# Patient Record
Sex: FEMALE | Race: WHITE | NOT HISPANIC OR LATINO | Employment: OTHER | URBAN - METROPOLITAN AREA
[De-identification: names, ages, dates, MRNs, and addresses within clinical notes are randomized per-mention and may not be internally consistent; named-entity substitution may affect disease eponyms.]

---

## 2017-04-03 ENCOUNTER — HOSPITAL ENCOUNTER (OUTPATIENT)
Dept: RADIOLOGY | Facility: HOSPITAL | Age: 82
Discharge: HOME | End: 2017-04-03
Attending: INTERNAL MEDICINE
Payer: MEDICARE

## 2017-04-03 DIAGNOSIS — M85.80 OSTEOPENIA: ICD-10-CM

## 2017-04-03 PROCEDURE — 77080 DXA BONE DENSITY AXIAL: CPT | Mod: 26,,, | Performed by: RADIOLOGY

## 2017-04-03 PROCEDURE — 77080 DXA BONE DENSITY AXIAL: CPT | Mod: TC

## 2017-10-30 ENCOUNTER — HOSPITAL ENCOUNTER (OUTPATIENT)
Dept: RADIOLOGY | Facility: HOSPITAL | Age: 82
Discharge: HOME | End: 2017-10-30
Attending: INTERNAL MEDICINE
Payer: MEDICARE

## 2017-10-30 DIAGNOSIS — R05.9 COUGH: Primary | ICD-10-CM

## 2017-10-30 DIAGNOSIS — R05.9 COUGH: ICD-10-CM

## 2017-10-30 PROCEDURE — 71020 XR CHEST PA AND LATERAL: CPT | Mod: TC

## 2017-10-30 PROCEDURE — 71020 XR CHEST PA AND LATERAL: CPT | Mod: 26,,, | Performed by: RADIOLOGY

## 2017-11-06 DIAGNOSIS — Z12.31 VISIT FOR SCREENING MAMMOGRAM: Primary | ICD-10-CM

## 2018-03-02 ENCOUNTER — HOSPITAL ENCOUNTER (OUTPATIENT)
Dept: RADIOLOGY | Facility: HOSPITAL | Age: 83
Discharge: HOME | End: 2018-03-02
Attending: INTERNAL MEDICINE
Payer: MEDICARE

## 2018-03-02 DIAGNOSIS — M25.562 ACUTE PAIN OF LEFT KNEE: ICD-10-CM

## 2018-03-02 DIAGNOSIS — M25.562 ACUTE PAIN OF LEFT KNEE: Primary | ICD-10-CM

## 2018-03-02 PROCEDURE — 73560 X-RAY EXAM OF KNEE 1 OR 2: CPT | Mod: TC,FY,LT

## 2018-03-02 PROCEDURE — 73560 X-RAY EXAM OF KNEE 1 OR 2: CPT | Mod: 26,LT,, | Performed by: RADIOLOGY

## 2018-03-19 ENCOUNTER — TELEPHONE (OUTPATIENT)
Dept: ORTHOPEDICS | Facility: CLINIC | Age: 83
End: 2018-03-19

## 2018-03-19 NOTE — TELEPHONE ENCOUNTER
----- Message from Pat Benoit sent at 3/19/2018  1:09 PM CDT -----  Contact: self  Pt is requesting a call back from [facility] regarding some information. She has been having problems with her knee and spoke to her daughter on yesterday.Pt could be reached at [phone]

## 2018-03-19 NOTE — TELEPHONE ENCOUNTER
Patient did not answer phone call. I left her a message to give us a call back so we can get her set up with one of our physicians.

## 2018-03-20 ENCOUNTER — TELEPHONE (OUTPATIENT)
Dept: ORTHOPEDICS | Facility: CLINIC | Age: 83
End: 2018-03-20

## 2018-03-20 NOTE — TELEPHONE ENCOUNTER
----- Message from Victoria Dalton MA sent at 3/20/2018 10:02 AM CDT -----  Patient would like to be seen tomorrow for bilateral knee pain.

## 2018-03-20 NOTE — TELEPHONE ENCOUNTER
Patient would like to be seen this week. I was able to schedule her with Aixa Green for tomorrow afternoon.

## 2018-03-20 NOTE — TELEPHONE ENCOUNTER
----- Message from Jesus Adam sent at 3/20/2018  9:22 AM CDT -----  Contact: self/home   Pt would like to speak with you. This is all the info pt provided.

## 2018-03-21 ENCOUNTER — OFFICE VISIT (OUTPATIENT)
Dept: ORTHOPEDICS | Facility: CLINIC | Age: 83
End: 2018-03-21
Payer: MEDICARE

## 2018-03-21 VITALS — HEIGHT: 64 IN | BODY MASS INDEX: 29.4 KG/M2 | WEIGHT: 172.19 LBS

## 2018-03-21 DIAGNOSIS — M25.562 ACUTE PAIN OF LEFT KNEE: Primary | ICD-10-CM

## 2018-03-21 DIAGNOSIS — M17.12 PRIMARY OSTEOARTHRITIS OF LEFT KNEE: ICD-10-CM

## 2018-03-21 PROCEDURE — 99999 PR PBB SHADOW E&M-EST. PATIENT-LVL IV: CPT | Mod: PBBFAC,,, | Performed by: NURSE PRACTITIONER

## 2018-03-21 PROCEDURE — 99203 OFFICE O/P NEW LOW 30 MIN: CPT | Mod: S$GLB,,, | Performed by: NURSE PRACTITIONER

## 2018-03-21 RX ORDER — AMLODIPINE AND BENAZEPRIL HYDROCHLORIDE 5; 20 MG/1; MG/1
1 CAPSULE ORAL DAILY
Refills: 3 | COMMUNITY
Start: 2017-12-16 | End: 2020-12-08

## 2018-03-21 RX ORDER — HYDROCHLOROTHIAZIDE 25 MG/1
TABLET ORAL
COMMUNITY
Start: 2018-02-12 | End: 2020-07-07

## 2018-03-21 RX ORDER — FLUTICASONE PROPIONATE 50 MCG
SPRAY, SUSPENSION (ML) NASAL
COMMUNITY
Start: 2018-01-15 | End: 2020-07-07 | Stop reason: SDUPTHER

## 2018-03-21 RX ORDER — TRIAMCINOLONE ACETONIDE 1 MG/G
CREAM TOPICAL 2 TIMES DAILY
COMMUNITY
End: 2021-02-02

## 2018-03-21 RX ORDER — ALENDRONATE SODIUM 70 MG/1
70 TABLET ORAL
COMMUNITY
Start: 2018-01-27 | End: 2020-07-07

## 2018-03-21 RX ORDER — OMEPRAZOLE 20 MG/1
CAPSULE, DELAYED RELEASE ORAL
COMMUNITY
Start: 2018-01-27 | End: 2020-07-07

## 2018-03-21 RX ORDER — BUDESONIDE AND FORMOTEROL FUMARATE DIHYDRATE 160; 4.5 UG/1; UG/1
AEROSOL RESPIRATORY (INHALATION)
COMMUNITY
Start: 2018-01-24 | End: 2021-02-02

## 2018-03-21 RX ORDER — SIMVASTATIN 20 MG/1
20 TABLET, FILM COATED ORAL NIGHTLY
Refills: 3 | COMMUNITY
Start: 2017-12-27 | End: 2020-07-07

## 2018-03-21 RX ORDER — PREGABALIN 50 MG/1
CAPSULE ORAL
COMMUNITY
Start: 2018-02-26 | End: 2020-07-07

## 2018-03-21 RX ORDER — ALBUTEROL SULFATE 0.83 MG/ML
2.5 SOLUTION RESPIRATORY (INHALATION) 2 TIMES DAILY PRN
COMMUNITY
Start: 2018-01-24

## 2018-03-21 RX ORDER — CELECOXIB 200 MG/1
200 CAPSULE ORAL DAILY
COMMUNITY
Start: 2018-03-02 | End: 2020-07-07

## 2018-03-21 RX ORDER — FLUTICASONE PROPIONATE AND SALMETEROL 50; 250 UG/1; UG/1
POWDER RESPIRATORY (INHALATION)
COMMUNITY
Start: 2018-01-07 | End: 2020-07-07

## 2018-03-21 NOTE — PROGRESS NOTES
CC: Pain of the Left Knee      HPI: Pt with left knee pain for the past month. The pain is severe and wakes her up at night. She was seen by her pcp and had an xray which shows arthritis and bone spurs. She has been taking celebrex and lyrica with little improvement. She has used ice and a tens unit that her daughter had which helped some. Her daughter reports that she has been trying holistic care and has her mother drinking apple cider vinegar to help get rid of the bone spurs because she read on the internet that it can help with that. She also has her mother drinking kale smoothies and giving up bread because she states she is 20 lbs overweight. She has to walk with a cane. She is unable to walk long distances due to knee pain, back pain, and copd.     ROS  General: denies fever and chills  Resp: no c/o sob  CVS: no c/o cp  MSK: c/o left knee pain which is burning and shooting and aching and worse with activity    PE  General: AAOx3, pleasant and cooperative  Resp: respirations even and unlabored  MSK: left knee exam  0 degrees extension  120 degrees flexion  No warmth or erythema   - effusion    Xray:  Reviewed by me: Degenerative changes have progressed from the prior exam. There is narrowing of the lateral tibiofemoral compartment and mild tricompartmental osteophytosis. Question chondrocalcinosis. Small joint effusion    Assessment:  Left knee djd with severe pain    Plan:  Pt's daughter requests an MRI to rule out a fracture. I explained that she will likely have degenerative changes of the meniscus and ligaments and those will not be repaired due to her age and arthritis, but the pt's daughter is concerned that she may have a fracture and wants to rule that out. MRI scheduled  Pt's daughter prefers a holistic approach and is not interested in cortisone injection for pain relief. She is only interested in the gel injections if the MRI shows arthritis.  Continue celebrex and lyrica as prescribed by  pcp  [facility] referral ordered as pt's daughter states that the copd, back pain, and knee pain are interfering with her ability to get out of the house and do things  Physical therapy ordered for aquatic therapy at [place]

## 2018-03-22 ENCOUNTER — TELEPHONE (OUTPATIENT)
Dept: ORTHOPEDICS | Facility: CLINIC | Age: 83
End: 2018-03-22

## 2018-03-22 NOTE — TELEPHONE ENCOUNTER
----- Message from Jesus Adam sent at 3/22/2018  1:06 PM CDT -----  Contact: self/home   Pt would like to speak with you regarding her PT.

## 2018-03-22 NOTE — TELEPHONE ENCOUNTER
Spoke with  and she informed me that she would like to go to  for PT instead of [place]. Aixa was notified and the referral was changed.New orders were faxed over to .

## 2018-03-23 ENCOUNTER — HOSPITAL ENCOUNTER (OUTPATIENT)
Dept: RADIOLOGY | Facility: HOSPITAL | Age: 83
Discharge: HOME | End: 2018-03-23
Attending: NURSE PRACTITIONER
Payer: MEDICARE

## 2018-03-23 DIAGNOSIS — M25.562 ACUTE PAIN OF LEFT KNEE: ICD-10-CM

## 2018-03-23 PROCEDURE — 73721 MRI JNT OF LWR EXTRE W/O DYE: CPT | Mod: TC,LT

## 2018-03-23 PROCEDURE — 73721 MRI JNT OF LWR EXTRE W/O DYE: CPT | Mod: 26,LT,, | Performed by: RADIOLOGY

## 2018-03-26 ENCOUNTER — TELEPHONE (OUTPATIENT)
Dept: ORTHOPEDICS | Facility: CLINIC | Age: 83
End: 2018-03-26

## 2018-03-26 NOTE — TELEPHONE ENCOUNTER
----- Message from Jennifer Magaña MA sent at 3/26/2018  3:58 PM CDT -----  Contact: self      ----- Message -----  From: Pat Benoit  Sent: 3/26/2018   3:34 PM  To: Peter Kruse Staff    Pt would like a return call back from BRANDON Green or Jennifer regarding her MRI results and some shots for her knee. Pt could be reached at [phone]

## 2018-03-26 NOTE — TELEPHONE ENCOUNTER
Called patient with her MRI results. She reports that her knees are feeling better. She is waiting to start PT at [facility]. Orders witting 3/22. Refaxed today.

## 2018-03-27 ENCOUNTER — TELEPHONE (OUTPATIENT)
Dept: ORTHOPEDICS | Facility: CLINIC | Age: 83
End: 2018-03-27

## 2018-03-27 NOTE — TELEPHONE ENCOUNTER
Spoke with  after speaking with Aixa and informed her that we didn't have anything available until the end of April. Pt was offered an earlier appt with another provider and Ms.Ulises did accept the offer. The pt was scheduled with Liv Adam on 4/9/18 at 9:30am.

## 2018-03-27 NOTE — TELEPHONE ENCOUNTER
----- Message from Aixa Green NP sent at 3/27/2018 10:53 AM CDT -----  Contact: self  Yes. She can come in at the next available or she can see someone else if she needs it sooner.  Thanks :-)  ----- Message -----  From: Jennifer Magaña MA  Sent: 3/27/2018   9:58 AM  To: Aixa Green NP    Did you tell them that they can have injections?  ----- Message -----  From: Ayla Suero  Sent: 3/27/2018   8:41 AM  To: Peter Kruse Staff    Pt would like to schedule knee injections.  She can be reached at [phone]

## 2018-04-09 ENCOUNTER — OFFICE VISIT (OUTPATIENT)
Dept: ORTHOPEDICS | Facility: CLINIC | Age: 83
End: 2018-04-09
Payer: MEDICARE

## 2018-04-09 VITALS — BODY MASS INDEX: 30.51 KG/M2 | HEIGHT: 63 IN | WEIGHT: 172.19 LBS

## 2018-04-09 DIAGNOSIS — M17.0 PRIMARY OSTEOARTHRITIS OF BOTH KNEES: Primary | ICD-10-CM

## 2018-04-09 PROCEDURE — 20610 DRAIN/INJ JOINT/BURSA W/O US: CPT | Mod: 50,S$GLB,, | Performed by: PHYSICIAN ASSISTANT

## 2018-04-09 PROCEDURE — 99999 PR PBB SHADOW E&M-EST. PATIENT-LVL III: CPT | Mod: PBBFAC,,, | Performed by: PHYSICIAN ASSISTANT

## 2018-04-09 PROCEDURE — 99213 OFFICE O/P EST LOW 20 MIN: CPT | Mod: 25,S$GLB,, | Performed by: PHYSICIAN ASSISTANT

## 2018-04-09 RX ORDER — TRIAMCINOLONE ACETONIDE 40 MG/ML
80 INJECTION, SUSPENSION INTRA-ARTICULAR; INTRAMUSCULAR
Status: COMPLETED | OUTPATIENT
Start: 2018-04-09 | End: 2018-04-09

## 2018-04-09 RX ADMIN — TRIAMCINOLONE ACETONIDE 80 MG: 40 INJECTION, SUSPENSION INTRA-ARTICULAR; INTRAMUSCULAR at 02:04

## 2018-04-09 NOTE — PROGRESS NOTES
CC: Bilateral knee pain    HPI: Pt with chronic atraumatic bilateral knee pain for the past several months. The pain is moderate to severe and wakes her up at night. Pain is medial and lateral. She has tried medications. Pain has improved somewhat since last visit with Aixa Green. She is starting aquatic therapy tomorrow. She is using cane for ambulation. She is unable to walk long distances due to knee pain, back pain, and copd.     ROS  General: denies fever and chills  Resp: no c/o sob  CVS: no c/o cp  MSK: c/o left knee pain which is burning and shooting and aching and worse with activity    PE  General: AAOx3, pleasant and cooperative  Resp: respirations even and unlabored  MSK: bilateral knee exam  0 degrees extension  120 degrees flexion  No warmth or erythema   - effusion  No pain with hip PROM bilaterally    Xray: Reviewed by me revealing degenerative changes have progressed from the prior exam. There is narrowing of the lateral tibiofemoral compartment and mild tricompartmental osteophytosis. Question chondrocalcinosis. Small joint effusion    Assessment:  Primary osteoarthritis of both knees      Plan:  Aquatic therapy  Soft compression brace ok  Steroid injections today  Would like to try Euflexxa if pain returns or does not improve with therapy and steroid injections. She understands that she needs to call in advance so that we can get approval with prior authorization from [employer].   F/u prn.    Knee Injection Procedure Note    Pre-operative Diagnosis: bilateral knee degenerative arthritis    Post-operative Diagnosis: same    Indications: bilateral knee pain    Anesthesia: none    Procedure Details     Verbal consent was obtained for the procedure. The injection site was identified and the skin was prepared with alcohol. The bilateral knee was injected from an anterolateral approach with 1 ml of Kenalog and 2 ml Lidocaine under sterile technique using a 22 gauge needle. The needle was removed and the  area cleansed and dressed.    Complications:  None; patient tolerated the procedure well.    she was advised to rest the knee today, using ice and elevation as needed for comfort and swelling. she did receive immediate relief of the knee pain. she was told this would be short lived and is secondary to the lidocaine. she may have an increase in discomfort tonight followed by steady improvement over the next several days. It may take 1-3 weeks following the injection to get the full benefit of the medication.

## 2018-04-11 ENCOUNTER — OFFICE VISIT (OUTPATIENT)
Dept: PHYSICAL MEDICINE AND REHAB | Facility: CLINIC | Age: 83
End: 2018-04-11
Payer: MEDICARE

## 2018-04-11 VITALS
HEIGHT: 63 IN | HEART RATE: 58 BPM | DIASTOLIC BLOOD PRESSURE: 57 MMHG | WEIGHT: 172.19 LBS | BODY MASS INDEX: 30.51 KG/M2 | SYSTOLIC BLOOD PRESSURE: 145 MMHG

## 2018-04-11 DIAGNOSIS — M17.0 PRIMARY OSTEOARTHRITIS OF BOTH KNEES: ICD-10-CM

## 2018-04-11 DIAGNOSIS — M47.812 DJD (DEGENERATIVE JOINT DISEASE), CERVICAL: ICD-10-CM

## 2018-04-11 DIAGNOSIS — M19.041 LOCALIZED PRIMARY OSTEOARTHRITIS OF BOTH HANDS: ICD-10-CM

## 2018-04-11 DIAGNOSIS — Z87.81 HISTORY OF VERTEBRAL COMPRESSION FRACTURE: ICD-10-CM

## 2018-04-11 DIAGNOSIS — M19.011 PRIMARY OSTEOARTHRITIS OF BOTH SHOULDERS: ICD-10-CM

## 2018-04-11 DIAGNOSIS — M47.26 OSTEOARTHRITIS OF SPINE WITH RADICULOPATHY, LUMBAR REGION: ICD-10-CM

## 2018-04-11 DIAGNOSIS — G89.29 CHRONIC MIDLINE LOW BACK PAIN WITH BILATERAL SCIATICA: ICD-10-CM

## 2018-04-11 DIAGNOSIS — M54.41 CHRONIC MIDLINE LOW BACK PAIN WITH BILATERAL SCIATICA: ICD-10-CM

## 2018-04-11 DIAGNOSIS — Z91.81 HISTORY OF FALL: ICD-10-CM

## 2018-04-11 DIAGNOSIS — M19.042 LOCALIZED PRIMARY OSTEOARTHRITIS OF BOTH HANDS: ICD-10-CM

## 2018-04-11 DIAGNOSIS — J43.9 PULMONARY EMPHYSEMA, UNSPECIFIED EMPHYSEMA TYPE: ICD-10-CM

## 2018-04-11 DIAGNOSIS — R26.9 GAIT DISORDER: Primary | ICD-10-CM

## 2018-04-11 DIAGNOSIS — M81.0 OSTEOPOROSIS, UNSPECIFIED OSTEOPOROSIS TYPE, UNSPECIFIED PATHOLOGICAL FRACTURE PRESENCE: ICD-10-CM

## 2018-04-11 DIAGNOSIS — R06.09 DOE (DYSPNEA ON EXERTION): ICD-10-CM

## 2018-04-11 DIAGNOSIS — M19.012 PRIMARY OSTEOARTHRITIS OF BOTH SHOULDERS: ICD-10-CM

## 2018-04-11 DIAGNOSIS — M54.42 CHRONIC MIDLINE LOW BACK PAIN WITH BILATERAL SCIATICA: ICD-10-CM

## 2018-04-11 PROCEDURE — 99999 PR PBB SHADOW E&M-EST. PATIENT-LVL III: CPT | Mod: PBBFAC,,, | Performed by: PHYSICAL MEDICINE & REHABILITATION

## 2018-04-11 PROCEDURE — 99204 OFFICE O/P NEW MOD 45 MIN: CPT | Mod: S$GLB,,, | Performed by: PHYSICAL MEDICINE & REHABILITATION

## 2018-04-11 NOTE — PROGRESS NOTES
Subjective:       Patient ID: Bridget Montgomery is a 83 y.o. female.    Chief Complaint: No chief complaint on file.    HPI     HISTORY OF PRESENT ILLNESS:  Mrs. Montgomery is an 83-year-old white female with   multiple medical problems, which presenting to the Physical Medicine Clinic for   evaluation for a power mobility device.  Her past medical history is significant   for hypertension, hyperlipidemia, emphysema, dyspnea on exertion, diffuse   osteoarthritis including OA of the knees, status post steroid injections with   limited relief, OA of the shoulders and OA of the hands.  She also has history   of chronic low back pain secondary to DJD with bilateral radiculopathy.  She   recalls history of osteoporosis with falling over a year ago.  She had a history   of lumbar compression fractures.    The patient lives with her daughter in a single-mercy home with three steps to   enter.  She is dependent with feeding herself after setup.  She requires   assistance for dressing and grooming.  She is independent with toileting, but it   takes her a long time.  She requires assistance for bathing with a tub bench.    She can ambulate with a straight cane or a rolling walker about 10 feet.  She is   restricted by dyspnea on exertion, chronic low back pain (up to 7-8/10), and   bilateral knee pain (up to 9/10).  She cannot propel a manual wheelchair due to   dyspnea on exertion, shoulder pain secondary to osteoarthritis (6-7/10) and hand   pain due to osteoarthritis (5-6/10).      MS/HN  dd: 04/11/2018 14:57:44 (CDT)  td: 04/12/2018 11:31:26 (CDT)  Doc ID   #[id number]  Job ID #[id number]    CC:       Review of Systems   Constitutional: Negative for fatigue.   Eyes: Negative for visual disturbance.   Respiratory: Positive for shortness of breath.    Cardiovascular: Negative for chest pain.   Gastrointestinal: Negative for nausea and vomiting.   Musculoskeletal: Positive for arthralgias, back pain, gait problem and neck pain.    Neurological: Negative for dizziness and headaches.   Psychiatric/Behavioral: Negative for behavioral problems.       Objective:      Physical Exam   Constitutional: She is oriented to person, place, and time. She appears well-developed and well-nourished. No distress.   HENT:   Head: Normocephalic and atraumatic.   Neck:   Decreased ROM.  Mild pain at end range.   Cardiovascular: Normal rate, regular rhythm and normal heart sounds.    Pulmonary/Chest: Effort normal. She has rales.   Abdominal: Soft.   Musculoskeletal:   BUE:  ROM:full.  Strength:    RUE: 3+/5 at shoulder abduction, 4 elbow flexion, 4 elbow extension, 4 hand .   LUE: 3+/5 at shoulder abduction, 4 elbow flexion, 4 elbow extension, 4 hand .  Sensation to pinprick:   RUE: intact.   LUE: intact.  DTR:    RUE: +2 biceps, +2 triceps.   LUE:  +2 biceps, +2 triceps.      BLE:  ROM:full.  + ve bilateral knee crepitus.   Strength:    RLE: 3/5 at hip flexion, 4 knee extension, 4+ ankle DF/PF.   LLE: 3+/5 at hip flexion, 4 knee extension, 4+ ankle DF/PF.  Sensation to pinprick:     RLE: intact.      LLE: intact.   DTR:     RLE: +2 knee, +1 ankle.    LLE: +2 knee, +1 ankle.  Clonus:    Rt ankle: -ve.    Lt ankle: -ve.  SLR (sitting):      RLE: +ve.      LLE: -ve.    Mild tenderness over lumbar spine.     Neurological: She is alert and oriented to person, place, and time.   Skin: Skin is warm.   Psychiatric: She has a normal mood and affect.   Vitals reviewed.      Assessment:       1. Gait disorder    2. Primary osteoarthritis of both knees    3. Pulmonary emphysema, unspecified emphysema type    4. KAISER (dyspnea on exertion)    5. Chronic midline low back pain with bilateral sciatica    6. Osteoarthritis of spine with radiculopathy, lumbar region    7. Localized primary osteoarthritis of both hands    8. Primary osteoarthritis of both shoulders    9. DJD (degenerative joint disease), cervical    10. Osteoporosis, unspecified osteoporosis type,  unspecified pathological fracture presence    11. History of vertebral compression fracture    12. History of fall        Summary/Plan:           - The patient was seen today for mobility evaluation for a power mobility device due to significant impairment at home.  - The patient has multifactorial gait impairment.  - The patient is not able to ambulate safely to the kitchen or living room.  - The patient is unable to use a walker functional distances due to KAISER b/o emphysema, chronic LBP with bilateral radiculopathy, bilateral knee pain b/o OA.  - The patient is unable to use an optimally-configured manual wheelchair at home due to KAISER b/o emphysema, bilateral hand and shoulder knee pain b/o OA.  - The patient has history of falls, which could be detrimental to her health due to osteoporosis with lumbar compression fractures.  - The patient has intact cognition and should be able to use a power mobility device well at home.  - The patient was given a prescription for an electric scooter.  - The patient has enough upper & lower extremity strength to be able to transfer to and from the power mobility device.  - The patient has enough range of motion & strength in BUE to allow functional operation of the tiller.  - The patient has good trunk balance and should be able to maintain a safe posture while operating a power mobility device.  - This will allow the patient to go safely to the kitchen, dining room or living room for feeding & socialization.   - The patient is to return the Physical Medicine/Mobility clinic prn.

## 2018-04-11 NOTE — LETTER
April 11, 2018      Aixa Green NP  [address]  [facility] [address]           [facility]  [address]  [facility] [address]  Phone: [phone]          Patient: Bridget Montgomery   MR Number: [id number]   YOB: 1935   Date of Visit: 4/11/2018       Dear Aixa Green:    Thank you for referring Bridget Montgomery to me for evaluation. Attached you will find relevant portions of my assessment and plan of care.    If you have questions, please do not hesitate to call me. I look forward to following Bridget Montgomery along with you.    Sincerely,    Vinayak Elena MD    Enclosure  CC:  No Recipients    If you would like to receive this communication electronically, please contact [email or web address] or [phone] to request more information on [facility] access.    For providers and/or their staff who would like to refer a patient to [facility], please contact us through our one-stop-shop provider referral line, [facility], at [phone].    If you feel you have received this communication in error or would no longer like to receive these types of communications, please e-mail [email or web address]

## 2018-05-31 ENCOUNTER — TELEPHONE (OUTPATIENT)
Dept: ORTHOPEDICS | Facility: CLINIC | Age: 83
End: 2018-05-31

## 2018-05-31 NOTE — TELEPHONE ENCOUNTER
----- Message from Liv Adam PA-C sent at 5/31/2018  1:35 PM CDT -----  Contact: self  If she wants Euflexxa (gel) then yes. I talked to her about it at her appointment. Make sure she knows it is 3 weeks and that it will be a few weeks before it is approved.  She had a steroid injection last time. If she wants that again she is a little bit too early and will have to wait another month. Let me know what she decides so I can order Euflexxa if needed.    ----- Message -----  From: Haroon Snyder MA  Sent: 5/31/2018  12:58 PM  To: Liv Adam PA-C    Hi does a referral need to be placed for me to make appointment?  ----- Message -----  From: Carole Ferraro  Sent: 5/31/2018  11:48 AM  To: Jair Pcek Staff    Pt called in about wanting to see if can another injection. Pt would like the nurse to give her a call back in regards to this matter      Pt can be reached at [phone]      TY

## 2018-05-31 NOTE — TELEPHONE ENCOUNTER
Called patient left message on voicemail to call back and discuss which injection she wants to get.

## 2018-06-08 DIAGNOSIS — M25.561 RIGHT KNEE PAIN, UNSPECIFIED CHRONICITY: Primary | ICD-10-CM

## 2018-06-13 ENCOUNTER — HOSPITAL ENCOUNTER (OUTPATIENT)
Dept: RADIOLOGY | Facility: HOSPITAL | Age: 83
Discharge: HOME | End: 2018-06-13
Attending: PHYSICIAN ASSISTANT
Payer: MEDICARE

## 2018-06-13 ENCOUNTER — OFFICE VISIT (OUTPATIENT)
Dept: ORTHOPEDICS | Facility: CLINIC | Age: 83
End: 2018-06-13
Payer: MEDICARE

## 2018-06-13 VITALS — WEIGHT: 172.19 LBS | BODY MASS INDEX: 30.51 KG/M2 | HEIGHT: 63 IN

## 2018-06-13 DIAGNOSIS — M17.0 PRIMARY OSTEOARTHRITIS OF BOTH KNEES: Primary | ICD-10-CM

## 2018-06-13 DIAGNOSIS — M25.561 RIGHT KNEE PAIN, UNSPECIFIED CHRONICITY: ICD-10-CM

## 2018-06-13 PROCEDURE — 73562 X-RAY EXAM OF KNEE 3: CPT | Mod: 26,XS,LT, | Performed by: RADIOLOGY

## 2018-06-13 PROCEDURE — 73562 X-RAY EXAM OF KNEE 3: CPT | Mod: TC,LT

## 2018-06-13 PROCEDURE — 99999 PR PBB SHADOW E&M-EST. PATIENT-LVL III: CPT | Mod: PBBFAC,,, | Performed by: PHYSICIAN ASSISTANT

## 2018-06-13 PROCEDURE — 99213 OFFICE O/P EST LOW 20 MIN: CPT | Mod: S$GLB,,, | Performed by: PHYSICIAN ASSISTANT

## 2018-06-13 PROCEDURE — 73564 X-RAY EXAM KNEE 4 OR MORE: CPT | Mod: 26,RT,, | Performed by: RADIOLOGY

## 2018-06-13 NOTE — PROGRESS NOTES
CC: Bilateral knee pain    HPI: Pt with chronic atraumatic bilateral knee pain for the past several months. The pain is severe and wakes her up at night. Pain is medial and lateral. She has tried medications. Pain improved somewhat after injections in April but pain has returned. She went to a wedding last weekend and after standing has swelling in both knees. She finished aquatic therapy which was very helpful. She is using cane for ambulation. She is unable to walk long distances due to knee pain, back pain, and copd.     ROS  General: denies fever and chills  Resp: no c/o sob  CVS: no c/o cp  MSK: c/o left knee pain which is burning and shooting and aching and worse with activity    PE  General: AAOx3, pleasant and cooperative  Resp: respirations even and unlabored  MSK: bilateral knee exam  0 degrees extension  120 degrees flexion  No warmth or erythema   - effusion  No pain with hip PROM bilaterally    Xray: Reviewed by me revealing degenerative changes have progressed from the prior exam. There is complete loss of lateral joint space with flexion.      Assessment:  Primary osteoarthritis of both knees    Plan:  Continue aquatic therapy  Soft compression brace ok  She would like to try Euflexxa. Will obtain authorization and give the knees a few weeks to calm down.   Discussed TKA.  If injections do not help, will think about tka in the fall  F/u for first bilateral Euflexxa

## 2018-06-25 ENCOUNTER — TELEPHONE (OUTPATIENT)
Dept: ORTHOPEDICS | Facility: CLINIC | Age: 83
End: 2018-06-25

## 2018-06-29 ENCOUNTER — TELEPHONE (OUTPATIENT)
Dept: ORTHOPEDICS | Facility: CLINIC | Age: 83
End: 2018-06-29

## 2018-06-29 NOTE — TELEPHONE ENCOUNTER
----- Message from Boom Coffey sent at 6/29/2018  9:32 AM CDT -----  Contact: Self/ [phone]  Patient would like a call back to make sure that it is ok for her to come in on Monday for her joint injection. The patient would like to know did it get approved by her insurance company.

## 2018-07-03 ENCOUNTER — TELEPHONE (OUTPATIENT)
Dept: ORTHOPEDICS | Facility: CLINIC | Age: 83
End: 2018-07-03

## 2018-07-03 ENCOUNTER — OFFICE VISIT (OUTPATIENT)
Dept: ORTHOPEDICS | Facility: CLINIC | Age: 83
End: 2018-07-03
Payer: MEDICARE

## 2018-07-03 VITALS
WEIGHT: 169.44 LBS | SYSTOLIC BLOOD PRESSURE: 127 MMHG | HEIGHT: 63 IN | HEART RATE: 59 BPM | DIASTOLIC BLOOD PRESSURE: 74 MMHG | BODY MASS INDEX: 30.02 KG/M2

## 2018-07-03 DIAGNOSIS — M17.0 PRIMARY OSTEOARTHRITIS OF BOTH KNEES: Primary | ICD-10-CM

## 2018-07-03 PROCEDURE — 20610 DRAIN/INJ JOINT/BURSA W/O US: CPT | Mod: 50,S$GLB,, | Performed by: PHYSICIAN ASSISTANT

## 2018-07-03 PROCEDURE — 99499 UNLISTED E&M SERVICE: CPT | Mod: S$GLB,,, | Performed by: PHYSICIAN ASSISTANT

## 2018-07-03 PROCEDURE — 99999 PR PBB SHADOW E&M-EST. PATIENT-LVL III: CPT | Mod: PBBFAC,,, | Performed by: PHYSICIAN ASSISTANT

## 2018-07-03 NOTE — PROGRESS NOTES
Bridget Montgomery presents to clinic today for the first bilateral knee Euflexxa injection.    Exam demonstrates the no effusion in the bilateral knee, and the skin is intact.    Diagnosis: osteoarthritis knee    After time out was performed and patient ID, side, and site were verified, the right knee and the left knee were sterilly prepped in the standard fashion.  A 22-gauge needle was introduced into the right knee and the left knee joint from an roly-lateral site without complication. The right knee and the left knee were then injected with 2 ml of Euflexxa each. Sterile dressing was applied.  The patient was instructed to resume activities as tolerated and to call with any problems.     We will see Bridget Montgomery back next week for the second injection.

## 2018-07-10 ENCOUNTER — OFFICE VISIT (OUTPATIENT)
Dept: ORTHOPEDICS | Facility: CLINIC | Age: 83
End: 2018-07-10
Payer: MEDICARE

## 2018-07-10 VITALS
HEIGHT: 63 IN | DIASTOLIC BLOOD PRESSURE: 67 MMHG | BODY MASS INDEX: 30.04 KG/M2 | HEART RATE: 68 BPM | WEIGHT: 169.56 LBS | SYSTOLIC BLOOD PRESSURE: 137 MMHG

## 2018-07-10 DIAGNOSIS — M17.0 PRIMARY OSTEOARTHRITIS OF BOTH KNEES: Primary | ICD-10-CM

## 2018-07-10 PROCEDURE — 99999 PR PBB SHADOW E&M-EST. PATIENT-LVL III: CPT | Mod: PBBFAC,,, | Performed by: PHYSICIAN ASSISTANT

## 2018-07-10 PROCEDURE — 20610 DRAIN/INJ JOINT/BURSA W/O US: CPT | Mod: 50,S$GLB,, | Performed by: PHYSICIAN ASSISTANT

## 2018-07-10 PROCEDURE — 99499 UNLISTED E&M SERVICE: CPT | Mod: S$GLB,,, | Performed by: PHYSICIAN ASSISTANT

## 2018-07-10 NOTE — PROGRESS NOTES
Bridget Montgomery presents to clinic today for the second bilateral knee Euflexxa injection.    Exam demonstrates the no effusion in the bilateral knee, and the skin is intact.    Diagnosis: osteoarthritis knee    After time out was performed and patient ID, side, and site were verified, the right knee and the left knee were sterilly prepped in the standard fashion.  A 22-gauge needle was introduced into the right knee and the left knee joint from an roly-lateral site without complication. The right knee and the left knee were then injected with 2 ml of Euflexxa each. Sterile dressing was applied.  The patient was instructed to resume activities as tolerated and to call with any problems.     We will see Bridget Montgomery back next week for the third injection.

## 2018-07-17 ENCOUNTER — OFFICE VISIT (OUTPATIENT)
Dept: ORTHOPEDICS | Facility: CLINIC | Age: 83
End: 2018-07-17
Payer: MEDICARE

## 2018-07-17 VITALS
HEIGHT: 63 IN | SYSTOLIC BLOOD PRESSURE: 136 MMHG | HEART RATE: 62 BPM | WEIGHT: 169.56 LBS | BODY MASS INDEX: 30.04 KG/M2 | DIASTOLIC BLOOD PRESSURE: 59 MMHG

## 2018-07-17 DIAGNOSIS — M17.0 PRIMARY OSTEOARTHRITIS OF BOTH KNEES: Primary | ICD-10-CM

## 2018-07-17 PROCEDURE — 20610 DRAIN/INJ JOINT/BURSA W/O US: CPT | Mod: 50,S$GLB,, | Performed by: PHYSICIAN ASSISTANT

## 2018-07-17 PROCEDURE — 99499 UNLISTED E&M SERVICE: CPT | Mod: S$GLB,,, | Performed by: PHYSICIAN ASSISTANT

## 2018-07-17 PROCEDURE — 99999 PR PBB SHADOW E&M-EST. PATIENT-LVL III: CPT | Mod: PBBFAC,,, | Performed by: PHYSICIAN ASSISTANT

## 2018-07-17 NOTE — PROGRESS NOTES
Bridget Montgomery presents to clinic today for the third bilateral knee Euflexxa injection.    Exam demonstrates the no effusion in the bilateral knee, and the skin is intact.    Diagnosis: osteoarthritis knee    After time out was performed and patient ID, side, and site were verified, the right knee and the left knee were sterilly prepped in the standard fashion.  A 22-gauge needle was introduced into the right knee and the left knee joint from an roly-lateral site without complication. The right knee and the left knee were then injected with 2 ml of Euflexxa each. Sterile dressing was applied.  The patient was instructed to resume activities as tolerated and to call with any problems.     Continue aquatic exercise. F/u prn.

## 2019-01-21 ENCOUNTER — OFFICE VISIT (OUTPATIENT)
Dept: URGENT CARE | Facility: CLINIC | Age: 84
End: 2019-01-21
Payer: MEDICARE

## 2019-01-21 VITALS
SYSTOLIC BLOOD PRESSURE: 159 MMHG | BODY MASS INDEX: 29.95 KG/M2 | OXYGEN SATURATION: 97 % | TEMPERATURE: 98 F | HEIGHT: 63 IN | RESPIRATION RATE: 16 BRPM | WEIGHT: 169 LBS | DIASTOLIC BLOOD PRESSURE: 66 MMHG | HEART RATE: 70 BPM

## 2019-01-21 DIAGNOSIS — R05.9 COUGHING: Primary | ICD-10-CM

## 2019-01-21 LAB
CTP QC/QA: YES
FLUAV AG NPH QL: NEGATIVE
FLUBV AG NPH QL: NEGATIVE

## 2019-01-21 PROCEDURE — 1100F PR PT FALLS ASSESS DOC 2+ FALLS/FALL W/INJURY/YR: ICD-10-PCS | Mod: CPTII,S$GLB,, | Performed by: NURSE PRACTITIONER

## 2019-01-21 PROCEDURE — 99214 PR OFFICE/OUTPT VISIT, EST, LEVL IV, 30-39 MIN: ICD-10-PCS | Mod: S$GLB,,, | Performed by: NURSE PRACTITIONER

## 2019-01-21 PROCEDURE — 3288F FALL RISK ASSESSMENT DOCD: CPT | Mod: CPTII,S$GLB,, | Performed by: NURSE PRACTITIONER

## 2019-01-21 PROCEDURE — 87804 INFLUENZA ASSAY W/OPTIC: CPT | Mod: QW,S$GLB,, | Performed by: NURSE PRACTITIONER

## 2019-01-21 PROCEDURE — 3288F PR FALLS RISK ASSESSMENT DOCUMENTED: ICD-10-PCS | Mod: CPTII,S$GLB,, | Performed by: NURSE PRACTITIONER

## 2019-01-21 PROCEDURE — 71046 XR CHEST PA AND LATERAL: ICD-10-PCS | Mod: FY,S$GLB,, | Performed by: RADIOLOGY

## 2019-01-21 PROCEDURE — 87804 POCT INFLUENZA A/B: ICD-10-PCS | Mod: QW,S$GLB,, | Performed by: NURSE PRACTITIONER

## 2019-01-21 PROCEDURE — 71046 X-RAY EXAM CHEST 2 VIEWS: CPT | Mod: FY,S$GLB,, | Performed by: RADIOLOGY

## 2019-01-21 PROCEDURE — 99214 OFFICE O/P EST MOD 30 MIN: CPT | Mod: S$GLB,,, | Performed by: NURSE PRACTITIONER

## 2019-01-21 PROCEDURE — 1100F PTFALLS ASSESS-DOCD GE2>/YR: CPT | Mod: CPTII,S$GLB,, | Performed by: NURSE PRACTITIONER

## 2019-01-21 RX ORDER — CLARITHROMYCIN 250 MG/1
250 TABLET, FILM COATED ORAL EVERY 12 HOURS
Qty: 14 TABLET | Refills: 0 | Status: SHIPPED | OUTPATIENT
Start: 2019-01-21 | End: 2019-01-28

## 2019-01-21 RX ORDER — TRAMADOL HYDROCHLORIDE 50 MG/1
50 TABLET ORAL NIGHTLY
Refills: 3 | COMMUNITY
Start: 2019-01-01 | End: 2020-08-25

## 2019-01-21 NOTE — PATIENT INSTRUCTIONS
Chest x ray showed no acute findings.  If you were prescribed a narcotic or controlled medication, do not drive or operate heavy equipment or machinery while taking these medications.  You must understand that you've received an Urgent Care treatment only and that you may be released before all your medical problems are known or treated. You, the patient, will arrange for follow up care as instructed.  Follow up with your PCP or specialty clinic as directed in the next 1-2 weeks if not improved or as needed.  You can call [phone] to schedule an appointment with the appropriate provider.  If your condition worsens we recommend that you receive another evaluation at the emergency room immediately or contact your primary medical clinics after hours call service to discuss your concerns.  Please return here or go to the Emergency Department for any concerns or worsening of condition.      Acute Bacterial Rhinosinusitis (ABRS)    Acute bacterial rhinosinusitis (ABRS) is an infection of your nasal cavity and sinuses. Its caused by bacteria. Acute means that youve had symptoms for less than 12 weeks.  Understanding your sinuses  The nasal cavity is the large air-filled space behind your nose. The sinuses are a group of spaces formed by the bones of your face. They connect with your nasal cavity. ABRS causes the tissue lining these spaces to become inflamed. Mucus may not drain normally. This leads to facial pain and other symptoms.  What causes ABRS?  ABRS most often follows an upper respiratory infection caused by a virus. Bacteria then infect the lining of your nasal cavity and sinuses. But you can also get ABRS if you have:  · Nasal allergies  · Long-term nasal swelling and congestion not caused by allergies  · Blockage in the nose  Symptoms of ABRS  The symptoms of ABRS may be different for each person, and can include:  · Nasal congestion  · Runny nose  · Fluid draining from the nose down the throat  (postnasal drip)  · Headache  · Cough  · Pain in the sinuses  · Thick, colored fluid from the nose (mucus)  · Fever  Diagnosing ABRS  ABRS may be diagnosed if youve had an upper respiratory infection like a cold and cough for longer than 10 to 14 days. Your health care provider will ask about your symptoms and your medical history. The provider will check your vital signs, including your temperature. Youll have a physical exam. The health care provider will check your ears, nose, and throat. You likely wont need any tests. If ABRS comes back, you may have a culture or other tests.  Treatment for ABRS  Treatment may include:  · Antibiotic medicine. This is for symptoms that last for at least 10 to 14 days.  · Nasal corticosteroid medicine. Drops or spray used in the nose can lessen swelling and congestion.  · Over-the-counter pain medicine. This is to lessen sinus pain and pressure.  · Nasal decongestant medicine. Spray or drops may help to lessen congestion. Do not use them for more than a few days.  · Salt wash (saline irrigation). This can help to loosen mucus.  Possible complications of ABRS  ABRS may come back or become long-term (chronic).  In rare cases, ABRS may cause complications such as:   · Inflamed tissue around the brain and spinal cord (meningitis)  · Inflamed tissue around the eyes (orbital cellulitis)  · Inflamed bones around the sinuses (osteitis)  These problems may need to be treated in a hospital with intravenous (IV) antibiotic medicine or surgery.  When to call the health care provider  Call your health care provider if you have any of the following:  · Symptoms that dont get better, or get worse  · Symptoms that dont get better after 3 to 5 days on antibiotics  · Trouble seeing  · Swelling around your eyes  · Confusion or trouble staying awake   Date Last Reviewed: 3/3/2015  © 8626-0050 [employer]. [address] [place] [address], [place] [address]. All rights reserved. This  information is not intended as a substitute for professional medical care. Always follow your healthcare professional's instructions.

## 2019-01-21 NOTE — PROGRESS NOTES
"Subjective:       Patient ID: Bridget Montgomery is a 83 y.o. female.    Vitals:  height is 5' 3" (1.6 m) and weight is 76.7 kg (169 lb). Her oral temperature is 98 °F (36.7 °C). Her blood pressure is 159/66 (abnormal) and her pulse is 70. Her respiration is 16 and oxygen saturation is 97%.     Chief Complaint: Cough    Patient states her symptoms started on 1/17/19. Patient states she has been coughing, having sinus pressure, nosebleeds, itchy eyes, and blurred vision.      Cough   This is a new problem. The current episode started in the past 7 days. The problem has been unchanged. The problem occurs constantly. The cough is productive of sputum. Associated symptoms include headaches, nasal congestion, postnasal drip, shortness of breath and wheezing. Pertinent negatives include no chills, ear pain, eye redness, fever, hemoptysis, myalgias, rash or sore throat. The symptoms are aggravated by lying down. Treatments tried: oral decongestants. The treatment provided mild relief. Her past medical history is significant for bronchitis, COPD and emphysema.       Constitution: Negative for chills, sweating, fatigue and fever.   HENT: Positive for congestion, nosebleeds, postnasal drip and sinus pressure. Negative for ear pain, sinus pain, sore throat and voice change.    Neck: Negative for painful lymph nodes.   Eyes: Positive for eye discharge, eye itching and blurred vision. Negative for eye redness.   Respiratory: Positive for cough, sputum production, shortness of breath and wheezing. Negative for chest tightness, bloody sputum, COPD, stridor and asthma.    Gastrointestinal: Positive for nausea. Negative for vomiting.   Musculoskeletal: Negative for muscle ache.   Skin: Negative for rash.   Allergic/Immunologic: Negative for seasonal allergies and asthma.   Neurological: Positive for headaches.   Hematologic/Lymphatic: Negative for swollen lymph nodes.       Objective:      Physical Exam   Constitutional: She is " oriented to person, place, and time. Vital signs are normal. She appears well-developed and well-nourished. She is active and cooperative.  Non-toxic appearance. She does not have a sickly appearance. She does not appear ill. No distress.   HENT:   Head: Normocephalic and atraumatic.   Right Ear: Hearing, tympanic membrane, external ear and ear canal normal.   Left Ear: Hearing, tympanic membrane, external ear and ear canal normal.   Nose: Mucosal edema and rhinorrhea present. Right sinus exhibits maxillary sinus tenderness. Right sinus exhibits no frontal sinus tenderness. Left sinus exhibits maxillary sinus tenderness. Left sinus exhibits no frontal sinus tenderness.   Mouth/Throat: Uvula is midline and mucous membranes are normal. Posterior oropharyngeal erythema (mild) present. No oropharyngeal exudate, posterior oropharyngeal edema or tonsillar abscesses.   Eyes: Conjunctivae, EOM and lids are normal. Pupils are equal, round, and reactive to light.   Neck: Trachea normal, normal range of motion and full passive range of motion without pain. Neck supple.   Cardiovascular: Regular rhythm. Exam reveals no decreased pulses.   Pulses:       Radial pulses are 2+ on the right side, and 1+ on the left side.   Pulmonary/Chest: Effort normal and breath sounds normal.   Abdominal: Normal appearance and bowel sounds are normal. There is no tenderness.   Musculoskeletal: Normal range of motion.   Neurological: She is alert and oriented to person, place, and time. Gait normal.   Skin: Skin is warm. Capillary refill takes less than 2 seconds. No rash noted.   Psychiatric: She has a normal mood and affect. Her speech is normal and behavior is normal. Judgment and thought content normal. Cognition and memory are normal.   Nursing note and vitals reviewed.      Assessment:       1. Coughing        Results for orders placed or performed in visit on 01/21/19   POCT Influenza A/B   Result Value Ref Range    Rapid Influenza A Ag  Negative Negative    Rapid Influenza B Ag Negative Negative     Acceptable Yes        Plan:    Patient will be discharged home with clarithromycin.  Presentation most consistent with bacteria rhino sinusitis versus chronic bacterial bronchitis. Hx of bronchitis. Patient also report tenderness to maxillary sinus. Patient reports relief from symptoms with this abx prescribed. Patient advised to follow-up primary care provider.  Patient showed no signs of acute distress prior to discharge. Patient lung sounds clear in all lung fields.  Patient noted to be congested.  Patient denies any blurry vision.  No epistaxis noted.    Coughing  -     XR CHEST PA AND LATERAL; Future; Expected date: 01/21/2019      Patient Instructions     Chest x ray showed no acute findings.    Acute Bacterial Rhinosinusitis (ABRS)    Acute bacterial rhinosinusitis (ABRS) is an infection of your nasal cavity and sinuses. Its caused by bacteria. Acute means that youve had symptoms for less than 12 weeks.  Understanding your sinuses  The nasal cavity is the large air-filled space behind your nose. The sinuses are a group of spaces formed by the bones of your face. They connect with your nasal cavity. ABRS causes the tissue lining these spaces to become inflamed. Mucus may not drain normally. This leads to facial pain and other symptoms.  What causes ABRS?  ABRS most often follows an upper respiratory infection caused by a virus. Bacteria then infect the lining of your nasal cavity and sinuses. But you can also get ABRS if you have:  · Nasal allergies  · Long-term nasal swelling and congestion not caused by allergies  · Blockage in the nose  Symptoms of ABRS  The symptoms of ABRS may be different for each person, and can include:  · Nasal congestion  · Runny nose  · Fluid draining from the nose down the throat (postnasal drip)  · Headache  · Cough  · Pain in the sinuses  · Thick, colored fluid from the nose  (mucus)  · Fever  Diagnosing ABRS  ABRS may be diagnosed if youve had an upper respiratory infection like a cold and cough for longer than 10 to 14 days. Your health care provider will ask about your symptoms and your medical history. The provider will check your vital signs, including your temperature. Youll have a physical exam. The health care provider will check your ears, nose, and throat. You likely wont need any tests. If ABRS comes back, you may have a culture or other tests.  Treatment for ABRS  Treatment may include:  · Antibiotic medicine. This is for symptoms that last for at least 10 to 14 days.  · Nasal corticosteroid medicine. Drops or spray used in the nose can lessen swelling and congestion.  · Over-the-counter pain medicine. This is to lessen sinus pain and pressure.  · Nasal decongestant medicine. Spray or drops may help to lessen congestion. Do not use them for more than a few days.  · Salt wash (saline irrigation). This can help to loosen mucus.  Possible complications of ABRS  ABRS may come back or become long-term (chronic).  In rare cases, ABRS may cause complications such as:   · Inflamed tissue around the brain and spinal cord (meningitis)  · Inflamed tissue around the eyes (orbital cellulitis)  · Inflamed bones around the sinuses (osteitis)  These problems may need to be treated in a hospital with intravenous (IV) antibiotic medicine or surgery.  When to call the health care provider  Call your health care provider if you have any of the following:  · Symptoms that dont get better, or get worse  · Symptoms that dont get better after 3 to 5 days on antibiotics  · Trouble seeing  · Swelling around your eyes  · Confusion or trouble staying awake   Date Last Reviewed: 3/3/2015  © 7466-4239 [employer]. [address] [place] [address], [place] [address]. All rights reserved. This information is not intended as a substitute for professional medical care. Always follow your healthcare  professional's instructions.

## 2019-01-21 NOTE — PROGRESS NOTES
"Subjective:       Patient ID: Bridget Montgomery is a 83 y.o. female.    Vitals:  height is 5' 3" (1.6 m) and weight is 76.7 kg (169 lb). Her oral temperature is 98 °F (36.7 °C). Her blood pressure is 159/66 (abnormal) and her pulse is 70. Her respiration is 16 and oxygen saturation is 97%.     Chief Complaint: Cough    Patient states her symptoms started on 1/17/19. Patient states she has been coughing, having sinus pressure, nosebleeds, itchy eyes, and blurred vision.      Cough   This is a new problem. The current episode started in the past 7 days. The problem has been unchanged. The problem occurs constantly. The cough is productive of sputum. Associated symptoms include headaches, nasal congestion, postnasal drip, shortness of breath and wheezing. Pertinent negatives include no chills, ear pain, eye redness, fever, hemoptysis, myalgias, rash or sore throat. The symptoms are aggravated by lying down. Treatments tried: oral decongestants. The treatment provided mild relief. Her past medical history is significant for bronchitis, COPD and emphysema.       Constitution: Negative for chills, sweating, fatigue and fever.   HENT: Positive for congestion, nosebleeds, postnasal drip and sinus pressure. Negative for ear pain, sinus pain, sore throat and voice change.    Neck: Negative for painful lymph nodes.   Eyes: Positive for eye discharge, eye itching and blurred vision. Negative for eye redness.   Respiratory: Positive for cough, sputum production, shortness of breath and wheezing. Negative for chest tightness, bloody sputum, COPD, stridor and asthma.    Gastrointestinal: Positive for nausea. Negative for vomiting.   Musculoskeletal: Negative for muscle ache.   Skin: Negative for rash.   Allergic/Immunologic: Negative for seasonal allergies and asthma.   Neurological: Positive for headaches.   Hematologic/Lymphatic: Negative for swollen lymph nodes.       Objective:      Physical Exam   Constitutional: She is " oriented to person, place, and time. Vital signs are normal. She appears well-developed and well-nourished. She is active and cooperative.  Non-toxic appearance. She does not have a sickly appearance. She does not appear ill. No distress.   HENT:   Head: Normocephalic and atraumatic.   Right Ear: Hearing, tympanic membrane, external ear and ear canal normal.   Left Ear: Hearing, tympanic membrane, external ear and ear canal normal.   Nose: Mucosal edema and rhinorrhea present. Right sinus exhibits maxillary sinus tenderness. Right sinus exhibits no frontal sinus tenderness. Left sinus exhibits maxillary sinus tenderness. Left sinus exhibits no frontal sinus tenderness.   Mouth/Throat: Uvula is midline and mucous membranes are normal. Posterior oropharyngeal erythema (mild) present. No oropharyngeal exudate, posterior oropharyngeal edema or tonsillar abscesses.   Eyes: Conjunctivae, EOM and lids are normal. Pupils are equal, round, and reactive to light.   Neck: Trachea normal, normal range of motion and full passive range of motion without pain. Neck supple.   Cardiovascular: Regular rhythm. Exam reveals no decreased pulses.   Pulses:       Radial pulses are 2+ on the right side, and 1+ on the left side.   Pulmonary/Chest: Effort normal and breath sounds normal.   Abdominal: Normal appearance and bowel sounds are normal. There is no tenderness.   Musculoskeletal: Normal range of motion.   Neurological: She is alert and oriented to person, place, and time. Gait normal.   Skin: Skin is warm. Capillary refill takes less than 2 seconds. No rash noted.   Psychiatric: She has a normal mood and affect. Her speech is normal and behavior is normal. Judgment and thought content normal. Cognition and memory are normal.   Nursing note and vitals reviewed.      Assessment:       1. Coughing        Plan:         Coughing  -     XR CHEST PA AND LATERAL; Future; Expected date: 01/21/2019

## 2019-03-22 ENCOUNTER — HOSPITAL ENCOUNTER (OUTPATIENT)
Dept: RADIOLOGY | Facility: HOSPITAL | Age: 84
Discharge: HOME | End: 2019-03-22
Attending: INTERNAL MEDICINE
Payer: MEDICARE

## 2019-03-22 DIAGNOSIS — Z01.818 PREOP EXAMINATION: Primary | ICD-10-CM

## 2019-03-22 DIAGNOSIS — Z01.818 PREOP EXAMINATION: ICD-10-CM

## 2019-03-22 PROCEDURE — 71046 X-RAY EXAM CHEST 2 VIEWS: CPT | Mod: 26,,, | Performed by: RADIOLOGY

## 2019-03-22 PROCEDURE — 71046 X-RAY EXAM CHEST 2 VIEWS: CPT | Mod: TC,FY

## 2019-03-22 PROCEDURE — 71046 XR CHEST PA AND LATERAL: ICD-10-PCS | Mod: 26,,, | Performed by: RADIOLOGY

## 2019-06-20 ENCOUNTER — HOSPITAL ENCOUNTER (OUTPATIENT)
Dept: RADIOLOGY | Facility: HOSPITAL | Age: 84
Discharge: HOME | End: 2019-06-20
Attending: INTERNAL MEDICINE
Payer: MEDICARE

## 2019-06-20 DIAGNOSIS — R22.31 LOCALIZED SWELLING, MASS, OR LUMP OF UPPER EXTREMITY, RIGHT: ICD-10-CM

## 2019-06-20 DIAGNOSIS — R22.31 LOCALIZED SWELLING, MASS, OR LUMP OF UPPER EXTREMITY, RIGHT: Primary | ICD-10-CM

## 2019-06-20 PROCEDURE — 73030 XR SHOULDER COMPLETE 2 OR MORE VIEWS RIGHT: ICD-10-PCS | Mod: 26,RT,, | Performed by: RADIOLOGY

## 2019-06-20 PROCEDURE — 73030 X-RAY EXAM OF SHOULDER: CPT | Mod: TC,FY,RT

## 2019-06-20 PROCEDURE — 73030 X-RAY EXAM OF SHOULDER: CPT | Mod: 26,RT,, | Performed by: RADIOLOGY

## 2020-07-07 PROBLEM — J44.9 CHRONIC OBSTRUCTIVE PULMONARY DISEASE: Status: ACTIVE | Noted: 2020-07-07

## 2020-07-07 PROBLEM — M19.90 IDIOPATHIC OSTEOARTHRITIS: Status: ACTIVE | Noted: 2020-07-07

## 2020-07-07 PROBLEM — G95.9 SPINAL CORD DISORDER: Status: ACTIVE | Noted: 2018-08-17

## 2020-07-07 PROBLEM — J30.9 ALLERGIC RHINITIS: Status: ACTIVE | Noted: 2020-07-07

## 2020-07-07 PROBLEM — N83.8 OVARIAN MASS: Status: ACTIVE | Noted: 2019-03-22

## 2020-07-07 PROBLEM — M16.10 PRIMARY LOCALIZED OSTEOARTHRITIS OF PELVIC REGION AND THIGH: Status: ACTIVE | Noted: 2020-07-07

## 2020-07-07 PROBLEM — K22.10 ULCER OF ESOPHAGUS: Status: ACTIVE | Noted: 2019-01-29

## 2020-07-07 PROBLEM — E78.2 MIXED HYPERLIPIDEMIA: Status: ACTIVE | Noted: 2020-07-07

## 2020-07-07 PROBLEM — M48.061 SPINAL STENOSIS OF LUMBAR REGION: Status: ACTIVE | Noted: 2020-07-07

## 2020-07-07 PROBLEM — F33.0 MILD RECURRENT MAJOR DEPRESSION: Status: ACTIVE | Noted: 2020-07-07

## 2020-07-07 PROBLEM — M47.816 LUMBAR SPONDYLOSIS: Status: ACTIVE | Noted: 2018-08-17

## 2020-07-07 PROBLEM — M54.31 NEURALGIA OF RIGHT SCIATIC NERVE: Status: ACTIVE | Noted: 2020-07-07

## 2020-07-07 PROBLEM — M85.80 OSTEOPENIA: Status: ACTIVE | Noted: 2020-07-07

## 2020-07-07 PROBLEM — D69.2 SENILE PURPURA: Status: ACTIVE | Noted: 2020-07-07

## 2020-07-07 PROBLEM — E11.21 DIABETIC RENAL DISEASE: Status: ACTIVE | Noted: 2020-07-07

## 2020-12-08 PROBLEM — M70.60 GREATER TROCHANTERIC BURSITIS: Status: ACTIVE | Noted: 2020-12-08

## 2020-12-08 PROBLEM — M17.0 OSTEOARTHRITIS OF BOTH KNEES: Status: ACTIVE | Noted: 2020-12-08

## 2021-04-26 ENCOUNTER — LAB VISIT (OUTPATIENT)
Dept: LAB | Facility: HOSPITAL | Age: 86
End: 2021-04-26
Attending: INTERNAL MEDICINE
Payer: MEDICARE

## 2021-04-26 DIAGNOSIS — I10 ESSENTIAL HYPERTENSION: ICD-10-CM

## 2021-04-26 DIAGNOSIS — E86.0 DEHYDRATION: ICD-10-CM

## 2021-04-26 DIAGNOSIS — R19.7 DIARRHEA, UNSPECIFIED TYPE: ICD-10-CM

## 2021-04-26 LAB
ANION GAP SERPL CALC-SCNC: 8 MMOL/L (ref 8–16)
BASOPHILS # BLD AUTO: 0.07 K/UL (ref 0–0.2)
BASOPHILS NFR BLD: 1.6 % (ref 0–1.9)
BUN SERPL-MCNC: 60 MG/DL (ref 8–23)
CALCIUM SERPL-MCNC: 8.6 MG/DL (ref 8.7–10.5)
CHLORIDE SERPL-SCNC: 102 MMOL/L (ref 95–110)
CO2 SERPL-SCNC: 24 MMOL/L (ref 23–29)
CREAT SERPL-MCNC: 1.5 MG/DL (ref 0.5–1.4)
DIFFERENTIAL METHOD: ABNORMAL
EOSINOPHIL # BLD AUTO: 0.2 K/UL (ref 0–0.5)
EOSINOPHIL NFR BLD: 3.4 % (ref 0–8)
ERYTHROCYTE [DISTWIDTH] IN BLOOD BY AUTOMATED COUNT: 12.2 % (ref 11.5–14.5)
EST. GFR  (AFRICAN AMERICAN): 36 ML/MIN/1.73 M^2
EST. GFR  (NON AFRICAN AMERICAN): 31 ML/MIN/1.73 M^2
GLUCOSE SERPL-MCNC: 113 MG/DL (ref 70–110)
HCT VFR BLD AUTO: 30 % (ref 37–48.5)
HGB BLD-MCNC: 9.9 G/DL (ref 12–16)
IMM GRANULOCYTES # BLD AUTO: 0.01 K/UL (ref 0–0.04)
IMM GRANULOCYTES NFR BLD AUTO: 0.2 % (ref 0–0.5)
LYMPHOCYTES # BLD AUTO: 0.9 K/UL (ref 1–4.8)
LYMPHOCYTES NFR BLD: 21.3 % (ref 18–48)
MCH RBC QN AUTO: 30.6 PG (ref 27–31)
MCHC RBC AUTO-ENTMCNC: 33 G/DL (ref 32–36)
MCV RBC AUTO: 93 FL (ref 82–98)
MONOCYTES # BLD AUTO: 0.6 K/UL (ref 0.3–1)
MONOCYTES NFR BLD: 13.6 % (ref 4–15)
NEUTROPHILS # BLD AUTO: 2.7 K/UL (ref 1.8–7.7)
NEUTROPHILS NFR BLD: 59.9 % (ref 38–73)
NRBC BLD-RTO: 0 /100 WBC
PLATELET # BLD AUTO: 389 K/UL (ref 150–450)
PMV BLD AUTO: 9.5 FL (ref 9.2–12.9)
POTASSIUM SERPL-SCNC: 5 MMOL/L (ref 3.5–5.1)
RBC # BLD AUTO: 3.24 M/UL (ref 4–5.4)
SODIUM SERPL-SCNC: 134 MMOL/L (ref 136–145)
WBC # BLD AUTO: 4.42 K/UL (ref 3.9–12.7)

## 2021-04-26 PROCEDURE — 36415 COLL VENOUS BLD VENIPUNCTURE: CPT | Performed by: INTERNAL MEDICINE

## 2021-04-26 PROCEDURE — 85025 COMPLETE CBC W/AUTO DIFF WBC: CPT | Performed by: INTERNAL MEDICINE

## 2021-04-26 PROCEDURE — 80048 BASIC METABOLIC PNL TOTAL CA: CPT | Performed by: INTERNAL MEDICINE

## 2021-08-12 DIAGNOSIS — U07.1 COVID-19 VIRUS INFECTION: Primary | ICD-10-CM

## 2021-08-13 ENCOUNTER — INFUSION (OUTPATIENT)
Dept: INFECTIOUS DISEASES | Facility: HOSPITAL | Age: 86
End: 2021-08-13
Attending: INTERNAL MEDICINE
Payer: MEDICARE

## 2021-08-13 VITALS
DIASTOLIC BLOOD PRESSURE: 74 MMHG | HEIGHT: 64 IN | WEIGHT: 154 LBS | TEMPERATURE: 98 F | OXYGEN SATURATION: 98 % | RESPIRATION RATE: 18 BRPM | HEART RATE: 84 BPM | SYSTOLIC BLOOD PRESSURE: 155 MMHG | BODY MASS INDEX: 26.29 KG/M2

## 2021-08-13 DIAGNOSIS — U07.1 COVID-19 VIRUS INFECTION: Primary | ICD-10-CM

## 2021-08-13 PROCEDURE — 25000003 PHARM REV CODE 250: Performed by: INTERNAL MEDICINE

## 2021-08-13 PROCEDURE — 63600175 PHARM REV CODE 636 W HCPCS: Performed by: INTERNAL MEDICINE

## 2021-08-13 PROCEDURE — M0243 CASIRIVI AND IMDEVI INFUSION: HCPCS | Performed by: INTERNAL MEDICINE

## 2021-08-13 RX ORDER — ALBUTEROL SULFATE 90 UG/1
2 AEROSOL, METERED RESPIRATORY (INHALATION)
Status: DISCONTINUED | OUTPATIENT
Start: 2021-08-13 | End: 2021-09-24

## 2021-08-13 RX ORDER — ACETAMINOPHEN 325 MG/1
650 TABLET ORAL ONCE AS NEEDED
Status: DISCONTINUED | OUTPATIENT
Start: 2021-08-13 | End: 2021-09-24

## 2021-08-13 RX ORDER — DIPHENHYDRAMINE HYDROCHLORIDE 50 MG/ML
25 INJECTION INTRAMUSCULAR; INTRAVENOUS ONCE AS NEEDED
Status: DISCONTINUED | OUTPATIENT
Start: 2021-08-13 | End: 2021-09-24

## 2021-08-13 RX ORDER — ONDANSETRON 4 MG/1
4 TABLET, ORALLY DISINTEGRATING ORAL ONCE AS NEEDED
Status: DISCONTINUED | OUTPATIENT
Start: 2021-08-13 | End: 2021-09-24

## 2021-08-13 RX ORDER — EPINEPHRINE 0.3 MG/.3ML
0.3 INJECTION SUBCUTANEOUS
Status: DISCONTINUED | OUTPATIENT
Start: 2021-08-13 | End: 2021-09-24

## 2021-08-13 RX ORDER — SODIUM CHLORIDE 0.9 % (FLUSH) 0.9 %
10 SYRINGE (ML) INJECTION
Status: DISCONTINUED | OUTPATIENT
Start: 2021-08-13 | End: 2021-09-24

## 2021-08-13 RX ADMIN — CASIRIVIMAB AND IMDEVIMAB 600 MG: 600; 600 INJECTION, SOLUTION, CONCENTRATE INTRAVENOUS at 10:08

## 2022-03-23 ENCOUNTER — LAB VISIT (OUTPATIENT)
Dept: LAB | Facility: HOSPITAL | Age: 87
End: 2022-03-23
Attending: INTERNAL MEDICINE
Payer: MEDICARE

## 2022-03-23 DIAGNOSIS — D64.9 ANEMIA, UNSPECIFIED TYPE: ICD-10-CM

## 2022-03-23 DIAGNOSIS — N18.32 TYPE 2 DIABETES MELLITUS WITH STAGE 3B CHRONIC KIDNEY DISEASE, WITHOUT LONG-TERM CURRENT USE OF INSULIN: ICD-10-CM

## 2022-03-23 DIAGNOSIS — R60.0 BILATERAL LEG EDEMA: ICD-10-CM

## 2022-03-23 DIAGNOSIS — N18.32 STAGE 3B CHRONIC KIDNEY DISEASE: ICD-10-CM

## 2022-03-23 DIAGNOSIS — E11.22 TYPE 2 DIABETES MELLITUS WITH STAGE 3B CHRONIC KIDNEY DISEASE, WITHOUT LONG-TERM CURRENT USE OF INSULIN: ICD-10-CM

## 2022-03-23 LAB
ALBUMIN SERPL BCP-MCNC: 3.1 G/DL (ref 3.5–5.2)
ALP SERPL-CCNC: 87 U/L (ref 55–135)
ALT SERPL W/O P-5'-P-CCNC: 20 U/L (ref 10–44)
ANION GAP SERPL CALC-SCNC: 11 MMOL/L (ref 8–16)
AST SERPL-CCNC: 19 U/L (ref 10–40)
BASOPHILS # BLD AUTO: 0.02 K/UL (ref 0–0.2)
BASOPHILS NFR BLD: 0.2 % (ref 0–1.9)
BILIRUB SERPL-MCNC: 0.2 MG/DL (ref 0.1–1)
BUN SERPL-MCNC: 47 MG/DL (ref 8–23)
CALCIUM SERPL-MCNC: 8.6 MG/DL (ref 8.7–10.5)
CHLORIDE SERPL-SCNC: 100 MMOL/L (ref 95–110)
CO2 SERPL-SCNC: 23 MMOL/L (ref 23–29)
CREAT SERPL-MCNC: 1.8 MG/DL (ref 0.5–1.4)
DIFFERENTIAL METHOD: ABNORMAL
EOSINOPHIL # BLD AUTO: 0 K/UL (ref 0–0.5)
EOSINOPHIL NFR BLD: 0.2 % (ref 0–8)
ERYTHROCYTE [DISTWIDTH] IN BLOOD BY AUTOMATED COUNT: 12.3 % (ref 11.5–14.5)
EST. GFR  (AFRICAN AMERICAN): 29 ML/MIN/1.73 M^2
EST. GFR  (NON AFRICAN AMERICAN): 25 ML/MIN/1.73 M^2
ESTIMATED AVG GLUCOSE: 128 MG/DL (ref 68–131)
GLUCOSE SERPL-MCNC: 196 MG/DL (ref 70–110)
HBA1C MFR BLD: 6.1 % (ref 4–5.6)
HCT VFR BLD AUTO: 32.2 % (ref 37–48.5)
HGB BLD-MCNC: 10.7 G/DL (ref 12–16)
IMM GRANULOCYTES # BLD AUTO: 0.06 K/UL (ref 0–0.04)
IMM GRANULOCYTES NFR BLD AUTO: 0.6 % (ref 0–0.5)
LYMPHOCYTES # BLD AUTO: 0.8 K/UL (ref 1–4.8)
LYMPHOCYTES NFR BLD: 8 % (ref 18–48)
MCH RBC QN AUTO: 32.5 PG (ref 27–31)
MCHC RBC AUTO-ENTMCNC: 33.2 G/DL (ref 32–36)
MCV RBC AUTO: 98 FL (ref 82–98)
MONOCYTES # BLD AUTO: 0.7 K/UL (ref 0.3–1)
MONOCYTES NFR BLD: 7 % (ref 4–15)
NEUTROPHILS # BLD AUTO: 8.1 K/UL (ref 1.8–7.7)
NEUTROPHILS NFR BLD: 84 % (ref 38–73)
NRBC BLD-RTO: 0 /100 WBC
PLATELET # BLD AUTO: 324 K/UL (ref 150–450)
PMV BLD AUTO: 10.2 FL (ref 9.2–12.9)
POTASSIUM SERPL-SCNC: 4.4 MMOL/L (ref 3.5–5.1)
PROT SERPL-MCNC: 6.4 G/DL (ref 6–8.4)
RBC # BLD AUTO: 3.29 M/UL (ref 4–5.4)
SODIUM SERPL-SCNC: 134 MMOL/L (ref 136–145)
WBC # BLD AUTO: 9.63 K/UL (ref 3.9–12.7)

## 2022-03-23 PROCEDURE — 85025 COMPLETE CBC W/AUTO DIFF WBC: CPT | Performed by: INTERNAL MEDICINE

## 2022-03-23 PROCEDURE — 83036 HEMOGLOBIN GLYCOSYLATED A1C: CPT | Performed by: INTERNAL MEDICINE

## 2022-03-23 PROCEDURE — 80053 COMPREHEN METABOLIC PANEL: CPT | Performed by: INTERNAL MEDICINE

## 2022-03-23 PROCEDURE — 36415 COLL VENOUS BLD VENIPUNCTURE: CPT | Performed by: INTERNAL MEDICINE

## 2022-04-05 ENCOUNTER — HOSPITAL ENCOUNTER (OUTPATIENT)
Dept: CARDIOLOGY | Facility: HOSPITAL | Age: 87
Discharge: HOME | End: 2022-04-05
Attending: INTERNAL MEDICINE
Payer: MEDICARE

## 2022-04-05 DIAGNOSIS — R60.0 BILATERAL LEG EDEMA: ICD-10-CM

## 2022-04-05 PROCEDURE — 93970 EXTREMITY STUDY: CPT | Mod: 26,,, | Performed by: INTERNAL MEDICINE

## 2022-04-05 PROCEDURE — 93970 CV US DOPPLER VENOUS LEGS BILATERAL (CUPID ONLY): ICD-10-PCS | Mod: 26,,, | Performed by: INTERNAL MEDICINE

## 2022-04-05 PROCEDURE — 93970 EXTREMITY STUDY: CPT | Mod: 50

## 2022-04-27 ENCOUNTER — HOSPITAL ENCOUNTER (OUTPATIENT)
Dept: RADIOLOGY | Facility: HOSPITAL | Age: 87
Discharge: HOME | End: 2022-04-27
Attending: INTERNAL MEDICINE
Payer: MEDICARE

## 2022-04-27 DIAGNOSIS — N18.4 CKD (CHRONIC KIDNEY DISEASE) STAGE 4, GFR 15-29 ML/MIN: ICD-10-CM

## 2022-04-27 PROCEDURE — 76770 US EXAM ABDO BACK WALL COMP: CPT | Mod: 26,,, | Performed by: RADIOLOGY

## 2022-04-27 PROCEDURE — 76770 US EXAM ABDO BACK WALL COMP: CPT | Mod: TC

## 2022-04-27 PROCEDURE — 76770 US RETROPERITONEAL COMPLETE: ICD-10-PCS | Mod: 26,,, | Performed by: RADIOLOGY

## 2022-05-16 ENCOUNTER — OFFICE VISIT (OUTPATIENT)
Dept: URGENT CARE | Facility: CLINIC | Age: 87
End: 2022-05-16
Payer: MEDICARE

## 2022-05-16 VITALS
BODY MASS INDEX: 25.95 KG/M2 | SYSTOLIC BLOOD PRESSURE: 145 MMHG | HEIGHT: 64 IN | RESPIRATION RATE: 18 BRPM | DIASTOLIC BLOOD PRESSURE: 73 MMHG | HEART RATE: 67 BPM | TEMPERATURE: 98 F | WEIGHT: 152 LBS | OXYGEN SATURATION: 96 %
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
## 2022-05-16 DIAGNOSIS — M79.641 RIGHT HAND PAIN: ICD-10-CM

## 2022-05-16 DIAGNOSIS — M25.511 ACUTE PAIN OF RIGHT SHOULDER: ICD-10-CM

## 2022-05-16 DIAGNOSIS — M25.561 ACUTE PAIN OF RIGHT KNEE: ICD-10-CM

## 2022-05-16 PROCEDURE — 1159F MED LIST DOCD IN RCRD: CPT | Mod: CPTII,S$GLB,, | Performed by: NURSE PRACTITIONER

## 2022-05-16 PROCEDURE — 1125F AMNT PAIN NOTED PAIN PRSNT: CPT | Mod: CPTII,S$GLB,, | Performed by: NURSE PRACTITIONER

## 2022-05-16 PROCEDURE — 73030 X-RAY EXAM OF SHOULDER: CPT | Mod: FY,RT,S$GLB, | Performed by: RADIOLOGY

## 2022-05-16 PROCEDURE — 1125F PR PAIN SEVERITY QUANTIFIED, PAIN PRESENT: ICD-10-PCS | Mod: CPTII,S$GLB,, | Performed by: NURSE PRACTITIONER

## 2022-05-16 PROCEDURE — 73130 X-RAY EXAM OF HAND: CPT | Mod: FY,RT,S$GLB, | Performed by: RADIOLOGY

## 2022-05-16 PROCEDURE — 1160F PR REVIEW ALL MEDS BY PRESCRIBER/CLIN PHARMACIST DOCUMENTED: ICD-10-PCS | Mod: CPTII,S$GLB,, | Performed by: NURSE PRACTITIONER

## 2022-05-16 PROCEDURE — 99203 PR OFFICE/OUTPT VISIT, NEW, LEVL III, 30-44 MIN: ICD-10-PCS | Mod: S$GLB,,, | Performed by: NURSE PRACTITIONER

## 2022-05-16 PROCEDURE — 73562 XR KNEE 3 VIEW RIGHT: ICD-10-PCS | Mod: FY,RT,S$GLB, | Performed by: RADIOLOGY

## 2022-05-16 PROCEDURE — 1159F PR MEDICATION LIST DOCUMENTED IN MEDICAL RECORD: ICD-10-PCS | Mod: CPTII,S$GLB,, | Performed by: NURSE PRACTITIONER

## 2022-05-16 PROCEDURE — 99203 OFFICE O/P NEW LOW 30 MIN: CPT | Mod: S$GLB,,, | Performed by: NURSE PRACTITIONER

## 2022-05-16 PROCEDURE — 73030 XR SHOULDER TRAUMA 3 VIEW RIGHT: ICD-10-PCS | Mod: FY,RT,S$GLB, | Performed by: RADIOLOGY

## 2022-05-16 PROCEDURE — 1160F RVW MEDS BY RX/DR IN RCRD: CPT | Mod: CPTII,S$GLB,, | Performed by: NURSE PRACTITIONER

## 2022-05-16 PROCEDURE — 73130 XR HAND COMPLETE 3 VIEW RIGHT: ICD-10-PCS | Mod: FY,RT,S$GLB, | Performed by: RADIOLOGY

## 2022-05-16 PROCEDURE — 73562 X-RAY EXAM OF KNEE 3: CPT | Mod: FY,RT,S$GLB, | Performed by: RADIOLOGY

## 2022-05-16 NOTE — PROGRESS NOTES
"Subjective:       Patient ID: Bridget Montgomery is a 87 y.o. female.    Vitals:  height is 5' 4" (1.626 m) and weight is 68.9 kg (152 lb). Her temperature is 97.6 °F (36.4 °C). Her blood pressure is 145/73 (abnormal) and her pulse is 67. Her respiration is 18 and oxygen saturation is 96%.     Chief Complaint: Fall    This is a 87 y.o. female who presents today with a chief complaint of fall yesterday. Pt was trying to get in her car and fell on concrete. Right shoulder and left shoulder pain, right knee and right hand pain. She has cleaned her wounds with saline and applied antibiotic ointment and dressing.     Fall  The accident occurred 2 days ago. The fall occurred while standing. She landed on concrete. The volume of blood lost was minimal. The point of impact was the right shoulder, right knee, left shoulder and neck. The pain is present in the neck, right shoulder, left shoulder, right knee and right hand. The pain is at a severity of 10/10. The pain is severe. The symptoms are aggravated by movement, pressure on injury and ambulation. She has tried heat (tummeric, tramadol) for the symptoms. The treatment provided mild relief.     ROS    Objective:      Physical Exam   Constitutional: She is oriented to person, place, and time. She appears well-developed. She is cooperative.  Non-toxic appearance. She does not appear ill. No distress.   HENT:   Head: Normocephalic and atraumatic. Head is without abrasion, without contusion and without laceration.   Ears:   Right Ear: Hearing and external ear normal. No hemotympanum.   Left Ear: Hearing and external ear normal. No hemotympanum.   Nose: Nose normal. No mucosal edema, rhinorrhea or nasal deformity. No epistaxis. Right sinus exhibits no maxillary sinus tenderness and no frontal sinus tenderness. Left sinus exhibits no maxillary sinus tenderness and no frontal sinus tenderness.   Mouth/Throat: Uvula is midline, oropharynx is clear and moist and mucous membranes " are normal. No trismus in the jaw. Normal dentition. No uvula swelling. No posterior oropharyngeal erythema.   Eyes: Conjunctivae, EOM and lids are normal. Pupils are equal, round, and reactive to light. Right eye exhibits no discharge. Left eye exhibits no discharge. No scleral icterus.   Neck: Trachea normal and phonation normal. Neck supple. No tracheal deviation present. No neck rigidity present. No spinous process tenderness present. No muscular tenderness present.   Cardiovascular: Normal rate, regular rhythm and normal pulses.   Pulmonary/Chest: Effort normal. No respiratory distress.   Abdominal: Normal appearance. She exhibits no pulsatile midline mass. Soft.   Musculoskeletal:         General: No deformity.      Right shoulder: She exhibits decreased range of motion and tenderness. She exhibits no swelling.      Right wrist: She exhibits normal range of motion, no tenderness and no swelling.      Right hand: She exhibits tenderness. She exhibits normal range of motion and no swelling.   Neurological: She is alert and oriented to person, place, and time. She has normal strength. No cranial nerve deficit or sensory deficit. She exhibits normal muscle tone. She displays no seizure activity. Coordination normal. GCS eye subscore is 4. GCS verbal subscore is 5. GCS motor subscore is 6.   Skin: Skin is warm, dry, not diaphoretic and not pale. Capillary refill takes less than 2 seconds. Abrasions - upper ext.:  Hand (right) and upper arm (right)  Abrasions - lower ext.:  Knee (right)abrasion, bruising and ecchymosis No burn        Psychiatric: Her speech is normal and behavior is normal. Judgment and thought content normal.   Nursing note and vitals reviewed.   XR HAND COMPLETE 3 VIEW RIGHT    Result Date: 5/16/2022  EXAMINATION: XR HAND COMPLETE 3 VIEW RIGHT CLINICAL HISTORY: Unspecified fall, initial encounter TECHNIQUE: PA, lateral, and oblique views of the right hand were performed. COMPARISON: None FINDINGS:  Three views right hand. There is osteopenia.  Degenerative changes are noted of the D IP and PIP joints.  There is flexion at the 5th digit PIP joint and extension at the 5th digit D IP joint overall limiting evaluation of the digit.  No convincing acute displaced fracture or dislocation of the hand.  No radiopaque foreign body.  No significant edema.     1. Allowing for positioning, no convincing acute displaced fracture or dislocation of the hand, correlation with any focal tenderness recommended repositioning as warranted. Electronically signed by: Juan Shelton MD Date:    05/16/2022 Time:    19:47    XR KNEE 3 VIEW RIGHT    Result Date: 5/16/2022  EXAMINATION: XR KNEE 3 VIEW RIGHT CLINICAL HISTORY: Unspecified fall, initial encounter TECHNIQUE: AP, lateral, and Merchant views of the right knee were performed. COMPARISON: 06/13/2018 FINDINGS: Three views right knee. There is osteopenia.  Degenerative changes are noted of the knee, worsened since the previous examination particularly involving the lateral compartment.  There is a mild suprapatellar effusion.  Edema overlies the knee.  There is vascular calcification.     1. No convincing acute displaced fracture or dislocation of the knee noting mild suprapatellar effusion and overlying anterior edema. Electronically signed by: Juan Shelton MD Date:    05/16/2022 Time:    19:48    US Retroperitoneal Complete    Result Date: 4/27/2022  EXAMINATION: US RETROPERITONEAL COMPLETE CLINICAL HISTORY: Chronic kidney disease, stage 4 (severe) TECHNIQUE: Ultrasound of the kidneys and urinary bladder was performed including color flow and Doppler evaluation of the kidneys. COMPARISON: None. FINDINGS: Right kidney: The right kidney measures 9.5 cm. There is cortical thinning.  No loss of corticomedullary distinction. Resistive index measures 0.71.  No mass. No renal stone. No hydronephrosis. Left kidney: The left kidney is not well evaluated due to bowel gas and adjacent  rib shadowing but measures at least 7.1 cm. No cortical thinning. No loss of corticomedullary distinction. Resistive index measures 0.78.  No mass. No renal stone. No hydronephrosis. The bladder is partially distended at the time of scanning and has an unremarkable appearance.     1. Bilateral renal cortical thinning 2. Elevated resistive index in the left kidney suggesting chronic renal disease, noting that the left kidney is poorly visualized. Electronically signed by: Dot Epps MD Date:    04/27/2022 Time:    10:32    XR SHOULDER TRAUMA 3 VIEW RIGHT    Result Date: 5/16/2022  EXAMINATION: XR SHOULDER TRAUMA 3 VIEW RIGHT CLINICAL HISTORY: Unspecified fall, initial encounter TECHNIQUE: Three or four views of the right shoulder were performed. COMPARISON: 06/20/2019 FINDINGS: Three views right shoulder. The right humeral head maintains appropriate relationship with the glenoid noting glenohumeral degenerative changes.  There is osteopenia.  The right acromioclavicular joint is intact noting degenerative changes.  No acute displaced right rib fracture.  The right lung zones are grossly clear.     1. Allowing for positioning, no convincing acute displaced fracture or dislocation of the right shoulder. Electronically signed by: Juan Shelton MD Date:    05/16/2022 Time:    19:50          Assessment:       1. Fall, initial encounter    2. Acute pain of right knee    3. Acute pain of right shoulder    4. Right hand pain          Plan:         Fall, initial encounter  -     XR HAND COMPLETE 3 VIEW RIGHT; Future; Expected date: 05/16/2022  -     XR KNEE 3 VIEW RIGHT; Future; Expected date: 05/16/2022  -     XR SHOULDER TRAUMA 3 VIEW RIGHT; Future; Expected date: 05/16/2022    Acute pain of right knee    Acute pain of right shoulder    Right hand pain         Patient Instructions   - You must understand that you have received an Urgent Care treatment only and that you may be released before all of your medical  problems are known or treated.   - You, the patient, will arrange for follow up care as instructed.   - If your condition worsens or fails to improve we recommend that you receive another evaluation at the ER immediately or contact your PCP to discuss your concerns.   - You can call [phone] or [phone] to help schedule an appointment with the appropriate provider.    Take OTC Tylenol 500-1000 mg 3 times per day. Max dose 4000 mg per day from all sources.   Alternate heat and ice. Apply heat for 20-30 minutes, perform gentle range of motion exercises, and then apply ice for 15 minutes. Do this 3-4 times per day.

## 2022-05-17 ENCOUNTER — HOSPITAL ENCOUNTER (OUTPATIENT)
Dept: RADIOLOGY | Facility: HOSPITAL | Age: 87
Discharge: HOME | End: 2022-05-17
Attending: INTERNAL MEDICINE
Payer: MEDICARE

## 2022-05-17 DIAGNOSIS — M85.88 OSTEOPENIA OF LUMBAR SPINE: ICD-10-CM

## 2022-05-17 PROCEDURE — 77080 DEXA BONE DENSITY SPINE HIP: ICD-10-PCS | Mod: 26,,, | Performed by: RADIOLOGY

## 2022-05-17 PROCEDURE — 77080 DXA BONE DENSITY AXIAL: CPT | Mod: 26,,, | Performed by: RADIOLOGY

## 2022-05-17 PROCEDURE — 77080 DXA BONE DENSITY AXIAL: CPT | Mod: TC

## 2022-05-20 ENCOUNTER — CLINICAL SUPPORT (OUTPATIENT)
Dept: LAB | Facility: HOSPITAL | Age: 87
End: 2022-05-20
Attending: INTERNAL MEDICINE
Payer: MEDICARE

## 2022-05-20 ENCOUNTER — HOSPITAL ENCOUNTER (OUTPATIENT)
Dept: RADIOLOGY | Facility: HOSPITAL | Age: 87
Discharge: HOME | End: 2022-05-20
Attending: INTERNAL MEDICINE
Payer: MEDICARE

## 2022-05-20 DIAGNOSIS — I10 ESSENTIAL HYPERTENSION: ICD-10-CM

## 2022-05-20 DIAGNOSIS — Z01.818 PREOP EXAMINATION: ICD-10-CM

## 2022-05-20 DIAGNOSIS — J44.9 CHRONIC OBSTRUCTIVE PULMONARY DISEASE, UNSPECIFIED COPD TYPE: ICD-10-CM

## 2022-05-20 PROCEDURE — 93010 ELECTROCARDIOGRAM REPORT: CPT | Mod: ,,, | Performed by: INTERNAL MEDICINE

## 2022-05-20 PROCEDURE — 93010 EKG 12-LEAD: ICD-10-PCS | Mod: ,,, | Performed by: INTERNAL MEDICINE

## 2022-05-20 PROCEDURE — 93005 ELECTROCARDIOGRAM TRACING: CPT

## 2022-05-20 PROCEDURE — 71046 X-RAY EXAM CHEST 2 VIEWS: CPT | Mod: TC,FY

## 2022-05-20 PROCEDURE — 71046 X-RAY EXAM CHEST 2 VIEWS: CPT | Mod: 26,,, | Performed by: RADIOLOGY

## 2022-05-20 PROCEDURE — 71046 XR CHEST PA AND LATERAL: ICD-10-PCS | Mod: 26,,, | Performed by: RADIOLOGY

## 2022-05-26 ENCOUNTER — OFFICE VISIT (OUTPATIENT)
Dept: URGENT CARE | Facility: CLINIC | Age: 87
End: 2022-05-26
Payer: MEDICARE

## 2022-05-26 VITALS
TEMPERATURE: 98 F | WEIGHT: 152 LBS | HEART RATE: 63 BPM | DIASTOLIC BLOOD PRESSURE: 73 MMHG | BODY MASS INDEX: 25.95 KG/M2 | HEIGHT: 64 IN | RESPIRATION RATE: 18 BRPM | OXYGEN SATURATION: 98 % | SYSTOLIC BLOOD PRESSURE: 151 MMHG

## 2022-05-26 DIAGNOSIS — S80.211A INFECTED ABRASION OF RIGHT KNEE, INITIAL ENCOUNTER: Primary | ICD-10-CM

## 2022-05-26 DIAGNOSIS — L08.9 INFECTED ABRASION OF RIGHT KNEE, INITIAL ENCOUNTER: Primary | ICD-10-CM

## 2022-05-26 PROCEDURE — 1159F MED LIST DOCD IN RCRD: CPT | Mod: CPTII,S$GLB,, | Performed by: STUDENT IN AN ORGANIZED HEALTH CARE EDUCATION/TRAINING PROGRAM

## 2022-05-26 PROCEDURE — 99214 OFFICE O/P EST MOD 30 MIN: CPT | Mod: S$GLB,,, | Performed by: STUDENT IN AN ORGANIZED HEALTH CARE EDUCATION/TRAINING PROGRAM

## 2022-05-26 PROCEDURE — 1160F RVW MEDS BY RX/DR IN RCRD: CPT | Mod: CPTII,S$GLB,, | Performed by: STUDENT IN AN ORGANIZED HEALTH CARE EDUCATION/TRAINING PROGRAM

## 2022-05-26 PROCEDURE — 99214 PR OFFICE/OUTPT VISIT, EST, LEVL IV, 30-39 MIN: ICD-10-PCS | Mod: S$GLB,,, | Performed by: STUDENT IN AN ORGANIZED HEALTH CARE EDUCATION/TRAINING PROGRAM

## 2022-05-26 PROCEDURE — 1159F PR MEDICATION LIST DOCUMENTED IN MEDICAL RECORD: ICD-10-PCS | Mod: CPTII,S$GLB,, | Performed by: STUDENT IN AN ORGANIZED HEALTH CARE EDUCATION/TRAINING PROGRAM

## 2022-05-26 PROCEDURE — 1160F PR REVIEW ALL MEDS BY PRESCRIBER/CLIN PHARMACIST DOCUMENTED: ICD-10-PCS | Mod: CPTII,S$GLB,, | Performed by: STUDENT IN AN ORGANIZED HEALTH CARE EDUCATION/TRAINING PROGRAM

## 2022-05-26 RX ORDER — MUPIROCIN 20 MG/G
OINTMENT TOPICAL 2 TIMES DAILY
Qty: 22 G | Refills: 0 | Status: SHIPPED | OUTPATIENT
Start: 2022-05-26 | End: 2023-05-12

## 2022-05-26 RX ORDER — SULFAMETHOXAZOLE AND TRIMETHOPRIM 800; 160 MG/1; MG/1
1 TABLET ORAL 2 TIMES DAILY
Qty: 14 TABLET | Refills: 0 | Status: SHIPPED | OUTPATIENT
Start: 2022-05-26 | End: 2022-06-02

## 2022-05-27 ENCOUNTER — TELEPHONE (OUTPATIENT)
Dept: WOUND CARE | Facility: CLINIC | Age: 87
End: 2022-05-27
Payer: MEDICARE

## 2022-05-27 NOTE — TELEPHONE ENCOUNTER
Call and left message to inform patient daughter on the available date was open to try and schedule her mother and appointment.  Asked Liliana to give me a call back at [phone]

## 2022-05-27 NOTE — TELEPHONE ENCOUNTER
Patient returned called and stated that her daughter's at work so she return the call. Appointment was made for 06/01/2022

## 2022-05-27 NOTE — TELEPHONE ENCOUNTER
----- Message from Talia Payne sent at 5/27/2022 10:34 AM CDT -----  Contact: Liliana (daughter) [phone] or [phone]  Type: Appointment Request    Name of Caller: Liliana (daughter)   When is the first available appointment? Do not have access  Reason for Visit: Wound on knee. Urgent care F/U  Best Call Back Number: [phone] or [phone], if no answer please leave a voicemail  Additional Information:

## 2022-06-01 ENCOUNTER — TELEPHONE (OUTPATIENT)
Dept: WOUND CARE | Facility: CLINIC | Age: 87
End: 2022-06-01
Payer: MEDICARE

## 2022-06-01 NOTE — TELEPHONE ENCOUNTER
Called both patient's number and daughter's number - no answer, left voice messages on both phones to return call to reschedule office visit for wound care services at [phone] or [phone]

## 2022-06-10 ENCOUNTER — CLINICAL SUPPORT (OUTPATIENT)
Dept: URGENT CARE | Facility: CLINIC | Age: 87
End: 2022-06-10
Payer: MEDICARE

## 2022-06-10 VITALS
BODY MASS INDEX: 25.27 KG/M2 | DIASTOLIC BLOOD PRESSURE: 71 MMHG | TEMPERATURE: 98 F | RESPIRATION RATE: 20 BRPM | HEIGHT: 64 IN | WEIGHT: 148 LBS | HEART RATE: 65 BPM | SYSTOLIC BLOOD PRESSURE: 144 MMHG | OXYGEN SATURATION: 97 %

## 2022-06-10 DIAGNOSIS — M25.511 ACUTE PAIN OF RIGHT SHOULDER: Primary | ICD-10-CM

## 2022-06-10 PROCEDURE — 99213 OFFICE O/P EST LOW 20 MIN: CPT | Mod: S$GLB,,, | Performed by: NURSE PRACTITIONER

## 2022-06-10 PROCEDURE — 99213 PR OFFICE/OUTPT VISIT, EST, LEVL III, 20-29 MIN: ICD-10-PCS | Mod: S$GLB,,, | Performed by: NURSE PRACTITIONER

## 2022-06-10 RX ORDER — AMLODIPINE AND BENAZEPRIL HYDROCHLORIDE 5; 10 MG/1; MG/1
CAPSULE ORAL
COMMUNITY
Start: 2022-06-02 | End: 2022-06-13 | Stop reason: SDUPTHER

## 2022-06-10 RX ORDER — LIDOCAINE 50 MG/G
1 PATCH TOPICAL DAILY
Qty: 5 PATCH | Refills: 0 | Status: SHIPPED | OUTPATIENT
Start: 2022-06-10 | End: 2023-07-20

## 2022-06-10 NOTE — PROGRESS NOTES
"Subjective:       Patient ID: Bridget Montgomery is a 87 y.o. female.    Vitals:  height is 5' 4" (1.626 m) and weight is 67.1 kg (148 lb). Her temperature is 97.5 °F (36.4 °C). Her blood pressure is 144/71 (abnormal) and her pulse is 65. Her respiration is 20 and oxygen saturation is 97%.     Chief Complaint: Arm Pain    Patient with c/o R shoulder pain x 2-3 weeks after a fall.  Seen in urgent care, xray of shoulder unremarkable for fracture or dislocation.  Pain is exacerbated with movement, resolves with rest. Currently treating pain with Lyrica without sufficient relief. Sensation intake     Arm Pain   The incident occurred 6 to 12 hours ago. The incident occurred at home. The injury mechanism was a fall. The pain is present in the upper right arm and right shoulder. The quality of the pain is described as stabbing. The pain radiates to the right arm. The pain is at a severity of 10/10. The pain is severe. The pain has been constant since the incident. Pertinent negatives include no chest pain, muscle weakness, numbness or tingling. Nothing aggravates the symptoms. Treatments tried: lyrica. The treatment provided mild relief.       Cardiovascular: Negative for chest pain.   Musculoskeletal: Positive for trauma and joint pain. Negative for back pain, muscle cramps and muscle ache.   Skin: Negative for rash, erythema and bruising.   Neurological: Negative for numbness and tingling.       Objective:      Physical Exam   Constitutional:  Non-toxic appearance. She does not appear ill. No distress.   Cardiovascular: Normal rate.   Pulmonary/Chest: Effort normal. No respiratory distress.   Abdominal: Normal appearance.   Musculoskeletal:         General: Tenderness present.      Comments: Exquisite tenderness to R lateral shoulder with both passive and active ROM,   Shoulder rotation causes with most discomfort  Hand grasp R<L     Neurological: She is alert. No sensory deficit. Coordination normal.      Comments: " Sensation intact   Skin: Skin is not diaphoretic and no rash. No bruising and No erythema   Nursing note and vitals reviewed.        Assessment:       1. Acute pain of right shoulder          Plan:         Acute pain of right shoulder - fracture vs soft tissue pathology, likely rotator cuff  -     Cancel: Ambulatory referral/consult to Orthopedics  -     LIDOcaine (LIDODERM) 5 %; Place 1 patch onto the skin once daily. Remove & Discard patch within 12 hours or as directed by provider  Dispense: 5 patch; Refill: 0    -xray of R shoulder to assess for new injury declined by family which I think is reasonable given the level of discomfort it would cause for patient and it would not .   -sling for stabilization  -patient has scheduled appt with Ortho in 72 hours  -recommend tylenol + lidocaine derm patch prn pain, to ER for worsening of symptoms, strict ER precautions discussed             Patient Instructions     See additional patient Instructions provided    Rest and elevated affected limb   May apply ice x 15-20 minutes 3-4 times per day as needed for pain and swelling for the fist 72 hours after injury  Stabilize limb using sling   Tylenol oas needed for pain if not contraindicated  Voltaren or Klaus chen topical, use as directed on  packaging  Or may use Salon pas patches, also over the counter, use as directed on  packaging  Healing may take up to 4-6 weeks, follow up with PCP for no improvement  If symptoms worsen be sure to call your PCP for prompt assessment    Patient Instructions   - You must understand that you have received an Urgent Care treatment only and that you may be released before all of your medical problems are known or treated.   - You, the patient, will arrange for follow up care as instructed.   - If your condition worsens or fails to improve we recommend that you receive another evaluation at the ER immediately or contact your PCP to discuss your concerns  or return here.     Advised on return/follow-up precautions. Advised on ER precautions. Answered all patient questions. Patient verbalized understanding and voiced agreement with current treatment plan.

## 2022-06-10 NOTE — PATIENT INSTRUCTIONS
See additional patient Instructions provided    Rest and elevated affected limb   May apply ice x 15-20 minutes 3-4 times per day as needed for pain and swelling for the fist 72 hours after injury  Stabilize limb using sling   Tylenol oas needed for pain if not contraindicated  Voltaren or Klaus chne topical, use as directed on  packaging  Or may use Salon pas patches, also over the counter, use as directed on  packaging  Healing may take up to 4-6 weeks, follow up with PCP for no improvement  If symptoms worsen be sure to call your PCP for prompt assessment    Patient Instructions   - You must understand that you have received an Urgent Care treatment only and that you may be released before all of your medical problems are known or treated.   - You, the patient, will arrange for follow up care as instructed.   - If your condition worsens or fails to improve we recommend that you receive another evaluation at the ER immediately or contact your PCP to discuss your concerns or return here.     Advised on return/follow-up precautions. Advised on ER precautions. Answered all patient questions. Patient verbalized understanding and voiced agreement with current treatment plan.

## 2022-06-13 ENCOUNTER — OFFICE VISIT (OUTPATIENT)
Dept: ORTHOPEDICS | Facility: CLINIC | Age: 87
End: 2022-06-13
Payer: MEDICARE

## 2022-06-13 ENCOUNTER — LAB VISIT (OUTPATIENT)
Dept: LAB | Facility: HOSPITAL | Age: 87
End: 2022-06-13
Attending: INTERNAL MEDICINE
Payer: MEDICARE

## 2022-06-13 ENCOUNTER — HOSPITAL ENCOUNTER (OUTPATIENT)
Dept: RADIOLOGY | Facility: HOSPITAL | Age: 87
Discharge: HOME | End: 2022-06-13
Attending: PHYSICIAN ASSISTANT
Payer: MEDICARE

## 2022-06-13 VITALS
DIASTOLIC BLOOD PRESSURE: 79 MMHG | SYSTOLIC BLOOD PRESSURE: 153 MMHG | WEIGHT: 147.94 LBS | BODY MASS INDEX: 25.25 KG/M2 | HEIGHT: 64 IN | HEART RATE: 70 BPM

## 2022-06-13 DIAGNOSIS — M12.811 ROTATOR CUFF ARTHROPATHY, RIGHT: ICD-10-CM

## 2022-06-13 DIAGNOSIS — N25.81 SECONDARY HYPERPARATHYROIDISM OF RENAL ORIGIN: ICD-10-CM

## 2022-06-13 DIAGNOSIS — D63.1 ANEMIA IN STAGE 4 CHRONIC KIDNEY DISEASE: ICD-10-CM

## 2022-06-13 DIAGNOSIS — S46.011A TRAUMATIC COMPLETE TEAR OF RIGHT ROTATOR CUFF, INITIAL ENCOUNTER: ICD-10-CM

## 2022-06-13 DIAGNOSIS — N18.4 ANEMIA IN STAGE 4 CHRONIC KIDNEY DISEASE: ICD-10-CM

## 2022-06-13 DIAGNOSIS — N18.4 CKD (CHRONIC KIDNEY DISEASE) STAGE 4, GFR 15-29 ML/MIN: ICD-10-CM

## 2022-06-13 DIAGNOSIS — S46.011A TRAUMATIC COMPLETE TEAR OF RIGHT ROTATOR CUFF, INITIAL ENCOUNTER: Primary | ICD-10-CM

## 2022-06-13 LAB
ALBUMIN SERPL BCP-MCNC: 3.1 G/DL (ref 3.5–5.2)
ANION GAP SERPL CALC-SCNC: 11 MMOL/L (ref 8–16)
BASOPHILS # BLD AUTO: 0.06 K/UL (ref 0–0.2)
BASOPHILS NFR BLD: 0.9 % (ref 0–1.9)
BUN SERPL-MCNC: 16 MG/DL (ref 8–23)
CALCIUM SERPL-MCNC: 9.4 MG/DL (ref 8.7–10.5)
CHLORIDE SERPL-SCNC: 95 MMOL/L (ref 95–110)
CO2 SERPL-SCNC: 26 MMOL/L (ref 23–29)
CREAT SERPL-MCNC: 1.1 MG/DL (ref 0.5–1.4)
DIFFERENTIAL METHOD: ABNORMAL
EOSINOPHIL # BLD AUTO: 0 K/UL (ref 0–0.5)
EOSINOPHIL NFR BLD: 0.6 % (ref 0–8)
ERYTHROCYTE [DISTWIDTH] IN BLOOD BY AUTOMATED COUNT: 11.7 % (ref 11.5–14.5)
EST. GFR  (AFRICAN AMERICAN): 52 ML/MIN/1.73 M^2
EST. GFR  (NON AFRICAN AMERICAN): 45 ML/MIN/1.73 M^2
GLUCOSE SERPL-MCNC: 98 MG/DL (ref 70–110)
HCT VFR BLD AUTO: 33.5 % (ref 37–48.5)
HGB BLD-MCNC: 11.2 G/DL (ref 12–16)
IMM GRANULOCYTES # BLD AUTO: 0.02 K/UL (ref 0–0.04)
IMM GRANULOCYTES NFR BLD AUTO: 0.3 % (ref 0–0.5)
IRON SERPL-MCNC: 37 UG/DL (ref 30–160)
LYMPHOCYTES # BLD AUTO: 1 K/UL (ref 1–4.8)
LYMPHOCYTES NFR BLD: 15.5 % (ref 18–48)
MCH RBC QN AUTO: 30.8 PG (ref 27–31)
MCHC RBC AUTO-ENTMCNC: 33.4 G/DL (ref 32–36)
MCV RBC AUTO: 92 FL (ref 82–98)
MONOCYTES # BLD AUTO: 0.7 K/UL (ref 0.3–1)
MONOCYTES NFR BLD: 9.9 % (ref 4–15)
NEUTROPHILS # BLD AUTO: 4.8 K/UL (ref 1.8–7.7)
NEUTROPHILS NFR BLD: 72.8 % (ref 38–73)
NRBC BLD-RTO: 0 /100 WBC
PHOSPHATE SERPL-MCNC: 3.5 MG/DL (ref 2.7–4.5)
PLATELET # BLD AUTO: 507 K/UL (ref 150–450)
PMV BLD AUTO: 9 FL (ref 9.2–12.9)
POTASSIUM SERPL-SCNC: 4.9 MMOL/L (ref 3.5–5.1)
PTH-INTACT SERPL-MCNC: 48.1 PG/ML (ref 9–77)
RBC # BLD AUTO: 3.64 M/UL (ref 4–5.4)
SATURATED IRON: 10 % (ref 20–50)
SODIUM SERPL-SCNC: 132 MMOL/L (ref 136–145)
TOTAL IRON BINDING CAPACITY: 379 UG/DL (ref 250–450)
TRANSFERRIN SERPL-MCNC: 256 MG/DL (ref 200–375)
URATE SERPL-MCNC: 6.1 MG/DL (ref 2.4–5.7)
WBC # BLD AUTO: 6.56 K/UL (ref 3.9–12.7)

## 2022-06-13 PROCEDURE — 99204 OFFICE O/P NEW MOD 45 MIN: CPT | Mod: S$GLB,,, | Performed by: PHYSICIAN ASSISTANT

## 2022-06-13 PROCEDURE — 85025 COMPLETE CBC W/AUTO DIFF WBC: CPT | Performed by: INTERNAL MEDICINE

## 2022-06-13 PROCEDURE — 83970 ASSAY OF PARATHORMONE: CPT | Performed by: INTERNAL MEDICINE

## 2022-06-13 PROCEDURE — 73221 MRI JOINT UPR EXTREM W/O DYE: CPT | Mod: 26,RT,, | Performed by: RADIOLOGY

## 2022-06-13 PROCEDURE — 80069 RENAL FUNCTION PANEL: CPT | Performed by: INTERNAL MEDICINE

## 2022-06-13 PROCEDURE — 73221 MRI SHOULDER WITHOUT CONTRAST RIGHT: ICD-10-PCS | Mod: 26,RT,, | Performed by: RADIOLOGY

## 2022-06-13 PROCEDURE — 84466 ASSAY OF TRANSFERRIN: CPT | Performed by: INTERNAL MEDICINE

## 2022-06-13 PROCEDURE — 84550 ASSAY OF BLOOD/URIC ACID: CPT | Performed by: INTERNAL MEDICINE

## 2022-06-13 PROCEDURE — 99999 PR PBB SHADOW E&M-EST. PATIENT-LVL V: CPT | Mod: PBBFAC,,, | Performed by: PHYSICIAN ASSISTANT

## 2022-06-13 PROCEDURE — 99999 PR PBB SHADOW E&M-EST. PATIENT-LVL V: ICD-10-PCS | Mod: PBBFAC,,, | Performed by: PHYSICIAN ASSISTANT

## 2022-06-13 PROCEDURE — 1125F PR PAIN SEVERITY QUANTIFIED, PAIN PRESENT: ICD-10-PCS | Mod: CPTII,S$GLB,, | Performed by: PHYSICIAN ASSISTANT

## 2022-06-13 PROCEDURE — 1125F AMNT PAIN NOTED PAIN PRSNT: CPT | Mod: CPTII,S$GLB,, | Performed by: PHYSICIAN ASSISTANT

## 2022-06-13 PROCEDURE — 36415 COLL VENOUS BLD VENIPUNCTURE: CPT | Performed by: INTERNAL MEDICINE

## 2022-06-13 PROCEDURE — 73221 MRI JOINT UPR EXTREM W/O DYE: CPT | Mod: TC,RT

## 2022-06-13 PROCEDURE — 99204 PR OFFICE/OUTPT VISIT, NEW, LEVL IV, 45-59 MIN: ICD-10-PCS | Mod: S$GLB,,, | Performed by: PHYSICIAN ASSISTANT

## 2022-06-13 RX ORDER — AA/PROT/LYSINE/METHIO/VIT C/B6 50-12.5 MG
10 TABLET ORAL DAILY
COMMUNITY
End: 2023-10-03

## 2022-06-13 RX ORDER — CALCIUM CARBONATE/VITAMIN D3 500 MG-10
TABLET,CHEWABLE ORAL
COMMUNITY
End: 2023-05-12 | Stop reason: DRUGHIGH

## 2022-06-13 RX ORDER — SULFAMETHOXAZOLE AND TRIMETHOPRIM 800; 160 MG/1; MG/1
TABLET ORAL
COMMUNITY
End: 2022-06-13

## 2022-06-13 RX ORDER — GLUCOSAM/CHONDRO/HERB 149/HYAL 750-100 MG
1 TABLET ORAL DAILY
COMMUNITY

## 2022-06-13 NOTE — PROGRESS NOTES
Subjective:      Patient ID: Bridget Montgomery is a 87 y.o. female.    Chief Complaint: Pain of the Right Shoulder      87-year-old right-hand-dominant female presents with 3 week history of right shoulder pain.  She is accompanied with her 2 daughters.  They states she had a fall about 3 weeks ago onto her right shoulder.  She is now unable to move her shoulder in any direction.  Patient states she had full range of motion prior to the injury.  She has significant pain laterally.  She also notes weakness in her right upper extremity.  She was ordered home PT who is working on her right shoulder.  She is currently using lidocaine patches, Voltaren gel, Tylenol, and tramadol as needed for pain.      Review of Systems   Constitutional: Negative for chills and fever.   Cardiovascular: Negative for chest pain.   Respiratory: Negative for cough.    Hematologic/Lymphatic: Does not bruise/bleed easily.   Skin: Negative for poor wound healing and rash.   Musculoskeletal: Positive for arthritis, falls, joint pain, muscle weakness, myalgias, neck pain and stiffness.   Gastrointestinal: Negative for abdominal pain.   Genitourinary: Negative for bladder incontinence.   Neurological: Negative for dizziness, loss of balance and weakness.   Psychiatric/Behavioral: Negative for altered mental status.       Review of patient's allergies indicates:   Allergen Reactions    Cephalexin     Codeine     Meperidine      Other reaction(s): delerium, hallucination  Delerium  Delerium  Delerium      Penicillins     Tazarotene      Other reaction(s): rash, Unknown        Current Outpatient Medications   Medication Sig Dispense Refill    acetylcysteine 600 mg TbIE Take by mouth Daily.      albuterol (PROVENTIL) 2.5 mg /3 mL (0.083 %) nebulizer solution Take 2.5 mg by nebulization.       amLODIPine-benazepriL (LOTREL) 5-40 mg per capsule Take 1 capsule by mouth once daily.      apple cider vinegar 600 mg Cap Take by mouth once daily.       calcium carbonate (CALCIUM 500 ORAL) Take by mouth.      calcium carbonate-vitamin D3 500 mg-10 mcg (400 unit) Chew Take by mouth.      cholecalciferol, vitamin D3, (VITAMIN D3) 50 mcg (2,000 unit) Cap capsule Take 4,000 Units by mouth once daily.      coenzyme Q10 10 mg capsule Take 10 mg by mouth.      coQ10, ubiquinol, 100 mg Cap Take by mouth once daily.      diclofenac sodium (VOLTAREN ARTHRITIS PAIN) 1 % Gel Apply 2 g topically once daily. 1 each 11    famotidine (PEPCID) 40 MG tablet 40 mg once daily.       fluticasone propionate (FLONASE) 50 mcg/actuation nasal spray INSTILL TWO SPRAYS INTO EACH NOSTRIL TWICE A DAY 48 mL 2    furosemide (LASIX) 40 MG tablet Take 1 tablet (40 mg total) by mouth every 8 (eight) hours as needed (fluid). (Patient taking differently: Take 20 mg by mouth every 8 (eight) hours as needed (fluid).) 180 tablet 3    LIDOcaine (LIDODERM) 5 % Place 1 patch onto the skin once daily. Remove & Discard patch within 12 hours or as directed by provider 5 patch 0    methylcellulose oral powder Take by mouth.      mupirocin (BACTROBAN) 2 % ointment Apply topically 2 (two) times daily. 22 g 0    olopatadine (PATANOL) 0.1 % ophthalmic solution       omega 3-dha-epa-fish oil 1,000 mg (120 mg-180 mg) Cap Take by mouth.      omeprazole (PRILOSEC) 20 MG capsule Take 20 mg by mouth once daily.      polyethylene glycol 3350 (MIRALAX ORAL) Take by mouth.      pregabalin (LYRICA) 25 MG capsule Take 1 capsule (25 mg total) by mouth 2 (two) times daily. (Patient taking differently: Take 25 mg by mouth once daily at 6am.) 60 capsule 5    psyllium 0.52 gram capsule Take 0.52 g by mouth once daily.      SELENIUM ORAL Take by mouth Daily.      SYMBICORT 160-4.5 mcg/actuation HFAA INHALE 2 PUFFS INTO THE LUNGS TWICE A DAY 10.2 g 6    traMADoL (ULTRAM) 50 mg tablet TAKE 1 TABLET (50 MG TOTAL) BY MOUTH EVERY 12 (TWELVE) HOURS AS NEEDED FOR PAIN. 60 tablet 5    triamcinolone acetonide  "0.1% (KENALOG) 0.1 % cream APPLY TO AFFECTED AREA TWICE A  g 3    vit A/vit C/vit E/zinc/copper (PRESERVISION AREDS ORAL) Take by mouth 2 (two) times a day.      zinc sulfate (ZINC-15 ORAL) Take 30 mg by mouth once daily.       No current facility-administered medications for this visit.        The patient's relevant past medical, surgical, and social history was reviewed in Epic.       Objective:      VITAL SIGNS: BP (!) 153/79 (BP Location: Left arm, Patient Position: Sitting, BP Method: Large (Automatic))   Pulse 70   Ht 5' 4" (1.626 m)   Wt 67.1 kg (147 lb 14.9 oz)   BMI 25.39 kg/m²     General    Nursing note and vitals reviewed.  Constitutional: She is oriented to person, place, and time. She appears well-developed and well-nourished.   Neurological: She is alert and oriented to person, place, and time.         Right Shoulder Exam     Tenderness   The patient is tender to palpation of the greater tuberosity.    Range of Motion   Active abduction: 40   Passive abduction: 60   Forward Flexion: 100   External Rotation 0 degrees: 30   Internal rotation 0 degrees: Sacrum     Tests & Signs   Drop arm: positive  Hutchinson test: positive  Impingement: positive  Rotator Cuff Painful Arc/Range: severe    Other   Sensation: normal    Muscle Strength   Right Upper Extremity   Shoulder Abduction: 3/5   Shoulder Internal Rotation: 3/5   Shoulder External Rotation: 3/5   Supraspinatus: 3/5   Subscapularis: 3/5   Biceps: 4/5     Vascular Exam     Right Pulses      Radial:                    2+       EXAMINATION:  XR SHOULDER TRAUMA 3 VIEW RIGHT     CLINICAL HISTORY:  Unspecified fall, initial encounter     TECHNIQUE:  Three or four views of the right shoulder were performed.     COMPARISON:  06/20/2019     FINDINGS:  Three views right shoulder.     The right humeral head maintains appropriate relationship with the glenoid noting glenohumeral degenerative changes.  There is osteopenia.  The right acromioclavicular " joint is intact noting degenerative changes.  No acute displaced right rib fracture.  The right lung zones are grossly clear.     Impression:     1. Allowing for positioning, no convincing acute displaced fracture or dislocation of the right shoulder.        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  I do believe there is some humeral head superior migration noted which is consistent with rotator cuff arthropathy.    I have reviewed the above radiograph and agree with the findings stated by the radiologist.           Assessment:       1. Traumatic complete tear of right rotator cuff, initial encounter    2. Rotator cuff arthropathy, right          Plan:         Bridget was seen today for pain.    Diagnoses and all orders for this visit:    Traumatic complete tear of right rotator cuff, initial encounter  -     MRI Shoulder Without Contrast Right; Future  -     Ambulatory referral/consult to Physical/Occupational Therapy; Future    Rotator cuff arthropathy, right  -     MRI Shoulder Without Contrast Right; Future  -     Ambulatory referral/consult to Physical/Occupational Therapy; Future    Patient most likely sustained a rotator cuff tear in the setting of rotator cuff arthropathy.  Will order a right shoulder MRI to assess the rotator cuff.  I gave the patient and her family the options of either conservative treatment which consist of physical therapy and injections verses surgical treatment which would consist of a probable reverse total shoulder.  Will wait on the results of the MRI.        Nadya Hopper PA-C   06/13/2022

## 2022-06-14 ENCOUNTER — TELEPHONE (OUTPATIENT)
Dept: ORTHOPEDICS | Facility: CLINIC | Age: 87
End: 2022-06-14
Payer: MEDICARE

## 2022-06-15 ENCOUNTER — TELEPHONE (OUTPATIENT)
Dept: ORTHOPEDICS | Facility: CLINIC | Age: 87
End: 2022-06-15
Payer: MEDICARE

## 2022-06-15 NOTE — TELEPHONE ENCOUNTER
----- Message from Alana Pan MA sent at 6/14/2022  3:01 PM CDT -----  Contact: Liliana(Daughter)-[phone]  MRI results  ----- Message -----  From: Claire Diop  Sent: 6/14/2022   2:44 PM CDT  To: [facility] Staff    Type:  Patient Returning Call    Who Called:Pt's daughter  Who Left Message for Patient:BRANDON Hopper  Does the patient know what this is regarding?:Results  Would the patient rather a call back or a response via [facility]? Call back  Best Call Back Number:[phone] or [phone]

## 2022-06-16 ENCOUNTER — TELEPHONE (OUTPATIENT)
Dept: ORTHOPEDICS | Facility: CLINIC | Age: 87
End: 2022-06-16
Payer: MEDICARE

## 2022-06-16 DIAGNOSIS — M19.011 LOCALIZED OSTEOARTHRITIS OF RIGHT SHOULDER: Primary | ICD-10-CM

## 2022-06-16 NOTE — TELEPHONE ENCOUNTER
Spoke with patient regarding  Mothers MRI results. No large rotator cuff tear.  Severe osteoarthritis. Recommend IR kenalog injection and continuation of PT. Wll put the referral into IR today.       ----- Message from Alana Pan MA sent at 6/15/2022  4:06 PM CDT -----  Contact: Rikki Ford [phone]    ----- Message -----  From: Alicia Clark  Sent: 6/15/2022   4:02 PM CDT  To: [facility] Staff    Type:  Patient Returning Call    Who Called:pt's daughter   Who Left Message for Patient:Nadya TAYLOR   Does the patient know what this is regarding?:Mri results   Would the patient rather a call back or a response via [facility]? Call back   Best Call Back Number:[phone]  Additional Information: n/a

## 2022-06-20 ENCOUNTER — TELEPHONE (OUTPATIENT)
Dept: ORTHOPEDICS | Facility: CLINIC | Age: 87
End: 2022-06-20
Payer: MEDICARE

## 2022-06-20 NOTE — TELEPHONE ENCOUNTER
----- Message from Jonathan Winters sent at 6/20/2022  4:23 PM CDT -----  Contact: patient/ [phone]  Patient calling to speak with you regarding scheduling an injection   Please advise

## 2022-06-23 ENCOUNTER — TELEPHONE (OUTPATIENT)
Dept: INTERVENTIONAL RADIOLOGY/VASCULAR | Facility: HOSPITAL | Age: 87
End: 2022-06-23
Payer: MEDICARE

## 2022-06-27 ENCOUNTER — TELEPHONE (OUTPATIENT)
Dept: INTERVENTIONAL RADIOLOGY/VASCULAR | Facility: HOSPITAL | Age: 87
End: 2022-06-27
Payer: MEDICARE

## 2022-07-06 ENCOUNTER — TELEPHONE (OUTPATIENT)
Dept: INTERVENTIONAL RADIOLOGY/VASCULAR | Facility: HOSPITAL | Age: 87
End: 2022-07-06
Payer: MEDICARE

## 2022-07-07 ENCOUNTER — HOSPITAL ENCOUNTER (OUTPATIENT)
Dept: INTERVENTIONAL RADIOLOGY/VASCULAR | Facility: HOSPITAL | Age: 87
Discharge: HOME | End: 2022-07-07
Attending: PHYSICIAN ASSISTANT
Payer: MEDICARE

## 2022-07-07 DIAGNOSIS — M19.011 LOCALIZED OSTEOARTHRITIS OF RIGHT SHOULDER: ICD-10-CM

## 2022-07-07 PROCEDURE — 63600175 PHARM REV CODE 636 W HCPCS: Performed by: RADIOLOGY

## 2022-07-07 PROCEDURE — 25000003 PHARM REV CODE 250: Performed by: RADIOLOGY

## 2022-07-07 PROCEDURE — 20610 IR ASPIRATION INJECTION LARGE JOINT W FLUORO: ICD-10-PCS | Mod: RT,,, | Performed by: RADIOLOGY

## 2022-07-07 PROCEDURE — 77002 IR ASPIRATION INJECTION LARGE JOINT W FLUORO: ICD-10-PCS | Mod: 26,,, | Performed by: RADIOLOGY

## 2022-07-07 PROCEDURE — 20610 DRAIN/INJ JOINT/BURSA W/O US: CPT | Mod: RT,,, | Performed by: RADIOLOGY

## 2022-07-07 PROCEDURE — 77002 NEEDLE LOCALIZATION BY XRAY: CPT | Mod: TC | Performed by: RADIOLOGY

## 2022-07-07 PROCEDURE — A4215 STERILE NEEDLE: HCPCS

## 2022-07-07 RX ORDER — LIDOCAINE HYDROCHLORIDE 10 MG/ML
INJECTION INFILTRATION; PERINEURAL CODE/TRAUMA/SEDATION MEDICATION
Status: COMPLETED | OUTPATIENT
Start: 2022-07-07 | End: 2022-07-07

## 2022-07-07 RX ORDER — TRIAMCINOLONE ACETONIDE 40 MG/ML
INJECTION, SUSPENSION INTRA-ARTICULAR; INTRAMUSCULAR CODE/TRAUMA/SEDATION MEDICATION
Status: COMPLETED | OUTPATIENT
Start: 2022-07-07 | End: 2022-07-07

## 2022-07-07 RX ADMIN — LIDOCAINE HYDROCHLORIDE 5 ML: 10 INJECTION, SOLUTION INFILTRATION; PERINEURAL at 02:07

## 2022-07-07 RX ADMIN — TRIAMCINOLONE ACETONIDE 40 MG: 40 INJECTION, SUSPENSION INTRA-ARTICULAR; INTRAMUSCULAR at 02:07

## 2022-07-07 NOTE — H&P
Interventional Radiology Pre-Procedure History & Physical      Chief Complaint/Reason for Referral: RIGHT shoulder pain    History of Present Illness:  Bridget Montgomery is a 87 y.o. female who presents with RIGHT shoulder pain. Referred for CSI under image-guidance.    Past Medical History:   Diagnosis Date    Anemia, unspecified     Arthritis     CKD (chronic kidney disease) stage 4, GFR 15-29 ml/min     COPD (chronic obstructive pulmonary disease)     Edema     HTN (hypertension)     Hypocalcemia     Hyponatremia     Osteoarthritis      Past Surgical History:   Procedure Laterality Date    BREAST CYST EXCISION      FOOT SURGERY         Allergies:   Review of patient's allergies indicates:   Allergen Reactions    Cephalexin     Codeine     Meperidine      Other reaction(s): delerium, hallucination  Delerium  Delerium  Delerium      Penicillins     Tazarotene      Other reaction(s): rash, Unknown       Home Meds:   Prior to Admission medications    Medication Sig Start Date End Date Taking? Authorizing Provider   acetylcysteine 600 mg TbIE Take by mouth Daily.    Historical Provider   albuterol (PROVENTIL) 2.5 mg /3 mL (0.083 %) nebulizer solution Take 2.5 mg by nebulization.  1/24/18   Historical Provider   amLODIPine-benazepriL (LOTREL) 5-40 mg per capsule Take 1 capsule by mouth once daily.    Historical Provider   apple cider vinegar 600 mg Cap Take by mouth once daily.    Historical Provider   calcium carbonate (CALCIUM 500 ORAL) Take by mouth.    Historical Provider   calcium carbonate-vitamin D3 500 mg-10 mcg (400 unit) Chew Take by mouth.    Historical Provider   cholecalciferol, vitamin D3, (VITAMIN D3) 50 mcg (2,000 unit) Cap capsule Take 4,000 Units by mouth once daily.    Historical Provider   coenzyme Q10 10 mg capsule Take 10 mg by mouth.    Historical Provider   coQ10, ubiquinol, 100 mg Cap Take by mouth once daily.    Historical Provider   diclofenac sodium (VOLTAREN ARTHRITIS  PAIN) 1 % Gel Apply 2 g topically once daily. 4/4/22   Lluvia Rahman MD   famotidine (PEPCID) 40 MG tablet 40 mg once daily.  6/30/20   Historical Provider   fluticasone propionate (FLONASE) 50 mcg/actuation nasal spray INSTILL TWO SPRAYS INTO EACH NOSTRIL TWICE A DAY 9/7/21   Charles Peacock MD   furosemide (LASIX) 40 MG tablet Take 1 tablet (40 mg total) by mouth every 8 (eight) hours as needed (fluid).  Patient taking differently: Take 20 mg by mouth every 8 (eight) hours as needed (fluid). 4/4/22   Lluvia Rahman MD   LIDOcaine (LIDODERM) 5 % Place 1 patch onto the skin once daily. Remove & Discard patch within 12 hours or as directed by provider 6/10/22   Sepideh Smith NP   methylcellulose oral powder Take by mouth.    Historical Provider   mupirocin (BACTROBAN) 2 % ointment Apply topically 2 (two) times daily. 5/26/22   Colleen Charles MD   olopatadine (PATANOL) 0.1 % ophthalmic solution Place into both eyes Daily. 4/6/20   Historical Provider   omega 3-dha-epa-fish oil 1,000 mg (120 mg-180 mg) Cap Take by mouth.    Historical Provider   omeprazole (PRILOSEC) 20 MG capsule Take 20 mg by mouth once daily.    Historical Provider   polyethylene glycol 3350 (MIRALAX ORAL) Take by mouth.    Historical Provider   pregabalin (LYRICA) 25 MG capsule Take 1 capsule (25 mg total) by mouth 2 (two) times daily.  Patient taking differently: Take 25 mg by mouth once daily at 6am. 6/4/22 12/3/22  Charles Peacock MD   psyllium 0.52 gram capsule Take 0.52 g by mouth once daily.    Historical Provider   SELENIUM ORAL Take by mouth Daily.    Historical Provider   SYMBICORT 160-4.5 mcg/actuation HFAA INHALE 2 PUFFS INTO THE LUNGS TWICE A DAY 1/3/22   Charles Peacock MD   traMADoL (ULTRAM) 50 mg tablet TAKE 1 TABLET (50 MG TOTAL) BY MOUTH EVERY 12 (TWELVE) HOURS AS NEEDED FOR PAIN. 12/8/21   Charles Peacock MD   triamcinolone acetonide 0.1% (KENALOG) 0.1 % cream APPLY TO AFFECTED AREA TWICE A DAY 3/29/22   Charles SEVERINO  MD Ayush   vit A/vit C/vit E/zinc/copper (PRESERVISION AREDS ORAL) Take by mouth 2 (two) times a day.    Historical Provider   zinc sulfate (ZINC-15 ORAL) Take 30 mg by mouth once daily.    Historical Provider       Anticoagulation/Antiplatelet Meds: no anticoagulation    Review of Systems:   Hematological: no known coagulopathies  Respiratory: no shortness of breath  Cardiovascular: no chest pain  Gastrointestinal: no abdominal pain  Genitourinary: no dysuria  Musculoskeletal: negative  Neurological: no TIA or stroke symptoms     Physical Exam:       General: Elderly, NAD  HEENT: Normocephalic, sclera anicteric, oropharynx clear  Heart: RRR  Lungs: Symmetric excursions, breathing unlabored  Abd: NTND, soft  Extremities: PUCKETT  Neuro: Gross nonfocal    Laboratory:  No results found for: INR    Lab Results   Component Value Date    WBC 6.56 06/13/2022    HGB 11.2 (L) 06/13/2022    HCT 33.5 (L) 06/13/2022    MCV 92 06/13/2022     (H) 06/13/2022      Lab Results   Component Value Date    GLU 98 06/13/2022     (L) 06/13/2022    K 4.9 06/13/2022    CL 95 06/13/2022    CO2 26 06/13/2022    BUN 16 06/13/2022    CREATININE 1.1 06/13/2022    CALCIUM 9.4 06/13/2022    ALT 19 05/20/2022    AST 25 05/20/2022    ALBUMIN 3.1 (L) 06/13/2022    BILITOT 0.3 05/20/2022       Imaging:  Right shoulder XR 5/16/22 reviewed.    Assessment/Plan:  87 y.o. female with right shoulder pain. Will undergo CSI today today.    Sedation plan: None    Risks (including, but not limited to, pain, bleeding, infection, damage to nearby structures, treatment failure/recurrence, and the need for additional procedures), potential benefits, and alternatives were discussed with the patient and her daughter. All questions were answered to the best of my abilities. The patient wishes to proceed. Written informed consent was obtained.      Andrew Marsala MD [facility]  Pager [phone]

## 2022-07-07 NOTE — DISCHARGE SUMMARY
Interventional Radiology Short Stay Discharge Summary      Admit Date: 7/7/2022  Discharge Date: 07/07/2022     Hospital Course: Uneventful    Discharge Diagnosis: Right shoulder pain s/p CSI today    Discharge Condition: Stable    Discharge Disposition: Home    Diet: Resume prior diet    Activity: Activity as tolerated    Follow-up: With referring provider      Keron Sanabria MD  [facility]  Pager [phone]

## 2022-07-07 NOTE — PROCEDURES
Interventional Radiology Immediate Post-Procedure Note    Pre-Op Diagnosis: RIGHT shoulder pain  Post-Op Diagnosis: Same    Procedure: Fluoro guided CSI    Procedure performed by: Keron Sanabria MD  Assistants: None    Estimated Blood Loss: Minimal  Specimen Removed: Yes    Findings/description of procedure:  40 mg Kenalog and 2 mL lidocaine 1% administered into RIGHT glenohumeral joint under fluoro guidance.    No immediate complications. Patient tolerated procedure well. Please see full dictated procedure report for additional details and recommendations.      Keron Sanabria MD  [facility]  Pager [phone]

## 2022-07-15 ENCOUNTER — OFFICE VISIT (OUTPATIENT)
Dept: ORTHOPEDICS | Facility: CLINIC | Age: 87
End: 2022-07-15
Payer: MEDICARE

## 2022-07-15 ENCOUNTER — HOSPITAL ENCOUNTER (OUTPATIENT)
Dept: RADIOLOGY | Facility: HOSPITAL | Age: 87
Discharge: HOME | End: 2022-07-15
Attending: PHYSICIAN ASSISTANT
Payer: MEDICARE

## 2022-07-15 VITALS
DIASTOLIC BLOOD PRESSURE: 63 MMHG | HEART RATE: 85 BPM | SYSTOLIC BLOOD PRESSURE: 146 MMHG | WEIGHT: 154.19 LBS | HEIGHT: 64 IN | BODY MASS INDEX: 26.32 KG/M2

## 2022-07-15 DIAGNOSIS — M54.50 PAIN OF LUMBAR SPINE: ICD-10-CM

## 2022-07-15 DIAGNOSIS — M54.2 NECK PAIN: ICD-10-CM

## 2022-07-15 DIAGNOSIS — M19.012 ARTHRITIS OF LEFT SHOULDER REGION: ICD-10-CM

## 2022-07-15 DIAGNOSIS — M19.012 ARTHRITIS OF LEFT SHOULDER REGION: Primary | ICD-10-CM

## 2022-07-15 PROCEDURE — 72100 X-RAY EXAM L-S SPINE 2/3 VWS: CPT | Mod: 26,,, | Performed by: RADIOLOGY

## 2022-07-15 PROCEDURE — 73030 XR SHOULDER COMPLETE 2 OR MORE VIEWS LEFT: ICD-10-PCS | Mod: 26,LT,, | Performed by: RADIOLOGY

## 2022-07-15 PROCEDURE — 3288F PR FALLS RISK ASSESSMENT DOCUMENTED: ICD-10-PCS | Mod: CPTII,S$GLB,, | Performed by: PHYSICIAN ASSISTANT

## 2022-07-15 PROCEDURE — 99213 OFFICE O/P EST LOW 20 MIN: CPT | Mod: S$GLB,,, | Performed by: PHYSICIAN ASSISTANT

## 2022-07-15 PROCEDURE — 72040 X-RAY EXAM NECK SPINE 2-3 VW: CPT | Mod: 26,,, | Performed by: RADIOLOGY

## 2022-07-15 PROCEDURE — 1160F RVW MEDS BY RX/DR IN RCRD: CPT | Mod: CPTII,S$GLB,, | Performed by: PHYSICIAN ASSISTANT

## 2022-07-15 PROCEDURE — 1101F PT FALLS ASSESS-DOCD LE1/YR: CPT | Mod: CPTII,S$GLB,, | Performed by: PHYSICIAN ASSISTANT

## 2022-07-15 PROCEDURE — 1160F PR REVIEW ALL MEDS BY PRESCRIBER/CLIN PHARMACIST DOCUMENTED: ICD-10-PCS | Mod: CPTII,S$GLB,, | Performed by: PHYSICIAN ASSISTANT

## 2022-07-15 PROCEDURE — 1125F PR PAIN SEVERITY QUANTIFIED, PAIN PRESENT: ICD-10-PCS | Mod: CPTII,S$GLB,, | Performed by: PHYSICIAN ASSISTANT

## 2022-07-15 PROCEDURE — 73030 X-RAY EXAM OF SHOULDER: CPT | Mod: TC,FY,LT

## 2022-07-15 PROCEDURE — 72040 XR CERVICAL SPINE AP LATERAL: ICD-10-PCS | Mod: 26,,, | Performed by: RADIOLOGY

## 2022-07-15 PROCEDURE — 72100 XR LUMBAR SPINE AP AND LATERAL: ICD-10-PCS | Mod: 26,,, | Performed by: RADIOLOGY

## 2022-07-15 PROCEDURE — 1125F AMNT PAIN NOTED PAIN PRSNT: CPT | Mod: CPTII,S$GLB,, | Performed by: PHYSICIAN ASSISTANT

## 2022-07-15 PROCEDURE — 72100 X-RAY EXAM L-S SPINE 2/3 VWS: CPT | Mod: TC,FY

## 2022-07-15 PROCEDURE — 3288F FALL RISK ASSESSMENT DOCD: CPT | Mod: CPTII,S$GLB,, | Performed by: PHYSICIAN ASSISTANT

## 2022-07-15 PROCEDURE — 99999 PR PBB SHADOW E&M-EST. PATIENT-LVL IV: CPT | Mod: PBBFAC,,, | Performed by: PHYSICIAN ASSISTANT

## 2022-07-15 PROCEDURE — 1101F PR PT FALLS ASSESS DOC 0-1 FALLS W/OUT INJ PAST YR: ICD-10-PCS | Mod: CPTII,S$GLB,, | Performed by: PHYSICIAN ASSISTANT

## 2022-07-15 PROCEDURE — 99213 PR OFFICE/OUTPT VISIT, EST, LEVL III, 20-29 MIN: ICD-10-PCS | Mod: S$GLB,,, | Performed by: PHYSICIAN ASSISTANT

## 2022-07-15 PROCEDURE — 1159F MED LIST DOCD IN RCRD: CPT | Mod: CPTII,S$GLB,, | Performed by: PHYSICIAN ASSISTANT

## 2022-07-15 PROCEDURE — 73030 X-RAY EXAM OF SHOULDER: CPT | Mod: 26,LT,, | Performed by: RADIOLOGY

## 2022-07-15 PROCEDURE — 72040 X-RAY EXAM NECK SPINE 2-3 VW: CPT | Mod: TC,FY

## 2022-07-15 PROCEDURE — 1159F PR MEDICATION LIST DOCUMENTED IN MEDICAL RECORD: ICD-10-PCS | Mod: CPTII,S$GLB,, | Performed by: PHYSICIAN ASSISTANT

## 2022-07-15 PROCEDURE — 99999 PR PBB SHADOW E&M-EST. PATIENT-LVL IV: ICD-10-PCS | Mod: PBBFAC,,, | Performed by: PHYSICIAN ASSISTANT

## 2022-07-15 NOTE — PROGRESS NOTES
Subjective:      Patient ID: Bridget Montgomery is a 87 y.o. female.    Chief Complaint: Pain of the Right Shoulder      86yo female follow up for shoulder pain. Recently had a right shoulder fluoro guided injection with IR which she states helped.   She is now having similar symptoms in the left shoulder.  Her pain is anteriorly.  Worse with lifting herself out of a chair and with overhead reaching.  She notes crepitus in her shoulder.  She takes Tylenol for pain.  She is still in home health and cannot start outpatient physical therapy until she is discharged.  She does not notice any relief in symptoms from home health PT.      Review of Systems   Constitutional: Negative for chills and fever.   Cardiovascular: Negative for chest pain.   Respiratory: Negative for cough.    Hematologic/Lymphatic: Does not bruise/bleed easily.   Skin: Negative for poor wound healing and rash.   Musculoskeletal: Positive for arthritis, joint pain, myalgias and stiffness.   Gastrointestinal: Negative for abdominal pain.   Genitourinary: Negative for bladder incontinence.   Neurological: Negative for dizziness, loss of balance and weakness.   Psychiatric/Behavioral: Negative for altered mental status.       Review of patient's allergies indicates:   Allergen Reactions    Cephalexin     Codeine     Meperidine      Other reaction(s): delerium, hallucination  Delerium  Delerium  Delerium      Penicillins     Tazarotene      Other reaction(s): rash, Unknown        Current Outpatient Medications   Medication Sig Dispense Refill    acetylcysteine 600 mg TbIE Take by mouth Daily.      albuterol (PROVENTIL) 2.5 mg /3 mL (0.083 %) nebulizer solution Take 2.5 mg by nebulization.       amLODIPine-benazepriL (LOTREL) 5-40 mg per capsule Take 1 capsule by mouth once daily.      apple cider vinegar 600 mg Cap Take by mouth once daily.      calcium carbonate (CALCIUM 500 ORAL) Take by mouth.      calcium carbonate-vitamin D3 500 mg-10  mcg (400 unit) Chew Take by mouth.      cholecalciferol, vitamin D3, (VITAMIN D3) 50 mcg (2,000 unit) Cap capsule Take 4,000 Units by mouth once daily.      coenzyme Q10 10 mg capsule Take 10 mg by mouth.      coQ10, ubiquinol, 100 mg Cap Take by mouth once daily.      diclofenac sodium (VOLTAREN ARTHRITIS PAIN) 1 % Gel Apply 2 g topically once daily. 1 each 11    famotidine (PEPCID) 40 MG tablet 40 mg once daily.       fluticasone propionate (FLONASE) 50 mcg/actuation nasal spray INSTILL TWO SPRAYS INTO EACH NOSTRIL TWICE A DAY 48 mL 2    furosemide (LASIX) 40 MG tablet Take 1 tablet (40 mg total) by mouth every 8 (eight) hours as needed (fluid). (Patient taking differently: Take 20 mg by mouth every 8 (eight) hours as needed (fluid).) 180 tablet 3    LIDOcaine (LIDODERM) 5 % Place 1 patch onto the skin once daily. Remove & Discard patch within 12 hours or as directed by provider 5 patch 0    methylcellulose oral powder Take by mouth.      mupirocin (BACTROBAN) 2 % ointment Apply topically 2 (two) times daily. 22 g 0    olopatadine (PATANOL) 0.1 % ophthalmic solution Place into both eyes Daily.      omega 3-dha-epa-fish oil 1,000 mg (120 mg-180 mg) Cap Take by mouth.      omeprazole (PRILOSEC) 20 MG capsule Take 20 mg by mouth once daily.      polyethylene glycol 3350 (MIRALAX ORAL) Take by mouth.      pregabalin (LYRICA) 25 MG capsule Take 1 capsule (25 mg total) by mouth 2 (two) times daily. (Patient taking differently: Take 25 mg by mouth once daily at 6am.) 60 capsule 5    psyllium 0.52 gram capsule Take 0.52 g by mouth once daily.      SELENIUM ORAL Take by mouth Daily.      SYMBICORT 160-4.5 mcg/actuation HFAA INHALE 2 PUFFS INTO THE LUNGS TWICE A DAY 10.2 g 6    traMADoL (ULTRAM) 50 mg tablet TAKE 1 TABLET (50 MG TOTAL) BY MOUTH EVERY 12 (TWELVE) HOURS AS NEEDED FOR PAIN. 60 tablet 5    triamcinolone acetonide 0.1% (KENALOG) 0.1 % cream APPLY TO AFFECTED AREA TWICE A  g 3    vit  "A/vit C/vit E/zinc/copper (PRESERVISION AREDS ORAL) Take by mouth 2 (two) times a day.      zinc sulfate (ZINC-15 ORAL) Take 30 mg by mouth once daily.       No current facility-administered medications for this visit.        The patient's relevant past medical, surgical, and social history was reviewed in Epic.       Objective:      VITAL SIGNS: BP (!) 146/63 (BP Location: Left arm, Patient Position: Sitting, BP Method: Small (Automatic))   Pulse 85   Ht 5' 4" (1.626 m)   Wt 70 kg (154 lb 3.4 oz)   BMI 26.47 kg/m²     General    Nursing note and vitals reviewed.  Constitutional: She is oriented to person, place, and time. She appears well-developed and well-nourished.   Neurological: She is alert and oriented to person, place, and time.             Left Shoulder Exam     Inspection/Observation   Atrophy: present    Tenderness   Left shoulder tenderness location: glenohumeral joint.    Range of Motion   Active abduction: 120   Passive abduction: 160   Forward Flexion: 120   External Rotation 0 degrees: 20       Muscle Strength   Left Upper Extremity  Shoulder Abduction: 3/5   Shoulder Internal Rotation: 4/5   Shoulder External Rotation: 4/5   Supraspinatus: 3/5   Subscapularis: 4/5            Assessment:       1. Arthritis of left shoulder region    2. Neck pain    3. Pain of lumbar spine          Plan:         Bridget was seen today for pain.    Diagnoses and all orders for this visit:    Arthritis of left shoulder region  -     Cancel: IR Aspiration Injection Large Joint W FL; Future  -     X-Ray Shoulder 2 or More Views Left; Future  -     IR Aspiration Injection Large Joint W [place]; Future    Neck pain  -     Ambulatory referral/consult to Pain Clinic; Future  -     X-Ray Cervical Spine AP And Lateral; Future    Pain of lumbar spine  -     X-Ray Lumbar Spine AP And Lateral; Future        Patient most likely also has significant left glenohumeral joint osteoarthritis.  I will  Put in new order in for left " shoulder glenohumeral joint fluoro  Guided injection.  I would like her to be discharged from  Home health and start outpatient physical therapy.    I will refer her to Pain Management for evaluation of neck and back pain.  X-rays ordered today.  She may follow-up at any time.        Nadya Hopper PA-C   07/15/2022

## 2022-07-27 ENCOUNTER — HOSPITAL ENCOUNTER (OUTPATIENT)
Dept: RADIOLOGY | Facility: HOSPITAL | Age: 87
Discharge: HOME | End: 2022-07-27
Attending: INTERNAL MEDICINE
Payer: MEDICARE

## 2022-07-27 ENCOUNTER — HOSPITAL ENCOUNTER (OUTPATIENT)
Dept: INTERVENTIONAL RADIOLOGY/VASCULAR | Facility: HOSPITAL | Age: 87
Discharge: HOME | End: 2022-07-27
Attending: PHYSICIAN ASSISTANT
Payer: MEDICARE

## 2022-07-27 VITALS
BODY MASS INDEX: 25.44 KG/M2 | TEMPERATURE: 98 F | HEART RATE: 72 BPM | HEIGHT: 64 IN | DIASTOLIC BLOOD PRESSURE: 58 MMHG | WEIGHT: 149 LBS | OXYGEN SATURATION: 98 % | RESPIRATION RATE: 16 BRPM | SYSTOLIC BLOOD PRESSURE: 125 MMHG

## 2022-07-27 DIAGNOSIS — D21.21 BENIGN NEOPLASM OF CONNECTIVE AND OTHER SOFT TISSUE OF RIGHT LOWER LIMB, INCLUDING HIP: ICD-10-CM

## 2022-07-27 DIAGNOSIS — M19.012 ARTHRITIS OF LEFT SHOULDER REGION: ICD-10-CM

## 2022-07-27 DIAGNOSIS — R10.31 RIGHT GROIN PAIN: ICD-10-CM

## 2022-07-27 PROCEDURE — 76882 US LMTD JT/FCL EVL NVASC XTR: CPT | Mod: TC,RT

## 2022-07-27 PROCEDURE — 20610 DRAIN/INJ JOINT/BURSA W/O US: CPT | Mod: LT,,, | Performed by: RADIOLOGY

## 2022-07-27 PROCEDURE — 25500020 PHARM REV CODE 255: Performed by: RADIOLOGY

## 2022-07-27 PROCEDURE — 77002 NEEDLE LOCALIZATION BY XRAY: CPT | Mod: TC | Performed by: RADIOLOGY

## 2022-07-27 PROCEDURE — 63600175 PHARM REV CODE 636 W HCPCS: Performed by: RADIOLOGY

## 2022-07-27 PROCEDURE — 76882 US SOFT TISSUE, GROIN RIGHT: ICD-10-PCS | Mod: 26,RT,, | Performed by: RADIOLOGY

## 2022-07-27 PROCEDURE — 76882 US LMTD JT/FCL EVL NVASC XTR: CPT | Mod: 26,RT,, | Performed by: RADIOLOGY

## 2022-07-27 PROCEDURE — 20610 IR ASPIRATION INJECTION LARGE JOINT W FLUORO: ICD-10-PCS | Mod: LT,,, | Performed by: RADIOLOGY

## 2022-07-27 PROCEDURE — 77002 IR ASPIRATION INJECTION LARGE JOINT W FLUORO: ICD-10-PCS | Mod: 26,,, | Performed by: RADIOLOGY

## 2022-07-27 PROCEDURE — 25000003 PHARM REV CODE 250: Performed by: RADIOLOGY

## 2022-07-27 PROCEDURE — A4215 STERILE NEEDLE: HCPCS

## 2022-07-27 RX ORDER — LIDOCAINE HYDROCHLORIDE 10 MG/ML
INJECTION INFILTRATION; PERINEURAL CODE/TRAUMA/SEDATION MEDICATION
Status: COMPLETED | OUTPATIENT
Start: 2022-07-27 | End: 2022-07-27

## 2022-07-27 RX ORDER — TRIAMCINOLONE ACETONIDE 40 MG/ML
40 INJECTION, SUSPENSION INTRA-ARTICULAR; INTRAMUSCULAR ONCE
Status: COMPLETED | OUTPATIENT
Start: 2022-07-27 | End: 2022-07-27

## 2022-07-27 RX ADMIN — LIDOCAINE HYDROCHLORIDE 5 ML: 10 INJECTION, SOLUTION INFILTRATION; PERINEURAL at 02:07

## 2022-07-27 RX ADMIN — IOHEXOL 3 ML: 300 INJECTION, SOLUTION INTRAVENOUS at 02:07

## 2022-07-27 RX ADMIN — TRIAMCINOLONE ACETONIDE 40 MG: 40 INJECTION, SUSPENSION INTRA-ARTICULAR; INTRAMUSCULAR at 02:07

## 2022-07-27 NOTE — PROCEDURES
Interventional Radiology Immediate Post-Procedure Note     Pre-Op Diagnosis: LEFT shoulder pain  Post-Op Diagnosis: Same     Procedure: Fluoro guided CSI     Procedure performed by: Keron Sanabria MD  Assistants: None     Estimated Blood Loss: Minimal  Specimen Removed: Yes     Findings/description of procedure:  40 mg Kenalog and 2 mL lidocaine 1% administered into LEFT glenohumeral joint under fluoro guidance.     No immediate complications. Patient tolerated procedure well. Please see full dictated procedure report for additional details and recommendations.        Keron Sanabria MD  [facility]  Pager [phone]

## 2022-07-27 NOTE — PROGRESS NOTES
[facility]  New Patient H&P    Referring Provider: Nadya Hopper Pa-c  [address]  [address]  [place] [address]    Chief Complaint:   Chief Complaint   Patient presents with    Neck Pain       History of Present Illness: Bridget Montgomery is a 87 y.o. female referred by Nadya Hopper for back and neck pain.      Onset: 2007  Location: bilateral low back  Radiation: left lateral leg across the knee and into the shin and into the right groin.   Timing: constant  Quality: Aching, burning, grabbing, deep, sharp  Exacerbating Factors: lifting, standing for more than 3 min, walking for more than 1 min, daily activity  Alleviating Factors: heat, medications, rest, PT  Associated Symptoms: She does have weakness and numbness in the legs, primarily the right leg. She denies night fever/night sweats, urinary incontinence, bowel incontinence and significant weight loss    She does currently have a chronic wound on her toe that is infected.     She takes tramadol for pain and this helps, but also causes sedation and she mostly is using this at nighttime.        Severity: Currently: 8/10   Typical Range: 5-10/10     Exacerbation: 10/10     Pain Disability Index  Family/Home Responsibilities:: 10  Recreation:: 5  Social Activity:: 10  Occupation:: 10  Sexual Behavior:: 0  Self Care:: 7  Life-Support Activities:: 8  Pain Disability Index (PDI): 50    Previous Interventions:  - Several procedures with Dr. Daugherty which were helpful     Previous Therapies:  PT/OT: yes    Relevant Surgery: yes    - Vertiflex at L3-4 with Dr. Daugherty with no relief  Previous Medications:   - NSAIDS: Voltaren Gel  - Muscle Relaxants:    - TCAs:   - SNRIs:   - Topicals:   - Anticonvulsants:  Lyrica.   - Opioids: tramadol    Current Pain Medications:  1. Tramadol 50 mg helps, but causes sedation and she mostly takes this at night.    2. Lidoderm   3. Turmeric    Blood Thinners: Fish Oil    Full Medication List:    Current  Outpatient Medications:     acetylcysteine 600 mg TbIE, Take by mouth Daily., Disp: , Rfl:     albuterol (PROVENTIL) 2.5 mg /3 mL (0.083 %) nebulizer solution, Take 2.5 mg by nebulization. , Disp: , Rfl:     amLODIPine-benazepriL (LOTREL) 5-40 mg per capsule, Take 1 capsule by mouth once daily., Disp: , Rfl:     apple cider vinegar 600 mg Cap, Take by mouth once daily., Disp: , Rfl:     calcium carbonate (CALCIUM 500 ORAL), Take by mouth., Disp: , Rfl:     calcium carbonate-vitamin D3 500 mg-10 mcg (400 unit) Chew, Take by mouth., Disp: , Rfl:     cholecalciferol, vitamin D3, (VITAMIN D3) 50 mcg (2,000 unit) Cap capsule, Take 4,000 Units by mouth once daily., Disp: , Rfl:     coenzyme Q10 10 mg capsule, Take 10 mg by mouth., Disp: , Rfl:     collagenase (SANTYL) ointment, Apply topically once daily., Disp: 30 g, Rfl: 0    coQ10, ubiquinol, 100 mg Cap, Take by mouth once daily., Disp: , Rfl:     diclofenac sodium (VOLTAREN ARTHRITIS PAIN) 1 % Gel, Apply 2 g topically once daily., Disp: 1 each, Rfl: 11    famotidine (PEPCID) 40 MG tablet, 40 mg once daily. , Disp: , Rfl:     fluticasone propionate (FLONASE) 50 mcg/actuation nasal spray, INSTILL TWO SPRAYS INTO EACH NOSTRIL TWICE A DAY, Disp: 48 mL, Rfl: 2    furosemide (LASIX) 40 MG tablet, Take 1 tablet (40 mg total) by mouth every 8 (eight) hours as needed (fluid). (Patient taking differently: Take 20 mg by mouth every 8 (eight) hours as needed (fluid).), Disp: 180 tablet, Rfl: 3    LIDOcaine (LIDODERM) 5 %, Place 1 patch onto the skin once daily. Remove & Discard patch within 12 hours or as directed by provider, Disp: 5 patch, Rfl: 0    methylcellulose oral powder, Take by mouth., Disp: , Rfl:     mupirocin (BACTROBAN) 2 % ointment, Apply topically 2 (two) times daily., Disp: 22 g, Rfl: 0    olopatadine (PATANOL) 0.1 % ophthalmic solution, Place into both eyes Daily., Disp: , Rfl:     omega 3-dha-epa-fish oil 1,000 mg (120 mg-180 mg) Cap, Take by  mouth., Disp: , Rfl:     omeprazole (PRILOSEC) 20 MG capsule, Take 20 mg by mouth once daily., Disp: , Rfl:     pantoprazole (PROTONIX) 40 MG tablet, Take 40 mg by mouth once daily., Disp: , Rfl:     polyethylene glycol 3350 (MIRALAX ORAL), Take by mouth., Disp: , Rfl:     pregabalin (LYRICA) 25 MG capsule, Take 1 capsule (25 mg total) by mouth 2 (two) times daily. (Patient taking differently: Take 25 mg by mouth once daily at 6am.), Disp: 60 capsule, Rfl: 5    psyllium 0.52 gram capsule, Take 0.52 g by mouth once daily., Disp: , Rfl:     SELENIUM ORAL, Take by mouth Daily., Disp: , Rfl:     sulfamethoxazole-trimethoprim 800-160mg (BACTRIM DS) 800-160 mg Tab, Take 1 tablet by mouth 2 (two) times daily., Disp: , Rfl:     SYMBICORT 160-4.5 mcg/actuation HFAA, INHALE 2 PUFFS INTO THE LUNGS TWICE A DAY, Disp: 10.2 g, Rfl: 6    traMADoL (ULTRAM) 50 mg tablet, TAKE 1 TABLET (50 MG TOTAL) BY MOUTH EVERY 12 (TWELVE) HOURS AS NEEDED FOR PAIN., Disp: 60 tablet, Rfl: 5    triamcinolone acetonide 0.1% (KENALOG) 0.1 % cream, APPLY TO AFFECTED AREA TWICE A DAY, Disp: 454 g, Rfl: 3    vit A/vit C/vit E/zinc/copper (PRESERVISION AREDS ORAL), Take by mouth 2 (two) times a day., Disp: , Rfl:     zinc sulfate (ZINC-15 ORAL), Take 30 mg by mouth once daily., Disp: , Rfl:      Review of Systems:  ROS    Allergies:  Cephalexin, Codeine, Meperidine, Penicillins, and Tazarotene     Medical History:   has a past medical history of Anemia, unspecified, Arthritis, CKD (chronic kidney disease) stage 4, GFR 15-29 ml/min, COPD (chronic obstructive pulmonary disease), Edema, HTN (hypertension), Hypocalcemia, Hyponatremia, and Osteoarthritis.    Surgical History:   has a past surgical history that includes Breast cyst excision and Foot surgery.    Family History:  family history includes Cancer in her mother; No Known Problems in her father.    Social History:   reports that she has quit smoking. She has never used smokeless tobacco.  "She reports previous alcohol use. She reports that she does not use drugs.    Physical Exam:  /69   Pulse 83   Resp 16   Ht 5' 4" (1.626 m)   BMI 25.58 kg/m²   GEN: No acute distress. Calm, comfortable  HENT: Normocephalic, atraumatic, moist mucous membranes  EYE: Anicteric sclera, non-injected.   CV: Non-diaphoretic. Regular Rate. Radial Pulses 2+.  RESP: Breathing comfortably. Chest expansion symmetric.  EXT: No clubbing, cyanosis.   SKIN: Warm, & dry to palpation. No visible rashes or lesions of exposed skin.   PSYCH: Pleasant mood and appropriate affect. Recent and remote memory intact.   GAIT: Mod Independent with RW  Lumbar Spine Exam:       Inspection: No erythema, bruising.       Palpation: (+) TTP of lumbar paraspinals bilaterally       ROM:  Limited in flexion, extension, lateral bending.       (+) Facet loading bilaterally      (-) Seated Straight Leg Raise bilaterally      (-) Seated JUSTYN bilaterally  Hip Exam:      Inspection: No gross deformity or apparent leg length discrepancy      Palpation: (+) TTP to bilateral greater trochanteric bursas (L>R).       ROM: (+) limitation & pain in internal rotation, external rotation b/l  Neurologic Exam:     Alert. Speech is fluent and appropriate.     Strength:  4/5 throughout right leg, otherwise 4++/5 throughout left lower extremity     Sensation:  Grossly intact to light touch in bilateral lower extremities     Reflexes: 1+ in b/l patella, absent in b/l achilles     Tone: No abnormality appreciated in bilateral lower extremities           Imaging:  - X-ray hips b/l 7/28/22:  Hips bilateral two views to include pelvis.  Right: No fracture dislocation bone destruction seen.  Left: No fracture dislocation bone destruction seen.   There is DJD of the spine pelvis hips and proximal femurs.  No acute process seen.      - Xray Lumbar Spine 07/15/2022:  FINDINGS:  Lumbar spine two views: There is hardware status post fusion of the spinous process of L3 and " L4.  There is moderate-severe DJD throughout the L-spine lower T-spine.  There is a dextroconvex curvature of the upper L-spine.  No fracture dislocation bone destruction seen.  No instability seen.  There is grade 1 anterolisthesis of L3 on L4.  No hardware failure seen.  No complication or acute fracture dislocation bone destruction seen.    -Xray Cervical Spine 07/15/2022:  FINDINGS:  Cervical spine two views: Odontoid prevertebral soft tissues and posterior elements are intact.  There is moderate DJD at C5-C6 and C6-C7.  No trauma seen.  No acute process seen.    -Xray Shoulder Left 07/15/2022:  FINDINGS:  Shoulder complete three views left: There is severe advanced DJD of the glenohumeral joint.  No acute fracture, dislocation, or bone destruction seen.    Labs:  BMP  Lab Results   Component Value Date     (L) 06/13/2022    K 4.9 06/13/2022    CL 95 06/13/2022    CO2 26 06/13/2022    BUN 16 06/13/2022    CREATININE 1.1 06/13/2022    CALCIUM 9.4 06/13/2022    ANIONGAP 11 06/13/2022    ESTGFRAFRICA 52 (A) 06/13/2022    EGFRNONAA 45 (A) 06/13/2022     Lab Results   Component Value Date    ALT 19 05/20/2022    AST 25 05/20/2022    ALKPHOS 136 (H) 05/20/2022    BILITOT 0.3 05/20/2022     Lab Results   Component Value Date     (H) 06/13/2022     Lab Results   Component Value Date    HGBA1C 6.1 (H) 03/23/2022       Assessment:  Bridget Montgomery is a 87 y.o. female with the following diagnoses based on history, exam, and imaging:    Problem List Items Addressed This Visit    None     Visit Diagnoses     Right groin pain    -  Primary    Relevant Orders    X-Ray Hips Bilateral 2 View Incl AP Pelvis (Completed)    Neck pain        Lumbar radiculopathy, chronic        Relevant Orders    MRI Lumbar Spine Without Contrast    Spinal stenosis, lumbosacral region        Relevant Orders    MRI Lumbar Spine Without Contrast    Trochanteric bursitis of both hips        Iliotibial band syndrome, left         Decreased range of motion of both hips        Relevant Orders    X-Ray Hips Bilateral 2 View Incl AP Pelvis (Completed)          This is a pleasant 87 y.o. lady presenting with:     - Chronic bilateral low back pain and left radicular leg pain and some radiation into the right groin. She has vertiflex device at L3-4 that was not helpful.   - Right groin pain and limited hip ROM b/l: Related to OA on imaging.   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   - Chronic neck pain: Not fully evaluated today.   - Comorbidities: CKD. Chronic wound.     Treatment Plan:   - PT/OT/HEP: She has been referred to PT for shoulders, butw ill plan for PT for back and neck. Discussed benefits of exercise for pain.   - Procedures: Plan for caudal MARKUS after infection resolves.   - Medications: No changes recommended at this time.  - Imaging: Reviewed. Order lumbar MRI and bilateral hip x-rays.   - Labs: Reviewed.  Medications are appropriately dosed for current hepatorenal function.    Follow Up: RTC after imaging to discuss.     Kelsey Rasmussen M.D.  Interventional Pain Medicine / Physical Medicine & Rehabilitation    Disclaimer: This note was partly generated using dictation software which may occasionally result in transcription errors.

## 2022-07-27 NOTE — H&P
Interventional Radiology Pre-Procedure History & Physical      Chief Complaint/Reason for Referral: LEFT shoulder pain     History of Present Illness:  Bridget Montgomery is a 87 y.o. female who presents with LEFT shoulder pain. Referred for CSI under image-guidance.    Of note, underwent RIGHT shoulder CSI on 7/7/22 and says that it did help. Denies any other steroid injections within the past 12 months.    Past Medical History:   Diagnosis Date    Anemia, unspecified     Arthritis     CKD (chronic kidney disease) stage 4, GFR 15-29 ml/min     COPD (chronic obstructive pulmonary disease)     Edema     HTN (hypertension)     Hypocalcemia     Hyponatremia     Osteoarthritis      Past Surgical History:   Procedure Laterality Date    BREAST CYST EXCISION      FOOT SURGERY         Allergies:   Review of patient's allergies indicates:   Allergen Reactions    Cephalexin     Codeine     Meperidine      Other reaction(s): delerium, hallucination  Delerium  Delerium  Delerium      Penicillins     Tazarotene      Other reaction(s): rash, Unknown       Home Meds:   Prior to Admission medications    Medication Sig Start Date End Date Taking? Authorizing Provider   acetylcysteine 600 mg TbIE Take by mouth Daily.   Yes Historical Provider   albuterol (PROVENTIL) 2.5 mg /3 mL (0.083 %) nebulizer solution Take 2.5 mg by nebulization.  1/24/18  Yes Historical Provider   amLODIPine-benazepriL (LOTREL) 5-40 mg per capsule Take 1 capsule by mouth once daily.   Yes Historical Provider   apple cider vinegar 600 mg Cap Take by mouth once daily.   Yes Historical Provider   calcium carbonate (CALCIUM 500 ORAL) Take by mouth.   Yes Historical Provider   calcium carbonate-vitamin D3 500 mg-10 mcg (400 unit) Chew Take by mouth.   Yes Historical Provider   cholecalciferol, vitamin D3, (VITAMIN D3) 50 mcg (2,000 unit) Cap capsule Take 4,000 Units by mouth once daily.   Yes Historical Provider   coenzyme Q10 10 mg capsule Take  10 mg by mouth.   Yes Historical Provider   collagenase (SANTYL) ointment Apply topically once daily. 7/25/22  Yes Charles Peacock MD   coQ10, ubiquinol, 100 mg Cap Take by mouth once daily.   Yes Historical Provider   diclofenac sodium (VOLTAREN ARTHRITIS PAIN) 1 % Gel Apply 2 g topically once daily. 4/4/22  Yes Lluvia Rahman MD   famotidine (PEPCID) 40 MG tablet 40 mg once daily.  6/30/20  Yes Historical Provider   fluticasone propionate (FLONASE) 50 mcg/actuation nasal spray INSTILL TWO SPRAYS INTO EACH NOSTRIL TWICE A DAY 9/7/21  Yes Charles Peacock MD   furosemide (LASIX) 40 MG tablet Take 1 tablet (40 mg total) by mouth every 8 (eight) hours as needed (fluid).  Patient taking differently: Take 20 mg by mouth every 8 (eight) hours as needed (fluid). 4/4/22  Yes Lluvia Rahman MD   LIDOcaine (LIDODERM) 5 % Place 1 patch onto the skin once daily. Remove & Discard patch within 12 hours or as directed by provider 6/10/22  Yes Sepideh Smith, MELISSA   methylcellulose oral powder Take by mouth.   Yes Historical Provider   mupirocin (BACTROBAN) 2 % ointment Apply topically 2 (two) times daily. 5/26/22  Yes Colleen Charles MD   olopatadine (PATANOL) 0.1 % ophthalmic solution Place into both eyes Daily. 4/6/20  Yes Historical Provider   omega 3-dha-epa-fish oil 1,000 mg (120 mg-180 mg) Cap Take by mouth.   Yes Historical Provider   omeprazole (PRILOSEC) 20 MG capsule Take 20 mg by mouth once daily.   Yes Historical Provider   pantoprazole (PROTONIX) 40 MG tablet Take 40 mg by mouth once daily. 7/3/22  Yes Historical Provider   polyethylene glycol 3350 (MIRALAX ORAL) Take by mouth.   Yes Historical Provider   pregabalin (LYRICA) 25 MG capsule Take 1 capsule (25 mg total) by mouth 2 (two) times daily.  Patient taking differently: Take 25 mg by mouth once daily at 6am. 6/4/22 12/3/22 Yes Charles Peacock MD   psyllium 0.52 gram capsule Take 0.52 g by mouth once daily.   Yes Historical Provider   SELENIUM ORAL Take  by mouth Daily.   Yes Historical Provider   sulfamethoxazole-trimethoprim 800-160mg (BACTRIM DS) 800-160 mg Tab Take 1 tablet by mouth 2 (two) times daily.   Yes Historical Provider   SYMBICORT 160-4.5 mcg/actuation HFAA INHALE 2 PUFFS INTO THE LUNGS TWICE A DAY 1/3/22  Yes Charles Peacock MD   traMADoL (ULTRAM) 50 mg tablet TAKE 1 TABLET (50 MG TOTAL) BY MOUTH EVERY 12 (TWELVE) HOURS AS NEEDED FOR PAIN. 12/8/21  Yes Charles Peacock MD   triamcinolone acetonide 0.1% (KENALOG) 0.1 % cream APPLY TO AFFECTED AREA TWICE A DAY 3/29/22  Yes Charles Peacock MD   vit A/vit C/vit E/zinc/copper (PRESERVISION AREDS ORAL) Take by mouth 2 (two) times a day.   Yes Historical Provider   zinc sulfate (ZINC-15 ORAL) Take 30 mg by mouth once daily.   Yes Historical Provider       Anticoagulation/Antiplatelet Meds: no anticoagulation    Review of Systems:   Hematological: no known coagulopathies  Respiratory: no shortness of breath  Cardiovascular: no chest pain  Gastrointestinal: no abdominal pain  Genitourinary: no dysuria  Musculoskeletal: negative  Neurological: no TIA or stroke symptoms     Physical Exam:  Temp: 97.7 °F (36.5 °C) (07/27/22 1245)  Pulse: 72 (07/27/22 1245)  Resp: 16 (07/27/22 1245)  BP: (!) 125/58 (07/27/22 1245)  SpO2: 98 % (07/27/22 1245)    General: NAD  HEENT: Normocephalic, sclera anicteric, oropharynx clear  Neck: Supple, no palpable lymphadenopathy  Heart: RRR  Lungs: Symmetric excursions, breathing unlabored  Abd: NTND, soft  Extremities: PUCKETT  Neuro: Gross nonfocal    Laboratory:  No results found for: INR    Lab Results   Component Value Date    WBC 6.56 06/13/2022    HGB 11.2 (L) 06/13/2022    HCT 33.5 (L) 06/13/2022    MCV 92 06/13/2022     (H) 06/13/2022      Lab Results   Component Value Date    GLU 98 06/13/2022     (L) 06/13/2022    K 4.9 06/13/2022    CL 95 06/13/2022    CO2 26 06/13/2022    BUN 16 06/13/2022    CREATININE 1.1 06/13/2022    CALCIUM 9.4 06/13/2022    ALT 19  05/20/2022    AST 25 05/20/2022    ALBUMIN 3.1 (L) 06/13/2022    BILITOT 0.3 05/20/2022       Imaging:  Left shoulder XR 7/15/22 reviewed.     Assessment/Plan:  87 y.o. female with left shoulder pain. Will undergo CSI today today. This will be her 2nd CSI in 12 months. She understand she can only receive 1 more steroid injection in any location over the next 12 months.     Sedation plan: None     Risks (including, but not limited to, pain, bleeding, infection, damage to nearby structures, treatment failure/recurrence, and the need for additional procedures), potential benefits, and alternatives were discussed with the patient and her daughter. All questions were answered to the best of my abilities. The patient wishes to proceed. Written informed consent was obtained.        Andrew Marsala MD [facility]   Pager [phone]

## 2022-07-27 NOTE — DISCHARGE SUMMARY
Interventional Radiology Short Stay Discharge Summary      Admit Date: 7/27/2022  Discharge Date: 07/27/2022     Hospital Course: Uneventful     Discharge Diagnosis: Right shoulder pain s/p CSI today     Discharge Condition: Stable     Discharge Disposition: Home     Diet: Resume prior diet     Activity: Activity as tolerated     Follow-up: With referring provider        Keron Sanabria MD  [facility]  Pager [phone]

## 2022-07-28 ENCOUNTER — OFFICE VISIT (OUTPATIENT)
Dept: PAIN MEDICINE | Facility: CLINIC | Age: 87
End: 2022-07-28
Payer: MEDICARE

## 2022-07-28 ENCOUNTER — HOSPITAL ENCOUNTER (OUTPATIENT)
Dept: RADIOLOGY | Facility: HOSPITAL | Age: 87
Discharge: HOME | End: 2022-07-28
Attending: PHYSICAL MEDICINE & REHABILITATION
Payer: MEDICARE

## 2022-07-28 VITALS
HEART RATE: 83 BPM | RESPIRATION RATE: 16 BRPM | BODY MASS INDEX: 25.58 KG/M2 | HEIGHT: 64 IN | DIASTOLIC BLOOD PRESSURE: 69 MMHG | SYSTOLIC BLOOD PRESSURE: 132 MMHG

## 2022-07-28 DIAGNOSIS — R10.31 RIGHT GROIN PAIN: ICD-10-CM

## 2022-07-28 DIAGNOSIS — M48.07 SPINAL STENOSIS, LUMBOSACRAL REGION: ICD-10-CM

## 2022-07-28 DIAGNOSIS — M54.16 LUMBAR RADICULOPATHY, CHRONIC: ICD-10-CM

## 2022-07-28 DIAGNOSIS — M54.2 NECK PAIN: ICD-10-CM

## 2022-07-28 DIAGNOSIS — M76.32 ILIOTIBIAL BAND SYNDROME, LEFT: ICD-10-CM

## 2022-07-28 DIAGNOSIS — M25.652 DECREASED RANGE OF MOTION OF BOTH HIPS: ICD-10-CM

## 2022-07-28 DIAGNOSIS — M25.651 DECREASED RANGE OF MOTION OF BOTH HIPS: ICD-10-CM

## 2022-07-28 DIAGNOSIS — M70.62 TROCHANTERIC BURSITIS OF BOTH HIPS: ICD-10-CM

## 2022-07-28 DIAGNOSIS — R10.31 RIGHT GROIN PAIN: Primary | ICD-10-CM

## 2022-07-28 DIAGNOSIS — M70.61 TROCHANTERIC BURSITIS OF BOTH HIPS: ICD-10-CM

## 2022-07-28 PROCEDURE — 99204 OFFICE O/P NEW MOD 45 MIN: CPT | Mod: S$GLB,,, | Performed by: PHYSICAL MEDICINE & REHABILITATION

## 2022-07-28 PROCEDURE — 1101F PR PT FALLS ASSESS DOC 0-1 FALLS W/OUT INJ PAST YR: ICD-10-PCS | Mod: CPTII,S$GLB,, | Performed by: PHYSICAL MEDICINE & REHABILITATION

## 2022-07-28 PROCEDURE — 99999 PR PBB SHADOW E&M-EST. PATIENT-LVL III: CPT | Mod: PBBFAC,,, | Performed by: PHYSICAL MEDICINE & REHABILITATION

## 2022-07-28 PROCEDURE — 99999 PR PBB SHADOW E&M-EST. PATIENT-LVL III: ICD-10-PCS | Mod: PBBFAC,,, | Performed by: PHYSICAL MEDICINE & REHABILITATION

## 2022-07-28 PROCEDURE — 73521 X-RAY EXAM HIPS BI 2 VIEWS: CPT | Mod: TC,FY

## 2022-07-28 PROCEDURE — 1125F AMNT PAIN NOTED PAIN PRSNT: CPT | Mod: CPTII,S$GLB,, | Performed by: PHYSICAL MEDICINE & REHABILITATION

## 2022-07-28 PROCEDURE — 1125F PR PAIN SEVERITY QUANTIFIED, PAIN PRESENT: ICD-10-PCS | Mod: CPTII,S$GLB,, | Performed by: PHYSICAL MEDICINE & REHABILITATION

## 2022-07-28 PROCEDURE — 73521 X-RAY EXAM HIPS BI 2 VIEWS: CPT | Mod: 26,,, | Performed by: RADIOLOGY

## 2022-07-28 PROCEDURE — 73521 XR HIPS BILATERAL 2 VIEW INCL AP PELVIS: ICD-10-PCS | Mod: 26,,, | Performed by: RADIOLOGY

## 2022-07-28 PROCEDURE — 1101F PT FALLS ASSESS-DOCD LE1/YR: CPT | Mod: CPTII,S$GLB,, | Performed by: PHYSICAL MEDICINE & REHABILITATION

## 2022-07-28 PROCEDURE — 99204 PR OFFICE/OUTPT VISIT, NEW, LEVL IV, 45-59 MIN: ICD-10-PCS | Mod: S$GLB,,, | Performed by: PHYSICAL MEDICINE & REHABILITATION

## 2022-07-28 PROCEDURE — 3288F FALL RISK ASSESSMENT DOCD: CPT | Mod: CPTII,S$GLB,, | Performed by: PHYSICAL MEDICINE & REHABILITATION

## 2022-07-28 PROCEDURE — 3288F PR FALLS RISK ASSESSMENT DOCUMENTED: ICD-10-PCS | Mod: CPTII,S$GLB,, | Performed by: PHYSICAL MEDICINE & REHABILITATION

## 2022-09-12 ENCOUNTER — HOSPITAL ENCOUNTER (OUTPATIENT)
Dept: RADIOLOGY | Facility: HOSPITAL | Age: 87
Discharge: HOME | End: 2022-09-12
Attending: INTERNAL MEDICINE
Payer: MEDICARE

## 2022-09-12 DIAGNOSIS — J44.9 CHRONIC OBSTRUCTIVE PULMONARY DISEASE, UNSPECIFIED COPD TYPE: ICD-10-CM

## 2022-09-12 PROCEDURE — 71046 XR CHEST PA AND LATERAL: ICD-10-PCS | Mod: 26,,, | Performed by: RADIOLOGY

## 2022-09-12 PROCEDURE — 71046 X-RAY EXAM CHEST 2 VIEWS: CPT | Mod: 26,,, | Performed by: RADIOLOGY

## 2022-09-12 PROCEDURE — 71046 X-RAY EXAM CHEST 2 VIEWS: CPT | Mod: TC,FY

## 2022-09-15 ENCOUNTER — HOSPITAL ENCOUNTER (OUTPATIENT)
Dept: RADIOLOGY | Facility: HOSPITAL | Age: 87
Discharge: HOME | End: 2022-09-15
Attending: PHYSICAL MEDICINE & REHABILITATION
Payer: MEDICARE

## 2022-09-15 DIAGNOSIS — M54.16 LUMBAR RADICULOPATHY, CHRONIC: ICD-10-CM

## 2022-09-15 DIAGNOSIS — M48.07 SPINAL STENOSIS, LUMBOSACRAL REGION: ICD-10-CM

## 2022-09-15 PROCEDURE — 72148 MRI LUMBAR SPINE W/O DYE: CPT | Mod: TC

## 2022-09-15 PROCEDURE — 72148 MRI LUMBAR SPINE WITHOUT CONTRAST: ICD-10-PCS | Mod: 26,,, | Performed by: RADIOLOGY

## 2022-09-15 PROCEDURE — 72148 MRI LUMBAR SPINE W/O DYE: CPT | Mod: 26,,, | Performed by: RADIOLOGY

## 2022-09-16 NOTE — PROGRESS NOTES
Imaging reviewed.  There is severe foraminal stenosis on the left at L3-4, and moderate to severe foraminal stenosis on the left at L5-S1 as well foraminal stenosis at other levels that are less severe. There is severe canal stenosis at L3-4.

## 2022-12-15 ENCOUNTER — TELEPHONE (OUTPATIENT)
Dept: PAIN MEDICINE | Facility: CLINIC | Age: 87
End: 2022-12-15
Payer: MEDICARE

## 2022-12-15 ENCOUNTER — OFFICE VISIT (OUTPATIENT)
Dept: PAIN MEDICINE | Facility: CLINIC | Age: 87
End: 2022-12-15
Payer: MEDICARE

## 2022-12-15 VITALS
WEIGHT: 145.94 LBS | BODY MASS INDEX: 25.05 KG/M2 | DIASTOLIC BLOOD PRESSURE: 72 MMHG | HEART RATE: 80 BPM | SYSTOLIC BLOOD PRESSURE: 181 MMHG

## 2022-12-15 DIAGNOSIS — M48.07 SPINAL STENOSIS, LUMBOSACRAL REGION: ICD-10-CM

## 2022-12-15 DIAGNOSIS — M51.36 DDD (DEGENERATIVE DISC DISEASE), LUMBAR: ICD-10-CM

## 2022-12-15 DIAGNOSIS — M54.16 LUMBAR RADICULOPATHY, CHRONIC: Primary | ICD-10-CM

## 2022-12-15 DIAGNOSIS — R10.31 RIGHT GROIN PAIN: ICD-10-CM

## 2022-12-15 PROCEDURE — 1159F PR MEDICATION LIST DOCUMENTED IN MEDICAL RECORD: ICD-10-PCS | Mod: CPTII,S$GLB,, | Performed by: NURSE PRACTITIONER

## 2022-12-15 PROCEDURE — 1125F AMNT PAIN NOTED PAIN PRSNT: CPT | Mod: CPTII,S$GLB,, | Performed by: NURSE PRACTITIONER

## 2022-12-15 PROCEDURE — 1125F PR PAIN SEVERITY QUANTIFIED, PAIN PRESENT: ICD-10-PCS | Mod: CPTII,S$GLB,, | Performed by: NURSE PRACTITIONER

## 2022-12-15 PROCEDURE — 99999 PR PBB SHADOW E&M-EST. PATIENT-LVL V: ICD-10-PCS | Mod: PBBFAC,,, | Performed by: NURSE PRACTITIONER

## 2022-12-15 PROCEDURE — 99214 PR OFFICE/OUTPT VISIT, EST, LEVL IV, 30-39 MIN: ICD-10-PCS | Mod: S$GLB,,, | Performed by: NURSE PRACTITIONER

## 2022-12-15 PROCEDURE — 99214 OFFICE O/P EST MOD 30 MIN: CPT | Mod: S$GLB,,, | Performed by: NURSE PRACTITIONER

## 2022-12-15 PROCEDURE — 99999 PR PBB SHADOW E&M-EST. PATIENT-LVL V: CPT | Mod: PBBFAC,,, | Performed by: NURSE PRACTITIONER

## 2022-12-15 PROCEDURE — 1159F MED LIST DOCD IN RCRD: CPT | Mod: CPTII,S$GLB,, | Performed by: NURSE PRACTITIONER

## 2022-12-15 NOTE — PROGRESS NOTES
[facility]  Established Clinic Visit     Referring Provider: No referring provider defined for this encounter.    Chief Complaint:   Chief Complaint   Patient presents with    Low-back Pain    Groin Pain    Leg Pain     Left        History of Present Illness: Bridget Montgomery is a 87 y.o. female referred by Nadya Hopper for back and neck pain.      Onset: 2007  Location: bilateral low back  Radiation: left lateral leg across the knee and into the shin and into the right groin.   Timing: constant  Quality: Aching, burning, grabbing, deep, sharp  Exacerbating Factors: lifting, standing for more than 3 min, walking for more than 1 min, daily activity  Alleviating Factors: heat, medications, rest, PT  Associated Symptoms: She does have weakness and numbness in the legs, primarily the right leg. She denies night fever/night sweats, urinary incontinence, bowel incontinence and significant weight loss    She does currently have a chronic wound on her toe that is infected.     She takes tramadol for pain and this helps, but also causes sedation and she mostly is using this at nighttime.        Severity: Currently: 8/10   Typical Range: 5-10/10     Exacerbation: 10/10     Interval History (12/15/2022):  Bridget Montgomery returns today for follow up.  At the last clinic visit, she was referred to PT. Her low back, right groin, left lateral leg pain to her shin is her worse pain. See pain described below.       Current Pain Scales:  Current: 10/10              Typical Range: 10-1/10        Previous Interventions:  - Several procedures with Dr. Daugherty which were helpful     Previous Therapies:  PT/OT: yes    Relevant Surgery: yes    - Vertiflex at L3-4 with Dr. Daugherty with no relief  Previous Medications:   - NSAIDS: Voltaren Gel  - Muscle Relaxants:    - TCAs:   - SNRIs:   - Topicals:   - Anticonvulsants:  Lyrica.   - Opioids: tramadol    Current Pain Medications:  Tramadol 50 mg helps, but causes  sedation and she mostly takes this at night.    Lidoderm   Turmeric    Blood Thinners: Fish Oil    Full Medication List:    Current Outpatient Medications:     acetylcysteine 600 mg TbIE, Take by mouth Daily., Disp: , Rfl:     albuterol (PROVENTIL) 2.5 mg /3 mL (0.083 %) nebulizer solution, Take 2.5 mg by nebulization as needed., Disp: , Rfl:     amLODIPine-benazepriL (LOTREL) 5-40 mg per capsule, Take 1 capsule by mouth once daily., Disp: , Rfl:     apple cider vinegar 600 mg Cap, Take by mouth once daily., Disp: , Rfl:     calcium carbonate (CALCIUM 500 ORAL), Take by mouth., Disp: , Rfl:     calcium carbonate-vitamin D3 500 mg-10 mcg (400 unit) Chew, Take by mouth., Disp: , Rfl:     cholecalciferol, vitamin D3, (VITAMIN D3) 50 mcg (2,000 unit) Cap capsule, Take 4,000 Units by mouth once daily., Disp: , Rfl:     coenzyme Q10 10 mg capsule, Take 10 mg by mouth., Disp: , Rfl:     collagenase (SANTYL) ointment, Apply topically once daily., Disp: 30 g, Rfl: 0    coQ10, ubiquinol, 100 mg Cap, Take by mouth once daily., Disp: , Rfl:     diclofenac sodium (VOLTAREN ARTHRITIS PAIN) 1 % Gel, Apply 2 g topically once daily. (Patient taking differently: Apply 2 g topically as needed.), Disp: 1 each, Rfl: 11    famotidine (PEPCID) 40 MG tablet, 40 mg once daily. , Disp: , Rfl:     fluticasone propionate (FLONASE) 50 mcg/actuation nasal spray, INSTILL TWO SPRAYS INTO EACH NOSTRIL TWICE A DAY, Disp: 48 mL, Rfl: 2    furosemide (LASIX) 40 MG tablet, Take 1 tablet (40 mg total) by mouth every 8 (eight) hours as needed (fluid). (Patient taking differently: Take 20 mg by mouth every 8 (eight) hours as needed (fluid).), Disp: 180 tablet, Rfl: 3    LIDOcaine (LIDODERM) 5 %, Place 1 patch onto the skin once daily. Remove & Discard patch within 12 hours or as directed by provider, Disp: 5 patch, Rfl: 0    methylcellulose oral powder, Take by mouth., Disp: , Rfl:     mupirocin (BACTROBAN) 2 % ointment, Apply topically 2 (two) times  daily., Disp: 22 g, Rfl: 0    olopatadine (PATANOL) 0.1 % ophthalmic solution, Place into both eyes Daily., Disp: , Rfl:     omega 3-dha-epa-fish oil 1,000 mg (120 mg-180 mg) Cap, Take by mouth., Disp: , Rfl:     omeprazole (PRILOSEC) 20 MG capsule, Take 20 mg by mouth once daily., Disp: , Rfl:     pantoprazole (PROTONIX) 40 MG tablet, Take 40 mg by mouth once daily., Disp: , Rfl:     polyethylene glycol 3350 (MIRALAX ORAL), Take by mouth., Disp: , Rfl:     pregabalin (LYRICA) 25 MG capsule, Take 1 capsule (25 mg total) by mouth 2 (two) times daily. (Patient taking differently: Take 25 mg by mouth once daily at 6am.), Disp: 60 capsule, Rfl: 5    psyllium 0.52 gram capsule, Take 0.52 g by mouth once daily., Disp: , Rfl:     SELENIUM ORAL, Take by mouth Daily., Disp: , Rfl:     SYMBICORT 160-4.5 mcg/actuation HFAA, INHALE 2 PUFFS INTO THE LUNGS TWICE A DAY, Disp: 10.2 g, Rfl: 6    traMADoL (ULTRAM) 50 mg tablet, Take 1 tablet (50 mg total) by mouth every 12 (twelve) hours as needed for Pain., Disp: 60 tablet, Rfl: 5    triamcinolone acetonide 0.1% (KENALOG) 0.1 % cream, APPLY TO AFFECTED AREA TWICE A DAY, Disp: 454 g, Rfl: 3    vit A/vit C/vit E/zinc/copper (PRESERVISION AREDS ORAL), Take by mouth 2 (two) times a day., Disp: , Rfl:     zinc sulfate (ZINC-15 ORAL), Take 30 mg by mouth once daily., Disp: , Rfl:      Review of Systems:  ROS    Allergies:  Cephalexin, Codeine, Meperidine, Penicillins, and Tazarotene     Medical History:   has a past medical history of Anemia, unspecified, Arthritis, CKD (chronic kidney disease) stage 4, GFR 15-29 ml/min, COPD (chronic obstructive pulmonary disease), Edema, HTN (hypertension), Hypocalcemia, Hyponatremia, and Osteoarthritis.    Surgical History:   has a past surgical history that includes Breast cyst excision and Foot surgery.    Family History:  family history includes Cancer in her mother; No Known Problems in her father.    Social History:   reports that she has quit  smoking. She has never used smokeless tobacco. She reports that she does not currently use alcohol. She reports that she does not use drugs.    Physical Exam:  BP (!) 181/72   Pulse 80   Wt 66.2 kg (145 lb 15.1 oz)   BMI 25.05 kg/m²   GEN: No acute distress. Calm, comfortable  HENT: Normocephalic, atraumatic, moist mucous membranes  EYE: Anicteric sclera, non-injected.   CV: Non-diaphoretic. Regular Rate. Radial Pulses 2+.  RESP: Breathing comfortably. Chest expansion symmetric.  EXT: No clubbing, cyanosis.   SKIN: Warm, & dry to palpation. No visible rashes or lesions of exposed skin.   PSYCH: Pleasant mood and appropriate affect. Recent and remote memory intact.   GAIT: Mod Independent with RW  Lumbar Spine Exam:       Inspection: No erythema, bruising.       Palpation: (+) TTP of lumbar paraspinals bilaterally       ROM:  Limited in flexion, extension, lateral bending.       (+) Facet loading bilaterally      (-) Seated Straight Leg Raise bilaterally      (-) Seated JUSTYN bilaterally  Hip Exam:      Inspection: No gross deformity or apparent leg length discrepancy      Palpation: (+) TTP to bilateral greater trochanteric bursas (L>R).       ROM: (+) limitation & pain in internal rotation, external rotation b/l  Neurologic Exam:     Alert. Speech is fluent and appropriate.     Strength:  4/5 throughout right leg, otherwise 4++/5 throughout left lower extremity     Sensation:  Grossly intact to light touch in bilateral lower extremities     Reflexes: 1+ in b/l patella, absent in b/l achilles     Tone: No abnormality appreciated in bilateral lower extremities           Imaging:  - X-ray hips b/l 7/28/22:  Hips bilateral two views to include pelvis.  Right: No fracture dislocation bone destruction seen.  Left: No fracture dislocation bone destruction seen.   There is DJD of the spine pelvis hips and proximal femurs.  No acute process seen.      - Xray Lumbar Spine 07/15/2022:  FINDINGS:  Lumbar spine two views:  There is hardware status post fusion of the spinous process of L3 and L4.  There is moderate-severe DJD throughout the L-spine lower T-spine.  There is a dextroconvex curvature of the upper L-spine.  No fracture dislocation bone destruction seen.  No instability seen.  There is grade 1 anterolisthesis of L3 on L4.  No hardware failure seen.  No complication or acute fracture dislocation bone destruction seen.    -Xray Cervical Spine 07/15/2022:  FINDINGS:  Cervical spine two views: Odontoid prevertebral soft tissues and posterior elements are intact.  There is moderate DJD at C5-C6 and C6-C7.  No trauma seen.  No acute process seen.    -Xray Shoulder Left 07/15/2022:  FINDINGS:  Shoulder complete three views left: There is severe advanced DJD of the glenohumeral joint.  No acute fracture, dislocation, or bone destruction seen.    Labs:  BMP  Lab Results   Component Value Date     09/12/2022     09/12/2022    K 3.6 09/12/2022    K 3.7 09/12/2022     09/12/2022     09/12/2022    CO2 23 09/12/2022    CO2 24 09/12/2022    BUN 24 (H) 09/12/2022    BUN 24 (H) 09/12/2022    CREATININE 1.4 09/12/2022    CREATININE 1.4 09/12/2022    CALCIUM 9.2 09/12/2022    CALCIUM 9.3 09/12/2022    ANIONGAP 13 09/12/2022    ANIONGAP 12 09/12/2022    ESTGFRAFRICA 52 (A) 06/13/2022    EGFRNONAA 45 (A) 06/13/2022     Lab Results   Component Value Date    ALT 19 05/20/2022    AST 25 05/20/2022    ALKPHOS 136 (H) 05/20/2022    BILITOT 0.3 05/20/2022     Lab Results   Component Value Date     09/12/2022     09/12/2022     Lab Results   Component Value Date    HGBA1C 6.1 (H) 03/23/2022       Assessment:  Bridget Montgomery is a 87 y.o. female with the following diagnoses based on history, exam, and imaging:    Problem List Items Addressed This Visit    None  Visit Diagnoses       Lumbar radiculopathy, chronic    -  Primary    Relevant Orders    Ambulatory referral/consult to Physical/Occupational Therapy     Spinal stenosis, lumbosacral region        Relevant Orders    Ambulatory referral/consult to Physical/Occupational Therapy    Right groin pain        Relevant Orders    Ambulatory referral/consult to Physical/Occupational Therapy              This is a pleasant 87 y.o. lady presenting with:     - Chronic bilateral low back pain and left radicular leg pain and some radiation into the right groin. She has vertiflex device at L3-4 that was not helpful.   - Right groin pain and limited hip ROM b/l: Related to OA on imaging.   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   - Chronic neck pain: Not fully evaluated today.   - Comorbidities: CKD. Chronic wound.     12/15/2022- 87-year-old female that presents with her daughter history of chronic low back and left lateral radicular leg pain radiates into her shin she also has right groin pain.  She is here to review her recent lumbar MRI which shows severe foraminal and central canal stenosis on the left at L3-4 and moderate to severe foraminal stenosis on the left at L5-S1 as well as foraminal stenosis at other levels that are less severe. See plan agreed upon below.     Treatment Plan:   - PT/OT/HEP: She has been referred to PT for shoulders I recommended she continue, but  will plan for PT for back and neck. Discussed benefits of exercise for pain.   - Procedures: S/F caudal MARKUS   - Medications: No changes recommended at this time.  - Imaging: Reviewed and discussed Lumbar MRI  using images from patient's chart and spine model  - Labs: Reviewed.  Medications are appropriately dosed for current hepatorenal function.    Follow Up: RTC injection     Rajesh Carter, NP-C  Interventional Pain Management    Disclaimer: This note was partly generated using dictation software which may occasionally result in transcription errors.

## 2022-12-22 ENCOUNTER — TELEPHONE (OUTPATIENT)
Dept: PAIN MEDICINE | Facility: CLINIC | Age: 87
End: 2022-12-22
Payer: MEDICARE

## 2022-12-22 NOTE — TELEPHONE ENCOUNTER
----- Message from Binta Pink sent at 12/22/2022  1:21 PM CST -----  Needs advice from nurse:      Who Called:pt  Regarding:needs orders from PT at [facility]  Would the patient rather a call back or [email or web address]?  Best Call Back number:[phone]  Additional Info:

## 2023-01-25 ENCOUNTER — TELEPHONE (OUTPATIENT)
Dept: PAIN MEDICINE | Facility: HOSPITAL | Age: 88
End: 2023-01-25
Payer: MEDICARE

## 2023-01-25 PROBLEM — M54.16 LUMBAR RADICULOPATHY: Status: ACTIVE | Noted: 2023-01-25

## 2023-01-25 NOTE — TELEPHONE ENCOUNTER
Called patients daughter to notify her of 745 am arrival time and the following. No answer:  Please remember:  Check in at the registration desk on the first floor of the hospital. [address] [place] [id number]  Please DO NOT eat or drink 8 hours prior to your arrival time for the procedure, if diabetic 6 hours prior. If you are taking medications for blood pressure, heart medications, thyroid, cholesterol; you can take them with a small sip of water.  Refrain from drinking alcohol within 24 hours prior to your procedure  You will need someone to drive you home after procedure. You cannot take taxi, Uber, or Lyft.  If you start feeling sick (fever, chills, or coughing) or start on any antibiotics please contact us at [phone] to reschedule.

## 2023-01-26 ENCOUNTER — TELEPHONE (OUTPATIENT)
Dept: PAIN MEDICINE | Facility: CLINIC | Age: 88
End: 2023-01-26
Payer: MEDICARE

## 2023-01-26 NOTE — TELEPHONE ENCOUNTER
----- Message from Binta Pink sent at 1/26/2023 10:42 AM CST -----  Needs advice from nurse:      Who Called:pt  Regarding:would like to know when her injection will be rescheduled  Would the patient rather a call back or VIA [facility]?  Best Call Back number:[phone]  Additional Info:

## 2023-01-26 NOTE — TELEPHONE ENCOUNTER
----- Message from Chaya Sureo RN sent at 1/26/2023  8:55 AM CST -----  Regarding: Reschedule please  She was cancelled, can you please reschedule her. She finished her antibiotic on Monday 1/23, a Z-pack for a sinus infection, Thank You!

## 2023-01-26 NOTE — H&P (VIEW-ONLY)
"[facility]  Patient presenting for Procedure(s) (LRB):  Injection-steroid-epidural-caudal (N/A)     Patient on Anti-coagulation No    No health changes since previous encounter    Past Medical History:   Diagnosis Date    Anemia, unspecified     Arthritis     CKD (chronic kidney disease) stage 4, GFR 15-29 ml/min     COPD (chronic obstructive pulmonary disease)     Edema     HTN (hypertension)     Hypocalcemia     Hyponatremia     Osteoarthritis      Past Surgical History:   Procedure Laterality Date    BREAST CYST EXCISION      FOOT SURGERY       Review of patient's allergies indicates:   Allergen Reactions    Cephalexin     Codeine     Meperidine      Other reaction(s): delerium, hallucination  Delerium  Delerium  Delerium      Penicillins     Tazarotene      Other reaction(s): rash, Unknown      Current Facility-Administered Medications   Medication    ALPRAZolam tablet 0.5 mg    dexAMETHasone injection    iohexoL (OMNIPAQUE 300) injection    LIDOcaine (PF) 10 mg/ml (1%) injection    LIDOcaine (PF) 20 mg/mL (2%) injection    sodium chloride 0.9% injection       PMHx, PSHx, Allergies, Medications reviewed in epic    ROS negative except pain complaints in HPI    OBJECTIVE:    BP (!) 194/88 (BP Location: Right arm, Patient Position: Sitting)   Pulse 78   Temp 97.2 °F (36.2 °C) (Oral)   Resp 16   Ht 5' 4" (1.626 m)   Wt 64 kg (141 lb)   SpO2 100%   Breastfeeding No   BMI 24.20 kg/m²     PHYSICAL EXAMINATION:    GENERAL: Well appearing, in no acute distress, alert and oriented x3.  PSYCH:  Mood and affect appropriate.  SKIN: Skin color, texture, turgor normal, no rashes or lesions which will impact the procedure.  CV: RRR with palpation of the radial artery.  PULM: No evidence of respiratory difficulty, symmetric chest rise. Clear to auscultation.  NEURO: Cranial nerves grossly intact.    Plan:    Proceed with procedure as planned Procedure(s) (LRB):  Injection-steroid-epidural-caudal (N/A)    MARIA LUISA GRIMM" XIOMARA  01/26/2023

## 2023-01-26 NOTE — TELEPHONE ENCOUNTER
----- Message from Binta Pink sent at 1/26/2023 11:30 AM CST -----  Needs advice from nurse:      Who Called:pt  Regarding:returning a call to schedule procedure//please call patient back immediately after if you get VM/patient is hard of hearing  Would the patient rather a call back or VIA [facility]?  Best Call Back number:[phone]  Additional Info:

## 2023-01-27 ENCOUNTER — PATIENT MESSAGE (OUTPATIENT)
Dept: PAIN MEDICINE | Facility: CLINIC | Age: 88
End: 2023-01-27
Payer: MEDICARE

## 2023-01-28 ENCOUNTER — TELEPHONE (OUTPATIENT)
Dept: PAIN MEDICINE | Facility: CLINIC | Age: 88
End: 2023-01-28
Payer: MEDICARE

## 2023-01-28 DIAGNOSIS — M54.16 LUMBAR RADICULOPATHY: Primary | ICD-10-CM

## 2023-02-01 ENCOUNTER — TELEPHONE (OUTPATIENT)
Dept: PAIN MEDICINE | Facility: HOSPITAL | Age: 88
End: 2023-02-01
Payer: MEDICARE

## 2023-02-01 NOTE — TELEPHONE ENCOUNTER
Attempted to call patient to notify her of 10:00am arrival time and the following. No answer.    Please remember:  Check in at the registration desk on the first floor of the hospital. [address] [place] [address]  Please DO NOT eat or drink 8 hours prior to your arrival time for the procedure, if diabetic 6 hours prior. If you are taking medications for blood pressure, heart medications, thyroid, cholesterol; you can take them with a small sip of water.  Refrain from drinking alcohol within 24 hours prior to your procedure  You will need someone to drive you home after procedure. You cannot take taxi, Uber, or Lyft.  If you start feeling sick (fever, chills, or coughing) or start on any antibiotics please contact us at [phone] to reschedule.

## 2023-02-02 ENCOUNTER — HOSPITAL ENCOUNTER (OUTPATIENT)
Facility: HOSPITAL | Age: 88
Discharge: HOME | End: 2023-02-02
Attending: ANESTHESIOLOGY | Admitting: ANESTHESIOLOGY
Payer: MEDICARE

## 2023-02-02 VITALS
SYSTOLIC BLOOD PRESSURE: 159 MMHG | RESPIRATION RATE: 15 BRPM | OXYGEN SATURATION: 99 % | HEART RATE: 64 BPM | DIASTOLIC BLOOD PRESSURE: 67 MMHG

## 2023-02-02 DIAGNOSIS — G89.29 CHRONIC PAIN: ICD-10-CM

## 2023-02-02 DIAGNOSIS — M54.16 LUMBAR RADICULOPATHY: Primary | ICD-10-CM

## 2023-02-02 DIAGNOSIS — M51.37 DDD (DEGENERATIVE DISC DISEASE), LUMBOSACRAL: ICD-10-CM

## 2023-02-02 PROCEDURE — 25000003 PHARM REV CODE 250: Performed by: EMERGENCY MEDICINE

## 2023-02-02 PROCEDURE — 25500020 PHARM REV CODE 255: Performed by: ANESTHESIOLOGY

## 2023-02-02 PROCEDURE — 63600175 PHARM REV CODE 636 W HCPCS: Performed by: ANESTHESIOLOGY

## 2023-02-02 PROCEDURE — 62323 PR INJ LUMBAR/SACRAL, W/IMAGING GUIDANCE: ICD-10-PCS | Mod: ,,, | Performed by: ANESTHESIOLOGY

## 2023-02-02 PROCEDURE — 62323 NJX INTERLAMINAR LMBR/SAC: CPT | Mod: ,,, | Performed by: ANESTHESIOLOGY

## 2023-02-02 PROCEDURE — 62323 NJX INTERLAMINAR LMBR/SAC: CPT | Performed by: ANESTHESIOLOGY

## 2023-02-02 PROCEDURE — 25000003 PHARM REV CODE 250: Performed by: ANESTHESIOLOGY

## 2023-02-02 PROCEDURE — A4217 STERILE WATER/SALINE, 500 ML: HCPCS | Performed by: ANESTHESIOLOGY

## 2023-02-02 RX ORDER — DEXAMETHASONE SODIUM PHOSPHATE 100 MG/10ML
INJECTION INTRAMUSCULAR; INTRAVENOUS
Status: DISCONTINUED | OUTPATIENT
Start: 2023-02-02 | End: 2023-02-02 | Stop reason: HOSPADM

## 2023-02-02 RX ORDER — ALPRAZOLAM 0.5 MG/1
0.5 TABLET, ORALLY DISINTEGRATING ORAL
Status: DISCONTINUED | OUTPATIENT
Start: 2023-02-02 | End: 2023-02-02 | Stop reason: HOSPADM

## 2023-02-02 RX ORDER — LIDOCAINE HYDROCHLORIDE 20 MG/ML
INJECTION, SOLUTION EPIDURAL; INFILTRATION; INTRACAUDAL; PERINEURAL
Status: DISCONTINUED | OUTPATIENT
Start: 2023-02-02 | End: 2023-02-02 | Stop reason: HOSPADM

## 2023-02-02 RX ORDER — LIDOCAINE HYDROCHLORIDE 10 MG/ML
INJECTION, SOLUTION EPIDURAL; INFILTRATION; INTRACAUDAL; PERINEURAL
Status: DISCONTINUED | OUTPATIENT
Start: 2023-02-02 | End: 2023-02-02 | Stop reason: HOSPADM

## 2023-02-02 RX ORDER — WATER 1 ML/ML
IRRIGANT IRRIGATION
Status: DISCONTINUED | OUTPATIENT
Start: 2023-02-02 | End: 2023-02-02 | Stop reason: HOSPADM

## 2023-02-02 RX ADMIN — ALPRAZOLAM 0.5 MG: 0.5 TABLET, ORALLY DISINTEGRATING ORAL at 10:02

## 2023-02-02 NOTE — DISCHARGE SUMMARY
Discharge Note  Short Stay      SUMMARY     Admit Date: 2/2/2023    Attending Physician: Angel Sparrow    Discharge Physician: MARIA LUISA SOLANO      Discharge Date: 2/2/2023 11:13 AM    Procedure(s) (LRB):  Injection-steroid-epidural-caudal (N/A)    Final Diagnosis: Lumbar radiculopathy [M54.16]    Disposition: Home or self care    Patient Instructions:   Current Discharge Medication List        CONTINUE these medications which have NOT CHANGED    Details   amLODIPine-benazepriL (LOTREL) 5-40 mg per capsule Take 1 capsule by mouth once daily.      famotidine (PEPCID) 40 MG tablet 40 mg once daily.       furosemide (LASIX) 40 MG tablet Take 1 tablet (40 mg total) by mouth every 8 (eight) hours as needed (fluid).  Qty: 180 tablet, Refills: 3    Associated Diagnoses: Edema of both legs      omeprazole (PRILOSEC) 20 MG capsule Take 20 mg by mouth once daily.      pantoprazole (PROTONIX) 40 MG tablet Take 40 mg by mouth once daily.      pregabalin (LYRICA) 25 MG capsule TAKE 1 CAPSULE BY MOUTH 2 TIMES DAILY.  Qty: 60 capsule, Refills: 4    Comments: This request is for a new prescription for a controlled substance as required by Federal/State law.  Associated Diagnoses: Spinal stenosis of lumbar region with neurogenic claudication      acetylcysteine 600 mg TbIE Take by mouth Daily.      albuterol (PROVENTIL) 2.5 mg /3 mL (0.083 %) nebulizer solution Take 2.5 mg by nebulization as needed.      apple cider vinegar 600 mg Cap Take by mouth once daily.      budesonide-formoterol 160-4.5 mcg (SYMBICORT) 160-4.5 mcg/actuation HFAA INHALE 2 PUFFS INTO THE LUNGS TWICE A DAY  Qty: 10.2 g, Refills: 6      calcium carbonate (CALCIUM 500 ORAL) Take by mouth.      calcium carbonate-vitamin D3 500 mg-10 mcg (400 unit) Chew Take by mouth.      cholecalciferol, vitamin D3, (VITAMIN D3) 50 mcg (2,000 unit) Cap capsule Take 4,000 Units by mouth once daily.      coenzyme Q10 10 mg capsule Take 10 mg by mouth.      collagenase (SANTYL)  ointment Apply topically once daily.  Qty: 30 g, Refills: 0    Associated Diagnoses: Open wound of left great toe, initial encounter      coQ10, ubiquinol, 100 mg Cap Take by mouth once daily.      diclofenac sodium (VOLTAREN ARTHRITIS PAIN) 1 % Gel Apply 2 g topically once daily.  Qty: 1 each, Refills: 11    Associated Diagnoses: Idiopathic osteoarthritis      fluticasone propionate (FLONASE) 50 mcg/actuation nasal spray INSTILL TWO SPRAYS INTO EACH NOSTRIL TWICE A DAY  Qty: 48 mL, Refills: 2    Associated Diagnoses: Allergic rhinitis, unspecified seasonality, unspecified trigger      LIDOcaine (LIDODERM) 5 % Place 1 patch onto the skin once daily. Remove & Discard patch within 12 hours or as directed by provider  Qty: 5 patch, Refills: 0    Associated Diagnoses: Acute pain of right shoulder      methylcellulose oral powder Take by mouth.      mupirocin (BACTROBAN) 2 % ointment Apply topically 2 (two) times daily.  Qty: 22 g, Refills: 0    Associated Diagnoses: Infected abrasion of right knee, initial encounter      olopatadine (PATANOL) 0.1 % ophthalmic solution Place into both eyes Daily.      omega 3-dha-epa-fish oil 1,000 mg (120 mg-180 mg) Cap Take by mouth.      polyethylene glycol 3350 (MIRALAX ORAL) Take by mouth.      psyllium 0.52 gram capsule Take 0.52 g by mouth once daily.      SELENIUM ORAL Take by mouth Daily.      traMADoL (ULTRAM) 50 mg tablet Take 1 tablet (50 mg total) by mouth every 12 (twelve) hours as needed for Pain.  Qty: 60 tablet, Refills: 5    Comments: **Greater than 7 day supply medically necessary.**  Associated Diagnoses: Bilateral primary osteoarthritis of knee      triamcinolone acetonide 0.1% (KENALOG) 0.1 % cream APPLY TO AFFECTED AREA TWICE A DAY  Qty: 454 g, Refills: 3      vit A/vit C/vit E/zinc/copper (PRESERVISION AREDS ORAL) Take by mouth 2 (two) times a day.      zinc sulfate (ZINC-15 ORAL) Take 30 mg by mouth once daily.                 Discharge Diagnosis: Lumbar  radiculopathy [M54.16]  Condition on Discharge: Stable with no complications to procedure   Diet on Discharge: Same as before.  Activity: as per instruction sheet.  Discharge to: Home with a responsible adult.  Follow up: 2-4 weeks       Please call my office or pager at [phone] if experienced any weakness or loss of sensation, fever > 101.5, pain uncontrolled with oral medications, persistent nausea/vomiting/or diarrhea, redness or drainage from the incisions, or any other worrisome concerns. If physician on call was not reached or could not communicate with our office for any reason please go to the nearest emergency department        Angel Sparrow

## 2023-02-02 NOTE — OP NOTE
Caudal Epidural Steroid Injection with Catheter under Fluoroscopic Guidance    The procedure, risks, benefits, and options were discussed with the patient. There are no contraindications to the procedure. The patent expressed understanding and agreed to the procedure. Informed written consent was obtained prior to the start of the procedure and can be found in the patient's chart.    PATIENT NAME: Bridget Montgomery   MRN: [id number]     DATE OF PROCEDURE: 02/02/2023    PROCEDURE: Caudal Epidural Steroid Injection under Fluoroscopic Guidance    PRE-OP DIAGNOSIS: Lumbar radiculopathy [M54.16] Lumbar radiculopathy [M54.16]    POST-OP DIAGNOSIS: Same    PHYSICIAN: Angel Sparrow MD    ASSISTANTS: Miguel Aguilera, M.D. [facility]       MEDICATIONS INJECTED: Preservative-free Decadron 10mg with 4cc of Lidocaine 1% MPF     LOCAL ANESTHETIC INJECTED: Xylocaine 2%     SEDATION: None    ESTIMATED BLOOD LOSS: None    COMPLICATIONS: None    TECHNIQUE: Time-out was performed to identify the patient and procedure to be performed. With the patient laying in a prone position, the surgical area was prepped and draped in the usual sterile fashion using ChloraPrep and a fenestrated drape. The injection site was determined under fluoroscopy guidance. Skin anesthesia was achieved by injecting Lidocaine 2% over the injection site. The sacrum and sacral cornua were then approached with a 16 gauge, 3.5 inch epidural needle that was introduced and advanced into the sacral hiatus under fluoroscopic guidance with AP, lateral and/or contralateral oblique imaging. After the needle passed through the sacrococcygeal ligament, the needle angle was lowered and the needle was advanced 1 cm. After negative aspiration of blood or CSF, and no evidence of paraesthesias, the catheter was threaded through the needle into the epidural space using live fluoroscopy, contrast dye Omnipaque (300mg/mL) was injected to confirm placement and there was no  vascular runoff. Fluoroscopic imaging in the AP and lateral views revealed a clear outline of the spinal nerve with proximal spread of agent through the caudal epidural space. Then 1 mL of the medication mixture listed above was injected slowly. Displacement of the radio opaque contrast after injection of the medication confirmed that the medication went into the area of the transforaminal spaces. The needles were removed and bleeding was nil. A sterile dressing was applied. No specimens collected. The patient tolerated the procedure well.       The patient was monitored after the procedure in the recovery area. They were given post-procedure and discharge instructions to follow at home. The patient was discharged in a stable condition.        MARIA LUISA SOLANO MD       I reviewed and edited the fellow's note. I conducted my own interview and physical examination. I agree with the findings. I was present and supervising all critical portions of the procedure.    Angel Sparrow MD

## 2023-02-02 NOTE — DISCHARGE INSTRUCTIONS
Home Care Instructions Pain Management:    1.  DIET:    You may resume your normal diet today.    2.  BATHING:    You may shower with luke warm water.    3.  DRESSING:    You may remove your bandage today.    4.  ACTIVITY LEVEL:      You may resume your normal activities 24 hours after your procedure.    5.  MEDICATIONS:    You may resume your normal medications today.    6.  SPECIAL INSTRUCTIONS:    No heat to the injection site for 24 hours including bath or shower, heating pad, moist heat or hot tubs.    Use an ice pack to the injection site for any pain or discomfort.  Apply ice packs for 20 minute intervals as needed.    If you have received any sedatives by mouth today, you can not drive for 12 hours.    If you have received sedation through an IV, you can not drive for 24 hours.    PLEASE CALL YOUR DOCTOR FOR THE FOLLOWIN.  Redness or swelling around the injection site.  2.  Fever of 101 degrees.  3.  Drainage (pus) from the injection site.  4.  For any continuous bleeding (some dried blood over the incision is normal.)    FOR EMERGENCIES:    If any unusual problems or difficulties occur during clinic hours, call [phone] or dial [phone].    Follow up with with your physician in 2-3 weeks.

## 2023-02-13 ENCOUNTER — OFFICE VISIT (OUTPATIENT)
Dept: PAIN MEDICINE | Facility: CLINIC | Age: 88
End: 2023-02-13
Payer: MEDICARE

## 2023-02-13 VITALS
BODY MASS INDEX: 24.1 KG/M2 | SYSTOLIC BLOOD PRESSURE: 155 MMHG | HEIGHT: 64 IN | DIASTOLIC BLOOD PRESSURE: 83 MMHG | WEIGHT: 141.13 LBS | HEART RATE: 65 BPM

## 2023-02-13 DIAGNOSIS — M25.651 DECREASED RANGE OF MOTION OF BOTH HIPS: ICD-10-CM

## 2023-02-13 DIAGNOSIS — M54.16 LUMBAR RADICULOPATHY: Primary | ICD-10-CM

## 2023-02-13 DIAGNOSIS — R10.31 RIGHT GROIN PAIN: ICD-10-CM

## 2023-02-13 DIAGNOSIS — M25.652 DECREASED RANGE OF MOTION OF BOTH HIPS: ICD-10-CM

## 2023-02-13 DIAGNOSIS — M51.36 DDD (DEGENERATIVE DISC DISEASE), LUMBAR: ICD-10-CM

## 2023-02-13 DIAGNOSIS — M54.16 LUMBAR RADICULOPATHY, CHRONIC: ICD-10-CM

## 2023-02-13 DIAGNOSIS — M51.37 DDD (DEGENERATIVE DISC DISEASE), LUMBOSACRAL: ICD-10-CM

## 2023-02-13 DIAGNOSIS — G89.4 CHRONIC PAIN SYNDROME: ICD-10-CM

## 2023-02-13 PROCEDURE — 1125F AMNT PAIN NOTED PAIN PRSNT: CPT | Mod: CPTII,S$GLB,, | Performed by: NURSE PRACTITIONER

## 2023-02-13 PROCEDURE — 99999 PR PBB SHADOW E&M-EST. PATIENT-LVL IV: CPT | Mod: PBBFAC,,, | Performed by: NURSE PRACTITIONER

## 2023-02-13 PROCEDURE — 1159F MED LIST DOCD IN RCRD: CPT | Mod: CPTII,S$GLB,, | Performed by: NURSE PRACTITIONER

## 2023-02-13 PROCEDURE — 1125F PR PAIN SEVERITY QUANTIFIED, PAIN PRESENT: ICD-10-PCS | Mod: CPTII,S$GLB,, | Performed by: NURSE PRACTITIONER

## 2023-02-13 PROCEDURE — 1160F RVW MEDS BY RX/DR IN RCRD: CPT | Mod: CPTII,S$GLB,, | Performed by: NURSE PRACTITIONER

## 2023-02-13 PROCEDURE — 1159F PR MEDICATION LIST DOCUMENTED IN MEDICAL RECORD: ICD-10-PCS | Mod: CPTII,S$GLB,, | Performed by: NURSE PRACTITIONER

## 2023-02-13 PROCEDURE — 1160F PR REVIEW ALL MEDS BY PRESCRIBER/CLIN PHARMACIST DOCUMENTED: ICD-10-PCS | Mod: CPTII,S$GLB,, | Performed by: NURSE PRACTITIONER

## 2023-02-13 PROCEDURE — 99214 OFFICE O/P EST MOD 30 MIN: CPT | Mod: S$GLB,,, | Performed by: NURSE PRACTITIONER

## 2023-02-13 PROCEDURE — 99999 PR PBB SHADOW E&M-EST. PATIENT-LVL IV: ICD-10-PCS | Mod: PBBFAC,,, | Performed by: NURSE PRACTITIONER

## 2023-02-13 PROCEDURE — 99214 PR OFFICE/OUTPT VISIT, EST, LEVL IV, 30-39 MIN: ICD-10-PCS | Mod: S$GLB,,, | Performed by: NURSE PRACTITIONER

## 2023-02-13 NOTE — PROGRESS NOTES
[facility]  Established Clinic Visit     Referring Provider: No referring provider defined for this encounter.    Chief Complaint:   Chief Complaint   Patient presents with    Follow-up    Back Pain       History of Present Illness: Bridget Montgomery is a 87 y.o. female referred by Nadya Hopper for back and neck pain.      Onset: 2007  Location: bilateral low back  Radiation: left lateral leg across the knee and into the shin and into the right groin.   Timing: constant  Quality: Aching, burning, grabbing, deep, sharp  Exacerbating Factors: lifting, standing for more than 3 min, walking for more than 1 min, daily activity  Alleviating Factors: heat, medications, rest, PT  Associated Symptoms: She does have weakness and numbness in the legs, primarily the right leg. She denies night fever/night sweats, urinary incontinence, bowel incontinence and significant weight loss    She does currently have a chronic wound on her toe that is infected.     She takes tramadol for pain and this helps, but also causes sedation and she mostly is using this at nighttime.        Severity: Currently: 8/10   Typical Range: 5-10/10     Exacerbation: 10/10     Interval History   02/13/2023-  a 87-year-old female presents status post a caudal MARKUS reporting 25% relief of her symptoms.  Her primary pain continues in the low back and left leg.        Interval History(12/15/2022)::  Bridget Montgomery returns today for follow up.  At the last clinic visit, she was referred to PT. Her low back, right groin, left lateral leg pain to her shin is her worse pain. See pain described below.       Current Pain Scales:  Current: 10/10              Typical Range: 10-1/10   Pain Disability Index  Family/Home Responsibilities:: 8  Recreation:: 8  Social Activity:: 9  Occupation:: 8  Sexual Behavior:: 0  Self Care:: 10  Life-Support Activities:: 7  Pain Disability Index (PDI): 50    Previous Interventions:  -02/02/2023 Caudal Epidural  25%   - Several procedures with Dr. Daugherty which were helpful     Previous Therapies:  PT/OT: yes    Relevant Surgery: yes    - Vertiflex at L3-4 with Dr. Daugherty with no relief  Previous Medications:   - NSAIDS: Voltaren Gel  - Muscle Relaxants:    - TCAs:   - SNRIs:   - Topicals:   - Anticonvulsants:  Lyrica.   - Opioids: tramadol    Current Pain Medications:  Tramadol 50 mg helps, but causes sedation and she mostly takes this at night.    Lidoderm   Turmeric    Blood Thinners: Fish Oil    Full Medication List:    Current Outpatient Medications:     acetylcysteine 600 mg TbIE, Take by mouth Daily., Disp: , Rfl:     albuterol (PROVENTIL) 2.5 mg /3 mL (0.083 %) nebulizer solution, Take 2.5 mg by nebulization as needed., Disp: , Rfl:     amLODIPine-benazepriL (LOTREL) 5-40 mg per capsule, Take 1 capsule by mouth once daily., Disp: , Rfl:     apple cider vinegar 600 mg Cap, Take by mouth once daily., Disp: , Rfl:     budesonide-formoterol 160-4.5 mcg (SYMBICORT) 160-4.5 mcg/actuation HFAA, INHALE 2 PUFFS INTO THE LUNGS TWICE A DAY, Disp: 10.2 g, Rfl: 6    calcium carbonate (CALCIUM 500 ORAL), Take by mouth., Disp: , Rfl:     calcium carbonate-vitamin D3 500 mg-10 mcg (400 unit) Chew, Take by mouth., Disp: , Rfl:     cholecalciferol, vitamin D3, (VITAMIN D3) 50 mcg (2,000 unit) Cap capsule, Take 4,000 Units by mouth once daily., Disp: , Rfl:     coenzyme Q10 10 mg capsule, Take 10 mg by mouth., Disp: , Rfl:     collagenase (SANTYL) ointment, Apply topically once daily., Disp: 30 g, Rfl: 0    coQ10, ubiquinol, 100 mg Cap, Take by mouth once daily., Disp: , Rfl:     diclofenac sodium (VOLTAREN ARTHRITIS PAIN) 1 % Gel, Apply 2 g topically once daily. (Patient taking differently: Apply 2 g topically as needed.), Disp: 1 each, Rfl: 11    famotidine (PEPCID) 40 MG tablet, 40 mg once daily. , Disp: , Rfl:     fluticasone propionate (FLONASE) 50 mcg/actuation nasal spray, INSTILL TWO SPRAYS INTO EACH NOSTRIL TWICE A DAY, Disp:  48 mL, Rfl: 2    furosemide (LASIX) 40 MG tablet, Take 1 tablet (40 mg total) by mouth every 8 (eight) hours as needed (fluid). (Patient taking differently: Take 20 mg by mouth every 8 (eight) hours as needed (fluid).), Disp: 180 tablet, Rfl: 3    LIDOcaine (LIDODERM) 5 %, Place 1 patch onto the skin once daily. Remove & Discard patch within 12 hours or as directed by provider, Disp: 5 patch, Rfl: 0    methylcellulose oral powder, Take by mouth., Disp: , Rfl:     mupirocin (BACTROBAN) 2 % ointment, Apply topically 2 (two) times daily., Disp: 22 g, Rfl: 0    olopatadine (PATANOL) 0.1 % ophthalmic solution, Place into both eyes Daily., Disp: , Rfl:     omega 3-dha-epa-fish oil 1,000 mg (120 mg-180 mg) Cap, Take by mouth., Disp: , Rfl:     omeprazole (PRILOSEC) 20 MG capsule, Take 20 mg by mouth once daily., Disp: , Rfl:     pantoprazole (PROTONIX) 40 MG tablet, Take 40 mg by mouth once daily., Disp: , Rfl:     polyethylene glycol 3350 (MIRALAX ORAL), Take by mouth., Disp: , Rfl:     pregabalin (LYRICA) 25 MG capsule, TAKE 1 CAPSULE BY MOUTH 2 TIMES DAILY., Disp: 60 capsule, Rfl: 4    psyllium 0.52 gram capsule, Take 0.52 g by mouth once daily., Disp: , Rfl:     SELENIUM ORAL, Take by mouth Daily., Disp: , Rfl:     traMADoL (ULTRAM) 50 mg tablet, Take 1 tablet (50 mg total) by mouth every 12 (twelve) hours as needed for Pain., Disp: 60 tablet, Rfl: 5    triamcinolone acetonide 0.1% (KENALOG) 0.1 % cream, APPLY TO AFFECTED AREA TWICE A DAY, Disp: 454 g, Rfl: 3    vit A/vit C/vit E/zinc/copper (PRESERVISION AREDS ORAL), Take by mouth 2 (two) times a day., Disp: , Rfl:     zinc sulfate (ZINC-15 ORAL), Take 30 mg by mouth once daily., Disp: , Rfl:      Review of Systems:  ROS    Allergies:  Cephalexin, Codeine, Meperidine, Penicillins, and Tazarotene     Medical History:   has a past medical history of Anemia, unspecified, Arthritis, CKD (chronic kidney disease) stage 4, GFR 15-29 ml/min, COPD (chronic obstructive  "pulmonary disease), Edema, HTN (hypertension), Hypocalcemia, Hyponatremia, and Osteoarthritis.    Surgical History:   has a past surgical history that includes Breast cyst excision; Foot surgery; and Caudal epidural steroid injection (N/A, 2/2/2023).    Family History:  family history includes Cancer in her mother; No Known Problems in her father.    Social History:   reports that she has quit smoking. She has never used smokeless tobacco. She reports that she does not currently use alcohol. She reports that she does not use drugs.    Physical Exam:  BP (!) 155/83   Pulse 65   Ht 5' 4" (1.626 m)   Wt 64 kg (141 lb 1.5 oz)   BMI 24.22 kg/m²   GEN: No acute distress. Calm, comfortable  HENT: Normocephalic, atraumatic, moist mucous membranes  EYE: Anicteric sclera, non-injected.   CV: Non-diaphoretic. Regular Rate. Radial Pulses 2+.  RESP: Breathing comfortably. Chest expansion symmetric.  EXT: No clubbing, cyanosis.   SKIN: Warm, & dry to palpation. No visible rashes or lesions of exposed skin.   PSYCH: Pleasant mood and appropriate affect. Recent and remote memory intact.   GAIT: Mod Independent with RW  Lumbar Spine Exam:       Inspection: No erythema, bruising.       Palpation: (+) TTP of lumbar paraspinals bilaterally       ROM:  Limited in flexion, extension, lateral bending.       (+) Facet loading bilaterally      (-) Seated Straight Leg Raise bilaterally      (-) Seated JUSTYN bilaterally  Hip Exam:      Inspection: No gross deformity or apparent leg length discrepancy      Palpation: (+) TTP to bilateral greater trochanteric bursas (L>R).       ROM: (+) limitation & pain in internal rotation, external rotation b/l  Neurologic Exam:     Alert. Speech is fluent and appropriate.     Strength:  4/5 throughout right leg, otherwise 4++/5 throughout left lower extremity     Sensation:  Grossly intact to light touch in bilateral lower extremities     Reflexes: 1+ in b/l patella, absent in b/l achilles     Tone: No " abnormality appreciated in bilateral lower extremities           Imaging:  - X-ray hips b/l 7/28/22:  Hips bilateral two views to include pelvis.  Right: No fracture dislocation bone destruction seen.  Left: No fracture dislocation bone destruction seen.   There is DJD of the spine pelvis hips and proximal femurs.  No acute process seen.      - Xray Lumbar Spine 07/15/2022:  FINDINGS:  Lumbar spine two views: There is hardware status post fusion of the spinous process of L3 and L4.  There is moderate-severe DJD throughout the L-spine lower T-spine.  There is a dextroconvex curvature of the upper L-spine.  No fracture dislocation bone destruction seen.  No instability seen.  There is grade 1 anterolisthesis of L3 on L4.  No hardware failure seen.  No complication or acute fracture dislocation bone destruction seen.    -Xray Cervical Spine 07/15/2022:  FINDINGS:  Cervical spine two views: Odontoid prevertebral soft tissues and posterior elements are intact.  There is moderate DJD at C5-C6 and C6-C7.  No trauma seen.  No acute process seen.    -Xray Shoulder Left 07/15/2022:  FINDINGS:  Shoulder complete three views left: There is severe advanced DJD of the glenohumeral joint.  No acute fracture, dislocation, or bone destruction seen.    Labs:  BMP  Lab Results   Component Value Date     09/12/2022     09/12/2022    K 3.6 09/12/2022    K 3.7 09/12/2022     09/12/2022     09/12/2022    CO2 23 09/12/2022    CO2 24 09/12/2022    BUN 24 (H) 09/12/2022    BUN 24 (H) 09/12/2022    CREATININE 1.4 09/12/2022    CREATININE 1.4 09/12/2022    CALCIUM 9.2 09/12/2022    CALCIUM 9.3 09/12/2022    ANIONGAP 13 09/12/2022    ANIONGAP 12 09/12/2022    ESTGFRAFRICA 52 (A) 06/13/2022    EGFRNONAA 45 (A) 06/13/2022     Lab Results   Component Value Date    ALT 19 05/20/2022    AST 25 05/20/2022    ALKPHOS 136 (H) 05/20/2022    BILITOT 0.3 05/20/2022     Lab Results   Component Value Date     09/12/2022    PLT  404 09/12/2022     Lab Results   Component Value Date    HGBA1C 6.1 (H) 03/23/2022       Assessment:  Bridget Montgomery is a 87 y.o. female with the following diagnoses based on history, exam, and imaging:    Problem List Items Addressed This Visit          Neuro    Lumbar radiculopathy - Primary     Other Visit Diagnoses       DDD (degenerative disc disease), lumbosacral        Lumbar radiculopathy, chronic        Right groin pain        DDD (degenerative disc disease), lumbar        Chronic pain syndrome        Decreased range of motion of both hips                    This is a pleasant 87 y.o. lady presenting with:     - Chronic bilateral low back pain and left radicular leg pain and some radiation into the right groin. She has vertiflex device at L3-4 that was not helpful.   - Right groin pain and limited hip ROM b/l: Related to OA on imaging.   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   - Chronic neck pain: Not fully evaluated today.   - Comorbidities: CKD. Chronic wound.     12/15/2022- 87-year-old female that presents with her daughter history of chronic low back and left lateral radicular leg pain radiates into her shin she also has right groin pain.  She is here to review her recent lumbar MRI which shows severe foraminal and central canal stenosis on the left at L3-4 and moderate to severe foraminal stenosis on the left at L5-S1 as well as foraminal stenosis at other levels that are less severe. See plan agreed upon below.       2/13/2023- Bridget Montgomery is a 87 y.o. female who  has a past medical history of Anemia, unspecified, Arthritis, CKD (chronic kidney disease) stage 4, GFR 15-29 ml/min, COPD (chronic obstructive pulmonary disease), Edema, HTN (hypertension), Hypocalcemia, Hyponatremia, and Osteoarthritis.  By history and examination this patient has chronic low back pain with radiculopathy.  The underlying cause cause is facet arthritis, degenerative disc disease,  foraminal stenosis, muscle dysfunction, and deconditioning.  Pathology is confirmed by imaging.  We discussed the underlying diagnoses and multiple treatment options including non-opioid medications, interventional procedures, home exercise, core muscle enhancement, and activity modification.  The risks and benefits of each treatment option were discussed and all questions were answered.        Treatment Plan:   - PT/OT/HEP:  Continue  PT for low back. Discussed benefits of exercise for pain.   - Procedures: none at this time.   - Medications: No changes recommended at this time.  - Imaging: Reviewed and discussed Lumbar MRI  using images from patient's chart and spine model  - Labs: Reviewed.  Medications are appropriately dosed for current hepatorenal function.    Follow Up: 3 months or sooner    TEODORO Tuttle  Interventional Pain Management    Disclaimer: This note was partly generated using dictation software which may occasionally result in transcription errors.

## 2023-02-24 ENCOUNTER — LAB VISIT (OUTPATIENT)
Dept: LAB | Facility: HOSPITAL | Age: 88
End: 2023-02-24
Payer: MEDICARE

## 2023-02-24 DIAGNOSIS — N18.4 CKD (CHRONIC KIDNEY DISEASE) STAGE 4, GFR 15-29 ML/MIN: ICD-10-CM

## 2023-02-24 LAB
ALBUMIN/CREAT UR: 18.3 UG/MG (ref 0–30)
BILIRUB UR QL STRIP: NEGATIVE
CLARITY UR: CLEAR
COLOR UR: YELLOW
CREAT UR-MCNC: 60 MG/DL (ref 15–325)
GLUCOSE UR QL STRIP: NEGATIVE
HGB UR QL STRIP: NEGATIVE
KETONES UR QL STRIP: NEGATIVE
LEUKOCYTE ESTERASE UR QL STRIP: NEGATIVE
MICROALBUMIN UR DL<=1MG/L-MCNC: 11 UG/ML
NITRITE UR QL STRIP: NEGATIVE
PH UR STRIP: 6 [PH] (ref 5–8)
PROT UR QL STRIP: NEGATIVE
SP GR UR STRIP: 1.01 (ref 1–1.03)
URN SPEC COLLECT METH UR: NORMAL
UROBILINOGEN UR STRIP-ACNC: NEGATIVE EU/DL

## 2023-02-24 PROCEDURE — 81003 URINALYSIS AUTO W/O SCOPE: CPT | Performed by: INTERNAL MEDICINE

## 2023-02-24 PROCEDURE — 82570 ASSAY OF URINE CREATININE: CPT | Performed by: INTERNAL MEDICINE

## 2023-03-06 ENCOUNTER — TELEPHONE (OUTPATIENT)
Dept: WOUND CARE | Facility: HOSPITAL | Age: 88
End: 2023-03-06
Payer: MEDICARE

## 2023-03-06 NOTE — TELEPHONE ENCOUNTER
Patient called back to inform that she is going to [facility] wound care [facility]

## 2023-03-06 NOTE — TELEPHONE ENCOUNTER
Called patient in regards to wound care referral. Patient did not answer phone. Was able to leave a detailed message asking for return call to #[phone] option 1

## 2023-03-13 ENCOUNTER — HOSPITAL ENCOUNTER (OUTPATIENT)
Dept: RADIOLOGY | Facility: HOSPITAL | Age: 88
Discharge: HOME | End: 2023-03-13
Attending: INTERNAL MEDICINE
Payer: MEDICARE

## 2023-03-13 DIAGNOSIS — J44.9 CHRONIC OBSTRUCTIVE PULMONARY DISEASE, UNSPECIFIED COPD TYPE: ICD-10-CM

## 2023-03-13 PROCEDURE — 71046 XR CHEST PA AND LATERAL: ICD-10-PCS | Mod: 26,,, | Performed by: INTERNAL MEDICINE

## 2023-03-13 PROCEDURE — 71046 X-RAY EXAM CHEST 2 VIEWS: CPT | Mod: 26,,, | Performed by: INTERNAL MEDICINE

## 2023-03-13 PROCEDURE — 71046 X-RAY EXAM CHEST 2 VIEWS: CPT | Mod: TC,FY

## 2023-03-14 ENCOUNTER — OFFICE VISIT (OUTPATIENT)
Dept: UROLOGY | Facility: CLINIC | Age: 88
End: 2023-03-14
Payer: MEDICARE

## 2023-03-14 VITALS
BODY MASS INDEX: 25.16 KG/M2 | HEIGHT: 64 IN | DIASTOLIC BLOOD PRESSURE: 74 MMHG | SYSTOLIC BLOOD PRESSURE: 176 MMHG | HEART RATE: 65 BPM | WEIGHT: 147.38 LBS

## 2023-03-14 DIAGNOSIS — R39.15 URINARY URGENCY: ICD-10-CM

## 2023-03-14 DIAGNOSIS — N39.41 URGE INCONTINENCE: Primary | ICD-10-CM

## 2023-03-14 LAB
BILIRUB SERPL-MCNC: ABNORMAL MG/DL
BLOOD URINE, POC: ABNORMAL
CLARITY, POC UA: CLEAR
COLOR, POC UA: YELLOW
GLUCOSE UR QL STRIP: ABNORMAL
KETONES UR QL STRIP: ABNORMAL
LEUKOCYTE ESTERASE URINE, POC: ABNORMAL
NITRITE, POC UA: ABNORMAL
PH, POC UA: 5
POC RESIDUAL URINE VOLUME: 3 ML (ref 0–100)
PROTEIN, POC: ABNORMAL
SPECIFIC GRAVITY, POC UA: 1.01
UROBILINOGEN, POC UA: ABNORMAL

## 2023-03-14 PROCEDURE — 1125F PR PAIN SEVERITY QUANTIFIED, PAIN PRESENT: ICD-10-PCS | Mod: CPTII,S$GLB,, | Performed by: STUDENT IN AN ORGANIZED HEALTH CARE EDUCATION/TRAINING PROGRAM

## 2023-03-14 PROCEDURE — 1160F PR REVIEW ALL MEDS BY PRESCRIBER/CLIN PHARMACIST DOCUMENTED: ICD-10-PCS | Mod: CPTII,S$GLB,, | Performed by: STUDENT IN AN ORGANIZED HEALTH CARE EDUCATION/TRAINING PROGRAM

## 2023-03-14 PROCEDURE — 1159F PR MEDICATION LIST DOCUMENTED IN MEDICAL RECORD: ICD-10-PCS | Mod: CPTII,S$GLB,, | Performed by: STUDENT IN AN ORGANIZED HEALTH CARE EDUCATION/TRAINING PROGRAM

## 2023-03-14 PROCEDURE — 99999 PR PBB SHADOW E&M-EST. PATIENT-LVL III: ICD-10-PCS | Mod: PBBFAC,,, | Performed by: STUDENT IN AN ORGANIZED HEALTH CARE EDUCATION/TRAINING PROGRAM

## 2023-03-14 PROCEDURE — 1159F MED LIST DOCD IN RCRD: CPT | Mod: CPTII,S$GLB,, | Performed by: STUDENT IN AN ORGANIZED HEALTH CARE EDUCATION/TRAINING PROGRAM

## 2023-03-14 PROCEDURE — 51798 POCT BLADDER SCAN: ICD-10-PCS | Mod: S$GLB,,, | Performed by: STUDENT IN AN ORGANIZED HEALTH CARE EDUCATION/TRAINING PROGRAM

## 2023-03-14 PROCEDURE — 81002 POCT URINE DIPSTICK WITHOUT MICROSCOPE: ICD-10-PCS | Mod: S$GLB,,, | Performed by: STUDENT IN AN ORGANIZED HEALTH CARE EDUCATION/TRAINING PROGRAM

## 2023-03-14 PROCEDURE — 81002 URINALYSIS NONAUTO W/O SCOPE: CPT | Mod: S$GLB,,, | Performed by: STUDENT IN AN ORGANIZED HEALTH CARE EDUCATION/TRAINING PROGRAM

## 2023-03-14 PROCEDURE — 1125F AMNT PAIN NOTED PAIN PRSNT: CPT | Mod: CPTII,S$GLB,, | Performed by: STUDENT IN AN ORGANIZED HEALTH CARE EDUCATION/TRAINING PROGRAM

## 2023-03-14 PROCEDURE — 51798 US URINE CAPACITY MEASURE: CPT | Mod: S$GLB,,, | Performed by: STUDENT IN AN ORGANIZED HEALTH CARE EDUCATION/TRAINING PROGRAM

## 2023-03-14 PROCEDURE — 1160F RVW MEDS BY RX/DR IN RCRD: CPT | Mod: CPTII,S$GLB,, | Performed by: STUDENT IN AN ORGANIZED HEALTH CARE EDUCATION/TRAINING PROGRAM

## 2023-03-14 PROCEDURE — 99999 PR PBB SHADOW E&M-EST. PATIENT-LVL III: CPT | Mod: PBBFAC,,, | Performed by: STUDENT IN AN ORGANIZED HEALTH CARE EDUCATION/TRAINING PROGRAM

## 2023-03-14 PROCEDURE — 99204 PR OFFICE/OUTPT VISIT, NEW, LEVL IV, 45-59 MIN: ICD-10-PCS | Mod: S$GLB,,, | Performed by: STUDENT IN AN ORGANIZED HEALTH CARE EDUCATION/TRAINING PROGRAM

## 2023-03-14 PROCEDURE — 99204 OFFICE O/P NEW MOD 45 MIN: CPT | Mod: S$GLB,,, | Performed by: STUDENT IN AN ORGANIZED HEALTH CARE EDUCATION/TRAINING PROGRAM

## 2023-03-14 NOTE — PROGRESS NOTES
"Subjective:       Patient ID: Bridget Montgomery is a 88 y.o. female.    Chief Complaint: Urinary Incontinence      This is a 88 y.o.  female patient that is new to me.  The patient was referred to me by nephrology for "urge incontinence."  Overnight sometimes when she wakes up she often cannot make it to the bathroom in time and first thing in the morning she sometimes will leak before going to the bathroom. She will feel the urge to urinate and when she gets there she has to sit and wait several minutes. She feels it is a "shy bladder." This will occur more overnight and first thing in the morning more consistently.   Hydration patterns - drinks water most. Has a cup of tea in the AM. Dinner is around 6pm, water with dinner. Goes to sleep at midnight. She does drink water leading up to bedtime.   Bowel movements - every other day  Medications on list that might be contributing to urinary patterns - lasix takes it daily sometimes twice a day - she will add based off of weight or swelling in legs.          Urine dip benign  Pvr 3cc      Lab Results   Component Value Date    CREATININE 1.6 (H) 03/13/2023       ---  Past Medical History:   Diagnosis Date    Allergy     Anemia, unspecified     Arthritis     CKD (chronic kidney disease) stage 4, GFR 15-29 ml/min     COPD (chronic obstructive pulmonary disease)     Edema     HTN (hypertension)     Hypocalcemia     Hyponatremia     Osteoarthritis        Past Surgical History:   Procedure Laterality Date    BREAST CYST EXCISION      CAUDAL EPIDURAL STEROID INJECTION N/A 2/2/2023    Procedure: Injection-steroid-epidural-caudal;  Surgeon: Angel Sparrow MD;  Location: [facility];  Service: Pain Management;  Laterality: N/A;    FOOT SURGERY         Family History   Problem Relation Age of Onset    Cancer Mother     No Known Problems Father        Social History     Tobacco Use    Smoking status: Former    Smokeless tobacco: Never   Substance Use Topics    Alcohol " use: Not Currently    Drug use: Never       Current Outpatient Medications on File Prior to Visit   Medication Sig Dispense Refill    acetylcysteine 600 mg TbIE Take by mouth Daily.      albuterol (PROVENTIL) 2.5 mg /3 mL (0.083 %) nebulizer solution Take 2.5 mg by nebulization as needed.      apple cider vinegar 600 mg Cap Take by mouth once daily.      budesonide-formoterol 160-4.5 mcg (SYMBICORT) 160-4.5 mcg/actuation HFAA INHALE 2 PUFFS INTO THE LUNGS TWICE A DAY 10.2 g 6    calcium carbonate (CALCIUM 500 ORAL) Take by mouth.      calcium carbonate-vitamin D3 500 mg-10 mcg (400 unit) Chew Take by mouth.      cholecalciferol, vitamin D3, (VITAMIN D3) 50 mcg (2,000 unit) Cap capsule Take 4,000 Units by mouth once daily.      coenzyme Q10 10 mg capsule Take 10 mg by mouth.      collagenase (SANTYL) ointment Apply topically once daily. 30 g 0    diclofenac sodium (VOLTAREN ARTHRITIS PAIN) 1 % Gel Apply 2 g topically once daily. (Patient taking differently: Apply 2 g topically as needed.) 1 each 11    famotidine (PEPCID) 40 MG tablet 40 mg once daily.       fluticasone propionate (FLONASE) 50 mcg/actuation nasal spray INSTILL TWO SPRAYS INTO EACH NOSTRIL TWICE A DAY 48 mL 2    furosemide (LASIX) 40 MG tablet Take 1 tablet (40 mg total) by mouth every 8 (eight) hours as needed (fluid). (Patient taking differently: Take 20 mg by mouth every 8 (eight) hours as needed (fluid).) 180 tablet 3    LIDOcaine (LIDODERM) 5 % Place 1 patch onto the skin once daily. Remove & Discard patch within 12 hours or as directed by provider 5 patch 0    methylcellulose oral powder Take by mouth.      mupirocin (BACTROBAN) 2 % ointment Apply topically 2 (two) times daily. 22 g 0    olmesartan (BENICAR) 40 MG tablet Take 1 tablet (40 mg total) by mouth once daily. 90 tablet 3    olopatadine (PATANOL) 0.1 % ophthalmic solution Place into both eyes Daily.      omega 3-dha-epa-fish oil 1,000 mg (120 mg-180 mg) Cap Take by mouth.      omeprazole  (PRILOSEC) 20 MG capsule Take 20 mg by mouth once daily.      pantoprazole (PROTONIX) 40 MG tablet Take 40 mg by mouth once daily.      polyethylene glycol 3350 (MIRALAX ORAL) Take by mouth.      pregabalin (LYRICA) 25 MG capsule TAKE 1 CAPSULE BY MOUTH 2 TIMES DAILY. 60 capsule 4    psyllium 0.52 gram capsule Take 0.52 g by mouth once daily.      SELENIUM ORAL Take by mouth Daily.      traMADoL (ULTRAM) 50 mg tablet Take 1 tablet (50 mg total) by mouth every 12 (twelve) hours as needed for Pain. 60 tablet 5    triamcinolone acetonide 0.1% (KENALOG) 0.1 % cream APPLY TO AFFECTED AREA TWICE A  g 3    vit A/vit C/vit E/zinc/copper (PRESERVISION AREDS ORAL) Take by mouth 2 (two) times a day.      zinc sulfate (ZINC-15 ORAL) Take 30 mg by mouth once daily.       No current facility-administered medications on file prior to visit.       Review of patient's allergies indicates:   Allergen Reactions    Cephalexin     Codeine     Meperidine      Other reaction(s): delerium, hallucination  Delerium  Delerium  Delerium      Penicillins     Tazarotene      Other reaction(s): rash, Unknown       Review of Systems   Constitutional:  Negative for activity change.   HENT:  Negative for congestion.    Eyes:  Negative for visual disturbance.   Respiratory:  Negative for shortness of breath.    Cardiovascular:  Negative for chest pain.   Gastrointestinal:  Negative for abdominal distention.   Musculoskeletal:  Negative for gait problem.   Skin:  Negative for color change.   Neurological:  Negative for dizziness.   Psychiatric/Behavioral:  Negative for agitation.      Objective:      Physical Exam  Constitutional:       Appearance: She is well-developed.   HENT:      Head: Normocephalic and atraumatic.   Pulmonary:      Effort: Pulmonary effort is normal.   Musculoskeletal:         General: Normal range of motion.      Cervical back: Normal range of motion.      Comments: Uses a rolling walker   Skin:     General: Skin is warm  and dry.   Neurological:      Mental Status: She is alert and oriented to person, place, and time.       Assessment:       1. Urge incontinence    2. Urinary urgency        Plan:         Counseled that urinary urgency/frequency is likely multifactorial: age, use of lasix, possibly due to bladder irritants, possibly could be affected if she improves her BM. Some of it does sound like normal physiology (slower stream overnight and first thing in AM). Reassured pt her PVR is low and dip is benign.    Plan:  Avoid bladder irritants possibly in the evenings because it could lead to irritative voiding symptoms - including but not limited to caffeine, alcohol, smoking, spicy foods, acidic foods, tomato-based products, citrus, artificial sweeteners, chocolate, coffee or tea.   Decrease fluids 3 hours before bedtime. Make sure you empty your bladder before going to bed.   The patient does have a history of constipation. We discussed that avoiding constipation may help with improving voiding patterns (urgency, frequency, and in some cases incomplete emptying) OTC stool softeners and fiber were discussed to achieve a goal of 1 soft daily bowel movement.  Discussed that urinary urgency and frequency may be due to medication side effects, be more common with ageing, etc. First line management includes the above and pelvic floor physical therapy is a consideration. Overactive bladder medications could also be offered although in her age group it would be at a very high risk of adverse side effects.    For this patient - discussed fluid management, avoiding bladder irritants, referral to PFPT.        Urge incontinence  -     POCT URINE DIPSTICK WITHOUT MICROSCOPE  -     POCT Bladder Scan  -     Ambulatory referral/consult to Physical/Occupational Therapy; Future; Expected date: 03/21/2023    Urinary urgency  -     Ambulatory referral/consult to Physical/Occupational Therapy; Future; Expected date: 03/21/2023

## 2023-03-14 NOTE — PATIENT INSTRUCTIONS
Avoid bladder irritants possibly in the evenings because it could lead to irritative voiding symptoms - including but not limited to caffeine, alcohol, smoking, spicy foods, acidic foods, tomato-based products, citrus, artificial sweeteners, chocolate, coffee or tea.   Decrease fluids 3 hours before bedtime. Make sure you empty your bladder before going to bed.   The patient does have a history of constipation. We discussed that avoiding constipation may help with improving voiding patterns (urgency, frequency, and in some cases incomplete emptying) OTC stool softeners and fiber were discussed to achieve a goal of 1 soft daily bowel movement.  Discussed that urinary urgency and frequency may be due to medication side effects, be more common with ageing, etc. First line management includes the above and pelvic floor physical therapy is a consideration. Overactive bladder medications could also be offered although in her age group it would be at a very high risk of adverse side effects.    For this patient - discussed fluid management, avoiding bladder irritants, referral to pelvic floor physical therapist.

## 2023-04-06 DIAGNOSIS — M12.811 ROTATOR CUFF ARTHROPATHY, RIGHT: ICD-10-CM

## 2023-04-06 DIAGNOSIS — M19.011 LOCALIZED OSTEOARTHRITIS OF RIGHT SHOULDER: ICD-10-CM

## 2023-04-06 DIAGNOSIS — S46.011A TRAUMATIC COMPLETE TEAR OF RIGHT ROTATOR CUFF, INITIAL ENCOUNTER: ICD-10-CM

## 2023-04-06 DIAGNOSIS — M19.012 ARTHRITIS OF LEFT SHOULDER REGION: Primary | ICD-10-CM

## 2023-04-11 ENCOUNTER — HOSPITAL ENCOUNTER (EMERGENCY)
Facility: HOSPITAL | Age: 88
Discharge: HOME | End: 2023-04-11
Attending: STUDENT IN AN ORGANIZED HEALTH CARE EDUCATION/TRAINING PROGRAM
Payer: MEDICARE

## 2023-04-11 VITALS
TEMPERATURE: 98 F | SYSTOLIC BLOOD PRESSURE: 155 MMHG | HEART RATE: 99 BPM | DIASTOLIC BLOOD PRESSURE: 70 MMHG | RESPIRATION RATE: 18 BRPM | WEIGHT: 143 LBS | BODY MASS INDEX: 24.55 KG/M2 | OXYGEN SATURATION: 99 %

## 2023-04-11 DIAGNOSIS — G25.5 MUSCLE, JERKY MOVEMENTS (UNCONTROLLED): Primary | ICD-10-CM

## 2023-04-11 DIAGNOSIS — R25.1 TREMOR: ICD-10-CM

## 2023-04-11 DIAGNOSIS — R53.1 GENERALIZED WEAKNESS: ICD-10-CM

## 2023-04-11 LAB
ALBUMIN SERPL BCP-MCNC: 3.5 G/DL (ref 3.5–5.2)
ALP SERPL-CCNC: 142 U/L (ref 55–135)
ALT SERPL W/O P-5'-P-CCNC: 16 U/L (ref 10–44)
ANION GAP SERPL CALC-SCNC: 12 MMOL/L (ref 8–16)
AST SERPL-CCNC: 23 U/L (ref 10–40)
BASOPHILS # BLD AUTO: 0.05 K/UL (ref 0–0.2)
BASOPHILS NFR BLD: 1 % (ref 0–1.9)
BILIRUB SERPL-MCNC: 0.2 MG/DL (ref 0.1–1)
BILIRUB UR QL STRIP: NEGATIVE
BUN SERPL-MCNC: 41 MG/DL (ref 8–23)
CALCIUM SERPL-MCNC: 9 MG/DL (ref 8.7–10.5)
CHLORIDE SERPL-SCNC: 100 MMOL/L (ref 95–110)
CLARITY UR: CLEAR
CO2 SERPL-SCNC: 23 MMOL/L (ref 23–29)
COLOR UR: YELLOW
CREAT SERPL-MCNC: 1.6 MG/DL (ref 0.5–1.4)
DIFFERENTIAL METHOD: ABNORMAL
EOSINOPHIL # BLD AUTO: 0.1 K/UL (ref 0–0.5)
EOSINOPHIL NFR BLD: 1.1 % (ref 0–8)
ERYTHROCYTE [DISTWIDTH] IN BLOOD BY AUTOMATED COUNT: 11.9 % (ref 11.5–14.5)
EST. GFR  (NO RACE VARIABLE): 31 ML/MIN/1.73 M^2
GLUCOSE SERPL-MCNC: 102 MG/DL (ref 70–110)
GLUCOSE UR QL STRIP: NEGATIVE
HCT VFR BLD AUTO: 33.9 % (ref 37–48.5)
HGB BLD-MCNC: 11.3 G/DL (ref 12–16)
HGB UR QL STRIP: NEGATIVE
IMM GRANULOCYTES # BLD AUTO: 0.02 K/UL (ref 0–0.04)
IMM GRANULOCYTES NFR BLD AUTO: 0.4 % (ref 0–0.5)
KETONES UR QL STRIP: NEGATIVE
LEUKOCYTE ESTERASE UR QL STRIP: ABNORMAL
LYMPHOCYTES # BLD AUTO: 1 K/UL (ref 1–4.8)
LYMPHOCYTES NFR BLD: 18.3 % (ref 18–48)
MAGNESIUM SERPL-MCNC: 2.2 MG/DL (ref 1.6–2.6)
MCH RBC QN AUTO: 31.2 PG (ref 27–31)
MCHC RBC AUTO-ENTMCNC: 33.3 G/DL (ref 32–36)
MCV RBC AUTO: 94 FL (ref 82–98)
MICROSCOPIC COMMENT: NORMAL
MONOCYTES # BLD AUTO: 0.6 K/UL (ref 0.3–1)
MONOCYTES NFR BLD: 10.5 % (ref 4–15)
NEUTROPHILS # BLD AUTO: 3.6 K/UL (ref 1.8–7.7)
NEUTROPHILS NFR BLD: 68.7 % (ref 38–73)
NITRITE UR QL STRIP: NEGATIVE
NRBC BLD-RTO: 0 /100 WBC
PH UR STRIP: 5 [PH] (ref 5–8)
PLATELET # BLD AUTO: 355 K/UL (ref 150–450)
PMV BLD AUTO: 9.8 FL (ref 9.2–12.9)
POTASSIUM SERPL-SCNC: 4.9 MMOL/L (ref 3.5–5.1)
PROT SERPL-MCNC: 6.8 G/DL (ref 6–8.4)
PROT UR QL STRIP: NEGATIVE
RBC # BLD AUTO: 3.62 M/UL (ref 4–5.4)
SODIUM SERPL-SCNC: 135 MMOL/L (ref 136–145)
SP GR UR STRIP: 1.01 (ref 1–1.03)
URN SPEC COLLECT METH UR: ABNORMAL
UROBILINOGEN UR STRIP-ACNC: NEGATIVE EU/DL
WBC # BLD AUTO: 5.26 K/UL (ref 3.9–12.7)
WBC #/AREA URNS HPF: 0 /HPF (ref 0–5)

## 2023-04-11 PROCEDURE — 81000 URINALYSIS NONAUTO W/SCOPE: CPT | Performed by: PHYSICIAN ASSISTANT

## 2023-04-11 PROCEDURE — 83735 ASSAY OF MAGNESIUM: CPT | Performed by: PHYSICIAN ASSISTANT

## 2023-04-11 PROCEDURE — 93005 ELECTROCARDIOGRAM TRACING: CPT

## 2023-04-11 PROCEDURE — 99285 EMERGENCY DEPT VISIT HI MDM: CPT | Mod: 25

## 2023-04-11 PROCEDURE — 93010 ELECTROCARDIOGRAM REPORT: CPT | Mod: ,,, | Performed by: INTERNAL MEDICINE

## 2023-04-11 PROCEDURE — 93010 EKG 12-LEAD: ICD-10-PCS | Mod: ,,, | Performed by: INTERNAL MEDICINE

## 2023-04-11 PROCEDURE — 80053 COMPREHEN METABOLIC PANEL: CPT | Performed by: PHYSICIAN ASSISTANT

## 2023-04-11 PROCEDURE — 85025 COMPLETE CBC W/AUTO DIFF WBC: CPT | Performed by: PHYSICIAN ASSISTANT

## 2023-04-11 NOTE — FIRST PROVIDER EVALUATION
Emergency Department TeleTriage Encounter Note      CHIEF COMPLAINT    Chief Complaint   Patient presents with    Weakness     Has been having whole body twitching x3 days, worse at night. Pt takes Magnesium. Pt ambulates with Rolator.       VITAL SIGNS   Initial Vitals [04/11/23 1713]   BP Pulse Resp Temp SpO2   (!) 162/73 65 18 97.5 °F (36.4 °C) 99 %      MAP       --            ALLERGIES    Review of patient's allergies indicates:   Allergen Reactions    Cephalexin     Codeine     Meperidine      Other reaction(s): delerium, hallucination  Delerium  Delerium  Delerium      Penicillins     Tazarotene      Other reaction(s): rash, Unknown       PROVIDER TRIAGE NOTE  Patient presents with 2-3 day history of twitching and tremors. Has been taking magnesium and calcium supplements. No other new medications. Also generalized weakness and pasty stool       ORDERS  Labs Reviewed - No data to display    ED Orders (720h ago, onward)      None              Virtual Visit Note: The provider triage portion of this emergency department evaluation and documentation was performed via [employer], a HIPAA-compliant telemedicine application, in concert with a tele-presenter in the room. A face to face patient evaluation with one of my colleagues will occur once the patient is placed in an emergency department room.      DISCLAIMER: This note was prepared with [employer] voice recognition transcription software. Garbled syntax, mangled pronouns, and other bizarre constructions may be attributed to that software system.

## 2023-04-12 NOTE — DISCHARGE INSTRUCTIONS

## 2023-04-12 NOTE — ED PROVIDER NOTES
Encounter Date: 4/11/2023       History     Chief Complaint   Patient presents with    Weakness     Has been having whole body twitching x3 days, worse at night. Pt takes Magnesium. Pt ambulates with Rolator.     88-year-old female who presents with 3 days of uncontrolled rhythmic twitching and jerking to the upper extremities and sometimes the entire body.  Never happened before and no history of epilepsy.  Patient says that this is happening throughout the day and some mostly when she is sleeping which is why his most annoying for her.  No new prescription medications.  Patient has been taking magnesium and calcium a supplements at night however has been taking these for multiple weeks.  Denies taking more than is supposed to.  She denies any numbness, paresthesias, nausea vomiting, headache.  No trauma.  No history of psychiatric disorders using lithium or antipsychotics.    Review of patient's allergies indicates:   Allergen Reactions    Cephalexin     Codeine     Meperidine      Other reaction(s): delerium, hallucination  Delerium  Delerium  Delerium      Penicillins     Tazarotene      Other reaction(s): rash, Unknown     Past Medical History:   Diagnosis Date    Allergy     Anemia, unspecified     Arthritis     CKD (chronic kidney disease) stage 4, GFR 15-29 ml/min     COPD (chronic obstructive pulmonary disease)     Edema     HTN (hypertension)     Hypocalcemia     Hyponatremia     Osteoarthritis      Past Surgical History:   Procedure Laterality Date    BREAST CYST EXCISION      CAUDAL EPIDURAL STEROID INJECTION N/A 2/2/2023    Procedure: Injection-steroid-epidural-caudal;  Surgeon: Angel Sparrow MD;  Location: [facility];  Service: Pain Management;  Laterality: N/A;    FOOT SURGERY       Family History   Problem Relation Age of Onset    Cancer Mother     No Known Problems Father      Social History     Tobacco Use    Smoking status: Former    Smokeless tobacco: Never   Substance Use Topics     Alcohol use: Not Currently    Drug use: Never     Review of Systems   All other systems reviewed and are negative.    Physical Exam     Initial Vitals [04/11/23 1713]   BP Pulse Resp Temp SpO2   (!) 162/73 65 18 97.5 °F (36.4 °C) 99 %      MAP       --         Physical Exam    Nursing note and vitals reviewed.  Constitutional: She appears well-developed and well-nourished. She is not diaphoretic. No distress.   Phonating well, spontaneously protecting airway.   HENT:   Head: Normocephalic and atraumatic.   Eyes: EOM are normal. Pupils are equal, round, and reactive to light.   Neck: Neck supple. No JVD present.   Normal range of motion.  Cardiovascular:  Normal rate, regular rhythm, normal heart sounds and intact distal pulses.     Exam reveals no gallop and no friction rub.       No murmur heard.  Pulmonary/Chest: Breath sounds normal. No stridor. No respiratory distress. She has no wheezes. She has no rhonchi. She has no rales. She exhibits no tenderness.   Abdominal: Abdomen is soft. She exhibits no mass. There is no abdominal tenderness. There is no rebound and no guarding.   Musculoskeletal:         General: No edema. Normal range of motion.      Cervical back: Normal range of motion and neck supple.     Neurological: She is alert and oriented to person, place, and time. She has normal strength and normal reflexes. She is not disoriented. She displays no atrophy and no tremor. No cranial nerve deficit or sensory deficit. She exhibits normal muscle tone. She displays a negative Romberg sign. She displays no seizure activity. Coordination and gait normal. GCS eye subscore is 4. GCS verbal subscore is 5. GCS motor subscore is 6.   Negative pronator drift.  Negative dysmetria.  Negative heel to shin.  No ataxic gait.  No facial droop.    Intermittent bilateral shoulder shrugging   Skin: Skin is warm and dry. Capillary refill takes less than 2 seconds.   Psychiatric: She has a normal mood and affect. Thought content  normal.       ED Course   Procedures  Labs Reviewed   CBC W/ AUTO DIFFERENTIAL - Abnormal; Notable for the following components:       Result Value    RBC 3.62 (*)     Hemoglobin 11.3 (*)     Hematocrit 33.9 (*)     MCH 31.2 (*)     All other components within normal limits   COMPREHENSIVE METABOLIC PANEL - Abnormal; Notable for the following components:    Sodium 135 (*)     BUN 41 (*)     Creatinine 1.6 (*)     Alkaline Phosphatase 142 (*)     eGFR 31 (*)     All other components within normal limits   URINALYSIS, REFLEX TO URINE CULTURE - Abnormal; Notable for the following components:    Leukocytes, UA Trace (*)     All other components within normal limits    Narrative:     Specimen Source->Urine   MAGNESIUM   URINALYSIS MICROSCOPIC    Narrative:     Specimen Source->Urine          Imaging Results              CT Head Without Contrast (In process)                      MRI Brain Without Contrast (Final result)  Result time 04/11/23 20:21:24      Final result by Sukh Arnold MD (04/11/23 20:21:24)                   Impression:      Involutional and chronic small vessel type changes.    No acute abnormality      Electronically signed by: Sukh Arnold  Date:    04/11/2023  Time:    20:21               Narrative:    EXAMINATION:  MRI BRAIN WITHOUT CONTRAST    CLINICAL HISTORY:  uncontrolled tremor;.    TECHNIQUE:  Multiplanar multisequence MR imaging of the brain was performed without contrast.    COMPARISON:  CT brain, 04/25/2007    FINDINGS:  Intracranial compartment:    Ventricles and sulci are normal in size for age without evidence of hydrocephalus. No extra-axial blood or fluid collections.    The brain parenchyma appears stable with mild involutional changes now more apparent with white matter gliotic signal changes throughout the deep and subcortical white matter.  A few small areas of decreased signal on SWI sequences but without significant involvement vasculitis type.  No restricted diffusion to  suggest acute or subacute infarct or inflammation..  No mass lesion, acute hemorrhage, edema or acute infarct.    Normal vascular flow voids are preserved.    Skull/extracranial contents (limited evaluation): Bone marrow signal intensity is normal.  Mild mastoid effusions.                                       Medications - No data to display  Medical Decision Making:   Initial Assessment:   Hemodynamically stable. Afebrile. Phonating and protecting the airway spontaneously. No clinical evidence for cardiovascular instability or impending airway compromise. Examination as above.  Will pursue laboratory studies and neuro imaging.           ED Course as of 04/11/23 2100   Tue Apr 11, 2023 2034 MRI reviewed.  [BG]   2036 Twelve lead EKG per my independent interpretation reveals sinus bradycardia at a rate of 54.  Normal axis, normal intervals.  There are no regional ST segment elevation. [BG]   2058 The patient was reassessed and on subsequent re-evaluation, they were subjectively feeling better. They were resting comfortably and in no acute distress. I discussed the laboratory and diagnostic findings with the patient. Education was provided and all questions were answered. Will refer to neurology. Offered low dose anti-spasmodic medications to try but pt is worried about falling. I completely agree with her and will forgo these for now.    As discussed, they were recommended to follow up with their primary care physician within the next few days and to return to the emergency department sooner for any new or worsening. They acknowledged and verbalized agreement to the treatment plan. The patient was discharged home in stable condition.     DISCLAIMER: This note was prepared with [employer] voice recognition transcription software. Garbled syntax, mangled pronouns, and other bizarre constructions may be attributed to that software system.     [BG]      ED Course User Index  [BG] Santos Parker MD                  Clinical Impression:   Final diagnoses:  [R53.1] Generalized weakness  [R25.1] Tremor  [G25.5] Muscle, jerky movements (uncontrolled) (Primary)        ED Disposition Condition    Discharge Stable          ED Prescriptions    None       Follow-up Information       Follow up With Specialties Details Why Contact Info    Charles Peacock MD Internal Medicine Go to  As needed [address]  [address]  [facility] [address]  [phone]               Santos Parker MD  04/11/23 2100

## 2023-04-13 PROBLEM — G25.5 ACUTE CHOREA: Status: ACTIVE | Noted: 2023-04-13

## 2023-05-06 ENCOUNTER — HOSPITAL ENCOUNTER (EMERGENCY)
Facility: HOSPITAL | Age: 88
Discharge: HOME | End: 2023-05-06
Attending: STUDENT IN AN ORGANIZED HEALTH CARE EDUCATION/TRAINING PROGRAM
Payer: MEDICARE

## 2023-05-06 VITALS
HEART RATE: 70 BPM | OXYGEN SATURATION: 96 % | BODY MASS INDEX: 25.23 KG/M2 | DIASTOLIC BLOOD PRESSURE: 73 MMHG | RESPIRATION RATE: 17 BRPM | TEMPERATURE: 98 F | WEIGHT: 147 LBS | SYSTOLIC BLOOD PRESSURE: 172 MMHG

## 2023-05-06 DIAGNOSIS — L03.90 CELLULITIS: ICD-10-CM

## 2023-05-06 PROCEDURE — 99283 EMERGENCY DEPT VISIT LOW MDM: CPT

## 2023-05-06 PROCEDURE — 25000003 PHARM REV CODE 250: Performed by: STUDENT IN AN ORGANIZED HEALTH CARE EDUCATION/TRAINING PROGRAM

## 2023-05-06 RX ORDER — SULFAMETHOXAZOLE AND TRIMETHOPRIM 800; 160 MG/1; MG/1
1 TABLET ORAL
Status: COMPLETED | OUTPATIENT
Start: 2023-05-06 | End: 2023-05-06

## 2023-05-06 RX ORDER — SULFAMETHOXAZOLE AND TRIMETHOPRIM 800; 160 MG/1; MG/1
1 TABLET ORAL 2 TIMES DAILY
Qty: 14 TABLET | Refills: 0 | Status: ON HOLD | OUTPATIENT
Start: 2023-05-06 | End: 2023-05-17 | Stop reason: HOSPADM

## 2023-05-06 RX ORDER — ACETAMINOPHEN 325 MG/1
650 TABLET ORAL
Status: COMPLETED | OUTPATIENT
Start: 2023-05-06 | End: 2023-05-06

## 2023-05-06 RX ORDER — SULFAMETHOXAZOLE AND TRIMETHOPRIM 800; 160 MG/1; MG/1
2 TABLET ORAL
Status: DISCONTINUED | OUTPATIENT
Start: 2023-05-06 | End: 2023-05-06

## 2023-05-06 RX ADMIN — ACETAMINOPHEN 650 MG: 325 TABLET ORAL at 02:05

## 2023-05-06 RX ADMIN — SULFAMETHOXAZOLE AND TRIMETHOPRIM 1 TABLET: 800; 160 TABLET ORAL at 02:05

## 2023-05-06 NOTE — ED PROVIDER NOTES
Encounter Date: 5/6/2023       History     Chief Complaint   Patient presents with    Wound Check     Patient brought in by daughter. Patient states she acquired a wound to right lower leg after it was hit by  door. Is being seen by wound care once a week. Wound care nurse felt wound might be infected. Patient complaining of increased pain to area. Denies fever.     88 year old female presents with concern for a R leg wound infection. She has been followed by wound care weekly and she was sent in for increased redness and pain. No fevers, nausea or vomiting. She says that she typically has pain to that area but it is worse. No other complaints.     Review of patient's allergies indicates:   Allergen Reactions    Cephalexin     Codeine     Meperidine      Other reaction(s): delerium, hallucination  Delerium  Delerium  Delerium      Penicillins     Tazarotene      Other reaction(s): rash, Unknown     Past Medical History:   Diagnosis Date    Allergy     Anemia, unspecified     Arthritis     CKD (chronic kidney disease) stage 4, GFR 15-29 ml/min     COPD (chronic obstructive pulmonary disease)     Edema     HTN (hypertension)     Hypocalcemia     Hyponatremia     Osteoarthritis      Past Surgical History:   Procedure Laterality Date    BREAST CYST EXCISION      CAUDAL EPIDURAL STEROID INJECTION N/A 2/2/2023    Procedure: Injection-steroid-epidural-caudal;  Surgeon: Angel Sparrow MD;  Location: [facility];  Service: Pain Management;  Laterality: N/A;    FOOT SURGERY       Family History   Problem Relation Age of Onset    Cancer Mother     No Known Problems Father      Social History     Tobacco Use    Smoking status: Former    Smokeless tobacco: Never   Substance Use Topics    Alcohol use: Not Currently    Drug use: Never     Review of Systems   All other systems reviewed and are negative.    Physical Exam     Initial Vitals [05/06/23 0156]   BP Pulse Resp Temp SpO2   (!) 188/80 67 17 98.3 °F (36.8  °C) 95 %      MAP       --         Physical Exam    Nursing note and vitals reviewed.  Constitutional: She appears well-developed and well-nourished. She is not diaphoretic. No distress.   HENT:   Head: Normocephalic and atraumatic.   Eyes: EOM are normal. Pupils are equal, round, and reactive to light.   Neck: No tracheal deviation present.   Normal range of motion.  Cardiovascular:  Normal rate, regular rhythm, normal heart sounds and intact distal pulses.     Exam reveals no gallop and no friction rub.       No murmur heard.  Pulmonary/Chest: Breath sounds normal. No stridor. No respiratory distress. She has no wheezes. She has no rhonchi. She has no rales.   Abdominal: Abdomen is soft. Bowel sounds are normal. She exhibits no distension and no mass. There is no abdominal tenderness. There is no rebound and no guarding.   Musculoskeletal:         General: No edema. Normal range of motion.      Cervical back: Normal range of motion.     Neurological: She is alert and oriented to person, place, and time. GCS score is 15. GCS eye subscore is 4. GCS verbal subscore is 5. GCS motor subscore is 6.   Skin: Skin is warm and dry. Capillary refill takes less than 2 seconds.   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 There is surrounding erythema that is tender to the touch. No induration. No purulent drainage. No bullae, anesthesia, or tenderness beyond the margins of erythema. Neurovascularly intact distally.    Psychiatric: She has a normal mood and affect. Thought content normal.       ED Course   Procedures  Labs Reviewed - No data to display       Imaging Results              X-Ray Tibia Fibula 2 View Right (Final result)  Result time 05/06/23 04:06:57      Final result by Pam Sterling MD (05/06/23 04:06:57)                   Impression:      Please see above.      Electronically signed by: Pam Sterling MD  Date:    05/06/2023  Time:    04:06               Narrative:    EXAMINATION:  XR TIBIA  FIBULA 2 VIEW RIGHT    CLINICAL HISTORY:  Cellulitis, unspecified    TECHNIQUE:  AP and lateral views of the right tibia and fibula were performed.    COMPARISON:  None.    FINDINGS:  There is diffuse osteopenia which limits assessment.  Visualized osseous and articular structures appear intact noting degenerative change.  Alignment appears to be within normal limits.  There are vascular and soft tissue calcifications present.  No definite soft tissue gas is definitively visualized.  If there is clinical correlation for soft tissue infection/osteomyelitis further assessment with MRI is advised if there are no clinical contraindications.                                       Medications   acetaminophen tablet 650 mg (650 mg Oral Given 5/6/23 0240)   sulfamethoxazole-trimethoprim 800-160mg per tablet 1 tablet (1 tablet Oral Given 5/6/23 0240)     Medical Decision Making:   Initial Assessment:   Hemodynamically stable. Afebrile. Phonating and protecting the airway spontaneously. No clinical evidence for cardiovascular instability or impending airway compromise. Examination as above. Non-toxic in appearance. Review of the microbiology data - no prior wound cultures. Will cover with bactrim as she does have a PCN allergy. Will obtain xray to evaluate for osteomyelitis. Will provide analgesia and abx here.           ED Course as of 05/06/23 0413   Sat May 06, 2023   0411 Xray reviewed. No overt concern for osteomyelitis at this time. Will refer to PCP for further management. Pt well connected with wound care.     The patient was reassessed and on subsequent re-evaluation, they were subjectively feeling better. They were resting comfortably and in no acute distress. I discussed the laboratory and diagnostic findings with the patient. Education was provided and all questions were answered. As discussed, they were recommended to follow up with their primary care physician within the next few days and to return to the  emergency department sooner for any new or worsening. They acknowledged and verbalized agreement to the treatment plan. The patient was discharged home in stable condition.     DISCLAIMER: This note was prepared with [employer] voice recognition transcription software. Garbled syntax, mangled pronouns, and other bizarre constructions may be attributed to that software system.     [BG]      ED Course User Index  [BG] Santos Parker MD                 Clinical Impression:   Final diagnoses:  [L03.90] Cellulitis        ED Disposition Condition    Discharge Stable          ED Prescriptions       Medication Sig Dispense Start Date End Date Auth. Provider    sulfamethoxazole-trimethoprim 800-160mg (BACTRIM DS) 800-160 mg Tab Take 1 tablet by mouth 2 (two) times daily. for 7 days 14 tablet 5/6/2023 5/13/2023 Santos Parker MD          Follow-up Information       Follow up With Specialties Details Why Contact Info    Charles Peacock MD Internal Medicine Go to  As needed [address]  [address]  [facility] [address]  [phone]               Santos Parker MD  05/06/23 0411

## 2023-05-12 ENCOUNTER — HOSPITAL ENCOUNTER (INPATIENT)
Facility: HOSPITAL | Age: 88
LOS: 5 days | Discharge: HOME HEALTH | DRG: 605 | End: 2023-05-17
Attending: EMERGENCY MEDICINE | Admitting: INTERNAL MEDICINE
Payer: MEDICARE

## 2023-05-12 DIAGNOSIS — I10 ESSENTIAL HYPERTENSION: ICD-10-CM

## 2023-05-12 DIAGNOSIS — J44.9 CHRONIC OBSTRUCTIVE PULMONARY DISEASE, UNSPECIFIED COPD TYPE: ICD-10-CM

## 2023-05-12 DIAGNOSIS — S81.801A NON-HEALING WOUND OF RIGHT LOWER EXTREMITY: Primary | ICD-10-CM

## 2023-05-12 DIAGNOSIS — N18.32 STAGE 3B CHRONIC KIDNEY DISEASE: ICD-10-CM

## 2023-05-12 DIAGNOSIS — L03.115 CELLULITIS OF RIGHT LOWER EXTREMITY: ICD-10-CM

## 2023-05-12 DIAGNOSIS — S81.801A WOUND OF RIGHT LEG: ICD-10-CM

## 2023-05-12 DIAGNOSIS — M54.16 LUMBAR RADICULOPATHY: ICD-10-CM

## 2023-05-12 DIAGNOSIS — S81.801A LEG WOUND, RIGHT: ICD-10-CM

## 2023-05-12 PROBLEM — E87.1 HYPONATREMIA: Status: ACTIVE | Noted: 2023-05-12

## 2023-05-12 LAB
ALBUMIN SERPL BCP-MCNC: 3.3 G/DL (ref 3.5–5.2)
ALP SERPL-CCNC: 132 U/L (ref 55–135)
ALT SERPL W/O P-5'-P-CCNC: 22 U/L (ref 10–44)
ANION GAP SERPL CALC-SCNC: 9 MMOL/L (ref 8–16)
AST SERPL-CCNC: 31 U/L (ref 10–40)
BASOPHILS # BLD AUTO: 0.06 K/UL (ref 0–0.2)
BASOPHILS NFR BLD: 0.9 % (ref 0–1.9)
BILIRUB SERPL-MCNC: 0.2 MG/DL (ref 0.1–1)
BUN SERPL-MCNC: 37 MG/DL (ref 8–23)
CALCIUM SERPL-MCNC: 8.9 MG/DL (ref 8.7–10.5)
CHLORIDE SERPL-SCNC: 98 MMOL/L (ref 95–110)
CO2 SERPL-SCNC: 22 MMOL/L (ref 23–29)
CREAT SERPL-MCNC: 1.9 MG/DL (ref 0.5–1.4)
CRP SERPL-MCNC: 7.8 MG/L (ref 0–8.2)
DIFFERENTIAL METHOD: ABNORMAL
EOSINOPHIL # BLD AUTO: 0 K/UL (ref 0–0.5)
EOSINOPHIL NFR BLD: 0.5 % (ref 0–8)
ERYTHROCYTE [DISTWIDTH] IN BLOOD BY AUTOMATED COUNT: 11.8 % (ref 11.5–14.5)
EST. GFR  (NO RACE VARIABLE): 25 ML/MIN/1.73 M^2
GLUCOSE SERPL-MCNC: 103 MG/DL (ref 70–110)
HCT VFR BLD AUTO: 28.7 % (ref 37–48.5)
HGB BLD-MCNC: 9.6 G/DL (ref 12–16)
IMM GRANULOCYTES # BLD AUTO: 0.02 K/UL (ref 0–0.04)
IMM GRANULOCYTES NFR BLD AUTO: 0.3 % (ref 0–0.5)
LYMPHOCYTES # BLD AUTO: 0.9 K/UL (ref 1–4.8)
LYMPHOCYTES NFR BLD: 13.9 % (ref 18–48)
MCH RBC QN AUTO: 30.7 PG (ref 27–31)
MCHC RBC AUTO-ENTMCNC: 33.4 G/DL (ref 32–36)
MCV RBC AUTO: 92 FL (ref 82–98)
MONOCYTES # BLD AUTO: 0.5 K/UL (ref 0.3–1)
MONOCYTES NFR BLD: 7.8 % (ref 4–15)
NEUTROPHILS # BLD AUTO: 4.9 K/UL (ref 1.8–7.7)
NEUTROPHILS NFR BLD: 76.6 % (ref 38–73)
NRBC BLD-RTO: 0 /100 WBC
PLATELET # BLD AUTO: 346 K/UL (ref 150–450)
PMV BLD AUTO: 9.8 FL (ref 9.2–12.9)
POTASSIUM SERPL-SCNC: 4.9 MMOL/L (ref 3.5–5.1)
PROT SERPL-MCNC: 6.7 G/DL (ref 6–8.4)
RBC # BLD AUTO: 3.13 M/UL (ref 4–5.4)
SODIUM SERPL-SCNC: 129 MMOL/L (ref 136–145)
WBC # BLD AUTO: 6.41 K/UL (ref 3.9–12.7)

## 2023-05-12 PROCEDURE — 80053 COMPREHEN METABOLIC PANEL: CPT | Performed by: EMERGENCY MEDICINE

## 2023-05-12 PROCEDURE — 86140 C-REACTIVE PROTEIN: CPT | Performed by: STUDENT IN AN ORGANIZED HEALTH CARE EDUCATION/TRAINING PROGRAM

## 2023-05-12 PROCEDURE — 25000003 PHARM REV CODE 250: Performed by: STUDENT IN AN ORGANIZED HEALTH CARE EDUCATION/TRAINING PROGRAM

## 2023-05-12 PROCEDURE — 85652 RBC SED RATE AUTOMATED: CPT | Performed by: STUDENT IN AN ORGANIZED HEALTH CARE EDUCATION/TRAINING PROGRAM

## 2023-05-12 PROCEDURE — 99285 EMERGENCY DEPT VISIT HI MDM: CPT | Mod: 25

## 2023-05-12 PROCEDURE — 87070 CULTURE OTHR SPECIMN AEROBIC: CPT | Performed by: EMERGENCY MEDICINE

## 2023-05-12 PROCEDURE — 11000001 HC ACUTE MED/SURG PRIVATE ROOM

## 2023-05-12 PROCEDURE — 85025 COMPLETE CBC W/AUTO DIFF WBC: CPT | Performed by: EMERGENCY MEDICINE

## 2023-05-12 PROCEDURE — 63600175 PHARM REV CODE 636 W HCPCS: Performed by: STUDENT IN AN ORGANIZED HEALTH CARE EDUCATION/TRAINING PROGRAM

## 2023-05-12 RX ORDER — CHOLECALCIFEROL (VITAMIN D3) 10 MCG
1200 TABLET ORAL DAILY
Status: DISCONTINUED | OUTPATIENT
Start: 2023-05-13 | End: 2023-05-17 | Stop reason: HOSPADM

## 2023-05-12 RX ORDER — NALOXONE HCL 0.4 MG/ML
0.02 VIAL (ML) INJECTION
Status: DISCONTINUED | OUTPATIENT
Start: 2023-05-12 | End: 2023-05-17 | Stop reason: HOSPADM

## 2023-05-12 RX ORDER — ENOXAPARIN SODIUM 100 MG/ML
30 INJECTION SUBCUTANEOUS EVERY 24 HOURS
Status: DISCONTINUED | OUTPATIENT
Start: 2023-05-12 | End: 2023-05-17 | Stop reason: HOSPADM

## 2023-05-12 RX ORDER — FLUTICASONE FUROATE AND VILANTEROL 100; 25 UG/1; UG/1
1 POWDER RESPIRATORY (INHALATION) DAILY
Status: DISCONTINUED | OUTPATIENT
Start: 2023-05-13 | End: 2023-05-17 | Stop reason: HOSPADM

## 2023-05-12 RX ORDER — AMLODIPINE BESYLATE 5 MG/1
5 TABLET ORAL DAILY
Status: DISCONTINUED | OUTPATIENT
Start: 2023-05-12 | End: 2023-05-17 | Stop reason: HOSPADM

## 2023-05-12 RX ORDER — POLYETHYLENE GLYCOL 3350 17 G/17G
17 POWDER, FOR SOLUTION ORAL DAILY
Status: DISCONTINUED | OUTPATIENT
Start: 2023-05-13 | End: 2023-05-17

## 2023-05-12 RX ORDER — TALC
6 POWDER (GRAM) TOPICAL NIGHTLY
Status: DISCONTINUED | OUTPATIENT
Start: 2023-05-12 | End: 2023-05-17 | Stop reason: HOSPADM

## 2023-05-12 RX ORDER — LOSARTAN POTASSIUM 50 MG/1
50 TABLET ORAL DAILY
Status: DISCONTINUED | OUTPATIENT
Start: 2023-05-12 | End: 2023-05-13

## 2023-05-12 RX ORDER — GLUCAGON 1 MG
1 KIT INJECTION
Status: DISCONTINUED | OUTPATIENT
Start: 2023-05-12 | End: 2023-05-17 | Stop reason: HOSPADM

## 2023-05-12 RX ORDER — CALCIUM CARBONATE 200(500)MG
500 TABLET,CHEWABLE ORAL DAILY
Status: DISCONTINUED | OUTPATIENT
Start: 2023-05-13 | End: 2023-05-13

## 2023-05-12 RX ORDER — DEXTROSE 40 %
15 GEL (GRAM) ORAL
Status: DISCONTINUED | OUTPATIENT
Start: 2023-05-12 | End: 2023-05-17 | Stop reason: HOSPADM

## 2023-05-12 RX ORDER — SODIUM CHLORIDE 9 MG/ML
INJECTION, SOLUTION INTRAVENOUS CONTINUOUS
Status: ACTIVE | OUTPATIENT
Start: 2023-05-12 | End: 2023-05-13

## 2023-05-12 RX ORDER — ACETAMINOPHEN 500 MG
500 TABLET ORAL EVERY 6 HOURS PRN
Status: DISCONTINUED | OUTPATIENT
Start: 2023-05-12 | End: 2023-05-17 | Stop reason: HOSPADM

## 2023-05-12 RX ORDER — PANTOPRAZOLE SODIUM 40 MG/1
40 TABLET, DELAYED RELEASE ORAL DAILY
Status: DISCONTINUED | OUTPATIENT
Start: 2023-05-13 | End: 2023-05-14

## 2023-05-12 RX ORDER — AMLODIPINE AND BENAZEPRIL HYDROCHLORIDE 5; 10 MG/1; MG/1
1 CAPSULE ORAL DAILY
Status: ON HOLD | COMMUNITY
End: 2023-06-22 | Stop reason: HOSPADM

## 2023-05-12 RX ORDER — LOSARTAN POTASSIUM 25 MG/1
25 TABLET ORAL DAILY
Status: DISCONTINUED | OUTPATIENT
Start: 2023-05-13 | End: 2023-05-12

## 2023-05-12 RX ORDER — SULFAMETHOXAZOLE AND TRIMETHOPRIM 800; 160 MG/1; MG/1
1 TABLET ORAL DAILY
Status: DISPENSED | OUTPATIENT
Start: 2023-05-12 | End: 2023-05-15

## 2023-05-12 RX ORDER — DEXTROSE 40 %
30 GEL (GRAM) ORAL
Status: DISCONTINUED | OUTPATIENT
Start: 2023-05-12 | End: 2023-05-17 | Stop reason: HOSPADM

## 2023-05-12 RX ORDER — AMLODIPINE BESYLATE 5 MG/1
5 TABLET ORAL DAILY
Status: DISCONTINUED | OUTPATIENT
Start: 2023-05-13 | End: 2023-05-12

## 2023-05-12 RX ORDER — SODIUM CHLORIDE 0.9 % (FLUSH) 0.9 %
10 SYRINGE (ML) INJECTION EVERY 12 HOURS PRN
Status: DISCONTINUED | OUTPATIENT
Start: 2023-05-12 | End: 2023-05-17 | Stop reason: HOSPADM

## 2023-05-12 RX ORDER — ALBUTEROL SULFATE 90 UG/1
2 AEROSOL, METERED RESPIRATORY (INHALATION) EVERY 6 HOURS PRN
COMMUNITY
End: 2023-12-11

## 2023-05-12 RX ORDER — ACETAMINOPHEN 500 MG
500 TABLET ORAL EVERY 6 HOURS PRN
COMMUNITY
End: 2023-12-12 | Stop reason: SDUPTHER

## 2023-05-12 RX ORDER — PREGABALIN 75 MG/1
75 CAPSULE ORAL 2 TIMES DAILY
Status: DISCONTINUED | OUTPATIENT
Start: 2023-05-12 | End: 2023-05-17 | Stop reason: HOSPADM

## 2023-05-12 RX ADMIN — ENOXAPARIN SODIUM 30 MG: 30 INJECTION SUBCUTANEOUS at 10:05

## 2023-05-12 RX ADMIN — SODIUM CHLORIDE: 0.9 INJECTION, SOLUTION INTRAVENOUS at 10:05

## 2023-05-12 RX ADMIN — AMLODIPINE BESYLATE 5 MG: 5 TABLET ORAL at 10:05

## 2023-05-12 RX ADMIN — ACETAMINOPHEN 500 MG: 500 TABLET ORAL at 09:05

## 2023-05-12 RX ADMIN — Medication 6 MG: at 09:05

## 2023-05-12 RX ADMIN — PREGABALIN 75 MG: 75 CAPSULE ORAL at 09:05

## 2023-05-12 RX ADMIN — LOSARTAN POTASSIUM 50 MG: 50 TABLET, FILM COATED ORAL at 10:05

## 2023-05-12 NOTE — ED PROVIDER NOTES
"Encounter Date: 5/12/2023       History     Chief Complaint   Patient presents with    Wound Infection     Pt referred to ed after having non healing wound to right lower leg, currently on bactrim, home health referred pt to ED for ultra sound and IV antibiotics       Patient is a 88 year old female who has a past medical history of anemia, chronic kidney disease stage 4, chronic obstructive pulmonary disease, edema, hypertension, hypocalcemia, hyponatremia, and osteoarthritis presents to the ED with complaint of wound infection. Patients daughter reports patient was referred to the ED by [facility] for a culture, ultrasound, and possible IV antibiotic admit. She states her mother has been undergoing wound care for a wound located to the right lower leg. Patient was last seen in the ED May 6th due to increased pain to the area. Patient was prescribed bactrim, but states it has not been working. Patient currently states she still gets "shooting pains" to the infected area. She endorses no fevers. 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 Not currently on any medications. However, patients daughter reports she is currently on her lasix.     - independent historian used         The history is provided by the patient and a caregiver.   Review of patient's allergies indicates:   Allergen Reactions    Cephalexin     Codeine     Meperidine      Other reaction(s): delerium, hallucination  Delerium  Delerium  Delerium      Penicillins     Tazarotene      Other reaction(s): rash, Unknown     Past Medical History:   Diagnosis Date    Allergy     Anemia, unspecified     Arthritis     CKD (chronic kidney disease) stage 4, GFR 15-29 ml/min     COPD (chronic obstructive pulmonary disease)     Edema     HTN (hypertension)     Hypocalcemia     Hyponatremia     Osteoarthritis      Past Surgical History:   Procedure Laterality Date    BREAST CYST EXCISION      CAUDAL EPIDURAL STEROID INJECTION N/A 2/2/2023    " Procedure: Injection-steroid-epidural-caudal;  Surgeon: Angel Sparrow MD;  Location: [facility];  Service: Pain Management;  Laterality: N/A;    FOOT SURGERY       Family History   Problem Relation Age of Onset    Cancer Mother     No Known Problems Father      Social History     Tobacco Use    Smoking status: Former    Smokeless tobacco: Never   Substance Use Topics    Alcohol use: Not Currently    Drug use: Never     Review of Systems   Constitutional:  Negative for fever.   HENT:  Negative for sore throat.    Respiratory:  Negative for shortness of breath.    Cardiovascular:  Negative for chest pain.   Gastrointestinal:  Negative for nausea.   Genitourinary:  Negative for dysuria.   Musculoskeletal:  Negative for back pain.   Skin:  Positive for wound (Right leg). Negative for rash.   Neurological:  Negative for weakness.   Hematological:  Does not bruise/bleed easily.     Physical Exam     Initial Vitals [05/12/23 1657]   BP Pulse Resp Temp SpO2   (!) 164/72 61 20 97.7 °F (36.5 °C) 96 %      MAP       --         Physical Exam    Nursing note and vitals reviewed.  Constitutional: She appears well-developed and well-nourished.   HENT:   Head: Normocephalic and atraumatic.   Nose: Nose normal.   Eyes: EOM are normal. Pupils are equal, round, and reactive to light.   Neck: Neck supple.   Normal range of motion.  Cardiovascular:  Normal rate, regular rhythm and normal heart sounds.           Pulmonary/Chest: Breath sounds normal.   Abdominal: Abdomen is soft. Bowel sounds are normal.   Musculoskeletal:         General: Normal range of motion.      Cervical back: Normal range of motion and neck supple.      Comments: Open wound noted to the RLE with surrounding cellulitic change.  It is approximately 8cm x 4cm in greatest dimension; no calf tenderness; the RLE is well-perfused.      Neurological: She is alert and oriented to person, place, and time. She has normal strength.   Skin: Skin is warm and dry.  Capillary refill takes less than 2 seconds.   Psychiatric: She has a normal mood and affect. Thought content normal.       ED Course   Procedures  Labs Reviewed   CBC W/ AUTO DIFFERENTIAL - Abnormal; Notable for the following components:       Result Value    RBC 3.13 (*)     Hemoglobin 9.6 (*)     Hematocrit 28.7 (*)     Lymph # 0.9 (*)     Gran % 76.6 (*)     Lymph % 13.9 (*)     All other components within normal limits   COMPREHENSIVE METABOLIC PANEL - Abnormal; Notable for the following components:    Sodium 129 (*)     CO2 22 (*)     BUN 37 (*)     Creatinine 1.9 (*)     Albumin 3.3 (*)     eGFR 25 (*)     All other components within normal limits   CULTURE, AEROBIC  (SPECIFY SOURCE)   C-REACTIVE PROTEIN   SEDIMENTATION RATE          Imaging Results              X-Ray Tibia Fibula 2 View Right (Final result)  Result time 05/12/23 19:14:02      Final result by Juan Shelton MD (05/12/23 19:14:02)                   Impression:      1. No acute displaced fracture or dislocation of the tibia or fibula.      Electronically signed by: Juan Shelton MD  Date:    05/12/2023  Time:    19:14               Narrative:    EXAMINATION:  XR TIBIA FIBULA 2 VIEW RIGHT    CLINICAL HISTORY:  Unspecified open wound, right lower leg, initial encounter    TECHNIQUE:  AP and lateral views of the right tibia and fibula were performed.    COMPARISON:  05/06/2023    FINDINGS:  Three views right tibia fibula.    There is osteopenia.  There are degenerative changes of the knee.  No acute displaced fracture or dislocation of the tibia or fibula.  The ankle mortise is intact noting degenerative changes of the ankle.  There is vascular calcification.  No large knee joint effusion.                                       US Lower Extremity Veins Right (Final result)  Result time 05/12/23 18:30:57      Final result by Juan Shelton MD (05/12/23 18:30:57)                   Impression:      No evidence of deep venous thrombosis in the  right lower extremity.      Electronically signed by: Juan Shelton MD  Date:    05/12/2023  Time:    18:30               Narrative:    EXAMINATION:  US LOWER EXTREMITY VEINS RIGHT    CLINICAL HISTORY:  Unspecified open wound, right lower leg, initial encounter    TECHNIQUE:  Duplex and color flow Doppler evaluation and graded compression of the right lower extremity veins was performed.    COMPARISON:  None    FINDINGS:  Duplex and color flow Doppler evaluation does not reveal any evidence of acute venous thrombosis in the common femoral, superficial femoral, greater saphenous, popliteal, peroneal, anterior tibial and posterior tibial veins of the right lower extremity.  There is no reflux to suggest valvular incompetence.                                       Medications   acetaminophen tablet 500 mg (has no administration in time range)   calcium carbonate 200 mg calcium (500 mg) chewable tablet 500 mg (has no administration in time range)   amLODIPine tablet 5 mg (has no administration in time range)   cholecalciferol (vitamin D3) capsule/tablet 1,200 Units (has no administration in time range)   losartan tablet 25 mg (has no administration in time range)   pantoprazole EC tablet 40 mg (has no administration in time range)   polyethylene glycol packet 17 g (has no administration in time range)   pregabalin capsule 75 mg (has no administration in time range)   fluticasone furoate-vilanteroL 100-25 mcg/dose diskus inhaler 1 puff (has no administration in time range)   sodium chloride 0.9% flush 10 mL (has no administration in time range)   naloxone 0.4 mg/mL injection 0.02 mg (has no administration in time range)   glucagon (human recombinant) injection 1 mg (has no administration in time range)   dextrose 10% bolus 125 mL 125 mL (has no administration in time range)   dextrose 10% bolus 250 mL 250 mL (has no administration in time range)   dextrose 40 % gel 15,000 mg (has no administration in time range)    dextrose 40 % gel 30,000 mg (has no administration in time range)   enoxaparin injection 30 mg (has no administration in time range)   0.9%  NaCl infusion (has no administration in time range)   melatonin tablet 6 mg (has no administration in time range)   sulfamethoxazole-trimethoprim 800-160mg per tablet 1 tablet (has no administration in time range)     Medical Decision Making:   History:   I obtained history from: someone other than patient.       <> Summary of History: Patients daughter reports wound care referred for possible ED admit for IV antibiotic, culture, and ultrasound of right lower leg.   Initial Assessment:   Patient is a 88 year old female who has a past medical history of anemia, chronic kidney disease stage 4, chronic obstructive pulmonary disease, edema, hypertension, hypocalcemia, hyponatremia, and osteoarthritis presents to the ED with complaint of chronic wound infection. Patients daughter reports patient was referred to the ED by [facility] for a culture, ultrasound, and possible IV antibiotic admit. She states her mother has been undergoing wound care for a chronic wound located to the right lower leg. She is reportedly on bactrim, but states it has not been working. No fever reported. 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 Not currently on any medications. However, patients daughter reports she is currently on her lasix.           Differential Diagnosis:   Differential Diagnosis includes, but is not limited to:  Cellulitis, abscess, necrotizing fasciitis, osteomyelitis, septic joint, MRSA, DVT, drug eruption, allergic/contact dermatitis, EM/SJS, viral exanthem, local trauma/contusion    Clinical Tests:   Lab Tests: Ordered and Reviewed  Radiological Study: Ordered and Reviewed  ED Management:  - will admit to [facility] for failure of OP treatment for RLE wound   - pt in agreement with plan for admission         Scribe Attestation:   Scribe #1: I performed the above  scribed service and the documentation accurately describes the services I performed. I attest to the accuracy of the note.                   Clinical Impression:         ED Disposition Condition    Admit           I, Raul Huitron,  personally performed the services described in this documentation. All medical record entries made by the scribe were at my direction and in my presence.  I have reviewed the chart and agree that the record reflects my personal performance and is accurate and complete. Raul Huitron M.D., Emergency Medicine Physician 8:39 PM05/12/2023      Raul Huitron MD  05/12/23 2039

## 2023-05-12 NOTE — ED NOTES
Pt arrive from home complains of wound infection. Pt referred to ed after having non healing wound to right lower leg, currently on bactrim. Pt was referred to ED by home [facility] wound care. Pt appears to be comfortable and in no distress.

## 2023-05-13 LAB
ALBUMIN SERPL BCP-MCNC: 3 G/DL (ref 3.5–5.2)
ALP SERPL-CCNC: 126 U/L (ref 55–135)
ALT SERPL W/O P-5'-P-CCNC: 17 U/L (ref 10–44)
ANION GAP SERPL CALC-SCNC: 7 MMOL/L (ref 8–16)
ANION GAP SERPL CALC-SCNC: 9 MMOL/L (ref 8–16)
AST SERPL-CCNC: 28 U/L (ref 10–40)
BASOPHILS # BLD AUTO: 0.04 K/UL (ref 0–0.2)
BASOPHILS NFR BLD: 0.9 % (ref 0–1.9)
BILIRUB SERPL-MCNC: 0.2 MG/DL (ref 0.1–1)
BUN SERPL-MCNC: 35 MG/DL (ref 8–23)
BUN SERPL-MCNC: 38 MG/DL (ref 8–23)
CALCIUM SERPL-MCNC: 9.1 MG/DL (ref 8.7–10.5)
CALCIUM SERPL-MCNC: 9.3 MG/DL (ref 8.7–10.5)
CHLORIDE SERPL-SCNC: 101 MMOL/L (ref 95–110)
CHLORIDE SERPL-SCNC: 101 MMOL/L (ref 95–110)
CO2 SERPL-SCNC: 21 MMOL/L (ref 23–29)
CO2 SERPL-SCNC: 23 MMOL/L (ref 23–29)
CREAT SERPL-MCNC: 1.6 MG/DL (ref 0.5–1.4)
CREAT SERPL-MCNC: 1.7 MG/DL (ref 0.5–1.4)
DIFFERENTIAL METHOD: ABNORMAL
EOSINOPHIL # BLD AUTO: 0.1 K/UL (ref 0–0.5)
EOSINOPHIL NFR BLD: 1.8 % (ref 0–8)
ERYTHROCYTE [DISTWIDTH] IN BLOOD BY AUTOMATED COUNT: 11.8 % (ref 11.5–14.5)
ERYTHROCYTE [SEDIMENTATION RATE] IN BLOOD BY PHOTOMETRIC METHOD: 37 MM/HR (ref 0–36)
EST. GFR  (NO RACE VARIABLE): 29 ML/MIN/1.73 M^2
EST. GFR  (NO RACE VARIABLE): 31 ML/MIN/1.73 M^2
GLUCOSE SERPL-MCNC: 83 MG/DL (ref 70–110)
GLUCOSE SERPL-MCNC: 86 MG/DL (ref 70–110)
HCT VFR BLD AUTO: 28.2 % (ref 37–48.5)
HGB BLD-MCNC: 9.4 G/DL (ref 12–16)
IMM GRANULOCYTES # BLD AUTO: 0.01 K/UL (ref 0–0.04)
IMM GRANULOCYTES NFR BLD AUTO: 0.2 % (ref 0–0.5)
LYMPHOCYTES # BLD AUTO: 1.1 K/UL (ref 1–4.8)
LYMPHOCYTES NFR BLD: 25 % (ref 18–48)
MAGNESIUM SERPL-MCNC: 1.9 MG/DL (ref 1.6–2.6)
MCH RBC QN AUTO: 30.9 PG (ref 27–31)
MCHC RBC AUTO-ENTMCNC: 33.3 G/DL (ref 32–36)
MCV RBC AUTO: 93 FL (ref 82–98)
MONOCYTES # BLD AUTO: 0.7 K/UL (ref 0.3–1)
MONOCYTES NFR BLD: 15.2 % (ref 4–15)
NEUTROPHILS # BLD AUTO: 2.6 K/UL (ref 1.8–7.7)
NEUTROPHILS NFR BLD: 56.9 % (ref 38–73)
NRBC BLD-RTO: 0 /100 WBC
PHOSPHATE SERPL-MCNC: 4.2 MG/DL (ref 2.7–4.5)
PLATELET # BLD AUTO: 322 K/UL (ref 150–450)
PMV BLD AUTO: 9.3 FL (ref 9.2–12.9)
POCT GLUCOSE: 134 MG/DL (ref 70–110)
POCT GLUCOSE: 172 MG/DL (ref 70–110)
POCT GLUCOSE: 79 MG/DL (ref 70–110)
POTASSIUM SERPL-SCNC: 4.4 MMOL/L (ref 3.5–5.1)
POTASSIUM SERPL-SCNC: 5.3 MMOL/L (ref 3.5–5.1)
PROT SERPL-MCNC: 6.1 G/DL (ref 6–8.4)
RBC # BLD AUTO: 3.04 M/UL (ref 4–5.4)
SODIUM SERPL-SCNC: 131 MMOL/L (ref 136–145)
SODIUM SERPL-SCNC: 131 MMOL/L (ref 136–145)
WBC # BLD AUTO: 4.48 K/UL (ref 3.9–12.7)

## 2023-05-13 PROCEDURE — 25000242 PHARM REV CODE 250 ALT 637 W/ HCPCS: Performed by: STUDENT IN AN ORGANIZED HEALTH CARE EDUCATION/TRAINING PROGRAM

## 2023-05-13 PROCEDURE — 25000242 PHARM REV CODE 250 ALT 637 W/ HCPCS

## 2023-05-13 PROCEDURE — 25000003 PHARM REV CODE 250: Performed by: STUDENT IN AN ORGANIZED HEALTH CARE EDUCATION/TRAINING PROGRAM

## 2023-05-13 PROCEDURE — 94640 AIRWAY INHALATION TREATMENT: CPT

## 2023-05-13 PROCEDURE — 63600175 PHARM REV CODE 636 W HCPCS: Performed by: STUDENT IN AN ORGANIZED HEALTH CARE EDUCATION/TRAINING PROGRAM

## 2023-05-13 PROCEDURE — 99900035 HC TECH TIME PER 15 MIN (STAT)

## 2023-05-13 PROCEDURE — 36415 COLL VENOUS BLD VENIPUNCTURE: CPT | Performed by: STUDENT IN AN ORGANIZED HEALTH CARE EDUCATION/TRAINING PROGRAM

## 2023-05-13 PROCEDURE — 83735 ASSAY OF MAGNESIUM: CPT | Performed by: STUDENT IN AN ORGANIZED HEALTH CARE EDUCATION/TRAINING PROGRAM

## 2023-05-13 PROCEDURE — 84100 ASSAY OF PHOSPHORUS: CPT | Performed by: STUDENT IN AN ORGANIZED HEALTH CARE EDUCATION/TRAINING PROGRAM

## 2023-05-13 PROCEDURE — 85025 COMPLETE CBC W/AUTO DIFF WBC: CPT | Performed by: STUDENT IN AN ORGANIZED HEALTH CARE EDUCATION/TRAINING PROGRAM

## 2023-05-13 PROCEDURE — 80048 BASIC METABOLIC PNL TOTAL CA: CPT | Mod: XB | Performed by: STUDENT IN AN ORGANIZED HEALTH CARE EDUCATION/TRAINING PROGRAM

## 2023-05-13 PROCEDURE — 80053 COMPREHEN METABOLIC PANEL: CPT | Performed by: STUDENT IN AN ORGANIZED HEALTH CARE EDUCATION/TRAINING PROGRAM

## 2023-05-13 PROCEDURE — 94761 N-INVAS EAR/PLS OXIMETRY MLT: CPT

## 2023-05-13 PROCEDURE — 11000001 HC ACUTE MED/SURG PRIVATE ROOM

## 2023-05-13 PROCEDURE — 25000003 PHARM REV CODE 250

## 2023-05-13 RX ORDER — IPRATROPIUM BROMIDE AND ALBUTEROL SULFATE 2.5; .5 MG/3ML; MG/3ML
3 SOLUTION RESPIRATORY (INHALATION)
Status: DISCONTINUED | OUTPATIENT
Start: 2023-05-13 | End: 2023-05-17 | Stop reason: HOSPADM

## 2023-05-13 RX ORDER — TRAMADOL HYDROCHLORIDE 50 MG/1
50 TABLET ORAL 2 TIMES DAILY PRN
Status: DISCONTINUED | OUTPATIENT
Start: 2023-05-13 | End: 2023-05-17 | Stop reason: HOSPADM

## 2023-05-13 RX ORDER — OMEPRAZOLE 40 MG/1
40 CAPSULE, DELAYED RELEASE ORAL
Status: DISCONTINUED | OUTPATIENT
Start: 2023-05-14 | End: 2023-05-17

## 2023-05-13 RX ORDER — FAMOTIDINE 20 MG/1
20 TABLET, FILM COATED ORAL DAILY PRN
Status: DISCONTINUED | OUTPATIENT
Start: 2023-05-13 | End: 2023-05-17 | Stop reason: HOSPADM

## 2023-05-13 RX ORDER — LOSARTAN POTASSIUM 50 MG/1
100 TABLET ORAL DAILY
Status: DISCONTINUED | OUTPATIENT
Start: 2023-05-14 | End: 2023-05-17 | Stop reason: HOSPADM

## 2023-05-13 RX ORDER — CALCIUM CARBONATE 200(500)MG
500 TABLET,CHEWABLE ORAL DAILY
Status: DISCONTINUED | OUTPATIENT
Start: 2023-05-13 | End: 2023-05-17 | Stop reason: HOSPADM

## 2023-05-13 RX ORDER — LOSARTAN POTASSIUM 50 MG/1
50 TABLET ORAL DAILY
Status: COMPLETED | OUTPATIENT
Start: 2023-05-13 | End: 2023-05-13

## 2023-05-13 RX ADMIN — ACETAMINOPHEN 500 MG: 500 TABLET ORAL at 04:05

## 2023-05-13 RX ADMIN — CALCIUM CARBONATE (ANTACID) CHEW TAB 500 MG 500 MG: 500 CHEW TAB at 04:05

## 2023-05-13 RX ADMIN — FAMOTIDINE 20 MG: 20 TABLET, FILM COATED ORAL at 12:05

## 2023-05-13 RX ADMIN — IPRATROPIUM BROMIDE AND ALBUTEROL SULFATE 3 ML: 2.5; .5 SOLUTION RESPIRATORY (INHALATION) at 11:05

## 2023-05-13 RX ADMIN — FLUTICASONE FUROATE AND VILANTEROL TRIFENATATE 1 PUFF: 100; 25 POWDER RESPIRATORY (INHALATION) at 10:05

## 2023-05-13 RX ADMIN — LOSARTAN POTASSIUM 50 MG: 50 TABLET, FILM COATED ORAL at 08:05

## 2023-05-13 RX ADMIN — ENOXAPARIN SODIUM 30 MG: 30 INJECTION SUBCUTANEOUS at 04:05

## 2023-05-13 RX ADMIN — PREGABALIN 75 MG: 75 CAPSULE ORAL at 10:05

## 2023-05-13 RX ADMIN — IPRATROPIUM BROMIDE AND ALBUTEROL SULFATE 3 ML: 2.5; .5 SOLUTION RESPIRATORY (INHALATION) at 03:05

## 2023-05-13 RX ADMIN — PANTOPRAZOLE SODIUM 40 MG: 40 TABLET, DELAYED RELEASE ORAL at 08:05

## 2023-05-13 RX ADMIN — SULFAMETHOXAZOLE AND TRIMETHOPRIM 1 TABLET: 800; 160 TABLET ORAL at 08:05

## 2023-05-13 RX ADMIN — AMLODIPINE BESYLATE 5 MG: 5 TABLET ORAL at 08:05

## 2023-05-13 RX ADMIN — TRAMADOL HYDROCHLORIDE 50 MG: 50 TABLET, COATED ORAL at 02:05

## 2023-05-13 RX ADMIN — CHOLECALCIFEROL (VITAMIN D3) 10 MCG (400 UNIT) TABLET 1200 UNITS: at 08:05

## 2023-05-13 RX ADMIN — ACETAMINOPHEN 500 MG: 500 TABLET ORAL at 10:05

## 2023-05-13 RX ADMIN — Medication 6 MG: at 10:05

## 2023-05-13 RX ADMIN — PREGABALIN 75 MG: 75 CAPSULE ORAL at 08:05

## 2023-05-13 RX ADMIN — IPRATROPIUM BROMIDE AND ALBUTEROL SULFATE 3 ML: 2.5; .5 SOLUTION RESPIRATORY (INHALATION) at 08:05

## 2023-05-13 RX ADMIN — POLYETHYLENE GLYCOL 3350 17 G: 17 POWDER, FOR SOLUTION ORAL at 08:05

## 2023-05-13 RX ADMIN — LOSARTAN POTASSIUM 50 MG: 50 TABLET, FILM COATED ORAL at 10:05

## 2023-05-13 NOTE — PROGRESS NOTES
Pharmacist Renal Dose Adjustment Note    Bridget Montgomery is a 88 y.o. female being treated with the medication bactrim    Patient Data:    Vital Signs (Most Recent):  Temp: 97.7 °F (36.5 °C) (05/12/23 1657)  Pulse: 60 (05/12/23 1852)  Resp: 20 (05/12/23 1657)  BP: (!) 165/72 (05/12/23 1852)  SpO2: 98 % (05/12/23 1852) Vital Signs (72h Range):  Temp:  [97.7 °F (36.5 °C)]   Pulse:  [60-61]   Resp:  [20]   BP: (164-165)/(72)   SpO2:  [96 %-98 %]      Recent Labs   Lab 05/12/23 1751   CREATININE 1.9*     Serum creatinine: 1.9 mg/dL (H) 05/12/23 1751  Estimated creatinine clearance: 19.2 mL/min (A)    Medication:bactrim dose: 800-160 mg frequency 2 times daily will be changed to medication:bactrim dose:800-160 mg frequency:daily    Pharmacist's Name: Sophia Aguilar  Pharmacist's Extension: [phone]

## 2023-05-13 NOTE — PROGRESS NOTES
05/13/23 1116   Vital Signs   Temp 97.5 °F (36.4 °C)   Pulse 64   Resp 18   SpO2 96 %   BP (!) 196/84   MAP (mmHg) 120     MD Kerry, notified. Per MD, elevated BP could be related to pain. PRN tramadol ordered.

## 2023-05-13 NOTE — PHARMACY MED REC
"  Admission Medication History     The home medication history was taken by Drea Holbrook CPhT.    Medication history obtained from, Patient Verified    You may go to "Admission" then "Reconcile Home Medications" tabs to review and/or act upon these items.     The home medication list has been updated by the Pharmacy department.   Please read ALL comments highlighted in yellow.   Please address this information as you see fit.    Feel free to contact us if you have any questions or require assistance.      The medications listed below were removed from the home medication list.  Please reorder if appropriate:  Patient reports no longer taking the following medication(s):  Acetylcysteine 600 mg  Collagenase ointment  Famotidine 20 mg  Mupirocin 2% ointment  Protonix 40 mg        Drea Holbrook CPhT.  Ext [phone]             .        "

## 2023-05-13 NOTE — ED NOTES
Spoke with dr goodwin who states he will put in parameters for pt bp and just to give pt prn tylenol at this time, no new orders at this time

## 2023-05-13 NOTE — PLAN OF CARE
Problem: Adult Inpatient Plan of Care  Goal: Plan of Care Review  Outcome: Ongoing, Progressing  Goal: Patient-Specific Goal (Individualized)  Outcome: Ongoing, Progressing  Goal: Absence of Hospital-Acquired Illness or Injury  Outcome: Ongoing, Progressing  Goal: Optimal Comfort and Wellbeing  Outcome: Ongoing, Progressing  Goal: Readiness for Transition of Care  Outcome: Ongoing, Progressing     Problem: Diabetes Comorbidity  Goal: Blood Glucose Level Within Targeted Range  Outcome: Ongoing, Progressing     Problem: Fall Injury Risk  Goal: Absence of Fall and Fall-Related Injury  Outcome: Ongoing, Progressing     Problem: COPD (Chronic Obstructive Pulmonary Disease) Comorbidity  Goal: Maintenance of COPD Symptom Control  Outcome: Ongoing, Progressing     Problem: Hypertension Comorbidity  Goal: Blood Pressure in Desired Range  Outcome: Ongoing, Progressing     Problem: Pain Acute  Goal: Acceptable Pain Control and Functional Ability  Outcome: Ongoing, Progressing

## 2023-05-13 NOTE — H&P
[facility] Medicine H&P Note     Admitting Team: hospitals Hospitalist Team B  Attending Physician: Herve Daniel MD  Resident: Marcelino  Intern: Kerry    Date of Admit: 5/12/2023    Chief Complaint     Nonhealing right lower leg wound x 3 weeks    Subjective:      History of Present Illness:  Bridget Montgomery is a 88 y.o. F with a PMH of CKDIV, COPD, HTN, prediabetes (A1c 6.1 3/2023), and DVT (2022) who presents for a nonhealing right lower leg wound that has been present for 3 weeks. Pt reports sustaining an injury to this leg when she brushed it against the edge of her . She has been followed by home health wound care and recently was started on Bactrim a week ago from the ED. Presents today as a recommendation from her  nurse who was concerned about pt requiring IV abx and an US to r/o DVT. Pt is afebrile, denies fevers/chills, N/V, CP/SOB. Does endorse severe pain in the area of the wound.       Past Medical History:  CKDIV  COPD  HTN  OA  HLD  Prediabetes  DVT 2022  Psoriasis  Torn rotator cuff       Past Surgical History:  Past Surgical History:   Procedure Laterality Date    BREAST CYST EXCISION      CAUDAL EPIDURAL STEROID INJECTION N/A 2/2/2023    Procedure: Injection-steroid-epidural-caudal;  Surgeon: Angel Sparrow MD;  Location: [facility] [place];  Service: Pain Management;  Laterality: N/A;    FOOT SURGERY     Total hysterectomy     Allergies:  Review of patient's allergies indicates:   Allergen Reactions    Cephalexin     Codeine     Meperidine      Other reaction(s): delerium, hallucination  Delerium  Delerium  Delerium      Penicillins     Tazarotene      Other reaction(s): rash, Unknown       Home Medications:  Bactrim 800mg BID for 7 days  Tramadol 50mg  Symbicort 80-4.5 2 puff BID  Lasix 20 q8  Pregabalin 75 BID  Olmesartan 40mg  Vit D3 1,200 unites  Calcium carbonate 500mg      Family History:  Mom and Dad had colon cancer    Social History:  Tobacco - quit in 1966  Denies alcohol and  drug use      Review of Systems:    All other systems are reviewed and are negative.      Health Maintaince :   Primary Care Physician: Charles Peacock    Immunizations:   TDap unsure   Flu: does not obtain anymore due to fear   Pna UTD    Cancer Screening:  Has obtained MMG and colonoscopy in the past    Objective:   Last 24 Hour Vital Signs:  BP  Min: 164/72  Max: 165/72  Temp  Av.7 °F (36.5 °C)  Min: 97.7 °F (36.5 °C)  Max: 97.7 °F (36.5 °C)  Pulse  Av.5  Min: 60  Max: 61  Resp  Av  Min: 20  Max: 20  SpO2  Av %  Min: 96 %  Max: 98 %  Weight  Av.7 kg (147 lb)  Min: 66.7 kg (147 lb)  Max: 66.7 kg (147 lb)  Body mass index is 25.23 kg/m².  No intake/output data recorded.    Physical Examination:  Physical Exam  Constitutional:       General: She is not in acute distress.     Appearance: She is not ill-appearing or diaphoretic.   HENT:      Mouth/Throat:      Mouth: Mucous membranes are moist.      Pharynx: Oropharynx is clear.   Eyes:      Extraocular Movements: Extraocular movements intact.      Pupils: Pupils are equal, round, and reactive to light.   Cardiovascular:      Rate and Rhythm: Normal rate and regular rhythm.      Pulses: Normal pulses.      Heart sounds: Normal heart sounds.   Pulmonary:      Effort: Pulmonary effort is normal. No respiratory distress.      Breath sounds: Normal breath sounds. No wheezing.   Abdominal:      General: Bowel sounds are normal. There is no distension.      Palpations: Abdomen is soft.      Tenderness: There is no abdominal tenderness.   Musculoskeletal:      Right lower leg: No edema.      Left lower leg: No edema.      Comments: Tenderness to palpation of right lower leg. No crepitus   Skin:     Capillary Refill: Capillary refill takes less than 2 seconds.      Findings: Wound (Right lower leg, no purulent drainage, no erythematous borders) present.   Neurological:      General: No focal deficit present.      Mental Status: She is alert and oriented  to person, place, and time.         Laboratory:  Most Recent Data:  CBC:   Lab Results   Component Value Date    WBC 6.41 05/12/2023    HGB 9.6 (L) 05/12/2023    HCT 28.7 (L) 05/12/2023     05/12/2023    MCV 92 05/12/2023    RDW 11.8 05/12/2023     WBC Differential: 76.6 % N, 0 % Bands, 13.9 % L, 7.8 % M, 0.5 % Eo, 0.9 % Baso, no additional cells seen  BMP:   Lab Results   Component Value Date     (L) 05/12/2023    K 4.9 05/12/2023    CL 98 05/12/2023    CO2 22 (L) 05/12/2023    BUN 37 (H) 05/12/2023    CREATININE 1.9 (H) 05/12/2023     05/12/2023    CALCIUM 8.9 05/12/2023    MG 2.2 04/11/2023    PHOS 3.4 09/12/2022    PHOS 3.4 09/12/2022     LFTs:   Lab Results   Component Value Date    PROT 6.7 05/12/2023    ALBUMIN 3.3 (L) 05/12/2023    BILITOT 0.2 05/12/2023    AST 31 05/12/2023    ALKPHOS 132 05/12/2023    ALT 22 05/12/2023     Coags: No results found for: INR, PROTIME, PTT  FLP:   Lab Results   Component Value Date    CHOL 243 (H) 03/13/2023    HDL 60 03/13/2023    LDLCALC 167.0 (H) 03/13/2023    TRIG 80 03/13/2023    CHOLHDL 24.7 03/13/2023     DM:   Lab Results   Component Value Date    HGBA1C 5.6 03/13/2023    HGBA1C 6.1 (H) 03/23/2022    HGBA1C 5.7 03/11/2021    LDLCALC 167.0 (H) 03/13/2023    CREATININE 1.9 (H) 05/12/2023     Thyroid: No results found for: TSH, FREET4, Q9BSWVK, W4IDZDX, THYROIDAB  Anemia:   Lab Results   Component Value Date    IRON 43 02/24/2023    TIBC 380 02/24/2023    FERRITIN 57 02/24/2023     Cardiac: No results found for: TROPONINI, CKTOTAL, CKMB, BNP  Urinalysis:   Lab Results   Component Value Date    COLORU Yellow 04/11/2023    CLARITYU Clear 03/14/2023    SPECGRAV 1.015 04/11/2023    NITRITE Negative 04/11/2023    KETONESU Negative 04/11/2023    UROBILINOGEN Negative 04/11/2023    WBCUA 0 04/11/2023       Microbiology Data:  SARS-CoV-2: unknown    Other Results:  EKG (my interpretation): none    Radiology:    XR Tibia Fibula Right 5/12  1. No acute displaced  fracture or dislocation of the tibia or fibula.    US Lower Extremity Right 5/12  No evidence of deep venous thrombosis in the right lower extremity.         Assessment:     Bridget Montgomery is a 88 y.o. F with a PMH of CKDIV, COPD, HTN, prediabetes (A1c 6.1 3/2023), and DVT (2022) who presents for a nonhealing right lower leg wound that has been present for 3 weeks. Pt afebrile and no leukocytosis. Pt completed 6 days of Bactrim prior to admission. Low clinical suspicion of infection. Neg erythema, crepitus, or purulent drainage. XR neg for obvious infection. Due to severe pain, ordered CRP and ESR. If continued pain, may consider further imaging with MRI or possible bone biopsy.     Plan:     Right Lower Extremity Wound  WBC 6.4, pt afebrile on admit  - Neg for purulent drainage, crepitus, or erythematous borders  - XR Tibia: no obvious signs of osteo  - Ordered CRP and ESR  - Consulted wound care  - Give dose of Bactrim 800 to complete 7 day course  - May consider MRI or bone biopsy    Pain 2/2 Right Lower Extremity Wound  - Ordered Tylenol 500mg q6 PRN    HTN  /74 on admit. Home med: olmesartan 40mg  - Likely elevated 2/2 pain  - Goal systolics <210  - Continue amlodipine 5mg and losartan 25mg    Hx of DVT  - Most recently in 2022  - Not on home anticoagulation  - DVT US 5/2023 neg    Microcytic Anemia  H/H 9.6/28.7 on admit  - Iron studies 2/2023: Fe 43, Transferrin 257, TIBC 380, Saturated iron 11  - Ferritin 2/2023 57  - Transfuse Hg <7    COPD  - Home symbicort  - Continue on fluticasone furoate-vilanterol 100-25mcg    Hyponatremia  Na 129 on admit  - IVF bolus @50ml/hr    RADHA on CKDIV  Cr 1.9 on admit, baseline ~ 1.4  - IVF bolus @50ml/hr    Spinal Stenosis  - Continue home pregabalin 75mg BID    HLD  - Lipid panel: Chol 243, HDL 60, , TG 80  - Given advanced age, benefits does not outweigh risks for statin therapy    Hypoalbuminemia  Albumin 3.3 on admit  - CTM    Prediabetes  - A1c 6.1%  3/2023  - CTM    Constipation  - Miralax bowel regimen    Diet: Renal  DVT: Lov  IVF: NS  Dispo: Pending pain control, resolution of RADHA, and wound care    Code Status:     Full    Zaid Payne MD  [facility] Medicine HO-I  U Internal Medicine Team B    [facility] Medicine Hospitalist Pager numbers:   [facility] Hospitalist Medicine Team B (Fredy/Eliana):  [phone]

## 2023-05-13 NOTE — NURSING
Patient reports no relief from heart burn after famotidine administration. Patient stated that she takes omeprazole at home and that works for heart burn. Spoke to MD Kerry. Will place new orders.

## 2023-05-13 NOTE — NURSING
RAPID RESPONSE NURSE PROACTIVE ROUNDING NOTE       Time of Visit:     Admit Date: 2023  LOS: 0  Code Status: Full Code   Date of Visit: 2023  : 1935  Age: 88 y.o.  Sex: female  Race: White  Bed: K528/K528 A:   MRN: [id number]  Was the patient discharged from an ICU this admission? No   Was the patient discharged from a PACU within last 24 hours? No   Did the patient receive conscious sedation/general anesthesia in last 24 hours? No   Was the patient in the ED within the past 24 hours? Yes   Was the patient on NIPPV within the past 24 hours? No   Attending Physician: Herve Daniel MD  Primary Service: Allergy,Allergy and Immunology,Internal Medicine   Time spent at the bedside: < 15 min    SITUATION    Notified by charge RN during rounding  Reason for alert: Hypertension    Diagnosis: Non-healing wound of right lower extremity   has a past medical history of Allergy, Anemia, unspecified, Arthritis, CKD (chronic kidney disease) stage 4, GFR 15-29 ml/min, COPD (chronic obstructive pulmonary disease), Edema, HTN (hypertension), Hypocalcemia, Hyponatremia, and Osteoarthritis.    Last Vitals:  Temp: 97.7 °F (36.5 °C) (1657)  Pulse: 58 (2205)  Resp: 20 (1657)  BP: 211/70 (2205)  SpO2: 98 % (2036)    24 Hour Vitals Range:  Temp:  [97.7 °F (36.5 °C)]   Pulse:  [58-61]   Resp:  [20]   BP: (164-213)/(70-86)   SpO2:  [96 %-98 %]     Clinical Issues: Circulatory    ASSESSMENT/INTERVENTIONS    On arrival pt laying in bed, c/o 5/10 pain to RLE and headache. VS: /86 (124), HR: 58. Lung sounds clear, and no edema noted to extremities.     RECOMMENDATIONS  PRN BP medication  Strict I/O's     Discussed plan of care with bedside RNCharles    PROVIDER ESCALATION    Physician escalation: Yes    Orders received and case discussed with Dr. Stone .    Disposition:Remain in room 528    FOLLOW UP    Call back the Rapid Response Nurse, Laura Huffman at [phone] for additional  questions or concerns.

## 2023-05-13 NOTE — PROGRESS NOTES
Pharmacist Renal Dose Adjustment Note    Bridget Montgomery is a 88 y.o. female being treated with the medication lovenox    Patient Data:    Vital Signs (Most Recent):  Temp: 97.7 °F (36.5 °C) (05/12/23 1657)  Pulse: 60 (05/12/23 1852)  Resp: 20 (05/12/23 1657)  BP: (!) 165/72 (05/12/23 1852)  SpO2: 98 % (05/12/23 1852) Vital Signs (72h Range):  Temp:  [97.7 °F (36.5 °C)]   Pulse:  [60-61]   Resp:  [20]   BP: (164-165)/(72)   SpO2:  [96 %-98 %]      Recent Labs   Lab 05/12/23 1751   CREATININE 1.9*     Serum creatinine: 1.9 mg/dL (H) 05/12/23 1751  Estimated creatinine clearance: 19.2 mL/min (A)    Medication:lovenox dose: 40mg frequency every 24 hours will be changed to medication:lovenox dose:30mg frequency:every 24 hours    Pharmacist's Name: Sophia Aguilar  Pharmacist's Extension: [phone]

## 2023-05-14 LAB
ALBUMIN SERPL BCP-MCNC: 2.9 G/DL (ref 3.5–5.2)
ALP SERPL-CCNC: 110 U/L (ref 55–135)
ALT SERPL W/O P-5'-P-CCNC: 15 U/L (ref 10–44)
ANION GAP SERPL CALC-SCNC: 5 MMOL/L (ref 8–16)
AST SERPL-CCNC: 25 U/L (ref 10–40)
BASOPHILS # BLD AUTO: 0.07 K/UL (ref 0–0.2)
BASOPHILS NFR BLD: 1.8 % (ref 0–1.9)
BILIRUB SERPL-MCNC: 0.2 MG/DL (ref 0.1–1)
BUN SERPL-MCNC: 33 MG/DL (ref 8–23)
CALCIUM SERPL-MCNC: 9.1 MG/DL (ref 8.7–10.5)
CHLORIDE SERPL-SCNC: 104 MMOL/L (ref 95–110)
CO2 SERPL-SCNC: 24 MMOL/L (ref 23–29)
CREAT SERPL-MCNC: 1.5 MG/DL (ref 0.5–1.4)
DIFFERENTIAL METHOD: ABNORMAL
EOSINOPHIL # BLD AUTO: 0.1 K/UL (ref 0–0.5)
EOSINOPHIL NFR BLD: 1.8 % (ref 0–8)
ERYTHROCYTE [DISTWIDTH] IN BLOOD BY AUTOMATED COUNT: 11.9 % (ref 11.5–14.5)
EST. GFR  (NO RACE VARIABLE): 33 ML/MIN/1.73 M^2
GLUCOSE SERPL-MCNC: 90 MG/DL (ref 70–110)
HCT VFR BLD AUTO: 28.5 % (ref 37–48.5)
HGB BLD-MCNC: 9.4 G/DL (ref 12–16)
IMM GRANULOCYTES # BLD AUTO: 0.01 K/UL (ref 0–0.04)
IMM GRANULOCYTES NFR BLD AUTO: 0.3 % (ref 0–0.5)
LYMPHOCYTES # BLD AUTO: 1.2 K/UL (ref 1–4.8)
LYMPHOCYTES NFR BLD: 29.7 % (ref 18–48)
MAGNESIUM SERPL-MCNC: 1.9 MG/DL (ref 1.6–2.6)
MCH RBC QN AUTO: 30.2 PG (ref 27–31)
MCHC RBC AUTO-ENTMCNC: 33 G/DL (ref 32–36)
MCV RBC AUTO: 92 FL (ref 82–98)
MONOCYTES # BLD AUTO: 0.6 K/UL (ref 0.3–1)
MONOCYTES NFR BLD: 14.6 % (ref 4–15)
NEUTROPHILS # BLD AUTO: 2 K/UL (ref 1.8–7.7)
NEUTROPHILS NFR BLD: 51.8 % (ref 38–73)
NRBC BLD-RTO: 0 /100 WBC
PHOSPHATE SERPL-MCNC: 4.5 MG/DL (ref 2.7–4.5)
PLATELET # BLD AUTO: 320 K/UL (ref 150–450)
PMV BLD AUTO: 9.7 FL (ref 9.2–12.9)
POCT GLUCOSE: 101 MG/DL (ref 70–110)
POCT GLUCOSE: 140 MG/DL (ref 70–110)
POCT GLUCOSE: 141 MG/DL (ref 70–110)
POCT GLUCOSE: 95 MG/DL (ref 70–110)
POTASSIUM SERPL-SCNC: 4.6 MMOL/L (ref 3.5–5.1)
PROT SERPL-MCNC: 5.7 G/DL (ref 6–8.4)
RBC # BLD AUTO: 3.11 M/UL (ref 4–5.4)
SODIUM SERPL-SCNC: 133 MMOL/L (ref 136–145)
WBC # BLD AUTO: 3.9 K/UL (ref 3.9–12.7)

## 2023-05-14 PROCEDURE — 94761 N-INVAS EAR/PLS OXIMETRY MLT: CPT

## 2023-05-14 PROCEDURE — 25000003 PHARM REV CODE 250: Performed by: STUDENT IN AN ORGANIZED HEALTH CARE EDUCATION/TRAINING PROGRAM

## 2023-05-14 PROCEDURE — 85025 COMPLETE CBC W/AUTO DIFF WBC: CPT | Performed by: STUDENT IN AN ORGANIZED HEALTH CARE EDUCATION/TRAINING PROGRAM

## 2023-05-14 PROCEDURE — 94640 AIRWAY INHALATION TREATMENT: CPT

## 2023-05-14 PROCEDURE — 99222 1ST HOSP IP/OBS MODERATE 55: CPT | Mod: ,,, | Performed by: NEUROLOGICAL SURGERY

## 2023-05-14 PROCEDURE — 80053 COMPREHEN METABOLIC PANEL: CPT | Performed by: STUDENT IN AN ORGANIZED HEALTH CARE EDUCATION/TRAINING PROGRAM

## 2023-05-14 PROCEDURE — 11000001 HC ACUTE MED/SURG PRIVATE ROOM

## 2023-05-14 PROCEDURE — 63600175 PHARM REV CODE 636 W HCPCS: Performed by: STUDENT IN AN ORGANIZED HEALTH CARE EDUCATION/TRAINING PROGRAM

## 2023-05-14 PROCEDURE — 36415 COLL VENOUS BLD VENIPUNCTURE: CPT | Performed by: STUDENT IN AN ORGANIZED HEALTH CARE EDUCATION/TRAINING PROGRAM

## 2023-05-14 PROCEDURE — 84100 ASSAY OF PHOSPHORUS: CPT | Performed by: STUDENT IN AN ORGANIZED HEALTH CARE EDUCATION/TRAINING PROGRAM

## 2023-05-14 PROCEDURE — 87070 CULTURE OTHR SPECIMN AEROBIC: CPT | Performed by: SURGERY

## 2023-05-14 PROCEDURE — 25000003 PHARM REV CODE 250: Performed by: INTERNAL MEDICINE

## 2023-05-14 PROCEDURE — 99900035 HC TECH TIME PER 15 MIN (STAT)

## 2023-05-14 PROCEDURE — 99222 PR INITIAL HOSPITAL CARE,LEVL II: ICD-10-PCS | Mod: ,,, | Performed by: NEUROLOGICAL SURGERY

## 2023-05-14 PROCEDURE — 87075 CULTR BACTERIA EXCEPT BLOOD: CPT | Performed by: SURGERY

## 2023-05-14 PROCEDURE — 25000242 PHARM REV CODE 250 ALT 637 W/ HCPCS

## 2023-05-14 PROCEDURE — 83735 ASSAY OF MAGNESIUM: CPT | Performed by: STUDENT IN AN ORGANIZED HEALTH CARE EDUCATION/TRAINING PROGRAM

## 2023-05-14 PROCEDURE — 25000003 PHARM REV CODE 250

## 2023-05-14 RX ORDER — MUPIROCIN 20 MG/G
OINTMENT TOPICAL DAILY
Status: DISCONTINUED | OUTPATIENT
Start: 2023-05-14 | End: 2023-05-17 | Stop reason: HOSPADM

## 2023-05-14 RX ORDER — MUPIROCIN 20 MG/G
OINTMENT TOPICAL DAILY
Status: DISCONTINUED | OUTPATIENT
Start: 2023-05-15 | End: 2023-05-14

## 2023-05-14 RX ADMIN — SULFAMETHOXAZOLE AND TRIMETHOPRIM 1 TABLET: 800; 160 TABLET ORAL at 08:05

## 2023-05-14 RX ADMIN — MUPIROCIN: 20 OINTMENT TOPICAL at 05:05

## 2023-05-14 RX ADMIN — PREGABALIN 75 MG: 75 CAPSULE ORAL at 08:05

## 2023-05-14 RX ADMIN — ACETAMINOPHEN 500 MG: 500 TABLET ORAL at 08:05

## 2023-05-14 RX ADMIN — IPRATROPIUM BROMIDE AND ALBUTEROL SULFATE 3 ML: 2.5; .5 SOLUTION RESPIRATORY (INHALATION) at 08:05

## 2023-05-14 RX ADMIN — IPRATROPIUM BROMIDE AND ALBUTEROL SULFATE 3 ML: 2.5; .5 SOLUTION RESPIRATORY (INHALATION) at 12:05

## 2023-05-14 RX ADMIN — COLLAGENASE SANTYL: 250 OINTMENT TOPICAL at 05:05

## 2023-05-14 RX ADMIN — LOSARTAN POTASSIUM 100 MG: 50 TABLET, FILM COATED ORAL at 08:05

## 2023-05-14 RX ADMIN — IPRATROPIUM BROMIDE AND ALBUTEROL SULFATE 3 ML: 2.5; .5 SOLUTION RESPIRATORY (INHALATION) at 04:05

## 2023-05-14 RX ADMIN — AMLODIPINE BESYLATE 5 MG: 5 TABLET ORAL at 08:05

## 2023-05-14 RX ADMIN — FLUTICASONE FUROATE AND VILANTEROL TRIFENATATE 1 PUFF: 100; 25 POWDER RESPIRATORY (INHALATION) at 08:05

## 2023-05-14 RX ADMIN — Medication 6 MG: at 08:05

## 2023-05-14 RX ADMIN — OMEPRAZOLE 40 MG: 40 CAPSULE, DELAYED RELEASE ORAL at 06:05

## 2023-05-14 RX ADMIN — TRAMADOL HYDROCHLORIDE 50 MG: 50 TABLET, COATED ORAL at 04:05

## 2023-05-14 RX ADMIN — FAMOTIDINE 20 MG: 20 TABLET, FILM COATED ORAL at 08:05

## 2023-05-14 RX ADMIN — ENOXAPARIN SODIUM 30 MG: 30 INJECTION SUBCUTANEOUS at 04:05

## 2023-05-14 RX ADMIN — POLYETHYLENE GLYCOL 3350 17 G: 17 POWDER, FOR SOLUTION ORAL at 08:05

## 2023-05-14 RX ADMIN — CHOLECALCIFEROL (VITAMIN D3) 10 MCG (400 UNIT) TABLET 1200 UNITS: at 08:05

## 2023-05-14 RX ADMIN — CALCIUM CARBONATE (ANTACID) CHEW TAB 500 MG 500 MG: 500 CHEW TAB at 08:05

## 2023-05-14 NOTE — PROGRESS NOTES
"[facility] Medicine Progress Note    Primary Team: [facility] Hospitalist Team B  Attending Physician: Herve Daniel MD  Resident: Marcelino / Kaden  Intern: Kerry / Lesly    Subjective:      NAEON. Pt seen at bedside resting comfortably. She remains afebrile and continues to deny any fever, chills, chest pain, SOB, leg pain, or headache.       Objective:     Last 24 Hour Vital Signs:  BP  Min: 151/69  Max: 196/84  Temp  Av.9 °F (36.6 °C)  Min: 97.4 °F (36.3 °C)  Max: 98.6 °F (37 °C)  Pulse  Av  Min: 56  Max: 66  Resp  Av.6  Min: 16  Max: 21  SpO2  Av.1 %  Min: 95 %  Max: 97 %  Height  Av' 4" (162.6 cm)  Min: 5' 4" (162.6 cm)  Max: 5' 4" (162.6 cm)  Weight  Av.4 kg (146 lb 6.2 oz)  Min: 64.9 kg (143 lb 1.3 oz)  Max: 67.9 kg (149 lb 11.1 oz)  I/O last 3 completed shifts:  In: 630 [P.O.:630]  Out: 2800 [Urine:2800]    Physical Examination:  Physical Exam  Constitutional:       General: She is not in acute distress.     Appearance: She is not toxic-appearing.   Eyes:      Conjunctiva/sclera: Conjunctivae normal.      Pupils: Pupils are equal, round, and reactive to light.   Cardiovascular:      Rate and Rhythm: Normal rate and regular rhythm.      Pulses: Normal pulses.      Heart sounds: Normal heart sounds.   Pulmonary:      Effort: Pulmonary effort is normal. No respiratory distress.      Breath sounds: Normal breath sounds. No wheezing.   Abdominal:      General: Bowel sounds are normal. There is no distension.      Palpations: Abdomen is soft.      Tenderness: There is no abdominal tenderness.   Musculoskeletal:      Right lower leg: No edema.      Left lower leg: No edema.      Comments: Tenderness to palpation of right lower leg. No crepitus    Skin:     Capillary Refill: Capillary refill takes less than 2 seconds.      Findings: Wound (Right lower leg, no purulent drainage, no erythematous borders) present.   Neurological:      General: No focal deficit present.      Mental Status: She is " alert and oriented to person, place, and time.   Psychiatric:         Mood and Affect: Mood normal.         Behavior: Behavior normal.       Laboratory:  Laboratory Data Reviewed: yes  Pertinent Findings:  Recent Labs   Lab 05/12/23  1751 05/13/23  0614 05/13/23  0804 05/14/23  0557   WBC 6.41 4.48  --  3.90   HGB 9.6* 9.4*  --  9.4*   HCT 28.7* 28.2*  --  28.5*    322  --  320   MCV 92 93  --  92   RDW 11.8 11.8  --  11.9   * 131* 131* 133*   K 4.9 5.3* 4.4 4.6   CL 98 101 101 104   CO2 22* 23 21* 24   BUN 37* 38* 35* 33*   CREATININE 1.9* 1.7* 1.6* 1.5*    83 86 90   PROT 6.7 6.1  --  5.7*   ALBUMIN 3.3* 3.0*  --  2.9*   BILITOT 0.2 0.2  --  0.2   AST 31 28  --  25   ALKPHOS 132 126  --  110   ALT 22 17  --  15       Microbiology Data Reviewed:  Pertinent Findings:  Microbiology Results (last 7 days)       Procedure Component Value Units Date/Time    Aerobic culture [[id number]] Collected: 05/14/23 1658    Order Status: Sent Specimen: Wound from Leg, Right Updated: 05/14/23 1702    Culture, Anaerobe [[id number]] Collected: 05/14/23 1658    Order Status: Sent Specimen: Wound from Leg, Right Updated: 05/14/23 1701    Aerobic culture (Specify Source) **CANNOT BE ORDERED AS STAT** [[id number]] Collected: 05/12/23 1819    Order Status: Completed Specimen: Wound from Leg, Right Updated: 05/14/23 0722     Aerobic Bacterial Culture No significant isolate to date    Aerobic culture (Specify Source) **CANNOT BE ORDERED AS STAT** [[id number]]     Order Status: Canceled Specimen: Wound     Blood culture [[id number]]     Order Status: Canceled Specimen: Blood     Blood culture [[id number]]     Order Status: Canceled Specimen: Blood           Other Results:  EKG (my interpretation): no study to review    Radiology Data Reviewed: yes  Pertinent Findings:  X-Ray Tibia Fibula 2 View Right   Final Result      1. No acute displaced fracture or dislocation of the tibia or fibula.         Electronically signed by: uJan  MD Cat   Date:    05/12/2023   Time:    19:14      US Lower Extremity Veins Right   Final Result      No evidence of deep venous thrombosis in the right lower extremity.         Electronically signed by: Juan Shelton MD   Date:    05/12/2023   Time:    18:30           Current Medications:     Infusions:       Scheduled:   albuterol-ipratropium  3 mL Nebulization Q4H WAKE    amLODIPine  5 mg Oral Daily    calcium carbonate  500 mg Oral Daily    cholecalciferol (vitamin D3)  1,200 Units Oral Daily    enoxaparin  30 mg Subcutaneous Daily    fluticasone furoate-vilanteroL  1 puff Inhalation Daily    losartan  100 mg Oral Daily    melatonin  6 mg Oral Nightly    omeprazole  40 mg Oral Before breakfast    polyethylene glycol  17 g Oral Daily    pregabalin  75 mg Oral BID    sulfamethoxazole-trimethoprim 800-160mg  1 tablet Oral Daily        PRN:  acetaminophen, dextrose 10%, dextrose 10%, dextrose, dextrose, famotidine, glucagon (human recombinant), naloxone, sodium chloride 0.9%, traMADoL    Antibiotics and Day Number of Therapy:  Antibiotics (72h ago, onward)      Start     Stop Route Frequency Ordered    05/14/23 1700  mupirocin 2 % ointment         -- Top Daily 05/14/23 1658    05/12/23 2045  sulfamethoxazole-trimethoprim 800-160mg per tablet 1 tablet         05/15 0859 Oral Daily 05/12/23 2027              Lines and Day Number of Therapy:  PIV 5/12      Assessment and Plan:     Bridget Montgomery is a 88 y.o. F with a PMH of CKDIV, COPD, HTN, prediabetes (A1c 6.1 3/2023), and DVT (2022) who presents for a nonhealing right lower leg wound that has been present for 3 weeks. Pt afebrile and no leukocytosis. Pt completed 6 days of Bactrim prior to admission. Low clinical suspicion of infection. Neg erythema, crepitus, or purulent drainage. XR neg for obvious infection. Due to severe pain, ordered CRP and ESR which were not significant. If continued pain, may consider further imaging with MRI or possible bone  biopsy. Pending Sx consult w/ Dr. Mohan at this time.     Right Lower Extremity Wound  WBC 6.4, pt afebrile on admit. Photo in media tab. Has HH wound care set up  - Neg for purulent drainage, crepitus, or erythematous borders  - XR Tibia: no obvious signs of osteo  - CRP 7.8 and ESR 37  - Consulted Dr Mohan to see if pt would need to follow him outpatient  - Given dose of Bactrim 800 and completed 7 day course as previously prescribed  - Further abx pending Dr. Mohan recs  - May consider MRI or bone biopsy     Pain 2/2 Right Lower Extremity Wound  - Ordered Tylenol 500mg q6 PRN  - Tramadol 50mg PRN     Spinal Stenosis  - Continue home pregabalin 75mg BID  - NSGY consulted, no plans for surgical intervention at this time. Recommending f/u w/ NSGY as outpatient if radicular leg symptoms worsen     HTN  /74 on admit. Home med: olmesartan 40mg  - Likely elevated 2/2 pain  - Goal systolics <210  - Given losartan 50mg but continued to be elevated so given another dose of 50 mg  - Continue amlodipine 5mg and losartan 100mg     H/o DVT  - Most recently in 2022  - Not on home anticoagulation  - DVT US 5/2023 neg    Hyponatremia, improving  Na 129 on admit  - Completed IVF bolus @50ml/hr  - Na improved to 133 this morning  - Continuing to encourage PO intake     RADHA on CKDIV, improving  Cr 1.9 on admit, baseline ~ 1.4  - Completed IVF bolus @50ml/hr  - Cr improved to 1.5 this morning  - Continuing to encourage PO intake     Microcytic Anemia  H/H 9.6/28.7 on admit  - Iron studies 2/2023: Fe 43, Transferrin 257, TIBC 380, Saturated iron 11  - Ferritin 2/2023 57  - Transfuse Hg <7     COPD  - Home symbicort  - Continue on fluticasone furoate-vilanterol 100-25mcg     HLD  - Lipid panel: Chol 243, HDL 60, , TG 80  - Given advanced age, benefits does not outweigh risks for statin therapy    Hypoalbuminemia  Albumin 3.3 on admit  - CTM     Prediabetes  - A1c 6.1% 3/2023  - CTM     Constipation  - Continue  Miralax bowel regimen       Code Status: Full code  Diet: Renal  DVT: Lovenox    Dispo: Pending Sx consult and continued pain control      Napoleon Grace,   [facility], PGY-1  [facility] Hospital Medicine Team B    [facility] Medicine Hospitalist Pager numbers:   [facility] Hospitalist Medicine Team B (Fredy/Eliana):  [phone]

## 2023-05-14 NOTE — PLAN OF CARE
Problem: Adult Inpatient Plan of Care  Goal: Plan of Care Review  Outcome: Ongoing, Progressing  Goal: Patient-Specific Goal (Individualized)  Outcome: Ongoing, Progressing  Goal: Absence of Hospital-Acquired Illness or Injury  Outcome: Ongoing, Progressing  Goal: Optimal Comfort and Wellbeing  Outcome: Ongoing, Progressing  Goal: Readiness for Transition of Care  Outcome: Ongoing, Progressing     Problem: Diabetes Comorbidity  Goal: Blood Glucose Level Within Targeted Range  Outcome: Ongoing, Progressing     Problem: Fall Injury Risk  Goal: Absence of Fall and Fall-Related Injury  Outcome: Ongoing, Progressing     Problem: Infection  Goal: Absence of Infection Signs and Symptoms  Outcome: Ongoing, Progressing     Problem: COPD (Chronic Obstructive Pulmonary Disease) Comorbidity  Goal: Maintenance of COPD Symptom Control  Outcome: Ongoing, Progressing     Problem: Hypertension Comorbidity  Goal: Blood Pressure in Desired Range  Outcome: Ongoing, Progressing     Problem: Pain Acute  Goal: Acceptable Pain Control and Functional Ability  Outcome: Ongoing, Progressing     Problem: Skin Injury Risk Increased  Goal: Skin Health and Integrity  Outcome: Ongoing, Progressing

## 2023-05-14 NOTE — PLAN OF CARE
Problem: Adult Inpatient Plan of Care  Goal: Plan of Care Review  Outcome: Ongoing, Progressing  Flowsheets (Taken 5/14/2023 0428)  Plan of Care Reviewed With:   patient   daughter     Problem: Diabetes Comorbidity  Goal: Blood Glucose Level Within Targeted Range  Outcome: Ongoing, Progressing  Intervention: Monitor and Manage Glycemia  Flowsheets (Taken 5/14/2023 0428)  Glycemic Management: blood glucose monitored     Problem: Impaired Wound Healing  Goal: Optimal Wound Healing  Outcome: Ongoing, Progressing     Problem: Fall Injury Risk  Goal: Absence of Fall and Fall-Related Injury  Outcome: Ongoing, Progressing  Intervention: Identify and Manage Contributors  Flowsheets (Taken 5/14/2023 0428)  Medication Review/Management: medications reviewed     Problem: Hypertension Comorbidity  Goal: Blood Pressure in Desired Range  Outcome: Ongoing, Progressing  Intervention: Maintain Blood Pressure Management  Flowsheets (Taken 5/14/2023 0428)  Medication Review/Management: medications reviewed

## 2023-05-14 NOTE — CONSULTS
NEUROSURGICAL INPATIENT CONSULTATION NOTE    DATE OF SERVICE:  05/14/2023    ATTENDING PHYSICIAN:  Jaylen Quach MD    CONSULT REQUESTED BY:  Hospital medicine    REASON FOR CONSULT:  Right leg pain    SUBJECTIVE:    HISTORY OF PRESENT ILLNESS:  History made with patient's daughter on the phone.  This is a very pleasant 88 y.o. female, chronic kidney disease stage 3, type 2 diabetes, spinal stenosis, essential hypertension.  She had a right anterior leg injury over the tibia a few weeks ago.  She is been complaining of severe pain that is localized over the wound area.  Patient is known for spinal stenosis, status post L3-4 interspinous device placement for spinal stenosis with pain management.  Patient was established with pain management for left leg pain.  She says the left leg pain comes and goes.  She says the right leg pain is different than the sciatica that she usually has. Today she says today the pain is better.  She reports the Lyrica and tramadol is helping the pain.  No new onset of motor weakness or numbness.  She walks with a walker.  No fecal incontinence or urinary retention.              PAST MEDICAL HISTORY:  Active Ambulatory Problems     Diagnosis Date Noted    Chronic kidney disease, stage III (moderate) 08/20/2013    Type 2 diabetes mellitus with diabetic chronic kidney disease 02/23/2016    Spinal stenosis of lumbar region 07/07/2020    Senile purpura 07/07/2020    Primary localized osteoarthritis of pelvic region and thigh 07/07/2020    Idiopathic osteoarthritis 07/07/2020    Osteopenia 07/07/2020    Neuralgia of right sciatic nerve 07/07/2020    Mixed hyperlipidemia 07/07/2020    Mild recurrent major depression 07/07/2020    Diabetic renal disease 07/07/2020    Chronic obstructive pulmonary disease 07/07/2020    Allergic rhinitis 07/07/2020    Essential hypertension 02/23/2016    Gastro-esophageal reflux disease without esophagitis 03/01/2016    Lumbar spondylosis 08/17/2018    Ovarian  mass 03/22/2019    Spinal cord disorder 08/17/2018    Ulcer of esophagus 01/29/2019    Greater trochanteric bursitis 12/08/2020    Osteoarthritis of both knees 12/08/2020    Lumbar radiculopathy 01/25/2023    Acute chorea 04/13/2023     Resolved Ambulatory Problems     Diagnosis Date Noted    No Resolved Ambulatory Problems     Past Medical History:   Diagnosis Date    Allergy     Anemia, unspecified     Arthritis     CKD (chronic kidney disease) stage 4, GFR 15-29 ml/min     COPD (chronic obstructive pulmonary disease)     Edema     HTN (hypertension)     Hypocalcemia     Hyponatremia     Osteoarthritis        PAST SURGICAL HISTORY:  Past Surgical History:   Procedure Laterality Date    BREAST CYST EXCISION      CAUDAL EPIDURAL STEROID INJECTION N/A 2/2/2023    Procedure: Injection-steroid-epidural-caudal;  Surgeon: Angel Sparrow MD;  Location: [facility];  Service: Pain Management;  Laterality: N/A;    FOOT SURGERY         SOCIAL HISTORY:   Social History     Socioeconomic History    Marital status:    Tobacco Use    Smoking status: Former    Smokeless tobacco: Never   Substance and Sexual Activity    Alcohol use: Not Currently    Drug use: Never    Sexual activity: Not Currently       FAMILY HISTORY:  Family History   Problem Relation Age of Onset    Cancer Mother     No Known Problems Father        CURRENTS MEDICATIONS:    No current facility-administered medications on file prior to encounter.     Current Outpatient Medications on File Prior to Encounter   Medication Sig Dispense Refill    amlodipine-benazepril 5-10 mg (LOTREL) 5-10 mg per capsule Take 1 capsule by mouth once daily.      apple cider vinegar 600 mg Cap Take 600 mg by mouth once daily.      calcium carbonate (CALCIUM 500 ORAL) Take 500 mg by mouth once daily.      cholecalciferol, vitamin D3, (VITAMIN D3) 50 mcg (2,000 unit) Cap capsule Take 2,000 Units by mouth once daily.      coenzyme Q10 10 mg capsule Take 10 mg by mouth once  daily.      diclofenac sodium (VOLTAREN ARTHRITIS PAIN) 1 % Gel Apply 2 g topically once daily. (Patient taking differently: Apply 2 g topically as needed.) 1 each 11    fluticasone propionate (FLONASE) 50 mcg/actuation nasal spray INSTILL TWO SPRAYS INTO EACH NOSTRIL TWICE A DAY 48 mL 2    furosemide (LASIX) 20 MG tablet Take 20 mg by mouth every 8 (eight) hours as needed.      LIDOcaine (LIDODERM) 5 % Place 1 patch onto the skin once daily. Remove & Discard patch within 12 hours or as directed by provider (Patient taking differently: Place 1 patch onto the skin daily as needed. Remove & Discard patch within 12 hours or as directed by provider) 5 patch 0    olmesartan (BENICAR) 40 MG tablet Take 1 tablet (40 mg total) by mouth once daily. 90 tablet 3    olopatadine (PATANOL) 0.1 % ophthalmic solution Place into both eyes Daily.      omega 3-dha-epa-fish oil 1,000 mg (120 mg-180 mg) Cap Take 1 capsule by mouth once daily.      omeprazole (PRILOSEC) 20 MG capsule Take 20 mg by mouth once daily.      polyethylene glycol 3350 (MIRALAX ORAL) Take 17 g by mouth once daily.      pregabalin (LYRICA) 75 MG capsule Take 75 mg by mouth 2 (two) times daily.      psyllium 0.52 gram capsule Take 0.52 g by mouth daily as needed.      selenium 200 mcg Cap Take 200 mcg by mouth Daily.      [] sulfamethoxazole-trimethoprim 800-160mg (BACTRIM DS) 800-160 mg Tab Take 1 tablet by mouth 2 (two) times daily. for 7 days 14 tablet 0    SYMBICORT 80-4.5 mcg/actuation HFAA Inhale 2 puffs into the lungs 2 (two) times a day. 10.2 g 6    traMADoL (ULTRAM) 50 mg tablet Take 1 tablet (50 mg total) by mouth every 12 (twelve) hours as needed for Pain. 60 tablet 5    triamcinolone acetonide 0.1% (KENALOG) 0.1 % cream APPLY TO AFFECTED AREA TWICE A DAY (Patient taking differently: Apply topically 2 (two) times daily as needed.) 454 g 3    vit A/vit C/vit E/zinc/copper (PRESERVISION AREDS ORAL) Take 1 capsule by mouth 2 (two) times a day.       zinc sulfate (ZINC-15 ORAL) Take 15 mg by mouth once daily.      acetaminophen (TYLENOL) 500 MG tablet Take 500 mg by mouth every 6 (six) hours as needed for Pain.      albuterol (PROVENTIL) 2.5 mg /3 mL (0.083 %) nebulizer solution Take 2.5 mg by nebulization 2 (two) times daily as needed.      albuterol (PROVENTIL/VENTOLIN HFA) 90 mcg/actuation inhaler Inhale 2 puffs into the lungs every 6 (six) hours as needed for Wheezing. Rescue          albuterol-ipratropium  3 mL Nebulization Q4H WAKE    amLODIPine  5 mg Oral Daily    calcium carbonate  500 mg Oral Daily    cholecalciferol (vitamin D3)  1,200 Units Oral Daily    enoxaparin  30 mg Subcutaneous Daily    fluticasone furoate-vilanteroL  1 puff Inhalation Daily    losartan  100 mg Oral Daily    melatonin  6 mg Oral Nightly    omeprazole  40 mg Oral Before breakfast    polyethylene glycol  17 g Oral Daily    pregabalin  75 mg Oral BID    sulfamethoxazole-trimethoprim 800-160mg  1 tablet Oral Daily       ALLERGIES:  Review of patient's allergies indicates:   Allergen Reactions    Cephalexin     Codeine     Meperidine      Other reaction(s): delerium, hallucination  Delerium  Delerium  Delerium      Penicillins     Tazarotene      Other reaction(s): rash, Unknown       REVIEW OF SYSTEMS:  Review of Systems   Constitutional:  Negative for diaphoresis, fever and weight loss.   Respiratory:  Negative for shortness of breath.    Cardiovascular:  Negative for chest pain.   Gastrointestinal:  Negative for blood in stool.   Genitourinary:  Negative for hematuria.   Endo/Heme/Allergies:  Does not bruise/bleed easily.   All other systems reviewed and are negative.    OBJECTIVE:    PHYSICAL EXAMINATION:   Vitals:    05/14/23 0752   BP: (!) 181/78   Pulse: (!) 56   Resp: 20   Temp: 97.5 °F (36.4 °C)       Physical Exam:  Vitals reviewed.    Constitutional: She appears well-developed and well-nourished.     Eyes: Pupils are equal, round, and reactive to light. Conjunctivae and  EOM are normal.     Cardiovascular: Normal distal pulses and no edema.     Abdominal: Soft.     Skin: Skin displays no rash on trunk and no rash on extremities. Skin displays no lesions on trunk and no lesions on extremities.     Psych/Behavior: She is alert. She is oriented to person, place, and time. She has a normal mood and affect.     Musculoskeletal:        Neck: Range of motion is limited.     Back Exam     Muscle Strength   Right Quadriceps:  5/5   Left Quadriceps:  5/5             Neurologic Exam     Mental Status   Oriented to person, place, and time.     Cranial Nerves     CN III, IV, VI   Pupils are equal, round, and reactive to light.  Extraocular motions are normal.     Motor Exam     Strength   Right iliopsoas: 5/5  Left iliopsoas: 5/5  Right quadriceps: 5/5  Left quadriceps: 5/5  Right anterior tibial: 5/5  Left anterior tibial: 5/5  Right gastroc: 5/5  Left gastroc: 5/5    DIAGNOSTIC DATA:    Recent Labs     05/12/23  1751 05/13/23  0614 05/13/23  0804 05/14/23  0557   HGB 9.6* 9.4*  --  9.4*   HCT 28.7* 28.2*  --  28.5*   MCV 92 93  --  92   WBC 6.41 4.48  --  3.90    322  --  320   * 131* 131* 133*   K 4.9 5.3* 4.4 4.6   CL 98 101 101 104    83 86 90   BUN 37* 38* 35* 33*   CREATININE 1.9* 1.7* 1.6* 1.5*   CALCIUM 8.9 9.1 9.3 9.1   MG  --  1.9  --  1.9   ALT 22 17  --  15   AST 31 28  --  25   ALBUMIN 3.3* 3.0*  --  2.9*   BILITOT 0.2 0.2  --  0.2       Microbiology Results (last 7 days)       Procedure Component Value Units Date/Time    Aerobic culture (Specify Source) **CANNOT BE ORDERED AS STAT** [[id number]] Collected: 05/12/23 1819    Order Status: Completed Specimen: Wound from Leg, Right Updated: 05/14/23 0722     Aerobic Bacterial Culture No significant isolate to date    Aerobic culture (Specify Source) **CANNOT BE ORDERED AS STAT** [[id number]]     Order Status: Canceled Specimen: Wound     Blood culture [[id number]]     Order Status: Canceled Specimen: Blood     Blood  culture [[id number]]     Order Status: Canceled Specimen: Blood             I personally interpreted the following imaging:  Lumbar spine MRI from 09/15/2022 reviewed showing moderate-to-severe spinal stenosis at L3-4, left more than right foraminal stenosis at L3-4, bilateral severe L5-S1 foraminal stenosis.  Degenerative disc disease diffusely.  Status post L3-4 interspinous device placement  Lumbar x-rays shows degenerative scoliosis, loss of lumbar lordosis    ASSESMENT:  This is a 88 y.o. female with known lumbar stenosis and left radiculopathy.  Status post L3-4 interspinous device placement.  Neurogenic claudication.  Now presenting with severe pain below the knee on the right side.  The pain is felt around her lower extremity anterior wound.  She might have hyperalgesia in the context of chronic radiculopathy.      PLAN:  No emergent surgical intervention recommended at this time.  Patient can follow-up outpatient with Neurosurgery if her radicular leg symptoms are worsening over time.  The patient is also in need of right shoulder surgery.  The surgery has been postponed due to her right leg wound.    Wound care consulted.  All questions answered.    Jaylen Quach MD  Cell: [phone]

## 2023-05-14 NOTE — CONSULTS
Today`s Date: 5/14/2023     Admit Date: 5/12/2023    Admitting Physician: Herve Daniel MD    Patient`s Name: Bridget Montgomery , 88 y.o. female    Reason for consultation  Wound evaluation right lower leg , c/o pain right leg wound.   Patient Active Problem List:     Chronic kidney disease, stage III (moderate)     Type 2 diabetes mellitus with diabetic chronic kidney disease     Spinal stenosis of lumbar region     Senile purpura     Primary localized osteoarthritis of pelvic region and thigh     Idiopathic osteoarthritis     Osteopenia     Neuralgia of right sciatic nerve     Mixed hyperlipidemia     Mild recurrent major depression     Diabetic renal disease     Chronic obstructive pulmonary disease     Allergic rhinitis     Essential hypertension     Gastro-esophageal reflux disease without esophagitis     Lumbar spondylosis     Ovarian mass     Spinal cord disorder     Ulcer of esophagus     Greater trochanteric bursitis     Osteoarthritis of both knees     Lumbar radiculopathy     Acute chorea     Hyponatremia     Non-healing wound of right lower extremity      Past Medical History:  No date: Allergy  No date: Anemia, unspecified  No date: Arthritis  No date: CKD (chronic kidney disease) stage 4, GFR 15-29 ml/min  No date: COPD (chronic obstructive pulmonary disease)  No date: Edema  No date: HTN (hypertension)  No date: Hypocalcemia  No date: Hyponatremia  No date: Osteoarthritis    Past Surgical History:  No date: BREAST CYST EXCISION  2/2/2023: CAUDAL EPIDURAL STEROID INJECTION; N/A      Comment:  Procedure: Injection-steroid-epidural-caudal;  Surgeon:                Angel Sparrow MD;  Location: [facility];                 Service: Pain Management;  Laterality: N/A;  No date: FOOT SURGERY    Prior to Admission medications :  Medication amlodipine-benazepril 5-10 mg (LOTREL) 5-10 mg per capsule, Sig Take 1 capsule by mouth once daily., Start Date , End Date , Taking? Yes, Authorizing Provider  Historical Provider    Medication apple cider vinegar 600 mg Cap, Sig Take 600 mg by mouth once daily., Start Date , End Date , Taking? Yes, Authorizing Provider Historical Provider    Medication calcium carbonate (CALCIUM 500 ORAL), Sig Take 500 mg by mouth once daily., Start Date , End Date , Taking? Yes, Authorizing Provider Historical Provider    Medication cholecalciferol, vitamin D3, (VITAMIN D3) 50 mcg (2,000 unit) Cap capsule, Sig Take 2,000 Units by mouth once daily., Start Date , End Date , Taking? Yes, Authorizing Provider Historical Provider    Medication coenzyme Q10 10 mg capsule, Sig Take 10 mg by mouth once daily., Start Date , End Date , Taking? Yes, Authorizing Provider Historical Provider    Medication diclofenac sodium (VOLTAREN ARTHRITIS PAIN) 1 % Gel, Sig Apply 2 g topically once daily.Patient taking differently: Apply 2 g topically as needed., Start Date 4/4/22, End Date , Taking? Yes, Authorizing Provider Lluvia Rahman MD    Medication fluticasone propionate (FLONASE) 50 mcg/actuation nasal spray, Sig INSTILL TWO SPRAYS INTO EACH NOSTRIL TWICE A DAY, Start Date 10/24/22, End Date , Taking? Yes, Authorizing Provider Charles Peacock MD    Medication furosemide (LASIX) 20 MG tablet, Sig Take 20 mg by mouth every 8 (eight) hours as needed., Start Date 3/30/23, End Date , Taking? Yes, Authorizing Provider Historical Provider    Medication LIDOcaine (LIDODERM) 5 %, Sig Place 1 patch onto the skin once daily. Remove & Discard patch within 12 hours or as directed by providerPatient taking differently: Place 1 patch onto the skin daily as needed. Remove & Discard patch within 12 hours or as directed by provider, Start Date 6/10/22, End Date , Taking? Yes, Authorizing Provider Sepideh Smith NP    Medication olmesartan (BENICAR) 40 MG tablet, Sig Take 1 tablet (40 mg total) by mouth once daily., Start Date 3/13/23, End Date 3/12/24, Taking? Yes, Authorizing Provider Charles Peacock  MD    Medication olopatadine (PATANOL) 0.1 % ophthalmic solution, Sig Place into both eyes Daily., Start Date 4/6/20, End Date , Taking? Yes, Authorizing Provider Historical Provider    Medication omega 3-dha-epa-fish oil 1,000 mg (120 mg-180 mg) Cap, Sig Take 1 capsule by mouth once daily., Start Date , End Date , Taking? Yes, Authorizing Provider Historical Provider    Medication omeprazole (PRILOSEC) 20 MG capsule, Sig Take 20 mg by mouth once daily., Start Date , End Date , Taking? Yes, Authorizing Provider Historical Provider    Medication polyethylene glycol 3350 (MIRALAX ORAL), Sig Take 17 g by mouth once daily., Start Date , End Date , Taking? Yes, Authorizing Provider Historical Provider    Medication pregabalin (LYRICA) 75 MG capsule, Sig Take 75 mg by mouth 2 (two) times daily., Start Date 3/30/23, End Date , Taking? Yes, Authorizing Provider Historical Provider    Medication psyllium 0.52 gram capsule, Sig Take 0.52 g by mouth daily as needed., Start Date , End Date , Taking? Yes, Authorizing Provider Historical Provider    Medication selenium 200 mcg Cap, Sig Take 200 mcg by mouth Daily., Start Date , End Date , Taking? Yes, Authorizing Provider Historical Provider    Medication sulfamethoxazole-trimethoprim 800-160mg (BACTRIM DS) 800-160 mg Tab, Sig Take 1 tablet by mouth 2 (two) times daily. for 7 days, Start Date 5/6/23, End Date 5/13/23, Taking? Yes, Authorizing Provider Santos Parker MD    Medication SYMBICORT 80-4.5 mcg/actuation HFAA, Sig Inhale 2 puffs into the lungs 2 (two) times a day., Start Date 4/13/23, End Date 4/12/24, Taking? Yes, Authorizing Provider Charles Peacock MD    Medication traMADoL (ULTRAM) 50 mg tablet, Sig Take 1 tablet (50 mg total) by mouth every 12 (twelve) hours as needed for Pain., Start Date 4/25/23, End Date , Taking? Yes, Authorizing Provider Charles Peacock MD    Medication triamcinolone acetonide 0.1% (KENALOG) 0.1 % cream, Sig APPLY TO AFFECTED AREA TWICE A  DAYPatient taking differently: Apply topically 2 (two) times daily as needed., Start Date 3/29/22, End Date , Taking? Yes, Authorizing Provider Charles Peacock MD    Medication vit A/vit C/vit E/zinc/copper (PRESERVISION AREDS ORAL), Sig Take 1 capsule by mouth 2 (two) times a day., Start Date , End Date , Taking? Yes, Authorizing Provider Historical Provider    Medication zinc sulfate (ZINC-15 ORAL), Sig Take 15 mg by mouth once daily., Start Date , End Date , Taking? Yes, Authorizing Provider Historical Provider    Medication acetaminophen (TYLENOL) 500 MG tablet, Sig Take 500 mg by mouth every 6 (six) hours as needed for Pain., Start Date , End Date , Taking? , Authorizing Provider Historical Provider    Medication albuterol (PROVENTIL) 2.5 mg /3 mL (0.083 %) nebulizer solution, Sig Take 2.5 mg by nebulization 2 (two) times daily as needed., Start Date 1/24/18, End Date , Taking? , Authorizing Provider Historical Provider    Medication albuterol (PROVENTIL/VENTOLIN HFA) 90 mcg/actuation inhaler, Sig Inhale 2 puffs into the lungs every 6 (six) hours as needed for Wheezing. Rescue, Start Date , End Date , Taking? , Authorizing Provider Historical Provider      No current facility-administered medications on file prior to encounter.  Current Outpatient Medications on File Prior to Encounter:  amlodipine-benazepril 5-10 mg (LOTREL) 5-10 mg per capsule, Take 1 capsule by mouth once daily., Disp: , Rfl:   apple cider vinegar 600 mg Cap, Take 600 mg by mouth once daily., Disp: , Rfl:   calcium carbonate (CALCIUM 500 ORAL), Take 500 mg by mouth once daily., Disp: , Rfl:   cholecalciferol, vitamin D3, (VITAMIN D3) 50 mcg (2,000 unit) Cap capsule, Take 2,000 Units by mouth once daily., Disp: , Rfl:   coenzyme Q10 10 mg capsule, Take 10 mg by mouth once daily., Disp: , Rfl:   diclofenac sodium (VOLTAREN ARTHRITIS PAIN) 1 % Gel, Apply 2 g topically once daily. (Patient taking differently: Apply 2 g topically as needed.),  Disp: 1 each, Rfl: 11  fluticasone propionate (FLONASE) 50 mcg/actuation nasal spray, INSTILL TWO SPRAYS INTO EACH NOSTRIL TWICE A DAY, Disp: 48 mL, Rfl: 2  furosemide (LASIX) 20 MG tablet, Take 20 mg by mouth every 8 (eight) hours as needed., Disp: , Rfl:   LIDOcaine (LIDODERM) 5 %, Place 1 patch onto the skin once daily. Remove & Discard patch within 12 hours or as directed by provider (Patient taking differently: Place 1 patch onto the skin daily as needed. Remove & Discard patch within 12 hours or as directed by provider), Disp: 5 patch, Rfl: 0  olmesartan (BENICAR) 40 MG tablet, Take 1 tablet (40 mg total) by mouth once daily., Disp: 90 tablet, Rfl: 3  olopatadine (PATANOL) 0.1 % ophthalmic solution, Place into both eyes Daily., Disp: , Rfl:   omega 3-dha-epa-fish oil 1,000 mg (120 mg-180 mg) Cap, Take 1 capsule by mouth once daily., Disp: , Rfl:   omeprazole (PRILOSEC) 20 MG capsule, Take 20 mg by mouth once daily., Disp: , Rfl:   polyethylene glycol 3350 (MIRALAX ORAL), Take 17 g by mouth once daily., Disp: , Rfl:   pregabalin (LYRICA) 75 MG capsule, Take 75 mg by mouth 2 (two) times daily., Disp: , Rfl:   psyllium 0.52 gram capsule, Take 0.52 g by mouth daily as needed., Disp: , Rfl:   selenium 200 mcg Cap, Take 200 mcg by mouth Daily., Disp: , Rfl:   [] sulfamethoxazole-trimethoprim 800-160mg (BACTRIM DS) 800-160 mg Tab, Take 1 tablet by mouth 2 (two) times daily. for 7 days, Disp: 14 tablet, Rfl: 0  SYMBICORT 80-4.5 mcg/actuation HFAA, Inhale 2 puffs into the lungs 2 (two) times a day., Disp: 10.2 g, Rfl: 6  traMADoL (ULTRAM) 50 mg tablet, Take 1 tablet (50 mg total) by mouth every 12 (twelve) hours as needed for Pain., Disp: 60 tablet, Rfl: 5  triamcinolone acetonide 0.1% (KENALOG) 0.1 % cream, APPLY TO AFFECTED AREA TWICE A DAY (Patient taking differently: Apply topically 2 (two) times daily as needed.), Disp: 454 g, Rfl: 3  vit A/vit C/vit E/zinc/copper (PRESERVISION AREDS ORAL), Take 1 capsule  by mouth 2 (two) times a day., Disp: , Rfl:   zinc sulfate (ZINC-15 ORAL), Take 15 mg by mouth once daily., Disp: , Rfl:   acetaminophen (TYLENOL) 500 MG tablet, Take 500 mg by mouth every 6 (six) hours as needed for Pain., Disp: , Rfl:   albuterol (PROVENTIL) 2.5 mg /3 mL (0.083 %) nebulizer solution, Take 2.5 mg by nebulization 2 (two) times daily as needed., Disp: , Rfl:   albuterol (PROVENTIL/VENTOLIN HFA) 90 mcg/actuation inhaler, Inhale 2 puffs into the lungs every 6 (six) hours as needed for Wheezing. Rescue, Disp: , Rfl:          Review of patient's allergies indicates:   -- Cephalexin    -- Codeine    -- Meperidine     --  Other reaction(s): delerium, hallucination             Delerium             Delerium             Delerium   -- Penicillins    -- Tazarotene     --  Other reaction(s): rash, Unknown    Social History:   reports that she has quit smoking. She has never used smokeless tobacco. She reports that she does not currently use alcohol. She reports that she does not use drugs.     Review of patient's family history indicates:      PHYSICAL EXAMINATION  Temp:  [97 °F (36.1 °C)-98.6 °F (37 °C)] 97 °F (36.1 °C)  Pulse:  [56-68] 68  Resp:  [16-20] 18  SpO2:  [95 %-100 %] 96 %  BP: (151-181)/(67-78) 158/70    General Condition:   Alert x 3     Head & Neck  Anemia: None  Jaundice: None  Neck vein: Not distended  Carotid Bruits: none  Lymph nodes: none palpable  Thyroid: normal    Chest: normal    Heart: normal    Rt. Breast: not examined  Lt. Breast: not examined  Axillary lymph nodes: none    Abdomen: Soft,  None tender with no palpable mass or organ  Hernia: none    Rectal: Defered    Extremities: infected right lower leg wound medial aspect 4 x 5 centimeter  with infected tissue at the base.    Vascular: normal    Specific focus Examination  Infected traumatic right lower leg wound for past 4 weeks.    Imp: infected diabetic traumatic wound right loer leg medial aspect, dm   Pvd    Plan: check cultures  and ano local wound care orders written.

## 2023-05-15 ENCOUNTER — PATIENT OUTREACH (OUTPATIENT)
Dept: ADMINISTRATIVE | Facility: OTHER | Age: 88
End: 2023-05-15
Payer: MEDICARE

## 2023-05-15 LAB
ALBUMIN SERPL BCP-MCNC: 3 G/DL (ref 3.5–5.2)
ALP SERPL-CCNC: 116 U/L (ref 55–135)
ALT SERPL W/O P-5'-P-CCNC: 16 U/L (ref 10–44)
ANION GAP SERPL CALC-SCNC: 10 MMOL/L (ref 8–16)
AST SERPL-CCNC: 24 U/L (ref 10–40)
BACTERIA SPEC AEROBE CULT: NORMAL
BASOPHILS # BLD AUTO: 0.06 K/UL (ref 0–0.2)
BASOPHILS NFR BLD: 1.4 % (ref 0–1.9)
BILIRUB SERPL-MCNC: 0.2 MG/DL (ref 0.1–1)
BUN SERPL-MCNC: 38 MG/DL (ref 8–23)
CALCIUM SERPL-MCNC: 9.3 MG/DL (ref 8.7–10.5)
CHLORIDE SERPL-SCNC: 104 MMOL/L (ref 95–110)
CO2 SERPL-SCNC: 20 MMOL/L (ref 23–29)
CREAT SERPL-MCNC: 2 MG/DL (ref 0.5–1.4)
DIFFERENTIAL METHOD: ABNORMAL
EOSINOPHIL # BLD AUTO: 0.1 K/UL (ref 0–0.5)
EOSINOPHIL NFR BLD: 2.9 % (ref 0–8)
ERYTHROCYTE [DISTWIDTH] IN BLOOD BY AUTOMATED COUNT: 12.1 % (ref 11.5–14.5)
EST. GFR  (NO RACE VARIABLE): 24 ML/MIN/1.73 M^2
GLUCOSE SERPL-MCNC: 91 MG/DL (ref 70–110)
HCT VFR BLD AUTO: 30.1 % (ref 37–48.5)
HGB BLD-MCNC: 9.9 G/DL (ref 12–16)
IMM GRANULOCYTES # BLD AUTO: 0.01 K/UL (ref 0–0.04)
IMM GRANULOCYTES NFR BLD AUTO: 0.2 % (ref 0–0.5)
LYMPHOCYTES # BLD AUTO: 1 K/UL (ref 1–4.8)
LYMPHOCYTES NFR BLD: 24.3 % (ref 18–48)
MAGNESIUM SERPL-MCNC: 2 MG/DL (ref 1.6–2.6)
MCH RBC QN AUTO: 30.4 PG (ref 27–31)
MCHC RBC AUTO-ENTMCNC: 32.9 G/DL (ref 32–36)
MCV RBC AUTO: 92 FL (ref 82–98)
MONOCYTES # BLD AUTO: 0.4 K/UL (ref 0.3–1)
MONOCYTES NFR BLD: 10.6 % (ref 4–15)
NEUTROPHILS # BLD AUTO: 2.5 K/UL (ref 1.8–7.7)
NEUTROPHILS NFR BLD: 60.6 % (ref 38–73)
NRBC BLD-RTO: 0 /100 WBC
PHOSPHATE SERPL-MCNC: 5.4 MG/DL (ref 2.7–4.5)
PLATELET # BLD AUTO: 336 K/UL (ref 150–450)
PMV BLD AUTO: 9.8 FL (ref 9.2–12.9)
POCT GLUCOSE: 104 MG/DL (ref 70–110)
POCT GLUCOSE: 111 MG/DL (ref 70–110)
POCT GLUCOSE: 123 MG/DL (ref 70–110)
POCT GLUCOSE: 133 MG/DL (ref 70–110)
POTASSIUM SERPL-SCNC: 5.1 MMOL/L (ref 3.5–5.1)
PROT SERPL-MCNC: 6.1 G/DL (ref 6–8.4)
RBC # BLD AUTO: 3.26 M/UL (ref 4–5.4)
SODIUM SERPL-SCNC: 134 MMOL/L (ref 136–145)
WBC # BLD AUTO: 4.16 K/UL (ref 3.9–12.7)

## 2023-05-15 PROCEDURE — 94640 AIRWAY INHALATION TREATMENT: CPT

## 2023-05-15 PROCEDURE — 25000003 PHARM REV CODE 250: Performed by: STUDENT IN AN ORGANIZED HEALTH CARE EDUCATION/TRAINING PROGRAM

## 2023-05-15 PROCEDURE — 25000242 PHARM REV CODE 250 ALT 637 W/ HCPCS

## 2023-05-15 PROCEDURE — 11000001 HC ACUTE MED/SURG PRIVATE ROOM

## 2023-05-15 PROCEDURE — 36415 COLL VENOUS BLD VENIPUNCTURE: CPT | Performed by: STUDENT IN AN ORGANIZED HEALTH CARE EDUCATION/TRAINING PROGRAM

## 2023-05-15 PROCEDURE — 83735 ASSAY OF MAGNESIUM: CPT | Performed by: STUDENT IN AN ORGANIZED HEALTH CARE EDUCATION/TRAINING PROGRAM

## 2023-05-15 PROCEDURE — 80053 COMPREHEN METABOLIC PANEL: CPT | Performed by: STUDENT IN AN ORGANIZED HEALTH CARE EDUCATION/TRAINING PROGRAM

## 2023-05-15 PROCEDURE — 84100 ASSAY OF PHOSPHORUS: CPT | Performed by: STUDENT IN AN ORGANIZED HEALTH CARE EDUCATION/TRAINING PROGRAM

## 2023-05-15 PROCEDURE — 25000003 PHARM REV CODE 250

## 2023-05-15 PROCEDURE — 85025 COMPLETE CBC W/AUTO DIFF WBC: CPT | Performed by: STUDENT IN AN ORGANIZED HEALTH CARE EDUCATION/TRAINING PROGRAM

## 2023-05-15 PROCEDURE — 94761 N-INVAS EAR/PLS OXIMETRY MLT: CPT

## 2023-05-15 PROCEDURE — 63600175 PHARM REV CODE 636 W HCPCS: Performed by: STUDENT IN AN ORGANIZED HEALTH CARE EDUCATION/TRAINING PROGRAM

## 2023-05-15 RX ORDER — GUAIFENESIN 600 MG/1
600 TABLET, EXTENDED RELEASE ORAL 2 TIMES DAILY
Status: DISCONTINUED | OUTPATIENT
Start: 2023-05-15 | End: 2023-05-17 | Stop reason: HOSPADM

## 2023-05-15 RX ORDER — BENZONATATE 100 MG/1
100 CAPSULE ORAL 3 TIMES DAILY PRN
Status: DISCONTINUED | OUTPATIENT
Start: 2023-05-15 | End: 2023-05-17 | Stop reason: HOSPADM

## 2023-05-15 RX ADMIN — TRAMADOL HYDROCHLORIDE 50 MG: 50 TABLET, COATED ORAL at 10:05

## 2023-05-15 RX ADMIN — IPRATROPIUM BROMIDE AND ALBUTEROL SULFATE 3 ML: 2.5; .5 SOLUTION RESPIRATORY (INHALATION) at 08:05

## 2023-05-15 RX ADMIN — ACETAMINOPHEN 500 MG: 500 TABLET ORAL at 08:05

## 2023-05-15 RX ADMIN — CALCIUM CARBONATE (ANTACID) CHEW TAB 500 MG 500 MG: 500 CHEW TAB at 09:05

## 2023-05-15 RX ADMIN — OMEPRAZOLE 40 MG: 40 CAPSULE, DELAYED RELEASE ORAL at 09:05

## 2023-05-15 RX ADMIN — FLUTICASONE FUROATE AND VILANTEROL TRIFENATATE 1 PUFF: 100; 25 POWDER RESPIRATORY (INHALATION) at 09:05

## 2023-05-15 RX ADMIN — Medication 6 MG: at 10:05

## 2023-05-15 RX ADMIN — POLYETHYLENE GLYCOL 3350 17 G: 17 POWDER, FOR SOLUTION ORAL at 09:05

## 2023-05-15 RX ADMIN — PREGABALIN 75 MG: 75 CAPSULE ORAL at 09:05

## 2023-05-15 RX ADMIN — LOSARTAN POTASSIUM 100 MG: 50 TABLET, FILM COATED ORAL at 09:05

## 2023-05-15 RX ADMIN — MUPIROCIN: 20 OINTMENT TOPICAL at 09:05

## 2023-05-15 RX ADMIN — IPRATROPIUM BROMIDE AND ALBUTEROL SULFATE 3 ML: 2.5; .5 SOLUTION RESPIRATORY (INHALATION) at 12:05

## 2023-05-15 RX ADMIN — AMLODIPINE BESYLATE 5 MG: 5 TABLET ORAL at 09:05

## 2023-05-15 RX ADMIN — COLLAGENASE SANTYL: 250 OINTMENT TOPICAL at 09:05

## 2023-05-15 RX ADMIN — PREGABALIN 75 MG: 75 CAPSULE ORAL at 10:05

## 2023-05-15 RX ADMIN — SODIUM CHLORIDE, POTASSIUM CHLORIDE, SODIUM LACTATE AND CALCIUM CHLORIDE 250 ML: 600; 310; 30; 20 INJECTION, SOLUTION INTRAVENOUS at 09:05

## 2023-05-15 RX ADMIN — IPRATROPIUM BROMIDE AND ALBUTEROL SULFATE 3 ML: 2.5; .5 SOLUTION RESPIRATORY (INHALATION) at 09:05

## 2023-05-15 RX ADMIN — BENZONATATE 100 MG: 100 CAPSULE ORAL at 11:05

## 2023-05-15 RX ADMIN — TRAMADOL HYDROCHLORIDE 50 MG: 50 TABLET, COATED ORAL at 05:05

## 2023-05-15 RX ADMIN — CHOLECALCIFEROL (VITAMIN D3) 10 MCG (400 UNIT) TABLET 1200 UNITS: at 09:05

## 2023-05-15 RX ADMIN — GUAIFENESIN 600 MG: 600 TABLET, EXTENDED RELEASE ORAL at 11:05

## 2023-05-15 RX ADMIN — IPRATROPIUM BROMIDE AND ALBUTEROL SULFATE 3 ML: 2.5; .5 SOLUTION RESPIRATORY (INHALATION) at 04:05

## 2023-05-15 NOTE — PLAN OF CARE
Problem: Pain Acute  Goal: Acceptable Pain Control and Functional Ability  Outcome: Ongoing, Progressing     Received tylenol and tramadol x1 on this shift. Pain 4 to 6/ 10 in R leg.

## 2023-05-15 NOTE — PROGRESS NOTES
IP Liaison - Initial Visit Note    Patient: Bridget Montgomery  MRN:  [id number]  Date of Service:  5/15/2023  Completed by:  SELENA Zuñiga    Reason for Visit   Patient presents with    IP Liaison Initial Visit       RSW met with patient and pt daughter at bedside in order to complete SDOH questionnaire and liaison assessment.  Pt has identified no immediate social barriers to care. Pt and pt daughter interested in Medicaid information to add Medicaid to pt PHN insurance. RSW provided pt with Medicaid contact information to call for information. Pt declined the need for resources at this time.    The following were addressed during this visit:  - Review SDOH Questions   - Complete patient assessment   - Complete initial visit with patient        Patient Summary     IP Liaison Patient Assessment    General  Level of Caregiver support: Member independent and does not need caregiver assistance  Have you had to make a decision between paying for any of the following in the last 2 months?: None  Transportation means: Family, Self  Employment status: Retired and not working  Assessments  Was the PHQ Depression Screening completed this visit?: No  Was the ISABELA-7 Screening completed this visit?: No       SELENA Zuñiga

## 2023-05-15 NOTE — PROGRESS NOTES
[facility] Medicine Progress Note    Primary Team: [facility] Hospitalist Team B  Attending Physician: Herve Daniel MD  Resident: Marcelino  Intern: Kerry    Subjective:      ANYA.  Continues to have pain in her RLLL but it is controlled with current pain regimen. Denies fevers/chills/SOB/N/V.    Objective:     Last 24 Hour Vital Signs:  BP  Min: 133/61  Max: 181/78  Temp  Av.4 °F (36.3 °C)  Min: 97 °F (36.1 °C)  Max: 97.8 °F (36.6 °C)  Pulse  Av.9  Min: 56  Max: 76  Resp  Av  Min: 16  Max: 20  SpO2  Av.8 %  Min: 96 %  Max: 100 %  Weight  Av.7 kg (144 lb 13.5 oz)  Min: 65.7 kg (144 lb 13.5 oz)  Max: 65.7 kg (144 lb 13.5 oz)  I/O last 3 completed shifts:  In: 990 [P.O.:990]  Out: 2900 [Urine:2900]    Physical Examination:  Physical Exam  Constitutional:       General: She is not in acute distress.     Appearance: She is not toxic-appearing.   Eyes:      Conjunctiva/sclera: Conjunctivae normal.      Pupils: Pupils are equal, round, and reactive to light.   Cardiovascular:      Rate and Rhythm: Normal rate and regular rhythm.      Pulses: Normal pulses.      Heart sounds: Normal heart sounds.   Pulmonary:      Effort: Pulmonary effort is normal. No respiratory distress.      Breath sounds: Normal breath sounds. No wheezing.   Abdominal:      General: Bowel sounds are normal. There is no distension.      Palpations: Abdomen is soft.      Tenderness: There is no abdominal tenderness.   Musculoskeletal:      Right lower leg: No edema.      Left lower leg: No edema.      Comments: Tenderness to palpation of right lower leg. No crepitus    Skin:     Capillary Refill: Capillary refill takes less than 2 seconds.      Findings: Wound (Right lower leg, no purulent drainage, no erythematous borders) present.   Neurological:      General: No focal deficit present.      Mental Status: She is alert and oriented to person, place, and time.   Psychiatric:         Mood and Affect: Mood normal.         Behavior:  Behavior normal.       Laboratory:  Laboratory Data Reviewed: yes  Pertinent Findings:  Recent Labs   Lab 05/13/23  0614 05/13/23  0804 05/14/23  0557 05/15/23  0506   WBC 4.48  --  3.90 4.16   HGB 9.4*  --  9.4* 9.9*   HCT 28.2*  --  28.5* 30.1*     --  320 336   MCV 93  --  92 92   RDW 11.8  --  11.9 12.1   * 131* 133* 134*   K 5.3* 4.4 4.6 5.1    101 104 104   CO2 23 21* 24 20*   BUN 38* 35* 33* 38*   CREATININE 1.7* 1.6* 1.5* 2.0*   GLU 83 86 90 91   PROT 6.1  --  5.7* 6.1   ALBUMIN 3.0*  --  2.9* 3.0*   BILITOT 0.2  --  0.2 0.2   AST 28  --  25 24   ALKPHOS 126  --  110 116   ALT 17  --  15 16         Microbiology Data Reviewed:  Pertinent Findings:  Microbiology Results (last 7 days)       Procedure Component Value Units Date/Time    Aerobic culture [[id number]] Collected: 05/14/23 1658    Order Status: Sent Specimen: Wound from Leg, Right Updated: 05/15/23 0103    Culture, Anaerobe [[id number]] Collected: 05/14/23 1658    Order Status: Sent Specimen: Wound from Leg, Right Updated: 05/15/23 0103    Aerobic culture (Specify Source) **CANNOT BE ORDERED AS STAT** [[id number]] Collected: 05/12/23 1819    Order Status: Completed Specimen: Wound from Leg, Right Updated: 05/14/23 0722     Aerobic Bacterial Culture No significant isolate to date    Aerobic culture (Specify Source) **CANNOT BE ORDERED AS STAT** [[id number]]     Order Status: Canceled Specimen: Wound     Blood culture [[id number]]     Order Status: Canceled Specimen: Blood     Blood culture [[id number]]     Order Status: Canceled Specimen: Blood           Other Results:  EKG (my interpretation): no study to review    Radiology Data Reviewed: yes  Pertinent Findings:  X-Ray Tibia Fibula 2 View Right   Final Result      1. No acute displaced fracture or dislocation of the tibia or fibula.         Electronically signed by: Juan Shelton MD   Date:    05/12/2023   Time:    19:14      US Lower Extremity Veins Right   Final Result      No  evidence of deep venous thrombosis in the right lower extremity.         Electronically signed by: Juan Shelton MD   Date:    05/12/2023   Time:    18:30             Current Medications:     Infusions:       Scheduled:   albuterol-ipratropium  3 mL Nebulization Q4H WAKE    amLODIPine  5 mg Oral Daily    calcium carbonate  500 mg Oral Daily    cholecalciferol (vitamin D3)  1,200 Units Oral Daily    collagenase   Topical (Top) Daily    enoxaparin  30 mg Subcutaneous Daily    fluticasone furoate-vilanteroL  1 puff Inhalation Daily    lactated ringers  250 mL Intravenous Once    losartan  100 mg Oral Daily    melatonin  6 mg Oral Nightly    mupirocin   Topical (Top) Daily    omeprazole  40 mg Oral Before breakfast    polyethylene glycol  17 g Oral Daily    pregabalin  75 mg Oral BID    sulfamethoxazole-trimethoprim 800-160mg  1 tablet Oral Daily        PRN:  acetaminophen, dextrose 10%, dextrose 10%, dextrose, dextrose, famotidine, glucagon (human recombinant), naloxone, sodium chloride 0.9%, traMADoL    Antibiotics and Day Number of Therapy:  Antibiotics (72h ago, onward)      Start     Stop Route Frequency Ordered    05/14/23 1700  mupirocin 2 % ointment         -- Top Daily 05/14/23 1658    05/12/23 2045  sulfamethoxazole-trimethoprim 800-160mg per tablet 1 tablet         05/15 0859 Oral Daily 05/12/23 2027              Lines and Day Number of Therapy:  PIV 5/12      Assessment and Plan:     Bridget Montgomery is a 88 y.o. F with a PMH of CKDIV, COPD, HTN, prediabetes (A1c 6.1 3/2023), and DVT (2022) who presents for a nonhealing right lower leg wound that has been present for 3 weeks. Pt afebrile and no leukocytosis. Pt completed 6 days of Bactrim prior to admission. Low clinical suspicion of infection. Neg erythema, crepitus, or purulent drainage. XR neg for obvious infection. Due to severe pain, ordered CRP and ESR which were not significant. If continued pain, may consider further imaging with MRI or  possible bone biopsy. Dr. Mohan consulted for wound management. Pending aerobic cultures.     Right Lower Extremity Wound  WBC 6.4, pt afebrile on admit. Photo in media tab. Has HH wound care set up  - Neg for purulent drainage, crepitus, or erythematous borders  - XR Tibia: no obvious signs of osteo  - CRP 7.8 and ESR 37  - Consulted Dr Mohan to see if pt would need to follow him outpatient  - Given dose of Bactrim 800 and completed 7 day course as previously prescribed  - Aerobic cx from 5/12 NG   - Further abx pending Dr. Mohan recs  - Aerobic and anaerobic wound cx pending  - May consider MRI or bone biopsy     Pain 2/2 Right Lower Extremity Wound  - Ordered Tylenol 500mg q6 PRN  - Tramadol 50mg BID PRN     Spinal Stenosis  - Continue home pregabalin 75mg BID  - NSGY consulted, no plans for surgical intervention at this time. Recommending f/u w/ NSGY as outpatient if radicular leg symptoms worsen     HTN  /74 on admit. Home med: olmesartan 40mg  - Likely elevated 2/2 pain  - Goal systolics <210  - Continue amlodipine 5mg and losartan 100mg     H/o DVT  - Most recently in 2022  - Not on home anticoagulation  - DVT US 5/2023 neg    Hyponatremia, improving  Na 129 on admit  - Completed IVF bolus @50ml/hr  - Na improved to 133 this morning  - Continuing to encourage PO intake     RADHA on CKDIV  Cr 1.9 on admit, baseline ~ 1.4  - Giving 250mL LR bolus     Microcytic Anemia  H/H 9.6/28.7 on admit  - Iron studies 2/2023: Fe 43, Transferrin 257, TIBC 380, Saturated iron 11  - Ferritin 2/2023 57  - Transfuse Hg <7     COPD  - Home symbicort  - Continue on fluticasone furoate-vilanterol 100-25mcg     HLD  - Lipid panel: Chol 243, HDL 60, , TG 80  - Given advanced age, benefits does not outweigh risks for statin therapy    Hypoalbuminemia  Albumin 3.3 on admit  - CTM     Prediabetes  - A1c 6.1% 3/2023  - CTM     Constipation  - Continue Miralax bowel regimen       Code Status: Full code  Diet: Renal  DVT:  Lovenox    Dispo: Pending wound cxs      Zaid Payne, DO  [facility] Internal Medicine, PGY-1  [facility] Hospital Medicine Team B    [facility] Medicine Hospitalist Pager numbers:   [facility] Hospitalist Medicine Team B (Fredy/Eliana):  [phone]

## 2023-05-15 NOTE — DISCHARGE SUMMARY
[facility] Hospital Medicine Discharge Summary    Primary Team: [facility] Hospitalist Team B  Attending Physician: Scott Monroy MD  Resident: Marcelino  Intern: Kerry    Date of Admit: 5/12/2023  Date of Discharge: 5/17/2023    Discharge to: Home  Condition: Stable    Discharge Diagnoses     Patient Active Problem List   Diagnosis    Chronic kidney disease, stage III (moderate)    Type 2 diabetes mellitus with diabetic chronic kidney disease    Spinal stenosis of lumbar region    Senile purpura    Primary localized osteoarthritis of pelvic region and thigh    Idiopathic osteoarthritis    Osteopenia    Neuralgia of right sciatic nerve    Mixed hyperlipidemia    Mild recurrent major depression    Diabetic renal disease    Chronic obstructive pulmonary disease    Allergic rhinitis    Essential hypertension    Gastro-esophageal reflux disease without esophagitis    Lumbar spondylosis    Ovarian mass    Spinal cord disorder    Ulcer of esophagus    Greater trochanteric bursitis    Osteoarthritis of both knees    Lumbar radiculopathy    Acute chorea    Non-healing wound of right lower extremity       Consultants and Procedures     Consultants:  Wound Care  ID  NSGY    Procedures:   none    Imaging:    XR Tibia Fibula Right 5/12  1. No acute displaced fracture or dislocation of the tibia or fibula.     US Lower Extremity Right 5/12  No evidence of deep venous thrombosis in the right lower extremity.    Brief History of Present Illness      Bridget Montgomery is a 88 y.o. F with a PMH of CKDIV, COPD, HTN, prediabetes (A1c 6.1 3/2023), and DVT (2022) who presents for a nonhealing right lower leg wound that has been present for 3 weeks. Pt afebrile and no leukocytosis. Pt completed 6 days of Bactrim prior to admission. Low clinical suspicion of infection. Neg erythema, crepitus, or purulent drainage. XR neg for obvious infection. Due to severe pain, ordered CRP and ESR which were not significant. NSGY consulted to evaluate spinal  stenosis; no acute interventions at this time. Aerobic cx obtained 5/12 NG. Dr. Mohan consulted for wound management, obtained additional wound cxs and states can follow them outpatient. Wounds improving daily. ID consulted, do not recommend further abx. Pt stable to be discharged and follow up with Dr. Mohan in wound care clinic.    For the full HPI please refer to the History & Physical from this admission.    Hospital Course By Problem with Pertinent Findings     Right Lower Extremity Wound  WBC 6.4, pt afebrile on admit. Photo in media tab. Has HH wound care set up  - Neg for purulent drainage, crepitus, or erythematous borders  - XR Tibia: no obvious signs of osteo  - CRP 7.8 and ESR 37  - Given dose of Bactrim 800 and completed 7 day course as previously prescribed  - Aerobic cx from 5/12    - Consulted Dr Mohan for wound care: will follow pt outpatient  - Aerobic cx 5/14 neg and anaerobic wound cx pending  - ID consulted, no Abx recommended     Pain 2/2 Right Lower Extremity Wound  - Ordered Tylenol 500mg q6 PRN  - Tramadol 50mg BID PRN     Spinal Stenosis  - Continue home pregabalin 75mg BID  - NSGY consulted, no plans for surgical intervention at this time. Recommending f/u w/ NSGY as outpatient if radicular leg symptoms worsen  - Continue outpatient PT     HTN  /74 on admit. Home med: olmesartan 40mg  - Likely elevated 2/2 pain  - Goal systolics <210  - Continue home olmesartan 40mg     H/o DVT  - Most recently in 2022  - Not on home anticoagulation  - DVT US 5/2023 neg     Hyponatremia, improving  Na 129 on admit  - Completed IVF bolus @50ml/hr  - Na improved to 133 this morning  - Continuing to encourage PO intake     RADHA on CKDIV, resolved  Cr 1.9 on admit, baseline ~ 1.4  - Improved w/ 250mL LR bolus     Microcytic Anemia  H/H 9.6/28.7 on admit  - Iron studies 2/2023: Fe 43, Transferrin 257, TIBC 380, Saturated iron 11  - Ferritin 2/2023 57     COPD  - Home symbicort  - Continue on  fluticasone furoate-vilanterol 100-25mcg     HLD  - Lipid panel: Chol 243, HDL 60, , TG 80  - Given advanced age, benefits does not outweigh risks for statin therapy     Hypoalbuminemia  Albumin 3.3 on admit  - CTM     Prediabetes  - A1c 6.1% 3/2023  - CTM     Constipation  - Continue Miralax bowel regimen       Discharge Medications        Medication List        START taking these medications      mupirocin 2 % ointment  Commonly known as: BACTROBAN  Apply topically once daily.  Start taking on: May 18, 2023     SantyL ointment  Generic drug: collagenase  Apply topically once daily.  Start taking on: May 18, 2023            CHANGE how you take these medications      traMADoL 50 mg tablet  Commonly known as: ULTRAM  Take 1 tablet (50 mg total) by mouth 2 (two) times daily as needed for Pain.  What changed: when to take this     triamcinolone acetonide 0.1% 0.1 % cream  Commonly known as: KENALOG  APPLY TO AFFECTED AREA TWICE A DAY  What changed:   how much to take  how to take this  when to take this  reasons to take this  additional instructions            CONTINUE taking these medications      acetaminophen 500 MG tablet  Commonly known as: TYLENOL     * albuterol 2.5 mg /3 mL (0.083 %) nebulizer solution  Commonly known as: PROVENTIL     * albuterol 90 mcg/actuation inhaler  Commonly known as: PROVENTIL/VENTOLIN HFA     amlodipine-benazepril 5-10 mg 5-10 mg per capsule  Commonly known as: LOTREL     CALCIUM 500 ORAL     cholecalciferol (vitamin D3) 50 mcg (2,000 unit) Cap capsule  Commonly known as: VITAMIN D3     coenzyme Q10 10 mg capsule     diclofenac sodium 1 % Gel  Commonly known as: VOLTAREN ARTHRITIS PAIN  Apply 2 g topically once daily.     fluticasone propionate 50 mcg/actuation nasal spray  Commonly known as: FLONASE  INSTILL TWO SPRAYS INTO EACH NOSTRIL TWICE A DAY     furosemide 20 MG tablet  Commonly known as: LASIX     LIDOcaine 5 %  Commonly known as: LIDODERM  Place 1 patch onto the skin  once daily. Remove & Discard patch within 12 hours or as directed by provider     MIRALAX ORAL     olmesartan 40 MG tablet  Commonly known as: BENICAR  Take 1 tablet (40 mg total) by mouth once daily.     olopatadine 0.1 % ophthalmic solution  Commonly known as: PATANOL     omega 3-dha-epa-fish oil 1,000 mg (120 mg-180 mg) Cap     omeprazole 20 MG capsule  Commonly known as: PRILOSEC     pregabalin 75 MG capsule  Commonly known as: LYRICA     PRESERVISION AREDS ORAL     psyllium 0.52 gram capsule     selenium 200 mcg Cap     SYMBICORT 80-4.5 mcg/actuation Hfaa  Generic drug: budesonide-formoterol 80-4.5 mcg  Inhale 2 puffs into the lungs 2 (two) times a day.     ZINC-15 ORAL           * This list has 2 medication(s) that are the same as other medications prescribed for you. Read the directions carefully, and ask your doctor or other care provider to review them with you.                STOP taking these medications      apple cider vinegar 600 mg Cap     sulfamethoxazole-trimethoprim 800-160mg 800-160 mg Tab  Commonly known as: BACTRIM DS               Where to Get Your Medications        These medications were sent to [facility]  [address], [place] [address]      Hours: Mon-Fri, 8a-5:30p Phone: [phone]   mupirocin 2 % ointment  SantyL ointment  traMADoL 50 mg tablet         Discharge Information:     Diet:  Renal    Physical Activity:  As tolerated             Instructions:  1. Take all medications as prescribed  2. Keep all follow-up appointments  3. Return to the hospital or call your primary care physicians if any worsening symptoms such as fever, chest pain, shortness of breath, return of symptoms, or any other concerns.    Follow-Up Appointments:  Dr. Mohan Wound Care  Physical Therapy  PCP    Zaid Payne MD  [facility] Internal Medicine HO-I  [facility] Internal Medicine Team B

## 2023-05-15 NOTE — CONSULTS
Wound care consult received for RLE wound- pt already seen by Dr. Mohan with treatment plan in place for RLE wound. Will check with nurse for any other skin/wound concerns.

## 2023-05-15 NOTE — PLAN OF CARE
SW met with pt at bedside this AM to complete DCA. PT reported that she lives at home with her Drt Liliana [phone] and will have her help transport the pt home at time of d/c. Pt stated that she has a RW, SC, Cane, and WC at home that she will use to ambulate. [facility] SW requested f/u appts. White board updated with CM name and contact information.  Discharge brochure provided.  Pt encouraged to call with any questions or concerns.  Cm will continue to follow pt through transitions of care and assist with any discharge needs.    Nura Nieves, DB  [phone]    Future Appointments   Date Time Provider Department Center   9/13/2023 10:30 AM Charles Peacock MD [facility]        05/15/23 1037   Discharge Assessment   Assessment Type Discharge Planning Assessment   Confirmed/corrected address, phone number and insurance Yes   Confirmed Demographics Correct on Facesheet   Source of Information patient   When was your last doctors appointment?   (per pt feburary)   Communicated MIKAYLA with patient/caregiver Yes   Reason For Admission Non-healing wound of right lower extremity   People in Home child(trung), adult   Facility Arrived From: home   Do you expect to return to your current living situation? No   Do you have help at home or someone to help you manage your care at home? Yes   Who are your caregiver(s) and their phone number(s)? Joao Ford [phone]   Prior to hospitilization cognitive status: Alert/Oriented   Current cognitive status: Alert/Oriented   Walking or Climbing Stairs ambulation difficulty, requires equipment   Dressing/Bathing bathing difficulty, requires equipment   Do you have any problems with: Errands/Grocery   Home Accessibility wheelchair accessible   Home Layout Able to live on 1st floor   Equipment Currently Used at Home walker, rolling;shower chair;cane, straight;wheelchair   Readmission within 30 days? No   Patient currently being followed by  outpatient case management? No   Do you currently have service(s) that help you manage your care at home? No   Do you take prescription medications? Yes   Do you have prescription coverage? Yes   Coverage PHN   Do you have any problems affording any of your prescribed medications? No   Is the patient taking medications as prescribed? yes   Who is going to help you get home at discharge? Joao Ford [phone]   How do you get to doctors appointments? family or friend will provide   Are you on dialysis? No   Do you take coumadin? No   Discharge Plan A Home Health   DME Needed Upon Discharge  none   Discharge Plan discussed with: Adult children   Transition of Care Barriers None   Financial Resource Strain   How hard is it for you to pay for the very basics like food, housing, medical care, and heating? Not hard   Housing Stability   In the last 12 months, was there a time when you were not able to pay the mortgage or rent on time? N   In the last 12 months, was there a time when you did not have a steady place to sleep or slept in a shelter (including now)? N   Transportation Needs   In the past 12 months, has lack of transportation kept you from medical appointments or from getting medications? no   In the past 12 months, has lack of transportation kept you from meetings, work, or from getting things needed for daily living? No   Food Insecurity   Within the past 12 months, you worried that your food would run out before you got the money to buy more. Never true   Within the past 12 months, the food you bought just didn't last and you didn't have money to get more. Never true   Stress   Do you feel stress - tense, restless, nervous, or anxious, or unable to sleep at night because your mind is troubled all the time - these days? Only a littl   Social Connections   In a typical week, how many times do you talk on the phone with family, friends, or neighbors? More than 3   How often do you get together with friends  or relatives? More than 3   How often do you attend Scientology or Restorationist services? 1 to 4   Do you belong to any clubs or organizations such as Scientology groups, unions, fraternal or athletic groups, or school groups? No   How often do you attend meetings of the clubs or organizations you belong to? Never   Are you , , , , never , or living with a partner?    Alcohol Use   Q1: How often do you have a drink containing alcohol? Never   Q2: How many drinks containing alcohol do you have on a typical day when you are drinking? None   Q3: How often do you have six or more drinks on one occasion? Never   OTHER   Name(s) of People in Home Joao Ford [phone]

## 2023-05-15 NOTE — CONSULTS
[facility] Consult Note    Primary Attending Physician: Dr Daniel  Consultant Attending: Dr Salazar     Reason for Consult:     Nonhealing RLE wound x 3 weeks, oral antibiotic recommendations for discharge     Assessment/Recommendations     Bridget Montgomery is a 88 y.o. F with a PMH of CKDIV, COPD, HTN, prediabetes (A1c 6.1 3/2023), and DVT (2022) who presents for a nonhealing RLE wound x 3 weeks.   Right lower extremity ulcer. Afebrile, VSS. No elevation in WBC. Cultures pending. S/p 7 days bactrim.   CKDIV, COPD, HTN, preDM, DVT     Recommendations:  - Continue to follow wound cultures  - Would not restart antibiotic therapy at this time   - Continue wound care  - Confirm with patient where she will be following up for wound care management at discharge       Thank you for allowing us to participate in the care of this patient. Please contact us if you have any questions regarding this consult.    Mayi Oconnor   [facility]    Subjective:      History of Present Illness:  Bridget Montgomery is a 88 y.o. F with a PMH of CKDIV, COPD, HTN, prediabetes (A1c 6.1 3/2023), and DVT (2022) who presents for a nonhealing RLE wound x 3 weeks.     The patient reports that she sustained this injury by hitting it against her  door. At first she thought the wound was just a bruise, but has noted skin sloughing at the site followed by drainage over the past three weeks. She denies fever, chills. Admits to pain at the wound site. She has followed with wound care outpatient for chronic LE wounds that she states are 2/2 edema in the setting of CKD. She presented to the ED for wound evaluation 5/6 at the recommendation of her wound care nurse. She was diagnosed with cellulitis and discharged with a 7 day course of bactrim. She took the abx as directed without difficulty. She presented to the ED again 5/12 at recommendation of wound care who thought that she needed to be evaluated for IV abx  therapy.     Patient has been without elevated white count or fever during admission. Completed 7 day course of bactrim. XR notable for osteopenia. DVT U/S negative. Evaluated by Dr Mohan 5/14 who recommended outpatient oral antibiotic therapy, continued wound care and follow up of cultures. Aerobic and anaerobic cx pending at time of evaluation. [facility] consulted for outpatient antibiotic recommendations.     Past Medical History:  Past Medical History:   Diagnosis Date    Allergy     Anemia, unspecified     Arthritis     CKD (chronic kidney disease) stage 4, GFR 15-29 ml/min     COPD (chronic obstructive pulmonary disease)     Edema     HTN (hypertension)     Hypocalcemia     Hyponatremia     Osteoarthritis        Past Surgical History:  Past Surgical History:   Procedure Laterality Date    BREAST CYST EXCISION      CAUDAL EPIDURAL STEROID INJECTION N/A 2/2/2023    Procedure: Injection-steroid-epidural-caudal;  Surgeon: Angel Sparrow MD;  Location: [facility];  Service: Pain Management;  Laterality: N/A;    FOOT SURGERY         Allergies:  Review of patient's allergies indicates:   Allergen Reactions    Cephalexin     Codeine     Meperidine      Other reaction(s): delerium, hallucination  Delerium  Delerium  Delerium      Penicillins     Tazarotene      Other reaction(s): rash, Unknown       Medications:   In-Hospital Scheduled Medications:   albuterol-ipratropium  3 mL Nebulization Q4H WAKE    amLODIPine  5 mg Oral Daily    calcium carbonate  500 mg Oral Daily    cholecalciferol (vitamin D3)  1,200 Units Oral Daily    collagenase   Topical (Top) Daily    enoxaparin  30 mg Subcutaneous Daily    fluticasone furoate-vilanteroL  1 puff Inhalation Daily    losartan  100 mg Oral Daily    melatonin  6 mg Oral Nightly    mupirocin   Topical (Top) Daily    omeprazole  40 mg Oral Before breakfast    polyethylene glycol  17 g Oral Daily    pregabalin  75 mg Oral BID      In-Hospital PRN Medications:  acetaminophen,  dextrose 10%, dextrose 10%, dextrose, dextrose, famotidine, glucagon (human recombinant), naloxone, sodium chloride 0.9%, traMADoL   In-Hospital IV Infusion Medications:     Home Medications:  Prior to Admission medications    Medication Sig Start Date End Date Taking? Authorizing Provider   amlodipine-benazepril 5-10 mg (LOTREL) 5-10 mg per capsule Take 1 capsule by mouth once daily.   Yes Historical Provider   apple cider vinegar 600 mg Cap Take 600 mg by mouth once daily.   Yes Historical Provider   calcium carbonate (CALCIUM 500 ORAL) Take 500 mg by mouth once daily.   Yes Historical Provider   cholecalciferol, vitamin D3, (VITAMIN D3) 50 mcg (2,000 unit) Cap capsule Take 2,000 Units by mouth once daily.   Yes Historical Provider   coenzyme Q10 10 mg capsule Take 10 mg by mouth once daily.   Yes Historical Provider   diclofenac sodium (VOLTAREN ARTHRITIS PAIN) 1 % Gel Apply 2 g topically once daily.  Patient taking differently: Apply 2 g topically as needed. 4/4/22  Yes Lluvia Rahman MD   fluticasone propionate (FLONASE) 50 mcg/actuation nasal spray INSTILL TWO SPRAYS INTO EACH NOSTRIL TWICE A DAY 10/24/22  Yes Charles Peacock MD   furosemide (LASIX) 20 MG tablet Take 20 mg by mouth every 8 (eight) hours as needed. 3/30/23  Yes Historical Provider   LIDOcaine (LIDODERM) 5 % Place 1 patch onto the skin once daily. Remove & Discard patch within 12 hours or as directed by provider  Patient taking differently: Place 1 patch onto the skin daily as needed. Remove & Discard patch within 12 hours or as directed by provider 6/10/22  Yes Sepideh Smith NP   olmesartan (BENICAR) 40 MG tablet Take 1 tablet (40 mg total) by mouth once daily. 3/13/23 3/12/24 Yes Charles Peacock MD   olopatadine (PATANOL) 0.1 % ophthalmic solution Place into both eyes Daily. 4/6/20  Yes Historical Provider   omega 3-dha-epa-fish oil 1,000 mg (120 mg-180 mg) Cap Take 1 capsule by mouth once daily.   Yes Historical Provider    omeprazole (PRILOSEC) 20 MG capsule Take 20 mg by mouth once daily.   Yes Historical Provider   polyethylene glycol 3350 (MIRALAX ORAL) Take 17 g by mouth once daily.   Yes Historical Provider   pregabalin (LYRICA) 75 MG capsule Take 75 mg by mouth 2 (two) times daily. 3/30/23  Yes Historical Provider   psyllium 0.52 gram capsule Take 0.52 g by mouth daily as needed.   Yes Historical Provider   selenium 200 mcg Cap Take 200 mcg by mouth Daily.   Yes Historical Provider   SYMBICORT 80-4.5 mcg/actuation HFAA Inhale 2 puffs into the lungs 2 (two) times a day. 4/13/23 4/12/24 Yes Charles Peacock MD   traMADoL (ULTRAM) 50 mg tablet Take 1 tablet (50 mg total) by mouth every 12 (twelve) hours as needed for Pain. 4/25/23  Yes Charles Peacock MD   triamcinolone acetonide 0.1% (KENALOG) 0.1 % cream APPLY TO AFFECTED AREA TWICE A DAY  Patient taking differently: Apply topically 2 (two) times daily as needed. 3/29/22  Yes Charles Peacock MD   vit A/vit C/vit E/zinc/copper (PRESERVISION AREDS ORAL) Take 1 capsule by mouth 2 (two) times a day.   Yes Historical Provider   zinc sulfate (ZINC-15 ORAL) Take 15 mg by mouth once daily.   Yes Historical Provider   acetaminophen (TYLENOL) 500 MG tablet Take 500 mg by mouth every 6 (six) hours as needed for Pain.    Historical Provider   albuterol (PROVENTIL) 2.5 mg /3 mL (0.083 %) nebulizer solution Take 2.5 mg by nebulization 2 (two) times daily as needed. 1/24/18   Historical Provider   albuterol (PROVENTIL/VENTOLIN HFA) 90 mcg/actuation inhaler Inhale 2 puffs into the lungs every 6 (six) hours as needed for Wheezing. Rescue    Historical Provider       Family History:  Family History   Problem Relation Age of Onset    Cancer Mother     No Known Problems Father        Social History:  Social History     Tobacco Use    Smoking status: Former    Smokeless tobacco: Never   Substance Use Topics    Alcohol use: Not Currently    Drug use: Never       Review of Systems   All other  systems reviewed and are negative. Pertinent items noted in HPI.    Objective:   Last 24 Hour Vital Signs:  BP  Min: 133/61  Max: 191/79  Temp  Av.6 °F (36.4 °C)  Min: 97.2 °F (36.2 °C)  Max: 98.4 °F (36.9 °C)  Pulse  Av.3  Min: 59  Max: 76  Resp  Av.8  Min: 16  Max: 20  SpO2  Av.2 %  Min: 95 %  Max: 98 %  Weight  Av.7 kg (144 lb 13.5 oz)  Min: 65.7 kg (144 lb 13.5 oz)  Max: 65.7 kg (144 lb 13.5 oz)  I/O last 3 completed shifts:  In: 990 [P.O.:990]  Out: 2900 [Urine:2900]    Physical Exam  Vitals and nursing note reviewed.   Constitutional:       General: She is not in acute distress.     Appearance: Normal appearance. She is not toxic-appearing or diaphoretic.   HENT:      Head: Normocephalic and atraumatic.      Right Ear: External ear normal.      Left Ear: External ear normal.      Nose: Nose normal. No rhinorrhea.      Mouth/Throat:      Mouth: Mucous membranes are moist.      Pharynx: Oropharynx is clear.   Eyes:      Extraocular Movements: Extraocular movements intact.      Conjunctiva/sclera: Conjunctivae normal.   Cardiovascular:      Rate and Rhythm: Normal rate and regular rhythm.      Pulses: Normal pulses.      Heart sounds: Normal heart sounds. No murmur heard.  Pulmonary:      Effort: Pulmonary effort is normal. No respiratory distress.      Breath sounds: Normal breath sounds. No wheezing, rhonchi or rales.   Abdominal:      General: Abdomen is flat. Bowel sounds are normal. There is no distension.      Palpations: Abdomen is soft.      Tenderness: There is no abdominal tenderness.   Skin:     General: Skin is warm and dry.      Capillary Refill: Capillary refill takes less than 2 seconds.      Comments: Lateral RLE with ulceration/wound. No purulent drainage noted.   Neurological:      General: No focal deficit present.      Mental Status: She is alert and oriented to person, place, and time.      Cranial Nerves: No cranial nerve deficit.   Psychiatric:         Mood and  Affect: Mood normal.         Behavior: Behavior normal.             Laboratory Results:  Trended Lab Data:  Recent Labs   Lab 05/13/23  0614 05/13/23  0804 05/14/23  0557 05/15/23  0506   WBC 4.48  --  3.90 4.16   HGB 9.4*  --  9.4* 9.9*   HCT 28.2*  --  28.5* 30.1*     --  320 336   MCV 93  --  92 92   RDW 11.8  --  11.9 12.1   * 131* 133* 134*   K 5.3* 4.4 4.6 5.1    101 104 104   CO2 23 21* 24 20*   BUN 38* 35* 33* 38*   GLU 83 86 90 91   PROT 6.1  --  5.7* 6.1   ALBUMIN 3.0*  --  2.9* 3.0*   BILITOT 0.2  --  0.2 0.2   AST 28  --  25 24   ALKPHOS 126  --  110 116   ALT 17  --  15 16     Urinalysis:   Lab Results   Component Value Date    COLORU Yellow 04/11/2023    CLARITYU Clear 03/14/2023    SPECGRAV 1.015 04/11/2023    NITRITE Negative 04/11/2023    KETONESU Negative 04/11/2023    UROBILINOGEN Negative 04/11/2023       Microbiology Data:  Microbiology Results (last 7 days)       Procedure Component Value Units Date/Time    Aerobic culture (Specify Source) **CANNOT BE ORDERED AS STAT** [[id number]] Collected: 05/12/23 1819    Order Status: Completed Specimen: Wound from Leg, Right Updated: 05/15/23 1407     Aerobic Bacterial Culture Skin vero,  no predominant organism    Aerobic culture [[id number]] Collected: 05/14/23 1658    Order Status: Sent Specimen: Wound from Leg, Right Updated: 05/15/23 0103    Culture, Anaerobe [[id number]] Collected: 05/14/23 1658    Order Status: Sent Specimen: Wound from Leg, Right Updated: 05/15/23 0103    Aerobic culture (Specify Source) **CANNOT BE ORDERED AS STAT** [[id number]]     Order Status: Canceled Specimen: Wound     Blood culture [[id number]]     Order Status: Canceled Specimen: Blood     Blood culture [[id number]]     Order Status: Canceled Specimen: Blood           Antimicrobials:  Bactrim 800-160mg 7 day course completed 5/14/23    Other Results:  Radiology:  X-Ray Tibia Fibula 2 View Right    Result Date: 5/12/2023  EXAMINATION: XR TIBIA FIBULA 2 VIEW  RIGHT CLINICAL HISTORY: Unspecified open wound, right lower leg, initial encounter TECHNIQUE: AP and lateral views of the right tibia and fibula were performed. COMPARISON: 05/06/2023 FINDINGS: Three views right tibia fibula. There is osteopenia.  There are degenerative changes of the knee.  No acute displaced fracture or dislocation of the tibia or fibula.  The ankle mortise is intact noting degenerative changes of the ankle.  There is vascular calcification.  No large knee joint effusion.     1. No acute displaced fracture or dislocation of the tibia or fibula. Electronically signed by: Juan Shelton MD Date:    05/12/2023 Time:    19:14    X-Ray Tibia Fibula 2 View Right    Result Date: 5/6/2023  EXAMINATION: XR TIBIA FIBULA 2 VIEW RIGHT CLINICAL HISTORY: Cellulitis, unspecified TECHNIQUE: AP and lateral views of the right tibia and fibula were performed. COMPARISON: None. FINDINGS: There is diffuse osteopenia which limits assessment.  Visualized osseous and articular structures appear intact noting degenerative change.  Alignment appears to be within normal limits.  There are vascular and soft tissue calcifications present.  No definite soft tissue gas is definitively visualized.  If there is clinical correlation for soft tissue infection/osteomyelitis further assessment with MRI is advised if there are no clinical contraindications.     Please see above. Electronically signed by: Pam Sterling MD Date:    05/06/2023 Time:    04:06    US Lower Extremity Veins Right    Result Date: 5/12/2023  EXAMINATION: US LOWER EXTREMITY VEINS RIGHT CLINICAL HISTORY: Unspecified open wound, right lower leg, initial encounter TECHNIQUE: Duplex and color flow Doppler evaluation and graded compression of the right lower extremity veins was performed. COMPARISON: None FINDINGS: Duplex and color flow Doppler evaluation does not reveal any evidence of acute venous thrombosis in the common femoral, superficial femoral, greater  saphenous, popliteal, peroneal, anterior tibial and posterior tibial veins of the right lower extremity.  There is no reflux to suggest valvular incompetence.     No evidence of deep venous thrombosis in the right lower extremity. Electronically signed by: Juan Shelton MD Date:    05/12/2023 Time:    18:30

## 2023-05-15 NOTE — PLAN OF CARE
Pt is AAOX4. Scheduled meds were given per MAR. No C/O pain or N/V. Family was present at the bedside. Bed in lowest position, bed alarm is set. Call light within reach.   Problem: Adult Inpatient Plan of Care  Goal: Plan of Care Review  Outcome: Ongoing, Progressing     Problem: Diabetes Comorbidity  Goal: Blood Glucose Level Within Targeted Range  Outcome: Ongoing, Progressing     Problem: Impaired Wound Healing  Goal: Optimal Wound Healing  Outcome: Ongoing, Progressing     Problem: Infection  Goal: Absence of Infection Signs and Symptoms  Outcome: Ongoing, Progressing

## 2023-05-16 LAB
ALBUMIN SERPL BCP-MCNC: 2.9 G/DL (ref 3.5–5.2)
ALP SERPL-CCNC: 113 U/L (ref 55–135)
ALT SERPL W/O P-5'-P-CCNC: 16 U/L (ref 10–44)
ANION GAP SERPL CALC-SCNC: 8 MMOL/L (ref 8–16)
AST SERPL-CCNC: 25 U/L (ref 10–40)
BASOPHILS # BLD AUTO: 0.06 K/UL (ref 0–0.2)
BASOPHILS NFR BLD: 1.8 % (ref 0–1.9)
BILIRUB SERPL-MCNC: 0.2 MG/DL (ref 0.1–1)
BILIRUB UR QL STRIP: NEGATIVE
BUN SERPL-MCNC: 41 MG/DL (ref 8–23)
CALCIUM SERPL-MCNC: 8.7 MG/DL (ref 8.7–10.5)
CHLORIDE SERPL-SCNC: 103 MMOL/L (ref 95–110)
CLARITY UR: CLEAR
CO2 SERPL-SCNC: 19 MMOL/L (ref 23–29)
COLOR UR: YELLOW
CREAT SERPL-MCNC: 1.7 MG/DL (ref 0.5–1.4)
DIFFERENTIAL METHOD: ABNORMAL
EOSINOPHIL # BLD AUTO: 0.2 K/UL (ref 0–0.5)
EOSINOPHIL NFR BLD: 4.6 % (ref 0–8)
ERYTHROCYTE [DISTWIDTH] IN BLOOD BY AUTOMATED COUNT: 12 % (ref 11.5–14.5)
EST. GFR  (NO RACE VARIABLE): 29 ML/MIN/1.73 M^2
GLUCOSE SERPL-MCNC: 76 MG/DL (ref 70–110)
GLUCOSE SERPL-MCNC: 83 MG/DL (ref 70–110)
GLUCOSE UR QL STRIP: NEGATIVE
HCT VFR BLD AUTO: 27.7 % (ref 37–48.5)
HGB BLD-MCNC: 9 G/DL (ref 12–16)
HGB UR QL STRIP: NEGATIVE
IMM GRANULOCYTES # BLD AUTO: 0.01 K/UL (ref 0–0.04)
IMM GRANULOCYTES NFR BLD AUTO: 0.3 % (ref 0–0.5)
KETONES UR QL STRIP: NEGATIVE
LEUKOCYTE ESTERASE UR QL STRIP: NEGATIVE
LYMPHOCYTES # BLD AUTO: 0.9 K/UL (ref 1–4.8)
LYMPHOCYTES NFR BLD: 26.4 % (ref 18–48)
MAGNESIUM SERPL-MCNC: 2 MG/DL (ref 1.6–2.6)
MCH RBC QN AUTO: 30.2 PG (ref 27–31)
MCHC RBC AUTO-ENTMCNC: 32.5 G/DL (ref 32–36)
MCV RBC AUTO: 93 FL (ref 82–98)
MONOCYTES # BLD AUTO: 0.4 K/UL (ref 0.3–1)
MONOCYTES NFR BLD: 12.3 % (ref 4–15)
NEUTROPHILS # BLD AUTO: 1.8 K/UL (ref 1.8–7.7)
NEUTROPHILS NFR BLD: 54.6 % (ref 38–73)
NITRITE UR QL STRIP: NEGATIVE
NRBC BLD-RTO: 0 /100 WBC
PH UR STRIP: 6 [PH] (ref 5–8)
PHOSPHATE SERPL-MCNC: 4.8 MG/DL (ref 2.7–4.5)
PLATELET # BLD AUTO: 306 K/UL (ref 150–450)
PMV BLD AUTO: 9.6 FL (ref 9.2–12.9)
POCT GLUCOSE: 119 MG/DL (ref 70–110)
POCT GLUCOSE: 120 MG/DL (ref 70–110)
POCT GLUCOSE: 83 MG/DL (ref 70–110)
POCT GLUCOSE: 90 MG/DL (ref 70–110)
POTASSIUM SERPL-SCNC: 5.1 MMOL/L (ref 3.5–5.1)
PROCALCITONIN SERPL IA-MCNC: 0.05 NG/ML
PROT SERPL-MCNC: 5.8 G/DL (ref 6–8.4)
PROT UR QL STRIP: NEGATIVE
RBC # BLD AUTO: 2.98 M/UL (ref 4–5.4)
SODIUM SERPL-SCNC: 130 MMOL/L (ref 136–145)
SP GR UR STRIP: 1.01 (ref 1–1.03)
URN SPEC COLLECT METH UR: NORMAL
UROBILINOGEN UR STRIP-ACNC: NEGATIVE EU/DL
WBC # BLD AUTO: 3.26 K/UL (ref 3.9–12.7)

## 2023-05-16 PROCEDURE — 25000242 PHARM REV CODE 250 ALT 637 W/ HCPCS

## 2023-05-16 PROCEDURE — 25000003 PHARM REV CODE 250: Performed by: STUDENT IN AN ORGANIZED HEALTH CARE EDUCATION/TRAINING PROGRAM

## 2023-05-16 PROCEDURE — 83735 ASSAY OF MAGNESIUM: CPT | Performed by: STUDENT IN AN ORGANIZED HEALTH CARE EDUCATION/TRAINING PROGRAM

## 2023-05-16 PROCEDURE — 99900035 HC TECH TIME PER 15 MIN (STAT)

## 2023-05-16 PROCEDURE — 85025 COMPLETE CBC W/AUTO DIFF WBC: CPT | Performed by: STUDENT IN AN ORGANIZED HEALTH CARE EDUCATION/TRAINING PROGRAM

## 2023-05-16 PROCEDURE — 11000001 HC ACUTE MED/SURG PRIVATE ROOM

## 2023-05-16 PROCEDURE — 94640 AIRWAY INHALATION TREATMENT: CPT

## 2023-05-16 PROCEDURE — 94761 N-INVAS EAR/PLS OXIMETRY MLT: CPT

## 2023-05-16 PROCEDURE — 81003 URINALYSIS AUTO W/O SCOPE: CPT

## 2023-05-16 PROCEDURE — 36415 COLL VENOUS BLD VENIPUNCTURE: CPT

## 2023-05-16 PROCEDURE — 84145 PROCALCITONIN (PCT): CPT

## 2023-05-16 PROCEDURE — 84100 ASSAY OF PHOSPHORUS: CPT | Performed by: STUDENT IN AN ORGANIZED HEALTH CARE EDUCATION/TRAINING PROGRAM

## 2023-05-16 PROCEDURE — 80053 COMPREHEN METABOLIC PANEL: CPT | Performed by: STUDENT IN AN ORGANIZED HEALTH CARE EDUCATION/TRAINING PROGRAM

## 2023-05-16 PROCEDURE — 25000003 PHARM REV CODE 250

## 2023-05-16 PROCEDURE — 36415 COLL VENOUS BLD VENIPUNCTURE: CPT | Performed by: STUDENT IN AN ORGANIZED HEALTH CARE EDUCATION/TRAINING PROGRAM

## 2023-05-16 RX ADMIN — MUPIROCIN: 20 OINTMENT TOPICAL at 09:05

## 2023-05-16 RX ADMIN — LOSARTAN POTASSIUM 100 MG: 50 TABLET, FILM COATED ORAL at 09:05

## 2023-05-16 RX ADMIN — AMLODIPINE BESYLATE 5 MG: 5 TABLET ORAL at 09:05

## 2023-05-16 RX ADMIN — Medication 6 MG: at 09:05

## 2023-05-16 RX ADMIN — SODIUM CHLORIDE 250 ML: 0.9 INJECTION, SOLUTION INTRAVENOUS at 09:05

## 2023-05-16 RX ADMIN — IPRATROPIUM BROMIDE AND ALBUTEROL SULFATE 3 ML: 2.5; .5 SOLUTION RESPIRATORY (INHALATION) at 11:05

## 2023-05-16 RX ADMIN — IPRATROPIUM BROMIDE AND ALBUTEROL SULFATE 3 ML: 2.5; .5 SOLUTION RESPIRATORY (INHALATION) at 03:05

## 2023-05-16 RX ADMIN — COLLAGENASE SANTYL: 250 OINTMENT TOPICAL at 09:05

## 2023-05-16 RX ADMIN — GUAIFENESIN 600 MG: 600 TABLET, EXTENDED RELEASE ORAL at 09:05

## 2023-05-16 RX ADMIN — CHOLECALCIFEROL (VITAMIN D3) 10 MCG (400 UNIT) TABLET 1200 UNITS: at 09:05

## 2023-05-16 RX ADMIN — PREGABALIN 75 MG: 75 CAPSULE ORAL at 09:05

## 2023-05-16 RX ADMIN — IPRATROPIUM BROMIDE AND ALBUTEROL SULFATE 3 ML: 2.5; .5 SOLUTION RESPIRATORY (INHALATION) at 07:05

## 2023-05-16 RX ADMIN — CALCIUM CARBONATE (ANTACID) CHEW TAB 500 MG 500 MG: 500 CHEW TAB at 09:05

## 2023-05-16 RX ADMIN — OMEPRAZOLE 40 MG: 40 CAPSULE, DELAYED RELEASE ORAL at 09:05

## 2023-05-16 RX ADMIN — FLUTICASONE FUROATE AND VILANTEROL TRIFENATATE 1 PUFF: 100; 25 POWDER RESPIRATORY (INHALATION) at 07:05

## 2023-05-16 NOTE — PROGRESS NOTES
[facility] Medicine Progress Note    Primary Team: [facility] Hospitalist Team B  Attending Physician: Herve Daniel MD  Resident: Marcelino  Intern: Kerry    Subjective:      Had a rectal temperature of 94 overnight. Was provided a nya hugger. 97.5 currently. Denies fevers/chills/SOB/N/V.    Objective:     Last 24 Hour Vital Signs:  BP  Min: 117/55  Max: 191/79  Temp  Av.3 °F (36.3 °C)  Min: 94.3 °F (34.6 °C)  Max: 98.4 °F (36.9 °C)  Pulse  Av  Min: 54  Max: 77  Resp  Av.2  Min: 17  Max: 20  SpO2  Av.3 %  Min: 94 %  Max: 100 %  I/O last 3 completed shifts:  In: 840 [P.O.:840]  Out: 3250 [Urine:3250]    Physical Examination:  Physical Exam  Constitutional:       General: She is not in acute distress.     Appearance: She is not toxic-appearing.   Eyes:      Conjunctiva/sclera: Conjunctivae normal.      Pupils: Pupils are equal, round, and reactive to light.   Cardiovascular:      Rate and Rhythm: Normal rate and regular rhythm.      Pulses: Normal pulses.      Heart sounds: Normal heart sounds.   Pulmonary:      Effort: Pulmonary effort is normal. No respiratory distress.      Breath sounds: Normal breath sounds. No wheezing.   Abdominal:      General: Bowel sounds are normal. There is no distension.      Palpations: Abdomen is soft.      Tenderness: There is no abdominal tenderness.   Musculoskeletal:      Right lower leg: No edema.      Left lower leg: No edema.      Comments: Tenderness to palpation of right lower leg. No crepitus    Skin:     Capillary Refill: Capillary refill takes less than 2 seconds.      Findings: Wound (Right lower leg, no purulent drainage, no erythematous borders) present.   Neurological:      General: No focal deficit present.      Mental Status: She is alert and oriented to person, place, and time.   Psychiatric:         Mood and Affect: Mood normal.         Behavior: Behavior normal.       Laboratory:  Laboratory Data Reviewed: yes  Pertinent Findings:  Recent Labs   Lab  05/14/23  0557 05/15/23  0506 05/16/23  0459   WBC 3.90 4.16 3.26*   HGB 9.4* 9.9* 9.0*   HCT 28.5* 30.1* 27.7*    336 306   MCV 92 92 93   RDW 11.9 12.1 12.0   * 134* 130*   K 4.6 5.1 5.1    104 103   CO2 24 20* 19*   BUN 33* 38* 41*   CREATININE 1.5* 2.0* 1.7*   GLU 90 91 76   PROT 5.7* 6.1 5.8*   ALBUMIN 2.9* 3.0* 2.9*   BILITOT 0.2 0.2 0.2   AST 25 24 25   ALKPHOS 110 116 113   ALT 15 16 16         Microbiology Data Reviewed:  Pertinent Findings:  Microbiology Results (last 7 days)       Procedure Component Value Units Date/Time    Culture, Anaerobe [[id number]] Collected: 05/14/23 1658    Order Status: Completed Specimen: Wound from Leg, Right Updated: 05/16/23 0841     Anaerobic Culture Culture in progress    Aerobic culture [[id number]] Collected: 05/14/23 1658    Order Status: Completed Specimen: Wound from Leg, Right Updated: 05/16/23 0754     Aerobic Bacterial Culture No significant isolate to date    Aerobic culture (Specify Source) **CANNOT BE ORDERED AS STAT** [[id number]] Collected: 05/12/23 1819    Order Status: Completed Specimen: Wound from Leg, Right Updated: 05/15/23 1407     Aerobic Bacterial Culture Skin vero,  no predominant organism    Aerobic culture (Specify Source) **CANNOT BE ORDERED AS STAT** [[id number]]     Order Status: Canceled Specimen: Wound     Blood culture [[id number]]     Order Status: Canceled Specimen: Blood     Blood culture [[id number]]     Order Status: Canceled Specimen: Blood           Other Results:  EKG (my interpretation): no study to review    Radiology Data Reviewed: yes  Pertinent Findings:  X-Ray Tibia Fibula 2 View Right   Final Result      1. No acute displaced fracture or dislocation of the tibia or fibula.         Electronically signed by: Juan Shelton MD   Date:    05/12/2023   Time:    19:14      US Lower Extremity Veins Right   Final Result      No evidence of deep venous thrombosis in the right lower extremity.         Electronically signed  by: Juan Shelton MD   Date:    05/12/2023   Time:    18:30             Current Medications:     Infusions:       Scheduled:   albuterol-ipratropium  3 mL Nebulization Q4H WAKE    amLODIPine  5 mg Oral Daily    calcium carbonate  500 mg Oral Daily    cholecalciferol (vitamin D3)  1,200 Units Oral Daily    collagenase   Topical (Top) Daily    enoxaparin  30 mg Subcutaneous Daily    fluticasone furoate-vilanteroL  1 puff Inhalation Daily    guaiFENesin  600 mg Oral BID    losartan  100 mg Oral Daily    melatonin  6 mg Oral Nightly    mupirocin   Topical (Top) Daily    omeprazole  40 mg Oral Before breakfast    polyethylene glycol  17 g Oral Daily    pregabalin  75 mg Oral BID        PRN:  acetaminophen, benzonatate, dextrose 10%, dextrose 10%, dextrose, dextrose, famotidine, glucagon (human recombinant), naloxone, sodium chloride 0.9%, traMADoL    Antibiotics and Day Number of Therapy:  Antibiotics (72h ago, onward)      Start     Stop Route Frequency Ordered    05/14/23 1700  mupirocin 2 % ointment         -- Top Daily 05/14/23 1658    05/12/23 2045  sulfamethoxazole-trimethoprim 800-160mg per tablet 1 tablet         05/15 0859 Oral Daily 05/12/23 2027              Lines and Day Number of Therapy:  PIV 5/12      Assessment and Plan:     Bridget Montgomery is a 88 y.o. F with a PMH of CKDIV, COPD, HTN, prediabetes (A1c 6.1 3/2023), and DVT (2022) who presents for a nonhealing right lower leg wound that has been present for 3 weeks. Pt afebrile and no leukocytosis. Pt completed 6 days of Bactrim prior to admission. Low clinical suspicion of infection. Neg erythema, crepitus, or purulent drainage. XR neg for obvious infection. Due to severe pain, ordered CRP and ESR which were not significant. Dr. Mohan consulted for wound management. Pending aerobic cultures.     Right Lower Extremity Wound  WBC 6.4, pt afebrile on admit. Photo in media tab. Has HH wound care set up  - Neg for purulent drainage, crepitus, or  erythematous borders  - XR Tibia: no obvious signs of osteo  - CRP 7.8 and ESR 37  - Consulted Dr Mohan to see if pt would need to follow him outpatient  - Given dose of Bactrim 800 and completed 7 day course as previously prescribed  - Aerobic cx from 5/12 NG   - Further abx pending Dr. Mohan recs  - Aerobic and anaerobic wound cx pending  - ID consulted, no abx at this time  - Due to temp of 94 overnight, ordered procal and urinalysis to evaluate for infection    Pain 2/2 Right Lower Extremity Wound  - Ordered Tylenol 500mg q6 PRN  - Tramadol 50mg BID PRN     Spinal Stenosis  - Continue home pregabalin 75mg BID  - NSGY consulted, no plans for surgical intervention at this time. Recommending f/u w/ NSGY as outpatient if radicular leg symptoms worsen     HTN  /74 on admit. Home med: olmesartan 40mg  - Likely elevated 2/2 pain  - Goal systolics <210  - Continue amlodipine 5mg and losartan 100mg     H/o DVT  - Most recently in 2022  - Not on home anticoagulation  - DVT US 5/2023 neg    Hyponatremia, improving  Na 129 on admit  - Completed IVF bolus @50ml/hr  - Na improved to 133 this morning  - Continuing to encourage PO intake     RADHA on CKDIV  Cr 1.9 on admit, baseline ~ 1.4  - Improved with 250mL LR bolus  - Continuing to encourage PO intake     Microcytic Anemia  H/H 9.6/28.7 on admit  - Iron studies 2/2023: Fe 43, Transferrin 257, TIBC 380, Saturated iron 11  - Ferritin 2/2023 57  - Transfuse Hg <7     COPD  - Home symbicort  - Continue on fluticasone furoate-vilanterol 100-25mcg     HLD  - Lipid panel: Chol 243, HDL 60, , TG 80  - Given advanced age, benefits does not outweigh risks for statin therapy    Hypoalbuminemia  Albumin 3.3 on admit  - CTM     Prediabetes  - A1c 6.1% 3/2023  - CTM     Constipation  - Continue Miralax bowel regimen     HCM  - Of note, pt's daughter has been providing pt with herbal supplements during this stay. Advised to not administer them while inpatient    Code  Status: Full code  Diet: Renal  DVT: Lovenox    Dispo: monitoring for infection due to overnight temp of 94 degrees      Zaid Payne MD  [facility], PGY-1  [facility] Medicine Team B    [facility] Hospitalist Pager numbers:   [facility] Hospitalist Medicine Team B (Fredy/Eliana):  [phone]

## 2023-05-16 NOTE — PROGRESS NOTES
[facility] Infectious Diseases Progress Note    Assessment/Plan:     Bridget Montgomery is a 88 y.o. F with a PMH of CKDIV, COPD, HTN, prediabetes (A1c 6.1 3/2023), and DVT () who presents for a nonhealing RLE wound x 3 weeks.   Right lower extremity wound 2/2 traumatic injury. Initial culture with only skin vero. Repeat wound culture with no growth to date x2 days. S/p 7 days bactrim. Clinically improving.  CKDIV, COPD, HTN, preDM, DVT      Recommendations:  - Continue to follow wound cultures  - No antibiotics at this time  - Continue aggressive wound care  - If vital signs stable overnight and cultures remain no growth, ok from ID perspective to discharge home tomorrow morning with outpatient wound care follow up   - Patient [facility] Al [facility] DO  [facility] Med-Peds [facility]    Thank you for allowing us to participate in the care of this patient. Case has been discussed with consult staff, who is in agreement with assessment and plan.     Subjective:      One low temp of 94.3 (rectal) recorded overnight. Patient denies symptoms at the time. Was placed on nya hugger. Temp normalized afterward. Pain in leg improved.      Objective:   Last 24 Hour Vital Signs:  BP  Min: 117/55  Max: 159/69  Temp  Av.2 °F (36.2 °C)  Min: 94.3 °F (34.6 °C)  Max: 98.3 °F (36.8 °C)  Pulse  Av.3  Min: 54  Max: 77  Resp  Av  Min: 17  Max: 20  SpO2  Av.5 %  Min: 94 %  Max: 100 %  I/O last 3 completed shifts:  In: 840 [P.O.:840]  Out: 3250 [Urine:3250]    Physical Exam  Vitals and nursing note reviewed.   Constitutional:       General: She is not in acute distress.     Appearance: She is not toxic-appearing.   HENT:      Head: Normocephalic and atraumatic.      Right Ear: External ear normal.      Left Ear: External ear normal.      Nose: Nose normal. No congestion or rhinorrhea.      Mouth/Throat:      Mouth: Mucous membranes are moist.      Pharynx: Oropharynx is clear.    Eyes:      Extraocular Movements: Extraocular movements intact.      Conjunctiva/sclera: Conjunctivae normal.   Cardiovascular:      Rate and Rhythm: Normal rate and regular rhythm.      Pulses: Normal pulses.      Heart sounds: Normal heart sounds.   Pulmonary:      Effort: Pulmonary effort is normal.      Breath sounds: Normal breath sounds.   Abdominal:      General: Abdomen is flat. Bowel sounds are normal.      Palpations: Abdomen is soft.   Skin:     General: Skin is warm and dry.      Capillary Refill: Capillary refill takes less than 2 seconds.      Comments: Right lateral lower extremity with wound. Bandage in place. Wound edges not erythematous. Appears improved from yesterday.   Neurological:      General: No focal deficit present.      Mental Status: She is alert and oriented to person, place, and time.      Cranial Nerves: No cranial nerve deficit.   Psychiatric:         Mood and Affect: Mood normal.         Behavior: Behavior normal.       Laboratory:  Laboratory Data Reviewed: yes  Pertinent Findings:  Recent Labs   Lab 05/14/23  0557 05/15/23  0506 05/16/23  0459   WBC 3.90 4.16 3.26*   HGB 9.4* 9.9* 9.0*   HCT 28.5* 30.1* 27.7*    336 306   MCV 92 92 93   RDW 11.9 12.1 12.0   * 134* 130*   K 4.6 5.1 5.1    104 103   CO2 24 20* 19*   BUN 33* 38* 41*   CREATININE 1.5* 2.0* 1.7*   GLU 90 91 76   PROT 5.7* 6.1 5.8*   ALBUMIN 2.9* 3.0* 2.9*   BILITOT 0.2 0.2 0.2   AST 25 24 25   ALKPHOS 110 116 113   ALT 15 16 16       Microbiology Data:  Wound culture 5/12/23: normal skin vero  Aerobic wound culture 5/14/23: no growth to date  Anaerobic wound culture 5/14/23: in progress    Antimicrobials:  S/p 7 days bactrim    Other Results:  Radiology Results:  X-Ray Tibia Fibula 2 View Right    Result Date: 5/12/2023  EXAMINATION: XR TIBIA FIBULA 2 VIEW RIGHT CLINICAL HISTORY: Unspecified open wound, right lower leg, initial encounter TECHNIQUE: AP and lateral views of the right tibia and  fibula were performed. COMPARISON: 05/06/2023 FINDINGS: Three views right tibia fibula. There is osteopenia.  There are degenerative changes of the knee.  No acute displaced fracture or dislocation of the tibia or fibula.  The ankle mortise is intact noting degenerative changes of the ankle.  There is vascular calcification.  No large knee joint effusion.     1. No acute displaced fracture or dislocation of the tibia or fibula. Electronically signed by: Juan Shelton MD Date:    05/12/2023 Time:    19:14    X-Ray Tibia Fibula 2 View Right    Result Date: 5/6/2023  EXAMINATION: XR TIBIA FIBULA 2 VIEW RIGHT CLINICAL HISTORY: Cellulitis, unspecified TECHNIQUE: AP and lateral views of the right tibia and fibula were performed. COMPARISON: None. FINDINGS: There is diffuse osteopenia which limits assessment.  Visualized osseous and articular structures appear intact noting degenerative change.  Alignment appears to be within normal limits.  There are vascular and soft tissue calcifications present.  No definite soft tissue gas is definitively visualized.  If there is clinical correlation for soft tissue infection/osteomyelitis further assessment with MRI is advised if there are no clinical contraindications.     Please see above. Electronically signed by: Pam Sterling MD Date:    05/06/2023 Time:    04:06    US Lower Extremity Veins Right    Result Date: 5/12/2023  EXAMINATION: US LOWER EXTREMITY VEINS RIGHT CLINICAL HISTORY: Unspecified open wound, right lower leg, initial encounter TECHNIQUE: Duplex and color flow Doppler evaluation and graded compression of the right lower extremity veins was performed. COMPARISON: None FINDINGS: Duplex and color flow Doppler evaluation does not reveal any evidence of acute venous thrombosis in the common femoral, superficial femoral, greater saphenous, popliteal, peroneal, anterior tibial and posterior tibial veins of the right lower extremity.  There is no reflux to suggest  valvular incompetence.     No evidence of deep venous thrombosis in the right lower extremity. Electronically signed by: Juan Shelton MD Date:    05/12/2023 Time:    18:30      Current Medications:     Infusions:       Scheduled:   albuterol-ipratropium  3 mL Nebulization Q4H WAKE    amLODIPine  5 mg Oral Daily    calcium carbonate  500 mg Oral Daily    cholecalciferol (vitamin D3)  1,200 Units Oral Daily    collagenase   Topical (Top) Daily    enoxaparin  30 mg Subcutaneous Daily    fluticasone furoate-vilanteroL  1 puff Inhalation Daily    guaiFENesin  600 mg Oral BID    losartan  100 mg Oral Daily    melatonin  6 mg Oral Nightly    mupirocin   Topical (Top) Daily    omeprazole  40 mg Oral Before breakfast    polyethylene glycol  17 g Oral Daily    pregabalin  75 mg Oral BID        PRN:  acetaminophen, benzonatate, dextrose 10%, dextrose 10%, dextrose, dextrose, famotidine, glucagon (human recombinant), naloxone, sodium chloride 0.9%, traMADoL

## 2023-05-16 NOTE — PROGRESS NOTES
"Surgery follow up  BP (!) 117/55   Pulse 77   Temp 98.1 °F (36.7 °C)   Resp 18   Ht 5' 4" (1.626 m)   Wt 65.7 kg (144 lb 13.5 oz)   SpO2 96%   Breastfeeding No   BMI 24.86 kg/m²   I/O last 3 completed shifts:  In: 840 [P.O.:840]  Out: 3250 [Urine:3250]  No intake/output data recorded.    Recent Results (from the past 336 hour(s))   CBC Auto Differential    Collection Time: 05/16/23  4:59 AM   Result Value Ref Range    WBC 3.26 (L) 3.90 - 12.70 K/uL    Hemoglobin 9.0 (L) 12.0 - 16.0 g/dL    Hematocrit 27.7 (L) 37.0 - 48.5 %    Platelets 306 150 - 450 K/uL   CBC Auto Differential    Collection Time: 05/15/23  5:06 AM   Result Value Ref Range    WBC 4.16 3.90 - 12.70 K/uL    Hemoglobin 9.9 (L) 12.0 - 16.0 g/dL    Hematocrit 30.1 (L) 37.0 - 48.5 %    Platelets 336 150 - 450 K/uL   CBC Auto Differential    Collection Time: 05/14/23  5:57 AM   Result Value Ref Range    WBC 3.90 3.90 - 12.70 K/uL    Hemoglobin 9.4 (L) 12.0 - 16.0 g/dL    Hematocrit 28.5 (L) 37.0 - 48.5 %    Platelets 320 150 - 450 K/uL   Wound better continue present treatment.  "

## 2023-05-16 NOTE — NURSING
Assumed care of patient from SUZETTE Tobar, patient is AAOX4, room air, vitals WNL, except temp, will recheck, patient is SBAX1 to bathroom, right LE calf area dressing change completed, pain meds given prior to dressing change but no additional c/o pain or discomfort, all safety precautions are in place, call bell and tray table are within reach of the patient and will continue to monitor.

## 2023-05-16 NOTE — NURSING
Difficulty obtaining pt's temp, just took rectal and it was 94.3, placed nya hugger on patient, will continue to monitor.

## 2023-05-16 NOTE — PROGRESS NOTES
"Surgeyr follow up  BP (!) 117/55   Pulse 77   Temp 98.1 °F (36.7 °C)   Resp 18   Ht 5' 4" (1.626 m)   Wt 65.7 kg (144 lb 13.5 oz)   SpO2 96%   Breastfeeding No   BMI 24.86 kg/m²   I/O last 3 completed shifts:  In: 840 [P.O.:840]  Out: 3250 [Urine:3250]  No intake/output data recorded.    Wound better awaiting culture reports continue present treatment.  "

## 2023-05-16 NOTE — PLAN OF CARE
Pt is AAOX4. Scheduled meds were given per MAR. No C/O pain or N/V. Family is not present at the bedside. Bed in lowest position, bed alarm is set. Call light within reach.   Problem: Adult Inpatient Plan of Care  Goal: Plan of Care Review  Outcome: Ongoing, Progressing     Problem: Diabetes Comorbidity  Goal: Blood Glucose Level Within Targeted Range  Outcome: Ongoing, Progressing     Problem: Impaired Wound Healing  Goal: Optimal Wound Healing  Outcome: Ongoing, Progressing     Problem: Fall Injury Risk  Goal: Absence of Fall and Fall-Related Injury  Outcome: Ongoing, Progressing

## 2023-05-16 NOTE — PLAN OF CARE
Problem: Adult Inpatient Plan of Care  Goal: Plan of Care Review  Outcome: Ongoing, Progressing  Goal: Patient-Specific Goal (Individualized)  Outcome: Ongoing, Progressing  Goal: Absence of Hospital-Acquired Illness or Injury  Outcome: Ongoing, Progressing  Goal: Optimal Comfort and Wellbeing  Outcome: Ongoing, Progressing  Goal: Readiness for Transition of Care  Outcome: Ongoing, Progressing     Problem: Diabetes Comorbidity  Goal: Blood Glucose Level Within Targeted Range  Outcome: Ongoing, Progressing     Problem: Impaired Wound Healing  Goal: Optimal Wound Healing  Outcome: Ongoing, Progressing     Problem: Fall Injury Risk  Goal: Absence of Fall and Fall-Related Injury  Outcome: Ongoing, Progressing     Problem: Infection  Goal: Absence of Infection Signs and Symptoms  Outcome: Ongoing, Progressing     Problem: COPD (Chronic Obstructive Pulmonary Disease) Comorbidity  Goal: Maintenance of COPD Symptom Control  Outcome: Ongoing, Progressing     Problem: Hypertension Comorbidity  Goal: Blood Pressure in Desired Range  Outcome: Ongoing, Progressing     Problem: Osteoarthritis Comorbidity  Goal: Maintenance of Osteoarthritis Symptom Control  Outcome: Ongoing, Progressing     Problem: Electrolyte Imbalance (Chronic Kidney Disease)  Goal: Electrolyte Balance  Outcome: Ongoing, Progressing     Problem: Renal Function Impairment (Chronic Kidney Disease)  Goal: Minimize Renal Failure Effects  Outcome: Ongoing, Progressing     Problem: Pain Acute  Goal: Acceptable Pain Control and Functional Ability  Outcome: Ongoing, Progressing     Problem: Skin Injury Risk Increased  Goal: Skin Health and Integrity  Outcome: Ongoing, Progressing

## 2023-05-16 NOTE — PROGRESS NOTES
"Surgeyr follow up  /63 (Patient Position: Lying)   Pulse (!) 54   Temp 98.4 °F (36.9 °C)   Resp 20   Ht 5' 4" (1.626 m)   Wt 65.7 kg (144 lb 13.5 oz)   SpO2 100%   Breastfeeding No   BMI 24.86 kg/m²   Recent Results (from the past 336 hour(s))   CBC Auto Differential    Collection Time: 05/15/23  5:06 AM   Result Value Ref Range    WBC 4.16 3.90 - 12.70 K/uL    Hemoglobin 9.9 (L) 12.0 - 16.0 g/dL    Hematocrit 30.1 (L) 37.0 - 48.5 %    Platelets 336 150 - 450 K/uL   CBC Auto Differential    Collection Time: 05/14/23  5:57 AM   Result Value Ref Range    WBC 3.90 3.90 - 12.70 K/uL    Hemoglobin 9.4 (L) 12.0 - 16.0 g/dL    Hematocrit 28.5 (L) 37.0 - 48.5 %    Platelets 320 150 - 450 K/uL   CBC Auto Differential    Collection Time: 05/13/23  6:14 AM   Result Value Ref Range    WBC 4.48 3.90 - 12.70 K/uL    Hemoglobin 9.4 (L) 12.0 - 16.0 g/dL    Hematocrit 28.2 (L) 37.0 - 48.5 %    Platelets 322 150 - 450 K/uL     Wound better  Awaiting culture   Continue present treatment.  "

## 2023-05-17 ENCOUNTER — PATIENT OUTREACH (OUTPATIENT)
Dept: ADMINISTRATIVE | Facility: OTHER | Age: 88
End: 2023-05-17
Payer: MEDICARE

## 2023-05-17 VITALS
TEMPERATURE: 98 F | WEIGHT: 142.63 LBS | SYSTOLIC BLOOD PRESSURE: 172 MMHG | RESPIRATION RATE: 16 BRPM | BODY MASS INDEX: 24.35 KG/M2 | HEART RATE: 56 BPM | HEIGHT: 64 IN | OXYGEN SATURATION: 98 % | DIASTOLIC BLOOD PRESSURE: 69 MMHG

## 2023-05-17 PROBLEM — E87.1 HYPONATREMIA: Status: RESOLVED | Noted: 2023-05-12 | Resolved: 2023-05-17

## 2023-05-17 LAB
ALBUMIN SERPL BCP-MCNC: 3 G/DL (ref 3.5–5.2)
ALP SERPL-CCNC: 126 U/L (ref 55–135)
ALT SERPL W/O P-5'-P-CCNC: 22 U/L (ref 10–44)
ANION GAP SERPL CALC-SCNC: 10 MMOL/L (ref 8–16)
AST SERPL-CCNC: 31 U/L (ref 10–40)
BACTERIA SPEC AEROBE CULT: NORMAL
BASOPHILS # BLD AUTO: 0.03 K/UL (ref 0–0.2)
BASOPHILS NFR BLD: 0.9 % (ref 0–1.9)
BILIRUB SERPL-MCNC: 0.2 MG/DL (ref 0.1–1)
BUN SERPL-MCNC: 40 MG/DL (ref 8–23)
CALCIUM SERPL-MCNC: 9.1 MG/DL (ref 8.7–10.5)
CHLORIDE SERPL-SCNC: 105 MMOL/L (ref 95–110)
CO2 SERPL-SCNC: 18 MMOL/L (ref 23–29)
CREAT SERPL-MCNC: 1.4 MG/DL (ref 0.5–1.4)
DIFFERENTIAL METHOD: ABNORMAL
EOSINOPHIL # BLD AUTO: 0.1 K/UL (ref 0–0.5)
EOSINOPHIL NFR BLD: 3.5 % (ref 0–8)
ERYTHROCYTE [DISTWIDTH] IN BLOOD BY AUTOMATED COUNT: 12.1 % (ref 11.5–14.5)
EST. GFR  (NO RACE VARIABLE): 36 ML/MIN/1.73 M^2
GLUCOSE SERPL-MCNC: 91 MG/DL (ref 70–110)
HCT VFR BLD AUTO: 32.3 % (ref 37–48.5)
HGB BLD-MCNC: 10.3 G/DL (ref 12–16)
IMM GRANULOCYTES # BLD AUTO: 0.01 K/UL (ref 0–0.04)
IMM GRANULOCYTES NFR BLD AUTO: 0.3 % (ref 0–0.5)
LYMPHOCYTES # BLD AUTO: 1 K/UL (ref 1–4.8)
LYMPHOCYTES NFR BLD: 28.9 % (ref 18–48)
MAGNESIUM SERPL-MCNC: 2.1 MG/DL (ref 1.6–2.6)
MCH RBC QN AUTO: 29.9 PG (ref 27–31)
MCHC RBC AUTO-ENTMCNC: 31.9 G/DL (ref 32–36)
MCV RBC AUTO: 94 FL (ref 82–98)
MONOCYTES # BLD AUTO: 0.5 K/UL (ref 0.3–1)
MONOCYTES NFR BLD: 13.3 % (ref 4–15)
NEUTROPHILS # BLD AUTO: 1.8 K/UL (ref 1.8–7.7)
NEUTROPHILS NFR BLD: 53.1 % (ref 38–73)
NRBC BLD-RTO: 0 /100 WBC
PHOSPHATE SERPL-MCNC: 4.7 MG/DL (ref 2.7–4.5)
PLATELET # BLD AUTO: 337 K/UL (ref 150–450)
PMV BLD AUTO: 9.8 FL (ref 9.2–12.9)
POCT GLUCOSE: 92 MG/DL (ref 70–110)
POCT GLUCOSE: 98 MG/DL (ref 70–110)
POTASSIUM SERPL-SCNC: 4.9 MMOL/L (ref 3.5–5.1)
PROT SERPL-MCNC: 6.1 G/DL (ref 6–8.4)
RBC # BLD AUTO: 3.44 M/UL (ref 4–5.4)
SODIUM SERPL-SCNC: 133 MMOL/L (ref 136–145)
WBC # BLD AUTO: 3.39 K/UL (ref 3.9–12.7)

## 2023-05-17 PROCEDURE — 25000003 PHARM REV CODE 250: Performed by: STUDENT IN AN ORGANIZED HEALTH CARE EDUCATION/TRAINING PROGRAM

## 2023-05-17 PROCEDURE — 94640 AIRWAY INHALATION TREATMENT: CPT

## 2023-05-17 PROCEDURE — 80053 COMPREHEN METABOLIC PANEL: CPT | Performed by: STUDENT IN AN ORGANIZED HEALTH CARE EDUCATION/TRAINING PROGRAM

## 2023-05-17 PROCEDURE — 84100 ASSAY OF PHOSPHORUS: CPT | Performed by: STUDENT IN AN ORGANIZED HEALTH CARE EDUCATION/TRAINING PROGRAM

## 2023-05-17 PROCEDURE — 94761 N-INVAS EAR/PLS OXIMETRY MLT: CPT

## 2023-05-17 PROCEDURE — 25000003 PHARM REV CODE 250

## 2023-05-17 PROCEDURE — 83735 ASSAY OF MAGNESIUM: CPT | Performed by: STUDENT IN AN ORGANIZED HEALTH CARE EDUCATION/TRAINING PROGRAM

## 2023-05-17 PROCEDURE — 85025 COMPLETE CBC W/AUTO DIFF WBC: CPT | Performed by: STUDENT IN AN ORGANIZED HEALTH CARE EDUCATION/TRAINING PROGRAM

## 2023-05-17 PROCEDURE — 36415 COLL VENOUS BLD VENIPUNCTURE: CPT | Performed by: STUDENT IN AN ORGANIZED HEALTH CARE EDUCATION/TRAINING PROGRAM

## 2023-05-17 PROCEDURE — 99900035 HC TECH TIME PER 15 MIN (STAT)

## 2023-05-17 RX ORDER — MUPIROCIN 20 MG/G
OINTMENT TOPICAL DAILY
Qty: 22 G | Refills: 0 | Status: SHIPPED | OUTPATIENT
Start: 2023-05-18 | End: 2023-10-17

## 2023-05-17 RX ORDER — TRAMADOL HYDROCHLORIDE 50 MG/1
50 TABLET ORAL 2 TIMES DAILY PRN
Qty: 14 TABLET | Refills: 0 | Status: SHIPPED | OUTPATIENT
Start: 2023-05-17 | End: 2023-06-08 | Stop reason: SDUPTHER

## 2023-05-17 RX ORDER — PANTOPRAZOLE SODIUM 40 MG/1
40 TABLET, DELAYED RELEASE ORAL DAILY
Status: DISCONTINUED | OUTPATIENT
Start: 2023-05-17 | End: 2023-05-17 | Stop reason: HOSPADM

## 2023-05-17 RX ADMIN — AMLODIPINE BESYLATE 5 MG: 5 TABLET ORAL at 08:05

## 2023-05-17 RX ADMIN — CALCIUM CARBONATE (ANTACID) CHEW TAB 500 MG 500 MG: 500 CHEW TAB at 08:05

## 2023-05-17 RX ADMIN — PANTOPRAZOLE SODIUM 40 MG: 40 TABLET, DELAYED RELEASE ORAL at 09:05

## 2023-05-17 RX ADMIN — COLLAGENASE SANTYL: 250 OINTMENT TOPICAL at 08:05

## 2023-05-17 RX ADMIN — LOSARTAN POTASSIUM 100 MG: 50 TABLET, FILM COATED ORAL at 08:05

## 2023-05-17 RX ADMIN — PREGABALIN 75 MG: 75 CAPSULE ORAL at 08:05

## 2023-05-17 RX ADMIN — GUAIFENESIN 600 MG: 600 TABLET, EXTENDED RELEASE ORAL at 08:05

## 2023-05-17 RX ADMIN — FLUTICASONE FUROATE AND VILANTEROL TRIFENATATE 1 PUFF: 100; 25 POWDER RESPIRATORY (INHALATION) at 09:05

## 2023-05-17 RX ADMIN — CHOLECALCIFEROL (VITAMIN D3) 10 MCG (400 UNIT) TABLET 1200 UNITS: at 08:05

## 2023-05-17 RX ADMIN — MUPIROCIN: 20 OINTMENT TOPICAL at 08:05

## 2023-05-17 NOTE — NURSING
Assumed care of patient from SUZETTE Tobar, room air, vitals WNL, AAOX4, daughter visiting, no c/o pain or discomfort, right calf dressing change completed, urine sample sent down, all safety precautions are in place, call bell and tray table are within reach of the patient and will continue to monitor.

## 2023-05-17 NOTE — PLAN OF CARE
[facility]      HOME HEALTH ORDERS  FACE TO FACE ENCOUNTER    Patient Name: Bridget Montgomery  YOB: 1935    PCP: Charles Peacock MD   PCP Address: [address] / [place] [address]  PCP Phone Number: [phone]  PCP Fax: [phone]    Encounter Date: 5/12/23    Admit to Home Health    Diagnoses:  Active Hospital Problems    Diagnosis  POA    *Non-healing wound of right lower extremity [S81.801A]  Unknown    Mixed hyperlipidemia [E78.2]  Yes    Chronic obstructive pulmonary disease [J44.9]  Yes     - remote history of smoking        Chronic kidney disease, stage III (moderate) [N18.30]  Yes     - sees Lacey.  - last GFR was 36 (in 2022)          Resolved Hospital Problems    Diagnosis Date Resolved POA    Hyponatremia [E87.1] 05/17/2023 Unknown       Follow Up Appointments:  Future Appointments   Date Time Provider Department Center   9/13/2023 10:30 AM Charles Peacock MD [facility]       Allergies:  Review of patient's allergies indicates:   Allergen Reactions    Cephalexin     Codeine     Meperidine      Other reaction(s): delerium, hallucination  Delerium  Delerium  Delerium      Penicillins     Tazarotene      Other reaction(s): rash, Unknown       Medications: Review discharge medications with patient and family and provide education.    Current Facility-Administered Medications   Medication Dose Route Frequency Provider Last Rate Last Admin    acetaminophen tablet 500 mg  500 mg Oral Q6H PRN Jeffery Benson MD   500 mg at 05/15/23 2010    albuterol-ipratropium 2.5 mg-0.5 mg/3 mL nebulizer solution 3 mL  3 mL Nebulization Q4H WAKE Zaid Payne MD   3 mL at 05/16/23 1555    amLODIPine tablet 5 mg  5 mg Oral Daily George Stone MD   5 mg at 05/17/23 0850    benzonatate capsule 100 mg  100 mg Oral TID PRN Dragan Salcido MD   100 mg at 05/15/23 2342    calcium carbonate 200 mg calcium (500 mg) chewable tablet 500 mg  500 mg Oral Daily George Stone MD   500  mg at 05/17/23 0850    cholecalciferol (vitamin D3) capsule/tablet 1,200 Units  1,200 Units Oral Daily Jeffery Benson MD   1,200 Units at 05/17/23 0850    collagenase ointment   Topical (Top) Daily Herve Daniel MD   Given at 05/17/23 0850    dextrose 10% bolus 125 mL 125 mL  12.5 g Intravenous PRN Jeffery Benson MD        dextrose 10% bolus 250 mL 250 mL  25 g Intravenous PRN Jeffery Bensno MD        dextrose 40 % gel 15,000 mg  15 g Oral PRN Jeffery Benson MD        dextrose 40 % gel 30,000 mg  30 g Oral PRN Jeffery Benson MD        enoxaparin injection 30 mg  30 mg Subcutaneous Daily Jeffery Benson MD   30 mg at 05/14/23 1649    famotidine tablet 20 mg  20 mg Oral Daily PRN Zaid Payne MD   20 mg at 05/14/23 0805    fluticasone furoate-vilanteroL 100-25 mcg/dose diskus inhaler 1 puff  1 puff Inhalation Daily Jeffery Benson MD   1 puff at 05/17/23 0932    glucagon (human recombinant) injection 1 mg  1 mg Intramuscular PRN Jeffery Benson MD        guaiFENesin 12 hr tablet 600 mg  600 mg Oral BID Dragan Salcido MD   600 mg at 05/17/23 0850    losartan tablet 100 mg  100 mg Oral Daily Jeffery Benson MD   100 mg at 05/17/23 0850    melatonin tablet 6 mg  6 mg Oral Nightly Jeffery Benson MD   6 mg at 05/16/23 2136    mupirocin 2 % ointment   Topical (Top) Daily Herve Daniel MD   Given at 05/17/23 0850    naloxone 0.4 mg/mL injection 0.02 mg  0.02 mg Intravenous PRN Jeffery Benson MD        pantoprazole EC tablet 40 mg  40 mg Oral Daily Zaid Payne MD   40 mg at 05/17/23 0901    pregabalin capsule 75 mg  75 mg Oral BID Jeffery Benson MD   75 mg at 05/17/23 0850    sodium chloride 0.9% flush 10 mL  10 mL Intravenous Q12H PRN Jeffery Benson MD        traMADoL tablet 50 mg  50 mg Oral BID PRN Zaid Payne MD   50 mg at 05/15/23 2231     Current Discharge Medication List        START taking these medications    Details   collagenase (SANTYL) ointment Apply topically once daily.  Qty: 30 g, Refills: 0      mupirocin (BACTROBAN) 2 % ointment Apply topically once daily.  Qty: 22  g, Refills: 0           CONTINUE these medications which have CHANGED    Details   traMADoL (ULTRAM) 50 mg tablet Take 1 tablet (50 mg total) by mouth 2 (two) times daily as needed for Pain.  Qty: 14 tablet, Refills: 0    Comments: Quantity prescribed more than 7 day supply? No           CONTINUE these medications which have NOT CHANGED    Details   amlodipine-benazepril 5-10 mg (LOTREL) 5-10 mg per capsule Take 1 capsule by mouth once daily.      calcium carbonate (CALCIUM 500 ORAL) Take 500 mg by mouth once daily.      cholecalciferol, vitamin D3, (VITAMIN D3) 50 mcg (2,000 unit) Cap capsule Take 2,000 Units by mouth once daily.      coenzyme Q10 10 mg capsule Take 10 mg by mouth once daily.      diclofenac sodium (VOLTAREN ARTHRITIS PAIN) 1 % Gel Apply 2 g topically once daily.  Qty: 1 each, Refills: 11    Associated Diagnoses: Idiopathic osteoarthritis      fluticasone propionate (FLONASE) 50 mcg/actuation nasal spray INSTILL TWO SPRAYS INTO EACH NOSTRIL TWICE A DAY  Qty: 48 mL, Refills: 2    Associated Diagnoses: Allergic rhinitis, unspecified seasonality, unspecified trigger      furosemide (LASIX) 20 MG tablet Take 20 mg by mouth every 8 (eight) hours as needed.      LIDOcaine (LIDODERM) 5 % Place 1 patch onto the skin once daily. Remove & Discard patch within 12 hours or as directed by provider  Qty: 5 patch, Refills: 0    Associated Diagnoses: Acute pain of right shoulder      olmesartan (BENICAR) 40 MG tablet Take 1 tablet (40 mg total) by mouth once daily.  Qty: 90 tablet, Refills: 3    Comments: .  Associated Diagnoses: Essential hypertension      olopatadine (PATANOL) 0.1 % ophthalmic solution Place into both eyes Daily.      omega 3-dha-epa-fish oil 1,000 mg (120 mg-180 mg) Cap Take 1 capsule by mouth once daily.      omeprazole (PRILOSEC) 20 MG capsule Take 20 mg by mouth once daily.      polyethylene glycol 3350 (MIRALAX ORAL) Take 17 g by mouth once daily.      pregabalin (LYRICA) 75 MG capsule Take  75 mg by mouth 2 (two) times daily.      psyllium 0.52 gram capsule Take 0.52 g by mouth daily as needed.      selenium 200 mcg Cap Take 200 mcg by mouth Daily.      SYMBICORT 80-4.5 mcg/actuation HFAA Inhale 2 puffs into the lungs 2 (two) times a day.  Qty: 10.2 g, Refills: 6    Associated Diagnoses: Chronic obstructive pulmonary disease, unspecified COPD type      triamcinolone acetonide 0.1% (KENALOG) 0.1 % cream APPLY TO AFFECTED AREA TWICE A DAY  Qty: 454 g, Refills: 3      vit A/vit C/vit E/zinc/copper (PRESERVISION AREDS ORAL) Take 1 capsule by mouth 2 (two) times a day.      zinc sulfate (ZINC-15 ORAL) Take 15 mg by mouth once daily.      acetaminophen (TYLENOL) 500 MG tablet Take 500 mg by mouth every 6 (six) hours as needed for Pain.      albuterol (PROVENTIL) 2.5 mg /3 mL (0.083 %) nebulizer solution Take 2.5 mg by nebulization 2 (two) times daily as needed.      albuterol (PROVENTIL/VENTOLIN HFA) 90 mcg/actuation inhaler Inhale 2 puffs into the lungs every 6 (six) hours as needed for Wheezing. Rescue           STOP taking these medications       apple cider vinegar 600 mg Cap Comments:   Reason for Stopping:         sulfamethoxazole-trimethoprim 800-160mg (BACTRIM DS) 800-160 mg Tab Comments:   Reason for Stopping:                 I have seen and examined this patient within the last 30 days. My clinical findings that support the need for the home health skilled services and home bound status are the following:no   Weakness/numbness causing balance and gait disturbance due to Weakness/Debility making it taxing to leave home.     Diet:   cardiac diet    Labs:  None    Referrals/ Consults  Physical Therapy to evaluate and treat. Evaluate for home safety and equipment needs; Establish/upgrade home exercise program. Perform / instruct on therapeutic exercises, gait training, transfer training, and Range of Motion.  Occupational Therapy to evaluate and treat. Evaluate home environment for safety and equipment  needs. Perform/Instruct on transfers, ADL training, ROM, and therapeutic exercises.    Activities:   activity as tolerated    Nursing:   Agency to admit patient within 24 hours of hospital discharge unless specified on physician order or at patient request    SN to complete comprehensive assessment including routine vital signs. Instruct on disease process and s/s of complications to report to MD. Review/verify medication list sent home with the patient at time of discharge  and instruct patient/caregiver as needed. Frequency may be adjusted depending on start of care date.     Skilled nurse to perform up to 3 visits PRN for symptoms related to diagnosis      Ok to schedule additional visits based on staff availability and patient request on consecutive days within the home health episode.    When multiple disciplines ordered:    Start of Care occurs on Sunday - Wednesday schedule remaining discipline evaluations as ordered on separate consecutive days following the start of care.    Thursday SOC -schedule subsequent evaluations Friday and Monday the following week.     Friday - Saturday SOC - schedule subsequent discipline evaluations on consecutive days starting Monday of the following week.    For all post-discharge communication and subsequent orders please contact patient's primary care physician. If unable to reach primary care physician or do not receive response within 30 minutes, please contact [facility] for clinical staff order clarification    I certify that this patient is confined to her home and needs physical therapy and occupational therapy.

## 2023-05-17 NOTE — PLAN OF CARE
Pt is set to d/c today home. Pt will have her her Drt Liliana [phone] help transport her home. SW requested f/u appts. Rounds completed on pt. All questions addressed. Bedside nurse to discuss d/c medications. Discussed importance to attend all f/u appts and take medications as prescribed. Verbalized understanding. Case Management to sign off.     PRINCE sent HH orders via fax and email. SW will follow.    DB Rangel  [phone]    Future Appointments   Date Time Provider Department Center   9/13/2023 10:30 AM Charles Peacock MD [facility]        05/17/23 1204   Final Note   Assessment Type Final Discharge Note   Anticipated Discharge Disposition Home   What phone number can be called within the next 1-3 days to see how you are doing after discharge? [phone]   Hospital Resources/Appts/Education Provided Appointments scheduled and added to AVS   Post-Acute Status   Coverage PHN   Discharge Delays None known at this time

## 2023-05-17 NOTE — PROGRESS NOTES
RSW met with patient to discuss discharge and additional patient barriers to care. Pt identified no additional social barriers to care. Per pt, pt is not in need of resources at this time.    The following were addressed during this visit:  -Complete follow-up with patient    SELENA Zuñiga

## 2023-05-17 NOTE — PROGRESS NOTES
[facility] Infectious Diseases Progress Note    Assessment/Plan:     Bridget Montgomery is a 88 y.o. F with a PMH of CKDIV, COPD, HTN, prediabetes (A1c 6.1 3/2023), and DVT () who presents for a nonhealing RLE wound x 3 weeks.   Right lower extremity wound 2/2 traumatic injury. Initial culture with only skin vero. Repeat wound culture with only skin vero growth x3 days. S/p 7 days bactrim. Clinically improving.  CKDIV, COPD, HTN, preDM, DVT      Recommendations:  - No antibiotics at this time  - Continue aggressive wound care  - Continue wound care at discharge   - Patient wishes to follow up with Dr Mohan for outpatient wound care  - Ok for discharge from ID perspective     Mayi Harper,   [facility]    Thank you for allowing us to participate in the care of this patient. Case has been discussed with consult staff, who is in agreement with assessment and plan.     Subjective:      No acute events overnight. Temp within normal limits last 24h. Pain in leg improved.      Objective:   Last 24 Hour Vital Signs:  BP  Min: 134/63  Max: 175/77  Temp  Av °F (36.7 °C)  Min: 97.7 °F (36.5 °C)  Max: 98.5 °F (36.9 °C)  Pulse  Av.3  Min: 50  Max: 71  Resp  Av  Min: 16  Max: 18  SpO2  Av.6 %  Min: 95 %  Max: 98 %  Weight  Av.7 kg (142 lb 10.2 oz)  Min: 64.7 kg (142 lb 10.2 oz)  Max: 64.7 kg (142 lb 10.2 oz)  I/O last 3 completed shifts:  In: 840 [P.O.:840]  Out: 2100 [Urine:2100]    Physical Exam  Vitals and nursing note reviewed.   Constitutional:       General: She is not in acute distress.     Appearance: She is not toxic-appearing.   HENT:      Head: Normocephalic and atraumatic.      Right Ear: External ear normal.      Left Ear: External ear normal.      Nose: Nose normal. No congestion or rhinorrhea.      Mouth/Throat:      Mouth: Mucous membranes are moist.      Pharynx: Oropharynx is clear.   Eyes:      Extraocular Movements: Extraocular movements intact.      Conjunctiva/sclera:  Conjunctivae normal.   Cardiovascular:      Rate and Rhythm: Normal rate and regular rhythm.      Pulses: Normal pulses.      Heart sounds: Normal heart sounds.   Pulmonary:      Effort: Pulmonary effort is normal.      Breath sounds: Normal breath sounds.   Abdominal:      General: Abdomen is flat. Bowel sounds are normal.      Palpations: Abdomen is soft.   Skin:     General: Skin is warm and dry.      Capillary Refill: Capillary refill takes less than 2 seconds.      Comments: Right lateral lower extremity with wound. Bandage in place. Without surrounding erythema.   Neurological:      General: No focal deficit present.      Mental Status: She is alert and oriented to person, place, and time.      Cranial Nerves: No cranial nerve deficit.   Psychiatric:         Mood and Affect: Mood normal.         Behavior: Behavior normal.       Laboratory:  Laboratory Data Reviewed: yes  Pertinent Findings:  Recent Labs   Lab 05/15/23  0506 05/16/23  0459 05/17/23  0533   WBC 4.16 3.26* 3.39*   HGB 9.9* 9.0* 10.3*   HCT 30.1* 27.7* 32.3*    306 337   MCV 92 93 94   RDW 12.1 12.0 12.1   * 130* 133*   K 5.1 5.1 4.9    103 105   CO2 20* 19* 18*   BUN 38* 41* 40*   CREATININE 2.0* 1.7* 1.4   GLU 91 76 91   PROT 6.1 5.8* 6.1   ALBUMIN 3.0* 2.9* 3.0*   BILITOT 0.2 0.2 0.2   AST 24 25 31   ALKPHOS 116 113 126   ALT 16 16 22       Microbiology Data:  Wound culture 5/12/23: normal skin vero  Aerobic wound culture 5/14/23: no growth to date  Anaerobic wound culture 5/14/23: in progress    Antimicrobials:  S/p 7 days bactrim    Other Results:  Radiology Results:  X-Ray Tibia Fibula 2 View Right    Result Date: 5/12/2023  EXAMINATION: XR TIBIA FIBULA 2 VIEW RIGHT CLINICAL HISTORY: Unspecified open wound, right lower leg, initial encounter TECHNIQUE: AP and lateral views of the right tibia and fibula were performed. COMPARISON: 05/06/2023 FINDINGS: Three views right tibia fibula. There is osteopenia.  There are  degenerative changes of the knee.  No acute displaced fracture or dislocation of the tibia or fibula.  The ankle mortise is intact noting degenerative changes of the ankle.  There is vascular calcification.  No large knee joint effusion.     1. No acute displaced fracture or dislocation of the tibia or fibula. Electronically signed by: Juan Shelton MD Date:    05/12/2023 Time:    19:14    X-Ray Tibia Fibula 2 View Right    Result Date: 5/6/2023  EXAMINATION: XR TIBIA FIBULA 2 VIEW RIGHT CLINICAL HISTORY: Cellulitis, unspecified TECHNIQUE: AP and lateral views of the right tibia and fibula were performed. COMPARISON: None. FINDINGS: There is diffuse osteopenia which limits assessment.  Visualized osseous and articular structures appear intact noting degenerative change.  Alignment appears to be within normal limits.  There are vascular and soft tissue calcifications present.  No definite soft tissue gas is definitively visualized.  If there is clinical correlation for soft tissue infection/osteomyelitis further assessment with MRI is advised if there are no clinical contraindications.     Please see above. Electronically signed by: Pam Sterling MD Date:    05/06/2023 Time:    04:06    US Lower Extremity Veins Right    Result Date: 5/12/2023  EXAMINATION: US LOWER EXTREMITY VEINS RIGHT CLINICAL HISTORY: Unspecified open wound, right lower leg, initial encounter TECHNIQUE: Duplex and color flow Doppler evaluation and graded compression of the right lower extremity veins was performed. COMPARISON: None FINDINGS: Duplex and color flow Doppler evaluation does not reveal any evidence of acute venous thrombosis in the common femoral, superficial femoral, greater saphenous, popliteal, peroneal, anterior tibial and posterior tibial veins of the right lower extremity.  There is no reflux to suggest valvular incompetence.     No evidence of deep venous thrombosis in the right lower extremity. Electronically signed by: Juan  MD Cat Date:    05/12/2023 Time:    18:30      Current Medications:     Infusions:       Scheduled:   albuterol-ipratropium  3 mL Nebulization Q4H WAKE    amLODIPine  5 mg Oral Daily    calcium carbonate  500 mg Oral Daily    cholecalciferol (vitamin D3)  1,200 Units Oral Daily    collagenase   Topical (Top) Daily    enoxaparin  30 mg Subcutaneous Daily    fluticasone furoate-vilanteroL  1 puff Inhalation Daily    guaiFENesin  600 mg Oral BID    losartan  100 mg Oral Daily    melatonin  6 mg Oral Nightly    mupirocin   Topical (Top) Daily    pantoprazole  40 mg Oral Daily    pregabalin  75 mg Oral BID        PRN:  acetaminophen, benzonatate, dextrose 10%, dextrose 10%, dextrose, dextrose, famotidine, glucagon (human recombinant), naloxone, sodium chloride 0.9%, traMADoL

## 2023-05-17 NOTE — PROGRESS NOTES
IP Liaison - Final Visit Note    Patient: Bridget Montgomery  MRN:  [id number]  Date of Service:  5/17/2023  Completed by:  SELENA Zuñiga    Reason for Visit   Patient presents with    IP Liaison Chart Review        Patient Summary     Discharge Date: 05/17/2023  Discharge telephone number/address: [phone] / [address] [place] [address]  Follow up provider: Dr. Vania Xavier  Follow up appointments: 5/23/2023 @ 9:30am  Home Health agency & telephone number:  orders sent to [facility]- pt currently with [facility]  DME ordered &  name: n/a  Assigned OPCM RN/SW: n/a  Report sent to follow up team (PCP/OPCM) via in basket message: n/a  Community Resources arranged including agency name & contact info: Medicaid information      SELENA Zuñiga

## 2023-05-17 NOTE — PLAN OF CARE
Introduced as VN and will be reviewing discharge instructions.  Educated patient on reason for admission, home medication list, and discharge instructions including when to return to ED and the following doctor appointments.  Education per flowsheet.  Opportunity given for questions and questions answered.   Nurse  notified of   completion of discharge education.  Patient waiting for wheelchair

## 2023-05-17 NOTE — NURSING
AVS reviewed with patient and daughter. IV removed. Prescriptions delivered to bedside. Voiced no concerns. W/c transport to escort patient and belongings to main lobby.

## 2023-05-19 LAB — BACTERIA SPEC ANAEROBE CULT: NORMAL

## 2023-05-25 ENCOUNTER — PATIENT MESSAGE (OUTPATIENT)
Dept: WOUND CARE | Facility: HOSPITAL | Age: 88
End: 2023-05-25

## 2023-05-25 ENCOUNTER — HOSPITAL ENCOUNTER (OUTPATIENT)
Dept: WOUND CARE | Facility: HOSPITAL | Age: 88
Discharge: HOME | End: 2023-05-25
Attending: SURGERY
Payer: MEDICARE

## 2023-05-25 VITALS
DIASTOLIC BLOOD PRESSURE: 72 MMHG | HEART RATE: 79 BPM | BODY MASS INDEX: 24.24 KG/M2 | TEMPERATURE: 98 F | HEIGHT: 64 IN | SYSTOLIC BLOOD PRESSURE: 171 MMHG | WEIGHT: 142 LBS

## 2023-05-25 DIAGNOSIS — N18.32 STAGE 3B CHRONIC KIDNEY DISEASE: ICD-10-CM

## 2023-05-25 DIAGNOSIS — N18.32 TYPE 2 DIABETES MELLITUS WITH STAGE 3B CHRONIC KIDNEY DISEASE, WITHOUT LONG-TERM CURRENT USE OF INSULIN: ICD-10-CM

## 2023-05-25 DIAGNOSIS — E11.22 TYPE 2 DIABETES MELLITUS WITH STAGE 3B CHRONIC KIDNEY DISEASE, WITHOUT LONG-TERM CURRENT USE OF INSULIN: ICD-10-CM

## 2023-05-25 DIAGNOSIS — S81.801A NON-HEALING WOUND OF RIGHT LOWER EXTREMITY: Primary | ICD-10-CM

## 2023-05-25 PROCEDURE — 87075 CULTR BACTERIA EXCEPT BLOOD: CPT | Performed by: SURGERY

## 2023-05-25 PROCEDURE — 11042 DBRDMT SUBQ TIS 1ST 20SQCM/<: CPT

## 2023-05-25 PROCEDURE — 87070 CULTURE OTHR SPECIMN AEROBIC: CPT | Performed by: SURGERY

## 2023-05-25 PROCEDURE — 99203 OFFICE O/P NEW LOW 30 MIN: CPT | Mod: 25

## 2023-05-25 RX ORDER — DOXYCYCLINE HYCLATE 100 MG
100 TABLET ORAL EVERY 12 HOURS
Status: ON HOLD | COMMUNITY
End: 2023-06-22 | Stop reason: HOSPADM

## 2023-05-25 NOTE — PROGRESS NOTES
[facility]    Subjective:       Patient ID: Bridget Montgomery is a 88 y.o. female.    Chief Complaint: Wound Consult    5/25/2023  Consult to Dr Mohan related to right lower leg traumatic injury from hitting leg on the  on April 15th 2023. Patient has finished taking 7 days of bactrim.  Home health comes by once weekly to clean the wound with wound cleanser, apply bactroban and santyl, cover with a bandage and apply tubigrip.  Right foot <3s capillary refill, +1 dorsalis pedis +1 posterior tibial no edema, no numbness no tingling.  No fevers no chills no pus.  Right ROBLES 0.80.  Pain 10/10 related to right shoulder, patient waiting for wound to heal for rotator cuff surgery, MD aware.  Unable to save pictures related to technical difficulty.  Patient tolerated sharp debridement well.  Plan of care established.  Orders faxed to home health.    Review of Systems   Constitutional: Negative.    HENT: Negative.     Eyes: Negative.    Respiratory: Negative.     Cardiovascular: Negative.    Gastrointestinal: Negative.    Genitourinary: Negative.    Musculoskeletal: Negative.    Skin: Negative.    Neurological: Negative.    Psychiatric/Behavioral: Negative.         Objective:     Vitals:    05/25/23 0840   BP: (!) 171/72   Pulse: 79   Temp:          Physical Exam  Constitutional:       Appearance: She is well-developed.   HENT:      Head: Normocephalic.   Eyes:      Conjunctiva/sclera: Conjunctivae normal.      Pupils: Pupils are equal, round, and reactive to light.   Cardiovascular:      Rate and Rhythm: Normal rate and regular rhythm.      Heart sounds: Normal heart sounds.   Pulmonary:      Effort: Pulmonary effort is normal.      Breath sounds: Normal breath sounds.   Abdominal:      General: Bowel sounds are normal.      Palpations: Abdomen is soft.   Musculoskeletal:         General: Normal range of motion.      Cervical back: Normal range of motion and  neck supple.   Skin:     General: Skin is warm and dry.   Neurological:      Mental Status: She is alert and oriented to person, place, and time.      Deep Tendon Reflexes: Reflexes are normal and symmetric.        Altered Skin Integrity 05/12/23 2305 Right anterior;distal;lower Leg #1 (Active)   05/12/23 2305   Altered Skin Integrity Present on Admission - Did Patient arrive to the hospital with altered skin?: yes   Side: Right   Orientation: anterior;distal;lower   Location: Leg   Wound Number: #1   Is this injury device related?:    Primary Wound Type:    Description of Altered Skin Integrity:    Ankle-Brachial Index:    Pulses:    Removal Indication and Assessment:    Wound Outcome:    (Retired) Wound Length (cm):    (Retired) Wound Width (cm):    (Retired) Depth (cm):    Wound Description (Comments):    Removal Indications:    Description of Altered Skin Integrity Full thickness tissue loss. Subcutaneous fat may be visible but bone, tendon or muscle are not exposed 05/25/23 0800   Dressing Appearance Intact;Dry;Clean 05/25/23 0800   Drainage Amount None 05/25/23 0800   Appearance Red;Yellow;Moist 05/25/23 0800   Tissue loss description Full thickness 05/25/23 0800   Red (%), Wound Tissue Color 70 % 05/25/23 0800   Yellow (%), Wound Tissue Color 30 % 05/25/23 0800   Periwound Area Redness;Dry;Intact 05/25/23 0800   Wound Edges Irregular 05/25/23 0800   Wound Length (cm) 3.1 cm 05/25/23 0800   Wound Width (cm) 2.9 cm 05/25/23 0800   Wound Depth (cm) 0.2 cm 05/25/23 0800   Wound Volume (cm^3) 1.798 cm^3 05/25/23 0800   Wound Surface Area (cm^2) 8.99 cm^2 05/25/23 0800   Care Cleansed with:;Soap and water;Sterile normal saline;Debrided 05/25/23 0800   Dressing Applied;Foam;Island/border;Tubular bandage 05/25/23 0800   Periwound Care Dry periwound area maintained 05/25/23 0800   Compression Tubular elasticized bandage 05/25/23 0800   Dressing Change Due 05/27/23 05/25/23 0800         Assessment/Plan:          ICD-10-CM ICD-9-CM   1. Non-healing wound of right lower extremity  S81.801A 894.1           Tissue pathology and/or culture taken:  [] Yes [x] No   Sharp debridement performed:   [x] Yes [] No   Labs ordered this visit:   [] Yes [x] No   Imaging ordered this visit:   [] Yes [x] No           Orders Placed This Encounter   Procedures    CULTURE, ANAEROBE          CULTURE, AEROBIC  (SPECIFY SOURCE)          Ambulatory referral/consult to General Surgery     Standing Status:   Standing     Number of Occurrences:   1     Referral Priority:   Routine     Referral Type:   Consultation     Referral Reason:   Specialty Services Required     Referred to Provider:   Adrian Mohan MD     Requested Specialty:   General Surgery     Number of Visits Requested:   1    Change dressing     Right lower leg traumatic injury  Cleanse wound with: Normal Saline  Lidocaine: PRN  Silver nitrate: PRN  Periwound care: maintain dry periwound  Primary dressing: santyl and bactroban  Secondary dressing: border dressing  Edema control: tubigrip E from toes to knee  Frequency: Monday Wednesday Friday and as needed  Follow-up: in 1 week with Dr Mohan  [facility]: Please continue wound care as above on Mondays Wednesdays Fridays and as needed except when the patient is in clinic.  Thank you.  Other Orders: wound culture 5/25/23, doxycyline 100mg BID PO        Follow up in 6 days (on 5/31/2023) for Dr Mohan.

## 2023-05-25 NOTE — PROCEDURES
"Debridement    Date/Time: 5/25/2023 8:00 AM  Performed by: Adrian Mohan MD  Authorized by: Adrian Mohan MD     Time out: Immediately prior to procedure a "time out" was called to verify the correct patient, procedure, equipment, support staff and site/side marked as required.    Consent Done?:  Yes (Verbal)    Preparation: Patient was prepped and draped with clean technique    Local anesthesia used?: Yes    Local anesthetic:  Topical anesthetic    Wound Details:    Location:  Right leg    Type of Debridement:  Excisional       Length (cm):  3.1       Area (sq cm):  8.99       Width (cm):  2.9       Percent Debrided (%):  100       Depth (cm):  0.2       Total Area Debrided (sq cm):  8.99    Depth of debridement:  Subcutaneous tissue    Tissue debrided:  Subcutaneous    Devitalized tissue debrided:  Exudate, Fibrin and Necrotic/Eschar    Instruments:  Curette, Forceps and Scissors  Bleeding:  Minimal  Hemostasis Achieved: Yes  Method Used:  Pressure and Silver Nitrate  Patient tolerance:  Patient tolerated the procedure well with no immediate complications  "

## 2023-05-29 LAB — BACTERIA SPEC AEROBE CULT: NORMAL

## 2023-05-30 LAB — BACTERIA SPEC ANAEROBE CULT: NORMAL

## 2023-05-31 ENCOUNTER — HOSPITAL ENCOUNTER (OUTPATIENT)
Dept: WOUND CARE | Facility: HOSPITAL | Age: 88
Discharge: HOME | End: 2023-05-31
Attending: SURGERY
Payer: MEDICARE

## 2023-05-31 VITALS — SYSTOLIC BLOOD PRESSURE: 154 MMHG | DIASTOLIC BLOOD PRESSURE: 69 MMHG | HEART RATE: 70 BPM | TEMPERATURE: 96 F

## 2023-05-31 DIAGNOSIS — E11.22 TYPE 2 DIABETES MELLITUS WITH STAGE 3B CHRONIC KIDNEY DISEASE, WITHOUT LONG-TERM CURRENT USE OF INSULIN: ICD-10-CM

## 2023-05-31 DIAGNOSIS — S81.801A NON-HEALING WOUND OF RIGHT LOWER EXTREMITY: Primary | ICD-10-CM

## 2023-05-31 DIAGNOSIS — N18.32 TYPE 2 DIABETES MELLITUS WITH STAGE 3B CHRONIC KIDNEY DISEASE, WITHOUT LONG-TERM CURRENT USE OF INSULIN: ICD-10-CM

## 2023-05-31 PROCEDURE — 11042 DBRDMT SUBQ TIS 1ST 20SQCM/<: CPT

## 2023-05-31 NOTE — PROCEDURES
"Debridement    Date/Time: 5/31/2023 8:00 AM  Performed by: Adrian Mohan MD  Authorized by: Adrian Mohan MD     Time out: Immediately prior to procedure a "time out" was called to verify the correct patient, procedure, equipment, support staff and site/side marked as required.    Consent Done?:  Yes (Verbal)    Preparation: Patient was prepped and draped with clean technique    Local anesthesia used?: Yes    Local anesthetic:  Topical anesthetic    Wound Details:    Location:  Right leg       Length (cm):  3       Area (sq cm):  7.5       Width (cm):  2.5       Percent Debrided (%):  100       Depth (cm):  0.2       Total Area Debrided (sq cm):  7.5    Depth of debridement:  Subcutaneous tissue    Tissue debrided:  Subcutaneous    Devitalized tissue debrided:  Fibrin and Necrotic/Eschar    Instruments:  Curette  Bleeding:  Minimal  Hemostasis Achieved: Yes  Method Used:  Pressure and Silver Nitrate  Patient tolerance:  Patient tolerated the procedure well with no immediate complications  1st Wound Pain Assessment: 3  "

## 2023-05-31 NOTE — PROGRESS NOTES
W[facility] Care [facility]    Subjective:       Patient ID: Bridget Montgomery is a 88 y.o. female.    Chief Complaint: Wound Care    5/31/2023  Follow up with Dr Mohan related to right lower leg traumatic injury.  No complaints.  Increased serosanguinous drainage.  Wound cultures negative.  Patient tolerated sharp debridement and silver nitratex1 to wound.  Patient to continue taking doxycyline.  Plan of care updated.  Orders faxed to home health.      Review of Systems   Constitutional: Negative.    HENT: Negative.     Eyes: Negative.    Respiratory: Negative.     Cardiovascular: Negative.    Gastrointestinal: Negative.    Genitourinary: Negative.    Musculoskeletal: Negative.    Skin: Negative.    Neurological: Negative.    Psychiatric/Behavioral: Negative.         Objective:     Vitals:    05/31/23 0800   BP: (!) 154/69   Pulse: 70   Temp: 96.2 °F (35.7 °C)         Physical Exam  Constitutional:       Appearance: She is well-developed.   HENT:      Head: Normocephalic.   Eyes:      Conjunctiva/sclera: Conjunctivae normal.      Pupils: Pupils are equal, round, and reactive to light.   Cardiovascular:      Rate and Rhythm: Normal rate and regular rhythm.      Heart sounds: Normal heart sounds.   Pulmonary:      Effort: Pulmonary effort is normal.      Breath sounds: Normal breath sounds.   Abdominal:      General: Bowel sounds are normal.      Palpations: Abdomen is soft.   Musculoskeletal:         General: Normal range of motion.      Cervical back: Normal range of motion and neck supple.   Skin:     General: Skin is warm and dry.   Neurological:      Mental Status: She is alert and oriented to person, place, and time.      Deep Tendon Reflexes: Reflexes are normal and symmetric.        Altered Skin Integrity 05/12/23 2305 Right anterior;distal;lower Leg #1 (Active)   05/12/23 2305   Altered Skin Integrity Present on Admission - Did Patient arrive to the hospital with altered  skin?: yes   Side: Right   Orientation: anterior;distal;lower   Location: Leg   Wound Number: #1   Is this injury device related?:    Primary Wound Type:    Description of Altered Skin Integrity:    Ankle-Brachial Index:    Pulses:    Removal Indication and Assessment:    Wound Outcome:    (Retired) Wound Length (cm):    (Retired) Wound Width (cm):    (Retired) Depth (cm):    Wound Description (Comments):    Removal Indications:    Wound Image   05/31/23 0800   Description of Altered Skin Integrity Full thickness tissue loss. Subcutaneous fat may be visible but bone, tendon or muscle are not exposed 05/31/23 0800   Dressing Appearance Saturated 05/31/23 0800   Drainage Amount Large 05/31/23 0800   Drainage Characteristics/Odor Serosanguineous 05/31/23 0800   Appearance Red;Yellow;Wet 05/31/23 0800   Tissue loss description Full thickness 05/31/23 0800   Red (%), Wound Tissue Color 60 % 05/31/23 0800   Yellow (%), Wound Tissue Color 40 % 05/31/23 0800   Periwound Area Redness;Dry;Intact 05/31/23 0800   Wound Edges Irregular 05/31/23 0800   Wound Length (cm) 3 cm 05/31/23 0800   Wound Width (cm) 2.5 cm 05/31/23 0800   Wound Depth (cm) 0.2 cm 05/31/23 0800   Wound Volume (cm^3) 1.5 cm^3 05/31/23 0800   Wound Surface Area (cm^2) 7.5 cm^2 05/31/23 0800   Care Cleansed with:;Soap and water;Sterile normal saline 05/31/23 0800   Dressing Applied;Foam;Island/border;Tubular bandage;Calcium alginate;Silver 05/31/23 0800   Periwound Care Dry periwound area maintained 05/31/23 0800   Compression Tubular elasticized bandage 05/31/23 0800   Dressing Change Due 06/02/23 05/31/23 0800         Assessment/Plan:         ICD-10-CM ICD-9-CM   1. Non-healing wound of right lower extremity  S81.801A 894.1           Tissue pathology and/or culture taken:  [] Yes [x] No   Sharp debridement performed:   [x] Yes [] No   Labs ordered this visit:   [] Yes [x] No   Imaging ordered this visit:   [] Yes [x] No           Orders Placed This Encounter    Procedures    Change dressing     Right lower leg traumatic injury   Cleanse wound with: Normal Saline   Lidocaine: PRN   Silver nitrate: PRN   Periwound care: maintain dry periwound   Primary dressing: santyl and bactroban, aquacel extra   Secondary dressing: border dressing   Edema control: tubigrip E from toes to knee   Frequency: Monday Wednesday Friday and as needed   Follow-up: in 2 weeks with Dr Mohan   [facility]: Please continue wound care as above on Mondays Wednesdays Fridays and as needed except when the patient is in clinic.  Thank you.   Other Orders: continue doxycyline 100mg BID PO        Follow up in 2 weeks (on 6/14/2023) for Dr Mohan.

## 2023-06-02 ENCOUNTER — LAB VISIT (OUTPATIENT)
Dept: LAB | Facility: HOSPITAL | Age: 88
End: 2023-06-02
Payer: MEDICARE

## 2023-06-02 DIAGNOSIS — N18.32 TYPE 2 DIABETES MELLITUS WITH STAGE 3B CHRONIC KIDNEY DISEASE, WITHOUT LONG-TERM CURRENT USE OF INSULIN: ICD-10-CM

## 2023-06-02 DIAGNOSIS — D63.1 ANEMIA IN STAGE 4 CHRONIC KIDNEY DISEASE: ICD-10-CM

## 2023-06-02 DIAGNOSIS — N18.4 ANEMIA IN STAGE 4 CHRONIC KIDNEY DISEASE: ICD-10-CM

## 2023-06-02 DIAGNOSIS — E11.22 TYPE 2 DIABETES MELLITUS WITH STAGE 3B CHRONIC KIDNEY DISEASE, WITHOUT LONG-TERM CURRENT USE OF INSULIN: ICD-10-CM

## 2023-06-02 LAB
ALBUMIN SERPL BCP-MCNC: 3.5 G/DL (ref 3.5–5.2)
ANION GAP SERPL CALC-SCNC: 10 MMOL/L (ref 8–16)
BASOPHILS # BLD AUTO: 0.05 K/UL (ref 0–0.2)
BASOPHILS NFR BLD: 0.5 % (ref 0–1.9)
BILIRUB UR QL STRIP: NEGATIVE
BUN SERPL-MCNC: 53 MG/DL (ref 8–23)
CALCIUM SERPL-MCNC: 9.3 MG/DL (ref 8.7–10.5)
CHLORIDE SERPL-SCNC: 101 MMOL/L (ref 95–110)
CLARITY UR: CLEAR
CO2 SERPL-SCNC: 22 MMOL/L (ref 23–29)
COLOR UR: YELLOW
CREAT SERPL-MCNC: 1.5 MG/DL (ref 0.5–1.4)
CREAT UR-MCNC: 18 MG/DL (ref 15–325)
DIFFERENTIAL METHOD: ABNORMAL
EOSINOPHIL # BLD AUTO: 0.1 K/UL (ref 0–0.5)
EOSINOPHIL NFR BLD: 0.6 % (ref 0–8)
ERYTHROCYTE [DISTWIDTH] IN BLOOD BY AUTOMATED COUNT: 12.8 % (ref 11.5–14.5)
EST. GFR  (NO RACE VARIABLE): 33 ML/MIN/1.73 M^2
FERRITIN SERPL-MCNC: 93 NG/ML (ref 20–300)
GLUCOSE SERPL-MCNC: 111 MG/DL (ref 70–110)
GLUCOSE UR QL STRIP: NEGATIVE
HCT VFR BLD AUTO: 33.3 % (ref 37–48.5)
HGB BLD-MCNC: 10.6 G/DL (ref 12–16)
HGB UR QL STRIP: NEGATIVE
IMM GRANULOCYTES # BLD AUTO: 0.04 K/UL (ref 0–0.04)
IMM GRANULOCYTES NFR BLD AUTO: 0.4 % (ref 0–0.5)
IRON SERPL-MCNC: 64 UG/DL (ref 30–160)
KETONES UR QL STRIP: NEGATIVE
LEUKOCYTE ESTERASE UR QL STRIP: NEGATIVE
LYMPHOCYTES # BLD AUTO: 1.1 K/UL (ref 1–4.8)
LYMPHOCYTES NFR BLD: 11.1 % (ref 18–48)
MAGNESIUM SERPL-MCNC: 1.8 MG/DL (ref 1.6–2.6)
MCH RBC QN AUTO: 30 PG (ref 27–31)
MCHC RBC AUTO-ENTMCNC: 31.8 G/DL (ref 32–36)
MCV RBC AUTO: 94 FL (ref 82–98)
MONOCYTES # BLD AUTO: 0.7 K/UL (ref 0.3–1)
MONOCYTES NFR BLD: 7.4 % (ref 4–15)
NEUTROPHILS # BLD AUTO: 7.7 K/UL (ref 1.8–7.7)
NEUTROPHILS NFR BLD: 80 % (ref 38–73)
NITRITE UR QL STRIP: NEGATIVE
NRBC BLD-RTO: 0 /100 WBC
PH UR STRIP: 5 [PH] (ref 5–8)
PHOSPHATE SERPL-MCNC: 5 MG/DL (ref 2.7–4.5)
PLATELET # BLD AUTO: 312 K/UL (ref 150–450)
PMV BLD AUTO: 10.1 FL (ref 9.2–12.9)
POTASSIUM SERPL-SCNC: 4.5 MMOL/L (ref 3.5–5.1)
PROT UR QL STRIP: NEGATIVE
PROT UR-MCNC: <7 MG/DL (ref 0–15)
PROT/CREAT UR: NORMAL MG/G{CREAT} (ref 0–0.2)
PTH-INTACT SERPL-MCNC: 39.9 PG/ML (ref 9–77)
RBC # BLD AUTO: 3.53 M/UL (ref 4–5.4)
SATURATED IRON: 16 % (ref 20–50)
SODIUM SERPL-SCNC: 133 MMOL/L (ref 136–145)
SP GR UR STRIP: 1 (ref 1–1.03)
TOTAL IRON BINDING CAPACITY: 411 UG/DL (ref 250–450)
TRANSFERRIN SERPL-MCNC: 278 MG/DL (ref 200–375)
URATE SERPL-MCNC: 8.1 MG/DL (ref 2.4–5.7)
URN SPEC COLLECT METH UR: NORMAL
UROBILINOGEN UR STRIP-ACNC: NEGATIVE EU/DL
WBC # BLD AUTO: 9.69 K/UL (ref 3.9–12.7)

## 2023-06-02 PROCEDURE — 83735 ASSAY OF MAGNESIUM: CPT | Performed by: INTERNAL MEDICINE

## 2023-06-02 PROCEDURE — 82728 ASSAY OF FERRITIN: CPT | Performed by: INTERNAL MEDICINE

## 2023-06-02 PROCEDURE — 84466 ASSAY OF TRANSFERRIN: CPT | Performed by: INTERNAL MEDICINE

## 2023-06-02 PROCEDURE — 84550 ASSAY OF BLOOD/URIC ACID: CPT | Performed by: INTERNAL MEDICINE

## 2023-06-02 PROCEDURE — 81003 URINALYSIS AUTO W/O SCOPE: CPT | Performed by: INTERNAL MEDICINE

## 2023-06-02 PROCEDURE — 85025 COMPLETE CBC W/AUTO DIFF WBC: CPT | Performed by: INTERNAL MEDICINE

## 2023-06-02 PROCEDURE — 36415 COLL VENOUS BLD VENIPUNCTURE: CPT | Performed by: INTERNAL MEDICINE

## 2023-06-02 PROCEDURE — 83970 ASSAY OF PARATHORMONE: CPT | Performed by: INTERNAL MEDICINE

## 2023-06-02 PROCEDURE — 80069 RENAL FUNCTION PANEL: CPT | Performed by: INTERNAL MEDICINE

## 2023-06-02 PROCEDURE — 84156 ASSAY OF PROTEIN URINE: CPT | Performed by: INTERNAL MEDICINE

## 2023-06-14 ENCOUNTER — HOSPITAL ENCOUNTER (OUTPATIENT)
Dept: WOUND CARE | Facility: HOSPITAL | Age: 88
Discharge: HOME | End: 2023-06-14
Attending: SURGERY
Payer: MEDICARE

## 2023-06-14 VITALS
HEIGHT: 64 IN | TEMPERATURE: 96 F | BODY MASS INDEX: 24.75 KG/M2 | SYSTOLIC BLOOD PRESSURE: 152 MMHG | HEART RATE: 78 BPM | DIASTOLIC BLOOD PRESSURE: 68 MMHG | WEIGHT: 145 LBS

## 2023-06-14 DIAGNOSIS — S81.801A NON-HEALING WOUND OF RIGHT LOWER EXTREMITY: Primary | ICD-10-CM

## 2023-06-14 PROCEDURE — 99213 OFFICE O/P EST LOW 20 MIN: CPT

## 2023-06-14 NOTE — PROGRESS NOTES
[facility]    Subjective:       Patient ID: Bridget Montgomery is a 88 y.o. female.    Chief Complaint: Non-healing Wound (Right Lower Leg)    6/14/2023: Follow up visit for Right Lower Leg Wound. Physician assessed and informed patient that wound is healing well. Silver applied to wound. Instructed to continue with the same treatment plan involving dressing changes and to continue with present antibiotics.   Review of Systems   Constitutional: Negative.    HENT: Negative.     Eyes: Negative.    Respiratory: Negative.     Cardiovascular: Negative.    Gastrointestinal: Negative.    Genitourinary: Negative.    Musculoskeletal: Negative.    Skin: Negative.    Neurological: Negative.    Psychiatric/Behavioral: Negative.         Objective:     Vitals:    06/14/23 0853   BP: (!) 152/68   Pulse: 78   Temp: 96.2 °F (35.7 °C)         Physical Exam  Constitutional:       Appearance: She is well-developed.   HENT:      Head: Normocephalic.   Eyes:      Conjunctiva/sclera: Conjunctivae normal.      Pupils: Pupils are equal, round, and reactive to light.   Cardiovascular:      Rate and Rhythm: Normal rate and regular rhythm.      Heart sounds: Normal heart sounds.   Pulmonary:      Effort: Pulmonary effort is normal.      Breath sounds: Normal breath sounds.   Abdominal:      General: Bowel sounds are normal.      Palpations: Abdomen is soft.   Musculoskeletal:         General: Normal range of motion.      Cervical back: Normal range of motion and neck supple.   Skin:     General: Skin is warm and dry.   Neurological:      Mental Status: She is alert and oriented to person, place, and time.      Deep Tendon Reflexes: Reflexes are normal and symmetric.        Altered Skin Integrity 05/12/23 2305 Right anterior;distal;lower Leg #1 (Active)   05/12/23 2305   Altered Skin Integrity Present on Admission - Did Patient arrive to the hospital with altered skin?: yes   Side: Right    Orientation: anterior;distal;lower   Location: Leg   Wound Number: #1   Is this injury device related?:    Primary Wound Type:    Description of Altered Skin Integrity:    Ankle-Brachial Index:    Pulses:    Removal Indication and Assessment:    Wound Outcome:    (Retired) Wound Length (cm):    (Retired) Wound Width (cm):    (Retired) Depth (cm):    Wound Description (Comments):    Removal Indications:    Wound Image   06/14/23 0936   Description of Altered Skin Integrity Full thickness tissue loss. Subcutaneous fat may be visible but bone, tendon or muscle are not exposed 06/14/23 0936   Dressing Appearance Moist drainage 06/14/23 0936   Drainage Amount Moderate 06/14/23 0936   Drainage Characteristics/Odor Serosanguineous 06/14/23 0936   Appearance Red;Pink;Yellow 06/14/23 0936   Tissue loss description Full thickness 06/14/23 0936   Red (%), Wound Tissue Color 70 % 06/14/23 0936   Yellow (%), Wound Tissue Color 30 % 06/14/23 0936   Periwound Area Redness 06/14/23 0936   Wound Edges Irregular 06/14/23 0936   Wound Length (cm) 2.9 cm 06/14/23 0936   Wound Width (cm) 1.8 cm 06/14/23 0936   Wound Depth (cm) 0.2 cm 06/14/23 0936   Wound Volume (cm^3) 1.044 cm^3 06/14/23 0936   Wound Surface Area (cm^2) 5.22 cm^2 06/14/23 0936   Care Cleansed with:;Soap and water;Sterile normal saline 06/14/23 0936   Dressing Applied;Silver;Absorptive Pad;Island/border;Tubular bandage 06/14/23 0936   Periwound Care Dry periwound area maintained 06/14/23 0936   Compression Tubular elasticized bandage 06/14/23 0936   Dressing Change Due 06/28/23 06/14/23 0936         Assessment/Plan:         ICD-10-CM ICD-9-CM   1. Non-healing wound of right lower extremity  S81.801A 894.1           Tissue pathology and/or culture taken:  [] Yes [x] No   Sharp debridement performed:   [] Yes [x] No   Labs ordered this visit:   [] Yes [x] No   Imaging ordered this visit:   [] Yes [x] No           Orders Placed This Encounter   Procedures    Change  dressing     Non-healing wound of right lower extremity  - Primary       Comments    Right lower leg traumatic injury   Cleanse wound with: Normal Saline   Lidocaine: PRN   Silver nitrate: PRN   Periwound care: maintain dry periwound   Primary dressing: santyl and bactroban, aquacel extra   Secondary dressing: border dressing   Edema control: tubigrip E from toes to knee   Frequency: Monday Wednesday Friday and as needed   Follow-up: in 2 weeks with Dr Mohan   [facility]: Please continue wound care as above on Mondays Wednesdays Fridays and as needed except when the patient is in clinic.  Thank you.   Other Orders: continue doxycyline 100mg BID PO        Follow up in about 2 weeks (around 6/28/2023) for Dr. Mohan.

## 2023-06-20 ENCOUNTER — HOSPITAL ENCOUNTER (OUTPATIENT)
Facility: HOSPITAL | Age: 88
Discharge: HOME | End: 2023-06-22
Attending: EMERGENCY MEDICINE | Admitting: INTERNAL MEDICINE
Payer: MEDICARE

## 2023-06-20 DIAGNOSIS — I10 ESSENTIAL HYPERTENSION: ICD-10-CM

## 2023-06-20 DIAGNOSIS — N18.4 ANEMIA, CHRONIC RENAL FAILURE, STAGE 4 (SEVERE): ICD-10-CM

## 2023-06-20 DIAGNOSIS — R10.13 EPIGASTRIC PAIN: ICD-10-CM

## 2023-06-20 DIAGNOSIS — S81.801A NON-HEALING WOUND OF RIGHT LOWER EXTREMITY: ICD-10-CM

## 2023-06-20 DIAGNOSIS — N17.9 ACUTE RENAL FAILURE SUPERIMPOSED ON STAGE 4 CHRONIC KIDNEY DISEASE, UNSPECIFIED ACUTE RENAL FAILURE TYPE: ICD-10-CM

## 2023-06-20 DIAGNOSIS — D63.1 ANEMIA, CHRONIC RENAL FAILURE, STAGE 4 (SEVERE): ICD-10-CM

## 2023-06-20 DIAGNOSIS — K85.90 ACUTE PANCREATITIS WITHOUT INFECTION OR NECROSIS, UNSPECIFIED PANCREATITIS TYPE: ICD-10-CM

## 2023-06-20 DIAGNOSIS — R11.2 NAUSEA AND VOMITING, UNSPECIFIED VOMITING TYPE: ICD-10-CM

## 2023-06-20 DIAGNOSIS — N18.4 ACUTE RENAL FAILURE SUPERIMPOSED ON STAGE 4 CHRONIC KIDNEY DISEASE, UNSPECIFIED ACUTE RENAL FAILURE TYPE: ICD-10-CM

## 2023-06-20 DIAGNOSIS — K85.10 ACUTE BILIARY PANCREATITIS WITHOUT INFECTION OR NECROSIS: ICD-10-CM

## 2023-06-20 DIAGNOSIS — K85.90 ACUTE PANCREATITIS, UNSPECIFIED COMPLICATION STATUS, UNSPECIFIED PANCREATITIS TYPE: Primary | ICD-10-CM

## 2023-06-20 LAB
ALBUMIN SERPL BCP-MCNC: 2.9 G/DL (ref 3.5–5.2)
ALP SERPL-CCNC: 200 U/L (ref 55–135)
ALT SERPL W/O P-5'-P-CCNC: 19 U/L (ref 10–44)
ANION GAP SERPL CALC-SCNC: 13 MMOL/L (ref 8–16)
APTT PPP: 37.6 SEC (ref 21–32)
AST SERPL-CCNC: 29 U/L (ref 10–40)
BASOPHILS # BLD AUTO: 0.04 K/UL (ref 0–0.2)
BASOPHILS NFR BLD: 0.5 % (ref 0–1.9)
BILIRUB SERPL-MCNC: 0.4 MG/DL (ref 0.1–1)
BUN SERPL-MCNC: 76 MG/DL (ref 8–23)
CALCIUM SERPL-MCNC: 8.8 MG/DL (ref 8.7–10.5)
CHLORIDE SERPL-SCNC: 101 MMOL/L (ref 95–110)
CO2 SERPL-SCNC: 22 MMOL/L (ref 23–29)
CREAT SERPL-MCNC: 1.8 MG/DL (ref 0.5–1.4)
DIFFERENTIAL METHOD: ABNORMAL
EOSINOPHIL # BLD AUTO: 0 K/UL (ref 0–0.5)
EOSINOPHIL NFR BLD: 0.4 % (ref 0–8)
ERYTHROCYTE [DISTWIDTH] IN BLOOD BY AUTOMATED COUNT: 13 % (ref 11.5–14.5)
EST. GFR  (NO RACE VARIABLE): 27 ML/MIN/1.73 M^2
GLUCOSE SERPL-MCNC: 142 MG/DL (ref 70–110)
HAV IGM SERPL QL IA: NORMAL
HBV CORE IGM SERPL QL IA: NORMAL
HBV SURFACE AG SERPL QL IA: NORMAL
HCT VFR BLD AUTO: 31 % (ref 37–48.5)
HCV AB SERPL QL IA: NORMAL
HGB BLD-MCNC: 10.7 G/DL (ref 12–16)
IMM GRANULOCYTES # BLD AUTO: 0.01 K/UL (ref 0–0.04)
IMM GRANULOCYTES NFR BLD AUTO: 0.1 % (ref 0–0.5)
INR PPP: 1.1 (ref 0.8–1.2)
LACTATE SERPL-SCNC: 1.2 MMOL/L (ref 0.5–2.2)
LIPASE SERPL-CCNC: 866 U/L (ref 4–60)
LYMPHOCYTES # BLD AUTO: 1.3 K/UL (ref 1–4.8)
LYMPHOCYTES NFR BLD: 17.9 % (ref 18–48)
MAGNESIUM SERPL-MCNC: 1.9 MG/DL (ref 1.6–2.6)
MCH RBC QN AUTO: 30.5 PG (ref 27–31)
MCHC RBC AUTO-ENTMCNC: 34.5 G/DL (ref 32–36)
MCV RBC AUTO: 88 FL (ref 82–98)
MONOCYTES # BLD AUTO: 0.5 K/UL (ref 0.3–1)
MONOCYTES NFR BLD: 6.8 % (ref 4–15)
NEUTROPHILS # BLD AUTO: 5.6 K/UL (ref 1.8–7.7)
NEUTROPHILS NFR BLD: 74.3 % (ref 38–73)
NRBC BLD-RTO: 0 /100 WBC
PHOSPHATE SERPL-MCNC: 4.4 MG/DL (ref 2.7–4.5)
PLATELET # BLD AUTO: 255 K/UL (ref 150–450)
PMV BLD AUTO: 10.4 FL (ref 9.2–12.9)
POTASSIUM SERPL-SCNC: 4 MMOL/L (ref 3.5–5.1)
PROT SERPL-MCNC: 5.9 G/DL (ref 6–8.4)
PROTHROMBIN TIME: 11.6 SEC (ref 9–12.5)
RBC # BLD AUTO: 3.51 M/UL (ref 4–5.4)
SODIUM SERPL-SCNC: 136 MMOL/L (ref 136–145)
TROPONIN I SERPL DL<=0.01 NG/ML-MCNC: <0.006 NG/ML (ref 0–0.03)
WBC # BLD AUTO: 7.49 K/UL (ref 3.9–12.7)

## 2023-06-20 PROCEDURE — 96375 TX/PRO/DX INJ NEW DRUG ADDON: CPT

## 2023-06-20 PROCEDURE — 83605 ASSAY OF LACTIC ACID: CPT | Performed by: PHYSICIAN ASSISTANT

## 2023-06-20 PROCEDURE — 85730 THROMBOPLASTIN TIME PARTIAL: CPT

## 2023-06-20 PROCEDURE — 83690 ASSAY OF LIPASE: CPT | Performed by: PHYSICIAN ASSISTANT

## 2023-06-20 PROCEDURE — 36415 COLL VENOUS BLD VENIPUNCTURE: CPT

## 2023-06-20 PROCEDURE — 94761 N-INVAS EAR/PLS OXIMETRY MLT: CPT

## 2023-06-20 PROCEDURE — G0378 HOSPITAL OBSERVATION PER HR: HCPCS

## 2023-06-20 PROCEDURE — C9113 INJ PANTOPRAZOLE SODIUM, VIA: HCPCS

## 2023-06-20 PROCEDURE — 93010 ELECTROCARDIOGRAM REPORT: CPT | Mod: ,,, | Performed by: INTERNAL MEDICINE

## 2023-06-20 PROCEDURE — 63600175 PHARM REV CODE 636 W HCPCS

## 2023-06-20 PROCEDURE — 63600175 PHARM REV CODE 636 W HCPCS: Performed by: PHYSICIAN ASSISTANT

## 2023-06-20 PROCEDURE — 99205 OFFICE O/P NEW HI 60 MIN: CPT | Mod: ,,, | Performed by: INTERNAL MEDICINE

## 2023-06-20 PROCEDURE — 96376 TX/PRO/DX INJ SAME DRUG ADON: CPT

## 2023-06-20 PROCEDURE — 87040 BLOOD CULTURE FOR BACTERIA: CPT | Performed by: PHYSICIAN ASSISTANT

## 2023-06-20 PROCEDURE — 25000003 PHARM REV CODE 250

## 2023-06-20 PROCEDURE — 96361 HYDRATE IV INFUSION ADD-ON: CPT

## 2023-06-20 PROCEDURE — 93010 EKG 12-LEAD: ICD-10-PCS | Mod: ,,, | Performed by: INTERNAL MEDICINE

## 2023-06-20 PROCEDURE — 96372 THER/PROPH/DIAG INJ SC/IM: CPT | Mod: 59

## 2023-06-20 PROCEDURE — 83735 ASSAY OF MAGNESIUM: CPT | Performed by: PHYSICIAN ASSISTANT

## 2023-06-20 PROCEDURE — 96374 THER/PROPH/DIAG INJ IV PUSH: CPT | Mod: 59

## 2023-06-20 PROCEDURE — 94640 AIRWAY INHALATION TREATMENT: CPT

## 2023-06-20 PROCEDURE — 85025 COMPLETE CBC W/AUTO DIFF WBC: CPT | Performed by: PHYSICIAN ASSISTANT

## 2023-06-20 PROCEDURE — 99205 PR OFFICE/OUTPT VISIT, NEW, LEVL V, 60-74 MIN: ICD-10-PCS | Mod: ,,, | Performed by: INTERNAL MEDICINE

## 2023-06-20 PROCEDURE — 80074 ACUTE HEPATITIS PANEL: CPT

## 2023-06-20 PROCEDURE — 80053 COMPREHEN METABOLIC PANEL: CPT | Performed by: PHYSICIAN ASSISTANT

## 2023-06-20 PROCEDURE — 25000242 PHARM REV CODE 250 ALT 637 W/ HCPCS

## 2023-06-20 PROCEDURE — 93005 ELECTROCARDIOGRAM TRACING: CPT

## 2023-06-20 PROCEDURE — 84484 ASSAY OF TROPONIN QUANT: CPT | Performed by: PHYSICIAN ASSISTANT

## 2023-06-20 PROCEDURE — 84100 ASSAY OF PHOSPHORUS: CPT

## 2023-06-20 PROCEDURE — 85610 PROTHROMBIN TIME: CPT

## 2023-06-20 PROCEDURE — 99285 EMERGENCY DEPT VISIT HI MDM: CPT | Mod: 25

## 2023-06-20 RX ORDER — PANTOPRAZOLE SODIUM 40 MG/10ML
40 INJECTION, POWDER, LYOPHILIZED, FOR SOLUTION INTRAVENOUS DAILY
Status: DISCONTINUED | OUTPATIENT
Start: 2023-06-20 | End: 2023-06-22

## 2023-06-20 RX ORDER — SODIUM CHLORIDE, SODIUM LACTATE, POTASSIUM CHLORIDE, CALCIUM CHLORIDE 600; 310; 30; 20 MG/100ML; MG/100ML; MG/100ML; MG/100ML
INJECTION, SOLUTION INTRAVENOUS CONTINUOUS
Status: ACTIVE | OUTPATIENT
Start: 2023-06-20 | End: 2023-06-21

## 2023-06-20 RX ORDER — DEXTROSE 40 %
30 GEL (GRAM) ORAL
Status: DISCONTINUED | OUTPATIENT
Start: 2023-06-20 | End: 2023-06-22 | Stop reason: HOSPADM

## 2023-06-20 RX ORDER — HEPARIN SODIUM 5000 [USP'U]/ML
5000 INJECTION, SOLUTION INTRAVENOUS; SUBCUTANEOUS EVERY 8 HOURS
Status: DISCONTINUED | OUTPATIENT
Start: 2023-06-20 | End: 2023-06-22 | Stop reason: HOSPADM

## 2023-06-20 RX ORDER — ENALAPRILAT 1.25 MG/ML
0.62 INJECTION INTRAVENOUS EVERY 6 HOURS PRN
Status: DISCONTINUED | OUTPATIENT
Start: 2023-06-20 | End: 2023-06-22 | Stop reason: HOSPADM

## 2023-06-20 RX ORDER — NALOXONE HCL 0.4 MG/ML
0.02 VIAL (ML) INJECTION
Status: DISCONTINUED | OUTPATIENT
Start: 2023-06-20 | End: 2023-06-22 | Stop reason: HOSPADM

## 2023-06-20 RX ORDER — AMLODIPINE BESYLATE 5 MG/1
5 TABLET ORAL DAILY
Status: DISCONTINUED | OUTPATIENT
Start: 2023-06-20 | End: 2023-06-22

## 2023-06-20 RX ORDER — DEXTROSE 40 %
15 GEL (GRAM) ORAL
Status: DISCONTINUED | OUTPATIENT
Start: 2023-06-20 | End: 2023-06-22 | Stop reason: HOSPADM

## 2023-06-20 RX ORDER — ONDANSETRON 2 MG/ML
4 INJECTION INTRAMUSCULAR; INTRAVENOUS ONCE
Status: DISCONTINUED | OUTPATIENT
Start: 2023-06-20 | End: 2023-06-22 | Stop reason: HOSPADM

## 2023-06-20 RX ORDER — GLUCAGON 1 MG
1 KIT INJECTION
Status: DISCONTINUED | OUTPATIENT
Start: 2023-06-20 | End: 2023-06-22 | Stop reason: HOSPADM

## 2023-06-20 RX ORDER — ACETAMINOPHEN 325 MG/1
650 TABLET ORAL EVERY 4 HOURS PRN
Status: DISCONTINUED | OUTPATIENT
Start: 2023-06-20 | End: 2023-06-20

## 2023-06-20 RX ORDER — SODIUM CHLORIDE 0.9 % (FLUSH) 0.9 %
10 SYRINGE (ML) INJECTION EVERY 12 HOURS PRN
Status: DISCONTINUED | OUTPATIENT
Start: 2023-06-20 | End: 2023-06-22 | Stop reason: HOSPADM

## 2023-06-20 RX ORDER — MORPHINE SULFATE 2 MG/ML
2 INJECTION, SOLUTION INTRAMUSCULAR; INTRAVENOUS
Status: COMPLETED | OUTPATIENT
Start: 2023-06-20 | End: 2023-06-20

## 2023-06-20 RX ORDER — HYDROMORPHONE HYDROCHLORIDE 1 MG/ML
0.4 INJECTION, SOLUTION INTRAMUSCULAR; INTRAVENOUS; SUBCUTANEOUS ONCE
Status: COMPLETED | OUTPATIENT
Start: 2023-06-20 | End: 2023-06-20

## 2023-06-20 RX ORDER — MORPHINE SULFATE 2 MG/ML
2 INJECTION, SOLUTION INTRAMUSCULAR; INTRAVENOUS EVERY 4 HOURS PRN
Status: DISCONTINUED | OUTPATIENT
Start: 2023-06-20 | End: 2023-06-22 | Stop reason: HOSPADM

## 2023-06-20 RX ORDER — FLUTICASONE FUROATE AND VILANTEROL 100; 25 UG/1; UG/1
1 POWDER RESPIRATORY (INHALATION) DAILY
Status: DISCONTINUED | OUTPATIENT
Start: 2023-06-20 | End: 2023-06-22 | Stop reason: HOSPADM

## 2023-06-20 RX ORDER — ONDANSETRON 2 MG/ML
4 INJECTION INTRAMUSCULAR; INTRAVENOUS
Status: COMPLETED | OUTPATIENT
Start: 2023-06-20 | End: 2023-06-20

## 2023-06-20 RX ORDER — FLUTICASONE PROPIONATE 50 MCG
1 SPRAY, SUSPENSION (ML) NASAL DAILY
Status: DISCONTINUED | OUTPATIENT
Start: 2023-06-21 | End: 2023-06-22 | Stop reason: HOSPADM

## 2023-06-20 RX ORDER — ACETAMINOPHEN 325 MG/1
650 TABLET ORAL EVERY 6 HOURS
Status: DISCONTINUED | OUTPATIENT
Start: 2023-06-20 | End: 2023-06-22 | Stop reason: HOSPADM

## 2023-06-20 RX ORDER — ONDANSETRON 2 MG/ML
4 INJECTION INTRAMUSCULAR; INTRAVENOUS EVERY 6 HOURS PRN
Status: DISCONTINUED | OUTPATIENT
Start: 2023-06-20 | End: 2023-06-22 | Stop reason: HOSPADM

## 2023-06-20 RX ADMIN — ONDANSETRON 4 MG: 2 INJECTION INTRAMUSCULAR; INTRAVENOUS at 02:06

## 2023-06-20 RX ADMIN — HYDROMORPHONE HYDROCHLORIDE 0.4 MG: 1 INJECTION, SOLUTION INTRAMUSCULAR; INTRAVENOUS; SUBCUTANEOUS at 04:06

## 2023-06-20 RX ADMIN — HEPARIN SODIUM 5000 UNITS: 5000 INJECTION INTRAVENOUS; SUBCUTANEOUS at 09:06

## 2023-06-20 RX ADMIN — SODIUM CHLORIDE, POTASSIUM CHLORIDE, SODIUM LACTATE AND CALCIUM CHLORIDE 1000 ML: 600; 310; 30; 20 INJECTION, SOLUTION INTRAVENOUS at 02:06

## 2023-06-20 RX ADMIN — PANTOPRAZOLE SODIUM 40 MG: 40 INJECTION, POWDER, LYOPHILIZED, FOR SOLUTION INTRAVENOUS at 02:06

## 2023-06-20 RX ADMIN — SODIUM CHLORIDE, POTASSIUM CHLORIDE, SODIUM LACTATE AND CALCIUM CHLORIDE: 600; 310; 30; 20 INJECTION, SOLUTION INTRAVENOUS at 07:06

## 2023-06-20 RX ADMIN — ONDANSETRON 4 MG: 2 INJECTION INTRAMUSCULAR; INTRAVENOUS at 10:06

## 2023-06-20 RX ADMIN — HEPARIN SODIUM 5000 UNITS: 5000 INJECTION INTRAVENOUS; SUBCUTANEOUS at 02:06

## 2023-06-20 RX ADMIN — MORPHINE SULFATE 2 MG: 2 INJECTION, SOLUTION INTRAMUSCULAR; INTRAVENOUS at 08:06

## 2023-06-20 RX ADMIN — AMLODIPINE BESYLATE 5 MG: 5 TABLET ORAL at 09:06

## 2023-06-20 RX ADMIN — COLLAGENASE SANTYL: 250 OINTMENT TOPICAL at 06:06

## 2023-06-20 RX ADMIN — MORPHINE SULFATE 2 MG: 2 INJECTION, SOLUTION INTRAMUSCULAR; INTRAVENOUS at 10:06

## 2023-06-20 RX ADMIN — FLUTICASONE FUROATE AND VILANTEROL TRIFENATATE 1 PUFF: 100; 25 POWDER RESPIRATORY (INHALATION) at 03:06

## 2023-06-20 RX ADMIN — MORPHINE SULFATE 2 MG: 2 INJECTION, SOLUTION INTRAMUSCULAR; INTRAVENOUS at 02:06

## 2023-06-20 NOTE — PLAN OF CARE
VN cued into pt's room for introduction with pt's daughter  permission.  VN role explained and informed pt's daughter that VN would be working with bedside nurse and the rest of the care team.  Fall risk and bed alarm protocol education provided.  Instructed to call for assistance and agreeable.  Allowed time for questions. NAD noted.  Pt c/o pain and nausea. Bedside nurse notified. Will cont to be available as needed.      06/20/23 1419   Admission   Initial VN Admission Questions Complete   Communication Issues? None   Shift   Virtual Nurse - Patient Verbalized Approval Of Camera Use;VN Rounding   Safety/Activity   Patient Rounds call light in patient/parent reach;clutter free environment maintained;visualized patient   Safety Promotion/Fall Prevention Fall Risk reviewed with patient/family;instructed to call staff for mobility

## 2023-06-20 NOTE — PLAN OF CARE
Pt on RA with documented sats. The proper method of use, as well as anticipated side effects, of this metered-dose inhaler are discussed and demonstrated to the patient. Will continue to monitor.

## 2023-06-20 NOTE — CONSULTS
[facility] Surg  Wound Care    Patient Name:  Bridget Montgomery   MRN:  [id number]  Date: 6/20/2023  Diagnosis: <principal problem not specified>    History:     Past Medical History:   Diagnosis Date    Allergy     Anemia, unspecified     Arthritis     CKD (chronic kidney disease) stage 4, GFR 15-29 ml/min     COPD (chronic obstructive pulmonary disease)     Edema     HTN (hypertension)     Hypocalcemia     Hyponatremia     Osteoarthritis        Social History     Socioeconomic History    Marital status:    Tobacco Use    Smoking status: Former    Smokeless tobacco: Never   Substance and Sexual Activity    Alcohol use: Not Currently    Drug use: Never    Sexual activity: Not Currently     Social Determinants of Health     Financial Resource Strain: Low Risk     Difficulty of Paying Living Expenses: Not hard at all   Food Insecurity: No Food Insecurity    Worried About Running Out of Food in the Last Year: Never true    Ran Out of Food in the Last Year: Never true   Transportation Needs: No Transportation Needs    Lack of Transportation (Medical): No    Lack of Transportation (Non-Medical): No   Stress: No Stress Concern Present    Feeling of Stress : Only a little   Social Connections: Moderately Isolated    Frequency of Communication with Friends and Family: More than three times a week    Frequency of Social Gatherings with Friends and Family: More than three times a week    Attends Mandaen Services: 1 to 4 times per year    Active Member of Clubs or Organizations: No    Attends Club or Organization Meetings: Never    Marital Status:    Housing Stability: Low Risk     Unable to Pay for Housing in the Last Year: No    Number of Places Lived in the Last Year: 1    Unstable Housing in the Last Year: No       Precautions:     Allergies as of 06/20/2023 - Reviewed 06/20/2023   Allergen Reaction Noted    Cephalexin  03/21/2018    Codeine  03/21/2018    Meperidine  01/29/2019    Penicillins  03/21/2018     Tazarotene  03/30/2020       [facility] Assessment Details/Treatment     Pt established with Dr. Mohan in outpatient wound clinic- will continue treatment with Santyl ointment daily.  Notified nurse and Dr. Rosenthal.    RLE- full thickness wound- 3x2x0.1cm    Bilateral heels intact.    06/20/2023

## 2023-06-20 NOTE — H&P
[facility] Internal Medicine History and Physical - Resident Note    Admitting Team: [facility] Internal Medicine Team A  Attending Physician: Dr. Doris Rick   Resident: Galo  Interns: Justin    Date of Admit: 6/20/2023    Chief Complaint     Abdominal Pain (Generalized abd pain with n/v, pt appears weak and frail in triage, aaox3 )   for 3 weeks, worsening over 1 day     Subjective:      History of Present Illness:  Bridget Montgomery is a 88 y.o. female with history of CKD 4, COPD, HTN, chronic RLE wound, partial provoked DVT RLE with resolution, hypocalcemia, anemia of chronic disease, and osteoarthritis who presented to [facility] on 6/20/2023 with a primary complaint of abdominal pain x3 weeks and severe abdominal pain with nausea and vomiting for 1 day.     History obtained from daughter and patient. They state she started having abdominal pain, worse after she ate about 3-4 weeks ago. This is unusual for her although she does have GERD and a hiatal hernia but she does not normally get pain like this. She has also noticed some intermittent nausea and vomiting after eating. This acutely worsened yesterday and she has been unable to eat anything since dinner last night. The pain is located in the center of her abdomen. She denies any radiation or RUQ pain. She thinks it might be somewhat worse after she eats but it has been more severe since yesterday. She denies any hematemsis but has had yellow vomiting (no bilious emesis). Her last bowel movement was yesterday (she normally goes every other day with help of miralax and fiber). She den ies any fevers/chills, cough, congestion, shortness of breath, dysuria or trouble urinating, diarrhea. She denies any significant change to her RLE wound (no inc in erythema or drainage, last saw home wound care yesterday but daughter changes dressing daily).     In the ED, she was afebrile but in pain which improved with 2mg morphine and nausea that improved  with zofran. Labs significant for lipase elevation.     Past Medical History:  Past Medical History:   Diagnosis Date    Allergy     Anemia, unspecified     Arthritis     CKD (chronic kidney disease) stage 4, GFR 15-29 ml/min     COPD (chronic obstructive pulmonary disease)     Edema     HTN (hypertension)     Hypocalcemia     Hyponatremia     Osteoarthritis        Past Surgical History:  Past Surgical History:   Procedure Laterality Date    BREAST CYST EXCISION      CAUDAL EPIDURAL STEROID INJECTION N/A 2/2/2023    Procedure: Injection-steroid-epidural-caudal;  Surgeon: Angel Sparrow MD;  Location: [facility] PAIN T;  Service: Pain Management;  Laterality: N/A;    FOOT SURGERY     Hysterectomy ~15 years ago     Allergies:  Review of patient's allergies indicates:   Allergen Reactions    Cephalexin     Codeine     Meperidine      Other reaction(s): delerium, hallucination  Delerium  Delerium  Delerium      Penicillins     Tazarotene      Other reaction(s): rash, Unknown       Home Medications:  Doxycycline for past 3 weeks for chronic RLE wound   Tramadol 50mg BID   Amlodipine-benazepril 5-10mg qd  Lasix 20mg BID   Omeprazole 20mg daily   Lidocaine patches PRN   Lyrica 50mg nightly PRN   Symbicort 80-45 2 puffs BID   Albuterol PRN   Miralax daily   Olopatadine eye drops   Multivitamin with calcium, zinc, and vitamin D3   Santyl and mupricin PRN for wound care       Family History:  Family History   Problem Relation Age of Onset    Cancer Mother     No Known Problems Father        Social History:  Social History     Tobacco Use    Smoking status: Former    Smokeless tobacco: Never   Substance Use Topics    Alcohol use: Not Currently    Drug use: Never       Review of Systems:  Pertinent positives and negatives listed in HPI. All other systems are reviewed and are negative.    Health Maintaince :   Primary Care Physician: Charles Peacock  Immunizations:   TDap is up to date, 2016.  Influenza is not up to date, last  "2019.  Pneumovax is up to date, per pt/family report.     Objective:   Last 24 Hour Vital Signs:  BP  Min: 143/62  Max: 143/62  Temp  Av.7 °F (36.5 °C)  Min: 97.7 °F (36.5 °C)  Max: 97.7 °F (36.5 °C)  Pulse  Av  Min: 53  Max: 53  Resp  Av  Min: 18  Max: 18  SpO2  Av %  Min: 98 %  Max: 98 %  Height  Av' 4" (162.6 cm)  Min: 5' 4" (162.6 cm)  Max: 5' 4" (162.6 cm)  Weight  Av.2 kg (135 lb)  Min: 61.2 kg (135 lb)  Max: 61.2 kg (135 lb)  Body mass index is 23.17 kg/m².  No intake/output data recorded.    Physical Examination:  General: Alert and awake in no apparent distress, laying in bed comfortably with some discomfort with moving or deep breaths   Head:  Normocephalic and atraumatic  Eyes:  PERRL; EOMi with anicteric sclera and clear conjunctivae  Mouth:  Oropharynx clear, moist mucous membranes  Cardio:  Regular rate and rhythm with normal S1 and S2; no murmurs   Resp:  CTAB; respirations unlabored; no wheezes, crackles or rhonchi  Abdom: Soft, nondistended, TTP in epigastric region, no guarding or rebound  Extrem: Warm and well-perfused with no clubbing, cyanosis or edema  Skin:  RLE chronic wound with dressing in place, clean wound bed without surrounding erythema or purulence   Pulses:   2+ and symmetric distally  Neuro:  AAOx3; cooperative and pleasant with no focal deficits    Laboratory:  Most Recent Data:  CBC:   Lab Results   Component Value Date    WBC 7.49 2023    HGB 10.7 (L) 2023    HCT 31.0 (L) 2023     2023    MCV 88 2023    RDW 13.0 2023     BMP:   Lab Results   Component Value Date     2023    K 4.0 2023     2023    CO2 22 (L) 2023    BUN 76 (H) 2023    CREATININE 1.8 (H) 2023     (H) 2023    CALCIUM 8.8 2023    MG 1.9 2023    PHOS 5.0 (H) 2023     LFTs:   Lab Results   Component Value Date    PROT 5.9 (L) 2023    ALBUMIN 2.9 (L) 2023    " BILITOT 0.4 06/20/2023    AST 29 06/20/2023    ALKPHOS 200 (H) 06/20/2023    ALT 19 06/20/2023     Coags: No results found for: INR, PROTIME, PTT  FLP:   Lab Results   Component Value Date    CHOL 243 (H) 03/13/2023    HDL 60 03/13/2023    LDLCALC 167.0 (H) 03/13/2023    TRIG 80 03/13/2023    CHOLHDL 24.7 03/13/2023     DM:   Lab Results   Component Value Date    HGBA1C 5.6 03/13/2023    HGBA1C 6.1 (H) 03/23/2022    HGBA1C 5.7 03/11/2021    LDLCALC 167.0 (H) 03/13/2023    CREATININE 1.8 (H) 06/20/2023     Thyroid: No results found for: TSH, FREET4, G0XNZOD, T4ODQZL, THYROIDAB  Anemia:   Lab Results   Component Value Date    IRON 64 06/02/2023    TIBC 411 06/02/2023    FERRITIN 93 06/02/2023     Cardiac:   Lab Results   Component Value Date    TROPONINI <0.006 06/20/2023     Urinalysis:   Lab Results   Component Value Date    COLORU Yellow 06/02/2023    CLARITYU Clear 03/14/2023    SPECGRAV 1.005 06/02/2023    NITRITE Negative 06/02/2023    KETONESU Negative 06/02/2023    UROBILINOGEN Negative 06/02/2023    WBCUA 0 04/11/2023       Trended Cardiac Data:  Recent Labs   Lab 06/20/23  0956   TROPONINI <0.006     Microbiology Data:  None    Other Laboratory Data:  None     Other Results:  EKG (my interpretation): Sinus bradycardia, normal axis     Radiology:  CXR - No acute cardiopulmonary process     Ultrasound RUQ: FINDINGS:  There is gallbladder wall thickening measuring 5 mm.  There is a small amount of fluid around the gallbladder.  The bile duct measures 4 mm.  There is mild intrahepatic bile duct dilatation near the dawna hepatis.  Liver measures 15 cm.  The spleen was not seen.  There is a small amount of fluid around the gallbladder.     Impression:  There is gallbladder wall thickening and a small amount of pericholecystic fluid likely related to hepatocellular disease.  There may be mild intrahepatic bile duct dilatation.  MRCP could be helpful.       Assessment:     Bridget Montgomery is a 88 y.o. female  "with history of CKD 4, COPD, HTN, chronic RLE wound, partial provoked DVT RLE with resolution, hypocalcemia, anemia of chronic disease, and osteoarthritis who presented to [facility] on 6/20/2023 with a primary complaint of abdominal pain x3 weeks and severe abdominal pain with nausea and vomiting for 1 day now admitted to [facility] for pancreatitis. RUQ ultrasound with some intrahepatic bile duct dilatation, will get MRCP for further characterization and consult GI. Will start fluids and pain control and continue to monitor.     Plan:     Pancreatitis:   - Workup ongoing, RUQ ultrasound with possible gallbladder involvement   - MRCP ordered and GI consulted, appreciate recs   - Pain control with morphine PRN, pt states that is helping for now   - Will start gentle fluid resuscitation and continue to monitor fluid status   - NPO for MRCP, can advance diet as tolerated if MRCP is negative and pt does not require any more interventions   - Has HLD (T cholest ~250 and LDL ~170), not on statin due to age    CKD 4:  - Follows with Dr. Lyons outpatient   - Avoid nephrotoxins as able   - Consult nephrology, appreciate recs   - Starting some hydration for prior to MRCP   - Continue to monitor daily     Chronic Right Lower Extremity Wound, stable:  - No leukocytosis, afebrile  - Follows with hyperbarics/wound care outpatient and Dr. Mohan   - Wound care consulted, appreciate recs   - Pt previously on doxycycline, s/p 3 weeks - will discontinue as there is clean wound bed and no erythema or purulence     HTN:  - Pt previously on olmesartan at home but changed to amlodipine-benazepril  5-10mg daily ~1 mo ago    - Will hold for now, if better BP control needed will start with amlodipine as ACE/ARB can be associated with pancreatitis     Spinal Stenosis:  - Previously seen by NSGY, no acute intervention needed   - Pt previously rx Lyrica 75mg BID but that caused her to "act like a zombie" so her daughter " decreased it to 50mg nightly prn but she hasn't required it in several days     Anemia of CKD:  - Stable, continue to monitor   - Baseline Hb 9-10  - Normocytic anemia   - Most recent iron studies with ferritin 93, iron 64, TIBC 411 earlier this month     Chronic Constipation:   - Continue home miralax     Osteoarthritis:   - Chronic pain to R shoulder, takes tramadol at home for that   - Supposed to have shoulder surgery in the next few weeks at [facility] per daughter   - Continue pain management for pancreatitis     H/o Type 2 Diabetes:   - Most recent A1c <6  - Continue to monitor     Full code, discussed with pt and daughter on admission   Diet: NPO until MRCP   Ppx: heparin   Dispo: admit for pancreatitis, anticipate >48 hours inpt     Terrie Rosenthal MD  [facility] Internal Medicine Team A   [facility] P[facility]  06/20/2023 11:28 AM

## 2023-06-20 NOTE — CONSULTS
NEPHROLOGY CONSULT NOTE    HPI & INTERVAL HISTORY:    Past Medical History:   Diagnosis Date    Allergy     Anemia, unspecified     Arthritis     CKD (chronic kidney disease) stage 4, GFR 15-29 ml/min     COPD (chronic obstructive pulmonary disease)     Edema     HTN (hypertension)     Hypocalcemia     Hyponatremia     Osteoarthritis       Past Surgical History:   Procedure Laterality Date    BREAST CYST EXCISION      CAUDAL EPIDURAL STEROID INJECTION N/A 2/2/2023    Procedure: Injection-steroid-epidural-caudal;  Surgeon: Angel Sparrow MD;  Location: [facility];  Service: Pain Management;  Laterality: N/A;    FOOT SURGERY        Review of patient's allergies indicates:   Allergen Reactions    Cephalexin     Codeine     Meperidine      Other reaction(s): delerium, hallucination  Delerium  Delerium  Delerium      Penicillins     Tazarotene      Other reaction(s): rash, Unknown      (Not in a hospital admission)      Social History     Socioeconomic History    Marital status:    Tobacco Use    Smoking status: Former    Smokeless tobacco: Never   Substance and Sexual Activity    Alcohol use: Not Currently    Drug use: Never    Sexual activity: Not Currently     Social Determinants of Health     Financial Resource Strain: Low Risk     Difficulty of Paying Living Expenses: Not hard at all   Food Insecurity: No Food Insecurity    Worried About Running Out of Food in the Last Year: Never true    Ran Out of Food in the Last Year: Never true   Transportation Needs: No Transportation Needs    Lack of Transportation (Medical): No    Lack of Transportation (Non-Medical): No   Stress: No Stress Concern Present    Feeling of Stress : Only a little   Social Connections: Moderately Isolated    Frequency of Communication with Friends and Family: More than three times a week    Frequency of Social Gatherings with Friends and Family: More than three times a week    Attends Congregation Services: 1 to 4 times per year     Active Member of Clubs or Organizations: No    Attends Club or Organization Meetings: Never    Marital Status:    Housing Stability: Low Risk     Unable to Pay for Housing in the Last Year: No    Number of Places Lived in the Last Year: 1    Unstable Housing in the Last Year: No        MEDS   fluticasone furoate-vilanteroL  1 puff Inhalation Daily    [START ON 6/21/2023] fluticasone propionate  1 spray Each Nostril Daily    heparin (porcine)  5,000 Units Subcutaneous Q8H    lactated ringers  2,000 mL Intravenous Once    pantoprazole  40 mg Intravenous Daily               CONTINOUS INFUSIONS:    No intake or output data in the 24 hours ending 06/20/23 1306     HEMODYNAMICS:    Temp:  [97.7 °F (36.5 °C)] 97.7 °F (36.5 °C)  Pulse:  [53-59] 59  Resp:  [18-19] 19  SpO2:  [98 %-100 %] 100 %  BP: (139-143)/(59-62) 139/59   General : weak  Cardiology : pulse 59  Pulmonary : diminished breath sounds  Abdomen : soft   Extremities : no edema   Skin: dry     LABS   Lab Results   Component Value Date    WBC 7.49 06/20/2023    HGB 10.7 (L) 06/20/2023    HCT 31.0 (L) 06/20/2023    MCV 88 06/20/2023     06/20/2023        Recent Labs   Lab 06/20/23  0956   *   CALCIUM 8.8   ALBUMIN 2.9*   PROT 5.9*      K 4.0   CO2 22*      BUN 76*   CREATININE 1.8*   ALKPHOS 200*   ALT 19   AST 29   BILITOT 0.4      Lab Results   Component Value Date    PTH 39.9 06/02/2023    CALCIUM 8.8 06/20/2023    PHOS 5.0 (H) 06/02/2023      Lab Results   Component Value Date    IRON 64 06/02/2023    TIBC 411 06/02/2023    FERRITIN 93 06/02/2023        ABG  No results for input(s): PH, PO2, PCO2, HCO3, BE in the last 168 hours.      IMAGING:  CXR    ASSESSMENT / PLAN  Nausea, vomiting, abdominal pain and generalized weakness  Lipase 866  US  There is gallbladder wall thickening measuring 5 mm.  There is a small amount of fluid around the gallbladder.  The bile duct measures 4 mm.  There is mild intrahepatic bile duct dilatation  near the dawna hepatis.  Liver measures 15 cm.  The spleen was not seen.  There is a small amount of fluid around the gallbladder.     RADHA/ CKD 4  UA  protein negative, blood negative   Creatinine 1.8  GFR 27 cc/min  Azotemia  BUN 76  Metabolic acidosis  Metabolic bone disease  Anemia multifactorial  Sat iron 16  Hb 10.7  Poor nutrition  Albumin 2.9  CXR-   No acute process seen  Hypertension  Blood pressure 139/59.  I and O  Weight daily  Avoid nephrotoxic agents, hypovolemia,  nephrotoxic agents  IVF  GI consult  Will follow up  Wound care

## 2023-06-20 NOTE — ED NOTES
LOC: The patient is awake, alert, and oriented to self, place, time, and situation. Pt is calm and cooperative. Affect is appropriate.  Speech is appropriate and clear. Pt appears frail.     APPEARANCE: Patient resting in no mild distress.  Pt actively vomiting. Patient is clean and well groomed.    SKIN: The skin is warm and dry; color consistent with ethnicity.  Patient has normal skin turgor and moist mucus membranes. Wound noted to R skin. Pts family member states the wound looks better today than it has been.     MUSCULOSKELETAL: Patient moving upper and lower extremities with mild difficulty due to weakness; denies pain in the extremities or back.  Reports weakness.     RESPIRATORY: Airway is open and patent. Respirations spontaneous, even, easy, and non-labored.  Patient has a normal effort and rate.  No accessory muscle use noted. Denies cough.     CARDIAC:  Bradycardia noted at 51bpm.  No peripheral edema noted. No complaints of chest pain.     ABDOMEN: Soft and tender to palpation.  No distention noted. Pt reports abdominal pain, nausea, vomiting, denies diarrhea, or constipation.    NEUROLOGIC: Eyes open spontaneously.  Behavior appropriate to situation.  Follows commands; facial expression symmetrical.  Purposeful motor response noted; normal sensation in all extremities. Pt denies headache; denies lightheadedness or dizziness; denies visual disturbances; denies loss of balance; denies unilateral weakness.

## 2023-06-20 NOTE — CONSULTS
"[facility]     CC: Abdominal pain     HPI 88 y.o. female with a history of CKD, COPD, anemia of chronic disease, GERD and HTN who presents with subacute, progressive epigastric/ RUQ abdominal pain associated with nausea and vomiting. Reports pain has been present for this time and gets worse immediately after meals. She has been on Doxycycline outpatient for a chronic leg wound along with several multivitamins and supplements. She denies excessive alcohol use. The patient has also been gradually losing weight, but according to her daughter this has been intentional through dietary modifications.       Past Medical History  CKD 4  HTN  OA  GERD  COPD  Hx of DVT, not on AC      Physical Examination  BP (!) 179/75 (BP Location: Right arm, Patient Position: Lying)   Pulse (!) 57   Temp 97.5 °F (36.4 °C) (Oral)   Resp 20   Ht 5' 4" (1.626 m)   Wt 66.4 kg (146 lb 6.2 oz)   SpO2 98%   BMI 25.13 kg/m²   General appearace: well appearing, no acute distress, alert   Abdomen: soft, non-tender, non-distended abdomen with no rebound or guarding    Labs:  Hgb: 10.7    INR: 1.1  Lipase 866  Bili: 0.4  AST/ ALT: 29/19    Imaging:  US of the abdomen reviewed which shows thickening around the gallbladder with CBD of 4mm. Mild dilation seen intrahepatically around the dawna hepatis. Possible shadowing seen in the gallbladder, but no overt stones seen on my read.     External medical records reviewed including external documents     Additional history obtained from the patient's daughter at the bedside      Assessment:  88 y.o. female with a history of CKD, COPD, anemia of chronic disease, GERD and HTN who presents with acute pancreatitis with no CBD dilation or liver injury, but possible intrahepatic ductal dilation. Possible etiology of gallstone pancreatitis without overt findings for obstructing choledocholithisis vs malignancy.    (This is an acute illness requiring hospitalization and poses a threat to life or " bodily function)    Plan:  -Follow up MRCP   -Check CMP in the am  -Follow up acute hepatitis panel   -Continue supportive care     Case discussed with Dr. Wren.      Kwan Hurtado MD  [facility] Gastroenterology Fellow, PGY-IV  Pager #: [phone]  GI Clinic: [phone]

## 2023-06-20 NOTE — ED PROVIDER NOTES
Encounter Date: 6/20/2023       History     Chief Complaint   Patient presents with    Abdominal Pain     Generalized abd pain with n/v, pt appears weak and frail in triage, aaox3      88-year-old female, PMH chronic wound right lower extremity followed by Dr. Mohan/ wound care, HTN, CKD stage 4, COPD, presents to ED with daughter with concern of nausea, vomiting, abdominal pain and generalized weakness.  Patient's daughter at bedside reports patient has had intermittent abdominal pain for nearly 1 month as severely worsened yesterday followed by nausea and roughly 6 episodes of NBNB vomiting overnight with generalized fatigue today.  Denying fevers, chills, sore throat, nasal congestion, chest pain, cough, shortness of breath, palpitations, leg swelling, urinary or bowel complaints.   Last bowel movement yesterday described as normal with no bright red blood or melena.  She is currently on doxycycline for her chronic RLE wound.  No other acute complaints at this time.    The history is provided by the patient.   Review of patient's allergies indicates:   Allergen Reactions    Cephalexin     Codeine     Meperidine      Other reaction(s): delerium, hallucination  Delerium  Delerium  Delerium      Penicillins     Tazarotene      Other reaction(s): rash, Unknown     Past Medical History:   Diagnosis Date    Allergy     Anemia, unspecified     Arthritis     CKD (chronic kidney disease) stage 4, GFR 15-29 ml/min     COPD (chronic obstructive pulmonary disease)     Edema     HTN (hypertension)     Hypocalcemia     Hyponatremia     Osteoarthritis      Past Surgical History:   Procedure Laterality Date    BREAST CYST EXCISION      CAUDAL EPIDURAL STEROID INJECTION N/A 2/2/2023    Procedure: Injection-steroid-epidural-caudal;  Surgeon: Angel Sparrow MD;  Location: [facility];  Service: Pain Management;  Laterality: N/A;    FOOT SURGERY       Family History   Problem Relation Age of Onset    Cancer Mother     No  Known Problems Father      Social History     Tobacco Use    Smoking status: Former    Smokeless tobacco: Never   Substance Use Topics    Alcohol use: Not Currently    Drug use: Never     Review of Systems   Constitutional:  Negative for chills and fever.   HENT:  Negative for congestion and sore throat.    Respiratory:  Negative for cough and shortness of breath.    Cardiovascular:  Negative for chest pain.   Gastrointestinal:  Positive for abdominal pain, nausea and vomiting. Negative for diarrhea.   Genitourinary:  Negative for dysuria and frequency.   Musculoskeletal:  Negative for back pain, neck pain and neck stiffness.   Skin:  Positive for wound.   Neurological:  Negative for headaches.     Physical Exam     Initial Vitals [06/20/23 0913]   BP Pulse Resp Temp SpO2   (!) 143/62 (!) 53 18 97.7 °F (36.5 °C) 98 %      MAP       --         Physical Exam    Nursing note and vitals reviewed.  Constitutional: She is cooperative. No distress.     Weak/frail-appearing.  No distress   HENT:   Head: Normocephalic and atraumatic.   Mouth/Throat: Oropharynx is clear and moist.   Eyes: EOM are normal.   Neck: Neck supple.   Normal range of motion.  Cardiovascular:  Regular rhythm and normal heart sounds.   Bradycardia present.         Pulmonary/Chest: Effort normal and breath sounds normal.   Abdominal: Abdomen is soft. There is abdominal tenderness in the epigastric area.    Epigastric abdominal pain.  Denying tenderness towards quadrant or lower abdominal region.  No guarding.  No overlying skin changes.   Musculoskeletal:         General: No tenderness.      Cervical back: Normal range of motion and neck supple.     Neurological: She is alert and oriented to person, place, and time. She is not disoriented. GCS eye subscore is 4. GCS verbal subscore is 5. GCS motor subscore is 6.   Skin: Skin is warm and dry.          Chronic wound to right lower extremity, appearing at baseline per daughter at bedside.  No surrounding  erythema or warmth.   Psychiatric: She has a normal mood and affect. Her speech is normal and behavior is normal. Thought content normal.       ED Course   Procedures  Labs Reviewed   CBC W/ AUTO DIFFERENTIAL - Abnormal; Notable for the following components:       Result Value    RBC 3.51 (*)     Hemoglobin 10.7 (*)     Hematocrit 31.0 (*)     Gran % 74.3 (*)     Lymph % 17.9 (*)     All other components within normal limits   COMPREHENSIVE METABOLIC PANEL - Abnormal; Notable for the following components:    CO2 22 (*)     Glucose 142 (*)     BUN 76 (*)     Creatinine 1.8 (*)     Total Protein 5.9 (*)     Albumin 2.9 (*)     Alkaline Phosphatase 200 (*)     eGFR 27 (*)     All other components within normal limits   LIPASE - Abnormal; Notable for the following components:    Lipase 866 (*)     All other components within normal limits   CULTURE, BLOOD   CULTURE, BLOOD   LACTIC ACID, PLASMA   TROPONIN I   MAGNESIUM   URINALYSIS, REFLEX TO URINE CULTURE          Imaging Results              X-Ray Chest 1 View (Final result)  Result time 06/20/23 10:17:35      Final result by Sathish Chung III, MD (06/20/23 10:17:35)                   Impression:      No acute process seen.      Electronically signed by: Sathish Chung MD  Date:    06/20/2023  Time:    10:17               Narrative:    EXAMINATION:  XR CHEST 1 VIEW    CLINICAL HISTORY:  Epigastric pain    FINDINGS:  Chest one view: Heart size is normal.  Lungs are clear.  Bones reveal severe DJD of the shoulders.                                       Medications   ondansetron injection 4 mg (4 mg Intravenous Given 6/20/23 1002)   morphine injection 2 mg (2 mg Intravenous Given 6/20/23 1003)     Medical Decision Making:   Initial Assessment:     Patient presents with daughter with concern of epigastric abdominal pain, nausea, vomiting and generalized fatigue.  Abdominal pain intermittent over past month but worsening yesterday.  Afebrile.  Patient does appear weak  and frail but in no distress.  Epigastric abdominal pain noted.  RLE chronic wound at reported baseline.  Differential Diagnosis:    Including but not limited to GERD, intestinal spasm, gastroenteritis, gastritis, ulcer, cholecystitis, cholelithiasis, gallstones, pancreatitis, ileus, small bowel obstruction, appendicitis, diverticulitis, colitis, constipation, intestinal gas pain , sepsis, UTI, pyelonephritis, electrolyte abnormality, RADHA, dehydration      ED Management:   CBC, CMP, lipase, UA, lactic, troponin, magnesium, EKG, CXR     EKG sinus Umair, rate 56, no acute ST elevation.  No STEMI.  EKG reviewed by attending, Dr. Aguillon .  CXR with no acute cardiopulmonary findings    CBC grossly unremarkable no elevation WBC.  H/ H appearing at baseline.    CMP appearing near baseline.  Creatinine 1.8.    Troponin WNL.  Lipase 866, concerning for pancreatitis.    Lactic WNL.  Blood cultures pending.  UA pending.      Findings concerning for acute pancreatitis.  Pain appearing moderately controlled with morphine in ED. Will discuss placement observation with Hospital Medicine   Patient discussed with [facility] Hospital Medicine team, who has accepted patient observation.  Will order abdominal ultrasound.                        Clinical Impression:   Final diagnoses:  [R10.13] Epigastric pain  [K85.90] Acute pancreatitis, unspecified complication status, unspecified pancreatitis type (Primary)  [R11.2] Nausea and vomiting, unspecified vomiting type        ED Disposition Condition    Observation                 Tyrell Pope PA-C  06/20/23 1514

## 2023-06-21 LAB
ALBUMIN SERPL BCP-MCNC: 2.4 G/DL (ref 3.5–5.2)
ALP SERPL-CCNC: 178 U/L (ref 55–135)
ALT SERPL W/O P-5'-P-CCNC: 16 U/L (ref 10–44)
ANION GAP SERPL CALC-SCNC: 10 MMOL/L (ref 8–16)
AST SERPL-CCNC: 23 U/L (ref 10–40)
BASOPHILS # BLD AUTO: 0.03 K/UL (ref 0–0.2)
BASOPHILS NFR BLD: 0.3 % (ref 0–1.9)
BILIRUB SERPL-MCNC: 0.5 MG/DL (ref 0.1–1)
BUN SERPL-MCNC: 65 MG/DL (ref 8–23)
CALCIUM SERPL-MCNC: 8.4 MG/DL (ref 8.7–10.5)
CHLORIDE SERPL-SCNC: 105 MMOL/L (ref 95–110)
CO2 SERPL-SCNC: 24 MMOL/L (ref 23–29)
CREAT SERPL-MCNC: 1.4 MG/DL (ref 0.5–1.4)
DIFFERENTIAL METHOD: ABNORMAL
EOSINOPHIL # BLD AUTO: 0 K/UL (ref 0–0.5)
EOSINOPHIL NFR BLD: 0.4 % (ref 0–8)
ERYTHROCYTE [DISTWIDTH] IN BLOOD BY AUTOMATED COUNT: 12.8 % (ref 11.5–14.5)
EST. GFR  (NO RACE VARIABLE): 36 ML/MIN/1.73 M^2
GLUCOSE SERPL-MCNC: 99 MG/DL (ref 70–110)
HCT VFR BLD AUTO: 27.5 % (ref 37–48.5)
HGB BLD-MCNC: 9.5 G/DL (ref 12–16)
IMM GRANULOCYTES # BLD AUTO: 0.03 K/UL (ref 0–0.04)
IMM GRANULOCYTES NFR BLD AUTO: 0.3 % (ref 0–0.5)
LYMPHOCYTES # BLD AUTO: 1.3 K/UL (ref 1–4.8)
LYMPHOCYTES NFR BLD: 12.1 % (ref 18–48)
MAGNESIUM SERPL-MCNC: 1.9 MG/DL (ref 1.6–2.6)
MCH RBC QN AUTO: 30.1 PG (ref 27–31)
MCHC RBC AUTO-ENTMCNC: 34.5 G/DL (ref 32–36)
MCV RBC AUTO: 87 FL (ref 82–98)
MONOCYTES # BLD AUTO: 0.9 K/UL (ref 0.3–1)
MONOCYTES NFR BLD: 8.3 % (ref 4–15)
NEUTROPHILS # BLD AUTO: 8.8 K/UL (ref 1.8–7.7)
NEUTROPHILS NFR BLD: 78.6 % (ref 38–73)
NRBC BLD-RTO: 0 /100 WBC
PHOSPHATE SERPL-MCNC: 3.6 MG/DL (ref 2.7–4.5)
PLATELET # BLD AUTO: 231 K/UL (ref 150–450)
PMV BLD AUTO: 10.8 FL (ref 9.2–12.9)
POTASSIUM SERPL-SCNC: 3.7 MMOL/L (ref 3.5–5.1)
PROT SERPL-MCNC: 4.8 G/DL (ref 6–8.4)
RBC # BLD AUTO: 3.16 M/UL (ref 4–5.4)
SODIUM SERPL-SCNC: 139 MMOL/L (ref 136–145)
WBC # BLD AUTO: 11.12 K/UL (ref 3.9–12.7)

## 2023-06-21 PROCEDURE — 96372 THER/PROPH/DIAG INJ SC/IM: CPT

## 2023-06-21 PROCEDURE — 94640 AIRWAY INHALATION TREATMENT: CPT

## 2023-06-21 PROCEDURE — G0378 HOSPITAL OBSERVATION PER HR: HCPCS

## 2023-06-21 PROCEDURE — 80053 COMPREHEN METABOLIC PANEL: CPT

## 2023-06-21 PROCEDURE — 96376 TX/PRO/DX INJ SAME DRUG ADON: CPT

## 2023-06-21 PROCEDURE — 94761 N-INVAS EAR/PLS OXIMETRY MLT: CPT

## 2023-06-21 PROCEDURE — 96361 HYDRATE IV INFUSION ADD-ON: CPT

## 2023-06-21 PROCEDURE — 25000003 PHARM REV CODE 250

## 2023-06-21 PROCEDURE — 85025 COMPLETE CBC W/AUTO DIFF WBC: CPT

## 2023-06-21 PROCEDURE — 96366 THER/PROPH/DIAG IV INF ADDON: CPT

## 2023-06-21 PROCEDURE — 36415 COLL VENOUS BLD VENIPUNCTURE: CPT

## 2023-06-21 PROCEDURE — 97165 OT EVAL LOW COMPLEX 30 MIN: CPT

## 2023-06-21 PROCEDURE — 97161 PT EVAL LOW COMPLEX 20 MIN: CPT

## 2023-06-21 PROCEDURE — 25000242 PHARM REV CODE 250 ALT 637 W/ HCPCS

## 2023-06-21 PROCEDURE — 97530 THERAPEUTIC ACTIVITIES: CPT

## 2023-06-21 PROCEDURE — 84100 ASSAY OF PHOSPHORUS: CPT

## 2023-06-21 PROCEDURE — C9113 INJ PANTOPRAZOLE SODIUM, VIA: HCPCS

## 2023-06-21 PROCEDURE — 96365 THER/PROPH/DIAG IV INF INIT: CPT

## 2023-06-21 PROCEDURE — 63600175 PHARM REV CODE 636 W HCPCS

## 2023-06-21 PROCEDURE — 83735 ASSAY OF MAGNESIUM: CPT

## 2023-06-21 RX ORDER — MAGNESIUM SULFATE HEPTAHYDRATE 40 MG/ML
2 INJECTION, SOLUTION INTRAVENOUS ONCE
Status: COMPLETED | OUTPATIENT
Start: 2023-06-21 | End: 2023-06-21

## 2023-06-21 RX ADMIN — FLUTICASONE FUROATE AND VILANTEROL TRIFENATATE 1 PUFF: 100; 25 POWDER RESPIRATORY (INHALATION) at 08:06

## 2023-06-21 RX ADMIN — HEPARIN SODIUM 5000 UNITS: 5000 INJECTION INTRAVENOUS; SUBCUTANEOUS at 09:06

## 2023-06-21 RX ADMIN — SODIUM CHLORIDE, POTASSIUM CHLORIDE, SODIUM LACTATE AND CALCIUM CHLORIDE: 600; 310; 30; 20 INJECTION, SOLUTION INTRAVENOUS at 06:06

## 2023-06-21 RX ADMIN — ACETAMINOPHEN 650 MG: 325 TABLET ORAL at 06:06

## 2023-06-21 RX ADMIN — ACETAMINOPHEN 650 MG: 325 TABLET ORAL at 12:06

## 2023-06-21 RX ADMIN — ACETAMINOPHEN 650 MG: 325 TABLET ORAL at 11:06

## 2023-06-21 RX ADMIN — FLUTICASONE PROPIONATE 50 MCG: 50 SPRAY, METERED NASAL at 09:06

## 2023-06-21 RX ADMIN — MAGNESIUM SULFATE 2 G: 2 INJECTION INTRAVENOUS at 09:06

## 2023-06-21 RX ADMIN — PANTOPRAZOLE SODIUM 40 MG: 40 INJECTION, POWDER, LYOPHILIZED, FOR SOLUTION INTRAVENOUS at 09:06

## 2023-06-21 RX ADMIN — ACETAMINOPHEN 650 MG: 325 TABLET ORAL at 05:06

## 2023-06-21 RX ADMIN — AMLODIPINE BESYLATE 5 MG: 5 TABLET ORAL at 09:06

## 2023-06-21 RX ADMIN — ONDANSETRON 4 MG: 2 INJECTION INTRAMUSCULAR; INTRAVENOUS at 11:06

## 2023-06-21 RX ADMIN — HEPARIN SODIUM 5000 UNITS: 5000 INJECTION INTRAVENOUS; SUBCUTANEOUS at 06:06

## 2023-06-21 RX ADMIN — HEPARIN SODIUM 5000 UNITS: 5000 INJECTION INTRAVENOUS; SUBCUTANEOUS at 02:06

## 2023-06-21 RX ADMIN — ONDANSETRON 4 MG: 2 INJECTION INTRAMUSCULAR; INTRAVENOUS at 05:06

## 2023-06-21 NOTE — PT/OT/SLP EVAL
Physical Therapy Evaluation    Patient Name:  Bridget Montgomery   MRN:  [id number]    Recommendations:     Discharge Recommendations: home health PT, home health OT (low intensity therapy and increased family assistance)   Discharge Equipment Recommendations: TBD (possible BSC)   Barriers to discharge:  impaired functional mobility    Assessment:     Bridget Montgomery is a 88 y.o. female admitted with a medical diagnosis of Acute pancreatitis without infection or necrosis.  She presents with the following impairments/functional limitations: weakness, gait instability, impaired balance, impaired endurance, impaired self care skills, impaired functional mobility, decreased upper extremity function, decreased ROM, pain .  Pt ambulated ~30 ft with RW and CGA. Pt reporting increased fatigue, nausea, and abdominal pain with activity. Pt will benefit from skilled acute PT services to maximize functional mobility and independence. Recommending low intensity therapy (HH PT/OT) and increased family assistance upon d/c.           Rehab Prognosis: Good; patient would benefit from acute skilled PT services to address these deficits and reach maximum level of function.    Recent Surgery: * No surgery found *      Plan:     During this hospitalization, patient to be seen 5 x/week to address the identified rehab impairments via gait training, therapeutic activities, therapeutic exercises, neuromuscular re-education and progress toward the following goals:    Plan of Care Expires:  07/21/23    Subjective     Chief Complaint: nausea, abdominal pain, and fatigue  Patient/Family Comments/goals: return to PLOF  Pain/Comfort:  Pain Rating 1: 6/10  Location - Orientation 1: generalized  Location 1: abdomen  Pain Addressed 1: Reposition, Distraction, Cessation of Activity, Nurse notified  Pain Rating Post-Intervention 1: 10/10    Patients cultural, spiritual, Uatsdin conflicts given the current situation: no    Living  Environment:  Pt lives w/daughter, SSH, ramp, tub/shower combo with SC  Prior to admission, patients level of function was Mod I with rollator for mobility and ADL's pt reports she requires occasional assist with ADL's from daughter.  Equipment used at home: rollator, shower chair, wheelchair.  DME owned (not currently used): wheelchair.  Upon discharge, patient will have assistance from daughter.    Objective:     Communicated with nsg prior to session.  Patient found HOB elevated with bed alarm, peripheral IV  upon PT entry to room.    General Precautions: Standard, fall  Orthopedic Precautions:N/A   Braces: N/A  Respiratory Status: Room air    Exams:  Cognitive Exam:  Patient is oriented to Person, Place, Time, and Situation  Postural Exam:  Patient presented with the following abnormalities:    -       Rounded shoulders  -       Forward head  -       Kyphosis  Skin Integrity/Edema:      -       Skin integrity: Wound R lower leg, bandaged  -       Edema: Mild RLE  RLE ROM: WFL  RLE Strength: WFL except 4/5 hip flexion  LLE ROM: WFL  LLE Strength: WFL except 4/5 hip flexion    Functional Mobility:  Bed Mobility:     Scooting: contact guard assistance and minimum assistance  Supine to Sit: contact guard assistance and minimum assistance  Transfers:     Sit to Stand:  minimum assistance with rolling walker  Gait: Pt ambulated ~30 ft with RW and CGA. Pt limited by fatigue; pt demonstrating forward flexed posture and decreased speed; VC's for proximity and safe use of RW      AM-PAC 6 CLICK MOBILITY  Total Score:16       Treatment & Education:  Pt educated on role of PT/POC and pt agreeable to participate  Pt performed mobility as reported above  Pt transitioned to chair and emesis bag provided due to pt reporting increased nausea; pt declined cool washcloth   Pt reporting increased abdominal pain - nurse notified     Patient left up in chair with all lines intact, call button in reach, chair alarm on, and nsg  notified.    GOALS:   Multidisciplinary Problems       Physical Therapy Goals          Problem: Physical Therapy    Goal Priority Disciplines Outcome Goal Variances Interventions   Physical Therapy Goal     PT, PT/OT Ongoing, Progressing     Description: Goals to be met by: 23     Patient will increase functional independence with mobility by performin. Supine to sit with Modified [place]  2. Sit to stand transfer with Supervision  3. Bed to chair transfer with Supervision using rollator  4. Gait  x 100 feet with Supervision using rollator.   5. Lower extremity exercise program x10 reps per handout, with independence                         History:     Past Medical History:   Diagnosis Date    Allergy     Anemia, unspecified     Arthritis     CKD (chronic kidney disease) stage 4, GFR 15-29 ml/min     COPD (chronic obstructive pulmonary disease)     Edema     HTN (hypertension)     Hypocalcemia     Hyponatremia     Osteoarthritis        Past Surgical History:   Procedure Laterality Date    BREAST CYST EXCISION      CAUDAL EPIDURAL STEROID INJECTION N/A 2023    Procedure: Injection-steroid-epidural-caudal;  Surgeon: Angel Sparrow MD;  Location: [facility];  Service: Pain Management;  Laterality: N/A;    FOOT SURGERY         Time Tracking:     PT Received On: 23  PT Start Time: 1047     PT Stop Time: 1107  PT Total Time (min): 20 min     Billable Minutes: Evaluation 20 (co-eval with OT)      2023

## 2023-06-21 NOTE — PLAN OF CARE
Pt is AAOX4. Scheduled meds were given per MAR. No C/O pain or N/V. Family is present at the bedside. Pt tolerated full liquid diet. Bed in lowest position, bed alarm is set. Call light within reach.   Problem: Adult Inpatient Plan of Care  Goal: Plan of Care Review  Outcome: Ongoing, Progressing     Problem: Diabetes Comorbidity  Goal: Blood Glucose Level Within Targeted Range  Outcome: Ongoing, Progressing     Problem: Impaired Wound Healing  Goal: Optimal Wound Healing  Outcome: Ongoing, Progressing     Problem: Fall Injury Risk  Goal: Absence of Fall and Fall-Related Injury  Outcome: Ongoing, Progressing

## 2023-06-21 NOTE — PLAN OF CARE
SW met with pt this AM at bedside to complete DCA. Pt reported that she lives at home with her drt Liliana [phone] and will have her help transport the pt back home at time of d/c. Per pt she has a RW and SC at home. [facility]  and does wish to continue receiving their services.  SW requested f/u appts, pt will have her drt help take her to any f/u appts after hospitalization. White board updated with CM name and contact information.  Discharge brochure provided.  Pt encouraged to call with any questions or concerns.  Cm will continue to follow pt through transitions of care and assist with any discharge needs.    Nura Ezequiel, MSW  [phone]    Future Appointments   Date Time Provider Department Center   6/28/2023  9:00 AM Adrian Mohan MD [facility]   7/3/2023 10:00 AM Liliana Chacon MD [facility]   9/5/2023  2:00 PM Lluvia Rahman MD [facility]   9/13/2023 10:30 AM Charles Peacock MD [facility]        06/21/23 0944   Discharge Planning   Assessment Type Discharge Planning Brief Assessment   Support Systems Children;Home care staff   Equipment Currently Used at Home walker, rolling;shower chair   Current Living Arrangements home   Care Facility Name home   Patient/Family Anticipates Transition to home with family   Patient/Family Anticipated Services at Transition home health care   DME Needed Upon Discharge  none   Discharge Plan A Home Health;Home with family

## 2023-06-21 NOTE — PROGRESS NOTES
Progress Note  Nephrology      Consult Requested By: Doris Rick MD      SUBJECTIVE:     Overnight events  Patient is a 88 y.o. female     Patient Active Problem List   Diagnosis    Chronic kidney disease, stage III (moderate)    Type 2 diabetes mellitus with diabetic chronic kidney disease    Spinal stenosis of lumbar region    Senile purpura    Primary localized osteoarthritis of pelvic region and thigh    Idiopathic osteoarthritis    Osteopenia    Neuralgia of right sciatic nerve    Mixed hyperlipidemia    Mild recurrent major depression    Diabetic renal disease    Chronic obstructive pulmonary disease    Allergic rhinitis    Essential hypertension    Gastro-esophageal reflux disease without esophagitis    Lumbar spondylosis    Ovarian mass    Spinal cord disorder    Ulcer of esophagus    Greater trochanteric bursitis    Osteoarthritis of both knees    Lumbar radiculopathy    Acute chorea    Non-healing wound of right lower extremity    Acute pancreatitis without infection or necrosis    Acute renal failure superimposed on stage 4 chronic kidney disease    Anemia, chronic renal failure, stage 4 (severe)     Past Medical History:   Diagnosis Date    Allergy     Anemia, unspecified     Arthritis     CKD (chronic kidney disease) stage 4, GFR 15-29 ml/min     COPD (chronic obstructive pulmonary disease)     Edema     HTN (hypertension)     Hypocalcemia     Hyponatremia     Osteoarthritis               OBJECTIVE:     Vitals:    06/21/23 0514 06/21/23 0726 06/21/23 0842 06/21/23 1139   BP: (!) 146/65 (!) 144/65  (!) 149/67   BP Location:       Patient Position:       Pulse: 73 68 67 65   Resp: 18 17 16 17   Temp: 98.6 °F (37 °C) 97.7 °F (36.5 °C)  97.9 °F (36.6 °C)   TempSrc:  Oral  Oral   SpO2: 96% 97% 96% 98%   Weight:       Height:           Temp: 97.9 °F (36.6 °C) (06/21/23 1139)  Pulse: 65 (06/21/23 1139)  Resp: 17 (06/21/23 1139)  BP: (!) 149/67 (06/21/23 1139)  SpO2: 98 % (06/21/23 1139)    Date 06/21/23  0700 - 06/22/23 0659   Shift 9782-0555 0069-2286 6214-5749 24 Hour Total   INTAKE   P.O. 100   100   Shift Total(mL/kg) 100(1.5)   100(1.5)   OUTPUT   Shift Total(mL/kg)       Weight (kg) 66.4 66.4 66.4 66.4             Medications:   acetaminophen  650 mg Oral Q6H    amLODIPine  5 mg Oral Daily    collagenase   Topical (Top) Daily    fluticasone furoate-vilanteroL  1 puff Inhalation Daily    fluticasone propionate  1 spray Each Nostril Daily    heparin (porcine)  5,000 Units Subcutaneous Q8H    ondansetron  4 mg Intravenous Once    pantoprazole  40 mg Intravenous Daily      lactated ringers 100 mL/hr at 06/21/23 0605               Physical Exam:  General appearance:feeling better   Nausea   No vomiting   No diarrhea  Lungs: diminished breath sounds  Heart: pulse 65  Abdomen: soft  Extremities: no edema  Skin: dry      Laboratory:  ABG  Labs reviewed  No results found for this or any previous visit (from the past 336 hour(s)).  Recent Results (from the past 336 hour(s))   CBC with Automated Differential    Collection Time: 06/21/23  3:57 AM   Result Value Ref Range    WBC 11.12 3.90 - 12.70 K/uL    Hemoglobin 9.5 (L) 12.0 - 16.0 g/dL    Hematocrit 27.5 (L) 37.0 - 48.5 %    Platelets 231 150 - 450 K/uL   CBC auto differential    Collection Time: 06/20/23  9:56 AM   Result Value Ref Range    WBC 7.49 3.90 - 12.70 K/uL    Hemoglobin 10.7 (L) 12.0 - 16.0 g/dL    Hematocrit 31.0 (L) 37.0 - 48.5 %    Platelets 255 150 - 450 K/uL     Urinalysis  No results for input(s): COLORU, CLARITYU, SPECGRAV, PHUR, PROTEINUA, GLUCOSEU, BILIRUBINCON, BLOODU, WBCU, RBCU, BACTERIA, MUCUS, NITRITE, LEUKOCYTESUR, UROBILINOGEN, HYALINECASTS in the last 24 hours.    Diagnostic Results:  X-Ray: Reviewed  US: Reviewed  Echo: Reviewed  ACCESS    ASSESSMENT/PLAN:   Acute pancreatitis with no CBD dilation or liver injury, MRCP without evidence of choledocholithiasis    US-  There is gallbladder wall thickening measuring 5 mm.  There is a small  amount of fluid around the gallbladder.  The bile duct measures 4 mm.  There is mild intrahepatic bile duct dilatation near the dawna hepatis.  Liver measures 15 cm.  The spleen was not seen.  There is a small amount of fluid around the gallbladder.  MRI-  Hydrops changes of the gallbladder without filling defect.  Correlate for reactive changes of pancreatitis versus acalculous cholecystitis.  Common bile duct up to 4 mm with no filling defect.  No intrahepatic ductal dilatation.  Phlegmon changes in the anterior pararenal space and peripancreatic space compatible with the patient's history of pancreatitis.  Small amount of ascites and subcapsular fluid around the liver.      RADHA/ CKD 4  UA  protein negative, blood negative   Creatinine 1.8- 1.4  GFR 27 - 36 cc/min  Azotemia  BUN 76 - 65  Metabolic bone disease  Anemia multifactorial  Sat iron 16  Hb 10.7- 9.5  Poor nutrition  Albumin 2.9 - 2.4  CXR-   No acute process seen  Hypertension  Blood pressure 139/59, 146/67.  I and O  Weight daily  Avoid nephrotoxic agents, hypovolemia,  nephrotoxic agents  D/c IVF after current bag complete  Liquid diet  Will follow up  Wound care

## 2023-06-21 NOTE — PLAN OF CARE
Problem: Physical Therapy  Goal: Physical Therapy Goal  Description: Goals to be met by: 23     Patient will increase functional independence with mobility by performin. Supine to sit with Modified [place]  2. Sit to stand transfer with Supervision  3. Bed to chair transfer with Supervision using rollator  4. Gait  x 100 feet with Supervision using rollator.   5. Lower extremity exercise program x10 reps per handout, with independence    Outcome: Ongoing, Progressing     PT Eval completed, note to follow. Pt ambulated ~30 ft with RW and CGA. Pt reporting increased fatigue, nausea, and abdominal pain with activity. Pt will benefit from skilled acute PT services to maximize functional mobility and independence. Recommending low intensity therapy (HH PT/OT) and increased family assistance upon d/c.

## 2023-06-21 NOTE — PROGRESS NOTES
"[facility]    CC: Abdominal pain    HPI: 88 y.o. female with a history of CKD, COPD, anemia of chronic disease, GERD and HTN who presents with subacute, progressive epigastric/ RUQ abdominal pain associated with nausea and vomiting. Reports pain has been present for this time and gets worse immediately after meals. She has been on Doxycycline outpatient for a chronic leg wound along with several multivitamins and supplements. She denies excessive alcohol use. The patient has also been gradually losing weight, but according to her daughter this has been intentional through dietary modifications.     Interval history:  Patient states her pain is improving. She is able to tolerate some foods now, but in small quantities.     Review of Systems:  Gastrointestinal ROS:no change in bowel habits, or black/ bloody stools. + abdominal pain.    Physical Examination  BP (!) 149/67   Pulse 65   Temp 97.9 °F (36.6 °C) (Oral)   Resp 17   Ht 5' 4" (1.626 m)   Wt 66.4 kg (146 lb 6.2 oz)   SpO2 98%   BMI 25.13 kg/m²   General appearance: alert, cooperative, no distress  Abdomen: soft, mild epigastric tenderness; bowel sounds normoactive; no organomegaly    Assessment:   88 y.o. female with a history of CKD, COPD, anemia of chronic disease, GERD and HTN who presents with acute pancreatitis with no CBD dilation or liver injury, MRCP without evidence of choledocholithiasis. Acute hepatitis panel negative.      (This is an acute illness requiring hospitalization and poses a threat to life or bodily function)    Plan:  - Check IgG4 level to assess for autoimmune pancreatitis   - Continue supportive care     Case discussed with fellow Dr Hurtado.    Patient will be discussed and evaluated by staff physician. Please see their attestation for any changes to the current assessment or  plan.     Mayi Harper, DO  [facility]  6/21/23  "

## 2023-06-21 NOTE — PLAN OF CARE
Problem: Adult Inpatient Plan of Care  Goal: Plan of Care Review  Outcome: Ongoing, Progressing  Flowsheets (Taken 6/21/2023 0636)  Plan of Care Reviewed With:   patient   daughter     Problem: Impaired Wound Healing  Goal: Optimal Wound Healing  Outcome: Ongoing, Progressing     Problem: Fall Injury Risk  Goal: Absence of Fall and Fall-Related Injury  Outcome: Ongoing, Progressing  Intervention: Promote Injury-Free Environment  Flowsheets (Taken 6/21/2023 0636)  Safety Promotion/Fall Prevention:   bed alarm set   side rails raised x 3     Problem: Infection (Pancreatitis)  Goal: Infection Symptom Resolution  Outcome: Ongoing, Progressing  Intervention: Prevent or Manage Infection  Flowsheets (Taken 6/21/2023 0636)  Infection Management: aseptic technique maintained     Vitals:    06/21/23 0514   BP: (!) 146/65   Pulse: 73   Resp: 18   Temp: 98.6 °F (37 °C)

## 2023-06-21 NOTE — PT/OT/SLP EVAL
Occupational Therapy   Evaluation    Name: Bridget Montgomery  MRN: [id number]  Admitting Diagnosis: Acute pancreatitis without infection or necrosis  Recent Surgery: * No surgery found *      Recommendations:     Discharge Recommendations: home health OT, home health PT, other (see comments) (low intensity therapy & 24/7 care/assistance)  Discharge Equipment Recommendations:  other (see comments) (TBD)  Barriers to discharge:  Other (Comment) (Pt requires increased level of assistance)    Assessment:     Bridget Montgomery is a 88 y.o. female with a medical diagnosis of Acute pancreatitis without infection or necrosis.  She presents with The primary encounter diagnosis was Acute pancreatitis, unspecified complication status, unspecified pancreatitis type. Diagnoses of Epigastric pain, Nausea and vomiting, unspecified vomiting type, Non-healing wound of right lower extremity, Acute biliary pancreatitis without infection or necrosis, Acute pancreatitis without infection or necrosis, unspecified pancreatitis type, Acute renal failure superimposed on stage 4 chronic kidney disease, unspecified acute renal failure type, Anemia, chronic renal failure, stage 4 (severe), and Essential hypertension were also pertinent to this visit. Performance deficits affecting function: pain, impaired endurance, gait instability, impaired functional mobility, weakness, impaired balance, impaired self care skills, decreased ROM, decreased upper extremity function, decreased coordination, impaired coordination.      Pt would benefit from cont OT services in order to maximize functional independence. Recommending low intensity therapy (HH OT/PT) & 24/7 care/assistance upon d/c. Pt limited this date by increased pain & nausea. Pt performing functional mobility in room/hallway with CGA & use of RW. Will progress as able.     Rehab Prognosis: Good; patient would benefit from acute skilled OT services to address these deficits and reach  maximum level of function.       Plan:     Patient to be seen 5 x/week to address the above listed problems via self-care/home management, therapeutic activities, therapeutic exercises  Plan of Care Expires: 07/21/23  Plan of Care Reviewed with: patient    Subjective     Chief Complaint: Increased abdominal pain & nausea  Patient/Family Comments/goals: Return to PLOF    Occupational Profile:  Living Environment: Pt lives w/daughter, SSH, ramp, tub/shower combo with SC  Previous level of function: Pt reports Magy with RW for functional mobility, occasional assistance from daughter with ADLs PRN  Equipment Used at Home: rollator, shower chair  Assistance upon Discharge: Daughter    Pain/Comfort:  Pain Rating 1: 6/10  Location 1: abdomen  Pain Addressed 1: Reposition, Distraction, Cessation of Activity, Nurse notified  Pain Rating Post-Intervention 1: 10/10    Patients cultural, spiritual, Amish conflicts given the current situation: no    Objective:     Communicated with: nsg prior to session.  Patient found HOB elevated with bed alarm, peripheral IV upon OT entry to room.    General Precautions: Standard, fall  Orthopedic Precautions: N/A  Braces: N/A  Respiratory Status: Room air    Occupational Performance:    Bed Mobility:    Patient completed Scooting/Bridging with contact guard assistance and minimum assistance  Patient completed Supine to Sit with contact guard assistance and minimum assistance    Functional Mobility/Transfers:  Patient completed Sit <> Stand Transfer with minimum assistance  with  rolling walker   Functional Mobility: Pt performing functional mobility shorts distance into hallway with CGA & use of RW; pt limited by fatigue, abdominal pain & nausea    Cognitive/Visual Perceptual:  Cognitive/Psychosocial Skills:     -       Oriented to: Person, Place, Time, and Situation   -       Memory: No Deficits noted  -       Mood/Affect/Coping skills/emotional control: Cooperative    Physical  Exam:  Upper Extremity Range of Motion:     -       Right Upper Extremity: Deficits: shoulder flex ~30 degrees  -       Left Upper Extremity: Deficits: shoulder flex ~90 degrees  Upper Extremity Strength:    -       Right Upper Extremity: 3+/5 distally  -       Left Upper Extremity: 3+/5 proximally & distally   Strength:    -       Right Upper Extremity: Fair  -       Left Upper Extremity: Fair    AMPAC 6 Click ADL:  AMPAC Total Score: 19    Treatment & Education:  Pt would benefit from cont OT services in order to maximize functional independence.   Pt limited this date by increased pain & nausea.   Pt performing bed mobility as above with increased time.   Pt performing functional mobility in room/hallway with CGA & use of RW.   Nsg notified of pain & nausea.   Will progress as able.     Patient left up in chair with all lines intact, call button in reach, chair alarm on, and nsg notified    GOALS:   Multidisciplinary Problems       Occupational Therapy Goals          Problem: Occupational Therapy    Goal Priority Disciplines Outcome Interventions   Occupational Therapy Goal     OT, PT/OT Ongoing, Progressing    Description: Goals to be met by: 7/21/2023     Patient will increase functional independence with ADLs by performing:    UE Dressing with Set-up Assistance.  Grooming while seated with Set-up Assistance.  Toileting from toilet with Supervision for hygiene and clothing management.   Supine to sit with Modified [place].  Step transfer with Supervision & appropriate AD.  Toilet transfer to toilet with Supervision & appropriate AD.                         History:     Past Medical History:   Diagnosis Date    Allergy     Anemia, unspecified     Arthritis     CKD (chronic kidney disease) stage 4, GFR 15-29 ml/min     COPD (chronic obstructive pulmonary disease)     Edema     HTN (hypertension)     Hypocalcemia     Hyponatremia     Osteoarthritis          Past Surgical History:   Procedure Laterality  Date    BREAST CYST EXCISION      CAUDAL EPIDURAL STEROID INJECTION N/A 2/2/2023    Procedure: Injection-steroid-epidural-caudal;  Surgeon: Angel Sparrow MD;  Location: [facility];  Service: Pain Management;  Laterality: N/A;    FOOT SURGERY         Time Tracking:     OT Date of Treatment: 06/21/23  OT Start Time: 1048  OT Stop Time: 1106  OT Total Time (min): 18 min with PT    Billable Minutes:Evaluation 9  Therapeutic Activity 9    6/21/2023

## 2023-06-21 NOTE — PLAN OF CARE
Pt on room air in no apparent distress. MDI tx. Given with good pt. Effort.  Will cont. To monitor.

## 2023-06-21 NOTE — PLAN OF CARE
Pt would benefit from cont OT services in order to maximize functional independence. Recommending low intensity therapy (HH OT/PT) & 24/7 care/assistance upon d/c. Pt limited this date by increased pain & nausea. Pt performing functional mobility in room/hallway with CGA & use of RW. Will progress as able.     Problem: Occupational Therapy  Goal: Occupational Therapy Goal  Description: Goals to be met by: 7/21/2023     Patient will increase functional independence with ADLs by performing:    UE Dressing with Set-up Assistance.  Grooming while seated with Set-up Assistance.  Toileting from toilet with Supervision for hygiene and clothing management.   Supine to sit with Modified [place].  Step transfer with Supervision & appropriate AD.  Toilet transfer to toilet with Supervision & appropriate AD.    Outcome: Ongoing, Progressing

## 2023-06-21 NOTE — PROGRESS NOTES
Pt AAOx4. Pt c/o abd pain 8/10 . PRN morphine given at 2023, reassessed and pt states pain is 7-8/10. Dr Christopher notified in regards to BP and uncontrolled pain.,        06/20/23 1700 06/20/23 2002 06/20/23 2019   Vital Signs   Temp  --  98.6 °F (37 °C)  --    Pulse  --  76  --    Resp  --  18  --    SpO2  --  98 %  --    Device (Oxygen Therapy) room air  --   --    BP  --  (!) 194/78 (!) 174/86   MAP (mmHg)  --  112  --    BP Location  --   --  Right arm   BP Method  --   --  Manual   Patient Position  --   --  Lying   Assessments (Pre/Post)   Level of Consciousness (AVPU) alert  --  alert      06/20/23 2023 06/20/23 2131   Vital Signs   Temp  --   --    Pulse  --  75   Resp 17  --    SpO2  --   --    Device (Oxygen Therapy)  --   --    BP  --  (!) 170/79   MAP (mmHg)  --   --    BP Location  --   --    BP Method  --  Automatic   Patient Position  --   --    Assessments (Pre/Post)   Level of Consciousness (AVPU)  --  alert

## 2023-06-21 NOTE — NURSING
[facility] RN met with patient regarding [facility] hospital follow up upon discharge. Pt agreeable to hospital follow up .RN informed pt of scheduled appointment and that appointment will also appear on d/c AVS. Patient informed to bring all medication bottles to [facility] follow up appointment.  [facility] information handout, appointment letter and folder provided to patient. Pt's daughter will provide transportation to appointment.    PCC nurse contacted pt's daughter Liliana in room with pt ; PCC explained PCC purpose and provided pt hfu visit info.       Patient Contact info: Liliana Montgomery (Daughter)   [phone] (Home Phone)    Person providing transportation contact info: Liliana Montgomery (Daughter)   [phone] (Home Phone)    Barriers to attending [facility] visit: none    Future Appointments   Date Time Provider Department Center   6/27/2023  9:30 AM Liliana Chacon MD [facility]   6/28/2023  9:00 AM Adrian Mohan MD [facility]   9/5/2023  2:00 PM Lluvia Rahman MD [facility]   9/13/2023 10:30 AM Charles Peacock MD [facility]

## 2023-06-21 NOTE — PROGRESS NOTES
"[facility] Hospital Medicine Progress Note    Primary Team: [facility] Hospitalist Team A  Attending Physician: Doris Rick MD  Resident: Galo  Intern: Justin    Subjective:      NAEON. Pt reports some belching and acid reflux this AM but otherwise improved appetite for liquids and softer foods, and better controlled abd pain, rated 7/10, instead of 10/10.      Objective:     Last 24 Hour Vital Signs:  BP  Min: 139/59  Max: 194/78  Temp  Av.1 °F (36.7 °C)  Min: 97.5 °F (36.4 °C)  Max: 98.6 °F (37 °C)  Pulse  Av.2  Min: 53  Max: 76  Resp  Av.2  Min: 17  Max: 20  SpO2  Av.2 %  Min: 96 %  Max: 100 %  Height  Av' 4" (162.6 cm)  Min: 5' 4" (162.6 cm)  Max: 5' 4" (162.6 cm)  Weight  Av.8 kg (140 lb 11.1 oz)  Min: 61.2 kg (135 lb)  Max: 66.4 kg (146 lb 6.2 oz)  I/O last 3 completed shifts:  In: 1735.6 [P.O.:480; I.V.:541.8; IV Piggyback:713.8]  Out: 300 [Urine:300]    Physical Examination:  General:          Alert and awake in no apparent distress, laying in bed comfortably with some discomfort with moving or deep breaths   Head:               Normocephalic and atraumatic  Eyes:               PERRL; EOMi with anicteric sclera and clear conjunctivae  Mouth:             Oropharynx clear, moist mucous membranes  Cardio:             Regular rate and rhythm with normal S1 and S2; no murmurs   Resp:               CTAB; respirations unlabored; no wheezes, crackles or rhonchi  Abdom:            Soft, nondistended, TTP in epigastric region, no guarding or rebound  Extrem:            Warm and well-perfused with no clubbing, cyanosis or edema  Skin:                RLE chronic wound with dressing in place, clean wound bed without surrounding erythema or purulence   Pulses:                        2+ and symmetric distally  Neuro:             AAOx3; cooperative and pleasant with no focal deficits      Laboratory:  Laboratory Data Reviewed: yes  Pertinent Findings:  Pertinent labs below    Microbiology Data " Reviewed: yes  Pertinent Findings:  N/a    Other Results:  EKG (my interpretation): Sinus bradycardia, normal axis     Radiology Data Reviewed: yes  Pertinent Findings:  Imaging Results              US Abdomen Limited (Final result)  Result time 06/20/23 13:01:31   Procedure changed from US Abdomen Complete     Final result by Sathish Chung III, MD (06/20/23 13:01:31)                   Impression:      There is gallbladder wall thickening and a small amount of pericholecystic fluid likely related to hepatocellular disease.    There may be mild intrahepatic bile duct dilatation.  MRCP could be helpful.      Electronically signed by: Sathish Chung MD  Date:    06/20/2023  Time:    13:01               Narrative:    EXAMINATION:  US ABDOMEN LIMITED    CLINICAL HISTORY:  abd pain;  Epigastric pain    FINDINGS:  There is gallbladder wall thickening measuring 5 mm.  There is a small amount of fluid around the gallbladder.  The bile duct measures 4 mm.  There is mild intrahepatic bile duct dilatation near the dawna hepatis.  Liver measures 15 cm.  The spleen was not seen.  There is a small amount of fluid around the gallbladder.                                       X-Ray Chest 1 View (Final result)  Result time 06/20/23 10:17:35      Final result by Sathish Chung III, MD (06/20/23 10:17:35)                   Impression:      No acute process seen.      Electronically signed by: Sathish Chung MD  Date:    06/20/2023  Time:    10:17               Narrative:    EXAMINATION:  XR CHEST 1 VIEW    CLINICAL HISTORY:  Epigastric pain    FINDINGS:  Chest one view: Heart size is normal.  Lungs are clear.  Bones reveal severe DJD of the shoulders.                                        Current Medications:     Infusions:   lactated ringers 100 mL/hr at 06/21/23 0605        Scheduled:   acetaminophen  650 mg Oral Q6H    amLODIPine  5 mg Oral Daily    collagenase   Topical (Top) Daily    fluticasone furoate-vilanteroL  1 puff  Inhalation Daily    fluticasone propionate  1 spray Each Nostril Daily    heparin (porcine)  5,000 Units Subcutaneous Q8H    ondansetron  4 mg Intravenous Once    pantoprazole  40 mg Intravenous Daily        PRN:  dextrose 10%, dextrose 10%, dextrose, dextrose, enalaprilat, glucagon (human recombinant), morphine, naloxone, ondansetron, sodium chloride 0.9%    Antibiotics and Day Number of Therapy:  N/a    Lines and Day Number of Therapy:  N/a    Assessment:     Bridget Montgomery is a 88 y.o. female with history of CKD 4, COPD, HTN, chronic RLE wound, partial provoked DVT RLE with resolution, hypocalcemia, anemia of chronic disease, and osteoarthritis who presented to [facility] on [facility]2023 with a primary complaint of abdominal pain x3 weeks and severe abdominal pain with nausea and vomiting for 1 day now admitted to [facility] for pancreatitis. RUQ ultrasound with some intrahepatic bile duct dilatation, will get MRCP for further characterization and consult GI. Will start fluids and pain control and continue to monitor.        Plan:     Acute Pancreatitis, gallstone vs drug-induced:   - lipase 866, severe epigastric abd pain, alk phos 200, consistent with acute pancreatitis  - has been on doxycycline for 1 month, around the time her sx had started, which has high association with pancreatitis, discontinued at admission  - RUQ US showed gallbladder wall thickening with possible intrahepatic duct dilation  - MRCP showed hydrops changes of GB w/o filling defect, non-dilated CBD, no intrahepatic ductal dilation, phlegmon changes in anterior pararenal space and peripancreatic space indicative of pancreatitis  - GI consulted, appreciate recs   - Pain control with morphine PRN and scheduled tylenol, pt states that is helping for now   - 1L IVF bolus, with 100cc/hr IVF infusion for 20hrs afterward  - full liquid diet, can advance diet as tolerated  - Has HLD (T cholest ~250 and LDL ~170), not on statin due  "to age  - negative hepatitis panel     RADHA on CKD 4, resolved:  - baseline 1.4-1.6, presented with Cr 1.8  - Follows with Dr. Lyons outpatient   - Avoid nephrotoxins as able   - Consulted nephrology, appreciate recs   - Continue to monitor daily      Chronic Right Lower Extremity Wound, stable:  - No leukocytosis, afebrile  - Follows with hyperbarics/wound care outpatient and Dr. Mohan   - Wound care consulted, appreciate recs   - Pt previously on doxycycline, s/p 3 weeks - will discontinue as there is clean wound bed and no erythema or purulence     GERD  Hx Hiatal hernia  - continue IV protonix since pt c/o mod-severe GERD sx without PPI     HTN:  - Pt previously on olmesartan at home but changed to amlodipine-benazepril  5-10mg daily ~1 mo ago    - Will hold for now, if better BP control needed will start with amlodipine as ACE/ARB can be associated with pancreatitis      Spinal Stenosis:  - Previously seen by NSGY, no acute intervention needed   - Pt previously rx Lyrica 75mg BID but that caused her to "act like a zombie" so her daughter decreased it to 50mg nightly prn but she hasn't required it in several days      Anemia of CKD:  - Stable, continue to monitor   - Baseline Hb 9-10  - Normocytic anemia   - Most recent iron studies with ferritin 93, iron 64, TIBC 411 earlier this month      Chronic Constipation:   - Continue home miralax      Osteoarthritis:   - Chronic pain to R shoulder, takes tramadol at home for that   - Supposed to have shoulder surgery in the next few weeks at [facility] per daughter   - Continue pain management for pancreatitis      H/o Type 2 Diabetes:   - Most recent A1c <6  - Continue to monitor        Full code, discussed with pt and daughter on admission   Diet: Full liquids, advance as tolerated to regular  Ppx: heparin   Dispo: admit for pancreatitis, anticipate >48 hours inpt       Phoebe Anderson MD  [facility]      [facility] Medicine Hospitalist Pager " numbers:   [facility] Hospitalist Medicine Team A (Jen/Prabhjot): [phone]  [facility] Hospitalist Medicine Team B (Fredy/Eliana):  [phone]

## 2023-06-22 ENCOUNTER — DOCUMENT SCAN (OUTPATIENT)
Dept: HOME HEALTH SERVICES | Facility: HOSPITAL | Age: 88
End: 2023-06-22
Payer: MEDICARE

## 2023-06-22 VITALS
DIASTOLIC BLOOD PRESSURE: 72 MMHG | SYSTOLIC BLOOD PRESSURE: 176 MMHG | HEIGHT: 64 IN | TEMPERATURE: 98 F | HEART RATE: 71 BPM | OXYGEN SATURATION: 97 % | BODY MASS INDEX: 27.21 KG/M2 | RESPIRATION RATE: 20 BRPM | WEIGHT: 159.38 LBS

## 2023-06-22 PROBLEM — N17.9 ACUTE RENAL FAILURE SUPERIMPOSED ON STAGE 4 CHRONIC KIDNEY DISEASE: Status: RESOLVED | Noted: 2023-06-20 | Resolved: 2023-06-22

## 2023-06-22 PROBLEM — N18.4 ACUTE RENAL FAILURE SUPERIMPOSED ON STAGE 4 CHRONIC KIDNEY DISEASE: Status: RESOLVED | Noted: 2023-06-20 | Resolved: 2023-06-22

## 2023-06-22 LAB
ALBUMIN SERPL BCP-MCNC: 2.2 G/DL (ref 3.5–5.2)
ALP SERPL-CCNC: 170 U/L (ref 55–135)
ALT SERPL W/O P-5'-P-CCNC: 14 U/L (ref 10–44)
ANION GAP SERPL CALC-SCNC: 7 MMOL/L (ref 8–16)
AST SERPL-CCNC: 24 U/L (ref 10–40)
BASOPHILS # BLD AUTO: 0.04 K/UL (ref 0–0.2)
BASOPHILS NFR BLD: 0.4 % (ref 0–1.9)
BILIRUB SERPL-MCNC: 0.4 MG/DL (ref 0.1–1)
BUN SERPL-MCNC: 48 MG/DL (ref 8–23)
CALCIUM SERPL-MCNC: 8.1 MG/DL (ref 8.7–10.5)
CHLORIDE SERPL-SCNC: 106 MMOL/L (ref 95–110)
CO2 SERPL-SCNC: 23 MMOL/L (ref 23–29)
CREAT SERPL-MCNC: 1.3 MG/DL (ref 0.5–1.4)
DIFFERENTIAL METHOD: ABNORMAL
EOSINOPHIL # BLD AUTO: 0.1 K/UL (ref 0–0.5)
EOSINOPHIL NFR BLD: 0.9 % (ref 0–8)
ERYTHROCYTE [DISTWIDTH] IN BLOOD BY AUTOMATED COUNT: 12.8 % (ref 11.5–14.5)
EST. GFR  (NO RACE VARIABLE): 40 ML/MIN/1.73 M^2
GLUCOSE SERPL-MCNC: 86 MG/DL (ref 70–110)
HCT VFR BLD AUTO: 26.4 % (ref 37–48.5)
HGB BLD-MCNC: 9.1 G/DL (ref 12–16)
IMM GRANULOCYTES # BLD AUTO: 0.02 K/UL (ref 0–0.04)
IMM GRANULOCYTES NFR BLD AUTO: 0.2 % (ref 0–0.5)
LYMPHOCYTES # BLD AUTO: 1.4 K/UL (ref 1–4.8)
LYMPHOCYTES NFR BLD: 14.4 % (ref 18–48)
MAGNESIUM SERPL-MCNC: 2.2 MG/DL (ref 1.6–2.6)
MCH RBC QN AUTO: 30.1 PG (ref 27–31)
MCHC RBC AUTO-ENTMCNC: 34.5 G/DL (ref 32–36)
MCV RBC AUTO: 87 FL (ref 82–98)
MONOCYTES # BLD AUTO: 0.9 K/UL (ref 0.3–1)
MONOCYTES NFR BLD: 8.9 % (ref 4–15)
NEUTROPHILS # BLD AUTO: 7.2 K/UL (ref 1.8–7.7)
NEUTROPHILS NFR BLD: 75.2 % (ref 38–73)
NRBC BLD-RTO: 0 /100 WBC
PHOSPHATE SERPL-MCNC: 3 MG/DL (ref 2.7–4.5)
PLATELET # BLD AUTO: 202 K/UL (ref 150–450)
PMV BLD AUTO: 10.5 FL (ref 9.2–12.9)
POCT GLUCOSE: 81 MG/DL (ref 70–110)
POTASSIUM SERPL-SCNC: 3.8 MMOL/L (ref 3.5–5.1)
PROT SERPL-MCNC: 4.4 G/DL (ref 6–8.4)
RBC # BLD AUTO: 3.02 M/UL (ref 4–5.4)
SODIUM SERPL-SCNC: 136 MMOL/L (ref 136–145)
WBC # BLD AUTO: 9.62 K/UL (ref 3.9–12.7)

## 2023-06-22 PROCEDURE — 96372 THER/PROPH/DIAG INJ SC/IM: CPT

## 2023-06-22 PROCEDURE — 36415 COLL VENOUS BLD VENIPUNCTURE: CPT

## 2023-06-22 PROCEDURE — 80053 COMPREHEN METABOLIC PANEL: CPT

## 2023-06-22 PROCEDURE — 97116 GAIT TRAINING THERAPY: CPT | Mod: CQ

## 2023-06-22 PROCEDURE — 94640 AIRWAY INHALATION TREATMENT: CPT

## 2023-06-22 PROCEDURE — 94761 N-INVAS EAR/PLS OXIMETRY MLT: CPT

## 2023-06-22 PROCEDURE — G0378 HOSPITAL OBSERVATION PER HR: HCPCS

## 2023-06-22 PROCEDURE — 85025 COMPLETE CBC W/AUTO DIFF WBC: CPT

## 2023-06-22 PROCEDURE — 84100 ASSAY OF PHOSPHORUS: CPT

## 2023-06-22 PROCEDURE — 97110 THERAPEUTIC EXERCISES: CPT | Mod: CQ

## 2023-06-22 PROCEDURE — 83735 ASSAY OF MAGNESIUM: CPT

## 2023-06-22 PROCEDURE — 63600175 PHARM REV CODE 636 W HCPCS

## 2023-06-22 PROCEDURE — 82787 IGG 1 2 3 OR 4 EACH: CPT | Performed by: STUDENT IN AN ORGANIZED HEALTH CARE EDUCATION/TRAINING PROGRAM

## 2023-06-22 PROCEDURE — 25000003 PHARM REV CODE 250

## 2023-06-22 PROCEDURE — 25000242 PHARM REV CODE 250 ALT 637 W/ HCPCS

## 2023-06-22 RX ORDER — ONDANSETRON 4 MG/1
4 TABLET, ORALLY DISINTEGRATING ORAL EVERY 6 HOURS PRN
Qty: 16 TABLET | Refills: 0 | Status: SHIPPED | OUTPATIENT
Start: 2023-06-22 | End: 2023-06-26

## 2023-06-22 RX ORDER — PANTOPRAZOLE SODIUM 40 MG/1
40 TABLET, DELAYED RELEASE ORAL DAILY
Status: DISCONTINUED | OUTPATIENT
Start: 2023-06-23 | End: 2023-06-22 | Stop reason: HOSPADM

## 2023-06-22 RX ORDER — POLYETHYLENE GLYCOL 3350 17 G/17G
17 POWDER, FOR SOLUTION ORAL DAILY
Status: DISCONTINUED | OUTPATIENT
Start: 2023-06-22 | End: 2023-06-22 | Stop reason: HOSPADM

## 2023-06-22 RX ORDER — AMLODIPINE BESYLATE 5 MG/1
10 TABLET ORAL DAILY
Status: DISCONTINUED | OUTPATIENT
Start: 2023-06-22 | End: 2023-06-22 | Stop reason: HOSPADM

## 2023-06-22 RX ORDER — AMLODIPINE BESYLATE 10 MG/1
10 TABLET ORAL DAILY
Qty: 30 TABLET | Refills: 3 | Status: SHIPPED | OUTPATIENT
Start: 2023-06-23 | End: 2023-11-22 | Stop reason: SDUPTHER

## 2023-06-22 RX ADMIN — ACETAMINOPHEN 650 MG: 325 TABLET ORAL at 11:06

## 2023-06-22 RX ADMIN — POLYETHYLENE GLYCOL 3350 17 G: 17 POWDER, FOR SOLUTION ORAL at 09:06

## 2023-06-22 RX ADMIN — FLUTICASONE PROPIONATE 50 MCG: 50 SPRAY, METERED NASAL at 09:06

## 2023-06-22 RX ADMIN — HEPARIN SODIUM 5000 UNITS: 5000 INJECTION INTRAVENOUS; SUBCUTANEOUS at 06:06

## 2023-06-22 RX ADMIN — AMLODIPINE BESYLATE 10 MG: 5 TABLET ORAL at 09:06

## 2023-06-22 RX ADMIN — FLUTICASONE FUROATE AND VILANTEROL TRIFENATATE 1 PUFF: 100; 25 POWDER RESPIRATORY (INHALATION) at 08:06

## 2023-06-22 NOTE — PROGRESS NOTES
"[facility]    CC: Abdominal pain    HPI: 88 y.o. female with a history of CKD, COPD, anemia of chronic disease, GERD and HTN who presents with subacute, progressive epigastric/ RUQ abdominal pain associated with nausea and vomiting. Reports pain has been present for this time and gets worse immediately after meals. She has been on Doxycycline outpatient for a chronic leg wound along with several multivitamins and supplements. She denies excessive alcohol use. The patient has also been gradually losing weight, but according to her daughter this has been intentional through dietary modifications.     Interval history:  Resting comfortably in bed. Tolerating diet. Continued improvement in abdominal pain.    Review of Systems:  Gastrointestinal ROS:no change in bowel habits, or black/ bloody stools. + abdominal pain.    Physical Examination  BP (!) 155/66   Pulse 73   Temp 98.3 °F (36.8 °C) (Axillary)   Resp 18   Ht 5' 4" (1.626 m)   Wt 72.3 kg (159 lb 6.3 oz)   SpO2 (!) 94%   BMI 27.36 kg/m²   General appearance: alert, cooperative, no distress  Abdomen: soft, abdomen nontender; bowel sounds normoactive; no organomegaly    Assessment:   88 y.o. female with a history of CKD, COPD, anemia of chronic disease, GERD and HTN who presents with acute pancreatitis with no CBD dilation or liver injury, MRCP without evidence of choledocholithiasis. Acute hepatitis panel negative. Clinically improving.     Plan:  - Follow up IgG4 level  - Continue supportive care       Patient will be discussed and evaluated by staff physician. Please see their attestation for any changes to the current assessment or  plan.     GI team will sign off at this time. Please reach out with any additional questions regarding this consult.    Mayi Harper,   [facility]  6/22/23 7:42 AM  "

## 2023-06-22 NOTE — PROGRESS NOTES
Pharmacist Intervention IV to PO Note    Bridget Montgomery is a 88 y.o. female being treated with IV medication pantoprazole    Patient Data:    Vital Signs (Most Recent):  Temp: 97.7 °F (36.5 °C) (06/22/23 0806)  Pulse: 76 (06/22/23 0826)  Resp: 17 (06/22/23 0826)  BP: (!) 171/76 (06/22/23 0806)  SpO2: 96 % (06/22/23 0826) Vital Signs (72h Range):  Temp:  [96.3 °F (35.7 °C)-98.6 °F (37 °C)]   Pulse:  [53-82]   Resp:  [16-20]   BP: (139-194)/(59-86)   SpO2:  [94 %-100 %]      CBC:  Recent Labs   Lab 06/20/23  0956 06/21/23  0357 06/22/23  0504   WBC 7.49 11.12 9.62   RBC 3.51* 3.16* 3.02*   HGB 10.7* 9.5* 9.1*   HCT 31.0* 27.5* 26.4*    231 202   MCV 88 87 87   MCH 30.5 30.1 30.1   MCHC 34.5 34.5 34.5     CMP:     Recent Labs   Lab 06/20/23  0956 06/21/23  0357 06/22/23  0504   * 99 86   CALCIUM 8.8 8.4* 8.1*   ALBUMIN 2.9* 2.4* 2.2*   PROT 5.9* 4.8* 4.4*    139 136   K 4.0 3.7 3.8   CO2 22* 24 23    105 106   BUN 76* 65* 48*   CREATININE 1.8* 1.4 1.3   ALKPHOS 200* 178* 170*   ALT 19 16 14   AST 29 23 24   BILITOT 0.4 0.5 0.4       Dietary Orders:  Diet Orders            Diet Adult Regular (IDDSI Level 7): Regular starting at 06/22 0537            Based on the following criteria, this patient qualifies for intravenous to oral conversion:  [x] The patients gastrointestinal tract is functioning (tolerating medications via oral or enteral route for 24 hours and tolerating food or enteral feeds for 24 hours).  [x] The patient is hemodynamically stable for 24 hours (heart rate <100 beats per minute, systolic blood pressure >99 mm Hg, and respiratory rate <20 breaths per minute).  [x] The patient shows clinical improvement (afebrile for at least 24 hours and white blood cell count downtrending or normalized). Additionally, the patient must be non-neutropenic (absolute neutrophil count >500 cells/mm3).  [x] For antimicrobials, the patient has received IV therapy for at least 24 hours.    IV  medication pantoprazole will be changed to oral medication pantoprazole    Pharmacist's Name: Laura Madsen  Pharmacist's Extension: [phone]

## 2023-06-22 NOTE — DISCHARGE SUMMARY
[facility] Hospital Medicine Discharge Summary    Primary Team: [facility] Hospitalist Team A  Attending Physician: Doris Rick MD  Resident: Galo  Intern: Justin    Date of Admit: 6/20/2023  Date of Discharge: 6/22/2023    Discharge to: home with home health PT/OT  Condition: stable, fair    Discharge Diagnoses     Patient Active Problem List   Diagnosis    Chronic kidney disease, stage III (moderate)    Type 2 diabetes mellitus with diabetic chronic kidney disease    Spinal stenosis of lumbar region    Senile purpura    Primary localized osteoarthritis of pelvic region and thigh    Idiopathic osteoarthritis    Osteopenia    Neuralgia of right sciatic nerve    Mixed hyperlipidemia    Mild recurrent major depression    Diabetic renal disease    Chronic obstructive pulmonary disease    Allergic rhinitis    Essential hypertension    Gastro-esophageal reflux disease without esophagitis    Lumbar spondylosis    Ovarian mass    Spinal cord disorder    Ulcer of esophagus    Greater trochanteric bursitis    Osteoarthritis of both knees    Lumbar radiculopathy    Acute chorea    Non-healing wound of right lower extremity    Acute pancreatitis without infection or necrosis    Anemia, chronic renal failure, stage 4 (severe)       Consultants and Procedures     Consultants:  Gastroenterology    Procedures:   N/a    Imaging:  Imaging Results              US Abdomen Limited (Final result)  Result time 06/20/23 13:01:31   Procedure changed from US Abdomen Complete     Final result by Sathish Chung III, MD (06/20/23 13:01:31)                   Impression:      There is gallbladder wall thickening and a small amount of pericholecystic fluid likely related to hepatocellular disease.    There may be mild intrahepatic bile duct dilatation.  MRCP could be helpful.      Electronically signed by: Sathish Chung MD  Date:    06/20/2023  Time:    13:01               Narrative:    EXAMINATION:  US ABDOMEN LIMITED    CLINICAL HISTORY:  abd  pain;  Epigastric pain    FINDINGS:  There is gallbladder wall thickening measuring 5 mm.  There is a small amount of fluid around the gallbladder.  The bile duct measures 4 mm.  There is mild intrahepatic bile duct dilatation near the dawna hepatis.  Liver measures 15 cm.  The spleen was not seen.  There is a small amount of fluid around the gallbladder.                                       X-Ray Chest 1 View (Final result)  Result time 06/20/23 10:17:35      Final result by Sathish Chung III, MD (06/20/23 10:17:35)                   Impression:      No acute process seen.      Electronically signed by: Sathish Chung MD  Date:    06/20/2023  Time:    10:17               Narrative:    EXAMINATION:  XR CHEST 1 VIEW    CLINICAL HISTORY:  Epigastric pain    FINDINGS:  Chest one view: Heart size is normal.  Lungs are clear.  Bones reveal severe DJD of the shoulders.                                    MCRP (6/20/23):  Hydrops changes of the gallbladder without filling defect.  Correlate for reactive changes of pancreatitis versus acalculous cholecystitis.     Common bile duct up to 4 mm with no filling defect.     No intrahepatic ductal dilatation.     Phlegmon changes in the anterior pararenal space and peripancreatic space compatible with the patient's history of pancreatitis.     Small amount of ascites and subcapsular fluid around the liver.    Brief History of Present Illness      Bridget Montgomery is a 88 y.o. female with history of CKD 4, COPD, HTN, chronic RLE wound, partial provoked DVT RLE with resolution, hypocalcemia, anemia of chronic disease, and osteoarthritis who presented to [facility] on 6/20/2023 with a primary complaint of abdominal pain x3 weeks and severe abdominal pain with nausea and vomiting for 1 day.      History obtained from daughter and patient. They state she started having abdominal pain, worse after she ate about 3-4 weeks ago. This is unusual for her although  she does have GERD and a hiatal hernia but she does not normally get pain like this. She has also noticed some intermittent nausea and vomiting after eating. This acutely worsened yesterday and she has been unable to eat anything since dinner last night. The pain is located in the center of her abdomen. She denies any radiation or RUQ pain. She thinks it might be somewhat worse after she eats but it has been more severe since yesterday. She denies any hematemsis but has had yellow vomiting (no bilious emesis). Her last bowel movement was yesterday (she normally goes every other day with help of miralax and fiber). She den ies any fevers/chills, cough, congestion, shortness of breath, dysuria or trouble urinating, diarrhea. She denies any significant change to her RLE wound (no inc in erythema or drainage, last saw home wound care yesterday but daughter changes dressing daily).      In the ED, she was afebrile but in pain which improved with 2mg morphine and nausea that improved with zofran. Labs significant for lipase elevation.     Course:  Pt presented with severe abdominal pain that was controlled with IV morphine 2mg q6h PRN and scheduled 650mg q6h tylenol, and received moderate IVF resuscitation with 1L IVF bolus and 2L over 20 hrs per nephrology recommendations given pt's hx CKD4. Pt had RUQ ultrasound that showed possible intrahepatic ductal dilation that prompted an MRCP that showed a markedly distended GB but no evidence of choledocholithiasis. Given marked GB distension with cystic duct dilation, possibility of gallstone pancreatitis. Pt has also been on a prolonged course of doxycycline with abd pain sx starting shortly after initiation. Pt also recently changed to amlodipine-benazepril a couple weeks ago, so possibility of ACEi or tetracycline induced pancreatitis. GI recommended evaluating for IgG4 level, pending results. Follow up as outpatient in 2 weeks to further evaluate for gallstone etiology  pancreatitis/need for cholecystectomy. Evaluated by PT/OT, recommended home health PT/OT due to weakness/debility.      For the full HPI please refer to the History & Physical from this admission.    Hospital Course By Problem with Pertinent Findings     Acute Pancreatitis, gallstone vs drug-induced:   - lipase 866, severe epigastric abd pain, alk phos 200, consistent with acute pancreatitis  - has been on doxycycline for 1 month, around the time her sx had started, which has high association with pancreatitis, discontinued at admission  - RUQ US showed gallbladder wall thickening with possible intrahepatic duct dilation  - MRCP showed hydrops changes of GB w/o filling defect, non-dilated CBD, no intrahepatic ductal dilation, phlegmon changes in anterior pararenal space and peripancreatic space indicative of pancreatitis  - GI consulted, recommended IgG4 level to check for autoimmune pancreatitis, but otherwise no further recs. pending results  - Pain control with morphine PRN and scheduled tylenol, pt states that is helping for now, but pt did not require any IV pain meds in the past 24 hours prior to discharge  - 1L IVF bolus, with 100cc/hr IVF infusion for 20hrs afterward  - full liquid diet, can advance diet as tolerated  - Has HLD (T cholest ~250 and LDL ~170), not on statin due to age  - negative hepatitis panel  - discharge with PRN zofran, can continue to use Tylenol as needed at home for pain control   - discontinued doxycycline and benazepril as possible drug-induced sources of pancreatitis and recommending GI follow up for possible gallstone etiology of pancreatitis     RADHA on CKD 4, resolved:  - baseline 1.4-1.6, presented with Cr 1.8  - Follows with Dr. Lyons outpatient   - Avoid nephrotoxins as able   - Consulted nephrology, recommended moderate IVF for treatment of acute pancreatitis in setting of CKD4  - RADHA resolved, back at baseline     Chronic Right Lower Extremity Wound, stable:  - No  "leukocytosis, afebrile  - Follows with hyperbarics/wound care outpatient and Dr. Mohan   - Wound care consulted, appreciate recs   - Pt previously on doxycycline, s/p 3 weeks - will discontinue as there is clean wound bed and no erythema or purulence   - recommending continued follow up with wound care as outpatient     GERD  Hx Hiatal hernia  - continued IV protonix since pt c/o mod-severe GERD sx without PPI  - continue home PPI as outpatient      HTN:  - Pt previously on olmesartan at home but changed to amlodipine-benazepril  5-10mg daily ~1 mo ago    - Will hold for now, if better BP control needed will start with amlodipine as ACE/ARB can be associated with pancreatitis   - increased amlodipine 5 to 10mg on 6/22 for better BP control as pt has had persistently elevated BP despite adequate pain control     Spinal Stenosis:  - Previously seen by NSGY, no acute intervention needed   - Pt previously rx Lyrica 75mg BID but that caused her to "act like a zombie" so her daughter decreased it to 50mg nightly prn but she hasn't required it in several days      Anemia of CKD:  - Stable, continue to monitor   - Baseline Hb 9-10  - Normocytic anemia   - Most recent iron studies with ferritin 93, iron 64, TIBC 411 earlier this month      Chronic Constipation:   - Continue home miralax      Osteoarthritis:   - Chronic pain to R shoulder, takes tramadol at home for that   - Supposed to have shoulder surgery in the next few weeks at [facility] per daughter   - Continue pain management for pancreatitis      H/o Type 2 Diabetes:   - Most recent A1c <6    Discharge Medications        Medication List        START taking these medications      amLODIPine 10 MG tablet  Commonly known as: NORVASC  Take 1 tablet (10 mg total) by mouth once daily.  Start taking on: June 23, 2023     ondansetron 4 MG Tbdl  Commonly known as: ZOFRAN-ODT  Take 1 tablet (4 mg total) by mouth every 6 (six) hours as needed (as needed for nausea).   "          CHANGE how you take these medications      triamcinolone acetonide 0.1% 0.1 % cream  Commonly known as: KENALOG  APPLY TO AFFECTED AREA TWICE A DAY  What changed:   how much to take  how to take this  when to take this  reasons to take this  additional instructions            CONTINUE taking these medications      acetaminophen 500 MG tablet  Commonly known as: TYLENOL     * albuterol 2.5 mg /3 mL (0.083 %) nebulizer solution  Commonly known as: PROVENTIL     * albuterol 90 mcg/actuation inhaler  Commonly known as: PROVENTIL/VENTOLIN HFA     CALCIUM 500 ORAL     cholecalciferol (vitamin D3) 50 mcg (2,000 unit) Cap capsule  Commonly known as: VITAMIN D3     coenzyme Q10 10 mg capsule     diclofenac sodium 1 % Gel  Commonly known as: VOLTAREN ARTHRITIS PAIN  Apply 2 g topically once daily.     fluticasone propionate 50 mcg/actuation nasal spray  Commonly known as: FLONASE  INSTILL TWO SPRAYS INTO EACH NOSTRIL TWICE A DAY     furosemide 20 MG tablet  Commonly known as: LASIX     LIDOcaine 5 %  Commonly known as: LIDODERM  Place 1 patch onto the skin once daily. Remove & Discard patch within 12 hours or as directed by provider     MIRALAX ORAL     mupirocin 2 % ointment  Commonly known as: BACTROBAN  Apply topically once daily.     olopatadine 0.1 % ophthalmic solution  Commonly known as: PATANOL     omega 3-dha-epa-fish oil 1,000 mg (120 mg-180 mg) Cap     omeprazole 20 MG capsule  Commonly known as: PRILOSEC     pregabalin 75 MG capsule  Commonly known as: LYRICA     PRESERVISION AREDS ORAL     psyllium 0.52 gram capsule     SantyL ointment  Generic drug: collagenase  Apply topically once daily.     selenium 200 mcg Cap     SYMBICORT 80-4.5 mcg/actuation Hfaa  Generic drug: budesonide-formoterol 80-4.5 mcg  Inhale 2 puffs into the lungs 2 (two) times a day.     traMADoL 50 mg tablet  Commonly known as: ULTRAM  Take 1 tablet (50 mg total) by mouth 2 (two) times daily as needed for Pain.     ZINC-15 ORAL            * This list has 2 medication(s) that are the same as other medications prescribed for you. Read the directions carefully, and ask your doctor or other care provider to review them with you.                STOP taking these medications      amlodipine-benazepril 5-10 mg 5-10 mg per capsule  Commonly known as: LOTREL     doxycycline 100 MG tablet  Commonly known as: VIBRA-TABS               Where to Get Your Medications        These medications were sent to [facility]  [facility] [address], [place] [address]      Hours: Mon-Fri, 8a-5:30p Phone: [phone]   amLODIPine 10 MG tablet  ondansetron 4 MG Tbdl         Discharge Information:   Diet:  Regular diet    Physical Activity:  Up ad sydnie, as tolerated with [facility] PT/OT             Instructions:  1. Take all medications as prescribed  2. Keep all follow-up appointments, such as Gastroenterology  3. Return to the hospital or call your primary care physicians if any worsening symptoms such as fever, chest pain, shortness of breath, return of symptoms, or any other concerns.  4. Follow up on pending labs: IgG4 level    Follow-Up Appointments:   Follow-up Information       [facility]. Call.    Why: As needed  Contact information:  [phone]             [facility] G[facility]. Schedule an appointment as soon as possible for a visit in 2 week(s).    Specialty: Gastroenterology  Contact information:  [address], [address]  [facility] [address] [phone]  Additional information:  [address]                             Phoebe Anderson MD  [facility] Internal Medicine, [facility]

## 2023-06-22 NOTE — PLAN OF CARE
Problem: Adult Inpatient Plan of Care  Goal: Plan of Care Review  Outcome: Ongoing, Progressing     VN: Vital signs, labs, progress notes and care plan reviewed.

## 2023-06-22 NOTE — PROGRESS NOTES
[facility] Hospital Medicine Progress Note    Primary Team: [facility] Hospitalist Team A  Attending Physician: Doris Rick MD  Resident: Galo  Intern: Justin    Subjective:      NAEON. Pt reports having improved appetite today, wanting to eat eggs and sausage. Also states that her pain is a 3-5/10, compared to a 7/10 yesterday. Denies difficulty with urination, but notes no BM since she was admitted and notes some abd distension today with no associated abd pain.      Objective:     Last 24 Hour Vital Signs:  BP  Min: 144/65  Max: 173/69  Temp  Av.7 °F (36.5 °C)  Min: 96.3 °F (35.7 °C)  Max: 98.3 °F (36.8 °C)  Pulse  Av.3  Min: 65  Max: 82  Resp  Av.4  Min: 16  Max: 18  SpO2  Av.7 %  Min: 94 %  Max: 99 %  Weight  Av.3 kg (159 lb 6.3 oz)  Min: 72.3 kg (159 lb 6.3 oz)  Max: 72.3 kg (159 lb 6.3 oz)  I/O last 3 completed shifts:  In: 3716.4 [P.O.:1020; I.V.:1982.6; IV Piggyback:713.8]  Out: 1201 [Urine:1201]    Physical Examination:  General:          Alert and awake in no apparent distress, laying in bed comfortably   Head:               Normocephalic and atraumatic  Eyes:               PERRL; EOMi with anicteric sclera and clear conjunctivae  Mouth:             Oropharynx clear, moist mucous membranes  Cardio:             Regular rate and rhythm with normal S1 and S2; no murmurs   Resp:               CTAB; respirations unlabored; no wheezes, crackles or rhonchi  Abdom:            Soft, mild generalized distension, no TTP, no guarding or rebound  Extrem:            Warm and well-perfused with no clubbing, cyanosis or edema  Skin:                RLE chronic wound with dressing in place, clean wound bed without surrounding erythema or purulence   Pulses:                        2+ and symmetric distally  Neuro:             AAOx3; cooperative and pleasant with no focal deficits      Laboratory:  Laboratory Data Reviewed: yes  Pertinent Findings:  Pertinent labs below    Microbiology Data Reviewed:  yes  Pertinent Findings:  N/a    Other Results:  EKG (my interpretation): Sinus bradycardia, normal axis     Radiology Data Reviewed: yes  Pertinent Findings:  Imaging Results              US Abdomen Limited (Final result)  Result time 06/20/23 13:01:31   Procedure changed from US Abdomen Complete     Final result by Sathish Chung III, MD (06/20/23 13:01:31)                   Impression:      There is gallbladder wall thickening and a small amount of pericholecystic fluid likely related to hepatocellular disease.    There may be mild intrahepatic bile duct dilatation.  MRCP could be helpful.      Electronically signed by: Sathish Chung MD  Date:    06/20/2023  Time:    13:01               Narrative:    EXAMINATION:  US ABDOMEN LIMITED    CLINICAL HISTORY:  abd pain;  Epigastric pain    FINDINGS:  There is gallbladder wall thickening measuring 5 mm.  There is a small amount of fluid around the gallbladder.  The bile duct measures 4 mm.  There is mild intrahepatic bile duct dilatation near the dawna hepatis.  Liver measures 15 cm.  The spleen was not seen.  There is a small amount of fluid around the gallbladder.                                       X-Ray Chest 1 View (Final result)  Result time 06/20/23 10:17:35      Final result by Sathish Chung III, MD (06/20/23 10:17:35)                   Impression:      No acute process seen.      Electronically signed by: Sathish Chung MD  Date:    06/20/2023  Time:    10:17               Narrative:    EXAMINATION:  XR CHEST 1 VIEW    CLINICAL HISTORY:  Epigastric pain    FINDINGS:  Chest one view: Heart size is normal.  Lungs are clear.  Bones reveal severe DJD of the shoulders.                                        Current Medications:     Infusions:         Scheduled:   acetaminophen  650 mg Oral Q6H    amLODIPine  5 mg Oral Daily    collagenase   Topical (Top) Daily    fluticasone furoate-vilanteroL  1 puff Inhalation Daily    fluticasone propionate  1 spray Each  Nostril Daily    heparin (porcine)  5,000 Units Subcutaneous Q8H    ondansetron  4 mg Intravenous Once    pantoprazole  40 mg Intravenous Daily        PRN:  dextrose 10%, dextrose 10%, dextrose, dextrose, enalaprilat, glucagon (human recombinant), morphine, naloxone, ondansetron, sodium chloride 0.9%    Antibiotics and Day Number of Therapy:  N/a    Lines and Day Number of Therapy:  N/a    Assessment:     Bridget Montgomery is a 88 y.o. female with history of CKD 4, COPD, HTN, chronic RLE wound, partial provoked DVT RLE with resolution, hypocalcemia, anemia of chronic disease, and osteoarthritis who presented to [facility] on 6/20/2023 with a primary complaint of abdominal pain x3 weeks and severe abdominal pain with nausea and vomiting for 1 day now admitted to [facility] for pancreatitis. RUQ ultrasound with some intrahepatic bile duct dilatation, will get MRCP negative, and pt feels improved. Plan for discharge home today with home health.      Plan:     Acute Pancreatitis, gallstone vs drug-induced:   - lipase 866, severe epigastric abd pain, alk phos 200, consistent with acute pancreatitis  - has been on doxycycline for 1 month, around the time her sx had started, which has high association with pancreatitis, discontinued at admission  - RUQ US showed gallbladder wall thickening with possible intrahepatic duct dilation  - MRCP showed hydrops changes of GB w/o filling defect, non-dilated CBD, no intrahepatic ductal dilation, phlegmon changes in anterior pararenal space and peripancreatic space indicative of pancreatitis  - GI consulted, recommended IgG4 level to check for autoimmune pancreatitis, but otherwise no further recs.   - Pain control with morphine PRN and scheduled tylenol, pt states that is helping for now, but pt did not require any IV pain meds in the past 24 hours.   - 1L IVF bolus, with 100cc/hr IVF infusion for 20hrs afterward  - full liquid diet, can advance diet as tolerated  -  "Has HLD (T cholest ~250 and LDL ~170), not on statin due to age  - negative hepatitis panel     RADHA on CKD 4, resolved:  - baseline 1.4-1.6, presented with Cr 1.8  - Follows with Dr. Lyons outpatient   - Avoid nephrotoxins as able   - Consulted nephrology, recommended moderate IVF for treatment of acute pancreatitis in setting of CKD4  - Continue to monitor daily      Chronic Right Lower Extremity Wound, stable:  - No leukocytosis, afebrile  - Follows with hyperbarics/wound care outpatient and Dr. Mohan   - Wound care consulted, appreciate recs   - Pt previously on doxycycline, s/p 3 weeks - will discontinue as there is clean wound bed and no erythema or purulence     GERD  Hx Hiatal hernia  - continue IV protonix since pt c/o mod-severe GERD sx without PPI     HTN:  - Pt previously on olmesartan at home but changed to amlodipine-benazepril  5-10mg daily ~1 mo ago    - Will hold for now, if better BP control needed will start with amlodipine as ACE/ARB can be associated with pancreatitis   - increased amlodipine 5 to 10mg on 6/22 for better BP control as pt has had persistently elevated BP despite adequate pain control     Spinal Stenosis:  - Previously seen by NSGY, no acute intervention needed   - Pt previously rx Lyrica 75mg BID but that caused her to "act like a zombie" so her daughter decreased it to 50mg nightly prn but she hasn't required it in several days      Anemia of CKD:  - Stable, continue to monitor   - Baseline Hb 9-10  - Normocytic anemia   - Most recent iron studies with ferritin 93, iron 64, TIBC 411 earlier this month      Chronic Constipation:   - Continue home miralax      Osteoarthritis:   - Chronic pain to R shoulder, takes tramadol at home for that   - Supposed to have shoulder surgery in the next few weeks at [facility] per daughter   - Continue pain management for pancreatitis      H/o Type 2 Diabetes:   - Most recent A1c <6  - Continue to monitor        Full code, discussed " with pt and daughter on admission   Diet: Full liquids, advance as tolerated to regular  Ppx: heparin   Dispo: clinically improved, likely discharge today      Phoebe Anderson MD  [facility] Internal Medicine HO-I      [facility] Medicine Hospitalist Pager numbers:   [facility] Hospitalist Medicine Team A (Jen/Prabhjot): [phone]  [facility] Hospitalist Medicine Team B (Fredy/Eliana):  [phone]

## 2023-06-22 NOTE — PLAN OF CARE
AOX4. VS stable. Safety maintained. Meds given per MAR. Pain treated with PRN meds. Denies N/V at this time. Dressing to RLE CDI. IV fluids infusing per orders. Full liquid diet maintained. Resting quietly. SR up X 2. Call light in reach. Bed alarm set. Will continue to monitor.       Problem: Adult Inpatient Plan of Care  Goal: Plan of Care Review  Outcome: Ongoing, Progressing  Goal: Patient-Specific Goal (Individualized)  Outcome: Ongoing, Progressing     Problem: Impaired Wound Healing  Goal: Optimal Wound Healing  Outcome: Ongoing, Progressing     Problem: Pain (Pancreatitis)  Goal: Acceptable Pain Control  Outcome: Ongoing, Progressing     Problem: Respiratory Compromise (Pancreatitis)  Goal: Effective Oxygenation and Ventilation  Outcome: Ongoing, Progressing     Problem: Renal Function Impairment (Acute Kidney Injury/Impairment)  Goal: Effective Renal Function  Outcome: Ongoing, Progressing

## 2023-06-22 NOTE — PT/OT/SLP PROGRESS
Physical Therapy Treatment    Patient Name:  Bridget Montgomery   MRN:  [id number]    Recommendations:     Discharge Recommendations: home health PT, home health OT (low intensity therapy and increased family assistance)  Discharge Equipment Recommendations: other (see comments) (possible BSC)  Barriers to discharge:  decreased mobility,strength and endurance    Assessment:     Bridget Montgomery is a 88 y.o. female admitted with a medical diagnosis of Acute pancreatitis without infection or necrosis.  She presents with the following impairments/functional limitations: weakness, impaired endurance, impaired functional mobility, gait instability, impaired balance, decreased lower extremity function, decreased ROM, impaired coordination,pt with improving status and will beenfit from low intensity therapy upon discharge.    Rehab Prognosis: Good; patient would benefit from acute skilled PT services to address these deficits and reach maximum level of function.    Recent Surgery: * No surgery found *      Plan:     During this hospitalization, patient to be seen 5 x/week to address the identified rehab impairments via gait training, therapeutic activities, therapeutic exercises, neuromuscular re-education and progress toward the following goals:    Plan of Care Expires:  07/21/23    Subjective     Chief Complaint: n/a  Patient/Family Comments/goals: pt may be leaving today.  Pain/Comfort:  Pain Rating 1:  (no c/o's)      Objective:     Communicated with nsg prior to session.  Patient found supine with bed alarm, peripheral IV upon PT entry to room.     General Precautions: Standard, fall  Orthopedic Precautions: N/A  Braces: N/A  Respiratory Status: Room air     Functional Mobility:  Bed Mobility:     Supine to Sit: supervision  Transfers:     Sit to Stand:  stand by assistance with rolling walker  Bed to Chair: stand by assistance and contact guard assistance with  rolling walker  using  ambulation  Gait: amb ~40'  with RW and SBA/CGA with decreased pace  Balance: fair standing balance with RW      AM-PAC 6 CLICK MOBILITY  Turning over in bed (including adjusting bedclothes, sheets and blankets)?: 3  Sitting down on and standing up from a chair with arms (e.g., wheelchair, bedside commode, etc.): 3  Moving from lying on back to sitting on the side of the bed?: 3  Moving to and from a bed to a chair (including a wheelchair)?: 3  Need to walk in hospital room?: 3  Climbing 3-5 steps with a railing?: 2  Basic Mobility Total Score: 17       Treatment & Education: le supine ex's X 10 reps.    Patient left up in chair with all lines intact, call button in reach, chair alarm on, and nsg notified..    GOALS: see general POC  Multidisciplinary Problems       Physical Therapy Goals          Problem: Physical Therapy    Goal Priority Disciplines Outcome Goal Variances Interventions   Physical Therapy Goal     PT, PT/OT Ongoing, Progressing     Description: Goals to be met by: 23     Patient will increase functional independence with mobility by performin. Supine to sit with Modified [place]  2. Sit to stand transfer with Supervision  3. Bed to chair transfer with Supervision using rollator  4. Gait  x 100 feet with Supervision using rollator.   5. Lower extremity exercise program x10 reps per handout, with independence                         Time Tracking:     PT Received On: 23  PT Start Time: 912     PT Stop Time: 937  PT Total Time (min): 25 min     Billable Minutes: Gait Training 15 and Therapeutic Exercise 10    Treatment Type: Treatment  PT/PTA: PTA     Number of PTA visits since last PT visit: 2023

## 2023-06-22 NOTE — PLAN OF CARE
SW met with the pt at bedside to complete final d/c. Pt reported that she will have her drt Liliana [phone] arrive to the hospital around 1pm to help transport her home. Pt would like to resume her HH with [facility]. SW faxed and emailed HH orders to Carlotta with [facility]. PT/OT is recommending pt go home with a BSC, pt reported that she already has a BSC at home and will not be needing another one. SW requested f/u appts for pt. Pt is requesting that MD call her drt to discuss medications. Pt did not have any additional questions or concerns. Rounds completed on pt. All questions addressed. Bedside nurse to discuss d/c medications. Discussed importance to attend all f/u appts and take medications as prescribed. Verbalized understanding. Case Management to sign off.     Nura Nieves, DB  [phone]    Future Appointments   Date Time Provider Department Center   6/27/2023  9:30 AM Liliana Chacon MD [facility] [place] [facility]   6/28/2023  9:00 AM Adrian Mohan MD [facility]   9/5/2023  2:00 PM Lluvia Rahman MD [facility]   9/13/2023 10:30 AM Charles Peacock MD [facility]        06/22/23 1047   Final Note   Assessment Type Final Discharge Note   Anticipated Discharge Disposition Home-Health   What phone number can be called within the next 1-3 days to see how you are doing after discharge? [phone]   Hospital Resources/Appts/Education Provided Appointments scheduled and added to AVS   Post-Acute Status   Discharge Delays None known at this time

## 2023-06-22 NOTE — PLAN OF CARE
[facility]      HOME HEALTH ORDERS  FACE TO FACE ENCOUNTER    Patient Name: Bridget Montgomery  YOB: 1935    PCP: Charles Peacock MD   PCP Address: [address] / [place] [address]  PCP Phone Number: [phone]  PCP Fax: [phone]    Encounter Date: 6/20/23    Admit to Home Health    Diagnoses:  Active Hospital Problems    Diagnosis  POA    *Acute pancreatitis without infection or necrosis [K85.90]  Yes    Anemia, chronic renal failure, stage 4 (severe) [N18.4, D63.1]  Yes    Non-healing wound of right lower extremity [S81.801A]  Yes    Essential hypertension [I10]  Yes      Resolved Hospital Problems    Diagnosis Date Resolved POA    Acute renal failure superimposed on stage 4 chronic kidney disease [N17.9, N18.4] 06/22/2023 Yes       Follow Up Appointments:  Future Appointments   Date Time Provider Department Center   6/27/2023  9:30 AM Liliana Chacon MD [facility]   6/28/2023  9:00 AM Adrian Mohan MD [facility]   9/5/2023  2:00 PM Lluvia Rahman MD [facility]   9/13/2023 10:30 AM Charles Peacock MD [facility]       Allergies:  Review of patient's allergies indicates:   Allergen Reactions    Cephalexin     Codeine     Meperidine      Other reaction(s): delerium, hallucination  Delerium  Delerium  Delerium      Penicillins     Tazarotene      Other reaction(s): rash, Unknown       Medications: Review discharge medications with patient and family and provide education.    Current Facility-Administered Medications   Medication Dose Route Frequency Provider Last Rate Last Admin    acetaminophen tablet 650 mg  650 mg Oral Q6H Phoebe Anderson MD   650 mg at 06/21/23 2320    amLODIPine tablet 10 mg  10 mg Oral Daily Phoebe Anderson MD   10 mg at 06/22/23 0940    collagenase ointment   Topical (Top) Daily Phoebe Anderson MD   Given at 06/20/23 1821    dextrose 10% bolus 125 mL 125 mL  12.5 g Intravenous PRN Phoebe  MD Justin        dextrose 10% bolus 250 mL 250 mL  25 g Intravenous PRN Phoebe Anderson MD        dextrose 40 % gel 15,000 mg  15 g Oral PRN Phoebe Anderson MD        dextrose 40 % gel 30,000 mg  30 g Oral PRN Phoebe Anderson MD        enalaprilat injection 0.625 mg  0.625 mg Intravenous Q6H PRN Mariela Marquez MD        fluticasone furoate-vilanteroL 100-25 mcg/dose diskus inhaler 1 puff  1 puff Inhalation Daily Phoebe Anderson MD   1 puff at 06/22/23 0826    fluticasone propionate 50 mcg/actuation nasal spray 50 mcg  1 spray Each Nostril Daily Phoebe Anderson MD   50 mcg at 06/22/23 0941    glucagon (human recombinant) injection 1 mg  1 mg Intramuscular PRN Phoebe Anderson MD        heparin (porcine) injection 5,000 Units  5,000 Units Subcutaneous Q8H Phoebe Anderson MD   5,000 Units at 06/22/23 0601    morphine injection 2 mg  2 mg Intravenous Q4H PRN Phoebe Anderson MD   2 mg at 06/20/23 2023    naloxone 0.4 mg/mL injection 0.02 mg  0.02 mg Intravenous PRN Phoebe Anderson MD        ondansetron injection 4 mg  4 mg Intravenous Q6H PRN Phoebe Anderson MD   4 mg at 06/21/23 1747    ondansetron injection 4 mg  4 mg Intravenous Once Phoebe Anderson MD        [START ON 6/23/2023] pantoprazole EC tablet 40 mg  40 mg Oral Daily Doris Rick MD        polyethylene glycol packet 17 g  17 g Oral Daily Phoebe Anderson MD   17 g at 06/22/23 0940    sodium chloride 0.9% flush 10 mL  10 mL Intravenous Q12H PRN Phoebe Anderson MD         Current Discharge Medication List        START taking these medications    Details   amLODIPine (NORVASC) 10 MG tablet Take 1 tablet (10 mg total) by mouth once daily.  Qty: 30 tablet, Refills: 3    Comments: Meds to bedside delivery      ondansetron (ZOFRAN-ODT) 4 MG TbDL Take 1 tablet (4 mg total) by mouth every 6 (six) hours as needed (as needed for nausea).  Qty: 16 tablet, Refills: 0    Comments: Meds to bedside delivery           CONTINUE these medications which have  NOT CHANGED    Details   albuterol (PROVENTIL) 2.5 mg /3 mL (0.083 %) nebulizer solution Take 2.5 mg by nebulization 2 (two) times daily as needed.      calcium carbonate (CALCIUM 500 ORAL) Take 500 mg by mouth once daily.      cholecalciferol, vitamin D3, (VITAMIN D3) 50 mcg (2,000 unit) Cap capsule Take 2,000 Units by mouth once daily.      coenzyme Q10 10 mg capsule Take 10 mg by mouth once daily.      furosemide (LASIX) 20 MG tablet Take 20 mg by mouth 2 (two) times a day.      LIDOcaine (LIDODERM) 5 % Place 1 patch onto the skin once daily. Remove & Discard patch within 12 hours or as directed by provider  Qty: 5 patch, Refills: 0    Associated Diagnoses: Acute pain of right shoulder      mupirocin (BACTROBAN) 2 % ointment Apply topically once daily.  Qty: 22 g, Refills: 0      omeprazole (PRILOSEC) 20 MG capsule Take 20 mg by mouth once daily.      polyethylene glycol 3350 (MIRALAX ORAL) Take 17 g by mouth once daily.      pregabalin (LYRICA) 75 MG capsule Take 50 mg by mouth nightly as needed.      psyllium 0.52 gram capsule Take 0.52 g by mouth daily as needed.      selenium 200 mcg Cap Take 200 mcg by mouth Daily.      SYMBICORT 80-4.5 mcg/actuation HFAA Inhale 2 puffs into the lungs 2 (two) times a day.  Qty: 10.2 g, Refills: 6    Associated Diagnoses: Chronic obstructive pulmonary disease, unspecified COPD type      traMADoL (ULTRAM) 50 mg tablet Take 1 tablet (50 mg total) by mouth 2 (two) times daily as needed for Pain.  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      zinc sulfate (ZINC-15 ORAL) Take 15 mg by mouth once daily.      acetaminophen (TYLENOL)  500 MG tablet Take 500 mg by mouth every 6 (six) hours as needed for Pain.      albuterol (PROVENTIL/VENTOLIN HFA) 90 mcg/actuation inhaler Inhale 2 puffs into the lungs every 6 (six) hours as needed for Wheezing. Rescue      collagenase (SANTYL) ointment Apply topically once daily.  Qty: 30 g, Refills: 0      diclofenac sodium (VOLTAREN ARTHRITIS PAIN) 1 % Gel Apply 2 g topically once daily.  Qty: 1 each, Refills: 11    Associated Diagnoses: Idiopathic osteoarthritis      fluticasone propionate (FLONASE) 50 mcg/actuation nasal spray INSTILL TWO SPRAYS INTO EACH NOSTRIL TWICE A DAY  Qty: 48 mL, Refills: 2    Associated Diagnoses: Allergic rhinitis, unspecified seasonality, unspecified trigger      olopatadine (PATANOL) 0.1 % ophthalmic solution Place into both eyes Daily.      omega 3-dha-epa-fish oil 1,000 mg (120 mg-180 mg) Cap Take 1 capsule by mouth once daily.           STOP taking these medications       amlodipine-benazepril 5-10 mg (LOTREL) 5-10 mg per capsule Comments:   Reason for Stopping:         doxycycline (VIBRA-TABS) 100 MG tablet Comments:   Reason for Stopping:                 I have seen and examined this patient within the last 30 days. My clinical findings that support the need for the home health skilled services and home bound status are the following:no   Requiring assistive device to leave home due to unsteady gait caused by  Weakness/Debility.     Diet:   regular diet    Labs:  N/a    Referrals/ Consults  Physical Therapy to evaluate and treat. Evaluate for home safety and equipment needs; Establish/upgrade home exercise program. Perform / instruct on therapeutic exercises, gait training, transfer training, and Range of Motion.  Occupational Therapy to evaluate and treat. Evaluate home environment for safety and equipment needs. Perform/Instruct on transfers, ADL training, ROM, and therapeutic exercises.    Activities:   activity as tolerated    Nursing:   Agency to admit patient within 24  hours of hospital discharge unless specified on physician order or at patient request    SN to complete comprehensive assessment including routine vital signs. Instruct on disease process and s/s of complications to report to MD. Review/verify medication list sent home with the patient at time of discharge  and instruct patient/caregiver as needed. Frequency may be adjusted depending on start of care date.     Skilled nurse to perform up to 3 visits PRN for symptoms related to diagnosis    Notify MD if SBP > 160 or < 90; DBP > 90 or < 50; HR > 120 or < 50; Temp > 101; O2 < 88%; Other:   n/a    Ok to schedule additional visits based on staff availability and patient request on consecutive days within the home health episode.    When multiple disciplines ordered:    Start of Care occurs on Sunday - Wednesday schedule remaining discipline evaluations as ordered on separate consecutive days following the start of care.    Thursday SOC -schedule subsequent evaluations Friday and Monday the following week.     Friday - Saturday SOC - schedule subsequent discipline evaluations on consecutive days starting Monday of the following week.    For all post-discharge communication and subsequent orders please contact patient's primary care physician. If unable to reach primary care physician or do not receive response within 30 minutes, please contact PCP for clinical staff order clarification    Miscellaneous   N/a    Home Health Aide:  Physical Therapy Other: as recommended in PT eval  and Occupational Therapy Other: as recommended in OT eval    Wound Care Orders  no    I certify that this patient is confined to her home and needs physical therapy and occupational therapy.

## 2023-06-22 NOTE — PLAN OF CARE
Problem: Physical Therapy  Goal: Physical Therapy Goal  Description: Goals to be met by: 23     Patient will increase functional independence with mobility by performin. Supine to sit with Modified [place]  2. Sit to stand transfer with Supervision  3. Bed to chair transfer with Supervision using rollator  4. Gait  x 100 feet with Supervision using rollator.   5. Lower extremity exercise program x10 reps per handout, with independence    Outcome: Ongoing, Progressing

## 2023-06-22 NOTE — NURSING
VN cued into room and permission given to adjust camera towards patient and patient's daughter.  AVS reviewed with both of them and they verbalized understanding on new mediations and continuing home medications, diet, activity and follow-up appointments.  Patient received medications from Outpatient Pharmacy.  Wheelchair per Transport placed.  Voiced no other concerns.

## 2023-06-25 LAB
BACTERIA BLD CULT: NORMAL
BACTERIA BLD CULT: NORMAL

## 2023-06-26 LAB — IGG4 SER-MCNC: 72 MG/DL (ref 4–86)

## 2023-06-27 ENCOUNTER — OFFICE VISIT (OUTPATIENT)
Dept: PRIMARY CARE CLINIC | Facility: CLINIC | Age: 88
End: 2023-06-27
Payer: MEDICARE

## 2023-06-27 VITALS
SYSTOLIC BLOOD PRESSURE: 134 MMHG | OXYGEN SATURATION: 95 % | TEMPERATURE: 98 F | HEART RATE: 75 BPM | DIASTOLIC BLOOD PRESSURE: 65 MMHG

## 2023-06-27 DIAGNOSIS — K85.90 ACUTE PANCREATITIS WITHOUT INFECTION OR NECROSIS, UNSPECIFIED PANCREATITIS TYPE: Primary | ICD-10-CM

## 2023-06-27 DIAGNOSIS — I10 ESSENTIAL HYPERTENSION: ICD-10-CM

## 2023-06-27 DIAGNOSIS — S81.801S WOUND OF RIGHT LOWER EXTREMITY, SEQUELA: ICD-10-CM

## 2023-06-27 PROBLEM — S81.801A LEG WOUND, RIGHT: Status: ACTIVE | Noted: 2023-06-27

## 2023-06-27 PROCEDURE — 1126F PR PAIN SEVERITY QUANTIFIED, NO PAIN PRESENT: ICD-10-PCS | Mod: CPTII,S$GLB,, | Performed by: INTERNAL MEDICINE

## 2023-06-27 PROCEDURE — 1126F AMNT PAIN NOTED NONE PRSNT: CPT | Mod: CPTII,S$GLB,, | Performed by: INTERNAL MEDICINE

## 2023-06-27 PROCEDURE — 1101F PT FALLS ASSESS-DOCD LE1/YR: CPT | Mod: CPTII,S$GLB,, | Performed by: INTERNAL MEDICINE

## 2023-06-27 PROCEDURE — 3288F FALL RISK ASSESSMENT DOCD: CPT | Mod: CPTII,S$GLB,, | Performed by: INTERNAL MEDICINE

## 2023-06-27 PROCEDURE — 1160F PR REVIEW ALL MEDS BY PRESCRIBER/CLIN PHARMACIST DOCUMENTED: ICD-10-PCS | Mod: CPTII,S$GLB,, | Performed by: INTERNAL MEDICINE

## 2023-06-27 PROCEDURE — 99999 PR PBB SHADOW E&M-EST. PATIENT-LVL III: ICD-10-PCS | Mod: PBBFAC,,, | Performed by: INTERNAL MEDICINE

## 2023-06-27 PROCEDURE — 1159F MED LIST DOCD IN RCRD: CPT | Mod: CPTII,S$GLB,, | Performed by: INTERNAL MEDICINE

## 2023-06-27 PROCEDURE — 99215 OFFICE O/P EST HI 40 MIN: CPT | Mod: S$GLB,,, | Performed by: INTERNAL MEDICINE

## 2023-06-27 PROCEDURE — 99215 PR OFFICE/OUTPT VISIT, EST, LEVL V, 40-54 MIN: ICD-10-PCS | Mod: S$GLB,,, | Performed by: INTERNAL MEDICINE

## 2023-06-27 PROCEDURE — 1160F RVW MEDS BY RX/DR IN RCRD: CPT | Mod: CPTII,S$GLB,, | Performed by: INTERNAL MEDICINE

## 2023-06-27 PROCEDURE — 99999 PR PBB SHADOW E&M-EST. PATIENT-LVL III: CPT | Mod: PBBFAC,,, | Performed by: INTERNAL MEDICINE

## 2023-06-27 PROCEDURE — 3288F PR FALLS RISK ASSESSMENT DOCUMENTED: ICD-10-PCS | Mod: CPTII,S$GLB,, | Performed by: INTERNAL MEDICINE

## 2023-06-27 PROCEDURE — 1159F PR MEDICATION LIST DOCUMENTED IN MEDICAL RECORD: ICD-10-PCS | Mod: CPTII,S$GLB,, | Performed by: INTERNAL MEDICINE

## 2023-06-27 PROCEDURE — 1101F PR PT FALLS ASSESS DOC 0-1 FALLS W/OUT INJ PAST YR: ICD-10-PCS | Mod: CPTII,S$GLB,, | Performed by: INTERNAL MEDICINE

## 2023-06-27 NOTE — PROGRESS NOTES
[facility]   New Visit Progress Note   Recent Hospital Discharge     PRESENTING HISTORY     Chief Complaint/Reason for Admission:  Follow up Hospital Discharge   PCP: Charles Peacock MD    History of Present Illness:  Ms. Bridget Montgomery is a 88 y.o. female who was recently admitted to the hospital.    [facility] Medicine Discharge Summary  Primary Team: [facility] [place] [facility]  Attending Physician: Doris Rick MD  Resident: Galo  Intern: Justin  Date of Admit: 6/20/2023  Date of Discharge: 6/22/2023  Discharge to: home with home health PT/OT  Condition: stable, fair  ___________________________________________________________________    Today:  [facility].  Recently hospitalized for management of acute pancreatitis, gallstone vs drug induced.   Admitted to [facility] Medicine service.  Seen in consultation with GI team.  Patient on one month course outpatient Doxycycline (per wound care team) at time of symptom onset -> GI team with concern for medication induced pancreatitis.  Doxycycline discontinued.   Patient also recently started Benazepril as outpatient and this was discontinued.   US with mild intrahepatic bile duct dilatation but this was not evident on MRCP.   Patient responded well to above interventions and supportive care.  Discharged to home.   [facility] resumed at discharge.     Patient accompanied today by her daughter who is primary caregiver.  Patient ambulatory with Rolator.   Did not bring medication bottles for review.  Unable to conduct medication reconciliation today.   LE edema recently worsened and daughter doubled dose of Lasix with good response.   Home health team in place for wound care to legs; also followed by [facility] wound care team.   ABD pain has resolved.  Patient tolerating oral intake.     Review of Systems  General ROS: negative for chills, fever or weight loss  Psychological ROS: negative for  hallucination, depression or suicidal ideation  Ophthalmic ROS: negative for blurry vision, photophobia or eye pain  ENT ROS: negative for epistaxis, sore throat or rhinorrhea  Respiratory ROS: no cough, shortness of breath, or wheezing  Cardiovascular ROS: no chest pain or dyspnea on exertion  + bilateral LE edema   Gastrointestinal ROS: no abdominal pain, change in bowel habits, or black/ bloody stools  Genito-Urinary ROS: no dysuria, trouble voiding, or hematuria  Musculoskeletal ROS: negative for gait disturbance or muscular weakness  Neurological ROS: no syncope or seizures; no ataxia  Dermatological ROS: negative for pruritis, rash and jaundice + R leg wound      PAST HISTORY:     Past Medical History:   Diagnosis Date    Allergy     Anemia, unspecified     Arthritis     CKD (chronic kidney disease) stage 4, GFR 15-29 ml/min     COPD (chronic obstructive pulmonary disease)     Edema     HTN (hypertension)     Hypocalcemia     Hyponatremia     Osteoarthritis        Past Surgical History:   Procedure Laterality Date    BREAST CYST EXCISION      CAUDAL EPIDURAL STEROID INJECTION N/A 2/2/2023    Procedure: Injection-steroid-epidural-caudal;  Surgeon: Angel Sparrow MD;  Location: [facility];  Service: Pain Management;  Laterality: N/A;    FOOT SURGERY         Family History   Problem Relation Age of Onset    Cancer Mother     No Known Problems Father          MEDICATIONS & ALLERGIES:     Current Outpatient Medications on File Prior to Visit   Medication Sig Dispense Refill    acetaminophen (TYLENOL) 500 MG tablet Take 500 mg by mouth every 6 (six) hours as needed for Pain.      albuterol (PROVENTIL) 2.5 mg /3 mL (0.083 %) nebulizer solution Take 2.5 mg by nebulization 2 (two) times daily as needed.      albuterol (PROVENTIL/VENTOLIN HFA) 90 mcg/actuation inhaler Inhale 2 puffs into the lungs every 6 (six) hours as needed for Wheezing. Rescue      amLODIPine (NORVASC) 10 MG tablet Take 1 tablet (10 mg total)  by mouth once daily. 30 tablet 3    calcium carbonate (CALCIUM 500 ORAL) Take 500 mg by mouth once daily.      cholecalciferol, vitamin D3, (VITAMIN D3) 50 mcg (2,000 unit) Cap capsule Take 2,000 Units by mouth once daily.      coenzyme Q10 10 mg capsule Take 10 mg by mouth once daily.      collagenase (SANTYL) ointment Apply topically once daily. 30 g 0    diclofenac sodium (VOLTAREN ARTHRITIS PAIN) 1 % Gel Apply 2 g topically once daily. (Patient taking differently: Apply 2 g topically as needed.) 1 each 11    fluticasone propionate (FLONASE) 50 mcg/actuation nasal spray INSTILL TWO SPRAYS INTO EACH NOSTRIL TWICE A DAY 48 mL 2    furosemide (LASIX) 20 MG tablet Take 20 mg by mouth 2 (two) times a day.      LIDOcaine (LIDODERM) 5 % Place 1 patch onto the skin once daily. Remove & Discard patch within 12 hours or as directed by provider (Patient taking differently: Place 1 patch onto the skin daily as needed. Remove & Discard patch within 12 hours or as directed by provider) 5 patch 0    mupirocin (BACTROBAN) 2 % ointment Apply topically once daily. 22 g 0    olopatadine (PATANOL) 0.1 % ophthalmic solution Place into both eyes Daily.      omega 3-dha-epa-fish oil 1,000 mg (120 mg-180 mg) Cap Take 1 capsule by mouth once daily.      omeprazole (PRILOSEC) 20 MG capsule Take 20 mg by mouth once daily.      [] ondansetron (ZOFRAN-ODT) 4 MG TbDL Take 1 tablet (4 mg total) by mouth every 6 (six) hours as needed (as needed for nausea). 16 tablet 0    polyethylene glycol 3350 (MIRALAX ORAL) Take 17 g by mouth once daily.      pregabalin (LYRICA) 75 MG capsule Take 50 mg by mouth nightly as needed.      psyllium 0.52 gram capsule Take 0.52 g by mouth daily as needed.      selenium 200 mcg Cap Take 200 mcg by mouth Daily.      SYMBICORT 80-4.5 mcg/actuation HFAA Inhale 2 puffs into the lungs 2 (two) times a day. 10.2 g 6    traMADoL (ULTRAM) 50 mg tablet Take 1 tablet (50 mg total) by mouth 2 (two) times daily as  needed for Pain. 60 tablet 2    triamcinolone acetonide 0.1% (KENALOG) 0.1 % cream APPLY TO AFFECTED AREA TWICE A DAY (Patient taking differently: Apply topically 2 (two) times daily as needed.) 454 g 3    vit A/vit C/vit E/zinc/copper (PRESERVISION AREDS ORAL) Take 1 capsule by mouth 2 (two) times a day.      zinc sulfate (ZINC-15 ORAL) Take 15 mg by mouth once daily.       No current facility-administered medications on file prior to visit.        Review of patient's allergies indicates:   Allergen Reactions    Cephalexin     Codeine     Meperidine      Other reaction(s): delerium, hallucination  Delerium  Delerium  Delerium      Penicillins     Tazarotene      Other reaction(s): rash, Unknown       OBJECTIVE:     Vital Signs:  /65 (BP Location: Right arm, Patient Position: Sitting, BP Method: Small (Automatic))   Pulse 75   Temp 98 °F (36.7 °C) (Oral)   SpO2 95%   Wt Readings from Last 3 Encounters:   06/21/23 2334 72.3 kg (159 lb 6.3 oz)   06/20/23 1425 66.4 kg (146 lb 6.2 oz)   06/20/23 0913 61.2 kg (135 lb)   06/14/23 0853 65.8 kg (145 lb)   06/06/23 1455 65.8 kg (145 lb)     There is no height or weight on file to calculate BMI.        Physical Exam:  /65 (BP Location: Right arm, Patient Position: Sitting, BP Method: Small (Automatic))   Pulse 75   Temp 98 °F (36.7 °C) (Oral)   SpO2 95%   General appearance: alert, cooperative, no distress  Constitutional:Oriented to person, place, and time  + appears well-developed and well-nourished.   HEENT: Normocephalic, atraumatic, neck symmetrical, no nasal discharge   Eyes: conjunctivae/corneas clear, PERRL, EOM's intact  Lungs: clear to auscultation bilaterally, no dullness to percussion bilaterally  Heart: regular rate and rhythm without rub; no displacement of the PMI   Abdomen: soft, non-tender; bowel sounds normoactive; no organomegaly  Extremities: extremities symmetric; no clubbing, cyanosis, + trace pre tibial edema, bilateral LE, knee high  compression hose in place   Integument: Skin color, texture, turgor normal; no rashes; hair distrubution normal  Neurologic: Alert and oriented X 3, normal strength, normal coordination and gait  Psychiatric: no pressured speech; normal affect; no evidence of impaired cognition     Laboratory  Lab Results   Component Value Date    WBC 9.62 06/22/2023    HGB 9.1 (L) 06/22/2023    HCT 26.4 (L) 06/22/2023    MCV 87 06/22/2023     06/22/2023     BMP  Lab Results   Component Value Date     06/22/2023    K 3.8 06/22/2023     06/22/2023    CO2 23 06/22/2023    BUN 48 (H) 06/22/2023    CREATININE 1.3 06/22/2023    CALCIUM 8.1 (L) 06/22/2023    ANIONGAP 7 (L) 06/22/2023    EGFRNORACEVR 40 (A) 06/22/2023     Lab Results   Component Value Date    ALT 14 06/22/2023    AST 24 06/22/2023    ALKPHOS 170 (H) 06/22/2023    BILITOT 0.4 06/22/2023     Lab Results   Component Value Date    INR 1.1 06/20/2023     Lab Results   Component Value Date    HGBA1C 5.6 03/13/2023       Diagnostic Results:    US ABD Limited 6/20/23:  There is gallbladder wall thickening and a small amount of pericholecystic fluid likely related to hepatocellular disease.  There may be mild intrahepatic bile duct dilatation.  MRCP could be helpful.    MRI MRCP 6/20/23:  Hydrops changes of the gallbladder without filling defect.  Correlate for reactive changes of pancreatitis versus acalculous cholecystitis.  Common bile duct up to 4 mm with no filling defect.  No intrahepatic ductal dilatation.  Phlegmon changes in the anterior pararenal space and peripancreatic space compatible with the patient's history of pancreatitis.  Small amount of ascites and subcapsular fluid around the liver.       ASSESSMENT & PLAN:       Acute pancreatitis without infection or necrosis, unspecified pancreatitis type  - resolved, etiology unclear-> gallstone vs medication induced   - Doxycycline and Benazepril discontinued due to concern for drug induced pancreatitis    - now tolerating oral intake, no ABD pain or nausea      Essential hypertension  - continue Amlodipine     Wound of right lower extremity, sequela  - continue home wound care   - see [facility] [place] [facility] 7/5/23    Patient will be released from hospital follow up clinic.  She will see her PCP, Dr Peacock, 9/13/23-> please bring all medication bottles and OTC supplements to visit for medication reconciliation.     Instructions for the patient:      Scheduled Follow-up :  Future Appointments   Date Time Provider Department Center   7/5/2023 10:00 AM Adrian Mohan MD [facility] WOUND [facility]   8/30/2023  2:30 PM Angela Schofield PA-C [facility]   9/5/2023  2:00 PM Lluvia Rahman MD [facility]   9/13/2023 10:30 AM Charles Peacock MD [facility]       Post Visit Medication List:     Medication List            Accurate as of June 27, 2023 12:18 PM. If you have any questions, ask your nurse or doctor.                CHANGE how you take these medications      diclofenac sodium 1 % Gel  Commonly known as: VOLTAREN ARTHRITIS PAIN  Apply 2 g topically once daily.  What changed:   when to take this  reasons to take this     LIDOcaine 5 %  Commonly known as: LIDODERM  Place 1 patch onto the skin once daily. Remove & Discard patch within 12 hours or as directed by provider  What changed:   when to take this  reasons to take this     triamcinolone acetonide 0.1% 0.1 % cream  Commonly known as: KENALOG  APPLY TO AFFECTED AREA TWICE A DAY  What changed:   how much to take  how to take this  when to take this  reasons to take this  additional instructions            CONTINUE taking these medications      acetaminophen 500 MG tablet  Commonly known as: TYLENOL     * albuterol 2.5 mg /3 mL (0.083 %) nebulizer solution  Commonly known as: PROVENTIL     * albuterol 90 mcg/actuation inhaler  Commonly known as: PROVENTIL/VENTOLIN HFA     amLODIPine 10 MG tablet  Commonly known as: NORVASC  Take 1  tablet (10 mg total) by mouth once daily.     CALCIUM 500 ORAL     cholecalciferol (vitamin D3) 50 mcg (2,000 unit) Cap capsule  Commonly known as: VITAMIN D3     coenzyme Q10 10 mg capsule     fluticasone propionate 50 mcg/actuation nasal spray  Commonly known as: FLONASE  INSTILL TWO SPRAYS INTO EACH NOSTRIL TWICE A DAY     furosemide 20 MG tablet  Commonly known as: LASIX     MIRALAX ORAL     mupirocin 2 % ointment  Commonly known as: BACTROBAN  Apply topically once daily.     olopatadine 0.1 % ophthalmic solution  Commonly known as: PATANOL     omega 3-dha-epa-fish oil 1,000 mg (120 mg-180 mg) Cap     omeprazole 20 MG capsule  Commonly known as: PRILOSEC     PRESERVISION AREDS ORAL     psyllium 0.52 gram capsule     SantyL ointment  Generic drug: collagenase  Apply topically once daily.     selenium 200 mcg Cap     SYMBICORT 80-4.5 mcg/actuation Hfaa  Generic drug: budesonide-formoterol 80-4.5 mcg  Inhale 2 puffs into the lungs 2 (two) times a day.     traMADoL 50 mg tablet  Commonly known as: ULTRAM  Take 1 tablet (50 mg total) by mouth 2 (two) times daily as needed for Pain.     ZINC-15 ORAL           * This list has 2 medication(s) that are the same as other medications prescribed for you. Read the directions carefully, and ask your doctor or other care provider to review them with you.                  Signing Physician:  Liliana Chacon MD

## 2023-07-05 ENCOUNTER — HOSPITAL ENCOUNTER (OUTPATIENT)
Dept: WOUND CARE | Facility: HOSPITAL | Age: 88
Discharge: HOME | End: 2023-07-05
Attending: SURGERY
Payer: MEDICARE

## 2023-07-05 VITALS
TEMPERATURE: 96 F | SYSTOLIC BLOOD PRESSURE: 157 MMHG | DIASTOLIC BLOOD PRESSURE: 69 MMHG | WEIGHT: 159 LBS | BODY MASS INDEX: 27.14 KG/M2 | HEART RATE: 83 BPM | HEIGHT: 64 IN

## 2023-07-05 DIAGNOSIS — S81.801A LEG WOUND, RIGHT: Primary | ICD-10-CM

## 2023-07-07 NOTE — PROGRESS NOTES
[facility]    Subjective:       Patient ID: Bridget Montgomery is a 88 y.o. female.    Chief Complaint: Wound Care    F/u for wound care   Doing well    Wound progressing to R lower leg.  Superficial pressure ulcer to Left heel. Ordered heel protectors.   Review of Systems   Constitutional: Negative.    HENT: Negative.     Eyes: Negative.    Respiratory: Negative.     Cardiovascular: Negative.    Gastrointestinal: Negative.    Genitourinary: Negative.    Musculoskeletal: Negative.    Skin: Negative.    Neurological: Negative.    Psychiatric/Behavioral: Negative.         Objective:     Vitals:    07/05/23 1023   BP: (!) 157/69   Pulse: 83   Temp: 96 °F (35.6 °C)         Physical Exam  Constitutional:       Appearance: She is well-developed.   HENT:      Head: Normocephalic.   Eyes:      Conjunctiva/sclera: Conjunctivae normal.      Pupils: Pupils are equal, round, and reactive to light.   Cardiovascular:      Rate and Rhythm: Normal rate and regular rhythm.      Heart sounds: Normal heart sounds.   Pulmonary:      Effort: Pulmonary effort is normal.      Breath sounds: Normal breath sounds.   Abdominal:      General: Bowel sounds are normal.      Palpations: Abdomen is soft.   Musculoskeletal:         General: Normal range of motion.      Cervical back: Normal range of motion and neck supple.   Skin:     General: Skin is warm and dry.   Neurological:      Mental Status: She is alert and oriented to person, place, and time.      Deep Tendon Reflexes: Reflexes are normal and symmetric.        Altered Skin Integrity 05/12/23 2305 Right anterior;distal;lower Leg #1 (Active)   05/12/23 2305   Altered Skin Integrity Present on Admission - Did Patient arrive to the hospital with altered skin?: yes   Side: Right   Orientation: anterior;distal;lower   Location: Leg   Wound Number: #1   Is this injury device related?:    Primary Wound Type:    Description of Altered Skin  Integrity:    Ankle-Brachial Index:    Pulses:    Removal Indication and Assessment:    Wound Outcome:    (Retired) Wound Length (cm):    (Retired) Wound Width (cm):    (Retired) Depth (cm):    Wound Description (Comments):    Removal Indications:    Wound Image   07/05/23 1035   Dressing Appearance Moist drainage 07/05/23 1035   Drainage Amount Moderate 07/05/23 1035   Drainage Characteristics/Odor Serosanguineous 07/05/23 1035   Appearance Pink;Red;Yellow 07/05/23 1035   Tissue loss description Full thickness 07/05/23 1035   Red (%), Wound Tissue Color 30 % 07/05/23 1035   Yellow (%), Wound Tissue Color 70 % 07/05/23 1035   Periwound Area Macerated 07/05/23 1035   Wound Edges Defined 07/05/23 1035   Wound Length (cm) 2.2 cm 07/05/23 1035   Wound Width (cm) 1.7 cm 07/05/23 1035   Wound Depth (cm) 0.2 cm 07/05/23 1035   Wound Volume (cm^3) 0.748 cm^3 07/05/23 1035   Wound Surface Area (cm^2) 3.74 cm^2 07/05/23 1035   Care Cleansed with:;Sterile normal saline 07/05/23 1035   Dressing [place]/border;Absorptive Pad 07/05/23 1035   Periwound Care Topical treatment applied 07/05/23 1035   Compression Tubular elasticized bandage 07/05/23 1035   Dressing Change Due 07/12/23 07/05/23 1035         Assessment/Plan:         ICD-10-CM ICD-9-CM   1. Leg wound, right  S81.801A 891.0           Tissue pathology and/or culture taken:  [] Yes [x] No   Sharp debridement performed:   [x] Yes [x] No   Labs ordered this visit:   [] Yes [x] No   Imaging ordered this visit:   [] Yes [x] No           Orders Placed This Encounter   Procedures    Change dressing     Right lower leg traumatic injury   Cleanse wound with: Normal Saline   Lidocaine: PRN   Silver nitrate: PRN   Periwound care: maintain dry periwound   Primary dressing: santyl and bactroban, aquacel extra   Secondary dressing: border dressing   Edema control: tubigrip E from toes to knee   Frequency: Monday Wednesday Friday and as needed   Follow-up: in 1 week  with Dr Mohan    Home Health: Please continue wound care as above on Mondays Wednesdays Fridays and as needed except when the patient is in clinic.    Other orders:  Left heel  apply gentian violet and mepilex border.  Patient instructed to get heel protectors and wear at night to float heels        Follow up in 1 week

## 2023-07-12 ENCOUNTER — HOSPITAL ENCOUNTER (OUTPATIENT)
Dept: WOUND CARE | Facility: HOSPITAL | Age: 88
Discharge: HOME | End: 2023-07-12
Attending: SURGERY
Payer: MEDICARE

## 2023-07-12 ENCOUNTER — PATIENT MESSAGE (OUTPATIENT)
Dept: WOUND CARE | Facility: HOSPITAL | Age: 88
End: 2023-07-12
Payer: MEDICARE

## 2023-07-12 VITALS
RESPIRATION RATE: 18 BRPM | SYSTOLIC BLOOD PRESSURE: 165 MMHG | WEIGHT: 159 LBS | DIASTOLIC BLOOD PRESSURE: 70 MMHG | HEIGHT: 64 IN | HEART RATE: 75 BPM | TEMPERATURE: 98 F | BODY MASS INDEX: 27.14 KG/M2

## 2023-07-12 DIAGNOSIS — M16.10 PRIMARY LOCALIZED OSTEOARTHRITIS OF PELVIC REGION AND THIGH: ICD-10-CM

## 2023-07-12 DIAGNOSIS — N18.32 STAGE 3B CHRONIC KIDNEY DISEASE: ICD-10-CM

## 2023-07-12 DIAGNOSIS — S81.801A NON-HEALING WOUND OF RIGHT LOWER EXTREMITY: Primary | ICD-10-CM

## 2023-07-12 PROCEDURE — 11042 DBRDMT SUBQ TIS 1ST 20SQCM/<: CPT

## 2023-07-12 PROCEDURE — 99213 OFFICE O/P EST LOW 20 MIN: CPT

## 2023-07-12 NOTE — PROCEDURES
"Debridement    Date/Time: 7/12/2023 9:00 AM  Performed by: Adrian Mohan MD  Authorized by: Adrian Mohan MD     Time out: Immediately prior to procedure a "time out" was called to verify the correct patient, procedure, equipment, support staff and site/side marked as required.    Consent Done?:  Yes (Verbal)    Preparation: Patient was prepped and draped with clean technique    Local anesthesia used?: Yes    Local anesthetic:  Topical anesthetic    Wound Details:    Location:  Right leg    Type of Debridement:  Excisional       Length (cm):  2       Area (sq cm):  3       Width (cm):  1.5       Percent Debrided (%):  100       Depth (cm):  0.2       Total Area Debrided (sq cm):  3    Depth of debridement:  Subcutaneous tissue    Instruments:  Forceps and Other  Bleeding:  Minimal  Hemostasis Achieved: Yes  Method Used:  Pressure and Silver Nitrate  1st Wound Pain Assessment: 2  "

## 2023-07-12 NOTE — PROGRESS NOTES
[facility]    Subjective:       Patient ID: Bridget Montgomery is a 88 y.o. female.    Chief Complaint: Non-healing Wound Follow Up    Wound care follow up for right leg ulcer. Area linda, edema controlled. Area mechanically debrided with q tip, silver nitrate applied, patient tolerated well. Left heel with continue soreness, no ulcer noted - patient educated on floating heels  - verbalized understanding. Continue plan of care, return to clinic in 1 week.   Review of Systems   Constitutional: Negative.    HENT: Negative.     Eyes: Negative.    Respiratory: Negative.     Cardiovascular: Negative.    Gastrointestinal: Negative.    Genitourinary: Negative.    Musculoskeletal: Negative.    Skin: Negative.    Neurological: Negative.    Psychiatric/Behavioral: Negative.         Objective:     Vitals:    07/12/23 0930   BP: (!) 165/70   Pulse: 75   Resp: 18   Temp: 97.8 °F (36.6 °C)         Physical Exam  Constitutional:       Appearance: She is well-developed.   HENT:      Head: Normocephalic.   Eyes:      Conjunctiva/sclera: Conjunctivae normal.      Pupils: Pupils are equal, round, and reactive to light.   Cardiovascular:      Rate and Rhythm: Normal rate and regular rhythm.      Heart sounds: Normal heart sounds.   Pulmonary:      Effort: Pulmonary effort is normal.      Breath sounds: Normal breath sounds.   Abdominal:      General: Bowel sounds are normal.      Palpations: Abdomen is soft.   Musculoskeletal:         General: Normal range of motion.      Cervical back: Normal range of motion and neck supple.   Skin:     General: Skin is warm and dry.   Neurological:      Mental Status: She is alert and oriented to person, place, and time.      Deep Tendon Reflexes: Reflexes are normal and symmetric.        Altered Skin Integrity 05/12/23 2305 Right anterior;distal;lower Leg #1 (Active)   05/12/23 2305   Altered Skin Integrity Present on Admission - Did Patient  arrive to the hospital with altered skin?: yes   Side: Right   Orientation: anterior;distal;lower   Location: Leg   Wound Number: #1   Is this injury device related?:    Primary Wound Type:    Description of Altered Skin Integrity:    Ankle-Brachial Index:    Pulses:    Removal Indication and Assessment:    Wound Outcome:    (Retired) Wound Length (cm):    (Retired) Wound Width (cm):    (Retired) Depth (cm):    Wound Description (Comments):    Removal Indications:    Wound Image   07/12/23 1021   Dressing Appearance Moist drainage 07/12/23 1021   Drainage Amount Small 07/12/23 1021   Drainage Characteristics/Odor Serosanguineous 07/12/23 1021   Appearance Fibrin;Red 07/12/23 1021   Tissue loss description Full thickness 07/12/23 1021   Red (%), Wound Tissue Color 75 % 07/12/23 1021   Yellow (%), Wound Tissue Color 25 % 07/12/23 1021   Periwound Area Intact 07/12/23 1021   Wound Edges Defined 07/12/23 1021   Wound Length (cm) 2 cm 07/12/23 1021   Wound Width (cm) 1.5 cm 07/12/23 1021   Wound Depth (cm) 0.1 cm 07/12/23 1021   Wound Volume (cm^3) 0.3 cm^3 07/12/23 1021   Wound Surface Area (cm^2) 3 cm^2 07/12/23 1021   Care Cleansed with:;Sterile normal saline 07/12/23 1021   Dressing Applied;Other (comment);Island/border 07/12/23 1021   Compression Compression Stocking (20-30 mmHg) 07/12/23 1021   Dressing Change Due 07/14/23 07/12/23 1021         Assessment/Plan:         ICD-10-CM ICD-9-CM   1. Non-healing wound of right lower extremity  S81.801A 894.1           Tissue pathology and/or culture taken:  [] Yes [x] No   Sharp debridement performed:   [x] Yes [] No   Labs ordered this visit:   [] Yes [x] No   Imaging ordered this visit:   [] Yes [x] No           Orders Placed This Encounter   Procedures    Change dressing     Right lower leg traumatic injury   Cleanse wound with: Normal Saline   Lidocaine: PRN   Silver nitrate: PRN   Periwound care: maintain dry periwound   Primary dressing: santyl and bactroban, aquacel  extra   Secondary dressing: border dressing   Edema control: tubigrip E from toes to knee or patients compression stocking  Frequency: Monday Wednesday Friday and as needed   Follow-up: in 1 week  with Dr Mohan   [facility]: Please continue wound care as above on Mondays Wednesdays Fridays and as needed except when the patient is in clinic.     Other orders:  Left heel  apply gentian violet and mepilex border.  Patient instructed to get heel protectors and wear at night to float heels        Follow up in about 1 week (around 7/19/2023) for .

## 2023-07-19 ENCOUNTER — HOSPITAL ENCOUNTER (OUTPATIENT)
Dept: WOUND CARE | Facility: HOSPITAL | Age: 88
Discharge: HOME | End: 2023-07-19
Attending: SURGERY
Payer: MEDICARE

## 2023-07-19 DIAGNOSIS — S81.801A NON-HEALING WOUND OF RIGHT LOWER EXTREMITY: Primary | ICD-10-CM

## 2023-07-19 PROCEDURE — 17250 CHEM CAUT OF GRANLTJ TISSUE: CPT

## 2023-07-19 NOTE — PROGRESS NOTES
[facility]    Subjective:       Patient ID: Bridget Montgomery is a 88 y.o. female.    Chief Complaint: Non-healing Wound Follow Up    Follow up right lower leg wound. Wound improving no complaints silver nitrate x 1  applied for hypergranulation tissue tolerated well. Continued current plan of care.   Review of Systems   Constitutional: Negative.    HENT: Negative.     Eyes: Negative.    Respiratory: Negative.     Cardiovascular: Negative.    Gastrointestinal: Negative.    Genitourinary: Negative.    Musculoskeletal: Negative.    Skin: Negative.    Neurological: Negative.    Psychiatric/Behavioral: Negative.         Objective:   There were no vitals filed for this visit.      Physical Exam  Constitutional:       Appearance: She is well-developed.   HENT:      Head: Normocephalic.   Eyes:      Conjunctiva/sclera: Conjunctivae normal.      Pupils: Pupils are equal, round, and reactive to light.   Cardiovascular:      Rate and Rhythm: Normal rate and regular rhythm.      Heart sounds: Normal heart sounds.   Pulmonary:      Effort: Pulmonary effort is normal.      Breath sounds: Normal breath sounds.   Abdominal:      General: Bowel sounds are normal.      Palpations: Abdomen is soft.   Musculoskeletal:         General: Normal range of motion.      Cervical back: Normal range of motion and neck supple.   Skin:     General: Skin is warm and dry.   Neurological:      Mental Status: She is alert and oriented to person, place, and time.      Deep Tendon Reflexes: Reflexes are normal and symmetric.        Altered Skin Integrity 05/12/23 2305 Right anterior;distal;lower Leg #1 (Active)   05/12/23 2305   Altered Skin Integrity Present on Admission - Did Patient arrive to the hospital with altered skin?: yes   Side: Right   Orientation: anterior;distal;lower   Location: Leg   Wound Number: #1   Is this injury device related?:    Primary Wound Type:    Description of Altered  Skin Integrity:    Ankle-Brachial Index:    Pulses:    Removal Indication and Assessment:    Wound Outcome:    (Retired) Wound Length (cm):    (Retired) Wound Width (cm):    (Retired) Depth (cm):    Wound Description (Comments):    Removal Indications:    Wound Image    07/19/23 1323   Dressing Appearance Intact;Moist drainage 07/19/23 1323   Drainage Amount Small 07/19/23 1323   Drainage Characteristics/Odor Serosanguineous 07/19/23 1323   Appearance Red;Fibrin;Moist;Hypergranulation 07/19/23 1323   Tissue loss description Full thickness 07/19/23 1323   Red (%), Wound Tissue Color 90 % 07/19/23 1323   Yellow (%), Wound Tissue Color 10 % 07/19/23 1323   Periwound Area Intact 07/19/23 1323   Wound Edges Defined 07/19/23 1323   Wound Length (cm) 1.8 cm 07/19/23 1323   Wound Width (cm) 1.1 cm 07/19/23 1323   Wound Depth (cm) 0.1 cm 07/19/23 1323   Wound Volume (cm^3) 0.198 cm^3 07/19/23 1323   Wound Surface Area (cm^2) 1.98 cm^2 07/19/23 1323   Care Cleansed with:;Sterile normal saline 07/19/23 1323   Dressing Applied;Island/border;Tubular bandage;Silver 07/19/23 1323   Periwound Care Absorptive dressing applied;Dry periwound area maintained 07/19/23 1323   Compression Tubular elasticized bandage 07/19/23 1323   Dressing Change Due 07/21/23 07/19/23 1323         Assessment/Plan:         ICD-10-CM ICD-9-CM   1. Non-healing wound of right lower extremity  S81.801A 894.1           Tissue pathology and/or culture taken:  [] Yes [x] No   Sharp debridement performed:   [] Yes [x] No   Labs ordered this visit:   [] Yes [x] No   Imaging ordered this visit:   [] Yes [x] No           Orders Placed This Encounter   Procedures    Change dressing     Right lower leg traumatic injury   Cleanse wound with: Normal Saline   Lidocaine: PRN   Silver nitrate: PRN   Periwound care: maintain dry periwound   Primary dressing: santyl and bactroban, aquacel extra   Secondary dressing: border dressing   Edema control: tubigrip E from toes to  knee or patients compression stocking   Frequency: Monday Wednesday Friday and as needed   Follow-up: in 2 weeks  with Dr Mohan   [facility]: Please continue wound care as above on Mondays Wednesdays Fridays and as needed except when the patient is in clinic.     Other orders:  Left heel  apply gentian violet and mepilex border.  Patient instructed to get heel protectors and wear at night to float heels        Follow up in about 1 week (around 7/26/2023) for .

## 2023-07-24 ENCOUNTER — DOCUMENT SCAN (OUTPATIENT)
Dept: HOME HEALTH SERVICES | Facility: HOSPITAL | Age: 88
End: 2023-07-24
Payer: MEDICARE

## 2023-07-24 PROBLEM — S12.100D CLOSED ODONTOID FRACTURE WITH ROUTINE HEALING: Status: ACTIVE | Noted: 2023-07-24

## 2023-07-24 PROBLEM — M53.3 COCCYALGIA: Status: ACTIVE | Noted: 2023-07-24

## 2023-07-28 ENCOUNTER — EXTERNAL HOME HEALTH (OUTPATIENT)
Dept: HOME HEALTH SERVICES | Facility: HOSPITAL | Age: 88
End: 2023-07-28
Payer: MEDICARE

## 2023-08-01 ENCOUNTER — TELEPHONE (OUTPATIENT)
Dept: NEUROSURGERY | Facility: CLINIC | Age: 88
End: 2023-08-01
Payer: MEDICARE

## 2023-08-01 DIAGNOSIS — S12.100A CLOSED ODONTOID FRACTURE, INITIAL ENCOUNTER: Primary | ICD-10-CM

## 2023-08-01 NOTE — TELEPHONE ENCOUNTER
Reached out to patient per provider. Patient was made aware and agreed to appt date and time set for MRI on 8/11 with clinic visit set on 9/13 with x-rays prior to.

## 2023-08-02 ENCOUNTER — HOSPITAL ENCOUNTER (OUTPATIENT)
Dept: WOUND CARE | Facility: HOSPITAL | Age: 88
Discharge: HOME | End: 2023-08-02
Attending: SURGERY
Payer: MEDICARE

## 2023-08-02 DIAGNOSIS — S81.801D WOUND OF RIGHT LOWER EXTREMITY, SUBSEQUENT ENCOUNTER: ICD-10-CM

## 2023-08-02 DIAGNOSIS — S81.801A LEG WOUND, RIGHT: Primary | ICD-10-CM

## 2023-08-02 DIAGNOSIS — E13.9 DIABETES 1.5, MANAGED AS TYPE 2: ICD-10-CM

## 2023-08-02 PROCEDURE — 99212 OFFICE O/P EST SF 10 MIN: CPT

## 2023-08-02 NOTE — PROGRESS NOTES
[facility]    Subjective:       Patient ID: Bridget Montgomery is a 88 y.o. female.    Chief Complaint: Wound Care    Follow up for RLE wound that is progressing well. No new concerns.  New orders noted.   Review of Systems   Constitutional: Negative.    HENT: Negative.     Eyes: Negative.    Respiratory: Negative.     Cardiovascular: Negative.    Gastrointestinal: Negative.    Genitourinary: Negative.    Musculoskeletal: Negative.    Skin: Negative.    Neurological: Negative.    Psychiatric/Behavioral: Negative.         Objective:   There were no vitals filed for this visit.      Physical Exam  Constitutional:       Appearance: She is well-developed.   HENT:      Head: Normocephalic.   Eyes:      Conjunctiva/sclera: Conjunctivae normal.      Pupils: Pupils are equal, round, and reactive to light.   Cardiovascular:      Rate and Rhythm: Normal rate and regular rhythm.      Heart sounds: Normal heart sounds.   Pulmonary:      Effort: Pulmonary effort is normal.      Breath sounds: Normal breath sounds.   Abdominal:      General: Bowel sounds are normal.      Palpations: Abdomen is soft.   Musculoskeletal:         General: Normal range of motion.      Cervical back: Normal range of motion and neck supple.   Skin:     General: Skin is warm and dry.   Neurological:      Mental Status: She is alert and oriented to person, place, and time.      Deep Tendon Reflexes: Reflexes are normal and symmetric.        Altered Skin Integrity 05/12/23 2305 Right anterior;distal;lower Leg #1 (Active)   05/12/23 2305   Altered Skin Integrity Present on Admission - Did Patient arrive to the hospital with altered skin?: yes   Side: Right   Orientation: anterior;distal;lower   Location: Leg   Wound Number: #1   Is this injury device related?:    Primary Wound Type:    Description of Altered Skin Integrity:    Ankle-Brachial Index:    Pulses:    Removal Indication and Assessment:    Wound  Outcome:    (Retired) Wound Length (cm):    (Retired) Wound Width (cm):    (Retired) Depth (cm):    Wound Description (Comments):    Removal Indications:    Wound Image   08/02/23 1039   Dressing Appearance Intact;Moist drainage 08/02/23 1039   Drainage Amount Scant 08/02/23 1039   Drainage Characteristics/Odor Serosanguineous 08/02/23 1039   Appearance Intact;Red 08/02/23 1039   Tissue loss description Full thickness 08/02/23 1039   Yellow (%), Wound Tissue Color 100 % 08/02/23 1039   Periwound Area Intact 08/02/23 1039   Wound Edges Defined 08/02/23 1039   Wound Length (cm) 0.4 cm 08/02/23 1039   Wound Width (cm) 0.6 cm 08/02/23 1039   Wound Depth (cm) 0.1 cm 08/02/23 1039   Wound Volume (cm^3) 0.024 cm^3 08/02/23 1039   Wound Surface Area (cm^2) 0.24 cm^2 08/02/23 1039   Care Cleansed with:;Soap and water;Sterile normal saline 08/02/23 1039   Dressing Applied;Changed;Foam;Island/border;Hydrofiber 08/02/23 1039   Periwound Care Absorptive dressing applied 08/02/23 1039   Compression Compression Stocking (20-30 mmHg) 08/02/23 1039   Dressing Change Due 08/04/23 08/02/23 1039         Assessment/Plan:         ICD-10-CM ICD-9-CM   1. Leg wound, right  S81.801A 891.0           Tissue pathology and/or culture taken:  [] Yes [x] No   Sharp debridement performed:   [] Yes [x] No   Labs ordered this visit:   [] Yes [x] No   Imaging ordered this visit:   [] Yes [x] No           Orders Placed This Encounter   Procedures    Change dressing     Right lower leg traumatic injury   Cleanse wound with: Normal Saline   Lidocaine: PRN   Silver nitrate: PRN   Periwound care: maintain dry periwound   Primary dressing: Bactroban, Hydrofera ready  Secondary dressing: border dressing   Edema control: Patient's own compression stockings  Frequency: Twice weekly  Follow-up: in 2 weeks  with Dr Mohan   [facility]: Please continue wound care as above and as needed except when the patient is in clinic.     Other orders:  Left heel  apply  gentian violet and mepilex border.  Patient instructed to get heel protectors and wear at night to float heels        Follow up in about 2 weeks (around 8/16/2023).            This includes face to face time and non-face to face time preparing to see the patient (eg, review of tests), obtaining and/or reviewing separately obtained history, documenting clinical information in the electronic or other health record, independently interpreting results and communicating results to the patient/family/caregiver, or care coordinator.

## 2023-08-11 ENCOUNTER — HOSPITAL ENCOUNTER (OUTPATIENT)
Dept: RADIOLOGY | Facility: HOSPITAL | Age: 88
Discharge: HOME | End: 2023-08-11
Attending: NEUROLOGICAL SURGERY
Payer: MEDICARE

## 2023-08-11 DIAGNOSIS — S12.100A CLOSED ODONTOID FRACTURE, INITIAL ENCOUNTER: ICD-10-CM

## 2023-08-11 PROCEDURE — 72141 MRI NECK SPINE W/O DYE: CPT | Mod: TC

## 2023-08-11 PROCEDURE — 72141 MRI CERVICAL SPINE WITHOUT CONTRAST: ICD-10-PCS | Mod: 26,,, | Performed by: RADIOLOGY

## 2023-08-11 PROCEDURE — 72141 MRI NECK SPINE W/O DYE: CPT | Mod: 26,,, | Performed by: RADIOLOGY

## 2023-08-16 ENCOUNTER — HOSPITAL ENCOUNTER (OUTPATIENT)
Dept: WOUND CARE | Facility: HOSPITAL | Age: 88
Discharge: HOME | End: 2023-08-16
Attending: SURGERY
Payer: MEDICARE

## 2023-08-16 VITALS
HEIGHT: 64 IN | SYSTOLIC BLOOD PRESSURE: 183 MMHG | DIASTOLIC BLOOD PRESSURE: 86 MMHG | WEIGHT: 135 LBS | BODY MASS INDEX: 23.05 KG/M2 | HEART RATE: 78 BPM | TEMPERATURE: 97 F

## 2023-08-16 DIAGNOSIS — S81.801D WOUND OF RIGHT LOWER EXTREMITY, SUBSEQUENT ENCOUNTER: Primary | ICD-10-CM

## 2023-08-16 DIAGNOSIS — Z87.828 HEALED WOUND: ICD-10-CM

## 2023-08-16 PROCEDURE — 99213 OFFICE O/P EST LOW 20 MIN: CPT

## 2023-08-16 NOTE — PROGRESS NOTES
[facility]    Subjective:       Patient ID: Bridget Montgomery is a 88 y.o. female.    Chief Complaint: Non-healing Wound (Right leg)    Right leg skin tear resolved. No further treatment needed. Patient to continue to wear BLE compression stockings daily. Discharged from wound clinic. Return as needed.  Review of Systems   Constitutional: Negative.    HENT: Negative.     Eyes: Negative.    Respiratory: Negative.     Cardiovascular: Negative.    Gastrointestinal: Negative.    Genitourinary: Negative.    Musculoskeletal: Negative.    Skin: Negative.    Neurological: Negative.    Psychiatric/Behavioral: Negative.         Objective:     Vitals:    08/16/23 1453   BP: (!) 183/86   Pulse: 78   Temp: 96.7 °F (35.9 °C)         Physical Exam  Constitutional:       Appearance: She is well-developed.   HENT:      Head: Normocephalic.   Eyes:      Conjunctiva/sclera: Conjunctivae normal.      Pupils: Pupils are equal, round, and reactive to light.   Cardiovascular:      Rate and Rhythm: Normal rate and regular rhythm.      Heart sounds: Normal heart sounds.   Pulmonary:      Effort: Pulmonary effort is normal.      Breath sounds: Normal breath sounds.   Abdominal:      General: Bowel sounds are normal.      Palpations: Abdomen is soft.   Musculoskeletal:         General: Normal range of motion.      Cervical back: Normal range of motion and neck supple.   Skin:     General: Skin is warm and dry.   Neurological:      Mental Status: She is alert and oriented to person, place, and time.      Deep Tendon Reflexes: Reflexes are normal and symmetric.        Altered Skin Integrity 05/12/23 2305 Right anterior;distal;lower Leg #1 (Active)   05/12/23 2305   Altered Skin Integrity Present on Admission - Did Patient arrive to the hospital with altered skin?: yes   Side: Right   Orientation: anterior;distal;lower   Location: Leg   Wound Number: #1   Is this injury device related?:     Primary Wound Type:    Description of Altered Skin Integrity:    Ankle-Brachial Index:    Pulses:    Removal Indication and Assessment:    Wound Outcome:    (Retired) Wound Length (cm):    (Retired) Wound Width (cm):    (Retired) Depth (cm):    Wound Description (Comments):    Removal Indications:    Wound Image   08/16/23 1500   Dressing Appearance Intact;Dry 08/16/23 1500   Drainage Amount None 08/16/23 1500   Appearance Pink;Dry;Closed/resurfaced 08/16/23 1500   Tissue loss description Not applicable 08/16/23 1500   Yellow (%), Wound Tissue Color 100 % 08/16/23 1500   Periwound Area Dry 08/16/23 1500   Wound Edges Rolled/closed 08/16/23 1500   Wound Length (cm) 0 cm 08/16/23 1500   Wound Width (cm) 0 cm 08/16/23 1500   Wound Depth (cm) 0 cm 08/16/23 1500   Wound Volume (cm^3) 0 cm^3 08/16/23 1500   Wound Surface Area (cm^2) 0 cm^2 08/16/23 1500   Care Cleansed with:;Soap and water 08/16/23 1500         Assessment/Plan:         ICD-10-CM ICD-9-CM   1. Wound of right lower extremity, subsequent encounter  S81.801D V58.89     894.0   2. Healed wound  Z87.828 V15.59           Tissue pathology and/or culture taken:  [] Yes [x] No   Sharp debridement performed:   [] Yes [x] No   Labs ordered this visit:   [] Yes [x] No   Imaging ordered this visit:   [] Yes [x] No           Orders Placed This Encounter   Procedures    Change dressing     Right leg skin tear resolved.  Wear BLE compression stockings daily.  Discharged from Wound Clinic.  Return as needed.        Follow up return as needed.            This includes face to face time and non-face to face time preparing to see the patient (eg, review of tests), obtaining and/or reviewing separately obtained history, documenting clinical information in the electronic or other health record, independently interpreting results and communicating results to the patient/family/caregiver, or care coordinator.

## 2023-08-17 ENCOUNTER — HOSPITAL ENCOUNTER (EMERGENCY)
Facility: HOSPITAL | Age: 88
Discharge: HOME | End: 2023-08-17
Attending: EMERGENCY MEDICINE
Payer: MEDICARE

## 2023-08-17 VITALS
SYSTOLIC BLOOD PRESSURE: 170 MMHG | OXYGEN SATURATION: 100 % | HEART RATE: 70 BPM | HEIGHT: 64 IN | RESPIRATION RATE: 16 BRPM | BODY MASS INDEX: 23.05 KG/M2 | WEIGHT: 135 LBS | TEMPERATURE: 99 F | DIASTOLIC BLOOD PRESSURE: 74 MMHG

## 2023-08-17 DIAGNOSIS — T14.8XXA ABRASION: ICD-10-CM

## 2023-08-17 DIAGNOSIS — S42.202A CLOSED FRACTURE OF PROXIMAL END OF LEFT HUMERUS, UNSPECIFIED FRACTURE MORPHOLOGY, INITIAL ENCOUNTER: ICD-10-CM
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
## 2023-08-17 DIAGNOSIS — S00.93XA CONTUSION OF HEAD, UNSPECIFIED PART OF HEAD, INITIAL ENCOUNTER: Primary | ICD-10-CM

## 2023-08-17 PROCEDURE — 99284 EMERGENCY DEPT VISIT MOD MDM: CPT | Mod: 25

## 2023-08-17 PROCEDURE — 25000003 PHARM REV CODE 250: Performed by: EMERGENCY MEDICINE

## 2023-08-17 RX ORDER — ACETAMINOPHEN 500 MG
1000 TABLET ORAL
Status: COMPLETED | OUTPATIENT
Start: 2023-08-17 | End: 2023-08-17

## 2023-08-17 RX ADMIN — ACETAMINOPHEN 1000 MG: 500 TABLET ORAL at 03:08

## 2023-08-17 NOTE — DISCHARGE INSTRUCTIONS
We know that you have many choices and are honored that you chose us. We hope that we met or exceeded your expectations and goals for this visit and will keep the [facility] family in mind for your future needs and those of your family and friends.     - Dr. Henson

## 2023-08-17 NOTE — ED NOTES
Pt presents to ED after a fall, pt states she lost her balance, is supposed to use a walker which pt has not used. Last fall was in July, has fractures in neck which is why pt is wearing a neck brace. Pt c/o of acute HA and hip pain. Multiple bruises noted to BUE/BLE, pt is not on blood thinners, states bruises easily.     Patient identifiers verified by spelling and stated name on armband along with .    Review of patient's allergies indicates:   Allergen Reactions    Cephalexin     Codeine     Meperidine      Other reaction(s): delerium, hallucination  Delerium  Delerium  Delerium      Penicillins     Tazarotene      Other reaction(s): rash, Unknown       APPEARANCE: Alert, oriented x 4, and in no acute distress.  HEENT: +chronic neck pain with brace.  NEURO: +HA 5/5 strength major flexors/extensors bilaterally. Sensory intact to light touch bilaterally. Junction coma scale: eyes open spontaneously-4, oriented & converses-5, obeys commands-6. No neurological abnormalities. Denies dizziness, photophobia.  CARDIAC: Normal rate and rhythm through apical/radial pulse, S1 and S2 noted, denies CP.   RESPIRATORY:Normal rate and effort, breath sounds clear bilaterally throughout chest anterior and posterior. Respirations are equal and unlabored, no obvious signs of distress. No SOB reported.  PERIPHERAL VASCULAR: +2 peripheral pulses present. Normal cap refill <3sec. No edema. Warm to touch. Denies numbness/tingling.  GASTRO: Abdomen soft, flat, bowel sounds normal and active in all 4 quadrants, no tenderness, no abdominal distention. Denies NVD.  MUSC: +acute left hip pain +chronic back pain +chronic LUE shoulder pain No obvious deformity.  SKIN: +LUE Skin tear +misc bruising Skin is warm and dry, normal skin turgor, mucous membranes moist.   GENITOURINARY: Voids spontaneously, denies itching, burning, hematuria.

## 2023-08-17 NOTE — ED PROVIDER NOTES
Emergency Department Encounter  Provider Note  Encounter Date: 8/17/2023    Patient Name: Bridget Montgomery  MRN: [id number]    History of Present Illness   HPI  History of Present Illness:    Chief Complaint:   Chief Complaint   Patient presents with    Fall     Patient from home and trip over something and fell backwards. Has a laceration to back of head. No LOC and No blood thinners. Chronic shouder and neck pain. Has a soft neck brace on that she wears all the times.        80-year-old female presenting with mechanical trip and fall earlier today.  Patient was not using her walker and tripped, falling backwards hitting her head.  No known LOC. patient complaining of right and left shoulder pain, a cut to the back of her head.  Did not ambulate afterwards.  Tetanus is up-to-date.  Daughter asking for hip x-rays, sacrum x-rays.    The following PMH/PSH/SocHx/FamHx has been reviewed by myself:    Past Medical History:   Diagnosis Date    Allergy     Anemia, unspecified     Arthritis     CKD (chronic kidney disease) stage 4, GFR 15-29 ml/min     COPD (chronic obstructive pulmonary disease)     Edema     HTN (hypertension)     Hypocalcemia     Hyponatremia     Osteoarthritis      Past Surgical History:   Procedure Laterality Date    BREAST CYST EXCISION      CAUDAL EPIDURAL STEROID INJECTION N/A 2/2/2023    Procedure: Injection-steroid-epidural-caudal;  Surgeon: Angel Sparrow MD;  Location: [facility];  Service: Pain Management;  Laterality: N/A;    FOOT SURGERY       Social History     Tobacco Use    Smoking status: Former     Current packs/day: 0.00    Smokeless tobacco: Never   Substance Use Topics    Alcohol use: Not Currently    Drug use: Never     Family History   Problem Relation Age of Onset    Cancer Mother     No Known Problems Father        Allergies reviewed:  Review of patient's allergies indicates:   Allergen Reactions    Cephalexin     Codeine     Meperidine      Other reaction(s): delerium,  hallucination  Delerium  Delerium  Delerium      Penicillins     Tazarotene      Other reaction(s): rash, Unknown       Medications reviewed:  Medication List with Changes/Refills   Current Medications    ACETAMINOPHEN (TYLENOL) 500 MG TABLET    Take 500 mg by mouth every 6 (six) hours as needed for Pain.    ALBUTEROL (PROVENTIL) 2.5 MG /3 ML (0.083 %) NEBULIZER SOLUTION    Take 2.5 mg by nebulization 2 (two) times daily as needed.    ALBUTEROL (PROVENTIL/VENTOLIN HFA) 90 MCG/ACTUATION INHALER    Inhale 2 puffs into the lungs every 6 (six) hours as needed for Wheezing. Rescue    AMLODIPINE (NORVASC) 10 MG TABLET    Take 1 tablet (10 mg total) by mouth once daily.    CALCIUM CARBONATE (CALCIUM 500 ORAL)    Take 500 mg by mouth once daily.    CHOLECALCIFEROL, VITAMIN D3, (VITAMIN D3) 50 MCG (2,000 UNIT) CAP CAPSULE    Take 2,000 Units by mouth once daily.    COENZYME Q10 10 MG CAPSULE    Take 10 mg by mouth once daily.    COLLAGENASE (SANTYL) OINTMENT    Apply topically once daily.    DICLOFENAC SODIUM (VOLTAREN ARTHRITIS PAIN) 1 % GEL    Apply 2 g topically once daily.    FLUTICASONE PROPIONATE (FLONASE) 50 MCG/ACTUATION NASAL SPRAY    INSTILL TWO SPRAYS INTO EACH NOSTRIL TWICE A DAY    FUROSEMIDE (LASIX) 20 MG TABLET    Take 20 mg by mouth 2 (two) times a day.    LIDOCAINE (LIDODERM) 5 %    Place 1 patch onto the skin once daily. Remove & Discard patch within 12 hours or as directed by MD    MUPIROCIN (BACTROBAN) 2 % OINTMENT    Apply topically once daily.    OLOPATADINE (PATANOL) 0.1 % OPHTHALMIC SOLUTION    Place into both eyes Daily.    OMEGA 3-DHA-EPA-FISH OIL 1,000 MG (120 MG-180 MG) CAP    Take 1 capsule by mouth once daily.    OMEPRAZOLE (PRILOSEC) 20 MG CAPSULE    Take 20 mg by mouth once daily.    POLYETHYLENE GLYCOL 3350 (MIRALAX ORAL)    Take 17 g by mouth once daily.    PREGABALIN (LYRICA) 50 MG CAPSULE    Take 1 capsule (50 mg total) by mouth every evening.    PSYLLIUM 0.52 GRAM CAPSULE    Take 0.52 g  by mouth daily as needed.    SELENIUM 200 MCG CAP    Take 200 mcg by mouth Daily.    SYMBICORT 80-4.5 MCG/ACTUATION HFAA    Inhale 2 puffs into the lungs 2 (two) times a day.    TRAMADOL (ULTRAM) 50 MG TABLET    Take 1 tablet (50 mg total) by mouth 2 (two) times daily as needed for Pain.    TRIAMCINOLONE ACETONIDE 0.1% (KENALOG) 0.1 % CREAM    APPLY TO AFFECTED AREA TWICE A DAY    VIT A/VIT C/VIT E/ZINC/COPPER (PRESERVISION AREDS ORAL)    Take 1 capsule by mouth 2 (two) times a day.    ZINC SULFATE (ZINC-15 ORAL)    Take 15 mg by mouth once daily.       ROS  Review of Systems:    Constitutional:  Negative for fever.   HENT:  Negative for sore throat.    Eyes:  Negative for redness.   Respiratory:  Negative for shortness of breath.    Cardiovascular:  Negative for chest pain.   Gastrointestinal:  Negative for nausea.   Genitourinary:  Negative for dysuria.   Musculoskeletal:  Negative for back pain.   Skin:  Negative for rash.   Neurological:  Negative for weakness.   Hematological:  Does not bruise/bleed easily.   Psychiatric/Behavioral:  The patient is not nervous/anxious.      Physical Exam   Physical Exam    Initial Vitals [08/17/23 1349]   BP Pulse Resp Temp SpO2   (!) 170/76 68 15 97.6 °F (36.4 °C) 99 %      MAP       --           Triage vital signs reviewed.    Constitutional:  Thin, has a soft collar on.  Nontoxic appearing.  HENT: Normocephalic, oozing abrasion to the back of her head. Moist mucous membranes.  Eyes: No conjunctival injection.  Resp: Normal respiratory effort, breathing unlabored.  Cardio: Regular rate and rhythm.  GI: Abdomen non-distended.   MSK:  Right and left shoulder pain with palpation.  No pain with rocking of the hips.  Able to range bilateral lower extremities.  Skin: Warm and dry. No rashes or lesions noted.  Neuro: Awake and alert. Moves all extremities.    ED Course   Procedures    Medical Decision Making    History Acquisition     The history is provided by the patient.    Assessment requiring an independent historian and why historian was needed:  Daughter at bedside supplementing history.    Review of prior external/non ED notes:  Wound care notes    Differential Diagnoses   Based on available information and initial assessment, the working Differential Diagnosis includes, but is not limited to:  Polytrauma, fall/syncope, traumatic SAH/intracranial bleed, skull/c-spine/facial fracture, concussion, neck injury, chest trauma, intraabdominal bleed, solid organ injury, pelvic fracture, long bone fracture/dislocation, nerve injury/palsy, vascular injury, hemarthrosis, septic joint, osteoarthritis, compartment syndrome, rhabdomyolysis, soft tissue contusion, muscle strain, ligament tear/sprain, foreign body, laceration, abrasion.  .    EKG   EKG ordered and independently reviewed by me:       Labs   Lab tests ordered and independently reviewed by me:    Labs Reviewed - No data to display      Imaging   Imaging ordered and independently reviewed by me:   Imaging Results              CT Head Without Contrast (In process)                      CT Cervical Spine Without Contrast (In process)                          Additional Consideration   Bridget Montgomery  presents to the emergency Department today with mechanical fall. Already in soft collar. Plan for CTH/N, xr bilateral shoulders, xr bilateral hips, sacrum per daughter request. Wound on back of head is an abrasion, no indication for closure but is oozing. Lives at home w daughter. If no acute findings on imaging, will likely be able to dc home w daughter.     Additional testing considered but not indicated during the course of this workup: further imaging and labwork, not indicated  Co-morbidities/chronic illness/exacerbation of chronic illness considered which impacted clinical decision making: advanced age, osteoporosis  Procedures done in the ED or plan for the OR:   Social determinants of care considered during development of  treatment plan include:   Discussion of management or test interpretation with external provider: Yes, describe: oncoming physician Dr Henson dispo pending results   DNR discussion: No    The patient's list of active medications and allergies as documented per RN staff has been reviewed.  Medications given in the ED and/or prescribed:   Medications   acetaminophen tablet 1,000 mg (1,000 mg Oral Given 8/17/23 3724)                  Explanation of disposition: Pending CT results and per discretion of oncoming physician Dr Henson     Clinical Impression:     1. Karen Ferrera MD  08/17/23 7653

## 2023-08-17 NOTE — ED NOTES
Pt daughter refused xray and C/T until tylenol administration, upon giving, noticed bleeding to sheet, Dr. Jo at bs with assessment, no open skin noted to pt scalp although bleeding noted occipital region in hair.

## 2023-08-17 NOTE — ED NOTES
Dressing applied to pt head and wrapped with gauze, sling applied to LUE, dressing and gauze w/o adhesive applied to LUE skin tear.

## 2023-08-21 PROBLEM — S42.202D CLOSED FRACTURE OF PROXIMAL END OF LEFT HUMERUS WITH ROUTINE HEALING: Status: ACTIVE | Noted: 2023-08-21

## 2023-09-11 ENCOUNTER — DOCUMENT SCAN (OUTPATIENT)
Dept: HOME HEALTH SERVICES | Facility: HOSPITAL | Age: 88
End: 2023-09-11
Payer: MEDICARE

## 2023-09-13 ENCOUNTER — DOCUMENTATION ONLY (OUTPATIENT)
Dept: NEUROSURGERY | Facility: CLINIC | Age: 88
End: 2023-09-13

## 2023-09-13 ENCOUNTER — OFFICE VISIT (OUTPATIENT)
Dept: NEUROSURGERY | Facility: CLINIC | Age: 88
End: 2023-09-13
Payer: MEDICARE

## 2023-09-13 ENCOUNTER — HOSPITAL ENCOUNTER (OUTPATIENT)
Dept: RADIOLOGY | Facility: HOSPITAL | Age: 88
Discharge: HOME | End: 2023-09-13
Attending: NEUROLOGICAL SURGERY
Payer: MEDICARE

## 2023-09-13 VITALS
DIASTOLIC BLOOD PRESSURE: 72 MMHG | SYSTOLIC BLOOD PRESSURE: 122 MMHG | HEART RATE: 78 BPM | WEIGHT: 136 LBS | BODY MASS INDEX: 23.22 KG/M2 | HEIGHT: 64 IN

## 2023-09-13 DIAGNOSIS — M53.2X2 SPINAL INSTABILITY OF CERVICAL REGION: Primary | ICD-10-CM

## 2023-09-13 DIAGNOSIS — S12.112A CLOSED NONDISPLACED ODONTOID FRACTURE WITH TYPE II MORPHOLOGY, INITIAL ENCOUNTER: ICD-10-CM

## 2023-09-13 DIAGNOSIS — S12.100A CLOSED ODONTOID FRACTURE, INITIAL ENCOUNTER: ICD-10-CM

## 2023-09-13 PROCEDURE — 1100F PTFALLS ASSESS-DOCD GE2>/YR: CPT | Mod: CPTII,S$GLB,, | Performed by: NEUROLOGICAL SURGERY

## 2023-09-13 PROCEDURE — 3288F PR FALLS RISK ASSESSMENT DOCUMENTED: ICD-10-PCS | Mod: CPTII,S$GLB,, | Performed by: NEUROLOGICAL SURGERY

## 2023-09-13 PROCEDURE — 99214 OFFICE O/P EST MOD 30 MIN: CPT | Mod: S$GLB,,, | Performed by: NEUROLOGICAL SURGERY

## 2023-09-13 PROCEDURE — 72050 X-RAY EXAM NECK SPINE 4/5VWS: CPT | Mod: 26,,, | Performed by: RADIOLOGY

## 2023-09-13 PROCEDURE — 72050 XR CERVICAL SPINE AP LAT WITH FLEX EXTEN: ICD-10-PCS | Mod: 26,,, | Performed by: RADIOLOGY

## 2023-09-13 PROCEDURE — 1100F PR PT FALLS ASSESS DOC 2+ FALLS/FALL W/INJURY/YR: ICD-10-PCS | Mod: CPTII,S$GLB,, | Performed by: NEUROLOGICAL SURGERY

## 2023-09-13 PROCEDURE — 72050 X-RAY EXAM NECK SPINE 4/5VWS: CPT | Mod: TC,FY

## 2023-09-13 PROCEDURE — 1125F PR PAIN SEVERITY QUANTIFIED, PAIN PRESENT: ICD-10-PCS | Mod: CPTII,S$GLB,, | Performed by: NEUROLOGICAL SURGERY

## 2023-09-13 PROCEDURE — 1125F AMNT PAIN NOTED PAIN PRSNT: CPT | Mod: CPTII,S$GLB,, | Performed by: NEUROLOGICAL SURGERY

## 2023-09-13 PROCEDURE — 99214 PR OFFICE/OUTPT VISIT, EST, LEVL IV, 30-39 MIN: ICD-10-PCS | Mod: S$GLB,,, | Performed by: NEUROLOGICAL SURGERY

## 2023-09-13 PROCEDURE — 99999 PR PBB SHADOW E&M-EST. PATIENT-LVL V: ICD-10-PCS | Mod: PBBFAC,,, | Performed by: NEUROLOGICAL SURGERY

## 2023-09-13 PROCEDURE — 99999 PR PBB SHADOW E&M-EST. PATIENT-LVL V: CPT | Mod: PBBFAC,,, | Performed by: NEUROLOGICAL SURGERY

## 2023-09-13 PROCEDURE — 1159F PR MEDICATION LIST DOCUMENTED IN MEDICAL RECORD: ICD-10-PCS | Mod: CPTII,S$GLB,, | Performed by: NEUROLOGICAL SURGERY

## 2023-09-13 PROCEDURE — 1159F MED LIST DOCD IN RCRD: CPT | Mod: CPTII,S$GLB,, | Performed by: NEUROLOGICAL SURGERY

## 2023-09-13 PROCEDURE — 3288F FALL RISK ASSESSMENT DOCD: CPT | Mod: CPTII,S$GLB,, | Performed by: NEUROLOGICAL SURGERY

## 2023-09-15 ENCOUNTER — TELEPHONE (OUTPATIENT)
Dept: NEUROSURGERY | Facility: CLINIC | Age: 88
End: 2023-09-15
Payer: MEDICARE

## 2023-09-15 DIAGNOSIS — S12.112A CLOSED NONDISPLACED ODONTOID FRACTURE WITH TYPE II MORPHOLOGY, INITIAL ENCOUNTER: Primary | ICD-10-CM

## 2023-09-15 NOTE — TELEPHONE ENCOUNTER
Returned call to pt, pt is requesting to know if she can continue to do her PT for her legs and travel before the surgery. I stated to pt that I will send a message to  and I will be back in contact with her. Pt voiced understanding

## 2023-09-18 ENCOUNTER — TELEPHONE (OUTPATIENT)
Dept: PREADMISSION TESTING | Facility: HOSPITAL | Age: 88
End: 2023-09-18
Payer: MEDICARE

## 2023-09-19 ENCOUNTER — TELEPHONE (OUTPATIENT)
Dept: NEUROSURGERY | Facility: CLINIC | Age: 88
End: 2023-09-19
Payer: MEDICARE

## 2023-09-28 ENCOUNTER — HOSPITAL ENCOUNTER (OUTPATIENT)
Dept: RADIOLOGY | Facility: HOSPITAL | Age: 88
Discharge: HOME | End: 2023-09-28
Attending: INTERNAL MEDICINE
Payer: MEDICARE

## 2023-09-28 ENCOUNTER — CLINICAL SUPPORT (OUTPATIENT)
Dept: LAB | Facility: HOSPITAL | Age: 88
End: 2023-09-28
Attending: INTERNAL MEDICINE
Payer: MEDICARE

## 2023-09-28 DIAGNOSIS — I10 ESSENTIAL HYPERTENSION: ICD-10-CM

## 2023-09-28 DIAGNOSIS — J44.9 CHRONIC OBSTRUCTIVE PULMONARY DISEASE, UNSPECIFIED COPD TYPE: ICD-10-CM

## 2023-09-28 PROCEDURE — 93005 ELECTROCARDIOGRAM TRACING: CPT

## 2023-09-28 PROCEDURE — 71046 X-RAY EXAM CHEST 2 VIEWS: CPT | Mod: TC,FY

## 2023-09-28 PROCEDURE — 93010 EKG 12-LEAD: ICD-10-PCS | Mod: ,,, | Performed by: INTERNAL MEDICINE

## 2023-09-28 PROCEDURE — 71046 X-RAY EXAM CHEST 2 VIEWS: CPT | Mod: 26,,, | Performed by: RADIOLOGY

## 2023-09-28 PROCEDURE — 93010 ELECTROCARDIOGRAM REPORT: CPT | Mod: ,,, | Performed by: INTERNAL MEDICINE

## 2023-09-28 PROCEDURE — 71046 XR CHEST PA AND LATERAL: ICD-10-PCS | Mod: 26,,, | Performed by: RADIOLOGY

## 2023-10-17 ENCOUNTER — HOSPITAL ENCOUNTER (INPATIENT)
Facility: HOSPITAL | Age: 88
LOS: 6 days | Discharge: NURSING, REHAB OR LONG-TERM CARE | DRG: 065 | End: 2023-10-23
Attending: EMERGENCY MEDICINE | Admitting: INTERNAL MEDICINE
Payer: MEDICARE

## 2023-10-17 DIAGNOSIS — I63.9 STROKE: Primary | ICD-10-CM

## 2023-10-17 DIAGNOSIS — S12.100S CLOSED ODONTOID FRACTURE, SEQUELA: ICD-10-CM

## 2023-10-17 DIAGNOSIS — I63.89 OTHER CEREBRAL INFARCTION: ICD-10-CM

## 2023-10-17 DIAGNOSIS — R51.9 NONINTRACTABLE HEADACHE, UNSPECIFIED CHRONICITY PATTERN, UNSPECIFIED HEADACHE TYPE: ICD-10-CM

## 2023-10-17 DIAGNOSIS — I63.02 CEREBRAL INFARCTION DUE TO THROMBOSIS OF BASILAR ARTERY: ICD-10-CM

## 2023-10-17 DIAGNOSIS — I63.9 CEREBROVASCULAR ACCIDENT (CVA), UNSPECIFIED MECHANISM: ICD-10-CM

## 2023-10-17 DIAGNOSIS — H53.2 DIPLOPIA: ICD-10-CM

## 2023-10-17 LAB
ALBUMIN SERPL BCP-MCNC: 3.2 G/DL (ref 3.5–5.2)
ALLENS TEST: NORMAL
ALLENS TEST: NORMAL
ALP SERPL-CCNC: 135 U/L (ref 55–135)
ALT SERPL W/O P-5'-P-CCNC: 11 U/L (ref 10–44)
ANION GAP SERPL CALC-SCNC: 10 MMOL/L (ref 8–16)
AST SERPL-CCNC: 21 U/L (ref 10–40)
BASOPHILS # BLD AUTO: 0.08 K/UL (ref 0–0.2)
BASOPHILS NFR BLD: 1.6 % (ref 0–1.9)
BILIRUB SERPL-MCNC: 0.3 MG/DL (ref 0.1–1)
BILIRUB UR QL STRIP: NEGATIVE
BUN SERPL-MCNC: 29 MG/DL (ref 8–23)
CALCIUM SERPL-MCNC: 8.4 MG/DL (ref 8.7–10.5)
CHLORIDE SERPL-SCNC: 103 MMOL/L (ref 95–110)
CHOLEST SERPL-MCNC: 233 MG/DL (ref 120–199)
CHOLEST/HDLC SERPL: 4.2 {RATIO} (ref 2–5)
CLARITY UR: CLEAR
CO2 SERPL-SCNC: 28 MMOL/L (ref 23–29)
COLOR UR: COLORLESS
CREAT SERPL-MCNC: 1.1 MG/DL (ref 0.5–1.4)
CREAT SERPL-MCNC: 1.1 MG/DL (ref 0.5–1.4)
CREAT SERPL-MCNC: 1.3 MG/DL (ref 0.5–1.4)
DELSYS: NORMAL
DELSYS: NORMAL
DIFFERENTIAL METHOD: ABNORMAL
EOSINOPHIL # BLD AUTO: 0.1 K/UL (ref 0–0.5)
EOSINOPHIL NFR BLD: 1 % (ref 0–8)
ERYTHROCYTE [DISTWIDTH] IN BLOOD BY AUTOMATED COUNT: 12.3 % (ref 11.5–14.5)
EST. GFR  (NO RACE VARIABLE): 48 ML/MIN/1.73 M^2
EST. GFR  (NO RACE VARIABLE): 48 ML/MIN/1.73 M^2
ESTIMATED AVG GLUCOSE: 120 MG/DL (ref 68–131)
GLUCOSE SERPL-MCNC: 93 MG/DL (ref 70–110)
GLUCOSE UR QL STRIP: NEGATIVE
HBA1C MFR BLD: 5.8 % (ref 4–5.6)
HCT VFR BLD AUTO: 33.2 % (ref 37–48.5)
HDLC SERPL-MCNC: 56 MG/DL (ref 40–75)
HDLC SERPL: 24 % (ref 20–50)
HGB BLD-MCNC: 11.2 G/DL (ref 12–16)
HGB UR QL STRIP: NEGATIVE
IMM GRANULOCYTES # BLD AUTO: 0.01 K/UL (ref 0–0.04)
IMM GRANULOCYTES NFR BLD AUTO: 0.2 % (ref 0–0.5)
INR PPP: 1 (ref 0.8–1.2)
KETONES UR QL STRIP: NEGATIVE
LDLC SERPL CALC-MCNC: 157.2 MG/DL (ref 63–159)
LEUKOCYTE ESTERASE UR QL STRIP: NEGATIVE
LYMPHOCYTES # BLD AUTO: 1.3 K/UL (ref 1–4.8)
LYMPHOCYTES NFR BLD: 24.9 % (ref 18–48)
MCH RBC QN AUTO: 30.4 PG (ref 27–31)
MCHC RBC AUTO-ENTMCNC: 33.7 G/DL (ref 32–36)
MCV RBC AUTO: 90 FL (ref 82–98)
MODE: NORMAL
MODE: NORMAL
MONOCYTES # BLD AUTO: 0.5 K/UL (ref 0.3–1)
MONOCYTES NFR BLD: 10.7 % (ref 4–15)
NEUTROPHILS # BLD AUTO: 3.1 K/UL (ref 1.8–7.7)
NEUTROPHILS NFR BLD: 61.6 % (ref 38–73)
NITRITE UR QL STRIP: NEGATIVE
NONHDLC SERPL-MCNC: 177 MG/DL
NRBC BLD-RTO: 0 /100 WBC
PH UR STRIP: 7 [PH] (ref 5–8)
PLATELET # BLD AUTO: 340 K/UL (ref 150–450)
PMV BLD AUTO: 9.7 FL (ref 9.2–12.9)
POC PTINR: 1.1 (ref 0.9–1.2)
POCT GLUCOSE: 102 MG/DL (ref 70–110)
POTASSIUM SERPL-SCNC: 3.1 MMOL/L (ref 3.5–5.1)
PROT SERPL-MCNC: 6.2 G/DL (ref 6–8.4)
PROT UR QL STRIP: NEGATIVE
PROTHROMBIN TIME: 10.5 SEC (ref 9–12.5)
RBC # BLD AUTO: 3.68 M/UL (ref 4–5.4)
SAMPLE: NORMAL
SAMPLE: NORMAL
SITE: NORMAL
SITE: NORMAL
SODIUM SERPL-SCNC: 141 MMOL/L (ref 136–145)
SP GR UR STRIP: 1.01 (ref 1–1.03)
TRIGL SERPL-MCNC: 99 MG/DL (ref 30–150)
TSH SERPL DL<=0.005 MIU/L-ACNC: 1.48 UIU/ML (ref 0.4–4)
URN SPEC COLLECT METH UR: ABNORMAL
UROBILINOGEN UR STRIP-ACNC: NEGATIVE EU/DL
WBC # BLD AUTO: 5.06 K/UL (ref 3.9–12.7)

## 2023-10-17 PROCEDURE — 81003 URINALYSIS AUTO W/O SCOPE: CPT | Performed by: STUDENT IN AN ORGANIZED HEALTH CARE EDUCATION/TRAINING PROGRAM

## 2023-10-17 PROCEDURE — 82962 GLUCOSE BLOOD TEST: CPT

## 2023-10-17 PROCEDURE — 85610 PROTHROMBIN TIME: CPT

## 2023-10-17 PROCEDURE — 84443 ASSAY THYROID STIM HORMONE: CPT | Performed by: NURSE PRACTITIONER

## 2023-10-17 PROCEDURE — 93005 ELECTROCARDIOGRAM TRACING: CPT

## 2023-10-17 PROCEDURE — 80061 LIPID PANEL: CPT | Performed by: NURSE PRACTITIONER

## 2023-10-17 PROCEDURE — 85610 PROTHROMBIN TIME: CPT | Performed by: NURSE PRACTITIONER

## 2023-10-17 PROCEDURE — 99900035 HC TECH TIME PER 15 MIN (STAT)

## 2023-10-17 PROCEDURE — 63600175 PHARM REV CODE 636 W HCPCS: Performed by: STUDENT IN AN ORGANIZED HEALTH CARE EDUCATION/TRAINING PROGRAM

## 2023-10-17 PROCEDURE — G0425 INPT/ED TELECONSULT30: HCPCS | Mod: 95,,, | Performed by: STUDENT IN AN ORGANIZED HEALTH CARE EDUCATION/TRAINING PROGRAM

## 2023-10-17 PROCEDURE — 25500020 PHARM REV CODE 255: Performed by: EMERGENCY MEDICINE

## 2023-10-17 PROCEDURE — 82565 ASSAY OF CREATININE: CPT

## 2023-10-17 PROCEDURE — 99285 EMERGENCY DEPT VISIT HI MDM: CPT | Mod: 25

## 2023-10-17 PROCEDURE — 85025 COMPLETE CBC W/AUTO DIFF WBC: CPT | Performed by: NURSE PRACTITIONER

## 2023-10-17 PROCEDURE — 25000003 PHARM REV CODE 250: Performed by: STUDENT IN AN ORGANIZED HEALTH CARE EDUCATION/TRAINING PROGRAM

## 2023-10-17 PROCEDURE — 25000003 PHARM REV CODE 250: Performed by: NURSE PRACTITIONER

## 2023-10-17 PROCEDURE — 25000003 PHARM REV CODE 250

## 2023-10-17 PROCEDURE — 83036 HEMOGLOBIN GLYCOSYLATED A1C: CPT | Performed by: STUDENT IN AN ORGANIZED HEALTH CARE EDUCATION/TRAINING PROGRAM

## 2023-10-17 PROCEDURE — 80053 COMPREHEN METABOLIC PANEL: CPT | Performed by: NURSE PRACTITIONER

## 2023-10-17 PROCEDURE — G0425 PR INPT TELEHEALTH CONSULT 30M: ICD-10-PCS | Mod: 95,,, | Performed by: STUDENT IN AN ORGANIZED HEALTH CARE EDUCATION/TRAINING PROGRAM

## 2023-10-17 PROCEDURE — 93010 EKG 12-LEAD: ICD-10-PCS | Mod: ,,, | Performed by: INTERNAL MEDICINE

## 2023-10-17 PROCEDURE — 11000001 HC ACUTE MED/SURG PRIVATE ROOM

## 2023-10-17 PROCEDURE — 93010 ELECTROCARDIOGRAM REPORT: CPT | Mod: ,,, | Performed by: INTERNAL MEDICINE

## 2023-10-17 RX ORDER — ATORVASTATIN CALCIUM 40 MG/1
40 TABLET, FILM COATED ORAL DAILY
Status: DISCONTINUED | OUTPATIENT
Start: 2023-10-18 | End: 2023-10-18

## 2023-10-17 RX ORDER — PREGABALIN 50 MG/1
50 CAPSULE ORAL NIGHTLY
Status: DISCONTINUED | OUTPATIENT
Start: 2023-10-17 | End: 2023-10-23 | Stop reason: HOSPADM

## 2023-10-17 RX ORDER — CLOPIDOGREL BISULFATE 75 MG/1
75 TABLET ORAL DAILY
Status: DISCONTINUED | OUTPATIENT
Start: 2023-10-18 | End: 2023-10-23 | Stop reason: HOSPADM

## 2023-10-17 RX ORDER — ACETAMINOPHEN 325 MG/1
650 TABLET ORAL EVERY 6 HOURS PRN
Status: DISCONTINUED | OUTPATIENT
Start: 2023-10-17 | End: 2023-10-23 | Stop reason: HOSPADM

## 2023-10-17 RX ORDER — NAPROXEN SODIUM 220 MG/1
81 TABLET, FILM COATED ORAL DAILY
Status: DISCONTINUED | OUTPATIENT
Start: 2023-10-18 | End: 2023-10-23 | Stop reason: HOSPADM

## 2023-10-17 RX ORDER — SODIUM CHLORIDE 0.9 % (FLUSH) 0.9 %
10 SYRINGE (ML) INJECTION
Status: DISCONTINUED | OUTPATIENT
Start: 2023-10-17 | End: 2023-10-23 | Stop reason: HOSPADM

## 2023-10-17 RX ORDER — POTASSIUM CHLORIDE 20 MEQ/1
40 TABLET, EXTENDED RELEASE ORAL EVERY 4 HOURS
Status: DISPENSED | OUTPATIENT
Start: 2023-10-17 | End: 2023-10-18

## 2023-10-17 RX ORDER — ASPIRIN 325 MG
325 TABLET ORAL
Status: COMPLETED | OUTPATIENT
Start: 2023-10-17 | End: 2023-10-17

## 2023-10-17 RX ORDER — TRAMADOL HYDROCHLORIDE 50 MG/1
50 TABLET ORAL ONCE
Status: COMPLETED | OUTPATIENT
Start: 2023-10-17 | End: 2023-10-17

## 2023-10-17 RX ORDER — FUROSEMIDE 20 MG/1
20 TABLET ORAL DAILY
Status: DISCONTINUED | OUTPATIENT
Start: 2023-10-18 | End: 2023-10-23 | Stop reason: HOSPADM

## 2023-10-17 RX ORDER — CLOPIDOGREL BISULFATE 75 MG/1
300 TABLET ORAL
Status: COMPLETED | OUTPATIENT
Start: 2023-10-17 | End: 2023-10-17

## 2023-10-17 RX ORDER — CETIRIZINE HYDROCHLORIDE 10 MG/1
10 TABLET ORAL DAILY
COMMUNITY

## 2023-10-17 RX ORDER — LABETALOL HYDROCHLORIDE 5 MG/ML
10 INJECTION, SOLUTION INTRAVENOUS
Status: DISCONTINUED | OUTPATIENT
Start: 2023-10-17 | End: 2023-10-23 | Stop reason: HOSPADM

## 2023-10-17 RX ORDER — ENOXAPARIN SODIUM 100 MG/ML
40 INJECTION SUBCUTANEOUS EVERY 24 HOURS
Status: DISCONTINUED | OUTPATIENT
Start: 2023-10-17 | End: 2023-10-22

## 2023-10-17 RX ORDER — ACETAMINOPHEN 325 MG/1
650 TABLET ORAL ONCE
Status: DISCONTINUED | OUTPATIENT
Start: 2023-10-17 | End: 2023-10-23 | Stop reason: HOSPADM

## 2023-10-17 RX ADMIN — TRAMADOL HYDROCHLORIDE 50 MG: 50 TABLET, COATED ORAL at 10:10

## 2023-10-17 RX ADMIN — ASPIRIN 325 MG ORAL TABLET 325 MG: 325 PILL ORAL at 06:10

## 2023-10-17 RX ADMIN — PREGABALIN 50 MG: 50 CAPSULE ORAL at 10:10

## 2023-10-17 RX ADMIN — CLOPIDOGREL BISULFATE 300 MG: 75 TABLET ORAL at 06:10

## 2023-10-17 RX ADMIN — IOHEXOL 100 ML: 350 INJECTION, SOLUTION INTRAVENOUS at 05:10

## 2023-10-17 RX ADMIN — POTASSIUM CHLORIDE 40 MEQ: 1500 TABLET, EXTENDED RELEASE ORAL at 10:10

## 2023-10-17 RX ADMIN — ENOXAPARIN SODIUM 40 MG: 40 INJECTION SUBCUTANEOUS at 10:10

## 2023-10-17 NOTE — SUBJECTIVE & OBJECTIVE
Woke up with symptoms?: no      Allergies: Cephalexin  Codeine  Meperidine  Nsaids (Non-Steroidal Anti-Inflammatory Drug)  Penicillins  Tazarotene No relevant allergies    Review of Systems   Constitutional:  Positive for fatigue.   HENT:  Positive for voice change. Negative for trouble swallowing.    Eyes:  Positive for visual disturbance.   Respiratory:  Negative for shortness of breath.    Gastrointestinal:  Positive for nausea. Negative for vomiting.   Endocrine: Negative.    Genitourinary: Negative.    Musculoskeletal:  Positive for gait problem.   Skin: Negative.    Neurological:  Positive for dizziness, facial asymmetry, speech difficulty and numbness. Negative for weakness and headaches.   Psychiatric/Behavioral: Negative.       Objective:   Vitals: Blood pressure (!) 190/91, pulse 72, temperature 97.5 °F (36.4 °C), resp. rate 20, weight 61.7 kg (136 lb), SpO2 99 %.     CT READ: Yes  No hemmorhage. No mass effect. No early infarct signs.     Physical Exam  Constitutional:       Appearance: She is ill-appearing.   HENT:      Head: Atraumatic.      Comments: Mild  RFD  Eyes:      Comments: L CN III palsy    Cardiovascular:      Rate and Rhythm: Normal rate.   Pulmonary:      Effort: Pulmonary effort is normal.   Neurological:      Mental Status: She is alert and oriented to person, place, and time.      Cranial Nerves: Cranial nerve deficit present.      Sensory: Sensory deficit present.      Motor: No weakness.      Comments: Multiple abrasions noted on legs

## 2023-10-17 NOTE — ASSESSMENT & PLAN NOTE
87 yo female w/ PMHx of CKD, DM II, HLD who presents w/ multiple complaints.   Double vision and dizziness and issues w/ balance  - 11 AM - occurred when she stood up.   Never happened before.   States that dizziness gets worse with moved.   Daughter noted dysarthria this AM , which is new as well, but happened previously when she was dehydrated.   Deny any new weakness.   Not on any blood thinners at home due to multiple falls in the past    NIHSS3. Suspect posterior circulation infarct. OOW for TNK based on LKW.     CTH w/o acute abnormalities, CTA w/o LVO .     Recommend admission for completion of stoke w/u.     Recommendations:  · MRI brain to evaluate for presence and/or extent of ischemic injury  · Allow for permissive HTNsion up to 220/110 until AIS is r/o w/ imaging   · Lipid profile, A1c, TSH  · ECHO w/o bubble study  · PT/OT/SLP eval and Rx  · IVF to allow for maximum cerebral perfusion  · HOB flat   · Load aspirin 325 mg & clopidogrel 300 mg x 1 now, followed by daily aspirin 81 mg /  clopidogrel 75 mg x 30 days followed by monotherapy thereafter  · High intensity statin for LDL goal <70 long term     Discussed recommendations w Dr. Huitron via [employer] Chat

## 2023-10-17 NOTE — ED PROVIDER NOTES
Source of History:  Patient, family, chart    Chief complaint:  Diplopia (Double vision, numbness to back of head, dizziness. Hx dens fracture. Scheduled for 11/10 surgery to screw axis to atlas (Dr. Quach, neurosurgeon). )      HPI:  Bridget Montgomery is a 88 y.o. female with medical history of C1/C2 Dems fracture, anemia, CKD, diabetes, depression, arthritis, COPD, hypertension presenting with diplopia, difficulty with ambulation head pain since 4:00 a.m..  Patient's family states they attempted to get her up at 11 and when they returned from the dog park at 1:00 p.m. patient was still sitting in the same spot stating she could not walk due to dizziness.  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    ROS: As per HPI and below:  Constitutional: No fever.  No chills.  Eyes: No visual changes.  ENT: No sore throat. No ear pain    Cardiovascular: No chest pain.  Respiratory: No shortness of breath.  GI: No abdominal pain.  No nausea or vomiting.  Genitourinary: No abnormal urination.  Neurologic:  Positive for double vision.  Positive for dizziness.  MSK: no back pain.  Integument: No rashes or lesions.  Hematologic: No easy bruising.  Endocrine: No excessive thirst or urination.    Review of patient's allergies indicates:   Allergen Reactions    Cephalexin     Codeine     Meperidine      Other reaction(s): delerium, hallucination  Delerium  Delerium  Delerium      Nsaids (non-steroidal anti-inflammatory drug)     Penicillins     Tazarotene      Other reaction(s): rash, Unknown       PMH:  As per HPI and below:  Past Medical History:   Diagnosis Date    Allergy     Anemia, unspecified     Arthritis     CKD (chronic kidney disease) stage 4, GFR 15-29 ml/min     COPD (chronic obstructive pulmonary disease)     Edema     HTN (hypertension)     Hypocalcemia     Hyponatremia     Osteoarthritis      Past  Surgical History:   Procedure Laterality Date    BREAST CYST EXCISION      CAUDAL EPIDURAL STEROID INJECTION N/A 2/2/2023    Procedure: Injection-steroid-epidural-caudal;  Surgeon: Angel Sparrow MD;  Location: [facility];  Service: Pain Management;  Laterality: N/A;    FOOT SURGERY         Social History     Tobacco Use    Smoking status: Former    Smokeless tobacco: Never   Substance Use Topics    Alcohol use: Not Currently    Drug use: Never       Physical Exam:    BP (!) 173/81 (BP Location: Right arm, Patient Position: Lying)   Pulse 77   Temp 97.5 °F (36.4 °C)   Resp 20   Wt 61.7 kg (136 lb)   SpO2 98%   BMI 23.34 kg/m²   Nursing note and vital signs reviewed.  Constitutional: No acute distress.  Nontoxic  Eyes: No conjunctival injection.  Extraocular muscles are intact.  Pupils equal round reactive 2-1 mm.  Left-sided lateral vision loss.  Left eye can not cross midline.  ENT: Oropharynx clear.    Cardiovascular: Regular rate and rhythm.  No murmurs. No gallops. No rubs.  Respiratory: Clear to auscultation bilaterally.  Good air movement.  No wheezes.  No rhonchi. No rales. No accessory muscle use.  Abdomen: Soft.  Not distended.  Nontender.  No guarding.  No rebound. Non-peritoneal.  Musculoskeletal: Soft C-collar in place.  Decreased range of motion to left shoulder.  Known rotator cuff injury.  Good range of motion of all other joints.  No deformities.  Neck supple.  No meningismus.  Skin: No rashes seen.  Good turgor.  No abrasions.  No ecchymoses.  Neurologic:   NIHSS- 2.  All extremities are 5/5 strength.  Motor and sensory intact.  Good finger-to-nose.  No focal neurological deficits.  Psych:  Appropriate, conversant    MDM    Emergent evaluation of an 87 yo female presenting for diplopia, difficulty with ambulation and headache.  Patient states she believes the symptoms began between 3 and 4:00 a.m..  Patient states she went out and was sitting on the couch.  Patient's family spoke to her  at 11:00 a.m. and then left to go to the [employer].  Patient's family states they returned at 1:00 a.m. and patient states she was unable to walk due to diplopia and dizziness.  Patient has history of acute angular glaucoma and family was concerned for this.  Patient denies any nausea or vomiting.  Patient denies any recent falls or trauma.  Patient does have a C1/C2 dens fracture and is scheduled for surgery with neuro surgery next month.  On exam pt is A&Ox3. VSS. Nonfebrile and nontoxic appearing.  Mucous membranes pink and moist.  Pupils equal round reactive 2-1 mm.  Left-sided lateral vision loss noted.  Left eye vision does not cross midline.  Speaking in full sentences. NIHSS-3 Patient is hard of hearing at baseline. Breath sounds clear bilaterally.  Abdomen soft and nontender. No rebound or guarding appreciated on exam.   BS WNL.  Pt speaking in full sentences.  Steady gait appreciated. Cap refill < 3 seconds.      Additional MDM:     NIH Stroke Scale:   Interval = baseline (upon arrival/admit)  Level of consciousness = 0 - alert  LOC questions = 0 - answers both correctly  LOC commands = 0 - performs both correctly  Best gaze = 1 - partial gaze palsy  Visual = 2 - complete hemianopia  Facial palsy = 0 - normal  Motor left arm =  0 - no drift  Motor right arm =  0 - no drift  Motor left leg = 0 - no drift  Motor right leg =  0 - no drift  Limb ataxia = 0 - absent  Sensory = 0 - normal  Best language = 0 - no aphasia  Dysarthria = 0 - normal articulation  Extinction and inattention = 0 - no neglect  NIH Stroke Scale Total = 3           History Acquisition   Additional history was acquired from other historians.  Family, chart    The patient's list of active medical problems, social history, medications, and allergies as documented per RN staff has been reviewed.     Differential Diagnoses   Based on available information and the initial assessment, the working differential diagnoses considered during this  evaluation include but are not limited to stroke, glaucoma, vertigo, dizziness, electrolyte abnormality, others.    I will activate stroke code and get labs, imaging, recommendations from vascular Neurology and reassess.  I discussed this case with my supervising physician.      LABS     Labs Reviewed   CREATININE, SERUM - Abnormal; Notable for the following components:       Result Value    eGFR 48 (*)     All other components within normal limits    Narrative:     absorbed by other test CMP   CBC W/ AUTO DIFFERENTIAL - Abnormal; Notable for the following components:    RBC 3.68 (*)     Hemoglobin 11.2 (*)     Hematocrit 33.2 (*)     All other components within normal limits   COMPREHENSIVE METABOLIC PANEL - Abnormal; Notable for the following components:    Potassium 3.1 (*)     BUN 29 (*)     Calcium 8.4 (*)     Albumin 3.2 (*)     eGFR 48 (*)     All other components within normal limits   LIPID PANEL - Abnormal; Notable for the following components:    Cholesterol 233 (*)     All other components within normal limits   PROTIME-INR   TSH   HEMOGLOBIN A1C   POCT GLUCOSE, HAND-HELD DEVICE   POCT GLUCOSE   ISTAT PROCEDURE   ISTAT CREATININE         Imaging     Imaging Results              CTA Head and Neck (xpd) (Final result)  Result time 10/17/23 18:29:07      Final result by Sukh Arnold MD (10/17/23 18:29:07)                   Impression:      No acute abnormality.    No high-grade stenosis or major vessel occlusion.    Stable changes in the spine and nodules within the thyroid gland.      Electronically signed by: Sukh Arnold  Date:    10/17/2023  Time:    18:29               Narrative:    EXAMINATION:  CTA HEAD AND NECK (XPD)    CLINICAL HISTORY:  Diplopia;    TECHNIQUE:  One hundred CT angiogram was performed from the level of the denton to the top of the head following the IV administration of 100mL of Omnipaque 350.   Sagittal and coronal reconstructions and maximum intensity projection  reconstructions were performed. Arterial stenosis percentages are based on NASCET measurement criteria.    COMPARISON:  08/17/2023    FINDINGS:  Intracranial Compartment:    Ventricles and sulci are stable in size for age without evidence of hydrocephalus. No extra-axial blood or fluid collections.    The brain parenchyma appears stable.  No parenchymal mass, hemorrhage, edema, or major vascular distribution infarct.    Skull/Extracranial Contents (limited evaluation): No fracture. Mastoid air cells and paranasal sinuses are essentially clear.  Chronic type 2 dens fracture and spondylosis throughout the spine.  1 the    Non-Vascular Structures of the Neck/Thoracic Inlet (limited evaluation): Multiple thyroid nodules..  10 mm ground-glass nodular opacity in the left upper lobe    Aorta: Normal 2 vessel bovine arch..    Extracranial carotid circulation: No hemodynamically significant stenosis, aneurysmal dilatation, or dissection.    Extracranial vertebral circulation: No hemodynamically significant stenosis, aneurysmal dilatation, or dissection.    Intracranial Arteries: No focal high-grade stenosis, occlusion, or aneurysm.    Venous structures (limited evaluation): Normal.                                      A radiology report was available for my review at the time of the encounter.    EKG   EKG Readings: (Independently Interpreted)   Initial Reading: No STEMI. Previous EKG Date: 9/28/23. Rhythm: Normal Sinus Rhythm. Heart Rate: 73.   My independent interpretation:    Normal sinus rhythm.  Rate of 73.  No STEMI.       Additional Consideration   All available testing was considered during the course of this workup.  Comorbidities taken into consideration during the patient's evaluation and treatment include weight, age.    Social determinants of health were taken into consideration during development of our treatment plan.    Medications   aspirin chewable tablet 81 mg (has no administration in time range)    clopidogreL tablet 75 mg (has no administration in time range)   iohexoL (OMNIPAQUE 350) injection 100 mL (100 mLs Intravenous Given 10/17/23 1750)   clopidogreL tablet 300 mg (300 mg Oral Given 10/17/23 1855)   aspirin tablet 325 mg (325 mg Oral Given 10/17/23 1855)      ED Course as of 10/17/23 1937   Tue Oct 17, 2023   1816 CBC unremarkable.  No leukocytosis noted.  H&H stable 11.2 and 33.2.  Patient talking to vascular Neurology at this time.  Awaiting recommendations. [RZ]   1850 Discussed case with hospital medicine.  Will admit for further evaluation treatment of brainstem stroke.  Awaiting bed assignment. [RZ]      ED Course User Index  [RZ] Trina Chi NP     Critical Care    Date/Time: 10/17/2023 7:27 PM    Performed by: Trina Chi NP  Authorized by: Raul Huitron MD  Direct patient critical care time: 15 minutes  Additional history critical care time: 10 minutes  Ordering / reviewing critical care time: 10 minutes  Documentation critical care time: 10 minutes  Consulting other physicians critical care time: 5 minutes  Consult with family critical care time: 10 minutes  Total critical care time (exclusive of procedural time) : 60 minutes  Critical care time was exclusive of separately billable procedures and treating other patients and teaching time.  Critical care was necessary to treat or prevent imminent or life-threatening deterioration of the following conditions: CNS failure or compromise (stroke).  Critical care was time spent personally by me on the following activities: development of treatment plan with patient or surrogate, discussions with consultants, interpretation of cardiac output measurements, evaluation of patient's response to treatment, examination of patient, obtaining history from patient or surrogate, ordering and performing treatments and interventions, ordering and review of laboratory studies, ordering and review of radiographic studies, pulse oximetry,  re-evaluation of patient's condition and review of old charts.                CLINICAL IMPRESSION  1. Stroke    2. Diplopia    3. Closed odontoid fracture, sequela    4. Nonintractable headache, unspecified chronicity pattern, unspecified headache type    5. Other cerebral infarction         ED Disposition Condition    Admit Stable        This note was created using dictation software.  This program may occasionally mistype words and phrases.         Trina Chi, MELISSA  10/17/23 1937

## 2023-10-17 NOTE — HPI
89 yo female w/ PMHx of CKD, DM II, HLD who presents w/ multiple complaints.   Double vision and dizziness and issues w/ balance  - 11 AM - occurred when she stood up.   Never happened before.   States that dizziness gets worse with moved.   Daughter noted dysarthria this AM , which is new as well, but happened previously when she was dehydrated.   Deny any new weakness.   Not on any blood thinners at home due to multiple falls in the past

## 2023-10-17 NOTE — CONSULTS
[facility]  Vascular Neurology  Comprehensive Stroke Center  TeleVascular Neurology Acute Consultation Note      Consult to Telemedicine - Acute Stroke  Consult performed by: Ariana Whittington MD  Consult ordered by: Trina Chi NP          Consulting Provider: ROMULO IBANEZ JR  Current Providers  No providers found    Patient Location:  [facility] EMERGENCY DEPARTMENT Emergency Department  Spoke hospital nurse at bedside with patient assisting consultant.     Patient information was obtained from patient and relative(s).         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   Double vision and dizziness and issues w/ balance  - 11 AM - occurred when she stood up.   Never happened before.   States that dizziness gets worse with moved.   Daughter noted dysarthria this AM , which is new as well, but happened previously when she was dehydrated.   Deny any new weakness.   Not on any blood thinners at home due to multiple falls in the past    NIHSS3. Suspect posterior circulation infarct. OOW for TNK based on LKW.     CTH w/o acute abnormalities, CTA w/o LVO .     Recommend admission for completion of stoke w/u.     Recommendations:  MRI brain to evaluate for presence and/or extent of ischemic injury  Allow for permissive HTNsion up to 220/110 until AIS is r/o w/ imaging   Lipid profile, A1c, TSH  ECHO w/o bubble study  PT/OT/SLP eval and Rx  IVF to allow for maximum cerebral perfusion  HOB flat   Load aspirin 325 mg & clopidogrel 300 mg x 1 now, followed by daily aspirin 81 mg /  clopidogrel 75 mg x 30 days followed by monotherapy thereafter  High intensity statin for LDL goal <70 long term     Discussed recommendations w Dr. Huitron via [employer] Chat              STROKE DOCUMENTATION     Acute Stroke Times:   Acute Stroke Times   Last Known Normal  Date: 10/17/23  Last Known Normal Time: 1100  Symptom Onset Date: 10/17/23  Symptom Onset Time: 1100  Stroke Team Called Date: 10/17/23  Stroke Team Called Time: 1745  Stroke Team Arrival Date: 10/17/23  Stroke Team Arrival Time: 1755  CT Interpretation Time: 1810  Thrombolytic Therapy Recommended: No  CTA Interpretation Time: 1810  Thrombectomy Recommended: No    NIH Scale:  1a. Level of Consciousness: 0-->Alert, keenly responsive  1b. LOC Questions: 0-->Answers both questions correctly  1c. LOC Commands: 0-->Performs both tasks correctly  2. Best Gaze: 1-->Partial gaze palsy, gaze is abnormal in one or both eyes, but forced deviation or total gaze paresis is not present  3. Visual: 0-->No visual loss  4. Facial Palsy: 1-->Minor paralysis (flattened nasolabial fold, asymmetry on smiling)  5a. Motor Arm, Left: 0-->No drift, limb holds 90 (or 45) degrees for full 10 secs  5b. Motor Arm, Right: 0-->No drift, limb holds 90 (or 45) degrees for full 10 secs  6a. Motor Leg, Left: 0-->No drift, leg holds 30 degree position for full 5 secs  6b. Motor Leg, Right: 0-->No drift, leg holds 30 degree position for full 5 secs  7. Limb Ataxia: 0-->Absent  8. Sensory: 0-->Normal, no sensory loss  9. Best Language: 0-->No aphasia, normal  10. Dysarthria: 1-->Mild-to-moderate dysarthria, patient slurs at least some words and, at worst, can be understood with some difficulty  11. Extinction and Inattention (formerly Neglect): 0-->No abnormality  Total (NIH Stroke Scale): 3     Modified Gwen Score: 3  [place] Coma Scale:    ABCD2 Score:    [id number] Score:   HAS -BLED Score:   ICH Score:   Hunt & Escobar Classification:       Blood pressure (!) 190/91, pulse 72, temperature 97.5 °F (36.4 °C), resp. rate 20, weight 61.7 kg (136 lb), SpO2 99 %.  Eligible for thrombolytic therapy?: No  Thrombolytic therapy recomended: Thrombolytic therapy not recommended due to Outside of treatment window   Possible Interventional Revascularization  Candidate? No; No large vessel occlusion identified on imaging     Disposition Recommendation: admit to inpatient    Subjective:     History of Present Illness:  87 yo female w/ PMHx of CKD, DM II, HLD who presents w/ multiple complaints.   Double vision and dizziness and issues w/ balance  - 11 AM - occurred when she stood up.   Never happened before.   States that dizziness gets worse with moved.   Daughter noted dysarthria this AM , which is new as well, but happened previously when she was dehydrated.   Deny any new weakness.   Not on any blood thinners at home due to multiple falls in the past      Woke up with symptoms?: no      Allergies: Cephalexin  Codeine  Meperidine  Nsaids (Non-Steroidal Anti-Inflammatory Drug)  Penicillins  Tazarotene No relevant allergies    Review of Systems   Constitutional:  Positive for fatigue.   HENT:  Positive for voice change. Negative for trouble swallowing.    Eyes:  Positive for visual disturbance.   Respiratory:  Negative for shortness of breath.    Gastrointestinal:  Positive for nausea. Negative for vomiting.   Endocrine: Negative.    Genitourinary: Negative.    Musculoskeletal:  Positive for gait problem.   Skin: Negative.    Neurological:  Positive for dizziness, facial asymmetry, speech difficulty and numbness. Negative for weakness and headaches.   Psychiatric/Behavioral: Negative.       Objective:   Vitals: Blood pressure (!) 190/91, pulse 72, temperature 97.5 °F (36.4 °C), resp. rate 20, weight 61.7 kg (136 lb), SpO2 99 %.     CT READ: Yes  No hemmorhage. No mass effect. No early infarct signs.     Physical Exam  Constitutional:       Appearance: She is ill-appearing.   HENT:      Head: Atraumatic.      Comments: Mild  RFD  Eyes:      Comments: L CN III palsy    Cardiovascular:      Rate and Rhythm: Normal rate.   Pulmonary:      Effort: Pulmonary effort is normal.   Neurological:      Mental Status: She is alert and oriented to person, place, and time.      Cranial  Nerves: Cranial nerve deficit present.      Sensory: Sensory deficit present.      Motor: No weakness.      Comments: Multiple abrasions noted on legs               Recommended the emergency room physician to have a brief discussion with the patient and/or family if available regarding the  risks and benefits of treatment, and to briefly document the occurrence of that discussion in his clinical encounter note.     The attending portion of this evaluation, treatment, and documentation was performed per Ariana Whittington MD via audiovisual.    Billing code:  (non-intervention mild to moderate stroke, TIA, some mimics)      This patient has a critical neurological condition/illness, with some potential for high morbidity and mortality.  There is a moderate probability for acute neurological change leading to clinical and possibly life-threatening deterioration requiring highest level of physician preparedness for urgent intervention.  Care was coordinated with other physicians involved in the patient's care.  Radiologic studies and laboratory data were reviewed and interpreted, and plan of care was re-assessed based on the results.  Diagnosis, treatment options and prognosis may have been discussed with the patient and/or family members or caregiver.    In your opinion, this was a: Tier 2 Van Negative    Consult End Time: 6:36 PM     Ariana Whittington MD  Comprehensive Stroke Center  Vascular Neurology   [facility]

## 2023-10-18 LAB
ALBUMIN SERPL BCP-MCNC: 3.2 G/DL (ref 3.5–5.2)
ALP SERPL-CCNC: 139 U/L (ref 55–135)
ALT SERPL W/O P-5'-P-CCNC: 11 U/L (ref 10–44)
ANION GAP SERPL CALC-SCNC: 13 MMOL/L (ref 8–16)
AST SERPL-CCNC: 23 U/L (ref 10–40)
AV INDEX (PROSTH): 0.62
AV MEAN GRADIENT: 4 MMHG
AV PEAK GRADIENT: 9 MMHG
AV VALVE AREA BY VELOCITY RATIO: 1.77 CM²
AV VALVE AREA: 1.8 CM²
AV VELOCITY RATIO: 0.61
BASOPHILS # BLD AUTO: 0.08 K/UL (ref 0–0.2)
BASOPHILS NFR BLD: 1.4 % (ref 0–1.9)
BILIRUB SERPL-MCNC: 0.3 MG/DL (ref 0.1–1)
BSA FOR ECHO PROCEDURE: 1.63 M2
BUN SERPL-MCNC: 25 MG/DL (ref 8–23)
CALCIUM SERPL-MCNC: 9.1 MG/DL (ref 8.7–10.5)
CHLORIDE SERPL-SCNC: 102 MMOL/L (ref 95–110)
CO2 SERPL-SCNC: 28 MMOL/L (ref 23–29)
CREAT SERPL-MCNC: 1.1 MG/DL (ref 0.5–1.4)
CV ECHO LV RWT: 0.35 CM
DIFFERENTIAL METHOD: ABNORMAL
DOP CALC AO PEAK VEL: 1.47 M/S
DOP CALC AO VTI: 36.7 CM
DOP CALC LVOT AREA: 2.9 CM2
DOP CALC LVOT DIAMETER: 1.93 CM
DOP CALC LVOT PEAK VEL: 0.89 M/S
DOP CALC LVOT STROKE VOLUME: 66.08 CM3
DOP CALC MV VTI: 35.9 CM
DOP CALCLVOT PEAK VEL VTI: 22.6 CM
E WAVE DECELERATION TIME: 235.4 MSEC
E/A RATIO: 0.59
E/E' RATIO: 25.75 M/S
ECHO LV POSTERIOR WALL: 0.84 CM (ref 0.6–1.1)
EOSINOPHIL # BLD AUTO: 0.1 K/UL (ref 0–0.5)
EOSINOPHIL NFR BLD: 1 % (ref 0–8)
ERYTHROCYTE [DISTWIDTH] IN BLOOD BY AUTOMATED COUNT: 12.2 % (ref 11.5–14.5)
EST. GFR  (NO RACE VARIABLE): 48 ML/MIN/1.73 M^2
FERRITIN SERPL-MCNC: 34 NG/ML (ref 20–300)
FOLATE SERPL-MCNC: 15.1 NG/ML (ref 4–24)
FRACTIONAL SHORTENING: 33 % (ref 28–44)
GLUCOSE SERPL-MCNC: 99 MG/DL (ref 70–110)
HCT VFR BLD AUTO: 33.3 % (ref 37–48.5)
HGB BLD-MCNC: 10.8 G/DL (ref 12–16)
IMM GRANULOCYTES # BLD AUTO: 0.01 K/UL (ref 0–0.04)
IMM GRANULOCYTES NFR BLD AUTO: 0.2 % (ref 0–0.5)
INTERVENTRICULAR SEPTUM: 0.91 CM (ref 0.6–1.1)
IRON SERPL-MCNC: 85 UG/DL (ref 30–160)
IVC DIAMETER: 1.56 CM
LA MAJOR: 4.78 CM
LA MINOR: 5.1 CM
LA WIDTH: 4.2 CM
LEFT ATRIUM SIZE: 3.52 CM
LEFT ATRIUM VOLUME INDEX MOD: 26.7 ML/M2
LEFT ATRIUM VOLUME INDEX: 38 ML/M2
LEFT ATRIUM VOLUME MOD: 43.53 CM3
LEFT ATRIUM VOLUME: 62.01 CM3
LEFT INTERNAL DIMENSION IN SYSTOLE: 3.23 CM (ref 2.1–4)
LEFT VENTRICLE DIASTOLIC VOLUME INDEX: 66.06 ML/M2
LEFT VENTRICLE DIASTOLIC VOLUME: 107.68 ML
LEFT VENTRICLE MASS INDEX: 87 G/M2
LEFT VENTRICLE SYSTOLIC VOLUME INDEX: 25.6 ML/M2
LEFT VENTRICLE SYSTOLIC VOLUME: 41.74 ML
LEFT VENTRICULAR INTERNAL DIMENSION IN DIASTOLE: 4.8 CM (ref 3.5–6)
LEFT VENTRICULAR MASS: 142.39 G
LV LATERAL E/E' RATIO: 25.75 M/S
LV SEPTAL E/E' RATIO: 25.75 M/S
LVOT MG: 1.58 MMHG
LVOT MV: 0.59 CM/S
LYMPHOCYTES # BLD AUTO: 1.5 K/UL (ref 1–4.8)
LYMPHOCYTES NFR BLD: 26.7 % (ref 18–48)
MAGNESIUM SERPL-MCNC: 1.9 MG/DL (ref 1.6–2.6)
MCH RBC QN AUTO: 29.2 PG (ref 27–31)
MCHC RBC AUTO-ENTMCNC: 32.4 G/DL (ref 32–36)
MCV RBC AUTO: 90 FL (ref 82–98)
MONOCYTES # BLD AUTO: 0.6 K/UL (ref 0.3–1)
MONOCYTES NFR BLD: 11.2 % (ref 4–15)
MV A" WAVE DURATION": 159.85 MSEC
MV MEAN GRADIENT: 4 MMHG
MV PEAK A VEL: 1.75 M/S
MV PEAK E VEL: 1.03 M/S
MV PEAK GRADIENT: 12 MMHG
MV STENOSIS PRESSURE HALF TIME: 68.26 MS
MV VALVE AREA BY CONTINUITY EQUATION: 1.84 CM2
MV VALVE AREA P 1/2 METHOD: 3.22 CM2
NEUTROPHILS # BLD AUTO: 3.4 K/UL (ref 1.8–7.7)
NEUTROPHILS NFR BLD: 59.5 % (ref 38–73)
NRBC BLD-RTO: 0 /100 WBC
OHS LV EJECTION FRACTION SIMPSONS BIPLANE MOD: 61 %
PHOSPHATE SERPL-MCNC: 3.5 MG/DL (ref 2.7–4.5)
PISA TR MAX VEL: 2.98 M/S
PLATELET # BLD AUTO: 328 K/UL (ref 150–450)
PMV BLD AUTO: 9.9 FL (ref 9.2–12.9)
POCT GLUCOSE: 113 MG/DL (ref 70–110)
POTASSIUM SERPL-SCNC: 3.6 MMOL/L (ref 3.5–5.1)
PROT SERPL-MCNC: 6.3 G/DL (ref 6–8.4)
PULM VEIN S/D RATIO: 1.88
PV MV: 0.78 M/S
PV PEAK D VEL: 0.25 M/S
PV PEAK GRADIENT: 4 MMHG
PV PEAK S VEL: 0.47 M/S
PV PEAK VELOCITY: 0.95 M/S
RA MAJOR: 4.15 CM
RA PRESSURE ESTIMATED: 3 MMHG
RA WIDTH: 2.79 CM
RBC # BLD AUTO: 3.7 M/UL (ref 4–5.4)
RIGHT VENTRICULAR END-DIASTOLIC DIMENSION: 3.78 CM
RV TB RVSP: 6 MMHG
RV TISSUE DOPPLER FREE WALL SYSTOLIC VELOCITY 1 (APICAL 4 CHAMBER VIEW): 15.98 CM/S
SATURATED IRON: 20 % (ref 20–50)
SINUS: 3.29 CM
SODIUM SERPL-SCNC: 143 MMOL/L (ref 136–145)
STJ: 3.07 CM
TDI LATERAL: 0.04 M/S
TDI SEPTAL: 0.04 M/S
TDI: 0.04 M/S
TOTAL IRON BINDING CAPACITY: 428 UG/DL (ref 250–450)
TR MAX PG: 36 MMHG
TRANSFERRIN SERPL-MCNC: 289 MG/DL (ref 200–375)
TRICUSPID ANNULAR PLANE SYSTOLIC EXCURSION: 1.84 CM
TV REST PULMONARY ARTERY PRESSURE: 39 MMHG
VIT B12 SERPL-MCNC: 691 PG/ML (ref 210–950)
WBC # BLD AUTO: 5.72 K/UL (ref 3.9–12.7)
Z-SCORE OF LEFT VENTRICULAR DIMENSION IN END DIASTOLE: 0.43
Z-SCORE OF LEFT VENTRICULAR DIMENSION IN END SYSTOLE: 0.99

## 2023-10-18 PROCEDURE — 99900035 HC TECH TIME PER 15 MIN (STAT)

## 2023-10-18 PROCEDURE — 83540 ASSAY OF IRON: CPT | Performed by: STUDENT IN AN ORGANIZED HEALTH CARE EDUCATION/TRAINING PROGRAM

## 2023-10-18 PROCEDURE — 97530 THERAPEUTIC ACTIVITIES: CPT

## 2023-10-18 PROCEDURE — 25000003 PHARM REV CODE 250: Performed by: STUDENT IN AN ORGANIZED HEALTH CARE EDUCATION/TRAINING PROGRAM

## 2023-10-18 PROCEDURE — 80053 COMPREHEN METABOLIC PANEL: CPT | Performed by: STUDENT IN AN ORGANIZED HEALTH CARE EDUCATION/TRAINING PROGRAM

## 2023-10-18 PROCEDURE — 97163 PT EVAL HIGH COMPLEX 45 MIN: CPT

## 2023-10-18 PROCEDURE — 82746 ASSAY OF FOLIC ACID SERUM: CPT | Performed by: STUDENT IN AN ORGANIZED HEALTH CARE EDUCATION/TRAINING PROGRAM

## 2023-10-18 PROCEDURE — 85025 COMPLETE CBC W/AUTO DIFF WBC: CPT | Performed by: STUDENT IN AN ORGANIZED HEALTH CARE EDUCATION/TRAINING PROGRAM

## 2023-10-18 PROCEDURE — 83735 ASSAY OF MAGNESIUM: CPT | Performed by: STUDENT IN AN ORGANIZED HEALTH CARE EDUCATION/TRAINING PROGRAM

## 2023-10-18 PROCEDURE — 84466 ASSAY OF TRANSFERRIN: CPT | Performed by: STUDENT IN AN ORGANIZED HEALTH CARE EDUCATION/TRAINING PROGRAM

## 2023-10-18 PROCEDURE — 97535 SELF CARE MNGMENT TRAINING: CPT

## 2023-10-18 PROCEDURE — 97116 GAIT TRAINING THERAPY: CPT

## 2023-10-18 PROCEDURE — 36415 COLL VENOUS BLD VENIPUNCTURE: CPT | Performed by: STUDENT IN AN ORGANIZED HEALTH CARE EDUCATION/TRAINING PROGRAM

## 2023-10-18 PROCEDURE — 94761 N-INVAS EAR/PLS OXIMETRY MLT: CPT

## 2023-10-18 PROCEDURE — 97112 NEUROMUSCULAR REEDUCATION: CPT

## 2023-10-18 PROCEDURE — 63600175 PHARM REV CODE 636 W HCPCS: Performed by: STUDENT IN AN ORGANIZED HEALTH CARE EDUCATION/TRAINING PROGRAM

## 2023-10-18 PROCEDURE — 82607 VITAMIN B-12: CPT | Performed by: STUDENT IN AN ORGANIZED HEALTH CARE EDUCATION/TRAINING PROGRAM

## 2023-10-18 PROCEDURE — 92610 EVALUATE SWALLOWING FUNCTION: CPT

## 2023-10-18 PROCEDURE — 99222 PR INITIAL HOSPITAL CARE,LEVL II: ICD-10-PCS | Mod: ,,, | Performed by: PSYCHIATRY & NEUROLOGY

## 2023-10-18 PROCEDURE — 97165 OT EVAL LOW COMPLEX 30 MIN: CPT

## 2023-10-18 PROCEDURE — 11000001 HC ACUTE MED/SURG PRIVATE ROOM

## 2023-10-18 PROCEDURE — 84100 ASSAY OF PHOSPHORUS: CPT | Performed by: STUDENT IN AN ORGANIZED HEALTH CARE EDUCATION/TRAINING PROGRAM

## 2023-10-18 PROCEDURE — 82728 ASSAY OF FERRITIN: CPT | Performed by: STUDENT IN AN ORGANIZED HEALTH CARE EDUCATION/TRAINING PROGRAM

## 2023-10-18 PROCEDURE — 99222 1ST HOSP IP/OBS MODERATE 55: CPT | Mod: ,,, | Performed by: PSYCHIATRY & NEUROLOGY

## 2023-10-18 RX ORDER — LISINOPRIL 5 MG/1
5 TABLET ORAL DAILY
Qty: 90 TABLET | Refills: 3 | Status: SHIPPED | OUTPATIENT
Start: 2023-10-18 | End: 2023-10-18 | Stop reason: HOSPADM

## 2023-10-18 RX ORDER — POTASSIUM CHLORIDE 7.45 MG/ML
10 INJECTION INTRAVENOUS
Status: COMPLETED | OUTPATIENT
Start: 2023-10-18 | End: 2023-10-18

## 2023-10-18 RX ORDER — NAPROXEN SODIUM 220 MG/1
81 TABLET, FILM COATED ORAL DAILY
Qty: 90 TABLET | Refills: 3 | Status: SHIPPED | OUTPATIENT
Start: 2023-10-18 | End: 2024-10-17

## 2023-10-18 RX ORDER — ATORVASTATIN CALCIUM 80 MG/1
80 TABLET, FILM COATED ORAL DAILY
Qty: 90 TABLET | Refills: 3 | Status: SHIPPED | OUTPATIENT
Start: 2023-10-19 | End: 2023-11-15 | Stop reason: SDUPTHER

## 2023-10-18 RX ORDER — MAGNESIUM SULFATE HEPTAHYDRATE 40 MG/ML
2 INJECTION, SOLUTION INTRAVENOUS ONCE
Status: COMPLETED | OUTPATIENT
Start: 2023-10-18 | End: 2023-10-18

## 2023-10-18 RX ORDER — CLOPIDOGREL BISULFATE 75 MG/1
75 TABLET ORAL DAILY
Qty: 20 TABLET | Refills: 0 | Status: SHIPPED | OUTPATIENT
Start: 2023-10-18 | End: 2023-11-15 | Stop reason: SDUPTHER

## 2023-10-18 RX ORDER — ATORVASTATIN CALCIUM 40 MG/1
80 TABLET, FILM COATED ORAL DAILY
Status: DISCONTINUED | OUTPATIENT
Start: 2023-10-19 | End: 2023-10-23 | Stop reason: HOSPADM

## 2023-10-18 RX ADMIN — PREGABALIN 50 MG: 50 CAPSULE ORAL at 08:10

## 2023-10-18 RX ADMIN — POTASSIUM CHLORIDE 10 MEQ: 7.46 INJECTION, SOLUTION INTRAVENOUS at 12:10

## 2023-10-18 RX ADMIN — CLOPIDOGREL BISULFATE 75 MG: 75 TABLET ORAL at 12:10

## 2023-10-18 RX ADMIN — ASPIRIN 81 MG CHEWABLE TABLET 81 MG: 81 TABLET CHEWABLE at 12:10

## 2023-10-18 RX ADMIN — FUROSEMIDE 20 MG: 20 TABLET ORAL at 12:10

## 2023-10-18 RX ADMIN — POTASSIUM CHLORIDE 10 MEQ: 7.46 INJECTION, SOLUTION INTRAVENOUS at 02:10

## 2023-10-18 RX ADMIN — POTASSIUM CHLORIDE 10 MEQ: 7.46 INJECTION, SOLUTION INTRAVENOUS at 03:10

## 2023-10-18 RX ADMIN — MAGNESIUM SULFATE HEPTAHYDRATE 2 G: 40 INJECTION, SOLUTION INTRAVENOUS at 12:10

## 2023-10-18 RX ADMIN — ENOXAPARIN SODIUM 40 MG: 40 INJECTION SUBCUTANEOUS at 05:10

## 2023-10-18 NOTE — CONSULTS
NEUROLOGY FLOOR CONSULT    Reason for consult: concern for stroke    Informant: patient and chart       CC: double vision    HPI:    Bridget Montgomery is a 88 y.o. year old female who presented to the ER after having double vision for 1 day.   Patient indicates in the morning of the 17th she woke up around 3am in her normal health with no concerns.  She went back to sleep, and awoke again around 11am and noticed she had double vision.  She went to the bathroom and noticed her gait was unsteady, and the double vision persisted.  Her family cam by around 1pm and also noticed that she had slurred speech and was unstable in her gait.  The daughter also noticed that her left eye would not look to the right.  This was all new and concerning that the patient may be having a stroke or acute angle closure glaucoma, per daughter and ultimately brought her to the hospital.     Patient has a past medical history of chronic obstructive pulmonary disease, hypertension, arthritis, hyperlipidemia, gastroesophageal reflux disease, diabetes, chronic pain secondary to den's fracture with surgery scheduled November, chronic kidney disease stage 4, and deep vein thrombosis.    Since admission, the patient had a CT head that was unremarkable.  A CT angiogram was unremarkable.  Admission [facility] was 3.  Patient was ultimately loaded with 325mg aspirin and 300mg plavix once.  Patient admitted for further work up concerning for stroke.  Laboratory work up revealed a LDL of 157.    During the examination this morning, the patient did not have a 3rd nerve palsy, and was only complaining of double vision with both eyes open, that only occurred when looking to the lateral gaze in either direction.  This improved with one eye closure with either eye.      ROS: is as above    Histories:     Allergies:  Cephalexin, Codeine, Meperidine, Nsaids (non-steroidal anti-inflammatory drug), Penicillins, and Tazarotene    Current Medications:    Current  Facility-Administered Medications   Medication Dose Route Frequency Provider Last Rate Last Admin    acetaminophen tablet 650 mg  650 mg Oral Q6H PRN Ben Miranda MD        acetaminophen tablet 650 mg  650 mg Oral Once Lucía Santiago MD        aspirin chewable tablet 81 mg  81 mg Oral Daily Ben Miranda MD        atorvastatin tablet 40 mg  40 mg Oral Daily Ben Miranda MD        clopidogreL tablet 75 mg  75 mg Oral Daily Ben Miranda MD        enoxaparin injection 40 mg  40 mg Subcutaneous Daily Ben Miranda MD   40 mg at 10/17/23 2259    furosemide tablet 20 mg  20 mg Oral Daily Ben Miranda MD        labetaloL injection 10 mg  10 mg Intravenous Q15 Min PRN Ben Miranda MD        pregabalin capsule 50 mg  50 mg Oral QHS Ben Miranda MD   50 mg at 10/17/23 2258    sodium chloride 0.9% flush 10 mL  10 mL Intravenous PRN Ben Miranda MD         Past Medical/Surgical/Family History:  Medical:   Past Medical History:   Diagnosis Date    Allergy     Anemia, unspecified     Arthritis     CKD (chronic kidney disease) stage 4, GFR 15-29 ml/min     COPD (chronic obstructive pulmonary disease)     Edema     HTN (hypertension)     Hypocalcemia     Hyponatremia     Osteoarthritis       Surgeries:   Past Surgical History:   Procedure Laterality Date    BREAST CYST EXCISION      CAUDAL EPIDURAL STEROID INJECTION N/A 2/2/2023    Procedure: Injection-steroid-epidural-caudal;  Surgeon: Angel Sparrow MD;  Location: [facility];  Service: Pain Management;  Laterality: N/A;    FOOT SURGERY        Family:   Family History   Problem Relation Age of Onset    Cancer Mother     No Known Problems Father      Social History:  Substance Abuse/Dependence History:  Tobacco: denied  EtOH: none  Ilicits: none  Occupational/Employment History:  Occupation: retired    Current Evaluation:   Vital Signs:   Vitals:    10/18/23 0436   BP: (!) 159/75   Pulse: 60   Resp: 20   Temp: 97.5 °F (36.4 °C)      ORIENTATION: Within Normal  Limits  MEMORY: recent and remote memory intact  LANGUAGE: 0=No aphasia, normal (per daughter and patient this is her normal speech)  CRANIAL NERVES:  Normal  Describes binocular diplopia with end lateral gaze, this resolves with either eye closure.  The diplopia is described as objects beside each other slightly overlapping.  Patient describes blurred vision to the left eye that goes away with both eyes open.  Ocular movements are intact and normal throughout all movements.    MOTOR:  4+/5 to the left   4/5 to the left hip flexion and extension (when compared to the right)  Left knee flexion and extension 4+/5  5/5 throughout the rest of muscle groups tested  Tone: normal  DTR'S:  2+ bilaterally  SENSORY:  Dull sensory to the left lower extremity  Intact sensory and vibratory to rest of extremities  CEREBELLAR/GAIT:  Finger to nose: normal  Heel to shin: normal  Gait: deferred    NIH Stroke Scale  1a. Level of consciousness 0=alert; keenly responsive   1b. LOC questions 0=Answers both tasks correctly   1c. LOC commands 0=Answers both tasks correctly   2. Best gaze 0=normal   3. Visual 0=No visual loss   4.  Facial palsy 0=Normal symmetric movement   5.  Motor left arm 0=No drift, limb holds 90 (or 45) degrees for full 10 seconds   5b. Motor right arm 0=No drift, limb holds 90 (or 45) degrees for full 10 seconds   6a. Motor left leg 0=No drift, limb holds 90 (or 45) degrees for full 10 seconds   6b. Motor right leg 0=No drift, limb holds 90 (or 45) degrees for full 10 seconds   7. Limb ataxia 0=Absent   8. Sensory 1=Mild to moderate sensory loss; patient feels pinprick is less sharp or is dull on the affected side; there is a loss of superficial pain with pinprick but patient is aware She is being touched   9. Best language 0=No aphasia, normal   10. Dysarthria 0=Normal   11.  Extinction and inattention 0=No abnormality                                TOTAL: 1     Modified [place] Score: 2    LABORATORY STUDIES:         Component Ref Range & Units 03:22  (10/18/23) 1 d ago  (10/17/23)   WBC 3.90 - 12.70 K/uL 5.72 5.06   RBC 4.00 - 5.40 M/uL 3.70 Low  3.68 Low    Hemoglobin 12.0 - 16.0 g/dL 10.8 Low  11.2 Low    Hematocrit 37.0 - 48.5 % 33.3 Low  33.2 Low    MCV 82 - 98 fL 90 90   MCH 27.0 - 31.0 pg 29.2 30.4   MCHC 32.0 - 36.0 g/dL 32.4 33.7   RDW 11.5 - 14.5 % 12.2 12.3   Platelets 150 - 450 K/uL 328              Component Ref Range & Units 03:22  (10/18/23)   Sodium 136 - 145 mmol/L 143   Potassium 3.5 - 5.1 mmol/L 3.6   Chloride 95 - 110 mmol/L 102   CO2 23 - 29 mmol/L 28   Glucose 70 - 110 mg/dL 99   BUN 8 - 23 mg/dL 25 High    Creatinine 0.5 - 1.4 mg/dL 1.1   Calcium 8.7 - 10.5 mg/dL 9.1   Total Protein 6.0 - 8.4 g/dL 6.3   Albumin 3.5 - 5.2 g/dL 3.2 Low    Total Bilirubin 0.1 - 1.0 mg/dL 0.3   Alkaline Phosphatase 55 - 135 U/L 139 High    AST 10 - 40 U/L 23   ALT 10 - 44 U/L 11   eGFR >60 mL/min/1.73 m^2 48 Abnormal    Anion Gap 8 - 16 mmol/L 13        Thyroid: 1.483  HgA1C%: 5.8  LDL: 157    RADIOLOGY STUDIES:  CT head and angiogram head and neck with contrast 10/17/2023  - head was unremarkable  - no large vessel occlusion, no flow limiting stenosis, unremarkable    MRI brain without contrast 10/18/2023  Impression:   Equivocal punctate focus of restricted diffusion in the left paramedian midbrain without convincing signal abnormality on additional sequences may be artifactual, however a tiny focus of recent ischemia difficult to exclude        Assessment and Plan:     88 year old female patient who presented with symptoms of diplopia, gait instability, slurred speech, and 3rd nerve palsy to the left.  She did not receive TNK due to timing being outside the window.  CT head unremarkable.  CT angiogram unremarkable.  Work up otherwise unremarkable.  Less concerning at this time for acute stroke due to the fact she has a binocular diplopia that resolves with either eye closure.      Treatment Plan:  - MRI brain without  contrast, with questionable hyperintensity to the midbrain, likely artifactual, however empirically treating for TIA versus stroke is warranted    - no correlation found with any other sequences  - TTE with bubble study, pending  - start aspirin 81mg once daily and plavix 75mg once daily for 21 days   - stop plavix after the 21 days  - increase the atorvastatin to 80mg once daily   - goal LDL less than 70  - therapies  - recommend referral Ophthalmology for further evaluation     Differential diagnosis was explained to the patient. All questions were answered. Patient understood and agreed to adhere to plan.     Case discussed with Dr. Stacie Conklin MD  [facility], PGY-3

## 2023-10-18 NOTE — MEDICAL/APP STUDENT
"[facility] Medicine Progress Note    Primary Team: [facility] Hospitalist Team B  Attending Physician: Herve Daniel MD  Resident: Dr. Miranda  Intern: Dr. Colon    Subjective:      Patient had no acute overnight events and states that she still has residual blurry vision, which she describes as "haziness" from left eye. She states that her double vision has improved; however, she still experiences this when she look laterally (Right>>Left). She reports that She denies any nausea, vomiting, chest pain, or head ache.     Objective:     Last 24 Hour Vital Signs:  BP  Min: 159/75  Max: 190/91  Temp  Av.4 °F (36.3 °C)  Min: 97.1 °F (36.2 °C)  Max: 97.5 °F (36.4 °C)  Pulse  Av.1  Min: 57  Max: 86  Resp  Av.1  Min: 16  Max: 20  SpO2  Av %  Min: 95 %  Max: 99 %  Height  Av' 4" (162.6 cm)  Min: 5' 4" (162.6 cm)  Max: 5' 4" (162.6 cm)  Weight  Av.5 kg (133 lb 4.8 oz)  Min: 59.2 kg (130 lb 9.6 oz)  Max: 61.7 kg (136 lb)  I/O last 3 completed shifts:  In: 708 [P.O.:708]  Out: 1000 [Urine:1000]    Physical Examination:  Physical Exam  Vitals reviewed.   Constitutional:       Appearance: Normal appearance.   HENT:      Head: Normocephalic.      Nose: Nose normal.      Mouth/Throat:      Mouth: Mucous membranes are moist.   Eyes:      General:         Right eye: No discharge.         Left eye: No discharge.      Comments: Pupils reactive to direct light; however, there is no constriction in the contralateral eye on my exam; able to follow my finger in all 4 quadrants; however, reported double vision when looking towards the right side;   Neck:      Comments: C-spine collar in place for Dens fracture  Cardiovascular:      Rate and Rhythm: Normal rate and regular rhythm.      Pulses: Normal pulses.      Heart sounds: Normal heart sounds.   Pulmonary:      Effort: Pulmonary effort is normal.      Breath sounds: Normal breath sounds.   Abdominal:      General: Abdomen is flat.      Palpations: Abdomen is " soft.   Musculoskeletal:         General: Normal range of motion.      Comments: 5/5 motor strength in bilateral extremities; sensation intact in bilateral extremities   Skin:     General: Skin is warm and dry.   Neurological:      Mental Status: She is alert and oriented to person, place, and time.      Sensory: No sensory deficit.      Deep Tendon Reflexes: Reflexes normal.      Comments: Improved ocular movement from previous exam; reports double vision more so when gazing laterally to the right   Psychiatric:         Mood and Affect: Mood normal.         Behavior: Behavior normal.         Laboratory:  Recent Labs   Lab 10/17/23  1803 10/18/23  0322   WBC 5.06 5.72   HGB 11.2* 10.8*   HCT 33.2* 33.3*    328   MCV 90 90   RDW 12.3 12.2    143   K 3.1* 3.6    102   CO2 28 28   BUN 29* 25*   CREATININE 1.1  1.1 1.1   GLU 93 99   PROT 6.2 6.3   ALBUMIN 3.2* 3.2*   BILITOT 0.3 0.3   AST 21 23   ALKPHOS 135 139*   ALT 11 11     Laboratory Data Reviewed: Yes  Pertinent Findings:  K 3.6 from 3.1 yesterday    Microbiology Data Reviewed:  Pertinent Findings:  None    Other Results:  EKG (my interpretation): normal sinus rhythm    Radiology Data Reviewed:   Pertinent Findings:    MRI Brain Without Contrast 10/18/2023:  Impression:     Equivocal punctate focus of restricted diffusion in the left paramedian midbrain without convincing signal abnormality on additional sequences may be artifactual, however a tiny focus of recent ischemia difficult to exclude.    CTA Head and Neck 10/17/2023:  Impression:     No acute abnormality.     No high-grade stenosis or major vessel occlusion.     Stable changes in the spine and nodules within the thyroid gland.    Current Medications:     Infusions:       Scheduled:   acetaminophen  650 mg Oral Once    aspirin  81 mg Oral Daily    [START ON 10/19/2023] atorvastatin  80 mg Oral Daily    clopidogreL  75 mg Oral Daily    enoxparin  40 mg Subcutaneous Daily    furosemide   20 mg Oral Daily    magnesium sulfate IVPB  2 g Intravenous Once    potassium chloride  10 mEq Intravenous Q1H    pregabalin  50 mg Oral QHS        PRN:  acetaminophen, labetalol, sodium chloride 0.9%    Antibiotics and Day Number of Therapy:  None    Lines and Day Number of Therapy:  None    Assessment:     Bridget Montgomery is a 88 y.o.female with PMH of CKD4, COPD, HTN, prediabetes (A1c 6.1 3/2023), DVT (2022), and dens fracture (surgery in November) presented for diplopia onset 1 day. Patient states that her double vision is improving after receiving treatment with a load of ASA and plavix as per neurology recommendations. This was concerning for a stroke; however, CTA head and neck was negative for acute processes. MRI showed equivocal lesion in the left paramedian midbrain, which ischemic event would be difficult to exclude. Patient is clinically stable without any new deficits.     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  - 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  - NIHSS3 on presentation  - CTH without acute intracranial process  - CTA without large vessel occlusion  - MRI brain demonstrated equivocal punctate lesion in the left paramedian midbrain, which because of study is difficult to exclude ischemic process  - TTE with bubble study ordered  - Loaded with  and plavix 300; will continue 21 days of ASA 81 mg and Plavix 75 mg daily for 21 days and stop Plavix after 21 days  - Increase atorvastatin to 80mg daily  - Neurology consulted and following recommendations  - PT/OT/SLP consulted and recommend assistance because she is fall risk  - Recommend Opthalmology follow up outpatient     CKD 4:  - Follows with Dr. Lyons outpatient   - Avoid nephrotoxins as able   - Daily CMP  - Continue lasix 20 mg daily     HTN  - Continue home lasix 20mg daily  - Will hold home amlodipine for permissive hypertension     Spinal Stenosis  C1-C2 dens fracture  - Follows with neurosurgery  - Scheduled  for surgery on 11/10/23  - Continue lyrica 50mg nightly     Anemia of Chronic Disease  - Stable, continue to monitor   - Baseline Hb 10-11  - Normocytic anemia   - Most recent iron studies with ferritin 46, iron 71, TIBC 488 last month     Chronic Constipation  - Continue home miralax      Osteoarthritis  - Chronic pain to R shoulder, stable  - tylenol prn     H/o Type 2 Diabetes  - Most recent A1c <6  - Continue to monitor      Diet: Regular  DVT ppx: lovenox 40 mg  Code: Full    Dispo: She is clinically stable and received an MRI of head today; appreciate neurology and PT recommendations; will stay on floor for further management    Jaylen Heredia, MS4

## 2023-10-18 NOTE — ED NOTES
Spoke to daughter-Dora Kapadia who is requesting to speak to admit team regarding plan of care for the patient. Message sent to Dr. Miranda and Dr. Colon.

## 2023-10-18 NOTE — NURSING
Pt arrived to unit vis stretcher with ED RN and daughter at bedside. Pt pulled over to bed with soft collar on. Pt AAO, [employer] [place], answers questions appropriately.
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
## 2023-10-18 NOTE — PLAN OF CARE
Attempted to complete admission questions. Refusing at this time. Patient c/o pain. Bedside nurse aware. Will re-attempt at later time.

## 2023-10-18 NOTE — H&P
[facility] Medicine H&P Note     Admitting Team: [facility] Hospitalist Team B  Attending Physician: Herve Daniel MD  Resident: Ruben  Intern: Isaiah    Date of Admit: 10/17/2023    Chief Complaint     Diplopia x 1 day    Subjective:      History of Present Illness:  Bridget Montgomery is a 88 y.o. female with a PMH  of CKD4, COPD, HTN, prediabetes (A1c 6.1 3/2023), DVT (2022), and dens fracture who presents for diplopia x 1 day. Patient states when she woke up around 11:00 this morning she attempted to ambulate to the restroom and states she experienced double vision and issues with balance. Her family checked on her around 1:00pm and she was still in her chair with double vision. She had some slurred speech at that time per daughter. She was last known well around 3:00 am when she ambulated from her bed to her lounge chair without difficulty. Denies new weakness. This has never happened to her before.       Past Medical History:  CKDIV  COPD  HTN  OA  HLD  Prediabetes  DVT 2022  Psoriasis  Torn rotator cuff     Past Surgical History:  Past Surgical History:   Procedure Laterality Date    BREAST CYST EXCISION      CAUDAL EPIDURAL STEROID INJECTION N/A 2/2/2023    Procedure: Injection-steroid-epidural-caudal;  Surgeon: Angel Sparrow MD;  Location: [facility] [place];  Service: Pain Management;  Laterality: N/A;    FOOT SURGERY         Allergies:  Review of patient's allergies indicates:   Allergen Reactions    Cephalexin     Codeine     Meperidine      Other reaction(s): delerium, hallucination  Delerium  Delerium  Delerium      Nsaids (non-steroidal anti-inflammatory drug)     Penicillins     Tazarotene      Other reaction(s): rash, Unknown       Home Medications:  Prior to Admission medications    Medication Sig Start Date End Date Taking? Authorizing Provider   acetaminophen (TYLENOL) 500 MG tablet Take 500 mg by mouth every 6 (six) hours as needed for Pain.   Yes Provider, Historical   albuterol (PROVENTIL) 2.5  mg /3 mL (0.083 %) nebulizer solution Take 2.5 mg by nebulization 2 (two) times daily as needed. 1/24/18  Yes Provider, Historical   albuterol (PROVENTIL/VENTOLIN HFA) 90 mcg/actuation inhaler Inhale 2 puffs into the lungs every 6 (six) hours as needed for Wheezing. Rescue   Yes Provider, Historical   amLODIPine (NORVASC) 10 MG tablet Take 1 tablet (10 mg total) by mouth once daily. 6/23/23 6/22/24 Yes Phoebe Anderson MD   calcium carbonate (CALCIUM 500 ORAL) Take 500 mg by mouth once daily.   Yes Provider, Historical   cetirizine (ZYRTEC) 10 MG tablet Take 10 mg by mouth once daily.   Yes Provider, Historical   cholecalciferol, vitamin D3, (VITAMIN D3) 50 mcg (2,000 unit) Cap capsule Take 2,000 Units by mouth once daily.   Yes Provider, Historical   diclofenac sodium (VOLTAREN ARTHRITIS PAIN) 1 % Gel Apply 2 g topically once daily.  Patient taking differently: Apply 2 g topically as needed. 4/4/22  Yes Lluvia Rahman MD   fluticasone propionate (FLONASE) 50 mcg/actuation nasal spray INSTILL TWO SPRAYS INTO EACH NOSTRIL TWICE A DAY  Patient taking differently: 2 sprays by Each Nostril route 2 (two) times a day. 10/24/22  Yes Charles Peacock MD   furosemide (LASIX) 20 MG tablet Take 20 mg by mouth 2 (two) times a day. 3/30/23  Yes Provider, Historical   LIDOcaine (LIDODERM) 5 % Place 1 patch onto the skin once daily. Remove & Discard patch within 12 hours or as directed by MD  Patient taking differently: Place 1 patch onto the skin once daily. Remove & Discard patch within 12 hours or as directed by MD  As needed 7/20/23  Yes Harris Gutierrez Jr., MD   olopatadine (PATANOL) 0.1 % ophthalmic solution Place 1 drop into both eyes Daily. 4/6/20  Yes Provider, Historical   omega 3-dha-epa-fish oil 1,000 mg (120 mg-180 mg) Cap Take 1 capsule by mouth once daily.   Yes Provider, Historical   omeprazole (PRILOSEC) 20 MG capsule Take 20 mg by mouth once daily.   Yes Provider, Historical   polyethylene glycol 3350  (MIRALAX ORAL) Take 17 g by mouth once daily. As needed   Yes Provider, Historical   pregabalin (LYRICA) 50 MG capsule Take 1 capsule (50 mg total) by mouth every evening. 23 Yes Charles Peacock MD   psyllium 0.52 gram capsule Take 0.52 g by mouth daily as needed.   Yes Provider, Historical   selenium 200 mcg Cap Take 200 mcg by mouth Daily.   Yes Provider, Historical   SYMBICORT 80-4.5 mcg/actuation HFAA Inhale 2 puffs into the lungs 2 (two) times a day. 23 Yes Charles Peacock MD   traMADoL (ULTRAM) 50 mg tablet TAKE 1 TABLET BY MOUTH TWICE DAILY AS NEEDED FOR PAIN  Patient taking differently: Take 50 mg by mouth 2 (two) times daily as needed for Pain. 23  Yes Charles Peacock MD   vit A/vit C/vit E/zinc/copper (PRESERVISION AREDS ORAL) Take 1 capsule by mouth 2 (two) times a day.   Yes Provider, Historical   zinc sulfate (ZINC-15 ORAL) Take 15 mg by mouth once daily.   Yes Provider, Historical   collagenase (SANTYL) ointment Apply topically once daily. 5/18/23 10/17/23  Zaid Payne MD   mupirocin (BACTROBAN) 2 % ointment Apply topically once daily. 5/18/23 10/17/23  Zaid Payne MD   triamcinolone acetonide 0.1% (KENALOG) 0.1 % cream APPLY TO AFFECTED AREA TWICE A DAY  Patient taking differently: Apply topically 2 (two) times daily as needed. 3/29/22 10/17/23  Charles Peacock MD       Family History:  Mom and Dad had colon cancer     Social History:  History of light tobacco use - quit in 1966  Denies alcohol and drug use    Review of Systems:  Pertinent items are noted in HPI. All other systems are reviewed and are negative.    Health Maintaince :   Primary Care Physician: Charles Peacock     Immunizations:   TDap unsure   Flu: does not obtain anymore due to fear   Pna UTD     Cancer Screening:  Has obtained MMG and colonoscopy in the past     Objective:   Last 24 Hour Vital Signs:  BP  Min: 168/74  Max: 190/91  Temp  Av.5 °F (36.4 °C)  Min: 97.5 °F (36.4 °C)  Max: 97.5 °F  (36.4 °C)  Pulse  Av.3  Min: 65  Max: 77  Resp  Av  Min: 20  Max: 20  SpO2  Av.7 %  Min: 98 %  Max: 99 %  Weight  Av.7 kg (136 lb)  Min: 61.7 kg (136 lb)  Max: 61.7 kg (136 lb)  Body mass index is 23.34 kg/m².  No intake/output data recorded.    Physical Examination:  Physical Exam  Constitutional:       Appearance: Normal appearance.   HENT:      Head: Normocephalic and atraumatic.      Mouth/Throat:      Mouth: Mucous membranes are moist.      Pharynx: Oropharynx is clear.   Eyes:      Comments: L CN III palsy   Neck:      Comments: Soft C-collar in place for known cervical injury  Cardiovascular:      Rate and Rhythm: Normal rate and regular rhythm.   Pulmonary:      Effort: Pulmonary effort is normal.      Breath sounds: Normal breath sounds.   Abdominal:      General: Abdomen is flat.      Palpations: Abdomen is soft.      Tenderness: There is no abdominal tenderness.   Skin:     General: Skin is warm and dry.   Neurological:      Mental Status: She is alert.      Cranial Nerves: No dysarthria or facial asymmetry.      Sensory: Sensation is intact.   Psychiatric:         Mood and Affect: Mood normal.         Behavior: Behavior normal.            Laboratory:  Most Recent Data:  CBC:   Lab Results   Component Value Date    WBC 5.06 10/17/2023    HGB 11.2 (L) 10/17/2023    HCT 33.2 (L) 10/17/2023     10/17/2023    MCV 90 10/17/2023    RDW 12.3 10/17/2023     WBC Differential: 61.6 % N, 24.9 % L, 10.7 % M, 1.0 % Eo, 1.6 % Baso  BMP:   Lab Results   Component Value Date     10/17/2023    K 3.1 (L) 10/17/2023     10/17/2023    CO2 28 10/17/2023    BUN 29 (H) 10/17/2023    CREATININE 1.1 10/17/2023    CREATININE 1.1 10/17/2023    GLU 93 10/17/2023    CALCIUM 8.4 (L) 10/17/2023    MG 1.9 2023    PHOS 3.8 2023     LFTs:   Lab Results   Component Value Date    PROT 6.2 10/17/2023    ALBUMIN 3.2 (L) 10/17/2023    BILITOT 0.3 10/17/2023    AST 21 10/17/2023    ALKPHOS 135  10/17/2023    ALT 11 10/17/2023     Coags:   Lab Results   Component Value Date    INR 1.1 10/17/2023     FLP:   Lab Results   Component Value Date    CHOL 233 (H) 10/17/2023    HDL 56 10/17/2023    LDLCALC 157.2 10/17/2023    TRIG 99 10/17/2023    CHOLHDL 24.0 10/17/2023     DM:   Lab Results   Component Value Date    HGBA1C 5.6 03/13/2023    HGBA1C 6.1 (H) 03/23/2022    HGBA1C 5.7 03/11/2021    LDLCALC 157.2 10/17/2023    CREATININE 1.1 10/17/2023    CREATININE 1.1 10/17/2023     Thyroid:   Lab Results   Component Value Date    TSH 1.483 10/17/2023     Anemia:   Lab Results   Component Value Date    IRON 71 09/13/2023    TIBC 448 09/13/2023    FERRITIN 46 09/13/2023     Cardiac:   Lab Results   Component Value Date    TROPONINI <0.006 06/20/2023     Urinalysis:   Lab Results   Component Value Date    COLORU Yellow 06/02/2023    CLARITYU Clear 03/14/2023    SPECGRAV 1.005 06/02/2023    NITRITE Negative 06/02/2023    KETONESU Negative 06/02/2023    UROBILINOGEN Negative 06/02/2023    WBCUA 0 04/11/2023       Trended Lab Data:  Recent Labs   Lab 10/17/23  1803   WBC 5.06   HGB 11.2*   HCT 33.2*      MCV 90   RDW 12.3      K 3.1*      CO2 28   BUN 29*   CREATININE 1.1  1.1   GLU 93   PROT 6.2   ALBUMIN 3.2*   BILITOT 0.3   AST 21   ALKPHOS 135   ALT 11       Other Results:  EKG (my interpretation): No STEMI. NSR.    Radiology:  Imaging Results              CTA Head and Neck (xpd) (Final result)  Result time 10/17/23 18:29:07      Final result by Sukh Arnold MD (10/17/23 18:29:07)                   Impression:      No acute abnormality.    No high-grade stenosis or major vessel occlusion.    Stable changes in the spine and nodules within the thyroid gland.      Electronically signed by: Sukh Arnold  Date:    10/17/2023  Time:    18:29               Narrative:    EXAMINATION:  CTA HEAD AND NECK (XPD)    CLINICAL HISTORY:  Diplopia;    TECHNIQUE:  One hundred CT angiogram was performed  from the level of the denton to the top of the head following the IV administration of 100mL of Omnipaque 350.   Sagittal and coronal reconstructions and maximum intensity projection reconstructions were performed. Arterial stenosis percentages are based on NASCET measurement criteria.    COMPARISON:  08/17/2023    FINDINGS:  Intracranial Compartment:    Ventricles and sulci are stable in size for age without evidence of hydrocephalus. No extra-axial blood or fluid collections.    The brain parenchyma appears stable.  No parenchymal mass, hemorrhage, edema, or major vascular distribution infarct.    Skull/Extracranial Contents (limited evaluation): No fracture. Mastoid air cells and paranasal sinuses are essentially clear.  Chronic type 2 dens fracture and spondylosis throughout the spine.  1 the    Non-Vascular Structures of the Neck/Thoracic Inlet (limited evaluation): Multiple thyroid nodules..  10 mm ground-glass nodular opacity in the left upper lobe    Aorta: Normal 2 vessel bovine arch..    Extracranial carotid circulation: No hemodynamically significant stenosis, aneurysmal dilatation, or dissection.    Extracranial vertebral circulation: No hemodynamically significant stenosis, aneurysmal dilatation, or dissection.    Intracranial Arteries: No focal high-grade stenosis, occlusion, or aneurysm.    Venous structures (limited evaluation): Normal.                                       Assessment:     Bridget Montgomery is a 88 y.o. 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    Closed fracture of proximal end of left humerus with routine healing 08/21/2023    Diabetes 1.5, 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    Closed odontoid fracture with routine healing 07/24/2023    Coccyalgia 07/24/2023    Leg wound, right 06/27/2023    Acute pancreatitis without infection or necrosis 06/20/2023    Anemia, chronic renal failure, stage 4  (severe) 06/20/2023    Non-healing wound of right lower extremity 05/12/2023    Acute chorea 04/13/2023    Lumbar radiculopathy 01/25/2023    Greater trochanteric bursitis 12/08/2020    Osteoarthritis of both knees 12/08/2020    Spinal stenosis of lumbar region 07/07/2020    Senile purpura 07/07/2020    Primary localized osteoarthritis of pelvic region and thigh 07/07/2020    Idiopathic osteoarthritis 07/07/2020    Osteopenia 07/07/2020    Neuralgia of right sciatic nerve 07/07/2020    Mixed hyperlipidemia 07/07/2020    Mild recurrent major depression 07/07/2020    Diabetic renal disease 07/07/2020    Chronic obstructive pulmonary disease 07/07/2020    Allergic rhinitis 07/07/2020    Ovarian mass 03/22/2019    Ulcer of esophagus 01/29/2019    Lumbar spondylosis 08/17/2018    Spinal cord disorder 08/17/2018    Gastro-esophageal reflux disease without esophagitis 03/01/2016    Type 2 diabetes mellitus with diabetic chronic kidney disease 02/23/2016    Essential hypertension 02/23/2016    Chronic kidney disease, stage III (moderate) 08/20/2013        Plan:     Posterior Circulation Stroke  - 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 Lyons outpatient   - Avoid nephrotoxins as able   - Daily CMP     HTN  - Continue home lasix 20mg daily  - Will hold home amlodipine for permissive hypertension     Spinal Stenosis  C1-C2 dens fracture  - Follows with neurosurgery  - Scheduled for surgery on 11/10/23  - Continue lyrica 50mg nightly     Anemia of Chronic Disease  - Stable, continue to monitor   - Baseline Hb 10-11  - Normocytic anemia   - Most recent iron studies with ferritin 46, iron 71, TIBC 488 last month     Chronic Constipation  -  Continue home miralax      Osteoarthritis  - Chronic pain to R shoulder, stable  - tylenol prn     H/o Type 2 Diabetes  - Most recent A1c <6  - Continue to monitor         Diet: Regular  DVT ppx: lovenox  Code: Full  Dispo: Admit for stroke workup      Pat Colon MD  [facility] Medicine HO-1    [facility] Medicine Hospitalist Pager numbers:   [facility] Hospitalist Medicine Team A (Jen/Prabhjot): [phone]  [facility] Hospitalist Medicine Team B (Fredy/Eliana):  [phone]

## 2023-10-18 NOTE — PT/OT/SLP EVAL
Physical Therapy Evaluation    Patient Name:  Bridget Montgomery   MRN:  [id number]    Recommendations:     Discharge Recommendations: Moderate Intensity Therapy   Discharge Equipment Recommendations: none   Barriers to discharge: Patient continues with complaints of diplopia.  This mildly improves with patching R eye.  Patient is a fall risk.    Assessment:     Bridget Montgomery is a 88 y.o. 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  She presents with the following impairments/functional limitations: weakness, impaired balance, impaired endurance, visual deficits, pain, impaired self care skills, impaired functional mobility, decreased upper extremity function, gait instability, decreased lower extremity function, orthopedic precautions     Patient seen for physical therapy evaluation on this date, co-evaluation with occupational therapy.  Patient's daughter present for evaluation.  Patient is agreeable to participate, continues with complaints of diplopia, worse when looking to R peripheral field.  Patient reports decrease in diplopia with R eye occluded.  Findings reported to Neurology and medical team.  Patient wearing C collar for dens fracture that is scheduled for surgical fix in November.  Patient reports she was driving last week, taking off the C collar.  Patient will benefit from inpatient physical therapy to address functional limitations and maximize functional independence.  Recommending patient to discharge to moderate intensity therapy setting due to fall risk and poor balance due to diplopia.  Also discussed further consultation with ophthalmology services to discern cause for vision problems.     Rehab Prognosis: Fair; patient would benefit from acute skilled PT services to address these deficits and reach maximum level of function.    Recent Surgery: * No surgery found *      Plan:     During this hospitalization, patient to be seen 5  "x/week to address the identified rehab impairments via gait training, therapeutic activities, therapeutic exercises, neuromuscular re-education and progress toward the following goals:    Plan of Care Expires:  11/17/23    Subjective     Chief Complaint: "I am uncomfortable because I need to go to the bathroom"  Patient/Family Comments/goals: To return to PLOF  Pain/Comfort:  Pain Rating 1: 8/10  Location - Orientation 1: generalized  Location 1: abdomen  Pain Addressed 1: Reposition, Distraction, Cessation of Activity (Took patient to bathroom for bowel movement at end of session)  Pain Rating Post-Intervention 1:  (does not report)    Patients cultural, spiritual, Hinduism conflicts given the current situation: no    Living Environment:  Patient lives in a 1 SH, Ramp entrance, T/S with bath bench in bathroom.  Patient's youngest daughter, Liliana, lives with her and assists in her care.  Prior to admission, patients level of function was patient states she ambulates with her rollator, has had a C collar since discovery of dens fracture in August.  Surgery for dens scheduled for November.  Patient has recent history of falls at home.  Incidentally, patient reports driving, most recently 1 week ago.  Patient states she was taking off her C collar to drive.  Patient reports she often sleeps in recliner chair.  Patient performs ADLs independently, daughter there to assist as needed.  Equipment used at home: rollator, walker, rolling, bath bench, wheelchair, cane, straight, bedside commode.  DME owned (not currently used): rolling walker and single point cane.  Upon discharge, patient will have assistance from caretaker Liliana (though patient reports Liliana is at hospital today due to a fracture in her foot).    Objective:     Communicated with nurse Baker prior to session.  Patient found HOB elevated with bed alarm, peripheral IV, PureWick  upon PT entry to room.    General Precautions: Standard, " fall  Orthopedic Precautions:spinal precautions (Dens Fracture - wearing a C Collar)   Braces: Cervical collar  Respiratory Status: Room air    Exams:  Cognitive Exam:  Patient is oriented to Person, Place, Time, Situation, and follows commands  Fine Motor Coordination:  Finger to Nose Intact B, ROBERT intact B hands and feet  Gross Motor Coordination:  WFL with functional mobility  Postural Exam:  Patient presented with the following abnormalities:    -       Rounded shoulders  -       Forward head  -       Kyphosis  Sensation:    -       Intact  light/touch B Les grossly  Skin Integrity/Edema:      -       Skin integrity: Visible skin intact  -       Edema: None noted   RLE ROM: WFL  RLE Strength: 4/5 grossly  LLE ROM: WFL  LLE Strength: Deficits: 4-/5 grossly  Visuospatial:  Refer to OT Eval for details.  Patient reports increased diplopia with looking to R.  Therapy applied patch to R and L eye with gait.  Patient reports decreased diplopia with R eye occlusion.  (L eye occlusion reduces diplopia, but blurriness reported).  Patient tracks appropriately and peripheral fields appear intact.      Functional Mobility:  Bed Mobility:     Rolling Left:  contact guard assistance  Scooting: minimum assistance  Supine to Sit: minimum assistance  Sit to Supine: minimum assistance  Transfers:     Sit to Stand:  minimum assistance with rolling walker  Gait: 1.  with RW x 15' and no patching:  unsteady, very slow pace, reports diplopia.  2.  Gait with RW x R eye patched, CGA, very slow pace, decreased diplopia.  3.  Gait with L eye patched and RW, CGA, reports blurriness - prefers R eye patched  Balance: Dynamic:  unsteady, fall risk      AM-PAC 6 CLICK MOBILITY  Total Score:14       Treatment & Education:  Patient participated well with therapy, however diplopia causing balance difficulties and increasing fall risk.  Discussed findings with medical team and neurology.  Patient educated on importance of wearing C collar to  avoid further dens damage.    Patient left HOB elevated with all lines intact, call button in reach, bed alarm on, and nurse notified.    GOALS:   Multidisciplinary Problems       Physical Therapy Goals          Problem: Physical Therapy    Goal Priority Disciplines Outcome Goal Variances Interventions   Physical Therapy Goal     PT, PT/OT Ongoing, Progressing     Description: Goals to be met by: 2023     Patient will increase functional independence with mobility by performin. Supine to sit with Modified [place]  2. Sit to supine with Modified [place]  3. Rolling to Left and Right with Modified [place].  4. Sit to stand transfer with Stand-by Assistance  5. Bed to chair transfer with Stand-by Assistance using Rolling Walker  6. Gait  x 100 feet with Stand-by Assistance using Rolling Walker.                          History:     Past Medical History:   Diagnosis Date    Allergy     Anemia, unspecified     Arthritis     CKD (chronic kidney disease) stage 4, GFR 15-29 ml/min     COPD (chronic obstructive pulmonary disease)     Edema     HTN (hypertension)     Hypocalcemia     Hyponatremia     Osteoarthritis        Past Surgical History:   Procedure Laterality Date    BREAST CYST EXCISION      CAUDAL EPIDURAL STEROID INJECTION N/A 2023    Procedure: Injection-steroid-epidural-caudal;  Surgeon: Angel Sparrow MD;  Location: [facility] PAIN T;  Service: Pain Management;  Laterality: N/A;    FOOT SURGERY         Time Tracking:     PT Received On: 10/18/23  PT Start Time: 1304     PT Stop Time: 1355  PT Total Time (min): 51 min     Billable Minutes: Evaluation 10, Gait Training 10, Therapeutic Activity 10, and Neuromuscular Re-education 8      10/18/2023

## 2023-10-18 NOTE — PHARMACY MED REC
"  [facility]           Pharmacy  Admission Medication History     The home medication history was taken by Masha Busby.      Medication history obtained from Medications listed below were obtained from: Patient/family    Based on information gathered for medication list, you may go to "Admission" then "Reconcile Home Medications" tabs to review and/or act upon those items.     The home medication list has been updated by the Pharmacy department.   Please read ALL comments highlighted in yellow.   Please address this information as you see fit.    Feel free to contact us if you have any questions or require assistance.    The medications listed below were removed from the home medication list.  Please reorder if appropriate:    Patient reports NOT TAKING the following medication(s):  Santyl oint  Bactroban oint  Kenalog cream      No current facility-administered medications on file prior to encounter.     Current Outpatient Medications on File Prior to Encounter   Medication Sig Dispense Refill    acetaminophen (TYLENOL) 500 MG tablet Take 500 mg by mouth every 6 (six) hours as needed for Pain.      albuterol (PROVENTIL) 2.5 mg /3 mL (0.083 %) nebulizer solution Take 2.5 mg by nebulization 2 (two) times daily as needed.      albuterol (PROVENTIL/VENTOLIN HFA) 90 mcg/actuation inhaler Inhale 2 puffs into the lungs every 6 (six) hours as needed for Wheezing. Rescue      amLODIPine (NORVASC) 10 MG tablet Take 1 tablet (10 mg total) by mouth once daily. 30 tablet 3    calcium carbonate (CALCIUM 500 ORAL) Take 500 mg by mouth once daily.      cetirizine (ZYRTEC) 10 MG tablet Take 10 mg by mouth once daily.      cholecalciferol, vitamin D3, (VITAMIN D3) 50 mcg (2,000 unit) Cap capsule Take 2,000 Units by mouth once daily.      diclofenac sodium (VOLTAREN ARTHRITIS PAIN) 1 % Gel Apply 2 g topically once daily. (Patient taking differently: Apply 2 g topically as needed.) 1 each 11    fluticasone " propionate (FLONASE) 50 mcg/actuation nasal spray INSTILL TWO SPRAYS INTO EACH NOSTRIL TWICE A DAY (Patient taking differently: 2 sprays by Each Nostril route 2 (two) times a day.) 48 mL 2    furosemide (LASIX) 20 MG tablet Take 20 mg by mouth 2 (two) times a day.      LIDOcaine (LIDODERM) 5 % Place 1 patch onto the skin once daily. Remove & Discard patch within 12 hours or as directed by MD (Patient taking differently: Place 1 patch onto the skin once daily. Remove & Discard patch within 12 hours or as directed by MD  As needed) 30 patch 0    olopatadine (PATANOL) 0.1 % ophthalmic solution Place 1 drop into both eyes Daily.      omega 3-dha-epa-fish oil 1,000 mg (120 mg-180 mg) Cap Take 1 capsule by mouth once daily.      omeprazole (PRILOSEC) 20 MG capsule Take 20 mg by mouth once daily.      polyethylene glycol 3350 (MIRALAX ORAL) Take 17 g by mouth once daily. As needed      pregabalin (LYRICA) 50 MG capsule Take 1 capsule (50 mg total) by mouth every evening. 30 capsule 5    psyllium 0.52 gram capsule Take 0.52 g by mouth daily as needed.      selenium 200 mcg Cap Take 200 mcg by mouth Daily.      SYMBICORT 80-4.5 mcg/actuation HFAA Inhale 2 puffs into the lungs 2 (two) times a day. 10.2 g 6    traMADoL (ULTRAM) 50 mg tablet TAKE 1 TABLET BY MOUTH TWICE DAILY AS NEEDED FOR PAIN (Patient taking differently: Take 50 mg by mouth 2 (two) times daily as needed for Pain.) 60 tablet 4    vit A/vit C/vit E/zinc/copper (PRESERVISION AREDS ORAL) Take 1 capsule by mouth 2 (two) times a day.      zinc sulfate (ZINC-15 ORAL) Take 15 mg by mouth once daily.         Please address this information as you see fit.  Feel free to contact us if you have any questions or require assistance.    Masha Busby  [phone]                .

## 2023-10-18 NOTE — PROGRESS NOTES
"[facility]           Pharmacy  Admission Medication Reconciliation     Based on information gathered for medication list, you may go to "Admission" then "Reconcile Home Medications" tabs to review and/or act upon those items.     The home medication list has been updated by the Pharmacy department.   Please read ALL comments highlighted in red.   Please address this information as you see fit.    Feel free to contact us if you have any questions or require assistance.    Home medication list has been compared to current inpatient medications. Please review the following discrepancies noted below:    Patient reports STILL TAKING the following medication(s) which was not ordered upon admit  Omeprazole 20mg daily  Calcium Carbonate 500mg daily    Feel free to contact us if you have any questions or require assistance.    Kathrine Rivas, PharmD  [phone]    "

## 2023-10-18 NOTE — PLAN OF CARE
PRINCE met with Pt's drt Liliana [phone] at bedside to complete DCA this AM. Pt lives at home with Liliana and will have her help transport her home at time of d/c. Pt has a rollator, bsc, and sc at home that she will use to ambulate. Family is interested in getting a new SC for pt, sw educated the pt on how to obtain one as insurance does not cover that item. Family did not have any additional questions or concerns for sw at this time. White board updated with CM name and contact information.  Discharge brochure provided.  Pt encouraged to call with any questions or concerns.  Cm will continue to follow pt through transitions of care and assist with any discharge needs.    Nura Ezequiel, MSW  [phone]    Future Appointments   Date Time Provider Department Center   10/19/2023 11:30 AM Stephen Calzada DNP [facility] PREADMT [facility]   11/28/2023  3:15 PM [facility] ODC XR-B LIMIT 500 LBS [facility] XRAY [facility]   11/28/2023  4:30 PM Leticia Mejias PA-C [facility] NEUROSU [facility]   12/11/2023  2:00 PM Lluvia Rahman MD [facility]   12/22/2023  1:15 PM [facility] ODC XR-B LIMIT 500 LBS [facility] XRAY [facility]   12/22/2023  2:30 PM Leticia Mejias PA-C [facility] NEUROSU [facility]   2/12/2024  9:15 AM [facility] ODC XR-B LIMIT 500 LBS [facility] XRAY OP [facility]   2/12/2024 10:30 AM Jaylen Quach MD [facility] NEUROSU [facility]   2/21/2024 10:15 AM Charles Peacock MD [facility] RADHA [facility]        10/18/23 0851   Discharge Assessment   Assessment Type Discharge Planning Assessment   Confirmed/corrected address, phone number and insurance Yes   Confirmed Demographics Correct on Facesheet   Source of Information family   When was your last doctors appointment?   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 Yes   Do you have help at home or someone to help you manage your care at home? Yes   Who are your caregiver(s) and their phone number(s)? pollo Ford [phone]   Prior to hospitilization cognitive status: Alert/Oriented   Current cognitive status: Alert/Oriented   Walking or Climbing Stairs ambulation difficulty, requires equipment   Dressing/Bathing bathing difficulty, requires equipment   Home Accessibility wheelchair accessible   Home Layout Able to live on 1st floor   Equipment Currently Used at Home rollator;bedside commode;shower chair   Readmission within 30 days? No   Do you currently have service(s) that help you manage your care at home? No   Do you take prescription medications? Yes   Do you have prescription coverage? Yes   Coverage PHN   Do you have any problems affording any of your prescribed medications? No   Is the patient taking medications as prescribed? yes   Who is going to help you get home at discharge? pollo Ford [phone]   How do you get to doctors appointments? family or friend will provide   Are you on dialysis? No   Do you take coumadin? No   DME Needed Upon Discharge  other (see comments)  (TBD)   Discharge Plan discussed with: Adult children   Transition of Care Barriers None   Discharge Plan A Home with family   OTHER   Name(s) of People in Home pollo Ford [phone]

## 2023-10-18 NOTE — PLAN OF CARE
10/17/23 2328   Admission   Initial VN Admission Questions Complete   Communication Issues? None   Shift   Virtual Nurse - Rounding Complete   Pain Management Interventions pain management plan reviewed with patient/caregiver   Virtual Nurse - Patient Verbalized Approval Of Camera Use;VN Rounding   Type of Frequent Check   Type Patient Rounds   Safety/Activity   Patient Rounds bed in low position;placement of personal items at bedside;call light in patient/parent reach;clutter free environment maintained;visualized patient   Safety Promotion/Fall Prevention side rails raised x 2;medications reviewed;instructed to call staff for mobility   Positioning   Body Position sitting up in bed        Pt arrived to unit. Introduced self as VN for this shift. Admission questions completed by VN. Educated pt and daughter on VTE risk, safety precautions, and VN's role in pt care. Opportunity given for  questions. All questions answered.

## 2023-10-18 NOTE — PLAN OF CARE
Problem: SLP  Goal: SLP Goal  Description: Short Term Goals:  1. Pt will participate in swallow eval to determine safest diet level.   2. Pt will tolerate reg/thin diet with no audible dysphagia signs.   Outcome: Ongoing, Progressing   SLP eval completed this date, rec: cardiac diet for now with thin liquids.

## 2023-10-18 NOTE — PROGRESS NOTES
"[facility] Medicine Progress Note    Primary Team: [facility] Hospitalist Team B  Attending Physician: Hevre Daneil MD  Resident: Ruben  Intern: Isaiah    Subjective:      No acute events overnight. Vital signs stable. Patient's double vision has improved this morning and the cranial nerve III palsy that was present on admission is now resolved. She does still have blurry vision. Tolerating PO without N/V. Denies CP, SOB, fever/chills, headache, lightheadedness. She still feels unsteady on her feet.      Objective:     Last 24 Hour Vital Signs:  BP  Min: 157/71  Max: 190/91  Temp  Av.3 °F (36.3 °C)  Min: 97.1 °F (36.2 °C)  Max: 97.5 °F (36.4 °C)  Pulse  Av.1  Min: 57  Max: 86  Resp  Av.2  Min: 16  Max: 20  SpO2  Av.8 %  Min: 95 %  Max: 99 %  Height  Av' 4" (162.6 cm)  Min: 5' 4" (162.6 cm)  Max: 5' 4" (162.6 cm)  Weight  Av kg (132 lb 3.2 oz)  Min: 59 kg (130 lb)  Max: 61.7 kg (136 lb)  I/O last 3 completed shifts:  In: 708 [P.O.:708]  Out: 1000 [Urine:1000]    Physical Examination:  Physical Exam  Constitutional:       Appearance: Normal appearance.   HENT:      Head: Normocephalic and atraumatic.      Mouth/Throat:      Mouth: Mucous membranes are moist.      Pharynx: Oropharynx is clear.   Eyes:      Comments: L CN III palsy resolved   Neck:      Comments: Soft C-collar in place for known cervical injury  Cardiovascular:      Rate and Rhythm: Normal rate and regular rhythm.   Pulmonary:      Effort: Pulmonary effort is normal.      Breath sounds: Normal breath sounds.   Abdominal:      General: Abdomen is flat.      Palpations: Abdomen is soft.      Tenderness: There is no abdominal tenderness.   Skin:     General: Skin is warm and dry.   Neurological:      Mental Status: She is alert. Reflexes 2+. Strength 5/5 symmetrically.     Cranial Nerves: No dysarthria or facial asymmetry.      Sensory: Sensation is intact.   Psychiatric:         Mood and Affect: Mood normal.         Behavior: " Behavior normal.        Laboratory:  Laboratory Data Reviewed: yes      Radiology Data Reviewed: yes  Pertinent Findings:  Imaging Results              MRI Brain Without Contrast (Final result)  Result time 10/18/23 09:58:26      Final result by Real Mooney MD (10/18/23 09:58:26)                   Impression:      Equivocal punctate focus of restricted diffusion in the left paramedian midbrain without convincing signal abnormality on additional sequences may be artifactual, however a tiny focus of recent ischemia difficult to exclude.      Electronically signed by: Real Mooney  Date:    10/18/2023  Time:    09:58               Narrative:    EXAMINATION:  MRI BRAIN WITHOUT CONTRAST    CLINICAL HISTORY:  Stroke, follow up; Other cerebral infarction    TECHNIQUE:  Multiplanar multisequence MR imaging of the brain was performed without contrast.    COMPARISON:  CTA head neck 10/17/2023.    FINDINGS:  Parenchyma: Equivocal punctate focus of restricted diffusion in the left paramedian midbrain without convincing signal abnormality on additional sequences (axial DWI series 3, image 16), may be artifactual, however a tiny focus of recent ischemia difficult to exclude.  Elsewhere, there appears to be a generalized pattern age-related parenchymal volume loss.  Areas of nonspecific T2/FLAIR hyperintense signal in the white matter would be in keeping with sequela of chronic small vessel ischemic disease.    Additional comments: There is no midline shift, abnormal extra-axial fluid collection, or acute intracranial hemorrhage. The basal cisterns are patent.    Ventricles: Similar size and configuration to 10/17/2023.  Focus of T1 shortening and susceptibility related signal loss non dependently within the frontal horn of the left lateral ventricle as well as within the right temporal horn, stable since 04/11/2023 MRI, favored benign.    Flow voids: The normal major intracranial arterial flow voids are  visualized.    Sinuses and mastoid air cells: Modest paranasal sinus mucosal thickening.  Fluid signal opacification of the mastoid air cells, left more so than right, possibly on the basis of effusion and/or mucosal thickening.    Orbits: Status post bilateral lens replacements.    Midline structures: No gross change in alignment of the ununited type 2 odontoid fracture when compared to recent CTA imaging of 10/01/2023.    Marrow: Normal                                         X-Ray Chest 1 View (Final result)  Result time 10/17/23 21:43:34      Final result by Marc Tariq MD (10/17/23 21:43:34)                   Impression:      No acute radiographic abnormality.      Electronically signed by: Marc Tariq  Date:    10/17/2023  Time:    21:43               Narrative:    EXAMINATION:  XR CHEST 1 VIEW    CLINICAL HISTORY:  shortness of breath; Cerebral infarction, unspecified    TECHNIQUE:  Single frontal view of the chest was performed.    COMPARISON:  09/28/2023    FINDINGS:  The lungs are clear, with normal appearance of pulmonary vasculature and no pleural effusion or pneumothorax.    The cardiac silhouette is normal in size. The hilar and mediastinal contours are unremarkable.    No acute fractures.  Chronic changes of the humeral heads in shoulders bilaterally.                                       CTA Head and Neck (xpd) (Final result)  Result time 10/17/23 18:29:07      Final result by Sukh Arnold MD (10/17/23 18:29:07)                   Impression:      No acute abnormality.    No high-grade stenosis or major vessel occlusion.    Stable changes in the spine and nodules within the thyroid gland.      Electronically signed by: Sukh Arnold  Date:    10/17/2023  Time:    18:29               Narrative:    EXAMINATION:  CTA HEAD AND NECK (XPD)    CLINICAL HISTORY:  Diplopia;    TECHNIQUE:  One hundred CT angiogram was performed from the level of the denton to the top of the head following the  IV administration of 100mL of Omnipaque 350.   Sagittal and coronal reconstructions and maximum intensity projection reconstructions were performed. Arterial stenosis percentages are based on NASCET measurement criteria.    COMPARISON:  08/17/2023    FINDINGS:  Intracranial Compartment:    Ventricles and sulci are stable in size for age without evidence of hydrocephalus. No extra-axial blood or fluid collections.    The brain parenchyma appears stable.  No parenchymal mass, hemorrhage, edema, or major vascular distribution infarct.    Skull/Extracranial Contents (limited evaluation): No fracture. Mastoid air cells and paranasal sinuses are essentially clear.  Chronic type 2 dens fracture and spondylosis throughout the spine.  1 the    Non-Vascular Structures of the Neck/Thoracic Inlet (limited evaluation): Multiple thyroid nodules..  10 mm ground-glass nodular opacity in the left upper lobe    Aorta: Normal 2 vessel bovine arch..    Extracranial carotid circulation: No hemodynamically significant stenosis, aneurysmal dilatation, or dissection.    Extracranial vertebral circulation: No hemodynamically significant stenosis, aneurysmal dilatation, or dissection.    Intracranial Arteries: No focal high-grade stenosis, occlusion, or aneurysm.    Venous structures (limited evaluation): Normal.                                        Current Medications:       Scheduled:   acetaminophen  650 mg Oral Once    aspirin  81 mg Oral Daily    [START ON 10/19/2023] atorvastatin  80 mg Oral Daily    clopidogreL  75 mg Oral Daily    enoxparin  40 mg Subcutaneous Daily    furosemide  20 mg Oral Daily    magnesium sulfate IVPB  2 g Intravenous Once    potassium chloride  10 mEq Intravenous Q1H    pregabalin  50 mg Oral QHS        PRN:  acetaminophen, labetalol, sodium chloride 0.9%      Assessment:     Bridget Montgomery is a 88 y.o.female with PMH of CKD4, COPD, HTN, prediabetes (A1c 6.1 3/2023), DVT (2022), and dens fracture  (surgery in November) presented for diplopia onset 1 day. Patient states that her double vision is improving after receiving treatment with a load of ASA and plavix as per neurology recommendations. This was concerning for a stroke; however, CTA head and neck was negative for acute processes. MRI showed equivocal lesion in the left paramedian midbrain, which ischemic event would be difficult to exclude. Patient is clinically stable without any new deficits.      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 Lyons outpatient   - Avoid nephrotoxins as able   - Daily CMP  - Continue lasix 20 mg daily      HTN  - Continue home lasix 20mg daily  - Will hold home amlodipine for permissive hypertension     Spinal Stenosis  C1-C2 dens fracture  - Follows with neurosurgery  - Scheduled for surgery on 11/10/23  - Continue lyrica 50mg nightly     Anemia of Chronic Disease  - Stable, continue to monitor   - Baseline Hb 10-11  - Normocytic anemia   - Most recent iron studies with ferritin 46, iron 71, TIBC 488 last month     Chronic Constipation  - Continue home miralax      Osteoarthritis  - Chronic pain to R shoulder, stable  - tylenol prn     H/o Type 2 Diabetes  - Most recent A1c <6  - Continue to monitor      Diet: Regular  DVT ppx: lovenox  Code: Full  Dispo: Pending placement  recommendations    Pat Colon MD  [facility] Internal Medicine HO-1    [facility] Medicine Hospitalist Pager numbers:   [facility] Hospitalist Medicine Team A (Jen/Prabhjot): [phone]  [facility] Hospitalist Medicine Team B (Fredy/Eliana):  [phone]

## 2023-10-18 NOTE — PT/OT/SLP EVAL
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    History per MD    Diplopia x 1 day     Subjective:      History of Present Illness:  Bridget Montgomery is a 88 y.o. female with a PMH  of CKD4, COPD, HTN, prediabetes (A1c 6.1 3/2023), DVT (2022), and dens fracture who presents for diplopia x 1 day. Patient states when she woke up around 11:00 this morning she attempted to ambulate to the restroom and states she experienced double vision and issues with balance. Her family checked on her around 1:00pm and she was still in her chair with double vision. She had some slurred speech at that time per daughter. She was last known well around 3:00 am when she ambulated from her bed to her lounge chair without difficulty. Denies new weakness. This has never happened to her before.        Past Medical History:   Diagnosis Date    Allergy     Anemia, unspecified     Arthritis     CKD (chronic kidney disease) stage 4, GFR 15-29 ml/min     COPD (chronic obstructive pulmonary disease)     Edema     HTN (hypertension)     Hypocalcemia     Hyponatremia     Osteoarthritis        Past Surgical History:   Procedure Laterality Date    BREAST CYST EXCISION      CAUDAL EPIDURAL STEROID INJECTION N/A 2/2/2023    Procedure: Injection-steroid-epidural-caudal;  Surgeon: Angel LOVE  "MD Kyara;  Location: [facility];  Service: Pain Management;  Laterality: N/A;    FOOT SURGERY         Social History: Patient lives with dtr at home.    Prior Intubation HX:  n/a    Modified Barium Swallow: none per EMR     MRI: Equivocal punctate focus of restricted diffusion in the left paramedian midbrain without convincing signal abnormality on additional sequences may be artifactual, however a tiny focus of recent ischemia difficult to exclude.     Chest X-Rays: No acute radiographic abnormality.     Prior diet: reg/thin liquids    Subjective     Consult received for clinical swallow eval/speech/lang eval this date, SLP did communicate with RN prior to eval/treat.    Patient goals: "I have not eaten since yesterday."     Pain/Comfort:  Pain Rating 1: 0/10    Respiratory Status: room air     Objective:   Pt seen in room, cleared by RN for eval.   Pt had passed Storey assessment earlier but was off the floor for testing.  Pt awake, oriented to self, place, year.   Pt reports hx of falls during this year.   She reported slurred speech on admit but "thinks" it sounds better.     Oral Musculature Evaluation  Oral Musculature: WFL  Dentition: lower dentures, upper dentures  Secretion Management: adequate  Mucosal Quality: adequate  Mandibular Strength and Mobility: WFL  Oral Labial Strength and Mobility: WFL  Lingual Strength and Mobility: WFL  Buccal Strength and Mobility: WFL  Volitional Cough: elicited  Volitional Swallow: timely swallow  Voice Prior to PO Intake: clear voice    Bedside Swallow Eval:   Consistencies Assessed:  Thin liquids juice self fed by cup sip and straw  Puree pudding self fed  Solids saltine cracker      Oral Phase:   WFL    Pharyngeal Phase:   no overt clinical signs/symptoms of aspiration  no overt clinical signs/symptoms of pharyngeal dysphagia  multiple spontaneous swallows  Timely swallow noted and adequate elevation palpated by SLP    Compensatory Strategies  Frequent oral care " at least 2-3x/day   Slow rate  Tray set-up for feeding     Treatment: Skilled education including role of SLP, swallow precautions, s/sx of dysphagia were discussed and appropriate diet instructions were reviewed with all parties including patient and dtr. Pt demo'd understanding of info provided and all questions were answered within SLP scope of practice.       Goals:   Multidisciplinary Problems       SLP Goals          Problem: SLP    Goal Priority Disciplines Outcome   SLP Goal     SLP Ongoing, Progressing   Description: Short Term Goals:  1. Pt will participate in swallow eval to determine safest diet level.   2. Pt will tolerate reg/thin diet with no audible dysphagia signs.                        Plan:     Patient to be seen:      Plan of Care expires:     Plan of Care reviewed with:  patient, daughter   SLP Follow-Up:  Yes       Discharge recommendations:   (pending PT and OT recs)   Barriers to Discharge:  none    Time Tracking:     SLP Treatment Date:   10/18/23  Speech Start Time:  1246  Speech Stop Time:  1314     Speech Total Time (min):  28 min    Billable Minutes: Eval Swallow and Oral Function 17 and Self Care/Home Management Training 11    10/18/2023

## 2023-10-18 NOTE — PT/OT/SLP EVAL
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  She presents with ..The primary encounter diagnosis was Stroke. Diagnoses of Diplopia, Closed odontoid fracture, sequela, Nonintractable headache, unspecified chronicity pattern, unspecified headache type, and Other cerebral infarction were also pertinent to this visit.  . Performance deficits affecting function: weakness, visual deficits, impaired endurance, impaired sensation, decreased coordination, impaired self care skills, impaired functional mobility, impaired cognition, decreased safety awareness, gait instability, impaired balance, pain, decreased upper extremity function.      OT/PT coeval performed 2/2 anticipated complexity. Patient with prehospitalization baseline functioning of needing assist for dressing 2/2 BUE ROM chronic deficits, but usually is modified independence in mobility with rollator and toileting. Patient with C1-C2 dens fracture, treated with collar at this time, and scheduled for surgery on 11/10/2023. Patient endorses driving a week ago and taking off collar (ie increasing injury risk 2/2 cervical ROM). Indicates  3 falls in past ~ 6 months.         Patient this date endorsing horizontal binocular diplopia, resolving with covering unilateral eye, and with use of eye patch / vision occlusion over right eye during mobility. No avert signs of gaze deviations noted. Requiring up to moderate / partial assist to stand, minimal assist with rolling walker for  functional mobility, and moderate assist for dressing. Unsteadiness noted throughout. Recommending neuro-opthalmology. Recommending moderate intensity therapy when medically stable; high risk for falls - patient's primary carer / daughter has a broken foot (per report of patient and other daughter - whom is not local- ).    Rehab Prognosis: Good; patient would benefit from acute skilled OT services to address these deficits and reach maximum level of function.       Plan:     Patient to be seen 4 x/week to address the above listed problems via self-care/home management, therapeutic activities, therapeutic exercises, neuromuscular re-education  Plan of Care Expires: 11/15/23  Plan of Care Reviewed with: patient, daughter    Subjective     Chief Complaint: indicates decreased comfort, needing to use the bathroom  Patient/Family Comments/goals: goal to return to home    Occupational Profile:  Living Environment: Patient resides in a single story home with ramp entrance. Tub shower combo with tub transfer bench. Lives with youngest daughter.  Previous level of function: Patient with prehospitalization baseline functioning of needing assist for dressing 2/2 BUE ROM chronic deficits, but usually is modified independence in mobility with rollator and toileting. Patient with C1-C2 dens fracture, treated with collar at this time, and scheduled for surgery on 11/10/2023. Patient endorses driving a week ago and taking off collar (ie increasing injury risk 2/2 cervical ROM 3 falls in past ~ 6 months.   Equipment Used at Home: bedside commode, wheelchair, other (see comments), rollator (tub transfer bench)  Assistance upon Discharge: limited; patient has three daughters but two are not local including daughter present. Youngest daughter whom she resides with currently has an injury herself thus unknown if patient could assist.    Pain/Comfort:  Pain Rating 1: 8/10 (lower abdomen)  Pain Addressed 1: Reposition, Distraction,  "Cessation of Activity  Pain Rating Post-Intervention 1: other (see comments) (unrated)      Objective:     Communicated with: nursing prior to session.  Patient found HOB elevated with bed alarm, peripheral IV, PureWick upon OT entry to room.    General Precautions: Standard, fall (double vision)  Orthopedic Precautions: spinal precautions  Braces: Cervical collar (soft collar)  Respiratory Status: Room air    Occupational Performance:    Bed Mobility:    Patient completed Supine to Sit with minimum assistance  Patient completed Sit to Supine with minimum assistance    Functional Mobility/Transfers:  Patient completed Sit <> Stand Transfer with minimum assist (except moderate from low commode)  with  rolling walker   Patient completed Toilet Transfer Step Transfer technique with moderate assistance with  rolling walker  Functional Mobility: 3 short trials; OT managing IV pole / providing visual gaze target and directly observing eye movements with PT providing min/CGA for balance during 3/3 trials. Trial one without patching, very slow pace with reported diplopia. Trial two with patching of R eye without diplopia "feels better this way". Trial three with L eye patched - "blurry" requesting R eye to be patched.    Activities of Daily Living:  Feeding:  setup assist; able to scan and find all items on tray  Upper Body Dressing: maximal assistance ; IV; chronic BUE ROM deficits  Lower Body Dressing: maximal assistance ; IV; cross leg approach instructed; cueing needed to avoid excessive trunk flexion  Toileting: moderate assistance ; balance/safety assist; cueing for alternative safer method of seated hygiene with B hip abduction    Cognitive/Visual Perceptual:  Cognitive/Psychosocial Skills:     -       Oriented to: Person, Place, Time, and Situation   -       Follows Commands/attention:Follows two-step commands  -       Memory: functional cognition for ADL intact  -       Safety awareness/insight to disability: " impaired; patient admits to taking off C collar and driving (thus appears needs additional coaching / education on danger of cervical ROM / driving with current prehospitalization injury of which neurosurgery was scheduled for next month - MD/DO team informed); decreased insight to current performance   -       Mood/Affect/Coping skills/emotional control: Cooperative  Visual/Perceptual:      -positive for binocular horizontal diplopia resolving with unilateral eye closure/occlusion greater during dynamic movements vs static; intact sacchades; indicates decreased acuity in left eye    Physical Exam:  Balance:    -       impaired; [facility] Standing Balance: 1+ (needs walker / min assist)  Postural examination/scapula alignment:    -       Rounded shoulders  -       Forward head  -       cervical collar in place   Dominant hand:    -       right  Upper Extremity Range of Motion:     -       Right Upper Extremity: chronic deficits; indicates R rotator cuff tear; active scaption limited to  0 - ~40 degrees; distal WFL ROM  -       Left Upper Extremity: chronic deficits; arthritis; crepitus;  0 - ~90 active scaption; distal WFL ROM  Upper Extremity Strength:    -       Right Upper Extremity: formal assessment not performed due to pain with ROM; no drift noted; minimal antigravity proximal; distal accepts antigravity  -       Left Upper Extremity: formal assessment not performed due to pain with ROM; no drift noted; minimal antigravity proximal; distal accepts antigravity  Fine Motor Coordination: intact finger to nose bilaterally    AMPAC 6 Click ADL:  AMPAC Total Score: 15    Treatment & Education:  Patient and daughter educated on role of OT/ POC development. Co-evaluation with physical therapy in consideration of anticipated complexity. Occupational profile developed via interview - patient indicating recently driving, taking off collar (alerted MD/DO team) reeducated on importance for wearing collar. Patient  guided to transition eob for assessment. Initiated ADL / functional mobility training as above with emphasis on trials with and without unilateral vision occluded (diplopia decreased with covering one eye). Educated patient / daughter on patient's fall risk and initial recommendations for continued need for therapy. Answered questions within scope.      Patient left HOB elevated with all lines intact, call button in reach, nursing notified, and family present    GOALS:   Multidisciplinary Problems       Occupational Therapy Goals          Problem: Occupational Therapy    Goal Priority Disciplines Outcome Interventions   Occupational Therapy Goal     OT, PT/OT Ongoing, Progressing    Description: Goals to be met by: 11/15/2023     Patient will increase functional independence with ADLs by performing:    UE Dressing with Set-up Assistance with overhead clothing via adaptive method 2/2 limited BUE ROM  LE Dressing with Contact Guard Assistance.  Toileting from toilet with Stand-by Assistance for hygiene and clothing management.   Toilet transfer to toilet with Stand-by Assistance with use of rolling walker.  Functional mobility to / from bathroom with use of compensatory vision techniques as needed (I.e unilateral vision occlusion) to / from bathroom with rolling walker and SBA.  100% patient or family reciprocation of fall risk reduction techniques and activity modifications to maximize independence and return to least restrictive environment                         History:     Past Medical History:   Diagnosis Date    Allergy     Anemia, unspecified     Arthritis     CKD (chronic kidney disease) stage 4, GFR 15-29 ml/min     COPD (chronic obstructive pulmonary disease)     Edema     HTN (hypertension)     Hypocalcemia     Hyponatremia     Osteoarthritis          Past Surgical History:   Procedure Laterality Date    BREAST CYST EXCISION      CAUDAL EPIDURAL STEROID INJECTION N/A 2/2/2023    Procedure:  Injection-steroid-epidural-caudal;  Surgeon: Angel Sparrow MD;  Location: [facility];  Service: Pain Management;  Laterality: N/A;    FOOT SURGERY         Time Tracking:     OT Date of Treatment: 10/18/23  OT Start Time: 1304  OT Stop Time: 1355  OT Total Time (min): 51 min total time; coeval PT    Billable Minutes:Evaluation 10 min  Self Care/Home Management 10 min  Therapeutic Activity 10 min  Neuromuscular Re-education 8 min    10/18/2023

## 2023-10-18 NOTE — PLAN OF CARE
Problem: Adult Inpatient Plan of Care  Goal: Plan of Care Review  Outcome: Ongoing, Progressing  Flowsheets (Taken 10/18/2023 0635)  Plan of Care Reviewed With:   patient   daughter     Problem: Fall Injury Risk  Goal: Absence of Fall and Fall-Related Injury  Outcome: Ongoing, Progressing  Intervention: Identify and Manage Contributors  Flowsheets (Taken 10/18/2023 0635)  Medication Review/Management: medications reviewed     Problem: Adjustment to Illness (Stroke, Ischemic/Transient Ischemic Attack)  Goal: Optimal Coping  Intervention: Support Psychosocial Response to Stroke  Flowsheets (Taken 10/18/2023 0635)  Supportive Measures: active listening utilized  Family/Support System Care:   presence promoted   support provided   Plan of care in progress.

## 2023-10-19 ENCOUNTER — TELEPHONE (OUTPATIENT)
Dept: NEUROSURGERY | Facility: CLINIC | Age: 88
End: 2023-10-19
Payer: MEDICARE

## 2023-10-19 LAB
ALBUMIN SERPL BCP-MCNC: 3.1 G/DL (ref 3.5–5.2)
ALP SERPL-CCNC: 143 U/L (ref 55–135)
ALT SERPL W/O P-5'-P-CCNC: 12 U/L (ref 10–44)
ANION GAP SERPL CALC-SCNC: 12 MMOL/L (ref 8–16)
AST SERPL-CCNC: 22 U/L (ref 10–40)
BASOPHILS # BLD AUTO: 0.06 K/UL (ref 0–0.2)
BASOPHILS NFR BLD: 0.9 % (ref 0–1.9)
BILIRUB SERPL-MCNC: 0.3 MG/DL (ref 0.1–1)
BUN SERPL-MCNC: 24 MG/DL (ref 8–23)
CALCIUM SERPL-MCNC: 9.1 MG/DL (ref 8.7–10.5)
CHLORIDE SERPL-SCNC: 102 MMOL/L (ref 95–110)
CO2 SERPL-SCNC: 28 MMOL/L (ref 23–29)
CREAT SERPL-MCNC: 1.2 MG/DL (ref 0.5–1.4)
DIFFERENTIAL METHOD: ABNORMAL
EOSINOPHIL # BLD AUTO: 0.1 K/UL (ref 0–0.5)
EOSINOPHIL NFR BLD: 0.9 % (ref 0–8)
ERYTHROCYTE [DISTWIDTH] IN BLOOD BY AUTOMATED COUNT: 12.3 % (ref 11.5–14.5)
EST. GFR  (NO RACE VARIABLE): 44 ML/MIN/1.73 M^2
GLUCOSE SERPL-MCNC: 113 MG/DL (ref 70–110)
HCT VFR BLD AUTO: 34.9 % (ref 37–48.5)
HGB BLD-MCNC: 11.6 G/DL (ref 12–16)
IMM GRANULOCYTES # BLD AUTO: 0.02 K/UL (ref 0–0.04)
IMM GRANULOCYTES NFR BLD AUTO: 0.3 % (ref 0–0.5)
LYMPHOCYTES # BLD AUTO: 1.4 K/UL (ref 1–4.8)
LYMPHOCYTES NFR BLD: 19.5 % (ref 18–48)
MAGNESIUM SERPL-MCNC: 2.3 MG/DL (ref 1.6–2.6)
MCH RBC QN AUTO: 29.7 PG (ref 27–31)
MCHC RBC AUTO-ENTMCNC: 33.2 G/DL (ref 32–36)
MCV RBC AUTO: 89 FL (ref 82–98)
MONOCYTES # BLD AUTO: 0.8 K/UL (ref 0.3–1)
MONOCYTES NFR BLD: 11.2 % (ref 4–15)
NEUTROPHILS # BLD AUTO: 4.7 K/UL (ref 1.8–7.7)
NEUTROPHILS NFR BLD: 67.2 % (ref 38–73)
NRBC BLD-RTO: 0 /100 WBC
PHOSPHATE SERPL-MCNC: 3.6 MG/DL (ref 2.7–4.5)
PLATELET # BLD AUTO: 340 K/UL (ref 150–450)
PMV BLD AUTO: 10 FL (ref 9.2–12.9)
POCT GLUCOSE: 106 MG/DL (ref 70–110)
POTASSIUM SERPL-SCNC: 4.1 MMOL/L (ref 3.5–5.1)
PROT SERPL-MCNC: 6.4 G/DL (ref 6–8.4)
RBC # BLD AUTO: 3.91 M/UL (ref 4–5.4)
SARS-COV-2 RDRP RESP QL NAA+PROBE: NEGATIVE
SODIUM SERPL-SCNC: 142 MMOL/L (ref 136–145)
WBC # BLD AUTO: 6.94 K/UL (ref 3.9–12.7)

## 2023-10-19 PROCEDURE — 85025 COMPLETE CBC W/AUTO DIFF WBC: CPT | Performed by: STUDENT IN AN ORGANIZED HEALTH CARE EDUCATION/TRAINING PROGRAM

## 2023-10-19 PROCEDURE — 97530 THERAPEUTIC ACTIVITIES: CPT | Mod: CQ

## 2023-10-19 PROCEDURE — U0002 COVID-19 LAB TEST NON-CDC: HCPCS | Performed by: INTERNAL MEDICINE

## 2023-10-19 PROCEDURE — 11000001 HC ACUTE MED/SURG PRIVATE ROOM

## 2023-10-19 PROCEDURE — 84100 ASSAY OF PHOSPHORUS: CPT | Performed by: STUDENT IN AN ORGANIZED HEALTH CARE EDUCATION/TRAINING PROGRAM

## 2023-10-19 PROCEDURE — 83735 ASSAY OF MAGNESIUM: CPT | Performed by: STUDENT IN AN ORGANIZED HEALTH CARE EDUCATION/TRAINING PROGRAM

## 2023-10-19 PROCEDURE — 97116 GAIT TRAINING THERAPY: CPT | Mod: CQ

## 2023-10-19 PROCEDURE — 25000003 PHARM REV CODE 250

## 2023-10-19 PROCEDURE — 86580 TB INTRADERMAL TEST: CPT | Performed by: INTERNAL MEDICINE

## 2023-10-19 PROCEDURE — 36415 COLL VENOUS BLD VENIPUNCTURE: CPT | Performed by: STUDENT IN AN ORGANIZED HEALTH CARE EDUCATION/TRAINING PROGRAM

## 2023-10-19 PROCEDURE — 25000003 PHARM REV CODE 250: Performed by: STUDENT IN AN ORGANIZED HEALTH CARE EDUCATION/TRAINING PROGRAM

## 2023-10-19 PROCEDURE — 99900035 HC TECH TIME PER 15 MIN (STAT)

## 2023-10-19 PROCEDURE — 97535 SELF CARE MNGMENT TRAINING: CPT

## 2023-10-19 PROCEDURE — 80053 COMPREHEN METABOLIC PANEL: CPT | Performed by: STUDENT IN AN ORGANIZED HEALTH CARE EDUCATION/TRAINING PROGRAM

## 2023-10-19 PROCEDURE — 97110 THERAPEUTIC EXERCISES: CPT

## 2023-10-19 PROCEDURE — 94761 N-INVAS EAR/PLS OXIMETRY MLT: CPT

## 2023-10-19 PROCEDURE — 97530 THERAPEUTIC ACTIVITIES: CPT

## 2023-10-19 PROCEDURE — 63600175 PHARM REV CODE 636 W HCPCS: Performed by: STUDENT IN AN ORGANIZED HEALTH CARE EDUCATION/TRAINING PROGRAM

## 2023-10-19 PROCEDURE — 30200315 PPD INTRADERMAL TEST REV CODE 302: Performed by: INTERNAL MEDICINE

## 2023-10-19 RX ORDER — PANTOPRAZOLE SODIUM 40 MG/1
40 TABLET, DELAYED RELEASE ORAL DAILY
Status: DISCONTINUED | OUTPATIENT
Start: 2023-10-19 | End: 2023-10-23 | Stop reason: HOSPADM

## 2023-10-19 RX ORDER — METHOCARBAMOL 500 MG/1
500 TABLET, FILM COATED ORAL
Status: COMPLETED | OUTPATIENT
Start: 2023-10-19 | End: 2023-10-19

## 2023-10-19 RX ORDER — AMLODIPINE BESYLATE 5 MG/1
10 TABLET ORAL DAILY
Status: DISCONTINUED | OUTPATIENT
Start: 2023-10-19 | End: 2023-10-23 | Stop reason: HOSPADM

## 2023-10-19 RX ORDER — METHOCARBAMOL 500 MG/1
500 TABLET, FILM COATED ORAL 3 TIMES DAILY PRN
Status: DISCONTINUED | OUTPATIENT
Start: 2023-10-19 | End: 2023-10-23 | Stop reason: HOSPADM

## 2023-10-19 RX ORDER — LANOLIN ALCOHOL/MO/W.PET/CERES
1 CREAM (GRAM) TOPICAL EVERY OTHER DAY
Status: DISCONTINUED | OUTPATIENT
Start: 2023-10-19 | End: 2023-10-23 | Stop reason: HOSPADM

## 2023-10-19 RX ORDER — TRAMADOL HYDROCHLORIDE 50 MG/1
50 TABLET ORAL DAILY PRN
Status: COMPLETED | OUTPATIENT
Start: 2023-10-19 | End: 2023-10-19

## 2023-10-19 RX ADMIN — METHOCARBAMOL TABLETS 500 MG: 500 TABLET, COATED ORAL at 01:10

## 2023-10-19 RX ADMIN — PANTOPRAZOLE SODIUM 40 MG: 40 TABLET, DELAYED RELEASE ORAL at 08:10

## 2023-10-19 RX ADMIN — ASPIRIN 81 MG CHEWABLE TABLET 81 MG: 81 TABLET CHEWABLE at 08:10

## 2023-10-19 RX ADMIN — ENOXAPARIN SODIUM 40 MG: 40 INJECTION SUBCUTANEOUS at 04:10

## 2023-10-19 RX ADMIN — ATORVASTATIN CALCIUM 80 MG: 40 TABLET, FILM COATED ORAL at 08:10

## 2023-10-19 RX ADMIN — FERROUS SULFATE TAB 325 MG (65 MG ELEMENTAL FE) 1 EACH: 325 (65 FE) TAB at 08:10

## 2023-10-19 RX ADMIN — CLOPIDOGREL BISULFATE 75 MG: 75 TABLET ORAL at 08:10

## 2023-10-19 RX ADMIN — FUROSEMIDE 20 MG: 20 TABLET ORAL at 08:10

## 2023-10-19 RX ADMIN — PREGABALIN 50 MG: 50 CAPSULE ORAL at 08:10

## 2023-10-19 RX ADMIN — TUBERCULIN PURIFIED PROTEIN DERIVATIVE 5 UNITS: 5 INJECTION, SOLUTION INTRADERMAL at 02:10

## 2023-10-19 RX ADMIN — METHOCARBAMOL TABLETS 500 MG: 500 TABLET, COATED ORAL at 08:10

## 2023-10-19 RX ADMIN — TRAMADOL HYDROCHLORIDE 50 MG: 50 TABLET, COATED ORAL at 12:10

## 2023-10-19 RX ADMIN — AMLODIPINE BESYLATE 10 MG: 5 TABLET ORAL at 08:10

## 2023-10-19 NOTE — MEDICAL/APP STUDENT
"[facility] Medicine Progress Note    Primary Team: [facility] Hospitalist Team B  Attending Physician: Scott Monroy MD  Resident: Dr. Miranda  Intern: Dr. Colon    Subjective:      Patient had no acute overnight events. Patient states that she is doing well; however, she still reports double vision when looking to the right and takes time to focus her eyes. Headache improved with Pregabalin. Physical therapy worked with her yesterday and recommend skilled rehab facility; however, she states that she just would like to go home. She denies any SOB, N/V, fever, chills, baseline dizziness, or chest pain. She has not had a bowel movement since 10/17 and would like to start back Miralax.     Objective:     Last 24 Hour Vital Signs:  BP  Min: 143/78  Max: 184/81  Temp  Av.5 °F (36.4 °C)  Min: 97.2 °F (36.2 °C)  Max: 97.8 °F (36.6 °C)  Pulse  Av.2  Min: 63  Max: 83  Resp  Av.1  Min: 16  Max: 20  SpO2  Av.8 %  Min: 94 %  Max: 98 %  Height  Av' 4" (162.6 cm)  Min: 5' 4" (162.6 cm)  Max: 5' 4" (162.6 cm)  Weight  Av kg (130 lb)  Min: 59 kg (130 lb)  Max: 59 kg (130 lb)  I/O last 3 completed shifts:  In: 708 [P.O.:708]  Out: 2300 [Urine:2300]    Physical Examination:  Physical Exam  Vitals reviewed.   Constitutional:       Appearance: Normal appearance.   HENT:      Head: Normocephalic.      Nose: Nose normal.   Eyes:      General:         Right eye: No discharge.         Left eye: No discharge.      Extraocular Movements: Extraocular movements intact.      Conjunctiva/sclera: Conjunctivae normal.      Pupils: Pupils are equal, round, and reactive to light.      Comments: Contralateral eye minimally reacts with light; visual field intact without deficits; no nystagmus appreciated with gazing   Neck:      Comments: C-spine collar in place  Cardiovascular:      Rate and Rhythm: Normal rate and regular rhythm.      Pulses: Normal pulses.      Heart sounds: Normal heart sounds.   Pulmonary:      Effort: " Pulmonary effort is normal.      Breath sounds: Normal breath sounds.   Abdominal:      General: Abdomen is flat.      Palpations: Abdomen is soft.   Musculoskeletal:         General: Normal range of motion.      Right lower leg: No edema.      Left lower leg: No edema.   Skin:     General: Skin is warm and dry.   Neurological:      Mental Status: She is alert and oriented to person, place, and time.      Comments: Extraocular movements intact; pupils reactive with light, however, minimally reactive in contralateral eye; no double vision reported with gazing; visual fields and peripheral fields intact on my examination   Psychiatric:         Mood and Affect: Mood normal.         Behavior: Behavior normal.       Laboratory:  Recent Labs   Lab 10/17/23  1803 10/18/23  0322 10/19/23  0350   WBC 5.06 5.72 6.94   HGB 11.2* 10.8* 11.6*   HCT 33.2* 33.3* 34.9*    328 340   MCV 90 90 89   RDW 12.3 12.2 12.3    143 142   K 3.1* 3.6 4.1    102 102   CO2 28 28 28   BUN 29* 25* 24*   CREATININE 1.1  1.1 1.1 1.2   GLU 93 99 113*   PROT 6.2 6.3 6.4   ALBUMIN 3.2* 3.2* 3.1*   BILITOT 0.3 0.3 0.3   AST 21 23 22   ALKPHOS 135 139* 143*   ALT 11 11 12     Laboratory Data Reviewed:  Pertinent Findings:  Ferritin - 34  TIBC - 428    Microbiology Data Reviewed:  Pertinent Findings:  None    Other Results:  EKG (my interpretation): no updated EKG    Transthoracic echo (TTE) complete with contrast 10/18/2023    Left Ventricle: The left ventricle is normal in size. Normal wall thickness. Normal wall motion. There is normal systolic function. Biplane (2D) method of discs ejection fraction is 61%. Diastolic function cannot be reliably determined in the presence of mitral annular calcification.    Left Atrium: Left atrium is moderately dilated.    Right Ventricle: Normal right ventricular cavity size. Wall thickness is normal. Right ventricle wall motion  is normal. Systolic function is normal.    Right Atrium: Right  atrium is mildly dilated.    Mitral Valve: There is moderate mitral annular calcification present. There is mild regurgitation.    Pulmonary Artery: The estimated pulmonary artery systolic pressure is 39 mmHg.    IVC/SVC: Normal venous pressure at 3 mmHg.      Radiology Data Reviewed:   Pertinent Findings:    MRI Brain Without Contrast 10/18/2023  Impression:       Equivocal punctate focus of restricted diffusion in the left paramedian midbrain without convincing signal abnormality on additional sequences may be artifactual, however a tiny focus of recent ischemia difficult to exclude.     CTA Head and Neck 10/17/2023  Impression:       No acute abnormality.    No high-grade stenosis or major vessel occlusion.    Stable changes in the spine and nodules within the thyroid gland.       Current Medications:     Infusions:       Scheduled:   acetaminophen  650 mg Oral Once    amLODIPine  10 mg Oral Daily    aspirin  81 mg Oral Daily    atorvastatin  80 mg Oral Daily    clopidogreL  75 mg Oral Daily    enoxparin  40 mg Subcutaneous Daily    ferrous sulfate  1 tablet Oral Every other day    furosemide  20 mg Oral Daily    pantoprazole  40 mg Oral Daily    pregabalin  50 mg Oral QHS        PRN:  acetaminophen, labetalol, sodium chloride 0.9%    Antibiotics and Day Number of Therapy:  None    Lines and Day Number of Therapy:  None    Assessment:     Bridget Montgomery is a 88 y.o.female with PMH of CKD4, COPD, HTN, prediabetes (A1c 6.1 3/2023), DVT (2022), and dens fracture (surgery in November) presented for diplopia onset 1 day. Patient states that her double vision is improving after receiving treatment with a load of ASA and plavix as per neurology recommendations. This was concerning for a stroke; however, CTA head and neck was negative for acute processes. MRI showed equivocal lesion in the left paramedian midbrain, which ischemic event would be difficult to exclude. Patient is clinically stable without any new  deficits and reports that she is improving.    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  - NIHSS3 on presentation  - CTH without acute intracranial process  - CTA without large vessel occlusion  - MRI brain demonstrated equivocal punctate lesion in the left paramedian midbrain, which because of study is difficult to exclude ischemic process  - TTE with bubble study ordered and demonstrated EF 61%, moderate mitral annular calcifications and mild regurgitation, and PA pressure of 39  - Loaded with  and plavix 300; will continue 21 days of ASA 81 mg and Plavix 75 mg daily for 21 days and stop Plavix after 21 days  - Increase atorvastatin to 80mg daily  - Neurology consulted and following recommendations  - PT/OT/SLP consulted and recommend assistance because she is fall risk  - Recommend Opthalmology follow up outpatient     CKD 4:  - Follows with Dr. Lyons outpatient   - Avoid nephrotoxins as able   - Daily CMP  - Continue lasix 20 mg daily     HTN  - Continue home lasix 20mg daily  - Since she is hypertensive this morning, we will start back her home regimen with 10 mg of amlodipine     Spinal Stenosis  C1-C2 dens fracture  - Follows with neurosurgery  - Scheduled for surgery on 11/10/23  - Continue lyrica 50mg nightly     Anemia of Chronic Disease  - Baseline Hb 10-11  - Normocytic anemia   - Most recent iron studies with ferritin 34 and TIBC 428 on this admission  - Will start on ferrous sulfate tablet every other day     Chronic Constipation  - Continue home miralax      Osteoarthritis  - Chronic pain to R shoulder, stable  - tylenol prn     H/o Type 2 Diabetes  - Most recent A1c <6  - Continue to monitor      Diet: Regular  DVT ppx: lovenox 40 mg  Code: Full     Dispo: She is clinically stable and received an MRI of head today; appreciate neurology and PT recommendations; will stay on floor for further  management     Jaylen Heredia, MS4

## 2023-10-19 NOTE — PLAN OF CARE
Progression this date. Moderate assist to regain balance after right sided lateral balance loss during turning during out of room mobility with rolling walker. Endorses x1 episode of diplopia during functional mobility improving to no diplopia on additional trial with use of glasses occluding vision of one eye. With addition of wearing tennis shoes, use of rolling walker, and elevating toilet with bedside commode frame over toilet patient improved functional mobility / transfer to bathroom with minimal assist. Requires moderate assist for LB ADLs. Recommending moderate intensity therapy when medically stable.     Problem: Occupational Therapy  Goal: Occupational Therapy Goal  Description: Goals to be met by: 11/15/2023     Patient will increase functional independence with ADLs by performing:    UE Dressing with Set-up Assistance with overhead clothing via adaptive method 2/2 limited BUE ROM  LE Dressing with Contact Guard Assistance.  Toileting from toilet with Stand-by Assistance for hygiene and clothing management.   Toilet transfer to toilet with Stand-by Assistance with use of rolling walker.  Functional mobility to / from bathroom with use of compensatory vision techniques as needed (I.e unilateral vision occlusion) to / from bathroom with rolling walker and SBA.  100% patient or family reciprocation of fall risk reduction techniques and activity modifications to maximize independence and return to least restrictive environment    Outcome: Ongoing, Progressing

## 2023-10-19 NOTE — PT/OT/SLP PROGRESS
Physical Therapy Treatment    Patient Name:  Bridget Montgomery   MRN:  [id number]    Recommendations:     Discharge Recommendations: Moderate Intensity Therapy  Discharge Equipment Recommendations: none  Barriers to discharge:  decreased functional mobility/strength    Assessment:     Bridget Montgomery is a 88 y.o. 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  She presents with the following impairments/functional limitations: weakness, impaired endurance, gait instability, decreased upper extremity function, decreased lower extremity function, decreased ROM, decreased safety awareness, impaired self care skills, impaired skin Pt would continue to benefit from P.T. To address impairments listed above.      Rehab Prognosis: Fair; patient would benefit from acute skilled PT services to address these deficits and reach maximum level of function.    Recent Surgery: * No surgery found *      Plan:     During this hospitalization, patient to be seen 5 x/week to address the identified rehab impairments via gait training, therapeutic activities, therapeutic exercises, neuromuscular re-education and progress toward the following goals:    Plan of Care Expires:  11/17/23    Subjective       Patient/Family Comments/goals: Pt agreed to tx.  Pain/Comfort:  Pain Rating 1: 5/10  Location - Side 1: Bilateral  Location - Orientation 1: generalized  Location 1: shoulder  Pain Addressed 1: Reposition, Distraction, Nurse notified  Pain Rating Post-Intervention 1: 5/10      Objective:     Communicated with RN prior to session.  Patient found supine with bed alarm, peripheral IV, PureWick, telemetry upon PT entry to room.     General Precautions: Standard, fall  Orthopedic Precautions: spinal precautions (Dens Fracture - wearing a C Collar)  Braces: Cervical collar  Respiratory Status: Room air     Functional Mobility:  Bed Mobility:     Rolling Left:  stand by assistance and  with b/r for assist  Scooting: stand by assistance and to EOB  Supine to Sit: stand by assistance  Sit to Supine: stand by assistance  Transfers:     Sit to Stand:  stand by assistance and contact guard assistance with rolling walker and vc's for hand placement. 2 reps  Gait: 36ft x 2 with RW, seated rest, then 45ft x 2 with RW with CGA.  Brief standing rest between bouts secondary to decreased endurance/strength.  No LOB, slow gait, but fairly steady.  Balance: sitting good-, standing fair+ RW, gait fair      AM-PAC 6 CLICK MOBILITY  Turning over in bed (including adjusting bedclothes, sheets and blankets)?: 3  Sitting down on and standing up from a chair with arms (e.g., wheelchair, bedside commode, etc.): 3  Moving from lying on back to sitting on the side of the bed?: 3  Moving to and from a bed to a chair (including a wheelchair)?: 3  Need to walk in hospital room?: 3  Climbing 3-5 steps with a railing?: 1  Basic Mobility Total Score: 16       Treatment & Education:  Pt sat EOB and donned B tennis shows and held LE up for PTA to assist to tie shoes.  Seated BLE therex: AP, LAQ, hip flexion 10 x 2 reps. Pt was assisted with donning a gown around her back, but was able to doff independently at end of tx.  Gait training as above.  Pt returned to bed supine with HOB elevated and B heels floated at end of tx.      Patient left HOB elevated with all lines intact, call button in reach, bed alarm on, and RN notified..    GOALS:   Multidisciplinary Problems       Physical Therapy Goals          Problem: Physical Therapy    Goal Priority Disciplines Outcome Goal Variances Interventions   Physical Therapy Goal     PT, PT/OT Ongoing, Progressing     Description: Goals to be met by: 2023     Patient will increase functional independence with mobility by performin. Supine to sit with Modified [place]  2. Sit to supine with Modified [place]  3. Rolling to Left and Right with Modified [place].  4.  Sit to stand transfer with Stand-by Assistance  5. Bed to chair transfer with Stand-by Assistance using Rolling Walker  6. Gait  x 100 feet with Stand-by Assistance using Rolling Walker.                          Time Tracking:     PT Received On: 10/19/23  PT Start Time: 1242     PT Stop Time: 1308  PT Total Time (min): 26 min     Billable Minutes: Gait Training 14 and Therapeutic Activity 12    Treatment Type: Treatment  PT/PTA: PTA     Number of PTA visits since last PT visit: 0     10/19/2023

## 2023-10-19 NOTE — PROGRESS NOTES
[facility] IM Resident HO-III Progress Note    Subjective:      Patient is awake and alert laying in bed this morning in no acute distress.  She says she still has some double vision that is somewhat improved from admission.  She denies weakness, numbness, speech difficulty, dysphagia, headache, or other issues.  She says as far as dispo she wants what is best for her health.  Both daughters initially wanted her home with home health PT/OT but now would like IPR vs SNF.     Objective:   Last 24 Hour Vital Signs:  BP  Min: 136/87  Max: 184/81  Temp  Av.7 °F (36.5 °C)  Min: 97.4 °F (36.3 °C)  Max: 98 °F (36.7 °C)  Pulse  Av.1  Min: 63  Max: 83  Resp  Av.9  Min: 16  Max: 20  SpO2  Av.9 %  Min: 94 %  Max: 98 %  I/O last 3 completed shifts:  In: 708 [P.O.:708]  Out: 2300 [Urine:2300]    Physical Examination:  Physical Exam  Vitals reviewed.   Constitutional:       Appearance: Normal appearance.   HENT:      Head: Normocephalic.      Nose: Nose normal.   Eyes:      General:         Right eye: No discharge.         Left eye: No discharge.      Extraocular Movements: Extraocular movements intact.      Conjunctiva/sclera: Conjunctivae normal.      Pupils: Pupils are equal, round, and reactive to light.      Comments: Contralateral eye minimally reacts with light; visual field intact without deficits; no nystagmus appreciated with gazing   Neck:      Comments: C-spine collar in place  Cardiovascular:      Rate and Rhythm: Normal rate and regular rhythm.      Pulses: Normal pulses.      Heart sounds: Normal heart sounds.   Pulmonary:      Effort: Pulmonary effort is normal.      Breath sounds: Normal breath sounds.   Abdominal:      General: Abdomen is flat.      Palpations: Abdomen is soft.   Musculoskeletal:         General: Normal range of motion.      Right lower leg: No edema.      Left lower leg: No edema.   Skin:     General: Skin is warm and dry.   Neurological:      Mental Status: She is alert and  oriented to person, place, and time.      Comments: Extraocular movements intact; pupils reactive with light, however, minimally reactive in contralateral eye; no double vision reported with gazing; visual fields and peripheral fields intact on my examination     Laboratory:  Laboratory Data Reviewed: yes  Pertinent Findings:  Ferritin 24, TIBC 428    Microbiology Data Reviewed: yes  Pertinent Findings:  none    Other Results:  EKG (my interpretation): Poor baseline, NSR, rate 70, nl axis, nl intervals, no ST changes    Radiology Data Reviewed: yes  Pertinent Findings:    MRI Brain Without Contrast 10/18/2023  Impression:       Equivocal punctate focus of restricted diffusion in the left paramedian midbrain without convincing signal abnormality on additional sequences may be artifactual, however a tiny focus of recent ischemia difficult to exclude.      CTA Head and Neck 10/17/2023  Impression:       No acute abnormality.    No high-grade stenosis or major vessel occlusion.    Stable changes in the spine and nodules within the thyroid gland.       Current Medications:     Infusions:       Scheduled:   acetaminophen  650 mg Oral Once    amLODIPine  10 mg Oral Daily    aspirin  81 mg Oral Daily    atorvastatin  80 mg Oral Daily    clopidogreL  75 mg Oral Daily    enoxparin  40 mg Subcutaneous Daily    ferrous sulfate  1 tablet Oral Every other day    furosemide  20 mg Oral Daily    pantoprazole  40 mg Oral Daily    pregabalin  50 mg Oral QHS        PRN:  acetaminophen, labetalol, methocarbamoL, sodium chloride 0.9%    Antibiotics and Day Number of Therapy:  None    Lines and Day Number of Therapy:  Peripheral Intravenous Line  Duration       Peripheral IV - Single Lumen 10/18/23 1316 20 G Distal;Posterior;Right Forearm <1 day   Drain  Duration  Female External Urinary Catheter 10/18/23 0931 1 day   Wound  Duration       Altered Skin Integrity 05/12/23 2305 Right anterior;distal;lower Leg #1       Assessment:      Bridget Montgomery is a 88 y.o.female with PMH of CKD4, COPD, HTN, prediabetes (A1c 6.1 3/2023), DVT (2022), and dens fracture (surgery in November) presented for diplopia onset 1 day. Patient states that her double vision is improving after receiving treatment with a load of ASA and plavix as per neurology recommendations. This was concerning for a stroke; however, CTA head and neck was negative for acute processes. MRI showed equivocal lesion in the left paramedian midbrain, which ischemic event would be difficult to exclude. Patient is clinically stable without any new deficits and reports that she is improving.     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  - NIHSS3 on presentation  - CTH without acute intracranial process  - CTA without large vessel occlusion  - MRI brain demonstrated equivocal punctate lesion in the left paramedian midbrain, which because of study is difficult to exclude ischemic process  - TTE with bubble study ordered and demonstrated EF 61%, moderate mitral annular calcifications and mild regurgitation, and PA pressure of 39  - Loaded with  and plavix 300; continue DAPT x 21 days then aspirin only daily for life  - High intensity statin atorvastatin to 80mg daily  - Neurology consulted and following recommendations  - PT/OT/SLP consulted and recommend assistance because she is fall risk  - Opthalmology follow up outpatient     CKD 4:  - Follows with Dr. Lyons outpatient   - Avoid nephrotoxins as able   - Daily CMP  - Continue lasix 20 mg daily     HTN  -held for permissive HTN post stroke   -restart amlodipine 10 mg daily now that she is out of permissive HTN timeframe     Spinal Stenosis  C1-C2 dens fracture  - Follows with neurosurgery  - Scheduled for surgery on 11/10/23; now cancelled for now post acute stroke  - Continue lyrica 50mg nightly     Anemia of Chronic Disease  - Baseline Hb 10-11  - Normocytic anemia   - Most recent iron  studies with ferritin 34 and TIBC 428 on this admission  - Will start on ferrous sulfate tablet every other day     Chronic Constipation  - Continue home miralax      Osteoarthritis  - Chronic pain to R shoulder, stable  - tylenol prn     H/o Type 2 Diabetes  - Most recent A1c <6  - Continue to monitor      Diet: Regular  DVT ppx: lovenox 40 mg  Code: Full     Dispo: She is clinically stable and received an MRI of head today; appreciate neurology and PT recommendations; will stay on floor for further management       Ben Miranda  [facility]  [facility] IM Service Team B    [facility] Medicine Hospitalist Pager numbers:   [facility] Hospitalist Medicine Team A (Jen/Prabhjot): [phone]  [facility] Hospitalist Medicine Team B (Fredy/Eliana):  [phone]

## 2023-10-19 NOTE — PLAN OF CARE
10/19/23 1127   Post-Acute Status   Post-Acute Authorization Placement   Post-Acute Placement Status Referrals Sent     Updated daughters to status of placement. Pt choices noted. PENDING ACCEPTING FACILITY.    1351 pm - [facility] will not have bed until next Thursday. Pt choices list from [facility] emailed and texted. JUSTO left with Liliana.

## 2023-10-19 NOTE — PLAN OF CARE
Problem: Adult Inpatient Plan of Care  Goal: Plan of Care Review  Outcome: Ongoing, Progressing  Flowsheets (Taken 10/19/2023 0402)  Plan of Care Reviewed With:   patient   daughter     Problem: Adjustment to Illness (Stroke, Ischemic/Transient Ischemic Attack)  Goal: Optimal Coping  Outcome: Ongoing, Progressing  Intervention: Support Psychosocial Response to Stroke  Flowsheets (Taken 10/19/2023 0402)  Supportive Measures: active listening utilized  Family/Support System Care:   presence promoted   support provided   involvement promoted   Plan of care progressing.

## 2023-10-19 NOTE — TELEPHONE ENCOUNTER
Returned pt's call, pt stated that she will have to cancel her surgery due to her having a mini stroke and being admitted into the hospital. Pt stated that she will contact us when she can reschedule. I stated to pt that I will send a message to our surgery scheduler. Pt voiced understanding

## 2023-10-19 NOTE — PT/OT/SLP PROGRESS
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  She presents with .The primary encounter diagnosis was Stroke. Diagnoses of Diplopia, Closed odontoid fracture, sequela, Nonintractable headache, unspecified chronicity pattern, unspecified headache type, and Other cerebral infarction were also pertinent to this visit.  . Performance deficits affecting function are weakness, gait instability, decreased upper extremity function, decreased ROM, impaired endurance, impaired balance, decreased lower extremity function, decreased safety awareness, impaired self care skills, impaired functional mobility, impaired skin.     Progression this date. Moderate assist to regain balance after right sided lateral balance loss during turning during out of room mobility with rolling walker. Endorses x1 episode of diplopia during functional mobility improving to no diplopia on additional trial with use of glasses occluding vision of one eye. With addition of wearing tennis shoes, use of rolling walker, and elevating toilet with bedside commode frame over toilet patient improved functional mobility / transfer to bathroom with minimal assist. Requires moderate assist for LB ADLs. Recommending moderate intensity therapy when medically stable.           Rehab Prognosis:  Good; patient would benefit from acute skilled OT services to address these deficits and reach maximum level of function.       Plan:     Patient to be seen 4 x/week to address the above listed problems via self-care/home  management, therapeutic activities, therapeutic exercises, neuromuscular re-education  Plan of Care Expires: 11/15/23  Plan of Care Reviewed with: patient, daughter    Subjective     Chief Complaint: indicates diplopia  x1 during session; indicates chronic BUE stiffness  Patient/Family Comments/goals: indicates that she has in the past had a fall 2/2 unable to self recover balance when making a turn to the right  Pain/Comfort:  Pain Rating 1: 7/10 (B shoulders)  Pain Addressed 1: Reposition, Cessation of Activity, Nurse notified  Pain Rating Post-Intervention 1: other (see comments) (indicates no additional pain complaints; declines need for pain medicine / further repositioning)    Objective:     Communicated with: nursing prior to session.  Patient found HOB elevated with bed alarm, peripheral IV, PureWick, telemetry upon OT entry to room.    General Precautions: Standard, droplet, vision impaired    Orthopedic Precautions:spinal precautions (soft C collar in place)  Braces: Cervical collar  Respiratory Status: Room air     Occupational Performance:     Bed Mobility:    Patient completed Supine to Sit with contact guard assistance and verbal cues for modified log roll technique  Patient completed Sit to Supine with stand by assistance and increased time      Functional Mobility/Transfers:  Patient completed Sit <> Stand Transfer with contact guard assistance  with  rolling walker   Patient completed Toilet Transfer Step Transfer technique with minimum assistance with  rolling walker and addition of bedside commode frame over true toilet  Functional Mobility:   1 trial mobility from bed to bathroom to bed: minimal assist with rolling walker without complaint of diplopia and without eye patch glasses to occlude one eye  2 trials mobility out to hallway and return to bed: trial 1 - Without eye patch, indicates diplopia, minimal assist with rolling walker, increasing to moderate assist 2/2 right side lateal LOB when  turning. Trial 2 - with eye patch, no diploplia, minimal assist with rolling walker for balance.    Activities of Daily Living:  Lower Body Dressing: moderate assistance ; cross leg technique; able to place shoes on but unable to manage laces; min balance cues for standing while managing clothing  Toileting: moderate assistance ; purewick removed; minimal verbal cues for lateral lean technique in seated    Therapeutic Exercise:  Supine warmup: 2x10 reps B ankle pumps  Seated eob: 2 x8 B shoulder taps (elbow flex/ext); 2 x10 alternating knee raises while engaging core; 2 x8 B shoulder retraction squeezes. SBA sitting eob; first trial therapist performed with patient      [facility] 6 Click ADL: 16    Treatment & Education:  Patient agreeable to session, indicating no diplopia at rest, indicating urge to use bathroom. Performed ankle pumps as above while therapist adjusted / fit bedside commode frame over true toilet to support improved efficacy. Functional mobility / LB ADL training performed as above in context of bathroom then returned to eob seated. Performed above seated strengthening exercises. 2 trials of functional mobility in hallway as above. 2/2 complaint of diplopia, used glasses occluding x1 eye resolving deficit. Moderate assist x1 event due to right sided LOB during turn. Returned to bed. Heels floated.    Patient left HOB elevated with all lines intact, call button in reach, bed alarm on, and nursing notified    GOALS:   Multidisciplinary Problems       Occupational Therapy Goals          Problem: Occupational Therapy    Goal Priority Disciplines Outcome Interventions   Occupational Therapy Goal     OT, PT/OT Ongoing, Progressing    Description: Goals to be met by: 11/15/2023     Patient will increase functional independence with ADLs by performing:    UE Dressing with Set-up Assistance with overhead clothing via adaptive method 2/2 limited BUE ROM  LE Dressing with Contact Guard Assistance.  Toileting from  toilet with Stand-by Assistance for hygiene and clothing management.   Toilet transfer to toilet with Stand-by Assistance with use of rolling walker.  Functional mobility to / from bathroom with use of compensatory vision techniques as needed (I.e unilateral vision occlusion) to / from bathroom with rolling walker and SBA.  100% patient or family reciprocation of fall risk reduction techniques and activity modifications to maximize independence and return to least restrictive environment                         Time Tracking:     OT Date of Treatment: 10/19/23  OT Start Time: 1430  OT Stop Time: 1502  OT Total Time (min): 32 min    Billable Minutes:Self Care/Home Management 12 min  Therapeutic Activity 12 min  Therapeutic Exercise 8 min    OT/BECKA: OT          10/19/2023

## 2023-10-20 LAB
ALBUMIN SERPL BCP-MCNC: 2.8 G/DL (ref 3.5–5.2)
ALP SERPL-CCNC: 137 U/L (ref 55–135)
ALT SERPL W/O P-5'-P-CCNC: 13 U/L (ref 10–44)
ANION GAP SERPL CALC-SCNC: 9 MMOL/L (ref 8–16)
AST SERPL-CCNC: 23 U/L (ref 10–40)
BILIRUB SERPL-MCNC: 0.2 MG/DL (ref 0.1–1)
BUN SERPL-MCNC: 31 MG/DL (ref 8–23)
CALCIUM SERPL-MCNC: 8.8 MG/DL (ref 8.7–10.5)
CHLORIDE SERPL-SCNC: 103 MMOL/L (ref 95–110)
CO2 SERPL-SCNC: 29 MMOL/L (ref 23–29)
CREAT SERPL-MCNC: 1.4 MG/DL (ref 0.5–1.4)
EST. GFR  (NO RACE VARIABLE): 36 ML/MIN/1.73 M^2
GLUCOSE SERPL-MCNC: 115 MG/DL (ref 70–110)
MAGNESIUM SERPL-MCNC: 2.1 MG/DL (ref 1.6–2.6)
PHOSPHATE SERPL-MCNC: 4.3 MG/DL (ref 2.7–4.5)
POCT GLUCOSE: 126 MG/DL (ref 70–110)
POTASSIUM SERPL-SCNC: 4 MMOL/L (ref 3.5–5.1)
PROT SERPL-MCNC: 6 G/DL (ref 6–8.4)
SODIUM SERPL-SCNC: 141 MMOL/L (ref 136–145)

## 2023-10-20 PROCEDURE — 97110 THERAPEUTIC EXERCISES: CPT

## 2023-10-20 PROCEDURE — 11000001 HC ACUTE MED/SURG PRIVATE ROOM

## 2023-10-20 PROCEDURE — 84100 ASSAY OF PHOSPHORUS: CPT | Performed by: STUDENT IN AN ORGANIZED HEALTH CARE EDUCATION/TRAINING PROGRAM

## 2023-10-20 PROCEDURE — 97530 THERAPEUTIC ACTIVITIES: CPT | Mod: CQ

## 2023-10-20 PROCEDURE — 25000003 PHARM REV CODE 250

## 2023-10-20 PROCEDURE — 25000003 PHARM REV CODE 250: Performed by: STUDENT IN AN ORGANIZED HEALTH CARE EDUCATION/TRAINING PROGRAM

## 2023-10-20 PROCEDURE — 63600175 PHARM REV CODE 636 W HCPCS: Performed by: STUDENT IN AN ORGANIZED HEALTH CARE EDUCATION/TRAINING PROGRAM

## 2023-10-20 PROCEDURE — 97530 THERAPEUTIC ACTIVITIES: CPT

## 2023-10-20 PROCEDURE — 83735 ASSAY OF MAGNESIUM: CPT | Performed by: STUDENT IN AN ORGANIZED HEALTH CARE EDUCATION/TRAINING PROGRAM

## 2023-10-20 PROCEDURE — 94761 N-INVAS EAR/PLS OXIMETRY MLT: CPT

## 2023-10-20 PROCEDURE — 80053 COMPREHEN METABOLIC PANEL: CPT | Performed by: STUDENT IN AN ORGANIZED HEALTH CARE EDUCATION/TRAINING PROGRAM

## 2023-10-20 PROCEDURE — 99900035 HC TECH TIME PER 15 MIN (STAT)

## 2023-10-20 PROCEDURE — 36415 COLL VENOUS BLD VENIPUNCTURE: CPT | Performed by: STUDENT IN AN ORGANIZED HEALTH CARE EDUCATION/TRAINING PROGRAM

## 2023-10-20 PROCEDURE — 97535 SELF CARE MNGMENT TRAINING: CPT

## 2023-10-20 RX ORDER — MAG HYDROX/ALUMINUM HYD/SIMETH 200-200-20
30 SUSPENSION, ORAL (FINAL DOSE FORM) ORAL EVERY 6 HOURS PRN
Status: DISPENSED | OUTPATIENT
Start: 2023-10-20 | End: 2023-10-22

## 2023-10-20 RX ORDER — POLYETHYLENE GLYCOL 3350 17 G/17G
17 POWDER, FOR SOLUTION ORAL DAILY
Status: DISCONTINUED | OUTPATIENT
Start: 2023-10-20 | End: 2023-10-23 | Stop reason: HOSPADM

## 2023-10-20 RX ORDER — LISINOPRIL 5 MG/1
5 TABLET ORAL DAILY
Status: DISCONTINUED | OUTPATIENT
Start: 2023-10-20 | End: 2023-10-23 | Stop reason: HOSPADM

## 2023-10-20 RX ORDER — PANTOPRAZOLE SODIUM 40 MG/1
40 TABLET, DELAYED RELEASE ORAL DAILY PRN
Status: DISCONTINUED | OUTPATIENT
Start: 2023-10-20 | End: 2023-10-20

## 2023-10-20 RX ADMIN — ATORVASTATIN CALCIUM 80 MG: 40 TABLET, FILM COATED ORAL at 10:10

## 2023-10-20 RX ADMIN — ENOXAPARIN SODIUM 40 MG: 40 INJECTION SUBCUTANEOUS at 04:10

## 2023-10-20 RX ADMIN — PREGABALIN 50 MG: 50 CAPSULE ORAL at 09:10

## 2023-10-20 RX ADMIN — ASPIRIN 81 MG CHEWABLE TABLET 81 MG: 81 TABLET CHEWABLE at 10:10

## 2023-10-20 RX ADMIN — PANTOPRAZOLE SODIUM 40 MG: 40 TABLET, DELAYED RELEASE ORAL at 10:10

## 2023-10-20 RX ADMIN — AMLODIPINE BESYLATE 10 MG: 5 TABLET ORAL at 10:10

## 2023-10-20 RX ADMIN — POLYETHYLENE GLYCOL 3350 17 G: 17 POWDER, FOR SOLUTION ORAL at 03:10

## 2023-10-20 RX ADMIN — ACETAMINOPHEN 650 MG: 325 TABLET ORAL at 06:10

## 2023-10-20 RX ADMIN — LISINOPRIL 5 MG: 5 TABLET ORAL at 10:10

## 2023-10-20 RX ADMIN — ALUMINUM HYDROXIDE, MAGNESIUM HYDROXIDE, AND SIMETHICONE 30 ML: 200; 200; 20 SUSPENSION ORAL at 06:10

## 2023-10-20 RX ADMIN — FUROSEMIDE 20 MG: 20 TABLET ORAL at 10:10

## 2023-10-20 RX ADMIN — CLOPIDOGREL BISULFATE 75 MG: 75 TABLET ORAL at 10:10

## 2023-10-20 NOTE — PT/OT/SLP PROGRESS
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  She presents with ..The primary encounter diagnosis was Stroke. Diagnoses of Diplopia, Closed odontoid fracture, sequela, Nonintractable headache, unspecified chronicity pattern, unspecified headache type, and Other cerebral infarction were also pertinent to this visit.  . Performance deficits affecting function are weakness, impaired endurance, impaired self care skills, impaired functional mobility, gait instability, impaired balance, pain, decreased lower extremity function, decreased upper extremity function, decreased ROM, impaired muscle length.     Progressing well. No diplopia during session. Advanced functional strength as tolerating multiple successive sit to stands with SBA, but needs partial assist for lower body dressing /toileting. Performing functional mobility with rolling walker and minimal to CGA/SBA. Recommending moderate intensity therapy when medically stable.      Rehab Prognosis:  Good; patient would benefit from acute skilled OT services to address these deficits and reach maximum level of function.       Plan:     Patient to be seen 4 x/week to address the above listed problems via self-care/home management, therapeutic activities, therapeutic exercises, neuromuscular re-education  Plan of Care Expires: 11/15/23  Plan of Care Reviewed with: patient, daughter    Subjective     Chief Complaint: reports mild knee pain after activity but also reports feeling better to move  Patient/Family Comments/goals: 3 daughters in room  indicating desire for patient to get up to the commode  Pain/Comfort:  Pain Rating 1: 0/10  Pain Addressed 1: Reposition  Pain Rating Post-Intervention 1: other (see comments) (indicates mild B knee discomfort resolving with return to rest)    Objective:     Communicated with: nursing prior to session.  Patient found HOB elevated with bed alarm, peripheral IV, telemetry, PureWick upon OT entry to room.    General Precautions: Standard, fall    Orthopedic Precautions:spinal precautions  Braces: Cervical collar  Respiratory Status: Room air     Occupational Performance:     Bed Mobility:    Patient completed Supine to Sit with stand by assistance  Patient completed Sit to Supine with stand by assistance     Functional Mobility/Transfers:  Patient completed Sit <> Stand Transfer with CGA first trial, progress to SBA  with  rolling walker   Patient completed Toilet Transfer Step Transfer technique with stand by assistance and WITH USE of BSC over toilet with  rolling walker and min verbal cues  Functional Mobility: ambulation to / from bed to bathroom to bed with CGA with rolling walker. X2 trials of brief out of room mobility with rolling walker with minimal assist trial 1; CGA to SBA on trial 2; seated rest break between tasks - reeducated between tasks for importance of visual gaze fixation on area ahead    Activities of Daily Living:  Lower Body Dressing: moderate assistance ; cross leg technique  Toileting: moderate assistance ; instructed on positioning / safety      [facility] 6 Click ADL: 17    Treatment & Education:  Patient alert and oriented x4. Patient's three daughters in room, including youngest daughter whom patient resides with (who is currently in a knee scooter and per her report unable to bear weight through her foot). Educated family on OT / POC.  Patient guided for ADL training in context of bathroom.   Returned to sitting eob. Instructed for 2 x5 repetitive sit to stand for functional exercise with SBA.  Educated patient/ family on therapeutic ex to perform during day inclusive of gentle B shoulder isometric /scapular retraction techniques.   Guided for x2 trials for functional mobility as above. Continued ed with patient and family on basic fall risk reduction strategies.  Returned to bed. Bed in chair position.    Patient left with bed in chair position with all lines intact, call button in reach, bed alarm on, and nursing notified    GOALS:   Multidisciplinary Problems       Occupational Therapy Goals          Problem: Occupational Therapy    Goal Priority Disciplines Outcome Interventions   Occupational Therapy Goal     OT, PT/OT Ongoing, Progressing    Description: Goals to be met by: 11/15/2023     Patient will increase functional independence with ADLs by performing:    UE Dressing with Set-up Assistance with overhead clothing via adaptive method 2/2 limited BUE ROM  LE Dressing with Contact Guard Assistance.  Toileting from toilet with Stand-by Assistance for hygiene and clothing management.   Toilet transfer to toilet with Stand-by Assistance with use of rolling walker.  Functional mobility to / from bathroom with use of compensatory vision techniques as needed (I.e unilateral vision occlusion) to / from bathroom with rolling walker and SBA.  100% patient or family reciprocation of fall risk reduction techniques and activity modifications to maximize independence and return to least restrictive environment                         Time Tracking:     OT Date of Treatment: 10/20/23  OT Start Time: 0935  OT Stop Time: 1008  OT Total Time (min): 33 min    Billable Minutes:Self Care/Home Management 10 min  Therapeutic Activity 15 min  Therapeutic Exercise 8 min    OT/BECKA: OT          10/20/2023

## 2023-10-20 NOTE — PROGRESS NOTES
[facility] IM Resident BELLA Progress Note    Subjective:      No acute events overnight. Vital signs stable. Patient is awake and laying in bed this morning in no acute distress with daughter at bedside. She says she still has some double vision that is somewhat improved from admission.  She denies sensory changes, speech difficulty, dysphagia, or headache. Her daughters would like her to go to [facility] to work on her strength and balance.      Objective:   Last 24 Hour Vital Signs:  BP  Min: 138/78  Max: 176/85  Temp  Av °F (36.7 °C)  Min: 97.6 °F (36.4 °C)  Max: 98.3 °F (36.8 °C)  Pulse  Av.1  Min: 67  Max: 80  Resp  Av.7  Min: 18  Max: 20  SpO2  Av.6 %  Min: 94 %  Max: 98 %  I/O last 3 completed shifts:  In: -   Out: 1300 [Urine:1300]    Physical Examination:  Physical Exam  Vitals reviewed.   Constitutional:       Appearance: Normal appearance.   HENT:      Head: Normocephalic.      Nose: Nose normal.   Eyes:      General:         Right eye: No discharge.         Left eye: No discharge.      Extraocular Movements: Extraocular movements intact.      Conjunctiva/sclera: Conjunctivae normal.      Pupils: Pupils are equal, round, and reactive to light.      Comments: Contralateral eye minimally reacts with light; visual field intact without deficits; no nystagmus appreciated with gazing   Neck:      Comments: C-spine collar in place  Cardiovascular:      Rate and Rhythm: Normal rate and regular rhythm.      Pulses: Normal pulses.      Heart sounds: Normal heart sounds.   Pulmonary:      Effort: Pulmonary effort is normal.      Breath sounds: Normal breath sounds.   Abdominal:      General: Abdomen is flat.      Palpations: Abdomen is soft.   Musculoskeletal:         General: Normal range of motion.      Right lower leg: No edema.      Left lower leg: No edema.   Skin:     General: Skin is warm and dry.   Neurological:      Mental Status: She is alert and oriented to person, place, and time.      Comments:  Extraocular movements intact; pupils reactive with light, however, minimally reactive in contralateral eye; no double vision reported with gazing; visual fields and peripheral fields intact on my examination     Laboratory:  Laboratory Data Reviewed: yes  Pertinent Findings:  Ferritin 24, TIBC 428    Microbiology Data Reviewed: yes  Pertinent Findings:  none    Other Results:  EKG (my interpretation): Poor baseline, NSR, rate 70, nl axis, nl intervals, no ST changes    Radiology Data Reviewed: yes  Pertinent Findings:    MRI Brain Without Contrast 10/18/2023  Impression:       Equivocal punctate focus of restricted diffusion in the left paramedian midbrain without convincing signal abnormality on additional sequences may be artifactual, however a tiny focus of recent ischemia difficult to exclude.      CTA Head and Neck 10/17/2023  Impression:       No acute abnormality.    No high-grade stenosis or major vessel occlusion.    Stable changes in the spine and nodules within the thyroid gland.       Current Medications:     Infusions:       Scheduled:   acetaminophen  650 mg Oral Once    amLODIPine  10 mg Oral Daily    aspirin  81 mg Oral Daily    atorvastatin  80 mg Oral Daily    clopidogreL  75 mg Oral Daily    enoxparin  40 mg Subcutaneous Daily    ferrous sulfate  1 tablet Oral Every other day    furosemide  20 mg Oral Daily    lisinopriL  5 mg Oral Daily    pantoprazole  40 mg Oral Daily    polyethylene glycol  17 g Oral Daily    pregabalin  50 mg Oral QHS        PRN:  acetaminophen, labetalol, methocarbamoL, sodium chloride 0.9%    Antibiotics and Day Number of Therapy:  None    Lines and Day Number of Therapy:  Peripheral Intravenous Line  Duration       Peripheral IV - Single Lumen 10/18/23 1316 20 G Distal;Posterior;Right Forearm <1 day   Drain  Duration  Female External Urinary Catheter 10/18/23 0931 1 day   Wound  Duration       Altered Skin Integrity 05/12/23 2305 Right anterior;distal;lower Leg #1        Assessment:     Bridget Montgomery is a 88 y.o.female with PMH of CKD4, COPD, HTN, prediabetes (A1c 6.1 3/2023), DVT (2022), and dens fracture (surgery in November) presented for diplopia onset 1 day. Patient states that her double vision is improving after receiving treatment with a load of ASA and plavix as per neurology recommendations. This was concerning for a stroke; however, CTA head and neck was negative for acute processes. MRI showed equivocal lesion in the left paramedian midbrain, which ischemic event would be difficult to exclude. Patient is clinically stable without any new deficits and reports that she is improving.     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  - NIHSS3 on presentation  - CTH without acute intracranial process  - CTA without large vessel occlusion  - MRI brain demonstrated equivocal punctate lesion in the left paramedian midbrain, which because of study is difficult to exclude ischemic process  - TTE with bubble study ordered and demonstrated EF 61%, moderate mitral annular calcifications and mild regurgitation, and PA pressure of 39  - Loaded with  and plavix 300; continue DAPT x 21 days then aspirin only daily for life  - High intensity statin atorvastatin to 80mg daily  - Neurology consulted and following recommendations  - PT/OT/SLP consulted and recommend assistance because she is fall risk  - Opthalmology follow up outpatient     CKD 4:  - Follows with Dr. Lyons outpatient   - Avoid nephrotoxins as able   - Daily CMP  - Continue lasix 20 mg daily     HTN  -held for permissive HTN post stroke   -restart amlodipine 10 mg daily now that she is out of permissive HTN timeframe     Spinal Stenosis  C1-C2 dens fracture  - Follows with neurosurgery  - Scheduled for surgery on 11/10/23; now cancelled for now post acute stroke  - Continue lyrica 50mg nightly     Anemia of Chronic Disease  - Baseline Hb 10-11  - Normocytic anemia   -  Most recent iron studies with ferritin 34 and TIBC 428 on this admission  - Will start on ferrous sulfate tablet every other day     Chronic Constipation  - Continue home miralax      Osteoarthritis  - Chronic pain to R shoulder, stable  - tylenol prn     H/o Type 2 Diabetes  - Most recent A1c <6  - Continue to monitor      Diet: Regular  DVT ppx: lovenox 40 mg  Code: Full     Dispo: Appreciate neurology and PT recommendations; will stay on floor for further management       Pat Colon  [facility] HO-I  [facility] IM Service Team B    [facility] Medicine Hospitalist Pager numbers:   [facility] Hospitalist Medicine Team A (Jen/Prabhjot): [phone]  [facility] Hospitalist Medicine Team B (Fredy/Eliana):  [phone]

## 2023-10-20 NOTE — PLAN OF CARE
Progressing well. No diplopia during session. Advanced functional strength as tolerating multiple successive sit to stands with SBA, but needs partial assist for lower body dressing /toileting. Performing functional mobility with rolling walker and minimal to CGA/SBA. Recommending moderate intensity therapy when medically stable.    Problem: Occupational Therapy  Goal: Occupational Therapy Goal  Description: Goals to be met by: 11/15/2023     Patient will increase functional independence with ADLs by performing:    UE Dressing with Set-up Assistance with overhead clothing via adaptive method 2/2 limited BUE ROM  LE Dressing with Contact Guard Assistance.  Toileting from toilet with Stand-by Assistance for hygiene and clothing management.   Toilet transfer to toilet with Stand-by Assistance with use of rolling walker.  Functional mobility to / from bathroom with use of compensatory vision techniques as needed (I.e unilateral vision occlusion) to / from bathroom with rolling walker and SBA.  100% patient or family reciprocation of fall risk reduction techniques and activity modifications to maximize independence and return to least restrictive environment    Outcome: Ongoing, Progressing

## 2023-10-20 NOTE — PT/OT/SLP PROGRESS
Physical Therapy Treatment    Patient Name:  Bridget Montgomery   MRN:  [id number]    Recommendations:     Discharge Recommendations: Moderate Intensity Therapy  Discharge Equipment Recommendations: none  Barriers to discharge:  Decreased functional mobility.     Assessment:     Bridget Montgomery is a 88 y.o. 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  She presents with the following impairments/functional limitations: weakness, impaired endurance, impaired balance, decreased coordination, impaired self care skills, impaired functional mobility, decreased lower extremity function, gait instability.    Rehab Prognosis: Fair; patient would benefit from acute skilled PT services to address these deficits and reach maximum level of function.    Recent Surgery: * No surgery found *      Plan:     During this hospitalization, patient to be seen 5 x/week to address the identified rehab impairments via gait training, therapeutic activities, therapeutic exercises, neuromuscular re-education and progress toward the following goals:    Plan of Care Expires:  11/17/23    Subjective     Chief Complaint: Pt with no complaints or concerns at this time.   Patient/Family Comments/goals: Pt agreeable to PT treatment.   Pain/Comfort:  Pain Rating 1: 0/10      Objective:     Communicated with nursing prior to session.  Patient found HOB elevated with peripheral IV, telemetry upon PT entry to room.     General Precautions: Standard, vision impaired  Orthopedic Precautions: spinal precautions  Braces: Cervical collar  Respiratory Status: Room air     Functional Mobility:  Bed Mobility:     Supine to Sit: stand by assistance  Sit to Supine: stand by assistance  Transfers:     Sit to Stand:  stand by assistance with rolling walker  Gait: Pt ambulated 80-90 ft with RW, CGA requiring verbal cueing on proper RW usage to maintain upright posture and improve quality of gait to prevent  LOB and reduce fall risk.    Balance: Pt with good sitting and fair standing balance.       AM-PAC 6 CLICK MOBILITY  Turning over in bed (including adjusting bedclothes, sheets and blankets)?: 3  Sitting down on and standing up from a chair with arms (e.g., wheelchair, bedside commode, etc.): 3  Moving from lying on back to sitting on the side of the bed?: 3  Moving to and from a bed to a chair (including a wheelchair)?: 3  Need to walk in hospital room?: 3  Climbing 3-5 steps with a railing?: 1  Basic Mobility Total Score: 16       Treatment & Education:      Patient left HOB elevated with all lines intact, call button in reach, and family members present..    GOALS:   Multidisciplinary Problems       Physical Therapy Goals          Problem: Physical Therapy    Goal Priority Disciplines Outcome Goal Variances Interventions   Physical Therapy Goal     PT, PT/OT Ongoing, Progressing     Description: Goals to be met by: 2023     Patient will increase functional independence with mobility by performin. Supine to sit with Modified [place]  2. Sit to supine with Modified [place]  3. Rolling to Left and Right with Modified [place].  4. Sit to stand transfer with Stand-by Assistance  5. Bed to chair transfer with Stand-by Assistance using Rolling Walker  6. Gait  x 100 feet with Stand-by Assistance using Rolling Walker.                          Time Tracking:     PT Received On: 10/20/23  PT Start Time: 1058     PT Stop Time: 1110  PT Total Time (min): 12 min     Billable Minutes: Therapeutic Activity 12    Treatment Type: Treatment  PT/PTA: PTA     Number of PTA visits since last PT visit: 1     10/20/2023

## 2023-10-20 NOTE — PLAN OF CARE
10/20/23 1037   Post-Acute Status   Post-Acute Authorization Placement   Post-Acute Placement Status Pending post-acute provider review/more information requested  (Met with pt and daughters at bedside. Updated to barrier for first choice facility of no bed available. Disussed current recs for [facility] and gave several accepting facilities for SNF. [facility] does not have any beds avialable.)     They are refusing placement at accepting facilities for SNF. Questions answered about other [facility] facilities on [address]. Informed that at this time she has not been recommended for IPR and barrier ins auth for IPR care level. All barriers discussed. They would like to proceed with trying to get IPR auth. Referral discussed with [facility].

## 2023-10-21 LAB
ALBUMIN SERPL BCP-MCNC: 2.9 G/DL (ref 3.5–5.2)
ALP SERPL-CCNC: 147 U/L (ref 55–135)
ALT SERPL W/O P-5'-P-CCNC: 24 U/L (ref 10–44)
ANION GAP SERPL CALC-SCNC: 12 MMOL/L (ref 8–16)
AST SERPL-CCNC: 46 U/L (ref 10–40)
BILIRUB SERPL-MCNC: 0.3 MG/DL (ref 0.1–1)
BUN SERPL-MCNC: 43 MG/DL (ref 8–23)
CALCIUM SERPL-MCNC: 8.7 MG/DL (ref 8.7–10.5)
CHLORIDE SERPL-SCNC: 104 MMOL/L (ref 95–110)
CO2 SERPL-SCNC: 24 MMOL/L (ref 23–29)
CREAT SERPL-MCNC: 1.4 MG/DL (ref 0.5–1.4)
EST. GFR  (NO RACE VARIABLE): 36 ML/MIN/1.73 M^2
GLUCOSE SERPL-MCNC: 94 MG/DL (ref 70–110)
MAGNESIUM SERPL-MCNC: 2.1 MG/DL (ref 1.6–2.6)
PHOSPHATE SERPL-MCNC: 4 MG/DL (ref 2.7–4.5)
POCT GLUCOSE: 138 MG/DL (ref 70–110)
POCT GLUCOSE: 99 MG/DL (ref 70–110)
POTASSIUM SERPL-SCNC: 3.8 MMOL/L (ref 3.5–5.1)
PROT SERPL-MCNC: 6 G/DL (ref 6–8.4)
SODIUM SERPL-SCNC: 140 MMOL/L (ref 136–145)
TB INDURATION 48 - 72 HR READ: 0 MM

## 2023-10-21 PROCEDURE — 97110 THERAPEUTIC EXERCISES: CPT | Mod: CQ

## 2023-10-21 PROCEDURE — 97116 GAIT TRAINING THERAPY: CPT | Mod: CQ

## 2023-10-21 PROCEDURE — 99900035 HC TECH TIME PER 15 MIN (STAT)

## 2023-10-21 PROCEDURE — 25000003 PHARM REV CODE 250: Performed by: STUDENT IN AN ORGANIZED HEALTH CARE EDUCATION/TRAINING PROGRAM

## 2023-10-21 PROCEDURE — 83735 ASSAY OF MAGNESIUM: CPT | Performed by: STUDENT IN AN ORGANIZED HEALTH CARE EDUCATION/TRAINING PROGRAM

## 2023-10-21 PROCEDURE — 63600175 PHARM REV CODE 636 W HCPCS: Performed by: STUDENT IN AN ORGANIZED HEALTH CARE EDUCATION/TRAINING PROGRAM

## 2023-10-21 PROCEDURE — 11000001 HC ACUTE MED/SURG PRIVATE ROOM

## 2023-10-21 PROCEDURE — 25000003 PHARM REV CODE 250

## 2023-10-21 PROCEDURE — 84100 ASSAY OF PHOSPHORUS: CPT | Performed by: STUDENT IN AN ORGANIZED HEALTH CARE EDUCATION/TRAINING PROGRAM

## 2023-10-21 PROCEDURE — 36415 COLL VENOUS BLD VENIPUNCTURE: CPT | Performed by: STUDENT IN AN ORGANIZED HEALTH CARE EDUCATION/TRAINING PROGRAM

## 2023-10-21 PROCEDURE — 94761 N-INVAS EAR/PLS OXIMETRY MLT: CPT

## 2023-10-21 PROCEDURE — 80053 COMPREHEN METABOLIC PANEL: CPT | Performed by: STUDENT IN AN ORGANIZED HEALTH CARE EDUCATION/TRAINING PROGRAM

## 2023-10-21 RX ADMIN — FERROUS SULFATE TAB 325 MG (65 MG ELEMENTAL FE) 1 EACH: 325 (65 FE) TAB at 08:10

## 2023-10-21 RX ADMIN — ENOXAPARIN SODIUM 40 MG: 40 INJECTION SUBCUTANEOUS at 04:10

## 2023-10-21 RX ADMIN — CLOPIDOGREL BISULFATE 75 MG: 75 TABLET ORAL at 08:10

## 2023-10-21 RX ADMIN — PANTOPRAZOLE SODIUM 40 MG: 40 TABLET, DELAYED RELEASE ORAL at 08:10

## 2023-10-21 RX ADMIN — ASPIRIN 81 MG CHEWABLE TABLET 81 MG: 81 TABLET CHEWABLE at 08:10

## 2023-10-21 RX ADMIN — POLYETHYLENE GLYCOL 3350 17 G: 17 POWDER, FOR SOLUTION ORAL at 08:10

## 2023-10-21 RX ADMIN — AMLODIPINE BESYLATE 10 MG: 5 TABLET ORAL at 08:10

## 2023-10-21 RX ADMIN — FUROSEMIDE 20 MG: 20 TABLET ORAL at 08:10

## 2023-10-21 RX ADMIN — PREGABALIN 50 MG: 50 CAPSULE ORAL at 09:10

## 2023-10-21 RX ADMIN — LISINOPRIL 5 MG: 5 TABLET ORAL at 08:10

## 2023-10-21 RX ADMIN — ATORVASTATIN CALCIUM 80 MG: 40 TABLET, FILM COATED ORAL at 08:10

## 2023-10-21 NOTE — PLAN OF CARE
Problem: Physical Therapy  Goal: Physical Therapy Goal  Description: Goals to be met by: 2023     Patient will increase functional independence with mobility by performin. Supine to sit with Modified [place]  2. Sit to supine with Modified [place]  3. Rolling to Left and Right with Modified [place].  4. Sit to stand transfer with Stand-by Assistance  5. Bed to chair transfer with Stand-by Assistance using Rolling Walker  6. Gait  x 100 feet with Stand-by Assistance using Rolling Walker.     Outcome: Ongoing, Progressing   Pt continues to work toward all goals. Able to perform 2 ambulation training trials ~80 ft each with RW requiring CGA. Also, able to perform standing therapeutic exercises 1 x 10 BLE's.

## 2023-10-21 NOTE — PROGRESS NOTES
[facility] IM Resident BELLA Progress Note    Subjective:      No acute events overnight. Vital signs stable. Patient is sleeping comfortably in bed this morning in no acute distress with daughter at bedside. Her double vision has improved. She denies sensory changes, speech difficulty, dysphagia, or headache.      Objective:   Last 24 Hour Vital Signs:  BP  Min: 129/73  Max: 150/71  Temp  Av.7 °F (36.5 °C)  Min: 97.5 °F (36.4 °C)  Max: 98.2 °F (36.8 °C)  Pulse  Av.3  Min: 65  Max: 80  Resp  Av.2  Min: 16  Max: 18  SpO2  Av.9 %  Min: 95 %  Max: 99 %  I/O last 3 completed shifts:  In: 540 [P.O.:540]  Out: -     Physical Examination:  Physical Exam  Vitals reviewed.   Constitutional:       Appearance: Normal appearance.   HENT:      Head: Normocephalic.      Nose: Nose normal.   Eyes:      General:         Right eye: No discharge.         Left eye: No discharge.      Extraocular Movements: Extraocular movements intact.      Conjunctiva/sclera: Conjunctivae normal.      Pupils: Pupils are equal, round, and reactive to light.   Neck:      Comments: C-spine collar in place  Cardiovascular:      Rate and Rhythm: Normal rate and regular rhythm.      Pulses: Normal pulses.      Heart sounds: Normal heart sounds.   Pulmonary:      Effort: Pulmonary effort is normal.      Breath sounds: Normal breath sounds.   Abdominal:      General: Abdomen is flat.      Palpations: Abdomen is soft.   Musculoskeletal:         General: Normal range of motion.      Right lower leg: No edema.      Left lower leg: No edema.   Skin:     General: Skin is warm and dry.   Neurological:      Mental Status: She is alert and oriented to person, place, and time.     Laboratory:  Laboratory Data Reviewed: yes    Microbiology Data Reviewed: yes    Radiology Data Reviewed: yes    MRI Brain Without Contrast 10/18/2023  Impression:       Equivocal punctate focus of restricted diffusion in the left paramedian midbrain without convincing signal  abnormality on additional sequences may be artifactual, however a tiny focus of recent ischemia difficult to exclude.      CTA Head and Neck 10/17/2023  Impression:       No acute abnormality.    No high-grade stenosis or major vessel occlusion.    Stable changes in the spine and nodules within the thyroid gland.       Current Medications:     Infusions:       Scheduled:   acetaminophen  650 mg Oral Once    amLODIPine  10 mg Oral Daily    aspirin  81 mg Oral Daily    atorvastatin  80 mg Oral Daily    clopidogreL  75 mg Oral Daily    enoxparin  40 mg Subcutaneous Daily    ferrous sulfate  1 tablet Oral Every other day    furosemide  20 mg Oral Daily    lisinopriL  5 mg Oral Daily    pantoprazole  40 mg Oral Daily    polyethylene glycol  17 g Oral Daily    pregabalin  50 mg Oral QHS        PRN:  acetaminophen, aluminum-magnesium hydroxide-simethicone, labetalol, methocarbamoL, sodium chloride 0.9%    Antibiotics and Day Number of Therapy:  None    Lines and Day Number of Therapy:  Peripheral Intravenous Line  Duration       Peripheral IV - Single Lumen 10/18/23 1316 20 G Distal;Posterior;Right Forearm <1 day   Drain  Duration  Female External Urinary Catheter 10/18/23 0931 1 day   Wound  Duration       Altered Skin Integrity 05/12/23 2305 Right anterior;distal;lower Leg #1       Assessment:     Bridget Montgomery is a 88 y.o.female with PMH of CKD4, COPD, HTN, prediabetes (A1c 6.1 3/2023), DVT (2022), and dens fracture (surgery in November) presented for diplopia onset 1 day. Patient states that her double vision is improving after receiving treatment with a load of ASA and plavix as per neurology recommendations. This was concerning for a stroke; however, CTA head and neck was negative for acute processes. MRI showed equivocal lesion in the left paramedian midbrain, which ischemic event would be difficult to exclude. Patient is clinically stable without any new deficits and reports that she is improving.     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  - NIHSS3 on presentation  - CTH without acute intracranial process  - CTA without large vessel occlusion  - MRI brain demonstrated equivocal punctate lesion in the left paramedian midbrain, which because of study is difficult to exclude ischemic process  - TTE with bubble study ordered and demonstrated EF 61%, moderate mitral annular calcifications and mild regurgitation, and PA pressure of 39  - Loaded with  and plavix 300; continue DAPT x 21 days then aspirin only daily for life  - High intensity statin atorvastatin to 80mg daily  - Neurology consulted and following recommendations  - PT/OT/SLP consulted and recommend assistance because she is fall risk  - Opthalmology follow up outpatient     CKD 4:  - Follows with Dr. Lyons outpatient   - Avoid nephrotoxins as able   - Daily CMP  - Continue lasix 20 mg daily     HTN  -held for permissive HTN post stroke   -restart amlodipine 10 mg daily now that she is out of permissive HTN timeframe     Spinal Stenosis  C1-C2 dens fracture  - Follows with neurosurgery  - Scheduled for surgery on 11/10/23; now cancelled for now post acute stroke  - Continue lyrica 50mg nightly     Anemia of Chronic Disease  - Baseline Hb 10-11  - Normocytic anemia   - Most recent iron studies with ferritin 34 and TIBC 428 on this admission  - Will start on ferrous sulfate tablet every other day     Chronic Constipation  - Continue home miralax      Osteoarthritis  - Chronic pain to R shoulder, stable  - tylenol prn     H/o Type 2 Diabetes  - Most recent A1c <6  - Continue to monitor      Diet: Regular  DVT ppx: lovenox 40 mg  Code: Full     Dispo: Appreciate neurology and PT recommendations; will stay on floor for further management       Pat Colon  \[facility]\"" Internal Medicine HO-I  U IM Service Team B    \[facility]\"" Medicine Hospitalist Pager numbers:   U Hospitalist Medicine Team A (Jen/Prabhjot): [phone]  \[facility]\""  Hospitalist Medicine Team B (Fredy/Eliana):  [phone]

## 2023-10-21 NOTE — PT/OT/SLP PROGRESS
"Physical Therapy Treatment    Patient Name:  Bridget Montgomery   MRN:  [id number]    Recommendations:     Discharge Recommendations: Moderate Intensity Therapy  Discharge Equipment Recommendations: none  Barriers to discharge:  Fall risk    Assessment:     Bridget Montgomery is a 88 y.o. 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  She presents with the following impairments/functional limitations: weakness, impaired endurance, impaired functional mobility, gait instability, impaired balance, visual deficits, decreased coordination, decreased upper extremity function, decreased lower extremity function, decreased ROM, pain, impaired coordination, orthopedic precautions. Pt able to perform 2 ambulation training trials with RW requiring CGA. Would benefit from continued PT services to increase pt's out of bed therapeutic activity and exercises.    Rehab Prognosis: Fair; patient would benefit from acute skilled PT services to address these deficits and reach maximum level of function.    Recent Surgery: * No surgery found *      Plan:     During this hospitalization, patient to be seen 5 x/week to address the identified rehab impairments via gait training, therapeutic activities, therapeutic exercises, neuromuscular re-education and progress toward the following goals:    Plan of Care Expires:  11/17/23    Subjective     Chief Complaint: None Expressed  Patient/Family Comments/goals: "I feel better today".  Pain/Comfort:  Pain Rating 1: 0/10 (States knees hurt when walking.)  Location - Side 1: Bilateral  Location - Orientation 1: generalized  Location 1: knee  Pain Addressed 1: Reposition  Pain Rating Post-Intervention 1:  (Not rated)      Objective:     Communicated with nurse prior to session.  Patient found  on toilet in bathroom  with peripheral IV, telemetry upon PT entry to room.     General Precautions: Standard, vision impaired  Orthopedic Precautions: " spinal precautions  Braces: Cervical collar  Respiratory Status: Room air     Functional Mobility:  Bed Mobility:     Rolling Left:  stand by assistance  Scooting: stand by assistance  Sit to Supine: stand by assistance  Transfers:     Sit to Stand:  stand by assistance with rolling walker  Gait: ~80 ft by 2 trials with RW requiring CGA      AM-PAC 6 CLICK MOBILITY  Turning over in bed (including adjusting bedclothes, sheets and blankets)?: 4  Sitting down on and standing up from a chair with arms (e.g., wheelchair, bedside commode, etc.): 3  Moving from lying on back to sitting on the side of the bed?: 3  Moving to and from a bed to a chair (including a wheelchair)?: 3  Need to walk in hospital room?: 3  Climbing 3-5 steps with a railing?: 2  Basic Mobility Total Score: 18       Treatment & Education:  Upon entering room pt found in bathroom with daughter in room. Pt able to perform self hygiene when finished. Instructed in and performed 1 x 10 sit to stand transfer trials for exercise with RW requiring SBA/CGA. Able to perform 2 ambulation training trials ~80 ft each with RW requiring CGA. Slow and cautious tessa secondary to occasional double vision. Instructed in and performed standing therapeutic exercises 1 x 10 reps BLE consisting of  heel raises and hip add/abd.    Patient left HOB elevated with all lines intact, call button in reach, bed alarm on, nurse notified, and daughter present..    GOALS:   Multidisciplinary Problems       Physical Therapy Goals          Problem: Physical Therapy    Goal Priority Disciplines Outcome Goal Variances Interventions   Physical Therapy Goal     PT, PT/OT Ongoing, Progressing     Description: Goals to be met by: 2023     Patient will increase functional independence with mobility by performin. Supine to sit with Modified [place]  2. Sit to supine with Modified [place]  3. Rolling to Left and Right with Modified [place].  4. Sit to stand  transfer with Stand-by Assistance  5. Bed to chair transfer with Stand-by Assistance using Rolling Walker  6. Gait  x 100 feet with Stand-by Assistance using Rolling Walker.                          Time Tracking:     PT Received On: 10/21/23  PT Start Time: 1014     PT Stop Time: 1040  PT Total Time (min): 26 min     Billable Minutes: Gait Training 16 and Therapeutic Exercise 10    Treatment Type: Treatment  PT/PTA: PTA     Number of PTA visits since last PT visit: 2     10/21/2023

## 2023-10-21 NOTE — PLAN OF CARE
Problem: Adult Inpatient Plan of Care  Goal: Plan of Care Review  Outcome: Ongoing, Not Progressing  Chart check complete. Vitals, orders, labs, and progress notes reviewed. Care plan updated. Will monitor.

## 2023-10-22 PROCEDURE — 63600175 PHARM REV CODE 636 W HCPCS: Performed by: INTERNAL MEDICINE

## 2023-10-22 PROCEDURE — 94761 N-INVAS EAR/PLS OXIMETRY MLT: CPT

## 2023-10-22 PROCEDURE — 99900035 HC TECH TIME PER 15 MIN (STAT)

## 2023-10-22 PROCEDURE — 25000003 PHARM REV CODE 250: Performed by: STUDENT IN AN ORGANIZED HEALTH CARE EDUCATION/TRAINING PROGRAM

## 2023-10-22 PROCEDURE — 11000001 HC ACUTE MED/SURG PRIVATE ROOM

## 2023-10-22 PROCEDURE — 25000003 PHARM REV CODE 250

## 2023-10-22 PROCEDURE — 25000003 PHARM REV CODE 250: Performed by: HEALTH CARE PROVIDER

## 2023-10-22 RX ORDER — FAMOTIDINE 20 MG/1
20 TABLET, FILM COATED ORAL DAILY PRN
Status: DISCONTINUED | OUTPATIENT
Start: 2023-10-22 | End: 2023-10-23 | Stop reason: HOSPADM

## 2023-10-22 RX ORDER — FAMOTIDINE 20 MG/1
20 TABLET, FILM COATED ORAL 2 TIMES DAILY
Status: DISCONTINUED | OUTPATIENT
Start: 2023-10-22 | End: 2023-10-22

## 2023-10-22 RX ORDER — ENOXAPARIN SODIUM 100 MG/ML
30 INJECTION SUBCUTANEOUS EVERY 24 HOURS
Status: DISCONTINUED | OUTPATIENT
Start: 2023-10-22 | End: 2023-10-23 | Stop reason: HOSPADM

## 2023-10-22 RX ADMIN — CLOPIDOGREL BISULFATE 75 MG: 75 TABLET ORAL at 10:10

## 2023-10-22 RX ADMIN — ENOXAPARIN SODIUM 30 MG: 30 INJECTION SUBCUTANEOUS at 05:10

## 2023-10-22 RX ADMIN — PREGABALIN 50 MG: 50 CAPSULE ORAL at 08:10

## 2023-10-22 RX ADMIN — AMLODIPINE BESYLATE 10 MG: 5 TABLET ORAL at 10:10

## 2023-10-22 RX ADMIN — ASPIRIN 81 MG CHEWABLE TABLET 81 MG: 81 TABLET CHEWABLE at 10:10

## 2023-10-22 RX ADMIN — FUROSEMIDE 20 MG: 20 TABLET ORAL at 10:10

## 2023-10-22 RX ADMIN — ATORVASTATIN CALCIUM 80 MG: 40 TABLET, FILM COATED ORAL at 10:10

## 2023-10-22 RX ADMIN — LISINOPRIL 5 MG: 5 TABLET ORAL at 10:10

## 2023-10-22 RX ADMIN — FAMOTIDINE 20 MG: 20 TABLET ORAL at 09:10

## 2023-10-22 RX ADMIN — POLYETHYLENE GLYCOL 3350 17 G: 17 POWDER, FOR SOLUTION ORAL at 10:10

## 2023-10-22 RX ADMIN — PANTOPRAZOLE SODIUM 40 MG: 40 TABLET, DELAYED RELEASE ORAL at 10:10

## 2023-10-22 NOTE — PLAN OF CARE
Plan of care reviewed with patient, pt voiced understanding. NSR on monitor. No acute distress noted. Side rails x2, bed in lowest position, call bell within reach, pt advised to call for assistance. Maintained bed alarm for pt safety. Family at bedside

## 2023-10-22 NOTE — PROGRESS NOTES
[facility] IM Resident BELLA Progress Note    Subjective:      No acute events overnight. Vital signs stable. Patient is sleeping comfortably in bed this morning in no acute distress with daughter at bedside. Tolerating PO without N/V. She denies sensory changes, speech difficulty, dysphagia, or headache.      Objective:   Last 24 Hour Vital Signs:  BP  Min: 130/60  Max: 167/78  Temp  Av.9 °F (36.6 °C)  Min: 97.6 °F (36.4 °C)  Max: 98.3 °F (36.8 °C)  Pulse  Av.1  Min: 69  Max: 75  Resp  Av.8  Min: 16  Max: 20  SpO2  Av.1 %  Min: 95 %  Max: 99 %  I/O last 3 completed shifts:  In: 240 [P.O.:240]  Out: 480 [Urine:480]    Physical Examination:  Physical Exam  Vitals reviewed.   Constitutional:       Appearance: Normal appearance.   HENT:      Head: Normocephalic.      Nose: Nose normal.   Eyes:      General:         Right eye: No discharge.         Left eye: No discharge.      Extraocular Movements: Extraocular movements intact.      Conjunctiva/sclera: Conjunctivae normal.      Pupils: Pupils are equal, round, and reactive to light.   Neck:      Comments: C-spine collar in place  Cardiovascular:      Rate and Rhythm: Normal rate and regular rhythm.      Pulses: Normal pulses.      Heart sounds: Normal heart sounds.   Pulmonary:      Effort: Pulmonary effort is normal.      Breath sounds: Normal breath sounds.   Abdominal:      General: Abdomen is flat.      Palpations: Abdomen is soft.   Musculoskeletal:         General: Normal range of motion.      Right lower leg: No edema.      Left lower leg: No edema.   Skin:     General: Skin is warm and dry.   Neurological:      Mental Status: She is alert and oriented to person, place, and time.     Laboratory:  Laboratory Data Reviewed: yes    Microbiology Data Reviewed: yes    Radiology Data Reviewed: yes    MRI Brain Without Contrast 10/18/2023  Impression:       Equivocal punctate focus of restricted diffusion in the left paramedian midbrain without convincing  signal abnormality on additional sequences may be artifactual, however a tiny focus of recent ischemia difficult to exclude.      CTA Head and Neck 10/17/2023  Impression:       No acute abnormality.    No high-grade stenosis or major vessel occlusion.    Stable changes in the spine and nodules within the thyroid gland.       Current Medications:     Infusions:       Scheduled:   acetaminophen  650 mg Oral Once    amLODIPine  10 mg Oral Daily    aspirin  81 mg Oral Daily    atorvastatin  80 mg Oral Daily    clopidogreL  75 mg Oral Daily    enoxparin  30 mg Subcutaneous Daily    ferrous sulfate  1 tablet Oral Every other day    furosemide  20 mg Oral Daily    lisinopriL  5 mg Oral Daily    pantoprazole  40 mg Oral Daily    polyethylene glycol  17 g Oral Daily    pregabalin  50 mg Oral QHS        PRN:  acetaminophen, aluminum-magnesium hydroxide-simethicone, labetalol, methocarbamoL, sodium chloride 0.9%    Antibiotics and Day Number of Therapy:  None    Lines and Day Number of Therapy:  Peripheral Intravenous Line  Duration       Peripheral IV - Single Lumen 10/18/23 1316 20 G Distal;Posterior;Right Forearm <1 day   Drain  Duration  Female External Urinary Catheter 10/18/23 0931 1 day   Wound  Duration       Altered Skin Integrity 05/12/23 2305 Right anterior;distal;lower Leg #1       Assessment:     Bridget Montgomery is a 88 y.o.female with PMH of CKD4, COPD, HTN, prediabetes (A1c 6.1 3/2023), DVT (2022), and dens fracture (surgery in November) presented for diplopia onset 1 day. Patient states that her double vision is improving after receiving treatment with a load of ASA and plavix as per neurology recommendations. This was concerning for a stroke; however, CTA head and neck was negative for acute processes. MRI showed equivocal lesion in the left paramedian midbrain, which ischemic event would be difficult to exclude. Patient is clinically stable without any new deficits and reports that she is improving.      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  - NIHSS3 on presentation  - CTH without acute intracranial process  - CTA without large vessel occlusion  - MRI brain demonstrated equivocal punctate lesion in the left paramedian midbrain, which because of study is difficult to exclude ischemic process  - TTE with bubble study ordered and demonstrated EF 61%, moderate mitral annular calcifications and mild regurgitation, and PA pressure of 39  - Loaded with  and plavix 300; continue DAPT x 21 days then aspirin only daily for life  - High intensity statin atorvastatin to 80mg daily  - Neurology consulted and following recommendations  - PT/OT/SLP consulted and recommend assistance because she is fall risk  - Opthalmology follow up outpatient     CKD 4:  - Follows with Dr. Lyons outpatient   - Avoid nephrotoxins as able   - Daily CMP  - Continue lasix 20 mg daily     HTN  -held for permissive HTN post stroke   -restart amlodipine 10 mg daily now that she is out of permissive HTN timeframe     Spinal Stenosis  C1-C2 dens fracture  - Follows with neurosurgery  - Scheduled for surgery on 11/10/23; now cancelled for now post acute stroke  - Continue lyrica 50mg nightly     Anemia of Chronic Disease  - Baseline Hb 10-11  - Normocytic anemia   - Most recent iron studies with ferritin 34 and TIBC 428 on this admission  - Will start on ferrous sulfate tablet every other day     Chronic Constipation  - Continue home miralax      Osteoarthritis  - Chronic pain to R shoulder, stable  - tylenol prn     H/o Type 2 Diabetes  - Most recent A1c <6  - Continue to monitor      Diet: Regular  DVT ppx: lovenox 40 mg  Code: Full     Dispo: Appreciate neurology and PT recommendations; will stay on floor for further management.        Pat Colon  [facility] Internal Medicine HO-I  U IM Service Team B    [facility] Medicine Hospitalist Pager numbers:   [facility] Hospitalist Medicine Team A  (Jen/Prabhjot): [phone]  [facility] Hospitalist Medicine Team B (Fredy/Eliana):  [phone]

## 2023-10-22 NOTE — PLAN OF CARE
Problem: Adult Inpatient Plan of Care  Goal: Plan of Care Review  Outcome: Ongoing, Progressing  Goal: Patient-Specific Goal (Individualized)  Outcome: Ongoing, Progressing  Goal: Optimal Comfort and Wellbeing  Outcome: Ongoing, Progressing     Problem: Cognitive Impairment (Stroke, Ischemic/Transient Ischemic Attack)  Goal: Optimal Cognitive Function  Outcome: Ongoing, Progressing     Problem: Functional Ability Impaired (Stroke, Ischemic/Transient Ischemic Attack)  Goal: Optimal Functional Ability  Outcome: Ongoing, Progressing     Problem: Respiratory Compromise (Stroke, Ischemic/Transient Ischemic Attack)  Goal: Effective Oxygenation and Ventilation  Outcome: Ongoing, Progressing     Problem: Swallowing Impairment (Stroke, Ischemic/Transient Ischemic Attack)  Goal: Optimal Eating and Swallowing without Aspiration  Outcome: Ongoing, Progressing     Problem: Urinary Elimination Impaired (Stroke, Ischemic/Transient Ischemic Attack)  Goal: Effective Urinary Elimination  Outcome: Ongoing, Progressing     Problem: Diabetes Comorbidity  Goal: Blood Glucose Level Within Targeted Range  Outcome: Ongoing, Progressing     Problem: Fall Injury Risk  Goal: Absence of Fall and Fall-Related Injury  Outcome: Ongoing, Progressing     Problem: COPD (Chronic Obstructive Pulmonary Disease) Comorbidity  Goal: Maintenance of COPD Symptom Control  Outcome: Ongoing, Progressing     Problem: Hypertension Comorbidity  Goal: Blood Pressure in Desired Range  Outcome: Ongoing, Progressing     Problem: Skin Injury Risk Increased  Goal: Skin Health and Integrity  Outcome: Ongoing, Progressing

## 2023-10-22 NOTE — PROGRESS NOTES
Pharmacist Renal Dose Adjustment Note    Bridget Montgomery is a 88 y.o. female being treated with the medication enoxaparin    Patient Data:    Vital Signs (Most Recent):  Temp: 98.1 °F (36.7 °C) (10/22/23 0530)  Pulse: 72 (10/22/23 0530)  Resp: 18 (10/22/23 0530)  BP: (!) 167/78 (10/22/23 0530)  SpO2: 97 % (10/22/23 0530) Vital Signs (72h Range):  Temp:  [97.5 °F (36.4 °C)-98.3 °F (36.8 °C)]   Pulse:  [63-80]   Resp:  [16-20]   BP: (129-176)/(60-87)   SpO2:  [94 %-99 %]      Recent Labs   Lab 10/19/23  0350 10/20/23  0356 10/21/23  0333   CREATININE 1.2 1.4 1.4     Serum creatinine: 1.4 mg/dL 10/21/23 0333  Estimated creatinine clearance: 24 mL/min    Medication:enoxaparin dose: 40 mg frequency q24h will be changed to medication:enoxaparin dose:30 mg frequency:q24h    Pharmacist's Name: Ale Mcghee  Pharmacist's Extension: [phone]

## 2023-10-22 NOTE — PLAN OF CARE
VN note: Patient chart, labs, and vitals reviewed. VN to continue to be available as needed.     Problem: Adult Inpatient Plan of Care  Goal: Plan of Care Review  Outcome: Ongoing, Progressing  Goal: Patient-Specific Goal (Individualized)  Outcome: Ongoing, Progressing  Goal: Absence of Hospital-Acquired Illness or Injury  Outcome: Ongoing, Progressing  Goal: Optimal Comfort and Wellbeing  Outcome: Ongoing, Progressing  Goal: Readiness for Transition of Care  Outcome: Ongoing, Progressing     Problem: Infection  Goal: Absence of Infection Signs and Symptoms  Outcome: Ongoing, Progressing     Problem: Adjustment to Illness (Stroke, Ischemic/Transient Ischemic Attack)  Goal: Optimal Coping  Outcome: Ongoing, Progressing     Problem: Bowel Elimination Impaired (Stroke, Ischemic/Transient Ischemic Attack)  Goal: Effective Bowel Elimination  Outcome: Ongoing, Progressing     Problem: Cerebral Tissue Perfusion (Stroke, Ischemic/Transient Ischemic Attack)  Goal: Optimal Cerebral Tissue Perfusion  Outcome: Ongoing, Progressing     Problem: Cognitive Impairment (Stroke, Ischemic/Transient Ischemic Attack)  Goal: Optimal Cognitive Function  Outcome: Ongoing, Progressing     Problem: Communication Impairment (Stroke, Ischemic/Transient Ischemic Attack)  Goal: Improved Communication Skills  Outcome: Ongoing, Progressing     Problem: Functional Ability Impaired (Stroke, Ischemic/Transient Ischemic Attack)  Goal: Optimal Functional Ability  Outcome: Ongoing, Progressing     Problem: Respiratory Compromise (Stroke, Ischemic/Transient Ischemic Attack)  Goal: Effective Oxygenation and Ventilation  Outcome: Ongoing, Progressing     Problem: Sensorimotor Impairment (Stroke, Ischemic/Transient Ischemic Attack)  Goal: Improved Sensorimotor Function  Outcome: Ongoing, Progressing     Problem: Swallowing Impairment (Stroke, Ischemic/Transient Ischemic Attack)  Goal: Optimal Eating and Swallowing without Aspiration  Outcome: Ongoing,  Progressing     Problem: Urinary Elimination Impaired (Stroke, Ischemic/Transient Ischemic Attack)  Goal: Effective Urinary Elimination  Outcome: Ongoing, Progressing     Problem: Diabetes Comorbidity  Goal: Blood Glucose Level Within Targeted Range  Outcome: Ongoing, Progressing     Problem: Impaired Wound Healing  Goal: Optimal Wound Healing  Outcome: Ongoing, Progressing     Problem: Fall Injury Risk  Goal: Absence of Fall and Fall-Related Injury  Outcome: Ongoing, Progressing     Problem: COPD (Chronic Obstructive Pulmonary Disease) Comorbidity  Goal: Maintenance of COPD Symptom Control  Outcome: Ongoing, Progressing     Problem: Hypertension Comorbidity  Goal: Blood Pressure in Desired Range  Outcome: Ongoing, Progressing     Problem: Skin Injury Risk Increased  Goal: Skin Health and Integrity  Outcome: Ongoing, Progressing

## 2023-10-23 ENCOUNTER — HOSPITAL ENCOUNTER (INPATIENT)
Facility: HOSPITAL | Age: 88
LOS: 22 days | Discharge: HOME HEALTH | DRG: 057 | End: 2023-11-14
Attending: HOSPITALIST | Admitting: HOSPITALIST
Payer: MEDICARE

## 2023-10-23 VITALS
BODY MASS INDEX: 22.2 KG/M2 | TEMPERATURE: 98 F | HEIGHT: 64 IN | DIASTOLIC BLOOD PRESSURE: 64 MMHG | OXYGEN SATURATION: 99 % | HEART RATE: 76 BPM | RESPIRATION RATE: 18 BRPM | SYSTOLIC BLOOD PRESSURE: 139 MMHG | WEIGHT: 130 LBS

## 2023-10-23 DIAGNOSIS — M54.31 NEURALGIA OF RIGHT SCIATIC NERVE: ICD-10-CM

## 2023-10-23 DIAGNOSIS — M19.90 IDIOPATHIC OSTEOARTHRITIS: ICD-10-CM

## 2023-10-23 DIAGNOSIS — M16.10 PRIMARY LOCALIZED OSTEOARTHRITIS OF PELVIC REGION AND THIGH: ICD-10-CM

## 2023-10-23 DIAGNOSIS — I63.9 CEREBROVASCULAR ACCIDENT (CVA), UNSPECIFIED MECHANISM: Primary | ICD-10-CM

## 2023-10-23 DIAGNOSIS — I63.02 CEREBRAL INFARCTION DUE TO THROMBOSIS OF BASILAR ARTERY: ICD-10-CM

## 2023-10-23 LAB
POCT GLUCOSE: 130 MG/DL (ref 70–110)
SARS-COV-2 RDRP RESP QL NAA+PROBE: NEGATIVE

## 2023-10-23 PROCEDURE — 97116 GAIT TRAINING THERAPY: CPT | Mod: CQ

## 2023-10-23 PROCEDURE — 99900035 HC TECH TIME PER 15 MIN (STAT)

## 2023-10-23 PROCEDURE — 25000003 PHARM REV CODE 250: Performed by: STUDENT IN AN ORGANIZED HEALTH CARE EDUCATION/TRAINING PROGRAM

## 2023-10-23 PROCEDURE — U0002 COVID-19 LAB TEST NON-CDC: HCPCS | Performed by: INTERNAL MEDICINE

## 2023-10-23 PROCEDURE — 25000003 PHARM REV CODE 250

## 2023-10-23 PROCEDURE — 94761 N-INVAS EAR/PLS OXIMETRY MLT: CPT

## 2023-10-23 PROCEDURE — 25000242 PHARM REV CODE 250 ALT 637 W/ HCPCS: Performed by: HOSPITALIST

## 2023-10-23 PROCEDURE — 97535 SELF CARE MNGMENT TRAINING: CPT | Mod: CO

## 2023-10-23 PROCEDURE — 97530 THERAPEUTIC ACTIVITIES: CPT | Mod: CQ

## 2023-10-23 PROCEDURE — 11000004 HC SNF PRIVATE

## 2023-10-23 PROCEDURE — 25000003 PHARM REV CODE 250: Performed by: HOSPITALIST

## 2023-10-23 RX ORDER — NAPROXEN SODIUM 220 MG/1
81 TABLET, FILM COATED ORAL DAILY
Status: DISCONTINUED | OUTPATIENT
Start: 2023-10-24 | End: 2023-11-14 | Stop reason: HOSPADM

## 2023-10-23 RX ORDER — LISINOPRIL 5 MG/1
5 TABLET ORAL DAILY
Qty: 90 TABLET | Refills: 3 | Status: ON HOLD | OUTPATIENT
Start: 2023-10-24 | End: 2023-11-10 | Stop reason: HOSPADM

## 2023-10-23 RX ORDER — CALCIUM CARBONATE 200(500)MG
500 TABLET,CHEWABLE ORAL DAILY
Status: DISCONTINUED | OUTPATIENT
Start: 2023-10-24 | End: 2023-11-14 | Stop reason: HOSPADM

## 2023-10-23 RX ORDER — CHOLECALCIFEROL (VITAMIN D3) 25 MCG
2000 TABLET ORAL DAILY
Status: DISCONTINUED | OUTPATIENT
Start: 2023-10-24 | End: 2023-11-14 | Stop reason: HOSPADM

## 2023-10-23 RX ORDER — FUROSEMIDE 20 MG/1
20 TABLET ORAL 2 TIMES DAILY
Status: DISCONTINUED | OUTPATIENT
Start: 2023-10-24 | End: 2023-10-31

## 2023-10-23 RX ORDER — AMOXICILLIN 250 MG
1 CAPSULE ORAL 2 TIMES DAILY
Status: DISCONTINUED | OUTPATIENT
Start: 2023-10-23 | End: 2023-11-02

## 2023-10-23 RX ORDER — CLOPIDOGREL BISULFATE 75 MG/1
75 TABLET ORAL DAILY
Status: COMPLETED | OUTPATIENT
Start: 2023-10-24 | End: 2023-11-12

## 2023-10-23 RX ORDER — CALCIUM CARBONATE 200(500)MG
500 TABLET,CHEWABLE ORAL 2 TIMES DAILY PRN
Status: DISCONTINUED | OUTPATIENT
Start: 2023-10-23 | End: 2023-11-14 | Stop reason: HOSPADM

## 2023-10-23 RX ORDER — POLYETHYLENE GLYCOL 3350 17 G/17G
17 POWDER, FOR SOLUTION ORAL DAILY PRN
Status: DISCONTINUED | OUTPATIENT
Start: 2023-10-23 | End: 2023-11-14 | Stop reason: HOSPADM

## 2023-10-23 RX ORDER — ALBUTEROL SULFATE 90 UG/1
2 AEROSOL, METERED RESPIRATORY (INHALATION) EVERY 6 HOURS PRN
Status: DISCONTINUED | OUTPATIENT
Start: 2023-10-23 | End: 2023-11-14 | Stop reason: HOSPADM

## 2023-10-23 RX ORDER — IPRATROPIUM BROMIDE AND ALBUTEROL SULFATE 2.5; .5 MG/3ML; MG/3ML
3 SOLUTION RESPIRATORY (INHALATION) 2 TIMES DAILY PRN
Status: DISCONTINUED | OUTPATIENT
Start: 2023-10-23 | End: 2023-11-14 | Stop reason: HOSPADM

## 2023-10-23 RX ORDER — DICLOFENAC SODIUM 10 MG/G
2 GEL TOPICAL DAILY
Status: DISCONTINUED | OUTPATIENT
Start: 2023-10-24 | End: 2023-10-26

## 2023-10-23 RX ORDER — FLUTICASONE PROPIONATE 50 MCG
2 SPRAY, SUSPENSION (ML) NASAL 2 TIMES DAILY
Status: DISCONTINUED | OUTPATIENT
Start: 2023-10-23 | End: 2023-11-14 | Stop reason: HOSPADM

## 2023-10-23 RX ORDER — OMEGA-3/DHA/EPA/FISH OIL 300-1000MG
1 CAPSULE,DELAYED RELEASE (ENTERIC COATED) ORAL DAILY
Status: DISCONTINUED | OUTPATIENT
Start: 2023-10-24 | End: 2023-11-14 | Stop reason: HOSPADM

## 2023-10-23 RX ORDER — PREGABALIN 50 MG/1
50 CAPSULE ORAL NIGHTLY
Status: DISCONTINUED | OUTPATIENT
Start: 2023-10-23 | End: 2023-11-02

## 2023-10-23 RX ORDER — TALC
6 POWDER (GRAM) TOPICAL NIGHTLY PRN
Status: DISCONTINUED | OUTPATIENT
Start: 2023-10-23 | End: 2023-11-14 | Stop reason: HOSPADM

## 2023-10-23 RX ORDER — ACETAMINOPHEN 500 MG
500 TABLET ORAL EVERY 6 HOURS PRN
Status: DISCONTINUED | OUTPATIENT
Start: 2023-10-23 | End: 2023-11-14 | Stop reason: HOSPADM

## 2023-10-23 RX ORDER — ACETAMINOPHEN 325 MG/1
650 TABLET ORAL EVERY 6 HOURS PRN
Status: DISCONTINUED | OUTPATIENT
Start: 2023-10-23 | End: 2023-11-14 | Stop reason: HOSPADM

## 2023-10-23 RX ORDER — FLUTICASONE FUROATE AND VILANTEROL 100; 25 UG/1; UG/1
1 POWDER RESPIRATORY (INHALATION) DAILY
Status: DISCONTINUED | OUTPATIENT
Start: 2023-10-24 | End: 2023-11-14 | Stop reason: HOSPADM

## 2023-10-23 RX ORDER — AMLODIPINE BESYLATE 10 MG/1
10 TABLET ORAL DAILY
Status: DISCONTINUED | OUTPATIENT
Start: 2023-10-24 | End: 2023-11-14 | Stop reason: HOSPADM

## 2023-10-23 RX ORDER — TRAMADOL HYDROCHLORIDE 50 MG/1
50 TABLET ORAL 2 TIMES DAILY PRN
Status: DISCONTINUED | OUTPATIENT
Start: 2023-10-23 | End: 2023-11-14 | Stop reason: HOSPADM

## 2023-10-23 RX ORDER — CETIRIZINE HYDROCHLORIDE 5 MG/1
10 TABLET ORAL DAILY
Status: DISCONTINUED | OUTPATIENT
Start: 2023-10-24 | End: 2023-11-14 | Stop reason: HOSPADM

## 2023-10-23 RX ORDER — ATORVASTATIN CALCIUM 40 MG/1
80 TABLET, FILM COATED ORAL DAILY
Status: DISCONTINUED | OUTPATIENT
Start: 2023-10-24 | End: 2023-11-14 | Stop reason: HOSPADM

## 2023-10-23 RX ADMIN — CLOPIDOGREL BISULFATE 75 MG: 75 TABLET ORAL at 08:10

## 2023-10-23 RX ADMIN — ATORVASTATIN CALCIUM 80 MG: 40 TABLET, FILM COATED ORAL at 08:10

## 2023-10-23 RX ADMIN — METHOCARBAMOL TABLETS 500 MG: 500 TABLET, COATED ORAL at 06:10

## 2023-10-23 RX ADMIN — METHOCARBAMOL TABLETS 500 MG: 500 TABLET, COATED ORAL at 12:10

## 2023-10-23 RX ADMIN — PANTOPRAZOLE SODIUM 40 MG: 40 TABLET, DELAYED RELEASE ORAL at 08:10

## 2023-10-23 RX ADMIN — ASPIRIN 81 MG CHEWABLE TABLET 81 MG: 81 TABLET CHEWABLE at 08:10

## 2023-10-23 RX ADMIN — FERROUS SULFATE TAB 325 MG (65 MG ELEMENTAL FE) 1 EACH: 325 (65 FE) TAB at 08:10

## 2023-10-23 RX ADMIN — METHOCARBAMOL TABLETS 500 MG: 500 TABLET, COATED ORAL at 05:10

## 2023-10-23 RX ADMIN — FUROSEMIDE 20 MG: 20 TABLET ORAL at 08:10

## 2023-10-23 RX ADMIN — LISINOPRIL 5 MG: 5 TABLET ORAL at 08:10

## 2023-10-23 RX ADMIN — SENNOSIDES AND DOCUSATE SODIUM 1 TABLET: 8.6; 5 TABLET ORAL at 09:10

## 2023-10-23 RX ADMIN — PREGABALIN 50 MG: 50 CAPSULE ORAL at 09:10

## 2023-10-23 RX ADMIN — AMLODIPINE BESYLATE 10 MG: 5 TABLET ORAL at 08:10

## 2023-10-23 RX ADMIN — FLUTICASONE PROPIONATE 100 MCG: 50 SPRAY, METERED NASAL at 09:10

## 2023-10-23 RX ADMIN — POLYETHYLENE GLYCOL 3350 17 G: 17 POWDER, FOR SOLUTION ORAL at 08:10

## 2023-10-23 NOTE — NURSING
Telephone report given to nurse Matson at [facility] at [phone], all questions answered, awaiting on transport.

## 2023-10-23 NOTE — NURSING
Spoke w/ Patient's daughter over the phone to review insurance and copays and any other questions regarding admission to OS.  She stated that she was at the hospital right now and her sister was speaking to someone.  She asked if she can call back and have her sister available on the speaker phone.  Stated okay and awaiting phone call.

## 2023-10-23 NOTE — PT/OT/SLP PROGRESS
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  Performance deficits affecting function are weakness, impaired endurance, impaired self care skills, impaired functional mobility, gait instability, impaired balance, decreased upper extremity function, decreased lower extremity function, decreased safety awareness, orthopedic precautions, decreased ROM, pain.     Rehab Prognosis:  Good; patient would benefit from acute skilled OT services to address these deficits and reach maximum level of function.       Plan:     Patient to be seen 4 x/week to address the above listed problems via self-care/home management, therapeutic activities, therapeutic exercises, neuromuscular re-education  Plan of Care Expires: 11/15/23  Plan of Care Reviewed with: patient, spouse    Subjective     Chief Complaint: shoulder discomfort  Patient/Family Comments/goals: return to PLOF  Pain/Comfort:  Pain Rating 1: 0/10    Objective:     Communicated with: Alicia thornton prior to session.  Patient found HOB elevated with bed alarm, telemetry, peripheral IV upon OT entry to room.    General Precautions: Standard, vision impaired    Orthopedic Precautions:spinal precautions  Braces: Cervical collar  Respiratory Status: Room air    Bed Mobility:    Patient completed Rolling/Turning to Left with  contact guard assistance and with side rail  Patient completed Scooting/Bridging with stand by assistance  Patient completed Supine to Sit with contact guard assistance and with side rail  Patient completed Sit to Supine with contact guard  assistance     Functional Mobility/Transfers:  Patient completed Sit <> Stand Transfer with contact guard assistance  with  rolling walker     Activities of Daily Living:  Grooming: set-up and VCs  for use of cup/basin system to eliminate flexing forward  Lower Body Dressing: minimum assistance to malgorzata/doff tennis shoes while seated EOB    [facility] 6 Click ADL: 19    Treatment & Education:  Educated on purpose/role of OT  Hard c-collar noted to have front portion upside down; collar adjusted correctly for proper/safe fit  Bed mobility as noted above  Ambulated EOB <> sink using RW /c CGA and VCs for postural awareness  G/H tasks in stance at sink -- see above  Educated to place paper towel or washcloth in chin portion of brace for increased cleanliness during G/H and eating tasks    Patient left HOB elevated with all lines intact, call button in reach, bed alarm on, nsg notified, and daughters present    GOALS:   Multidisciplinary Problems       Occupational Therapy Goals       Not on file              Multidisciplinary Problems (Resolved)          Problem: Occupational Therapy    Goal Priority Disciplines Outcome Interventions   Occupational Therapy Goal   (Resolved)     OT, PT/OT Met    Description: Goals to be met by: 11/15/2023     Patient will increase functional independence with ADLs by performing:    UE Dressing with Set-up Assistance with overhead clothing via adaptive method 2/2 limited BUE ROM  LE Dressing with Contact Guard Assistance.  Toileting from toilet with Stand-by Assistance for hygiene and clothing management.   Toilet transfer to toilet with Stand-by Assistance with use of rolling walker.  Functional mobility to / from bathroom with use of compensatory vision techniques as needed (I.e unilateral vision occlusion) to / from bathroom with rolling walker and SBA.  100% patient or family reciprocation of fall risk reduction techniques and activity modifications to maximize independence and return to  least restrictive environment                         Time Tracking:     OT Date of Treatment: 10/23/23  OT Start Time: 1458  OT Stop Time: 1521  OT Total Time (min): 23 min    Billable Minutes:Self Care/Home Management 23    OT/BECKA: BECKA     Number of BECKA visits since last OT visit: 1    10/23/2023

## 2023-10-23 NOTE — PT/OT/SLP PROGRESS
Physical Therapy Treatment    Patient Name:  Bridget Montgomery   MRN:  [id number]    Recommendations:     Discharge Recommendations: Moderate Intensity Therapy  Discharge Equipment Recommendations: none  Barriers to discharge:  fall risk    Assessment:     Bridget Montgomery is a 88 y.o. 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  She presents with the following impairments/functional limitations: weakness, impaired endurance, impaired self care skills, impaired functional mobility, gait instability, impaired balance, decreased upper extremity function, decreased lower extremity function, decreased ROM, orthopedic precautions Pt would continue to benefit from P.T. To address impairments listed above.  .    Rehab Prognosis: Fair; patient would benefit from acute skilled PT services to address these deficits and reach maximum level of function.    Recent Surgery: * No surgery found *      Plan:     During this hospitalization, patient to be seen 5 x/week to address the identified rehab impairments via gait training, therapeutic activities, therapeutic exercises, neuromuscular re-education and progress toward the following goals:    Plan of Care Expires:  11/17/23    Subjective     Patient/Family Comments/goals: Pt agreed to tx.  Pain/Comfort:  Pain Rating 1: 0/10  Pain Rating Post-Intervention 1: 0/10      Objective:     Communicated with RN prior to session.  Patient found HOB elevated with bed alarm, telemetry, peripheral IV upon PT entry to room.     General Precautions: Standard, vision impaired  Orthopedic Precautions: spinal precautions  Braces: Cervical collar  Respiratory Status: Room air     Functional Mobility:  Bed Mobility:     Rolling Left:  stand by assistance and with b/r for assist  Scooting: stand by assistance and to EOB   Supine to Sit: stand by assistance, contact guard assistance, and HOB elevated  Sit to Supine: stand by  assistance  Transfers:     Sit to Stand:  stand by assistance and contact guard assistance with rolling walker  Toilet Transfer: contact guard assistance with  rolling walker and using 6ft x 2  hand rails on b/s commode over toilet  using  Step Transfer  Gait: 6ft x 2 with RW and CGA to and from bathroom, seated rest, then ambulated 80ft x 2 with RW and brief standing rest between bouts.  Pt ambulates slowly with decreased step length and vc's for upright posture as able and closer proximity to rw for increased stability/safety.  Pt's LLE appears weaker than right with pt taking a quick step with R during static standing on L.  R knee flexed during stance.    Balance: sitting good-, standing fair, gait fair      AM-PAC 6 CLICK MOBILITY  Turning over in bed (including adjusting bedclothes, sheets and blankets)?: 4  Sitting down on and standing up from a chair with arms (e.g., wheelchair, bedside commode, etc.): 3  Moving from lying on back to sitting on the side of the bed?: 3  Moving to and from a bed to a chair (including a wheelchair)?: 3  Need to walk in hospital room?: 3  Climbing 3-5 steps with a railing?: 1  Basic Mobility Total Score: 17       Treatment & Education:  Pt ambulated to bathroom with RW and Close CGA and transferred onto toilet with close CGA, Rw, and using hand rails on b/s commode over toilet to assist with lowering.  Pt was able to perform hygiene needs to urine and required assist with cleaning for BM while standing with RW with CGA.  Pt ambulated BTB and donned tennis shoes with PTA assisting to tie them.  Gait training as above.  Pt sat on EOB and was assisted with [place] shoes before returning to bed supine with HOB elevated.     Patient left HOB elevated with all lines intact, call button in reach, bed alarm on, and RN notified..    GOALS:   Multidisciplinary Problems       Physical Therapy Goals       Not on file              Multidisciplinary Problems (Resolved)          Problem:  Physical Therapy    Goal Priority Disciplines Outcome Goal Variances Interventions   Physical Therapy Goal   (Resolved)     PT, PT/OT Met     Description: Goals to be met by: 2023     Patient will increase functional independence with mobility by performin. Supine to sit with Modified [place]  2. Sit to supine with Modified [place]  3. Rolling to Left and Right with Modified [place].  4. Sit to stand transfer with Stand-by Assistance  5. Bed to chair transfer with Stand-by Assistance using Rolling Walker  6. Gait  x 100 feet with Stand-by Assistance using Rolling Walker.                          Time Tracking:     PT Received On: 10/23/23  PT Start Time: 1400     PT Stop Time: 1429  PT Total Time (min): 29 min     Billable Minutes: Gait Training 15 and Therapeutic Activity 14    Treatment Type: Treatment  PT/PTA: PTA     Number of PTA visits since last PT visit: 3     10/23/2023

## 2023-10-23 NOTE — PROGRESS NOTES
Pharmacist Renal Dose Adjustment Note    Bridget Montgomery is a 88 y.o. female being treated with the medication Famotidine    Patient Data:    Vital Signs (Most Recent):  Temp: 98.4 °F (36.9 °C) (10/22/23 1642)  Pulse: 74 (10/22/23 1642)  Resp: 17 (10/22/23 1642)  BP: 120/60 (10/22/23 1642)  SpO2: 98 % (10/22/23 1943) Vital Signs (72h Range):  Temp:  [97.5 °F (36.4 °C)-98.4 °F (36.9 °C)]   Pulse:  [65-80]   Resp:  [16-20]   BP: (120-176)/(60-85)   SpO2:  [94 %-99 %]      Recent Labs   Lab 10/19/23  0350 10/20/23  0356 10/21/23  0333   CREATININE 1.2 1.4 1.4     Serum creatinine: 1.4 mg/dL 10/21/23 0333  Estimated creatinine clearance: 24 mL/min    Medication:Famotidine dose: 20 mg frequency q12h will be changed to medication:Famotidine dose:20 mg frequency:daily    Pharmacist's Name: Ranjan Polanco  Pharmacist's Extension: [phone]

## 2023-10-23 NOTE — PLAN OF CARE
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       Allergies:  Review of patient's allergies indicates:   Allergen Reactions    Cephalexin     Codeine     Meperidine      Other reaction(s): delerium, hallucination  Delerium  Delerium  Delerium      Nsaids (non-steroidal anti-inflammatory drug)     Penicillins     Tazarotene      Other reaction(s): rash, Unknown       Diet: renal diet    Activities: Activity as tolerated    Goals of Care Treatment Preferences:  Code Status: Full Code      Nursing: Routine     Labs: None    CONSULTS:    Physical Therapy to evaluate and treat.  and Occupational Therapy to evaluate and treat.    MISCELLANEOUS CARE:  Routine Skin for Bedridden Patients: Apply moisture barrier cream to all skin folds and wet areas in perineal area daily and after baths and all bowel movements.    WOUND CARE ORDERS  None    Medications: Review discharge medications with patient and family and provide education.      Current Discharge Medication List        START taking these medications    Details   aspirin 81 MG Chew Take 1 tablet (81 mg total) by mouth once daily.  Qty: 90 tablet, Refills: 3      atorvastatin (LIPITOR) 80 MG tablet Take 1 tablet (80 mg total) by mouth once daily.  Qty: 90 tablet, Refills: 3      clopidogreL (PLAVIX) 75 mg tablet Take 1 tablet (75 mg total) by mouth once daily. for 20 days  Qty: 20 tablet, Refills: 0    Comments: Please bedside deliver to 458       lisinopriL (PRINIVIL,ZESTRIL) 5 MG tablet Take 1 tablet (5 mg total) by mouth once daily.  Qty: 90 tablet, Refills: 3    Comments: .           CONTINUE these medications which have NOT CHANGED    Details   acetaminophen (TYLENOL) 500 MG tablet Take 500 mg by mouth every 6 (six) hours as needed for Pain.      albuterol (PROVENTIL) 2.5 mg /3 mL (0.083 %) nebulizer solution Take 2.5 mg by nebulization 2 (two) times daily as needed.      albuterol (PROVENTIL/VENTOLIN HFA) 90 mcg/actuation inhaler Inhale 2 puffs into the lungs every 6 (six) hours as needed for Wheezing. Rescue      amLODIPine (NORVASC) 10 MG tablet Take 1 tablet (10 mg total) by mouth once daily.  Qty: 30 tablet, Refills: 3    Comments: Meds to bedside delivery      calcium carbonate (CALCIUM 500 ORAL) Take 500 mg by mouth once daily.      cetirizine (ZYRTEC) 10 MG tablet Take 10 mg by mouth once daily.      cholecalciferol, vitamin D3, (VITAMIN D3) 50 mcg (2,000 unit) Cap capsule Take 2,000 Units by mouth once daily.      diclofenac sodium (VOLTAREN ARTHRITIS PAIN) 1 % Gel Apply 2 g topically once daily.  Qty: 1 each, Refills: 11    Associated Diagnoses: Idiopathic osteoarthritis      fluticasone propionate (FLONASE) 50 mcg/actuation nasal spray INSTILL TWO SPRAYS INTO EACH NOSTRIL TWICE A DAY  Qty: 48 mL, Refills: 2    Associated Diagnoses: Allergic rhinitis, unspecified seasonality, unspecified trigger      furosemide (LASIX) 20 MG tablet Take 20 mg by mouth 2 (two) times a day.      omega 3-dha-epa-fish oil 1,000 mg (120 mg-180 mg) Cap Take 1 capsule by mouth once daily.      polyethylene glycol 3350 (MIRALAX ORAL) Take 17 g by mouth once daily. As needed      pregabalin (LYRICA) 50 MG capsule Take 1 capsule (50 mg total) by mouth every evening.  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      SYMBICORT 80-4.5 mcg/actuation HFAA Inhale 2  puffs into the lungs 2 (two) times a day.  Qty: 10.2 g, Refills: 6    Associated Diagnoses: Chronic obstructive pulmonary disease, unspecified COPD type      traMADoL (ULTRAM) 50 mg tablet TAKE 1 TABLET BY MOUTH TWICE DAILY AS NEEDED FOR PAIN  Qty: 60 tablet, Refills: 4    Comments: Quantity prescribed more than 7 day supply? 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    STOP taking these medications       LIDOcaine (LIDODERM) 5 % Comments:   Reason for Stopping:         olopatadine (PATANOL) 0.1 % ophthalmic solution Comments:   Reason for Stopping:         omeprazole (PRILOSEC) 20 MG capsule Comments:   Reason for Stopping:         selenium 200 mcg Cap Comments:   Reason for Stopping:         vit A/vit C/vit E/zinc/copper (PRESERVISION AREDS ORAL) Comments:   Reason for Stopping:         zinc sulfate (ZINC-15 ORAL) Comments:   Reason for Stopping:                  Immunizations Administered as of 10/23/2023       No immunizations on file.            This patient has had both covid vaccinations    Some patients may experience side effects after vaccination.  These may include fever, headache, muscle or joint aches.  Most symptoms resolve with 24-48 hours and do not require urgent medical evaluation unless they persist for more than 72 hours or symptoms are concerning for an unrelated medical condition.          _________________________________  Ben Miranda MD  10/23/2023

## 2023-10-23 NOTE — PLAN OF CARE
Care plan reviewed with patient, Robbi, daughter at bedside, no respiratory distress noted, on room air.  Denies any pain or discomfort, education provided on medication effect and side effect, voices understanding. Call light within her reach, no apparent distress noted, bed in low position, bed alarm on, educated on the importance of calling as needed, voices understanding, stable at this time.    Stroke Patient education guide provider to patient and daughter, stroke education reviewed, questions answered, voices understanding.     Problem: Adult Inpatient Plan of Care  Goal: Plan of Care Review  Outcome: Ongoing, Progressing     Problem: Adult Inpatient Plan of Care  Goal: Patient-Specific Goal (Individualized)  Outcome: Ongoing, Progressing     Problem: Cerebral Tissue Perfusion (Stroke, Ischemic/Transient Ischemic Attack)  Goal: Optimal Cerebral Tissue Perfusion  Outcome: Ongoing, Progressing     Problem: Bowel Elimination Impaired (Stroke, Ischemic/Transient Ischemic Attack)  Goal: Effective Bowel Elimination  Outcome: Ongoing, Progressing     Problem: Cognitive Impairment (Stroke, Ischemic/Transient Ischemic Attack)  Goal: Optimal Cognitive Function  Outcome: Ongoing, Progressing

## 2023-10-23 NOTE — NURSING
Rapid Covid swab collected and sent to lab, pending results, pt denies any pain or discomfort at this time.

## 2023-10-23 NOTE — PROGRESS NOTES
Virtual Nurse:Discharge orders noted; additional clinical references attached.  and pharmacy tech notified.  Patient's discharge instruction packet given by bedside RN.    Cued into patient's room.  Permission received per patient to turn camera to view patient. Introduced as VN that will be instructing on discharge instructions.  Daughters at bedside.  Educated patient & her daughters on reason for admission; medications to hold, continue, and start, appointment to follow-up with doctor, and when to return to ED. Teach back method used. Verbalized understanding  Number given for 24/7 Nurse Line. Opportunity given for questions and questions answered.  Bedside nurse updated.        10/23/23 1523   Shift   Virtual Nurse - Patient Verbalized Approval Of Camera Use;VN Rounding   Type of Frequent Check   Type Patient Rounds   Safety/Activity   Patient Rounds visualized patient

## 2023-10-23 NOTE — NURSING
Discharged on stable conditions accompanied by two ambulance transporters, via stretcher, with all patient's belongings. No apparent distress noted, at time of discharge, no discomfort voiced.

## 2023-10-23 NOTE — DISCHARGE SUMMARY
[facility] Hospital Medicine Discharge Summary    Primary Team: [facility] Hospitalist Team B  Attending Physician: Scott Monroy MD  Resident: Ruben  Intern: Isaiah    Date of Admit: 10/17/2023  Date of Discharge: 10/23/2023    Discharge to: [facility]  Condition: Stable     Discharge Diagnoses     Patient Active Problem List   Diagnosis    Chronic kidney disease, stage III (moderate)    Type 2 diabetes mellitus with diabetic chronic kidney disease    Spinal stenosis of lumbar region    Senile purpura    Primary localized osteoarthritis of pelvic region and thigh    Idiopathic osteoarthritis    Osteopenia    Neuralgia of right sciatic nerve    Mixed hyperlipidemia    Mild recurrent major depression    Diabetic renal disease    Chronic obstructive pulmonary disease    Allergic rhinitis    Essential hypertension    Gastro-esophageal reflux disease without esophagitis    Lumbar spondylosis    Ovarian mass    Spinal cord disorder    Ulcer of esophagus    Greater trochanteric bursitis    Osteoarthritis of both knees    Lumbar radiculopathy    Acute chorea    Non-healing wound of right lower extremity    Acute pancreatitis without infection or necrosis    Anemia, chronic renal failure, stage 4 (severe)    Leg wound, right    Closed odontoid fracture with routine healing    Coccyalgia    Diabetes 1.5, managed as type 2    Closed fracture of proximal end of left humerus with routine healing    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 female with a PMH  of CKD4, COPD, HTN, prediabetes (A1c 6.1 3/2023), DVT (2022), and dens fracture who presents for diplopia x 1 day. Patient states when she woke up around 11:00 this morning she attempted to ambulate to the restroom and states she experienced double vision and issues with balance. Her  family checked on her around 1:00pm and she was still in her chair with double vision. She had some slurred speech at that time per daughter. She was last known well around 3:00 am when she ambulated from her bed to her lounge chair without difficulty. Denies new weakness. This has never happened to her before.      Hospital Course: Bridget Montgomery is a 88 y.o.female with PMH of CKD4, COPD, HTN, prediabetes (A1c 6.1 3/2023), DVT (2022), and dens fracture (surgery in November) presented for diplopia onset 1 day. Patient states that her double vision is improving after receiving treatment with a load of ASA and plavix as per neurology recommendations. This was concerning for a stroke; however, CTA head and neck was negative for acute processes. MRI showed equivocal lesion in the left paramedian midbrain, which ischemic event would be difficult to exclude. Patient is clinically stable without any new deficits and reports that she is improving.         For the full HPI please refer to the History & Physical from this admission.    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  - NIHSS3 on presentation  - CTH without acute intracranial process  - CTA without large vessel occlusion  - MRI brain demonstrated equivocal punctate lesion in the left paramedian midbrain, which because of study is difficult to exclude ischemic process  - TTE with bubble study ordered and demonstrated EF 61%, moderate mitral annular calcifications and mild regurgitation, and PA pressure of 39  - Loaded with  and plavix 300; continue DAPT x 21 days then aspirin only daily for life  - High intensity statin atorvastatin to 80mg daily  - Neurology consulted and following recommendations  - PT/OT/SLP consulted and recommend assistance because she is fall risk  - Opthalmology follow up outpatient  - Discharging to [facility] with PT/OT     CKD 4:  - Follows with   [facility] outpatient   - Avoid nephrotoxins as able   - Daily CMP  - Continue lasix 20 mg daily  - Follow up with Dr. Lyons outpatient     HTN  -held for permissive HTN post stroke   -restart amlodipine 10 mg daily now that she is out of permissive HTN timeframe  - Follow up with PCP     Spinal Stenosis  C1-C2 dens fracture  - Follows with neurosurgery  - Scheduled for surgery on 11/10/23; now cancelled for now post acute stroke  - Continue lyrica 50mg nightly  - Follow up with neurosurgery     Anemia of Chronic Disease  - Baseline Hb 10-11  - Normocytic anemia   - Most recent iron studies with ferritin 34 and TIBC 428 on this admission  - Will start on ferrous sulfate tablet every other day  - Follow up with PCP     Chronic Constipation  - Continue home miralax      Osteoarthritis  - Chronic pain to R shoulder, stable  - tylenol prn     H/o Type 2 Diabetes  - Most recent A1c <6  - Continue to monitor           [facility] IM Resident HO-I Progress Note     Subjective:      No acute events overnight. Vital signs stable. Patient is sleeping comfortably in bed this morning in no acute distress with daughter at bedside. Tolerating PO without N/V. She denies sensory changes, speech difficulty, dysphagia, or headache.      Objective:   Last 24 Hour Vital Signs:  BP  Min: 130/60  Max: 167/78  Temp  Av.9 °F (36.6 °C)  Min: 97.6 °F (36.4 °C)  Max: 98.3 °F (36.8 °C)  Pulse  Av.1  Min: 69  Max: 75  Resp  Av.8  Min: 16  Max: 20  SpO2  Av.1 %  Min: 95 %  Max: 99 %  I/O last 3 completed shifts:  In: 240 [P.O.:240]  Out: 480 [Urine:480]     Physical Examination:  Physical Exam  Vitals reviewed.   Constitutional:       Appearance: Normal appearance.   HENT:      Head: Normocephalic.      Nose: Nose normal.   Eyes:      General:         Right eye: No discharge.         Left eye: No discharge.      Extraocular Movements: Extraocular movements intact.      Conjunctiva/sclera: Conjunctivae normal.      Pupils:  Pupils are equal, round, and reactive to light.   Neck:      Comments: C-spine collar in place  Cardiovascular:      Rate and Rhythm: Normal rate and regular rhythm.      Pulses: Normal pulses.      Heart sounds: Normal heart sounds.   Pulmonary:      Effort: Pulmonary effort is normal.      Breath sounds: Normal breath sounds.   Abdominal:      General: Abdomen is flat.      Palpations: Abdomen is soft.   Musculoskeletal:         General: Normal range of motion.      Right lower leg: No edema.      Left lower leg: No edema.   Skin:     General: Skin is warm and dry.   Neurological:      Mental Status: She is alert and oriented to person, place, and time.       Discharge Medications        Medication List        START taking these medications      aspirin 81 MG Chew  Take 1 tablet (81 mg total) by mouth once daily.     atorvastatin 80 MG tablet  Commonly known as: LIPITOR  Take 1 tablet (80 mg total) by mouth once daily.     clopidogreL 75 mg tablet  Commonly known as: PLAVIX  Take 1 tablet (75 mg total) by mouth once daily. for 20 days     lisinopriL 5 MG tablet  Commonly known as: PRINIVIL,ZESTRIL  Take 1 tablet (5 mg total) by mouth once daily.  Start taking on: October 24, 2023            CHANGE how you take these medications      fluticasone propionate 50 mcg/actuation nasal spray  Commonly known as: FLONASE  INSTILL TWO SPRAYS INTO EACH NOSTRIL TWICE A DAY  What changed: See the new instructions.            CONTINUE taking these medications      acetaminophen 500 MG tablet  Commonly known as: TYLENOL     * albuterol 2.5 mg /3 mL (0.083 %) nebulizer solution  Commonly known as: PROVENTIL     * albuterol 90 mcg/actuation inhaler  Commonly known as: PROVENTIL/VENTOLIN HFA     amLODIPine 10 MG tablet  Commonly known as: NORVASC  Take 1 tablet (10 mg total) by mouth once daily.     CALCIUM 500 ORAL     cetirizine 10 MG tablet  Commonly known as: ZYRTEC     cholecalciferol (vitamin D3) 50 mcg (2,000 unit) Cap  capsule  Commonly known as: VITAMIN D3     diclofenac sodium 1 % Gel  Commonly known as: VOLTAREN ARTHRITIS PAIN  Apply 2 g topically once daily.     furosemide 20 MG tablet  Commonly known as: LASIX     LIDOcaine 5 %  Commonly known as: LIDODERM  Place 1 patch onto the skin once daily. Remove & Discard patch within 12 hours or as directed by MD ROSADO ORAL     olopatadine 0.1 % ophthalmic solution  Commonly known as: PATANOL     omega 3-dha-epa-fish oil 1,000 mg (120 mg-180 mg) Cap     omeprazole 20 MG capsule  Commonly known as: PRILOSEC     pregabalin 50 MG capsule  Commonly known as: LYRICA  Take 1 capsule (50 mg total) by mouth every evening.     PRESERVISION AREDS ORAL     psyllium 0.52 gram capsule     selenium 200 mcg Cap     SYMBICORT 80-4.5 mcg/actuation Hfaa  Generic drug: budesonide-formoterol 80-4.5 mcg  Inhale 2 puffs into the lungs 2 (two) times a day.     traMADoL 50 mg tablet  Commonly known as: ULTRAM  TAKE 1 TABLET BY MOUTH TWICE DAILY AS NEEDED FOR PAIN     ZINC-15 ORAL           * This list has 2 medication(s) that are the same as other medications prescribed for you. Read the directions carefully, and ask your doctor or other care provider to review them with you.                   Where to Get Your Medications        These medications were sent to [facility] [address], [place] [address]      Hours: Mon-Fri, 8a-5:30p Phone: [phone]   aspirin 81 MG Chew  atorvastatin 80 MG tablet  clopidogreL 75 mg tablet  lisinopriL 5 MG tablet         Discharge Information:   Diet: Regular    Physical Activity:  As tolerated by patient.              Instructions:  1. Take all medications as prescribed  2. Keep all follow-up appointments  3. Return to the hospital or call your primary care physicians if any worsening symptoms such as fever, chest pain, shortness of breath, return of symptoms, or any other concerns.    Follow-Up Appointments:  Nephrology 12/11/23  PCP  2/21/24    Pat Colon MD  [facility] Internal Medicine HO-I

## 2023-10-23 NOTE — PLAN OF CARE
"[facility] - Telemetry  Discharge Final Note    Primary Care Provider: Charles Peacock MD    Expected Discharge Date: 10/23/2023    Pt to DC to [facility].    Cleared from CM once auth confirmed and report information. Bedside Nurse and VN notified.    1204 - Updated pt and daughter at bedside to bed available with [facility] for today. Pt choice done verbally. Family prefers stretcher transport due to C spine fx and transporting via a WC and bumps.    1540 pm - [facility] - AUTH #   [id number]  Scheduled  Date/Time  Last updated: 10/23/23 1540  10/23/2023 7:35 PM       Final Discharge Note (most recent)       Final Note - 10/23/23 1118          Final Note    Assessment Type Final Discharge Note     Anticipated Discharge Disposition Skilled Nursing Facility        Post-Acute Status    Post-Acute Authorization Placement     Post-Acute Placement Status Set-up Complete/Auth obtained   Pt has been accepted by [facility] for bed available today. Orders to be reviewed and report info to be given once auth recieved.    Discharge Delays [facility] Arranged Transportation (P)    Pending order acceptance and report info                  Future Appointments   Date Time Provider Department Center   10/30/2023  1:00 PM Liliana Chacon MD [facility]   12/11/2023  2:00 PM Lluvia Rahman MD [facility]   2/21/2024 10:15 AM Charles Peacock MD [facility]     BP (!) 120/58 (BP Location: Left arm, Patient Position: Lying)   Pulse 72   Temp 97.1 °F (36.2 °C) (Oral)   Resp 18   Ht 5' 4" (1.626 m)   Wt 59 kg (130 lb)   SpO2 98%   BMI 22.31 kg/m²       Contact Info       Lluvia Rahman MD   Specialty: Nephrology   Relationship: Consulting Physician    [address]  [address]  [facility]  [place] [address]   Phone: [phone]       Next Steps: Follow up in 1 week(s)    [facility]   Specialty: Skilled Nursing Facility    [address] [place] [address], [address]RD FLOOR  [place] " [place] [address]   Phone: [phone]       Next Steps: Go today    Instructions: SNF             Medication List        START taking these medications      aspirin 81 MG Chew  Take 1 tablet (81 mg total) by mouth once daily.     atorvastatin 80 MG tablet  Commonly known as: LIPITOR  Take 1 tablet (80 mg total) by mouth once daily.     clopidogreL 75 mg tablet  Commonly known as: PLAVIX  Take 1 tablet (75 mg total) by mouth once daily. for 20 days     lisinopriL 5 MG tablet  Commonly known as: PRINIVIL,ZESTRIL  Take 1 tablet (5 mg total) by mouth once daily.  Start taking on: October 24, 2023            CHANGE how you take these medications      fluticasone propionate 50 mcg/actuation nasal spray  Commonly known as: FLONASE  INSTILL TWO SPRAYS INTO EACH NOSTRIL TWICE A DAY  What changed: See the new instructions.            CONTINUE taking these medications      acetaminophen 500 MG tablet  Commonly known as: TYLENOL     * albuterol 2.5 mg /3 mL (0.083 %) nebulizer solution  Commonly known as: PROVENTIL     * albuterol 90 mcg/actuation inhaler  Commonly known as: PROVENTIL/VENTOLIN HFA     amLODIPine 10 MG tablet  Commonly known as: NORVASC  Take 1 tablet (10 mg total) by mouth once daily.     CALCIUM 500 ORAL     cetirizine 10 MG tablet  Commonly known as: ZYRTEC     cholecalciferol (vitamin D3) 50 mcg (2,000 unit) Cap capsule  Commonly known as: VITAMIN D3     diclofenac sodium 1 % Gel  Commonly known as: VOLTAREN ARTHRITIS PAIN  Apply 2 g topically once daily.     furosemide 20 MG tablet  Commonly known as: LASIX     LIDOcaine 5 %  Commonly known as: LIDODERM  Place 1 patch onto the skin once daily. Remove & Discard patch within 12 hours or as directed by MD ROSADO ORAL     olopatadine 0.1 % ophthalmic solution  Commonly known as: PATANOL     omega 3-dha-epa-fish oil 1,000 mg (120 mg-180 mg) Cap     omeprazole 20 MG capsule  Commonly known as: PRILOSEC     pregabalin 50 MG capsule  Commonly known as: LYRICA  Take 1  capsule (50 mg total) by mouth every evening.     PRESERVISION AREDS ORAL     psyllium 0.52 gram capsule     selenium 200 mcg Cap     SYMBICORT 80-4.5 mcg/actuation Hfaa  Generic drug: budesonide-formoterol 80-4.5 mcg  Inhale 2 puffs into the lungs 2 (two) times a day.     traMADoL 50 mg tablet  Commonly known as: ULTRAM  TAKE 1 TABLET BY MOUTH TWICE DAILY AS NEEDED FOR PAIN     ZINC-15 ORAL           * This list has 2 medication(s) that are the same as other medications prescribed for you. Read the directions carefully, and ask your doctor or other care provider to review them with you.                   Where to Get Your Medications        These medications were sent to [facility] [address], [place] [address]      Hours: Mon-Fri, 8a-5:30p Phone: [phone]   aspirin 81 MG Chew  atorvastatin 80 MG tablet  clopidogreL 75 mg tablet  lisinopriL 5 MG tablet

## 2023-10-23 NOTE — PLAN OF CARE
Problem: Adult Inpatient Plan of Care  Goal: Plan of Care Review  Outcome: Ongoing, Progressing  Goal: Patient-Specific Goal (Individualized)  Outcome: Ongoing, Progressing  Goal: Absence of Hospital-Acquired Illness or Injury  Outcome: Ongoing, Progressing  Goal: Optimal Comfort and Wellbeing  Outcome: Ongoing, Progressing     Problem: Infection  Goal: Absence of Infection Signs and Symptoms  Outcome: Ongoing, Progressing     Problem: Cognitive Impairment (Stroke, Ischemic/Transient Ischemic Attack)  Goal: Optimal Cognitive Function  Outcome: Ongoing, Progressing     Problem: Communication Impairment (Stroke, Ischemic/Transient Ischemic Attack)  Goal: Improved Communication Skills  Outcome: Ongoing, Progressing     Problem: Functional Ability Impaired (Stroke, Ischemic/Transient Ischemic Attack)  Goal: Optimal Functional Ability  Outcome: Ongoing, Progressing     Problem: Respiratory Compromise (Stroke, Ischemic/Transient Ischemic Attack)  Goal: Effective Oxygenation and Ventilation  Outcome: Ongoing, Progressing     Problem: Diabetes Comorbidity  Goal: Blood Glucose Level Within Targeted Range  Outcome: Ongoing, Progressing     Problem: Impaired Wound Healing  Goal: Optimal Wound Healing  Outcome: Ongoing, Progressing     Problem: Fall Injury Risk  Goal: Absence of Fall and Fall-Related Injury  Outcome: Ongoing, Progressing     Problem: COPD (Chronic Obstructive Pulmonary Disease) Comorbidity  Goal: Maintenance of COPD Symptom Control  Outcome: Ongoing, Progressing     Problem: Hypertension Comorbidity  Goal: Blood Pressure in Desired Range  Outcome: Ongoing, Progressing     Problem: Skin Injury Risk Increased  Goal: Skin Health and Integrity  Outcome: Ongoing, Progressing

## 2023-10-24 PROBLEM — H53.2 DIPLOPIA: Status: ACTIVE | Noted: 2023-10-24

## 2023-10-24 PROBLEM — M48.00 SPINAL STENOSIS: Status: ACTIVE | Noted: 2020-07-07

## 2023-10-24 PROBLEM — K59.09 CHRONIC CONSTIPATION: Status: ACTIVE | Noted: 2023-10-24

## 2023-10-24 PROCEDURE — 25000003 PHARM REV CODE 250: Performed by: FAMILY MEDICINE

## 2023-10-24 PROCEDURE — 97535 SELF CARE MNGMENT TRAINING: CPT

## 2023-10-24 PROCEDURE — 97162 PT EVAL MOD COMPLEX 30 MIN: CPT

## 2023-10-24 PROCEDURE — 25000003 PHARM REV CODE 250: Performed by: HOSPITALIST

## 2023-10-24 PROCEDURE — 11000004 HC SNF PRIVATE

## 2023-10-24 PROCEDURE — 97165 OT EVAL LOW COMPLEX 30 MIN: CPT

## 2023-10-24 PROCEDURE — 25000242 PHARM REV CODE 250 ALT 637 W/ HCPCS: Performed by: HOSPITALIST

## 2023-10-24 PROCEDURE — 97116 GAIT TRAINING THERAPY: CPT

## 2023-10-24 PROCEDURE — 97530 THERAPEUTIC ACTIVITIES: CPT

## 2023-10-24 RX ORDER — METHOCARBAMOL 500 MG/1
500 TABLET, FILM COATED ORAL 4 TIMES DAILY PRN
Status: DISCONTINUED | OUTPATIENT
Start: 2023-10-24 | End: 2023-11-14 | Stop reason: HOSPADM

## 2023-10-24 RX ORDER — TRAMADOL HYDROCHLORIDE 50 MG/1
50 TABLET ORAL NIGHTLY
Status: DISCONTINUED | OUTPATIENT
Start: 2023-10-24 | End: 2023-11-14 | Stop reason: HOSPADM

## 2023-10-24 RX ORDER — GUAIFENESIN 100 MG/5ML
200 SOLUTION ORAL EVERY 4 HOURS PRN
Status: DISCONTINUED | OUTPATIENT
Start: 2023-10-24 | End: 2023-11-14 | Stop reason: HOSPADM

## 2023-10-24 RX ADMIN — CETIRIZINE HYDROCHLORIDE 10 MG: 5 TABLET, FILM COATED ORAL at 08:10

## 2023-10-24 RX ADMIN — PREGABALIN 50 MG: 50 CAPSULE ORAL at 08:10

## 2023-10-24 RX ADMIN — TRAMADOL HYDROCHLORIDE 50 MG: 50 TABLET, COATED ORAL at 08:10

## 2023-10-24 RX ADMIN — ACETAMINOPHEN 500 MG: 500 TABLET ORAL at 11:10

## 2023-10-24 RX ADMIN — FLUTICASONE PROPIONATE 100 MCG: 50 SPRAY, METERED NASAL at 08:10

## 2023-10-24 RX ADMIN — GUAIFENESIN 200 MG: 200 SOLUTION ORAL at 11:10

## 2023-10-24 RX ADMIN — FUROSEMIDE 20 MG: 20 TABLET ORAL at 06:10

## 2023-10-24 RX ADMIN — SENNOSIDES AND DOCUSATE SODIUM 1 TABLET: 8.6; 5 TABLET ORAL at 08:10

## 2023-10-24 RX ADMIN — ACETAMINOPHEN 500 MG: 500 TABLET ORAL at 06:10

## 2023-10-24 RX ADMIN — FUROSEMIDE 20 MG: 20 TABLET ORAL at 08:10

## 2023-10-24 RX ADMIN — TRAMADOL HYDROCHLORIDE 50 MG: 50 TABLET, COATED ORAL at 03:10

## 2023-10-24 RX ADMIN — CLOPIDOGREL BISULFATE 75 MG: 75 TABLET ORAL at 08:10

## 2023-10-24 RX ADMIN — CALCIUM CARBONATE (ANTACID) CHEW TAB 500 MG 500 MG: 500 CHEW TAB at 08:10

## 2023-10-24 RX ADMIN — GUAIFENESIN 200 MG: 200 SOLUTION ORAL at 03:10

## 2023-10-24 RX ADMIN — Medication 1 CAPSULE: at 08:10

## 2023-10-24 RX ADMIN — DICLOFENAC SODIUM 2 G: 10 GEL TOPICAL at 08:10

## 2023-10-24 RX ADMIN — ASPIRIN 81 MG CHEWABLE TABLET 81 MG: 81 TABLET CHEWABLE at 08:10

## 2023-10-24 RX ADMIN — AMLODIPINE BESYLATE 10 MG: 10 TABLET ORAL at 08:10

## 2023-10-24 RX ADMIN — GUAIFENESIN 200 MG: 200 SOLUTION ORAL at 10:10

## 2023-10-24 RX ADMIN — FLUTICASONE FUROATE AND VILANTEROL TRIFENATATE 1 PUFF: 100; 25 POWDER RESPIRATORY (INHALATION) at 08:10

## 2023-10-24 RX ADMIN — ATORVASTATIN CALCIUM 80 MG: 40 TABLET, FILM COATED ORAL at 08:10

## 2023-10-24 RX ADMIN — CHOLECALCIFEROL TAB 25 MCG (1000 UNIT) 2000 UNITS: 25 TAB at 08:10

## 2023-10-24 NOTE — PROGRESS NOTES
[facility]                                  Skilled Nursing Facility                   Progress Note     Admit Date: 10/23/2023  MIKAYLA   Principal Problem:  Diplopia   HPI obtained from patient interview and chart review     Chief Complaint: Establish Care/ Initial Visit     HPI:   Bridget Montgomery is an 88 y.o. female with PMH CKD4, COPD, HTN, pre diabetes, and DVT who was admitted to hospital with new onset diplopia and dysarthria. Neuro w/up with neurology completed. Unable to definitively rule out cva on mri, however no acute intracranial findings identified on CTA, CT or MRI brain. Treated as acute CVA given symptoms and inconclusive imaging findings.  Patient will be treated at [facility] with PT and OT to improve functional status and ability to perform ADLs.     Past Medical History: Patient has a past medical history of Allergy, Anemia, unspecified, Arthritis, CKD (chronic kidney disease) stage 4, GFR 15-29 ml/min, COPD (chronic obstructive pulmonary disease), Edema, HTN (hypertension), Hypocalcemia, Hyponatremia, and Osteoarthritis.    Past Surgical History: Patient has a past surgical history that includes Breast cyst excision; Foot surgery; and Caudal epidural steroid injection (N/A, 2/2/2023).    Social History: Patient reports that she has quit smoking. She has never used smokeless tobacco. She reports that she does not currently use alcohol. She reports that she does not use drugs.    Family History: family history includes Cancer in her mother; No Known Problems in her father.    Allergies: Patient is allergic to cephalexin, codeine, meperidine, nsaids (non-steroidal anti-inflammatory drug), penicillins, and tazarotene.    ROS  Constitutional: Negative for fever   Eyes: Positive for blurred vision, double vision   Respiratory: Negative for cough, shortness of breath   Cardiovascular: Negative for chest pain, palpitations,  "and leg swelling.   Gastrointestinal: Negative for abdominal pain, constipation, diarrhea, nausea, vomiting.   Genitourinary: Negative for dysuria, frequency   Musculoskeletal:  + generalized weakness, + neck pain (chronic). Negative for back pain and myalgias.   Skin: Negative for itching and rash.   Neurological: Negative for dizziness, headaches.   Psychiatric/Behavioral: Negative for depression. The patient is not nervous/anxious.      24 hour Vital Sign Range   Temp:  [97.6 °F (36.4 °C)-98.2 °F (36.8 °C)]   Pulse:  [76-80]   Resp:  [18]   BP: (139-153)/(64-70)   SpO2:  [96 %-99 %]     Current BMI: Body mass index is 24.17 kg/m².    PEx  Constitutional: Patient appears debilitated.  No distress noted  HENT:   Head: Normocephalic and atraumatic.   Eyes: Pupils are equal, round  Neck: Limited range of motion. Neck supple. C-spine collar present  Cardiovascular: Normal rate, regular rhythm and normal heart sounds.    Pulmonary/Chest: Effort normal and breath sounds are clear  Abdominal: Soft. Bowel sounds are normal.   Musculoskeletal: Normal range of motion.   Neurological: Alert and oriented to person, place, and time.   Psychiatric: Normal mood and affect. Behavior is normal.   Skin: Skin is warm and dry. Full skin assessment completed by NP on initial exam.       Altered Skin Integrity Right anterior;distal;lower Leg , POA         No results for input(s): "GLUCOSE", "NA", "K", "CL", "CO2", "BUN", "CREATININE", "CALCIUM", "MG" in the last 24 hours.    No results for input(s): "WBC", "RBC", "HGB", "HCT", "PLT", "MCV", "MCH", "MCHC" in the last 24 hours.    Recent Labs   Lab 10/18/23  2016 10/19/23  1233 10/20/23  0604 10/21/23  1122 10/21/23  1618 10/23/23  2032   POCTGLUCOSE 113* 106 126* 138* 99 130*        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  - CTH without acute intracranial process  - CTA without large " "vessel occlusion  - MRI brain demonstrated equivocal punctate lesion in the left paramedian midbrain, which because of study is difficult to exclude ischemic process  - TTE with bubble study ordered and demonstrated EF 61%, moderate mitral annular calcifications and mild regurgitation, and PA pressure of 39  - Loaded with  and plavix 300"  - Continue DAPT x 21 days then aspirin only daily for life  - Continue high intensity statin therapy w atorvastatin to 80mg daily  - PT/OT/SLP consulted to evaluate and treat  - Maintain fall and delirium precautions  - Will need Opthalmology follow up outpatient      Mixed HLD  Stroke Risk Factor.   - continue high intensity statin therapy w atorvastatin to 80mg daily  - Follow-up with PCP for monitoring per guidelines       Chronic Obstructive Pulmonary Disease (COPD)  - POA, stable  - Continue home LABA 1 puff daily  - Continue albuterol inhaler and neb prn  - May consider montelukast 10 mg po daily if unstable  - Start guaifenesin q 4 prn, consider bid mucinex if needed  - Continue BID Flonase for sinus relief      Stage 4 chronic kidney disease  - Chronic, stable  - Follows with Dr. Lyons  - Continue lasix 20 mg daily  - Avoid any nephrotoxic agents including NSAIDs, aminoglycosides, IV contrast (unless absolutely necessary), gadolinium, fleets and other phosphorous-based laxatives. Caution with antibiotics.  - Renally adjust medications based on patient's creatinine clearance  - Avoid Hypotension  - Renal Diet  - Routinely monitor renal function with scheduled metabolic profile      Essential Hypertension  - Chronic, stable  - Continue amlodipine 10 mg daily   - Continue lasix 20 mg daily  - Monitor BP  - Adjust therapy to BP goal < 1500/90  - Avoid hypotension       Spinal Stenosis  C1-C2 dens fracture  - Follows with neurosurgery  - Scheduled for surgery on 11/10/23; now cancelled d/t post acute stroke  - continue diclofenac na gel topically daily  - tylenol, " tramadol, robaxin prn  - Continue home Lyrica 50 mg nightly  - Follow up with neurosurgery outpatient      Chronic Constipation  - Continue scheduled senokot and miralax  - Adjust bowel regimen prn to avoid diarrhea  - Encourage fluid intake  - Encourage safe physical activity as tolerated  - Monitor bowel movement regularity and consistency      Osteoarthritis  Muscle Spasms, Chronic  - Chronic pain to R shoulder  - continue diclofenac na gel topically daily  - tylenol, tramadol, robaxin prn  - Continue home Lyrica 50 mg nightly        H/o Type 2 Diabetes  - Most recent A1c <6  - Continue to monitor w venous lab      Decreased Mobility   Physical Debility  Weakness   -Patient with decreased bed mobility therefore patient is at high risk for developing wounds and deterioration of any current wounds.   - Continue with PT/OT for gait training and strengthening and restoration of ADL's   - Encourage mobility, OOB in chair, and early ambulation as appropriate  - Fall precautions   - Monitor for bowel and bladder dysfunction  - Monitor for and prevent skin breakdown and pressure ulcers  - DVT prophylaxis with plavix, asa      Anemia of Chronic Disease  - Baseline Hb 10-11  - Normocytic anemia    - Trend with scheduled CBC  - Transfuse for Hgb < 7.0  - Continue ferrous sulfate tablet every other day  - Follow up with PCP outpatient      Insomnia, Chronic  - Resume home tramadol 50 mg q HS  - Fall and delerium precautions.   - Note home polypharmacy risk factors  - F/up with PCP regarding long-term insomnia and pain management        Anticipate disposition:  Home with home health    IP [facility] RISK OF UNPLANNED READMISSION Model:     Follow-up needed during SNF stay-    Follow-up needed after discharge from SNF: PCP, Opthalmology    Future Appointments   Date Time Provider Department Center   12/11/2023  2:00 PM Lluvia Rahman MD [facility]   2/21/2024 10:15 AM Chalres Peacock MD [facility]         I certify  that SNF services are required to be given on an inpatient basis because Bridget Montgomery needs for skilled nursing care and/or skilled rehabilitation are required on a daily basis and such services can only practically be provided in a skilled nursing facility setting and are for an ongoing condition for which she received inpatient care in the hospital.     Total time of the visit 146 minutes   Non physical exam/ non charting time: 115 minutes   Description of non physical exam/non charting time: counseling patient on clinical conditions and therapies provided.  Extensive chart review completed including all consultation notes.  All pertinent laboratory and radiographical images reviewed.        Carin Barclay NP  Department of Hospital Medicine   [facility]     DOS: 10/24/2023       Patient note was created using MModal Dictation.  Any errors in syntax or even information may not have been identified and edited on initial review prior to signing this note.

## 2023-10-24 NOTE — PLAN OF CARE
Problem: Occupational Therapy  Goal: Occupational Therapy Goal  Description: Goals to be met by: 3 weeks     Patient will increase functional independence with ADLs by performing:    UE Dressing with Stand-by Assistance.  LE Dressing with Stand-by Assistance with use of AD and AE as needed.  Grooming while standing at sink with Supervision.  Toileting from bedside commode or toilet with Supervision for hygiene and clothing management.   Bathing from sitting at sink with Set-up Assistance.  Rolling to Bilateral with Modified [place].   Supine to sit with Modified [place].  Stand pivot transfers with Supervision with use of AD and AE as needed.  Toilet transfer to bedside commode or toilet with Supervision with use of AD and AE as needed.  Increased functional strength through use of UBE for at least 8 minutes with Min resistance.  Upper extremity exercise program 1x15 reps per handout, with assistance as needed.  Pt will perform functional standing up to 10 minutes in prep for performance of functional ADLs in standing.      Outcome: Ongoing, Progressing

## 2023-10-24 NOTE — PLAN OF CARE
Problem: Adult Inpatient Plan of Care  Goal: Plan of Care Review  Outcome: Ongoing, Progressing  Flowsheets (Taken 10/24/2023 3534)  Plan of Care Reviewed With:   patient   daughter

## 2023-10-24 NOTE — PT/OT/SLP EVAL
Occupational Therapy   Evaluation    Name: Bridget Montgomery  MRN: [id number]  Admit Date: 10/23/2023  Recent Surgeries: N/A     General Precautions: Standard, fall  Orthopedic Precautions:spinal precautions   Braces: Cervical collar    Recommendations:     Discharge Recommendations:  (Home with home health OT)  Level of Assistance Recommended: 24 hours light assistance  Discharge Equipment Recommendations:  bath bench  Barriers to discharge:  None    Assessment:     Bridget Montgomery is a 88 y.o. 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  She presents with performance deficits affecting function including weakness, impaired endurance, impaired self care skills, impaired sensation, impaired functional mobility, gait instability, impaired balance, visual deficits, decreased coordination, decreased upper extremity function, decreased lower extremity function, decreased safety awareness, decreased ROM, orthopedic precautions.    Pt tolerated Tx without incident but continues to require assist to perform self care tasks, functional mobility and functional transfers .  She would continue to benefit from OT intervention to further her functional (I)ce and safety.      Rehab Potential is good    Activity Tolerance: Good    Plan:     Patient to be seen 5 x/week to address the above listed problems via self-care/home management, therapeutic activities, therapeutic exercises  Plan of Care Expires: 11/24/23  Plan of Care Reviewed with: patient, daughter    Subjective     Chief Complaint: Shoulder pain  Patient/Family Comments/goals: Return to PLOF    Occupational Profile:  Living Environment: Pt lives in a single story home with ramp entrance. She has a tub/shower combo with a shower chair in the tub and standard height toilet.  . Pt lives with youngest daughter who currently has a broken foot.and is wearing CAM boot.  Previous level of function: Patient required assist for  some ADLs (mainly dressing) due to  decreased UE ROM but was able to complete other ADLs with mod (I).  Equipment Used at Home: bedside commode, wheelchair, other (see comments), rollator (tub transfer bench)  Assistance upon Discharge: Pt lives with youngest daughter. Her other daughters are not local.     Pain/Comfort:  Pain Rating 1: 0/10  Location - Side 1: Bilateral  Location - Orientation 1: generalized  Location 1: shoulder  Pain Addressed 1: Distraction, Reposition, Nurse notified  Pain Rating Post-Intervention 1: 9/10    Patients cultural, spiritual, Alevism conflicts given the current situation: no    Objective:     Communicated with: Nurse prior to session.  Patient found supine with PureWick upon OT entry to room.    Occupational Performance:     Functional Limitation in Range of Motion   Upper Extremity Impairment on both sides   Lower Extremity No impairment   Mobility Devices None of the above were used   Eating   Did they attempt the activity? No   Oral Hygiene   Did they attempt the activity? Yes   Was the activity done independently? No   Assistance Needed Setup / clean-up;Supervision   Was adaptive equipment used? Yes  (Seated in w/c at sink)   CARE Score - Oral Hygiene 4   Toileting Hygiene   Did they attempt the activity? Yes   Was the activity done independently? No   Assistance Needed Touching assistance  (SBA)   Was adaptive equipment used? Yes  (BSC)   CARE Score - Toileting Hygiene 4   Shower/Bathe Self   Did they attempt the activity? Yes   Was the activity done independently? No   Assistance Needed Supervision;Setup / clean-up   Was adaptive equipment used? Yes  (Seated on BSC)   CARE Score - Shower/Bathe Self 4   Upper Body Dressing   Did they attempt the activity? Yes   Was the activity done independently? No   Assistance Needed Physical assistance   Physical Assistance Level Less than half   Was adaptive equipment used? No   CARE Score - Upper Body Dressing 3   Lower Body Dressing    Did they attempt the activity? Yes   Was the activity done independently? No   Assistance Needed Physical assistance   Physical Assistance Level More than half   Was adaptive equipment used? Yes  (RW and grab bars to steady when standing)   CARE Score - Lower Body Dressing 2   Putting On/Taking Off Footwear   Did they attempt the activity? Yes   Was the activity done independently? No   Assistance Needed Physical assistance   Physical Assistance Level More than half   Was adaptive equipment used? No   CARE Score - Putting On/Taking Off Footwear 2   Personal Hygiene   Did they attempt the activity? Yes   Was the activity done independently? No   Assistance Needed Supervision;Setup / clean-up   Was adaptive equipment used? Yes  (Seated in w/c at sink)   CARE Score - Personal Hygiene 4   Roll Left and Right   Did they attempt the activity? Yes   Was the activity done independently? No   Assistance Needed Supervision   Was adaptive equipment used? Yes  (Bed rails and HOB flat)   CARE Score - Roll Left and Right 4   Sit to Lying   Did they attempt the activity? No   Lying to Sitting on Side of Bed   Did they attempt the activity? Yes   Was the activity done independently? No   Assistance Needed Physical assistance   Physical Assistance Level Less than half   Was adaptive equipment used? Yes  (Bed rails and HOB flat)   CARE Score - Lying to Sitting on Side of Bed 3   Sit to Stand   Did they attempt the activity? Yes   Was the activity done independently? No   Assistance Needed Physical assistance   Physical Assistance Level Less than half   Was adaptive equipment used? Yes  (RW)   CARE Score - Sit to Stand 3   Chair/Bed-to-Chair Transfer   Did they attempt the activity? Yes   Was the activity done independently? No   Assistance Needed Touching assistance   Was adaptive equipment used? Yes  (RW)   CARE Score - Chair/Bed-to-Chair Transfer 4   Toilet Transfer   Did they attempt the activity? Yes   Was the activity done  independently? No   Assistance Needed Touching assistance   Was adaptive equipment used? Yes  (Grab bars)   CARE Score - Toilet Transfer 4     Cognitive/Visual Perceptual:  Oriented to: Person, Place, Time, and Situation  Follows Commands/attention: Follows multistep  commands  Communication: clear/fluent  Memory:  No Deficits noted  Safety awareness/insight to disability: intact   Coping skills/emotional control: Appropriate to situation  Impaired  Pt reports blurry vision and requires lots of focus to see clearly     Physical Exam:  Balance:   Pt demonstrated fair (+) functional sitting balance as noted when performing self care tasks.  Fair  functional standing with RW and  CGA required for safety.  Postural examination/scapula alignment:    -       Rounded shoulders  Skin integrity: Visible skin intact  Edema: None noted   Sensation:      -       Intact  Upper Extremity Range of Motion:  Right Upper Extremity: Deficits: Shoulder flexion and abduction below 90 deg.; Pain with shoulder movement. Elbow flexion/extension WFL    Left Upper Extremity: Deficits: Shoulder flexion and abduction below 90 deg.; Pain with shoulder movement. Elbow flexion/extension WFL    Upper Extremity Strength:  Right Upper Extremity: Deficits: Strength not tested due to pain and ROM testing  Left Upper Extremity: Deficits: Strength not tested due to pain and ROM testing   Strength: WFL  Fine motor coordination:      -       Intact      AMPAC 6 Click ADL:  AMPAC Total Score: 17    Treatment & Education:  Pt edu on role of OT, POC, safety when performing self care tasks , benefit of performing OOB activity, and safety when performing functional transfers and mobility.      Patient left up in chair with call button in reach and daughter present    GOALS:   Multidisciplinary Problems       Occupational Therapy Goals          Problem: Occupational Therapy    Goal Priority Disciplines Outcome Interventions   Occupational Therapy Goal      OT, PT/OT Ongoing, Progressing    Description: Goals to be met by: 3 weeks     Patient will increase functional independence with ADLs by performing:    UE Dressing with Stand-by Assistance.  LE Dressing with Stand-by Assistance with use of AD and AE as needed.  Grooming while standing at sink with Supervision.  Toileting from bedside commode or toilet with Supervision for hygiene and clothing management.   Bathing from sitting at sink with Set-up Assistance.  Rolling to Bilateral with Modified [place].   Supine to sit with Modified [place].  Stand pivot transfers with Supervision with use of AD and AE as needed.  Toilet transfer to bedside commode or toilet with Supervision with use of AD and AE as needed.  Increased functional strength through use of UBE for at least 8 minutes with Min resistance.  Upper extremity exercise program 1x15 reps per handout, with assistance as needed.  Pt will perform functional standing up to 10 minutes in prep for performance of functional ADLs in standing.                           History:     Past Medical History:   Diagnosis Date    Allergy     Anemia, unspecified     Arthritis     CKD (chronic kidney disease) stage 4, GFR 15-29 ml/min     COPD (chronic obstructive pulmonary disease)     Edema     HTN (hypertension)     Hypocalcemia     Hyponatremia     Osteoarthritis          Past Surgical History:   Procedure Laterality Date    BREAST CYST EXCISION      CAUDAL EPIDURAL STEROID INJECTION N/A 2/2/2023    Procedure: Injection-steroid-epidural-caudal;  Surgeon: Angel Sparrow MD;  Location: [facility];  Service: Pain Management;  Laterality: N/A;    FOOT SURGERY         Time Tracking:     OT Date of Treatment: 10/24/23  OT Start Time: 0734  OT Stop Time: 0832  OT Total Time (min): 58 min    Billable Minutes:Evaluation 20  Self Care/Home Management 38    10/24/2023

## 2023-10-24 NOTE — PLAN OF CARE
Evaluation completed. POC established.     Problem: Physical Therapy  Goal: Physical Therapy Goal  Description: Goals to be met by: 23     Patient will increase functional independence with mobility by performin. Supine to sit with Modified [place]  2. Sit to supine with Modified [place]  3. Rolling to Left and Right with Modified [place]  4. Sit to stand transfer with Stand-by Assistance  5. Bed to chair transfer with Stand-by Assistance using Rolling Walker  6. Gait  x 150 feet with Stand-by Assistance using Rolling Walker  7. Wheelchair propulsion x 100 feet with Supervision using bilateral upper extremities/bilateral lower extremities    Outcome: Ongoing, Progressing   10/24/2023

## 2023-10-25 PROCEDURE — 99306 PR NURSING FACILITY CARE, INIT, HIGH SEVERITY: ICD-10-PCS | Mod: AI,,, | Performed by: HOSPITALIST

## 2023-10-25 PROCEDURE — 97530 THERAPEUTIC ACTIVITIES: CPT | Mod: CQ

## 2023-10-25 PROCEDURE — 25000003 PHARM REV CODE 250: Performed by: HOSPITALIST

## 2023-10-25 PROCEDURE — 25000003 PHARM REV CODE 250: Performed by: FAMILY MEDICINE

## 2023-10-25 PROCEDURE — 11000004 HC SNF PRIVATE

## 2023-10-25 PROCEDURE — 25000242 PHARM REV CODE 250 ALT 637 W/ HCPCS: Performed by: HOSPITALIST

## 2023-10-25 PROCEDURE — 97535 SELF CARE MNGMENT TRAINING: CPT

## 2023-10-25 PROCEDURE — 97116 GAIT TRAINING THERAPY: CPT | Mod: CQ

## 2023-10-25 PROCEDURE — 99306 1ST NF CARE HIGH MDM 50: CPT | Mod: AI,,, | Performed by: HOSPITALIST

## 2023-10-25 RX ADMIN — FLUTICASONE FUROATE AND VILANTEROL TRIFENATATE 1 PUFF: 100; 25 POWDER RESPIRATORY (INHALATION) at 08:10

## 2023-10-25 RX ADMIN — GUAIFENESIN 200 MG: 200 SOLUTION ORAL at 06:10

## 2023-10-25 RX ADMIN — SENNOSIDES AND DOCUSATE SODIUM 1 TABLET: 8.6; 5 TABLET ORAL at 08:10

## 2023-10-25 RX ADMIN — GUAIFENESIN 200 MG: 200 SOLUTION ORAL at 08:10

## 2023-10-25 RX ADMIN — ASPIRIN 81 MG CHEWABLE TABLET 81 MG: 81 TABLET CHEWABLE at 08:10

## 2023-10-25 RX ADMIN — FUROSEMIDE 20 MG: 20 TABLET ORAL at 08:10

## 2023-10-25 RX ADMIN — POLYETHYLENE GLYCOL 3350 17 G: 17 POWDER, FOR SOLUTION ORAL at 06:10

## 2023-10-25 RX ADMIN — FLUTICASONE PROPIONATE 100 MCG: 50 SPRAY, METERED NASAL at 09:10

## 2023-10-25 RX ADMIN — CALCIUM CARBONATE (ANTACID) CHEW TAB 500 MG 500 MG: 500 CHEW TAB at 08:10

## 2023-10-25 RX ADMIN — CETIRIZINE HYDROCHLORIDE 10 MG: 5 TABLET, FILM COATED ORAL at 08:10

## 2023-10-25 RX ADMIN — SENNOSIDES AND DOCUSATE SODIUM 1 TABLET: 8.6; 5 TABLET ORAL at 09:10

## 2023-10-25 RX ADMIN — AMLODIPINE BESYLATE 10 MG: 10 TABLET ORAL at 08:10

## 2023-10-25 RX ADMIN — CHOLECALCIFEROL TAB 25 MCG (1000 UNIT) 2000 UNITS: 25 TAB at 08:10

## 2023-10-25 RX ADMIN — CALCIUM CARBONATE (ANTACID) CHEW TAB 500 MG 500 MG: 500 CHEW TAB at 02:10

## 2023-10-25 RX ADMIN — CLOPIDOGREL BISULFATE 75 MG: 75 TABLET ORAL at 08:10

## 2023-10-25 RX ADMIN — FUROSEMIDE 20 MG: 20 TABLET ORAL at 05:10

## 2023-10-25 RX ADMIN — TRAMADOL HYDROCHLORIDE 50 MG: 50 TABLET, COATED ORAL at 09:10

## 2023-10-25 RX ADMIN — TRAMADOL HYDROCHLORIDE 50 MG: 50 TABLET, COATED ORAL at 08:10

## 2023-10-25 RX ADMIN — ATORVASTATIN CALCIUM 80 MG: 40 TABLET, FILM COATED ORAL at 08:10

## 2023-10-25 RX ADMIN — METHOCARBAMOL 500 MG: 500 TABLET ORAL at 09:10

## 2023-10-25 RX ADMIN — FLUTICASONE PROPIONATE 100 MCG: 50 SPRAY, METERED NASAL at 08:10

## 2023-10-25 RX ADMIN — Medication 1 CAPSULE: at 08:10

## 2023-10-25 RX ADMIN — PREGABALIN 50 MG: 50 CAPSULE ORAL at 09:10

## 2023-10-25 RX ADMIN — DICLOFENAC SODIUM 2 G: 10 GEL TOPICAL at 09:10

## 2023-10-25 RX ADMIN — TRAMADOL HYDROCHLORIDE 50 MG: 50 TABLET, COATED ORAL at 05:10

## 2023-10-25 RX ADMIN — ACETAMINOPHEN 500 MG: 500 TABLET ORAL at 12:10

## 2023-10-25 NOTE — NURSING
Nurses Note -- 4 Eyes      10/23/23   11:00 PM      Skin assessed during: Admit      [] No Altered Skin Integrity Present    []Prevention Measures Documented      [x] Yes- Altered Skin Integrity Present or Discovered   [x] LDA Added if Not in Epic (Describe Wound)   [x] New Altered Skin Integrity was Present on Admit and Documented in LDA   [] Wound Image Taken    Wound Care Consulted? Yes    Attending Nurse:  MARIAMA Schaefer    Second RN/Staff Member:   NIA Acharya

## 2023-10-25 NOTE — H&P
"[facility] Medicine  Skilled Nursing Facility   History and Physical Exam      Date of Service: 10/25/2023      Patient Name: Bridget Montgomery  MRN: [id number]  Admission Date: 10/23/2023  Attending Physician: Brayan Ponce MD  Primary Care Provider: Charles Peacock MD  Code Status: Full Code      Principal problem:  Diplopia      Chief Complaint:   Chief Complaint   Patient presents with    Stroke     Admitted to OS for rehabilitation following hospital stay for acute vision change and weakness felt to be due to acute stroke.           HPI:   "Bridget Montgomery is an 88 y.o. female with PMH CKD4, COPD, HTN, pre diabetes, and DVT who was admitted to hospital with new onset diplopia and dysarthria. Neuro w/up with neurology completed. Unable to definitively rule out cva on mri, however no acute intracranial findings identified on CTA, CT or MRI brain. Treated as acute CVA given symptoms and inconclusive imaging findings." Per Carin Barclay, NP on 10/24/23.     She is doing okay today. She is using her headphones and microphone to aide her hearing. She says that her vision changes remain, but have improved. She is working more with therapy and walked 125 feet using RW with CGA, but with decreased tessa, flexed posture, and genu valgum. She says that she is eating well. She has not had a BM in a couple days, but denies any nausea or abdominal discomfort. Her daughter is at the bedside.     Patient admitted with skilled services with PT and OT to improve functional status and ability to perform ADLs.       Past Medical History:   Past Medical History:   Diagnosis Date    Allergy     Anemia, unspecified     Arthritis     CKD (chronic kidney disease) stage 4, GFR 15-29 ml/min     COPD (chronic obstructive pulmonary disease)     Edema     HTN (hypertension)     Hypocalcemia     Hyponatremia     Osteoarthritis        Past Surgical History:   Past Surgical History:   Procedure Laterality Date    BREAST CYST " EXCISION      CAUDAL EPIDURAL STEROID INJECTION N/A 2/2/2023    Procedure: Injection-steroid-epidural-caudal;  Surgeon: Angel Sparrow MD;  Location: [facility];  Service: Pain Management;  Laterality: N/A;    FOOT SURGERY         Social History:   Tobacco Use    Smoking status: Former    Smokeless tobacco: Never   Substance and Sexual Activity    Alcohol use: Not Currently    Drug use: Never    Sexual activity: Not Currently       Family History:   Family History       Problem Relation (Age of Onset)    Cancer Mother    No Known Problems Father            No current facility-administered medications on file prior to encounter.     Current Outpatient Medications on File Prior to Encounter   Medication Sig    acetaminophen (TYLENOL) 500 MG tablet Take 500 mg by mouth every 6 (six) hours as needed for Pain.    albuterol (PROVENTIL) 2.5 mg /3 mL (0.083 %) nebulizer solution Take 2.5 mg by nebulization 2 (two) times daily as needed.    albuterol (PROVENTIL/VENTOLIN HFA) 90 mcg/actuation inhaler Inhale 2 puffs into the lungs every 6 (six) hours as needed for Wheezing. Rescue    amLODIPine (NORVASC) 10 MG tablet Take 1 tablet (10 mg total) by mouth once daily.    aspirin 81 MG Chew Take 1 tablet (81 mg total) by mouth once daily.    atorvastatin (LIPITOR) 80 MG tablet Take 1 tablet (80 mg total) by mouth once daily.    calcium carbonate (CALCIUM 500 ORAL) Take 500 mg by mouth once daily.    cetirizine (ZYRTEC) 10 MG tablet Take 10 mg by mouth once daily.    cholecalciferol, vitamin D3, (VITAMIN D3) 50 mcg (2,000 unit) Cap capsule Take 2,000 Units by mouth once daily.    clopidogreL (PLAVIX) 75 mg tablet Take 1 tablet (75 mg total) by mouth once daily. for 20 days    diclofenac sodium (VOLTAREN ARTHRITIS PAIN) 1 % Gel Apply 2 g topically once daily. (Patient taking differently: Apply 2 g topically as needed.)    fluticasone propionate (FLONASE) 50 mcg/actuation nasal spray INSTILL TWO SPRAYS INTO EACH NOSTRIL TWICE  A DAY (Patient taking differently: 2 sprays by Each Nostril route 2 (two) times a day.)    furosemide (LASIX) 20 MG tablet Take 20 mg by mouth 2 (two) times a day.    lisinopriL (PRINIVIL,ZESTRIL) 5 MG tablet Take 1 tablet (5 mg total) by mouth once daily.    omega 3-dha-epa-fish oil 1,000 mg (120 mg-180 mg) Cap Take 1 capsule by mouth once daily.    polyethylene glycol 3350 (MIRALAX ORAL) Take 17 g by mouth once daily. As needed    pregabalin (LYRICA) 50 MG capsule Take 1 capsule (50 mg total) by mouth every evening.    psyllium 0.52 gram capsule Take 0.52 g by mouth daily as needed.    SYMBICORT 80-4.5 mcg/actuation HFAA Inhale 2 puffs into the lungs 2 (two) times a day.    traMADoL (ULTRAM) 50 mg tablet TAKE 1 TABLET BY MOUTH TWICE DAILY AS NEEDED FOR PAIN (Patient taking differently: Take 50 mg by mouth 2 (two) times daily as needed for Pain.)       Allergies:   Review of patient's allergies indicates:   Allergen Reactions    Cephalexin     Codeine     Meperidine      Other reaction(s): delerium, hallucination  Delerium  Delerium  Delerium      Nsaids (non-steroidal anti-inflammatory drug)     Penicillins     Tazarotene      Other reaction(s): rash, Unknown       ROS:  Review of Systems   Constitutional:  Negative for appetite change and fever.   HENT:  Positive for hearing loss.    Eyes:  Positive for visual disturbance.   Respiratory:  Negative for cough and shortness of breath.    Cardiovascular:  Negative for chest pain and palpitations.   Gastrointestinal:  Positive for constipation. Negative for abdominal pain, nausea and vomiting.   Genitourinary:  Negative for difficulty urinating and dysuria.   Musculoskeletal:  Negative for arthralgias and back pain.   Neurological:  Positive for weakness. Negative for dizziness and light-headedness.   Psychiatric/Behavioral:  Negative for sleep disturbance. The patient is not nervous/anxious.      Objective:  Temp:  [97.4 °F (36.3 °C)]   Pulse:  [61-68]   Resp:   [16-18]   BP: (131-144)/(65-68)   SpO2:  [98 %]     Body mass index is 24.17 kg/m².      Physical Exam  Vitals and nursing note reviewed.   Constitutional:       General: She is not in acute distress.     Appearance: She is well-developed.      Interventions: Cervical collar in place.   Cardiovascular:      Rate and Rhythm: Normal rate and regular rhythm.      Heart sounds: S1 normal and S2 normal. No murmur heard.  Pulmonary:      Effort: Pulmonary effort is normal. No respiratory distress.      Breath sounds: Normal breath sounds. No wheezing or rales.   Abdominal:      General: Bowel sounds are normal. There is no distension.      Palpations: Abdomen is soft.      Tenderness: There is no abdominal tenderness.   Musculoskeletal:         General: No tenderness.      Right lower leg: No edema.      Left lower leg: No edema.   Skin:     General: Skin is warm and dry.      Findings: Bruising present.   Neurological:      Mental Status: She is alert and oriented to person, place, and time.   Psychiatric:         Mood and Affect: Mood normal.         Behavior: Behavior normal. Behavior is cooperative.         Thought Content: Thought content normal.       Significant Labs: A1C:   Recent Labs   Lab 10/17/23  1946   HGBA1C 5.8*     TSH:   Recent Labs   Lab 10/17/23  1803   TSH 1.483       BMP  Lab Results   Component Value Date     10/21/2023    K 3.8 10/21/2023     10/21/2023    CO2 24 10/21/2023    BUN 43 (H) 10/21/2023    CREATININE 1.4 10/21/2023    CALCIUM 8.7 10/21/2023    ANIONGAP 12 10/21/2023    EGFRNORACEVR 36 (A) 10/21/2023       LFTS:  Lab Results   Component Value Date    ALT 24 10/21/2023    AST 46 (H) 10/21/2023    ALKPHOS 147 (H) 10/21/2023    BILITOT 0.3 10/21/2023       CBC:  Lab Results   Component Value Date    WBC 6.94 10/19/2023    HGB 11.6 (L) 10/19/2023    HCT 34.9 (L) 10/19/2023    MCV 89 10/19/2023     10/19/2023     Significant Imaging: I have reviewed all pertinent imaging  "results/findings completed during prior hospitalization.      Assessment and Plan:  Active Diagnoses:    Diagnosis Date Noted POA    PRINCIPAL PROBLEM:  Diplopia [H53.2] 10/24/2023 Yes    Chronic constipation [K59.09] 10/24/2023 Yes    CKD (chronic kidney disease) stage 4, GFR 15-29 ml/min [N18.4] 06/20/2023 Unknown    Chronic obstructive pulmonary disease [J44.9] 07/07/2020 Yes    Spinal stenosis [M48.00] 07/07/2020 Unknown    Idiopathic osteoarthritis [M19.90] 07/07/2020 Yes    Mixed hyperlipidemia [E78.2] 07/07/2020 Yes    Lumbar spondylosis [M47.816] 08/17/2018 Yes    Essential hypertension [I10] 02/23/2016 Yes    Chronic kidney disease, stage III (moderate) [N18.30] 08/20/2013 Yes      Problems Resolved During this Admission:       Diplopia  Acute stroke  In short term acute hospital per discharge summary:  "- 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  - CTH without acute intracranial process  - CTA without large vessel occlusion  - MRI brain demonstrated equivocal punctate lesion in the left paramedian midbrain, which because of study is difficult to exclude ischemic process  - TTE with bubble study ordered and demonstrated EF 61%, moderate mitral annular calcifications and mild regurgitation, and PA pressure of 39  - Loaded with  and plavix 300"  - Continue DAPT x 21 days then aspirin only daily for life  - Continue high intensity statin therapy w atorvastatin to 80mg daily  - PT/OT/SLP consulted to evaluate and treat  - Maintain fall and delirium precautions  - Will need Opthalmology follow up outpatient     Mixed HLD  - continue high intensity statin therapy with atorvastatin to 80mg daily  - Follow-up with PCP for monitoring      Chronic Obstructive Pulmonary Disease (COPD)  - Continue home LABA 1 puff daily  - Continue albuterol inhaler and neb prn  - May consider montelukast 10 mg po daily if unstable  - Continue BID Flonase for sinus relief     Stage 4 chronic kidney disease  - Follows with " [facility]  - Continue lasix 20 mg daily  - Avoid any nephrotoxic agents including NSAIDs, aminoglycosides, IV contrast (unless absolutely necessary), gadolinium, fleets and other phosphorous-based laxatives. Caution with antibiotics.  - Renally adjust medications based on patient's creatinine clearance  - Avoid Hypotension  - Renal Diet  - Routinely monitor renal function twice weekly     Essential Hypertension  - Continue amlodipine 10 mg daily and furosemide 20 mg daily  - Monitor BP  - Adjust therapy to BP goal < 1500/90  - Avoid hypotension      Spinal Stenosis  C1-C2 dens fracture  - Follows with neurosurgery  - Scheduled for surgery on 11/10/23; now cancelled d/t post acute stroke  - continue diclofenac gel topically daily  - tylenol, tramadol, robaxin prn  - Continue home Lyrica 50 mg nightly  - Follow up with neurosurgery outpatient  - C-collar for comfort when out of bed.     Chronic Constipation  - Continue scheduled senokot and miralax  - Adjust bowel regimen prn to avoid diarrhea  - Encourage fluid intake  - Monitor bowel movement regularity and consistency     Osteoarthritis  Muscle Spasms, Chronic  - Chronic pain to R shoulder  - continue diclofenac gel topically daily  - tylenol, tramadol, robaxin prn  - Continue home pregabalin 50 mg qHS and tramadol 50 gm qHS.      H/o Type 2 Diabetes  Lab Results   Component Value Date    HGBA1C 5.8 (H) 10/17/2023   - Continue to monitor with BMP twice weekly.   - Not on any treatment for this currently.      Decreased Mobility   Physical Debility  Weakness   -Patient with decreased bed mobility therefore patient is at high risk for developing wounds and deterioration of any current wounds.   - Continue with PT/OT for gait training and strengthening and restoration of ADL's   - Encourage mobility, OOB in chair, and early ambulation as appropriate  - Fall precautions   - Monitor for bowel and bladder dysfunction  - Monitor for and prevent skin breakdown and  pressure ulcers  - DVT prophylaxis with clopidogrel and ASA     Anemia of Chronic Disease  - Baseline Hb 10-11   - Trend with scheduled CBC  - Transfuse for Hgb < 7.0  - Continue ferrous sulfate tablet every other day  - Follow up with PCP outpatient     Insomnia, Chronic  - Continue home tramadol 50 mg q HS  - Fall and delerium precautions.   - Note home polypharmacy risk factors  - F/up with PCP regarding long-term insomnia and pain management      IP [facility] RISK OF UNPLANNED READMISSION Model: Moderate      Anticipated Disposition:  Home with home health      Future Appointments   Date Time Provider Department Center   12/11/2023  2:00 PM Lluvia Rahman MD [facility]   2/21/2024 10:15 AM Charles Peacock MD [facility]       I certify that SNF services are required to be given on an inpatient basis because Bridget Montgomery needs for skilled nursing care and/or skilled rehabilitation are required on a daily basis and such services can only practically be provided in a skilled nursing facility setting and are for an ongoing condition for which she received inpatient care in the hospital.       Brayan Ponce MD  Department of Hospital Medicine   [facility] - Skilled Nursing

## 2023-10-25 NOTE — PROGRESS NOTES
Patient sitting on recliner at this time ,per choice, ndn. Pt is free of falls or injuries this shift. Instructed to call prn, call light in reach, will monitor.

## 2023-10-25 NOTE — PT/OT/SLP PROGRESS
Physical Therapy Treatment    Patient Name:  Bridget Montgomery   MRN:  [id number]  Admit Date: 10/23/2023  Admitting Diagnosis: Diplopia  Recent Surgeries: N/A    General Precautions: Standard, fall  Orthopedic Precautions: spinal precautions  Braces: Cervical collar    Recommendations:     Discharge Recommendations: Low Intensity Therapy  Level of Assistance Recommended at Discharge: 24 hours light assistance  Discharge Equipment Recommendations: bath bench  Barriers to discharge: Decreased caregiver support    Assessment:     Bridget Montgomery is a 88 y.o. female admitted with a medical diagnosis of Diplopia .     Pt was agreeable and tolerated session well. Pt increase total gait distance without needing a seated rest break. Exchanged wheelchair to have a desk style armrest and required slightly less assistance to stand up. Pt is progressing well towards goals and continues to benefit from therapy to improve functional endurance, strength, and mobility.     Performance deficits affecting function: weakness, impaired endurance, impaired self care skills, impaired functional mobility, gait instability, impaired balance, decreased upper extremity function, decreased lower extremity function, decreased safety awareness, pain, decreased ROM, orthopedic precautions.    Rehab Potential is good    Activity Tolerance: Good    Plan:     Patient to be seen 5 x/week to address the above listed problems via gait training, therapeutic activities, therapeutic exercises, neuromuscular re-education, wheelchair management/training    Plan of Care Expires: 11/23/23  Plan of Care Reviewed with: patient, daughter    Subjective     Pt reports able to watch TV better in reclined chair than wheelchair.     Pain/Comfort:  Pain Rating 1: 9/10  Location - Side 1: Bilateral  Location - Orientation 1: generalized  Location 1: shoulder  Pain Addressed 1: Reposition, Pre-medicate for activity  Pain Rating Post-Intervention 1:  9/10    Patient's cultural, spiritual, Hindu conflicts given the current situation:  no    Objective:     Patient found  up in wheelchair  with cervical collar upon PT entry to room.     Therapeutic Activities and Exercises:   Reviewed spinal precautions with pt.   Exchanged wheelchair. Pt reported increase difficulty pushing up from full arm rest, exchanged to desk style arm rest for wheelchair.   Seated BLE therex 2x 10 reps: ankle pumps and long arc quads  Patient educated on role of therapy, goals of session, and benefits of out of bed mobility.   Instructed on use of call button and importance of calling nursing staff for assistance with mobility   Questions/concerns addressed within PTA scope of practice  Pt verbalized understanding.    Functional Mobility:  Bed Mobility:   Supine to Sit: minimum assistance; HOB elevated  Completed via log sitting then swing legs  Assisted with trunk management   Sit to Supine: minimum assistance  Assisted with trunk management   Transfers:   Sit <> Stand Transfer: minimum assistance and moderate assistance with rolling walker   Cues to scoot forward.  Slight decrease in assistance with desk armrest on wheelchair vs full arm rest  Wheelchair> Chair Transfer: contact guard assistance with no assistive device using Stand Pivot technique   Gait:  Pt ambulated ~125 ft with contact guard assistance and rolling walker. Wheelchair following   no rest breaks & no LOB  Gait Deviation(s):  decrease tessa, genu valgum (L>R), and slight flexed posture  Verbal/tactile cues for upright posture and pacing as needed   Wheelchair Propulsion:    Pt propelled Standard wheelchair x ~50 feet on Level tile with  Bilateral upper extremity with Supervision or Set-up Assistance.   Distance limited by fatigued.     AM-PAC 6 CLICK MOBILITY  15    Patient left up in chair with call button in reach.    GOALS:   Multidisciplinary Problems       Physical Therapy Goals          Problem: Physical  Therapy    Goal Priority Disciplines Outcome Goal Variances Interventions   Physical Therapy Goal     PT, PT/OT Ongoing, Progressing     Description: Goals to be met by: 23     Patient will increase functional independence with mobility by performin. Supine to sit with Modified [place]  2. Sit to supine with Modified [place]  3. Rolling to Left and Right with Modified [place]  4. Sit to stand transfer with Stand-by Assistance  5. Bed to chair transfer with Stand-by Assistance using Rolling Walker  6. Gait  x 150 feet with Stand-by Assistance using Rolling Walker  7. Wheelchair propulsion x 100 feet with Supervision using bilateral upper extremities/bilateral lower extremities                         Time Tracking:     PT Received On: 10/25/23  PT Start Time: 1049  PT Stop Time: 1122  PT Total Time (min): 33 min    Billable Minutes: Gait Training 16 and Therapeutic Activity 17    Treatment Type: Treatment  PT/PTA: PTA     Number of PTA visits since last PT visit: 1     10/25/2023

## 2023-10-25 NOTE — PT/OT/SLP PROGRESS
Occupational Therapy   Treatment    Name: Bridget Montgomery  MRN: [id number]  Admit Date: 10/23/2023  Admitting Diagnosis:  Diplopia    General Precautions: Standard, fall   Orthopedic Precautions: spinal precautions   Braces: Cervical collar    Recommendations:     Discharge Recommendations:   (Home with home health OT)  Level of Assistance Recommended at Discharge: 24 hours light assistance for ADL's and homemaking tasks  Discharge Equipment Recommendations: bath bench  Barriers to discharge:  None    Assessment:     Bridget Montgoemry is a 88 y.o. female with a medical diagnosis of Diplopia .  She presents with performance deficits affecting function are weakness, impaired endurance, impaired self care skills, impaired sensation, impaired functional mobility, gait instability, impaired balance, visual deficits, decreased coordination, decreased upper extremity function, decreased lower extremity function, decreased safety awareness, decreased ROM, orthopedic precautions.   Pt tolerated Tx without incident and is making progress but continues to require assist to perform self care tasks, functional mobility and functional transfers .  She would continue to benefit from OT intervention to further her functional (I)ce and safety.      Rehab Potential is good    Activity tolerance:  Good    Plan:     Patient to be seen 5 x/week to address the above listed problems via self-care/home management, therapeutic activities, therapeutic exercises    Plan of Care Expires: 11/24/23  Plan of Care Reviewed with: patient, daughter    Subjective     Communicated with: Nurse prior to session.  .    Pain/Comfort:  Pain Rating 1: 0/10  Location - Side 1: Bilateral  Location - Orientation 1: generalized  Location 1: shoulder  Pain Addressed 1: Distraction, Cessation of Activity, Pre-medicate for activity  Pain Rating Post-Intervention 1: 9/10    Patient's cultural, spiritual, Religion conflicts given the current  situation:  no    Objective:     Patient found supine with cervical collar upon OT entry to room.    Bed Mobility:    Patient completed Rolling/Turning to Left with  stand by assistance  Patient completed Scooting/Bridging with stand by assistance  Patient completed Supine to Sit with stand by assistance     Functional Mobility/Transfers:  Patient completed Sit <> Stand Transfer with minimum assistance  with  rolling walker   Patient completed Bed <> Chair Transfer using Stand Pivot technique with contact guard assistance with rolling walker  Patient completed Toilet Transfer Stand Pivot technique with contact guard assistance with  rolling walker    Activities of Daily Living:  Grooming: stand by assistance and set up assistance to complete oral care, face washing, and hair care/styling whiles seated in w/c at sink  Bathing: minimum assistance and set up assistance to wash upper/lower body while seated on BSC. Pt required assist to wash her lower legs and feet, but was able to was the rest of her body with set up and SBA.  Upper Body Dressing: minimum assistance to don/doff pull over shirt while seated on BSC. When donning and doffing shirt, pt required assistance to pull shirt on and off head and around neck area even with larger shirt size to accommodate for cervical collar.   Lower Body Dressing: minimum assistance to don/doff pants. Pt able to thread both feet into pant legs and pull pants up to upper thighs. Pt required assistance to pull pants over hips when standing using RW to steady.     [facility] 6 Click ADL: 16      Treatment & Education:  Pt edu on role of OT, POC, safety when performing self care tasks , and safety when performing functional transfers and mobility.      Patient left up in chair with call button in reach and daughter present    GOALS:   Multidisciplinary Problems       Occupational Therapy Goals          Problem: Occupational Therapy    Goal Priority Disciplines Outcome Interventions    Occupational Therapy Goal     OT, PT/OT Ongoing, Progressing    Description: Goals to be met by: 3 weeks     Patient will increase functional independence with ADLs by performing:    UE Dressing with Stand-by Assistance. -- Ongoing  LE Dressing with Stand-by Assistance with use of AD and AE as needed. -- Ongoing  Grooming while standing at sink with Supervision. -- Ongoing  Toileting from bedside commode or toilet with Supervision for hygiene and clothing management.   Bathing from sitting at sink with Set-up Assistance. -- Ongoing  Rolling to Bilateral with Modified [place]. -- Ongoing  Supine to sit with Modified [place].-- Ongoing  Stand pivot transfers with Supervision with use of AD and AE as needed. -- Ongoing  Toilet transfer to bedside commode or toilet with Supervision with use of AD and AE as needed. -- Ongoing  Increased functional strength and endurance through use of UBE for at least 8 minutes with Min resistance.  Upper extremity exercise program 1x15 reps per handout, with assistance as needed.  Pt will perform functional standing up to 10 minutes in prep for performance of functional ADLs in standing.                           Time Tracking:     OT Date of Treatment: 10/25/23  OT Start Time: 0847    OT Stop Time: 0925  OT Total Time (min): 38 min    Billable Minutes:Self Care/Home Management 38    10/25/2023

## 2023-10-25 NOTE — PLAN OF CARE
Problem: Occupational Therapy  Goal: Occupational Therapy Goal  Description: Goals to be met by: 3 weeks     Patient will increase functional independence with ADLs by performing:    UE Dressing with Stand-by Assistance. -- Ongoing  LE Dressing with Stand-by Assistance with use of AD and AE as needed. -- Ongoing  Grooming while standing at sink with Supervision. -- Ongoing  Toileting from bedside commode or toilet with Supervision for hygiene and clothing management.   Bathing from sitting at sink with Set-up Assistance. -- Ongoing  Rolling to Bilateral with Modified [place]. -- Ongoing  Supine to sit with Modified [place].-- Ongoing  Stand pivot transfers with Supervision with use of AD and AE as needed. -- Ongoing  Toilet transfer to bedside commode or toilet with Supervision with use of AD and AE as needed. -- Ongoing  Increased functional strength and endurance through use of UBE for at least 8 minutes with Min resistance.  Upper extremity exercise program 1x15 reps per handout, with assistance as needed.  Pt will perform functional standing up to 10 minutes in prep for performance of functional ADLs in standing.      Outcome: Ongoing, Progressing

## 2023-10-26 LAB
ANION GAP SERPL CALC-SCNC: 12 MMOL/L (ref 8–16)
BASOPHILS # BLD AUTO: 0.07 K/UL (ref 0–0.2)
BASOPHILS NFR BLD: 1.5 % (ref 0–1.9)
BUN SERPL-MCNC: 49 MG/DL (ref 8–23)
CALCIUM SERPL-MCNC: 8.6 MG/DL (ref 8.7–10.5)
CHLORIDE SERPL-SCNC: 104 MMOL/L (ref 95–110)
CO2 SERPL-SCNC: 20 MMOL/L (ref 23–29)
CREAT SERPL-MCNC: 1.3 MG/DL (ref 0.5–1.4)
DIFFERENTIAL METHOD: ABNORMAL
EOSINOPHIL # BLD AUTO: 0.2 K/UL (ref 0–0.5)
EOSINOPHIL NFR BLD: 3.6 % (ref 0–8)
ERYTHROCYTE [DISTWIDTH] IN BLOOD BY AUTOMATED COUNT: 12.2 % (ref 11.5–14.5)
EST. GFR  (NO RACE VARIABLE): 39.6 ML/MIN/1.73 M^2
GLUCOSE SERPL-MCNC: 69 MG/DL (ref 70–110)
HCT VFR BLD AUTO: 30.5 % (ref 37–48.5)
HGB BLD-MCNC: 9.9 G/DL (ref 12–16)
IMM GRANULOCYTES # BLD AUTO: 0.01 K/UL (ref 0–0.04)
IMM GRANULOCYTES NFR BLD AUTO: 0.2 % (ref 0–0.5)
LYMPHOCYTES # BLD AUTO: 1.4 K/UL (ref 1–4.8)
LYMPHOCYTES NFR BLD: 29.3 % (ref 18–48)
MAGNESIUM SERPL-MCNC: 1.9 MG/DL (ref 1.6–2.6)
MCH RBC QN AUTO: 29.7 PG (ref 27–31)
MCHC RBC AUTO-ENTMCNC: 32.5 G/DL (ref 32–36)
MCV RBC AUTO: 92 FL (ref 82–98)
MONOCYTES # BLD AUTO: 0.6 K/UL (ref 0.3–1)
MONOCYTES NFR BLD: 13.2 % (ref 4–15)
NEUTROPHILS # BLD AUTO: 2.5 K/UL (ref 1.8–7.7)
NEUTROPHILS NFR BLD: 52.2 % (ref 38–73)
NRBC BLD-RTO: 0 /100 WBC
PHOSPHATE SERPL-MCNC: 5.7 MG/DL (ref 2.7–4.5)
PLATELET # BLD AUTO: 335 K/UL (ref 150–450)
PMV BLD AUTO: 10.4 FL (ref 9.2–12.9)
POTASSIUM SERPL-SCNC: 3.7 MMOL/L (ref 3.5–5.1)
RBC # BLD AUTO: 3.33 M/UL (ref 4–5.4)
SODIUM SERPL-SCNC: 136 MMOL/L (ref 136–145)
WBC # BLD AUTO: 4.78 K/UL (ref 3.9–12.7)

## 2023-10-26 PROCEDURE — 85025 COMPLETE CBC W/AUTO DIFF WBC: CPT | Performed by: HOSPITALIST

## 2023-10-26 PROCEDURE — 25000242 PHARM REV CODE 250 ALT 637 W/ HCPCS: Performed by: HOSPITALIST

## 2023-10-26 PROCEDURE — 97110 THERAPEUTIC EXERCISES: CPT

## 2023-10-26 PROCEDURE — 97530 THERAPEUTIC ACTIVITIES: CPT | Mod: CQ

## 2023-10-26 PROCEDURE — 83735 ASSAY OF MAGNESIUM: CPT | Performed by: HOSPITALIST

## 2023-10-26 PROCEDURE — 97530 THERAPEUTIC ACTIVITIES: CPT

## 2023-10-26 PROCEDURE — 25000003 PHARM REV CODE 250: Performed by: HOSPITALIST

## 2023-10-26 PROCEDURE — 36415 COLL VENOUS BLD VENIPUNCTURE: CPT | Performed by: HOSPITALIST

## 2023-10-26 PROCEDURE — 84100 ASSAY OF PHOSPHORUS: CPT | Performed by: HOSPITALIST

## 2023-10-26 PROCEDURE — 97110 THERAPEUTIC EXERCISES: CPT | Mod: CQ

## 2023-10-26 PROCEDURE — 97535 SELF CARE MNGMENT TRAINING: CPT

## 2023-10-26 PROCEDURE — 25000003 PHARM REV CODE 250: Performed by: FAMILY MEDICINE

## 2023-10-26 PROCEDURE — 80048 BASIC METABOLIC PNL TOTAL CA: CPT | Performed by: HOSPITALIST

## 2023-10-26 PROCEDURE — 97116 GAIT TRAINING THERAPY: CPT | Mod: CQ

## 2023-10-26 PROCEDURE — 11000004 HC SNF PRIVATE

## 2023-10-26 RX ORDER — DICLOFENAC SODIUM 10 MG/G
2 GEL TOPICAL DAILY PRN
Status: DISCONTINUED | OUTPATIENT
Start: 2023-10-26 | End: 2023-11-14 | Stop reason: HOSPADM

## 2023-10-26 RX ORDER — ONDANSETRON 4 MG/1
4 TABLET, ORALLY DISINTEGRATING ORAL EVERY 6 HOURS PRN
Status: DISCONTINUED | OUTPATIENT
Start: 2023-10-26 | End: 2023-11-14 | Stop reason: HOSPADM

## 2023-10-26 RX ADMIN — ASPIRIN 81 MG CHEWABLE TABLET 81 MG: 81 TABLET CHEWABLE at 08:10

## 2023-10-26 RX ADMIN — FLUTICASONE FUROATE AND VILANTEROL TRIFENATATE 1 PUFF: 100; 25 POWDER RESPIRATORY (INHALATION) at 08:10

## 2023-10-26 RX ADMIN — CETIRIZINE HYDROCHLORIDE 10 MG: 5 TABLET, FILM COATED ORAL at 08:10

## 2023-10-26 RX ADMIN — GUAIFENESIN 200 MG: 200 SOLUTION ORAL at 05:10

## 2023-10-26 RX ADMIN — METHOCARBAMOL 500 MG: 500 TABLET ORAL at 08:10

## 2023-10-26 RX ADMIN — TRAMADOL HYDROCHLORIDE 50 MG: 50 TABLET, COATED ORAL at 08:10

## 2023-10-26 RX ADMIN — DICLOFENAC SODIUM 2 G: 10 GEL TOPICAL at 09:10

## 2023-10-26 RX ADMIN — CALCIUM CARBONATE (ANTACID) CHEW TAB 500 MG 500 MG: 500 CHEW TAB at 08:10

## 2023-10-26 RX ADMIN — FUROSEMIDE 20 MG: 20 TABLET ORAL at 08:10

## 2023-10-26 RX ADMIN — GUAIFENESIN 200 MG: 200 SOLUTION ORAL at 09:10

## 2023-10-26 RX ADMIN — Medication 1 CAPSULE: at 08:10

## 2023-10-26 RX ADMIN — FLUTICASONE PROPIONATE 100 MCG: 50 SPRAY, METERED NASAL at 08:10

## 2023-10-26 RX ADMIN — PREGABALIN 50 MG: 50 CAPSULE ORAL at 08:10

## 2023-10-26 RX ADMIN — CHOLECALCIFEROL TAB 25 MCG (1000 UNIT) 2000 UNITS: 25 TAB at 08:10

## 2023-10-26 RX ADMIN — SENNOSIDES AND DOCUSATE SODIUM 1 TABLET: 8.6; 5 TABLET ORAL at 08:10

## 2023-10-26 RX ADMIN — CALCIUM CARBONATE (ANTACID) CHEW TAB 500 MG 500 MG: 500 CHEW TAB at 01:10

## 2023-10-26 RX ADMIN — AMLODIPINE BESYLATE 10 MG: 10 TABLET ORAL at 08:10

## 2023-10-26 RX ADMIN — ATORVASTATIN CALCIUM 80 MG: 40 TABLET, FILM COATED ORAL at 08:10

## 2023-10-26 RX ADMIN — CLOPIDOGREL BISULFATE 75 MG: 75 TABLET ORAL at 08:10

## 2023-10-26 RX ADMIN — ONDANSETRON 4 MG: 4 TABLET, ORALLY DISINTEGRATING ORAL at 09:10

## 2023-10-26 RX ADMIN — FUROSEMIDE 20 MG: 20 TABLET ORAL at 05:10

## 2023-10-26 NOTE — PT/OT/SLP PROGRESS
Physical Therapy Treatment    Patient Name:  Bridget Montgomery   MRN:  [id number]  Admit Date: 10/23/2023  Admitting Diagnosis: Diplopia  Recent Surgeries: N/A    General Precautions: Standard, fall  Orthopedic Precautions: spinal precautions  Braces: Cervical collar    Recommendations:     Discharge Recommendations: Low Intensity Therapy  Level of Assistance Recommended at Discharge: 24 hours light assistance  Discharge Equipment Recommendations: bath bench  Barriers to discharge: Decreased caregiver support    Assessment:     Bridget Montgomery is a 88 y.o. female admitted with a medical diagnosis of Diplopia .   Pt was agreeable and tolerated session well. Pt demonstrated better bed mobility and sit<>stand transfer today. Andreina for sit<>stand for lower still required. Pt increase total gait distance with 1 seated rest break. Pt is progress well towards goals and continues to benefit from therapy to increase functional independence for safe transition to home environment at time of discharge.       Performance deficits affecting function: weakness, impaired endurance, impaired self care skills, impaired functional mobility, gait instability, impaired balance, decreased upper extremity function, decreased lower extremity function, decreased safety awareness, pain, decreased ROM, orthopedic precautions.    Rehab Potential is good    Activity Tolerance: Good    Plan:     Patient to be seen 5 x/week to address the above listed problems via gait training, therapeutic activities, therapeutic exercises, neuromuscular re-education, wheelchair management/training    Plan of Care Expires: 11/23/23  Plan of Care Reviewed with: patient, daughter    Subjective     Pt agreeable to therapy.     Pain/Comfort:  Pain Rating 1: 8/10  Location - Side 1: Bilateral  Location - Orientation 1: generalized  Location 1: shoulder  Pain Addressed 1: Nurse notified, Reposition  Pain Rating Post-Intervention 1: 8/10    Patient's cultural,  spiritual, Congregational conflicts given the current situation:  no    Objective:     Patient found HOB elevated with cervical collar upon PT entry to room.     Therapeutic Activities and Exercises:   Reviewed spinal precautions and pt able to maintain during session  Mini elliptical x10 mins (light resistance)  To improve BLE endurance, strength, and ROM  Patient educated on role of therapy, goals of session, and benefits of out of bed mobility.   Instructed on use of call button and importance of calling nursing staff for assistance with mobility   Questions/concerns addressed within PTA scope of practice  Pt verbalized understanding.    Functional Mobility:  Bed Mobility:   Scooting to EOB: contact guard assistance  Supine to Sit: contact guard assistance; HOB elevated  Completed via long sitting   Transfers:   Sit <> Stand Transfer:  CGA/Andreina  with rolling walker   CGA from EOB and Andreina from wheelchair (lower surface)  Bed >Sink> Wheelchair Transfer: contact guard assistance with rolling walker using Step Transfer technique (~2x 10ft)  Gait:  Pt ambulated ~84+112 ft with contact guard assistance and rolling walker. Wheelchair following   1 seated rest breaks & no LOB  Gait Deviation(s):  decrease tessa, slight flexed posture, and   genu valgum noted (L>R)  Verbal/tactile cues for pacing as needed   Balance:   Static/Dynamic sitting EOB balance: SBA   Assisted with changing shirt, brief, and pants and donned shoes   Static/Dynamic standing balance: CGA   Completed ADL at sink.   Wheelchair behind pt for safety      AM-PAC 6 CLICK MOBILITY  16    Patient left  up in wheelchair  with call button in reach and nurse notified.    GOALS:   Multidisciplinary Problems       Physical Therapy Goals          Problem: Physical Therapy    Goal Priority Disciplines Outcome Goal Variances Interventions   Physical Therapy Goal     PT, PT/OT Ongoing, Progressing     Description: Goals to be met by: 11/23/23     Patient will  increase functional independence with mobility by performin. Supine to sit with Modified [place]  2. Sit to supine with Modified [place]  3. Rolling to Left and Right with Modified [place]  4. Sit to stand transfer with Stand-by Assistance  5. Bed to chair transfer with Stand-by Assistance using Rolling Walker  6. Gait  x 150 feet with Stand-by Assistance using Rolling Walker  7. Wheelchair propulsion x 100 feet with Supervision using bilateral upper extremities/bilateral lower extremities                         Time Tracking:     PT Received On: 10/26/23  PT Start Time: 731  PT Stop Time: 819  PT Total Time (min): 48 min    Billable Minutes: Gait Training 17, Therapeutic Activity 20, and Therapeutic Exercise 11    Treatment Type: Treatment  PT/PTA: PTA     Number of PTA visits since last PT visit: 2     10/26/2023

## 2023-10-26 NOTE — PLAN OF CARE
Problem: Adult Inpatient Plan of Care  Goal: Plan of Care Review  Outcome: Ongoing, Progressing  Flowsheets (Taken 10/26/2023 0052)  Plan of Care Reviewed With:   patient   daughter  Goal: Patient-Specific Goal (Individualized)  Outcome: Ongoing, Progressing  Goal: Absence of Hospital-Acquired Illness or Injury  Outcome: Ongoing, Progressing  Goal: Optimal Comfort and Wellbeing  Outcome: Ongoing, Progressing  Goal: Readiness for Transition of Care  Outcome: Ongoing, Progressing     Problem: Diabetes Comorbidity  Goal: Blood Glucose Level Within Targeted Range  Outcome: Ongoing, Progressing     Problem: Impaired Wound Healing  Goal: Optimal Wound Healing  Outcome: Ongoing, Progressing     Problem: Fall Injury Risk  Goal: Absence of Fall and Fall-Related Injury  Outcome: Ongoing, Progressing     Problem: Skin Injury Risk Increased  Goal: Skin Health and Integrity  Intervention: Optimize Skin Protection  Flowsheets (Taken 10/26/2023 0052)  Pressure Reduction Techniques:   frequent weight shift encouraged   heels elevated off bed  Skin Protection: incontinence pads utilized  Head of Bed (HOB) Positioning: HOB at 30-45 degrees

## 2023-10-26 NOTE — CONSULTS
"  [facility] - Skilled Nursing  Adult Nutrition  Consult Note    SUMMARY   Recommendation  Continue renal diet, if PO < 50% add novasource renal daily, RD following glucose and renal labs  Goals: PO to meet 75% of EEN by next RD follow up  Nutrition Goal Status: new  Communication of RD Recs: other (comment) (POC)    Assessment and Plan  Inadequate  oral intake related to decreased appetite, restrictive diet as evidenced by PO < 75%.    New    Plan  Mineral and protein modified diet - renal  Collaboration with other providers  Mineral supplement therapy- Vit D, Ca carbonate    Malnutrition Assessment  pending                                       Reason for Assessment  Reason For Assessment: consult  Diagnosis:  (CVA, Diplopia)  Relevant Medical History: debility, chronic constipation, COPD, CKD 4, OA, HTN, DVT, pre-diabetes, anemia, spinal stenosis  Interdisciplinary Rounds: did not attend  General Information Comments: PO 66%,  weight hx reviewed with loss of 6% so far this year. Assessment being completed remotely.  Patient not available by phone.  Nutrition Discharge Planning: DC on  low sodium diet    Nutrition/Diet History    Patient Reported Diet/Restrictions/Preferences: general  Spiritual, Cultural Beliefs, Confucianist Practices, Values that Affect Care: no  Food Allergies: NKFA  Factors Affecting Nutritional Intake: None identified at this time    Anthropometrics    Temp: 97.7 °F (36.5 °C)  Height Method: Stated  Height: 5' 3" (160 cm)  Height (inches): 63 in  Weight Method: Bed Scale  Weight: 61.9 kg (136 lb 7.4 oz)  Weight (lb): 136.47 lb  Ideal Body Weight (IBW), Female: 115 lb  % Ideal Body Weight, Female (lb): 118.67 %  % Ideal Body Weight Malnutrition: 80-90% - mild deficit  BMI (Calculated): 24.2  BMI Grade: 18.5-24.9 - normal  Weight Loss: other (see comments)  Usual Body Weight (UBW), k.2 kg  Weight Change Amount:  (6% in last ten months)  % Usual Body Weight: 93.7  % Weight Change From Usual " Weight: -6.5 %       Lab/Procedures/Meds    Pertinent Labs Reviewed: reviewed  Pertinent Medications Reviewed: reviewed  Pertinent Medications Comments: Vit D, ASA, Omega 3, fish oil, CaCarbonate, lasix, statin, senna-docusate    Estimated/Assessed Needs    Weight Used For Calorie Calculations: 61.9 kg (136 lb 7.4 oz)  Energy Calorie Requirements (kcal): 1452  Energy Need Method: Pettis-St Jeor (x 1.4(PAL))  Protein Requirements: 37-49  Weight Used For Protein Calculations: 61.9 kg (136 lb 7.4 oz) (.6-.8g//kg)  Fluid Requirements (mL): 1452 or per MD     RDA Method (mL): 1452  CHO Requirement: 178      Nutrition Prescription Ordered    Current Diet Order: renal    Evaluation of Received Nutrient/Fluid Intake    I/O: no data  Energy Calories Required: not meeting needs  Protein Required: meeting needs  Fluid Required: meeting needs  Comments: LBM 10/25  Tolerance: tolerating  % Intake of Estimated Energy Needs: 50 - 75 %  % Meal Intake: 50 - 75 %    Nutrition Risk    Level of Risk/Frequency of Follow-up: low (one time per week)       Monitor and Evaluation    Food and Nutrient Intake: food and beverage intake  Food and Nutrient Adminstration: diet order  Knowledge/Beliefs/Attitudes: food and nutrition knowledge/skill  Physical Activity and Function: nutrition-related ADLs and IADLs  Anthropometric Measurements: weight change  Biochemical Data, Medical Tests and Procedures: electrolyte and renal panel, glucose/endocrine profile, gastrointestinal profile  Nutrition-Focused Physical Findings: overall appearance       Nutrition Follow-Up    RD Follow-up?: Yes

## 2023-10-26 NOTE — PT/OT/SLP PROGRESS
Occupational Therapy   Treatment    Name: Bridget Montgomery  MRN: [id number]  Admit Date: 10/23/2023  Admitting Diagnosis:  Diplopia    General Precautions: Standard, fall   Orthopedic Precautions: spinal precautions   Braces: Cervical collar    Recommendations:     Discharge Recommendations:   (Home with home health OT)  Level of Assistance Recommended at Discharge: 24 hours light assistance for ADL's and homemaking tasks  Discharge Equipment Recommendations: bath bench  Barriers to discharge:  None    Assessment:     Bridget Montgomery is a 88 y.o. female with a medical diagnosis of Diplopia .  She presents with performance deficits affecting function are weakness, impaired endurance, impaired self care skills, impaired sensation, impaired functional mobility, gait instability, impaired balance, visual deficits, decreased coordination, decreased upper extremity function, decreased lower extremity function, decreased safety awareness, decreased ROM, orthopedic precautions.   Pt tolerated Tx without incident and is making progress but continues to require assist to perform self care tasks, functional mobility and functional transfers .  She would continue to benefit from OT intervention to further her functional (I)ce and safety.      Rehab Potential is good    Activity tolerance:  Good    Plan:     Patient to be seen 5 x/week to address the above listed problems via self-care/home management, therapeutic activities, therapeutic exercises    Plan of Care Expires: 11/24/23  Plan of Care Reviewed with: patient, daughter    Subjective     Communicated with: Nurse prior to session. .    Pain/Comfort:  Pain Rating 1: 7/10  Location - Side 1: Bilateral  Location - Orientation 1: generalized  Location 1: shoulder  Pain Addressed 1: Reposition, Distraction, Pre-medicate for activity  Pain Rating Post-Intervention 1: 7/10    Patient's cultural, spiritual, Rastafarian conflicts given the current situation:  no    Objective:      Patient found up in chair with cervical collar upon OT entry to room.    Bed Mobility:    Pt seated in W/C in activity room at onset of therapy session.    Functional Mobility/Transfers:  Patient completed Sit <> Stand Transfer with contact guard assistance  with  rolling walker   Functional Mobility: Pt ambulated with RW from activity room towards the nurses station ~140 ft with no loss of balance observed but V/Cs required for safety.   Patient concluded the session by propelling light weight W/C from nurses station to her room using (B) UEs a distance of  70 ft  with  (S) assist provided throughout.      Activities of Daily Living:  Upper Body Dressing: minimum assistance to don button up sweater while standing using RW to steady. Pt was able to thread her (R) UE into sweater but required assist to bring the sweater around the back to find the other side of the sweater. She was able to then thread the (L) UE with SBA. Pt was able to doff sweater while seated in w/c with (S).  Lower Body Dressing: stand by assistance to don/doff pants.    [facility] 6 Click ADL: 19    OT Exercises:   Pt performed dowel exercises with 0 pound dowel 1 set 5 reps performing shld Flex/ Extn, Horizontal Ab/Adduction, chest presses, and biceps curls.  Min (A) needed to complete exercises but brief breaks provided between sets.    Pt worked on functional standing activity consisting of standing with RW while reaching in all planes , crossing of midline and reaching to varying heights to facilitate (B) wt shifting and stability in standing in prep for performance of self care tasks and functional ADLS in standing.  Pt tolerated up to  5 minutes and 50 seconds in standing with SBA and RW to steady.      Treatment & Education:  Pt edu on  POC, safety when performing self care tasks , and safety when performing functional transfers and mobility.      Patient left up in chair with call button in reach and daughter present    GOALS:    Multidisciplinary Problems       Occupational Therapy Goals          Problem: Occupational Therapy    Goal Priority Disciplines Outcome Interventions   Occupational Therapy Goal     OT, PT/OT Ongoing, Progressing    Description: Goals to be met by: 3 weeks     Patient will increase functional independence with ADLs by performing:    UE Dressing with Stand-by Assistance. -- Ongoing  LE Dressing with Stand-by Assistance with use of AD and AE as needed. -- Ongoing  Grooming while standing at sink with Supervision. -- Ongoing  Toileting from bedside commode or toilet with Supervision for hygiene and clothing management.   Bathing from sitting at sink with Set-up Assistance. -- Ongoing  Rolling to Bilateral with Modified [place]. -- Ongoing  Supine to sit with Modified [place].-- Ongoing  Stand pivot transfers with Supervision with use of AD and AE as needed. -- Ongoing  Toilet transfer to bedside commode or toilet with Supervision with use of AD and AE as needed. -- Ongoing  Increased functional strength and endurance through use of UBE for at least 8 minutes with Min resistance.  Upper extremity exercise program 1x15 reps per handout, with assistance as needed. -- Ongoing  Pt will perform functional standing up to 10 minutes in prep for performance of functional ADLs in standing. -- Ongoing                           Time Tracking:     OT Date of Treatment: 10/26/23  OT Start Time: 1126    OT Stop Time: 1205  OT Total Time (min): 39 min    Billable Minutes:Self Care/Home Management 15  Therapeutic Activity 15  Therapeutic Exercise 9    10/26/2023

## 2023-10-26 NOTE — PLAN OF CARE
Problem: Occupational Therapy  Goal: Occupational Therapy Goal  Description: Goals to be met by: 3 weeks     Patient will increase functional independence with ADLs by performing:    UE Dressing with Stand-by Assistance. -- Ongoing  LE Dressing with Stand-by Assistance with use of AD and AE as needed. -- Ongoing  Grooming while standing at sink with Supervision. -- Ongoing  Toileting from bedside commode or toilet with Supervision for hygiene and clothing management.   Bathing from sitting at sink with Set-up Assistance. -- Ongoing  Rolling to Bilateral with Modified [place]. -- Ongoing  Supine to sit with Modified [place].-- Ongoing  Stand pivot transfers with Supervision with use of AD and AE as needed. -- Ongoing  Toilet transfer to bedside commode or toilet with Supervision with use of AD and AE as needed. -- Ongoing  Increased functional strength and endurance through use of UBE for at least 8 minutes with Min resistance.  Upper extremity exercise program 1x15 reps per handout, with assistance as needed. -- Ongoing  Pt will perform functional standing up to 10 minutes in prep for performance of functional ADLs in standing. -- Ongoing      Outcome: Ongoing, Progressing

## 2023-10-26 NOTE — PLAN OF CARE
Continue renal diet, if PO < 50% add novasource renal daily, RD following glucose and renal labs  Goals: PO to meet 75% of EEN by next RD follow up  Nutrition Goal Status: new  Communication of RD Recs: other (comment) (POC)     Assessment and Plan  Inadequate  oral intake related to decreased appetite, restrictive diet as evidenced by PO < 75%.     New     Plan  Mineral and protein modified diet - renal  Collaboration with other providers  Mineral supplement therapy- Vit D, Ca carbonate

## 2023-10-26 NOTE — CLINICAL REVIEW
Clinical Pharmacy Chart Review Note      Admit Date: 10/23/2023   LOS: 3 days       Bridget Montgomery is a 88 y.o. female admitted to SNF for PT/OT after hospitalization for diplopia.    Active Hospital Problems    Diagnosis  POA    *Diplopia [H53.2]  Yes    Chronic constipation [K59.09]  Yes    CKD (chronic kidney disease) stage 4, GFR 15-29 ml/min [N18.4]  Unknown    Chronic obstructive pulmonary disease [J44.9]  Yes     - remote history of smoking      Spinal stenosis [M48.00]  Unknown    Idiopathic osteoarthritis [M19.90]  Yes    Mixed hyperlipidemia [E78.2]  Yes    Lumbar spondylosis [M47.816]  Yes    Essential hypertension [I10]  Yes    Chronic kidney disease, stage III (moderate) [N18.30]  Yes     - sees Lacey.  - last GFR was 36 (in 2022)        Resolved Hospital Problems   No resolved problems to display.     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    Closed odontoid fracture with routine healing 07/24/2023    Coccyalgia 07/24/2023    Leg wound, right 06/27/2023    Acute pancreatitis without infection or necrosis 06/20/2023    CKD (chronic kidney disease) stage 4, GFR 15-29 ml/min 06/20/2023    Non-healing wound of right lower extremity 05/12/2023    Acute chorea 04/13/2023    Lumbar radiculopathy 01/25/2023    Greater trochanteric bursitis 12/08/2020    Osteoarthritis of both knees 12/08/2020    Spinal stenosis 07/07/2020    Senile  purpura 07/07/2020    Primary localized osteoarthritis of pelvic region and thigh 07/07/2020    Idiopathic osteoarthritis 07/07/2020    Osteopenia 07/07/2020    Neuralgia of right sciatic nerve 07/07/2020    Mixed hyperlipidemia 07/07/2020    Mild recurrent major depression 07/07/2020    Diabetic renal disease 07/07/2020    Chronic obstructive pulmonary disease 07/07/2020    Allergic rhinitis 07/07/2020    Ovarian mass 03/22/2019    Ulcer of esophagus 01/29/2019    Lumbar spondylosis 08/17/2018    Spinal cord disorder 08/17/2018    Gastro-esophageal reflux disease without esophagitis 03/01/2016    Type 2 diabetes mellitus with diabetic chronic kidney disease 02/23/2016    Essential hypertension 02/23/2016    Chronic kidney disease, stage III (moderate) 08/20/2013       Scheduled Meds:    amLODIPine  10 mg Oral Daily    aspirin  81 mg Oral Daily    atorvastatin  80 mg Oral Daily    calcium carbonate  500 mg Oral Daily    cetirizine  10 mg Oral Daily    clopidogreL  75 mg Oral Daily    diclofenac sodium  2 g Topical (Top) Daily    fluticasone furoate-vilanteroL  1 puff Inhalation Daily    fluticasone propionate  2 spray Each Nostril BID    furosemide  20 mg Oral BID    omega 3-dha-epa-fish oil  1 capsule Oral Daily    pregabalin  50 mg Oral QHS    senna-docusate 8.6-50 mg  1 tablet Oral BID    traMADoL  50 mg Oral QHS    vitamin D  2,000 Units Oral Daily     Continuous Infusions:   PRN Meds: acetaminophen, acetaminophen, albuterol, albuterol-ipratropium, calcium carbonate, guaiFENesin 100 mg/5 ml, melatonin, methocarbamoL, polyethylene glycol, psyllium husk (aspartame), traMADoL    OBJECTIVE:     Vital Signs (Last 24H)  Temp:  [97.7 °F (36.5 °C)]   Pulse:  [63-81]   Resp:  [14-18]   BP: (143-151)/(68-89)   SpO2:  [95 %-99 %]     Laboratory:  CBC:   Recent Labs   Lab 10/26/23  0510   WBC 4.78   RBC 3.33*   HGB 9.9*   HCT 30.5*      MCV 92   MCH 29.7   MCHC 32.5     BMP:   Recent Labs   Lab 10/20/23  0350  10/21/23  0333   * 94    140   K 4.0 3.8    104   CO2 29 24   BUN 31* 43*   CREATININE 1.4 1.4   CALCIUM 8.8 8.7   MG 2.1 2.1     CMP:   Recent Labs   Lab 10/20/23  0356 10/21/23  0333   * 94   CALCIUM 8.8 8.7   ALBUMIN 2.8* 2.9*   PROT 6.0 6.0    140   K 4.0 3.8   CO2 29 24    104   BUN 31* 43*   CREATININE 1.4 1.4   ALKPHOS 137* 147*   ALT 13 24   AST 23 46*   BILITOT 0.2 0.3     LFTs:   Recent Labs   Lab 10/20/23  0356 10/21/23  0333   ALT 13 24   AST 23 46*   ALKPHOS 137* 147*   BILITOT 0.2 0.3   PROT 6.0 6.0   ALBUMIN 2.8* 2.9*     Lab Results   Component Value Date    HGBA1C 5.8 (H) 10/17/2023           ASSESSMENT/PLAN:     I have reviewed the medications in compliance with CMS Regulation F756 of the [place]. No irregularities were noted at this time.    **According to PMH and home medication list, patient takes the following medications at home. These medications are not currently ordered at [facility]:  Omeprazole 20 mg daily (on hold while at [facility])  Patanol 0.1% 1 gtt in both eye daily (on hold while at [facility])  Symbicort 80-4.5 mcg /actuation 2 puff twice daily (non-formulary, currently taking Breo)    Medications reviewed by PharmD, please re-consult if needed.      Binta Randolph, Pharm. D.  Clinical Pharmacist  [facility]

## 2023-10-26 NOTE — PLAN OF CARE
Interdisciplinary team, Ivana House, RN Unit Director, Evelin Sanchez, RN Charge Nurse, Cathie Tello, and Marilyn Joseph PT, spoke to patient, patient's caregiver, and daughters for care plan conference, weekly status update, and therapy progress update. Tentative discharge date set for 11/14/23.

## 2023-10-27 PROCEDURE — 97110 THERAPEUTIC EXERCISES: CPT | Mod: CQ

## 2023-10-27 PROCEDURE — 97110 THERAPEUTIC EXERCISES: CPT

## 2023-10-27 PROCEDURE — 97530 THERAPEUTIC ACTIVITIES: CPT

## 2023-10-27 PROCEDURE — 25000003 PHARM REV CODE 250: Performed by: FAMILY MEDICINE

## 2023-10-27 PROCEDURE — 11000004 HC SNF PRIVATE

## 2023-10-27 PROCEDURE — 97116 GAIT TRAINING THERAPY: CPT | Mod: CQ

## 2023-10-27 PROCEDURE — 25000003 PHARM REV CODE 250: Performed by: HOSPITALIST

## 2023-10-27 RX ADMIN — PREGABALIN 50 MG: 50 CAPSULE ORAL at 08:10

## 2023-10-27 RX ADMIN — FUROSEMIDE 20 MG: 20 TABLET ORAL at 04:10

## 2023-10-27 RX ADMIN — AMLODIPINE BESYLATE 10 MG: 10 TABLET ORAL at 08:10

## 2023-10-27 RX ADMIN — SENNOSIDES AND DOCUSATE SODIUM 1 TABLET: 8.6; 5 TABLET ORAL at 08:10

## 2023-10-27 RX ADMIN — CALCIUM CARBONATE (ANTACID) CHEW TAB 500 MG 500 MG: 500 CHEW TAB at 08:10

## 2023-10-27 RX ADMIN — Medication 1 CAPSULE: at 09:10

## 2023-10-27 RX ADMIN — GUAIFENESIN 200 MG: 200 SOLUTION ORAL at 05:10

## 2023-10-27 RX ADMIN — FLUTICASONE FUROATE AND VILANTEROL TRIFENATATE 1 PUFF: 100; 25 POWDER RESPIRATORY (INHALATION) at 08:10

## 2023-10-27 RX ADMIN — CLOPIDOGREL BISULFATE 75 MG: 75 TABLET ORAL at 08:10

## 2023-10-27 RX ADMIN — FLUTICASONE PROPIONATE 100 MCG: 50 SPRAY, METERED NASAL at 08:10

## 2023-10-27 RX ADMIN — CHOLECALCIFEROL TAB 25 MCG (1000 UNIT) 2000 UNITS: 25 TAB at 08:10

## 2023-10-27 RX ADMIN — CETIRIZINE HYDROCHLORIDE 10 MG: 5 TABLET, FILM COATED ORAL at 08:10

## 2023-10-27 RX ADMIN — FUROSEMIDE 20 MG: 20 TABLET ORAL at 08:10

## 2023-10-27 RX ADMIN — ATORVASTATIN CALCIUM 80 MG: 40 TABLET, FILM COATED ORAL at 08:10

## 2023-10-27 RX ADMIN — TRAMADOL HYDROCHLORIDE 50 MG: 50 TABLET, COATED ORAL at 10:10

## 2023-10-27 RX ADMIN — GUAIFENESIN 200 MG: 200 SOLUTION ORAL at 09:10

## 2023-10-27 RX ADMIN — METHOCARBAMOL 500 MG: 500 TABLET ORAL at 10:10

## 2023-10-27 RX ADMIN — ASPIRIN 81 MG CHEWABLE TABLET 81 MG: 81 TABLET CHEWABLE at 08:10

## 2023-10-27 RX ADMIN — TRAMADOL HYDROCHLORIDE 50 MG: 50 TABLET, COATED ORAL at 09:10

## 2023-10-27 NOTE — PROGRESS NOTES
[facility]                                  Skilled Nursing Facility                   Progress Note     Admit Date: 10/23/2023  MIKAYLA 11/14/2023  Principal Problem:  Diplopia   HPI obtained from patient interview and chart review     Chief Complaint: Re-evaluation of medical treatment and therapy status: therapy progress, dietary follow-up, HTN monitoring and management, pain monitoring     HPI:   Bridget Montgomery is an 88 y.o. female with PMH CKD4, COPD, HTN, pre diabetes, and DVT who was admitted to hospital with new onset diplopia and dysarthria. Neuro w/up with neurology completed. Unable to definitively rule out cva on mri, however no acute intracranial findings identified on CTA, CT or MRI brain. Treated as acute CVA given symptoms and inconclusive imaging findings.  Patient will be treated at [facility] with PT and OT to improve functional status and ability to perform ADLs.   Allergies: Patient is allergic to cephalexin, codeine, meperidine, nsaids (non-steroidal anti-inflammatory drug), penicillins, and tazarotene.      Interval History  - 24 hour chart review completed. NAEON. NAD.  - Afebrile, vital signs stable. Hypertensive, but maintaining at goal.   - Patient reports adequate appetite.   Daughter requested change from renal to regular. As discussed with RD, patient prescribed a low phosphorus diet, however kitchen restricts low phos diet as if she were only renal. Changed to regular diet with  novasource renal supplement (no boost or milk products). Monitor phosphorous and renal trends with routine lab on this diet.  - Patient reports pain / discomfort to neck with hard brace yesterday, denies pain and tolerating brace today. She endorses chronic pain to right shoulder mildly flared after PT. Patient reports pain is acceptably controlled with prn robaxin and tramadol.   - PT notes patient increased ambulation distance with  "stand-by assist and rolling walker today.   This patient would continue to benefit from skilled PT and skilled OT services to improve overall functional mobility and independence, strength, endurance, and safety.         ROS  Constitutional: Negative for fever   Eyes: Positive for blurred vision, double vision   Respiratory: Negative for cough, shortness of breath   Cardiovascular: Negative for chest pain, palpitations, and leg swelling.   Gastrointestinal: Negative for abdominal pain, constipation, diarrhea, nausea, vomiting.   Genitourinary: Negative for dysuria, frequency   Musculoskeletal:  + generalized weakness, + neck pain (chronic). Negative for back pain and myalgias.   Skin: Negative for itching and rash.   Neurological: Negative for dizziness, headaches.   Psychiatric/Behavioral: Negative for depression. The patient is not nervous/anxious.      24 hour Vital Sign Range   Temp:  [97.8 °F (36.6 °C)]   Pulse:  [75-86]   Resp:  [16]   BP: (137-161)/(60-72)   SpO2:  [96 %]     Current BMI: Body mass index is 24.17 kg/m².    PEx  Constitutional: Patient appears debilitated.  No distress noted  HENT:   Head: Normocephalic and atraumatic.   Eyes: Pupils are equal, round  Neck: Limited range of motion. Neck supple. C-spine collar present  Cardiovascular: Normal rate, regular rhythm and normal heart sounds.    Pulmonary/Chest: Effort normal and breath sounds are clear  Abdominal: Soft. Bowel sounds are normal.   Musculoskeletal: Normal range of motion.   Neurological: Alert and oriented to person, place, and time.   Psychiatric: Normal mood and affect. Behavior is normal.   Skin: Skin is warm and dry. Full skin assessment completed by NP on initial exam.       Altered Skin Integrity Right anterior;distal;lower Leg , POA       No results for input(s): "GLUCOSE", "NA", "K", "CL", "CO2", "BUN", "CREATININE", "CALCIUM", "MG" in the last 24 hours.      No results for input(s): "WBC", "RBC", "HGB", "HCT", "PLT", "MCV", " ""MCH", "MCHC" in the last 24 hours.      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  - CTH without acute intracranial process  - CTA without large vessel occlusion  - MRI brain demonstrated equivocal punctate lesion in the left paramedian midbrain, which because of study is difficult to exclude ischemic process  - TTE with bubble study ordered and demonstrated EF 61%, moderate mitral annular calcifications and mild regurgitation, and PA pressure of 39  - Loaded with  and plavix 300"  - Continue DAPT x 21 days then aspirin only daily for life  - Continue high intensity statin therapy w atorvastatin to 80mg daily  - PT/OT/SLP consulted to evaluate and treat  - Maintain fall and delirium precautions  - Will need Opthalmology follow up outpatient      Mixed HLD  Stroke Risk Factor.   - continue high intensity statin therapy w atorvastatin to 80mg daily  - Follow-up with PCP for monitoring per guidelines       Chronic Obstructive Pulmonary Disease (COPD)  - POA, stable  - Continue home LABA 1 puff daily  - Continue albuterol inhaler and neb prn  - Start guaifenesin q 4 prn, consider bid mucinex if needed  - Continue BID Flonase for sinus relief  - May consider montelukast 10 mg po daily if unstable  - Consider xopanex and CPT tx q 6 hours while awake if respiratory sx exascerbate      Stage 4 chronic kidney disease  - Chronic, stable  - Follows with Dr. Lyons  - Continue lasix 20 mg daily  - Avoid any nephrotoxic agents including NSAIDs, aminoglycosides, IV contrast (unless absolutely necessary), gadolinium, fleets and other phosphorous-based laxatives. Caution with antibiotics.  - Renally adjust medications based on patient's creatinine clearance  - Avoid Hypotension  - Low Phos Diet --> Reg diet with nephro and " no milk products on 10/27 per daughter request and d/w RD.  - Routinely monitor renal function with scheduled metabolic profile      Essential Hypertension  - Chronic, stable  - Continue amlodipine 10 mg daily   - Continue lasix 20 mg daily  - Monitor BP  - Adjust therapy to BP goal < 1500/90  - Avoid hypotension       Spinal Stenosis  C1-C2 dens fracture  - Follows with neurosurgery  - Scheduled for surgery on 11/10/23; now cancelled d/t post acute stroke  - continue diclofenac na gel topically daily  - tylenol, tramadol, robaxin prn  - Continue home Lyrica 50 mg nightly  - Follow up with neurosurgery outpatient      Chronic Constipation  - Continue scheduled senokot and miralax  - Adjust bowel regimen prn to avoid diarrhea  - Encourage fluid intake  - Encourage safe physical activity as tolerated  - Monitor bowel movement regularity and consistency      Osteoarthritis  Muscle Spasms, Chronic  - Chronic pain to R shoulder  - continue diclofenac na gel topically daily  - tylenol, tramadol, robaxin prn  - Continue home Lyrica 50 mg nightly        H/o Type 2 Diabetes  - Most recent A1c <6  - Continue to monitor w venous lab      Decreased Mobility   Physical Debility  Weakness   -Patient with decreased bed mobility therefore patient is at high risk for developing wounds and deterioration of any current wounds.   - Continue with PT/OT for gait training and strengthening and restoration of ADL's   - Encourage mobility, OOB in chair, and early ambulation as appropriate  - Fall precautions   - Monitor for bowel and bladder dysfunction  - Monitor for and prevent skin breakdown and pressure ulcers  - DVT prophylaxis with plavix, asa      Anemia of Chronic Disease  - Baseline Hb 10-11  - Normocytic anemia    - Trend with scheduled CBC  - Transfuse for Hgb < 7.0  - Continue ferrous sulfate tablet every other day  - Follow up with PCP outpatient      Insomnia, Chronic  - Resume home tramadol 50 mg q HS  - Fall and delerium  precautions.   - Note home polypharmacy risk factors  - F/up with PCP regarding long-term insomnia and pain management        Anticipate disposition:  Home with home health    IP [facility] RISK OF UNPLANNED READMISSION Model:     Follow-up needed during SNF stay-    Follow-up needed after discharge from SNF: PCP, Opthalmology    Future Appointments   Date Time Provider Department Center   12/11/2023  2:00 PM Lluvia Rahman MD [facility]   2/21/2024 10:15 AM Charles Peacock MD [facility]       Extended Visit  Total time spent: 46 minutes  Description of Time: counseling patient on clinical condition, therapies provided, plan of care, emotional support, coordinating patient care with other care team members, reviewing and interpreting labs and imaging, collaboration with physician, initiating new orders, chart review, and documentation. See interval hx.       Carin Barclay NP  Department of Hospital Medicine   [facility]     DOS: 10/27/2023       Patient note was created using MModal Dictation.  Any errors in syntax or even information may not have been identified and edited on initial review prior to signing this note.

## 2023-10-27 NOTE — PT/OT/SLP PROGRESS
Occupational Therapy   Treatment    Name: Bridget Montgomery  MRN: [id number]  Admit Date: 10/23/2023  Admitting Diagnosis:  Diplopia    General Precautions: Standard, fall   Orthopedic Precautions: spinal precautions   Braces: Cervical collar    Recommendations:     Discharge Recommendations:   (Home with home health OT)  Level of Assistance Recommended at Discharge: 24 hours light assistance for ADL's and homemaking tasks  Discharge Equipment Recommendations: bath bench  Barriers to discharge:  None    Assessment:     Bridget Montgomery is a 88 y.o. female with a medical diagnosis of Diplopia .  She presents with performance deficits affecting function are weakness, impaired endurance, impaired self care skills, impaired sensation, impaired functional mobility, gait instability, impaired balance, visual deficits, decreased coordination, decreased upper extremity function, decreased lower extremity function, decreased safety awareness, decreased ROM, orthopedic precautions.   Pt tolerated Tx without incident and is making progress but continues to require assist to perform self care tasks, functional mobility and functional transfers . She would continue to benefit from OT intervention to further her functional (I)ce and safety.      Rehab Potential is good    Activity tolerance:  Good    Plan:     Patient to be seen 5 x/week to address the above listed problems via self-care/home management, therapeutic activities, therapeutic exercises    Plan of Care Expires: 11/24/23  Plan of Care Reviewed with: patient, daughter    Subjective     Communicated with: Nurse prior to session.     Pain/Comfort:  Pain Rating 1: 7/10  Location - Side 1: Bilateral  Location - Orientation 1: generalized  Location 1: shoulder  Pain Addressed 1: Pre-medicate for activity, Reposition, Distraction  Pain Rating Post-Intervention 1: 7/10    Patient's cultural, spiritual, Buddhism conflicts given the current situation:  no    Objective:      Patient found up in chair in therapy gym after PT session with cervical collar.    Bed Mobility:    Pt seated in W/C at onset of therapy session.    Functional Mobility/Transfers:  Patient completed Sit <> Stand Transfer with stand by assistance  with  rolling walker     Activities of Daily Living:  Upper Body Dressing: minimum assistance to don button up sweater while standing using RW to steady herself. Pt was able to thread (L) UE into sweater and then required assist to pull sweater around her back to thread her (R) UE. Pt then was able to thread her (R) UE with CGA. Pt able to doff sweater with CGA.    [facility] 6 Click ADL: 19    OT Exercises:   Pt worked on functional standing activity consisting of standing with RW while reaching in all planes , crossing of midline and reaching to varying heights to facilitate (B) wt shifting and stability in standing in prep for performance of self care tasks and functional ADLS in standing.  Pt tolerated up to 10 minutes and 10 seconds in standing with supervision and RW to steady.    Pt performed UBE exercise for 10 minutes with no resistance.  UE exercises performed to increase functional endurance and strength in order increase independence when performing self care tasks, functional ambulation, W/C propulsion, and functional standing activities .      Treatment & Education:  Pt edu on POC, benefit of performing OOB activity, and safety when performing functional transfers and mobility.      Patient left up in chair with all lines intact and call button in reach    GOALS:   Multidisciplinary Problems       Occupational Therapy Goals          Problem: Occupational Therapy    Goal Priority Disciplines Outcome Interventions   Occupational Therapy Goal     OT, PT/OT Ongoing, Progressing    Description: Goals to be met by: 3 weeks     Patient will increase functional independence with ADLs by performing:    UE Dressing with Stand-by Assistance. -- Ongoing  LE Dressing with  Stand-by Assistance with use of AD and AE as needed. -- Ongoing  Grooming while standing at sink with Supervision. -- Ongoing  Toileting from bedside commode or toilet with Supervision for hygiene and clothing management.   Bathing from sitting at sink with Set-up Assistance. -- Ongoing  Rolling to Bilateral with Modified [place]. -- Ongoing  Supine to sit with Modified [place].-- Ongoing  Stand pivot transfers with Supervision with use of AD and AE as needed. -- Ongoing  Toilet transfer to bedside commode or toilet with Supervision with use of AD and AE as needed. -- Ongoing  Increased functional strength and endurance through use of UBE for at least 8 minutes with Min resistance. -- Ongoing  Upper extremity exercise program 1x15 reps per handout, with assistance as needed. -- Ongoing  Pt will perform functional standing up to 10 minutes in prep for performance of functional ADLs in standing. -- Ongoing                           Time Tracking:     OT Date of Treatment: 10/27/23  OT Start Time: 1042    OT Stop Time: 1118  OT Total Time (min): 36 min    Billable Minutes:Therapeutic Activity 20  Therapeutic Exercise 16    10/27/2023

## 2023-10-27 NOTE — PLAN OF CARE
Pt aao x3. Daughter and/or granddaughter at bedside. Pt wearing a hard C collar during the day and a soft C collar at night. Pt hard of hearing. Pt using headphones as hearing device. See assessment for full chart details. Will continue to monitor, assess and adjust care as needed.

## 2023-10-27 NOTE — PT/OT/SLP PROGRESS
Physical Therapy Treatment    Patient Name:  Bridget Montgomery   MRN:  [id number]  Admit Date: 10/23/2023  Admitting Diagnosis: Diplopia  Recent Surgeries: N/A    General Precautions: Standard, fall  Orthopedic Precautions: spinal precautions  Braces: Cervical collar    Recommendations:     Discharge Recommendations: Low Intensity Therapy  Level of Assistance Recommended at Discharge: 24 hours light assistance  Discharge Equipment Recommendations: bath bench  Barriers to discharge: Decreased caregiver support    Assessment:     Bridget Montgomery is a 88 y.o. female admitted with a medical diagnosis of Diplopia . Pt tolerated well, pt inc amb distance x 2 trials with SBA/CGA and RW. Pt completing seated TE and LBE with good tolerance. Pt progressing well and would continue to benefit from skilled PT services to improve overall functional mobility, strength and endurance.      Performance deficits affecting function: weakness, impaired endurance, impaired self care skills, impaired functional mobility, gait instability, impaired balance, decreased upper extremity function, decreased lower extremity function, decreased safety awareness, pain, decreased ROM, orthopedic precautions.    Rehab Potential is good    Activity Tolerance: Good    Plan:     Patient to be seen 5 x/week to address the above listed problems via gait training, therapeutic activities, therapeutic exercises, neuromuscular re-education, wheelchair management/training    Plan of Care Expires: 11/23/23  Plan of Care Reviewed with: patient, daughter    Subjective     Pt agreeable for PT.     Pain/Comfort:  Pain Rating 1:  (not rated)  Location - Side 1: Bilateral  Location - Orientation 1: generalized  Location 1: shoulder  Pain Addressed 1: Reposition, Pre-medicate for activity, Distraction, Cessation of Activity  Pain Rating Post-Intervention 1:  (not rated)    Patient's cultural, spiritual, Temple conflicts given the current  situation:  no    Objective:      Patient found up in chair with cervical collar upon PT entry to room.     Therapeutic Activities and Exercises: Seated TE: hip flex, hip abd/add, LAQ, AP 2 X 10 reps for BLE strengthening     LBE X 10 min For BLE strengthening, endurance and ROM    Patient educated on role of therapy, goals of session, and benefits of out of bed mobility.   Instructed on use of call button and importance of calling nursing staff for assistance with mobility   Questions/concerns addressed within PTA scope of practice  Pt verbalized understanding    Functional Mobility:  Transfers:     Sit to Stand:  stand by assistance with rolling walker  Gait: pt ambulated ~200 ft + ~220 ft SBA/CGA with RW, (pt ambulating over uneven mat on both trials) Seated rest break in between trials. Vc for upright posture, flexed posture noted and dec tessa  Uneven mat: pt amb over uneven mat x 2 trials with SBA/CGA with RW    AM-PAC 6 CLICK MOBILITY  16    Patient left up in chair with  OT .    GOALS:   Multidisciplinary Problems       Physical Therapy Goals          Problem: Physical Therapy    Goal Priority Disciplines Outcome Goal Variances Interventions   Physical Therapy Goal     PT, PT/OT Ongoing, Progressing     Description: Goals to be met by: 23     Patient will increase functional independence with mobility by performin. Supine to sit with Modified [place]  2. Sit to supine with Modified [place]  3. Rolling to Left and Right with Modified [place]  4. Sit to stand transfer with Stand-by Assistance  5. Bed to chair transfer with Stand-by Assistance using Rolling Walker  6. Gait  x 150 feet with Stand-by Assistance using Rolling Walker  7. Wheelchair propulsion x 100 feet with Supervision using bilateral upper extremities/bilateral lower extremities                         Time Tracking:     PT Received On: 10/27/23  PT Start Time: 1005  PT Stop Time: 1043  PT Total Time (min): 38  min    Billable Minutes: Gait Training 15 and Therapeutic Exercise 23    Treatment Type: Treatment  PT/PTA: PTA     Number of PTA visits since last PT visit: 3     10/27/2023

## 2023-10-27 NOTE — PLAN OF CARE
Problem: Occupational Therapy  Goal: Occupational Therapy Goal  Description: Goals to be met by: 3 weeks     Patient will increase functional independence with ADLs by performing:    UE Dressing with Stand-by Assistance. -- Ongoing  LE Dressing with Stand-by Assistance with use of AD and AE as needed. -- Ongoing  Grooming while standing at sink with Supervision. -- Ongoing  Toileting from bedside commode or toilet with Supervision for hygiene and clothing management.   Bathing from sitting at sink with Set-up Assistance. -- Ongoing  Rolling to Bilateral with Modified [place]. -- Ongoing  Supine to sit with Modified [place].-- Ongoing  Stand pivot transfers with Supervision with use of AD and AE as needed. -- Ongoing  Toilet transfer to bedside commode or toilet with Supervision with use of AD and AE as needed. -- Ongoing  Increased functional strength and endurance through use of UBE for at least 8 minutes with Min resistance. -- Ongoing  Upper extremity exercise program 1x15 reps per handout, with assistance as needed. -- Ongoing  Pt will perform functional standing up to 10 minutes in prep for performance of functional ADLs in standing. -- Ongoing      Outcome: Ongoing, Progressing

## 2023-10-27 NOTE — PLAN OF CARE
Problem: Adult Inpatient Plan of Care  Goal: Plan of Care Review  Outcome: Ongoing, Progressing  Flowsheets (Taken 10/27/2023 0047)  Plan of Care Reviewed With:   patient   daughter   grandchild(trung)  Goal: Patient-Specific Goal (Individualized)  Outcome: Ongoing, Progressing  Goal: Absence of Hospital-Acquired Illness or Injury  Outcome: Ongoing, Progressing  Goal: Optimal Comfort and Wellbeing  Outcome: Ongoing, Progressing  Goal: Readiness for Transition of Care  Outcome: Ongoing, Progressing     Problem: Diabetes Comorbidity  Goal: Blood Glucose Level Within Targeted Range  Outcome: Ongoing, Progressing     Problem: Impaired Wound Healing  Goal: Optimal Wound Healing  Outcome: Ongoing, Progressing     Problem: Fall Injury Risk  Goal: Absence of Fall and Fall-Related Injury  Intervention: Identify and Manage Contributors  Flowsheets (Taken 10/27/2023 0047)  Self-Care Promotion:   independence encouraged   BADL personal objects within reach   BADL personal routines maintained   safe use of adaptive equipment encouraged  Medication Review/Management: medications reviewed     Problem: Skin Injury Risk Increased  Goal: Skin Health and Integrity  Outcome: Ongoing, Progressing

## 2023-10-28 PROCEDURE — 25000003 PHARM REV CODE 250: Performed by: FAMILY MEDICINE

## 2023-10-28 PROCEDURE — 11000004 HC SNF PRIVATE

## 2023-10-28 PROCEDURE — 97110 THERAPEUTIC EXERCISES: CPT

## 2023-10-28 PROCEDURE — 25000003 PHARM REV CODE 250: Performed by: HOSPITALIST

## 2023-10-28 PROCEDURE — 25000242 PHARM REV CODE 250 ALT 637 W/ HCPCS: Performed by: HOSPITALIST

## 2023-10-28 PROCEDURE — 97116 GAIT TRAINING THERAPY: CPT

## 2023-10-28 RX ADMIN — ASPIRIN 81 MG CHEWABLE TABLET 81 MG: 81 TABLET CHEWABLE at 09:10

## 2023-10-28 RX ADMIN — TRAMADOL HYDROCHLORIDE 50 MG: 50 TABLET, COATED ORAL at 09:10

## 2023-10-28 RX ADMIN — CHOLECALCIFEROL TAB 25 MCG (1000 UNIT) 2000 UNITS: 25 TAB at 09:10

## 2023-10-28 RX ADMIN — FLUTICASONE PROPIONATE 100 MCG: 50 SPRAY, METERED NASAL at 09:10

## 2023-10-28 RX ADMIN — Medication 6 MG: at 10:10

## 2023-10-28 RX ADMIN — CLOPIDOGREL BISULFATE 75 MG: 75 TABLET ORAL at 09:10

## 2023-10-28 RX ADMIN — TRAMADOL HYDROCHLORIDE 50 MG: 50 TABLET, COATED ORAL at 10:10

## 2023-10-28 RX ADMIN — ATORVASTATIN CALCIUM 80 MG: 40 TABLET, FILM COATED ORAL at 09:10

## 2023-10-28 RX ADMIN — Medication 1 CAPSULE: at 09:10

## 2023-10-28 RX ADMIN — GUAIFENESIN 200 MG: 200 SOLUTION ORAL at 05:10

## 2023-10-28 RX ADMIN — PREGABALIN 50 MG: 50 CAPSULE ORAL at 10:10

## 2023-10-28 RX ADMIN — METHOCARBAMOL 500 MG: 500 TABLET ORAL at 05:10

## 2023-10-28 RX ADMIN — FLUTICASONE FUROATE AND VILANTEROL TRIFENATATE 1 PUFF: 100; 25 POWDER RESPIRATORY (INHALATION) at 09:10

## 2023-10-28 RX ADMIN — CETIRIZINE HYDROCHLORIDE 10 MG: 5 TABLET, FILM COATED ORAL at 09:10

## 2023-10-28 RX ADMIN — FLUTICASONE PROPIONATE 100 MCG: 50 SPRAY, METERED NASAL at 10:10

## 2023-10-28 RX ADMIN — ACETAMINOPHEN 500 MG: 500 TABLET ORAL at 02:10

## 2023-10-28 RX ADMIN — FUROSEMIDE 20 MG: 20 TABLET ORAL at 05:10

## 2023-10-28 RX ADMIN — CALCIUM CARBONATE (ANTACID) CHEW TAB 500 MG 500 MG: 500 CHEW TAB at 09:10

## 2023-10-28 RX ADMIN — SENNOSIDES AND DOCUSATE SODIUM 1 TABLET: 8.6; 5 TABLET ORAL at 10:10

## 2023-10-28 RX ADMIN — SENNOSIDES AND DOCUSATE SODIUM 1 TABLET: 8.6; 5 TABLET ORAL at 09:10

## 2023-10-28 RX ADMIN — FUROSEMIDE 20 MG: 20 TABLET ORAL at 09:10

## 2023-10-28 RX ADMIN — AMLODIPINE BESYLATE 10 MG: 10 TABLET ORAL at 09:10

## 2023-10-28 NOTE — PT/OT/SLP PROGRESS
Physical Therapy Treatment    Patient Name:  Bridget Montgomery   MRN:  [id number]  Admit Date: 10/23/2023  Admitting Diagnosis: Diplopia  Recent Surgeries: N/A    General Precautions: Standard, fall  Orthopedic Precautions: spinal precautions  Braces: Cervical collar    Recommendations:     Discharge Recommendations: Moderate Intensity Therapy  Level of Assistance Recommended at Discharge: Intermittent assistance   Discharge Equipment Recommendations: bath bench  Barriers to discharge: Decreased caregiver support    Assessment:     Bridget Montgomery is a 88 y.o. female admitted with a medical diagnosis of Diplopia . Pt continues to make good functional progress and would benefit from continued SNF rehab to help her gain (I) and safety as pt will be home alone occasionally during the day.    Performance deficits affecting function: weakness, impaired endurance, impaired self care skills, impaired functional mobility, gait instability, impaired balance, decreased upper extremity function, decreased lower extremity function, pain, impaired cardiopulmonary response to activity, orthopedic precautions.    Rehab Potential is good    Activity Tolerance: Good    Plan:     Patient to be seen 5 x/week to address the above listed problems via gait training, therapeutic activities, therapeutic exercises, neuromuscular re-education, wheelchair management/training    Plan of Care Expires: 11/23/23  Plan of Care Reviewed with: patient    Subjective     Pt. Agreeable to work with PT.     Pain/Comfort:  Pain Rating 1: 8/10  Location - Side 1: Bilateral  Location - Orientation 1: generalized  Location 1: shoulder  Pain Addressed 1: Pre-medicate for activity, Reposition, Distraction, Nurse notified  Pain Rating Post-Intervention 1: 8/10    Patient's cultural, spiritual, Yazidism conflicts given the current situation:  no    Objective:     Communicated with pt's nurse prior to session.  Patient found up in chair with cervical  collar upon PT entry to room.     Therapeutic Activities and Exercises: LBEx 10mins to help improve B l/e MMT and endurance.    Functional Mobility:     10/28/23 1005   Sit to Stand   Did they attempt the activity? Yes   Was the activity done independently? No   Assistance Needed Supervision  (SBA with RW from w/c)   Was adaptive equipment used? Yes   CARE Score - Sit to Stand 4   Chair/Bed-to-Chair Transfer   Did they attempt the activity? Yes   Was the activity done independently? No   Assistance Needed Supervision  (SBA with RW, SPT)   Was adaptive equipment used? Yes   CARE Score - Chair/Bed-to-Chair Transfer 4   Chair/Bed-to-Chair Transfer Discharge Goal   Discharge Goal 6   Car Transfer   Reason if not Attempted Environmental limitations   CARE Score - Car Transfer 10   Walk 10 Feet   Did they attempt the activity? Yes   Was the activity done independently? No   Assistance Needed Supervision  (CGA/SBA with RW, 226ft with no seated or standing rest breaks)   Was adaptive equipment used? Yes   CARE Score - Walk 10 Feet 4   Walk 50 Feet with Two Turns   Did they attempt the activity? Yes   Was the activity done independently? No   Assistance Needed Supervision   Was adaptive equipment used? Yes   CARE Score - Walk 50 Feet with Two Turns 4   Walk 150 Feet   Did they attempt the activity? Yes   Was the activity done independently? No   Assistance Needed Supervision  (CGA/SBA with RW, 226ft with no seated or standing rest breaks)   Was adaptive equipment used? Yes   CARE Score - Walk 150 Feet 4   Walking 10 Feet on Uneven Surfaces   Did they attempt the activity? Yes   Was the activity done independently? No   Assistance Needed Touching assistance  (CGA with RW)   Was adaptive equipment used? Yes   CARE Score - Walking 10 Feet on Uneven Surfaces 4   1 Step (Curb)   Did they attempt the activity? Yes   Was the activity done independently? No   Assistance Needed Physical assistance   Physical Assistance Level Less  than half  (Min A with RW, 4 inch curb step d.t LOB episode forward during descent)   Was adaptive equipment used? Yes   CARE Score - 1 Step (Curb) 3   4 Steps   Did they attempt the activity? Yes   Was the activity done independently? No   Assistance Needed Touching assistance  (8 steps with B rails and CGA)   Was adaptive equipment used? Yes   CARE Score - 4 Steps 4   12 Steps   Did they attempt the activity? No   Reason if not Attempted Safety concerns   CARE Score - 12 Steps 88   Picking Up Object   Did they attempt the activity? Yes   Was the activity done independently? No   Assistance Needed Touching assistance  (CGA with RW)   Was adaptive equipment used? Yes   CARE Score - Picking Up Object 4   OTHER   Uses a Wheelchair/Scooter? Yes   Wheel 50 Feet with Two Turns   Did they attempt the activity? Yes   Was the activity done independently? No   Assistance Needed Supervision   Type of Wheelchair/Scooter Manual   CARE Score - Wheel 50 Feet with Two Turns 4   Wheel 150 Feet   Did they attempt the activity? Yes   Was the activity done independently? No   Assistance Needed Supervision   Type of Wheelchair/Scooter Manual   CARE Score - Wheel 150 Feet 4       AM-PAC 6 CLICK MOBILITY  18    Patient left up in chair with call button in reach.    GOALS:   Multidisciplinary Problems       Physical Therapy Goals          Problem: Physical Therapy    Goal Priority Disciplines Outcome Goal Variances Interventions   Physical Therapy Goal     PT, PT/OT Ongoing, Progressing     Description: Goals to be met by: 23     Patient will increase functional independence with mobility by performin. Supine to sit with Modified [place]  2. Sit to supine with Modified [place]  3. Rolling to Left and Right with Modified [place]  4. Sit to stand transfer with Stand-by Assistance  5. Bed to chair transfer with Stand-by Assistance using Rolling Walker  6. Gait  x 150 feet with Stand-by Assistance using Rolling  Walker  7. Wheelchair propulsion x 100 feet with Supervision using bilateral upper extremities/bilateral lower extremities                         Time Tracking:     PT Received On: 10/28/23  PT Start Time: 0925  PT Stop Time: 1011  PT Total Time (min): 46 min    Billable Minutes: Gait Training 31 and Therapeutic Exercise 15    Treatment Type: Treatment  PT/PTA: PT     Number of PTA visits since last PT visit: 0     10/28/2023

## 2023-10-29 PROCEDURE — 94761 N-INVAS EAR/PLS OXIMETRY MLT: CPT

## 2023-10-29 PROCEDURE — 25000003 PHARM REV CODE 250: Performed by: FAMILY MEDICINE

## 2023-10-29 PROCEDURE — 11000004 HC SNF PRIVATE

## 2023-10-29 PROCEDURE — 25000003 PHARM REV CODE 250: Performed by: HOSPITALIST

## 2023-10-29 RX ADMIN — FLUTICASONE PROPIONATE 100 MCG: 50 SPRAY, METERED NASAL at 08:10

## 2023-10-29 RX ADMIN — POLYETHYLENE GLYCOL 3350 17 G: 17 POWDER, FOR SOLUTION ORAL at 11:10

## 2023-10-29 RX ADMIN — CETIRIZINE HYDROCHLORIDE 10 MG: 5 TABLET, FILM COATED ORAL at 08:10

## 2023-10-29 RX ADMIN — FUROSEMIDE 20 MG: 20 TABLET ORAL at 08:10

## 2023-10-29 RX ADMIN — ATORVASTATIN CALCIUM 80 MG: 40 TABLET, FILM COATED ORAL at 08:10

## 2023-10-29 RX ADMIN — Medication 1 CAPSULE: at 08:10

## 2023-10-29 RX ADMIN — ACETAMINOPHEN 500 MG: 500 TABLET ORAL at 01:10

## 2023-10-29 RX ADMIN — CALCIUM CARBONATE (ANTACID) CHEW TAB 500 MG 500 MG: 500 CHEW TAB at 08:10

## 2023-10-29 RX ADMIN — CLOPIDOGREL BISULFATE 75 MG: 75 TABLET ORAL at 08:10

## 2023-10-29 RX ADMIN — CHOLECALCIFEROL TAB 25 MCG (1000 UNIT) 2000 UNITS: 25 TAB at 08:10

## 2023-10-29 RX ADMIN — METHOCARBAMOL 500 MG: 500 TABLET ORAL at 08:10

## 2023-10-29 RX ADMIN — ASPIRIN 81 MG CHEWABLE TABLET 81 MG: 81 TABLET CHEWABLE at 08:10

## 2023-10-29 RX ADMIN — AMLODIPINE BESYLATE 10 MG: 10 TABLET ORAL at 08:10

## 2023-10-29 RX ADMIN — PREGABALIN 50 MG: 50 CAPSULE ORAL at 10:10

## 2023-10-29 RX ADMIN — GUAIFENESIN 200 MG: 200 SOLUTION ORAL at 10:10

## 2023-10-29 RX ADMIN — TRAMADOL HYDROCHLORIDE 50 MG: 50 TABLET, COATED ORAL at 10:10

## 2023-10-29 RX ADMIN — SENNOSIDES AND DOCUSATE SODIUM 1 TABLET: 8.6; 5 TABLET ORAL at 08:10

## 2023-10-29 RX ADMIN — GUAIFENESIN 200 MG: 200 SOLUTION ORAL at 09:10

## 2023-10-29 RX ADMIN — FLUTICASONE FUROATE AND VILANTEROL TRIFENATATE 1 PUFF: 100; 25 POWDER RESPIRATORY (INHALATION) at 08:10

## 2023-10-29 RX ADMIN — TRAMADOL HYDROCHLORIDE 50 MG: 50 TABLET, COATED ORAL at 09:10

## 2023-10-29 NOTE — PLAN OF CARE
Problem: Adult Inpatient Plan of Care  Goal: Plan of Care Review  Outcome: Ongoing, Progressing     Problem: Adult Inpatient Plan of Care  Goal: Patient-Specific Goal (Individualized)  Outcome: Ongoing, Progressing     Problem: Diabetes Comorbidity  Goal: Blood Glucose Level Within Targeted Range  Outcome: Ongoing, Progressing

## 2023-10-30 LAB
ANION GAP SERPL CALC-SCNC: 12 MMOL/L (ref 8–16)
BASOPHILS # BLD AUTO: 0.05 K/UL (ref 0–0.2)
BASOPHILS NFR BLD: 0.8 % (ref 0–1.9)
BUN SERPL-MCNC: 54 MG/DL (ref 8–23)
CALCIUM SERPL-MCNC: 8.3 MG/DL (ref 8.7–10.5)
CHLORIDE SERPL-SCNC: 103 MMOL/L (ref 95–110)
CO2 SERPL-SCNC: 20 MMOL/L (ref 23–29)
CREAT SERPL-MCNC: 1.2 MG/DL (ref 0.5–1.4)
DIFFERENTIAL METHOD: ABNORMAL
EOSINOPHIL # BLD AUTO: 0.1 K/UL (ref 0–0.5)
EOSINOPHIL NFR BLD: 1.1 % (ref 0–8)
ERYTHROCYTE [DISTWIDTH] IN BLOOD BY AUTOMATED COUNT: 12.2 % (ref 11.5–14.5)
EST. GFR  (NO RACE VARIABLE): 43.5 ML/MIN/1.73 M^2
GLUCOSE SERPL-MCNC: 86 MG/DL (ref 70–110)
HCT VFR BLD AUTO: 29.5 % (ref 37–48.5)
HGB BLD-MCNC: 9.8 G/DL (ref 12–16)
IMM GRANULOCYTES # BLD AUTO: 0.02 K/UL (ref 0–0.04)
IMM GRANULOCYTES NFR BLD AUTO: 0.3 % (ref 0–0.5)
LYMPHOCYTES # BLD AUTO: 1.1 K/UL (ref 1–4.8)
LYMPHOCYTES NFR BLD: 17.3 % (ref 18–48)
MAGNESIUM SERPL-MCNC: 1.7 MG/DL (ref 1.6–2.6)
MCH RBC QN AUTO: 29.9 PG (ref 27–31)
MCHC RBC AUTO-ENTMCNC: 33.2 G/DL (ref 32–36)
MCV RBC AUTO: 90 FL (ref 82–98)
MONOCYTES # BLD AUTO: 0.6 K/UL (ref 0.3–1)
MONOCYTES NFR BLD: 10.3 % (ref 4–15)
NEUTROPHILS # BLD AUTO: 4.4 K/UL (ref 1.8–7.7)
NEUTROPHILS NFR BLD: 70.2 % (ref 38–73)
NRBC BLD-RTO: 0 /100 WBC
PHOSPHATE SERPL-MCNC: 3.7 MG/DL (ref 2.7–4.5)
PLATELET # BLD AUTO: 378 K/UL (ref 150–450)
PMV BLD AUTO: 10.5 FL (ref 9.2–12.9)
POTASSIUM SERPL-SCNC: 3.3 MMOL/L (ref 3.5–5.1)
RBC # BLD AUTO: 3.28 M/UL (ref 4–5.4)
SODIUM SERPL-SCNC: 135 MMOL/L (ref 136–145)
WBC # BLD AUTO: 6.2 K/UL (ref 3.9–12.7)

## 2023-10-30 PROCEDURE — 97530 THERAPEUTIC ACTIVITIES: CPT

## 2023-10-30 PROCEDURE — 80048 BASIC METABOLIC PNL TOTAL CA: CPT | Performed by: HOSPITALIST

## 2023-10-30 PROCEDURE — 85025 COMPLETE CBC W/AUTO DIFF WBC: CPT | Performed by: HOSPITALIST

## 2023-10-30 PROCEDURE — 36415 COLL VENOUS BLD VENIPUNCTURE: CPT | Performed by: HOSPITALIST

## 2023-10-30 PROCEDURE — 11000004 HC SNF PRIVATE

## 2023-10-30 PROCEDURE — 25000003 PHARM REV CODE 250: Performed by: HOSPITALIST

## 2023-10-30 PROCEDURE — 84100 ASSAY OF PHOSPHORUS: CPT | Performed by: HOSPITALIST

## 2023-10-30 PROCEDURE — 97535 SELF CARE MNGMENT TRAINING: CPT

## 2023-10-30 PROCEDURE — 25000003 PHARM REV CODE 250: Performed by: NURSE PRACTITIONER

## 2023-10-30 PROCEDURE — 25000003 PHARM REV CODE 250: Performed by: FAMILY MEDICINE

## 2023-10-30 PROCEDURE — 97116 GAIT TRAINING THERAPY: CPT

## 2023-10-30 PROCEDURE — 83735 ASSAY OF MAGNESIUM: CPT | Performed by: HOSPITALIST

## 2023-10-30 PROCEDURE — 97110 THERAPEUTIC EXERCISES: CPT

## 2023-10-30 RX ORDER — SODIUM BICARBONATE 650 MG/1
650 TABLET ORAL DAILY
Status: DISCONTINUED | OUTPATIENT
Start: 2023-10-30 | End: 2023-11-14 | Stop reason: HOSPADM

## 2023-10-30 RX ORDER — POTASSIUM CHLORIDE 20 MEQ/1
40 TABLET, EXTENDED RELEASE ORAL ONCE
Status: COMPLETED | OUTPATIENT
Start: 2023-10-30 | End: 2023-10-30

## 2023-10-30 RX ORDER — POLYETHYLENE GLYCOL 3350 17 G/17G
17 POWDER, FOR SOLUTION ORAL DAILY
Status: DISCONTINUED | OUTPATIENT
Start: 2023-10-30 | End: 2023-11-02

## 2023-10-30 RX ORDER — OMEPRAZOLE 20 MG/1
20 CAPSULE, DELAYED RELEASE ORAL
Status: DISCONTINUED | OUTPATIENT
Start: 2023-10-30 | End: 2023-11-14 | Stop reason: HOSPADM

## 2023-10-30 RX ADMIN — CETIRIZINE HYDROCHLORIDE 10 MG: 5 TABLET, FILM COATED ORAL at 09:10

## 2023-10-30 RX ADMIN — FLUTICASONE PROPIONATE 100 MCG: 50 SPRAY, METERED NASAL at 09:10

## 2023-10-30 RX ADMIN — SODIUM BICARBONATE 650 MG TABLET 650 MG: at 09:10

## 2023-10-30 RX ADMIN — SENNOSIDES AND DOCUSATE SODIUM 1 TABLET: 8.6; 5 TABLET ORAL at 09:10

## 2023-10-30 RX ADMIN — AMLODIPINE BESYLATE 10 MG: 10 TABLET ORAL at 09:10

## 2023-10-30 RX ADMIN — ACETAMINOPHEN 500 MG: 500 TABLET ORAL at 02:10

## 2023-10-30 RX ADMIN — OMEPRAZOLE 20 MG: 20 CAPSULE, DELAYED RELEASE ORAL at 05:10

## 2023-10-30 RX ADMIN — ATORVASTATIN CALCIUM 80 MG: 40 TABLET, FILM COATED ORAL at 09:10

## 2023-10-30 RX ADMIN — FUROSEMIDE 20 MG: 20 TABLET ORAL at 09:10

## 2023-10-30 RX ADMIN — OMEPRAZOLE 20 MG: 20 CAPSULE, DELAYED RELEASE ORAL at 04:10

## 2023-10-30 RX ADMIN — ASPIRIN 81 MG CHEWABLE TABLET 81 MG: 81 TABLET CHEWABLE at 09:10

## 2023-10-30 RX ADMIN — POLYETHYLENE GLYCOL 3350 17 G: 17 POWDER, FOR SOLUTION ORAL at 05:10

## 2023-10-30 RX ADMIN — Medication 6 MG: at 09:10

## 2023-10-30 RX ADMIN — FLUTICASONE FUROATE AND VILANTEROL TRIFENATATE 1 PUFF: 100; 25 POWDER RESPIRATORY (INHALATION) at 09:10

## 2023-10-30 RX ADMIN — Medication 1 CAPSULE: at 09:10

## 2023-10-30 RX ADMIN — POLYETHYLENE GLYCOL 3350 17 G: 17 POWDER, FOR SOLUTION ORAL at 09:10

## 2023-10-30 RX ADMIN — CHOLECALCIFEROL TAB 25 MCG (1000 UNIT) 2000 UNITS: 25 TAB at 09:10

## 2023-10-30 RX ADMIN — PREGABALIN 50 MG: 50 CAPSULE ORAL at 09:10

## 2023-10-30 RX ADMIN — CALCIUM CARBONATE (ANTACID) CHEW TAB 500 MG 500 MG: 500 CHEW TAB at 09:10

## 2023-10-30 RX ADMIN — TRAMADOL HYDROCHLORIDE 50 MG: 50 TABLET, COATED ORAL at 09:10

## 2023-10-30 RX ADMIN — POTASSIUM CHLORIDE 40 MEQ: 1500 TABLET, EXTENDED RELEASE ORAL at 09:10

## 2023-10-30 RX ADMIN — CLOPIDOGREL BISULFATE 75 MG: 75 TABLET ORAL at 09:10

## 2023-10-30 RX ADMIN — GUAIFENESIN 200 MG: 200 SOLUTION ORAL at 09:10

## 2023-10-30 NOTE — PT/OT/SLP PROGRESS
Occupational Therapy   Treatment    Name: Bridget Montgomery  MRN: [id number]  Admit Date: 10/23/2023  Admitting Diagnosis:  Diplopia    General Precautions: Standard, fall, hearing impaired   Orthopedic Precautions: spinal precautions   Braces: Cervical collar    Recommendations:     Discharge Recommendations:   (Home with home health OT)  Level of Assistance Recommended at Discharge: Intermittent assistance for ADL's and homemaking tasks  Discharge Equipment Recommendations: bath bench  Barriers to discharge:  None    Assessment:     Bridget Montgomery is a 88 y.o. female with a medical diagnosis of Diplopia .  She presents with performance deficits affecting function including weakness, impaired endurance, impaired self care skills, impaired sensation, impaired functional mobility, gait instability, impaired balance, visual deficits, decreased coordination, decreased upper extremity function, decreased lower extremity function, decreased safety awareness, decreased ROM, orthopedic precautions.   Pt tolerated Tx without incident and is making progress but continues to require assist to perform self care tasks, functional mobility and functional transfers .  She would continue to benefit from OT intervention to further her functional (I)ce and safety.     Rehab Potential is good    Activity tolerance:  Good    Plan:     Patient to be seen 5 x/week to address the above listed problems via self-care/home management, therapeutic activities, therapeutic exercises    Plan of Care Expires: 11/24/23  Plan of Care Reviewed with: patient, daughter    Subjective     Communicated with: nurse prior to session.     Pain/Comfort:  Pain Rating 1:  (Pt did not report pain.)  Pain Rating Post-Intervention 1:  (Pt did not report pain.)    Patient's cultural, spiritual, Pentecostal conflicts given the current situation:  no    Objective:     Patient found up in chair with cervical collar upon OT entry to room.    Bed Mobility:    Pt  seated in W/C at onset of therapy session.      Functional Mobility/Transfers:  Patient completed Sit <> Stand Transfer with stand by assistance  with  rolling walker   Functional Mobility: Patient propelled light weight W/C from his/her room to therapy gym using (B) UEs a distance of  200 ft and concluded session by propelling back to her room     210 ft with (S) provided throughout.     Activities of Daily Living:  Lower Body Dressing: stand by assistance with Pt doffing/donning pants and shoes with SBA when standing with RW.     [facility] 6 Click ADL: 19    OT Exercises: Pt performed UBE exercise for 10 minutes with Min  resistance.  UE exercises performed to increase functional endurance and strength in order increase independence when performing self care tasks, functional ambulation, W/C propulsion, and functional standing activities .     Treatment & Education:  Pt worked on functional standing activity consisting of standing with RW while reaching in all planes , crossing of midline and reaching to varying heights to facilitate (B) wt shifting and stability in standing in prep for performance of self care tasks and functional ADLS in standing.  Pt was only able to  tolerate up to 2 min 20 sec in standing with SBA and RW to steady.     Patient left up in chair with call button in reach.     GOALS:   Multidisciplinary Problems       Occupational Therapy Goals          Problem: Occupational Therapy    Goal Priority Disciplines Outcome Interventions   Occupational Therapy Goal     OT, PT/OT Ongoing, Progressing    Description: Goals to be met by: 3 weeks     Patient will increase functional independence with ADLs by performing:    UE Dressing with Stand-by Assistance. -- Ongoing  LE Dressing with Stand-by Assistance with use of AD and AE as needed. -- Ongoing  Grooming while standing at sink with Supervision. -- Ongoing  Toileting from bedside commode or toilet with Supervision for hygiene and clothing management.    Bathing from sitting at sink with Set-up Assistance. -- Ongoing  Rolling to Bilateral with Modified [place]. -- Ongoing  Supine to sit with Modified [place].-- Ongoing  Stand pivot transfers with Supervision with use of AD and AE as needed. -- Ongoing  Toilet transfer to bedside commode or toilet with Supervision with use of AD and AE as needed. -- Ongoing  Increased functional strength and endurance through use of UBE for at least 8 minutes with Min resistance. -- Ongoing  Upper extremity exercise program 1x15 reps per handout, with assistance as needed. -- Ongoing  Pt will perform functional standing up to 10 minutes in prep for performance of functional ADLs in standing. -- Ongoing                           Time Tracking:     OT Date of Treatment: 10/30/23  OT Start Time: 1112    OT Stop Time: 1153  OT Total Time (min): 41 min    Billable Minutes:Self Care/Home Management 11  Therapeutic Activity 18  Therapeutic Exercise 12    10/30/2023

## 2023-10-30 NOTE — TREATMENT PLAN
Rehab Services' DME recommendations    Bridget Montgomery  MRN: [id number]    Patient reports having BSC, W/C, rollator, shower chair (in tub with step over), RW and SPC at home.    [x] Tub bench Standard (unpadded)     [x] Home health PT, OT, and Aide      Princess Amezquita, PT 10/30/2023

## 2023-10-30 NOTE — PLAN OF CARE
Problem: Occupational Therapy  Goal: Occupational Therapy Goal  Description: Goals to be met by: 3 weeks     Patient will increase functional independence with ADLs by performing:    UE Dressing with Stand-by Assistance. -- Ongoing  LE Dressing with Stand-by Assistance with use of AD and AE as needed. -- Ongoing  Grooming while standing at sink with Supervision. -- Ongoing  Toileting from bedside commode or toilet with Supervision for hygiene and clothing management.   Bathing from sitting at sink with Set-up Assistance. -- Ongoing  Rolling to Bilateral with Modified [place]. -- Ongoing  Supine to sit with Modified [place].-- Ongoing  Stand pivot transfers with Supervision with use of AD and AE as needed. -- Ongoing  Toilet transfer to bedside commode or toilet with Supervision with use of AD and AE as needed. -- Ongoing  Increased functional strength and endurance through use of UBE for at least 8 minutes with Min resistance. -- Ongoing  Upper extremity exercise program 1x15 reps per handout, with assistance as needed. -- Ongoing  Pt will perform functional standing up to 10 minutes in prep for performance of functional ADLs in standing. -- Ongoing      Outcome: Ongoing, Progressing

## 2023-10-30 NOTE — NURSING
Dr. De La Vega approved home med Omeprazole, not on formulary & family refused Protonix as substitution. Will scan label to pharmacy in a.m.

## 2023-10-30 NOTE — PT/OT/SLP PROGRESS
"Physical Therapy Treatment    Patient Name:  Bridget Montgomery   MRN:  [id number]  Admit Date: 10/23/2023  Admitting Diagnosis: Diplopia  Recent Surgeries: N/A    General Precautions: Standard, fall, hearing impaired  Orthopedic Precautions: spinal precautions  Braces: Cervical collar    Recommendations:     Discharge Recommendations: Low Intensity Therapy  Level of Assistance Recommended at Discharge: Intermittent assistance   Discharge Equipment Recommendations: bath bench  Barriers to discharge: None    Assessment:     Bridget Montgomery is a 88 y.o. female admitted with a medical diagnosis of Diplopia. Patient agreeable to PT treatment this PM. Patient with steady progression of mobility toward goals as per POC. Patient requiring decreased assistance for mobility tasks with improved balance/stability noted. Patient pleasant and willing to engage in therapy. Patient will benefit from continued SNF rehabilitation services to address deficits as well as progress mobility towards PLOF and increased functional independence for safe transition to home environment at time of discharge.     Performance deficits affecting function: weakness, impaired endurance, impaired self care skills, impaired functional mobility, gait instability, impaired balance, decreased upper extremity function, decreased lower extremity function, decreased safety awareness, pain, decreased ROM, orthopedic precautions.    Rehab Potential is good    Activity Tolerance: Good    Plan:     Patient to be seen 5 x/week to address the above listed problems via gait training, therapeutic activities, therapeutic exercises, neuromuscular re-education, wheelchair management/training    Plan of Care Expires: 11/23/23  Plan of Care Reviewed with: patient    Subjective     "I am doing okay."     Pain/Comfort:  Pain Rating 1: other (see comments) (Pain level not rated)  Location - Side 1: Bilateral  Location - Orientation 1: generalized  Location 1: " shoulder  Pain Addressed 1: Pre-medicate for activity, Reposition, Cessation of Activity, Distraction  Pain Rating Post-Intervention 1: other (see comments) (Pain level not rated)    Patient's cultural, spiritual, Tenriism conflicts given the current situation:  no    Objective:     Communicated with nursing staff prior to session.  Patient found up in chair with cervical collar (amplifier headphones) upon PT entry to room.     Therapeutic Activities and Exercises:   LE ergometer x 10 minutes at minimal resistance for LE strengthening, ROM, and endurance; no rest break required.    Functional Mobility:  Bed Mobility:     Rolling Left:  supervision and on level mat without railings  Rolling Right: supervision and on level mat without railings  Supine to Sit: supervision and on level mat without railings  Sit to Supine: supervision and on level mat without railings  Transfers:     Sit to Stand:  stand by assistance with rolling walker  Chair to mat: stand by assistance with  rolling walker  using  Step Transfer  Gait: Patient ambulated 181 feet, 60 feet to access curb step and steps and 95 feet using RW with close SBA. Seated rest break between trials. No LOB. Mild decrease in RLE clearance with onset of fatigue at end of 3rd gait trial. Steady pace of gait performed.   Stairs:  Pt ascended/descended 8 stair(s) with No Assistive Device with bilateral handrails with Stand-by Assistance and Contact Guard Assistance. Patient ascended/descended 4 inch curb step using RW with close SBA/CGA.     AM-PAC 6 CLICK MOBILITY  18    Patient left up in chair with call button in reach and nursing staff notified.    GOALS:   Multidisciplinary Problems       Physical Therapy Goals          Problem: Physical Therapy    Goal Priority Disciplines Outcome Goal Variances Interventions   Physical Therapy Goal     PT, PT/OT Ongoing, Progressing     Description: Goals to be met by: 11/23/23     Patient will increase functional independence  with mobility by performin. Supine to sit with Modified [place] - In progress  2. Sit to supine with Modified [place] - In progress  3. Rolling to Left and Right with Modified [place] - In progress  4. Sit to stand transfer with Stand-by Assistance - MET  Updated Goal 10/30/23: Sit to stand transfer with Supervision    5. Bed to chair transfer with Stand-by Assistance using Rolling Walker - MET  Updated Goal 10/30/23: Bed to chair transfer with Supervision using RW    6. Gait  x 150 feet with Stand-by Assistance using Rolling Walker - MET  Updated Goal 10/30/23: Gait x 150 feet with Supervision using RW    7. Wheelchair propulsion x 100 feet with Supervision using bilateral upper extremities/bilateral lower extremities - In progress                         Time Tracking:     PT Received On: 10/30/23  PT Start Time: 1303  PT Stop Time: 1341  PT Total Time (min): 38 min    Billable Minutes: Gait Training 18, Therapeutic Activity 10, and Therapeutic Exercise 10    Treatment Type: Treatment  PT/PTA: PT     Number of PTA visits since last PT visit: 0     10/30/2023

## 2023-10-30 NOTE — PLAN OF CARE
Problem: Adult Inpatient Plan of Care  Goal: Plan of Care Review  Outcome: Ongoing, Progressing  Flowsheets (Taken 10/30/2023 0116)  Plan of Care Reviewed With:   patient   grandchild(trung)  Goal: Patient-Specific Goal (Individualized)  Outcome: Ongoing, Progressing  Goal: Absence of Hospital-Acquired Illness or Injury  Outcome: Ongoing, Progressing  Goal: Optimal Comfort and Wellbeing  Outcome: Ongoing, Progressing  Goal: Readiness for Transition of Care  Outcome: Ongoing, Progressing     Problem: Diabetes Comorbidity  Goal: Blood Glucose Level Within Targeted Range  Outcome: Ongoing, Progressing     Problem: Fall Injury Risk  Goal: Absence of Fall and Fall-Related Injury  Outcome: Ongoing, Progressing  Intervention: Promote Injury-Free Environment  Flowsheets (Taken 10/30/2023 0116)  Safety Promotion/Fall Prevention:   assistive device/personal item within reach   family to remain at bedside   lighting adjusted   medications reviewed   nonskid shoes/socks when out of bed   pulse ox   side rails raised x 3   instructed to call staff for mobility     Problem: Skin Injury Risk Increased  Goal: Skin Health and Integrity  Outcome: Ongoing, Progressing

## 2023-10-30 NOTE — PLAN OF CARE
Patient with steady progression of mobility. Goals updated accordingly. Continue with POC.     Problem: Physical Therapy  Goal: Physical Therapy Goal  Description: Goals to be met by: 23     Patient will increase functional independence with mobility by performin. Supine to sit with Modified [place] - In progress  2. Sit to supine with Modified [place] - In progress  3. Rolling to Left and Right with Modified [place] - In progress  4. Sit to stand transfer with Stand-by Assistance - MET  Updated Goal 10/30/23: Sit to stand transfer with Supervision    5. Bed to chair transfer with Stand-by Assistance using Rolling Walker - MET  Updated Goal 10/30/23: Bed to chair transfer with Supervision using RW    6. Gait  x 150 feet with Stand-by Assistance using Rolling Walker - MET  Updated Goal 10/30/23: Gait x 150 feet with Supervision using RW    7. Wheelchair propulsion x 100 feet with Supervision using bilateral upper extremities/bilateral lower extremities - In progress    Outcome: Ongoing, Progressing   10/30/2023

## 2023-10-30 NOTE — PROGRESS NOTES
[facility]                                  Skilled Nursing Facility                   Progress Note     Admit Date: 10/23/2023  MIKAYLA 11/14/2023  Principal Problem:  Diplopia   HPI obtained from patient interview and chart review     Chief Complaint: Re-evaluation of medical treatment and therapy status: Lab review, therapy progress    HPI:   Bridget Montgomery is an 88 y.o. female with PMH CKD4, COPD, HTN, pre diabetes, and DVT who was admitted to hospital with new onset diplopia and dysarthria. Neuro w/up with neurology completed. Unable to definitively rule out cva on mri, however no acute intracranial findings identified on CTA, CT or MRI brain. Treated as acute CVA given symptoms and inconclusive imaging findings.  Patient will be treated at [facility] with PT and OT to improve functional status and ability to perform ADLs.   Allergies: Patient is allergic to cephalexin, codeine, meperidine, nsaids (non-steroidal anti-inflammatory drug), penicillins, and tazarotene.      Interval History  - 24 hour chart review completed. ANYA. NAD.  Vitals and labs were reviewed  - Afebrile, vital signs stable. Slightly hypertensive, but at goal.  Currently at 143/63  - potassium level 3.3, initiated potassium 40 mEq x1 p.o.  - bicarb at 20, initiated 650 sodium bicarb p.o. daily  - BUN at 54, initiated order to encourage p.o. fluids  - calcium at 8.3 but likely within normal range considering albumin level is likely low, initiated CMP on next lab draw to assess albumin  - patient reporting constipation, initiated MiraLax 17 g daily and continue MiraLax daily p.r.n. knee in senna docusate 8.6-50 mg 1 tablet b.i.d.  - Per PT notes patient with increased ambulation distance with stand-by assist and rolling walker today.   This patient would continue to benefit from skilled PT and skilled OT services to improve overall functional mobility and  independence, strength, endurance, and safety.         ROS  Constitutional: Negative for fever   Eyes: Positive for blurred vision, double vision   Respiratory: Negative for cough, shortness of breath   Cardiovascular: Negative for chest pain, palpitations, and leg swelling.   Gastrointestinal: Negative for abdominal pain, diarrhea, nausea, vomiting. + constipation  Genitourinary: Negative for dysuria, frequency   Musculoskeletal:  + generalized weakness, + neck pain (chronic). Negative for back pain and myalgias.   Skin: Negative for itching and rash.   Neurological: Negative for dizziness, headaches.   Psychiatric/Behavioral: Negative for depression. The patient is not nervous/anxious.      24 hour Vital Sign Range   Temp:  [97.4 °F (36.3 °C)-97.9 °F (36.6 °C)]   Pulse:  [64-76]   Resp:  [18]   BP: (143-153)/(63-68)   SpO2:  [98 %]     Current BMI: Body mass index is 23.59 kg/m².    PEx  Constitutional: Patient appears debilitated.  No distress noted  HENT:   Head: Normocephalic and atraumatic.   Eyes: Pupils are equal, round  Neck: Limited range of motion. Neck supple. C-spine collar present  Cardiovascular: Normal rate, regular rhythm and normal heart sounds.    Pulmonary/Chest: Effort normal and breath sounds are clear  Abdominal: Soft. Bowel sounds are normal.   Musculoskeletal: Normal range of motion.   Neurological: Alert and oriented to person, place, and time.   Psychiatric: Normal mood and affect. Behavior is normal.   Skin: Skin is warm and dry. Full skin assessment completed by NP on initial exam.       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  - CTH without acute intracranial process  - CTA without large vessel occlusion  - MRI brain demonstrated equivocal punctate lesion in the left paramedian midbrain, which because of study is difficult to exclude ischemic process  - TTE with bubble study ordered and demonstrated EF 61%, moderate mitral annular calcifications and mild regurgitation, and PA pressure of 39  - Loaded with  and plavix 300"  10/30/2023  - Continue DAPT x 21 days then aspirin only daily for life  - Continue high intensity statin therapy w atorvastatin to 80mg daily  - PT/OT/SLP consulted to evaluate and treat  - Maintain fall and delirium precautions  - Will need Opthalmology and stroke follow up outpatient    Hypokalemia, new 10/30  10/30/2023   - initiated potassium 40 mEq x1 p.o.    Metabolic acidosis, new 10/30  10/30/2023   - initiated 650 sodium bicarb p.o. daily    Azotemia, new 10/30  10/30/2023  - initiated order to encourage p.o. fluids    Hypocalcemia, new 10/30  10/30/2023   - initiated CMP on next lab draw to assess albumin    Chronic Constipation  10/30/2023   - Continue scheduled senokot and miralax  - Adjust bowel regimen prn to avoid diarrhea  - Encourage fluid intake  - Encourage safe physical activity as tolerated  - Monitor bowel movement regularity and consistency  - initiated MiraLax 17 g daily and continue MiraLax daily p.r.n. knee in senna docusate 8.6-50 mg 1 tablet b.i.d.    Decreased Mobility   Physical Debility  Weakness   10/30/2023   - Continue with PT/OT for gait training and strengthening and restoration of ADL's   - Encourage mobility, OOB in chair, and early ambulation as appropriate  - Fall precautions   - Monitor for bowel and bladder dysfunction  - Monitor for and prevent skin breakdown and pressure ulcers  - DVT prophylaxis with plavix, asa  - Per PT notes patient with " increased ambulation distance with stand-by assist and rolling walker today    Mixed HLD  Stroke Risk Factor.   10/30/2023   - continue high intensity statin therapy w atorvastatin to 80mg daily  - Follow-up with PCP for monitoring per guidelines     Chronic Obstructive Pulmonary Disease (COPD)  - POA, stable  10/30/2023  - Continue home LABA 1 puff daily  - Continue albuterol inhaler and neb prn  - Start guaifenesin q 4 prn, consider bid mucinex if needed  - Continue BID Flonase for sinus relief  - May consider montelukast 10 mg po daily if unstable  - Consider xopanex and CPT tx q 6 hours while awake if respiratory sx exascerbate    Stage 4 chronic kidney disease  - Chronic, stable  - Follows with Dr. Lyons  - Continue lasix 20 mg daily  - Avoid any nephrotoxic agents including NSAIDs, aminoglycosides, IV contrast (unless absolutely necessary), gadolinium, fleets and other phosphorous-based laxatives. Caution with antibiotics.  - Renally adjust medications based on patient's creatinine clearance  - Avoid Hypotension  - Low Phos Diet --> Reg diet with nephro and no milk products on 10/27 per daughter request and d/w RD.  10/30/2023  - Routinely monitor renal function with scheduled metabolic profile  - Stable Cr at 1.2    Essential Hypertension  - Chronic, stable  10/30/2023 - Continue amlodipine 10 mg daily   - Continue lasix 20 mg daily  - Monitor BP  - Adjust therapy to BP goal < 150/90  - Avoid hypotension   - Currently at 143/63    Spinal Stenosis  C1-C2 dens fracture  - Follows with neurosurgery  - Scheduled for surgery on 11/10/23; now cancelled d/t post acute stroke  - continue diclofenac na gel topically daily  - tylenol, tramadol, robaxin prn  10/30/2023  - Continue home Lyrica 50 mg nightly  - Follow up with neurosurgery outpatient    Osteoarthritis  Muscle Spasms, Chronic  - Chronic pain to R shoulder  10/30/2023  - continue diclofenac na gel topically daily  - tylenol, tramadol, robaxin prn  -  Continue home Lyrica 50 mg nightly      H/o Type 2 Diabetes  - Most recent A1c <6  10/30/2023  - Continue to monitor w venous lab and stable    Anemia of Chronic Disease  - Baseline Hb 10-11  - Normocytic anemia   10/30/2023  - Trend with scheduled CBC  - Transfuse for Hgb < 7.0  - Continue ferrous sulfate tablet every other day  - Follow up with PCP outpatient    Insomnia, Chronic  - Resume home tramadol 50 mg q HS  10/30/2023  - Fall and delerium precautions.   - Note home polypharmacy risk factors  - F/up with PCP regarding long-term insomnia and pain management        Anticipate disposition:  Home with home health    IP [facility] RISK OF UNPLANNED READMISSION Model:     Follow-up needed during SNF stay-    Follow-up needed after discharge from SNF: PCP, Opthalmology    Future Appointments   Date Time Provider Department Center   12/11/2023  2:00 PM Lluvia Rahman MD [facility]   2/21/2024 10:15 AM Charles Peacock MD [facility]       Total time spent: > 46 minutes  Description of Time: counseling provided on clinical condition, therapies provided, plan of care, emotional support, coordinating patient care with other care team members, reviewing and interpreting labs and imaging, collaboration with physician, initiating new orders, chart review, and documentation. See interval hx.      Rajesh Herring NP  Department of Hospital Medicine   [facility]     DOS: 10/30/2023       Patient note was created using MModal Dictation.  Any errors in syntax or even information may not have been identified and edited on initial review prior to signing this note.

## 2023-10-31 LAB — SARS-COV-2 RNA RESP QL NAA+PROBE: NOT DETECTED

## 2023-10-31 PROCEDURE — 25000003 PHARM REV CODE 250: Performed by: NURSE PRACTITIONER

## 2023-10-31 PROCEDURE — 87635 SARS-COV-2 COVID-19 AMP PRB: CPT | Performed by: HOSPITALIST

## 2023-10-31 PROCEDURE — 97110 THERAPEUTIC EXERCISES: CPT | Mod: CQ

## 2023-10-31 PROCEDURE — 97116 GAIT TRAINING THERAPY: CPT | Mod: CQ

## 2023-10-31 PROCEDURE — 97530 THERAPEUTIC ACTIVITIES: CPT

## 2023-10-31 PROCEDURE — 97535 SELF CARE MNGMENT TRAINING: CPT

## 2023-10-31 PROCEDURE — 11000004 HC SNF PRIVATE

## 2023-10-31 PROCEDURE — 25000003 PHARM REV CODE 250: Performed by: FAMILY MEDICINE

## 2023-10-31 PROCEDURE — 25000003 PHARM REV CODE 250: Performed by: HOSPITALIST

## 2023-10-31 RX ORDER — FUROSEMIDE 20 MG/1
20 TABLET ORAL DAILY
Status: DISCONTINUED | OUTPATIENT
Start: 2023-11-01 | End: 2023-11-14 | Stop reason: HOSPADM

## 2023-10-31 RX ADMIN — POLYETHYLENE GLYCOL 3350 17 G: 17 POWDER, FOR SOLUTION ORAL at 08:10

## 2023-10-31 RX ADMIN — CHOLECALCIFEROL TAB 25 MCG (1000 UNIT) 2000 UNITS: 25 TAB at 08:10

## 2023-10-31 RX ADMIN — OMEPRAZOLE 20 MG: 20 CAPSULE, DELAYED RELEASE ORAL at 06:10

## 2023-10-31 RX ADMIN — OMEPRAZOLE 20 MG: 20 CAPSULE, DELAYED RELEASE ORAL at 04:10

## 2023-10-31 RX ADMIN — FLUTICASONE PROPIONATE 100 MCG: 50 SPRAY, METERED NASAL at 08:10

## 2023-10-31 RX ADMIN — METHOCARBAMOL 500 MG: 500 TABLET ORAL at 09:10

## 2023-10-31 RX ADMIN — METHOCARBAMOL 500 MG: 500 TABLET ORAL at 03:10

## 2023-10-31 RX ADMIN — SENNOSIDES AND DOCUSATE SODIUM 1 TABLET: 8.6; 5 TABLET ORAL at 08:10

## 2023-10-31 RX ADMIN — Medication 1 CAPSULE: at 08:10

## 2023-10-31 RX ADMIN — CETIRIZINE HYDROCHLORIDE 10 MG: 5 TABLET, FILM COATED ORAL at 08:10

## 2023-10-31 RX ADMIN — FUROSEMIDE 20 MG: 20 TABLET ORAL at 08:10

## 2023-10-31 RX ADMIN — CALCIUM CARBONATE (ANTACID) CHEW TAB 500 MG 500 MG: 500 CHEW TAB at 08:10

## 2023-10-31 RX ADMIN — TRAMADOL HYDROCHLORIDE 50 MG: 50 TABLET, COATED ORAL at 08:10

## 2023-10-31 RX ADMIN — AMLODIPINE BESYLATE 10 MG: 10 TABLET ORAL at 08:10

## 2023-10-31 RX ADMIN — ACETAMINOPHEN 500 MG: 500 TABLET ORAL at 05:10

## 2023-10-31 RX ADMIN — SODIUM BICARBONATE 650 MG TABLET 650 MG: at 08:10

## 2023-10-31 RX ADMIN — ATORVASTATIN CALCIUM 80 MG: 40 TABLET, FILM COATED ORAL at 08:10

## 2023-10-31 RX ADMIN — GUAIFENESIN 200 MG: 200 SOLUTION ORAL at 09:10

## 2023-10-31 RX ADMIN — ASPIRIN 81 MG CHEWABLE TABLET 81 MG: 81 TABLET CHEWABLE at 08:10

## 2023-10-31 RX ADMIN — CLOPIDOGREL BISULFATE 75 MG: 75 TABLET ORAL at 08:10

## 2023-10-31 RX ADMIN — GUAIFENESIN 200 MG: 200 SOLUTION ORAL at 01:10

## 2023-10-31 RX ADMIN — FLUTICASONE FUROATE AND VILANTEROL TRIFENATATE 1 PUFF: 100; 25 POWDER RESPIRATORY (INHALATION) at 08:10

## 2023-10-31 RX ADMIN — Medication 6 MG: at 09:10

## 2023-10-31 RX ADMIN — PREGABALIN 50 MG: 50 CAPSULE ORAL at 09:10

## 2023-10-31 NOTE — PT/OT/SLP PROGRESS
"Physical Therapy Treatment    Patient Name:  Bridget Montgomery   MRN:  [id number]  Admit Date: 10/23/2023  Admitting Diagnosis: Diplopia  Recent Surgeries:     General Precautions: Standard, fall, hearing impaired  Orthopedic Precautions: spinal precautions  Braces: Cervical collar    Recommendations:     Discharge Recommendations: Low Intensity Therapy  Level of Assistance Recommended at Discharge: Intermittent assistance   Discharge Equipment Recommendations: bath bench  Barriers to discharge: None    Assessment:     Bridget Montgomery is a 88 y.o. female admitted with a medical diagnosis of Diplopia . Pt tolerated well, pt is very pleasant, demo good effort, occ vcs for inc safety awareness, pt would continue to benefit from skilled PT services to improve overall functional mobility, strength and endurance.  .      Performance deficits affecting function: weakness, impaired endurance, impaired self care skills, impaired functional mobility, gait instability, impaired balance, decreased upper extremity function, decreased lower extremity function, decreased safety awareness, pain, decreased ROM, orthopedic precautions.    Rehab Potential is good    Activity Tolerance: Fair    Plan:     Patient to be seen 5 x/week to address the above listed problems via gait training, therapeutic activities, therapeutic exercises, neuromuscular re-education, wheelchair management/training    Plan of Care Expires: 11/23/23  Plan of Care Reviewed with: patient    Subjective     "Achy, my knees and shoulders" pt agreeable to therapy.     Pain/Comfort:  Pain Rating 1: 0/10 ("just achy")  Location - Side 1: Bilateral  Location - Orientation 1: generalized  Location 1: shoulder (knees)  Pain Addressed 1: Pre-medicate for activity, Reposition, Distraction  Pain Rating Post-Intervention 1: 0/10 (no further mentioned)    Patient's cultural, spiritual, Mu-ism conflicts given the current situation:  no    Objective:      Patient found " "withcervical collarupon PT entry to room.     Therapeutic Activities and Exercises: mini elliptical x 10 minutes    Functional Mobility:  Transfers:     Sit to Stand:  stand by assistance with rolling walker  Gait: amb with RW CG/close SBA ~ 150 ft no LOB  Stairs:  asc/lew 4 steps with BHR x 2 trials (8 steps) vcs for safety/tech CGA/close SBA  Curb asc/lew 4" curb with RW CGA vcs for safety/tech, getting closer in the walker before moving up and down    AM-PAC 6 CLICK MOBILITY  18    Patient left up in chair with call button in reach, daug present, and belongings in reach .    GOALS:   Multidisciplinary Problems       Physical Therapy Goals          Problem: Physical Therapy    Goal Priority Disciplines Outcome Goal Variances Interventions   Physical Therapy Goal     PT, PT/OT Ongoing, Progressing     Description: Goals to be met by: 23     Patient will increase functional independence with mobility by performin. Supine to sit with Modified [place] - In progress  2. Sit to supine with Modified [place] - In progress  3. Rolling to Left and Right with Modified [place] - In progress  4. Sit to stand transfer with Stand-by Assistance - MET  Updated Goal 10/30/23: Sit to stand transfer with Supervision    5. Bed to chair transfer with Stand-by Assistance using Rolling Walker - MET  Updated Goal 10/30/23: Bed to chair transfer with Supervision using RW    6. Gait  x 150 feet with Stand-by Assistance using Rolling Walker - MET  Updated Goal 10/30/23: Gait x 150 feet with Supervision using RW    7. Wheelchair propulsion x 100 feet with Supervision using bilateral upper extremities/bilateral lower extremities - In progress                         Time Tracking:     PT Received On: 10/31/23  PT Start Time: 926  PT Stop Time: 956  PT Total Time (min): 30 min    Billable Minutes: Gait Training 15 and Therapeutic Exercise 15    Treatment Type: Treatment  PT/PTA: PTA     Number of PTA visits since " last PT visit: 1     10/31/2023

## 2023-10-31 NOTE — PT/OT/SLP PROGRESS
Occupational Therapy   Treatment    Name: Bridget Montgomery  MRN: [id number]  Admit Date: 10/23/2023  Admitting Diagnosis:  Diplopia    General Precautions: Standard, fall, hearing impaired   Orthopedic Precautions: spinal precautions   Braces: Cervical collar    Recommendations:     Discharge Recommendations:  Low Intensity Therapy  Level of Assistance Recommended at Discharge: 24 hours light assistance for ADL's and homemaking tasks  Discharge Equipment Recommendations: bath bench  Barriers to discharge:  None    Assessment:     Bridget Montgomery is a 88 y.o. female with a medical diagnosis of Diplopia.  She presents with the following performance deficits affecting function are weakness, impaired endurance, impaired self care skills, impaired functional mobility, gait instability, impaired balance, decreased coordination, decreased upper extremity function, decreased lower extremity function, pain, impaired coordination, orthopedic precautions. Pt agreeable to therapy and tolerated well. Pt remains limited in ADLs, functional mobility, and functional transfers and is currently not performing tasks at [facility]. Pt would continue to benefit from skilled OT services to maximize functional independence with ADLs and functional mobility, reduce caregiver burden, and facilitate safe discharge in the least restrictive environment.    Rehab Potential is good    Activity tolerance:  Good    Plan:     Patient to be seen 5 x/week to address the above listed problems via self-care/home management, therapeutic activities, therapeutic exercises    Plan of Care Expires: 11/24/23  Plan of Care Reviewed with: patient, daughter    Subjective     Communicated with: RN prior to session.     Pain/Comfort:  Pain Rating 1: 0/10  Location - Side 1: Left  Location - Orientation 1: generalized  Location 1: shoulder  Pain Addressed 1: Pre-medicate for activity, Reposition, Distraction, Cessation of Activity  Pain Rating Post-Intervention 1:  other (see comments)  Pain Rating 2:  (pt did not rate)    Patient's cultural, spiritual, Protestant conflicts given the current situation:  no    Objective:     Patient found HOB elevated with cervical collar upon OT entry to room.    Bed Mobility:    Patient completed Scooting/Bridging with supervision  Patient completed Supine to Sit with supervision     Functional Mobility/Transfers:  Patient completed Sit <> Stand Transfer with stand by assistance  with  rolling walker   Patient completed Toilet Transfer Step Transfer technique with stand by assistance with  rolling walker  Functional Mobility: Pt engaged in functional mobility to simulate household/community distances 12 ft x2 trials with close SBA and RW in order to maximize functional endurance and standing balance required for engagement in occupations of choice   No LOB or SOB noted    Activities of Daily Living:  Grooming: stand by assistance to brush teeth and wash face standing at sink with RW  Upper Body Dressing: minimum assistance to doff scrub top over back  Lower Body Dressing: stand by assistance to thread b/l feet through scrub pants and manage above hips and rear; Mod (A) to initiate donning b/l compression socks; SBA to doff non-skid socks and don shoes using figure 4 method  Toileting: stand by assistance to manage lower body clothing above/below hips and for seated pericare    [facility] 6 Click ADL: 19    Functional Activity: Pt participated in standing activity with CGA-SBA and RW. Pt at raised counter to perform fine motor, visual scanning, & reaching ax with focus on standing tolerance, fx reaching, dynamic standing bal, crossing midline, & to promote (I) w/ homemaking & self-care tasks. She was able to complete word search while stabilizing pen with (R) UE and maintaining unilateral support on countertop with (L) UE. Pt tolerated standing for 13 minutes and 6 seconds.    Treatment & Education:  -Education provided regarding fit and wear  schedule of cervical collar, adjustments provided for comfort.  -Education provided regarding spinal precautions for use during functional tasks/mobility/transfers   -Education on energy conservation and task modification to maximize safety and (I) during ADLs and mobility  -Education on importance of OOB activity to improve overall activity tolerance and promote recovery  -Pt educated to call for assistance and to transfer with hospital staff only  -Provided education regarding role of OT, POC, & discharge recommendations with pt and daughter verbalizing understanding.  Pt had no further questions & when asked whether there were any concerns pt reported none.     Patient left  in rehab gym seated in wheelchair  with all lines intact and PTA, Brandy present    GOALS:   Multidisciplinary Problems       Occupational Therapy Goals          Problem: Occupational Therapy    Goal Priority Disciplines Outcome Interventions   Occupational Therapy Goal     OT, PT/OT Ongoing, Progressing    Description: Goals to be met by: 3 weeks     Patient will increase functional independence with ADLs by performing:    UE Dressing with Stand-by Assistance. -- Ongoing  LE Dressing with Stand-by Assistance with use of AD and AE as needed. -- Ongoing  Grooming while standing at sink with Supervision. -- Ongoing  Toileting from bedside commode or toilet with Supervision for hygiene and clothing management.   Bathing from sitting at sink with Set-up Assistance. -- Ongoing  Rolling to Bilateral with Modified [place]olo. -- Ongoing  Supine to sit with Modified [place].-- Ongoing  Stand pivot transfers with Supervision with use of AD and AE as needed. -- Ongoing  Toilet transfer to bedside commode or toilet with Supervision with use of AD and AE as needed. -- Ongoing  Increased functional strength and endurance through use of UBE for at least 8 minutes with Min resistance. -- Ongoing  Upper extremity exercise program 1x15 reps per  handout, with assistance as needed. -- Ongoing  Pt will perform functional standing up to 10 minutes in prep for performance of functional ADLs in standing. -- Ongoing                           Time Tracking:     OT Date of Treatment: 10/31/23  OT Start Time: 0832    OT Stop Time: 0925  OT Total Time (min): 53 min    Billable Minutes:Self Care/Home Management 30  Therapeutic Activity 23    10/31/2023

## 2023-10-31 NOTE — CONSULTS
[facility] - Skilled Nursing  Wound Care    Patient Name:  Bridget Montgomery   MRN:  [id number]  Date: 10/31/2023  Diagnosis: Diplopia    History:     Past Medical History:   Diagnosis Date    Allergy     Anemia, unspecified     Arthritis     CKD (chronic kidney disease) stage 4, GFR 15-29 ml/min     COPD (chronic obstructive pulmonary disease)     Edema     HTN (hypertension)     Hypocalcemia     Hyponatremia     Osteoarthritis        Social History     Socioeconomic History    Marital status:    Tobacco Use    Smoking status: Former    Smokeless tobacco: Never   Substance and Sexual Activity    Alcohol use: Not Currently    Drug use: Never    Sexual activity: Not Currently     Social Determinants of Health     Financial Resource Strain: Low Risk  (5/15/2023)    Overall Financial Resource Strain (CARDIA)     Difficulty of Paying Living Expenses: Not hard at all   Food Insecurity: No Food Insecurity (5/15/2023)    Hunger Vital Sign     Worried About Running Out of Food in the Last Year: Never true     Ran Out of Food in the Last Year: Never true   Transportation Needs: No Transportation Needs (5/15/2023)    PRAPARE - Transportation     Lack of Transportation (Medical): No     Lack of Transportation (Non-Medical): No   Stress: No Stress Concern Present (5/15/2023)    [employer] [place] [employer] - Occupational Stress Questionnaire     Feeling of Stress : Only a little   Social Connections: Moderately Isolated (5/15/2023)    Social Connection and Isolation Panel [NHANES]     Frequency of Communication with Friends and Family: More than three times a week     Frequency of Social Gatherings with Friends and Family: More than three times a week     Attends Quaker Services: 1 to 4 times per year     Active Member of Clubs or Organizations: No     Attends Club or Organization Meetings: Never     Marital Status:    Housing Stability: Low Risk  (5/15/2023)    Housing Stability Vital Sign      Unable to Pay for Housing in the Last Year: No     Number of Places Lived in the Last Year: 1     Unstable Housing in the Last Year: No       Precautions:     Allergies as of 10/23/2023 - Reviewed 10/23/2023   Allergen Reaction Noted    Cephalexin  03/21/2018    Codeine  03/21/2018    Meperidine  01/29/2019    Nsaids (non-steroidal anti-inflammatory drug)  10/03/2023    Penicillins  03/21/2018    Tazarotene  03/30/2020       WOC Assessment Details/Treatment   Patient seen for wound care consultation.   Reviewed chart for this encounter.   See Flow Sheet for findings.    Pt sitting in chair agreed to assessment, pt was able to stand using walker.   Diaper removed revealing blanchable erythema.   No open wound     RECOMMENDATIONS:  Keep clean and dry   Ensure pt is properly inflated   Discussed POC with patient and primary nurse.   See EMR for orders & patient education.    Discussed nutrition and the role of protein in wound healing with the patient. Instructed patient to optimize protein for wound healing.    Nursing to continue care & monitoring.  Nursing to maintain pressure injury prevention interventions.    Nursing continue to monitor  WC sign off --please re-consult if needed.        10/31/23 1000   WOCN Assessment   WOCN Total Time (mins) 30   Visit Date 10/31/23   Visit Time 1452   Consult Type New   WOCN Speciality Wound   Intervention assessed;changed;applied;chart review;orders   Teaching on-going        Altered Skin Integrity 10/23/23 2300 Buttocks   Date First Assessed/Time First Assessed: 10/23/23 2300   Altered Skin Integrity Present on Admission - Did Patient arrive to the hospital with altered skin?: yes  Location: Buttocks   Wound Image    Dressing Appearance Open to air

## 2023-11-01 PROCEDURE — 97116 GAIT TRAINING THERAPY: CPT | Mod: CQ

## 2023-11-01 PROCEDURE — 25000003 PHARM REV CODE 250: Performed by: NURSE PRACTITIONER

## 2023-11-01 PROCEDURE — 25000003 PHARM REV CODE 250: Performed by: FAMILY MEDICINE

## 2023-11-01 PROCEDURE — 97110 THERAPEUTIC EXERCISES: CPT | Mod: CQ

## 2023-11-01 PROCEDURE — 25000003 PHARM REV CODE 250: Performed by: HOSPITALIST

## 2023-11-01 PROCEDURE — 97530 THERAPEUTIC ACTIVITIES: CPT | Mod: CO

## 2023-11-01 PROCEDURE — 11000004 HC SNF PRIVATE

## 2023-11-01 PROCEDURE — 97110 THERAPEUTIC EXERCISES: CPT | Mod: CO

## 2023-11-01 RX ADMIN — CETIRIZINE HYDROCHLORIDE 10 MG: 5 TABLET, FILM COATED ORAL at 09:11

## 2023-11-01 RX ADMIN — SODIUM BICARBONATE 650 MG TABLET 650 MG: at 09:11

## 2023-11-01 RX ADMIN — ACETAMINOPHEN 500 MG: 500 TABLET ORAL at 05:11

## 2023-11-01 RX ADMIN — CALCIUM CARBONATE (ANTACID) CHEW TAB 500 MG 500 MG: 500 CHEW TAB at 09:11

## 2023-11-01 RX ADMIN — FLUTICASONE PROPIONATE 100 MCG: 50 SPRAY, METERED NASAL at 08:11

## 2023-11-01 RX ADMIN — CLOPIDOGREL BISULFATE 75 MG: 75 TABLET ORAL at 09:11

## 2023-11-01 RX ADMIN — SENNOSIDES AND DOCUSATE SODIUM 1 TABLET: 8.6; 5 TABLET ORAL at 09:11

## 2023-11-01 RX ADMIN — AMLODIPINE BESYLATE 10 MG: 10 TABLET ORAL at 09:11

## 2023-11-01 RX ADMIN — POLYETHYLENE GLYCOL 3350 17 G: 17 POWDER, FOR SOLUTION ORAL at 09:11

## 2023-11-01 RX ADMIN — SENNOSIDES AND DOCUSATE SODIUM 1 TABLET: 8.6; 5 TABLET ORAL at 08:11

## 2023-11-01 RX ADMIN — GUAIFENESIN 200 MG: 200 SOLUTION ORAL at 08:11

## 2023-11-01 RX ADMIN — FLUTICASONE PROPIONATE 100 MCG: 50 SPRAY, METERED NASAL at 09:11

## 2023-11-01 RX ADMIN — OMEPRAZOLE 20 MG: 20 CAPSULE, DELAYED RELEASE ORAL at 03:11

## 2023-11-01 RX ADMIN — TRAMADOL HYDROCHLORIDE 50 MG: 50 TABLET, COATED ORAL at 09:11

## 2023-11-01 RX ADMIN — FUROSEMIDE 20 MG: 20 TABLET ORAL at 09:11

## 2023-11-01 RX ADMIN — PREGABALIN 50 MG: 50 CAPSULE ORAL at 08:11

## 2023-11-01 RX ADMIN — ATORVASTATIN CALCIUM 80 MG: 40 TABLET, FILM COATED ORAL at 09:11

## 2023-11-01 RX ADMIN — FLUTICASONE FUROATE AND VILANTEROL TRIFENATATE 1 PUFF: 100; 25 POWDER RESPIRATORY (INHALATION) at 09:11

## 2023-11-01 RX ADMIN — OMEPRAZOLE 20 MG: 20 CAPSULE, DELAYED RELEASE ORAL at 05:11

## 2023-11-01 RX ADMIN — TRAMADOL HYDROCHLORIDE 50 MG: 50 TABLET, COATED ORAL at 08:11

## 2023-11-01 RX ADMIN — CHOLECALCIFEROL TAB 25 MCG (1000 UNIT) 2000 UNITS: 25 TAB at 09:11

## 2023-11-01 RX ADMIN — Medication 1 CAPSULE: at 09:11

## 2023-11-01 RX ADMIN — ASPIRIN 81 MG CHEWABLE TABLET 81 MG: 81 TABLET CHEWABLE at 09:11

## 2023-11-01 NOTE — PLAN OF CARE
Problem: Occupational Therapy  Goal: Occupational Therapy Goal  Description: Goals to be met by: 3 weeks     Patient will increase functional independence with ADLs by performing:    UE Dressing with Stand-by Assistance. -- Ongoing  LE Dressing with Stand-by Assistance with use of AD and AE as needed. -- Ongoing  Grooming while standing at sink with Supervision. -- Ongoing  Toileting from bedside commode or toilet with Supervision for hygiene and clothing management.   Bathing from sitting at sink with Set-up Assistance. -- Ongoing  Rolling to Bilateral with Modified [place]. -- Ongoing  Supine to sit with Modified [place].-- Ongoing  Stand pivot transfers with Supervision with use of AD and AE as needed. -- Ongoing  Toilet transfer to bedside commode or toilet with Supervision with use of AD and AE as needed. -- Ongoing  Increased functional strength and endurance through use of UBE for at least 8 minutes with Min resistance. -- Ongoing  Upper extremity exercise program 1x15 reps per handout, with assistance as needed. -- Ongoing  Pt will perform functional standing up to 10 minutes in prep for performance of functional ADLs in standing. -- Ongoing      Outcome: Ongoing, Progressing

## 2023-11-01 NOTE — PT/OT/SLP PROGRESS
"Physical Therapy Treatment    Patient Name:  Bridget Montgomery   MRN:  [id number]  Admit Date: 10/23/2023  Admitting Diagnosis: Diplopia  Recent Surgeries: N/A    General Precautions: Standard, fall, hearing impaired  Orthopedic Precautions: spinal precautions  Braces: Cervical collar    Recommendations:     Discharge Recommendations: Low Intensity Therapy  Level of Assistance Recommended at Discharge: Intermittent assistance   Discharge Equipment Recommendations: bath bench  Barriers to discharge: None    Assessment:     Bridget Montgomery is a 88 y.o. female admitted with a medical diagnosis of Diplopia . Pt tolerated well, pt completed all functional mobility with S/SBA. Pt progressing well and would continue to benefit from skilled PT services to improve overall functional mobility, strength and endurance.      Performance deficits affecting function: weakness, impaired endurance, impaired self care skills, impaired functional mobility, gait instability, impaired balance, decreased upper extremity function, decreased lower extremity function, decreased safety awareness, pain, decreased ROM, orthopedic precautions.    Rehab Potential is good    Activity Tolerance: Good    Plan:     Patient to be seen 5 x/week to address the above listed problems via gait training, therapeutic activities, therapeutic exercises, neuromuscular re-education, wheelchair management/training    Plan of Care Expires: 11/23/23  Plan of Care Reviewed with: patient    Subjective     Pt agreeable for PT.     Pain/Comfort:  Pain Rating 1:  ("just soreness")  Location - Side 1: Bilateral  Location - Orientation 1: generalized  Location 1: shoulder  Pain Addressed 1: Pre-medicate for activity, Reposition, Cessation of Activity, Distraction  Pain Rating Post-Intervention 1: 0/10    Patient's cultural, spiritual, Mosque conflicts given the current situation:  no    Objective:       Patient found up in chair with cervical collar upon PT entry " "to room.     Therapeutic Activities and Exercises: LBE X 10 min For BLE strengthening, endurance and ROM    Standing TE: heel raises, hip abd/add, mini squats x 10 reps for BLE strengthening and marches (holding on to // bar with RUE) X 10 reps each    Patient educated on role of therapy, goals of session, and benefits of out of bed mobility.   Instructed on use of call button and importance of calling nursing staff for assistance with mobility   Questions/concerns addressed within PTA scope of practice  Pt verbalized understanding    Functional Mobility:  Transfers:     Sit to Stand:  supervision with rolling walker  Gait: pt amb >200 ft S/SBA with RW, no LOB. step through gait noted.  Stairs:  Pt ascended/descended 8 stair(s) with No Assistive Device with bilateral handrails with Stand-by Assistance. No rest breaks  4" curb: pt asc/desc curb with SBA and RW  Wheelchair Propulsion:  Pt propelled lightweight wheelchair x >150 feet on Level tile with  Bilateral upper extremity with Modified Independent.     AM-PAC 6 CLICK MOBILITY  18    Patient left up in chair with call button in reach.    GOALS:   Multidisciplinary Problems       Physical Therapy Goals          Problem: Physical Therapy    Goal Priority Disciplines Outcome Goal Variances Interventions   Physical Therapy Goal     PT, PT/OT Ongoing, Progressing     Description: Goals to be met by: 23     Patient will increase functional independence with mobility by performin. Supine to sit with Modified [place] - In progress  2. Sit to supine with Modified [place] - In progress  3. Rolling to Left and Right with Modified [place] - In progress  4. Sit to stand transfer with Stand-by Assistance - MET  Updated Goal 10/30/23: Sit to stand transfer with Supervision    5. Bed to chair transfer with Stand-by Assistance using Rolling Walker - MET  Updated Goal 10/30/23: Bed to chair transfer with Supervision using RW    6. Gait  x 150 feet with " Stand-by Assistance using Rolling Walker - MET  Updated Goal 10/30/23: Gait x 150 feet with Supervision using RW    7. Wheelchair propulsion x 100 feet with Supervision using bilateral upper extremities/bilateral lower extremities - In progress                         Time Tracking:     PT Received On: 11/01/23  PT Start Time: 1432  PT Stop Time: 1506  PT Total Time (min): 34 min    Billable Minutes: Gait Training 18 and Therapeutic Exercise 16    Treatment Type: Treatment  PT/PTA: PTA     Number of PTA visits since last PT visit: 2     11/01/2023

## 2023-11-01 NOTE — PLAN OF CARE
Problem: Adult Inpatient Plan of Care  Goal: Plan of Care Review  Outcome: Ongoing, Progressing  Flowsheets (Taken 10/31/2023 2344)  Plan of Care Reviewed With:   patient   daughter  Goal: Patient-Specific Goal (Individualized)  Outcome: Ongoing, Progressing  Goal: Absence of Hospital-Acquired Illness or Injury  Outcome: Ongoing, Progressing  Goal: Optimal Comfort and Wellbeing  Outcome: Ongoing, Progressing  Goal: Readiness for Transition of Care  Outcome: Ongoing, Progressing     Problem: Diabetes Comorbidity  Goal: Blood Glucose Level Within Targeted Range  Outcome: Ongoing, Progressing     Problem: Impaired Wound Healing  Goal: Optimal Wound Healing  Outcome: Ongoing, Progressing     Problem: Fall Injury Risk  Goal: Absence of Fall and Fall-Related Injury  Intervention: Promote Injury-Free Environment  Flowsheets (Taken 10/31/2023 2344)  Safety Promotion/Fall Prevention:   assistive device/personal item within reach   Fall Risk reviewed with patient/family   family to remain at bedside   medications reviewed   lighting adjusted   nonskid shoes/socks when out of bed   pulse ox   side rails raised x 3   instructed to call staff for mobility     Problem: Skin Injury Risk Increased  Goal: Skin Health and Integrity  Outcome: Ongoing, Progressing

## 2023-11-01 NOTE — PT/OT/SLP PROGRESS
"Occupational Therapy   Treatment    Name: Bridget Montgomery  MRN: [id number]  Admit Date: 10/23/2023  Admitting Diagnosis:  Diplopia    General Precautions: Standard, fall, hearing impaired   Orthopedic Precautions: spinal precautions   Braces: Cervical collar    Recommendations:     Discharge Recommendations:  Low Intensity Therapy  Level of Assistance Recommended at Discharge: 24 hours light assistance for ADL's and homemaking tasks  Discharge Equipment Recommendations: bath bench  Barriers to discharge:  None    Assessment:     Bridget Montgomery is a 88 y.o. female with a medical diagnosis of Diplopia.  She presents with limitations in performance of self-care, functional mobility, and ADLs. Performance deficits affecting function are weakness, impaired endurance, impaired self care skills, impaired functional mobility, gait instability, impaired balance, decreased coordination, decreased upper extremity function, decreased lower extremity function, pain, impaired coordination, orthopedic precautions. Pt tolerated Tx without incident, however pt required v/c to adhere to spinal precautions throughout Tx. Pt is making progress but continues to require assist to perform self care tasks, functional mobility and functional transfers. Pt would continue to benefit from OT intervention to further functional (I)ce and safety.     Rehab Potential is good    Activity tolerance:  Good    Plan:     Patient to be seen 5 x/week to address the above listed problems via self-care/home management, therapeutic activities, therapeutic exercises    Plan of Care Expires: 11/24/23  Plan of Care Reviewed with: patient, daughter    Subjective     Communicated with: Nursing prior to session.     Pain/Comfort:  Pain Rating 1: 8/10  Location - Side 1: Bilateral  Location - Orientation 1: generalized  Location 1: shoulder  Pain Addressed 1: Pre-medicate for activity, Reposition, Distraction  Pain Rating Post-Intervention 1:  ("it hurts " "less")  Pain Rating 2: 8/10  Location - Side 2: Bilateral  Location - Orientation 2: generalized  Location 2: knee  Pain Addressed 2: Reposition, Distraction, Pre-medicate for activity  Pain Rating Post-Intervention 2: 8/10    Patient's cultural, spiritual, Hinduism conflicts given the current situation:  no    Objective:     Patient found up in chair with cervical collar upon OT entry to room.    Bed Mobility:    Pt seated in chair at onset of treatment session.     Functional Mobility/Transfers:  Patient completed Sit <> Stand Transfer with contact guard assistance  with  rolling walker   Functional Mobility: Pt propelled W/C from room towards therapy gym with sup tolerating a total of 120 ft using BUE to propel chair.     [facility] 6 Click ADL: 19    OT Exercises: Wall Pulleys seated for functional ROM and to increase functional reaching and independence with ADLS.  Pt performed bilateral UE exercise with 2 lb dowel with rep of 10 x3 for bicep curls and wrist flex/ext.  UE exercises performed to increase functional endurance and strength in order increase independence when performing self care tasks, functional ambulation, W/C propulsion, and functional standing activities .    Treatment & Education:  Pt participated in gross motor standing activity with SBA/CGA and RW. Pt balloon volleyball activity with focus on standing tolerance, functional reaching, dynamic standing bal, crossing midline, and to promote independence with homemaking and self care tasks. Pt with v/c to maintain precautions.    Pt educated on:  - role of OT  - level of assistance  - energy conservation and task modification to maximized independence with ADL's and mobility   -  safety while performing functional transfers and self care tasks  - progress towards OT goals      Patient left up in chair with call button in reach and daughter present    GOALS:   Multidisciplinary Problems       Occupational Therapy Goals          Problem: Occupational " Therapy    Goal Priority Disciplines Outcome Interventions   Occupational Therapy Goal     OT, PT/OT Ongoing, Progressing    Description: Goals to be met by: 3 weeks     Patient will increase functional independence with ADLs by performing:    UE Dressing with Stand-by Assistance. -- Ongoing  LE Dressing with Stand-by Assistance with use of AD and AE as needed. -- Ongoing  Grooming while standing at sink with Supervision. -- Ongoing  Toileting from bedside commode or toilet with Supervision for hygiene and clothing management.   Bathing from sitting at sink with Set-up Assistance. -- Ongoing  Rolling to Bilateral with Modified [place]. -- Ongoing  Supine to sit with Modified [place].-- Ongoing  Stand pivot transfers with Supervision with use of AD and AE as needed. -- Ongoing  Toilet transfer to bedside commode or toilet with Supervision with use of AD and AE as needed. -- Ongoing  Increased functional strength and endurance through use of UBE for at least 8 minutes with Min resistance. -- Ongoing  Upper extremity exercise program 1x15 reps per handout, with assistance as needed. -- Ongoing  Pt will perform functional standing up to 10 minutes in prep for performance of functional ADLs in standing. -- Ongoing                           Time Tracking:     OT Date of Treatment: 11/01/23  OT Start Time: 1033    OT Stop Time: 1102  OT Total Time (min): 29 min    Billable Minutes:Therapeutic Activity 14  Therapeutic Exercise 15    11/1/2023  A client care conference was performed between the ERNIE and CHRIS, prior to treatment by CHRIS, to discuss the patient's status, treatment plan and established goals.

## 2023-11-02 LAB
ALBUMIN SERPL BCP-MCNC: 2.8 G/DL (ref 3.5–5.2)
ALP SERPL-CCNC: 165 U/L (ref 55–135)
ALT SERPL W/O P-5'-P-CCNC: 19 U/L (ref 10–44)
ANION GAP SERPL CALC-SCNC: 14 MMOL/L (ref 8–16)
AST SERPL-CCNC: 22 U/L (ref 10–40)
BASOPHILS # BLD AUTO: 0.04 K/UL (ref 0–0.2)
BASOPHILS NFR BLD: 0.6 % (ref 0–1.9)
BILIRUB SERPL-MCNC: 0.2 MG/DL (ref 0.1–1)
BUN SERPL-MCNC: 38 MG/DL (ref 8–23)
CALCIUM SERPL-MCNC: 8.7 MG/DL (ref 8.7–10.5)
CHLORIDE SERPL-SCNC: 104 MMOL/L (ref 95–110)
CO2 SERPL-SCNC: 22 MMOL/L (ref 23–29)
CREAT SERPL-MCNC: 0.9 MG/DL (ref 0.5–1.4)
DIFFERENTIAL METHOD: ABNORMAL
EOSINOPHIL # BLD AUTO: 0.1 K/UL (ref 0–0.5)
EOSINOPHIL NFR BLD: 0.8 % (ref 0–8)
ERYTHROCYTE [DISTWIDTH] IN BLOOD BY AUTOMATED COUNT: 12.7 % (ref 11.5–14.5)
EST. GFR  (NO RACE VARIABLE): >60 ML/MIN/1.73 M^2
GLUCOSE SERPL-MCNC: 84 MG/DL (ref 70–110)
HCT VFR BLD AUTO: 29 % (ref 37–48.5)
HGB BLD-MCNC: 9.4 G/DL (ref 12–16)
IMM GRANULOCYTES # BLD AUTO: 0.02 K/UL (ref 0–0.04)
IMM GRANULOCYTES NFR BLD AUTO: 0.3 % (ref 0–0.5)
LYMPHOCYTES # BLD AUTO: 1.3 K/UL (ref 1–4.8)
LYMPHOCYTES NFR BLD: 20.1 % (ref 18–48)
MAGNESIUM SERPL-MCNC: 1.8 MG/DL (ref 1.6–2.6)
MCH RBC QN AUTO: 29.2 PG (ref 27–31)
MCHC RBC AUTO-ENTMCNC: 32.4 G/DL (ref 32–36)
MCV RBC AUTO: 90 FL (ref 82–98)
MONOCYTES # BLD AUTO: 0.6 K/UL (ref 0.3–1)
MONOCYTES NFR BLD: 10.1 % (ref 4–15)
NEUTROPHILS # BLD AUTO: 4.3 K/UL (ref 1.8–7.7)
NEUTROPHILS NFR BLD: 68.1 % (ref 38–73)
NRBC BLD-RTO: 0 /100 WBC
PHOSPHATE SERPL-MCNC: 3.8 MG/DL (ref 2.7–4.5)
PLATELET # BLD AUTO: 375 K/UL (ref 150–450)
PMV BLD AUTO: 10.3 FL (ref 9.2–12.9)
POTASSIUM SERPL-SCNC: 3.3 MMOL/L (ref 3.5–5.1)
PROT SERPL-MCNC: 5.9 G/DL (ref 6–8.4)
RBC # BLD AUTO: 3.22 M/UL (ref 4–5.4)
SODIUM SERPL-SCNC: 140 MMOL/L (ref 136–145)
WBC # BLD AUTO: 6.31 K/UL (ref 3.9–12.7)

## 2023-11-02 PROCEDURE — 97116 GAIT TRAINING THERAPY: CPT

## 2023-11-02 PROCEDURE — 85025 COMPLETE CBC W/AUTO DIFF WBC: CPT | Performed by: HOSPITALIST

## 2023-11-02 PROCEDURE — 97530 THERAPEUTIC ACTIVITIES: CPT | Mod: CO

## 2023-11-02 PROCEDURE — 36415 COLL VENOUS BLD VENIPUNCTURE: CPT | Performed by: HOSPITALIST

## 2023-11-02 PROCEDURE — 25000003 PHARM REV CODE 250: Performed by: FAMILY MEDICINE

## 2023-11-02 PROCEDURE — 11000004 HC SNF PRIVATE

## 2023-11-02 PROCEDURE — 97110 THERAPEUTIC EXERCISES: CPT

## 2023-11-02 PROCEDURE — 97110 THERAPEUTIC EXERCISES: CPT | Mod: CO

## 2023-11-02 PROCEDURE — 25000003 PHARM REV CODE 250: Performed by: NURSE PRACTITIONER

## 2023-11-02 PROCEDURE — 25000003 PHARM REV CODE 250: Performed by: HOSPITALIST

## 2023-11-02 PROCEDURE — 84100 ASSAY OF PHOSPHORUS: CPT | Performed by: HOSPITALIST

## 2023-11-02 PROCEDURE — 80053 COMPREHEN METABOLIC PANEL: CPT | Performed by: NURSE PRACTITIONER

## 2023-11-02 PROCEDURE — 83735 ASSAY OF MAGNESIUM: CPT | Performed by: HOSPITALIST

## 2023-11-02 RX ORDER — POTASSIUM CHLORIDE 20 MEQ/1
40 TABLET, EXTENDED RELEASE ORAL ONCE
Status: COMPLETED | OUTPATIENT
Start: 2023-11-02 | End: 2023-11-02

## 2023-11-02 RX ORDER — ACETAMINOPHEN 325 MG/1
650 TABLET ORAL EVERY 8 HOURS
Status: DISCONTINUED | OUTPATIENT
Start: 2023-11-02 | End: 2023-11-14 | Stop reason: HOSPADM

## 2023-11-02 RX ORDER — AMOXICILLIN 250 MG
2 CAPSULE ORAL 2 TIMES DAILY
Status: DISCONTINUED | OUTPATIENT
Start: 2023-11-02 | End: 2023-11-14 | Stop reason: HOSPADM

## 2023-11-02 RX ORDER — POLYETHYLENE GLYCOL 3350 17 G/17G
17 POWDER, FOR SOLUTION ORAL 2 TIMES DAILY
Status: DISCONTINUED | OUTPATIENT
Start: 2023-11-02 | End: 2023-11-14 | Stop reason: HOSPADM

## 2023-11-02 RX ORDER — PREGABALIN 75 MG/1
75 CAPSULE ORAL NIGHTLY
Status: DISCONTINUED | OUTPATIENT
Start: 2023-11-02 | End: 2023-11-14 | Stop reason: HOSPADM

## 2023-11-02 RX ADMIN — CETIRIZINE HYDROCHLORIDE 10 MG: 5 TABLET, FILM COATED ORAL at 08:11

## 2023-11-02 RX ADMIN — METHOCARBAMOL 500 MG: 500 TABLET ORAL at 01:11

## 2023-11-02 RX ADMIN — SODIUM BICARBONATE 650 MG TABLET 650 MG: at 08:11

## 2023-11-02 RX ADMIN — AMLODIPINE BESYLATE 10 MG: 10 TABLET ORAL at 08:11

## 2023-11-02 RX ADMIN — TRAMADOL HYDROCHLORIDE 50 MG: 50 TABLET, COATED ORAL at 02:11

## 2023-11-02 RX ADMIN — Medication 1 CAPSULE: at 09:11

## 2023-11-02 RX ADMIN — POTASSIUM CHLORIDE 40 MEQ: 1500 TABLET, EXTENDED RELEASE ORAL at 08:11

## 2023-11-02 RX ADMIN — SENNOSIDES AND DOCUSATE SODIUM 1 TABLET: 8.6; 5 TABLET ORAL at 08:11

## 2023-11-02 RX ADMIN — ACETAMINOPHEN 500 MG: 500 TABLET ORAL at 08:11

## 2023-11-02 RX ADMIN — FLUTICASONE FUROATE AND VILANTEROL TRIFENATATE 1 PUFF: 100; 25 POWDER RESPIRATORY (INHALATION) at 08:11

## 2023-11-02 RX ADMIN — ATORVASTATIN CALCIUM 80 MG: 40 TABLET, FILM COATED ORAL at 08:11

## 2023-11-02 RX ADMIN — TRAMADOL HYDROCHLORIDE 50 MG: 50 TABLET, COATED ORAL at 08:11

## 2023-11-02 RX ADMIN — FLUTICASONE PROPIONATE 100 MCG: 50 SPRAY, METERED NASAL at 08:11

## 2023-11-02 RX ADMIN — CALCIUM CARBONATE (ANTACID) CHEW TAB 500 MG 500 MG: 500 CHEW TAB at 08:11

## 2023-11-02 RX ADMIN — TRAMADOL HYDROCHLORIDE 50 MG: 50 TABLET, COATED ORAL at 06:11

## 2023-11-02 RX ADMIN — ACETAMINOPHEN 650 MG: 325 TABLET ORAL at 09:11

## 2023-11-02 RX ADMIN — OMEPRAZOLE 20 MG: 20 CAPSULE, DELAYED RELEASE ORAL at 06:11

## 2023-11-02 RX ADMIN — GUAIFENESIN 200 MG: 200 SOLUTION ORAL at 09:11

## 2023-11-02 RX ADMIN — CLOPIDOGREL BISULFATE 75 MG: 75 TABLET ORAL at 08:11

## 2023-11-02 RX ADMIN — PREGABALIN 75 MG: 75 CAPSULE ORAL at 08:11

## 2023-11-02 RX ADMIN — ASPIRIN 81 MG CHEWABLE TABLET 81 MG: 81 TABLET CHEWABLE at 08:11

## 2023-11-02 RX ADMIN — METHOCARBAMOL 500 MG: 500 TABLET ORAL at 09:11

## 2023-11-02 RX ADMIN — CHOLECALCIFEROL TAB 25 MCG (1000 UNIT) 2000 UNITS: 25 TAB at 08:11

## 2023-11-02 RX ADMIN — OMEPRAZOLE 20 MG: 20 CAPSULE, DELAYED RELEASE ORAL at 04:11

## 2023-11-02 RX ADMIN — ACETAMINOPHEN 650 MG: 325 TABLET ORAL at 04:11

## 2023-11-02 RX ADMIN — POLYETHYLENE GLYCOL 3350 17 G: 17 POWDER, FOR SOLUTION ORAL at 08:11

## 2023-11-02 RX ADMIN — FUROSEMIDE 20 MG: 20 TABLET ORAL at 08:11

## 2023-11-02 NOTE — PROGRESS NOTES
[facility] - Skilled Nursing  Adult Nutrition  Progress Note    SUMMARY   Recommendations  Continue low phosphorus diet, dc novOpenTable renal daily has supply in room, may have salt on trays, send bottled water. RD following  Goals: PO to meet 75% of EEN by next RD follow up  Nutrition Goal Status: progressing towards goal  Communication of RD Recs: other (comment) (POC)    Assessment and Plan  Inadequate  oral intake related to decreased appetite, restrictive diet as evidenced by PO < 75%.     New     Plan  Mineral modified diet - low phosphorus  Commercial beverage( low phosphorus,potassium) novasource renal daily  Collaboration with other providers  Mineral supplement therapy- Vit D, Ca carbonate         Malnutrition Assessment  11/2     Skin (Micronutrient): edema  Teeth (Micronutrient): none  Tongue (Micronutrient): magenta  Neck/Chest (Micronutrient): muscle wasting  Musculoskeletal/Lower Extremities: muscle wasting           Orbital Region (Subcutaneous Fat Loss): mild depletion  Upper Arm Region (Subcutaneous Fat Loss): mild depletion  Thoracic and Lumbar Region: mild depletion   Protestant Region (Muscle Loss): moderate depletion  Clavicle Bone Region (Muscle Loss): moderate depletion  Clavicle and Acromion Bone Region (Muscle Loss): severe depletion  Dorsal Hand (Muscle Loss): moderate depletion  Patellar Region (Muscle Loss): moderate depletion  Anterior Thigh Region (Muscle Loss): moderate depletion  Posterior Calf Region (Muscle Loss): moderate depletion                 Reason for Assessment  Reason For Assessment: RD follow-up  Diagnosis:  (CVA, Diplopia)  Relevant Medical History: debility, chronic constipation, COPD, CKD 4, OA, HTN, DVT, pre-diabetes, anemia, spinal stenosis  Interdisciplinary Rounds: attended  General Information Comments: Patient asking for bottled water so they dont have to bring from home, she has been doing better on low phosphorus diet than expected as they were complaing about  "renal diet and not being restricted in salt. she has been able to drink the novasource renal but it has been piling up. Order will be cancelled and patient states she will try to drink what supply she has. NFPE completed  Nutrition Discharge Planning: DC on  low sodium diet    Nutrition/Diet History    Patient Reported Diet/Restrictions/Preferences: general  Spiritual, Cultural Beliefs, Protestant Practices, Values that Affect Care: no  Food Allergies: NKFA  Factors Affecting Nutritional Intake: None identified at this time    Anthropometrics    Temp: 97.6 °F (36.4 °C)  Height Method: Stated  Height: 5' 3" (160 cm)  Height (inches): 63 in  Weight Method: Standard Scale  Weight: 60.4 kg (133 lb 2.5 oz)  Weight (lb): 133.16 lb  Ideal Body Weight (IBW), Female: 115 lb  % Ideal Body Weight, Female (lb): 118.67 %  % Ideal Body Weight Malnutrition: 80-90% - mild deficit  BMI (Calculated): 23.6  BMI Grade: 18.5-24.9 - normal  Weight Loss: other (see comments)  Usual Body Weight (UBW), k.2 kg  Weight Change Amount:  (6% in last ten months)  % Usual Body Weight: 93.7  % Weight Change From Usual Weight: -6.5 %       Lab/Procedures/Meds    Pertinent Labs Reviewed: reviewed  Pertinent Labs Comments: Hg 9.4, Hct 29.0, K 3.3, BUN 38,  Pertinent Medications Reviewed: reviewed  Pertinent Medications Comments: KCl, lasix,    Estimated/Assessed Needs    Weight Used For Calorie Calculations: 61.9 kg (136 lb 7.4 oz)  Energy Calorie Requirements (kcal): 1452  Energy Need Method: [place]-St Jeor (x 1.4(PAL))  Protein Requirements: 37-49  Weight Used For Protein Calculations: 61.9 kg (136 lb 7.4 oz) (.6-.8g//kg)  Fluid Requirements (mL): 1452 or per MD     RDA Method (mL): 1452  CHO Requirement: 178    Nutrition Prescription Ordered  Current Diet Order: low phosphorus  Nutrition Order Comments: PO 75%  Oral Nutrition Supplement: dc    Evaluation of Received Nutrient/Fluid Intake    I/O: no data  Energy Calories Required: meeting " needs  Protein Required: meeting needs  Fluid Required: exceeds needs  Comments: LBM 10/31  Tolerance: tolerating  % Intake of Estimated Energy Needs: 75 - 100 %  % Meal Intake: 75 - 100 %    Nutrition Risk    Level of Risk/Frequency of Follow-up: low (one time per week)     Monitor and Evaluation    Food and Nutrient Intake: food and beverage intake  Food and Nutrient Adminstration: diet order  Knowledge/Beliefs/Attitudes: food and nutrition knowledge/skill  Physical Activity and Function: nutrition-related ADLs and IADLs  Anthropometric Measurements: weight change  Biochemical Data, Medical Tests and Procedures: electrolyte and renal panel, glucose/endocrine profile, gastrointestinal profile  Nutrition-Focused Physical Findings: overall appearance     Nutrition Follow-Up    RD Follow-up?: Yes

## 2023-11-02 NOTE — NURSING
POC reviewed with pt, pt verbalized understanding. Safety maintained throughout shift, bed locked and in lowest position, call light in reach, Side rails up X 2. Non skid sock on when OOB. Pt remained free of fall/trauma. Pt self reports of pain treated with PO pain medication as ordered by MD. VS stable.  no reports of nausea and vomiting.  No signs of distress, rise and fall of chest noted, respirations  even and unlabored   Plan of care on going. No future concerns as of present.

## 2023-11-02 NOTE — PT/OT/SLP PROGRESS
Occupational Therapy   Treatment    Name: Bridget Montgomery  MRN: [id number]  Admit Date: 10/23/2023  Admitting Diagnosis:  Diplopia    General Precautions: Standard, fall, hearing impaired   Orthopedic Precautions: spinal precautions   Braces: Cervical collar    Recommendations:     Discharge Recommendations:  Low Intensity Therapy  Level of Assistance Recommended at Discharge: 24 hours light assistance for ADL's and homemaking tasks  Discharge Equipment Recommendations: bath bench  Barriers to discharge:  None    Assessment:     Bridget Montgomery is a 88 y.o. female with a medical diagnosis of Diplopia.  She presents with limitations in performance of self-care, functional mobility, and ADLs. Performance deficits affecting function are weakness, impaired endurance, impaired self care skills, impaired functional mobility, gait instability, impaired balance, decreased coordination, decreased upper extremity function, decreased lower extremity function, pain, impaired coordination, orthopedic precautions. Pt tolerated Tx without incident and is making progress but continues to require assist to perform self care tasks, functional mobility and functional transfers. Pt would continue to benefit from OT intervention to further functional (I)ce and safety.     Rehab Potential is good    Activity tolerance:  Good    Plan:     Patient to be seen 5 x/week to address the above listed problems via self-care/home management, therapeutic activities, therapeutic exercises    Plan of Care Expires: 11/24/23  Plan of Care Reviewed with: patient, daughter    Subjective     Communicated with: Nursing prior to session.     Pain/Comfort:  Pain Rating 1: 8/10  Location - Side 1: Bilateral  Location - Orientation 1: generalized  Location 1:  (Shoulders, back, knees)  Pain Addressed 1: Pre-medicate for activity, Reposition, Distraction  Pain Rating Post-Intervention 1: 7/10    Patient's cultural, spiritual, Hoahaoism conflicts given  the current situation:  no    Objective:     Patient found up in chair with cervical collar upon OT entry to room.    Bed Mobility:    Pt seated in chair at onset of treatment session.     Functional Mobility/Transfers:  Patient completed Sit <> Stand Transfer with stand by assistance  with  rolling walker       AMPA 6 Click ADL: 19    OT Exercises: Wall Pulleys  seated for functional ROM and to increase functional reaching and independence with ADLS.    Pt performed BUE towel slides in standing at raised counter with RW and Sup with pt motioning the alphabet with towel.     Treatment & Education:  Pt participated in standing activity with sup and RW. Pt at raised counter to perform fine motor and reaching activity with focus on standing tolerance, functional reaching, dynamic standing bal, crossing midline, and to promote independence with homemaking and self care tasks. Pt tolerated standing for 10 min      Pt educated on:  - role of OT  - level of assistance  - energy conservation and task modification to maximized independence with ADL's and mobility   -  safety while performing functional transfers and self care tasks  - progress towards OT goals    Patient left up in chair with call button in reach and daughter present    GOALS:   Multidisciplinary Problems       Occupational Therapy Goals          Problem: Occupational Therapy    Goal Priority Disciplines Outcome Interventions   Occupational Therapy Goal     OT, PT/OT Ongoing, Progressing    Description: Goals to be met by: 3 weeks     Patient will increase functional independence with ADLs by performing:    UE Dressing with Stand-by Assistance. -- Ongoing  LE Dressing with Stand-by Assistance with use of AD and AE as needed. -- Met  Grooming while standing at sink with Supervision. -- Ongoing  Toileting from bedside commode or toilet with Supervision for hygiene and clothing management.   Bathing from sitting at sink with Set-up Assistance. --  Ongoing  Rolling to Bilateral with Modified [place]. -- Ongoing  Supine to sit with Modified [place].-- Ongoing  Stand pivot transfers with Supervision with use of AD and AE as needed. -- Ongoing  Toilet transfer to bedside commode or toilet with Supervision with use of AD and AE as needed. -- Ongoing  Increased functional strength and endurance through use of UBE for at least 8 minutes with Min resistance. -- Ongoing  Upper extremity exercise program 1x15 reps per handout, with assistance as needed. -- Ongoing  Pt will perform functional standing up to 10 minutes in prep for performance of functional ADLs in standing. -- Met                           Time Tracking:     OT Date of Treatment: 11/02/23  OT Start Time: 0948    OT Stop Time: 1018  OT Total Time (min): 30 min    Billable Minutes:Therapeutic Activity 15  Therapeutic Exercise 15    11/2/2023

## 2023-11-02 NOTE — PLAN OF CARE
Problem: Adult Inpatient Plan of Care  Goal: Plan of Care Review  Outcome: Ongoing, Progressing  Goal: Patient-Specific Goal (Individualized)  Outcome: Ongoing, Progressing  Goal: Absence of Hospital-Acquired Illness or Injury  Outcome: Ongoing, Progressing  Goal: Optimal Comfort and Wellbeing  Outcome: Ongoing, Progressing  Goal: Readiness for Transition of Care  Outcome: Ongoing, Progressing     Problem: Diabetes Comorbidity  Goal: Blood Glucose Level Within Targeted Range  Outcome: Ongoing, Progressing     Problem: Impaired Wound Healing  Goal: Optimal Wound Healing  Outcome: Ongoing, Progressing     Problem: Fall Injury Risk  Goal: Absence of Fall and Fall-Related Injury  Outcome: Ongoing, Progressing  Intervention: Identify and Manage Contributors  Flowsheets (Taken 11/2/2023 0303)  Self-Care Promotion:   BADL personal objects within reach   BADL personal routines maintained  Medication Review/Management: medications reviewed     Problem: Skin Injury Risk Increased  Goal: Skin Health and Integrity  Outcome: Ongoing, Progressing  Intervention: Optimize Skin Protection  Flowsheets (Taken 11/2/2023 0303)  Pressure Reduction Techniques: frequent weight shift encouraged  Skin Protection: adhesive use limited  Head of Bed (HOB) Positioning: HOB at 30-45 degrees

## 2023-11-02 NOTE — PT/OT/SLP PROGRESS
Physical Therapy Treatment    Patient Name:  Bridget Montgomery   MRN:  [id number]  Admit Date: 10/23/2023  Admitting Diagnosis: Diplopia  Recent Surgeries: N/A    General Precautions: Standard, fall, hearing impaired  Orthopedic Precautions: spinal precautions  Braces: Cervical collar    Recommendations:     Discharge Recommendations: Low Intensity Therapy  Level of Assistance Recommended at Discharge: Intermittent assistance   Discharge Equipment Recommendations: bath bench  Barriers to discharge: None    Assessment:     Bridget Montgomery is a 88 y.o. female admitted with a medical diagnosis of Diplopia . Pt continues to make good functional progress and would benefit from continued SNF rehab.      Performance deficits affecting function: weakness, impaired endurance, impaired self care skills, impaired functional mobility, gait instability, impaired balance, decreased upper extremity function, decreased lower extremity function, decreased safety awareness, impaired cardiopulmonary response to activity, orthopedic precautions.    Rehab Potential is good    Activity Tolerance: Good    Plan:     Patient to be seen 5 x/week to address the above listed problems via gait training, therapeutic activities, therapeutic exercises, neuromuscular re-education, wheelchair management/training    Plan of Care Expires: 11/23/23  Plan of Care Reviewed with: patient    Subjective     Pt. Agreeable to work with PT.     Pain/Comfort:  Pain Rating 1: 8/10  Location - Side 1: Bilateral  Location - Orientation 1: generalized  Location 1: shoulder  Pain Addressed 1: Pre-medicate for activity, Distraction, Reposition, Nurse notified  Pain Rating Post-Intervention 1: 8/10    Patient's cultural, spiritual, [employer] conflicts given the current situation:  no    Objective:     Communicated with pt's nurse prior to session.  Patient found up in chair with cervical collar upon PT entry to room.     Therapeutic Activities and Exercises: LBEx  "15mins to help improve B l/E MMT and endurance  Standing therex x 20reps with RW and SBA fro safety: HR, Hip Flex, Hip Abd/Add  Functional Mobility:  Transfers:     Sit to Stand:  stand by assistance with rolling walker  Gait: ~210ft x 2 trials with RW and SBA/(S) for safety  Stairs:  Pt ascended/descended 8 stair(s) and 4" curb step with Rolling Walker with bilateral handrails with Contact Guard Assistance.   Wheelchair Propulsion:  Pt propelled lightweight wheelchair x 160 feet on Level tile with  Bilateral upper extremity with Supervision or Set-up Assistance.     AM-PAC 6 CLICK MOBILITY  18    Patient left up in chair with call button in reach.    GOALS:   Multidisciplinary Problems       Physical Therapy Goals          Problem: Physical Therapy    Goal Priority Disciplines Outcome Goal Variances Interventions   Physical Therapy Goal     PT, PT/OT Ongoing, Progressing     Description: Goals to be met by: 23     Patient will increase functional independence with mobility by performin. Supine to sit with Modified [place] - In progress  2. Sit to supine with Modified [place] - In progress  3. Rolling to Left and Right with Modified [place] - In progress  4. Sit to stand transfer with Stand-by Assistance - MET  Updated Goal 10/30/23: Sit to stand transfer with Supervision    5. Bed to chair transfer with Stand-by Assistance using Rolling Walker - MET  Updated Goal 10/30/23: Bed to chair transfer with Supervision using RW    6. Gait  x 150 feet with Stand-by Assistance using Rolling Walker - MET  Updated Goal 10/30/23: Gait x 150 feet with Supervision using RW    7. Wheelchair propulsion x 100 feet with Supervision using bilateral upper extremities/bilateral lower extremities - In progress                         Time Tracking:     PT Received On: 23  PT Start Time: 09  PT Stop Time: 947  PT Total Time (min): 47 min    Billable Minutes: Gait Training 25 and Therapeutic Exercise " 22    Treatment Type: Treatment  PT/PTA: PT     Number of PTA visits since last PT visit: 0     11/02/2023

## 2023-11-02 NOTE — PLAN OF CARE
Interdisciplinary team, Ivana House, RN Unit Director, Huyen Mercado, RN Charge Nurse, Rosmery Zeng, OT, Cathie Tello, and Therese Wisdom, Dietician, spoke to patient and patient's daughter for care plan conference, weekly status update, and therapy progress update. Tentative discharge date set for 11/14/23.

## 2023-11-02 NOTE — TREATMENT PLAN
Rehab Services' DME recommendations    Bridget Montgomery  MRN: [id number]    Equipment used at home: BSC, shower chair, W/C, rollator .  DME owned (not currently used): rolling walker and single point cane.     [x] Tub bench Standard (unpadded)     [x] Home health PT and OT      JHONY Beckwith 11/2/2023

## 2023-11-02 NOTE — PLAN OF CARE
Problem: Adult Inpatient Plan of Care  Goal: Plan of Care Review  Outcome: Ongoing, Progressing  Goal: Patient-Specific Goal (Individualized)  Outcome: Ongoing, Progressing  Goal: Absence of Hospital-Acquired Illness or Injury  Outcome: Ongoing, Progressing  Goal: Optimal Comfort and Wellbeing  Outcome: Ongoing, Progressing  Goal: Readiness for Transition of Care  Outcome: Ongoing, Progressing     Problem: Diabetes Comorbidity  Goal: Blood Glucose Level Within Targeted Range  Outcome: Ongoing, Progressing     Problem: Impaired Wound Healing  Goal: Optimal Wound Healing  Outcome: Ongoing, Progressing     Problem: Fall Injury Risk  Goal: Absence of Fall and Fall-Related Injury  Outcome: Ongoing, Progressing  Intervention: Promote Injury-Free Environment  Flowsheets (Taken 11/2/2023 1720)  Safety Promotion/Fall Prevention:   assistive device/personal item within reach   side rails raised x 2   muscle strengthening facilitated   nonskid shoes/socks when out of bed   family to remain at bedside   lighting adjusted     Problem: Skin Injury Risk Increased  Goal: Skin Health and Integrity  Outcome: Ongoing, Progressing  Intervention: Promote and Optimize Oral Intake  Flowsheets (Taken 11/2/2023 1720)  Oral Nutrition Promotion: physical activity promoted    Pt AAOX4. Able to make needs known.  Accepted and tolerated medications whole.  Denies pain or discomfort at this time. No new skin issues noted.  Pt resting, HOB slightly elevated. Safety maintained.

## 2023-11-02 NOTE — PROGRESS NOTES
[facility]                                  Skilled Nursing Facility                   Progress Note     Admit Date: 10/23/2023  MIKAYLA 11/14/2023  Principal Problem:  Diplopia   HPI obtained from patient interview and chart review     Chief Complaint: Re-evaluation of medical treatment and therapy status: Lab review, therapy progress    HPI:   Bridget Montgomery is an 88 y.o. female with PMH CKD4, COPD, HTN, pre diabetes, and DVT who was admitted to hospital with new onset diplopia and dysarthria. Neuro w/up with neurology completed. Unable to definitively rule out cva on mri, however no acute intracranial findings identified on CTA, CT or MRI brain. Treated as acute CVA given symptoms and inconclusive imaging findings.  Patient will be treated at [facility] with PT and OT to improve functional status and ability to perform ADLs.   Allergies: Patient is allergic to cephalexin, codeine, meperidine, nsaids (non-steroidal anti-inflammatory drug), penicillins, and tazarotene.      Interval History  - 24 hour chart review completed. ANYA. NAD. Vitals and labs were reviewed  - Patient reporting achy intermittent pain in shoulders, hips, back, and knees rated 7/10 which is aggravated with activity and not relieved with current analgesic regimen, increased lyrica to 75 mg nightly and initiated tylenol 650 mg tid.   - Afebrile, vital signs stable. Currently at 135/63  - potassium level 3.3, initiated potassium 40 mEq x1 p.o.  - bicarb at 22, continue 650 sodium bicarb p.o. daily  - BUN at improved to 38 with order to encourage p.o. fluids  - calcium at 8.7, trending  - patient reporting persistent constipation, increased MiraLax 17 g bid and continue MiraLax daily p.r.n. and increased senna docusate 8.6-50 mg 2 tablet b.i.d.  - Per PT notes patient with increased ambulation distance with stand-by assist and rolling walker today. This patient would  continue to benefit from skilled PT and skilled OT services to improve overall functional mobility and independence, strength, endurance, and safety.         ROS  Constitutional: Negative for fever   Eyes: Positive for blurred vision, double vision   Respiratory: Negative for cough, shortness of breath   Cardiovascular: Negative for chest pain, palpitations, and leg swelling.   Gastrointestinal: Negative for abdominal pain, diarrhea, nausea, vomiting. + constipation  Genitourinary: Negative for dysuria, frequency   Musculoskeletal:  + generalized weakness, + neck pain (chronic). Negative for back pain and myalgias.   Skin: Negative for itching and rash.   Neurological: Negative for dizziness, headaches.   Psychiatric/Behavioral: Negative for depression. The patient is not nervous/anxious.      24 hour Vital Sign Range   Temp:  [97.6 °F (36.4 °C)-98.6 °F (37 °C)]   Pulse:  [67-83]   Resp:  [16-18]   BP: (135-152)/(63-70)   SpO2:  [96 %-99 %]     Current BMI: Body mass index is 23.59 kg/m².    PEx  Constitutional: Patient appears debilitated.  No distress noted  HENT:   Head: Normocephalic and atraumatic.   Eyes: Pupils are equal, round  Neck: Limited range of motion. Neck supple. C-spine collar present  Cardiovascular: Normal rate, regular rhythm and normal heart sounds.    Pulmonary/Chest: Effort normal and breath sounds are clear  Abdominal: Soft. Bowel sounds are normal.   Musculoskeletal: Normal range of motion.   Neurological: Alert and oriented to person, place, and time.   Psychiatric: Normal mood and affect. Behavior is normal.   Skin: Skin is warm and dry. Full skin assessment completed by NP on initial exam.       Altered Skin Integrity Right anterior;distal;lower Leg , POA       Recent Labs   Lab 11/02/23 0418      K 3.3*      CO2 22*   BUN 38*   CREATININE 0.9   CALCIUM 8.7   MG 1.8         Recent Labs   Lab 11/02/23 0418   WBC 6.31   RBC 3.22*   HGB 9.4*   HCT 29.0*      MCV 90   MCH  "29.2   [facility] 32.4         No results for input(s): "POCTGLUCOSE" in the last 168 hours.       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  - CTH without acute intracranial process  - CTA without large vessel occlusion  - MRI brain demonstrated equivocal punctate lesion in the left paramedian midbrain, which because of study is difficult to exclude ischemic process  - TTE with bubble study ordered and demonstrated EF 61%, moderate mitral annular calcifications and mild regurgitation, and PA pressure of 39  - Loaded with  and plavix 300"  11/2/2023  - Continue DAPT x 21 days then aspirin only daily for life  - Continue high intensity statin therapy w atorvastatin to 80mg daily  - PT/OT/SLP consulted to evaluate and treat  - Maintain fall and delirium precautions  - Will need Opthalmology and stroke follow up outpatient    Hypokalemia, new 10/30  11/2/2023   - initiated potassium 40 mEq x1 p.o.    Metabolic acidosis, new 10/30  11/2/2023   - continue 650 sodium bicarb p.o. daily    Azotemia, new 10/30  11/2/2023  - BUN stable continue to encourage p.o. fluids    Hypocalcemia, new 10/30  11/2/2023   - Ca stable at 8.7    Chronic Constipation  11/2/2023   - Continue scheduled senokot and miralax  - Adjust bowel regimen prn to avoid diarrhea  - Encourage fluid intake  - Encourage safe physical activity as tolerated  - Monitor bowel movement regularity and consistency  - Increased MiraLax 17 g bid and continue MiraLax daily p.r.n. and increased senna docusate 8.6-50 mg 2 tablet b.i.d.    Decreased Mobility   Physical Debility  Weakness   11/2/2023   - Continue with PT/OT for gait training and strengthening and restoration of ADL's   - Encourage mobility, OOB in chair, and early ambulation as appropriate  - Fall precautions   - Monitor for bowel and bladder dysfunction  - Monitor for and prevent skin breakdown and " pressure ulcers  - DVT prophylaxis with plavix, asa  - Per PT notes patient with increased ambulation distance with stand-by assist and rolling walker today    Mixed HLD  Stroke Risk Factor.   11/2/2023   - continue high intensity statin therapy w atorvastatin to 80mg daily  - Follow-up with PCP for monitoring per guidelines     Chronic Obstructive Pulmonary Disease (COPD)  - POA, stable  11/2/2023  - Continue home LABA 1 puff daily  - Continue albuterol inhaler and neb prn  - Start guaifenesin q 4 prn, consider bid mucinex if needed  - Continue BID Flonase for sinus relief  - May consider montelukast 10 mg po daily if unstable  - Consider xopanex and CPT tx q 6 hours while awake if respiratory sx exascerbate    Stage 4 chronic kidney disease  - Chronic, stable  - Follows with Dr. Lyons  - Continue lasix 20 mg daily  - Avoid any nephrotoxic agents including NSAIDs, aminoglycosides, IV contrast (unless absolutely necessary), gadolinium, fleets and other phosphorous-based laxatives. Caution with antibiotics.  - Renally adjust medications based on patient's creatinine clearance  - Avoid Hypotension  - Low Phos Diet --> Reg diet with nephro and no milk products on 10/27 per daughter request and d/w RD.  11/2/2023  - Routinely monitor renal function with scheduled metabolic profile  - Stable Cr at 0.9    Essential Hypertension  - Chronic, stable  11/2/2023 - Continue amlodipine 10 mg daily   - Continue lasix 20 mg daily  - Monitor BP  - Adjust therapy to BP goal < 150/90  - Avoid hypotension   - Currently at 135/63    Spinal Stenosis  C1-C2 dens fracture  - Follows with neurosurgery  - Scheduled for surgery on 11/10/23; now cancelled d/t post acute stroke  - continue diclofenac na gel topically daily  - tylenol, tramadol, robaxin prn  11/2/2023  - Increased Lyrica 75 mg nightly  - Follow up with neurosurgery outpatient    Osteoarthritis  Muscle Spasms, Chronic  - Chronic pain to R shoulder  11/2/2023  - continue  diclofenac na gel topically daily  - tylenol, tramadol, robaxin prn  - increased lyrica to 75 mg nightly and initiated tylenol 650 mg tid.   - Afebrile, vital signs stable. Currently at 135/63    H/o Type 2 Diabetes  - Most recent A1c <6  11/2/2023  - Continue to monitor w venous lab and stable    Anemia of Chronic Disease  - Baseline Hb 10-11  - Normocytic anemia   11/2/2023  - Trend with scheduled CBC  - Transfuse for Hgb < 7.0  - Continue ferrous sulfate tablet every other day  - Follow up with PCP outpatient    Insomnia, Chronic  - Resume home tramadol 50 mg q HS  11/2/2023  - Fall and delerium precautions.   - Note home polypharmacy risk factors  - F/up with PCP regarding long-term insomnia and pain management    Anticipate disposition:  Home with home health    IP [facility] RISK OF UNPLANNED READMISSION Model:     Follow-up needed during SNF stay-    Follow-up needed after discharge from SNF: PCP, Opthalmology    Future Appointments   Date Time Provider Department Center   12/11/2023  2:00 PM Lluvia Rahman MD [facility]   2/21/2024 10:15 AM Charles Peacock MD [facility]       Total time spent: > 46 minutes  Description of Time: counseling provided on clinical condition, therapies provided, plan of care, emotional support, coordinating patient care with other care team members, reviewing and interpreting labs and imaging, collaboration with physician, initiating new orders, chart review, and documentation. See interval hx.      Rajesh Herring NP  Department of [facility]   [facility]     DOS: 11/2/2023       Patient note was created using MModal Dictation.  Any errors in syntax or even information may not have been identified and edited on initial review prior to signing this note.

## 2023-11-02 NOTE — PLAN OF CARE
Continue low phosphorus diet, [facility] has supply in room, may have salt on trays, send bottled water. RD following  Goals: PO to meet 75% of EEN by next RD follow up  Nutrition Goal Status: progressing towards goal  Communication of RD Recs: other (comment) (POC)     Assessment and Plan  Inadequate  oral intake related to decreased appetite, restrictive diet as evidenced by PO < 75%.     New     Plan  Mineral modified diet - low phosphorus  Commercial beverage( low phosphorus,potassium) [employer] renal daily  Collaboration with other providers  Mineral supplement therapy- Vit D, Ca carbonate

## 2023-11-03 PROCEDURE — 97110 THERAPEUTIC EXERCISES: CPT | Mod: CO

## 2023-11-03 PROCEDURE — 97530 THERAPEUTIC ACTIVITIES: CPT | Mod: CO

## 2023-11-03 PROCEDURE — 25000003 PHARM REV CODE 250: Performed by: HOSPITALIST

## 2023-11-03 PROCEDURE — 97530 THERAPEUTIC ACTIVITIES: CPT | Mod: CQ

## 2023-11-03 PROCEDURE — 97110 THERAPEUTIC EXERCISES: CPT | Mod: CQ

## 2023-11-03 PROCEDURE — 97116 GAIT TRAINING THERAPY: CPT | Mod: CQ

## 2023-11-03 PROCEDURE — 25000003 PHARM REV CODE 250: Performed by: FAMILY MEDICINE

## 2023-11-03 PROCEDURE — 25000003 PHARM REV CODE 250: Performed by: NURSE PRACTITIONER

## 2023-11-03 PROCEDURE — 11000004 HC SNF PRIVATE

## 2023-11-03 RX ADMIN — PREGABALIN 75 MG: 75 CAPSULE ORAL at 10:11

## 2023-11-03 RX ADMIN — ACETAMINOPHEN 650 MG: 325 TABLET ORAL at 01:11

## 2023-11-03 RX ADMIN — OMEPRAZOLE 20 MG: 20 CAPSULE, DELAYED RELEASE ORAL at 04:11

## 2023-11-03 RX ADMIN — FLUTICASONE PROPIONATE 100 MCG: 50 SPRAY, METERED NASAL at 09:11

## 2023-11-03 RX ADMIN — TRAMADOL HYDROCHLORIDE 50 MG: 50 TABLET, COATED ORAL at 10:11

## 2023-11-03 RX ADMIN — METHOCARBAMOL 500 MG: 500 TABLET ORAL at 10:11

## 2023-11-03 RX ADMIN — TRAMADOL HYDROCHLORIDE 50 MG: 50 TABLET, COATED ORAL at 09:11

## 2023-11-03 RX ADMIN — ASPIRIN 81 MG CHEWABLE TABLET 81 MG: 81 TABLET CHEWABLE at 09:11

## 2023-11-03 RX ADMIN — AMLODIPINE BESYLATE 10 MG: 10 TABLET ORAL at 09:11

## 2023-11-03 RX ADMIN — CETIRIZINE HYDROCHLORIDE 10 MG: 5 TABLET, FILM COATED ORAL at 09:11

## 2023-11-03 RX ADMIN — CALCIUM CARBONATE (ANTACID) CHEW TAB 500 MG 500 MG: 500 CHEW TAB at 09:11

## 2023-11-03 RX ADMIN — CLOPIDOGREL BISULFATE 75 MG: 75 TABLET ORAL at 09:11

## 2023-11-03 RX ADMIN — GUAIFENESIN 200 MG: 200 SOLUTION ORAL at 09:11

## 2023-11-03 RX ADMIN — ATORVASTATIN CALCIUM 80 MG: 40 TABLET, FILM COATED ORAL at 09:11

## 2023-11-03 RX ADMIN — ACETAMINOPHEN 650 MG: 325 TABLET ORAL at 09:11

## 2023-11-03 RX ADMIN — ACETAMINOPHEN 650 MG: 325 TABLET ORAL at 06:11

## 2023-11-03 RX ADMIN — SENNOSIDES AND DOCUSATE SODIUM 2 TABLET: 8.6; 5 TABLET ORAL at 09:11

## 2023-11-03 RX ADMIN — FUROSEMIDE 20 MG: 20 TABLET ORAL at 09:11

## 2023-11-03 RX ADMIN — OMEPRAZOLE 20 MG: 20 CAPSULE, DELAYED RELEASE ORAL at 06:11

## 2023-11-03 RX ADMIN — SODIUM BICARBONATE 650 MG TABLET 650 MG: at 09:11

## 2023-11-03 RX ADMIN — FLUTICASONE FUROATE AND VILANTEROL TRIFENATATE 1 PUFF: 100; 25 POWDER RESPIRATORY (INHALATION) at 09:11

## 2023-11-03 RX ADMIN — CHOLECALCIFEROL TAB 25 MCG (1000 UNIT) 2000 UNITS: 25 TAB at 09:11

## 2023-11-03 RX ADMIN — POLYETHYLENE GLYCOL 3350 17 G: 17 POWDER, FOR SOLUTION ORAL at 09:11

## 2023-11-03 NOTE — PLAN OF CARE
Problem: Physical Therapy  Goal: Physical Therapy Goal  Description: Goals to be met by: 23     Patient will increase functional independence with mobility by performin. Supine to sit with Modified [place] - In progress  2. Sit to supine with Modified [place] - In progress  3. Rolling to Left and Right with Modified [place] - In progress  4. Sit to stand transfer with Stand-by Assistance - MET  Updated Goal 10/30/23: Sit to stand transfer with Supervision    5. Bed to chair transfer with Stand-by Assistance using Rolling Walker - MET  Updated Goal 10/30/23: Bed to chair transfer with Supervision using RW    6. Gait  x 150 feet with Stand-by Assistance using Rolling Walker - MET  Updated Goal 10/30/23: Gait x 150 feet with Supervision using RW    7. Wheelchair propulsion x 100 feet with Supervision using bilateral upper extremities/bilateral lower extremities - In progress    8. Added 23 Ascend/Descend 4 inch curb step with RW and (S).    9. Added 23 Ascend/descend 12 steps with RW and B rails and (S).    Outcome: Ongoing, Progressing

## 2023-11-03 NOTE — PLAN OF CARE
Problem: Adult Inpatient Plan of Care  Goal: Plan of Care Review  Outcome: Ongoing, Progressing  Goal: Patient-Specific Goal (Individualized)  Outcome: Ongoing, Progressing  Goal: Absence of Hospital-Acquired Illness or Injury  Outcome: Ongoing, Progressing  Goal: Optimal Comfort and Wellbeing  Outcome: Ongoing, Progressing  Goal: Readiness for Transition of Care  Outcome: Ongoing, Progressing     Problem: Diabetes Comorbidity  Goal: Blood Glucose Level Within Targeted Range  Outcome: Ongoing, Progressing     Problem: Impaired Wound Healing  Goal: Optimal Wound Healing  Outcome: Ongoing, Progressing  Intervention: Promote Wound Healing  Flowsheets (Taken 11/3/2023 0310)  Oral Nutrition Promotion: physical activity promoted  Sleep/Rest Enhancement: awakenings minimized  Activity Management: Rolling - L1     Problem: Fall Injury Risk  Goal: Absence of Fall and Fall-Related Injury  Outcome: Ongoing, Progressing  Intervention: Identify and Manage Contributors  Flowsheets (Taken 11/3/2023 0310)  Self-Care Promotion:   BADL personal objects within reach   BADL personal routines maintained  Medication Review/Management: medications reviewed     Problem: Skin Injury Risk Increased  Goal: Skin Health and Integrity  Outcome: Ongoing, Progressing  Intervention: Optimize Skin Protection  Flowsheets (Taken 11/3/2023 0310)  Pressure Reduction Techniques: frequent weight shift encouraged  Skin Protection: adhesive use limited  Head of Bed (HOB) Positioning: HOB at 20-30 degrees

## 2023-11-03 NOTE — PT/OT/SLP PROGRESS
"Physical Therapy Treatment    Patient Name:  Bridget Montgomery   MRN:  [id number]  Admit Date: 10/23/2023  Admitting Diagnosis: Diplopia  Recent Surgeries:     General Precautions: Standard, fall, hearing impaired  Orthopedic Precautions: spinal precautions  Braces: Cervical collar    Recommendations:     Discharge Recommendations: Low Intensity Therapy  Level of Assistance Recommended at Discharge: Intermittent assistance   Discharge Equipment Recommendations: bath bench  Barriers to discharge: None    Assessment:     Bridget Montgomery is a 88 y.o. female admitted with a medical diagnosis of Diplopia . Pt tolerated well, pt would continue to benefit from skilled PT services to improve overall functional mobility, strength and endurance.  .      Performance deficits affecting function: weakness, impaired endurance, impaired self care skills, impaired functional mobility, gait instability, impaired balance, decreased upper extremity function, decreased lower extremity function, decreased safety awareness, impaired cardiopulmonary response to activity, orthopedic precautions.    Rehab Potential is good    Activity Tolerance: Fair    Plan:     Patient to be seen 5 x/week to address the above listed problems via gait training, therapeutic activities, therapeutic exercises, neuromuscular re-education, wheelchair management/training    Plan of Care Expires: 11/23/23  Plan of Care Reviewed with: patient    Subjective     "Oh good" pt agreeable to therapy.     Pain/Comfort:  Pain Rating 1: 8/10  Location - Side 1: Bilateral  Location - Orientation 1: generalized  Location 1: shoulder  Pain Addressed 1: Pre-medicate for activity, Reposition, Distraction  Pain Rating Post-Intervention 1: 8/10 (no further mentioned)    Patient's cultural, spiritual, Judaism conflicts given the current situation:  no    Objective:      Patient found with cervical collar upon PT entry to room.     Therapeutic Activities and Exercises: mini " "elliptical x 15 min    Functional Mobility:  Transfers:     Sit to Stand:  stand by assistance with rolling walker  Gait: amb with RW SBA ~ 240 ft and 150 ft seated rest break no LOB  Stairs: asc/lew 4 step with BHR CG/close SBA x 2 trials (8 steps)  Curb asc/lew 4" curb with RW CG/close SBA x 2 trials vcs for safety/tech    AM-PAC 6 CLICK MOBILITY  18    Patient left up in chair with call button in reach and belongings in reach .    GOALS:   Multidisciplinary Problems       Physical Therapy Goals          Problem: Physical Therapy    Goal Priority Disciplines Outcome Goal Variances Interventions   Physical Therapy Goal     PT, PT/OT Ongoing, Progressing     Description: Goals to be met by: 23     Patient will increase functional independence with mobility by performin. Supine to sit with Modified [place] - In progress  2. Sit to supine with Modified [place] - In progress  3. Rolling to Left and Right with Modified [place] - In progress  4. Sit to stand transfer with Stand-by Assistance - MET  Updated Goal 10/30/23: Sit to stand transfer with Supervision    5. Bed to chair transfer with Stand-by Assistance using Rolling Walker - MET  Updated Goal 10/30/23: Bed to chair transfer with Supervision using RW    6. Gait  x 150 feet with Stand-by Assistance using Rolling Walker - MET  Updated Goal 10/30/23: Gait x 150 feet with Supervision using RW    7. Wheelchair propulsion x 100 feet with Supervision using bilateral upper extremities/bilateral lower extremities - In progress    8. Added 23 Ascend/Descend 4 inch curb step with RW and (S).    9. Added 23 Ascend/descend 12 steps with RW and B rails and (S).                         Time Tracking:     PT Received On: 23  PT Start Time: 1125  PT Stop Time: 1205  PT Total Time (min): 40 min    Billable Minutes: Gait Training 13, Therapeutic Activity 10, and Therapeutic Exercise 15    Treatment Type: Treatment  PT/PTA: PTA     Number " of PTA visits since last PT visit: 1 11/03/2023

## 2023-11-03 NOTE — NURSING
POC reviewed with pt, pt verbalized understanding. Safety maintained throughout shift, bed locked and in lowest position, call light in reach, Side rails up X 2. Non skid sock on when OOB. Pt remained free of fall/trauma. Pt self reports of pain treated with PO pain medication as ordered by MD. VS stable.  no reports of nausea and vomiting.  Pt has had 3 bm and would like to hold AM dose of senna and Miralax. No signs of distress, rise and fall of chest noted, respirations  even and unlabored   Plan of care on going. No future concerns as of present.

## 2023-11-04 PROCEDURE — 25000003 PHARM REV CODE 250: Performed by: NURSE PRACTITIONER

## 2023-11-04 PROCEDURE — 11000004 HC SNF PRIVATE

## 2023-11-04 PROCEDURE — 97110 THERAPEUTIC EXERCISES: CPT

## 2023-11-04 PROCEDURE — 25000003 PHARM REV CODE 250: Performed by: HOSPITALIST

## 2023-11-04 PROCEDURE — 25000003 PHARM REV CODE 250: Performed by: FAMILY MEDICINE

## 2023-11-04 PROCEDURE — 97530 THERAPEUTIC ACTIVITIES: CPT

## 2023-11-04 RX ADMIN — POLYETHYLENE GLYCOL 3350 17 G: 17 POWDER, FOR SOLUTION ORAL at 08:11

## 2023-11-04 RX ADMIN — CLOPIDOGREL BISULFATE 75 MG: 75 TABLET ORAL at 08:11

## 2023-11-04 RX ADMIN — ASPIRIN 81 MG CHEWABLE TABLET 81 MG: 81 TABLET CHEWABLE at 08:11

## 2023-11-04 RX ADMIN — SENNOSIDES AND DOCUSATE SODIUM 2 TABLET: 8.6; 5 TABLET ORAL at 08:11

## 2023-11-04 RX ADMIN — AMLODIPINE BESYLATE 10 MG: 10 TABLET ORAL at 08:11

## 2023-11-04 RX ADMIN — FUROSEMIDE 20 MG: 20 TABLET ORAL at 08:11

## 2023-11-04 RX ADMIN — CETIRIZINE HYDROCHLORIDE 10 MG: 5 TABLET, FILM COATED ORAL at 08:11

## 2023-11-04 RX ADMIN — SODIUM BICARBONATE 650 MG TABLET 650 MG: at 08:11

## 2023-11-04 RX ADMIN — ACETAMINOPHEN 650 MG: 325 TABLET ORAL at 10:11

## 2023-11-04 RX ADMIN — METHOCARBAMOL 500 MG: 500 TABLET ORAL at 10:11

## 2023-11-04 RX ADMIN — PREGABALIN 75 MG: 75 CAPSULE ORAL at 10:11

## 2023-11-04 RX ADMIN — FLUTICASONE PROPIONATE 100 MCG: 50 SPRAY, METERED NASAL at 08:11

## 2023-11-04 RX ADMIN — GUAIFENESIN 200 MG: 200 SOLUTION ORAL at 08:11

## 2023-11-04 RX ADMIN — ACETAMINOPHEN 650 MG: 325 TABLET ORAL at 01:11

## 2023-11-04 RX ADMIN — CHOLECALCIFEROL TAB 25 MCG (1000 UNIT) 2000 UNITS: 25 TAB at 08:11

## 2023-11-04 RX ADMIN — ACETAMINOPHEN 650 MG: 325 TABLET ORAL at 06:11

## 2023-11-04 RX ADMIN — TRAMADOL HYDROCHLORIDE 50 MG: 50 TABLET, COATED ORAL at 08:11

## 2023-11-04 RX ADMIN — ATORVASTATIN CALCIUM 80 MG: 40 TABLET, FILM COATED ORAL at 08:11

## 2023-11-04 RX ADMIN — OMEPRAZOLE 20 MG: 20 CAPSULE, DELAYED RELEASE ORAL at 04:11

## 2023-11-04 RX ADMIN — OMEPRAZOLE 20 MG: 20 CAPSULE, DELAYED RELEASE ORAL at 06:11

## 2023-11-04 RX ADMIN — FLUTICASONE FUROATE AND VILANTEROL TRIFENATATE 1 PUFF: 100; 25 POWDER RESPIRATORY (INHALATION) at 09:11

## 2023-11-04 RX ADMIN — CALCIUM CARBONATE (ANTACID) CHEW TAB 500 MG 500 MG: 500 CHEW TAB at 08:11

## 2023-11-04 RX ADMIN — Medication 1 CAPSULE: at 09:11

## 2023-11-04 NOTE — PLAN OF CARE
Problem: Adult Inpatient Plan of Care  Goal: Plan of Care Review  Outcome: Ongoing, Progressing  Flowsheets (Taken 11/4/2023 0249)  Plan of Care Reviewed With:   patient   daughter  Goal: Patient-Specific Goal (Individualized)  Outcome: Ongoing, Progressing  Goal: Absence of Hospital-Acquired Illness or Injury  Outcome: Ongoing, Progressing  Goal: Optimal Comfort and Wellbeing  Outcome: Ongoing, Progressing  Goal: Readiness for Transition of Care  Outcome: Ongoing, Progressing     Problem: Diabetes Comorbidity  Goal: Blood Glucose Level Within Targeted Range  Outcome: Ongoing, Progressing     Problem: Fall Injury Risk  Goal: Absence of Fall and Fall-Related Injury  Intervention: Promote Injury-Free Environment  Flowsheets (Taken 11/4/2023 0249)  Safety Promotion/Fall Prevention:   assistive device/personal item within reach   Fall Risk reviewed with patient/family   lighting adjusted   medications reviewed   nonskid shoes/socks when out of bed   side rails raised x 3   instructed to call staff for mobility     Problem: Skin Injury Risk Increased  Goal: Skin Health and Integrity  Outcome: Ongoing, Progressing

## 2023-11-04 NOTE — NURSING
Patient's daughter (Liliana) made nurse aware that she is unsure if patient should be receiving nova source renal boost without patient's nephrologist being aware.

## 2023-11-04 NOTE — PLAN OF CARE
Problem: Occupational Therapy  Goal: Occupational Therapy Goal  Description: Goals to be met by: 3 weeks     Patient will increase functional independence with ADLs by performing:    UE Dressing with Stand-by Assistance. -- Ongoing  LE Dressing with Stand-by Assistance with use of AD and AE as needed. -- Met  Grooming while standing at sink with Supervision. -- Ongoing  Toileting from bedside commode or toilet with Supervision for hygiene and clothing management.   Bathing from sitting at sink with Set-up Assistance. -- Ongoing  Rolling to Bilateral with Modified [place]. -- Ongoing  Supine to sit with Modified [place].-- Ongoing  Stand pivot transfers with Supervision with use of AD and AE as needed. -- Ongoing  Toilet transfer to bedside commode or toilet with Supervision with use of AD and AE as needed. -- Ongoing  Increased functional strength and endurance through use of UBE for at least 8 minutes with Min resistance. -- Ongoing  Upper extremity exercise program 1x15 reps per handout, with assistance as needed. -- Ongoing  Pt will perform functional standing up to 10 minutes in prep for performance of functional ADLs in standing. -- Met      Outcome: Ongoing, Progressing

## 2023-11-04 NOTE — PT/OT/SLP PROGRESS
Occupational Therapy   Treatment    Name: Bridget Montgomery  MRN: [id number]  Admit Date: 10/23/2023  Admitting Diagnosis:  Diplopia    General Precautions: Standard, fall, hearing impaired   Orthopedic Precautions: spinal precautions   Braces: Cervical collar    Recommendations:     Discharge Recommendations:  Low Intensity Therapy  Level of Assistance Recommended at Discharge: Intermittent assistance for ADL's and homemaking tasks  Discharge Equipment Recommendations: bath bench  Barriers to discharge:  None    Assessment:     Bridget Montgomery is a 88 y.o. female with a medical diagnosis of Diplopia .  She presents with performance deficits affecting function including weakness, impaired endurance, impaired self care skills, impaired functional mobility, gait instability, impaired balance, decreased coordination, decreased upper extremity function, decreased lower extremity function, pain, impaired coordination, orthopedic precautions.   Pt tolerated Tx without incident and is making progress but continues to require assist to perform self care tasks, functional mobility and functional transfers .  She would continue to benefit from OT intervention to further her functional (I)ce and safety.     Rehab Potential is good    Activity tolerance:  Good    Plan:     Patient to be seen 5 x/week to address the above listed problems via self-care/home management, therapeutic activities, therapeutic exercises    Plan of Care Expires: 11/24/23  Plan of Care Reviewed with: patient, daughter    Subjective     Communicated with: nurse prior to session.  .    Pain/Comfort:  Pain Rating 1:  (no pain reported)  Pain Addressed 1:  (no pain reported.)    Patient's cultural, spiritual, Islam conflicts given the current situation:  no    Objective:     Patient found up in chair with cervical collar upon OT entry to room.    Bed Mobility:    Pt seated in W/C at onset of therapy session.      Functional  Mobility/Transfers:  Patient completed Sit <> Stand Transfer with stand by assistance  with  rolling walker   Patient completed Bed <> Chair Transfer using Stand Pivot technique with stand by assistance with rolling walker  Functional Mobility: Patient propelled light weight W/C from her room to therapy gym using (B) UEs a distance of 201 ft  with (S) provided throughout.     Pt worked on functional standing activity consisting of standing with RW while reaching in all planes ,and  crossing of midline to facilitate (B) wt shifting and stability in standing in prep for performance of self care tasks and functional ADLS in standing.  Pt tolerated up to 10 Min. in standing with (S) and  RW to steady.       [facility] 6 Click ADL: 19    OT Exercises: Pt performed UBE exercise for 10 minutes with Mod resistance.  UE exercises performed to increase functional endurance and strength in order increase independence when performing self care tasks, functional ambulation, W/C propulsion, and functional standing activities .     Treatment & Education:  Pt edu on POC, safety when performing self care tasks and safety when performing functional transfers and mobility.    Patient left up in chair with call button in reach and daughter present    GOALS:   Multidisciplinary Problems       Occupational Therapy Goals          Problem: Occupational Therapy    Goal Priority Disciplines Outcome Interventions   Occupational Therapy Goal     OT, PT/OT Ongoing, Progressing    Description: Goals to be met by: 3 weeks     Patient will increase functional independence with ADLs by performing:    UE Dressing with Stand-by Assistance. -- Ongoing  LE Dressing with Stand-by Assistance with use of AD and AE as needed. -- Met  Grooming while standing at sink with Supervision. -- Ongoing  Toileting from bedside commode or toilet with Supervision for hygiene and clothing management.   Bathing from sitting at sink with Set-up Assistance. -- Ongoing  Rolling  to Bilateral with Modified [place]. -- Ongoing  Supine to sit with Modified [place].-- Ongoing  Stand pivot transfers with Supervision with use of AD and AE as needed. -- Ongoing  Toilet transfer to bedside commode or toilet with Supervision with use of AD and AE as needed. -- Ongoing  Increased functional strength and endurance through use of UBE for at least 8 minutes with Min resistance. -- Ongoing  Upper extremity exercise program 1x15 reps per handout, with assistance as needed. -- Ongoing  Pt will perform functional standing up to 10 minutes in prep for performance of functional ADLs in standing. -- Met                           Time Tracking:     OT Date of Treatment: 11/04/23  OT Start Time: 1305    OT Stop Time: 1345  OT Total Time (min): 40 min    Billable Minutes:Therapeutic Activity 30  Therapeutic Exercise 10    11/4/2023

## 2023-11-05 PROBLEM — I63.9 CEREBROVASCULAR ACCIDENT (CVA): Status: ACTIVE | Noted: 2023-11-05

## 2023-11-05 PROCEDURE — 25000003 PHARM REV CODE 250: Performed by: FAMILY MEDICINE

## 2023-11-05 PROCEDURE — 25000003 PHARM REV CODE 250: Performed by: NURSE PRACTITIONER

## 2023-11-05 PROCEDURE — 11000004 HC SNF PRIVATE

## 2023-11-05 PROCEDURE — 25000003 PHARM REV CODE 250: Performed by: HOSPITALIST

## 2023-11-05 RX ADMIN — ATORVASTATIN CALCIUM 80 MG: 40 TABLET, FILM COATED ORAL at 09:11

## 2023-11-05 RX ADMIN — Medication 1 CAPSULE: at 09:11

## 2023-11-05 RX ADMIN — CHOLECALCIFEROL TAB 25 MCG (1000 UNIT) 2000 UNITS: 25 TAB at 09:11

## 2023-11-05 RX ADMIN — ACETAMINOPHEN 650 MG: 325 TABLET ORAL at 09:11

## 2023-11-05 RX ADMIN — POLYETHYLENE GLYCOL 3350 17 G: 17 POWDER, FOR SOLUTION ORAL at 09:11

## 2023-11-05 RX ADMIN — ACETAMINOPHEN 650 MG: 325 TABLET ORAL at 01:11

## 2023-11-05 RX ADMIN — FLUTICASONE PROPIONATE 100 MCG: 50 SPRAY, METERED NASAL at 08:11

## 2023-11-05 RX ADMIN — SODIUM BICARBONATE 650 MG TABLET 650 MG: at 09:11

## 2023-11-05 RX ADMIN — CETIRIZINE HYDROCHLORIDE 10 MG: 5 TABLET, FILM COATED ORAL at 09:11

## 2023-11-05 RX ADMIN — FLUTICASONE PROPIONATE 100 MCG: 50 SPRAY, METERED NASAL at 09:11

## 2023-11-05 RX ADMIN — AMLODIPINE BESYLATE 10 MG: 10 TABLET ORAL at 09:11

## 2023-11-05 RX ADMIN — CALCIUM CARBONATE (ANTACID) CHEW TAB 500 MG 500 MG: 500 CHEW TAB at 09:11

## 2023-11-05 RX ADMIN — ASPIRIN 81 MG CHEWABLE TABLET 81 MG: 81 TABLET CHEWABLE at 09:11

## 2023-11-05 RX ADMIN — CLOPIDOGREL BISULFATE 75 MG: 75 TABLET ORAL at 09:11

## 2023-11-05 RX ADMIN — TRAMADOL HYDROCHLORIDE 50 MG: 50 TABLET, COATED ORAL at 08:11

## 2023-11-05 RX ADMIN — GUAIFENESIN 200 MG: 200 SOLUTION ORAL at 08:11

## 2023-11-05 RX ADMIN — FUROSEMIDE 20 MG: 20 TABLET ORAL at 09:11

## 2023-11-05 RX ADMIN — ACETAMINOPHEN 650 MG: 325 TABLET ORAL at 06:11

## 2023-11-05 RX ADMIN — FLUTICASONE FUROATE AND VILANTEROL TRIFENATATE 1 PUFF: 100; 25 POWDER RESPIRATORY (INHALATION) at 09:11

## 2023-11-05 RX ADMIN — METHOCARBAMOL 500 MG: 500 TABLET ORAL at 09:11

## 2023-11-05 RX ADMIN — OMEPRAZOLE 20 MG: 20 CAPSULE, DELAYED RELEASE ORAL at 06:11

## 2023-11-05 RX ADMIN — SENNOSIDES AND DOCUSATE SODIUM 2 TABLET: 8.6; 5 TABLET ORAL at 08:11

## 2023-11-05 RX ADMIN — PREGABALIN 75 MG: 75 CAPSULE ORAL at 09:11

## 2023-11-05 NOTE — PLAN OF CARE
Problem: Adult Inpatient Plan of Care  Goal: Plan of Care Review  Outcome: Ongoing, Progressing  Flowsheets (Taken 11/5/2023 0457)  Plan of Care Reviewed With:   patient   daughter  Goal: Patient-Specific Goal (Individualized)  Outcome: Ongoing, Progressing  Goal: Absence of Hospital-Acquired Illness or Injury  Outcome: Ongoing, Progressing  Goal: Optimal Comfort and Wellbeing  Outcome: Ongoing, Progressing  Goal: Readiness for Transition of Care  Outcome: Ongoing, Progressing     Problem: Diabetes Comorbidity  Goal: Blood Glucose Level Within Targeted Range  Outcome: Ongoing, Progressing     Problem: Impaired Wound Healing  Goal: Optimal Wound Healing  Outcome: Ongoing, Progressing     Problem: Fall Injury Risk  Goal: Absence of Fall and Fall-Related Injury  Outcome: Ongoing, Progressing     Problem: Skin Injury Risk Increased  Goal: Skin Health and Integrity  Intervention: Promote and Optimize Oral Intake  Flowsheets (Taken 11/5/2023 0457)  Oral Nutrition Promotion:   physical activity promoted   rest periods promoted   social interaction promoted

## 2023-11-05 NOTE — PLAN OF CARE
Problem: Adult Inpatient Plan of Care  Goal: Plan of Care Review  Outcome: Ongoing, Progressing  Goal: Patient-Specific Goal (Individualized)  Outcome: Ongoing, Progressing  Goal: Absence of Hospital-Acquired Illness or Injury  Outcome: Ongoing, Progressing  Goal: Optimal Comfort and Wellbeing  Outcome: Ongoing, Progressing  Goal: Readiness for Transition of Care  Outcome: Ongoing, Progressing     Problem: Diabetes Comorbidity  Goal: Blood Glucose Level Within Targeted Range  Outcome: Ongoing, Progressing     Problem: Impaired Wound Healing  Goal: Optimal Wound Healing  Outcome: Ongoing, Progressing     Problem: Fall Injury Risk  Goal: Absence of Fall and Fall-Related Injury  Outcome: Ongoing, Progressing  Intervention: Promote Injury-Free Environment  Flowsheets (Taken 11/5/2023 0393)  Safety Promotion/Fall Prevention:   assistive device/personal item within reach   instructed to call staff for mobility   side rails raised x 2   family to remain at bedside   medications reviewed   nonskid shoes/socks when out of bed     Problem: Skin Injury Risk Increased  Goal: Skin Health and Integrity  Outcome: Ongoing, Progressing     Pt AAOX4. Able to make needs known.  Accepted and tolerated medications whole. [place]. Denies pain or discomfort at this time. No new skin issues noted. Pt resting, HOB slightly elevated. Safety maintained.

## 2023-11-06 LAB
ALBUMIN SERPL BCP-MCNC: 2.6 G/DL (ref 3.5–5.2)
ALP SERPL-CCNC: 165 U/L (ref 55–135)
ALT SERPL W/O P-5'-P-CCNC: 22 U/L (ref 10–44)
ANION GAP SERPL CALC-SCNC: 10 MMOL/L (ref 8–16)
AST SERPL-CCNC: 28 U/L (ref 10–40)
BASOPHILS # BLD AUTO: 0.04 K/UL (ref 0–0.2)
BASOPHILS NFR BLD: 0.7 % (ref 0–1.9)
BILIRUB SERPL-MCNC: 0.2 MG/DL (ref 0.1–1)
BUN SERPL-MCNC: 33 MG/DL (ref 8–23)
CALCIUM SERPL-MCNC: 8.5 MG/DL (ref 8.7–10.5)
CHLORIDE SERPL-SCNC: 104 MMOL/L (ref 95–110)
CO2 SERPL-SCNC: 26 MMOL/L (ref 23–29)
CREAT SERPL-MCNC: 1 MG/DL (ref 0.5–1.4)
DIFFERENTIAL METHOD: ABNORMAL
EOSINOPHIL # BLD AUTO: 0.1 K/UL (ref 0–0.5)
EOSINOPHIL NFR BLD: 1.2 % (ref 0–8)
ERYTHROCYTE [DISTWIDTH] IN BLOOD BY AUTOMATED COUNT: 12.9 % (ref 11.5–14.5)
EST. GFR  (NO RACE VARIABLE): 54.2 ML/MIN/1.73 M^2
GLUCOSE SERPL-MCNC: 74 MG/DL (ref 70–110)
HCT VFR BLD AUTO: 28.4 % (ref 37–48.5)
HGB BLD-MCNC: 9.3 G/DL (ref 12–16)
IMM GRANULOCYTES # BLD AUTO: 0.01 K/UL (ref 0–0.04)
IMM GRANULOCYTES NFR BLD AUTO: 0.2 % (ref 0–0.5)
LYMPHOCYTES # BLD AUTO: 1.5 K/UL (ref 1–4.8)
LYMPHOCYTES NFR BLD: 24.2 % (ref 18–48)
MAGNESIUM SERPL-MCNC: 1.9 MG/DL (ref 1.6–2.6)
MCH RBC QN AUTO: 29.8 PG (ref 27–31)
MCHC RBC AUTO-ENTMCNC: 32.7 G/DL (ref 32–36)
MCV RBC AUTO: 91 FL (ref 82–98)
MONOCYTES # BLD AUTO: 0.8 K/UL (ref 0.3–1)
MONOCYTES NFR BLD: 13.5 % (ref 4–15)
NEUTROPHILS # BLD AUTO: 3.6 K/UL (ref 1.8–7.7)
NEUTROPHILS NFR BLD: 60.2 % (ref 38–73)
NRBC BLD-RTO: 0 /100 WBC
PHOSPHATE SERPL-MCNC: 4.3 MG/DL (ref 2.7–4.5)
PLATELET # BLD AUTO: 386 K/UL (ref 150–450)
PMV BLD AUTO: 10.5 FL (ref 9.2–12.9)
POTASSIUM SERPL-SCNC: 3.6 MMOL/L (ref 3.5–5.1)
PROT SERPL-MCNC: 5.7 G/DL (ref 6–8.4)
RBC # BLD AUTO: 3.12 M/UL (ref 4–5.4)
SODIUM SERPL-SCNC: 140 MMOL/L (ref 136–145)
WBC # BLD AUTO: 5.99 K/UL (ref 3.9–12.7)

## 2023-11-06 PROCEDURE — 36415 COLL VENOUS BLD VENIPUNCTURE: CPT | Performed by: HOSPITALIST

## 2023-11-06 PROCEDURE — 83735 ASSAY OF MAGNESIUM: CPT | Performed by: HOSPITALIST

## 2023-11-06 PROCEDURE — 80053 COMPREHEN METABOLIC PANEL: CPT | Performed by: NURSE PRACTITIONER

## 2023-11-06 PROCEDURE — 25000003 PHARM REV CODE 250: Performed by: FAMILY MEDICINE

## 2023-11-06 PROCEDURE — 97530 THERAPEUTIC ACTIVITIES: CPT | Mod: CO

## 2023-11-06 PROCEDURE — 97530 THERAPEUTIC ACTIVITIES: CPT | Mod: CQ

## 2023-11-06 PROCEDURE — 25000242 PHARM REV CODE 250 ALT 637 W/ HCPCS: Performed by: HOSPITALIST

## 2023-11-06 PROCEDURE — 97110 THERAPEUTIC EXERCISES: CPT | Mod: CQ

## 2023-11-06 PROCEDURE — 25000003 PHARM REV CODE 250: Performed by: HOSPITALIST

## 2023-11-06 PROCEDURE — 85025 COMPLETE CBC W/AUTO DIFF WBC: CPT | Performed by: HOSPITALIST

## 2023-11-06 PROCEDURE — 84100 ASSAY OF PHOSPHORUS: CPT | Performed by: HOSPITALIST

## 2023-11-06 PROCEDURE — 11000004 HC SNF PRIVATE

## 2023-11-06 PROCEDURE — 97110 THERAPEUTIC EXERCISES: CPT | Mod: CO

## 2023-11-06 PROCEDURE — 97116 GAIT TRAINING THERAPY: CPT | Mod: CQ

## 2023-11-06 PROCEDURE — 25000003 PHARM REV CODE 250: Performed by: NURSE PRACTITIONER

## 2023-11-06 PROCEDURE — 94761 N-INVAS EAR/PLS OXIMETRY MLT: CPT

## 2023-11-06 RX ADMIN — FUROSEMIDE 20 MG: 20 TABLET ORAL at 09:11

## 2023-11-06 RX ADMIN — FLUTICASONE PROPIONATE 100 MCG: 50 SPRAY, METERED NASAL at 09:11

## 2023-11-06 RX ADMIN — GUAIFENESIN 200 MG: 200 SOLUTION ORAL at 11:11

## 2023-11-06 RX ADMIN — CHOLECALCIFEROL TAB 25 MCG (1000 UNIT) 2000 UNITS: 25 TAB at 09:11

## 2023-11-06 RX ADMIN — METHOCARBAMOL 500 MG: 500 TABLET ORAL at 11:11

## 2023-11-06 RX ADMIN — ASPIRIN 81 MG CHEWABLE TABLET 81 MG: 81 TABLET CHEWABLE at 09:11

## 2023-11-06 RX ADMIN — TRAMADOL HYDROCHLORIDE 50 MG: 50 TABLET, COATED ORAL at 09:11

## 2023-11-06 RX ADMIN — POLYETHYLENE GLYCOL 3350 17 G: 17 POWDER, FOR SOLUTION ORAL at 09:11

## 2023-11-06 RX ADMIN — CALCIUM CARBONATE (ANTACID) CHEW TAB 500 MG 500 MG: 500 CHEW TAB at 09:11

## 2023-11-06 RX ADMIN — CETIRIZINE HYDROCHLORIDE 10 MG: 5 TABLET, FILM COATED ORAL at 09:11

## 2023-11-06 RX ADMIN — CLOPIDOGREL BISULFATE 75 MG: 75 TABLET ORAL at 09:11

## 2023-11-06 RX ADMIN — Medication 1 CAPSULE: at 09:11

## 2023-11-06 RX ADMIN — FLUTICASONE FUROATE AND VILANTEROL TRIFENATATE 1 PUFF: 100; 25 POWDER RESPIRATORY (INHALATION) at 09:11

## 2023-11-06 RX ADMIN — OMEPRAZOLE 20 MG: 20 CAPSULE, DELAYED RELEASE ORAL at 04:11

## 2023-11-06 RX ADMIN — SENNOSIDES AND DOCUSATE SODIUM 2 TABLET: 8.6; 5 TABLET ORAL at 09:11

## 2023-11-06 RX ADMIN — OMEPRAZOLE 20 MG: 20 CAPSULE, DELAYED RELEASE ORAL at 05:11

## 2023-11-06 RX ADMIN — PREGABALIN 75 MG: 75 CAPSULE ORAL at 09:11

## 2023-11-06 RX ADMIN — ACETAMINOPHEN 650 MG: 325 TABLET ORAL at 02:11

## 2023-11-06 RX ADMIN — SODIUM BICARBONATE 650 MG TABLET 650 MG: at 09:11

## 2023-11-06 RX ADMIN — ACETAMINOPHEN 650 MG: 325 TABLET ORAL at 05:11

## 2023-11-06 RX ADMIN — AMLODIPINE BESYLATE 10 MG: 10 TABLET ORAL at 09:11

## 2023-11-06 RX ADMIN — ACETAMINOPHEN 650 MG: 325 TABLET ORAL at 09:11

## 2023-11-06 RX ADMIN — Medication 6 MG: at 09:11

## 2023-11-06 RX ADMIN — ATORVASTATIN CALCIUM 80 MG: 40 TABLET, FILM COATED ORAL at 09:11

## 2023-11-06 NOTE — PLAN OF CARE
Problem: Adult Inpatient Plan of Care  Goal: Plan of Care Review  Outcome: Ongoing, Progressing  Flowsheets (Taken 11/6/2023 0026)  Plan of Care Reviewed With:   patient   daughter  Goal: Patient-Specific Goal (Individualized)  Outcome: Ongoing, Progressing  Goal: Absence of Hospital-Acquired Illness or Injury  Outcome: Ongoing, Progressing  Goal: Optimal Comfort and Wellbeing  Outcome: Ongoing, Progressing  Goal: Readiness for Transition of Care  Outcome: Ongoing, Progressing     Problem: Diabetes Comorbidity  Goal: Blood Glucose Level Within Targeted Range  Outcome: Ongoing, Progressing     Problem: Impaired Wound Healing  Goal: Optimal Wound Healing  Outcome: Ongoing, Progressing     Problem: Fall Injury Risk  Goal: Absence of Fall and Fall-Related Injury  Intervention: Identify and Manage Contributors  Flowsheets (Taken 11/6/2023 0026)  Self-Care Promotion:   independence encouraged   BADL personal objects within reach   BADL personal routines maintained   safe use of adaptive equipment encouraged  Medication Review/Management:   medications reviewed   high-risk medications identified     Problem: Skin Injury Risk Increased  Goal: Skin Health and Integrity  Outcome: Ongoing, Progressing

## 2023-11-06 NOTE — PT/OT/SLP PROGRESS
Occupational Therapy   Treatment    Name: Bridget Montgomery  MRN: [id number]  Admit Date: 10/23/2023  Admitting Diagnosis:  Diplopia    General Precautions: Standard, fall, hearing impaired   Orthopedic Precautions: spinal precautions   Braces: Cervical collar    Recommendations:     Discharge Recommendations:  Low Intensity Therapy  Level of Assistance Recommended at Discharge: Intermittent assistance for ADL's and homemaking tasks  Discharge Equipment Recommendations: bath bench  Barriers to discharge:  None    Assessment:     Bridget Montgomery is a 88 y.o. female with a medical diagnosis of Diplopia.  She presents with limitations in performance of self-care, functional mobility, and ADLs. Performance deficits affecting function are weakness, impaired endurance, impaired self care skills, impaired functional mobility, gait instability, impaired balance, decreased coordination, decreased upper extremity function, decreased lower extremity function, pain, impaired coordination, orthopedic precautions. Pt tolerated Tx without incident and is making progress but continues to require assist to perform self care tasks, functional mobility and functional transfers. Pt would continue to benefit from OT intervention to further functional (I)ce and safety.     Rehab Potential is good    Activity tolerance:  Good    Plan:     Patient to be seen 5 x/week to address the above listed problems via self-care/home management, therapeutic activities, therapeutic exercises    Plan of Care Expires: 11/24/23  Plan of Care Reviewed with: patient, daughter    Subjective     Communicated with: Nursing prior to session.     Pain/Comfort:  Pain Rating 1: 8/10  Location - Side 1: Bilateral  Location - Orientation 1: generalized  Location 1:  (shoulder;knee)  Pain Addressed 1: Pre-medicate for activity, Reposition, Distraction  Pain Rating Post-Intervention 1: 7/10    Patient's cultural, spiritual, Advent conflicts given the current  situation:  no    Objective:     Patient found up in chair with cervical collar upon OT entry to room.    Bed Mobility:    Pt seated in chair at onset of treatment session.     Functional Mobility/Transfers:  Patient completed Sit <> Stand Transfer with supervision  with  rolling walker     Activities of Daily Living:  Upper Body Dressing: stand by assistance to doff/malgorzata pullover shirt and cervical collar    [facility] 6 Click ADL: 19    OT Exercises: Wall Pulleys seated for functional ROM and to increase functional reaching and independence with ADLS.     Treatment & Education:  Pt participated in standing activity with sup and RW. Pt at raised counter to perform fine motor and visual scanning activity with focus on standing tolerance, functional reaching, dynamic standing bal, crossing midline, and to promote independence with homemaking and self care tasks. Pt tolerated standing for 9 min    Pt educated on:  - role of OT  - level of assistance  - energy conservation and task modification to maximized independence with ADL's and mobility   -  safety while performing functional transfers and self care tasks  - progress towards OT goals      Patient left up in chair with call button in reach    GOALS:   Multidisciplinary Problems       Occupational Therapy Goals          Problem: Occupational Therapy    Goal Priority Disciplines Outcome Interventions   Occupational Therapy Goal     OT, PT/OT Ongoing, Progressing    Description: Goals to be met by: 3 weeks     Patient will increase functional independence with ADLs by performing:    UE Dressing with Stand-by Assistance. -- Met  LE Dressing with Stand-by Assistance with use of AD and AE as needed. -- Met  Grooming while standing at sink with Supervision. -- Ongoing  Toileting from bedside commode or toilet with Supervision for hygiene and clothing management.   Bathing from sitting at sink with Set-up Assistance. -- Ongoing  Rolling to Bilateral with Modified  [place]. -- Ongoing  Supine to sit with Modified [place].-- Ongoing  Stand pivot transfers with Supervision with use of AD and AE as needed. -- Ongoing  Toilet transfer to bedside commode or toilet with Supervision with use of AD and AE as needed. -- Ongoing  Increased functional strength and endurance through use of UBE for at least 8 minutes with Min resistance. -- Met  Upper extremity exercise program 1x15 reps per handout, with assistance as needed. -- Ongoing  Pt will perform functional standing up to 10 minutes in prep for performance of functional ADLs in standing. -- Met                           Time Tracking:     OT Date of Treatment: 11/06/23  OT Start Time: 1341    OT Stop Time: 1412  OT Total Time (min): 31 min    Billable Minutes:Therapeutic Activity 21  Therapeutic Exercise 10    11/6/2023

## 2023-11-06 NOTE — PROGRESS NOTES
[facility]                                  Skilled Nursing Facility                   Progress Note     Admit Date: 10/23/2023  MIKAYLA 11/14/2023  Principal Problem:  Diplopia   HPI obtained from patient interview and chart review     Chief Complaint: Re-evaluation of medical treatment and therapy status: Lab review, therapy progress    HPI:   Bridget Montgomery is an 88 y.o. female with PMH CKD4, COPD, HTN, pre diabetes, and DVT who was admitted to hospital with new onset diplopia and dysarthria. Neuro w/up with neurology completed. Unable to definitively rule out cva on mri, however no acute intracranial findings identified on CTA, CT or MRI brain. Treated as acute CVA given symptoms and inconclusive imaging findings.  Patient will be treated at [facility] with PT and OT to improve functional status and ability to perform ADLs.   Allergies: Patient is allergic to cephalexin, codeine, meperidine, nsaids (non-steroidal anti-inflammatory drug), penicillins, and tazarotene.      Interval History  24 hour chart review completed. Pertinent lab, micro, and/or radiology results addressed below. NAEON. NAD.   Vitals: Afebrile, hemodynamically stable. Monitor HTN, currently at goal.  Lab: All of today's labs reviewed; listed and addressed below.   - eGFR >60-->54.2, BUN improving.  I/O: Patient reports adequate appetite. LBM 11/6.   Weight: up 6 lbs since last week, will request re-weigh.  Pain management: Patient reports current multimodal pain regime is adequately controlling pain.  Skilled Therapy: Patient progressing with therapy as tolerated and would continue to benefit from skilled PT and OT services to improve overall functional mobility and independence, strength, endurance, and safety.    Today's plan of care discussed with patient and daughter at bedside.      ROS  Constitutional: Negative for fever   Eyes: Positive for blurred vision,  "double vision   Respiratory: Negative for cough, shortness of breath   Cardiovascular: Negative for chest pain, palpitations, and leg swelling.   Gastrointestinal: Negative for abdominal pain, diarrhea, nausea, vomiting. + constipation  Genitourinary: Negative for dysuria, frequency   Musculoskeletal:  + generalized weakness, + neck pain (chronic). Negative for back pain and myalgias.   Skin: Negative for itching and rash.   Neurological: Negative for dizziness, headaches.   Psychiatric/Behavioral: Negative for depression. The patient is not nervous/anxious.      24 hour Vital Sign Range   Temp:  [97.6 °F (36.4 °C)-97.9 °F (36.6 °C)]   Pulse:  [70-86]   Resp:  [17-18]   BP: (143-154)/(65-67)   SpO2:  [97 %-98 %]     Current BMI: Body mass index is 24.64 kg/m².    PEx  Constitutional: Patient appears debilitated.  No distress noted  HENT:   Head: Normocephalic and atraumatic.   Eyes: Pupils are equal, round  Neck: Limited range of motion. Neck supple. C-spine collar present  Cardiovascular: Normal rate, regular rhythm and normal heart sounds.    Pulmonary/Chest: Effort normal and breath sounds are clear  Abdominal: Soft. Bowel sounds are normal.   Musculoskeletal: Normal range of motion.   Neurological: Alert and oriented to person, place, and time.   Psychiatric: Normal mood and affect. Behavior is normal.   Skin: Skin is warm and dry. Full skin assessment completed by NP on initial exam.       Altered Skin Integrity Right anterior;distal;lower Leg , POA       Recent Labs   Lab 11/06/23  0426      K 3.6      CO2 26   BUN 33*   CREATININE 1.0   CALCIUM 8.5*   MG 1.9         Recent Labs   Lab 11/06/23  0427   WBC 5.99   RBC 3.12*   HGB 9.3*   HCT 28.4*      MCV 91   MCH 29.8   MCHC 32.7         No results for input(s): "POCTGLUCOSE" in the last 168 hours.       Assessment and Plan:    Diplopia  Acute stroke  In short term acute hospital per discharge summary:  "- New onset L CN III palsy and " "dysarthria  - CTH without acute intracranial process  - CTA without large vessel occlusion  - MRI brain demonstrated equivocal punctate lesion in the left paramedian midbrain, which because of study is difficult to exclude ischemic process  - TTE with bubble study ordered and demonstrated EF 61%, moderate mitral annular calcifications and mild regurgitation, and PA pressure of 39  - Loaded with  and plavix 300"  11/6/2023  - Continue DAPT x 21 days then aspirin only daily for life  - Continue high intensity statin therapy w atorvastatin to 80mg daily  - PT/OT/SLP consulted to evaluate and treat  - Maintain fall and delirium precautions  - Will need Opthalmology and stroke follow up outpatient    Electrolyte Imbalance  Hypokalemia, new 10/30  Hypocalcemia, new 10/30  - Acute, stable  - Monitor with scheduled metabolic profile and replete PRN to goal serum level WNL    Metabolic acidosis, new 10/30  Acute, stable  - continue 650 sodium bicarb p.o. daily    Azotemia, new 10/30  - BUN stable and improving; continue to encourage p.o. fluids    Chronic Constipation  - Continue scheduled senokot and miralax  - Adjust bowel regimen prn to avoid diarrhea  - Encourage fluid intake  - Encourage safe physical activity as tolerated  - Monitor bowel movement regularity and consistency  - Increased MiraLax 17 g bid and continue MiraLax daily p.r.n. and increased senna docusate 8.6-50 mg 2 tablet b.i.d.    Decreased Mobility   Physical Debility  Weakness   - Continue with PT/OT for gait training and strengthening and restoration of ADL's   - Encourage mobility, OOB in chair, and early ambulation as appropriate  - Fall precautions   - Monitor for bowel and bladder dysfunction  - Monitor for and prevent skin breakdown and pressure ulcers  - DVT prophylaxis with plavix, asa  - Per PT notes patient with increased ambulation distance with stand-by assist and rolling walker today    Mixed HLD  Stroke Risk Factor.   - continue high " intensity statin therapy w atorvastatin to 80mg daily  - Follow-up with PCP for monitoring per guidelines     Chronic Obstructive Pulmonary Disease (COPD)  - POA, stable  - Continue home LABA 1 puff daily  - Continue albuterol inhaler and neb prn  - Start guaifenesin q 4 prn, consider bid mucinex if needed  - Continue BID Flonase for sinus relief  - May consider montelukast 10 mg po daily if unstable  - Consider xopanex and CPT tx q 6 hours while awake if respiratory sx exascerbate    Stage 4 chronic kidney disease  - Chronic, stable  - Follows with Dr. Lyons  - Continue lasix 20 mg daily  - Avoid any nephrotoxic agents including NSAIDs, aminoglycosides, IV contrast (unless absolutely necessary), gadolinium, fleets and other phosphorous-based laxatives. Caution with antibiotics.  - Renally adjust medications based on patient's creatinine clearance  - Avoid Hypotension  - Low Phos Diet --> Reg diet with nephro and no milk products on 10/27 per daughter request and d/w RD.  - Routinely monitor renal function with scheduled metabolic profile  - Stable Cr at 0.9    Essential Hypertension  - Chronic, stable  - Continue amlodipine 10 mg daily   - Continue lasix 20 mg daily  - Monitor BP  - Adjust therapy to BP goal < 150/90  - Avoid hypotension   - Currently at 135/63    Spinal Stenosis  C1-C2 dens fracture  - Follows with neurosurgery  - Scheduled for surgery on 11/10/23; now cancelled d/t post acute stroke  - continue diclofenac na gel topically daily  - tylenol, tramadol, robaxin prn  - Increased Lyrica 75 mg nightly  - Follow up with neurosurgery outpatient    Osteoarthritis  Muscle Spasms, Chronic  - Chronic pain to R shoulder  - continue diclofenac na gel topically daily  - tylenol, tramadol, robaxin prn  - increased lyrica to 75 mg nightly and initiated tylenol 650 mg tid.   - Afebrile, vital signs stable. Currently at 135/63    H/o Type 2 Diabetes  - Most recent A1c <6  - Continue to monitor w venous lab,  stable    Anemia of Chronic Disease  - Baseline Hb 10-11  - Normocytic anemia   - Trend with scheduled CBC  - Transfuse for Hgb < 7.0  - Continue ferrous sulfate tablet every other day  - Follow up with PCP outpatient    Insomnia, Chronic  - Resume home tramadol 50 mg q HS  - Fall and delerium precautions.   - Note home polypharmacy risk factors  - F/up with PCP regarding long-term insomnia and pain management    Anticipate disposition:  Home with home health    IP [facility] RISK OF UNPLANNED READMISSION Model:     Follow-up needed during SNF stay-    Follow-up needed after discharge from SNF: PCP, Opthalmology    Future Appointments   Date Time Provider Department Center   12/11/2023  2:00 PM Lluvia Rahman MD [facility]   2/21/2024 10:15 AM Charles Peacock MD [facility]       Total time spent: 31 minutes  Description of Time: counseling provided on clinical condition, therapies provided, plan of care, emotional support, coordinating patient care with other care team members, reviewing and interpreting labs and imaging, collaboration with physician, initiating new orders, chart review, and documentation. See interval hx.      Carin Barclay NP  Department of Hospital Medicine   [facility]     DOS: 11/6/2023       Patient note was created using MModal Dictation.  Any errors in syntax or even information may not have been identified and edited on initial review prior to signing this note.

## 2023-11-06 NOTE — PT/OT/SLP PROGRESS
"Physical Therapy Treatment    Patient Name:  Bridget Montgomery   MRN:  [id number]  Admit Date: 10/23/2023  Admitting Diagnosis: Diplopia  Recent Surgeries:     General Precautions: Standard, fall, hearing impaired  Orthopedic Precautions: spinal precautions  Braces: Cervical collar    Recommendations:     Discharge Recommendations: Low Intensity Therapy  Level of Assistance Recommended at Discharge: Intermittent assistance   Discharge Equipment Recommendations: bath bench  Barriers to discharge: None    Assessment:     Bridget Montgomery is a 88 y.o. female admitted with a medical diagnosis of Diplopia . Pt tolerated well, pt would continue to benefit from skilled PT services to improve overall functional mobility, strength and endurance.  .      Performance deficits affecting function: weakness, impaired endurance, impaired self care skills, impaired functional mobility, gait instability, impaired balance, decreased upper extremity function, decreased lower extremity function, decreased safety awareness, impaired cardiopulmonary response to activity, orthopedic precautions.    Rehab Potential is good    Activity Tolerance: Fair    Plan:     Patient to be seen 5 x/week to address the above listed problems via gait training, therapeutic activities, therapeutic exercises, neuromuscular re-education, wheelchair management/training    Plan of Care Expires: 11/23/23  Plan of Care Reviewed with: patient    Subjective     "Ready to go".     Pain/Comfort:  Pain Rating 1: 0/10  Pain Rating Post-Intervention 1: 0/10    Patient's cultural, spiritual, Church conflicts given the current situation:  no    Objective:       Patient found  with cervical collar (in wc daughter present) upon PT entry to room.     Therapeutic Activities and Exercises: mini elliptical x 15 minutes    Functional Mobility:  Transfers:     Sit to Stand:  stand by assistance with rolling walker  Gait: amb with RW close SBA ~ 240 ft no LOB  Stairs: " "asc/lew 4 steps x 2 trials( 8 steps) close SBA  Curb asc/lew 4" curb with RW CG/close SBA vcs for safety/tech x 2 trials, slight LOB descending on second trial, not clearing foot CGA to maintain    AM-PAC 6 CLICK MOBILITY  18    Patient left up in chair with  with OT .    GOALS:   Multidisciplinary Problems       Physical Therapy Goals          Problem: Physical Therapy    Goal Priority Disciplines Outcome Goal Variances Interventions   Physical Therapy Goal     PT, PT/OT Ongoing, Progressing     Description: Goals to be met by: 23     Patient will increase functional independence with mobility by performin. Supine to sit with Modified [place] - In progress  2. Sit to supine with Modified [place] - In progress  3. Rolling to Left and Right with Modified [place] - In progress  4. Sit to stand transfer with Stand-by Assistance - MET  Updated Goal 10/30/23: Sit to stand transfer with Supervision    5. Bed to chair transfer with Stand-by Assistance using Rolling Walker - MET  Updated Goal 10/30/23: Bed to chair transfer with Supervision using RW    6. Gait  x 150 feet with Stand-by Assistance using Rolling Walker - MET  Updated Goal 10/30/23: Gait x 150 feet with Supervision using RW    7. Wheelchair propulsion x 100 feet with Supervision using bilateral upper extremities/bilateral lower extremities - In progress    8. Added 23 Ascend/Descend 4 inch curb step with RW and (S).    9. Added 23 Ascend/descend 12 steps with RW and B rails and (S).                         Time Tracking:     PT Received On: 23  PT Start Time: 1301  PT Stop Time: 1339  PT Total Time (min): 38 min    Billable Minutes: Gait Training 13, Therapeutic Activity 10, and Therapeutic Exercise 15    Treatment Type: Treatment  PT/PTA: PTA     Number of PTA visits since last PT visit: 2     2023  "

## 2023-11-07 LAB — SARS-COV-2 RNA RESP QL NAA+PROBE: NOT DETECTED

## 2023-11-07 PROCEDURE — 11000004 HC SNF PRIVATE

## 2023-11-07 PROCEDURE — 97110 THERAPEUTIC EXERCISES: CPT | Mod: CQ

## 2023-11-07 PROCEDURE — 25000003 PHARM REV CODE 250: Performed by: NURSE PRACTITIONER

## 2023-11-07 PROCEDURE — 97530 THERAPEUTIC ACTIVITIES: CPT | Mod: CQ

## 2023-11-07 PROCEDURE — 25000003 PHARM REV CODE 250: Performed by: HOSPITALIST

## 2023-11-07 PROCEDURE — 97110 THERAPEUTIC EXERCISES: CPT | Mod: CO

## 2023-11-07 PROCEDURE — 87635 SARS-COV-2 COVID-19 AMP PRB: CPT | Performed by: HOSPITALIST

## 2023-11-07 PROCEDURE — 97535 SELF CARE MNGMENT TRAINING: CPT | Mod: CO

## 2023-11-07 PROCEDURE — 94761 N-INVAS EAR/PLS OXIMETRY MLT: CPT

## 2023-11-07 PROCEDURE — 97116 GAIT TRAINING THERAPY: CPT | Mod: CQ

## 2023-11-07 PROCEDURE — 25000003 PHARM REV CODE 250: Performed by: FAMILY MEDICINE

## 2023-11-07 RX ADMIN — CHOLECALCIFEROL TAB 25 MCG (1000 UNIT) 2000 UNITS: 25 TAB at 08:11

## 2023-11-07 RX ADMIN — SODIUM BICARBONATE 650 MG TABLET 650 MG: at 08:11

## 2023-11-07 RX ADMIN — TRAMADOL HYDROCHLORIDE 50 MG: 50 TABLET, COATED ORAL at 08:11

## 2023-11-07 RX ADMIN — FLUTICASONE FUROATE AND VILANTEROL TRIFENATATE 1 PUFF: 100; 25 POWDER RESPIRATORY (INHALATION) at 08:11

## 2023-11-07 RX ADMIN — OMEPRAZOLE 20 MG: 20 CAPSULE, DELAYED RELEASE ORAL at 04:11

## 2023-11-07 RX ADMIN — CALCIUM CARBONATE (ANTACID) CHEW TAB 500 MG 500 MG: 500 CHEW TAB at 08:11

## 2023-11-07 RX ADMIN — Medication 1 CAPSULE: at 09:11

## 2023-11-07 RX ADMIN — ACETAMINOPHEN 650 MG: 325 TABLET ORAL at 08:11

## 2023-11-07 RX ADMIN — FLUTICASONE PROPIONATE 100 MCG: 50 SPRAY, METERED NASAL at 08:11

## 2023-11-07 RX ADMIN — PREGABALIN 75 MG: 75 CAPSULE ORAL at 08:11

## 2023-11-07 RX ADMIN — POLYETHYLENE GLYCOL 3350 17 G: 17 POWDER, FOR SOLUTION ORAL at 08:11

## 2023-11-07 RX ADMIN — CLOPIDOGREL BISULFATE 75 MG: 75 TABLET ORAL at 08:11

## 2023-11-07 RX ADMIN — ATORVASTATIN CALCIUM 80 MG: 40 TABLET, FILM COATED ORAL at 08:11

## 2023-11-07 RX ADMIN — FUROSEMIDE 20 MG: 20 TABLET ORAL at 08:11

## 2023-11-07 RX ADMIN — Medication 6 MG: at 08:11

## 2023-11-07 RX ADMIN — SENNOSIDES AND DOCUSATE SODIUM 2 TABLET: 8.6; 5 TABLET ORAL at 08:11

## 2023-11-07 RX ADMIN — OMEPRAZOLE 20 MG: 20 CAPSULE, DELAYED RELEASE ORAL at 06:11

## 2023-11-07 RX ADMIN — AMLODIPINE BESYLATE 10 MG: 10 TABLET ORAL at 08:11

## 2023-11-07 RX ADMIN — TRAMADOL HYDROCHLORIDE 50 MG: 50 TABLET, COATED ORAL at 09:11

## 2023-11-07 RX ADMIN — CETIRIZINE HYDROCHLORIDE 10 MG: 5 TABLET, FILM COATED ORAL at 08:11

## 2023-11-07 RX ADMIN — ASPIRIN 81 MG CHEWABLE TABLET 81 MG: 81 TABLET CHEWABLE at 08:11

## 2023-11-07 RX ADMIN — ACETAMINOPHEN 650 MG: 325 TABLET ORAL at 05:11

## 2023-11-07 RX ADMIN — ACETAMINOPHEN 650 MG: 325 TABLET ORAL at 01:11

## 2023-11-07 NOTE — PT/OT/SLP PROGRESS
"Physical Therapy Treatment    Patient Name:  Bridget Montgomery   MRN:  [id number]  Admit Date: 10/23/2023  Admitting Diagnosis: Diplopia  Recent Surgeries:     General Precautions: Standard, fall, hearing impaired  Orthopedic Precautions: spinal precautions  Braces: Cervical collar    Recommendations:     Discharge Recommendations: Low Intensity Therapy  Level of Assistance Recommended at Discharge: Intermittent assistance   Discharge Equipment Recommendations: bath bench  Barriers to discharge: None    Assessment:     Bridget Montgomery is a 88 y.o. female admitted with a medical diagnosis of Diplopia . Pt tolerated well, pt would continue to benefit from skilled PT services to improve overall functional mobility, strength and endurance.  .      Performance deficits affecting function: weakness, impaired endurance, impaired self care skills, impaired functional mobility, gait instability, impaired balance, decreased upper extremity function, decreased lower extremity function, decreased safety awareness, impaired cardiopulmonary response to activity, orthopedic precautions.    Rehab Potential is good    Activity Tolerance: Fair    Plan:     Patient to be seen 5 x/week to address the above listed problems via gait training, therapeutic activities, therapeutic exercises, neuromuscular re-education, wheelchair management/training    Plan of Care Expires: 11/23/23  Plan of Care Reviewed with: patient    Subjective     "My shoulders are a little sore I worked them earlier" pt agreeable to therapy. "I just really want to walk"    Pain/Comfort:  Pain Rating 1:  (did not rate)  Location - Side 1: Bilateral  Location - Orientation 1: generalized  Location 1: shoulder  Pain Addressed 1: Reposition, Distraction, Cessation of Activity (declined needing meds)  Pain Rating Post-Intervention 1:  (no further mentioned)    Patient's cultural, spiritual, Scientology conflicts given the current situation:  no    Objective:       " Patient found with  (in wc) cervical collar upon PT entry to room.     Therapeutic Activities and Exercises: mini elliptical x 15 minutes 2x10 reps AP,LAQ,hip flex    Functional Mobility:  Transfers:     Sit to Stand:  stand by assistance and contact guard assistance with rolling walker  Gait: amb with RW CG/close SBA ~ 240 ft and 200 ft seated rest break no LOB  Steps/curb declined today (does not use steps at home has ramp)    AM-PAC 6 CLICK MOBILITY  18    Patient left up in chair with call button in reach and belongings in reach .    GOALS:   Multidisciplinary Problems       Physical Therapy Goals          Problem: Physical Therapy    Goal Priority Disciplines Outcome Goal Variances Interventions   Physical Therapy Goal     PT, PT/OT Ongoing, Progressing     Description: Goals to be met by: 23     Patient will increase functional independence with mobility by performin. Supine to sit with Modified [place] - In progress  2. Sit to supine with Modified [place] - In progress  3. Rolling to Left and Right with Modified [place] - In progress  4. Sit to stand transfer with Stand-by Assistance - MET  Updated Goal 10/30/23: Sit to stand transfer with Supervision    5. Bed to chair transfer with Stand-by Assistance using Rolling Walker - MET  Updated Goal 10/30/23: Bed to chair transfer with Supervision using RW    6. Gait  x 150 feet with Stand-by Assistance using Rolling Walker - MET  Updated Goal 10/30/23: Gait x 150 feet with Supervision using RW    7. Wheelchair propulsion x 100 feet with Supervision using bilateral upper extremities/bilateral lower extremities - In progress    8. Added 23 Ascend/Descend 4 inch curb step with RW and (S).    9. Added 23 Ascend/descend 12 steps with RW and B rails and (S).                         Time Tracking:     PT Received On: 23  PT Start Time: 1401  PT Stop Time: 1440  PT Total Time (min): 39 min    Billable Minutes: Gait Training 14,  Therapeutic Activity 8, and Therapeutic Exercise 17    Treatment Type: Treatment  PT/PTA: PTA     Number of PTA visits since last PT visit: 3     11/07/2023

## 2023-11-07 NOTE — PT/OT/SLP PROGRESS
Occupational Therapy   Treatment    Name: Bridget Montgomery  MRN: [id number]  Admit Date: 10/23/2023  Admitting Diagnosis:  Diplopia    General Precautions: Standard, fall, hearing impaired   Orthopedic Precautions: spinal precautions   Braces: Cervical collar    Recommendations:     Discharge Recommendations:  Low Intensity Therapy  Level of Assistance Recommended at Discharge: Intermittent assistance for ADL's and homemaking tasks  Discharge Equipment Recommendations: bath bench  Barriers to discharge:  None    Assessment:     Bridget Montgomery is a 88 y.o. female with a medical diagnosis of Diplopia.  She presents with limitations in performance of self-care, functional mobility, and ADLs. Performance deficits affecting function are weakness, impaired endurance, impaired self care skills, impaired functional mobility, gait instability, impaired balance, decreased coordination, decreased upper extremity function, decreased lower extremity function, pain, impaired coordination, orthopedic precautions. Pt tolerated Tx without incident and is making progress but continues to require assist to perform self care tasks, functional mobility and functional transfers. Pt would continue to benefit from OT intervention to further functional (I)ce and safety.     Rehab Potential is good    Activity tolerance:  Good    Plan:     Patient to be seen 5 x/week to address the above listed problems via self-care/home management, therapeutic activities, therapeutic exercises    Plan of Care Expires: 11/24/23  Plan of Care Reviewed with: patient, daughter    Subjective     Communicated with: Nursing prior to session.     Pain/Comfort:  Pain Rating 1: 8/10  Location - Side 1: Bilateral  Location - Orientation 1: generalized  Location 1:  (shoulders and knees)  Pain Addressed 1: Pre-medicate for activity, Reposition, Distraction  Pain Rating Post-Intervention 1: 8/10    Patient's cultural, spiritual, Sikh conflicts given the  current situation:  no    Objective:     Patient found  seated on toilet with daughter present  with  (no active lines) upon OT entry to room.    Bed Mobility:    Pt OOB at onset of treatment session.     Functional Mobility/Transfers:  Patient completed Sit <> Stand Transfer with supervision and stand by assistance  with  rolling walker   Patient completed Toilet Transfer Step Transfer technique with stand by assistance with  rolling walker with elevated toilet seat with arm rests to assist with sit to stand  Functional Mobility: Pt ambulated functional distance around room/bathroom with RW and SBA    Activities of Daily Living:  Grooming: supervision standing sinkside to perform oral hygiene and to brush hair  Upper Body Dressing: Set Up Assistance to doff/malgorzata pullover shirt and malgorzata cervical collar while seated    Lower Body Dressing: stand by assistance to doff/malgorzata pants with use of RW when standing to manage pants over hips. Pt with mod A to doff/malgorzata socks and shoes with pt able to doff socks with (A) to malgorzata B socks and (A) to tie shoelaces when donning B shoes.  Toileting: supervision pt performed lamar care prior to onset of Tx. Pt with RW when managing pants over hips    [facility] 6 Click ADL: 19    OT Exercises: Pt performed UBE exercise for 10 minutes with Mod resistance.  UE exercises performed to increase functional endurance and strength in order increase independence when performing self care tasks, functional ambulation, W/C propulsion, and functional standing activities .      Treatment & Education:  Pt educated on:  - role of OT  - level of assistance  - energy conservation and task modification to maximized independence with ADL's and mobility   -  safety while performing functional transfers and self care tasks  - progress towards OT goals    Patient left up in chair with call button in reach    GOALS:   Multidisciplinary Problems       Occupational Therapy Goals          Problem: Occupational  Therapy    Goal Priority Disciplines Outcome Interventions   Occupational Therapy Goal     OT, PT/OT Ongoing, Progressing    Description: Goals to be met by: 3 weeks     Patient will increase functional independence with ADLs by performing:    UE Dressing with Stand-by Assistance. -- Met  LE Dressing with Stand-by Assistance with use of AD and AE as needed. -- Met  Grooming while standing at sink with Supervision. -- Ongoing  Toileting from bedside commode or toilet with Supervision for hygiene and clothing management.   Bathing from sitting at sink with Set-up Assistance. -- Ongoing  Rolling to Bilateral with Modified [place]. -- Ongoing  Supine to sit with Modified [place].-- Ongoing  Stand pivot transfers with Supervision with use of AD and AE as needed. -- Ongoing  Toilet transfer to bedside commode or toilet with Supervision with use of AD and AE as needed. -- Ongoing  Increased functional strength and endurance through use of UBE for at least 8 minutes with Min resistance. -- Met  Upper extremity exercise program 1x15 reps per handout, with assistance as needed. -- Ongoing  Pt will perform functional standing up to 10 minutes in prep for performance of functional ADLs in standing. -- Met                           Time Tracking:     OT Date of Treatment: 11/07/23  OT Start Time: 0822    OT Stop Time: 0905  OT Total Time (min): 43 min    Billable Minutes:Self Care/Home Management 28  Therapeutic Exercise 15    11/7/2023

## 2023-11-08 PROCEDURE — 97530 THERAPEUTIC ACTIVITIES: CPT | Mod: CO

## 2023-11-08 PROCEDURE — 11000004 HC SNF PRIVATE

## 2023-11-08 PROCEDURE — 97116 GAIT TRAINING THERAPY: CPT | Mod: CQ

## 2023-11-08 PROCEDURE — 25000003 PHARM REV CODE 250: Performed by: HOSPITALIST

## 2023-11-08 PROCEDURE — 97110 THERAPEUTIC EXERCISES: CPT | Mod: CO

## 2023-11-08 PROCEDURE — 97110 THERAPEUTIC EXERCISES: CPT | Mod: CQ

## 2023-11-08 PROCEDURE — 25000003 PHARM REV CODE 250: Performed by: FAMILY MEDICINE

## 2023-11-08 PROCEDURE — 25000003 PHARM REV CODE 250: Performed by: NURSE PRACTITIONER

## 2023-11-08 PROCEDURE — 97535 SELF CARE MNGMENT TRAINING: CPT | Mod: CO

## 2023-11-08 RX ADMIN — ACETAMINOPHEN 650 MG: 325 TABLET ORAL at 02:11

## 2023-11-08 RX ADMIN — FUROSEMIDE 20 MG: 20 TABLET ORAL at 09:11

## 2023-11-08 RX ADMIN — ASPIRIN 81 MG CHEWABLE TABLET 81 MG: 81 TABLET CHEWABLE at 09:11

## 2023-11-08 RX ADMIN — FLUTICASONE PROPIONATE 100 MCG: 50 SPRAY, METERED NASAL at 09:11

## 2023-11-08 RX ADMIN — METHOCARBAMOL 500 MG: 500 TABLET ORAL at 08:11

## 2023-11-08 RX ADMIN — CALCIUM CARBONATE (ANTACID) CHEW TAB 500 MG 500 MG: 500 CHEW TAB at 09:11

## 2023-11-08 RX ADMIN — CLOPIDOGREL BISULFATE 75 MG: 75 TABLET ORAL at 09:11

## 2023-11-08 RX ADMIN — CETIRIZINE HYDROCHLORIDE 10 MG: 5 TABLET, FILM COATED ORAL at 09:11

## 2023-11-08 RX ADMIN — Medication 1 CAPSULE: at 09:11

## 2023-11-08 RX ADMIN — SODIUM BICARBONATE 650 MG TABLET 650 MG: at 09:11

## 2023-11-08 RX ADMIN — AMLODIPINE BESYLATE 10 MG: 10 TABLET ORAL at 09:11

## 2023-11-08 RX ADMIN — SENNOSIDES AND DOCUSATE SODIUM 2 TABLET: 8.6; 5 TABLET ORAL at 09:11

## 2023-11-08 RX ADMIN — CHOLECALCIFEROL TAB 25 MCG (1000 UNIT) 2000 UNITS: 25 TAB at 09:11

## 2023-11-08 RX ADMIN — GUAIFENESIN 200 MG: 200 SOLUTION ORAL at 08:11

## 2023-11-08 RX ADMIN — TRAMADOL HYDROCHLORIDE 50 MG: 50 TABLET, COATED ORAL at 09:11

## 2023-11-08 RX ADMIN — FLUTICASONE FUROATE AND VILANTEROL TRIFENATATE 1 PUFF: 100; 25 POWDER RESPIRATORY (INHALATION) at 09:11

## 2023-11-08 RX ADMIN — POLYETHYLENE GLYCOL 3350 17 G: 17 POWDER, FOR SOLUTION ORAL at 09:11

## 2023-11-08 RX ADMIN — SENNOSIDES AND DOCUSATE SODIUM 2 TABLET: 8.6; 5 TABLET ORAL at 08:11

## 2023-11-08 RX ADMIN — METHOCARBAMOL 500 MG: 500 TABLET ORAL at 02:11

## 2023-11-08 RX ADMIN — TRAMADOL HYDROCHLORIDE 50 MG: 50 TABLET, COATED ORAL at 08:11

## 2023-11-08 RX ADMIN — PREGABALIN 75 MG: 75 CAPSULE ORAL at 08:11

## 2023-11-08 RX ADMIN — ATORVASTATIN CALCIUM 80 MG: 40 TABLET, FILM COATED ORAL at 09:11

## 2023-11-08 RX ADMIN — OMEPRAZOLE 20 MG: 20 CAPSULE, DELAYED RELEASE ORAL at 04:11

## 2023-11-08 RX ADMIN — FLUTICASONE PROPIONATE 100 MCG: 50 SPRAY, METERED NASAL at 08:11

## 2023-11-08 RX ADMIN — OMEPRAZOLE 20 MG: 20 CAPSULE, DELAYED RELEASE ORAL at 08:11

## 2023-11-08 RX ADMIN — ACETAMINOPHEN 650 MG: 325 TABLET ORAL at 10:11

## 2023-11-08 NOTE — PT/OT/SLP PROGRESS
Occupational Therapy   Treatment    Name: Bridget Montgomery  MRN: [id number]  Admit Date: 10/23/2023  Admitting Diagnosis:  Diplopia    General Precautions: Standard, fall, hearing impaired   Orthopedic Precautions: spinal precautions   Braces: Cervical collar    Recommendations:     Discharge Recommendations:  Low Intensity Therapy  Level of Assistance Recommended at Discharge: Intermittent assistance for ADL's and homemaking tasks  Discharge Equipment Recommendations: bath bench  Barriers to discharge:  None    Assessment:     Bridget Montgomery is a 88 y.o. female with a medical diagnosis of Diplopia.  She presents with limitations in performance of self-care, functional mobility, and ADLs. Performance deficits affecting function are weakness, impaired endurance, impaired self care skills, impaired functional mobility, gait instability, impaired balance, decreased coordination, decreased upper extremity function, decreased lower extremity function, pain, impaired coordination, orthopedic precautions. Pt tolerated Tx without incident and is making progress but continues to require assist to perform self care tasks, functional mobility and functional transfers. Pt would continue to benefit from OT intervention to further functional (I)ce and safety.     Rehab Potential is good    Activity tolerance:  Good    Plan:     Patient to be seen 5 x/week to address the above listed problems via self-care/home management, therapeutic activities, therapeutic exercises    Plan of Care Expires: 11/24/23  Plan of Care Reviewed with: patient, daughter    Subjective     Communicated with: Nursing prior to session.     Pain/Comfort:  Pain Rating 1: 8/10  Location - Side 1: Bilateral  Location - Orientation 1: generalized  Location 1: shoulder  Pain Addressed 1: Pre-medicate for activity, Reposition, Distraction  Pain Rating Post-Intervention 1: 8/10    Patient's cultural, spiritual, Jain conflicts given the current  situation:  no    Objective:     Patient found up in chair with  (no lines) upon OT entry to room.    Bed Mobility:    Pt seated in chair at onset of treatment session.     Functional Mobility/Transfers:  Patient completed Sit <> Stand Transfer with supervision  with  rolling walker   Patient completed Toilet Transfer Step Transfer technique with supervision with  rolling walker with elevated toilet seat with arm rests to assist with sit to stand  Functional Mobility: Pt propelled lightweight W/C from room to therapy gym with sup for a total of 150 ft using BUE to propel chair. Pt ambulated functional distance in room/bathroom with RW and sup 2x.    Activities of Daily Living:  Toileting: supervision with pt completing lamar care seated with use of RW when standing to manage pants over hips    AMPAC 6 Click ADL: 19    OT Exercises: Pt performed UBE exercise for 8 minutes with Mod resistance.  UE exercises performed to increase functional endurance and strength in order increase independence when performing self care tasks, functional ambulation, W/C propulsion, and functional standing activities .      Treatment & Education:  Pt participated in standing activity with sup and RW. Pt at raised counter to perform fine motor and visual scanning activity with focus on standing tolerance, functional reaching, dynamic standing bal, crossing midline, and to promote independence with homemaking and self care tasks. Pt tolerated standing for 7 min with completion of activity    Pt educated on:  - role of OT  - level of assistance  - energy conservation and task modification to maximized independence with ADL's and mobility   -  safety while performing functional transfers and self care tasks  - progress towards OT goals    Patient left up in chair with call button in reach and daughter present    GOALS:   Multidisciplinary Problems       Occupational Therapy Goals          Problem: Occupational Therapy    Goal Priority  Disciplines Outcome Interventions   Occupational Therapy Goal     OT, PT/OT Ongoing, Progressing    Description: Goals to be met by: 3 weeks     Patient will increase functional independence with ADLs by performing:    UE Dressing with Stand-by Assistance. -- Met  LE Dressing with Stand-by Assistance with use of AD and AE as needed. -- Met  Grooming while standing at sink with Supervision. -- Met  Toileting from bedside commode or toilet with Supervision for hygiene and clothing management. - met  Bathing from sitting at sink with Set-up Assistance. -- Ongoing  Rolling to Bilateral with Modified [place]. -- Ongoing  Supine to sit with Modified [place].-- Ongoing  Stand pivot transfers with Supervision with use of AD and AE as needed. -- Ongoing  Toilet transfer to bedside commode or toilet with Supervision with use of AD and AE as needed. -- met  Increased functional strength and endurance through use of UBE for at least 8 minutes with Min resistance. -- Met  Upper extremity exercise program 1x15 reps per handout, with assistance as needed. -- Ongoing  Pt will perform functional standing up to 10 minutes in prep for performance of functional ADLs in standing. -- Met                           Time Tracking:     OT Date of Treatment: 11/08/23  OT Start Time: 1139    OT Stop Time: 1217  OT Total Time (min): 38 min    Billable Minutes:Self Care/Home Management 10  Therapeutic Activity 18  Therapeutic Exercise 10    11/8/2023

## 2023-11-08 NOTE — PT/OT/SLP PROGRESS
"Physical Therapy Treatment    Patient Name:  Bridget Montgomery   MRN:  [id number]  Admit Date: 10/23/2023  Admitting Diagnosis: Diplopia  Recent Surgeries: N/A    General Precautions: Standard, fall, hearing impaired  Orthopedic Precautions: spinal precautions  Braces: Cervical collar    Recommendations:     Discharge Recommendations: Low Intensity Therapy  Level of Assistance Recommended at Discharge: Intermittent assistance   Discharge Equipment Recommendations: bath bench  Barriers to discharge: None    Assessment:     Bridget Montgomery is a 88 y.o. female admitted with a medical diagnosis of Diplopia . Pt tolerated well, pt amb x 1 trial S with RW. Pt asc/desc 4" curb x 2 trials with SBA and asc/desc 8 stairs with BHR and S/SBA. Pt would continue to benefit from skilled PT services to improve overall functional mobility, strength and endurance.      Performance deficits affecting function: weakness, impaired endurance, impaired self care skills, impaired functional mobility, gait instability, impaired balance, decreased upper extremity function, decreased lower extremity function, decreased safety awareness, impaired cardiopulmonary response to activity, orthopedic precautions.    Rehab Potential is good    Activity Tolerance: Good    Plan:     Patient to be seen 5 x/week to address the above listed problems via gait training, therapeutic activities, therapeutic exercises, neuromuscular re-education, wheelchair management/training    Plan of Care Expires: 11/23/23  Plan of Care Reviewed with: patient    Subjective     "Millicent been wondering when you would come".     Pain/Comfort:  Pain Rating 1: 8/10  Location - Side 1: Bilateral  Location - Orientation 1: generalized  Location 1: shoulder  Pain Addressed 1: Reposition, Distraction, Cessation of Activity  Pain Rating Post-Intervention 1: 8/10    Patient's cultural, spiritual, Mormonism conflicts given the current situation:  no    Objective:     .  Patient found " "up in chair with  (in w/c) upon PT entry to room.     Therapeutic Activities and Exercises: seated TE: #2.5 hip flex, hip abd/add, LAQ 2 X 10 reps for BLE strengthening     Standing TE: heel raises, hip abd/add, hip extension x 10 reps for BLE strengthening    Patient educated on role of therapy, goals of session, and benefits of out of bed mobility.   Instructed on use of call button and importance of calling nursing staff for assistance with mobility   Questions/concerns addressed within PTA scope of practice  Pt verbalized understanding    Functional Mobility:  Transfers:     Sit to Stand:  supervision with rolling walker  Gait: pt amb ~240 ft S with RW. No LOB  Stairs:  Pt ascended/descended 8 stair(s) with No Assistive Device with bilateral handrails with Supervision or Set-up Assistance and Stand-by Assistance.   4" curb: pt asc/desc curb with SBA and RW x 2 trials     AM-PAC 6 CLICK MOBILITY  18    Patient left up in chair with  PT tech .    GOALS:   Multidisciplinary Problems       Physical Therapy Goals          Problem: Physical Therapy    Goal Priority Disciplines Outcome Goal Variances Interventions   Physical Therapy Goal     PT, PT/OT Ongoing, Progressing     Description: Goals to be met by: 23     Patient will increase functional independence with mobility by performin. Supine to sit with Modified [place] - In progress  2. Sit to supine with Modified [place] - In progress  3. Rolling to Left and Right with Modified [place] - In progress  4. Sit to stand transfer with Stand-by Assistance - MET  Updated Goal 10/30/23: Sit to stand transfer with Supervision    5. Bed to chair transfer with Stand-by Assistance using Rolling Walker - MET  Updated Goal 10/30/23: Bed to chair transfer with Supervision using RW    6. Gait  x 150 feet with Stand-by Assistance using Rolling Walker - MET  Updated Goal 10/30/23: Gait x 150 feet with Supervision using RW    7. Wheelchair propulsion x " 100 feet with Supervision using bilateral upper extremities/bilateral lower extremities - In progress    8. Added 11/2/23 Ascend/Descend 4 inch curb step with RW and (S).    9. Added 11/2/23 Ascend/descend 12 steps with RW and B rails and (S).                         Time Tracking:     PT Received On: 11/08/23  PT Start Time: 1038  PT Stop Time: 1116  PT Total Time (min): 38 min    Billable Minutes: Gait Training 13 and Therapeutic Exercise 25    Treatment Type: Treatment  PT/PTA: PTA     Number of PTA visits since last PT visit: 4     11/08/2023

## 2023-11-08 NOTE — PLAN OF CARE
Problem: Adult Inpatient Plan of Care  Goal: Plan of Care Review  Outcome: Ongoing, Progressing  Goal: Patient-Specific Goal (Individualized)  Outcome: Ongoing, Progressing  Goal: Absence of Hospital-Acquired Illness or Injury  Outcome: Ongoing, Progressing  Goal: Optimal Comfort and Wellbeing  Outcome: Ongoing, Progressing  Goal: Readiness for Transition of Care  Outcome: Ongoing, Progressing     Problem: Diabetes Comorbidity  Goal: Blood Glucose Level Within Targeted Range  Outcome: Ongoing, Progressing     Problem: Impaired Wound Healing  Goal: Optimal Wound Healing  Outcome: Ongoing, Progressing     Problem: Fall Injury Risk  Goal: Absence of Fall and Fall-Related Injury  Outcome: Ongoing, Progressing  Intervention: Promote Injury-Free Environment  Flowsheets (Taken 11/7/2023 8830)  Safety Promotion/Fall Prevention:   assistive device/personal item within reach   side rails raised x 2   instructed to call staff for mobility   medications reviewed   family to remain at bedside   nonskid shoes/socks when out of bed     Problem: Skin Injury Risk Increased  Goal: Skin Health and Integrity  Outcome: Ongoing, Progressing     Pt AAOX4. Able to make needs known.  Accepted medications whole.  Tolerated therapy. Assist x1 with all transfers. Denies pain or discomfort at this time. No new skin issues noted. Safety maintained throughout shift.

## 2023-11-08 NOTE — PROGRESS NOTES
[facility]                                  Skilled Nursing Facility                   Progress Note     Admit Date: 10/23/2023  MIKAYLA 11/14/2023  Principal Problem:  Diplopia   HPI obtained from patient interview and chart review     Chief Complaint: Re-evaluation of medical treatment and therapy status: therapy progress, pain management, HTN    HPI:   Bridget Montgomery is an 88 y.o. female with PMH CKD4, COPD, HTN, pre diabetes, and DVT who was admitted to hospital with new onset diplopia and dysarthria. Neuro w/up with neurology completed. Unable to definitively rule out cva on mri, however no acute intracranial findings identified on CTA, CT or MRI brain. Treated as acute CVA given symptoms and inconclusive imaging findings.  Patient will be treated at [facility] with PT and OT to improve functional status and ability to perform ADLs.   Allergies: Patient is allergic to cephalexin, codeine, meperidine, nsaids (non-steroidal anti-inflammatory drug), penicillins, and tazarotene.      Interval History  24 hour chart review completed. NAEON. NAD.   Vitals: Afebrile, hemodynamically stable.   - Monitor HTN, SBP improved from yesterday.   I/O: Patient reports adequate appetite. LBM 11/6 per patient report.   Weight: up 6 lbs since last week, request nursing to re-weigh.  Pain management: Patient reports current multimodal pain regime is adequately controlling pain/discomfort.  Skilled Therapy: Patient progressing with therapy as tolerated and would continue to benefit from skilled PT and OT services to improve overall functional mobility and independence, strength, endurance, and safety.        ROS  Constitutional: Negative for fever   Eyes: Positive for blurred vision, double vision   Respiratory: Negative for cough, shortness of breath   Cardiovascular: Negative for chest pain, palpitations, and leg swelling.   Gastrointestinal: Negative  "for abdominal pain, diarrhea, nausea, vomiting. + constipation  Genitourinary: Negative for dysuria, frequency   Musculoskeletal:  + generalized weakness, + neck pain (chronic). Negative for back pain and myalgias.   Skin: Negative for itching and rash.   Neurological: Negative for dizziness, headaches.   Psychiatric/Behavioral: Negative for depression. The patient is not nervous/anxious.      24 hour Vital Sign Range   Temp:  [97.6 °F (36.4 °C)]   Pulse:  [73-77]   Resp:  [18-19]   BP: (138-148)/(67-76)   SpO2:  [96 %]     Current BMI: Body mass index is 24.64 kg/m².    PEx  Constitutional: Patient appears debilitated.  No distress noted  HENT:   Head: Normocephalic and atraumatic.   Eyes: Pupils are equal, round  Neck: Limited range of motion. Neck supple. C-spine collar present  Cardiovascular: Normal rate, regular rhythm and normal heart sounds.    Pulmonary/Chest: Effort normal and breath sounds are clear  Abdominal: Soft. Bowel sounds are normal.   Musculoskeletal: Normal range of motion.   Neurological: Alert and oriented to person, place, and time.   Psychiatric: Normal mood and affect. Behavior is normal.   Skin: Skin is warm and dry. Full skin assessment completed by NP on initial exam.       Altered Skin Integrity Right anterior;distal;lower Leg , POA       No results for input(s): "GLUCOSE", "NA", "K", "CL", "CO2", "BUN", "CREATININE", "CALCIUM", "MG" in the last 24 hours.        No results for input(s): "WBC", "RBC", "HGB", "HCT", "PLT", "MCV", "MCH", "MCHC" in the last 24 hours.        No results for input(s): "POCTGLUCOSE" in the last 168 hours.       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  - CTH without acute intracranial process  - CTA without large vessel occlusion  - MRI brain demonstrated equivocal punctate lesion in the left paramedian midbrain, which because of study is difficult to exclude " "ischemic process  - TTE with bubble study ordered and demonstrated EF 61%, moderate mitral annular calcifications and mild regurgitation, and PA pressure of 39  - Loaded with  and plavix 300"  11/8/2023  - Continue DAPT x 21 days then aspirin only daily for life  - Continue high intensity statin therapy w atorvastatin to 80mg daily  - PT/OT/SLP consulted to evaluate and treat  - Maintain fall and delirium precautions  - Will need Opthalmology and stroke follow up outpatient    Electrolyte Imbalance  Hypokalemia, new 10/30  Hypocalcemia, new 10/30  - Acute, stable  - Monitor with scheduled metabolic profile and replete PRN to goal serum level WNL    Metabolic acidosis, new 10/30  Acute, stable  - continue 650 sodium bicarb p.o. daily    Azotemia, new 10/30  - BUN stable and improving; continue to encourage p.o. fluids    Chronic Constipation  - Continue scheduled senokot and miralax  - Adjust bowel regimen prn to avoid diarrhea  - Encourage fluid intake  - Encourage safe physical activity as tolerated  - Monitor bowel movement regularity and consistency  - Increased MiraLax 17 g bid and continue MiraLax daily p.r.n. and increased senna docusate 8.6-50 mg 2 tablet b.i.d.    Decreased Mobility   Physical Debility  Weakness   - Continue with PT/OT for gait training and strengthening and restoration of ADL's   - Encourage mobility, OOB in chair, and early ambulation as appropriate  - Fall precautions   - Monitor for bowel and bladder dysfunction  - Monitor for and prevent skin breakdown and pressure ulcers  - DVT prophylaxis with plavix, asa  - Per PT notes patient with increased ambulation distance with stand-by assist and rolling walker today    Mixed HLD  Stroke Risk Factor.   - continue high intensity statin therapy w atorvastatin to 80mg daily  - Follow-up with PCP for monitoring per guidelines     Chronic Obstructive Pulmonary Disease (COPD)  - POA, stable  - Continue home LABA 1 puff daily  - Continue " albuterol inhaler and neb prn  - Start guaifenesin q 4 prn, consider bid mucinex if needed  - Continue BID Flonase for sinus relief  - May consider montelukast 10 mg po daily if unstable  - Consider xopanex and CPT tx q 6 hours while awake if respiratory sx exascerbate    Stage 4 chronic kidney disease  - Chronic, stable  - Follows with Dr. Lyons  - Continue lasix 20 mg daily  - Avoid any nephrotoxic agents including NSAIDs, aminoglycosides, IV contrast (unless absolutely necessary), gadolinium, fleets and other phosphorous-based laxatives. Caution with antibiotics.  - Renally adjust medications based on patient's creatinine clearance  - Avoid Hypotension  - Low Phos Diet --> Reg diet with nephro and no milk products on 10/27 per daughter request and d/w RD.  - Routinely monitor renal function with scheduled metabolic profile  - Stable Cr at 0.9    Essential Hypertension  - Chronic, stable  - Continue amlodipine 10 mg daily   - Continue lasix 20 mg daily  - Monitor BP  - Adjust therapy to BP goal < 150/90  - Avoid hypotension   - Currently at 135/63    Spinal Stenosis  C1-C2 dens fracture  - Follows with neurosurgery  - Scheduled for surgery on 11/10/23; now cancelled d/t post acute stroke  - continue diclofenac na gel topically daily  - tylenol, tramadol, robaxin prn  - Increased Lyrica 75 mg nightly  - Follow up with neurosurgery outpatient    Osteoarthritis  Muscle Spasms, Chronic  - Chronic pain to R shoulder  - continue diclofenac na gel topically daily  - tylenol, tramadol, robaxin prn  - increased lyrica to 75 mg nightly and initiated tylenol 650 mg tid.   - Afebrile, vital signs stable. Currently at 135/63    H/o Type 2 Diabetes  - Most recent A1c <6  - Continue to monitor w venous lab, stable    Anemia of Chronic Disease  - Baseline Hb 10-11  - Normocytic anemia   - Trend with scheduled CBC  - Transfuse for Hgb < 7.0  - Continue ferrous sulfate tablet every other day  - Follow up with PCP  outpatient    Insomnia, Chronic  - Resume home tramadol 50 mg q HS  - Fall and delerium precautions.   - Note home polypharmacy risk factors  - F/up with PCP regarding long-term insomnia and pain management    Anticipate disposition:  Home with home health    IP [facility] RISK OF UNPLANNED READMISSION Model:     Follow-up needed during SNF stay-    Follow-up needed after discharge from SNF: PCP, Opthalmology    Future Appointments   Date Time Provider Department Center   12/11/2023  2:00 PM Lluvia Rahman MD [facility]   2/21/2024 10:15 AM Charles Peacock MD [facility]       Total time spent: 31 minutes  Description of Time: counseling provided on clinical condition, therapies provided, plan of care, emotional support, coordinating patient care with other care team members, reviewing and interpreting labs and imaging, collaboration with physician, initiating new orders, chart review, and documentation. See interval hx.      Carin Barclay NP  Department of Hospital Medicine   [facility]     DOS: 11/8/2023       Patient note was created using MModal Dictation.  Any errors in syntax or even information may not have been identified and edited on initial review prior to signing this note.

## 2023-11-08 NOTE — PROGRESS NOTES
[facility]                                  Skilled Nursing Facility                   Progress Note     Admit Date: 10/23/2023  MIKAYLA 11/14/2023  Principal Problem:  Diplopia   HPI obtained from patient interview and chart review     Chief Complaint: Re-evaluation of medical treatment and therapy status: therapy progress, pain management    HPI:   Bridget Montgomery is an 88 y.o. female with PMH CKD4, COPD, HTN, pre diabetes, and DVT who was admitted to hospital with new onset diplopia and dysarthria. Neuro w/up with neurology completed. Unable to definitively rule out cva on mri, however no acute intracranial findings identified on CTA, CT or MRI brain. Treated as acute CVA given symptoms and inconclusive imaging findings.  Patient will be treated at [facility] with PT and OT to improve functional status and ability to perform ADLs.   Allergies: Patient is allergic to cephalexin, codeine, meperidine, nsaids (non-steroidal anti-inflammatory drug), penicillins, and tazarotene.      Interval History  24 hour chart review completed. NAEON. NAD.   Vitals: Afebrile, hemodynamically stable. Monitor HTN, SBP slightly above goal on 24 hour trend.  I/O: Patient reports adequate appetite. LBM 11/6 per patient report.   Weight: up 6 lbs since last week, request nursing to re-weigh.  Pain management: Patient reports current multimodal pain regime is adequately controlling pain/discomfort.  Skilled Therapy: Patient progressing with therapy as tolerated and would continue to benefit from skilled PT and OT services to improve overall functional mobility and independence, strength, endurance, and safety.        ROS  Constitutional: Negative for fever   Eyes: Positive for blurred vision, double vision   Respiratory: Negative for cough, shortness of breath   Cardiovascular: Negative for chest pain, palpitations, and leg swelling.   Gastrointestinal:  "Negative for abdominal pain, diarrhea, nausea, vomiting. + constipation  Genitourinary: Negative for dysuria, frequency   Musculoskeletal:  + generalized weakness, + neck pain (chronic). Negative for back pain and myalgias.   Skin: Negative for itching and rash.   Neurological: Negative for dizziness, headaches.   Psychiatric/Behavioral: Negative for depression. The patient is not nervous/anxious.      24 hour Vital Sign Range   Temp:  [97.4 °F (36.3 °C)]   Pulse:  [70]   Resp:  [17-18]   BP: (154)/(70)   SpO2:  [95 %]     Current BMI: Body mass index is 24.64 kg/m².    PEx  Constitutional: Patient appears debilitated.  No distress noted  HENT:   Head: Normocephalic and atraumatic.   Eyes: Pupils are equal, round  Neck: Limited range of motion. Neck supple. C-spine collar present  Cardiovascular: Normal rate, regular rhythm and normal heart sounds.    Pulmonary/Chest: Effort normal and breath sounds are clear  Abdominal: Soft. Bowel sounds are normal.   Musculoskeletal: Normal range of motion.   Neurological: Alert and oriented to person, place, and time.   Psychiatric: Normal mood and affect. Behavior is normal.   Skin: Skin is warm and dry. Full skin assessment completed by NP on initial exam.       Altered Skin Integrity Right anterior;distal;lower Leg , POA       No results for input(s): "GLUCOSE", "NA", "K", "CL", "CO2", "BUN", "CREATININE", "CALCIUM", "MG" in the last 24 hours.        No results for input(s): "WBC", "RBC", "HGB", "HCT", "PLT", "MCV", "MCH", "MCHC" in the last 24 hours.        No results for input(s): "POCTGLUCOSE" in the last 168 hours.       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  - CTH without acute intracranial process  - CTA without large vessel occlusion  - MRI brain demonstrated equivocal punctate lesion in the left paramedian midbrain, which because of study is difficult to exclude " "ischemic process  - TTE with bubble study ordered and demonstrated EF 61%, moderate mitral annular calcifications and mild regurgitation, and PA pressure of 39  - Loaded with  and plavix 300"  11/7/2023  - Continue DAPT x 21 days then aspirin only daily for life  - Continue high intensity statin therapy w atorvastatin to 80mg daily  - PT/OT/SLP consulted to evaluate and treat  - Maintain fall and delirium precautions  - Will need Opthalmology and stroke follow up outpatient    Electrolyte Imbalance  Hypokalemia, new 10/30  Hypocalcemia, new 10/30  - Acute, stable  - Monitor with scheduled metabolic profile and replete PRN to goal serum level WNL    Metabolic acidosis, new 10/30  Acute, stable  - continue 650 sodium bicarb p.o. daily    Azotemia, new 10/30  - BUN stable and improving; continue to encourage p.o. fluids    Chronic Constipation  - Continue scheduled senokot and miralax  - Adjust bowel regimen prn to avoid diarrhea  - Encourage fluid intake  - Encourage safe physical activity as tolerated  - Monitor bowel movement regularity and consistency  - Increased MiraLax 17 g bid and continue MiraLax daily p.r.n. and increased senna docusate 8.6-50 mg 2 tablet b.i.d.    Decreased Mobility   Physical Debility  Weakness   - Continue with PT/OT for gait training and strengthening and restoration of ADL's   - Encourage mobility, OOB in chair, and early ambulation as appropriate  - Fall precautions   - Monitor for bowel and bladder dysfunction  - Monitor for and prevent skin breakdown and pressure ulcers  - DVT prophylaxis with plavix, asa  - Per PT notes patient with increased ambulation distance with stand-by assist and rolling walker today    Mixed HLD  Stroke Risk Factor.   - continue high intensity statin therapy w atorvastatin to 80mg daily  - Follow-up with PCP for monitoring per guidelines     Chronic Obstructive Pulmonary Disease (COPD)  - POA, stable  - Continue home LABA 1 puff daily  - Continue " albuterol inhaler and neb prn  - Start guaifenesin q 4 prn, consider bid mucinex if needed  - Continue BID Flonase for sinus relief  - May consider montelukast 10 mg po daily if unstable  - Consider xopanex and CPT tx q 6 hours while awake if respiratory sx exascerbate    Stage 4 chronic kidney disease  - Chronic, stable  - Follows with Dr. Lyons  - Continue lasix 20 mg daily  - Avoid any nephrotoxic agents including NSAIDs, aminoglycosides, IV contrast (unless absolutely necessary), gadolinium, fleets and other phosphorous-based laxatives. Caution with antibiotics.  - Renally adjust medications based on patient's creatinine clearance  - Avoid Hypotension  - Low Phos Diet --> Reg diet with nephro and no milk products on 10/27 per daughter request and d/w RD.  - Routinely monitor renal function with scheduled metabolic profile  - Stable Cr at 0.9    Essential Hypertension  - Chronic, stable  - Continue amlodipine 10 mg daily   - Continue lasix 20 mg daily  - Monitor BP  - Adjust therapy to BP goal < 150/90  - Avoid hypotension   - Currently at 135/63    Spinal Stenosis  C1-C2 dens fracture  - Follows with neurosurgery  - Scheduled for surgery on 11/10/23; now cancelled d/t post acute stroke  - continue diclofenac na gel topically daily  - tylenol, tramadol, robaxin prn  - Increased Lyrica 75 mg nightly  - Follow up with neurosurgery outpatient    Osteoarthritis  Muscle Spasms, Chronic  - Chronic pain to R shoulder  - continue diclofenac na gel topically daily  - tylenol, tramadol, robaxin prn  - increased lyrica to 75 mg nightly and initiated tylenol 650 mg tid.   - Afebrile, vital signs stable. Currently at 135/63    H/o Type 2 Diabetes  - Most recent A1c <6  - Continue to monitor w venous lab, stable    Anemia of Chronic Disease  - Baseline Hb 10-11  - Normocytic anemia   - Trend with scheduled CBC  - Transfuse for Hgb < 7.0  - Continue ferrous sulfate tablet every other day  - Follow up with PCP  outpatient    Insomnia, Chronic  - Resume home tramadol 50 mg q HS  - Fall and delerium precautions.   - Note home polypharmacy risk factors  - F/up with PCP regarding long-term insomnia and pain management    Anticipate disposition:  Home with home health    IP [facility] RISK OF UNPLANNED READMISSION Model:     Follow-up needed during SNF stay-    Follow-up needed after discharge from SNF: PCP, Opthalmology    Future Appointments   Date Time Provider Department Center   12/11/2023  2:00 PM Lluvia Rahman MD [facility]   2/21/2024 10:15 AM Charles Peacock MD [facility]       Total time spent: 31 minutes  Description of Time: counseling provided on clinical condition, therapies provided, plan of care, emotional support, coordinating patient care with other care team members, reviewing and interpreting labs and imaging, collaboration with physician, initiating new orders, chart review, and documentation. See interval hx.      Carin Barclay NP  Department of Hospital Medicine   [facility]     DOS: 11/7/2023       Patient note was created using MModal Dictation.  Any errors in syntax or even information may not have been identified and edited on initial review prior to signing this note.

## 2023-11-09 LAB
ALBUMIN SERPL BCP-MCNC: 2.7 G/DL (ref 3.5–5.2)
ALP SERPL-CCNC: 174 U/L (ref 55–135)
ALT SERPL W/O P-5'-P-CCNC: 24 U/L (ref 10–44)
ANION GAP SERPL CALC-SCNC: 9 MMOL/L (ref 8–16)
AST SERPL-CCNC: 36 U/L (ref 10–40)
BASOPHILS # BLD AUTO: 0.05 K/UL (ref 0–0.2)
BASOPHILS NFR BLD: 0.9 % (ref 0–1.9)
BILIRUB SERPL-MCNC: 0.2 MG/DL (ref 0.1–1)
BUN SERPL-MCNC: 42 MG/DL (ref 8–23)
CALCIUM SERPL-MCNC: 8.7 MG/DL (ref 8.7–10.5)
CHLORIDE SERPL-SCNC: 106 MMOL/L (ref 95–110)
CO2 SERPL-SCNC: 24 MMOL/L (ref 23–29)
CREAT SERPL-MCNC: 1 MG/DL (ref 0.5–1.4)
DIFFERENTIAL METHOD: ABNORMAL
EOSINOPHIL # BLD AUTO: 0.1 K/UL (ref 0–0.5)
EOSINOPHIL NFR BLD: 1.9 % (ref 0–8)
ERYTHROCYTE [DISTWIDTH] IN BLOOD BY AUTOMATED COUNT: 12.8 % (ref 11.5–14.5)
EST. GFR  (NO RACE VARIABLE): 54.2 ML/MIN/1.73 M^2
GLUCOSE SERPL-MCNC: 89 MG/DL (ref 70–110)
HCT VFR BLD AUTO: 27.5 % (ref 37–48.5)
HGB BLD-MCNC: 9 G/DL (ref 12–16)
IMM GRANULOCYTES # BLD AUTO: 0.01 K/UL (ref 0–0.04)
IMM GRANULOCYTES NFR BLD AUTO: 0.2 % (ref 0–0.5)
LYMPHOCYTES # BLD AUTO: 0.9 K/UL (ref 1–4.8)
LYMPHOCYTES NFR BLD: 17.7 % (ref 18–48)
MAGNESIUM SERPL-MCNC: 1.9 MG/DL (ref 1.6–2.6)
MCH RBC QN AUTO: 29.6 PG (ref 27–31)
MCHC RBC AUTO-ENTMCNC: 32.7 G/DL (ref 32–36)
MCV RBC AUTO: 91 FL (ref 82–98)
MONOCYTES # BLD AUTO: 0.6 K/UL (ref 0.3–1)
MONOCYTES NFR BLD: 11.8 % (ref 4–15)
NEUTROPHILS # BLD AUTO: 3.6 K/UL (ref 1.8–7.7)
NEUTROPHILS NFR BLD: 67.5 % (ref 38–73)
NRBC BLD-RTO: 0 /100 WBC
PHOSPHATE SERPL-MCNC: 4.4 MG/DL (ref 2.7–4.5)
PLATELET # BLD AUTO: 347 K/UL (ref 150–450)
PMV BLD AUTO: 10.4 FL (ref 9.2–12.9)
POTASSIUM SERPL-SCNC: 3.8 MMOL/L (ref 3.5–5.1)
PROT SERPL-MCNC: 5.9 G/DL (ref 6–8.4)
RBC # BLD AUTO: 3.04 M/UL (ref 4–5.4)
SODIUM SERPL-SCNC: 139 MMOL/L (ref 136–145)
WBC # BLD AUTO: 5.32 K/UL (ref 3.9–12.7)

## 2023-11-09 PROCEDURE — 36415 COLL VENOUS BLD VENIPUNCTURE: CPT | Performed by: HOSPITALIST

## 2023-11-09 PROCEDURE — 97535 SELF CARE MNGMENT TRAINING: CPT | Mod: CO

## 2023-11-09 PROCEDURE — 25000003 PHARM REV CODE 250: Performed by: FAMILY MEDICINE

## 2023-11-09 PROCEDURE — 97110 THERAPEUTIC EXERCISES: CPT

## 2023-11-09 PROCEDURE — 97530 THERAPEUTIC ACTIVITIES: CPT

## 2023-11-09 PROCEDURE — 11000004 HC SNF PRIVATE

## 2023-11-09 PROCEDURE — 84100 ASSAY OF PHOSPHORUS: CPT | Performed by: HOSPITALIST

## 2023-11-09 PROCEDURE — 85025 COMPLETE CBC W/AUTO DIFF WBC: CPT | Performed by: HOSPITALIST

## 2023-11-09 PROCEDURE — 80053 COMPREHEN METABOLIC PANEL: CPT | Performed by: NURSE PRACTITIONER

## 2023-11-09 PROCEDURE — 25000003 PHARM REV CODE 250: Performed by: NURSE PRACTITIONER

## 2023-11-09 PROCEDURE — 97116 GAIT TRAINING THERAPY: CPT

## 2023-11-09 PROCEDURE — 25000003 PHARM REV CODE 250: Performed by: HOSPITALIST

## 2023-11-09 PROCEDURE — 83735 ASSAY OF MAGNESIUM: CPT | Performed by: HOSPITALIST

## 2023-11-09 RX ORDER — BISACODYL 10 MG
10 SUPPOSITORY, RECTAL RECTAL ONCE
Status: DISCONTINUED | OUTPATIENT
Start: 2023-11-09 | End: 2023-11-09

## 2023-11-09 RX ADMIN — FLUTICASONE PROPIONATE 100 MCG: 50 SPRAY, METERED NASAL at 08:11

## 2023-11-09 RX ADMIN — CHOLECALCIFEROL TAB 25 MCG (1000 UNIT) 2000 UNITS: 25 TAB at 09:11

## 2023-11-09 RX ADMIN — AMLODIPINE BESYLATE 10 MG: 10 TABLET ORAL at 09:11

## 2023-11-09 RX ADMIN — ACETAMINOPHEN 650 MG: 325 TABLET ORAL at 05:11

## 2023-11-09 RX ADMIN — SODIUM BICARBONATE 650 MG TABLET 650 MG: at 09:11

## 2023-11-09 RX ADMIN — CLOPIDOGREL BISULFATE 75 MG: 75 TABLET ORAL at 09:11

## 2023-11-09 RX ADMIN — GUAIFENESIN 200 MG: 200 SOLUTION ORAL at 09:11

## 2023-11-09 RX ADMIN — ASPIRIN 81 MG CHEWABLE TABLET 81 MG: 81 TABLET CHEWABLE at 09:11

## 2023-11-09 RX ADMIN — FLUTICASONE FUROATE AND VILANTEROL TRIFENATATE 1 PUFF: 100; 25 POWDER RESPIRATORY (INHALATION) at 09:11

## 2023-11-09 RX ADMIN — ATORVASTATIN CALCIUM 80 MG: 40 TABLET, FILM COATED ORAL at 09:11

## 2023-11-09 RX ADMIN — METHOCARBAMOL 500 MG: 500 TABLET ORAL at 09:11

## 2023-11-09 RX ADMIN — FUROSEMIDE 20 MG: 20 TABLET ORAL at 09:11

## 2023-11-09 RX ADMIN — ACETAMINOPHEN 650 MG: 325 TABLET ORAL at 09:11

## 2023-11-09 RX ADMIN — TRAMADOL HYDROCHLORIDE 50 MG: 50 TABLET, COATED ORAL at 09:11

## 2023-11-09 RX ADMIN — CALCIUM CARBONATE (ANTACID) CHEW TAB 500 MG 500 MG: 500 CHEW TAB at 09:11

## 2023-11-09 RX ADMIN — ACETAMINOPHEN 650 MG: 325 TABLET ORAL at 02:11

## 2023-11-09 RX ADMIN — Medication 1 CAPSULE: at 09:11

## 2023-11-09 RX ADMIN — TRAMADOL HYDROCHLORIDE 50 MG: 50 TABLET, COATED ORAL at 02:11

## 2023-11-09 RX ADMIN — Medication 6 MG: at 09:11

## 2023-11-09 RX ADMIN — OMEPRAZOLE 20 MG: 20 CAPSULE, DELAYED RELEASE ORAL at 05:11

## 2023-11-09 RX ADMIN — OMEPRAZOLE 20 MG: 20 CAPSULE, DELAYED RELEASE ORAL at 04:11

## 2023-11-09 RX ADMIN — PREGABALIN 75 MG: 75 CAPSULE ORAL at 09:11

## 2023-11-09 RX ADMIN — CETIRIZINE HYDROCHLORIDE 10 MG: 5 TABLET, FILM COATED ORAL at 09:11

## 2023-11-09 NOTE — PLAN OF CARE
Problem: Adult Inpatient Plan of Care  Goal: Plan of Care Review  Outcome: Ongoing, Progressing  Flowsheets (Taken 11/9/2023 0107)  Plan of Care Reviewed With:   patient   daughter  Goal: Patient-Specific Goal (Individualized)  Outcome: Ongoing, Progressing  Goal: Absence of Hospital-Acquired Illness or Injury  Outcome: Ongoing, Progressing  Goal: Optimal Comfort and Wellbeing  Outcome: Ongoing, Progressing  Goal: Readiness for Transition of Care  Outcome: Ongoing, Progressing     Problem: Diabetes Comorbidity  Goal: Blood Glucose Level Within Targeted Range  Outcome: Ongoing, Progressing     Problem: Impaired Wound Healing  Goal: Optimal Wound Healing  Outcome: Ongoing, Progressing     Problem: Fall Injury Risk  Goal: Absence of Fall and Fall-Related Injury  Outcome: Ongoing, Progressing     Problem: Skin Injury Risk Increased  Goal: Skin Health and Integrity  Outcome: Ongoing, Progressing  Intervention: Optimize Skin Protection  Flowsheets (Taken 11/9/2023 0107)  Pressure Reduction Techniques: frequent weight shift encouraged  Head of Bed (HOB) Positioning: HOB at 30-45 degrees     Problem: Skin Injury Risk Increased  Goal: Skin Health and Integrity  Intervention: Optimize Skin Protection  Flowsheets (Taken 11/9/2023 0107)  Pressure Reduction Techniques: frequent weight shift encouraged  Head of Bed (HOB) Positioning: HOB at 30-45 degrees

## 2023-11-09 NOTE — PLAN OF CARE
Patient with steady progression of mobility. Goals updated accordingly. Continue with POC.     Problem: Physical Therapy  Goal: Physical Therapy Goal  Description: Goals to be met by: 23     Patient will increase functional independence with mobility by performin. Supine to sit with Modified [place] - In progress  2. Sit to supine with Modified [place] - In progress  3. Rolling to Left and Right with Modified [place] - In progress  4. Sit to stand transfer with Stand-by Assistance - MET  Updated Goal 10/30/23: Sit to stand transfer with Supervision - MET  Updated Goal 23: Sit to stand transfer with Mod I    5. Bed to chair transfer with Stand-by Assistance using Rolling Walker - MET  Updated Goal 10/30/23: Bed to chair transfer with Supervision using RW - MET  Updated Goal 23: Bed to chair transfer with Mod I using RW    6. Gait  x 150 feet with Stand-by Assistance using Rolling Walker - MET  Updated Goal 10/30/23: Gait x 150 feet with Supervision using RW - MET  Updated Goal 23: Gait x 200 feet with Supervision using RW    7. Wheelchair propulsion x 100 feet with Supervision using bilateral upper extremities/bilateral lower extremities - MET    8. Added 23 Ascend/Descend 4 inch curb step with RW and (S) - In progress    9. Added 23 Ascend/descend 12 steps with RW and B rails and (S) - In progress    Outcome: Ongoing, Progressing   2023

## 2023-11-09 NOTE — PROGRESS NOTES
[facility]                                  Skilled Nursing Facility                   Progress Note     Admit Date: 10/23/2023  MIKAYLA 11/14/2023  Principal Problem:  Diplopia   HPI obtained from patient interview and chart review     Chief Complaint: Re-evaluation of medical treatment and therapy status: therapy progress, pain management, HTN, lab review, constipation    HPI:   Bridget Montgomery is an 88 y.o. female with PMH CKD4, COPD, HTN, pre diabetes, and DVT who was admitted to hospital with new onset diplopia and dysarthria. Neuro w/up with neurology completed. Unable to definitively rule out cva on mri, however no acute intracranial findings identified on CTA, CT or MRI brain. Treated as acute CVA given symptoms and inconclusive imaging findings.  Patient will be treated at [facility] with PT and OT to improve functional status and ability to perform ADLs.   Allergies: Patient is allergic to cephalexin, codeine, meperidine, nsaids (non-steroidal anti-inflammatory drug), penicillins, and tazarotene.      Interval History  24 hour chart review completed. NAEON. NAD.   Vitals: Afebrile, hemodynamically stable.   - Monitor HTN, SBP improving overall.  Lab: All of today's labs reviewed; listed and addressed below.   I/O: Patient reports adequate appetite. LBM 11/6 per patient report. Single dose colace suppository ordered. Encourage fluids given constipation and rising BUN.  Weight: up 6 lbs since last week, request nursing to re-weigh.  Pain management: Patient reports current multimodal pain regime is adequately controlling pain/discomfort.  Skilled Therapy: Patient progressing with therapy as tolerated and would continue to benefit from skilled PT and OT services to improve overall functional mobility and independence, strength, endurance, and safety.        ROS  Constitutional: Negative for fever   Eyes: Positive for blurred vision,  "double vision   Respiratory: Negative for cough, shortness of breath   Cardiovascular: Negative for chest pain, palpitations, and leg swelling.   Gastrointestinal: Negative for abdominal pain, diarrhea, nausea, vomiting. + constipation  Genitourinary: Negative for dysuria, frequency   Musculoskeletal:  + generalized weakness, + neck pain (chronic). Negative for back pain and myalgias.   Skin: Negative for itching and rash.   Neurological: Negative for dizziness, headaches.   Psychiatric/Behavioral: Negative for depression. The patient is not nervous/anxious.      24 hour Vital Sign Range   Temp:  [97.1 °F (36.2 °C)-98 °F (36.7 °C)]   Pulse:  [62-67]   Resp:  [18]   BP: (151-154)/(68-69)   SpO2:  [97 %-98 %]     Current BMI: Body mass index is 24.64 kg/m².    PEx  Constitutional: Patient appears debilitated.  No distress noted  HENT:   Head: Normocephalic and atraumatic.   Eyes: Pupils are equal, round  Neck: Limited range of motion. Neck supple. C-spine collar present  Cardiovascular: Normal rate, regular rhythm and normal heart sounds.    Pulmonary/Chest: Effort normal and breath sounds are clear  Abdominal: Soft. Bowel sounds are normal.   Musculoskeletal: Normal range of motion.   Neurological: Alert and oriented to person, place, and time.   Psychiatric: Normal mood and affect. Behavior is normal.   Skin: Skin is warm and dry. Full skin assessment completed by NP on initial exam.       Altered Skin Integrity Right anterior;distal;lower Leg , POA       Recent Labs   Lab 11/09/23  0516      K 3.8      CO2 24   BUN 42*   CREATININE 1.0   CALCIUM 8.7   MG 1.9           Recent Labs   Lab 11/09/23  0516   WBC 5.32   RBC 3.04*   HGB 9.0*   HCT 27.5*      MCV 91   MCH 29.6   MCHC 32.7           No results for input(s): "POCTGLUCOSE" in the last 168 hours.       Assessment and Plan:    Diplopia  Acute stroke  In short term acute hospital per discharge summary:  "- New onset L CN III palsy and " "dysarthria  - CTH without acute intracranial process  - CTA without large vessel occlusion  - MRI brain demonstrated equivocal punctate lesion in the left paramedian midbrain, which because of study is difficult to exclude ischemic process  - TTE with bubble study ordered and demonstrated EF 61%, moderate mitral annular calcifications and mild regurgitation, and PA pressure of 39  - Loaded with  and plavix 300"  11/9/2023  - Continue DAPT x 21 days then aspirin only daily for life  - Continue high intensity statin therapy w atorvastatin to 80mg daily  - PT/OT/SLP consulted to evaluate and treat  - Maintain fall and delirium precautions  - Will need Opthalmology and stroke follow up outpatient    Electrolyte Imbalance  Hypokalemia, new 10/30  Hypocalcemia, new 10/30  - Acute, stable  - Monitor with scheduled metabolic profile and replete PRN to goal serum level WNL    Metabolic acidosis, new 10/30  Acute, stable  - continue 650 sodium bicarb p.o. daily    Azotemia, new 10/30  - BUN stable and improving; continue to encourage p.o. fluids    Chronic Constipation  - Continue scheduled senokot and miralax  - Adjust bowel regimen prn to avoid diarrhea  - Encourage fluid intake  - Encourage safe physical activity as tolerated  - Monitor bowel movement regularity and consistency  - Increased MiraLax 17 g bid and continue MiraLax daily p.r.n. and increased senna docusate 8.6-50 mg 2 tablet b.i.d.  - single dose suppository 11/9    Decreased Mobility   Physical Debility  Weakness   - Continue with PT/OT for gait training and strengthening and restoration of ADL's   - Encourage mobility, OOB in chair, and early ambulation as appropriate  - Fall precautions   - Monitor for bowel and bladder dysfunction  - Monitor for and prevent skin breakdown and pressure ulcers  - DVT prophylaxis with plavix, asa  - Per PT notes patient with increased ambulation distance with stand-by assist and rolling walker today    Mixed " HLD  Stroke Risk Factor.   - continue high intensity statin therapy w atorvastatin to 80mg daily  - Follow-up with PCP for monitoring per guidelines     Chronic Obstructive Pulmonary Disease (COPD)  - POA, stable  - Continue home LABA 1 puff daily  - Continue albuterol inhaler and neb prn  - Start guaifenesin q 4 prn, consider bid mucinex if needed  - Continue BID Flonase for sinus relief  - May consider montelukast 10 mg po daily if unstable  - Consider xopanex and CPT tx q 6 hours while awake if respiratory sx exascerbate    Stage 4 chronic kidney disease  - Chronic, stable  - Follows with Dr. Lyons  - Continue lasix 20 mg daily  - Avoid any nephrotoxic agents including NSAIDs, aminoglycosides, IV contrast (unless absolutely necessary), gadolinium, fleets and other phosphorous-based laxatives. Caution with antibiotics.  - Renally adjust medications based on patient's creatinine clearance  - Avoid Hypotension  - Low Phos Diet --> Reg diet with nephro and no milk products on 10/27 per daughter request and d/w RD.  - Routinely monitor renal function with scheduled metabolic profile  - Stable Cr at 0.9    Essential Hypertension  - Chronic, stable  - Continue amlodipine 10 mg daily   - Continue lasix 20 mg daily  - Monitor BP  - Adjust therapy to BP goal < 150/90  - Avoid hypotension   - Currently at 135/63    Spinal Stenosis  C1-C2 dens fracture  - Follows with neurosurgery  - Scheduled for surgery on 11/10/23; now cancelled d/t post acute stroke  - continue diclofenac na gel topically daily  - tylenol, tramadol, robaxin prn  - Increased Lyrica 75 mg nightly  - Follow up with neurosurgery outpatient    Osteoarthritis  Muscle Spasms, Chronic  - Chronic pain to R shoulder  - continue diclofenac na gel topically daily  - tylenol, tramadol, robaxin prn  - increased lyrica to 75 mg nightly and initiated tylenol 650 mg tid.   - Afebrile, vital signs stable. Currently at 135/63    H/o Type 2 Diabetes  - Most recent A1c  <6  - Continue to monitor w venous lab, stable    Anemia of Chronic Disease  - Baseline Hb 10-11  - Normocytic anemia   - Trend with scheduled CBC  - Transfuse for Hgb < 7.0  - Continue ferrous sulfate tablet every other day  - Follow up with PCP outpatient    Insomnia, Chronic  - Resume home tramadol 50 mg q HS  - Fall and delerium precautions.   - Note home polypharmacy risk factors  - F/up with PCP regarding long-term insomnia and pain management    Anticipate disposition:  Home with home health    IP [facility] RISK OF UNPLANNED READMISSION Model:     Follow-up needed during SNF stay-    Follow-up needed after discharge from SNF: PCP, Opthalmology    Future Appointments   Date Time Provider Department Center   12/11/2023  2:00 PM Lluvia Rahman MD [facility]   2/21/2024 10:15 AM Charles Peacock MD [facility]       Total time spent: 46 minutes  Description of Time: counseling provided on clinical condition, therapies provided, plan of care, emotional support, coordinating patient care with other care team members, reviewing and interpreting labs and imaging, collaboration with physician, initiating new orders, chart review, and documentation. See interval hx.      Carin Barclay NP  Department of Hospital Medicine   [facility]     DOS: 11/9/2023       Patient note was created using MModal Dictation.  Any errors in syntax or even information may not have been identified and edited on initial review prior to signing this note.

## 2023-11-09 NOTE — PLAN OF CARE
Continue low phosphorus diet, RD following  Goals: PO to meet 75% of EEN by next RD follow up  Nutrition Goal Status: goal met  Communication of RD Recs: other (comment) (POC)     Assessment and Plan     Inadequate  oral intake related to decreased appetite, restrictive diet as evidenced by PO < 75%.     New     Plan  Mineral modified diet - low phosphorus  Collaboration with other providers  Mineral supplement therapy- Vit D, Ca carbonate

## 2023-11-09 NOTE — PT/OT/SLP PROGRESS
"Physical Therapy Treatment    Patient Name:  Bridget Montgomery   MRN:  [id number]  Admit Date: 10/23/2023  Admitting Diagnosis: Diplopia  Recent Surgeries: N/A    General Precautions: Standard, fall, hearing impaired  Orthopedic Precautions: spinal precautions  Braces: Cervical collar    Recommendations:     Discharge Recommendations: Low Intensity Therapy  Level of Assistance Recommended at Discharge: Intermittent assistance   Discharge Equipment Recommendations: bath bench  Barriers to discharge: None    Assessment:     Bridget Montgomery is a 88 y.o. female admitted with a medical diagnosis of Diplopia. Patient agreeable to PT treatment this AM. Patient with gradual progression of mobility toward goals as per POC. Patient functioning at a Supervision level for functional transfers and ambulation. SBA required for safety during curb step negotiation and stair training. Patient pleasant and motivated to engage in therapy. Patient will benefit from continued SNF rehabilitation services to address deficits as well as progress mobility towards PLOF and increased functional independence for safe transition to home environment at time of discharge.    Performance deficits affecting function: weakness, impaired endurance, impaired self care skills, impaired functional mobility, gait instability, impaired balance, decreased lower extremity function, decreased upper extremity function, decreased safety awareness, decreased ROM, other (comment) (spinal precautions).    Rehab Potential is good    Activity Tolerance: Good    Plan:     Patient to be seen 5 x/week to address the above listed problems via gait training, therapeutic activities, therapeutic exercises, neuromuscular re-education, wheelchair management/training    Plan of Care Expires: 11/23/23  Plan of Care Reviewed with: patient    Subjective     "I am doing okay."     Pain/Comfort:  Pain Rating 1: 0/10 (Pain not reported)  Pain Rating Post-Intervention 1: 0/10 " (Pain not reported)    Patient's cultural, spiritual, Baptism conflicts given the current situation:  no    Objective:     Communicated with nursing staff prior to session.  Patient found up in chair with cervical collar upon PT entry to room. Daughter present in room.     Therapeutic Activities and Exercises:   LE ergometer x 10 minutes at moderate resistance for LE strengthening, ROM, and endurance; no rest break required    Seated BLE exercises x 20 reps including ankle DF/PF, LAQs, hip flexion and hip abduction/adduction    Functional Mobility:  Transfers:     Sit to Stand:  supervision with rolling walker  Gait: Patient ambulated 190 feet using RW with Supervision. Steady pace of gait. No LOB.   Stairs:  Pt ascended/descended 8 stair(s) with No Assistive Device with bilateral handrails with Stand-by Assistance. Patient ascended/descended 4 inch curb step using RW with SBA.   Wheelchair Propulsion:  Pt propelled lightweight wheelchair x 192 feet on Level tile with  Bilateral upper extremity with Modified Independent.     AM-PAC 6 CLICK MOBILITY  18    Patient left up in chair with   present.    GOALS:   Multidisciplinary Problems       Physical Therapy Goals          Problem: Physical Therapy    Goal Priority Disciplines Outcome Goal Variances Interventions   Physical Therapy Goal     PT, PT/OT Ongoing, Progressing     Description: Goals to be met by: 23     Patient will increase functional independence with mobility by performin. Supine to sit with Modified [place] - In progress  2. Sit to supine with Modified [place] - In progress  3. Rolling to Left and Right with Modified [place] - In progress  4. Sit to stand transfer with Stand-by Assistance - MET  Updated Goal 10/30/23: Sit to stand transfer with Supervision - MET  Updated Goal 23: Sit to stand transfer with Mod I    5. Bed to chair transfer with Stand-by Assistance using Rolling Walker -  MET  Updated Goal 10/30/23: Bed to chair transfer with Supervision using RW - MET  Updated Goal 11/9/23: Bed to chair transfer with Mod I using RW    6. Gait  x 150 feet with Stand-by Assistance using Rolling Walker - MET  Updated Goal 10/30/23: Gait x 150 feet with Supervision using RW - MET  Updated Goal 11/9/23: Gait x 200 feet with Supervision using RW    7. Wheelchair propulsion x 100 feet with Supervision using bilateral upper extremities/bilateral lower extremities - MET    8. Added 11/2/23 Ascend/Descend 4 inch curb step with RW and (S) - In progress    9. Added 11/2/23 Ascend/descend 12 steps with RW and B rails and (S) - In progress                         Time Tracking:     PT Received On: 11/09/23  PT Start Time: 0946  PT Stop Time: 1026  PT Total Time (min): 40 min    Billable Minutes: Gait Training 12, Therapeutic Activity 10, and Therapeutic Exercise 18    Treatment Type: Treatment  PT/PTA: PT     Number of PTA visits since last PT visit: 0     11/09/2023

## 2023-11-09 NOTE — PT/OT/SLP PROGRESS
Occupational Therapy   Treatment    Name: Bridget Montgomery  MRN: [id number]  Admit Date: 10/23/2023  Admitting Diagnosis:  Diplopia    General Precautions: Standard, fall, hearing impaired   Orthopedic Precautions: spinal precautions   Braces: Cervical collar    Recommendations:     Discharge Recommendations:  Low Intensity Therapy  Level of Assistance Recommended at Discharge: Intermittent assistance for ADL's and homemaking tasks  Discharge Equipment Recommendations: bath bench  Barriers to discharge:  None    Assessment:     Bridget Montgomery is a 88 y.o. female with a medical diagnosis of Diplopia.  She presents with limitations in performance of self-care, functional mobility, and ADLs. Performance deficits affecting function are weakness, impaired endurance, impaired self care skills, impaired functional mobility, gait instability, impaired balance, decreased coordination, decreased upper extremity function, decreased lower extremity function, pain, impaired coordination, orthopedic precautions. Pt tolerated Tx without incident and is making progress but continues to require assist to perform self care tasks, functional mobility and functional transfers. Pt would continue to benefit from OT intervention to further functional (I)ce and safety.     Rehab Potential is good    Activity tolerance:  Good    Plan:     Patient to be seen 5 x/week to address the above listed problems via self-care/home management, therapeutic activities, therapeutic exercises    Plan of Care Expires: 11/24/23  Plan of Care Reviewed with: patient, daughter    Subjective     Communicated with: Nursing prior to session.     Pain/Comfort:  Pain Rating 1: 7/10  Location - Side 1: Bilateral  Location - Orientation 1: generalized  Location 1: shoulder  Pain Addressed 1: Pre-medicate for activity, Reposition, Distraction  Pain Rating Post-Intervention 1: 7/10    Patient's cultural, spiritual, Mormonism conflicts given the current  situation:  no    Objective:     Patient found  seated on toilet  with cervical collar upon OT entry to room.    Bed Mobility:    Pt seated in chair at onset of treatment session.     Functional Mobility/Transfers:  Patient completed Sit <> Stand Transfer with supervision with RW and stand by assistance  with  grab bars(s)   Patient completed Toilet Transfer Step Transfer technique with supervision with  rolling walker with elevated toilet with arm rests for assistance with sit to stand transfer  Patient completed  Shower Transfer Step Transfer technique with supervision with rolling walker to shower bench with SBA with grab bars for stand pivot transfer from shower bench to wc  Functional Mobility: Pt ambulated functional distance in bathroom from toilet to shower with RW and sup    Activities of Daily Living:  Grooming: modified independence seated sinkside  Bathing: stand by assistance to perform seated shower with pt standing with grab bars for lamar area with v/c to adhere to precautions  Upper Body Dressing: stand by assistance to doff/malgorzata pullover shirt and malgorzata cervical collar   Lower Body Dressing: supervision to doff/malgorzata pants with use of RW when managing pants over hips. Pt with Andreina to doff/malgorzata B socks with use of AE with (A) to tie shoe laces with v/c for technique with AE when donning socks. V/c to adhere to precautions  Toileting: supervision with use of RW when standing to manage pants over hips with lamar care performed seated    [facility] 6 Click ADL: 19    Treatment & Education:  Pt educated on:  - role of OT  - level of assistance  - energy conservation and task modification to maximized independence with ADL's and mobility   -  safety while performing functional transfers and self care tasks  - progress towards OT goals      Patient left up in chair with call button in reach and daughter present    GOALS:   Multidisciplinary Problems       Occupational Therapy Goals          Problem: Occupational  Therapy    Goal Priority Disciplines Outcome Interventions   Occupational Therapy Goal     OT, PT/OT Ongoing, Progressing    Description: Goals to be met by: 3 weeks     Patient will increase functional independence with ADLs by performing:    UE Dressing with Stand-by Assistance. -- Met  LE Dressing with Stand-by Assistance with use of AD and AE as needed. -- Met  Grooming while standing at sink with Supervision. -- Met  Toileting from bedside commode or toilet with Supervision for hygiene and clothing management. - met  Bathing from sitting at sink with Set-up Assistance. -- Ongoing  Rolling to Bilateral with Modified [place]. -- Ongoing  Supine to sit with Modified [place].-- Ongoing  Stand pivot transfers with Supervision with use of AD and AE as needed. -- Ongoing  Toilet transfer to bedside commode or toilet with Supervision with use of AD and AE as needed. -- met  Increased functional strength and endurance through use of UBE for at least 8 minutes with Min resistance. -- Met  Upper extremity exercise program 1x15 reps per handout, with assistance as needed. -- Ongoing  Pt will perform functional standing up to 10 minutes in prep for performance of functional ADLs in standing. -- Met                           Time Tracking:     OT Date of Treatment: 11/09/23  OT Start Time: 0830    OT Stop Time: 0912  OT Total Time (min): 42 min    Billable Minutes:Self Care/Home Management 42    11/9/2023

## 2023-11-09 NOTE — PROGRESS NOTES
[facility] - Skilled Nursing  Adult Nutrition  Progress Note    SUMMARY   Recommendations  Continue low phosphorus diet, RD following  Goals: PO to meet 75% of EEN by next RD follow up  Nutrition Goal Status: goal met  Communication of RD Recs: other (comment) (POC)    Assessment and Plan     Inadequate  oral intake related to decreased appetite, restrictive diet as evidenced by PO < 75%.     New     Plan  Mineral modified diet - low phosphorus  Collaboration with other providers  Mineral supplement therapy- Vit D, Ca carbonate                      Malnutrition Assessment 11/2     Skin (Micronutrient): edema  Teeth (Micronutrient): none  Tongue (Micronutrient): magenta  Neck/Chest (Micronutrient): muscle wasting  Musculoskeletal/Lower Extremities: muscle wasting           Orbital Region (Subcutaneous Fat Loss): mild depletion  Upper Arm Region (Subcutaneous Fat Loss): mild depletion  Thoracic and Lumbar Region: mild depletion   [place] Region (Muscle Loss): moderate depletion  Clavicle Bone Region (Muscle Loss): moderate depletion  Clavicle and Acromion Bone Region (Muscle Loss): severe depletion  Dorsal Hand (Muscle Loss): moderate depletion  Patellar Region (Muscle Loss): moderate depletion  Anterior Thigh Region (Muscle Loss): moderate depletion  Posterior Calf Region (Muscle Loss): moderate depletion                 Reason for Assessment  Reason For Assessment: RD follow-up  Diagnosis:  (CVA, Diplopia)  Relevant Medical History: debility, chronic constipation, COPD, CKD 4, OA, HTN, DVT, pre-diabetes, anemia, spinal stenosis  Interdisciplinary Rounds: attended    General Information Comments: novasource renal supplement was cancelled per daughter request, to dc in am, asked nurse to get a weight on her today. PO %. RD is not providing renal diet education prior to dc as daughters only take advice from her nephrologist.     Nutrition Discharge Planning: DC on renal diet    Nutrition/Diet History    Patient  "Reported Diet/Restrictions/Preferences: general  Spiritual, Cultural Beliefs, Zoroastrianism Practices, Values that Affect Care: no  Food Allergies: NKFA  Factors Affecting Nutritional Intake: None identified at this time    Anthropometrics    Temp: 97.1 °F (36.2 °C)  Height Method: Stated  Height: 5' 3" (160 cm)  Height (inches): 63 in  Weight Method: Bed Scale  Weight: 63.1 kg (139 lb 1.8 oz)  Weight (lb): 139.11 lb  Ideal Body Weight (IBW), Female: 115 lb  % Ideal Body Weight, Female (lb): 118.67 %  % Ideal Body Weight Malnutrition: 80-90% - mild deficit  BMI (Calculated): 24.6  BMI Grade: 18.5-24.9 - normal  Weight Loss: other (see comments)  Usual Body Weight (UBW), k.2 kg  Weight Change Amount:  (6% in last ten months)  % Usual Body Weight: 93.7  % Weight Change From Usual Weight: -6.5 %       Lab/Procedures/Meds    Pertinent Labs Reviewed: reviewed  Pertinent Labs Comments: Hg 9.0, Hct 27.5, BUN 42, GFR 54.2  Pertinent Medications Reviewed: reviewed  Pertinent Medications Comments: senna-docusate, polyethylene glycol,    Estimated/Assessed Needs    Weight Used For Calorie Calculations: 61.9 kg (136 lb 7.4 oz)  Energy Calorie Requirements (kcal): 1452  Energy Need Method: [place]-St Jeor (x 1.4(PAL))  Protein Requirements: 37-49  Weight Used For Protein Calculations: 61.9 kg (136 lb 7.4 oz) (.6-.8g//kg)  Fluid Requirements (mL): 1452 or per MD     RDA Method (mL): 1452  CHO Requirement: 178      Nutrition Prescription Ordered    Current Diet Order: low phosphorus  Nutrition Order Comments: PO 75%  Oral Nutrition Supplement: dc    Evaluation of Received Nutrient/Fluid Intake    I/O: no data  Energy Calories Required: meeting needs  Protein Required: meeting needs  Fluid Required: exceeds needs  Comments: LBM   Tolerance: tolerating  % Intake of Estimated Energy Needs: 75 - 100 %  % Meal Intake: 75 - 100 %    Nutrition Risk    Level of Risk/Frequency of Follow-up: low (one time per week)     Monitor and " Evaluation    Food and Nutrient Intake: food and beverage intake  Food and Nutrient Adminstration: diet order  Knowledge/Beliefs/Attitudes: food and nutrition knowledge/skill  Physical Activity and Function: nutrition-related ADLs and IADLs  Anthropometric Measurements: weight change  Biochemical Data, Medical Tests and Procedures: electrolyte and renal panel, glucose/endocrine profile, gastrointestinal profile  Nutrition-Focused Physical Findings: overall appearance     Nutrition Follow-Up    RD Follow-up?: Yes

## 2023-11-09 NOTE — PLAN OF CARE
Interdisciplinary team, Ivana House, RN Unit Director, Evelin Sanchez, RN Charge Nurse, Noemi Diop, , Rosmery Zeng, OT, Rehab, Noemi Tavarez, Activities, and Therese Wisdom, Dietician, spoke to patient and patient's daughter for care plan conference, weekly status update, and therapy progress update. Tentative discharge date set for 11/14/23.

## 2023-11-10 PROBLEM — H53.2 DIPLOPIA: Status: RESOLVED | Noted: 2023-10-24 | Resolved: 2023-11-10

## 2023-11-10 PROCEDURE — 25000003 PHARM REV CODE 250: Performed by: NURSE PRACTITIONER

## 2023-11-10 PROCEDURE — 97530 THERAPEUTIC ACTIVITIES: CPT | Mod: CO

## 2023-11-10 PROCEDURE — 11000004 HC SNF PRIVATE

## 2023-11-10 PROCEDURE — 25000003 PHARM REV CODE 250: Performed by: HOSPITALIST

## 2023-11-10 PROCEDURE — 25000003 PHARM REV CODE 250: Performed by: FAMILY MEDICINE

## 2023-11-10 PROCEDURE — 97110 THERAPEUTIC EXERCISES: CPT | Mod: CO

## 2023-11-10 RX ORDER — METHOCARBAMOL 500 MG/1
500 TABLET, FILM COATED ORAL 4 TIMES DAILY PRN
Qty: 12 TABLET | Refills: 0 | Status: SHIPPED | OUTPATIENT
Start: 2023-11-10 | End: 2023-11-13

## 2023-11-10 RX ORDER — FUROSEMIDE 20 MG/1
20 TABLET ORAL NIGHTLY PRN
Status: DISCONTINUED | OUTPATIENT
Start: 2023-11-10 | End: 2023-11-12

## 2023-11-10 RX ORDER — AMOXICILLIN 250 MG
2 CAPSULE ORAL 2 TIMES DAILY
Qty: 60 TABLET | Refills: 0 | Status: SHIPPED | OUTPATIENT
Start: 2023-11-10

## 2023-11-10 RX ORDER — SODIUM BICARBONATE 650 MG/1
650 TABLET ORAL DAILY
Qty: 30 TABLET | Refills: 0 | Status: SHIPPED | OUTPATIENT
Start: 2023-11-11 | End: 2024-11-10

## 2023-11-10 RX ORDER — PREGABALIN 75 MG/1
75 CAPSULE ORAL NIGHTLY
Qty: 30 CAPSULE | Refills: 0 | Status: SHIPPED | OUTPATIENT
Start: 2023-11-10 | End: 2023-12-12 | Stop reason: SDUPTHER

## 2023-11-10 RX ORDER — DICLOFENAC SODIUM 10 MG/G
2 GEL TOPICAL 3 TIMES DAILY PRN
Qty: 100 G | Refills: 0 | Status: SHIPPED | OUTPATIENT
Start: 2023-11-10

## 2023-11-10 RX ORDER — TALC
6 POWDER (GRAM) TOPICAL NIGHTLY PRN
Qty: 30 TABLET | Refills: 0 | Status: SHIPPED | OUTPATIENT
Start: 2023-11-10

## 2023-11-10 RX ORDER — OMEPRAZOLE 20 MG/1
20 CAPSULE, DELAYED RELEASE ORAL
Qty: 60 CAPSULE | Refills: 0 | Status: SHIPPED | OUTPATIENT
Start: 2023-11-10 | End: 2024-11-09

## 2023-11-10 RX ADMIN — POLYETHYLENE GLYCOL 3350 17 G: 17 POWDER, FOR SOLUTION ORAL at 09:11

## 2023-11-10 RX ADMIN — FLUTICASONE PROPIONATE 100 MCG: 50 SPRAY, METERED NASAL at 09:11

## 2023-11-10 RX ADMIN — METHOCARBAMOL 500 MG: 500 TABLET ORAL at 09:11

## 2023-11-10 RX ADMIN — CLOPIDOGREL BISULFATE 75 MG: 75 TABLET ORAL at 10:11

## 2023-11-10 RX ADMIN — SENNOSIDES AND DOCUSATE SODIUM 2 TABLET: 8.6; 5 TABLET ORAL at 09:11

## 2023-11-10 RX ADMIN — ACETAMINOPHEN 650 MG: 325 TABLET ORAL at 06:11

## 2023-11-10 RX ADMIN — TRAMADOL HYDROCHLORIDE 50 MG: 50 TABLET, COATED ORAL at 10:11

## 2023-11-10 RX ADMIN — Medication 1 CAPSULE: at 10:11

## 2023-11-10 RX ADMIN — SENNOSIDES AND DOCUSATE SODIUM 2 TABLET: 8.6; 5 TABLET ORAL at 10:11

## 2023-11-10 RX ADMIN — FLUTICASONE FUROATE AND VILANTEROL TRIFENATATE 1 PUFF: 100; 25 POWDER RESPIRATORY (INHALATION) at 09:11

## 2023-11-10 RX ADMIN — OMEPRAZOLE 20 MG: 20 CAPSULE, DELAYED RELEASE ORAL at 05:11

## 2023-11-10 RX ADMIN — CETIRIZINE HYDROCHLORIDE 10 MG: 5 TABLET, FILM COATED ORAL at 10:11

## 2023-11-10 RX ADMIN — Medication 6 MG: at 09:11

## 2023-11-10 RX ADMIN — FUROSEMIDE 20 MG: 20 TABLET ORAL at 10:11

## 2023-11-10 RX ADMIN — ASPIRIN 81 MG CHEWABLE TABLET 81 MG: 81 TABLET CHEWABLE at 10:11

## 2023-11-10 RX ADMIN — TRAMADOL HYDROCHLORIDE 50 MG: 50 TABLET, COATED ORAL at 09:11

## 2023-11-10 RX ADMIN — OMEPRAZOLE 20 MG: 20 CAPSULE, DELAYED RELEASE ORAL at 06:11

## 2023-11-10 RX ADMIN — ATORVASTATIN CALCIUM 80 MG: 40 TABLET, FILM COATED ORAL at 10:11

## 2023-11-10 RX ADMIN — ACETAMINOPHEN 650 MG: 325 TABLET ORAL at 02:11

## 2023-11-10 RX ADMIN — GUAIFENESIN 200 MG: 200 SOLUTION ORAL at 02:11

## 2023-11-10 RX ADMIN — CHOLECALCIFEROL TAB 25 MCG (1000 UNIT) 2000 UNITS: 25 TAB at 10:11

## 2023-11-10 RX ADMIN — CALCIUM CARBONATE (ANTACID) CHEW TAB 500 MG 500 MG: 500 CHEW TAB at 10:11

## 2023-11-10 RX ADMIN — AMLODIPINE BESYLATE 10 MG: 10 TABLET ORAL at 10:11

## 2023-11-10 RX ADMIN — PREGABALIN 75 MG: 75 CAPSULE ORAL at 09:11

## 2023-11-10 RX ADMIN — SODIUM BICARBONATE 650 MG TABLET 650 MG: at 10:11

## 2023-11-10 NOTE — PLAN OF CARE
[facility] - Skilled Nursing      HOME HEALTH ORDERS  FACE TO FACE ENCOUNTER    Patient Name: Bridget Montgomery  YOB: 1935    PCP: Charles Peacock MD   PCP Address: [address] / [place] [address]  PCP Phone Number: [phone]  PCP Fax: [phone]    Encounter Date: 11/10/2023    Admit to Home Health    Diagnoses:  Active Hospital Problems    Diagnosis  POA    Chronic constipation [K59.09]  Yes    CKD (chronic kidney disease) stage 4, GFR 15-29 ml/min [N18.4]  Unknown    Chronic obstructive pulmonary disease [J44.9]  Yes     - remote history of smoking      Spinal stenosis [M48.00]  Unknown    Idiopathic osteoarthritis [M19.90]  Yes    Mixed hyperlipidemia [E78.2]  Yes    Lumbar spondylosis [M47.816]  Yes    Essential hypertension [I10]  Yes    Chronic kidney disease, stage III (moderate) [N18.30]  Yes     - sees Lacey.  - last GFR was 36 (in 2022)        Resolved Hospital Problems    Diagnosis Date Resolved POA    *Diplopia [H53.2] 11/10/2023 Yes       Follow Up Appointments:  Future Appointments   Date Time Provider Department Center   12/11/2023  2:00 PM Lluvia Rahman MD [facility]   2/21/2024 10:15 AM Charles Peacock MD [facility]       Allergies:  Review of patient's allergies indicates:   Allergen Reactions    Cephalexin     Codeine     Meperidine      Other reaction(s): delerium, hallucination  Delerium  Delerium  Delerium      Nsaids (non-steroidal anti-inflammatory drug)     Penicillins     Tazarotene      Other reaction(s): rash, Unknown       Medications: Review discharge medications with patient and family and provide education.      I have seen and examined this patient within the last 30 days. My clinical findings that support the need for the home health skilled services and home bound status are the following:no   Weakness/numbness causing balance and gait disturbance due to Stroke and Weakness/Debility making it taxing to leave  home.  Requiring assistive device to leave home due to unsteady gait caused by  Stroke and Weakness/Debility.     Diet: Low Phos, thin liquids.    Labs:  SN to perform labs:  CBC: Biweekly; 1 month(s) and CMP: Biweekly; 1 and Report Lab results to PCP.    Referrals/ Consults  Physical Therapy to evaluate and treat. Evaluate for home safety and equipment needs; Establish/upgrade home exercise program. Perform / instruct on therapeutic exercises, gait training, transfer training, and Range of Motion.  Occupational Therapy to evaluate and treat. Evaluate home environment for safety and equipment needs. Perform/Instruct on transfers, ADL training, ROM, and therapeutic exercises.  Aide to provide assistance with personal care, ADLs, and vital signs.    Activities:   ambulate in house with assistance    Nursing:   Agency to admit patient within 24 hours of hospital discharge unless specified on physician order or at patient request    SN to complete comprehensive assessment including routine vital signs. Instruct on disease process and s/s of complications to report to MD. Review/verify medication list sent home with the patient at time of discharge  and instruct patient/caregiver as needed. Frequency may be adjusted depending on start of care date.     Skilled nurse to perform up to 3 visits PRN for symptoms related to diagnosis    Notify MD if SBP > 160 or < 90; DBP > 90 or < 50; HR > 120 or < 50; Temp > 101; O2 < 88%    Ok to schedule additional visits based on staff availability and patient request on consecutive days within the home health episode.    Miscellaneous   Routine Skin for Bedridden Patients: Instruct patient/caregiver to apply moisture barrier cream to all skin folds and wet areas in perineal area daily and after baths and all bowel movements.    Home Health Aide:  Nursing weekly. Physical Therapy Three times weekly, Occupational Therapy Three times weekly, and Home Health Aide Three times weekly    Wound  Care Orders  Buttock -- keep clean and dry   Minimize diaper use   Apply Triad     Always cleanse wound before applying Triad.    To remove Triad:   Use pH-balanced wound cleanser to soften Triad  Gently wipe without scrubbing  For complete removal, repeat as needed.     Gently spread Triad evenly over the area of application to the thickness of a dime.    I certify that this patient is confined to her home and needs intermittent skilled nursing care, physical therapy, and occupational therapy.      Carin SPIVEY

## 2023-11-10 NOTE — CARE UPDATE
Fax sent to [facility] for HH orders ahead of 11/14 prospective dc date. Awaiting response. Pt requested [facility].

## 2023-11-10 NOTE — PLAN OF CARE
Problem: Adult Inpatient Plan of Care  Goal: Plan of Care Review  Outcome: Ongoing, Progressing  Flowsheets (Taken 11/10/2023 0422)  Plan of Care Reviewed With: patient  Goal: Patient-Specific Goal (Individualized)  Outcome: Ongoing, Progressing  Goal: Absence of Hospital-Acquired Illness or Injury  Outcome: Ongoing, Progressing  Goal: Optimal Comfort and Wellbeing  Outcome: Ongoing, Progressing  Goal: Readiness for Transition of Care  Outcome: Ongoing, Progressing     Problem: Diabetes Comorbidity  Goal: Blood Glucose Level Within Targeted Range  Outcome: Ongoing, Progressing     Problem: Impaired Wound Healing  Goal: Optimal Wound Healing  Outcome: Ongoing, Progressing     Problem: Fall Injury Risk  Goal: Absence of Fall and Fall-Related Injury  Outcome: Ongoing, Progressing     Problem: Skin Injury Risk Increased  Goal: Skin Health and Integrity  Intervention: Optimize Skin Protection  Flowsheets (Taken 11/10/2023 0422)  Pressure Reduction Techniques:   frequent weight shift encouraged   heels elevated off bed  Skin Protection: incontinence pads utilized  Head of Bed (HOB) Positioning: HOB at 30-45 degrees

## 2023-11-10 NOTE — PT/OT/SLP PROGRESS
Occupational Therapy   Treatment    Name: Bridget Montgomery  MRN: [id number]  Admit Date: 10/23/2023  Admitting Diagnosis:  Diplopia    General Precautions: Standard, fall, hearing impaired   Orthopedic Precautions: spinal precautions   Braces: Cervical collar    Recommendations:     Discharge Recommendations:  Low Intensity Therapy  Level of Assistance Recommended at Discharge: Intermittent assistance for ADL's and homemaking tasks  Discharge Equipment Recommendations: bath bench  Barriers to discharge:  None    Assessment:     Bridget Montgomery is a 88 y.o. female with a medical diagnosis of Diplopia.  She presents with limitations in performance of self-care, functional mobility, and ADLs. Performance deficits affecting function are weakness, impaired endurance, impaired self care skills, impaired functional mobility, gait instability, impaired balance, decreased coordination, decreased upper extremity function, decreased lower extremity function, pain, impaired coordination, orthopedic precautions. Pt tolerated Tx without incident and is making progress but continues to require assist to perform self care tasks, functional mobility and functional transfers. Pt would continue to benefit from OT intervention to further functional (I)ce and safety.    Rehab Potential is good    Activity tolerance:  Good    Plan:     Patient to be seen 5 x/week to address the above listed problems via self-care/home management, therapeutic activities, therapeutic exercises    Plan of Care Expires: 11/24/23  Plan of Care Reviewed with: patient, daughter    Subjective     Communicated with: Nursing prior to session.     Pain/Comfort:  Pain Rating 1: 8/10  Location - Side 1: Bilateral  Location - Orientation 1: generalized  Location 1: shoulder  Pain Addressed 1: Pre-medicate for activity, Reposition, Distraction (ice packs at end of Tx)  Pain Rating Post-Intervention 1: 8/10    Patient's cultural, spiritual, Rastafarian conflicts  given the current situation:  no    Objective:     Patient found up in chair with cervical collar upon OT entry to room.    Bed Mobility:    Pt seated in chair at onset of treatment session.     Functional Mobility/Transfers:  Patient completed Sit <> Stand Transfer with stand by assistance  with  rolling walker   Functional Mobility: Pt ambulated 80 ft for therapeutic activity in therapy gym with SBA and RW    [facility] 6 Click ADL: 19    OT Exercises:  Wall Pulleys  seated for functional ROM and to increase functional reaching and independence with ADLS.   Pt performed UBE exercise for 10 minutes with Min resistance.  UE exercises performed to increase functional endurance and strength in order increase independence when performing self care tasks, functional ambulation, W/C propulsion, and functional standing activities .      Treatment & Education:  Pt participated in reaching activity while ambulating with SBA  with RW and AE for reaching. Activity with focus on standing tolerance, functional reaching, dynamic standing bal, crossing midline, and to promote independence with homemaking and self care tasks    Pt educated on:  - role of OT  - level of assistance  - energy conservation and task modification to maximized independence with ADL's and mobility   -  safety while performing functional transfers and self care tasks  - progress towards OT goals    Patient left up in chair with call button in reach and daughter present    GOALS:   Multidisciplinary Problems       Occupational Therapy Goals          Problem: Occupational Therapy    Goal Priority Disciplines Outcome Interventions   Occupational Therapy Goal     OT, PT/OT Ongoing, Progressing    Description: Goals to be met by: 3 weeks     Patient will increase functional independence with ADLs by performing:    UE Dressing with Stand-by Assistance. -- Met  LE Dressing with Stand-by Assistance with use of AD and AE as needed. -- Met  Grooming while standing at  sink with Supervision. -- Met  Toileting from bedside commode or toilet with Supervision for hygiene and clothing management. - met  Bathing from sitting at sink with Set-up Assistance. -- Ongoing  Rolling to Bilateral with Modified [place]. -- Ongoing  Supine to sit with Modified [place].-- Ongoing  Stand pivot transfers with Supervision with use of AD and AE as needed. -- Ongoing  Toilet transfer to bedside commode or toilet with Supervision with use of AD and AE as needed. -- met  Increased functional strength and endurance through use of UBE for at least 8 minutes with Min resistance. -- Met  Upper extremity exercise program 1x15 reps per handout, with assistance as needed. -- Ongoing  Pt will perform functional standing up to 10 minutes in prep for performance of functional ADLs in standing. -- Met                           Time Tracking:     OT Date of Treatment: 11/10/23  OT Start Time: 1310    OT Stop Time: 1343  OT Total Time (min): 33 min    Billable Minutes:Therapeutic Activity 16  Therapeutic Exercise 17    11/10/2023

## 2023-11-10 NOTE — PROGRESS NOTES
[facility]                                  Skilled Nursing Facility                   Progress Note     Admit Date: 10/23/2023  MIKAYLA 11/14/2023  Principal Problem:  Diplopia   HPI obtained from patient interview and chart review     Chief Complaint: Re-evaluation of medical treatment and therapy status: therapy progress, pain management, HTN, constipation f/up    HPI:   Bridget Montgomery is an 88 y.o. female with PMH CKD4, COPD, HTN, pre diabetes, and DVT who was admitted to hospital with new onset diplopia and dysarthria. Neuro w/up with neurology completed. Unable to definitively rule out cva on mri, however no acute intracranial findings identified on CTA, CT or MRI brain. Treated as acute CVA given symptoms and inconclusive imaging findings.  Patient will be treated at [facility] with PT and OT to improve functional status and ability to perform ADLs.   Allergies: Patient is allergic to cephalexin, codeine, meperidine, nsaids (non-steroidal anti-inflammatory drug), penicillins, and tazarotene.      Interval History  24 hour chart review completed. ANYA. NAD.   Vitals: Afebrile, hemodynamically stable.   - Monitor HTN, SBP improving overall.  Lab: All of today's labs reviewed; listed and addressed below.   I/O: Patient reports adequate appetite. LBM 11/9, constipation resolved.  Weight: up 6 lbs since last week, request nursing to re-weigh.  Pain management: Patient reports current multimodal pain regime is adequately controlling pain/discomfort.  Skilled Therapy: Patient progressing with therapy as tolerated and would continue to benefit from skilled PT and OT services to improve overall functional mobility and independence, strength, endurance, and safety.    Anticipating discharge 11/14. Home health and DME orders placed with skilled PT and OT recommendations.      ROS  Constitutional: Negative for fever   Eyes: Positive for  "blurred vision, double vision   Respiratory: Negative for cough, shortness of breath   Cardiovascular: Negative for chest pain, palpitations, and leg swelling.   Gastrointestinal: Negative for abdominal pain, diarrhea, nausea, vomiting. + constipation  Genitourinary: Negative for dysuria, frequency   Musculoskeletal:  + generalized weakness, + neck pain (chronic). Negative for back pain and myalgias.   Skin: Negative for itching and rash.   Neurological: Negative for dizziness, headaches.   Psychiatric/Behavioral: Negative for depression. The patient is not nervous/anxious.      24 hour Vital Sign Range   Temp:  [97.4 °F (36.3 °C)-98 °F (36.7 °C)]   Pulse:  [62-64]   Resp:  [18]   BP: (152-153)/(69-70)   SpO2:  [97 %-98 %]     Current BMI: Body mass index is 24.64 kg/m².    PEx  Constitutional: Patient appears debilitated.  No distress noted  HENT:   Head: Normocephalic and atraumatic.   Eyes: Pupils are equal, round  Neck: Limited range of motion. Neck supple. C-spine collar present  Cardiovascular: Normal rate, regular rhythm and normal heart sounds.    Pulmonary/Chest: Effort normal and breath sounds are clear  Abdominal: Soft. Bowel sounds are normal.   Musculoskeletal: Normal range of motion.   Neurological: Alert and oriented to person, place, and time.   Psychiatric: Normal mood and affect. Behavior is normal.   Skin: Skin is warm and dry. Full skin assessment completed by NP on initial exam.       Altered Skin Integrity Right anterior;distal;lower Leg , POA       No results for input(s): "GLUCOSE", "NA", "K", "CL", "CO2", "BUN", "CREATININE", "CALCIUM", "MG" in the last 24 hours.          No results for input(s): "WBC", "RBC", "HGB", "HCT", "PLT", "MCV", "MCH", "MCHC" in the last 24 hours.          No results for input(s): "POCTGLUCOSE" in the last 168 hours.       Assessment and Plan:    Diplopia  Acute stroke  In short term acute hospital per discharge summary:  "- New onset L CN III palsy and " "dysarthria  - CTH without acute intracranial process  - CTA without large vessel occlusion  - MRI brain demonstrated equivocal punctate lesion in the left paramedian midbrain, which because of study is difficult to exclude ischemic process  - TTE with bubble study ordered and demonstrated EF 61%, moderate mitral annular calcifications and mild regurgitation, and PA pressure of 39  - Loaded with  and plavix 300"  11/10/2023  - Continue DAPT x 21 days then aspirin only daily for life  - Continue high intensity statin therapy w atorvastatin to 80mg daily  - PT/OT/SLP consulted to evaluate and treat  - Maintain fall and delirium precautions  - Will need Opthalmology and stroke follow up outpatient    Electrolyte Imbalance  Hypokalemia, new 10/30  Hypocalcemia, new 10/30  - Acute, stable  - Monitor with scheduled metabolic profile and replete PRN to goal serum level WNL    Metabolic acidosis, new 10/30  Acute, stable  - continue 650 sodium bicarb p.o. daily    Azotemia, new 10/30  - BUN stable and improving; continue to encourage p.o. fluids    Chronic Constipation  - Continue scheduled senokot and miralax  - Adjust bowel regimen prn to avoid diarrhea  - Encourage fluid intake  - Encourage safe physical activity as tolerated  - Monitor bowel movement regularity and consistency  - Increased MiraLax 17 g bid and continue MiraLax daily p.r.n. and increased senna docusate 8.6-50 mg 2 tablet b.i.d.  - single dose suppository 11/9 w + BM after admin    Decreased Mobility   Physical Debility  Weakness   - Continue with PT/OT for gait training and strengthening and restoration of ADL's   - Encourage mobility, OOB in chair, and early ambulation as appropriate  - Fall precautions   - Monitor for bowel and bladder dysfunction  - Monitor for and prevent skin breakdown and pressure ulcers  - DVT prophylaxis with plavix, asa  - Per PT notes patient with increased ambulation distance with stand-by assist and rolling walker " today    Mixed HLD  Stroke Risk Factor.   - continue high intensity statin therapy w atorvastatin to 80mg daily  - Follow-up with PCP for monitoring per guidelines     Chronic Obstructive Pulmonary Disease (COPD)  - POA, stable  - Continue home LABA 1 puff daily  - Continue albuterol inhaler and neb prn  - Start guaifenesin q 4 prn, consider bid mucinex if needed  - Continue BID Flonase for sinus relief  - May consider montelukast 10 mg po daily if unstable  - Consider xopanex and CPT tx q 6 hours while awake if respiratory sx exascerbate    Stage 4 chronic kidney disease  - Chronic, stable  - Follows with Dr. Lyons  - Continue lasix 20 mg daily  - Avoid any nephrotoxic agents including NSAIDs, aminoglycosides, IV contrast (unless absolutely necessary), gadolinium, fleets and other phosphorous-based laxatives. Caution with antibiotics.  - Renally adjust medications based on patient's creatinine clearance  - Avoid Hypotension  - Low Phos Diet --> Reg diet with nephro and no milk products on 10/27 per daughter request and d/w RD.  - Routinely monitor renal function with scheduled metabolic profile  - Stable Cr at 0.9    Essential Hypertension  - Chronic, stable  - Continue amlodipine 10 mg daily   - Continue lasix 20 mg daily  - Monitor BP  - Adjust therapy to BP goal < 150/90  - Avoid hypotension   - Currently at 135/63    Spinal Stenosis  C1-C2 dens fracture  - Follows with neurosurgery  - Scheduled for surgery on 11/10/23; now cancelled d/t post acute stroke  - continue diclofenac na gel topically daily  - tylenol, tramadol, robaxin prn  - Increased Lyrica 75 mg nightly  - Follow up with neurosurgery outpatient    Osteoarthritis  Muscle Spasms, Chronic  - Chronic pain to R shoulder  - continue diclofenac na gel topically daily  - tylenol, tramadol, robaxin prn  - increased lyrica to 75 mg nightly and initiated tylenol 650 mg tid.   - Afebrile, vital signs stable. Currently at 135/63    H/o Type 2 Diabetes  -  Most recent A1c <6  - Continue to monitor w venous lab, stable    Anemia of Chronic Disease  - Baseline Hb 10-11  - Normocytic anemia   - Trend with scheduled CBC  - Transfuse for Hgb < 7.0  - Continue ferrous sulfate tablet every other day  - Follow up with PCP outpatient    Insomnia, Chronic  - Resume home tramadol 50 mg q HS  - Fall and delerium precautions.   - Note home polypharmacy risk factors  - F/up with PCP regarding long-term insomnia and pain management    Anticipate disposition:  Home with home health    IP [facility] RISK OF UNPLANNED READMISSION Model:     Follow-up needed during SNF stay-    Follow-up needed after discharge from SNF: PCP, Opthalmology    Future Appointments   Date Time Provider Department Center   12/11/2023  2:00 PM Lluvia Rahman MD [facility]   2/21/2024 10:15 AM Charles Peacock MD [facility]       Total time spent: 32 minutes  Description of Time: counseling provided on clinical condition, therapies provided, plan of care, emotional support, coordinating patient care with other care team members, reviewing and interpreting labs and imaging, collaboration with physician, initiating new orders, chart review, and documentation. See interval hx.      Carin Barclay NP  Department of Hospital Medicine   [facility]     DOS: 11/10/2023       Patient note was created using MModal Dictation.  Any errors in syntax or even information may not have been identified and edited on initial review prior to signing this note.

## 2023-11-10 NOTE — PLAN OF CARE
Problem: Adult Inpatient Plan of Care  Goal: Plan of Care Review  Outcome: Ongoing, Progressing  Flowsheets (Taken 11/9/2023 1800)  Plan of Care Reviewed With: patient  Goal: Patient-Specific Goal (Individualized)  Outcome: Ongoing, Progressing  Goal: Absence of Hospital-Acquired Illness or Injury  Outcome: Ongoing, Progressing  Goal: Optimal Comfort and Wellbeing  Outcome: Ongoing, Progressing  Goal: Readiness for Transition of Care  Outcome: Ongoing, Progressing     Problem: Diabetes Comorbidity  Goal: Blood Glucose Level Within Targeted Range  Outcome: Ongoing, Progressing     Problem: Impaired Wound Healing  Goal: Optimal Wound Healing  Outcome: Ongoing, Progressing     Problem: Fall Injury Risk  Goal: Absence of Fall and Fall-Related Injury  Outcome: Ongoing, Progressing     Problem: Skin Injury Risk Increased  Goal: Skin Health and Integrity  Outcome: Ongoing, Progressing

## 2023-11-10 NOTE — PLAN OF CARE
Problem: Adult Inpatient Plan of Care  Goal: Plan of Care Review  Outcome: Ongoing, Progressing  Flowsheets (Taken 11/10/2023 1534)  Plan of Care Reviewed With: patient     Problem: Adult Inpatient Plan of Care  Goal: Patient-Specific Goal (Individualized)  Flowsheets (Taken 11/10/2023 1534)  Individualized Care Needs: Continue daily PT to be able to return back home to optimal level of wellness  Goal: Absence of Hospital-Acquired Illness or Injury  Intervention: Identify and Manage Fall Risk  Flowsheets (Taken 11/10/2023 1534)  Safety Promotion/Fall Prevention:   assistive device/personal item within reach   instructed to call staff for mobility   lighting adjusted   Fall Risk reviewed with patient/family  Intervention: Prevent Skin Injury  Flowsheets (Taken 11/10/2023 1534)  Body Position: weight shifting  Skin Protection: incontinence pads utilized  Intervention: Prevent and Manage VTE (Venous Thromboembolism) Risk  Flowsheets (Taken 11/10/2023 1534)  Activity Management: Up in chair - L3  VTE Prevention/Management: bleeding risk assessed

## 2023-11-11 PROBLEM — I63.89 OTHER CEREBRAL INFARCTION: Status: ACTIVE | Noted: 2023-11-11

## 2023-11-11 PROBLEM — H53.2 DIPLOPIA: Status: ACTIVE | Noted: 2023-11-11

## 2023-11-11 PROCEDURE — 25000003 PHARM REV CODE 250: Performed by: HOSPITALIST

## 2023-11-11 PROCEDURE — 97116 GAIT TRAINING THERAPY: CPT | Mod: CQ

## 2023-11-11 PROCEDURE — 25000003 PHARM REV CODE 250: Performed by: NURSE PRACTITIONER

## 2023-11-11 PROCEDURE — 97110 THERAPEUTIC EXERCISES: CPT | Mod: CQ

## 2023-11-11 PROCEDURE — 11000004 HC SNF PRIVATE

## 2023-11-11 PROCEDURE — 25000003 PHARM REV CODE 250: Performed by: FAMILY MEDICINE

## 2023-11-11 RX ADMIN — FLUTICASONE PROPIONATE 100 MCG: 50 SPRAY, METERED NASAL at 09:11

## 2023-11-11 RX ADMIN — POLYETHYLENE GLYCOL 3350 17 G: 17 POWDER, FOR SOLUTION ORAL at 08:11

## 2023-11-11 RX ADMIN — FLUTICASONE FUROATE AND VILANTEROL TRIFENATATE 1 PUFF: 100; 25 POWDER RESPIRATORY (INHALATION) at 09:11

## 2023-11-11 RX ADMIN — PREGABALIN 75 MG: 75 CAPSULE ORAL at 08:11

## 2023-11-11 RX ADMIN — ACETAMINOPHEN 650 MG: 325 TABLET ORAL at 06:11

## 2023-11-11 RX ADMIN — GUAIFENESIN 200 MG: 200 SOLUTION ORAL at 10:11

## 2023-11-11 RX ADMIN — CALCIUM CARBONATE (ANTACID) CHEW TAB 500 MG 500 MG: 500 CHEW TAB at 09:11

## 2023-11-11 RX ADMIN — Medication 6 MG: at 08:11

## 2023-11-11 RX ADMIN — POLYETHYLENE GLYCOL 3350 17 G: 17 POWDER, FOR SOLUTION ORAL at 09:11

## 2023-11-11 RX ADMIN — CHOLECALCIFEROL TAB 25 MCG (1000 UNIT) 2000 UNITS: 25 TAB at 09:11

## 2023-11-11 RX ADMIN — TRAMADOL HYDROCHLORIDE 50 MG: 50 TABLET, COATED ORAL at 10:11

## 2023-11-11 RX ADMIN — AMLODIPINE BESYLATE 10 MG: 10 TABLET ORAL at 09:11

## 2023-11-11 RX ADMIN — ASPIRIN 81 MG CHEWABLE TABLET 81 MG: 81 TABLET CHEWABLE at 09:11

## 2023-11-11 RX ADMIN — METHOCARBAMOL 500 MG: 500 TABLET ORAL at 10:11

## 2023-11-11 RX ADMIN — ATORVASTATIN CALCIUM 80 MG: 40 TABLET, FILM COATED ORAL at 09:11

## 2023-11-11 RX ADMIN — FUROSEMIDE 20 MG: 20 TABLET ORAL at 09:11

## 2023-11-11 RX ADMIN — CLOPIDOGREL BISULFATE 75 MG: 75 TABLET ORAL at 09:11

## 2023-11-11 RX ADMIN — FLUTICASONE PROPIONATE 100 MCG: 50 SPRAY, METERED NASAL at 10:11

## 2023-11-11 RX ADMIN — Medication 1 CAPSULE: at 09:11

## 2023-11-11 RX ADMIN — SODIUM BICARBONATE 650 MG TABLET 650 MG: at 09:11

## 2023-11-11 RX ADMIN — OMEPRAZOLE 20 MG: 20 CAPSULE, DELAYED RELEASE ORAL at 06:11

## 2023-11-11 RX ADMIN — CETIRIZINE HYDROCHLORIDE 10 MG: 5 TABLET, FILM COATED ORAL at 09:11

## 2023-11-11 RX ADMIN — TRAMADOL HYDROCHLORIDE 50 MG: 50 TABLET, COATED ORAL at 08:11

## 2023-11-11 RX ADMIN — ACETAMINOPHEN 650 MG: 325 TABLET ORAL at 02:11

## 2023-11-11 RX ADMIN — OMEPRAZOLE 20 MG: 20 CAPSULE, DELAYED RELEASE ORAL at 05:11

## 2023-11-11 NOTE — PT/OT/SLP PROGRESS
"Physical Therapy Treatment    Patient Name:  Bridget Montgomery   MRN:  [id number]  Admit Date: 10/23/2023  Admitting Diagnosis: Diplopia  Recent Surgeries: N/A    General Precautions: Standard, fall, hearing impaired  Orthopedic Precautions: spinal precautions  Braces: Cervical collar    Recommendations:     Discharge Recommendations: Low Intensity Therapy  Level of Assistance Recommended at Discharge: Intermittent assistance   Discharge Equipment Recommendations: bath bench  Barriers to discharge: None    Assessment:     Bridget Montgomery is a 88 y.o. female admitted with a medical diagnosis of Diplopia . Pt tolerated well, pt completed all functional mobility with Supervision. Pt progressing well, pleasant and would continue to benefit from skilled PT services to improve overall functional mobility, strength and endurance.      Performance deficits affecting function: weakness, impaired endurance, impaired self care skills, impaired functional mobility, gait instability, impaired balance, decreased lower extremity function, decreased upper extremity function, decreased safety awareness, decreased ROM, other (comment) (spinal precautions).    Rehab Potential is good    Activity Tolerance: Good    Plan:     Patient to be seen 5 x/week to address the above listed problems via gait training, therapeutic activities, therapeutic exercises, neuromuscular re-education, wheelchair management/training    Plan of Care Expires: 11/23/23  Plan of Care Reviewed with: patient    Subjective     Pt agreeable for PT.     Pain/Comfort:  Pain Rating 1:  ("just regular pain")  Location - Side 1: Bilateral  Location - Orientation 1: generalized  Location 1: shoulder  Pain Addressed 1: Reposition, Cessation of Activity, Distraction  Pain Rating Post-Intervention 1:  (not rated)    Patient's cultural, spiritual, Synagogue conflicts given the current situation:  no    Objective:      Patient found up in chair with cervical collar upon " "PT entry to room.     Therapeutic Activities and Exercises: Seated TE: hip flex, hip abd/add, LAQ X 20 reps for BLE strengthening     LBE X 15 min For BLE strengthening, endurance and ROM. Mod Res    Patient educated on role of therapy, goals of session, and benefits of out of bed mobility.   Instructed on use of call button and importance of calling nursing staff for assistance with mobility   Questions/concerns addressed within PTA scope of practice  Pt verbalized understanding    Functional Mobility:  Transfers:     Sit to Stand:  supervision with rolling walker  Gait: pt amb ~240 ft S with RW, no LOB. Step through gait  Stairs:  Pt ascended/descended 8 stair(s) with No Assistive Device with bilateral handrails with Supervision or Set-up Assistance.   4" curb: pt asc/desc curb with Sup and RW  Wheelchair Propulsion:  Pt propelled lightweight wheelchair x ~100 feet on Level tile with  Bilateral upper extremity with Supervision or Set-up Assistance.     AM-PAC 6 CLICK MOBILITY  18    Patient left up in chair with call button in reach and daughter present.    GOALS:   Multidisciplinary Problems       Physical Therapy Goals          Problem: Physical Therapy    Goal Priority Disciplines Outcome Goal Variances Interventions   Physical Therapy Goal     PT, PT/OT Ongoing, Progressing     Description: Goals to be met by: 23     Patient will increase functional independence with mobility by performin. Supine to sit with Modified [place] - In progress  2. Sit to supine with Modified [place] - In progress  3. Rolling to Left and Right with Modified [place] - In progress  4. Sit to stand transfer with Stand-by Assistance - MET  Updated Goal 10/30/23: Sit to stand transfer with Supervision - MET  Updated Goal 23: Sit to stand transfer with Mod I    5. Bed to chair transfer with Stand-by Assistance using Rolling Walker - MET  Updated Goal 10/30/23: Bed to chair transfer with Supervision using " RW - MET  Updated Goal 11/9/23: Bed to chair transfer with Mod I using RW    6. Gait  x 150 feet with Stand-by Assistance using Rolling Walker - MET  Updated Goal 10/30/23: Gait x 150 feet with Supervision using RW - MET  Updated Goal 11/9/23: Gait x 200 feet with Supervision using RW    7. Wheelchair propulsion x 100 feet with Supervision using bilateral upper extremities/bilateral lower extremities - MET    8. Added 11/2/23 Ascend/Descend 4 inch curb step with RW and (S) - In progress    9. Added 11/2/23 Ascend/descend 12 steps with RW and B rails and (S) - In progress                         Time Tracking:     PT Received On: 11/11/23  PT Start Time: 0854  PT Stop Time: 0932  PT Total Time (min): 38 min    Billable Minutes: Gait Training 13 and Therapeutic Exercise 25    Treatment Type: Treatment  PT/PTA: PTA     Number of PTA visits since last PT visit: 1 11/11/2023

## 2023-11-11 NOTE — PROGRESS NOTES
[facility]                                  Skilled Nursing Facility                   Progress Note     Admit Date: 10/23/2023  MIKAYLA 11/14/2023  Principal Problem:  Diplopia   HPI obtained from patient interview and chart review     Chief Complaint: Re-evaluation of medical treatment and therapy status: therapy progress, pain management, HTN, duiresis, liquid stool    HPI:   Bridget Montgomery is an 88 y.o. female with PMH CKD4, COPD, HTN, pre diabetes, and DVT who was admitted to hospital with new onset diplopia and dysarthria. Neuro w/up with neurology completed. Unable to definitively rule out cva on mri, however no acute intracranial findings identified on CTA, CT or MRI brain. Treated as acute CVA given symptoms and inconclusive imaging findings.  Patient will be treated at [facility] with PT and OT to improve functional status and ability to perform ADLs.   Allergies: Patient is allergic to cephalexin, codeine, meperidine, nsaids (non-steroidal anti-inflammatory drug), penicillins, and tazarotene.      Interval History  24 hour chart review completed. ANYA. NAD.   Vitals: Afebrile, hemodynamically stable.   - Monitor HTN, SBP improving overall.  - prn lasix to be admin this afternoon for increased BLE at pt / family request  I/O: Patient reports adequate appetite. New liquid stool today, pt was constipated prior to suppository admin 11/08. Obtain KUB.  Pain management: Patient reports current multimodal pain regime is adequately controlling pain/discomfort.  Skilled Therapy: Patient progressing with therapy as tolerated and would continue to benefit from skilled PT and OT services to improve overall functional mobility and independence, strength, endurance, and safety.        ROS  Constitutional: Negative for fever   Eyes: Positive for blurred vision, double vision   Respiratory: Negative for cough, shortness of breath  "  Cardiovascular: Negative for chest pain, palpitations, and leg swelling.   Gastrointestinal: Negative for abdominal pain, diarrhea, nausea, vomiting. + constipation  Genitourinary: Negative for dysuria, frequency   Musculoskeletal:  + generalized weakness, + neck pain (chronic). Negative for back pain and myalgias.   Skin: Negative for itching and rash.   Neurological: Negative for dizziness, headaches.   Psychiatric/Behavioral: Negative for depression. The patient is not nervous/anxious.      24 hour Vital Sign Range   Temp:  [97.4 °F (36.3 °C)]   Pulse:  [71-74]   Resp:  [18]   BP: (146-148)/(63-65)   SpO2:  [97 %]     Current BMI: Body mass index is 24.64 kg/m².    PEx  Constitutional: Patient appears debilitated.  No distress noted  HENT:   Head: Normocephalic and atraumatic.   Eyes: Pupils are equal, round  Neck: Limited range of motion. Neck supple. C-spine collar present  Cardiovascular: Normal rate, regular rhythm and normal heart sounds.    Pulmonary/Chest: Effort normal and breath sounds are clear  Abdominal: Soft. Bowel sounds are normal.   Musculoskeletal: Normal range of motion.   Neurological: Alert and oriented to person, place, and time.   Psychiatric: Normal mood and affect. Behavior is normal.   Skin: Skin is warm and dry. Full skin assessment completed by NP on initial exam.       Altered Skin Integrity Right anterior;distal;lower Leg , POA       No results for input(s): "GLUCOSE", "NA", "K", "CL", "CO2", "BUN", "CREATININE", "CALCIUM", "MG" in the last 24 hours.          No results for input(s): "WBC", "RBC", "HGB", "HCT", "PLT", "MCV", "MCH", "MCHC" in the last 24 hours.          No results for input(s): "POCTGLUCOSE" in the last 168 hours.       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  - CTH without acute intracranial process  - CTA without large vessel occlusion  - MRI brain demonstrated " "equivocal punctate lesion in the left paramedian midbrain, which because of study is difficult to exclude ischemic process  - TTE with bubble study ordered and demonstrated EF 61%, moderate mitral annular calcifications and mild regurgitation, and PA pressure of 39  - Loaded with  and plavix 300"  11/11/2023  - Continue DAPT x 21 days then aspirin only daily for life  - Continue high intensity statin therapy w atorvastatin to 80mg daily  - PT/OT/SLP consulted to evaluate and treat  - Maintain fall and delirium precautions  - Will need Opthalmology and stroke follow up outpatient    Liquid Stool  New 11/11  - pt was constipated prior to suppository admin 11/08.   - Obtain KUB.    Electrolyte Imbalance  Hypokalemia, new 10/30  Hypocalcemia, new 10/30  - Acute, stable  - Monitor with scheduled metabolic profile and replete PRN to goal serum level WNL    Metabolic acidosis, new 10/30  Acute, stable  - continue 650 sodium bicarb p.o. daily    Azotemia, new 10/30  - BUN stable and improving; continue to encourage p.o. fluids    Chronic Constipation  - Continue scheduled senokot and miralax  - Adjust bowel regimen prn to avoid diarrhea  - Encourage fluid intake  - Encourage safe physical activity as tolerated  - Monitor bowel movement regularity and consistency  - Increased MiraLax 17 g bid and continue MiraLax daily p.r.n. and increased senna docusate 8.6-50 mg 2 tablet b.i.d.  - single dose suppository 11/9    Decreased Mobility   Physical Debility  Weakness   - Continue with PT/OT for gait training and strengthening and restoration of ADL's   - Encourage mobility, OOB in chair, and early ambulation as appropriate  - Fall precautions   - Monitor for bowel and bladder dysfunction  - Monitor for and prevent skin breakdown and pressure ulcers  - DVT prophylaxis with plavix, asa  - Per PT notes patient with increased ambulation distance with stand-by assist and rolling walker today    Mixed HLD  Stroke Risk Factor. "   - continue high intensity statin therapy w atorvastatin to 80mg daily  - Follow-up with PCP for monitoring per guidelines     Chronic Obstructive Pulmonary Disease (COPD)  - POA, stable  - Continue home LABA 1 puff daily  - Continue albuterol inhaler and neb prn  - Start guaifenesin q 4 prn, consider bid mucinex if needed  - Continue BID Flonase for sinus relief  - May consider montelukast 10 mg po daily if unstable  - Consider xopanex and CPT tx q 6 hours while awake if respiratory sx exascerbate    Stage 4 chronic kidney disease  - Chronic, stable  - Follows with Dr. Lyons  - Continue lasix 20 mg daily  - Avoid any nephrotoxic agents including NSAIDs, aminoglycosides, IV contrast (unless absolutely necessary), gadolinium, fleets and other phosphorous-based laxatives. Caution with antibiotics.  - Renally adjust medications based on patient's creatinine clearance  - Avoid Hypotension  - Low Phos Diet --> Reg diet with nephro and no milk products on 10/27 per daughter request and d/w RD.  - Routinely monitor renal function with scheduled metabolic profile  - Stable Cr at 0.9    Essential Hypertension  - Chronic, stable  - Continue amlodipine 10 mg daily   - Continue lasix 20 mg daily  - Monitor BP  - Adjust therapy to BP goal < 150/90  - Avoid hypotension   - Currently at 135/63    Spinal Stenosis  C1-C2 dens fracture  - Follows with neurosurgery  - Scheduled for surgery on 11/10/23; now cancelled d/t post acute stroke  - continue diclofenac na gel topically daily  - tylenol, tramadol, robaxin prn  - Increased Lyrica 75 mg nightly  - Follow up with neurosurgery outpatient    Osteoarthritis  Muscle Spasms, Chronic  - Chronic pain to R shoulder  - continue diclofenac na gel topically daily  - tylenol, tramadol, robaxin prn  - increased lyrica to 75 mg nightly and initiated tylenol 650 mg tid.   - Afebrile, vital signs stable. Currently at 135/63    H/o Type 2 Diabetes  - Most recent A1c <6  - Continue to  monitor w venous lab, stable    Anemia of Chronic Disease  - Baseline Hb 10-11  - Normocytic anemia   - Trend with scheduled CBC  - Transfuse for Hgb < 7.0  - Continue ferrous sulfate tablet every other day  - Follow up with PCP outpatient    Insomnia, Chronic  - Resume home tramadol 50 mg q HS  - Fall and delerium precautions.   - Note home polypharmacy risk factors  - F/up with PCP regarding long-term insomnia and pain management    Anticipate disposition:  Home with home health    IP [facility] RISK OF UNPLANNED READMISSION Model:     Follow-up needed during SNF stay-    Follow-up needed after discharge from SNF: PCP, Opthalmology    Future Appointments   Date Time Provider Department Center   12/11/2023  2:00 PM Lluvia Rahman MD [facility]   2/21/2024 10:15 AM Charles Peacock MD [facility]       Total time spent: 34 minutes  Description of Time: counseling provided on clinical condition, therapies provided, plan of care, emotional support, coordinating patient care with other care team members, reviewing and interpreting labs and imaging, collaboration with physician, initiating new orders, chart review, and documentation. See interval hx.      Carin Barclay NP  Department of Hospital Medicine   [facility]- Skilled Nursing [facility]     DOS: 11/11/2023       Patient note was created using MModal Dictation.  Any errors in syntax or even information may not have been identified and edited on initial review prior to signing this note.

## 2023-11-12 PROCEDURE — 25000003 PHARM REV CODE 250: Performed by: NURSE PRACTITIONER

## 2023-11-12 PROCEDURE — 11000004 HC SNF PRIVATE

## 2023-11-12 PROCEDURE — 25000003 PHARM REV CODE 250: Performed by: FAMILY MEDICINE

## 2023-11-12 PROCEDURE — 25000003 PHARM REV CODE 250: Performed by: HOSPITALIST

## 2023-11-12 RX ORDER — FUROSEMIDE 20 MG/1
20 TABLET ORAL DAILY PRN
Status: DISCONTINUED | OUTPATIENT
Start: 2023-11-12 | End: 2023-11-14 | Stop reason: HOSPADM

## 2023-11-12 RX ADMIN — ASPIRIN 81 MG CHEWABLE TABLET 81 MG: 81 TABLET CHEWABLE at 08:11

## 2023-11-12 RX ADMIN — TRAMADOL HYDROCHLORIDE 50 MG: 50 TABLET, COATED ORAL at 08:11

## 2023-11-12 RX ADMIN — CALCIUM CARBONATE (ANTACID) CHEW TAB 500 MG 500 MG: 500 CHEW TAB at 08:11

## 2023-11-12 RX ADMIN — CHOLECALCIFEROL TAB 25 MCG (1000 UNIT) 2000 UNITS: 25 TAB at 08:11

## 2023-11-12 RX ADMIN — ACETAMINOPHEN 650 MG: 325 TABLET ORAL at 06:11

## 2023-11-12 RX ADMIN — GUAIFENESIN 200 MG: 200 SOLUTION ORAL at 08:11

## 2023-11-12 RX ADMIN — AMLODIPINE BESYLATE 10 MG: 10 TABLET ORAL at 08:11

## 2023-11-12 RX ADMIN — ATORVASTATIN CALCIUM 80 MG: 40 TABLET, FILM COATED ORAL at 08:11

## 2023-11-12 RX ADMIN — ACETAMINOPHEN 650 MG: 325 TABLET ORAL at 01:11

## 2023-11-12 RX ADMIN — CLOPIDOGREL BISULFATE 75 MG: 75 TABLET ORAL at 08:11

## 2023-11-12 RX ADMIN — SENNOSIDES AND DOCUSATE SODIUM 2 TABLET: 8.6; 5 TABLET ORAL at 08:11

## 2023-11-12 RX ADMIN — POLYETHYLENE GLYCOL 3350 17 G: 17 POWDER, FOR SOLUTION ORAL at 08:11

## 2023-11-12 RX ADMIN — FLUTICASONE FUROATE AND VILANTEROL TRIFENATATE 1 PUFF: 100; 25 POWDER RESPIRATORY (INHALATION) at 08:11

## 2023-11-12 RX ADMIN — FUROSEMIDE 20 MG: 20 TABLET ORAL at 04:11

## 2023-11-12 RX ADMIN — OMEPRAZOLE 20 MG: 20 CAPSULE, DELAYED RELEASE ORAL at 04:11

## 2023-11-12 RX ADMIN — PREGABALIN 75 MG: 75 CAPSULE ORAL at 09:11

## 2023-11-12 RX ADMIN — OMEPRAZOLE 20 MG: 20 CAPSULE, DELAYED RELEASE ORAL at 06:11

## 2023-11-12 RX ADMIN — Medication 6 MG: at 09:11

## 2023-11-12 RX ADMIN — SODIUM BICARBONATE 650 MG TABLET 650 MG: at 08:11

## 2023-11-12 RX ADMIN — FLUTICASONE PROPIONATE 100 MCG: 50 SPRAY, METERED NASAL at 09:11

## 2023-11-12 RX ADMIN — FLUTICASONE PROPIONATE 100 MCG: 50 SPRAY, METERED NASAL at 08:11

## 2023-11-12 RX ADMIN — CETIRIZINE HYDROCHLORIDE 10 MG: 5 TABLET, FILM COATED ORAL at 08:11

## 2023-11-12 RX ADMIN — ACETAMINOPHEN 650 MG: 325 TABLET ORAL at 09:11

## 2023-11-12 RX ADMIN — METHOCARBAMOL 500 MG: 500 TABLET ORAL at 10:11

## 2023-11-12 RX ADMIN — Medication 1 CAPSULE: at 09:11

## 2023-11-12 RX ADMIN — FUROSEMIDE 20 MG: 20 TABLET ORAL at 08:11

## 2023-11-12 NOTE — PROGRESS NOTES
"                                                        [facility]                                  Skilled Nursing Facility                   Progress Note     Admit Date: 10/23/2023  MIKAYLA 11/14/2023  [id number] A  Principal Problem:  Diplopia   HPI obtained from patient interview and chart review     Chief Complaint: Re-evaluation of medical treatment and therapy status: therapy progress, pain management, HTN, liquid stool/constipation f/up, radiology review    HPI:   Bridget Montgomery is an 88 y.o. female with PMH CKD4, COPD, HTN, pre diabetes, and DVT who was admitted to hospital with new onset diplopia and dysarthria. Neuro w/up with neurology completed. Unable to definitively rule out cva on mri, however no acute intracranial findings identified on CTA, CT or MRI brain. Treated as acute CVA given symptoms and inconclusive imaging findings.  Patient will be treated at [facility] with PT and OT to improve functional status and ability to perform ADLs.   Allergies: Patient is allergic to cephalexin, codeine, meperidine, nsaids (non-steroidal anti-inflammatory drug), penicillins, and tazarotene.    Interval History   24 hour chart review completed. NAEON. NAD.   Patient seen sitting at table in activity room with family. Denies complaint.  Vitals: Afebrile, hemodynamically stable.   - Monitor HTN, SBP improving overall.  - BLE edema improving. Pt compliant with niraj hose  I/O: Patient reports adequate appetite. Liquid stool improving.   Radiology: KUB findings: "Mild moderate colonic stool retention.  Gas seen throughout the colon and small bowel.  No definite fecal impaction within the rectum".  Pain management: Patient reports current multimodal pain regime is adequately controlling pain/discomfort.  Skilled Therapy: Patient progressing with therapy as tolerated and would continue to benefit from skilled PT and OT services to improve overall functional mobility and independence, " strength, endurance, and safety.       Past Medical History: Patient has a past medical history of Allergy, Anemia, unspecified, Arthritis, CKD (chronic kidney disease) stage 4, GFR 15-29 ml/min, COPD (chronic obstructive pulmonary disease), Edema, HTN (hypertension), Hypocalcemia, Hyponatremia, and Osteoarthritis.    Past Surgical History: Patient has a past surgical history that includes Breast cyst excision; Foot surgery; and Caudal epidural steroid injection (N/A, 2/2/2023).    Social History: Patient reports that she has quit smoking. She has never used smokeless tobacco. She reports that she does not currently use alcohol. She reports that she does not use drugs.    Family History: Reviewed. No pertinent family history.    ROS  Constitutional: Negative for fever   Eyes: Negative for blurred vision, double vision   Respiratory: Negative for cough, shortness of breath   Cardiovascular: Negative for chest pain, palpitations, and leg swelling.   Gastrointestinal: Negative for abdominal pain, constipation, diarrhea, nausea, vomiting.   Genitourinary: Negative for dysuria, frequency   Musculoskeletal:  + generalized weakness, + neck pain (chronic). Negative for back pain and myalgias.   Skin: Negative for itching and rash.   Neurological: Negative for dizziness, headaches.   Psychiatric/Behavioral: Negative for depression. The patient is not nervous/anxious.      24 hour Vital Sign Range   Temp:  [97.3 °F (36.3 °C)-97.4 °F (36.3 °C)]   Pulse:  [60-71]   Resp:  [18]   BP: (140-170)/(67-80)   SpO2:  [97 %-98 %]     Current BMI: Body mass index is 24.64 kg/m².    PEx  Constitutional: Patient appears debilitated.  No distress noted  HENT:   Head: Normocephalic and atraumatic.   Eyes: Pupils are equal, round  Neck: Limited range of motion. Neck supple. C-spine collar present  Cardiovascular: Normal rate, regular rhythm and normal heart sounds.    Pulmonary/Chest: Effort normal and breath sounds are clear  Abdominal:  "Soft. Bowel sounds are normal.   Musculoskeletal: Normal range of motion.   Neurological: Alert and oriented to person, place, and time.   Psychiatric: Normal mood and affect. Behavior is normal.   Skin: Skin is warm and dry.        Altered Skin Integrity Right anterior;distal;lower Leg , POA       No results for input(s): "GLUCOSE", "NA", "K", "CL", "CO2", "BUN", "CREATININE", "CALCIUM", "MG" in the last 24 hours.    No results for input(s): "WBC", "RBC", "HGB", "HCT", "PLT", "MCV", "MCH", "MCHC" in the last 24 hours.    No results for input(s): "POCTGLUCOSE" in the last 168 hours.     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  - CTH without acute intracranial process  - CTA without large vessel occlusion  - MRI brain demonstrated equivocal punctate lesion in the left paramedian midbrain, which because of study is difficult to exclude ischemic process  - TTE with bubble study ordered and demonstrated EF 61%, moderate mitral annular calcifications and mild regurgitation, and PA pressure of 39  - Loaded with  and plavix 300"  11/11/2023  - Continue DAPT x 21 days then aspirin only daily for life  - Continue high intensity statin therapy w atorvastatin to 80mg daily  - PT/OT/SLP consulted to evaluate and treat  - Maintain fall and delirium precautions  - Will need Opthalmology and stroke follow up outpatient     Liquid Stool  New 11/11  - pt was constipated prior to suppository admin 11/08.   - KUB w/o obstruction or acute intraabdominal process     Electrolyte Imbalance  Hypokalemia, new 10/30  Hypocalcemia, new 10/30  - Acute, stable  - Monitor with scheduled metabolic profile and replete PRN to goal serum level WNL     Metabolic acidosis, new 10/30  Acute, stable  - continue 650 sodium bicarb p.o. daily     Azotemia, new 10/30  - BUN stable and improving; continue to encourage p.o. fluids     Chronic " Constipation  - Continue scheduled senokot and miralax  - Adjust bowel regimen prn to avoid diarrhea  - Encourage fluid intake  - Encourage safe physical activity as tolerated  - Monitor bowel movement regularity and consistency  - Increased MiraLax 17 g bid and continue MiraLax daily p.r.n. and increased senna docusate 8.6-50 mg 2 tablet b.i.d.  - single dose suppository 11/9     Decreased Mobility   Physical Debility  Weakness   - Continue with PT/OT for gait training and strengthening and restoration of ADL's   - Encourage mobility, OOB in chair, and early ambulation as appropriate  - Fall precautions   - Monitor for bowel and bladder dysfunction  - Monitor for and prevent skin breakdown and pressure ulcers  - DVT prophylaxis with plavix, asa  - Per PT notes patient with increased ambulation distance with stand-by assist and rolling walker today     Mixed HLD  Stroke Risk Factor.   - continue high intensity statin therapy w atorvastatin to 80mg daily  - Follow-up with PCP for monitoring per guidelines     Chronic Obstructive Pulmonary Disease (COPD)  - POA, stable  - Continue home LABA 1 puff daily  - Continue albuterol inhaler and neb prn  - Start guaifenesin q 4 prn, consider bid mucinex if needed  - Continue BID Flonase for sinus relief  - May consider montelukast 10 mg po daily if unstable  - Consider xopanex and CPT tx q 6 hours while awake if respiratory sx exascerbate     Stage 4 chronic kidney disease  - Chronic, stable  - Follows with Dr. Lyons  - Continue lasix 20 mg daily  - Avoid any nephrotoxic agents including NSAIDs, aminoglycosides, IV contrast (unless absolutely necessary), gadolinium, fleets and other phosphorous-based laxatives. Caution with antibiotics.  - Renally adjust medications based on patient's creatinine clearance  - Avoid Hypotension  - Low Phos Diet --> Reg diet with nephro and no milk products on 10/27 per daughter request and d/w RD.  - Routinely monitor renal function with  scheduled metabolic profile  - Stable Cr at 0.9     Essential Hypertension  - Chronic, stable  - Continue amlodipine 10 mg daily   - Continue lasix 20 mg daily  - Monitor BP  - Adjust therapy to BP goal < 150/90  - Avoid hypotension   - Currently at 135/63     Spinal Stenosis  C1-C2 dens fracture  - Follows with neurosurgery  - Scheduled for surgery on 11/10/23; now cancelled d/t post acute stroke  - continue diclofenac na gel topically daily  - tylenol, tramadol, robaxin prn  - Increased Lyrica 75 mg nightly  - Follow up with neurosurgery outpatient     Osteoarthritis  Muscle Spasms, Chronic  - Chronic pain to R shoulder  - continue diclofenac na gel topically daily  - tylenol, tramadol, robaxin prn  - increased lyrica to 75 mg nightly and initiated tylenol 650 mg tid.   - Afebrile, vital signs stable. Currently at 135/63     H/o Type 2 Diabetes  - Most recent A1c <6  - Continue to monitor w venous lab, stable     Anemia of Chronic Disease  - Baseline Hb 10-11  - Normocytic anemia   - Trend with scheduled CBC  - Transfuse for Hgb < 7.0  - Continue ferrous sulfate tablet every other day  - Follow up with PCP outpatient     Insomnia, Chronic  - Resume home tramadol 50 mg q HS  - Fall and delerium precautions.   - Note home polypharmacy risk factors  - F/up with PCP regarding long-term insomnia and pain management    Anticipate disposition:  Home with home health    Follow-up needed during SNF stay-    Follow-up needed after discharge from SNF: PCP     Future Appointments   Date Time Provider Department Center   12/11/2023  2:00 PM Lluvia Rahman MD [facility]   2/21/2024 10:15 AM Charles Peacock MD [facility]       Extended Visit  Total time spent: 33 minutes  Description of Time: counseling patient on clinical condition, therapies provided, plan of care, emotional support, coordinating patient care with other care team members, reviewing and interpreting labs and imaging, collaboration with  physician, initiating new orders, chart review, and documentation. See interval history.        Carin Barclay NP  Department of Hospital Medicine   [facility]     DOS: 11/12/2023       Patient note was created using MModal Dictation.  Any errors in syntax or even information may not have been identified and edited on initial review prior to signing this note.

## 2023-11-12 NOTE — PLAN OF CARE
Problem: Adult Inpatient Plan of Care  Goal: Plan of Care Review  Outcome: Ongoing, Progressing  Flowsheets (Taken 11/12/2023 1321)  Plan of Care Reviewed With: patient     Problem: Adult Inpatient Plan of Care  Goal: Patient-Specific Goal (Individualized)  Flowsheets (Taken 11/12/2023 1321)  Individualized Care Needs: Continue PT daily  Goal: Absence of Hospital-Acquired Illness or Injury  Intervention: Identify and Manage Fall Risk  Flowsheets (Taken 11/12/2023 1321)  Safety Promotion/Fall Prevention:   assistive device/personal item within reach   Fall Risk reviewed with patient/family   lighting adjusted   instructed to call staff for mobility  Intervention: Prevent Skin Injury  Flowsheets (Taken 11/12/2023 1321)  Body Position: weight shifting  Skin Protection: incontinence pads utilized  Intervention: Prevent and Manage VTE (Venous Thromboembolism) Risk  Flowsheets (Taken 11/12/2023 1321)  Activity Management: Up in chair - L3  VTE Prevention/Management: bleeding risk assessed

## 2023-11-13 LAB
ALBUMIN SERPL BCP-MCNC: 2.7 G/DL (ref 3.5–5.2)
ALP SERPL-CCNC: 161 U/L (ref 55–135)
ALT SERPL W/O P-5'-P-CCNC: 47 U/L (ref 10–44)
ANION GAP SERPL CALC-SCNC: 12 MMOL/L (ref 8–16)
AST SERPL-CCNC: 60 U/L (ref 10–40)
BASOPHILS # BLD AUTO: 0.05 K/UL (ref 0–0.2)
BASOPHILS NFR BLD: 1 % (ref 0–1.9)
BILIRUB SERPL-MCNC: 0.2 MG/DL (ref 0.1–1)
BUN SERPL-MCNC: 42 MG/DL (ref 8–23)
CALCIUM SERPL-MCNC: 8.8 MG/DL (ref 8.7–10.5)
CHLORIDE SERPL-SCNC: 106 MMOL/L (ref 95–110)
CO2 SERPL-SCNC: 24 MMOL/L (ref 23–29)
CREAT SERPL-MCNC: 1.1 MG/DL (ref 0.5–1.4)
DIFFERENTIAL METHOD: ABNORMAL
EOSINOPHIL # BLD AUTO: 0.1 K/UL (ref 0–0.5)
EOSINOPHIL NFR BLD: 1.9 % (ref 0–8)
ERYTHROCYTE [DISTWIDTH] IN BLOOD BY AUTOMATED COUNT: 12.7 % (ref 11.5–14.5)
EST. GFR  (NO RACE VARIABLE): 48.3 ML/MIN/1.73 M^2
GLUCOSE SERPL-MCNC: 77 MG/DL (ref 70–110)
HCT VFR BLD AUTO: 28 % (ref 37–48.5)
HGB BLD-MCNC: 8.8 G/DL (ref 12–16)
IMM GRANULOCYTES # BLD AUTO: 0.01 K/UL (ref 0–0.04)
IMM GRANULOCYTES NFR BLD AUTO: 0.2 % (ref 0–0.5)
LYMPHOCYTES # BLD AUTO: 1.3 K/UL (ref 1–4.8)
LYMPHOCYTES NFR BLD: 27.9 % (ref 18–48)
MAGNESIUM SERPL-MCNC: 1.8 MG/DL (ref 1.6–2.6)
MCH RBC QN AUTO: 29 PG (ref 27–31)
MCHC RBC AUTO-ENTMCNC: 31.4 G/DL (ref 32–36)
MCV RBC AUTO: 92 FL (ref 82–98)
MONOCYTES # BLD AUTO: 0.6 K/UL (ref 0.3–1)
MONOCYTES NFR BLD: 12.1 % (ref 4–15)
NEUTROPHILS # BLD AUTO: 2.7 K/UL (ref 1.8–7.7)
NEUTROPHILS NFR BLD: 56.9 % (ref 38–73)
NRBC BLD-RTO: 0 /100 WBC
PHOSPHATE SERPL-MCNC: 4.6 MG/DL (ref 2.7–4.5)
PLATELET # BLD AUTO: 350 K/UL (ref 150–450)
PMV BLD AUTO: 10.2 FL (ref 9.2–12.9)
POTASSIUM SERPL-SCNC: 4.3 MMOL/L (ref 3.5–5.1)
PROT SERPL-MCNC: 5.7 G/DL (ref 6–8.4)
RBC # BLD AUTO: 3.03 M/UL (ref 4–5.4)
SODIUM SERPL-SCNC: 142 MMOL/L (ref 136–145)
WBC # BLD AUTO: 4.8 K/UL (ref 3.9–12.7)

## 2023-11-13 PROCEDURE — 97116 GAIT TRAINING THERAPY: CPT

## 2023-11-13 PROCEDURE — 85025 COMPLETE CBC W/AUTO DIFF WBC: CPT | Performed by: HOSPITALIST

## 2023-11-13 PROCEDURE — 25000003 PHARM REV CODE 250: Performed by: HOSPITALIST

## 2023-11-13 PROCEDURE — 80053 COMPREHEN METABOLIC PANEL: CPT | Performed by: NURSE PRACTITIONER

## 2023-11-13 PROCEDURE — 97535 SELF CARE MNGMENT TRAINING: CPT

## 2023-11-13 PROCEDURE — 97110 THERAPEUTIC EXERCISES: CPT

## 2023-11-13 PROCEDURE — 97530 THERAPEUTIC ACTIVITIES: CPT

## 2023-11-13 PROCEDURE — 25000003 PHARM REV CODE 250: Performed by: FAMILY MEDICINE

## 2023-11-13 PROCEDURE — 83735 ASSAY OF MAGNESIUM: CPT | Performed by: HOSPITALIST

## 2023-11-13 PROCEDURE — 25000003 PHARM REV CODE 250: Performed by: NURSE PRACTITIONER

## 2023-11-13 PROCEDURE — 11000004 HC SNF PRIVATE

## 2023-11-13 PROCEDURE — 84100 ASSAY OF PHOSPHORUS: CPT | Performed by: HOSPITALIST

## 2023-11-13 PROCEDURE — 36415 COLL VENOUS BLD VENIPUNCTURE: CPT | Performed by: HOSPITALIST

## 2023-11-13 RX ADMIN — SENNOSIDES AND DOCUSATE SODIUM 2 TABLET: 8.6; 5 TABLET ORAL at 09:11

## 2023-11-13 RX ADMIN — CALCIUM CARBONATE (ANTACID) CHEW TAB 500 MG 500 MG: 500 CHEW TAB at 09:11

## 2023-11-13 RX ADMIN — PREGABALIN 75 MG: 75 CAPSULE ORAL at 08:11

## 2023-11-13 RX ADMIN — FUROSEMIDE 20 MG: 20 TABLET ORAL at 09:11

## 2023-11-13 RX ADMIN — FLUTICASONE PROPIONATE 100 MCG: 50 SPRAY, METERED NASAL at 08:11

## 2023-11-13 RX ADMIN — OMEPRAZOLE 20 MG: 20 CAPSULE, DELAYED RELEASE ORAL at 06:11

## 2023-11-13 RX ADMIN — Medication 1 CAPSULE: at 09:11

## 2023-11-13 RX ADMIN — METHOCARBAMOL 500 MG: 500 TABLET ORAL at 10:11

## 2023-11-13 RX ADMIN — CHOLECALCIFEROL TAB 25 MCG (1000 UNIT) 2000 UNITS: 25 TAB at 09:11

## 2023-11-13 RX ADMIN — TRAMADOL HYDROCHLORIDE 50 MG: 50 TABLET, COATED ORAL at 08:11

## 2023-11-13 RX ADMIN — FLUTICASONE FUROATE AND VILANTEROL TRIFENATATE 1 PUFF: 100; 25 POWDER RESPIRATORY (INHALATION) at 09:11

## 2023-11-13 RX ADMIN — Medication 6 MG: at 10:11

## 2023-11-13 RX ADMIN — FLUTICASONE PROPIONATE 100 MCG: 50 SPRAY, METERED NASAL at 09:11

## 2023-11-13 RX ADMIN — AMLODIPINE BESYLATE 10 MG: 10 TABLET ORAL at 09:11

## 2023-11-13 RX ADMIN — POLYETHYLENE GLYCOL 3350 17 G: 17 POWDER, FOR SOLUTION ORAL at 08:11

## 2023-11-13 RX ADMIN — CETIRIZINE HYDROCHLORIDE 10 MG: 5 TABLET, FILM COATED ORAL at 09:11

## 2023-11-13 RX ADMIN — ASPIRIN 81 MG CHEWABLE TABLET 81 MG: 81 TABLET CHEWABLE at 09:11

## 2023-11-13 RX ADMIN — ATORVASTATIN CALCIUM 80 MG: 40 TABLET, FILM COATED ORAL at 09:11

## 2023-11-13 RX ADMIN — GUAIFENESIN 200 MG: 200 SOLUTION ORAL at 10:11

## 2023-11-13 RX ADMIN — OMEPRAZOLE 20 MG: 20 CAPSULE, DELAYED RELEASE ORAL at 03:11

## 2023-11-13 RX ADMIN — ACETAMINOPHEN 650 MG: 325 TABLET ORAL at 01:11

## 2023-11-13 RX ADMIN — ACETAMINOPHEN 650 MG: 325 TABLET ORAL at 05:11

## 2023-11-13 RX ADMIN — SODIUM BICARBONATE 650 MG TABLET 650 MG: at 09:11

## 2023-11-13 NOTE — CARE UPDATE
Spoke to daughter, Dora, in room regarding purchase of tub bench from [facility] @75.00.  She accepted. Dora's # is [phone], cell.

## 2023-11-13 NOTE — PROGRESS NOTES
[facility]                                  Skilled Nursing Facility                   Progress Note     Admit Date: 10/23/2023  MIKAYLA 11/14/2023  Principal Problem:  Diplopia   HPI obtained from patient interview and chart review     Chief Complaint: Re-evaluation of medical treatment and therapy status: therapy progress, pain management, HTN, lab review, constipation    HPI:   Bridget Montgomery is an 88 y.o. female with PMH CKD4, COPD, HTN, pre diabetes, and DVT who was admitted to hospital with new onset diplopia and dysarthria. Neuro w/up with neurology completed. Unable to definitively rule out cva on mri, however no acute intracranial findings identified on CTA, CT or MRI brain. Treated as acute CVA given symptoms and inconclusive imaging findings.  Patient will be treated at [facility] with PT and OT to improve functional status and ability to perform ADLs.   Allergies: Patient is allergic to cephalexin, codeine, meperidine, nsaids (non-steroidal anti-inflammatory drug), penicillins, and tazarotene.      Interval History  24 hour chart review completed. ANYA. NAD.   Vitals: Afebrile, hemodynamically stable.   - Monitor HTN, SBP today 169 but improving overall. Cont lasix and norvasc  Lab: LFTs elevated likely 2/2 statin, cont to monitor  I/O: Patient reports adequate appetite. LBM 11/12   Weight: stable at 62.2kg, edema stable  Pain management: Patient reports current multimodal pain regime is adequately controlling pain/discomfort.  Skilled Therapy: Patient progressing with therapy as tolerated and would continue to benefit from skilled PT and OT services to improve overall functional mobility and independence, strength, endurance, and safety.    Plan for discharge tomorrow to home with HH      MICHAELLE  Constitutional: Negative for fever   Eyes: Positive for blurred vision, double vision   Respiratory: Negative for cough, shortness of  "breath   Cardiovascular: Negative for chest pain, palpitations, and leg swelling.   Gastrointestinal: Negative for abdominal pain, diarrhea, nausea, vomiting. + constipation  Genitourinary: Negative for dysuria, frequency   Musculoskeletal:  + generalized weakness, + neck pain (chronic). Negative for back pain and myalgias.   Skin: Negative for itching and rash.   Neurological: Negative for dizziness, headaches.   Psychiatric/Behavioral: Negative for depression. The patient is not nervous/anxious.      24 hour Vital Sign Range   Temp:  [97.2 °F (36.2 °C)-97.4 °F (36.3 °C)]   Pulse:  [64-66]   Resp:  [17-18]   BP: (140-169)/(66-71)   SpO2:  [95 %-98 %]     Current BMI: Body mass index is 24.29 kg/m².    PEx  Constitutional: Patient appears debilitated.  No distress noted  HENT:   Head: Normocephalic and atraumatic.   Eyes: Pupils are equal, round  Neck: Limited range of motion. Neck supple. C-spine collar present  Cardiovascular: Normal rate, regular rhythm and normal heart sounds.    Pulmonary/Chest: Effort normal and breath sounds are clear  Abdominal: Soft. Bowel sounds are normal.   Musculoskeletal: Normal range of motion.   Neurological: Alert and oriented to person, place, and time.   Psychiatric: Normal mood and affect. Behavior is normal.   Skin: Skin is warm and dry. Full skin assessment completed by NP on initial exam.       Altered Skin Integrity Right anterior;distal;lower Leg , POA       Recent Labs   Lab 11/13/23  0429      K 4.3      CO2 24   BUN 42*   CREATININE 1.1   CALCIUM 8.8   MG 1.8           Recent Labs   Lab 11/13/23 0429   WBC 4.80   RBC 3.03*   HGB 8.8*   HCT 28.0*      MCV 92   MCH 29.0   MCHC 31.4*           No results for input(s): "POCTGLUCOSE" in the last 168 hours.       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  - CTH without acute intracranial process  - CTA " "without large vessel occlusion  - MRI brain demonstrated equivocal punctate lesion in the left paramedian midbrain, which because of study is difficult to exclude ischemic process  - TTE with bubble study ordered and demonstrated EF 61%, moderate mitral annular calcifications and mild regurgitation, and PA pressure of 39  - Loaded with  and plavix 300"  - Continue DAPT x 21 days then aspirin only daily for life  - Continue high intensity statin therapy w atorvastatin to 80mg daily  - PT/OT/SLP consulted to evaluate and treat  - Maintain fall and delirium precautions  - Will need Opthalmology and stroke follow up outpatient    Electrolyte Imbalance  Hypokalemia  Hypocalcemia  - Acute, stable  - Monitor with scheduled metabolic profile and replete PRN to goal serum level WNL    Metabolic acidosis, new 10/30  Acute, stable  - continue 650 sodium bicarb p.o. daily    Azotemia, new 10/30  - BUN stable and improving; continue to encourage p.o. fluids    Chronic Constipation  - Continue scheduled senokot and miralax  - Adjust bowel regimen prn to avoid diarrhea  - Encourage fluid intake  - Encourage safe physical activity as tolerated  - Monitor bowel movement regularity and consistency  - Increased MiraLax 17 g bid and continue MiraLax daily p.r.n. and increased senna docusate 8.6-50 mg 2 tablet b.i.d.  - single dose suppository 11/9    Decreased Mobility   Physical Debility  Weakness   - Continue with PT/OT for gait training and strengthening and restoration of ADL's   - Encourage mobility, OOB in chair, and early ambulation as appropriate  - Fall precautions   - Monitor for bowel and bladder dysfunction  - Monitor for and prevent skin breakdown and pressure ulcers  - DVT prophylaxis with plavix, asa  - Per PT notes patient with increased ambulation distance with stand-by assist and rolling walker today    Mixed HLD  Stroke Risk Factor.   - continue high intensity statin therapy w atorvastatin to 80mg daily  - " Follow-up with PCP for monitoring per guidelines     Chronic Obstructive Pulmonary Disease (COPD)  - POA, stable  - Continue home LABA 1 puff daily  - Continue albuterol inhaler and neb prn  - Start guaifenesin q 4 prn, consider bid mucinex if needed  - Continue BID Flonase for sinus relief  - May consider montelukast 10 mg po daily if unstable  - Consider xopanex and CPT tx q 6 hours while awake if respiratory sx exascerbate    Stage 4 chronic kidney disease  - Chronic, stable  - Follows with Dr. Lyons  - Continue lasix 20 mg daily  - Avoid any nephrotoxic agents including NSAIDs, aminoglycosides, IV contrast (unless absolutely necessary), gadolinium, fleets and other phosphorous-based laxatives. Caution with antibiotics.  - Renally adjust medications based on patient's creatinine clearance  - Avoid Hypotension  - Low Phos Diet --> Reg diet with nephro and no milk products on 10/27 per daughter request and d/w RD.  - Routinely monitor renal function with scheduled metabolic profile  - Stable Cr at 0.9    Essential Hypertension  - Chronic, stable  - Continue amlodipine 10 mg daily   - Continue lasix 20 mg daily  - Monitor BP  - Adjust therapy to BP goal < 150/90  - Avoid hypotension   - Currently at 135/63    Spinal Stenosis  C1-C2 dens fracture  - Follows with neurosurgery  - Scheduled for surgery on 11/10/23; now cancelled d/t post acute stroke  - continue diclofenac na gel topically daily  - tylenol, tramadol, robaxin prn  - Increased Lyrica 75 mg nightly  - Follow up with neurosurgery outpatient    Osteoarthritis  Muscle Spasms, Chronic  - Chronic pain to R shoulder  - continue diclofenac na gel topically daily  - tylenol, tramadol, robaxin prn  - increased lyrica to 75 mg nightly and initiated tylenol 650 mg tid.   - Afebrile, vital signs stable. Currently at 135/63    H/o Type 2 Diabetes  - Most recent A1c <6  - Continue to monitor w venous lab, stable    Anemia of Chronic Disease  - Baseline Hb 10-11  -  Normocytic anemia   - Trend with scheduled CBC  - Transfuse for Hgb < 7.0  - Continue ferrous sulfate tablet every other day  - Follow up with PCP outpatient    Insomnia, Chronic  - Resume home tramadol 50 mg q HS  - Fall and delerium precautions.   - Note home polypharmacy risk factors  - F/up with PCP regarding long-term insomnia and pain management    Elevated liver enzymes, new  Asymptomatic  Likely 2/2 Atorvastatin 80mg but needs d/t acute stroke    Anticipate disposition:  Home with home health 11/14    IP [facility] RISK OF UNPLANNED READMISSION Model:     Follow-up needed during SNF stay-    Follow-up needed after discharge from SNF: PCP, Opthalmology    Future Appointments   Date Time Provider Department Center   11/27/2023  1:15 PM [facility], DO [facility]   12/11/2023  2:00 PM Lluvia Rahman MD [facility]   2/21/2024 10:15 AM Charles Peacock MD [facility]       Total time spent: 32 minutes  Description of Time: counseling provided on clinical condition, therapies provided, plan of care, emotional support, coordinating patient care with other care team members, reviewing and interpreting labs and imaging, collaboration with physician, initiating new orders, chart review, and documentation. See interval hx.      PATRIC PERDOMO  Department of Hospital Medicine   [facility]     DOS: 11/13/2023       Patient note was created using MModal Dictation.  Any errors in syntax or even information may not have been identified and edited on initial review prior to signing this note.

## 2023-11-13 NOTE — PLAN OF CARE
Problem: Adult Inpatient Plan of Care  Goal: Plan of Care Review  Outcome: Ongoing, Progressing  Goal: Patient-Specific Goal (Individualized)  Outcome: Ongoing, Progressing  Goal: Absence of Hospital-Acquired Illness or Injury  Outcome: Ongoing, Progressing  Goal: Optimal Comfort and Wellbeing  Outcome: Ongoing, Progressing  Goal: Readiness for Transition of Care  Outcome: Ongoing, Progressing     Problem: Diabetes Comorbidity  Goal: Blood Glucose Level Within Targeted Range  Outcome: Ongoing, Progressing  Intervention: Monitor and Manage Glycemia  Flowsheets (Taken 11/13/2023 5475)  Glycemic Management: blood glucose monitored     Problem: Impaired Wound Healing  Goal: Optimal Wound Healing  Outcome: Ongoing, Progressing     Problem: Fall Injury Risk  Goal: Absence of Fall and Fall-Related Injury  Outcome: Ongoing, Progressing     Problem: Skin Injury Risk Increased  Goal: Skin Health and Integrity  Outcome: Ongoing, Progressing  Intervention: Promote and Optimize Oral Intake  Flowsheets (Taken 11/13/2023 2443)  Oral Nutrition Promotion:   medicated   physical activity promoted     Pt AAOX4. Able to make needs known.  Accepted and tolerated medications whole.  Denies pain or discomfort at this time. No new skin issues noted.  Pt up in w/c at bedside. Safety maintained.

## 2023-11-13 NOTE — PT/OT/SLP PROGRESS
"Physical Therapy Treatment/Discharge Note    Patient Name:  Bridget Montgomery   MRN:  [id number]  Admit Date: 10/23/2023  Admitting Diagnosis: Diplopia  Recent Surgeries: N/A    General Precautions: Standard, fall, hearing impaired  Orthopedic Precautions: spinal precautions  Braces: Cervical collar    Recommendations:     Discharge Recommendations: Low Intensity Therapy  Level of Assistance Recommended at Discharge: Intermittent assistance   Discharge Equipment Recommendations: bath bench  Barriers to discharge: None    Assessment:     Bridget Montgomery is a 88 y.o. female admitted with a medical diagnosis of Diplopia. Patient agreeable to PT treatment this AM. Patient with steady progression of mobility over course of therapy. Patient able to complete bed mobility with Mod I. Functional transfers, gait training, stair training and curb step negotiation completed with Supervision. RW used for BUE support and overall balance/stability during mobility for prevention of falls. Patient pleasant and motivated to engage in therapy. Patient scheduled for D/C on 11/14/23 with recommendation for Home Health PT/OT to continue with progression of mobility toward maximal functional potential and increased functional independence.      Performance deficits affecting function: weakness, impaired endurance, impaired self care skills, impaired functional mobility, gait instability, impaired balance, decreased lower extremity function, decreased safety awareness, pain, other (comment) (spinal precautions).    Rehab Potential is good    Activity Tolerance: Good    Plan:     Patient to be seen 5 x/week to address the above listed problems via gait training, therapeutic activities, therapeutic exercises, neuromuscular re-education, wheelchair management/training    Plan of Care Expires: 11/23/23  Plan of Care Reviewed with: patient    Subjective     "I am going home tomorrow."     Pain/Comfort:  Pain Rating 1: 7/10  Location - Side " 1: Right  Location - Orientation 1: generalized  Location 1: shoulder  Pain Addressed 1: Pre-medicate for activity, Reposition, Distraction, Cessation of Activity  Pain Rating Post-Intervention 1: 7/10    Patient's cultural, spiritual, Jewish conflicts given the current situation:  no    Objective:     Communicated with nursing staff prior to session.  Patient found up in chair with cervical collar upon PT entry to room. Daughter present in room.    Therapeutic Activities and Exercises:   LE ergometer x 15 minutes at minimal/moderate resistance for LE strengthening, ROM, and endurance; no rest break required.     Functional Mobility:  Bed Mobility:     Rolling Left:  modified independence and on level mat without railings  Rolling Right: modified independence and on level mat without railings  Supine to Sit: modified independence and on level mat without railings  Sit to Supine: modified independence and on level mat without railings  Transfers:     Sit to Stand:  supervision with rolling walker  Chair to mat: supervision with  rolling walker  using  Step Transfer  Gait: Patient ambulated 220 feet using RW with Supervision. Steady gait speed with no LOB.   Stairs:  Pt ascended/descended 12 stair(s) with No Assistive Device with bilateral handrails with Supervision or Set-up Assistance. Patient ascended/descended 4 inch curb step using RW with Supervision.   Wheelchair Propulsion:  Pt propelled lightweight wheelchair x 126 feet on Level tile with  Bilateral upper extremity with Supervision or Set-up Assistance.    AM-PAC 6 CLICK MOBILITY  20    Patient left up in chair with call button in reach and nursing staff notified.    GOALS:   Multidisciplinary Problems       Physical Therapy Goals          Problem: Physical Therapy    Goal Priority Disciplines Outcome Goal Variances Interventions   Physical Therapy Goal     PT, PT/OT Adequate for Care Transition     Description: Goals to be met by: 11/23/23     Patient  will increase functional independence with mobility by performin. Supine to sit with Modified [place] - MET  2. Sit to supine with Modified [place] - MET  3. Rolling to Left and Right with Modified [place] - MET  4. Sit to stand transfer with Stand-by Assistance - MET  Updated Goal 10/30/23: Sit to stand transfer with Supervision - MET  Updated Goal 23: Sit to stand transfer with Mod I - not met, maintain Supervision for safety    5. Bed to chair transfer with Stand-by Assistance using Rolling Walker - MET  Updated Goal 10/30/23: Bed to chair transfer with Supervision using RW - MET  Updated Goal 23: Bed to chair transfer with Mod I using RW - not met, maintain Supervision for safety    6. Gait  x 150 feet with Stand-by Assistance using Rolling Walker - MET  Updated Goal 10/30/23: Gait x 150 feet with Supervision using RW - MET  Updated Goal 23: Gait x 200 feet with Supervision using RW - MET    7. Wheelchair propulsion x 100 feet with Supervision using bilateral upper extremities/bilateral lower extremities - MET    8. Added 23 Ascend/Descend 4 inch curb step with RW and (S) - MET    9. Added 23 Ascend/descend 12 steps with RW and B rails and (S) - MET                         Time Tracking:     PT Received On: 23  PT Start Time: 910  PT Stop Time: 948  PT Total Time (min): 38 min    Billable Minutes: Gait Training 15, Therapeutic Activity 8, and Therapeutic Exercise 15    Treatment Type: Treatment  PT/PTA: PT     Number of PTA visits since last PT visit: 0     2023

## 2023-11-13 NOTE — PT/OT/SLP PROGRESS
Occupational Therapy   Treatment    Name: Bridget Montgomery  MRN: [id number]  Admit Date: 10/23/2023  Admitting Diagnosis:  Diplopia    General Precautions: Standard, fall, hearing impaired   Orthopedic Precautions: spinal precautions   Braces: Cervical collar    Recommendations:     Discharge Recommendations:  Low Intensity Therapy  Level of Assistance Recommended at Discharge: Intermittent assistance for ADL's and homemaking tasks  Discharge Equipment Recommendations: bath bench  Barriers to discharge:  None    Assessment:     Bridget Montgomery is a 88 y.o. female with a medical diagnosis of Diplopia .  She presents with performance deficits affecting function including weakness, impaired endurance, impaired self care skills, impaired functional mobility, gait instability, impaired balance, decreased coordination, decreased upper extremity function, decreased lower extremity function, pain, impaired coordination, orthopedic precautions.    Pt tolerated Tx without incident and is making progress but continues to require assist to perform self care tasks, functional mobility and functional transfers .  She would continue to benefit from OT intervention to further her functional (I)ce and safety.      Rehab Potential is good    Activity tolerance:  Good    Plan:     Patient to be seen 5 x/week to address the above listed problems via self-care/home management, therapeutic activities, therapeutic exercises    Plan of Care Expires: 11/24/23  Plan of Care Reviewed with: patient, daughter    Subjective     Communicated with: nurse prior to session.  .    Pain/Comfort:  Pain Rating 1:  (no pain reported.)  Pain Rating Post-Intervention 1:  (no pain reported.)    Patient's cultural, spiritual, Mormonism conflicts given the current situation:  no    Objective:     Patient found up in chair with cervical collar upon OT entry to room.    Bed Mobility:    Pt seated in W/C at onset of therapy session.      Functional  Mobility/Transfers:  Patient completed Sit <> Stand Transfer with supervision  with  rolling walker   Patient completed Bed <> Chair Transfer using Stand Pivot technique with supervision with rolling walker  Functional Mobility: Pt ambulated with RW from therapy gym to her room  with RW and (S) a distance of 173 ft with V/Cs for safety.          Activities of Daily Living:  Lower Body Dressing: minimum assistance with Pt doffing/donning pants with (S) using RW to steady when standing . She was able to don/doff tennis but needed (A) to tie shoes only.     [facility] 6 Click ADL: 20    OT Exercises: Pt performed UBE exercise for 10 minutes with Min  resistance.  UE exercises performed to increase functional endurance and strength in order increase independence when performing self care tasks, functional ambulation, W/C propulsion, and functional standing activities .     Pt worked on functional standing activity consisting of standing with RW while reaching in all planes ,and  crossing of midline to facilitate (B) wt shifting and stability in standing in prep for performance of self care tasks and functional ADLS in standing.  Pt tolerated up to  8 Min. in standing with (S) and RW to steady.     Treatment & Education:  Pt edu on POC, safety when performing self care tasks  and safety when performing functional transfers and mobility.       Patient left up in chair with call button in reach and daughter present    GOALS:   Multidisciplinary Problems       Occupational Therapy Goals          Problem: Occupational Therapy    Goal Priority Disciplines Outcome Interventions   Occupational Therapy Goal     OT, PT/OT Ongoing, Progressing    Description: Goals to be met by: 3 weeks     Patient will increase functional independence with ADLs by performing:    UE Dressing with Stand-by Assistance. -- Met  LE Dressing with Stand-by Assistance with use of AD and AE as needed. -- Met  Grooming while standing at sink with Supervision.  -- Met  Toileting from bedside commode or toilet with Supervision for hygiene and clothing management. - met  Bathing from sitting at sink with Set-up Assistance. -- Ongoing  Rolling to Bilateral with Modified [place]. --MET  Supine to sit with Modified [place].-- Ongoing  Stand pivot transfers with Supervision with use of AD and AE as needed. -- MET  Toilet transfer to bedside commode or toilet with Supervision with use of AD and AE as needed. -- met  Increased functional strength and endurance through use of UBE for at least 8 minutes with Min resistance. -- Met  Upper extremity exercise program 1x15 reps per handout, with assistance as needed. -- Ongoing  Pt will perform functional standing up to 10 minutes in prep for performance of functional ADLs in standing. -- Met                           Time Tracking:     OT Date of Treatment: 11/13/23  OT Start Time: 1130    OT Stop Time: 1210  OT Total Time (min): 40 min    Billable Minutes:Self Care/Home Management 10  Therapeutic Activity 20  Therapeutic Exercise 10    11/13/2023

## 2023-11-13 NOTE — PLAN OF CARE
Problem: Occupational Therapy  Goal: Occupational Therapy Goal  Description: Goals to be met by: 3 weeks     Patient will increase functional independence with ADLs by performing:    UE Dressing with Stand-by Assistance. -- Met  LE Dressing with Stand-by Assistance with use of AD and AE as needed. -- Met  Grooming while standing at sink with Supervision. -- Met  Toileting from bedside commode or toilet with Supervision for hygiene and clothing management. - met  Bathing from sitting at sink with Set-up Assistance. -- Ongoing  Rolling to Bilateral with Modified [place]. --MET  Supine to sit with Modified [place].-- Ongoing  Stand pivot transfers with Supervision with use of AD and AE as needed. -- MET  Toilet transfer to bedside commode or toilet with Supervision with use of AD and AE as needed. -- met  Increased functional strength and endurance through use of UBE for at least 8 minutes with Min resistance. -- Met  Upper extremity exercise program 1x15 reps per handout, with assistance as needed. -- Ongoing  Pt will perform functional standing up to 10 minutes in prep for performance of functional ADLs in standing. -- Met      Outcome: Ongoing, Progressing

## 2023-11-13 NOTE — DISCHARGE INSTRUCTIONS
Your follow up primary care physician has been set up for you. See above. Please go to this appointment to discuss your hospitalization with your physician.

## 2023-11-13 NOTE — PLAN OF CARE
Patient with steady progression of mobility over course of therapy. D/C scheduled on 23.    Problem: Physical Therapy  Goal: Physical Therapy Goal  Description: Goals to be met by: 23     Patient will increase functional independence with mobility by performin. Supine to sit with Modified [place] - MET  2. Sit to supine with Modified [place] - MET  3. Rolling to Left and Right with Modified [place] - MET  4. Sit to stand transfer with Stand-by Assistance - MET  Updated Goal 10/30/23: Sit to stand transfer with Supervision - MET  Updated Goal 23: Sit to stand transfer with Mod I - not met, maintain Supervision for safety    5. Bed to chair transfer with Stand-by Assistance using Rolling Walker - MET  Updated Goal 10/30/23: Bed to chair transfer with Supervision using RW - MET  Updated Goal 23: Bed to chair transfer with Mod I using RW - not met, maintain Supervision for safety    6. Gait  x 150 feet with Stand-by Assistance using Rolling Walker - MET  Updated Goal 10/30/23: Gait x 150 feet with Supervision using RW - MET  Updated Goal 23: Gait x 200 feet with Supervision using RW - MET    7. Wheelchair propulsion x 100 feet with Supervision using bilateral upper extremities/bilateral lower extremities - MET    8. Added 23 Ascend/Descend 4 inch curb step with RW and (S) - MET    9. Added 23 Ascend/descend 12 steps with RW and B rails and (S) - MET    Outcome: Adequate for Care Transition   2023

## 2023-11-14 VITALS
BODY MASS INDEX: 24.3 KG/M2 | RESPIRATION RATE: 17 BRPM | WEIGHT: 137.13 LBS | DIASTOLIC BLOOD PRESSURE: 74 MMHG | HEIGHT: 63 IN | SYSTOLIC BLOOD PRESSURE: 169 MMHG | HEART RATE: 79 BPM | TEMPERATURE: 98 F | OXYGEN SATURATION: 94 %

## 2023-11-14 PROCEDURE — 25000003 PHARM REV CODE 250: Performed by: FAMILY MEDICINE

## 2023-11-14 PROCEDURE — 25000003 PHARM REV CODE 250: Performed by: NURSE PRACTITIONER

## 2023-11-14 PROCEDURE — 25000003 PHARM REV CODE 250: Performed by: HOSPITALIST

## 2023-11-14 RX ADMIN — POLYETHYLENE GLYCOL 3350 17 G: 17 POWDER, FOR SOLUTION ORAL at 08:11

## 2023-11-14 RX ADMIN — ACETAMINOPHEN 650 MG: 325 TABLET ORAL at 05:11

## 2023-11-14 RX ADMIN — Medication 1 CAPSULE: at 08:11

## 2023-11-14 RX ADMIN — FLUTICASONE PROPIONATE 100 MCG: 50 SPRAY, METERED NASAL at 08:11

## 2023-11-14 RX ADMIN — CALCIUM CARBONATE (ANTACID) CHEW TAB 500 MG 500 MG: 500 CHEW TAB at 08:11

## 2023-11-14 RX ADMIN — FUROSEMIDE 20 MG: 20 TABLET ORAL at 08:11

## 2023-11-14 RX ADMIN — CETIRIZINE HYDROCHLORIDE 10 MG: 5 TABLET, FILM COATED ORAL at 08:11

## 2023-11-14 RX ADMIN — CHOLECALCIFEROL TAB 25 MCG (1000 UNIT) 2000 UNITS: 25 TAB at 08:11

## 2023-11-14 RX ADMIN — AMLODIPINE BESYLATE 10 MG: 10 TABLET ORAL at 08:11

## 2023-11-14 RX ADMIN — OMEPRAZOLE 20 MG: 20 CAPSULE, DELAYED RELEASE ORAL at 06:11

## 2023-11-14 RX ADMIN — GUAIFENESIN 200 MG: 200 SOLUTION ORAL at 09:11

## 2023-11-14 RX ADMIN — ATORVASTATIN CALCIUM 80 MG: 40 TABLET, FILM COATED ORAL at 08:11

## 2023-11-14 RX ADMIN — ASPIRIN 81 MG CHEWABLE TABLET 81 MG: 81 TABLET CHEWABLE at 08:11

## 2023-11-14 RX ADMIN — SODIUM BICARBONATE 650 MG TABLET 650 MG: at 08:11

## 2023-11-14 RX ADMIN — FLUTICASONE FUROATE AND VILANTEROL TRIFENATATE 1 PUFF: 100; 25 POWDER RESPIRATORY (INHALATION) at 08:11

## 2023-11-14 NOTE — PT/OT/SLP DISCHARGE
Occupational Therapy Discharge Summary    Bridget Montgomery  MRN: [id number]   Principal Problem: Diplopia      Patient Discharged from acute Occupational Therapy on 11/14/2023.  Please refer to prior OT note dated 11/13/2023 for functional status.    Assessment:      Patient appropriate for care in another setting.    Objective:     GOALS:   Multidisciplinary Problems       Occupational Therapy Goals          Problem: Occupational Therapy    Goal Priority Disciplines Outcome Interventions   Occupational Therapy Goal     OT, PT/OT Adequate for Care Transition    Description: Goals to be met by: 3 weeks     Patient will increase functional independence with ADLs by performing:    UE Dressing with Stand-by Assistance. -- Met  LE Dressing with Stand-by Assistance with use of AD and AE as needed. -- Met  Grooming while standing at sink with Supervision. -- Met  Toileting from bedside commode or toilet with Supervision for hygiene and clothing management. - met  Bathing from sitting at sink with Set-up Assistance. -- Not Met  Rolling to Bilateral with Modified [place]. --MET  Supine to sit with Modified [place].-- Not Met  Stand pivot transfers with Supervision with use of AD and AE as needed. -- MET  Toilet transfer to bedside commode or toilet with Supervision with use of AD and AE as needed. -- met  Increased functional strength and endurance through use of UBE for at least 8 minutes with Min resistance. -- Met  Upper extremity exercise program 1x15 reps per handout, with assistance as needed. --Not Met  Pt will perform functional standing up to 10 minutes in prep for performance of functional ADLs in standing. -- Met                           Reasons for Discontinuation of Therapy Services  Transfer to alternate level of care. and Satisfactory goal achievement.      Plan:     Patient Discharged to: Home with Home Health Service    11/14/2023

## 2023-11-14 NOTE — NURSING
Pt left facility stable via w/c and transported home in personal vehicle with daughter. Discharged papers reviewed and received. Pt and daughter both verbalized understanding. No new skin issues. All personal belongings taken. No concerns voiced upon departure.

## 2023-11-14 NOTE — CARE UPDATE
Pt will be seen by [facility] per     Wander Campa  Inpatient Authorization Coordinator  Authorizations Department - [address], [address]  [place] [address]  Main Number: [phone]  Main fax: [phone]  Direct number: [phone] option 1 or Ext: [phone]

## 2023-11-14 NOTE — PLAN OF CARE
Problem: Occupational Therapy  Goal: Occupational Therapy Goal  Description: Goals to be met by: 3 weeks     Patient will increase functional independence with ADLs by performing:    UE Dressing with Stand-by Assistance. -- Met  LE Dressing with Stand-by Assistance with use of AD and AE as needed. -- Met  Grooming while standing at sink with Supervision. -- Met  Toileting from bedside commode or toilet with Supervision for hygiene and clothing management. - met  Bathing from sitting at sink with Set-up Assistance. -- Not Met  Rolling to Bilateral with Modified [place]. --MET  Supine to sit with Modified [place].-- Not Met  Stand pivot transfers with Supervision with use of AD and AE as needed. -- MET  Toilet transfer to bedside commode or toilet with Supervision with use of AD and AE as needed. -- met  Increased functional strength and endurance through use of UBE for at least 8 minutes with Min resistance. -- Met  Upper extremity exercise program 1x15 reps per handout, with assistance as needed. --Not Met  Pt will perform functional standing up to 10 minutes in prep for performance of functional ADLs in standing. -- Met      Outcome: Adequate for Care Transition     Pt to d/c from SNF/OT 11/14/2023. Pt appropriate for next level of care.

## 2023-11-14 NOTE — PLAN OF CARE
Patient set to discharge 11/14 at 11am to home with daughter via personal car. Home health to be set up by N, at this writing have not confirmed company. PCP appointment made. DME of tube bench was delivered to room.

## 2023-11-14 NOTE — DISCHARGE SUMMARY
"[facility]   Skilled Nursing Facility   Discharge Summary  Patient Name: Bridget Montgomery  : 1935  MRN: [id number]  Attending Physician: No att. providers found  Nurse Practitioner: PATRIC PERDOMO    Admit Date: 10/23/2023   Date of Discharge:  2023  Length of Stay:  LOS: 22 days     Date of Service: 2023    Principal Problem: Diplopia    HPI      Hospital Course  Patient progressed well with PT and OT- last PT note states that patient ambulated 220 feet using RW with Supervision . Patient had no significant events during their stay at SNF. Home health was set up. DME was ordered if needed. Follow up appointment to be made by patient within one week. All prescriptions and discharge instructions were ordered to be given to the patient prior to discharge.     Physical Exam  Constitutional: Patient appears debilitated.  No distress noted  HENT:   Head: Normocephalic and atraumatic.   Eyes: Pupils are equal, round  Neck: Limited range of motion. Neck supple.   Cardiovascular: Normal rate, regular rhythm and normal heart sounds.    Pulmonary/Chest: Effort normal and breath sounds are clear  Abdominal: Soft. Bowel sounds are normal.   Musculoskeletal: Normal range of motion.   Neurological: Alert and oriented to person, place, and time.   Psychiatric: Normal mood and affect. Behavior is normal.   Skin: Skin is warm and dry    Recent Labs   Lab 23  0516 23   WBC 5.32 4.80   HGB 9.0* 8.8*   HCT 27.5* 28.0*    350     Recent Labs   Lab 23  0516 23    142   K 3.8 4.3    106   CO2 24 24   BUN 42* 42*   CREATININE 1.0 1.1   GLU 89 77   CALCIUM 8.7 8.8   MG 1.9 1.8   PHOS 4.4 4.6*     Recent Labs   Lab 23  0516 23   ALKPHOS 174* 161*   ALT 24 47*   AST 36 60*   ALBUMIN 2.7* 2.7*   PROT 5.9* 5.7*   BILITOT 0.2 0.2      No results for input(s): "CPK", "CPKMB", "MB", "TROPONINI" in the last 72 hours.  No results for " "input(s): "LACTATE" in the last 72 hours.    No results for input(s): "POCTGLUCOSE" in the last 168 hours.   Hemoglobin A1C   Date Value Ref Range Status   10/17/2023 5.8 (H) 4.0 - 5.6 % Final     Comment:     ADA Screening Guidelines:  5.7-6.4%  Consistent with prediabetes  >or=6.5%  Consistent with diabetes    High levels of fetal hemoglobin interfere with the HbA1C  assay. Heterozygous hemoglobin variants (HbS, HgC, etc)do  not significantly interfere with this assay.   However, presence of multiple variants may affect accuracy.     03/13/2023 5.6 4.0 - 5.6 % Final     Comment:     ADA Screening Guidelines:  5.7-6.4%  Consistent with prediabetes  >or=6.5%  Consistent with diabetes    High levels of fetal hemoglobin interfere with the HbA1C  assay. Heterozygous hemoglobin variants (HbS, HgC, etc)do  not significantly interfere with this assay.   However, presence of multiple variants may affect accuracy.     03/23/2022 6.1 (H) 4.0 - 5.6 % Final     Comment:     ADA Screening Guidelines:  5.7-6.4%  Consistent with prediabetes  >or=6.5%  Consistent with diabetes    High levels of fetal hemoglobin interfere with the HbA1C  assay. Heterozygous hemoglobin variants (HbS, HgC, etc)do  not significantly interfere with this assay.   However, presence of multiple variants may affect accuracy.           Discharge Medication List as of 11/14/2023 10:05 AM        START taking these medications    Details   melatonin (MELATIN) 3 mg tablet Take 2 tablets (6 mg total) by mouth nightly as needed for Insomnia., Starting Fri 11/10/2023, Normal      methocarbamoL (ROBAXIN) 500 MG Tab Take 1 tablet (500 mg total) by mouth 4 (four) times daily as needed (muscle spasms)., Starting Fri 11/10/2023, Until Mon 11/13/2023 at 2359, Normal      omeprazole (PRILOSEC) 20 MG capsule Take 1 capsule (20 mg total) by mouth 2 (two) times daily before meals., Starting Fri 11/10/2023, Until Sat 11/9/2024, Normal      senna-docusate 8.6-50 mg " (PERICOLACE) 8.6-50 mg per tablet Take 2 tablets by mouth 2 (two) times daily., Starting Fri 11/10/2023, Normal      sodium bicarbonate 650 MG tablet Take 1 tablet (650 mg total) by mouth once daily., Starting Sat 11/11/2023, Until Sun 11/10/2024, Normal           CONTINUE these medications which have CHANGED    Details   diclofenac sodium (VOLTAREN ARTHRITIS PAIN) 1 % Gel Apply 2 g topically 3 (three) times daily as needed (painful joint)., Starting Fri 11/10/2023, Normal      pregabalin (LYRICA) 75 MG capsule Take 1 capsule (75 mg total) by mouth every evening., Starting Fri 11/10/2023, Until Fri 5/10/2024, Normal           CONTINUE these medications which have NOT CHANGED    Details   acetaminophen (TYLENOL) 500 MG tablet Take 500 mg by mouth every 6 (six) hours as needed for Pain., Historical Med      albuterol (PROVENTIL) 2.5 mg /3 mL (0.083 %) nebulizer solution Take 2.5 mg by nebulization 2 (two) times daily as needed., Starting Wed 1/24/2018, Historical Med      albuterol (PROVENTIL/VENTOLIN HFA) 90 mcg/actuation inhaler Inhale 2 puffs into the lungs every 6 (six) hours as needed for Wheezing. Rescue, Historical Med      amLODIPine (NORVASC) 10 MG tablet Take 1 tablet (10 mg total) by mouth once daily., Starting Fri 6/23/2023, Until Sat 6/22/2024, Normal      aspirin 81 MG Chew Take 1 tablet (81 mg total) by mouth once daily., Starting Wed 10/18/2023, Until Thu 10/17/2024, Normal      atorvastatin (LIPITOR) 80 MG tablet Take 1 tablet (80 mg total) by mouth once daily., Starting Thu 10/19/2023, Until Fri 10/18/2024, Normal      calcium carbonate (CALCIUM 500 ORAL) Take 500 mg by mouth once daily., Historical Med      cetirizine (ZYRTEC) 10 MG tablet Take 10 mg by mouth once daily., Historical Med      cholecalciferol, vitamin D3, (VITAMIN D3) 50 mcg (2,000 unit) Cap capsule Take 2,000 Units by mouth once daily., Historical Med      clopidogreL (PLAVIX) 75 mg tablet Take 1 tablet (75 mg total) by mouth once  daily. for 20 days, Starting Wed 10/18/2023, Until Tue 11/7/2023, Normal      fluticasone propionate (FLONASE) 50 mcg/actuation nasal spray INSTILL TWO SPRAYS INTO EACH NOSTRIL TWICE A DAY, Normal      furosemide (LASIX) 20 MG tablet Take 20 mg by mouth 2 (two) times a day., Starting Thu 3/30/2023, Historical Med      omega 3-dha-epa-fish oil 1,000 mg (120 mg-180 mg) Cap Take 1 capsule by mouth once daily., Historical Med      polyethylene glycol 3350 (MIRALAX ORAL) Take 17 g by mouth once daily. As needed, Historical Med      psyllium 0.52 gram capsule Take 0.52 g by mouth daily as needed., Historical Med      SYMBICORT 80-4.5 mcg/actuation HFAA Inhale 2 puffs into the lungs 2 (two) times a day., Starting Mon 8/21/2023, Until Tue 8/20/2024, Normal      traMADoL (ULTRAM) 50 mg tablet TAKE 1 TABLET BY MOUTH TWICE DAILY AS NEEDED FOR PAIN, Starting Fri 9/29/2023, Normal           STOP taking these medications       lisinopriL (PRINIVIL,ZESTRIL) 5 MG tablet Comments:   Reason for Stopping:               Discharge Diet:cardiac    Activity: activity as tolerated    Discharge Condition: Good     [place] [facility] Appointments   Date Time Provider Department Center   11/15/2023  2:30 PM Charles Peacock MD [facility]   11/27/2023  1:15 PM Hamida Pete DO [facility]   12/11/2023  2:00 PM Lluvia Rahman MD [facility]   2/21/2024 10:15 AM Charles Peacock MD [facility]       36 minutes total time spent on the discharge of the patient including review of hospital course with the patient, reviewing discharge medications, and arranging follow-up care.      AMY L DEVINE, APRN [facility]   Skilled Nursing [facility]

## 2023-11-15 PROBLEM — R53.1 GENERALIZED WEAKNESS: Status: ACTIVE | Noted: 2023-11-15

## 2023-11-15 PROBLEM — H53.8 BLURRY VISION: Status: ACTIVE | Noted: 2023-11-15

## 2023-11-16 ENCOUNTER — PATIENT OUTREACH (OUTPATIENT)
Dept: ADMINISTRATIVE | Facility: CLINIC | Age: 88
End: 2023-11-16
Payer: MEDICARE

## 2023-11-16 ENCOUNTER — PATIENT MESSAGE (OUTPATIENT)
Dept: ADMINISTRATIVE | Facility: CLINIC | Age: 88
End: 2023-11-16
Payer: MEDICARE

## 2023-11-16 NOTE — PROGRESS NOTES
C3 nurse spoke with Bridget Montgomery  for a TCC post hospital discharge follow up call. The patient has a scheduled HOSFU appointment with Charles Peacock MD on 11/15/2023 @ D    Message sent to PCP staff.

## 2023-11-16 NOTE — PROGRESS NOTES
C3 nurse attempted to contact Bridget Montgomery  for a TCC post hospital discharge follow up call. No answer. Left voicemail with callback information. The patient has a scheduled HOSFU appointment with Charles Peacock MD  on 11/27/2023 @ [id number].     Message sent to PCP staff.

## 2023-12-07 ENCOUNTER — LAB VISIT (OUTPATIENT)
Dept: LAB | Facility: HOSPITAL | Age: 88
End: 2023-12-07
Attending: INTERNAL MEDICINE
Payer: MEDICARE

## 2023-12-07 DIAGNOSIS — N25.81 SECONDARY HYPERPARATHYROIDISM OF RENAL ORIGIN: ICD-10-CM

## 2023-12-07 DIAGNOSIS — D63.1 ANEMIA IN STAGE 4 CHRONIC KIDNEY DISEASE: ICD-10-CM

## 2023-12-07 DIAGNOSIS — N18.4 ANEMIA IN STAGE 4 CHRONIC KIDNEY DISEASE: ICD-10-CM

## 2023-12-07 DIAGNOSIS — N18.4 CKD (CHRONIC KIDNEY DISEASE) STAGE 4, GFR 15-29 ML/MIN: ICD-10-CM

## 2023-12-07 LAB
BASOPHILS # BLD AUTO: 0.04 K/UL (ref 0–0.2)
BASOPHILS NFR BLD: 0.6 % (ref 0–1.9)
DIFFERENTIAL METHOD: ABNORMAL
EOSINOPHIL # BLD AUTO: 0.1 K/UL (ref 0–0.5)
EOSINOPHIL NFR BLD: 1.3 % (ref 0–8)
ERYTHROCYTE [DISTWIDTH] IN BLOOD BY AUTOMATED COUNT: 13.6 % (ref 11.5–14.5)
FERRITIN SERPL-MCNC: 123 NG/ML (ref 20–300)
HCT VFR BLD AUTO: 33.7 % (ref 37–48.5)
HGB BLD-MCNC: 10.8 G/DL (ref 12–16)
IMM GRANULOCYTES # BLD AUTO: 0.02 K/UL (ref 0–0.04)
IMM GRANULOCYTES NFR BLD AUTO: 0.3 % (ref 0–0.5)
IRON SERPL-MCNC: 49 UG/DL (ref 30–160)
LYMPHOCYTES # BLD AUTO: 1.4 K/UL (ref 1–4.8)
LYMPHOCYTES NFR BLD: 22.4 % (ref 18–48)
MAGNESIUM SERPL-MCNC: 2.1 MG/DL (ref 1.6–2.6)
MCH RBC QN AUTO: 28.7 PG (ref 27–31)
MCHC RBC AUTO-ENTMCNC: 32 G/DL (ref 32–36)
MCV RBC AUTO: 90 FL (ref 82–98)
MONOCYTES # BLD AUTO: 0.6 K/UL (ref 0.3–1)
MONOCYTES NFR BLD: 10.2 % (ref 4–15)
NEUTROPHILS # BLD AUTO: 4.1 K/UL (ref 1.8–7.7)
NEUTROPHILS NFR BLD: 65.2 % (ref 38–73)
NRBC BLD-RTO: 0 /100 WBC
PLATELET # BLD AUTO: 534 K/UL (ref 150–450)
PMV BLD AUTO: 9.3 FL (ref 9.2–12.9)
PTH-INTACT SERPL-MCNC: 48.6 PG/ML (ref 9–77)
RBC # BLD AUTO: 3.76 M/UL (ref 4–5.4)
SATURATED IRON: 13 % (ref 20–50)
TOTAL IRON BINDING CAPACITY: 366 UG/DL (ref 250–450)
TRANSFERRIN SERPL-MCNC: 247 MG/DL (ref 200–375)
URATE SERPL-MCNC: 10 MG/DL (ref 2.4–5.7)
WBC # BLD AUTO: 6.26 K/UL (ref 3.9–12.7)

## 2023-12-07 PROCEDURE — 84466 ASSAY OF TRANSFERRIN: CPT | Performed by: INTERNAL MEDICINE

## 2023-12-07 PROCEDURE — 82728 ASSAY OF FERRITIN: CPT | Performed by: INTERNAL MEDICINE

## 2023-12-07 PROCEDURE — 84550 ASSAY OF BLOOD/URIC ACID: CPT | Performed by: INTERNAL MEDICINE

## 2023-12-07 PROCEDURE — 83540 ASSAY OF IRON: CPT | Performed by: INTERNAL MEDICINE

## 2023-12-07 PROCEDURE — 83970 ASSAY OF PARATHORMONE: CPT | Performed by: INTERNAL MEDICINE

## 2023-12-07 PROCEDURE — 85025 COMPLETE CBC W/AUTO DIFF WBC: CPT | Performed by: INTERNAL MEDICINE

## 2023-12-07 PROCEDURE — 36415 COLL VENOUS BLD VENIPUNCTURE: CPT | Performed by: INTERNAL MEDICINE

## 2023-12-07 PROCEDURE — 83735 ASSAY OF MAGNESIUM: CPT | Performed by: INTERNAL MEDICINE

## 2024-01-10 ENCOUNTER — HOSPITAL ENCOUNTER (OUTPATIENT)
Dept: RADIOLOGY | Facility: HOSPITAL | Age: 89
Discharge: HOME | End: 2024-01-10
Attending: NEUROLOGICAL SURGERY
Payer: MEDICARE

## 2024-01-10 DIAGNOSIS — S12.112A CLOSED NONDISPLACED ODONTOID FRACTURE WITH TYPE II MORPHOLOGY, INITIAL ENCOUNTER: ICD-10-CM

## 2024-01-10 PROCEDURE — 72040 X-RAY EXAM NECK SPINE 2-3 VW: CPT | Mod: 26,,, | Performed by: RADIOLOGY

## 2024-01-10 PROCEDURE — 72040 X-RAY EXAM NECK SPINE 2-3 VW: CPT | Mod: TC

## 2024-02-05 PROBLEM — I63.9 CEREBROVASCULAR ACCIDENT (CVA): Status: RESOLVED | Noted: 2023-11-05 | Resolved: 2024-02-05

## 2024-02-07 ENCOUNTER — HOSPITAL ENCOUNTER (OUTPATIENT)
Dept: RADIOLOGY | Facility: HOSPITAL | Age: 89
Discharge: HOME | End: 2024-02-07
Attending: NEUROLOGICAL SURGERY
Payer: MEDICARE

## 2024-02-07 DIAGNOSIS — S12.112A CLOSED NONDISPLACED ODONTOID FRACTURE WITH TYPE II MORPHOLOGY, INITIAL ENCOUNTER: ICD-10-CM

## 2024-02-07 PROCEDURE — 72040 X-RAY EXAM NECK SPINE 2-3 VW: CPT | Mod: 26,,, | Performed by: RADIOLOGY

## 2024-02-07 PROCEDURE — 72040 X-RAY EXAM NECK SPINE 2-3 VW: CPT | Mod: TC

## 2024-02-25 NOTE — PROGRESS NOTES
[facility] Card[facility]     SUBJECTIVE:     History of Present Illness:  Patient is a 89 y.o. female presents for preop clearance for neck surgery . Unstable neck after fall     Primary Diagnosis:   Hypertension::positive  DM:prediabetic  Smoker  Family history of early CAD  Heart disease:no history of MI or heart failure    CVA 10/23  CKD4  DVT 2022  COPD  ROS  No chest pains  No shortness of breath; no PND or orthopnea  No syncope  No palpitations  Activity: walks with walker ; light cooking; dishwashes a little since the stroke   Review of patient's allergies indicates:   Allergen Reactions    Cephalexin     Codeine     Meperidine      Other reaction(s): delerium, hallucination  Delerium  Delerium  Delerium      Nsaids (non-steroidal anti-inflammatory drug)     Penicillins     Tazarotene      Other reaction(s): rash, Unknown       Past Medical History:   Diagnosis Date    Allergy     Anemia, unspecified     Arthritis     CKD (chronic kidney disease) stage 4, GFR 15-29 ml/min     COPD (chronic obstructive pulmonary disease)     Edema     HTN (hypertension)     Hypocalcemia     Hyponatremia     Osteoarthritis        Past Surgical History:   Procedure Laterality Date    BREAST CYST EXCISION      CAUDAL EPIDURAL STEROID INJECTION N/A 2/2/2023    Procedure: Injection-steroid-epidural-caudal;  Surgeon: Angel Sparrow MD;  Location: [facility]IN [facility];  Service: Pain Management;  Laterality: N/A;    FOOT SURGERY             Past Hospitalization:         Cardiac meds:    ASA 81 mg  Amlodipine 10 mg  Atorvastatin 40 mg  Plavix 75 mg  Lasix 20 mg prn  Inhalers       OBJECTIVE:     Vital Signs (Most Recent)  Vitals:    02/27/24 1141   BP: (!) 142/70   Pulse: 71   SpO2: 97%   Weight: 57.5 kg (126 lb 12.2 oz)         Physical Exam:  Neck: normal carotids, no bruits; normal JVP  Lungs :clear  Heart: RR, normal S1,S2, sytolic ejection murmur LLSB , no gallops  Abd: no masses; no bruits;   Exts: normal DP and PT pulses bilaterally,  normal radials; no edema noted               Labs  11/23: GFR 48; LFT's abnormal;     Diagnostic Results:    1.EKG: 10/23: sinus; poor precordial R wave progression   2.Echo: 10/23: normal EF; LAE, CESAR, MA ca++,   3. Stress test  4. cath  5. CTA head: no significant disease carotids   Chart review:        ASSESSMENT/PLAN:     S/p CVA: no significant carotid disease  No symptoms of angina or failure  No valvular disease  Hypertension: controlled on medications   5. Activity: at least 4 METs  6. CKD stable   7. 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 Continue the same regimen  2. She is at moderately high risk for any surgery but is at optimum now   3. If Plavix needs to stop, may do so for 5 days prior to procedure     Mary Carmen Bach MD

## 2024-02-27 ENCOUNTER — OFFICE VISIT (OUTPATIENT)
Dept: CARDIOLOGY | Facility: CLINIC | Age: 89
End: 2024-02-27
Payer: MEDICARE

## 2024-02-27 VITALS
WEIGHT: 126.75 LBS | BODY MASS INDEX: 22.46 KG/M2 | HEART RATE: 71 BPM | SYSTOLIC BLOOD PRESSURE: 142 MMHG | OXYGEN SATURATION: 97 % | DIASTOLIC BLOOD PRESSURE: 70 MMHG

## 2024-02-27 DIAGNOSIS — I63.9 CEREBROVASCULAR ACCIDENT (CVA), UNSPECIFIED MECHANISM: Primary | ICD-10-CM

## 2024-02-27 DIAGNOSIS — I35.8 AORTIC VALVE SCLEROSIS: ICD-10-CM

## 2024-02-27 DIAGNOSIS — Z01.818 PREOPERATIVE CLEARANCE: ICD-10-CM

## 2024-02-27 DIAGNOSIS — I10 PRIMARY HYPERTENSION: ICD-10-CM

## 2024-02-27 PROCEDURE — 99204 OFFICE O/P NEW MOD 45 MIN: CPT | Mod: S$GLB,,, | Performed by: INTERNAL MEDICINE

## 2024-02-27 PROCEDURE — 99999 PR PBB SHADOW E&M-EST. PATIENT-LVL IV: CPT | Mod: PBBFAC,,, | Performed by: INTERNAL MEDICINE

## 2024-02-28 ENCOUNTER — HOSPITAL ENCOUNTER (OUTPATIENT)
Dept: RADIOLOGY | Facility: HOSPITAL | Age: 89
Discharge: HOME | End: 2024-02-28
Attending: NEUROLOGICAL SURGERY
Payer: MEDICARE

## 2024-02-28 DIAGNOSIS — S12.112A CLOSED NONDISPLACED ODONTOID FRACTURE WITH TYPE II MORPHOLOGY, INITIAL ENCOUNTER: ICD-10-CM

## 2024-02-28 PROCEDURE — 72040 X-RAY EXAM NECK SPINE 2-3 VW: CPT | Mod: TC

## 2024-02-28 PROCEDURE — 72040 X-RAY EXAM NECK SPINE 2-3 VW: CPT | Mod: 26,,, | Performed by: RADIOLOGY

## 2024-03-06 ENCOUNTER — OFFICE VISIT (OUTPATIENT)
Dept: NEUROSURGERY | Facility: CLINIC | Age: 89
End: 2024-03-06
Payer: MEDICARE

## 2024-03-06 ENCOUNTER — TELEPHONE (OUTPATIENT)
Dept: NEUROSURGERY | Facility: CLINIC | Age: 89
End: 2024-03-06
Payer: MEDICARE

## 2024-03-06 ENCOUNTER — HOSPITAL ENCOUNTER (OUTPATIENT)
Dept: RADIOLOGY | Facility: HOSPITAL | Age: 89
Discharge: HOME | End: 2024-03-06
Attending: NEUROLOGICAL SURGERY
Payer: MEDICARE

## 2024-03-06 VITALS
DIASTOLIC BLOOD PRESSURE: 88 MMHG | HEIGHT: 63 IN | WEIGHT: 126.75 LBS | BODY MASS INDEX: 22.46 KG/M2 | SYSTOLIC BLOOD PRESSURE: 147 MMHG | HEART RATE: 85 BPM

## 2024-03-06 DIAGNOSIS — S12.112K CLOSED NONDISPLACED ODONTOID FRACTURE WITH TYPE II MORPHOLOGY AND NONUNION, SUBSEQUENT ENCOUNTER: Primary | ICD-10-CM

## 2024-03-06 DIAGNOSIS — S12.112K CLOSED NONDISPLACED ODONTOID FRACTURE WITH TYPE II MORPHOLOGY AND NONUNION, SUBSEQUENT ENCOUNTER: ICD-10-CM

## 2024-03-06 PROCEDURE — 72040 X-RAY EXAM NECK SPINE 2-3 VW: CPT | Mod: 26,,, | Performed by: STUDENT IN AN ORGANIZED HEALTH CARE EDUCATION/TRAINING PROGRAM

## 2024-03-06 PROCEDURE — 72040 X-RAY EXAM NECK SPINE 2-3 VW: CPT | Mod: TC,FY

## 2024-03-06 PROCEDURE — 99999 PR PBB SHADOW E&M-EST. PATIENT-LVL V: CPT | Mod: PBBFAC,,, | Performed by: NEUROLOGICAL SURGERY

## 2024-03-06 PROCEDURE — 99214 OFFICE O/P EST MOD 30 MIN: CPT | Mod: S$GLB,,, | Performed by: NEUROLOGICAL SURGERY

## 2024-03-06 RX ORDER — IPRATROPIUM BROMIDE 42 UG/1
SPRAY, METERED NASAL
COMMUNITY
Start: 2024-01-25

## 2024-03-06 NOTE — PROGRESS NOTES
NEUROSURGICAL PROGRESS NOTE    DATE OF SERVICE:  03/06/2024    ATTENDING PHYSICIAN:  Jaylen Quach MD    SUBJECTIVE:  09/13/23  This is a very pleasant 88 y.o. female, she had a fall in July 2023 and was diagnosed with a type 2 odontoid fracture.  She is wearing collar on and off.  She had a repeat fall in August while wearing her collar.  She is mainly complaining of upper posterior cervical and occipital neck pain.  No new onset of motor weakness numbness or sphincter dysfunction symptoms.  She is known for lumbar spinal stenosis, leg pain and neurogenic claudication symptoms.     INTERIM HISTORY:    An 80-year-old female with known type 2 odontoid fracture 9 month ago.  She is here today for clinic follow-up.  She had been wearing collar at all time.  She uses a soft collar during sleep and while she is in bed and she has in a rigid collar when she is out of bed.  She denies any radicular symptoms.  She is accompanied by her daughters.    She had a small stroke last fall and recovered well.  She is doing physical therapy.  She would like to proceed with her bilateral shoulder surgeries.  Does not report significant neck pain at this time.              PAST MEDICAL HISTORY:  Active Ambulatory Problems     Diagnosis Date Noted    Chronic kidney disease, stage III (moderate) 08/20/2013    Type 2 diabetes mellitus with diabetic chronic kidney disease 02/23/2016    Spinal stenosis 07/07/2020    Senile purpura 07/07/2020    Primary localized osteoarthritis of pelvic region and thigh 07/07/2020    Idiopathic osteoarthritis 07/07/2020    Osteopenia 07/07/2020    Neuralgia of right sciatic nerve 07/07/2020    Mixed hyperlipidemia 07/07/2020    Mild recurrent major depression 07/07/2020    Diabetic renal disease 07/07/2020    Chronic obstructive pulmonary disease 07/07/2020    Allergic rhinitis 07/07/2020    Essential hypertension 02/23/2016    Gastro-esophageal reflux disease without esophagitis 03/01/2016     Lumbar spondylosis 08/17/2018    Ovarian mass 03/22/2019    Spinal cord disorder 08/17/2018    Ulcer of esophagus 01/29/2019    Greater trochanteric bursitis 12/08/2020    Osteoarthritis of both knees 12/08/2020    Lumbar radiculopathy 01/25/2023    Acute chorea 04/13/2023    Non-healing wound of right lower extremity 05/12/2023    Acute pancreatitis without infection or necrosis 06/20/2023    CKD (chronic kidney disease) stage 4, GFR 15-29 ml/min 06/20/2023    Leg wound, right 06/27/2023    Closed odontoid fracture with routine healing 07/24/2023    Coccyalgia 07/24/2023    Diabetes 1.5, 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    Closed fracture of proximal end of left humerus with routine healing 08/21/2023    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    Chronic constipation 10/24/2023    Other cerebral infarction 11/11/2023    Diplopia 11/11/2023    Generalized weakness 11/15/2023    Blurry vision 11/15/2023     Resolved Ambulatory Problems     Diagnosis Date Noted    Hyponatremia 05/12/2023    Acute renal failure superimposed on stage 4 chronic kidney disease 06/20/2023    Diplopia 10/24/2023    Cerebrovascular accident (CVA) 11/05/2023     Past Medical History:   Diagnosis Date    Allergy     Anemia, unspecified     Arthritis     COPD (chronic obstructive pulmonary disease)     Edema     HTN (hypertension)     Hypocalcemia     Osteoarthritis        PAST SURGICAL HISTORY:  Past Surgical History:   Procedure Laterality Date    BREAST CYST EXCISION      CAUDAL EPIDURAL STEROID INJECTION N/A 2/2/2023    Procedure: Injection-steroid-epidural-caudal;  Surgeon: Angel Sparrow MD;  [facility];  Service: Pain Management;  Laterality: N/A;    FOOT SURGERY         SOCIAL HISTORY:   Social History     Socioeconomic History    Marital status:    Tobacco Use    Smoking status: Former    Smokeless tobacco: Never   Substance and Sexual Activity    Alcohol use: Not Currently    Drug use:  Never    Sexual activity: Not Currently     Social Determinants of Health     Financial Resource Strain: Low Risk  (5/15/2023)    Overall Financial Resource Strain (CARDIA)     Difficulty of Paying Living Expenses: Not hard at all   Food Insecurity: No Food Insecurity (5/15/2023)    Hunger Vital Sign     Worried About Running Out of Food in the Last Year: Never true     Ran Out of Food in the Last Year: Never true   Transportation Needs: No Transportation Needs (5/15/2023)    PRAPARE - Transportation     Lack of Transportation (Medical): No     Lack of Transportation (Non-Medical): No   Stress: No Stress Concern Present (5/15/2023)    [employer] [place] [employer] - Occupational Stress Questionnaire     Feeling of Stress : Only a little   Social Connections: Moderately Isolated (5/15/2023)    Social Connection and Isolation Panel [NHANES]     Frequency of Communication with Friends and Family: More than three times a week     Frequency of Social Gatherings with Friends and Family: More than three times a week     Attends [employer] Services: 1 to 4 times per year     Active Member of Clubs or Organizations: No     Attends Club or Organization Meetings: Never     Marital Status:    Housing Stability: Low Risk  (5/15/2023)    Housing Stability Vital Sign     Unable to Pay for Housing in the Last Year: No     Number of Places Lived in the Last Year: 1     Unstable Housing in the Last Year: No       FAMILY HISTORY:  Family History   Problem Relation Age of Onset    Cancer Mother     No Known Problems Father        CURRENTS MEDICATIONS:  Current Outpatient Medications on File Prior to Visit   Medication Sig Dispense Refill    acetaminophen (TYLENOL) 500 MG tablet Take 1 tablet (500 mg total) by mouth every 6 (six) hours as needed for Pain. 90 tablet 6    albuterol (PROVENTIL) 2.5 mg /3 mL (0.083 %) nebulizer solution Take 2.5 mg by nebulization 2 (two) times daily as needed.      amLODIPine (NORVASC) 10  MG tablet Take 1 tablet (10 mg total) by mouth once daily. 90 tablet 3    aspirin 81 MG Chew Take 1 tablet (81 mg total) by mouth once daily. 90 tablet 3    atorvastatin (LIPITOR) 40 MG tablet Take 1 tablet (40 mg total) by mouth once daily. 90 tablet 3    calcium carbonate (CALCIUM 500 ORAL) Take 500 mg by mouth once daily.      cetirizine (ZYRTEC) 10 MG tablet Take 10 mg by mouth once daily.      cholecalciferol, vitamin D3, (VITAMIN D3) 50 mcg (2,000 unit) Cap capsule Take 2,000 Units by mouth once daily.      clopidogreL (PLAVIX) 75 mg tablet Take 1 tablet (75 mg total) by mouth once daily. 90 tablet 3    diclofenac sodium (VOLTAREN ARTHRITIS PAIN) 1 % Gel Apply 2 g topically 3 (three) times daily as needed (painful joint). 100 g 0    fluticasone propionate (FLONASE) 50 mcg/actuation nasal spray INSTILL TWO SPRAYS INTO EACH NOSTRIL TWICE A DAY (Patient taking differently: 2 sprays by Each Nostril route 2 (two) times a day.) 48 mL 2    furosemide (LASIX) 20 MG tablet Take 20 mg by mouth 2 (two) times a day.      ipratropium (ATROVENT) 42 mcg (0.06 %) nasal spray       melatonin (MELATIN) 3 mg tablet Take 2 tablets (6 mg total) by mouth nightly as needed for Insomnia. 30 tablet 0    omega 3-dha-epa-fish oil 1,000 mg (120 mg-180 mg) Cap Take 1 capsule by mouth once daily.      omeprazole (PRILOSEC) 20 MG capsule Take 1 capsule (20 mg total) by mouth 2 (two) times daily before meals. 60 capsule 0    polyethylene glycol 3350 (MIRALAX ORAL) Take 17 g by mouth once daily. As needed      senna-docusate 8.6-50 mg (PERICOLACE) 8.6-50 mg per tablet Take 2 tablets by mouth 2 (two) times daily. 60 tablet 0    sodium bicarbonate 650 MG tablet Take 1 tablet (650 mg total) by mouth once daily. 30 tablet 0    SYMBICORT 80-4.5 mcg/actuation HFAA Inhale 2 puffs into the lungs 2 (two) times a day. 10.2 g 6    traMADoL (ULTRAM) 50 mg tablet Take 1 tablet (50 mg total) by mouth 2 (two) times daily as needed. 60 tablet 4    pregabalin  (LYRICA) 75 MG capsule Take 1 capsule (75 mg total) by mouth every evening. 30 capsule 5    psyllium 0.52 gram capsule Take 0.52 g by mouth daily as needed.       No current facility-administered medications on file prior to visit.       ALLERGIES:  Review of patient's allergies indicates:   Allergen Reactions    Cephalexin     Codeine     Meperidine      Other reaction(s): delerium, hallucination  Delerium  Delerium  Delerium      Nsaids (non-steroidal anti-inflammatory drug)     Penicillins     Tazarotene      Other reaction(s): rash, Unknown       REVIEW OF SYSTEMS:  Review of Systems   Constitutional:  Negative for diaphoresis, fever and weight loss.   Respiratory:  Negative for shortness of breath.    Cardiovascular:  Negative for chest pain.   Gastrointestinal:  Negative for blood in stool.   Genitourinary:  Negative for hematuria.   Endo/Heme/Allergies:  Does not bruise/bleed easily.   All other systems reviewed and are negative.        OBJECTIVE:    PHYSICAL EXAMINATION:   Vitals:    03/06/24 1046   BP: (!) 147/88   Pulse: 85       Physical Exam:  Vitals reviewed.    Constitutional: She appears well-developed and well-nourished.     Eyes: Pupils are equal, round, and reactive to light. Conjunctivae and EOM are normal.     Cardiovascular: Normal distal pulses and no edema.     Abdominal: Soft.     Skin: Skin displays no rash on trunk and no rash on extremities. Skin displays no lesions on trunk and no lesions on extremities.     Psych/Behavior: She is alert. She is oriented to person, place, and time. She has a normal mood and affect.     Musculoskeletal:        Neck: Range of motion is limited.     Neurological:        DTRs: Tricep reflexes are 2+ on the right side and 2+ on the left side. Bicep reflexes are 2+ on the right side and 2+ on the left side. Brachioradialis reflexes are 2+ on the right side and 2+ on the left side. Patellar reflexes are 2+ on the right side and 2+ on the left side. Achilles  reflexes are 2+ on the right side and 2+ on the left side.       Back Exam     Muscle Strength   Right Quadriceps:  5/5   Left Quadriceps:  5/5   Right Hamstrings:  5/5   Left Hamstrings:  5/5                 Neurologic Exam     Mental Status   Oriented to person, place, and time.   Speech: speech is normal   Level of consciousness: alert    Cranial Nerves   Cranial nerves II through XII intact.     CN III, IV, VI   Pupils are equal, round, and reactive to light.  Extraocular motions are normal.     Motor Exam   Muscle bulk: normal  Overall muscle tone: normal    Strength   Right deltoid: 5/5  Left deltoid: 5/5  Right biceps: 5/5  Left biceps: 5/5  Right triceps: 5/5  Left triceps: 5/5  Right wrist flexion: 5/5  Left wrist flexion: 5/5  Right wrist extension: 5/5  Left wrist extension: 5/5  Right interossei: 5/5  Left interossei: 5/5  Right iliopsoas: 5/5  Left iliopsoas: 5/5  Right quadriceps: 5/5  Left quadriceps: 5/5  Right hamstrin/5  Left hamstrin/5  Right anterior tibial: 5/5  Left anterior tibial: 5/5  Right posterior tibial: 5/5  Left posterior tibial: 5/5  Right peroneal: 5/5  Left peroneal: 5/5  Right gastroc: 5/5  Left gastroc: 5/5    Sensory Exam   Light touch normal.   Pinprick normal.     Gait, Coordination, and Reflexes     Gait  Gait: normal    Coordination   Finger to nose coordination: normal  Tandem walking coordination: normal    Reflexes   Right brachioradialis: 2+  Left brachioradialis: 2+  Right biceps: 2+  Left biceps: 2+  Right triceps: 2+  Left triceps: 2+  Right patellar: 2+  Left patellar: 2+  Right achilles: 2+  Left achilles: 2+  Right plantar: normal  Left plantar: normal  Right Schneider: absent  Left Schneider: absent  Right ankle clonus: absent  Left ankle clonus: absent        DIAGNOSTIC DATA:  I personally interpreted the following imaging:   Cervical flexion-extension x-rays done today shows minimal displacement of C1 over C2.     ASSESSMENT:  This is a 89 y.o. female with      Problem List Items Addressed This Visit    None  Visit Diagnoses       Closed nondisplaced odontoid fracture with type II morphology and nonunion, subsequent encounter    -  Primary    Relevant Orders    X-Ray Cervical Spine Flexion And Extension Only (Completed)          Although the fracture does not appear fully fused on x-ray, there is no dynamic instability in flexion-extension which is better than her last x-ray.    PLAN:  Patient might not have a full bony union but she may have a fibrous union.  She does not appear to have significant C1-2 instability on flexion-extension x-ray today  Okay to start cervical physical therapy  No contraindication to proceed with shoulder surgery  Okay to start weaning collar off but keep using collar when at risk of fall or in a motor vehicle.    All questions answered  Follow-up in 3 months with repeat cervical flexion-extension x-rays.    More than 50% of the time was spent on discussing conservative management treatments (medication, physical therapy exercises) and possible interventions (spinal injections and surgical procedures). Care coordination was discussed.    30 min        Jaylen Quach MD  Cell:[phone]

## 2024-05-07 NOTE — PROGRESS NOTES
"[facility] Medicine Progress Note    Primary Team: [facility] Hospitalist Team B  Attending Physician: Herve Daniel MD  Resident: Marcelino  Intern: Kerry    Subjective:      NAEON. Remains afebrile. Continues to endorse severe pain in her right lower leg. Denies fevers/chills/SOB/CP/N/V.      Objective:     Last 24 Hour Vital Signs:  BP  Min: 163/72  Max: 213/86  Temp  Av.8 °F (36.6 °C)  Min: 97.5 °F (36.4 °C)  Max: 98.2 °F (36.8 °C)  Pulse  Av.4  Min: 58  Max: 80  Resp  Av.7  Min: 18  Max: 20  SpO2  Av.7 %  Min: 94 %  Max: 98 %  Height  Av' 4" (162.6 cm)  Min: 5' 4" (162.6 cm)  Max: 5' 4" (162.6 cm)  Weight  Av.7 kg (147 lb)  Min: 66.7 kg (147 lb)  Max: 66.7 kg (147 lb)  No intake/output data recorded.    Physical Examination:  Physical Exam  Constitutional:       General: She is not in acute distress.     Appearance: She is not toxic-appearing.   Eyes:      Conjunctiva/sclera: Conjunctivae normal.      Pupils: Pupils are equal, round, and reactive to light.   Cardiovascular:      Rate and Rhythm: Normal rate and regular rhythm.      Pulses: Normal pulses.      Heart sounds: Normal heart sounds.   Pulmonary:      Effort: Pulmonary effort is normal. No respiratory distress.      Breath sounds: Normal breath sounds. No wheezing.   Abdominal:      General: Bowel sounds are normal. There is no distension.      Palpations: Abdomen is soft.      Tenderness: There is no abdominal tenderness.   Musculoskeletal:      Right lower leg: No edema.      Left lower leg: No edema.      Comments: Tenderness to palpation of right lower leg. No crepitus    Skin:     Capillary Refill: Capillary refill takes less than 2 seconds.      Findings: Wound (Right lower leg, no purulent drainage, no erythematous borders) present.   Neurological:      General: No focal deficit present.      Mental Status: She is alert and oriented to person, place, and time.   Psychiatric:         Mood and Affect: Mood normal.         " Pt was able to ambulate without any assistance. JO Damian updated on POC.   Behavior: Behavior normal.       Laboratory:  Recent Labs   Lab 05/12/23  1751 05/13/23  0614   WBC 6.41 4.48   HGB 9.6* 9.4*   HCT 28.7* 28.2*    322   MCV 92 93   RDW 11.8 11.8   * 131*   K 4.9 5.3*   CL 98 101   CO2 22* 23   BUN 37* 38*   CREATININE 1.9* 1.7*    83   PROT 6.7 6.1   ALBUMIN 3.3* 3.0*   BILITOT 0.2 0.2   AST 31 28   ALKPHOS 132 126   ALT 22 17     Laboratory Data Reviewed:  Pertinent Findings:      Microbiology Data Reviewed:  Pertinent Findings:  Aerobic cx pending    Other Results:  EKG (my interpretation): none    Radiology Data Reviewed:   Pertinent Findings:    XR Tibia Fibula Right 5/12  1. No acute displaced fracture or dislocation of the tibia or fibula.     US Lower Extremity Right 5/12  No evidence of deep venous thrombosis in the right lower extremity.       Current Medications:     Infusions:       Scheduled:   amLODIPine  5 mg Oral Daily    calcium carbonate  500 mg Oral Daily    cholecalciferol (vitamin D3)  1,200 Units Oral Daily    enoxaparin  30 mg Subcutaneous Daily    fluticasone furoate-vilanteroL  1 puff Inhalation Daily    losartan  50 mg Oral Daily    melatonin  6 mg Oral Nightly    pantoprazole  40 mg Oral Daily    polyethylene glycol  17 g Oral Daily    pregabalin  75 mg Oral BID    sulfamethoxazole-trimethoprim 800-160mg  1 tablet Oral Daily        PRN:  acetaminophen, dextrose 10%, dextrose 10%, dextrose, dextrose, glucagon (human recombinant), naloxone, sodium chloride 0.9%    Antibiotics and Day Number of Therapy:  Bactrim     Lines and Day Number of Therapy:  PIV 5/12    Assessment:     Bridget Montgomery is a 88 y.o. F with a PMH of CKDIV, COPD, HTN, prediabetes (A1c 6.1 3/2023), and DVT (2022) who presents for a nonhealing right lower leg wound that has been present for 3 weeks. Pt afebrile and no leukocytosis. Pt completed 6 days of Bactrim prior to admission. Low clinical suspicion of infection. Neg erythema, crepitus, or purulent drainage. XR  neg for obvious infection. Due to severe pain, ordered CRP and ESR which were not significant. If continued pain, may consider further imaging with MRI or possible bone biopsy.      Plan:        Right Lower Extremity Wound  WBC 6.4, pt afebrile on admit. Photo in media tab. Has HH wound care set up  - Neg for purulent drainage, crepitus, or erythematous borders  - XR Tibia: no obvious signs of osteo  - CRP 7.8 and ESR 37  - Consulted Dr Mohan to see if pt would need to follow him outpatient  - Given dose of Bactrim 800 and completed 7 day course as previously prescribed  - Further abx pending Dr. Mohan recs  - May consider MRI or bone biopsy     Pain 2/2 Right Lower Extremity Wound  - Ordered Tylenol 500mg q6 PRN  - Tramadol 50mg PRN     Spinal Stenosis  - Continue home pregabalin 75mg BID  - Consulted Dr. Main MCCRAY as pt continues to endorse radiating pain     HTN  /74 on admit. 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  Cr 1.9 on admit, baseline ~ 1.4  - Completed IVF bolus @50ml/hr     Microcytic Anemia  H/H 9.6/28.7 on admit  - Iron studies 2/2023: Fe 43, Transferrin 257, TIBC 380, Saturated iron 11  - Ferritin 2/2023 57  - Transfuse Hg <7     COPD  - Home symbicort  - Continue on fluticasone furoate-vilanterol 100-25mcg     HLD  - Lipid panel: Chol 243, HDL 60, , TG 80  - Given advanced age, benefits does not outweigh risks for statin therapy    Hypoalbuminemia  Albumin 3.3 on admit  - CTM     Prediabetes  - A1c 6.1% 3/2023  - CTM     Constipation  - Miralax bowel regimen        Diet: Renal  DVT: Lov  IVF:   Dispo: Pending pain control, resolution of RADHA, and  wound care    Zaid Payne MD  [facility] Internal Medicine -I    [facility] Hospitalist Pager numbers:     [facility] Hospitalist Medicine Team B (Fredy/Eliana):  [phone]

## 2024-06-06 ENCOUNTER — OFFICE VISIT (OUTPATIENT)
Dept: NEUROSURGERY | Facility: CLINIC | Age: 89
End: 2024-06-06
Payer: MEDICARE

## 2024-06-06 ENCOUNTER — TELEPHONE (OUTPATIENT)
Dept: NEUROSURGERY | Facility: CLINIC | Age: 89
End: 2024-06-06

## 2024-06-06 ENCOUNTER — HOSPITAL ENCOUNTER (OUTPATIENT)
Dept: RADIOLOGY | Facility: HOSPITAL | Age: 89
Discharge: HOME | End: 2024-06-06
Attending: NEUROLOGICAL SURGERY
Payer: MEDICARE

## 2024-06-06 VITALS
BODY MASS INDEX: 22.46 KG/M2 | HEIGHT: 63 IN | WEIGHT: 126.75 LBS | DIASTOLIC BLOOD PRESSURE: 87 MMHG | SYSTOLIC BLOOD PRESSURE: 163 MMHG | HEART RATE: 83 BPM

## 2024-06-06 DIAGNOSIS — S12.112K CLOSED NONDISPLACED ODONTOID FRACTURE WITH TYPE II MORPHOLOGY AND NONUNION, SUBSEQUENT ENCOUNTER: ICD-10-CM

## 2024-06-06 DIAGNOSIS — S12.100D CLOSED ODONTOID FRACTURE WITH ROUTINE HEALING: Primary | ICD-10-CM

## 2024-06-06 PROCEDURE — 3288F FALL RISK ASSESSMENT DOCD: CPT | Mod: CPTII,S$GLB,, | Performed by: PHYSICIAN ASSISTANT

## 2024-06-06 PROCEDURE — 72050 X-RAY EXAM NECK SPINE 4/5VWS: CPT | Mod: 26,,, | Performed by: STUDENT IN AN ORGANIZED HEALTH CARE EDUCATION/TRAINING PROGRAM

## 2024-06-06 PROCEDURE — 1160F RVW MEDS BY RX/DR IN RCRD: CPT | Mod: CPTII,S$GLB,, | Performed by: PHYSICIAN ASSISTANT

## 2024-06-06 PROCEDURE — 1125F AMNT PAIN NOTED PAIN PRSNT: CPT | Mod: CPTII,S$GLB,, | Performed by: PHYSICIAN ASSISTANT

## 2024-06-06 PROCEDURE — 99999 PR PBB SHADOW E&M-EST. PATIENT-LVL IV: CPT | Mod: PBBFAC,,, | Performed by: PHYSICIAN ASSISTANT

## 2024-06-06 PROCEDURE — 72050 X-RAY EXAM NECK SPINE 4/5VWS: CPT | Mod: TC,FY

## 2024-06-06 PROCEDURE — 1101F PT FALLS ASSESS-DOCD LE1/YR: CPT | Mod: CPTII,S$GLB,, | Performed by: PHYSICIAN ASSISTANT

## 2024-06-06 PROCEDURE — 1159F MED LIST DOCD IN RCRD: CPT | Mod: CPTII,S$GLB,, | Performed by: PHYSICIAN ASSISTANT

## 2024-06-06 PROCEDURE — 99214 OFFICE O/P EST MOD 30 MIN: CPT | Mod: S$GLB,,, | Performed by: PHYSICIAN ASSISTANT

## 2024-06-06 RX ORDER — AZITHROMYCIN 250 MG/1
250 TABLET, FILM COATED ORAL
COMMUNITY
Start: 2024-03-18

## 2024-06-06 NOTE — PROGRESS NOTES
Subjective:     Patient ID:  Bridget Montgomery is a 89 y.o. female.    Main        [facility]      SUBJECTIVE:  09/13/23  This is a very pleasant 88 y.o. female, she had a fall in July 2023 and was diagnosed with a type 2 odontoid fracture.  She is wearing collar on and off.  She had a repeat fall in August while wearing her collar.  She is mainly complaining of upper posterior cervical and occipital neck pain.  No new onset of motor weakness numbness or sphincter dysfunction symptoms.  She is known for lumbar spinal stenosis, leg pain and neurogenic claudication symptoms.      INTERIM HISTORY:  03/06/24     An 80-year-old female with known type 2 odontoid fracture 9 month ago.  She is here today for clinic follow-up.  She had been wearing collar at all time.  She uses a soft collar during sleep and while she is in bed and she has in a rigid collar when she is out of bed.  She denies any radicular symptoms.  She is accompanied by her daughters.     She had a small stroke last fall and recovered well.  She is doing physical therapy.  She would like to proceed with her bilateral shoulder surgeries.  Does not report significant neck pain at this time.      Interval History:   06/06/2024    Patient has some stiffness in her neck after sleeping in a chair.  Otherwise no neck pain.  No arm pain or paresthesias.  She has not done physical therapy at a clinic but went 1 time and was given home exercises to do which she does on and off.  She wears a neck brace when she is shopping or when riding in a car.        Review of Systems:  Constitution: Negative for chills, fever, night sweats and weight loss.   Musculoskeletal: Negative for falls.   Gastrointestinal: Negative for bowel incontinence, nausea and vomiting.   Genitourinary: Negative for bladder incontinence.   Neurological: Negative for disturbances in coordination and loss of balance.      Objective:      Vitals:    06/06/24 0952   BP: (!) 163/87   Pulse: 83   Weight:  "57.5 kg (126 lb 12.2 oz)   Height: 5' 3" (1.6 m)   PainSc:   8   PainLoc: Shoulder         Physical Exam: Exam done in the chair      General:  Bridget Montgomery is well-developed, well-nourished, appears stated age, in no acute distress, alert and oriented to person, place, and time.    Pulmonary/Chest:  Respiratory effort normal  Abdominal: Exhibits no distension  Psychiatric:  Normal mood and affect.  Behavior is normal.  Judgement and thought content normal      Musculoskeletal:      Cervical ROM:   Mild stiffness in cervical spine flexion, extension, left rotation, and right rotation, left lateral bending, and right lateral bending.    Cervical Spine Inspection:  Normal with no surgical scars with no visible rashes.    Cervical Spine Palpation:  No tenderness to cervical spine palpation.      Neurological:    Muscle strength against resistance:     Right Left   Deltoid  5 / 5 5 / 5   Biceps  5 / 5 5 / 5   Triceps 5 / 5 5 / 5   Wrist flexion  5 / 5 5 / 5   Wrist extension 5 / 5 5 / 5   Finger abduction 5 / 5 5 / 5   Thumb opposition 5 / 5 5 / 5   Handgrip 5 / 5 5 / 5   Hip flexion  5 / 5 5 / 5   Hip extension 5 / 5 5 / 5   Hip abduction 5 / 5 5 / 5   Hip adduction 5/ 5 5 / 5   Knee extension  5 / 5 5 / 5   Knee flexion  5 / 5 5 / 5   Dorsiflexion  5 / 5 5 / 5   EHL  5 / 5 5 / 5   Plantar flexion  5 / 5 5 / 5   Inversion of the feet 5 / 5 5 / 5   Eversion of the feet 5 / 5 5 / 5       Reflexes:     Right Left   Triceps 2+ 2+   Biceps 2+ 2+   Brachioradialis 2+ 2+   Patellar 2+ 2+   Achilles 2+ 2+       Clonus:  Negative bilaterally  Schneider: Negative bilaterally    On gross examination of the bilateral lower extremities, patient has full painfree ROM with no signs of clubbing, cyanosis, edema, and weakness.      XRAY Results:     Narrative & Impression  EXAMINATION:  XR CERVICAL SPINE AP LAT WITH FLEX EXTEN     CLINICAL HISTORY:  Nondisplaced type ii dens fracture, subsequent encounter for fracture with " nonunion     TECHNIQUE:  Three views of the cervical spine plus flexion and extension views were performed.     COMPARISON:  Cervical spine radiograph 03/06/2024     FINDINGS:  No interval detrimental change.  Odontoid fracture in similar position and alignment without evidence for new fracture or traumatic malalignment.  Overall increased sclerosis about the fracture from comparison radiograph 02/28/2024.  No dynamic instability.     Mild diffuse osteopenia.     Alignment is unchanged.     Multilevel intervertebral disc space height loss.     Lung apices are clear.     Soft tissues are unremarkable.     Impression:     Please see above.        Electronically signed by:Killina Suarez  Date:                                            06/06/2024  Time:                                           09:53      Assessment:          1. Closed odontoid fracture with routine healing            Plan:               Will review all of the above with Dr. Quach for further recommendations going forward    Follow-Up:  Follow up if symptoms worsen or fail to improve. If there are any questions prior to this, the patient was instructed to contact the office.       SANTI Martino, PA-C  Neurosurgery  [facility]  06/06/2024

## 2024-06-07 ENCOUNTER — TELEPHONE (OUTPATIENT)
Dept: NEUROSURGERY | Facility: CLINIC | Age: 89
End: 2024-06-07
Payer: MEDICARE

## 2024-06-07 NOTE — TELEPHONE ENCOUNTER
Dr. Quach,    88 yo female who had fall in July 2023 and was diagnosed with a type 2 odontoid fracture.  At the last visit with you three months ago the brace was discontinued but she still wears it in a car and when going shopping.  She went to PT once and they gave her a HEP that she does on and off.  Mild neck stiffness at times.  No arm pain or paresthesias.    Xrays looks stable to me but can you review.    She was asking if she can drive?  Can she remove the brace totally?    Does she need any further follow up or imaging going forward?    Thanks,  Leticia Mejias, [facility], PA-C  Neurosurgery  [facility]  06/06/2024    
Please let patient know Dr. Quach said she does not needs to wear the brace anymore.  She is able to drive.     No neurosurgery follow needed.    Fu as needed.    Thanks,  Leticia Mejias, [facility], PA-C  Neurosurgery  [facility]  06/07/2024    
Hydronephrosis of right kidney

## 2024-06-10 ENCOUNTER — LAB VISIT (OUTPATIENT)
Dept: LAB | Facility: HOSPITAL | Age: 89
End: 2024-06-10
Attending: INTERNAL MEDICINE
Payer: MEDICARE

## 2024-06-10 DIAGNOSIS — N18.4 CKD (CHRONIC KIDNEY DISEASE) STAGE 4, GFR 15-29 ML/MIN: ICD-10-CM

## 2024-06-10 LAB
ALBUMIN/CREAT UR: 80.5 UG/MG (ref 0–30)
BILIRUB UR QL STRIP: NEGATIVE
CLARITY UR REFRACT.AUTO: CLEAR
COLOR UR AUTO: YELLOW
CREAT UR-MCNC: 41 MG/DL (ref 15–325)
CREAT UR-MCNC: 41 MG/DL (ref 15–325)
GLUCOSE UR QL STRIP: NEGATIVE
HGB UR QL STRIP: NEGATIVE
KETONES UR QL STRIP: NEGATIVE
LEUKOCYTE ESTERASE UR QL STRIP: NEGATIVE
MICROALBUMIN UR DL<=1MG/L-MCNC: 33 UG/ML
NITRITE UR QL STRIP: NEGATIVE
PH UR STRIP: 7 [PH] (ref 5–8)
PROT UR QL STRIP: NEGATIVE
PROT UR-MCNC: 13 MG/DL (ref 0–15)
PROT/CREAT UR: 0.32 MG/G{CREAT} (ref 0–0.2)
SP GR UR STRIP: 1.01 (ref 1–1.03)
URN SPEC COLLECT METH UR: NORMAL

## 2024-06-10 PROCEDURE — 82043 UR ALBUMIN QUANTITATIVE: CPT | Performed by: INTERNAL MEDICINE

## 2024-06-10 PROCEDURE — 81003 URINALYSIS AUTO W/O SCOPE: CPT | Performed by: INTERNAL MEDICINE

## 2024-06-10 PROCEDURE — 84156 ASSAY OF PROTEIN URINE: CPT | Performed by: INTERNAL MEDICINE

## 2024-08-27 ENCOUNTER — LAB VISIT (OUTPATIENT)
Dept: LAB | Facility: HOSPITAL | Age: 89
End: 2024-08-27
Attending: INTERNAL MEDICINE
Payer: MEDICARE

## 2024-08-27 DIAGNOSIS — N18.4 CKD (CHRONIC KIDNEY DISEASE) STAGE 4, GFR 15-29 ML/MIN: ICD-10-CM

## 2024-08-27 DIAGNOSIS — N18.4 ANEMIA IN STAGE 4 CHRONIC KIDNEY DISEASE: ICD-10-CM

## 2024-08-27 DIAGNOSIS — D63.1 ANEMIA IN STAGE 4 CHRONIC KIDNEY DISEASE: ICD-10-CM

## 2024-08-27 DIAGNOSIS — N25.81 SECONDARY HYPERPARATHYROIDISM OF RENAL ORIGIN: ICD-10-CM

## 2024-08-27 LAB
ALBUMIN SERPL BCP-MCNC: 3.5 G/DL (ref 3.5–5.2)
ALBUMIN/CREAT UR: 26.3 UG/MG (ref 0–30)
ANION GAP SERPL CALC-SCNC: 12 MMOL/L (ref 8–16)
BASOPHILS # BLD AUTO: 0.06 K/UL (ref 0–0.2)
BASOPHILS NFR BLD: 1.1 % (ref 0–1.9)
BILIRUB UR QL STRIP: NEGATIVE
BUN SERPL-MCNC: 33 MG/DL (ref 8–23)
CALCIUM SERPL-MCNC: 9.2 MG/DL (ref 8.7–10.5)
CHLORIDE SERPL-SCNC: 106 MMOL/L (ref 95–110)
CLARITY UR REFRACT.AUTO: CLEAR
CO2 SERPL-SCNC: 21 MMOL/L (ref 23–29)
COLOR UR AUTO: YELLOW
CREAT SERPL-MCNC: 1.2 MG/DL (ref 0.5–1.4)
CREAT UR-MCNC: 76 MG/DL (ref 15–325)
CREAT UR-MCNC: 76 MG/DL (ref 15–325)
DIFFERENTIAL METHOD BLD: ABNORMAL
EOSINOPHIL # BLD AUTO: 0.1 K/UL (ref 0–0.5)
EOSINOPHIL NFR BLD: 1.8 % (ref 0–8)
ERYTHROCYTE [DISTWIDTH] IN BLOOD BY AUTOMATED COUNT: 13.4 % (ref 11.5–14.5)
EST. GFR  (NO RACE VARIABLE): 43.3 ML/MIN/1.73 M^2
FERRITIN SERPL-MCNC: 48 NG/ML (ref 20–300)
GLUCOSE SERPL-MCNC: 147 MG/DL (ref 70–110)
GLUCOSE UR QL STRIP: NEGATIVE
HCT VFR BLD AUTO: 34 % (ref 37–48.5)
HGB BLD-MCNC: 10.5 G/DL (ref 12–16)
HGB UR QL STRIP: NEGATIVE
IMM GRANULOCYTES # BLD AUTO: 0.01 K/UL (ref 0–0.04)
IMM GRANULOCYTES NFR BLD AUTO: 0.2 % (ref 0–0.5)
IRON SERPL-MCNC: 60 UG/DL (ref 30–160)
KETONES UR QL STRIP: NEGATIVE
LEUKOCYTE ESTERASE UR QL STRIP: NEGATIVE
LYMPHOCYTES # BLD AUTO: 2 K/UL (ref 1–4.8)
LYMPHOCYTES NFR BLD: 35.7 % (ref 18–48)
MAGNESIUM SERPL-MCNC: 2.1 MG/DL (ref 1.6–2.6)
MCH RBC QN AUTO: 27.8 PG (ref 27–31)
MCHC RBC AUTO-ENTMCNC: 30.9 G/DL (ref 32–36)
MCV RBC AUTO: 90 FL (ref 82–98)
MICROALBUMIN UR DL<=1MG/L-MCNC: 20 UG/ML
MONOCYTES # BLD AUTO: 0.5 K/UL (ref 0.3–1)
MONOCYTES NFR BLD: 8.8 % (ref 4–15)
NEUTROPHILS # BLD AUTO: 2.9 K/UL (ref 1.8–7.7)
NEUTROPHILS NFR BLD: 52.4 % (ref 38–73)
NITRITE UR QL STRIP: NEGATIVE
NRBC BLD-RTO: 0 /100 WBC
PH UR STRIP: 6 [PH] (ref 5–8)
PHOSPHATE SERPL-MCNC: 4.1 MG/DL (ref 2.7–4.5)
PLATELET # BLD AUTO: 460 K/UL (ref 150–450)
PMV BLD AUTO: 10.1 FL (ref 9.2–12.9)
POTASSIUM SERPL-SCNC: 4.2 MMOL/L (ref 3.5–5.1)
PROT UR QL STRIP: NEGATIVE
PROT UR-MCNC: 9 MG/DL (ref 0–15)
PROT/CREAT UR: 0.12 MG/G{CREAT} (ref 0–0.2)
PTH-INTACT SERPL-MCNC: 23.7 PG/ML (ref 9–77)
RBC # BLD AUTO: 3.78 M/UL (ref 4–5.4)
SATURATED IRON: 13 % (ref 20–50)
SODIUM SERPL-SCNC: 139 MMOL/L (ref 136–145)
SP GR UR STRIP: 1.01 (ref 1–1.03)
TOTAL IRON BINDING CAPACITY: 459 UG/DL (ref 250–450)
TRANSFERRIN SERPL-MCNC: 310 MG/DL (ref 200–375)
URATE SERPL-MCNC: 7.1 MG/DL (ref 2.4–5.7)
URN SPEC COLLECT METH UR: NORMAL
WBC # BLD AUTO: 5.46 K/UL (ref 3.9–12.7)

## 2024-08-27 PROCEDURE — 83540 ASSAY OF IRON: CPT | Performed by: INTERNAL MEDICINE

## 2024-08-27 PROCEDURE — 82043 UR ALBUMIN QUANTITATIVE: CPT | Performed by: INTERNAL MEDICINE

## 2024-08-27 PROCEDURE — 85025 COMPLETE CBC W/AUTO DIFF WBC: CPT | Performed by: INTERNAL MEDICINE

## 2024-08-27 PROCEDURE — 83970 ASSAY OF PARATHORMONE: CPT | Performed by: INTERNAL MEDICINE

## 2024-08-27 PROCEDURE — 82570 ASSAY OF URINE CREATININE: CPT | Performed by: INTERNAL MEDICINE

## 2024-08-27 PROCEDURE — 81003 URINALYSIS AUTO W/O SCOPE: CPT | Performed by: INTERNAL MEDICINE

## 2024-08-27 PROCEDURE — 36415 COLL VENOUS BLD VENIPUNCTURE: CPT | Performed by: INTERNAL MEDICINE

## 2024-08-27 PROCEDURE — 84156 ASSAY OF PROTEIN URINE: CPT | Performed by: INTERNAL MEDICINE

## 2024-08-27 PROCEDURE — 82728 ASSAY OF FERRITIN: CPT | Performed by: INTERNAL MEDICINE

## 2024-08-27 PROCEDURE — 80069 RENAL FUNCTION PANEL: CPT | Performed by: INTERNAL MEDICINE

## 2024-08-27 PROCEDURE — 84550 ASSAY OF BLOOD/URIC ACID: CPT | Performed by: INTERNAL MEDICINE

## 2024-08-27 PROCEDURE — 83735 ASSAY OF MAGNESIUM: CPT | Performed by: INTERNAL MEDICINE

## 2024-10-25 NOTE — PT/OT/SLP PROGRESS
Dr Gutierrez place  Referral on 04/05/2024    Attempts were made to reach patient in in July by call and by  letter to schedule a Behavioral Health New Patient [facility] Appointment for Psychiatry.  The patient has not responded at time of this message.       Patient has been removed from the New Patient  Wait List for Psychiatry.    FYI to PCP, Dr Brooks.       Occupational Therapy   Treatment    Name: Bridget Montgomery  MRN: [id number]  Admit Date: 10/23/2023  Admitting Diagnosis:  Diplopia    General Precautions: Standard, fall, hearing impaired   Orthopedic Precautions: spinal precautions   Braces: Cervical collar    Recommendations:     Discharge Recommendations:  Low Intensity Therapy  Level of Assistance Recommended at Discharge: 24 hours light assistance for ADL's and homemaking tasks  Discharge Equipment Recommendations: bath bench  Barriers to discharge:  None    Assessment:     Bridget Montgomery is a 88 y.o. female with a medical diagnosis of Diplopia.  She presents with limitations in performance of self-care, functional mobility, and ADLs. Performance deficits affecting function are weakness, impaired endurance, impaired self care skills, impaired functional mobility, gait instability, impaired balance, decreased coordination, decreased upper extremity function, decreased lower extremity function, pain, impaired coordination, orthopedic precautions. Pt tolerated Tx without incident and is making progress but continues to require assist to perform self care tasks, functional mobility and functional transfers. Pt would continue to benefit from OT intervention to further functional (I)ce and safety.     Rehab Potential is good    Activity tolerance:  Good    Plan:     Patient to be seen 5 x/week to address the above listed problems via self-care/home management, therapeutic activities, therapeutic exercises    Plan of Care Expires: 11/24/23  Plan of Care Reviewed with: patient, daughter    Subjective     Communicated with: Nursing prior to session.     Pain/Comfort:  Pain Rating 1: 8/10  Location - Side 1: Bilateral  Location - Orientation 1: generalized  Location 1: shoulder  Pain Addressed 1: Pre-medicate for activity, Reposition, Distraction  Pain Rating Post-Intervention 1: 8/10  Pain Rating 2: 8/10  Location - Side 2: Left  Location - Orientation 2:  generalized  Location 2: hip (back)  Pain Addressed 2: Pre-medicate for activity, Reposition, Distraction  Pain Rating Post-Intervention 2: 8/10    Patient's cultural, spiritual, Taoism conflicts given the current situation:  no    Objective:     Patient found up in chair with cervical collar upon OT entry to room.    Bed Mobility:    Pt seated in chair at onset of treatment session.     Functional Mobility/Transfers:  Patient completed Sit <> Stand Transfer with stand by assistance  with  rolling walker   Functional Mobility: Pt propelled lightweight W/C from room to therapy gym with sup for a total of 150 ft using BUE to propel chair.     [facility] 6 Click ADL: 19    OT Exercises: Pt performed UBE exercise for 10 minutes with Min resistance.  UE exercises performed to increase functional endurance and strength in order increase independence when performing self care tasks, functional ambulation, W/C propulsion, and functional standing activities .      Treatment & Education:  Pt participated in gross motor standing activity with CGA and RW positioned in front for as needed for balance. Pt large ball for bouncing/catching activity using BUE with focus on standing tolerance, functional reaching, dynamic standing bal, crossing midline, and to promote independence with homemaking and self care tasks.    Pt educated on:  - role of OT  - level of assistance  - energy conservation and task modification to maximized independence with ADL's and mobility   -  safety while performing functional transfers and self care tasks  - progress towards OT goals     Patient left up in chair with call button in reach and daughter present    GOALS:   Multidisciplinary Problems       Occupational Therapy Goals          Problem: Occupational Therapy    Goal Priority Disciplines Outcome Interventions   Occupational Therapy Goal     OT, PT/OT Ongoing, Progressing    Description: Goals to be met by: 3 weeks     Patient will increase  functional independence with ADLs by performing:    UE Dressing with Stand-by Assistance. -- Ongoing  LE Dressing with Stand-by Assistance with use of AD and AE as needed. -- Met  Grooming while standing at sink with Supervision. -- Ongoing  Toileting from bedside commode or toilet with Supervision for hygiene and clothing management.   Bathing from sitting at sink with Set-up Assistance. -- Ongoing  Rolling to Bilateral with Modified Harvey. -- Ongoing  Supine to sit with Modified Harvey.-- Ongoing  Stand pivot transfers with Supervision with use of AD and AE as needed. -- Ongoing  Toilet transfer to bedside commode or toilet with Supervision with use of AD and AE as needed. -- Ongoing  Increased functional strength and endurance through use of UBE for at least 8 minutes with Min resistance. -- Ongoing  Upper extremity exercise program 1x15 reps per handout, with assistance as needed. -- Ongoing  Pt will perform functional standing up to 10 minutes in prep for performance of functional ADLs in standing. -- Met                           Time Tracking:     OT Date of Treatment: 11/03/23  OT Start Time: 1004    OT Stop Time: 1035  OT Total Time (min): 31 min    Billable Minutes:Therapeutic Activity 18  Therapeutic Exercise 13    11/3/2023

## 2024-12-06 ENCOUNTER — LAB VISIT (OUTPATIENT)
Dept: LAB | Facility: HOSPITAL | Age: 89
End: 2024-12-06
Attending: INTERNAL MEDICINE
Payer: MEDICARE

## 2024-12-06 DIAGNOSIS — N25.81 SECONDARY HYPERPARATHYROIDISM OF RENAL ORIGIN: ICD-10-CM

## 2024-12-06 DIAGNOSIS — E55.9 VITAMIN D DEFICIENCY: ICD-10-CM

## 2024-12-06 DIAGNOSIS — N18.4 CKD (CHRONIC KIDNEY DISEASE) STAGE 4, GFR 15-29 ML/MIN: ICD-10-CM

## 2024-12-06 LAB
25(OH)D3+25(OH)D2 SERPL-MCNC: 75 NG/ML (ref 30–96)
ALBUMIN SERPL BCP-MCNC: 3.6 G/DL (ref 3.5–5.2)
ANION GAP SERPL CALC-SCNC: 13 MMOL/L (ref 8–16)
BASOPHILS # BLD AUTO: 0.08 K/UL (ref 0–0.2)
BASOPHILS NFR BLD: 1.3 % (ref 0–1.9)
BUN SERPL-MCNC: 31 MG/DL (ref 8–23)
CALCIUM SERPL-MCNC: 9 MG/DL (ref 8.7–10.5)
CHLORIDE SERPL-SCNC: 103 MMOL/L (ref 95–110)
CO2 SERPL-SCNC: 22 MMOL/L (ref 23–29)
CREAT SERPL-MCNC: 1.3 MG/DL (ref 0.5–1.4)
DIFFERENTIAL METHOD BLD: ABNORMAL
EOSINOPHIL # BLD AUTO: 0.2 K/UL (ref 0–0.5)
EOSINOPHIL NFR BLD: 3.5 % (ref 0–8)
ERYTHROCYTE [DISTWIDTH] IN BLOOD BY AUTOMATED COUNT: 14.6 % (ref 11.5–14.5)
EST. GFR  (NO RACE VARIABLE): 39.3 ML/MIN/1.73 M^2
GLUCOSE SERPL-MCNC: 94 MG/DL (ref 70–110)
HCT VFR BLD AUTO: 33.9 % (ref 37–48.5)
HGB BLD-MCNC: 10.9 G/DL (ref 12–16)
IMM GRANULOCYTES # BLD AUTO: 0.02 K/UL (ref 0–0.04)
IMM GRANULOCYTES NFR BLD AUTO: 0.3 % (ref 0–0.5)
LYMPHOCYTES # BLD AUTO: 1.5 K/UL (ref 1–4.8)
LYMPHOCYTES NFR BLD: 24.1 % (ref 18–48)
MAGNESIUM SERPL-MCNC: 2.1 MG/DL (ref 1.6–2.6)
MCH RBC QN AUTO: 28.4 PG (ref 27–31)
MCHC RBC AUTO-ENTMCNC: 32.2 G/DL (ref 32–36)
MCV RBC AUTO: 88 FL (ref 82–98)
MONOCYTES # BLD AUTO: 0.7 K/UL (ref 0.3–1)
MONOCYTES NFR BLD: 11.4 % (ref 4–15)
NEUTROPHILS # BLD AUTO: 3.6 K/UL (ref 1.8–7.7)
NEUTROPHILS NFR BLD: 59.4 % (ref 38–73)
NRBC BLD-RTO: 0 /100 WBC
PHOSPHATE SERPL-MCNC: 4.5 MG/DL (ref 2.7–4.5)
PLATELET # BLD AUTO: 389 K/UL (ref 150–450)
PMV BLD AUTO: 10 FL (ref 9.2–12.9)
POTASSIUM SERPL-SCNC: 4 MMOL/L (ref 3.5–5.1)
PTH-INTACT SERPL-MCNC: 86.7 PG/ML (ref 9–77)
RBC # BLD AUTO: 3.84 M/UL (ref 4–5.4)
SODIUM SERPL-SCNC: 138 MMOL/L (ref 136–145)
URATE SERPL-MCNC: 7.5 MG/DL (ref 2.4–5.7)
WBC # BLD AUTO: 6.06 K/UL (ref 3.9–12.7)

## 2024-12-06 PROCEDURE — 84550 ASSAY OF BLOOD/URIC ACID: CPT | Performed by: INTERNAL MEDICINE

## 2024-12-06 PROCEDURE — 85025 COMPLETE CBC W/AUTO DIFF WBC: CPT | Performed by: INTERNAL MEDICINE

## 2024-12-06 PROCEDURE — 80069 RENAL FUNCTION PANEL: CPT | Performed by: INTERNAL MEDICINE

## 2024-12-06 PROCEDURE — 36415 COLL VENOUS BLD VENIPUNCTURE: CPT | Performed by: INTERNAL MEDICINE

## 2024-12-06 PROCEDURE — 83735 ASSAY OF MAGNESIUM: CPT | Performed by: INTERNAL MEDICINE

## 2024-12-06 PROCEDURE — 83970 ASSAY OF PARATHORMONE: CPT | Performed by: INTERNAL MEDICINE

## 2024-12-06 PROCEDURE — 82306 VITAMIN D 25 HYDROXY: CPT | Performed by: INTERNAL MEDICINE

## 2025-01-11 NOTE — PATIENT INSTRUCTIONS
- You must understand that you have received an Urgent Care treatment only and that you may be released before all of your medical problems are known or treated.   - You, the patient, will arrange for follow up care as instructed.   - If your condition worsens or fails to improve we recommend that you receive another evaluation at the ER immediately or contact your PCP to discuss your concerns.   - You can call [phone] or [phone] to help schedule an appointment with the appropriate provider.      Alternate heat and ice. Apply heat for 20-30 minutes, perform gentle range of motion exercises, and then apply ice for 15 minutes. Do this 3-4 times per day.        Normal vision: sees adequately in most situations; can see medication labels, newsprint

## 2025-03-08 ENCOUNTER — HOSPITAL ENCOUNTER (INPATIENT)
Facility: HOSPITAL | Age: OVER 89
LOS: 1 days | Discharge: ANOTHER HOSPITAL | DRG: 552 | End: 2025-03-09
Attending: EMERGENCY MEDICINE | Admitting: INTERNAL MEDICINE
Payer: MEDICARE

## 2025-03-08 DIAGNOSIS — G95.20 SPINAL CORD COMPRESSION: ICD-10-CM

## 2025-03-08 DIAGNOSIS — R29.818 ACUTE FOCAL NEUROLOGICAL DEFICIT: ICD-10-CM

## 2025-03-08 DIAGNOSIS — M48.02 SPINAL STENOSIS, CERVICAL REGION: ICD-10-CM

## 2025-03-08 DIAGNOSIS — G45.9 TIA (TRANSIENT ISCHEMIC ATTACK): ICD-10-CM

## 2025-03-08 DIAGNOSIS — R53.1 WEAKNESS: Primary | ICD-10-CM

## 2025-03-08 DIAGNOSIS — R51.9 NONINTRACTABLE HEADACHE, UNSPECIFIED CHRONICITY PATTERN, UNSPECIFIED HEADACHE TYPE: ICD-10-CM

## 2025-03-08 PROBLEM — R47.9 DIFFICULTY WITH SPEECH: Status: ACTIVE | Noted: 2025-03-08

## 2025-03-08 LAB
ALBUMIN SERPL BCP-MCNC: 3.5 G/DL (ref 3.5–5.2)
ALP SERPL-CCNC: 213 U/L (ref 40–150)
ALT SERPL W/O P-5'-P-CCNC: 11 U/L (ref 10–44)
ANION GAP SERPL CALC-SCNC: 9 MMOL/L (ref 8–16)
AST SERPL-CCNC: 26 U/L (ref 10–40)
BASOPHILS # BLD AUTO: 0.03 K/UL (ref 0–0.2)
BASOPHILS NFR BLD: 0.9 % (ref 0–1.9)
BILIRUB SERPL-MCNC: 0.2 MG/DL (ref 0.1–1)
BUN SERPL-MCNC: 35 MG/DL (ref 8–23)
CALCIUM SERPL-MCNC: 9.5 MG/DL (ref 8.7–10.5)
CHLORIDE SERPL-SCNC: 99 MMOL/L (ref 95–110)
CHOLEST SERPL-MCNC: 241 MG/DL (ref 120–199)
CHOLEST/HDLC SERPL: 3.4 {RATIO} (ref 2–5)
CO2 SERPL-SCNC: 28 MMOL/L (ref 23–29)
CREAT SERPL-MCNC: 1.4 MG/DL (ref 0.5–1.4)
CREAT SERPL-MCNC: 1.5 MG/DL (ref 0.5–1.4)
DIFFERENTIAL METHOD BLD: ABNORMAL
EOSINOPHIL # BLD AUTO: 0.1 K/UL (ref 0–0.5)
EOSINOPHIL NFR BLD: 1.7 % (ref 0–8)
ERYTHROCYTE [DISTWIDTH] IN BLOOD BY AUTOMATED COUNT: 14 % (ref 11.5–14.5)
EST. GFR  (NO RACE VARIABLE): 36 ML/MIN/1.73 M^2
GLUCOSE SERPL-MCNC: 151 MG/DL (ref 70–110)
HCT VFR BLD AUTO: 36.5 % (ref 37–48.5)
HDLC SERPL-MCNC: 70 MG/DL (ref 40–75)
HDLC SERPL: 29 % (ref 20–50)
HGB BLD-MCNC: 11.8 G/DL (ref 12–16)
IMM GRANULOCYTES # BLD AUTO: 0.01 K/UL (ref 0–0.04)
IMM GRANULOCYTES NFR BLD AUTO: 0.3 % (ref 0–0.5)
INFLUENZA A, MOLECULAR: NEGATIVE
INFLUENZA B, MOLECULAR: NEGATIVE
INR PPP: 0.9 (ref 0.8–1.2)
LDLC SERPL CALC-MCNC: 142.4 MG/DL (ref 63–159)
LYMPHOCYTES # BLD AUTO: 1.3 K/UL (ref 1–4.8)
LYMPHOCYTES NFR BLD: 37.6 % (ref 18–48)
MCH RBC QN AUTO: 29.1 PG (ref 27–31)
MCHC RBC AUTO-ENTMCNC: 32.3 G/DL (ref 32–36)
MCV RBC AUTO: 90 FL (ref 82–98)
MONOCYTES # BLD AUTO: 0.5 K/UL (ref 0.3–1)
MONOCYTES NFR BLD: 13.3 % (ref 4–15)
NEUTROPHILS # BLD AUTO: 1.6 K/UL (ref 1.8–7.7)
NEUTROPHILS NFR BLD: 46.2 % (ref 38–73)
NONHDLC SERPL-MCNC: 171 MG/DL
NRBC BLD-RTO: 0 /100 WBC
PLATELET # BLD AUTO: 280 K/UL (ref 150–450)
PLATELET BLD QL SMEAR: ABNORMAL
PMV BLD AUTO: 10.6 FL (ref 9.2–12.9)
POCT GLUCOSE: 142 MG/DL (ref 70–110)
POCT GLUCOSE: 167 MG/DL (ref 70–110)
POTASSIUM SERPL-SCNC: 4.1 MMOL/L (ref 3.5–5.1)
PROT SERPL-MCNC: 7.7 G/DL (ref 6–8.4)
PROTHROMBIN TIME: 10.4 SEC (ref 9–12.5)
RBC # BLD AUTO: 4.06 M/UL (ref 4–5.4)
SAMPLE: ABNORMAL
SARS-COV-2 RDRP RESP QL NAA+PROBE: NEGATIVE
SODIUM SERPL-SCNC: 136 MMOL/L (ref 136–145)
SPECIMEN SOURCE: NORMAL
TRIGL SERPL-MCNC: 143 MG/DL (ref 30–150)
TROPONIN I SERPL DL<=0.01 NG/ML-MCNC: 0.01 NG/ML (ref 0–0.03)
TSH SERPL DL<=0.005 MIU/L-ACNC: 2.75 UIU/ML (ref 0.4–4)
WBC # BLD AUTO: 3.46 K/UL (ref 3.9–12.7)

## 2025-03-08 PROCEDURE — G0426 INPT/ED TELECONSULT50: HCPCS | Mod: 95,,, | Performed by: PSYCHIATRY & NEUROLOGY

## 2025-03-08 PROCEDURE — 80061 LIPID PANEL: CPT | Performed by: EMERGENCY MEDICINE

## 2025-03-08 PROCEDURE — 85610 PROTHROMBIN TIME: CPT | Performed by: EMERGENCY MEDICINE

## 2025-03-08 PROCEDURE — 84484 ASSAY OF TROPONIN QUANT: CPT | Performed by: EMERGENCY MEDICINE

## 2025-03-08 PROCEDURE — 25500020 PHARM REV CODE 255: Performed by: EMERGENCY MEDICINE

## 2025-03-08 PROCEDURE — 99285 EMERGENCY DEPT VISIT HI MDM: CPT | Mod: 25

## 2025-03-08 PROCEDURE — 84443 ASSAY THYROID STIM HORMONE: CPT | Performed by: EMERGENCY MEDICINE

## 2025-03-08 PROCEDURE — 93010 ELECTROCARDIOGRAM REPORT: CPT | Mod: ,,, | Performed by: STUDENT IN AN ORGANIZED HEALTH CARE EDUCATION/TRAINING PROGRAM

## 2025-03-08 PROCEDURE — 82962 GLUCOSE BLOOD TEST: CPT

## 2025-03-08 PROCEDURE — 85025 COMPLETE CBC W/AUTO DIFF WBC: CPT | Performed by: EMERGENCY MEDICINE

## 2025-03-08 PROCEDURE — 80053 COMPREHEN METABOLIC PANEL: CPT | Performed by: EMERGENCY MEDICINE

## 2025-03-08 PROCEDURE — 93005 ELECTROCARDIOGRAM TRACING: CPT

## 2025-03-08 PROCEDURE — 87502 INFLUENZA DNA AMP PROBE: CPT | Performed by: EMERGENCY MEDICINE

## 2025-03-08 PROCEDURE — 87635 SARS-COV-2 COVID-19 AMP PRB: CPT | Performed by: EMERGENCY MEDICINE

## 2025-03-08 RX ADMIN — IOHEXOL 100 ML: 350 INJECTION, SOLUTION INTRAVENOUS at 09:03

## 2025-03-08 NOTE — Clinical Note
Diagnosis: Spinal cord compression [205490]   Future Attending Provider: ELZBIETA CONNER [[id number]]   Reason for IP Medical Treatment  (Clinical interventions that can only be accomplished in the IP setting? ) :: cervical cord compression   Plans for Post-Acute care--if anticipated (pick the single best option):: A. No post acute care anticipated at this time   Special Needs:: Fall Risk [15]   [facility] Medicine Provider Team:: Staff

## 2025-03-09 ENCOUNTER — HOSPITAL ENCOUNTER (INPATIENT)
Facility: HOSPITAL | Age: OVER 89
LOS: 15 days | Discharge: NURSING, REHAB OR LONG-TERM CARE | DRG: 471 | End: 2025-03-24
Attending: HOSPITALIST | Admitting: HOSPITALIST
Payer: MEDICARE

## 2025-03-09 VITALS
HEART RATE: 58 BPM | TEMPERATURE: 97 F | SYSTOLIC BLOOD PRESSURE: 142 MMHG | RESPIRATION RATE: 21 BRPM | DIASTOLIC BLOOD PRESSURE: 67 MMHG | OXYGEN SATURATION: 98 %

## 2025-03-09 DIAGNOSIS — E87.5 HYPERKALEMIA: ICD-10-CM

## 2025-03-09 DIAGNOSIS — M48.062 SPINAL STENOSIS OF LUMBAR REGION WITH NEUROGENIC CLAUDICATION: ICD-10-CM

## 2025-03-09 DIAGNOSIS — R07.9 CHEST PAIN: ICD-10-CM

## 2025-03-09 DIAGNOSIS — G95.20 SPINAL CORD COMPRESSION: ICD-10-CM

## 2025-03-09 DIAGNOSIS — S12.110A ODONTOID FRACTURE: ICD-10-CM

## 2025-03-09 DIAGNOSIS — S12.100D CLOSED ODONTOID FRACTURE WITH ROUTINE HEALING: Primary | ICD-10-CM

## 2025-03-09 PROBLEM — J43.8 OTHER EMPHYSEMA: Chronic | Status: ACTIVE | Noted: 2020-07-07

## 2025-03-09 PROBLEM — G45.9 TIA (TRANSIENT ISCHEMIC ATTACK): Status: ACTIVE | Noted: 2025-03-09

## 2025-03-09 PROBLEM — M48.02 SPINAL STENOSIS, CERVICAL REGION: Status: ACTIVE | Noted: 2025-03-09

## 2025-03-09 PROBLEM — J43.8 OTHER EMPHYSEMA: Status: ACTIVE | Noted: 2020-07-07

## 2025-03-09 PROBLEM — R53.1 GENERALIZED WEAKNESS: Chronic | Status: ACTIVE | Noted: 2023-11-15

## 2025-03-09 PROBLEM — R53.1 WEAKNESS: Status: ACTIVE | Noted: 2025-03-09

## 2025-03-09 PROBLEM — M48.00 SPINAL STENOSIS: Chronic | Status: ACTIVE | Noted: 2020-07-07

## 2025-03-09 PROBLEM — R51.9 NONINTRACTABLE HEADACHE: Status: ACTIVE | Noted: 2025-03-09

## 2025-03-09 LAB
ALBUMIN SERPL BCP-MCNC: 3.4 G/DL (ref 3.5–5.2)
ALP SERPL-CCNC: 199 U/L (ref 40–150)
ALT SERPL W/O P-5'-P-CCNC: 16 U/L (ref 10–44)
ANION GAP SERPL CALC-SCNC: 9 MMOL/L (ref 8–16)
AST SERPL-CCNC: 28 U/L (ref 10–40)
BASOPHILS # BLD AUTO: 0.04 K/UL (ref 0–0.2)
BASOPHILS NFR BLD: 1 % (ref 0–1.9)
BILIRUB SERPL-MCNC: 0.2 MG/DL (ref 0.1–1)
BILIRUB UR QL STRIP: NEGATIVE
BUN SERPL-MCNC: 34 MG/DL (ref 8–23)
CALCIUM SERPL-MCNC: 9.1 MG/DL (ref 8.7–10.5)
CHLORIDE SERPL-SCNC: 100 MMOL/L (ref 95–110)
CLARITY UR: CLEAR
CO2 SERPL-SCNC: 31 MMOL/L (ref 23–29)
COLOR UR: COLORLESS
CREAT SERPL-MCNC: 1.3 MG/DL (ref 0.5–1.4)
DIFFERENTIAL METHOD BLD: ABNORMAL
EOSINOPHIL # BLD AUTO: 0.1 K/UL (ref 0–0.5)
EOSINOPHIL NFR BLD: 1.6 % (ref 0–8)
ERYTHROCYTE [DISTWIDTH] IN BLOOD BY AUTOMATED COUNT: 13.9 % (ref 11.5–14.5)
EST. GFR  (NO RACE VARIABLE): 39 ML/MIN/1.73 M^2
ESTIMATED AVG GLUCOSE: 123 MG/DL (ref 68–131)
FERRITIN SERPL-MCNC: 29 NG/ML (ref 20–300)
FOLATE SERPL-MCNC: 13.7 NG/ML (ref 4–24)
GLUCOSE SERPL-MCNC: 92 MG/DL (ref 70–110)
GLUCOSE UR QL STRIP: NEGATIVE
HBA1C MFR BLD: 5.9 % (ref 4–5.6)
HCT VFR BLD AUTO: 32.5 % (ref 37–48.5)
HGB BLD-MCNC: 10.8 G/DL (ref 12–16)
HGB UR QL STRIP: NEGATIVE
IMM GRANULOCYTES # BLD AUTO: 0 K/UL (ref 0–0.04)
IMM GRANULOCYTES NFR BLD AUTO: 0 % (ref 0–0.5)
IRON SERPL-MCNC: 78 UG/DL (ref 30–160)
KETONES UR QL STRIP: NEGATIVE
LEUKOCYTE ESTERASE UR QL STRIP: NEGATIVE
LYMPHOCYTES # BLD AUTO: 1.4 K/UL (ref 1–4.8)
LYMPHOCYTES NFR BLD: 37.3 % (ref 18–48)
MAGNESIUM SERPL-MCNC: 2.3 MG/DL (ref 1.6–2.6)
MCH RBC QN AUTO: 28.8 PG (ref 27–31)
MCHC RBC AUTO-ENTMCNC: 33.2 G/DL (ref 32–36)
MCV RBC AUTO: 87 FL (ref 82–98)
MONOCYTES # BLD AUTO: 0.5 K/UL (ref 0.3–1)
MONOCYTES NFR BLD: 11.7 % (ref 4–15)
NEUTROPHILS # BLD AUTO: 1.9 K/UL (ref 1.8–7.7)
NEUTROPHILS NFR BLD: 48.4 % (ref 38–73)
NITRITE UR QL STRIP: NEGATIVE
NRBC BLD-RTO: 0 /100 WBC
PH UR STRIP: 7 [PH] (ref 5–8)
PHOSPHATE SERPL-MCNC: 4 MG/DL (ref 2.7–4.5)
PLATELET # BLD AUTO: 335 K/UL (ref 150–450)
PMV BLD AUTO: 9.7 FL (ref 9.2–12.9)
POCT GLUCOSE: 172 MG/DL (ref 70–110)
POCT GLUCOSE: 99 MG/DL (ref 70–110)
POCT GLUCOSE: 99 MG/DL (ref 70–110)
POTASSIUM SERPL-SCNC: 3.9 MMOL/L (ref 3.5–5.1)
PROT SERPL-MCNC: 7.4 G/DL (ref 6–8.4)
PROT UR QL STRIP: NEGATIVE
RBC # BLD AUTO: 3.75 M/UL (ref 4–5.4)
SATURATED IRON: 16 % (ref 20–50)
SODIUM SERPL-SCNC: 140 MMOL/L (ref 136–145)
SP GR UR STRIP: 1.01 (ref 1–1.03)
TOTAL IRON BINDING CAPACITY: 487 UG/DL (ref 250–450)
TRANSFERRIN SERPL-MCNC: 329 MG/DL (ref 200–375)
URN SPEC COLLECT METH UR: ABNORMAL
UROBILINOGEN UR STRIP-ACNC: NEGATIVE EU/DL
VIT B12 SERPL-MCNC: 865 PG/ML (ref 210–950)
WBC # BLD AUTO: 3.86 K/UL (ref 3.9–12.7)

## 2025-03-09 PROCEDURE — 11000001 HC ACUTE MED/SURG PRIVATE ROOM

## 2025-03-09 PROCEDURE — 25000003 PHARM REV CODE 250

## 2025-03-09 PROCEDURE — 92610 EVALUATE SWALLOWING FUNCTION: CPT

## 2025-03-09 PROCEDURE — 85025 COMPLETE CBC W/AUTO DIFF WBC: CPT

## 2025-03-09 PROCEDURE — 83735 ASSAY OF MAGNESIUM: CPT

## 2025-03-09 PROCEDURE — 82607 VITAMIN B-12: CPT

## 2025-03-09 PROCEDURE — 97535 SELF CARE MNGMENT TRAINING: CPT

## 2025-03-09 PROCEDURE — 82962 GLUCOSE BLOOD TEST: CPT

## 2025-03-09 PROCEDURE — 84466 ASSAY OF TRANSFERRIN: CPT

## 2025-03-09 PROCEDURE — 12000002 HC ACUTE/MED SURGE SEMI-PRIVATE ROOM

## 2025-03-09 PROCEDURE — 80053 COMPREHEN METABOLIC PANEL: CPT

## 2025-03-09 PROCEDURE — 83036 HEMOGLOBIN GLYCOSYLATED A1C: CPT

## 2025-03-09 PROCEDURE — 82746 ASSAY OF FOLIC ACID SERUM: CPT

## 2025-03-09 PROCEDURE — 84100 ASSAY OF PHOSPHORUS: CPT

## 2025-03-09 PROCEDURE — 82728 ASSAY OF FERRITIN: CPT

## 2025-03-09 PROCEDURE — 81003 URINALYSIS AUTO W/O SCOPE: CPT | Performed by: EMERGENCY MEDICINE

## 2025-03-09 PROCEDURE — 25000003 PHARM REV CODE 250: Performed by: STUDENT IN AN ORGANIZED HEALTH CARE EDUCATION/TRAINING PROGRAM

## 2025-03-09 PROCEDURE — 25000003 PHARM REV CODE 250: Performed by: DIETITIAN, REGISTERED

## 2025-03-09 RX ORDER — TRAMADOL HYDROCHLORIDE 50 MG/1
50 TABLET ORAL ONCE
Status: COMPLETED | OUTPATIENT
Start: 2025-03-09 | End: 2025-03-09

## 2025-03-09 RX ORDER — SODIUM CHLORIDE 0.9 % (FLUSH) 0.9 %
10 SYRINGE (ML) INJECTION EVERY 12 HOURS PRN
Status: DISCONTINUED | OUTPATIENT
Start: 2025-03-09 | End: 2025-03-09 | Stop reason: HOSPADM

## 2025-03-09 RX ORDER — BUDESONIDE AND FORMOTEROL FUMARATE DIHYDRATE 160; 4.5 UG/1; UG/1
2 AEROSOL RESPIRATORY (INHALATION) EVERY 12 HOURS
Status: ON HOLD | COMMUNITY
Start: 2024-12-04 | End: 2025-03-21 | Stop reason: HOSPADM

## 2025-03-09 RX ORDER — ACETAMINOPHEN 325 MG/1
650 TABLET ORAL EVERY 6 HOURS PRN
Status: DISCONTINUED | OUTPATIENT
Start: 2025-03-09 | End: 2025-03-09 | Stop reason: HOSPADM

## 2025-03-09 RX ORDER — ALUMINUM HYDROXIDE, MAGNESIUM HYDROXIDE, AND SIMETHICONE 1200; 120; 1200 MG/30ML; MG/30ML; MG/30ML
30 SUSPENSION ORAL 4 TIMES DAILY PRN
Status: DISCONTINUED | OUTPATIENT
Start: 2025-03-09 | End: 2025-03-19

## 2025-03-09 RX ORDER — METHOCARBAMOL 500 MG/1
500 TABLET, FILM COATED ORAL 2 TIMES DAILY
Status: DISCONTINUED | OUTPATIENT
Start: 2025-03-09 | End: 2025-03-10

## 2025-03-09 RX ORDER — GLUCAGON 1 MG
1 KIT INJECTION
Status: DISCONTINUED | OUTPATIENT
Start: 2025-03-09 | End: 2025-03-14

## 2025-03-09 RX ORDER — IBUPROFEN 200 MG
16 TABLET ORAL
Status: DISCONTINUED | OUTPATIENT
Start: 2025-03-09 | End: 2025-03-09 | Stop reason: HOSPADM

## 2025-03-09 RX ORDER — NAPROXEN SODIUM 220 MG/1
81 TABLET, FILM COATED ORAL DAILY
Status: DISCONTINUED | OUTPATIENT
Start: 2025-03-10 | End: 2025-03-09

## 2025-03-09 RX ORDER — CLOPIDOGREL BISULFATE 75 MG/1
300 TABLET ORAL ONCE
Status: COMPLETED | OUTPATIENT
Start: 2025-03-09 | End: 2025-03-09

## 2025-03-09 RX ORDER — ENOXAPARIN SODIUM 100 MG/ML
30 INJECTION SUBCUTANEOUS EVERY 24 HOURS
Status: DISCONTINUED | OUTPATIENT
Start: 2025-03-09 | End: 2025-03-09

## 2025-03-09 RX ORDER — PREGABALIN 50 MG/1
1 CAPSULE ORAL NIGHTLY
Status: ON HOLD | COMMUNITY
End: 2025-03-21 | Stop reason: HOSPADM

## 2025-03-09 RX ORDER — AMLODIPINE BESYLATE 5 MG/1
10 TABLET ORAL DAILY
Status: DISCONTINUED | OUTPATIENT
Start: 2025-03-09 | End: 2025-03-09 | Stop reason: HOSPADM

## 2025-03-09 RX ORDER — NALOXONE HCL 0.4 MG/ML
0.02 VIAL (ML) INJECTION
Status: DISCONTINUED | OUTPATIENT
Start: 2025-03-09 | End: 2025-03-24 | Stop reason: HOSPADM

## 2025-03-09 RX ORDER — GLUCAGON 1 MG
1 KIT INJECTION
Status: DISCONTINUED | OUTPATIENT
Start: 2025-03-09 | End: 2025-03-09 | Stop reason: HOSPADM

## 2025-03-09 RX ORDER — ASPIRIN 325 MG
325 TABLET ORAL ONCE
Status: COMPLETED | OUTPATIENT
Start: 2025-03-09 | End: 2025-03-09

## 2025-03-09 RX ORDER — POLYETHYLENE GLYCOL 3350 17 G/17G
17 POWDER, FOR SOLUTION ORAL DAILY PRN
Status: DISCONTINUED | OUTPATIENT
Start: 2025-03-09 | End: 2025-03-12

## 2025-03-09 RX ORDER — ALBUTEROL SULFATE 90 UG/1
2 INHALANT RESPIRATORY (INHALATION) EVERY 6 HOURS PRN
Status: DISCONTINUED | OUTPATIENT
Start: 2025-03-09 | End: 2025-03-19

## 2025-03-09 RX ORDER — AMLODIPINE BESYLATE 5 MG/1
10 TABLET ORAL DAILY
Status: DISCONTINUED | OUTPATIENT
Start: 2025-03-09 | End: 2025-03-09

## 2025-03-09 RX ORDER — METHOCARBAMOL 500 MG/1
TABLET, FILM COATED ORAL
Status: ON HOLD | COMMUNITY
End: 2025-03-21 | Stop reason: HOSPADM

## 2025-03-09 RX ORDER — METHOCARBAMOL 500 MG/1
500 TABLET, FILM COATED ORAL 2 TIMES DAILY
Status: DISCONTINUED | OUTPATIENT
Start: 2025-03-09 | End: 2025-03-09 | Stop reason: HOSPADM

## 2025-03-09 RX ORDER — IBUPROFEN 200 MG
24 TABLET ORAL
Status: DISCONTINUED | OUTPATIENT
Start: 2025-03-09 | End: 2025-03-14

## 2025-03-09 RX ORDER — SODIUM CHLORIDE 0.9 % (FLUSH) 0.9 %
1-10 SYRINGE (ML) INJECTION EVERY 12 HOURS PRN
Status: DISCONTINUED | OUTPATIENT
Start: 2025-03-09 | End: 2025-03-24 | Stop reason: HOSPADM

## 2025-03-09 RX ORDER — INSULIN ASPART 100 [IU]/ML
0-5 INJECTION, SOLUTION INTRAVENOUS; SUBCUTANEOUS
Status: DISCONTINUED | OUTPATIENT
Start: 2025-03-09 | End: 2025-03-09 | Stop reason: HOSPADM

## 2025-03-09 RX ORDER — FLUOROURACIL 0.04 G/G
CREAM TOPICAL
Status: ON HOLD | COMMUNITY
Start: 2024-12-12 | End: 2025-03-21 | Stop reason: HOSPADM

## 2025-03-09 RX ORDER — TALC
6 POWDER (GRAM) TOPICAL NIGHTLY PRN
Status: DISCONTINUED | OUTPATIENT
Start: 2025-03-09 | End: 2025-03-09 | Stop reason: HOSPADM

## 2025-03-09 RX ORDER — AMLODIPINE BESYLATE 10 MG/1
10 TABLET ORAL DAILY
Status: DISCONTINUED | OUTPATIENT
Start: 2025-03-10 | End: 2025-03-24 | Stop reason: HOSPADM

## 2025-03-09 RX ORDER — FLUTICASONE FUROATE AND VILANTEROL 100; 25 UG/1; UG/1
1 POWDER RESPIRATORY (INHALATION) DAILY
Status: DISCONTINUED | OUTPATIENT
Start: 2025-03-10 | End: 2025-03-24 | Stop reason: HOSPADM

## 2025-03-09 RX ORDER — NAPROXEN SODIUM 220 MG/1
81 TABLET, FILM COATED ORAL DAILY
Status: DISCONTINUED | OUTPATIENT
Start: 2025-03-09 | End: 2025-03-09

## 2025-03-09 RX ORDER — OMEPRAZOLE 20 MG/1
20 CAPSULE, DELAYED RELEASE ORAL
Qty: 60 CAPSULE | Refills: 0 | Status: ON HOLD | OUTPATIENT
Start: 2025-03-09 | End: 2026-03-09

## 2025-03-09 RX ORDER — NALOXONE HCL 0.4 MG/ML
0.02 VIAL (ML) INJECTION
Status: DISCONTINUED | OUTPATIENT
Start: 2025-03-09 | End: 2025-03-09 | Stop reason: HOSPADM

## 2025-03-09 RX ORDER — TALC
6 POWDER (GRAM) TOPICAL NIGHTLY PRN
Status: DISCONTINUED | OUTPATIENT
Start: 2025-03-09 | End: 2025-03-24 | Stop reason: HOSPADM

## 2025-03-09 RX ORDER — IBUPROFEN 200 MG
24 TABLET ORAL
Status: DISCONTINUED | OUTPATIENT
Start: 2025-03-09 | End: 2025-03-09 | Stop reason: HOSPADM

## 2025-03-09 RX ORDER — ONDANSETRON 8 MG/1
8 TABLET, ORALLY DISINTEGRATING ORAL EVERY 8 HOURS PRN
Status: DISCONTINUED | OUTPATIENT
Start: 2025-03-09 | End: 2025-03-24 | Stop reason: HOSPADM

## 2025-03-09 RX ORDER — PANTOPRAZOLE SODIUM 40 MG/1
40 TABLET, DELAYED RELEASE ORAL DAILY
Status: DISCONTINUED | OUTPATIENT
Start: 2025-03-09 | End: 2025-03-09 | Stop reason: HOSPADM

## 2025-03-09 RX ORDER — OXYCODONE HYDROCHLORIDE 5 MG/1
5 TABLET ORAL ONCE
Refills: 0 | Status: COMPLETED | OUTPATIENT
Start: 2025-03-09 | End: 2025-03-09

## 2025-03-09 RX ORDER — ALBUTEROL SULFATE 90 UG/1
2 INHALANT RESPIRATORY (INHALATION) EVERY 6 HOURS PRN
Status: ON HOLD | COMMUNITY
Start: 2024-12-04

## 2025-03-09 RX ORDER — FAMOTIDINE 40 MG/1
40 TABLET, FILM COATED ORAL NIGHTLY PRN
Status: ON HOLD | COMMUNITY
End: 2025-03-19

## 2025-03-09 RX ORDER — FLUTICASONE PROPIONATE 50 MCG
1 SPRAY, SUSPENSION (ML) NASAL 2 TIMES DAILY
Status: ON HOLD | COMMUNITY
Start: 2024-12-04

## 2025-03-09 RX ORDER — ACETAMINOPHEN 325 MG/1
650 TABLET ORAL EVERY 4 HOURS PRN
Status: DISCONTINUED | OUTPATIENT
Start: 2025-03-09 | End: 2025-03-20

## 2025-03-09 RX ORDER — IBUPROFEN 200 MG
16 TABLET ORAL
Status: DISCONTINUED | OUTPATIENT
Start: 2025-03-09 | End: 2025-03-14

## 2025-03-09 RX ORDER — GABAPENTIN 300 MG/1
300 CAPSULE ORAL NIGHTLY
Status: ON HOLD | COMMUNITY
Start: 2024-06-20

## 2025-03-09 RX ORDER — OXYCODONE HYDROCHLORIDE 5 MG/1
5 TABLET ORAL EVERY 6 HOURS PRN
Refills: 0 | Status: DISCONTINUED | OUTPATIENT
Start: 2025-03-09 | End: 2025-03-10

## 2025-03-09 RX ORDER — SIMETHICONE 80 MG
1 TABLET,CHEWABLE ORAL 4 TIMES DAILY PRN
Status: DISCONTINUED | OUTPATIENT
Start: 2025-03-09 | End: 2025-03-24 | Stop reason: HOSPADM

## 2025-03-09 RX ORDER — IPRATROPIUM BROMIDE AND ALBUTEROL SULFATE 2.5; .5 MG/3ML; MG/3ML
3 SOLUTION RESPIRATORY (INHALATION) EVERY 6 HOURS PRN
Status: DISCONTINUED | OUTPATIENT
Start: 2025-03-09 | End: 2025-03-09 | Stop reason: HOSPADM

## 2025-03-09 RX ORDER — CLOPIDOGREL BISULFATE 75 MG/1
75 TABLET ORAL DAILY
Status: DISCONTINUED | OUTPATIENT
Start: 2025-03-10 | End: 2025-03-09

## 2025-03-09 RX ADMIN — ACETAMINOPHEN 650 MG: 325 TABLET ORAL at 05:03

## 2025-03-09 RX ADMIN — METHOCARBAMOL 500 MG: 500 TABLET ORAL at 09:03

## 2025-03-09 RX ADMIN — CLOPIDOGREL BISULFATE 300 MG: 75 TABLET ORAL at 06:03

## 2025-03-09 RX ADMIN — TRAMADOL HYDROCHLORIDE 50 MG: 50 TABLET, COATED ORAL at 11:03

## 2025-03-09 RX ADMIN — OXYCODONE HYDROCHLORIDE 5 MG: 5 TABLET ORAL at 01:03

## 2025-03-09 RX ADMIN — AMLODIPINE BESYLATE 10 MG: 5 TABLET ORAL at 09:03

## 2025-03-09 RX ADMIN — ACETAMINOPHEN 650 MG: 325 TABLET ORAL at 08:03

## 2025-03-09 RX ADMIN — Medication 6 MG: at 08:03

## 2025-03-09 RX ADMIN — METHOCARBAMOL 500 MG: 500 TABLET ORAL at 12:03

## 2025-03-09 RX ADMIN — ASPIRIN 325 MG ORAL TABLET 325 MG: 325 PILL ORAL at 06:03

## 2025-03-09 RX ADMIN — PANTOPRAZOLE SODIUM 40 MG: 40 TABLET, DELAYED RELEASE ORAL at 09:03

## 2025-03-09 RX ADMIN — ACETAMINOPHEN 650 MG: 325 TABLET ORAL at 06:03

## 2025-03-09 RX ADMIN — OXYCODONE 5 MG: 5 TABLET ORAL at 11:03

## 2025-03-09 NOTE — ED NOTES
Unable to print Transfer form.  Patient is agreeable to be transferred to [facility] for higher level of care.  Daughter is at bedside and aware of transfer.  [facility]

## 2025-03-09 NOTE — ED PROVIDER NOTES
Encounter Date: 3/8/2025       History     Chief Complaint   Patient presents with    Aphasia     Headache since Monday. /81 (116) Hr 61 . Aphasia since 1947 per daughter.     The patient is a 89-year-old female who came to the emergency department with a headache for the past 5 days and weakness to her arms and legs since this morning.  The patient lives with her daughter and the daughter states she came home tonight and noticed the patient had not had anything to eat or drink all day today.  The patient states she was unable to ambulate.  She has a history of a TIA several years ago.      Review of patient's allergies indicates:   Allergen Reactions    Cephalexin     Codeine     Meperidine      Other reaction(s): delerium, hallucination  Delerium  Delerium  Delerium      Nsaids (non-steroidal anti-inflammatory drug)     Penicillins     Tazarotene      Other reaction(s): rash, Unknown     Past Medical History:   Diagnosis Date    Allergy     Anemia, unspecified     Arthritis     CKD (chronic kidney disease) stage 4, GFR 15-29 ml/min     COPD (chronic obstructive pulmonary disease)     Edema     HTN (hypertension)     Hypocalcemia     Hyponatremia     Osteoarthritis      Past Surgical History:   Procedure Laterality Date    BREAST CYST EXCISION      CAUDAL EPIDURAL STEROID INJECTION N/A 2/2/2023    Procedure: Injection-steroid-epidural-caudal;  Surgeon: Angel Sparrow MD;  Location: [facility];  Service: Pain Management;  Laterality: N/A;    FOOT SURGERY       Family History   Problem Relation Name Age of Onset    Cancer Mother      No Known Problems Father       Social History[1]  Review of Systems   All other systems reviewed and are negative.      Physical Exam     Initial Vitals [03/08/25 2324]   BP Pulse Resp Temp SpO2   (!) 142/65 (!) 56 13 -- 97 %      MAP       --         Physical Exam    Nursing note and vitals reviewed.  Constitutional: She appears well-developed and well-nourished.    HENT:   Head: Normocephalic and atraumatic.   Neck: Neck supple.   Normal range of motion.  Cardiovascular:  Normal rate, regular rhythm and normal heart sounds.           Pulmonary/Chest: Breath sounds normal.   Abdominal: Abdomen is soft.   Musculoskeletal:      Cervical back: Normal range of motion and neck supple.     Neurological: She is alert and oriented to person, place, and time.   Weakness to all extremities, no facial droop, some slurred speech   Skin: Skin is warm and dry.   Psychiatric: She has a normal mood and affect. Her behavior is normal. Judgment and thought content normal.         ED Course   Critical Care    Date/Time: 3/9/2025 1:43 AM    Performed by: Sol Griffin MD  Authorized by: Herve Daniel MD  Direct patient critical care time: 10 minutes  Additional history critical care time: 10 minutes  Ordering / reviewing critical care time: 10 minutes  Documentation critical care time: 15 minutes  Consulting other physicians critical care time: 10 minutes  Consult with family critical care time: 10 minutes  Total critical care time (exclusive of procedural time) : 65 minutes  Critical care time was exclusive of separately billable procedures and treating other patients.  Critical care was necessary to treat or prevent imminent or life-threatening deterioration of the following conditions: CNS failure or compromise.  Critical care was time spent personally by me on the following activities: development of treatment plan with patient or surrogate, discussions with consultants, evaluation of patient's response to treatment, examination of patient, obtaining history from patient or surrogate, ordering and performing treatments and interventions, ordering and review of laboratory studies, ordering and review of radiographic studies, pulse oximetry, re-evaluation of patient's condition and review of old charts.        Labs Reviewed   CBC W/ AUTO DIFFERENTIAL - Abnormal       Result Value    WBC  3.46 (*)     RBC 4.06      Hemoglobin 11.8 (*)     Hematocrit 36.5 (*)     MCV 90      MCH 29.1      MCHC 32.3      RDW 14.0      Platelets 280      MPV 10.6      Immature Granulocytes 0.3      Gran # (ANC) 1.6 (*)     Immature Grans (Abs) 0.01      Lymph # 1.3      Mono # 0.5      Eos # 0.1      Baso # 0.03      nRBC 0      Gran % 46.2      Lymph % 37.6      Mono % 13.3      Eosinophil % 1.7      Basophil % 0.9      Platelet Estimate Appears normal      Differential Method Automated     COMPREHENSIVE METABOLIC PANEL - Abnormal    Sodium 136      Potassium 4.1      Chloride 99      CO2 28      Glucose 151 (*)     BUN 35 (*)     Creatinine 1.4      Calcium 9.5      Total Protein 7.7      Albumin 3.5      Total Bilirubin 0.2      Alkaline Phosphatase 213 (*)     AST 26      ALT 11      eGFR 36 (*)     Anion Gap 9     LIPID PANEL - Abnormal    Cholesterol 241 (*)     Triglycerides 143      HDL 70      LDL Cholesterol 142.4      HDL/Cholesterol Ratio 29.0      Total Cholesterol/HDL Ratio 3.4      Non-HDL Cholesterol 171     URINALYSIS, REFLEX TO URINE CULTURE - Abnormal    Specimen UA Urine, Clean Catch      Color, UA Colorless (*)     Appearance, UA Clear      pH, UA 7.0      Specific Gravity, UA 1.010      Protein, UA Negative      Glucose, UA Negative      Ketones, UA Negative      Bilirubin (UA) Negative      Occult Blood UA Negative      Nitrite, UA Negative      Urobilinogen, UA Negative      Leukocytes, UA Negative      Narrative:     Specimen Source->Urine   POCT GLUCOSE - Abnormal    POCT Glucose 142 (*)    POCT GLUCOSE - Abnormal    POCT Glucose 167 (*)    ISTAT CREATININE - Abnormal    POC Creatinine 1.5 (*)     Sample VENOUS     INFLUENZA A & B BY MOLECULAR    Influenza A, Molecular Negative      Influenza B, Molecular Negative      Flu A & B Source Nasal swab     PROTIME-INR    Prothrombin Time 10.4      INR 0.9     TSH    TSH 2.745     TROPONIN I   SARS-COV-2 RNA AMPLIFICATION, QUAL    SARS-CoV-2 RNA,  Amplification, Qual Negative     TROPONIN I    Troponin I 0.006     COMPREHENSIVE METABOLIC PANEL   MAGNESIUM   PHOSPHORUS   HEMOGLOBIN A1C   CBC W/ AUTO DIFFERENTIAL   IRON AND TIBC   FERRITIN   VITAMIN B12   FOLATE          Imaging Results              X-Ray Chest AP Portable (Final result)  Result time 03/08/25 23:17:43      Final result by Benitez Griffin DO (03/08/25 23:17:43)                   Impression:      No acute abnormality.      Electronically signed by: Benitez Griffin  Date:    03/08/2025  Time:    23:17               Narrative:    EXAMINATION:  XR CHEST AP PORTABLE    CLINICAL HISTORY:  general weakness;    TECHNIQUE:  Single frontal view of the chest was performed.    COMPARISON:  10/17/2023.    FINDINGS:  There are postop changes of right reverse total shoulder arthroplasty.  There is degenerative change of the left glenohumeral joint.  Remaining osseous structures are otherwise intact.  The lungs are well expanded and clear. No focal opacities are seen. The pleural spaces are clear. The cardiac silhouette is borderline enlarged.  There are calcifications of the aortic arch.                                       CTA Head and Neck (xpd) (Final result)  Result time 03/08/25 22:02:59      Final result by Benitez Griffin DO (03/08/25 22:02:59)                   Impression:      No acute intracranial abnormality.  No large vessel occlusion or high-grade stenosis.    Remote nonunited fracture of the dens, unchanged.      Electronically signed by: Benitez Griffin  Date:    03/08/2025  Time:    22:02               Narrative:    EXAMINATION:  CTA HEAD AND NECK (XPD)    CLINICAL HISTORY:  Neuro deficit, acute, stroke suspected;    TECHNIQUE:  Non contrast low dose axial images were obtained though the head. CT angiogram was performed from the level of the denton to the top of the head following the IV administration of 100mL of Omnipaque 350.   Sagittal and coronal reconstructions and maximum intensity  projection reconstructions were performed. Arterial stenosis percentages are based on NASCET measurement criteria. An immediate post-contrast CT head was also performed. RapidAI LVO was utilized to measure arterial blood flow in the brain.    COMPARISON:  CTA head and neck from 10/17/2023    FINDINGS:  CT head: The ventricles are normal in size without evidence of hydrocephalus. There are chronic microvascular ischemic changes.  No parenchymal mass, edema or major vascular distribution infarct. There is no intracranial hemorrhage (agree with rapid AI assessment).  No extra-axial blood or fluid collection.    The cranium is intact.  Mastoid air cells and paranasal sinuses are clear.      CTA head:    Anterior circulation: There is atherosclerosis of the bilateral intracranial ICAs.  The bilateral anterior and middle cerebral arteries are within normal limits, without hemodynamically significant stenosis or occlusion.    Posterior circulation: Vertebrobasilar system is within normal limits without focal abnormality.  The bilateral posterior cerebral arteries are within normal limits, without hemodynamically significant stenosis or occlusion.    There is no intracranial aneurysm.    CTA neck:    The aortic arch maintains a 2 vessel branching pattern.  There is moderate atherosclerosis of the aortic arch.    The common and internal carotid arteries are normal in course and caliber.  There is atherosclerosis of the bilateral carotid bifurcations without significant stenosis in either carotid bifurcation.    The vertebral origins are patent. The cervical vertebral arteries are normal in course and caliber.    There are multiple bilateral thyroid lesions which can be further evaluated with nonemergent thyroid ultrasound if clinically indicated.  The visualized lung apices are clear.    There is a remote nonunited fracture of the dens, unchanged in appearance from prior.  There are multilevel degenerative changes of the  spine.  There are postop changes of the right shoulder, partially imaged.  There is scattered dental amalgam resulting in streak artifact.  There are bilateral prosthetic lenses.                                       Medications   melatonin tablet 6 mg (has no administration in time range)   pantoprazole EC tablet 40 mg (has no administration in time range)   sodium chloride 0.9% flush 10 mL (has no administration in time range)   naloxone 0.4 mg/mL injection 0.02 mg (has no administration in time range)   glucose chewable tablet 16 g (has no administration in time range)   glucose chewable tablet 24 g (has no administration in time range)   dextrose 50% injection 12.5 g (has no administration in time range)   dextrose 50% injection 25 g (has no administration in time range)   glucagon (human recombinant) injection 1 mg (has no administration in time range)   enoxaparin injection 30 mg (has no administration in time range)   iohexoL (OMNIPAQUE 350) injection 100 mL (100 mLs Intravenous Given 3/8/25 2146)     Medical Decision Making  Differential Diagnosis includes, but is not limited to:  CVA/TIA, intracranial mass/hemorrhage, head trauma, seizure, status epilepticus, post-ictal state, meningitis/encephalitis, sepsis, MI/ACS, arrhythmia, syncope,   anaphylaxis, thyroid disease, neuroleptic malignant syndrome, serotonin syndrome, CO poisoning, hypoxia/hypercapnea, uremic/hepatic encephalopathy, medication reaction, intentional overdose, metabolic derangement, psychiatric disturbance, substance abuse, alcohol intoxication/withdrawal, hypoglycemia/hyperglycemia, complicated migraine.     MDM:  The patient is a 89-year-old female with generalized weakness.  She was code stroked, the CT scan was negative for any obvious findings.  Vascular neurology recommends MRI MRA.  The patient's workup is negative so far, she needs to have a temp recorded and a urinalysis collected.  1157:  Case discussed with the [facility] internal  medicine service for admission.    Amount and/or Complexity of Data Reviewed  Labs: ordered.  Radiology: ordered. Decision-making details documented in ED Course.    Risk  Prescription drug management.      Additional MDM:     NIH Stroke Scale:   Interval = baseline (upon arrival/admit)  Level of consciousness = 0 - alert  LOC questions = 0 - answers both correctly  LOC commands = 0 - performs both correctly  Best gaze = 0 - normal  Visual = 0 - no visual loss  Facial palsy = 0 - normal  Motor left arm =  1 - drift  Motor right arm =  1 - drift  Motor left leg = 1 - drift  Motor right leg =  1 - drift  Limb ataxia = 2 - present in two limbs  Sensory = 0 - normal  Best language = 1 - mild to moderate aphasia  Dysarthria = 0 - normal articulation  Extinction and inattention = 0 - no neglect  NIH Stroke Scale Total = 7              ED Course as of 03/09/25 0144   Sat Mar 08, 2025   2350 X-Ray Chest AP Portable  Impression:     No acute abnormality.   [ST]      ED Course User Index  [ST] Sol Griffin MD                           Clinical Impression:  Final diagnoses:  [R29.818] Acute focal neurological deficit  [R53.1] Weakness (Primary)          ED Disposition Condition    Observation Stable                    [1]   Social History  Tobacco Use    Smoking status: Former    Smokeless tobacco: Never   Substance Use Topics    Alcohol use: Not Currently    Drug use: Never        Sol Griffin MD  03/09/25 0144

## 2025-03-09 NOTE — PLAN OF CARE
Per primary team, pt is being shifted to main campus due to concern for changes in her C spine which would warrant NSGY intervention and that her further care will be done in [facility] and thus doesn't need to be seen by [facility].    Case staffed with Dr Frandy Mercado  PGY2  Neurology

## 2025-03-09 NOTE — PT/OT/SLP PROGRESS
Occupational Therapy      Patient Name:  Bridget Montgomery   MRN:  [id number]    Patient not seen today secondary to Testing/imaging (CT/MRI). 1st attempt at 9:58 AM and patient in CT. 2nd attempt at 11:02 AM and patient in MRI. Third attempt made 13:14 PM where patient was seen being fitted for cervical collar. Upon clarification w/ medical team, therapy orders are to be discharged 2/2 change in patient status. Will follow-up as able..    3/9/2025

## 2025-03-09 NOTE — H&P
[facility] Medicine H&P Note     Admitting Team: [facility] Hospitalist Team B  Attending Physician: Herve Daniel MD  Resident: Sridevi  Intern: Leila    Date of Admit: 3/8/2025    Chief Complaint      for sudden onset-weakness around 8 pm    Subjective:      History of Present Illness:  Bridget Montgomery is a 89 y.o. female who has a PMH of COPD, CKD4, GERD, HTN, hx of TIA, HLD, prediabetes, and spinal stenosis. The patient presented to [facility] on 3/8/2025 with a primary complaint of sudden onset weakness.    The patient was in their usual state of health until Monday. Starting on Monday, the patient started having a headache that started in her neck and migrated toward the top of her head. She has had chronic neck pain due to cervical stenosis, but the headache was new. She managed the pain with tylenol. On Friday, she presented to her PCP who gave her 2 injections into her neck to help with the pain. She is not sure what the injection was but thinks it might have been a steroid. On Saturday, she had a normal morning and was able to perform all of her ADLs. Around 8 pm, she suddenly became weak in her arms and legs and was not able to walk. She was able to catch herself prior to falling. Per EMS report, the daughter reported a change in speech, but in my conversation with patient and daughter, she denies this. The primary concern was the weakness. She endorses chronic numbness/tingling in her fingers that has not worsened. She denies any sensory deficits. She denies any urinary incontinence or fecal incontinence. She endorses pain in her back, neck, knees, and a headache. She denies any fevers, chills, chest pain, shortness of breath, abdominal pain, N/V, diarrhea, constipation.    Of note, in 2023, she did suffer a TIA. During this presentation, she had double vision.    In the ED, she was code stroked. CT head and CTA were negative for acute process. Vascular neurology recommended  MRI/MRA brain.    Past Medical History:  Hx of TIA  CKD4  COPD  GERD  HTN  HLD  Spinal stenosis    Past Surgical History:  Past Surgical History:   Procedure Laterality Date    BREAST CYST EXCISION      CAUDAL EPIDURAL STEROID INJECTION N/A 2/2/2023    Procedure: Injection-steroid-epidural-caudal;  Surgeon: Angel Sparrow MD;  Location: [facility] PAIN MGT;  Service: Pain Management;  Laterality: N/A;    FOOT SURGERY         Allergies:  Review of patient's allergies indicates:   Allergen Reactions    Cephalexin     Codeine     Meperidine      Other reaction(s): delerium, hallucination  Delerium  Delerium  Delerium      Nsaids (non-steroidal anti-inflammatory drug)     Penicillins     Tazarotene      Other reaction(s): rash, Unknown     Home Medications:  Amlodipine 10 mg daily  Lasix 20 mg BID  Prilosec 20 mg BID  Symbicort 2 puffs BID, has not been using daily  Albuterol PRN    Is supposed to take daily ASA and lipitor, but patient does not want to due to side effects    Family History:  Family History   Problem Relation Name Age of Onset    Cancer Mother      No Known Problems Father         Social History:  Social History[1]  Tobacco: denies  Alcohol: socially  Illicit drugs: denies    Review of Systems:  Review of Systems   Constitutional:  Negative for chills and fever.   Eyes:  Negative for blurred vision and double vision.   Respiratory:  Negative for cough, shortness of breath and wheezing.    Cardiovascular:  Negative for chest pain and palpitations.   Gastrointestinal:  Negative for abdominal pain, constipation, diarrhea, nausea and vomiting.   Genitourinary:  Negative for dysuria.   Musculoskeletal:  Positive for joint pain and neck pain.   Neurological:  Positive for weakness and headaches. Negative for sensory change and speech change.     All other systems are reviewed and are negative.    Health Maintaince :   Primary Care Physician: Dr. Charles Peacock     Objective:   Last 24 Hour Vital Signs:  BP  Min:  142/65  Max: 161/70  Pulse  Av.5  Min: 55  Max: 56  Resp  Avg: 15  Min: 13  Max: 17  SpO2  Av.5 %  Min: 96 %  Max: 97 %  There is no height or weight on file to calculate BMI.  No intake/output data recorded.    Physical Examination:  Physical Exam   General: No acute distress, Alert and oriented x3, cooperative   Head: Atraumatic, Normocephalic, No injuries   Eyes: EOMi, PERRLA, normal conjuncitva  Throat: Tongue midline on protrusion, moist mucous membranes, no uvula deviation  Respiratory: CTABL, no increased work of breathing, on room air  Chest: nontender to palpation, symmetric rise with inspiration  Cardiovascular: Regular rate and rhythm, normal S1/S2, no additional heart sounds   Abdominal: nondistended, nontender to palpation, normal bowel sounds  Extremities: Spontaneous movement x4, +2 DP and RP bilaterally,   Skin: nonjaundiced, no wounds, no rashes   Neurologic: CN II-XII grossly intact, normal sensation. 5/5 strength in upper extremities. 3/5 strength in lower extremities, able to lift off bed but unable to resist. Intact sensation. Unstable when standing. Unable to assess gait due to instability. No drift noted in upper or lower extremities. EOMI, pupils equal and reactive. NIHSS4.    Laboratory:  Most Recent Data:  CBC:   Lab Results   Component Value Date    WBC 3.46 (L) 2025    HGB 11.8 (L) 2025    HCT 36.5 (L) 2025     2025    MCV 90 2025    RDW 14.0 2025     BMP:   Lab Results   Component Value Date     2025    K 4.1 2025    CL 99 2025    CO2 28 2025    BUN 35 (H) 2025    CREATININE 1.4 2025     (H) 2025    CALCIUM 9.5 2025    MG 2.1 2024    PHOS 4.5 2024     LFTs:   Lab Results   Component Value Date    PROT 7.7 2025    ALBUMIN 3.5 2025    BILITOT 0.2 2025    AST 26 2025    ALKPHOS 213 (H) 2025    ALT 11 2025     Coags:   Lab Results    Component Value Date    INR 0.9 03/08/2025     FLP:   Lab Results   Component Value Date    CHOL 241 (H) 03/08/2025    HDL 70 03/08/2025    LDLCALC 142.4 03/08/2025    TRIG 143 03/08/2025    CHOLHDL 29.0 03/08/2025     DM:   Lab Results   Component Value Date    HGBA1C 5.8 (H) 10/17/2023    HGBA1C 5.6 03/13/2023    HGBA1C 6.1 (H) 03/23/2022    LDLCALC 142.4 03/08/2025    CREATININE 1.4 03/08/2025     Thyroid:   Lab Results   Component Value Date    TSH 2.745 03/08/2025     Anemia:   Lab Results   Component Value Date    IRON 60 08/27/2024    TIBC 459 (H) 08/27/2024    FERRITIN 48 08/27/2024    PCGBZBJP22 691 10/18/2023    FOLATE 15.1 10/18/2023     Cardiac:   Lab Results   Component Value Date    TROPONINI 0.006 03/08/2025     Urinalysis:   Lab Results   Component Value Date    COLORU Yellow 12/06/2024    CLARITYU Clear 03/14/2023    SPECGRAV 1.015 12/06/2024    NITRITE Negative 12/06/2024    KETONESU Negative 12/06/2024    UROBILINOGEN Negative 12/07/2023    WBCUA 3 12/06/2024       Trended Lab Data:  Recent Labs   Lab 03/08/25 2202   WBC 3.46*   HGB 11.8*   HCT 36.5*      MCV 90   RDW 14.0      K 4.1   CL 99   CO2 28   BUN 35*   CREATININE 1.4   *   PROT 7.7   ALBUMIN 3.5   BILITOT 0.2   AST 26   ALKPHOS 213*   ALT 11       Trended Cardiac Data:  Recent Labs   Lab 03/08/25 2202   TROPONINI 0.006       Microbiology Data:  N/a    Other Results:  EKG (my interpretation): reviewed    Radiology:  Imaging Results              X-Ray Chest AP Portable (Final result)  Result time 03/08/25 23:17:43      Final result by Benitez Griffin DO (03/08/25 23:17:43)                   Impression:      No acute abnormality.      Electronically signed by: Benitez Griffin  Date:    03/08/2025  Time:    23:17               Narrative:    EXAMINATION:  XR CHEST AP PORTABLE    CLINICAL HISTORY:  general weakness;    TECHNIQUE:  Single frontal view of the chest was  performed.    COMPARISON:  10/17/2023.    FINDINGS:  There are postop changes of right reverse total shoulder arthroplasty.  There is degenerative change of the left glenohumeral joint.  Remaining osseous structures are otherwise intact.  The lungs are well expanded and clear. No focal opacities are seen. The pleural spaces are clear. The cardiac silhouette is borderline enlarged.  There are calcifications of the aortic arch.                                       CTA Head and Neck (xpd) (Final result)  Result time 03/08/25 22:02:59      Final result by Benitez Griffin DO (03/08/25 22:02:59)                   Impression:      No acute intracranial abnormality.  No large vessel occlusion or high-grade stenosis.    Remote nonunited fracture of the dens, unchanged.      Electronically signed by: Benitez Griffin  Date:    03/08/2025  Time:    22:02               Narrative:    EXAMINATION:  CTA HEAD AND NECK (XPD)    CLINICAL HISTORY:  Neuro deficit, acute, stroke suspected;    TECHNIQUE:  Non contrast low dose axial images were obtained though the head. CT angiogram was performed from the level of the denton to the top of the head following the IV administration of 100mL of Omnipaque 350.   Sagittal and coronal reconstructions and maximum intensity projection reconstructions were performed. Arterial stenosis percentages are based on NASCET measurement criteria. An immediate post-contrast CT head was also performed. RapidAI LVO was utilized to measure arterial blood flow in the brain.    COMPARISON:  CTA head and neck from 10/17/2023    FINDINGS:  CT head: The ventricles are normal in size without evidence of hydrocephalus. There are chronic microvascular ischemic changes.  No parenchymal mass, edema or major vascular distribution infarct. There is no intracranial hemorrhage (agree with rapid AI assessment).  No extra-axial blood or fluid collection.    The cranium is intact.  Mastoid air cells and paranasal sinuses are  clear.      CTA head:    Anterior circulation: There is atherosclerosis of the bilateral intracranial ICAs.  The bilateral anterior and middle cerebral arteries are within normal limits, without hemodynamically significant stenosis or occlusion.    Posterior circulation: Vertebrobasilar system is within normal limits without focal abnormality.  The bilateral posterior cerebral arteries are within normal limits, without hemodynamically significant stenosis or occlusion.    There is no intracranial aneurysm.    CTA neck:    The aortic arch maintains a 2 vessel branching pattern.  There is moderate atherosclerosis of the aortic arch.    The common and internal carotid arteries are normal in course and caliber.  There is atherosclerosis of the bilateral carotid bifurcations without significant stenosis in either carotid bifurcation.    The vertebral origins are patent. The cervical vertebral arteries are normal in course and caliber.    There are multiple bilateral thyroid lesions which can be further evaluated with nonemergent thyroid ultrasound if clinically indicated.  The visualized lung apices are clear.    There is a remote nonunited fracture of the dens, unchanged in appearance from prior.  There are multilevel degenerative changes of the spine.  There are postop changes of the right shoulder, partially imaged.  There is scattered dental amalgam resulting in streak artifact.  There are bilateral prosthetic lenses.                                    Assessment:     Bridget Montgomery is a 89 y.o. female with a PMH of COPD, CKD4, GERD, HTN, hx of TIA, HLD, prediabetes, and spinal stenosis. She presents with sudden onset weakness in her hands and legs. She is admitted to [facility] for management of a high-risk TIA. Neurology is consulted.     Plan:     Generalized weakness  Headache  Cervical and lumbar stenosis  High-risk TIA  Patient with acute onset of weakness earlier this evening (8pm). Stroke  activated in the ED. Initial head imaging negative. Neurology consulted. Thrombolytics deferred. Recently had cervical injection at PCPs office.  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  - MRI of full spine ordered due to hx of cervical and lumbar stenosis and recent cervical injection  - Neurology consulted, appreciate recs  - PT/OT eval  - tylenol PRN for headache  - Headache is non-concerning for meningitis, patient has full range of motion and no infectious sequelae  - Attempted to obtain orthostatics, but patient was feeling too shaky    Normocytic anemia  - Hgb 11.8  - iron panel, B12, folate pending    HTN  - /65 on arrival, states that she took her BP meds that day  - continue home amlodipine 10 mg daily  - permissive hypertension not recommended in high risk TIA    HLD  - lipid panel with mildly elevated total cholesterol  - patient states that she does not tolerate statins due to myopathy    COPD?  Mild emphysematous changes on CT  - PFTs 9/27/23 showed no obstruction, no restriction, mild reduction in diffusion capacity at 60%   - home inhalers: symbicort daily, albuterol PRN  - has not been consistently using  - duonebs ordered PRN    GERD  - home med: prilosec 20 mg BID  - protonix while inpatient    Pre-diabetes  - A1C 10/2023: 5.8  - repeat ordered  - LDSSI    CKD3  - follows with Dr. Rahman outpatient  - Cr at baseline  - Continue Lasix 20 mg BID    Diet:  Cardiac  PPX: Lovenox  Code: Full    Dispo: Pending MRI imaging    Leigh Cooper MD  [facility] Internal Medicine/Pediatrics HO-2  03/09/2025  1:26 AM    [facility]  Medicine Hospitalist Pager numbers:   [facility] Hospitalist Medicine Team A (Jen/Prabhjot): [phone]  [facility] Hospitalist Medicine Team B (Fredy/Eliana):  [phone]         [1]   Social History  Tobacco Use    Smoking status: Former    Smokeless tobacco: Never   Substance Use Topics    Alcohol use: Not Currently    Drug use: Never

## 2025-03-09 NOTE — PLAN OF CARE
Outside Transfer Acceptance Note / Regional Referral Center    Referring facility: \[facility]\""  Referring provider: LINO TONG  Accepting facility: [facility]  Accepting provider: Jose Antonio BAE  Admitting provider: HARJIT  Reason for transfer:  Needs NSGY eval  Transfer diagnosis: Cervical cord compression C1-C2  Transfer specialty requested: Neurosurgery  Transfer specialty notified: Yes  Transfer level: NUMBER 1-5: 2  Bed type requested: Med-tele  Isolation status: No active isolations   Admission class or status: IP- Inpatient      Narrative     89F COPD, CKD4, GERD, HTN, HLD, hx of TIA, prediabetes, and spinal stenosis who initially presented to [facility] on 3/8/2025 with a primary complaint of sudden onset weakness. The patient was in their usual state of health until Monday. Starting on Monday, the patient started having a headache that started in her neck and migrated toward the top of her head. She has had chronic neck pain due to cervical stenosis, but the headache was new. She managed the pain with tylenol. On Friday, she presented to her PCP (Dr. Alejandrina Russ) who gave her 2 injections into her neck to help with the pain. She is not sure what the injection was but thinks it might have been a steroid. On Saturday, she had a normal morning and was able to perform all of her ADLs. Around 8 pm, she suddenly became weak in her arms and legs and was not able to walk. She was able to catch herself prior to falling. The primary concern was the weakness and ongoing headache. She endorses chronic numbness/tingling in her fingers that has not worsened. She denies any sensory deficits. She denies any urinary incontinence or fecal incontinence. She endorses pain in her back, neck, knees, and a headache. She denies any fevers, chills, chest pain, shortness of breath, abdominal pain, N/V, diarrhea, constipation. CT Head was negative for acute stroke. MRI shows new onset  cervical cord compression C1-C2. Neurosurgery consulted, recommended transferring pt to [facility] for a higher level of care.     Objective     Vitals: Temp: 97.6 °F (36.4 °C) (03/09/25 0730)  Pulse: 66 (03/09/25 1331)  Resp: 19 (03/09/25 1331)  BP: 129/63 (03/09/25 1331)  SpO2: 97 % (03/09/25 1331)  Recent Labs: All pertinent labs within the past 24 hours have been reviewed.  Recent imaging: See above   Airway:     Vent settings:         IV access:        Peripheral IV - Single Lumen   18 G Left Antecubital (Active)   Site Assessment Clean;Dry;Intact 03/09/25 1200   Line Securement Device Secured with sutureless device 03/09/25 1200   Extremity Assessment Distal to IV No abnormal discoloration;No redness;No swelling;No warmth 03/09/25 1200   Line Status Blood return noted;Flushed;Saline locked 03/09/25 1200   Dressing Status Clean;Dry;Intact 03/09/25 1200   Dressing Intervention Sterile dressing change 03/09/25 1200   Reason Not Rotated Not due 03/09/25 1200     Infusions: See above  Allergies:   Review of patient's allergies indicates:   Allergen Reactions    Cephalexin     Codeine     Meperidine      Other reaction(s): delerium, hallucination  Delerium  Delerium  Delerium      Nsaids (non-steroidal anti-inflammatory drug)     Penicillins     Tazarotene      Other reaction(s): rash, Unknown      NPO: No    Anticoagulation:   Anticoagulants       None             Instructions      [facility]-  Admit to Hospital Medicine  Upon patient arrival to floor, please send SecureChat to [facility] or call extension [phone] (if no answer, do NOT leave a callback number after the beep, rather please send a SecureChat to [facility]), for Hospital Medicine admit team assignment and for additional admit orders for the patient.  Do not page the attending physician associated with the patient on arrival (this physician may not be on duty at the time of arrival).  Rather, always send a SecureChat to [facility] or call [phone] to reach the  triage physician for orders and team assignment.

## 2025-03-09 NOTE — PLAN OF CARE
Inpatient Upgrade Note    Bridget Montgomery has warranted treatment spanning two or more midnights of hospital level care for the management of  cervical stenosis, High risk TIA, new onset weakness . She continues to require  cervical stenosis, High risk TIA, new onset weakness  . Her condition is also complicated by the following comorbidities: Coronary Artery Disease and Chronic respiratory disease.

## 2025-03-09 NOTE — PT/OT/SLP PROGRESS
Physical Therapy      Patient Name:  Bridget Montgomery   MRN:  [id number]    Patient not seen today secondary to Testing/imaging (xray/CT/MRI). Will follow-up Monday. 2 attempts were made to see patient and at first attempt at 958 she was off the floor at CT. 2nd attempt made at 1102 and patient was off floor for MRI.

## 2025-03-09 NOTE — PHARMACY MED REC
"  [facility]           Pharmacy  Admission Medication History     The home medication history was taken by Masha Busby.      Medication history obtained from Medications listed below were obtained from: Patient/family    Based on information gathered for medication list, you may go to "Admission" then "Reconcile Home Medications" tabs to review and/or act upon those items.     The home medication list has been updated by the Pharmacy department.   Please read ALL comments highlighted in yellow.   Please address this information as you see fit.    Feel free to contact us if you have any questions or require assistance.    The medications listed below were removed from the home medication list.  Please reorder if appropriate:    Patient reports NOT TAKING the following medication(s):  Aspirin 81mg  Lipitor 40mg  Prilosec 20mg  Psyllium 0.52g    No current facility-administered medications on file prior to encounter.     Current Outpatient Medications on File Prior to Encounter   Medication Sig Dispense Refill    acetaminophen (TYLENOL) 500 MG tablet Take 1 tablet (500 mg total) by mouth every 6 (six) hours as needed for Pain. 90 tablet 6    albuterol (PROVENTIL) 2.5 mg /3 mL (0.083 %) nebulizer solution Take 2.5 mg by nebulization 2 (two) times daily as needed.      albuterol (PROVENTIL/VENTOLIN HFA) 90 mcg/actuation inhaler Inhale 2 puffs into the lungs every 6 (six) hours as needed.      amLODIPine (NORVASC) 10 MG tablet Take 1 tablet (10 mg total) by mouth once daily. 90 tablet 3    budesonide-formoterol 160-4.5 mcg (SYMBICORT) 160-4.5 mcg/actuation HFAA Inhale 2 puffs into the lungs every 12 (twelve) hours.      cetirizine (ZYRTEC) 10 MG tablet Take 10 mg by mouth once daily.      cholecalciferol, vitamin D3, (VITAMIN D3) 50 mcg (2,000 unit) Cap capsule Take 2,000 Units by mouth once daily.      diclofenac sodium (VOLTAREN ARTHRITIS PAIN) 1 % Gel Apply 2 g topically 3 (three) times daily " as needed (painful joint). 100 g 0    fluticasone propionate (FLONASE) 50 mcg/actuation nasal spray 1 spray by Nasal route 2 (two) times daily.      furosemide (LASIX) 20 MG tablet Take 1 tablet (20 mg total) by mouth 2 (two) times a day. 180 tablet 3    ipratropium (ATROVENT) 42 mcg (0.06 %) nasal spray       melatonin (MELATIN) 3 mg tablet Take 2 tablets (6 mg total) by mouth nightly as needed for Insomnia. 30 tablet 0    methocarbamoL (ROBAXIN) 500 MG Tab       omega 3-dha-epa-fish oil 1,000 mg (120 mg-180 mg) Cap Take 1 capsule by mouth once daily.      polyethylene glycol 3350 (MIRALAX ORAL) Take 17 g by mouth once daily. As needed      senna-docusate 8.6-50 mg (PERICOLACE) 8.6-50 mg per tablet Take 2 tablets by mouth 2 (two) times daily. 60 tablet 0    sodium bicarbonate 650 MG tablet Take 1 tablet (650 mg total) by mouth once daily. 30 tablet 0    TOLAK 4 % Crea apply a thin film to the lesions using the fingertips, once daily. preferably at night., WASH off in THE morning FOR 2 WEEKS.      traMADoL (ULTRAM) 50 mg tablet Take 1 tablet (50 mg total) by mouth 2 (two) times daily as needed. 60 tablet 4    calcium carbonate (CALCIUM 500 ORAL) Take 500 mg by mouth once daily.      famotidine (PEPCID) 40 MG tablet Take 40 mg by mouth nightly as needed for Heartburn.      gabapentin (NEURONTIN) 300 MG capsule Take 300 mg by mouth every evening.      pregabalin (LYRICA) 50 MG capsule Take 1 capsule by mouth every evening.         Please address this information as you see fit.  Feel free to contact us if you have any questions or require assistance.    Masha Busby  [phone]                .

## 2025-03-09 NOTE — PLAN OF CARE
Pt seen at bedside for swallow eval in Ed, pt able to express wants and needs. No issues with swallowing at bedside on current diet level. Notified RN of patient's intense level of pain at this time.

## 2025-03-09 NOTE — TELEMEDICINE CONSULT
[facility]  Vascular Neurology  Comprehensive Stroke Center  TeleVascular Neurology Acute Consultation Note        Consult Information  Consults    Consulting Provider: SOLEDAD SNOW   Current Providers  No providers found    Patient Location:  [facility] EMERGENCY DEPARTMENT Emergency Department    Spoke hospital nurse at bedside with patient assisting consultant.  Patient information was obtained from patient and relative(s).       Stroke Documentation  Acute Stroke Times   Last Known Normal Date: 03/08/25  Last Known Normal Time: 1945  Stroke Team Called Date: 03/08/25  Stroke Team Called Time: 2138  Stroke Team Arrival Date: 03/08/25  Stroke Team Arrival Time: 2145  CT Interpretation Time: 2150  Thrombolytic Therapy Recommended: No  Thrombectomy Recommended: No    NIH Scale:  1a. Level of Consciousness: 0-->Alert, keenly responsive  1b. LOC Questions: 0-->Answers both questions correctly  1c. LOC Commands: 0-->Performs both tasks correctly  2. Best Gaze: 0-->Normal  3. Visual: 0-->No visual loss  4. Facial Palsy: 0-->Normal symmetrical movements  5a. Motor Arm, Left: 0-->No drift, limb holds 90 (or 45) degrees for full 10 secs  5b. Motor Arm, Right: 0-->No drift, limb holds 90 (or 45) degrees for full 10 secs  6a. Motor Leg, Left: 1-->Drift, leg falls by the end of the 5-sec period but does not hit bed  6b. Motor Leg, Right: 1-->Drift, leg falls by the end of the 5-sec period but does not hit bed  7. Limb Ataxia: 0-->Absent  8. Sensory: 0-->Normal, no sensory loss  9. Best Language: 0-->No aphasia, normal  10. Dysarthria: 0-->Normal  11. Extinction and Inattention (formerly Neglect): 0-->No abnormality  Total (NIH Stroke Scale): 2      Modified Marin:    Guido Coma Scale:     ABCD2 Score:    [id number] Score:    HAS -BLED Score:    ICH Score:    Hunt & Escobar Classification:      There were no vitals taken for this visit.      In my opinion, this was a: Tier 1; VAN Stroke Assessment:  Negative     Medical Decision Making  HPI:  89 y.o. female with h/o TIA, COPD, CKD spinal stenosis, presenting with HA and speech difficulty.  Patient's daughter reports she's had a HA for 5 days, possibly cervicogenic (received neck injection with PCP yesterday).  This evening she was complaining of HA and then had trouble speaking.  Her daughter gave her several baby aspirin at home.  Last dose of tramadol was this morning.     Images personally reviewed and interpreted:  Study: Head CT and CTA Head & Neck  Study Interpretation: no acute findings, no LVO     Assessment and plan:  Patient with slow speech but no paraphasia, no difficulty repeating or following commands.  No unilateral weakness. Ddx includes L MCA stroke, encephalopathy due to metabolic or toxic disturbance.  Recommend MRI brain for further evaluation.  Will defer TNK given mild symptoms.    Lytics recommendation: Thrombolytic therapy not recommended due to Mild Non-Disabling Symptoms    Thrombectomy recommendation: No; No large vessel occlusion identified on imaging   Placement recommendation: admit to inpatient               ROS  Physical Exam  Neurological:      Comments: Alert, oriented, names objects, repeats, follows commands.  Speech slow.  No paraphasic errors or stuttering.  No CN deficits  Arms antigravity w/out drift  B/L leg drift       Past Medical History:   Diagnosis Date    Allergy     Anemia, unspecified     Arthritis     CKD (chronic kidney disease) stage 4, GFR 15-29 ml/min     COPD (chronic obstructive pulmonary disease)     Edema     HTN (hypertension)     Hypocalcemia     Hyponatremia     Osteoarthritis      Past Surgical History:   Procedure Laterality Date    BREAST CYST EXCISION      CAUDAL EPIDURAL STEROID INJECTION N/A 2/2/2023    Procedure: Injection-steroid-epidural-caudal;  Surgeon: Angel Sparrow MD;  Location: [facility];  Service: Pain Management;  Laterality: N/A;    FOOT SURGERY       Family History    Problem Relation Name Age of Onset    Cancer Mother      No Known Problems Father         Diagnoses  Problem Noted   Difficulty With Speech 3/8/2025       Angela Orosco MD      Emergent/Acute neurological consultation requested by spoke provider due to critical concerns for possible cerebrovascular event that could result in permanent loss of neurologic/bodily function, severe disability or death of this patient.  Immediate/timely evaluation by a highly prepared expert is paramount for optimal outcomes  High risk for neurological deterioration if not properly diagnosed  High risk for neurological deterioration if not treated promplty/as soon as possible  Complex diagnostic evaluation may be required (advanced imaging)  High risk treatment options (thrombolytics and/or thrombectomy)    Patient care was coordinated with spoke provider, including but not limted to    Discussing likely diagnosis/etiology of symptoms  Making recommendations for further diagnostic studies  Making recommendations for intravenous thrombolytics or other advanced therapies  Making recommendations for disposition (admission/transfer for higher level of care)      Neurology consultation requested by spoke provider. Audiovisual encounter with the patient performed using a secure connection.  Results and impressions from the visit are documented on this note and were communicated to the consulting provider/team via direct communication. The note has been shared for addition to the patients electronic medical record.

## 2025-03-09 NOTE — PT/OT/SLP EVAL
Speech Language Pathology Evaluation  Bedside Swallow    Patient Name:  Bridget Montgomery   MRN:  [id number]  Admitting Diagnosis: TIA (transient ischemic attack)    Recommendations:                 General Recommendations:  no further ST needs  Diet recommendations:  Regular Diet - IDDSI Level 7, Thin liquids - IDDSI Level 0   Aspiration Precautions: standard precautions   General Precautions: Standard, fall  Communication strategies:  none    Assessment:     Bridget Montgomery is a 89 y.o. female admitted with TIA who presents with no deficits with cognition/communication and no dysphagia s/sx.     History per MD      Bridget Montgomery is a 89 y.o. female who has a PMH of COPD, CKD4, GERD, HTN, hx of TIA, HLD, prediabetes, and spinal stenosis. The patient presented to [facility] on 3/8/2025 with a primary complaint of sudden onset weakness.     The patient was in their usual state of health until Monday. Starting on Monday, the patient started having a headache that started in her neck and migrated toward the top of her head. She has had chronic neck pain due to cervical stenosis, but the headache was new. She managed the pain with tylenol. On Friday, she presented to her PCP who gave her 2 injections into her neck to help with the pain. She is not sure what the injection was but thinks it might have been a steroid. On Saturday, she had a normal morning and was able to perform all of her ADLs. Around 8 pm, she suddenly became weak in her arms and legs and was not able to walk. She was able to catch herself prior to falling. Per EMS report, the daughter reported a change in speech, but in my conversation with patient and daughter, she denies this. The primary concern was the weakness. She endorses chronic numbness/tingling in her fingers that has not worsened. She denies any sensory deficits. She denies any urinary incontinence or fecal incontinence. She endorses pain in her back, neck,  knees, and a headache. She denies any fevers, chills, chest pain, shortness of breath, abdominal pain, N/V, diarrhea, constipation.     Of note, in 2023, she did suffer a TIA. During this presentation, she had double vision.     In the ED, she was code stroked. CT head and CTA were negative for acute process. Vascular neurology recommended MRI/MRA brain.    Past Medical History:   Diagnosis Date    Allergy     Anemia, unspecified     Arthritis     CKD (chronic kidney disease) stage 4, GFR 15-29 ml/min     COPD (chronic obstructive pulmonary disease)     Edema     HTN (hypertension)     Hypocalcemia     Hyponatremia     Osteoarthritis        Past Surgical History:   Procedure Laterality Date    BREAST CYST EXCISION      CAUDAL EPIDURAL STEROID INJECTION N/A 2/2/2023    Procedure: Injection-steroid-epidural-caudal;  Surgeon: Angel Sparrow MD;  Location: [facility];  Service: Pain Management;  Laterality: N/A;    FOOT SURGERY         Social History: Patient lives at home, indep with ADLs, dtr lives with mother at home.    Chest X-Rays: No acute abnormality.     MRI of the brain: No acute intracranial process with no evidence of acute infarct.   Chronic odontoid fracture with deformity and increased soft tissue in osteophyte formation flattens the cervicomedullary junction, mildly progressed from 2023.  Dedicated MR imaging of the cervical spine to be considered, particularly in light of the history of weakness.      MRI of spine- cervical: 1. 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  Relative to remote MRI of the cervical spine performed 08/11/2023, there appears to be further retroflexion and retropulsion of the proximal fracture fragment.  Edema-like signal is noted within the gap between the proximal and distal fracture fragments.  On the current examination, the retropulsed fracture fragment the overlying ligamentous thickening and associated partially calcified pannus/pseudo pannus  "formation contributes to moderate to severe spinal canal stenosis at C1-2 with effacement of CSF ventral and dorsal to the cord and cord deformity. Addition, there is intramedullary signal abnormality within the cord at the C1 and C2 levels, new when compared to the 08/11/2023 MRI, and in keeping with edema and/or early myelomalacia.  No definite new volume loss of the cord since that time point.  Focal kinking of the upper cervical cord and cervicomedullary junction also represents a change since that time point.  2. Edema-like signal noted within the C1 and C2 vertebra may at least in part reflect sequela of altered biomechanics, noting that superimposed recent fractures not excluded, if there is history of recent trauma, noting that there are no definite obvious hypointense fracture lines. Degenerative changes and edema-like signal is noted within the bilateral C0-C1 and C1-C2 joints.    Prior diet: reg/thin.    Subjective     Consult received for clinical swallow eval/speech/lang eval this date, SLP did communicate with RN prior to eval/treat.    Patient goals: "my head and neck hurt so bad."     Pain/Comfort:  Pain Rating 1: 0/10    Respiratory Status: room air     Objective:   Pt seen at bedside for clinical swallow eval in ED. RN did ok. Pt had breakfast tray nearby and asking to eat.    Pt is verbal, follows commands but need some assistance with feeding self. Pt reporting she cannot turn her head.     Oral Musculature Evaluation  Oral Musculature: WFL  Dentition: present and adequate, upper dentures, lower dentures  Secretion Management: adequate  Mucosal Quality: good, adequate  Mandibular Strength and Mobility: WFL  Oral Labial Strength and Mobility: WFL  Lingual Strength and Mobility: WFL  Buccal Strength and Mobility: WFL  Volitional Cough: elicited  Volitional Swallow: timely swallow  Voice Prior to PO Intake: clear voice    Bedside Swallow Eval:   Consistencies Assessed:  Thin liquids juice and water " by straw  Puree applesauce   Soft solids scrambled eggs   Solids cracker       Oral Phase:   WFL  Adequate mastication and good bolus control    Pharyngeal Phase:   no overt clinical signs/symptoms of aspiration  no overt clinical signs/symptoms of pharyngeal dysphagia  multiple spontaneous swallows  Timely swallow and adequate laryngeal lift noted per subjective bedside assessment     Treatment: SLP provided patient and family education on SLP recommendations, SLP role, s/s and risks of aspiration, safe swallow precautions, and POC. The patient and family v/u of all discussed and are in agreement with POC.   Secure chat sent to primary MD team and RN with above diet recommendations. RN also made aware of patient's increased pain level.     Goals:   Multidisciplinary Problems       SLP Goals       Not on file                    Plan:     Patient to be seen:      Plan of Care expires:     Plan of Care reviewed with:  patient, daughter   SLP Follow-Up:  No       Discharge recommendations:   (no further ST needs)   Barriers to Discharge:  None    Time Tracking:     SLP Treatment Date:   03/09/25  Speech Start Time:  1148  Speech Stop Time:  1212     Speech Total Time (min):  24 min    Billable Minutes: Eval Swallow and Oral Function 12 and Self Care/Home Management Training 12    03/09/2025

## 2025-03-09 NOTE — DISCHARGE SUMMARY
[facility] Medicine Discharge Summary    Primary Team: [facility] Hospitalist Team B  Attending Physician: Herve Daniel MD  Resident: Jeremias Laureano  Intern: Dot Dee    Date of Admit: 3/8/2025  Date of Discharge: 3/9/2025    Discharge to: Transfer to [facility]   Condition: Stable    Discharge Diagnoses     Problem List[1]  Cervical Cord Compression C1-C2  Lower extremity Weakness  New Onset Headache    Brief History of Present Illness      Bridget Montgomery is a 89 y.o. female who has a PMH of COPD, CKD4, GERD, HTN, hx of TIA, HLD, prediabetes, and spinal stenosis. The patient presented to [facility] on 3/8/2025 with a primary complaint of sudden onset weakness.     The patient was in their usual state of health until Monday. Starting on Monday, the patient started having a headache that started in her neck and migrated toward the top of her head. She has had chronic neck pain due to cervical stenosis, but the headache was new. She managed the pain with tylenol. On Friday, she presented to her PCP (Dr. Alejandrina Russ) who gave her 2 injections into her neck to help with the pain. She is not sure what the injection was but thinks it might have been a steroid. On Saturday, she had a normal morning and was able to perform all of her ADLs. Around 8 pm, she suddenly became weak in her arms and legs and was not able to walk. She was able to catch herself prior to falling. The primary concern was the weakness and ongoing headache. She endorses chronic numbness/tingling in her fingers that has not worsened. She denies any sensory deficits. She denies any urinary incontinence or fecal incontinence. She endorses pain in her back, neck, knees, and a headache. She denies any fevers, chills, chest pain, shortness of breath, abdominal pain, N/V, diarrhea, constipation. CT Head was negative for acute stroke. MRI shows new onset cervical cord compression C1-C2. Neurosurgery consulted, recommended  transferring pt to [facility] for a higher level of care.     For the full HPI please refer to the History & Physical from this admission.    Hospital Course By Problem with Pertinent Findings     Generalized weakness  Headache  Cervical and lumbar stenosis  High-risk TIA  Patient with acute onset of weakness earlier this evening (8pm). Stroke activated in the ED. Initial head imaging negative. Neurology consulted. Thrombolytics deferred. Recently had cervical injection at PCPs office.  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  Addition, there is new intramedullary signal abnormality within the cord at the C1 and C2 levels.   - Neurosurgery was consulted and recommended placement in a cervical collar and transfer to [facility] for a higher level of care.  - Pt being transferred to [facility]     Normocytic anemia  - Hgb 11.8  - iron panel with ferritin 29, B12, folate pending  - will require iron supplements      HTN  - /65 on arrival, states that she took her BP meds that day  - continue home amlodipine 10 mg daily  - permissive hypertension not recommended in high risk TIA     HLD  - lipid panel with mildly elevated total cholesterol  - patient states that she does not tolerate statins due to myopathy     COPD?  Mild  emphysematous changes on CT  - PFTs 9/27/23 showed no obstruction, no restriction, mild reduction in diffusion capacity at 60%   - home inhalers: symbicort daily, albuterol PRN  - has not been consistently using  - duonebs ordered PRN     GERD  - home med: prilosec 20 mg BID  - protonix while inpatient     Pre-diabetes  - A1C 10/2023: 5.8  - repeat ordered  - LDSSI     CKD3  - follows with Dr. Rahman outpatient  - Cr at baseline  - Continue Lasix 20 mg BID       Consultants and Procedures     Consultants:  Neurosurgery: Dr. Quach    Procedures:   None    Vitals on discharge:   Vitals:    03/09/25 1140   BP:    Pulse: 67   Resp: (!) 24   Temp:         Discharge Physical Examination:  General: No acute distress. Appropriate behavior.   Head: normocephalic, atraumatic  Eyes: PERRL. EOMI. No conjunctival injection. No scleral icterus.  ENT: MMM, no rhinorrhea or epistaxis.   Neck: No lymphadenopathy. No accessory muscle use.   Cardiac: Regular rate. Regular rhythm. No murmurs appreciated.  Pulmonary/Chest: CTAB. Normal work of breathing.   Abdomen: Soft, non tender, non distended, normoactive bowel sounds.   Extremities: atraumatic, no deformities, no edema. Spontaneous movement of all 4 extremities   Skin: dry, warm, intact. No bruising or rashes.  Neuro: Alert. Oriented to person, place, time. Moving all extremities spontaneously. CN II-XII grossly intact. Normal  strength bilaterally. 5/5 strength in UE; 4/5 strength in LE. Intact sensation; unable to assess gait; EOMI pupils equal and reactive     Discharge Medications        Medication List        ASK your doctor about these medications      acetaminophen 500 MG tablet  Commonly known as: TYLENOL  Take 1 tablet (500 mg total) by mouth every 6 (six) hours as needed for Pain.     * albuterol 2.5 mg /3 mL (0.083 %) nebulizer solution  Commonly known as: PROVENTIL     * albuterol 90 mcg/actuation inhaler  Commonly known as: PROVENTIL/VENTOLIN HFA     amLODIPine  10 MG tablet  Commonly known as: NORVASC  Take 1 tablet (10 mg total) by mouth once daily.     budesonide-formoterol 160-4.5 mcg 160-4.5 mcg/actuation Hfaa  Commonly known as: SYMBICORT  Ask about: Which instructions should I use?     CALCIUM 500 ORAL     cetirizine 10 MG tablet  Commonly known as: ZYRTEC     cholecalciferol (vitamin D3) 50 mcg (2,000 unit) Cap capsule  Commonly known as: VITAMIN D3     diclofenac sodium 1 % Gel  Commonly known as: VOLTAREN ARTHRITIS PAIN  Apply 2 g topically 3 (three) times daily as needed (painful joint).     famotidine 40 MG tablet  Commonly known as: PEPCID     fluticasone propionate 50 mcg/actuation nasal spray  Commonly known as: FLONASE  Ask about: Which instructions should I use?     furosemide 20 MG tablet  Commonly known as: LASIX  Take 1 tablet (20 mg total) by mouth 2 (two) times a day.     gabapentin 300 MG capsule  Commonly known as: NEURONTIN     ipratropium 42 mcg (0.06 %) nasal spray  Commonly known as: ATROVENT     melatonin 3 mg tablet  Commonly known as: MELATIN  Take 2 tablets (6 mg total) by mouth nightly as needed for Insomnia.     methocarbamoL 500 MG Tab  Commonly known as: Robaxin     MIRALAX ORAL     omega 3-dha-epa-fish oil 1,000 (120-180) mg Cap     pregabalin 50 MG capsule  Commonly known as: LYRICA     senna-docusate 8.6-50 mg 8.6-50 mg per tablet  Commonly known as: PERICOLACE  Take 2 tablets by mouth 2 (two) times daily.     sodium bicarbonate 650 MG tablet  Take 1 tablet (650 mg total) by mouth once daily.     TOLAK 4 % Crea  Generic drug: fluorouraciL     traMADoL 50 mg tablet  Commonly known as: ULTRAM  Take 1 tablet (50 mg total) by mouth 2 (two) times daily as needed.           * This list has 2 medication(s) that are the same as other medications prescribed for you. Read the directions carefully, and ask your doctor or other care provider to review them with you.                   Discharge Information:   Diet:  Adult Cardiac    Physical  Activity:  Pt requires C-Spine Collar  Per Neurosurgery recommendations                Dot Dee MD  [employer] Internal Medicine PGY-1  [facility] Hospitalist Team B        [1]   Patient Active Problem List  Diagnosis    Chronic kidney disease, stage III (moderate)    Type 2 diabetes mellitus with diabetic chronic kidney disease    Spinal stenosis    Senile purpura    Primary localized osteoarthritis of pelvic region and thigh    Idiopathic osteoarthritis    Osteopenia    Neuralgia of right sciatic nerve    Mixed hyperlipidemia    Mild recurrent major depression    Diabetic renal disease    Chronic obstructive pulmonary disease    Allergic rhinitis    Essential hypertension    Gastro-esophageal reflux disease without esophagitis    Lumbar spondylosis    Ovarian mass    Spinal cord disorder    Ulcer of esophagus    Greater trochanteric bursitis    Osteoarthritis of both knees    Lumbar radiculopathy    Acute chorea    Non-healing wound of right lower extremity    Acute pancreatitis without infection or necrosis    CKD (chronic kidney disease) stage 4, GFR 15-29 ml/min    Leg wound, right    Closed odontoid fracture with routine healing    Coccyalgia    Diabetes 1.5, managed as type 2    Closed fracture of proximal end of left humerus with routine healing    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

## 2025-03-09 NOTE — SUBJECTIVE & OBJECTIVE
HPI:  89 y.o. female with h/o TIA, COPD, CKD spinal stenosis, presenting with HA and speech difficulty.  Patient's daughter reports she's had a HA for 5 days, possibly cervicogenic (received neck injection with PCP yesterday).  This evening she was complaining of HA and then had trouble speaking.  Her daughter gave her several baby aspirin at home.  Last dose of tramadol was this morning.     Images personally reviewed and interpreted:  Study: Head CT and CTA Head & Neck  Study Interpretation: no acute findings, no LVO     Assessment and plan:  Patient with slow speech but no paraphasia, no difficulty repeating or following commands.  No unilateral weakness. Ddx includes L MCA stroke, encephalopathy due to metabolic or toxic disturbance.  Recommend MRI brain for further evaluation.  Will defer TNK given mild symptoms.    Lytics recommendation: Thrombolytic therapy not recommended due to Mild Non-Disabling Symptoms    Thrombectomy recommendation: No; No large vessel occlusion identified on imaging   Placement recommendation: admit to inpatient

## 2025-03-09 NOTE — ED NOTES
Transfer Center called, to call back once Stroke Provider confirms notification - per Machelle at Transfer center

## 2025-03-10 ENCOUNTER — ANESTHESIA EVENT (OUTPATIENT)
Dept: SURGERY | Facility: HOSPITAL | Age: OVER 89
End: 2025-03-10
Payer: MEDICARE

## 2025-03-10 LAB
ALBUMIN SERPL BCP-MCNC: 3 G/DL (ref 3.5–5.2)
ALP SERPL-CCNC: 182 U/L (ref 40–150)
ALT SERPL W/O P-5'-P-CCNC: 17 U/L (ref 10–44)
ANION GAP SERPL CALC-SCNC: 6 MMOL/L (ref 8–16)
AST SERPL-CCNC: 31 U/L (ref 10–40)
BASOPHILS # BLD AUTO: 0.03 K/UL (ref 0–0.2)
BASOPHILS NFR BLD: 0.9 % (ref 0–1.9)
BILIRUB SERPL-MCNC: 0.2 MG/DL (ref 0.1–1)
BUN SERPL-MCNC: 34 MG/DL (ref 8–23)
CALCIUM SERPL-MCNC: 9.1 MG/DL (ref 8.7–10.5)
CHLORIDE SERPL-SCNC: 99 MMOL/L (ref 95–110)
CO2 SERPL-SCNC: 30 MMOL/L (ref 23–29)
CREAT SERPL-MCNC: 1.4 MG/DL (ref 0.5–1.4)
DIFFERENTIAL METHOD BLD: ABNORMAL
EOSINOPHIL # BLD AUTO: 0.1 K/UL (ref 0–0.5)
EOSINOPHIL NFR BLD: 3.3 % (ref 0–8)
ERYTHROCYTE [DISTWIDTH] IN BLOOD BY AUTOMATED COUNT: 14 % (ref 11.5–14.5)
EST. GFR  (NO RACE VARIABLE): 35.7 ML/MIN/1.73 M^2
GLUCOSE SERPL-MCNC: 72 MG/DL (ref 70–110)
HCT VFR BLD AUTO: 33.8 % (ref 37–48.5)
HGB BLD-MCNC: 10.8 G/DL (ref 12–16)
IMM GRANULOCYTES # BLD AUTO: 0.01 K/UL (ref 0–0.04)
IMM GRANULOCYTES NFR BLD AUTO: 0.3 % (ref 0–0.5)
LYMPHOCYTES # BLD AUTO: 1.3 K/UL (ref 1–4.8)
LYMPHOCYTES NFR BLD: 39.2 % (ref 18–48)
MAGNESIUM SERPL-MCNC: 2.6 MG/DL (ref 1.6–2.6)
MCH RBC QN AUTO: 28.4 PG (ref 27–31)
MCHC RBC AUTO-ENTMCNC: 32 G/DL (ref 32–36)
MCV RBC AUTO: 89 FL (ref 82–98)
MONOCYTES # BLD AUTO: 0.5 K/UL (ref 0.3–1)
MONOCYTES NFR BLD: 16 % (ref 4–15)
NEUTROPHILS # BLD AUTO: 1.4 K/UL (ref 1.8–7.7)
NEUTROPHILS NFR BLD: 40.3 % (ref 38–73)
NRBC BLD-RTO: 0 /100 WBC
OHS QRS DURATION: 86 MS
OHS QTC CALCULATION: 417 MS
PLATELET # BLD AUTO: 345 K/UL (ref 150–450)
PMV BLD AUTO: 10 FL (ref 9.2–12.9)
POTASSIUM SERPL-SCNC: 4.1 MMOL/L (ref 3.5–5.1)
PROT SERPL-MCNC: 6.3 G/DL (ref 6–8.4)
RBC # BLD AUTO: 3.8 M/UL (ref 4–5.4)
SODIUM SERPL-SCNC: 135 MMOL/L (ref 136–145)
WBC # BLD AUTO: 3.37 K/UL (ref 3.9–12.7)

## 2025-03-10 PROCEDURE — 11000001 HC ACUTE MED/SURG PRIVATE ROOM

## 2025-03-10 PROCEDURE — 83735 ASSAY OF MAGNESIUM: CPT | Performed by: INTERNAL MEDICINE

## 2025-03-10 PROCEDURE — 25000003 PHARM REV CODE 250: Performed by: STUDENT IN AN ORGANIZED HEALTH CARE EDUCATION/TRAINING PROGRAM

## 2025-03-10 PROCEDURE — 80053 COMPREHEN METABOLIC PANEL: CPT | Performed by: INTERNAL MEDICINE

## 2025-03-10 PROCEDURE — 25000242 PHARM REV CODE 250 ALT 637 W/ HCPCS: Performed by: STUDENT IN AN ORGANIZED HEALTH CARE EDUCATION/TRAINING PROGRAM

## 2025-03-10 PROCEDURE — 92610 EVALUATE SWALLOWING FUNCTION: CPT

## 2025-03-10 PROCEDURE — 25000003 PHARM REV CODE 250: Performed by: NURSE PRACTITIONER

## 2025-03-10 PROCEDURE — 99232 SBSQ HOSP IP/OBS MODERATE 35: CPT | Mod: ,,, | Performed by: STUDENT IN AN ORGANIZED HEALTH CARE EDUCATION/TRAINING PROGRAM

## 2025-03-10 PROCEDURE — 94640 AIRWAY INHALATION TREATMENT: CPT

## 2025-03-10 PROCEDURE — 97535 SELF CARE MNGMENT TRAINING: CPT

## 2025-03-10 PROCEDURE — 25000003 PHARM REV CODE 250: Performed by: INTERNAL MEDICINE

## 2025-03-10 PROCEDURE — 94761 N-INVAS EAR/PLS OXIMETRY MLT: CPT

## 2025-03-10 PROCEDURE — 36415 COLL VENOUS BLD VENIPUNCTURE: CPT | Performed by: INTERNAL MEDICINE

## 2025-03-10 PROCEDURE — 85025 COMPLETE CBC W/AUTO DIFF WBC: CPT | Performed by: INTERNAL MEDICINE

## 2025-03-10 RX ORDER — MUPIROCIN 20 MG/G
OINTMENT TOPICAL 2 TIMES DAILY
Status: COMPLETED | OUTPATIENT
Start: 2025-03-10 | End: 2025-03-14

## 2025-03-10 RX ORDER — LOSARTAN POTASSIUM 25 MG/1
25 TABLET ORAL DAILY
Status: DISCONTINUED | OUTPATIENT
Start: 2025-03-10 | End: 2025-03-11

## 2025-03-10 RX ORDER — OXYCODONE AND ACETAMINOPHEN 5; 325 MG/1; MG/1
1 TABLET ORAL EVERY 6 HOURS PRN
Refills: 0 | Status: DISCONTINUED | OUTPATIENT
Start: 2025-03-10 | End: 2025-03-11

## 2025-03-10 RX ORDER — PANTOPRAZOLE SODIUM 20 MG/1
40 TABLET, DELAYED RELEASE ORAL DAILY
Status: DISCONTINUED | OUTPATIENT
Start: 2025-03-10 | End: 2025-03-12

## 2025-03-10 RX ORDER — GABAPENTIN 300 MG/1
300 CAPSULE ORAL NIGHTLY
Status: DISCONTINUED | OUTPATIENT
Start: 2025-03-10 | End: 2025-03-24 | Stop reason: HOSPADM

## 2025-03-10 RX ORDER — PREGABALIN 50 MG/1
50 CAPSULE ORAL NIGHTLY
Status: DISCONTINUED | OUTPATIENT
Start: 2025-03-10 | End: 2025-03-18

## 2025-03-10 RX ORDER — AMOXICILLIN 250 MG
1 CAPSULE ORAL 2 TIMES DAILY
Status: DISCONTINUED | OUTPATIENT
Start: 2025-03-10 | End: 2025-03-17

## 2025-03-10 RX ORDER — ACETAMINOPHEN 500 MG
1000 TABLET ORAL ONCE
Status: COMPLETED | OUTPATIENT
Start: 2025-03-10 | End: 2025-03-10

## 2025-03-10 RX ORDER — METHOCARBAMOL 500 MG/1
500 TABLET, FILM COATED ORAL 3 TIMES DAILY
Status: DISCONTINUED | OUTPATIENT
Start: 2025-03-10 | End: 2025-03-18

## 2025-03-10 RX ORDER — BUTALBITAL, ACETAMINOPHEN AND CAFFEINE 50; 325; 40 MG/1; MG/1; MG/1
1 TABLET ORAL EVERY 4 HOURS PRN
Status: DISCONTINUED | OUTPATIENT
Start: 2025-03-10 | End: 2025-03-20

## 2025-03-10 RX ORDER — LOSARTAN POTASSIUM 25 MG/1
25 TABLET ORAL DAILY
Status: DISCONTINUED | OUTPATIENT
Start: 2025-03-11 | End: 2025-03-10

## 2025-03-10 RX ORDER — OXYCODONE AND ACETAMINOPHEN 7.5; 325 MG/1; MG/1
1 TABLET ORAL EVERY 6 HOURS PRN
Refills: 0 | Status: DISCONTINUED | OUTPATIENT
Start: 2025-03-10 | End: 2025-03-11

## 2025-03-10 RX ADMIN — MUPIROCIN: 20 OINTMENT TOPICAL at 08:03

## 2025-03-10 RX ADMIN — SENNOSIDES AND DOCUSATE SODIUM 1 TABLET: 50; 8.6 TABLET ORAL at 10:03

## 2025-03-10 RX ADMIN — OXYCODONE 5 MG: 5 TABLET ORAL at 05:03

## 2025-03-10 RX ADMIN — SENNOSIDES AND DOCUSATE SODIUM 1 TABLET: 50; 8.6 TABLET ORAL at 08:03

## 2025-03-10 RX ADMIN — BUTALBITAL, ACETAMINOPHEN, AND CAFFEINE 1 TABLET: 325; 50; 40 TABLET ORAL at 07:03

## 2025-03-10 RX ADMIN — BUTALBITAL, ACETAMINOPHEN, AND CAFFEINE 1 TABLET: 325; 50; 40 TABLET ORAL at 11:03

## 2025-03-10 RX ADMIN — ACETAMINOPHEN 650 MG: 325 TABLET ORAL at 08:03

## 2025-03-10 RX ADMIN — LOSARTAN POTASSIUM 25 MG: 25 TABLET, FILM COATED ORAL at 05:03

## 2025-03-10 RX ADMIN — METHOCARBAMOL 500 MG: 500 TABLET ORAL at 03:03

## 2025-03-10 RX ADMIN — FLUTICASONE FUROATE AND VILANTEROL TRIFENATATE 1 PUFF: 100; 25 POWDER RESPIRATORY (INHALATION) at 08:03

## 2025-03-10 RX ADMIN — MUPIROCIN: 20 OINTMENT TOPICAL at 12:03

## 2025-03-10 RX ADMIN — PREGABALIN 50 MG: 50 CAPSULE ORAL at 08:03

## 2025-03-10 RX ADMIN — OXYCODONE 5 MG: 5 TABLET ORAL at 01:03

## 2025-03-10 RX ADMIN — METHOCARBAMOL 500 MG: 500 TABLET ORAL at 08:03

## 2025-03-10 RX ADMIN — GABAPENTIN 300 MG: 300 CAPSULE ORAL at 08:03

## 2025-03-10 RX ADMIN — ACETAMINOPHEN 1000 MG: 500 TABLET ORAL at 05:03

## 2025-03-10 RX ADMIN — AMLODIPINE BESYLATE 10 MG: 10 TABLET ORAL at 08:03

## 2025-03-10 NOTE — ASSESSMENT & PLAN NOTE
Presented to outside hospital with increased generalized weakness, particularly in her extremities.  CVA workup negative, however MRI showed new onset cervical cord compression, likely contributing to her symptoms.  Transferred here for Neurosurgery evaluation; appreciate recommendations.

## 2025-03-10 NOTE — ASSESSMENT & PLAN NOTE
Renal function remains around baseline; CrCl cannot be calculated (Unknown ideal weight.). according to latest data. 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 Will avoid nephrotoxic agents as able, and renally dose all meds as applicable.

## 2025-03-10 NOTE — ASSESSMENT & PLAN NOTE
Chronic, however likely exacerbated by cervical radiculopathy.  Consider PT/OT after neurosurgery recommendations.

## 2025-03-10 NOTE — SUBJECTIVE & OBJECTIVE
Interval History: NAEON. Pt still c/o diffuse headache and weakness in all extremities. Denies numbness/paresthesias or bowel/bladder incontinence.     Review of Systems   All other systems reviewed and are negative.    Objective:     Vital Signs (Most Recent):  Temp: 98 °F (36.7 °C) (03/10/25 1208)  Pulse: (!) 53 (03/10/25 1208)  Resp: 20 (03/10/25 1346)  BP: (!) 172/64 (03/10/25 1208)  SpO2: 97 % (03/10/25 1208) Vital Signs (24h Range):  Temp:  [97 °F (36.1 °C)-98 °F (36.7 °C)] 98 °F (36.7 °C)  Pulse:  [49-65] 53  Resp:  [13-21] 20  SpO2:  [92 %-98 %] 97 %  BP: (119-176)/(59-73) 172/64     Weight: 59 kg (130 lb 1.1 oz)  Body mass index is 23.04 kg/m².    Intake/Output Summary (Last 24 hours) at 3/10/2025 1448  Last data filed at 3/10/2025 0630  Gross per 24 hour   Intake --   Output 300 ml   Net -300 ml         Physical Exam  Vitals and nursing note reviewed.   Constitutional:       General: She is not in acute distress.     Appearance: She is well-developed. She is not diaphoretic.   HENT:      Head: Normocephalic and atraumatic.   Eyes:      General: No scleral icterus.     Conjunctiva/sclera: Conjunctivae normal.   Neck:      Vascular: No JVD.      Comments: Neck in C-collar   Cardiovascular:      Rate and Rhythm: Normal rate and regular rhythm.   Pulmonary:      Effort: Pulmonary effort is normal. No respiratory distress.   Musculoskeletal:      Right lower leg: No edema.      Left lower leg: No edema.   Skin:     Coloration: Skin is not jaundiced or pale.   Neurological:      Mental Status: She is alert and oriented to person, place, and time.      Cranial Nerves: No cranial nerve deficit.      Sensory: No sensory deficit.      Motor: Weakness (generalized) present. No abnormal muscle tone.   Psychiatric:         Mood and Affect: Mood normal.         Behavior: Behavior normal.               Significant Labs: All pertinent labs within the past 24 hours have been reviewed.    Significant Imaging: I have  reviewed all pertinent imaging results/findings within the past 24 hours.

## 2025-03-10 NOTE — NURSING
Bladder scan done 315 ml noted. Pt stated she never had a noriega catheter at outside hospital it was an external catheter. Pt in kevni without difficulties in bedside commade

## 2025-03-10 NOTE — ASSESSMENT & PLAN NOTE
History of COPD, not on home oxygen. Currently without evidence of acute exacerbation, and sats on room air at goal 88% or greater. Will continue hospital formulary equivalent of home regimen and monitor respiratory status.

## 2025-03-10 NOTE — ASSESSMENT & PLAN NOTE
Renal function remains around baseline; Estimated Creatinine Clearance: 22.1 mL/min (based on SCr of 1.4 mg/dL). according to latest data. 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 Will avoid nephrotoxic agents as able, and renally dose all meds as applicable.

## 2025-03-10 NOTE — SUBJECTIVE & OBJECTIVE
Prescriptions Prior to Admission[1]    Review of patient's allergies indicates:   Allergen Reactions    Cephalexin     Codeine     Meperidine      Other reaction(s): delerium, hallucination  Delerium  Delerium  Delerium      Nsaids (non-steroidal anti-inflammatory drug)     Penicillins     Tazarotene      Other reaction(s): rash, Unknown       Past Medical History:   Diagnosis Date    Allergy     Anemia, unspecified     Arthritis     CKD (chronic kidney disease) stage 4, GFR 15-29 ml/min     COPD (chronic obstructive pulmonary disease)     Edema     HTN (hypertension)     Hypocalcemia     Hyponatremia     Osteoarthritis      Past Surgical History:   Procedure Laterality Date    BREAST CYST EXCISION      CAUDAL EPIDURAL STEROID INJECTION N/A 2/2/2023    Procedure: Injection-steroid-epidural-caudal;  Surgeon: Angel Sparrow MD;  Location: [facility];  Service: Pain Management;  Laterality: N/A;    FOOT SURGERY       Family History       Problem Relation (Age of Onset)    Cancer Mother    No Known Problems Father          Tobacco Use    Smoking status: Former    Smokeless tobacco: Never   Substance and Sexual Activity    Alcohol use: Not Currently    Drug use: Never    Sexual activity: Not Currently       Objective:        There is no height or weight on file to calculate BMI.  Vital Signs (Most Recent):  Temp: 97 °F (36.1 °C) (03/09/25 2232)  Pulse: (!) 49 (03/10/25 0824)  Resp: 15 (03/10/25 0824)  BP: (!) 176/68 (03/10/25 0813)  SpO2: 96 % (03/10/25 0824) Vital Signs (24h Range):  Temp:  [97 °F (36.1 °C)] 97 °F (36.1 °C)  Pulse:  [49-69] 49  Resp:  [13-21] 15  SpO2:  [92 %-99 %] 96 %  BP: (119-176)/(59-77) 176/68                         Female External Urinary Catheter w/ Suction 03/09/25 (Active)   Output (mL) 300 mL 03/10/25 0630     Neurosurgery Physical Exam  General: well developed, well nourished, no distress.   Head: normocephalic, atraumatic  Mental Status: Awake, Alert, Oriented  Speech: Clear with  content appropriate to conversation  Cranial nerves: face symmetric, CN II-XII grossly intact.   Eyes: pupils equal, round, reactive to light, EOMI.  Sensory: intact to light touch throughout    Motor Strength:Moves all extremities spontaneously with good tone.  Full strength upper and lower extremities. No abnormal movements seen.     Strength  Deltoids Triceps Biceps Wrist Extension Wrist Flexion Hand    Upper: R 5/5 5/5 5/5 5/5 5/5 5/5    L 5/5 5/5 5/5 5/5 5/5 5/5     Iliopsoas Quadriceps Knee  Flexion Tibialis  anterior Gastro- cnemius EHL   Lower: R 5/5 5/5 5/5 5/5 5/5 5/5    L 5/5 5/5 5/5 5/5 5/5 5/5     Schneider: absent    Significant Labs:  Recent Labs   Lab 03/08/25 2202 03/09/25  0911 03/10/25  0956   * 92 72    140 135*   K 4.1 3.9 4.1   CL 99 100 99   CO2 28 31* 30*   BUN 35* 34* 34*   CREATININE 1.4 1.3 1.4   CALCIUM 9.5 9.1 9.1   MG  --  2.3 2.6     Recent Labs   Lab 03/08/25 2202 03/09/25  0911 03/10/25  0956   WBC 3.46* 3.86* 3.37*   HGB 11.8* 10.8* 10.8*   HCT 36.5* 32.5* 33.8*    335 345     Recent Labs   Lab 03/08/25 2202   INR 0.9     Microbiology Results (last 7 days)       ** No results found for the last 168 hours. **          All pertinent labs from the last 24 hours have been reviewed.    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 Relative to remote MRI of the cervical spine performed 08/11/2023, there appears to be further retroflexion and retropulsion of the proximal fracture fragment. Edema-like signal is noted within the gap between the proximal and distal fracture fragments. moderate to severe spinal canal stenosis at C1-2 with effacement of CSF ventral and dorsal to the cord and cord deformity. Addition, there is intramedullary signal abnormality within the cord at the C1 and C2 levels, new when compared to the 08/11/2023 MRI. Multilevel cervical foraminal  narrowing.  In the lumbar spine, at L3-4 anterolisthesis of L3 on L4 results in uncovering of a diffuse disc bulge, which in conjunction with advanced facet arthropathy contributes to severe central spinal canal stenosis. There is effacement of CSF about the compressed thecal sac. Narrowing of the subarticular zones with likely impingement of traversing L5 nerve roots. Moderate to severe left and moderate right foraminal narrowing.        [1]   Medications Prior to Admission   Medication Sig Dispense Refill Last Dose/Taking    acetaminophen (TYLENOL) 500 MG tablet Take 1 tablet (500 mg total) by mouth every 6 (six) hours as needed for Pain. 90 tablet 6     albuterol (PROVENTIL) 2.5 mg /3 mL (0.083 %) nebulizer solution Take 2.5 mg by nebulization 2 (two) times daily as needed.       albuterol (PROVENTIL/VENTOLIN HFA) 90 mcg/actuation inhaler Inhale 2 puffs into the lungs every 6 (six) hours as needed.       amLODIPine (NORVASC) 10 MG tablet Take 1 tablet (10 mg total) by mouth once daily. 90 tablet 3     budesonide-formoterol 160-4.5 mcg (SYMBICORT) 160-4.5 mcg/actuation HFAA Inhale 2 puffs into the lungs every 12 (twelve) hours.       calcium carbonate (CALCIUM 500 ORAL) Take 500 mg by mouth once daily.       cetirizine (ZYRTEC) 10 MG tablet Take 10 mg by mouth once daily.       cholecalciferol, vitamin D3, (VITAMIN D3) 50 mcg (2,000 unit) Cap capsule Take 2,000 Units by mouth once daily.       diclofenac sodium (VOLTAREN ARTHRITIS PAIN) 1 % Gel Apply 2 g topically 3 (three) times daily as needed (painful joint). 100 g 0     famotidine (PEPCID) 40 MG tablet Take 40 mg by mouth nightly as needed for Heartburn.       fluticasone propionate (FLONASE) 50 mcg/actuation nasal spray 1 spray by Nasal route 2 (two) times daily.       furosemide (LASIX) 20 MG tablet Take 1 tablet (20 mg total) by mouth 2 (two) times a day. 180 tablet 3     gabapentin (NEURONTIN) 300 MG capsule Take 300 mg by mouth every evening.        ipratropium (ATROVENT) 42 mcg (0.06 %) nasal spray        melatonin (MELATIN) 3 mg tablet Take 2 tablets (6 mg total) by mouth nightly as needed for Insomnia. 30 tablet 0     methocarbamoL (ROBAXIN) 500 MG Tab        omega 3-dha-epa-fish oil 1,000 mg (120 mg-180 mg) Cap Take 1 capsule by mouth once daily.       omeprazole (PRILOSEC) 20 MG capsule Take 1 capsule (20 mg total) by mouth 2 (two) times daily before meals. 60 capsule 0     polyethylene glycol 3350 (MIRALAX ORAL) Take 17 g by mouth once daily. As needed       pregabalin (LYRICA) 50 MG capsule Take 1 capsule by mouth every evening.       senna-docusate 8.6-50 mg (PERICOLACE) 8.6-50 mg per tablet Take 2 tablets by mouth 2 (two) times daily. 60 tablet 0     sodium bicarbonate 650 MG tablet Take 1 tablet (650 mg total) by mouth once daily. 30 tablet 0     TOLAK 4 % Crea apply a thin film to the lesions using the fingertips, once daily. preferably at night., WASH off in THE morning FOR 2 WEEKS.       traMADoL (ULTRAM) 50 mg tablet Take 1 tablet (50 mg total) by mouth 2 (two) times daily as needed. 60 tablet 4

## 2025-03-10 NOTE — HPI
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 Her symptoms began last Monday with headache that started in her neck and migrated toward the top of her head. She has had chronic neck pain due to cervical stenosis, but the headache was new. On Saturday, she had a normal morning and was able to perform all of her ADLs. Around 8 pm, she suddenly became weak in her arms and legs and was not able to walk. MRI demonstrated chronic type 2 odontoid fracture with progressed retropulsion of the fracture fragment in comparison to prior MRI from 8/11/2023, with moderate to severe canal stenosis at C1-C2. Of note, patient had previously discontinued wearing her cervical collar. Denies radiculopathy in her arms or legs, sensory deficit, bladder or bowel incontinence.

## 2025-03-10 NOTE — PLAN OF CARE
[facility] (Hospital)  Initial Discharge Assessment       Primary Care Provider: No primary care provider on file.    Admission Diagnosis: Spinal cord compression [G95.20]    Admission Date: 3/9/2025  Expected Discharge Date:     Transition of Care Barriers: (P) None    Payor: Buzz Referrals [facility] / Plan: Buzz Referrals CHOICES / Product Type: Medicare Advantage /     Extended Emergency Contact Information  Primary Emergency Contact: Liliana Montgomery   [facility]  Home Phone: [phone]  Relation: Daughter    Discharge Plan A: (P) Home with family, Home Health  Discharge Plan B: (P) Home with family      [facility] - [place], [place] - [address]  [address]  [place] [address]  Phone: [phone] Fax: [phone]      Initial Assessment (most recent)       Adult Discharge Assessment - 03/10/25 1316          Discharge Assessment    Assessment Type Discharge Planning Assessment     Confirmed/corrected address, phone number and insurance Yes     Confirmed Demographics Correct on Facesheet     Source of Information patient;family     When was your last doctors appointment? 03/07/25     Communicated MIKAYLA with patient/caregiver Date not available/Unable to determine     Reason For Admission spinal stenosis     People in Home child(trung), adult     Facility Arrived From: [facility] (P)      Do you expect to return to your current living situation? Yes (P)      Do you have help at home or someone to help you manage your care at home? Yes (P)      Who are your caregiver(s) and their phone number(s)? norm Ford [phone] (P)      Prior to hospitilization cognitive status: Unable to Assess (P)      Current cognitive status: Inappropriate Behavior (P)      Walking or Climbing Stairs Difficulty yes (P)      Walking or Climbing Stairs ambulation difficulty, requires equipment (P)      Mobility Management RW, lift chair (P)       Dressing/Bathing Difficulty yes (P)      Dressing/Bathing bathing difficulty, requires equipment (P)      Dressing/Bathing Management shower chair (P)      Home Accessibility wheelchair accessible (P)      Home Layout Able to live on 1st floor (P)      Equipment Currently Used at Home walker, rolling;shower chair (P)      Readmission within 30 days? Yes (P)      Patient currently being followed by outpatient case management? No (P)      Do you currently have service(s) that help you manage your care at home? No (P)      Do you take prescription medications? Yes (P)      Do you have prescription coverage? Yes (P)      Coverage PHN (P)      Do you have any problems affording any of your prescribed medications? No (P)      Is the patient taking medications as prescribed? yes (P)      Who is going to help you get home at discharge? kash Ford (P)      How do you get to doctors appointments? family or friend will provide (P)      Are you on dialysis? No (P)      Do you take coumadin? No (P)      Discharge Plan A Home with family;Home Health (P)      Discharge Plan B Home with family (P)      DME Needed Upon Discharge  none (P)      Discharge Plan discussed with: Adult children (P)      Transition of Care Barriers None (P)         Physical Activity    On average, how many days per week do you engage in moderate to strenuous exercise (like a brisk walk)? 4 days (P)      On average, how many minutes do you engage in exercise at this level? 30 min (P)         Financial Resource Strain    How hard is it for you to pay for the very basics like food, housing, medical care, and heating? Not hard at all (P)         Housing Stability    In the last 12 months, was there a time when you were not able to pay the mortgage or rent on time? No (P)      At any time in the past 12 months, were you homeless or living in a shelter (including now)? No (P)         Transportation Needs    Has the lack of transportation kept you from  medical appointments, meetings, work or from getting things needed for daily living? No (P)         Food Insecurity    Within the past 12 months, you worried that your food would run out before you got the money to buy more. Never true (P)      Within the past 12 months, the food you bought just didn't last and you didn't have money to get more. Never true (P)         Stress    Do you feel stress - tense, restless, nervous, or anxious, or unable to sleep at night because your mind is troubled all the time - these days? Only a little (P)         Social Isolation    How often do you feel lonely or isolated from those around you?  Rarely (P)         Alcohol Use    Q1: How often do you have a drink containing alcohol? Monthly or less (P)      Q2: How many drinks containing alcohol do you have on a typical day when you are drinking? 1 or 2 (P)      Q3: How often do you have six or more drinks on one occasion? Never (P)         Utilities    In the past 12 months has the electric, gas, oil, or water company threatened to shut off services in your home? No (P)         Health Literacy    How often do you need to have someone help you when you read instructions, pamphlets, or other written material from your doctor or pharmacy? Rarely (P)         OTHER    Name(s) of People in Home daughter Liliana (P)                  PRINCE met with patient and her daughter Liliana at bedside.  Patient resides with her daughter in a [facility] with no steps.  Patient has a RW, lift chair and shower chair at home.  Her dtr is her primary caregiver.  Patient is not on HD or Coumadin.  Patient will discharge home once medically cleared.  No anticipated post acute needs.  Liliana can provide support and transportation as needed.      Discharge Plan A and Plan B have been determined by review of patient's clinical status, future medical and therapeutic needs, and coverage/benefits for post-acute care in coordination with multidisciplinary team  members.    Syl Ramsay, LMSW [facility]  [phone]

## 2025-03-10 NOTE — SUBJECTIVE & OBJECTIVE
Past Medical History:   Diagnosis Date    Allergy     Anemia, unspecified     Arthritis     CKD (chronic kidney disease) stage 4, GFR 15-29 ml/min     COPD (chronic obstructive pulmonary disease)     Edema     HTN (hypertension)     Hypocalcemia     Hyponatremia     Osteoarthritis        Past Surgical History:   Procedure Laterality Date    BREAST CYST EXCISION      CAUDAL EPIDURAL STEROID INJECTION N/A 2/2/2023    Procedure: Injection-steroid-epidural-caudal;  Surgeon: Angel Sparrow MD;  Location: [facility];  Service: Pain Management;  Laterality: N/A;    FOOT SURGERY         Review of patient's allergies indicates:   Allergen Reactions    Cephalexin     Codeine     Meperidine      Other reaction(s): delerium, hallucination  Delerium  Delerium  Delerium      Nsaids (non-steroidal anti-inflammatory drug)     Penicillins     Tazarotene      Other reaction(s): rash, Unknown       Current Facility-Administered Medications on File Prior to Encounter   Medication    [COMPLETED] aspirin tablet 325 mg    [COMPLETED] clopidogreL tablet 300 mg    [COMPLETED] oxyCODONE immediate release tablet 5 mg    [COMPLETED] traMADoL tablet 50 mg    [DISCONTINUED] acetaminophen tablet 650 mg    [DISCONTINUED] albuterol-ipratropium 2.5 mg-0.5 mg/3 mL nebulizer solution 3 mL    [DISCONTINUED] amLODIPine tablet 10 mg    [DISCONTINUED] amLODIPine tablet 10 mg    [DISCONTINUED] aspirin chewable tablet 81 mg    [DISCONTINUED] aspirin chewable tablet 81 mg    [DISCONTINUED] clopidogreL tablet 75 mg    [DISCONTINUED] dextrose 50% injection 12.5 g    [DISCONTINUED] dextrose 50% injection 12.5 g    [DISCONTINUED] dextrose 50% injection 25 g    [DISCONTINUED] dextrose 50% injection 25 g    [DISCONTINUED] enoxaparin injection 30 mg    [DISCONTINUED] glucagon (human recombinant) injection 1 mg    [DISCONTINUED] glucagon (human recombinant) injection 1 mg    [DISCONTINUED] glucose chewable tablet 16 g    [DISCONTINUED] glucose chewable tablet  16 g    [DISCONTINUED] glucose chewable tablet 24 g    [DISCONTINUED] glucose chewable tablet 24 g    [DISCONTINUED] insulin aspart U-100 pen 0-5 Units    [DISCONTINUED] melatonin tablet 6 mg    [DISCONTINUED] methocarbamoL tablet 500 mg    [DISCONTINUED] naloxone 0.4 mg/mL injection 0.02 mg    [DISCONTINUED] pantoprazole EC tablet 40 mg    [DISCONTINUED] sodium chloride 0.9% flush 10 mL     Current Outpatient Medications on File Prior to Encounter   Medication Sig    acetaminophen (TYLENOL) 500 MG tablet Take 1 tablet (500 mg total) by mouth every 6 (six) hours as needed for Pain.    albuterol (PROVENTIL) 2.5 mg /3 mL (0.083 %) nebulizer solution Take 2.5 mg by nebulization 2 (two) times daily as needed.    albuterol (PROVENTIL/VENTOLIN HFA) 90 mcg/actuation inhaler Inhale 2 puffs into the lungs every 6 (six) hours as needed.    amLODIPine (NORVASC) 10 MG tablet Take 1 tablet (10 mg total) by mouth once daily.    budesonide-formoterol 160-4.5 mcg (SYMBICORT) 160-4.5 mcg/actuation HFAA Inhale 2 puffs into the lungs every 12 (twelve) hours.    calcium carbonate (CALCIUM 500 ORAL) Take 500 mg by mouth once daily.    cetirizine (ZYRTEC) 10 MG tablet Take 10 mg by mouth once daily.    cholecalciferol, vitamin D3, (VITAMIN D3) 50 mcg (2,000 unit) Cap capsule Take 2,000 Units by mouth once daily.    diclofenac sodium (VOLTAREN ARTHRITIS PAIN) 1 % Gel Apply 2 g topically 3 (three) times daily as needed (painful joint).    famotidine (PEPCID) 40 MG tablet Take 40 mg by mouth nightly as needed for Heartburn.    fluticasone propionate (FLONASE) 50 mcg/actuation nasal spray 1 spray by Nasal route 2 (two) times daily.    furosemide (LASIX) 20 MG tablet Take 1 tablet (20 mg total) by mouth 2 (two) times a day.    gabapentin (NEURONTIN) 300 MG capsule Take 300 mg by mouth every evening.    ipratropium (ATROVENT) 42 mcg (0.06 %) nasal spray     melatonin (MELATIN) 3 mg tablet Take 2 tablets (6 mg total) by mouth nightly as needed  for Insomnia.    methocarbamoL (ROBAXIN) 500 MG Tab     omega 3-dha-epa-fish oil 1,000 mg (120 mg-180 mg) Cap Take 1 capsule by mouth once daily.    omeprazole (PRILOSEC) 20 MG capsule Take 1 capsule (20 mg total) by mouth 2 (two) times daily before meals.    polyethylene glycol 3350 (MIRALAX ORAL) Take 17 g by mouth once daily. As needed    pregabalin (LYRICA) 50 MG capsule Take 1 capsule by mouth every evening.    senna-docusate 8.6-50 mg (PERICOLACE) 8.6-50 mg per tablet Take 2 tablets by mouth 2 (two) times daily.    sodium bicarbonate 650 MG tablet Take 1 tablet (650 mg total) by mouth once daily.    TOLAK 4 % Crea apply a thin film to the lesions using the fingertips, once daily. preferably at night., WASH off in THE morning FOR 2 WEEKS.    traMADoL (ULTRAM) 50 mg tablet Take 1 tablet (50 mg total) by mouth 2 (two) times daily as needed.    [DISCONTINUED] aspirin 81 MG Chew Take 1 tablet (81 mg total) by mouth once daily. (Patient not taking: Reported on 3/9/2025)    [DISCONTINUED] atorvastatin (LIPITOR) 40 MG tablet Take 1 tablet (40 mg total) by mouth once daily. (Patient not taking: Reported on 3/9/2025)    [DISCONTINUED] clopidogreL (PLAVIX) 75 mg tablet Take 1 tablet (75 mg total) by mouth once daily.    [DISCONTINUED] fluticasone propionate (FLONASE) 50 mcg/actuation nasal spray INSTILL TWO SPRAYS INTO EACH NOSTRIL TWICE A DAY (Patient taking differently: 2 sprays by Each Nostril route 2 (two) times a day.)    [DISCONTINUED] omeprazole (PRILOSEC) 20 MG capsule Take 1 capsule (20 mg total) by mouth 2 (two) times daily before meals.    [DISCONTINUED] psyllium 0.52 gram capsule Take 0.52 g by mouth daily as needed.    [DISCONTINUED] SYMBICORT 80-4.5 mcg/actuation HFAA Inhale 2 puffs into the lungs 2 (two) times a day.     Family History       Problem Relation (Age of Onset)    Cancer Mother    No Known Problems Father          Tobacco Use    Smoking status: Former    Smokeless tobacco: Never   Substance and  Sexual Activity    Alcohol use: Not Currently    Drug use: Never    Sexual activity: Not Currently     Review of Systems   All other systems reviewed and are negative.    Objective:     Vital Signs (Most Recent):  Temp: 97 °F (36.1 °C) (03/09/25 2232)  Pulse: 65 (03/09/25 2232)  Resp: 17 (03/09/25 2232)  BP: (!) 146/73 (03/09/25 2232)  SpO2: (!) 92 % (03/09/25 2232) Vital Signs (24h Range):  Temp:  [95.1 °F (35.1 °C)-97.6 °F (36.4 °C)] 97 °F (36.1 °C)  Pulse:  [51-70] 65  Resp:  [12-24] 17  SpO2:  [92 %-99 %] 92 %  BP: (119-209)/(59-90) 146/73        There is no height or weight on file to calculate BMI.     Physical Exam  Vitals and nursing note reviewed.   Constitutional:       General: She is not in acute distress.     Appearance: She is well-developed. She is not diaphoretic.   HENT:      Head: Normocephalic and atraumatic.   Eyes:      General: No scleral icterus.     Conjunctiva/sclera: Conjunctivae normal.   Neck:      Vascular: No JVD.      Comments: Neck in C-collar   Cardiovascular:      Rate and Rhythm: Normal rate and regular rhythm.   Pulmonary:      Effort: Pulmonary effort is normal. No respiratory distress.   Musculoskeletal:      Right lower leg: No edema.      Left lower leg: No edema.   Skin:     Coloration: Skin is not jaundiced or pale.   Neurological:      Mental Status: She is alert and oriented to person, place, and time.      Motor: Weakness (generalized) present. No abnormal muscle tone.   Psychiatric:         Mood and Affect: Mood normal.         Behavior: Behavior normal.                Significant Labs: All pertinent labs within the past 24 hours have been reviewed.  CBC:   Recent Labs   Lab 03/08/25 2202 03/09/25 0911   WBC 3.46* 3.86*   HGB 11.8* 10.8*   HCT 36.5* 32.5*    335     CMP:   Recent Labs   Lab 03/08/25 2202 03/09/25 0911    140   K 4.1 3.9   CL 99 100   CO2 28 31*   * 92   BUN 35* 34*   CREATININE 1.4 1.3   CALCIUM 9.5 9.1   PROT 7.7 7.4   ALBUMIN  3.5 3.4*   BILITOT 0.2 0.2   ALKPHOS 213* 199*   AST 26 28   ALT 11 16   ANIONGAP 9 9       Significant Imaging: I have reviewed all pertinent imaging results/findings within the past 24 hours.

## 2025-03-10 NOTE — PROGRESS NOTES
[facility] - Neurosurgery ([facility])  Adult Nutrition  Progress Note    SUMMARY     Recommendations  1. SLP eval for diet texture   2.Add renal non dialysis diet restrictions once evaled   3. Monitor weight and labs  4. Encourage PO intake    Goals: Pt will meet 85% of EEN/EPN by RD follow up    Nutrition Goal Status: new  Communication of RD Recs: other (comment) (POC)    Nutrition Discharge Planning  Nutrition Discharge Planning: Therapeutic diet (comments)  Therapeutic diet (comments): Minced and Moist    Assessment and Plan  Nutrition Problem  Inadequate energy intake     Related to (etiology):   Limited food acceptance     Signs and Symptoms (as evidenced by):   Picky eater   Does not like the texture of her food      Interventions:  Collaboration by nutrition professional with other providers   Minced and Moist Diet     Nutrition Diagnosis Status:   New    Malnutrition Assessment  Unable to assess at this time    Reason for Assessment  Reason For Assessment: identified at risk by screening criteria  Diagnosis:  (spinal stenosis)  General Information Comments: Pt is currently on a minced and moist diet, requesting to be advanced to a soft and bite sized diet. Marvel-17/skin intact. No meal intake noted. Pt transferred from [place] for NSGY evaluation for cervical cord compression. Pt a picky eater. -135 lbs. Pt eating 2 meals/day. Pt daughter does not believe in ONS and will bring her own protein shakes. Constipation related to meds. No soft drinks, chicken, or pork.    Nutrition/Diet History  Nutrition Intake History: 2 meals/day  Food Preferences: No chicken, pork, soft drinks.  Spiritual, Cultural Beliefs, Mandaen Practices, Values that Affect Care: no  Factors Affecting Nutritional Intake: None identified at this time    Food Insecurity: No Food Insecurity (3/10/2025)    Hunger Vital Sign     Worried About Running Out of Food in the Last Year: Never true     Ran Out of Food in the Last Year: Never  "true     Anthropometrics  Height: 5' 3" (160 cm)  Height (inches): 63 in  Weight: 59 kg (130 lb 1.1 oz)  Weight (lb): 130.07 lb  Ideal Body Weight (IBW), Female: 115 lb  % Ideal Body Weight, Female (lb): 113.1 %  BMI (Calculated): 23  BMI Grade: 18.5-24.9 - normal  Usual Body Weight (UBW), k.9 kg (6/10/24)  % Usual Body Weight: 98.7  % Weight Change From Usual Weight: -1.5 %     Lab/Procedures/Meds  Pertinent Labs Reviewed: reviewed  Pertinent Labs Comments: CO2 31, BUN 34, GFR 39,   Pertinent Medications Reviewed: reviewed  Pertinent Medications Comments: methocarbamol, pantoprazole, senna    Estimated/Assessed Needs  Weight Used For Calorie Calculations: 59.9 kg (132 lb 0.9 oz)  Energy Calorie Requirements (kcal): 5824-5932 kcals (25-30 kcals/kg)  Energy Need Method: Kcal/kg  Protein Requirements: 47-59g (0.8-1g/kg)  Weight Used For Protein Calculations: 59 kg (130 lb 1.1 oz)     Estimated Fluid Requirement Method: RDA Method  RDA Method (mL): 1497     Nutrition Prescription Ordered  Current Diet Order: Minced and Moist    Evaluation of Received Nutrient/Fluid Intake  I/O: 0/300  Energy Calories Required: not meeting needs  Protein Required: not meeting needs  Fluid Required: not meeting needs  Comments: LBM-3/8/25  Tolerance: tolerating  % Intake of Estimated Energy Needs: Other: No meal intake   % Meal Intake: Other: No meal intake     Nutrition Risk  Level of Risk/Frequency of Follow-up:  (1x/week)     Monitor and Evaluation  Monitor and Evaluation: Food and beverage intake, Energy intake, Protein intake, Diet order, Weight, Inflammatory profile     Nutrition Follow-Up  RD Follow-up?: Yes      "

## 2025-03-10 NOTE — ASSESSMENT & PLAN NOTE
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     MRI Cervical spine 3/9/25: chronic type 2 odontoid fracture with progressed retropulsion of the fracture fragment in comparison to prior MRI from 8/11/2023, with moderate to severe canal stenosis at C1-C2. Multilevel foraminal stenosis.     - Admitted to Hospital Medicine  - Cervical collar at all times  - Please obtain upright and supine cervical x-ray with patient in collar  - Ok for a diet  - Multimodal pain control   - Bowel regimen  - Notify NSGY immediately with any decline on exam     Case and plan discussed with attending neurosurgeon Dr. Lemons

## 2025-03-10 NOTE — CONSULTS
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 Her symptoms began last Monday with headache that started in her neck and migrated toward the top of her head. She has had chronic neck pain due to cervical stenosis, but the headache was new. On Saturday, she had a normal morning and was able to perform all of her ADLs. Around 8 pm, she suddenly became weak in her arms and legs and was not able to walk. MRI demonstrated chronic type 2 odontoid fracture with progressed retropulsion of the fracture fragment in comparison to prior MRI from 8/11/2023, with moderate to severe canal stenosis at C1-C2. Of note, patient had previously discontinued wearing her cervical collar. Denies radiculopathy in her arms or legs, sensory deficit, bladder or bowel incontinence.      [Prescriptions Prior to Admission]    [Prescriptions Prior to Admission]  Medications Prior to Admission   Medication Sig Dispense Refill Last Dose/Taking    acetaminophen (TYLENOL) 500 MG tablet Take 1 tablet (500 mg total) by mouth every 6 (six) hours as needed for Pain. 90 tablet 6     albuterol (PROVENTIL) 2.5 mg /3 mL (0.083 %) nebulizer solution Take 2.5 mg by nebulization 2 (two) times daily as needed.       albuterol (PROVENTIL/VENTOLIN HFA) 90 mcg/actuation inhaler Inhale 2 puffs into the lungs every 6 (six) hours as needed.       amLODIPine (NORVASC) 10 MG tablet Take 1 tablet (10 mg  total) by mouth once daily. 90 tablet 3     budesonide-formoterol 160-4.5 mcg (SYMBICORT) 160-4.5 mcg/actuation HFAA Inhale 2 puffs into the lungs every 12 (twelve) hours.       calcium carbonate (CALCIUM 500 ORAL) Take 500 mg by mouth once daily.       cetirizine (ZYRTEC) 10 MG tablet Take 10 mg by mouth once daily.       cholecalciferol, vitamin D3, (VITAMIN D3) 50 mcg (2,000 unit) Cap capsule Take 2,000 Units by mouth once daily.       diclofenac sodium (VOLTAREN ARTHRITIS PAIN) 1 % Gel Apply 2 g topically 3 (three) times daily as needed (painful joint). 100 g 0     famotidine (PEPCID) 40 MG tablet Take 40 mg by mouth nightly as needed for Heartburn.       fluticasone propionate (FLONASE) 50 mcg/actuation nasal spray 1 spray by Nasal route 2 (two) times daily.       furosemide (LASIX) 20 MG tablet Take 1 tablet (20 mg total) by mouth 2 (two) times a day. 180 tablet 3     gabapentin (NEURONTIN) 300 MG capsule Take 300 mg by mouth every evening.       ipratropium (ATROVENT) 42 mcg (0.06 %) nasal spray        melatonin (MELATIN) 3 mg tablet Take 2 tablets (6 mg total) by mouth nightly as needed for Insomnia. 30 tablet 0     methocarbamoL (ROBAXIN) 500 MG Tab        omega 3-dha-epa-fish oil 1,000 mg (120 mg-180 mg) Cap Take 1 capsule by mouth once daily.       omeprazole (PRILOSEC) 20 MG capsule Take 1 capsule (20 mg total) by mouth 2 (two) times daily before meals. 60 capsule 0     polyethylene glycol 3350 (MIRALAX ORAL) Take 17 g by mouth once daily. As needed       pregabalin (LYRICA) 50 MG capsule Take 1 capsule by mouth every evening.       senna-docusate 8.6-50 mg (PERICOLACE) 8.6-50 mg per tablet Take 2 tablets by mouth 2 (two) times daily. 60 tablet 0     sodium bicarbonate 650 MG tablet Take 1 tablet (650 mg total) by mouth once daily. 30 tablet 0     TOLAK 4 % Crea apply a thin film to the lesions using the fingertips, once daily. preferably at night., WASH off in THE morning FOR 2 WEEKS.       traMADoL  (ULTRAM) 50 mg tablet Take 1 tablet (50 mg total) by mouth 2 (two) times daily as needed. 60 tablet 4        Review of patient's allergies indicates:   Allergen Reactions    Cephalexin     Codeine     Meperidine      Other reaction(s): delerium, hallucination  Delerium  Delerium  Delerium      Nsaids (non-steroidal anti-inflammatory drug)     Penicillins     Tazarotene      Other reaction(s): rash, Unknown       Past Medical History:   Diagnosis Date    Allergy     Anemia, unspecified     Arthritis     CKD (chronic kidney disease) stage 4, GFR 15-29 ml/min     COPD (chronic obstructive pulmonary disease)     Edema     HTN (hypertension)     Hypocalcemia     Hyponatremia     Osteoarthritis      Past Surgical History:   Procedure Laterality Date    BREAST CYST EXCISION      CAUDAL EPIDURAL STEROID INJECTION N/A 2/2/2023    Procedure: Injection-steroid-epidural-caudal;  Surgeon: Angel Sparrow MD;  Location: [facility];  Service: Pain Management;  Laterality: N/A;    FOOT SURGERY       Family History       Problem Relation (Age of Onset)    Cancer Mother    No Known Problems Father          Tobacco Use    Smoking status: Former    Smokeless tobacco: Never   Substance and Sexual Activity    Alcohol use: Not Currently    Drug use: Never    Sexual activity: Not Currently       Objective:        There is no height or weight on file to calculate BMI.  Vital Signs (Most Recent):  Temp: 97 °F (36.1 °C) (03/09/25 2232)  Pulse: (!) 49 (03/10/25 0824)  Resp: 15 (03/10/25 0824)  BP: (!) 176/68 (03/10/25 0813)  SpO2: 96 % (03/10/25 0824) Vital Signs (24h Range):  Temp:  [97 °F (36.1 °C)] 97 °F (36.1 °C)  Pulse:  [49-69] 49  Resp:  [13-21] 15  SpO2:  [92 %-99 %] 96 %  BP: (119-176)/(59-77) 176/68                         Female External Urinary Catheter w/ Suction 03/09/25 (Active)   Output (mL) 300 mL 03/10/25 0630     Neurosurgery Physical Exam  General: well developed, well nourished, no distress.   Head: normocephalic,  atraumatic  Mental Status: Awake, Alert, Oriented  Speech: Clear with content appropriate to conversation  Cranial nerves: face symmetric, CN II-XII grossly intact.   Eyes: pupils equal, round, reactive to light, EOMI.  Sensory: intact to light touch throughout    Motor Exam: Unable to fully abduct R shoulder since prior clavicle fracture and shoulder replacement surgery    Strength  Deltoids Triceps Biceps Wrist Extension Wrist Flexion Hand    Upper: R 4/5 5/5 5/5 5/5 5/5 5/5    L 5/5 5/5 5/5 5/5 5/5 5/5     Iliopsoas Quadriceps Knee  Flexion Tibialis  anterior Gastro- cnemius EHL   Lower: R 5/5 5/5 5/5 5/5 5/5 5/5    L 5/5 5/5 5/5 5/5 5/5 5/5     Schneider: absent    Significant Labs:  Recent Labs   Lab 03/08/25 2202 03/09/25  0911 03/10/25  0956   * 92 72    140 135*   K 4.1 3.9 4.1   CL 99 100 99   CO2 28 31* 30*   BUN 35* 34* 34*   CREATININE 1.4 1.3 1.4   CALCIUM 9.5 9.1 9.1   MG  --  2.3 2.6     Recent Labs   Lab 03/08/25 2202 03/09/25  0911 03/10/25  0956   WBC 3.46* 3.86* 3.37*   HGB 11.8* 10.8* 10.8*   HCT 36.5* 32.5* 33.8*    335 345     Recent Labs   Lab 03/08/25 2202   INR 0.9     Microbiology Results (last 7 days)       ** No results found for the last 168 hours. **          All pertinent labs from the last 24 hours have been reviewed.    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 Relative to remote MRI of the cervical spine performed 08/11/2023, there appears to be further retroflexion and retropulsion of the proximal fracture fragment. Edema-like signal is noted within the gap between the proximal and distal fracture fragments. moderate to severe spinal canal stenosis at C1-2 with effacement of CSF ventral and dorsal to the cord and cord deformity. Addition, there is intramedullary signal abnormality within the cord at the C1 and C2 levels, new when compared to the 08/11/2023  MRI. Multilevel cervical foraminal narrowing.  In the lumbar spine, at L3-4 anterolisthesis of L3 on L4 results in uncovering of a diffuse disc bulge, which in conjunction with advanced facet arthropathy contributes to severe central spinal canal stenosis. There is effacement of CSF about the compressed thecal sac. Narrowing of the subarticular zones with likely impingement of traversing L5 nerve roots. Moderate to severe left and moderate right foraminal narrowing.     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     MRI Cervical spine 3/9/25: chronic type 2 odontoid fracture with progressed retropulsion of the fracture fragment in comparison to prior MRI from 8/11/2023, with moderate to severe canal stenosis at C1-C2. Multilevel foraminal stenosis.     - Admitted to Hospital Medicine  - Cervical collar at all times  - Please obtain upright and supine cervical x-ray with patient in collar  - Ok for a diet  - Multimodal pain control   - Bowel regimen  - Notify NSGY immediately with any decline on exam     Case and plan discussed with attending neurosurgeon Dr. Lemons        Thank you for your consult.     Catia Ng PA-C  Neurosurgery  [facility] - Neurosurgery ([facility])

## 2025-03-10 NOTE — ASSESSMENT & PLAN NOTE
Presented to outside hospital with increased generalized weakness, particularly in her extremities.  CVA workup negative, however MRI showed new onset cervical cord compression, likely contributing to her symptoms.  Transferred here for Neurosurgery evaluation; appreciate recommendations.  - MRI Cervical spine 3/9/25: chronic type 2 odontoid fracture with progressed retropulsion of the fracture fragment in comparison to prior MRI from 8/11/2023, with moderate to severe canal stenosis at C1-C2. Multilevel foraminal stenosis.   - Cont C-collar at all times  - Upright and supine cervical x-ray pending  - Neurosurgery consulted

## 2025-03-10 NOTE — PT/OT/SLP EVAL
"Speech Language Pathology Evaluation  Bedside Swallow/ Discharge     Patient Name:  Bridget Montgomery   MRN:  [id number]  Admitting Diagnosis: Spinal stenosis    Recommendations:                 General Recommendations:  Follow-up not indicated  Diet recommendations:  Soft & Bite Sized Diet - IDDSI Level 6, Thin liquids - IDDSI Level 0   Aspiration Precautions: 1 bite/sip at a time, Alternating bites/sips, Double swallow with each bite/sip, Feed only when awake/alert, HOB to 90 degrees, Meds crushed in puree, Meds whole buried in puree, Meds whole 1 at a time, Small bites/sips, and Standard aspiration precautions   General Precautions: Standard, dental soft  Communication strategies:  none    Assessment:     Bridget Montgomery is a 89 y.o. female with an SLP diagnosis of  functional swallow .  Family reporting preference for softer solids at this time 2/2 difficulty chewing w/ c-collar in place and head pain during chewing. No concerns for aspiration and no acute SLP needs.     History:     Past Medical History:   Diagnosis Date    Allergy     Anemia, unspecified     Arthritis     CKD (chronic kidney disease) stage 4, GFR 15-29 ml/min     COPD (chronic obstructive pulmonary disease)     Edema     HTN (hypertension)     Hypocalcemia     Hyponatremia     Osteoarthritis      Past Surgical History:   Procedure Laterality Date    BREAST CYST EXCISION      CAUDAL EPIDURAL STEROID INJECTION N/A 2/2/2023    Procedure: Injection-steroid-epidural-caudal;  Surgeon: Angel Sparrow MD;  Location: [facility];  Service: Pain Management;  Laterality: N/A;    FOOT SURGERY       Principal Problem:Spinal stenosis  Chief Complaint: weakness   HPI: Bridget Montgomery is a 89 y.o. female with COPD, HTN, HLD, history of TIA who is transferred here from [place] for NSGY evaluation for cervical cord compression.    Per Transfer Center:  "89F COPD, CKD4, GERD, HTN, HLD, hx of TIA, prediabetes, and spinal stenosis who initially " "presented to [facility] on 3/8/2025 with a primary complaint of sudden onset weakness. The patient was in their usual state of health until Monday. Starting on Monday, the patient started having a headache that started in her neck and migrated toward the top of her head. She has had chronic neck pain due to cervical stenosis, but the headache was new. She managed the pain with tylenol. On Friday, she presented to her PCP (Dr. Alejandrina Russ) who gave her 2 injections into her neck to help with the pain. She is not sure what the injection was but thinks it might have been a steroid. On Saturday, she had a normal morning and was able to perform all of her ADLs. Around 8 pm, she suddenly became weak in her arms and legs and was not able to walk. She was able to catch herself prior to falling. The primary concern was the weakness and ongoing headache. She endorses chronic numbness/tingling in her fingers that has not worsened. She denies any sensory deficits. She denies any urinary incontinence or fecal incontinence. She endorses pain in her back, neck, knees, and a headache. She denies any fevers, chills, chest pain, shortness of breath, abdominal pain, N/V, diarrhea, constipation. CT Head was negative for acute stroke. MRI shows new onset cervical cord compression C1-C2. Neurosurgery consulted, recommended transferring pt to [facility] for a higher level of care."    Subjective     Pt awake.alert with family members present at bedside. C-collar in place.     Pain/Comfort:  Pain Rating 1:  (not rated)  Location 1: head    Respiratory Status: Room air    Objective:     Oral Musculature Evaluation  Oral Musculature: WFL  Dentition: upper and lower dentures  Secretion Management: adequate  Mucosal Quality: adequate  Mandibular Strength and Mobility: WFL (limited ROM 2/2 c-collar)  Oral Labial Strength and Mobility: WFL  Lingual Strength and Mobility: WFL  Volitional Cough: present  Volitional Swallow: " "timely  Voice Prior to PO Intake: clear    Bedside Swallow Eval:   Consistencies Assessed:  Thin liquids 4 oz via straw sips   Puree tsp puree x 4  Solids cracker x 1/2       Oral Phase:   WFL    Pharyngeal Phase:   no overt clinical signs/symptoms of aspiration  no overt clinical signs/symptoms of pharyngeal dysphagia    Compensatory Strategies  Cyclic ingestion     Treatment: HOB elevated and pt able to self present all consistencies. Family reporting pt has an "esophageal hernia" and having pain and difficulty chewing 2/2 c-collar. They requested softer foods and pt currently on a level 5 minced and moist diet. Pt reporting significant dislike for softer/ground up foods. Recommend she be advanced to a level 6 soft and bite size diet at this time therefore it will still be soft, though not ground up. Pt and family in agreement. White board updated. Education provided re: role of SLP, diet recs, swallow precs, s/s aspiration and POC. No further questions/concerns. SLP will sign off.     Plan:     Plan of Care reviewed with:  patient, family   SLP Follow-Up:  No       Discharge recommendations:  No Therapy Indicated   Barriers to Discharge:  None    Time Tracking:     SLP Treatment Date:   03/10/25  Speech Start Time:  1600  Speech Stop Time:  1613     Speech Total Time (min):  13 min    Billable Minutes: Eval Swallow and Oral Function 5 and Self Care/Home Management Training 8    03/10/2025    "

## 2025-03-10 NOTE — HPI
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 She initially presented with a week of severe persistent HA, and diffuse weakness of all extremities. Her symptoms began last Monday with HA that started in her neck and migrated toward the top of her head. She has had chronic neck pain due to cervical stenosis, but the headache was new. On Saturday, she was at her baseline, able to perform all of her ADLs. Around 8 pm, she suddenly became weak in all extremities and was not able to walk. MRI demonstrated chronic type 2 odontoid fracture with progressed retropulsion of the fracture fragment in comparison to prior MRI from 8/11/2023, with moderate to severe canal stenosis at C1-C2. Of note, patient had previously discontinued wearing her cervical collar. Denied radiculopathy in her arms or legs, sensory deficit, bladder or bowel incontinence. Family decided to proceed with surgery. Booked for O-C4 fusion on 3/19/25. Admitted to [facility] for pre-op traction on 3/18.

## 2025-03-10 NOTE — PROGRESS NOTES
"[facility] - Neurosurgery (Health system Medicine  Progress Note    Patient Name: Bridget Montgomery  MRN: [id number]  Patient Class: IP- Inpatient   Admission Date: 3/9/2025  Length of Stay: 1 days  Attending Physician: Will Blanco MD  Primary Care Provider: No primary care provider on file.        Subjective     Principal Problem:Spinal stenosis        HPI:  Bridget Montgomery is a 89 y.o. female with COPD, HTN, HLD, history of TIA who is transferred here from [place] for NSGY evaluation for cervical cord compression.    Per Transfer Center:  "89F COPD, CKD4, GERD, HTN, HLD, hx of TIA, prediabetes, and spinal stenosis who initially presented to [facility] on 3/8/2025 with a primary complaint of sudden onset weakness. The patient was in their usual state of health until Monday. Starting on Monday, the patient started having a headache that started in her neck and migrated toward the top of her head. She has had chronic neck pain due to cervical stenosis, but the headache was new. She managed the pain with tylenol. On Friday, she presented to her PCP (Dr. Alejandrina Russ) who gave her 2 injections into her neck to help with the pain. She is not sure what the injection was but thinks it might have been a steroid. On Saturday, she had a normal morning and was able to perform all of her ADLs. Around 8 pm, she suddenly became weak in her arms and legs and was not able to walk. She was able to catch herself prior to falling. The primary concern was the weakness and ongoing headache. She endorses chronic numbness/tingling in her fingers that has not worsened. She denies any sensory deficits. She denies any urinary incontinence or fecal incontinence. She endorses pain in her back, neck, knees, and a headache. She denies any fevers, chills, chest pain, shortness of breath, abdominal pain, N/V, diarrhea, constipation. CT Head was negative for acute stroke. MRI shows new onset cervical cord " "compression C1-C2. Neurosurgery consulted, recommended transferring pt to [facility] for a higher level of care."    Overview/Hospital Course:  No notes on file    Interval History: NAEON. Pt still c/o diffuse headache and weakness in all extremities. Denies numbness/paresthesias or bowel/bladder incontinence.     Review of Systems   All other systems reviewed and are negative.    Objective:     Vital Signs (Most Recent):  Temp: 98 °F (36.7 °C) (03/10/25 1208)  Pulse: (!) 53 (03/10/25 1208)  Resp: 20 (03/10/25 1346)  BP: (!) 172/64 (03/10/25 1208)  SpO2: 97 % (03/10/25 1208) Vital Signs (24h Range):  Temp:  [97 °F (36.1 °C)-98 °F (36.7 °C)] 98 °F (36.7 °C)  Pulse:  [49-65] 53  Resp:  [13-21] 20  SpO2:  [92 %-98 %] 97 %  BP: (119-176)/(59-73) 172/64     Weight: 59 kg (130 lb 1.1 oz)  Body mass index is 23.04 kg/m².    Intake/Output Summary (Last 24 hours) at 3/10/2025 1448  Last data filed at 3/10/2025 0630  Gross per 24 hour   Intake --   Output 300 ml   Net -300 ml         Physical Exam  Vitals and nursing note reviewed.   Constitutional:       General: She is not in acute distress.     Appearance: She is well-developed. She is not diaphoretic.   HENT:      Head: Normocephalic and atraumatic.   Eyes:      General: No scleral icterus.     Conjunctiva/sclera: Conjunctivae normal.   Neck:      Vascular: No JVD.      Comments: Neck in C-collar   Cardiovascular:      Rate and Rhythm: Normal rate and regular rhythm.   Pulmonary:      Effort: Pulmonary effort is normal. No respiratory distress.   Musculoskeletal:      Right lower leg: No edema.      Left lower leg: No edema.   Skin:     Coloration: Skin is not jaundiced or pale.   Neurological:      Mental Status: She is alert and oriented to person, place, and time.      Cranial Nerves: No cranial nerve deficit.      Sensory: No sensory deficit.      Motor: Weakness (generalized) present. No abnormal muscle tone.   Psychiatric:         Mood and Affect: Mood normal.    "      Behavior: Behavior normal.               Significant Labs: All pertinent labs within the past 24 hours have been reviewed.    Significant Imaging: I have reviewed all pertinent imaging results/findings within the past 24 hours.      Assessment & Plan  Spinal stenosis  Presented to outside hospital with increased generalized weakness, particularly in her extremities.  CVA workup negative, however MRI showed new onset cervical cord compression, likely contributing to her symptoms.  Transferred here for Neurosurgery evaluation; appreciate recommendations.  - MRI Cervical spine 3/9/25: chronic type 2 odontoid fracture with progressed retropulsion of the fracture fragment in comparison to prior MRI from 8/11/2023, with moderate to severe canal stenosis at C1-C2. Multilevel foraminal stenosis.   - Cont C-collar at all times  - Upright and supine cervical x-ray pending  - Neurosurgery consulted      Chronic kidney disease, stage III (moderate)  Renal function remains around baseline; Estimated Creatinine Clearance: 22.1 mL/min (based on SCr of 1.4 mg/dL). according to latest data. 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 Will avoid nephrotoxic agents as able, and renally dose all meds as applicable.  Other emphysema  History of COPD, not on home oxygen. Currently without evidence of acute exacerbation, and sats on room air at goal 88% or greater. Will continue hospital formulary equivalent of home regimen and monitor respiratory status.   Essential hypertension  Chronic, and currently controlled. Latest blood pressure and vitals reviewed-   While in the hospital, will manage blood pressure as follows; Continue home antihypertensive regimen    Will utilize p.r.n. blood pressure medication only if patient's blood pressure greater than 220/110 and she develops symptoms such as worsening chest pain or shortness of breath.  Generalized weakness  Chronic, however likely exacerbated by cervical radiculopathy.  Consider PT/OT  after neurosurgery recommendations.    Headache  Fioricet PRN    Cervical spinal stenosis      VTE Risk Mitigation (From admission, onward)           Ordered     IP VTE HIGH RISK PATIENT  Once         03/09/25 2040     Place sequential compression device  Until discontinued         03/09/25 2040     Reason for No Pharmacological VTE Prophylaxis  Once        Question:  Reasons:  Answer:  Physician Provided (leave comment)  Comment:  surgery    03/09/25 2040                    Discharge Planning   MIKAYLA: 3/12/2025     Code Status: Full Code   Medical Readiness for Discharge Date:   Discharge Plan A: Home with family, Home Health                        Will Blanco MD  Department of Hospital Medicine   [facility] - Neurosurgery ([facility])

## 2025-03-10 NOTE — PLAN OF CARE
Patient seen with Dr. Lemons. Discussed surgical vs non-surgical options with the patient and her daughter Liliana Montgomery present. Patient's grandson Aidan (neurosurgeon) was also on the phone for this conversation. Given the degree of upper cervical cord kinking, cord edema, and craniocervical settling recommended surgical intervention to preserve her current function and prevent neurological decline. Surgical recommendation is occiput-C4 fusion. She would also need pre-op traction. Non-surgical option is strict cervical collar 24/7 indefinitely, pending stability of XR that is ordered to ensure patient is stable in the collar. This would also require the patient to be compliant with collar use. Offered surgical intervention tomorrow for OC fusion. Patient and daughter would like more time to discuss this with their family. While awaiting family decision, would recommend continue strict collar use and will f/u on XR results. Please contact with acute changes in exam.     Janeth Riley PA-C  Neurosurgery  [facility]

## 2025-03-10 NOTE — PLAN OF CARE
Recommendations  1. SLP eval for diet texture   2.Add renal non dialysis diet restrictions once evaled   3. Monitor weight and labs  4. Encourage PO intake    Goals: Pt will meet 85% of EEN/EPN by RD follow up

## 2025-03-11 ENCOUNTER — ANESTHESIA (OUTPATIENT)
Dept: SURGERY | Facility: HOSPITAL | Age: OVER 89
End: 2025-03-11
Payer: MEDICARE

## 2025-03-11 LAB
ALBUMIN SERPL BCP-MCNC: 2.8 G/DL (ref 3.5–5.2)
ALP SERPL-CCNC: 171 U/L (ref 40–150)
ALT SERPL W/O P-5'-P-CCNC: 14 U/L (ref 10–44)
ANION GAP SERPL CALC-SCNC: 14 MMOL/L (ref 8–16)
AST SERPL-CCNC: 35 U/L (ref 10–40)
BASOPHILS # BLD AUTO: 0.03 K/UL (ref 0–0.2)
BASOPHILS NFR BLD: 0.7 % (ref 0–1.9)
BILIRUB SERPL-MCNC: 0.2 MG/DL (ref 0.1–1)
BUN SERPL-MCNC: 33 MG/DL (ref 8–23)
CALCIUM SERPL-MCNC: 8.5 MG/DL (ref 8.7–10.5)
CHLORIDE SERPL-SCNC: 94 MMOL/L (ref 95–110)
CO2 SERPL-SCNC: 26 MMOL/L (ref 23–29)
CREAT SERPL-MCNC: 1.3 MG/DL (ref 0.5–1.4)
DIFFERENTIAL METHOD BLD: ABNORMAL
EOSINOPHIL # BLD AUTO: 0.1 K/UL (ref 0–0.5)
EOSINOPHIL NFR BLD: 2.4 % (ref 0–8)
ERYTHROCYTE [DISTWIDTH] IN BLOOD BY AUTOMATED COUNT: 14 % (ref 11.5–14.5)
EST. GFR  (NO RACE VARIABLE): 39.1 ML/MIN/1.73 M^2
GLUCOSE SERPL-MCNC: 70 MG/DL (ref 70–110)
HCT VFR BLD AUTO: 31.2 % (ref 37–48.5)
HGB BLD-MCNC: 10 G/DL (ref 12–16)
IMM GRANULOCYTES # BLD AUTO: 0.01 K/UL (ref 0–0.04)
IMM GRANULOCYTES NFR BLD AUTO: 0.2 % (ref 0–0.5)
LYMPHOCYTES # BLD AUTO: 1.3 K/UL (ref 1–4.8)
LYMPHOCYTES NFR BLD: 30.5 % (ref 18–48)
MAGNESIUM SERPL-MCNC: 2.7 MG/DL (ref 1.6–2.6)
MCH RBC QN AUTO: 28.4 PG (ref 27–31)
MCHC RBC AUTO-ENTMCNC: 32.1 G/DL (ref 32–36)
MCV RBC AUTO: 89 FL (ref 82–98)
MONOCYTES # BLD AUTO: 0.5 K/UL (ref 0.3–1)
MONOCYTES NFR BLD: 12.9 % (ref 4–15)
NEUTROPHILS # BLD AUTO: 2.2 K/UL (ref 1.8–7.7)
NEUTROPHILS NFR BLD: 53.3 % (ref 38–73)
NRBC BLD-RTO: 0 /100 WBC
PLATELET # BLD AUTO: 326 K/UL (ref 150–450)
PMV BLD AUTO: 10.3 FL (ref 9.2–12.9)
POTASSIUM SERPL-SCNC: 4.8 MMOL/L (ref 3.5–5.1)
PROT SERPL-MCNC: 5.8 G/DL (ref 6–8.4)
RBC # BLD AUTO: 3.52 M/UL (ref 4–5.4)
SODIUM SERPL-SCNC: 134 MMOL/L (ref 136–145)
WBC # BLD AUTO: 4.2 K/UL (ref 3.9–12.7)

## 2025-03-11 PROCEDURE — 25000003 PHARM REV CODE 250: Performed by: STUDENT IN AN ORGANIZED HEALTH CARE EDUCATION/TRAINING PROGRAM

## 2025-03-11 PROCEDURE — 85025 COMPLETE CBC W/AUTO DIFF WBC: CPT | Performed by: INTERNAL MEDICINE

## 2025-03-11 PROCEDURE — 80053 COMPREHEN METABOLIC PANEL: CPT | Performed by: INTERNAL MEDICINE

## 2025-03-11 PROCEDURE — 83735 ASSAY OF MAGNESIUM: CPT | Performed by: INTERNAL MEDICINE

## 2025-03-11 PROCEDURE — 63600175 PHARM REV CODE 636 W HCPCS: Performed by: INTERNAL MEDICINE

## 2025-03-11 PROCEDURE — 99233 SBSQ HOSP IP/OBS HIGH 50: CPT | Mod: ,,,

## 2025-03-11 PROCEDURE — 25000003 PHARM REV CODE 250: Performed by: NURSE PRACTITIONER

## 2025-03-11 PROCEDURE — 25000003 PHARM REV CODE 250: Performed by: INTERNAL MEDICINE

## 2025-03-11 PROCEDURE — 25000242 PHARM REV CODE 250 ALT 637 W/ HCPCS: Performed by: STUDENT IN AN ORGANIZED HEALTH CARE EDUCATION/TRAINING PROGRAM

## 2025-03-11 PROCEDURE — 36415 COLL VENOUS BLD VENIPUNCTURE: CPT | Performed by: INTERNAL MEDICINE

## 2025-03-11 PROCEDURE — 11000001 HC ACUTE MED/SURG PRIVATE ROOM

## 2025-03-11 RX ORDER — LOSARTAN POTASSIUM 50 MG/1
50 TABLET ORAL DAILY
Status: DISCONTINUED | OUTPATIENT
Start: 2025-03-12 | End: 2025-03-12

## 2025-03-11 RX ORDER — MORPHINE SULFATE 2 MG/ML
4 INJECTION, SOLUTION INTRAMUSCULAR; INTRAVENOUS ONCE
Status: COMPLETED | OUTPATIENT
Start: 2025-03-11 | End: 2025-03-11

## 2025-03-11 RX ORDER — MORPHINE SULFATE 2 MG/ML
2 INJECTION, SOLUTION INTRAMUSCULAR; INTRAVENOUS EVERY 4 HOURS PRN
Status: DISCONTINUED | OUTPATIENT
Start: 2025-03-11 | End: 2025-03-21

## 2025-03-11 RX ORDER — LOSARTAN POTASSIUM 25 MG/1
25 TABLET ORAL ONCE
Status: COMPLETED | OUTPATIENT
Start: 2025-03-11 | End: 2025-03-11

## 2025-03-11 RX ORDER — OXYCODONE AND ACETAMINOPHEN 5; 325 MG/1; MG/1
1 TABLET ORAL EVERY 4 HOURS PRN
Refills: 0 | Status: DISCONTINUED | OUTPATIENT
Start: 2025-03-11 | End: 2025-03-24 | Stop reason: HOSPADM

## 2025-03-11 RX ORDER — OXYCODONE AND ACETAMINOPHEN 7.5; 325 MG/1; MG/1
1 TABLET ORAL EVERY 4 HOURS PRN
Refills: 0 | Status: DISCONTINUED | OUTPATIENT
Start: 2025-03-11 | End: 2025-03-24 | Stop reason: HOSPADM

## 2025-03-11 RX ADMIN — LOSARTAN POTASSIUM 25 MG: 25 TABLET, FILM COATED ORAL at 11:03

## 2025-03-11 RX ADMIN — METHOCARBAMOL 500 MG: 500 TABLET ORAL at 08:03

## 2025-03-11 RX ADMIN — BUTALBITAL, ACETAMINOPHEN, AND CAFFEINE 1 TABLET: 325; 50; 40 TABLET ORAL at 03:03

## 2025-03-11 RX ADMIN — FLUTICASONE FUROATE AND VILANTEROL TRIFENATATE 1 PUFF: 100; 25 POWDER RESPIRATORY (INHALATION) at 08:03

## 2025-03-11 RX ADMIN — LOSARTAN POTASSIUM 25 MG: 25 TABLET, FILM COATED ORAL at 08:03

## 2025-03-11 RX ADMIN — SENNOSIDES AND DOCUSATE SODIUM 1 TABLET: 50; 8.6 TABLET ORAL at 10:03

## 2025-03-11 RX ADMIN — SENNOSIDES AND DOCUSATE SODIUM 1 TABLET: 50; 8.6 TABLET ORAL at 08:03

## 2025-03-11 RX ADMIN — PANTOPRAZOLE SODIUM 40 MG: 20 TABLET, DELAYED RELEASE ORAL at 08:03

## 2025-03-11 RX ADMIN — AMLODIPINE BESYLATE 10 MG: 10 TABLET ORAL at 08:03

## 2025-03-11 RX ADMIN — PREGABALIN 50 MG: 50 CAPSULE ORAL at 10:03

## 2025-03-11 RX ADMIN — METHOCARBAMOL 500 MG: 500 TABLET ORAL at 10:03

## 2025-03-11 RX ADMIN — BUTALBITAL, ACETAMINOPHEN, AND CAFFEINE 1 TABLET: 325; 50; 40 TABLET ORAL at 06:03

## 2025-03-11 RX ADMIN — BUTALBITAL, ACETAMINOPHEN, AND CAFFEINE 1 TABLET: 325; 50; 40 TABLET ORAL at 08:03

## 2025-03-11 RX ADMIN — OXYCODONE HYDROCHLORIDE AND ACETAMINOPHEN 1 TABLET: 5; 325 TABLET ORAL at 04:03

## 2025-03-11 RX ADMIN — ALUMINUM HYDROXIDE, MAGNESIUM HYDROXIDE, AND SIMETHICONE 30 ML: 200; 200; 20 SUSPENSION ORAL at 11:03

## 2025-03-11 RX ADMIN — GABAPENTIN 300 MG: 300 CAPSULE ORAL at 10:03

## 2025-03-11 RX ADMIN — MORPHINE SULFATE 4 MG: 2 INJECTION, SOLUTION INTRAMUSCULAR; INTRAVENOUS at 05:03

## 2025-03-11 RX ADMIN — MUPIROCIN: 20 OINTMENT TOPICAL at 08:03

## 2025-03-11 RX ADMIN — METHOCARBAMOL 500 MG: 500 TABLET ORAL at 03:03

## 2025-03-11 RX ADMIN — MUPIROCIN: 20 OINTMENT TOPICAL at 09:03

## 2025-03-11 NOTE — PROGRESS NOTES
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 Her symptoms began last Monday with headache that started in her neck and migrated toward the top of her head. She has had chronic neck pain due to cervical stenosis, but the headache was new. On Saturday, she had a normal morning and was able to perform all of her ADLs. Around 8 pm, she suddenly became weak in her arms and legs and was not able to walk. MRI demonstrated chronic type 2 odontoid fracture with progressed retropulsion of the fracture fragment in comparison to prior MRI from 8/11/2023, with moderate to severe canal stenosis at C1-C2. Of note, patient had previously discontinued wearing her cervical collar. Denies radiculopathy in her arms or legs, sensory deficit, bladder or bowel incontinence.      Post-Op Info:  Procedure(s) (LRB):  FUSION, SPINE, CERVICAL, POSTERIOR APPROACH (N/A)       Interval History: NAEON. Exam is stable. No new complaints. Will repeat X-ray c spine upright/supine as there is no supine film.     Medications:  Continuous Infusions:  Scheduled Meds:   amLODIPine  10 mg Oral Daily    fluticasone furoate-vilanteroL  1 puff Inhalation Daily    gabapentin  300 mg Oral QHS    losartan  25 mg Oral Once    [START ON 3/12/2025] losartan  50 mg Oral Daily    methocarbamoL  500 mg Oral TID    mupirocin   Nasal BID    pantoprazole  40 mg Oral Daily    pregabalin  50 mg Oral QHS    senna-docusate 8.6-50 mg  1 tablet Oral BID     PRN Meds:  Current Facility-Administered Medications:     acetaminophen, 650 mg, Oral, Q4H PRN    albuterol, 2 puff, Inhalation, Q6H PRN     aluminum-magnesium hydroxide-simethicone, 30 mL, Oral, QID PRN    butalbital-acetaminophen-caffeine -40 mg, 1 tablet, Oral, Q4H PRN    glucagon (human recombinant), 1 mg, Intramuscular, PRN    glucose, 16 g, Oral, PRN    glucose, 24 g, Oral, PRN    melatonin, 6 mg, Oral, Nightly PRN    naloxone, 0.02 mg, Intravenous, PRN    ondansetron, 8 mg, Oral, Q8H PRN    oxyCODONE-acetaminophen, 1 tablet, Oral, Q6H PRN    oxyCODONE-acetaminophen, 1 tablet, Oral, Q6H PRN    polyethylene glycol, 17 g, Oral, Daily PRN    simethicone, 1 tablet, Oral, QID PRN    sodium chloride 0.9%, 1-10 mL, Intravenous, Q12H PRN     Review of Systems  Objective:     Weight: 59 kg (130 lb 1.1 oz)  Body mass index is 23.04 kg/m².  Vital Signs (Most Recent):  Temp: 97.5 °F (36.4 °C) (03/11/25 0743)  Pulse: 71 (03/11/25 0849)  Resp: 17 (03/11/25 0849)  BP: (!) 181/76 (03/11/25 0743)  SpO2: 97 % (03/11/25 0743) Vital Signs (24h Range):  Temp:  [97.4 °F (36.3 °C)-98 °F (36.7 °C)] 97.5 °F (36.4 °C)  Pulse:  [53-71] 71  Resp:  [16-20] 17  SpO2:  [95 %-97 %] 97 %  BP: (149-181)/(64-76) 181/76                         Female External Urinary Catheter w/ Suction 03/09/25 (Active)   Suction Continuous suction at 40 mmHg 03/10/25 2046   Date of last wick change 03/10/25 03/10/25 2046   Time of last wick change 2100 03/10/25 2046   Output (mL) 300 mL 03/10/25 0630            Neurosurgery Physical Exam  General: Well developed, well nourished, no distress.   Head: Normocephalic, atraumatic.  Mental Status: Awake, Alert, Oriented  Speech: Clear with content appropriate to conversation.  Cranial nerves: Face symmetric, CN II-XII grossly intact.   Eyes: Pupils equal, round, reactive to light, EOMI.  Sensory: Intact to light touch throughout.  Motor Strength: Moves all extremities spontaneously with good tone. Full strength upper and lower extremities. No abnormal movements seen.      Strength   Deltoids Triceps Biceps Wrist Extension Wrist Flexion Hand   "  Upper: R 4/5 5/5 5/5 5/5 5/5 5/5     L 5/5 5/5 5/5 5/5 5/5 5/5       Iliopsoas Quadriceps Knee  Flexion Tibialis  anterior Gastro- cnemius EHL   Lower: R 5/5 5/5 5/5 5/5 5/5 5/5     L 5/5 5/5 5/5 5/5 5/5 5/5    Patient with R distal clavicle fx and h/o of R shoulder arthroplasty.    Schneider: absent    In c-collar.    Significant Labs:  Recent Labs   Lab 03/10/25  0956 03/11/25  0409   GLU 72 70   * 134*   K 4.1 4.8   CL 99 94*   CO2 30* 26   BUN 34* 33*   CREATININE 1.4 1.3   CALCIUM 9.1 8.5*   MG 2.6 2.7*     Recent Labs   Lab 03/10/25  0956 03/11/25  0409   WBC 3.37* 4.20   HGB 10.8* 10.0*   HCT 33.8* 31.2*    326     No results for input(s): "LABPT", "INR", "APTT" in the last 48 hours.  Microbiology Results (last 7 days)       ** No results found for the last 168 hours. **          All pertinent labs from the last 24 hours have been reviewed.    Significant Diagnostics:  I have reviewed and interpreted all pertinent imaging results/findings within the past 24 hours.  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 Multilevel foraminal stenosis.     Discussed surgical vs non-surgical options with the patient and her daughter Liliana Montgomery present. Patient's grandson Aidan (neurosurgeon) was also on the phone for this conversation. Given the degree of upper cervical cord kinking, cord edema, and craniocervical settling recommended surgical intervention to preserve her current " function and prevent neurological decline. Surgical recommendation is occiput-C4 fusion. She would also need pre-op traction. Non-surgical option is strict cervical collar 24/7 indefinitely, pending stability of XR that is ordered to ensure patient is stable in the collar. This would also require the patient to be compliant with collar use. Patient and daughter would like more time to discuss this with their family. While awaiting family decision, would recommend continue strict collar use and will f/u on XR results     Plan:   - Cervical collar at all times.  - Please obtain upright and supine cervical x-ray with patient in collar.  - Okay for a diet.  - Multimodal pain control.  - Bowel regimen.  - Notify NSGY immediately with any decline on exam.    D/w Dr. Lemons.        BRANDON Ponce-DANNI  Neurosurgery  [facility] - Neurosurgery ([facility])

## 2025-03-11 NOTE — ASSESSMENT & PLAN NOTE
Presented to outside hospital with increased generalized weakness, particularly in her extremities.  CVA workup negative, however MRI showed new onset cervical cord compression, likely contributing to her symptoms.  Transferred here for Neurosurgery evaluation; appreciate recommendations.  - MRI Cervical spine 3/9/25: chronic type 2 odontoid fracture with progressed retropulsion of the fracture fragment in comparison to prior MRI from 8/11/2023, with moderate to severe canal stenosis at C1-C2. Multilevel foraminal stenosis.   - Cont C-collar at all times  - Upright and supine cervical x-ray pending  - Neurosurgery recommended occiput-C4 fusion. Non-surgical option is strict cervical collar 24/7 indefinitely, XR pending to ensure patient is stable in the collar.

## 2025-03-11 NOTE — HOSPITAL COURSE
03/19/2025: To OR for O-C4 fusion  03/20/2025: POD 1 S/P O-C4 fusion. NAEO. VSS. Neurologically intact. Continues with residual neck pain, robaxin added to pain regimen. Pending step down to .           3/20 Transfer to hospital medicine. 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 Multilevel foraminal stenosis. Now s/p Occipital - C4 posterior spinal fusion (3/19). Continue Cervical collar at all times post operatively for 3 months. Post operative XR C spine pending.  Multimodal pain control  with oxycodone 5/7.5 prn, morphine prn, robaxin prn) CPK trended down 623--> 420.  vancomycin discontinued.  started on cefazolin due to ow concern for allergic reaction. Mosher and A-line removed. DVT ppx initiated. PT recs [facility]   3/22 Drain output 40ml/24h. remove drain today. Leucocytosis resolved.   3/23 urinary retention overnight. straight cath x 2. bladder scan q 6h. started on flomax and bethanechol. Ancef discontinued.sats 95% on RA.  Hb trended down to 7.5. No overt bleed. FOBT. Iron studies .   3/24 Hb stable. Discharge to [facility]

## 2025-03-11 NOTE — SUBJECTIVE & OBJECTIVE
Interval History: NAEON. Stable neuro exam. Denies numbness/paresthesias or bowel/bladder incontinence. Headache improving. Pt still undecided about surgery, leaning towards conservative mgmt. She would like more time to discuss with family.     Review of Systems   All other systems reviewed and are negative.    Objective:     Vital Signs (Most Recent):  Temp: 97.5 °F (36.4 °C) (03/11/25 1058)  Pulse: 72 (03/11/25 1058)  Resp: 18 (03/11/25 1058)  BP: (!) 169/74 (03/11/25 1058)  SpO2: 98 % (03/11/25 1058) Vital Signs (24h Range):  Temp:  [97.4 °F (36.3 °C)-97.5 °F (36.4 °C)] 97.5 °F (36.4 °C)  Pulse:  [56-72] 72  Resp:  [17-18] 18  SpO2:  [95 %-98 %] 98 %  BP: (149-181)/(69-76) 169/74     Weight: 59 kg (130 lb 1.1 oz)  Body mass index is 23.04 kg/m².    Intake/Output Summary (Last 24 hours) at 3/11/2025 1447  Last data filed at 3/11/2025 0354  Gross per 24 hour   Intake --   Output 1200 ml   Net -1200 ml         Physical Exam  Vitals and nursing note reviewed.   Constitutional:       General: She is not in acute distress.     Appearance: She is well-developed. She is not diaphoretic.   HENT:      Head: Normocephalic and atraumatic.   Eyes:      General: No scleral icterus.     Conjunctiva/sclera: Conjunctivae normal.   Neck:      Vascular: No JVD.      Comments: Neck in C-collar   Cardiovascular:      Rate and Rhythm: Normal rate and regular rhythm.   Pulmonary:      Effort: Pulmonary effort is normal. No respiratory distress.   Musculoskeletal:      Right lower leg: No edema.      Left lower leg: No edema.   Skin:     Coloration: Skin is not jaundiced or pale.   Neurological:      Mental Status: She is alert and oriented to person, place, and time.      Cranial Nerves: No cranial nerve deficit.      Sensory: No sensory deficit.      Motor: Weakness (generalized) present. No abnormal muscle tone.   Psychiatric:         Mood and Affect: Mood normal.         Behavior: Behavior normal.               Significant Labs:  All pertinent labs within the past 24 hours have been reviewed.    Significant Imaging: I have reviewed all pertinent imaging results/findings within the past 24 hours.

## 2025-03-11 NOTE — PLAN OF CARE
Problem: Adult Inpatient Plan of Care  Goal: Plan of Care Review  Outcome: Progressing  Goal: Optimal Comfort and Wellbeing  Outcome: Progressing     Problem: Fall Injury Risk  Goal: Absence of Fall and Fall-Related Injury  Outcome: Progressing     Problem: Skin Injury Risk Increased  Goal: Skin Health and Integrity  Outcome: Progressing   POC reviewed and updated with the patient. Questions regarding POC were encouraged and addressed with the patient.ANYA. Patient is AO X 4 at this time. Fall/safety precautions maintained, no signs of injury noted during shift. Upon exiting room, patient's bed locked in low position, side rails up x 3, bed alarm set, with call light within reach. Instructed patient to call staff for assistance, verbalized understanding.  No acute signs of distress noted at this time.

## 2025-03-11 NOTE — ASSESSMENT & PLAN NOTE
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 Multilevel foraminal stenosis.     Discussed surgical vs non-surgical options with the patient and her daughter Liliana Montgomery present. Patient's grandson Aidan (neurosurgeon) was also on the phone for this conversation. Given the degree of upper cervical cord kinking, cord edema, and craniocervical settling recommended surgical intervention to preserve her current function and prevent neurological decline. Surgical recommendation is occiput-C4 fusion. She would also need pre-op traction. Non-surgical option is strict cervical collar 24/7 indefinitely, pending stability of XR that is ordered to ensure patient is stable in the collar. This would also require the patient to be compliant with collar use. Patient and daughter would like more time to discuss this with their family. While awaiting family decision, would recommend continue strict collar use and will f/u on XR results     Plan:   - Cervical collar at all times.  - Please obtain upright and supine cervical x-ray with patient in collar.  - Okay for a diet.  - Multimodal pain control.  - Bowel regimen.  - Notify NSGY immediately with any decline on exam.    D/w Dr. Lemons.

## 2025-03-11 NOTE — PROGRESS NOTES
"[facility] - Neurosurgery ([facility]  Progress Note    Patient Name: Bridget Montgomery  MRN: [id number]  Patient Class: IP- Inpatient   Admission Date: 3/9/2025  Length of Stay: 2 days  Attending Physician: Will Blanco MD  Primary Care Provider: No primary care provider on file.        Subjective     Principal Problem:Spinal stenosis        HPI:  Bridget Montgomery is a 89 y.o. female with COPD, HTN, HLD, history of TIA who is transferred here from [place] for NSGY evaluation for cervical cord compression.    Per Transfer Center:  "89F COPD, CKD4, GERD, HTN, HLD, hx of TIA, prediabetes, and spinal stenosis who initially presented to [facility] on 3/8/2025 with a primary complaint of sudden onset weakness. The patient was in their usual state of health until Monday. Starting on Monday, the patient started having a headache that started in her neck and migrated toward the top of her head. She has had chronic neck pain due to cervical stenosis, but the headache was new. She managed the pain with tylenol. On Friday, she presented to her PCP (Dr. Alejandrina Russ) who gave her 2 injections into her neck to help with the pain. She is not sure what the injection was but thinks it might have been a steroid. On Saturday, she had a normal morning and was able to perform all of her ADLs. Around 8 pm, she suddenly became weak in her arms and legs and was not able to walk. She was able to catch herself prior to falling. The primary concern was the weakness and ongoing headache. She endorses chronic numbness/tingling in her fingers that has not worsened. She denies any sensory deficits. She denies any urinary incontinence or fecal incontinence. She endorses pain in her back, neck, knees, and a headache. She denies any fevers, chills, chest pain, shortness of breath, abdominal pain, N/V, diarrhea, constipation. CT Head was negative for acute stroke. MRI shows new onset cervical cord " "compression C1-C2. Neurosurgery consulted, recommended transferring pt to [facility] for a higher level of care."    Overview/Hospital Course:  No notes on file    Interval History: NAEON. Stable neuro exam. Denies numbness/paresthesias or bowel/bladder incontinence. Headache improving. Pt still undecided about surgery, leaning towards conservative mgmt. She would like more time to discuss with family.     Review of Systems   All other systems reviewed and are negative.    Objective:     Vital Signs (Most Recent):  Temp: 97.5 °F (36.4 °C) (03/11/25 1058)  Pulse: 72 (03/11/25 1058)  Resp: 18 (03/11/25 1058)  BP: (!) 169/74 (03/11/25 1058)  SpO2: 98 % (03/11/25 1058) Vital Signs (24h Range):  Temp:  [97.4 °F (36.3 °C)-97.5 °F (36.4 °C)] 97.5 °F (36.4 °C)  Pulse:  [56-72] 72  Resp:  [17-18] 18  SpO2:  [95 %-98 %] 98 %  BP: (149-181)/(69-76) 169/74     Weight: 59 kg (130 lb 1.1 oz)  Body mass index is 23.04 kg/m².    Intake/Output Summary (Last 24 hours) at 3/11/2025 1447  Last data filed at 3/11/2025 0354  Gross per 24 hour   Intake --   Output 1200 ml   Net -1200 ml         Physical Exam  Vitals and nursing note reviewed.   Constitutional:       General: She is not in acute distress.     Appearance: She is well-developed. She is not diaphoretic.   HENT:      Head: Normocephalic and atraumatic.   Eyes:      General: No scleral icterus.     Conjunctiva/sclera: Conjunctivae normal.   Neck:      Vascular: No JVD.      Comments: Neck in C-collar   Cardiovascular:      Rate and Rhythm: Normal rate and regular rhythm.   Pulmonary:      Effort: Pulmonary effort is normal. No respiratory distress.   Musculoskeletal:      Right lower leg: No edema.      Left lower leg: No edema.   Skin:     Coloration: Skin is not jaundiced or pale.   Neurological:      Mental Status: She is alert and oriented to person, place, and time.      Cranial Nerves: No cranial nerve deficit.      Sensory: No sensory deficit.      Motor: Weakness " (generalized) present. No abnormal muscle tone.   Psychiatric:         Mood and Affect: Mood normal.         Behavior: Behavior normal.               Significant Labs: All pertinent labs within the past 24 hours have been reviewed.    Significant Imaging: I have reviewed all pertinent imaging results/findings within the past 24 hours.      Assessment & Plan  Spinal stenosis  Presented to outside hospital with increased generalized weakness, particularly in her extremities.  CVA workup negative, however MRI showed new onset cervical cord compression, likely contributing to her symptoms.  Transferred here for Neurosurgery evaluation; appreciate recommendations.  - MRI Cervical spine 3/9/25: chronic type 2 odontoid fracture with progressed retropulsion of the fracture fragment in comparison to prior MRI from 8/11/2023, with moderate to severe canal stenosis at C1-C2. Multilevel foraminal stenosis.   - Cont C-collar at all times  - Upright and supine cervical x-ray pending  - Neurosurgery recommended occiput-C4 fusion. Non-surgical option is strict cervical collar 24/7 indefinitely, XR pending to ensure patient is stable in the collar.      Chronic kidney disease, stage III (moderate)  Renal function remains around baseline; Estimated Creatinine Clearance: 23.8 mL/min (based on SCr of 1.3 mg/dL). according to latest data. 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 Will avoid nephrotoxic agents as able, and renally dose all meds as applicable.  Other emphysema  History of COPD, not on home oxygen. Currently without evidence of acute exacerbation, and sats on room air at goal 88% or greater. Will continue hospital formulary equivalent of home regimen and monitor respiratory status.   Essential hypertension  Chronic, and currently controlled. Latest blood pressure and vitals reviewed-   While in the hospital, will manage blood pressure as follows; Continue home antihypertensive regimen    Will utilize p.r.n. blood pressure  medication only if patient's blood pressure greater than 220/110 and she develops symptoms such as worsening chest pain or shortness of breath.  Generalized weakness  Chronic, however likely exacerbated by cervical radiculopathy.  Consider PT/OT after neurosurgery recommendations.    Headache  Fioricet PRN    Cervical spinal stenosis      VTE Risk Mitigation (From admission, onward)           Ordered     IP VTE HIGH RISK PATIENT  Once         03/09/25 2040     Place sequential compression device  Until discontinued         03/09/25 2040     Reason for No Pharmacological VTE Prophylaxis  Once        Question:  Reasons:  Answer:  Physician Provided (leave comment)  Comment:  surgery    03/09/25 2040                    Discharge Planning   MIKAYLA: 3/12/2025     Code Status: Full Code   Medical Readiness for Discharge Date:   Discharge Plan A: Home with family, Home Health                        Will Blanco MD  Department of Hospital Medicine   [facility] - Neurosurgery ([facility])

## 2025-03-11 NOTE — SUBJECTIVE & OBJECTIVE
Interval History: NAEON. Exam is stable. No new complaints. Will repeat X-ray c spine upright/supine as there is no supine film.     Medications:  Continuous Infusions:  Scheduled Meds:   amLODIPine  10 mg Oral Daily    fluticasone furoate-vilanteroL  1 puff Inhalation Daily    gabapentin  300 mg Oral QHS    losartan  25 mg Oral Once    [START ON 3/12/2025] losartan  50 mg Oral Daily    methocarbamoL  500 mg Oral TID    mupirocin   Nasal BID    pantoprazole  40 mg Oral Daily    pregabalin  50 mg Oral QHS    senna-docusate 8.6-50 mg  1 tablet Oral BID     PRN Meds:  Current Facility-Administered Medications:     acetaminophen, 650 mg, Oral, Q4H PRN    albuterol, 2 puff, Inhalation, Q6H PRN    aluminum-magnesium hydroxide-simethicone, 30 mL, Oral, QID PRN    butalbital-acetaminophen-caffeine -40 mg, 1 tablet, Oral, Q4H PRN    glucagon (human recombinant), 1 mg, Intramuscular, PRN    glucose, 16 g, Oral, PRN    glucose, 24 g, Oral, PRN    melatonin, 6 mg, Oral, Nightly PRN    naloxone, 0.02 mg, Intravenous, PRN    ondansetron, 8 mg, Oral, Q8H PRN    oxyCODONE-acetaminophen, 1 tablet, Oral, Q6H PRN    oxyCODONE-acetaminophen, 1 tablet, Oral, Q6H PRN    polyethylene glycol, 17 g, Oral, Daily PRN    simethicone, 1 tablet, Oral, QID PRN    sodium chloride 0.9%, 1-10 mL, Intravenous, Q12H PRN     Review of Systems  Objective:     Weight: 59 kg (130 lb 1.1 oz)  Body mass index is 23.04 kg/m².  Vital Signs (Most Recent):  Temp: 97.5 °F (36.4 °C) (03/11/25 0743)  Pulse: 71 (03/11/25 0849)  Resp: 17 (03/11/25 0849)  BP: (!) 181/76 (03/11/25 0743)  SpO2: 97 % (03/11/25 0743) Vital Signs (24h Range):  Temp:  [97.4 °F (36.3 °C)-98 °F (36.7 °C)] 97.5 °F (36.4 °C)  Pulse:  [53-71] 71  Resp:  [16-20] 17  SpO2:  [95 %-97 %] 97 %  BP: (149-181)/(64-76) 181/76                         Female External Urinary Catheter w/ Suction 03/09/25 (Active)   Suction Continuous suction at 40 mmHg 03/10/25 2046   Date of last wick change  "03/10/25 03/10/25 2046   Time of last wick change 2100 03/10/25 2046   Output (mL) 300 mL 03/10/25 0630            Neurosurgery Physical Exam  General: Well developed, well nourished, no distress.   Head: Normocephalic, atraumatic.  Mental Status: Awake, Alert, Oriented  Speech: Clear with content appropriate to conversation.  Cranial nerves: Face symmetric, CN II-XII grossly intact.   Eyes: Pupils equal, round, reactive to light, EOMI.  Sensory: Intact to light touch throughout.  Motor Strength: Moves all extremities spontaneously with good tone. Full strength upper and lower extremities. No abnormal movements seen.      Strength   Deltoids Triceps Biceps Wrist Extension Wrist Flexion Hand    Upper: R 4/5 5/5 5/5 5/5 5/5 5/5     L 5/5 5/5 5/5 5/5 5/5 5/5       Iliopsoas Quadriceps Knee  Flexion Tibialis  anterior Gastro- cnemius EHL   Lower: R 5/5 5/5 5/5 5/5 5/5 5/5     L 5/5 5/5 5/5 5/5 5/5 5/5    Patient with R distal clavicle fx and h/o of R shoulder arthroplasty.    Schneider: absent    In c-collar.    Significant Labs:  Recent Labs   Lab 03/10/25  0956 03/11/25  0409   GLU 72 70   * 134*   K 4.1 4.8   CL 99 94*   CO2 30* 26   BUN 34* 33*   CREATININE 1.4 1.3   CALCIUM 9.1 8.5*   MG 2.6 2.7*     Recent Labs   Lab 03/10/25  0956 03/11/25  0409   WBC 3.37* 4.20   HGB 10.8* 10.0*   HCT 33.8* 31.2*    326     No results for input(s): "LABPT", "INR", "APTT" in the last 48 hours.  Microbiology Results (last 7 days)       ** No results found for the last 168 hours. **          All pertinent labs from the last 24 hours have been reviewed.    Significant Diagnostics:  I have reviewed and interpreted all pertinent imaging results/findings within the past 24 hours.  "

## 2025-03-11 NOTE — ASSESSMENT & PLAN NOTE
Renal function remains around baseline; Estimated Creatinine Clearance: 23.8 mL/min (based on SCr of 1.3 mg/dL). according to latest data. 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 Will avoid nephrotoxic agents as able, and renally dose all meds as applicable.

## 2025-03-12 PROBLEM — G95.19 EDEMA, SPINAL CORD: Status: ACTIVE | Noted: 2018-08-17

## 2025-03-12 PROBLEM — E83.42 HYPOMAGNESEMIA: Status: ACTIVE | Noted: 2025-03-12

## 2025-03-12 PROBLEM — G95.20 CORD COMPRESSION: Status: ACTIVE | Noted: 2025-03-12

## 2025-03-12 LAB
ALBUMIN SERPL BCP-MCNC: 2.8 G/DL (ref 3.5–5.2)
ALP SERPL-CCNC: 168 U/L (ref 40–150)
ALT SERPL W/O P-5'-P-CCNC: 18 U/L (ref 10–44)
ANION GAP SERPL CALC-SCNC: 11 MMOL/L (ref 8–16)
AST SERPL-CCNC: 37 U/L (ref 10–40)
BASOPHILS # BLD AUTO: 0.02 K/UL (ref 0–0.2)
BASOPHILS NFR BLD: 0.4 % (ref 0–1.9)
BILIRUB SERPL-MCNC: 0.2 MG/DL (ref 0.1–1)
BUN SERPL-MCNC: 38 MG/DL (ref 8–23)
CALCIUM SERPL-MCNC: 8.6 MG/DL (ref 8.7–10.5)
CHLORIDE SERPL-SCNC: 97 MMOL/L (ref 95–110)
CO2 SERPL-SCNC: 24 MMOL/L (ref 23–29)
CREAT SERPL-MCNC: 1.4 MG/DL (ref 0.5–1.4)
DIFFERENTIAL METHOD BLD: ABNORMAL
EOSINOPHIL # BLD AUTO: 0.1 K/UL (ref 0–0.5)
EOSINOPHIL NFR BLD: 2.5 % (ref 0–8)
ERYTHROCYTE [DISTWIDTH] IN BLOOD BY AUTOMATED COUNT: 13.9 % (ref 11.5–14.5)
EST. GFR  (NO RACE VARIABLE): 35.7 ML/MIN/1.73 M^2
GLUCOSE SERPL-MCNC: 87 MG/DL (ref 70–110)
HCT VFR BLD AUTO: 31.1 % (ref 37–48.5)
HGB BLD-MCNC: 9.8 G/DL (ref 12–16)
IMM GRANULOCYTES # BLD AUTO: 0.01 K/UL (ref 0–0.04)
IMM GRANULOCYTES NFR BLD AUTO: 0.2 % (ref 0–0.5)
LYMPHOCYTES # BLD AUTO: 1 K/UL (ref 1–4.8)
LYMPHOCYTES NFR BLD: 20.9 % (ref 18–48)
MAGNESIUM SERPL-MCNC: 3.1 MG/DL (ref 1.6–2.6)
MCH RBC QN AUTO: 28.1 PG (ref 27–31)
MCHC RBC AUTO-ENTMCNC: 31.5 G/DL (ref 32–36)
MCV RBC AUTO: 89 FL (ref 82–98)
MONOCYTES # BLD AUTO: 0.6 K/UL (ref 0.3–1)
MONOCYTES NFR BLD: 12.9 % (ref 4–15)
NEUTROPHILS # BLD AUTO: 3.1 K/UL (ref 1.8–7.7)
NEUTROPHILS NFR BLD: 63.1 % (ref 38–73)
NRBC BLD-RTO: 0 /100 WBC
PLATELET # BLD AUTO: 315 K/UL (ref 150–450)
PMV BLD AUTO: 10.5 FL (ref 9.2–12.9)
POCT GLUCOSE: 210 MG/DL (ref 70–110)
POTASSIUM SERPL-SCNC: 4.7 MMOL/L (ref 3.5–5.1)
PROT SERPL-MCNC: 5.9 G/DL (ref 6–8.4)
RBC # BLD AUTO: 3.49 M/UL (ref 4–5.4)
SODIUM SERPL-SCNC: 132 MMOL/L (ref 136–145)
WBC # BLD AUTO: 4.88 K/UL (ref 3.9–12.7)

## 2025-03-12 PROCEDURE — 63600175 PHARM REV CODE 636 W HCPCS: Performed by: INTERNAL MEDICINE

## 2025-03-12 PROCEDURE — 36415 COLL VENOUS BLD VENIPUNCTURE: CPT | Performed by: INTERNAL MEDICINE

## 2025-03-12 PROCEDURE — 83735 ASSAY OF MAGNESIUM: CPT | Performed by: INTERNAL MEDICINE

## 2025-03-12 PROCEDURE — 99232 SBSQ HOSP IP/OBS MODERATE 35: CPT | Mod: ,,, | Performed by: PHYSICIAN ASSISTANT

## 2025-03-12 PROCEDURE — 85025 COMPLETE CBC W/AUTO DIFF WBC: CPT | Performed by: INTERNAL MEDICINE

## 2025-03-12 PROCEDURE — 11000001 HC ACUTE MED/SURG PRIVATE ROOM

## 2025-03-12 PROCEDURE — 25000003 PHARM REV CODE 250: Performed by: INTERNAL MEDICINE

## 2025-03-12 PROCEDURE — 63600175 PHARM REV CODE 636 W HCPCS: Performed by: PHYSICIAN ASSISTANT

## 2025-03-12 PROCEDURE — 80053 COMPREHEN METABOLIC PANEL: CPT | Performed by: INTERNAL MEDICINE

## 2025-03-12 PROCEDURE — 25000003 PHARM REV CODE 250: Performed by: STUDENT IN AN ORGANIZED HEALTH CARE EDUCATION/TRAINING PROGRAM

## 2025-03-12 RX ORDER — INSULIN ASPART 100 [IU]/ML
0-5 INJECTION, SOLUTION INTRAVENOUS; SUBCUTANEOUS
Status: DISCONTINUED | OUTPATIENT
Start: 2025-03-12 | End: 2025-03-19

## 2025-03-12 RX ORDER — GLUCAGON 1 MG
1 KIT INJECTION
Status: DISCONTINUED | OUTPATIENT
Start: 2025-03-12 | End: 2025-03-19

## 2025-03-12 RX ORDER — POLYETHYLENE GLYCOL 3350 17 G/17G
17 POWDER, FOR SOLUTION ORAL DAILY
Status: DISCONTINUED | OUTPATIENT
Start: 2025-03-12 | End: 2025-03-17

## 2025-03-12 RX ORDER — LOSARTAN POTASSIUM 50 MG/1
50 TABLET ORAL ONCE
Status: COMPLETED | OUTPATIENT
Start: 2025-03-12 | End: 2025-03-12

## 2025-03-12 RX ORDER — IBUPROFEN 200 MG
24 TABLET ORAL
Status: DISCONTINUED | OUTPATIENT
Start: 2025-03-12 | End: 2025-03-19

## 2025-03-12 RX ORDER — DEXAMETHASONE SODIUM PHOSPHATE 4 MG/ML
4 INJECTION, SOLUTION INTRA-ARTICULAR; INTRALESIONAL; INTRAMUSCULAR; INTRAVENOUS; SOFT TISSUE EVERY 6 HOURS
Status: DISCONTINUED | OUTPATIENT
Start: 2025-03-12 | End: 2025-03-19

## 2025-03-12 RX ORDER — IBUPROFEN 200 MG
16 TABLET ORAL
Status: DISCONTINUED | OUTPATIENT
Start: 2025-03-12 | End: 2025-03-19

## 2025-03-12 RX ORDER — PANTOPRAZOLE SODIUM 20 MG/1
40 TABLET, DELAYED RELEASE ORAL 2 TIMES DAILY
Status: DISCONTINUED | OUTPATIENT
Start: 2025-03-12 | End: 2025-03-14

## 2025-03-12 RX ORDER — LOSARTAN POTASSIUM 50 MG/1
100 TABLET ORAL DAILY
Status: DISCONTINUED | OUTPATIENT
Start: 2025-03-13 | End: 2025-03-16

## 2025-03-12 RX ADMIN — MUPIROCIN: 20 OINTMENT TOPICAL at 09:03

## 2025-03-12 RX ADMIN — FLUTICASONE FUROATE AND VILANTEROL TRIFENATATE 1 PUFF: 100; 25 POWDER RESPIRATORY (INHALATION) at 08:03

## 2025-03-12 RX ADMIN — GABAPENTIN 300 MG: 300 CAPSULE ORAL at 09:03

## 2025-03-12 RX ADMIN — BUTALBITAL, ACETAMINOPHEN, AND CAFFEINE 1 TABLET: 325; 50; 40 TABLET ORAL at 08:03

## 2025-03-12 RX ADMIN — LOSARTAN POTASSIUM 50 MG: 50 TABLET, FILM COATED ORAL at 08:03

## 2025-03-12 RX ADMIN — PANTOPRAZOLE SODIUM 40 MG: 20 TABLET, DELAYED RELEASE ORAL at 03:03

## 2025-03-12 RX ADMIN — PREGABALIN 50 MG: 50 CAPSULE ORAL at 09:03

## 2025-03-12 RX ADMIN — DEXAMETHASONE SODIUM PHOSPHATE 4 MG: 4 INJECTION INTRA-ARTICULAR; INTRALESIONAL; INTRAMUSCULAR; INTRAVENOUS; SOFT TISSUE at 06:03

## 2025-03-12 RX ADMIN — AMLODIPINE BESYLATE 10 MG: 10 TABLET ORAL at 08:03

## 2025-03-12 RX ADMIN — SENNOSIDES AND DOCUSATE SODIUM 1 TABLET: 50; 8.6 TABLET ORAL at 09:03

## 2025-03-12 RX ADMIN — BUTALBITAL, ACETAMINOPHEN, AND CAFFEINE 1 TABLET: 325; 50; 40 TABLET ORAL at 06:03

## 2025-03-12 RX ADMIN — METHOCARBAMOL 500 MG: 500 TABLET ORAL at 08:03

## 2025-03-12 RX ADMIN — INSULIN ASPART 1 UNITS: 100 INJECTION, SOLUTION INTRAVENOUS; SUBCUTANEOUS at 09:03

## 2025-03-12 RX ADMIN — SENNOSIDES AND DOCUSATE SODIUM 1 TABLET: 50; 8.6 TABLET ORAL at 08:03

## 2025-03-12 RX ADMIN — LOSARTAN POTASSIUM 50 MG: 50 TABLET, FILM COATED ORAL at 11:03

## 2025-03-12 RX ADMIN — METHOCARBAMOL 500 MG: 500 TABLET ORAL at 09:03

## 2025-03-12 RX ADMIN — POLYETHYLENE GLYCOL 3350 17 G: 17 POWDER, FOR SOLUTION ORAL at 08:03

## 2025-03-12 RX ADMIN — PANTOPRAZOLE SODIUM 40 MG: 20 TABLET, DELAYED RELEASE ORAL at 08:03

## 2025-03-12 RX ADMIN — METHOCARBAMOL 500 MG: 500 TABLET ORAL at 02:03

## 2025-03-12 RX ADMIN — DEXAMETHASONE SODIUM PHOSPHATE 4 MG: 4 INJECTION INTRA-ARTICULAR; INTRALESIONAL; INTRAMUSCULAR; INTRAVENOUS; SOFT TISSUE at 02:03

## 2025-03-12 RX ADMIN — BUTALBITAL, ACETAMINOPHEN, AND CAFFEINE 1 TABLET: 325; 50; 40 TABLET ORAL at 12:03

## 2025-03-12 NOTE — SUBJECTIVE & OBJECTIVE
Interval History: NAEON. Worsening RUE weakness noted. Pt states she is having trouble raising arm to feed herself. Denies numbness/paresthesias or bowel/bladder incontinence. She is still undecided about surgery, states she would like to discuss with her grandson who is a neurosurgeon in [place].     Review of Systems   All other systems reviewed and are negative.    Objective:     Vital Signs (Most Recent):  Temp: 97.9 °F (36.6 °C) (03/12/25 0510)  Pulse: (!) 56 (03/12/25 1218)  Resp: 18 (03/12/25 1218)  BP: 116/67 (03/12/25 1218)  SpO2: (!) 89 % (03/12/25 1218) Vital Signs (24h Range):  Temp:  [97.4 °F (36.3 °C)-97.9 °F (36.6 °C)] 97.9 °F (36.6 °C)  Pulse:  [46-65] 56  Resp:  [17-18] 18  SpO2:  [89 %-97 %] 89 %  BP: (116-180)/(67-80) 116/67     Weight: 59 kg (130 lb 1.1 oz)  Body mass index is 23.04 kg/m².    Intake/Output Summary (Last 24 hours) at 3/12/2025 1623  Last data filed at 3/12/2025 0508  Gross per 24 hour   Intake --   Output 1000 ml   Net -1000 ml         Physical Exam  Vitals and nursing note reviewed.   Constitutional:       General: She is not in acute distress.     Appearance: She is well-developed. She is not diaphoretic.   HENT:      Head: Normocephalic and atraumatic.   Eyes:      General: No scleral icterus.     Conjunctiva/sclera: Conjunctivae normal.   Neck:      Vascular: No JVD.      Comments: Neck in C-collar   Cardiovascular:      Rate and Rhythm: Normal rate and regular rhythm.   Pulmonary:      Effort: Pulmonary effort is normal. No respiratory distress.   Musculoskeletal:      Right lower leg: No edema.      Left lower leg: No edema.   Skin:     Coloration: Skin is not jaundiced or pale.   Neurological:      Mental Status: She is alert and oriented to person, place, and time.      Cranial Nerves: No cranial nerve deficit.      Sensory: No sensory deficit.      Motor: Weakness (generalized) present. No abnormal muscle tone.      Comments: Motor: RUE 3/5, LUE, RLE, LLE 5/5   Psychiatric:          Mood and Affect: Mood normal.         Behavior: Behavior normal.               Significant Labs: All pertinent labs within the past 24 hours have been reviewed.    Significant Imaging: I have reviewed all pertinent imaging results/findings within the past 24 hours.

## 2025-03-12 NOTE — PROGRESS NOTES
[facility] Medicine Progress Note    Primary Team: [facility] Hospitalist Team B  Attending Physician: Scott Monroy MD  Resident: Marcelino  Intern: Payne    Subjective:      No further temperatures of 94, maintained at 97 degrees. Per surgery, pt's wound improving. Denies fevers/chills/SOB/N/V.    Objective:     Last 24 Hour Vital Signs:  BP  Min: 134/63  Max: 173/79  Temp  Av.9 °F (36.6 °C)  Min: 97.5 °F (36.4 °C)  Max: 98.5 °F (36.9 °C)  Pulse  Av.1  Min: 50  Max: 71  Resp  Av.5  Min: 16  Max: 18  SpO2  Av.5 %  Min: 95 %  Max: 97 %  Weight  Av.7 kg (142 lb 10.2 oz)  Min: 64.7 kg (142 lb 10.2 oz)  Max: 64.7 kg (142 lb 10.2 oz)  I/O last 3 completed shifts:  In: 840 [P.O.:840]  Out: 2100 [Urine:2100]    Physical Examination:  Physical Exam  Constitutional:       General: She is not in acute distress.     Appearance: She is not toxic-appearing.   Eyes:      Conjunctiva/sclera: Conjunctivae normal.      Pupils: Pupils are equal, round, and reactive to light.   Cardiovascular:      Rate and Rhythm: Normal rate and regular rhythm.      Pulses: Normal pulses.      Heart sounds: Normal heart sounds.   Pulmonary:      Effort: Pulmonary effort is normal. No respiratory distress.      Breath sounds: Normal breath sounds. No wheezing.   Abdominal:      General: Bowel sounds are normal. There is no distension.      Palpations: Abdomen is soft.      Tenderness: There is no abdominal tenderness.   Musculoskeletal:      Right lower leg: No edema.      Left lower leg: No edema.   Skin:     Capillary Refill: Capillary refill takes less than 2 seconds.      Findings: Wound (Right lower leg, no purulent drainage, no erythematous borders) present.   Neurological:      General: No focal deficit present.      Mental Status: She is alert and oriented to person, place, and time.   Psychiatric:         Mood and Affect: Mood normal.         Behavior: Behavior normal.       Laboratory:  Laboratory Data Reviewed:  See separate refill TE 3/10/25- 2 days supply sent for Synthroid 225 mcg po daily until MD review labs and patient needed to schedule f/u appt as she is over due     5/20/25-F/U appt    2/25/25-labs completed:    Component      Latest Ref Rng 2/25/2025  4:20 PM   T4, FREE      0.8 - 1.5 ng/dL 0.9    TSH      0.350 - 5.000 mcUnits/mL 1.068        Patient now has completed labs and has f/u appt.    Routed to covering provider, Dr. Olivas to review labs and if ok to provide refills at current dose until f/u appt.   yes  Pertinent Findings:  Recent Labs   Lab 05/15/23  0506 05/16/23  0459 05/17/23  0533   WBC 4.16 3.26* 3.39*   HGB 9.9* 9.0* 10.3*   HCT 30.1* 27.7* 32.3*    306 337   MCV 92 93 94   RDW 12.1 12.0 12.1   * 130* 133*   K 5.1 5.1 4.9    103 105   CO2 20* 19* 18*   BUN 38* 41* 40*   CREATININE 2.0* 1.7* 1.4   GLU 91 76 91   PROT 6.1 5.8* 6.1   ALBUMIN 3.0* 2.9* 3.0*   BILITOT 0.2 0.2 0.2   AST 24 25 31   ALKPHOS 116 113 126   ALT 16 16 22       Microbiology Data Reviewed:  Pertinent Findings:  Microbiology Results (last 7 days)       Procedure Component Value Units Date/Time    Aerobic culture [[id number]] Collected: 05/14/23 1658    Order Status: Completed Specimen: Wound from Leg, Right Updated: 05/17/23 0737     Aerobic Bacterial Culture Skin vero,  no predominant organism    Culture, Anaerobe [[id number]] Collected: 05/14/23 1658    Order Status: Completed Specimen: Wound from Leg, Right Updated: 05/16/23 0841     Anaerobic Culture Culture in progress    Aerobic culture (Specify Source) **CANNOT BE ORDERED AS STAT** [[id number]] Collected: 05/12/23 1819    Order Status: Completed Specimen: Wound from Leg, Right Updated: 05/15/23 1407     Aerobic Bacterial Culture Skin vero,  no predominant organism    Aerobic culture (Specify Source) **CANNOT BE ORDERED AS STAT** [[id number]]     Order Status: Canceled Specimen: Wound     Blood culture [[id number]]     Order Status: Canceled Specimen: Blood     Blood culture [[id number]]     Order Status: Canceled Specimen: Blood           Other Results:  EKG (my interpretation): no study to review    Radiology Data Reviewed: yes  Pertinent Findings:  X-Ray Tibia Fibula 2 View Right   Final Result      1. No acute displaced fracture or dislocation of the tibia or fibula.         Electronically signed by: Juan Shelton MD   Date:    05/12/2023   Time:    19:14      US Lower Extremity Veins Right   Final Result      No evidence of deep venous thrombosis in the  right lower extremity.         Electronically signed by: Juan Shelton MD   Date:    05/12/2023   Time:    18:30             Current Medications:     Infusions:       Scheduled:   albuterol-ipratropium  3 mL Nebulization Q4H WAKE    amLODIPine  5 mg Oral Daily    calcium carbonate  500 mg Oral Daily    cholecalciferol (vitamin D3)  1,200 Units Oral Daily    collagenase   Topical (Top) Daily    enoxaparin  30 mg Subcutaneous Daily    fluticasone furoate-vilanteroL  1 puff Inhalation Daily    guaiFENesin  600 mg Oral BID    losartan  100 mg Oral Daily    melatonin  6 mg Oral Nightly    mupirocin   Topical (Top) Daily    omeprazole  40 mg Oral Before breakfast    polyethylene glycol  17 g Oral Daily    pregabalin  75 mg Oral BID        PRN:  acetaminophen, benzonatate, dextrose 10%, dextrose 10%, dextrose, dextrose, famotidine, glucagon (human recombinant), naloxone, sodium chloride 0.9%, traMADoL    Antibiotics and Day Number of Therapy:  Antibiotics (72h ago, onward)      Start     Stop Route Frequency Ordered    05/14/23 1700  mupirocin 2 % ointment         -- Top Daily 05/14/23 1658    05/12/23 2045  sulfamethoxazole-trimethoprim 800-160mg per tablet 1 tablet         05/15 0859 Oral Daily 05/12/23 2027              Lines and Day Number of Therapy:  PIV 5/12      Assessment and Plan:     Bridget Montgomery is a 88 y.o. F with a PMH of CKDIV, COPD, HTN, prediabetes (A1c 6.1 3/2023), and DVT (2022) who presents for a nonhealing right lower leg wound that has been present for 3 weeks. Pt afebrile and no leukocytosis. Pt completed 6 days of Bactrim prior to admission. Low clinical suspicion of infection. Neg erythema, crepitus, or purulent drainage. XR neg for obvious infection. Due to severe pain, ordered CRP and ESR which were not significant. Dr. Mohan consulted for wound management. Aerobic cultures pending but anaerobic cx no growth.     Right Lower Extremity Wound  WBC 6.4, pt afebrile on admit. Photo in  media tab. Has HH wound care set up  - Neg for purulent drainage, crepitus, or erythematous borders  - XR Tibia: no obvious signs of osteo  - CRP 7.8 and ESR 37  - Given dose of Bactrim 800 and completed 7 day course as previously prescribed  - Aerobic cx from 5/12 NG   - Consulted Dr Mohan for wound care management, states wound is improving  - Aerobic cx pending  - Anaerobic wound cx NG  - ID consulted, no abx at this time  - Procal 0.05 and UA neg    Pain 2/2 Right Lower Extremity Wound  - Ordered Tylenol 500mg q6 PRN  - Tramadol 50mg BID PRN     Spinal Stenosis  - Continue home pregabalin 75mg BID  - NSGY consulted, no plans for surgical intervention at this time. Recommending f/u w/ NSGY as outpatient if radicular leg symptoms worsen     HTN  /74 on admit. Home med: olmesartan 40mg  - Likely elevated 2/2 pain  - Goal systolics <210  - Continue amlodipine 5mg and losartan 100mg     H/o DVT  - Most recently in 2022  - Not on home anticoagulation  - DVT  5/2023 neg    Hyponatremia, improving  Na 129 on admit  - Completed IVF bolus @50ml/hr  - Na improved to 133 this morning  - Continuing to encourage PO intake     RADHA on CKDIV, resolved  Cr 1.9 on admit, baseline ~ 1.4  - Improved with 250mL LR bolus  - Continuing to encourage PO intake     Microcytic Anemia  H/H 9.6/28.7 on admit  - Iron studies 2/2023: Fe 43, Transferrin 257, TIBC 380, Saturated iron 11  - Ferritin 2/2023 57  - Transfuse Hg <7     COPD  - Home symbicort  - Continue on fluticasone furoate-vilanterol 100-25mcg     HLD  - Lipid panel: Chol 243, HDL 60, , TG 80  - Given advanced age, benefits does not outweigh risks for statin therapy    Hypoalbuminemia  Albumin 3.3 on admit  - CTM     Prediabetes  - A1c 6.1% 3/2023  - CTM     Constipation  - Continue Miralax bowel regimen     HCM  - Of note, pt's daughter has been providing pt with herbal supplements during this stay. Advised to not administer them while inpatient    Code Status:  Full code  Diet: Renal  DVT: Lovenox (pt has refused Lovenox for 2 nights in a row)    Dispo: discharge today w/ follow up in wound care clinic      Zaid Payne MD  [facility], PGY-1  [facility] Hospital Medicine Team B    [facility] Medicine Hospitalist Pager numbers:   [facility] Hospitalist Medicine Team B (Fredy/Eliana):  [phone]

## 2025-03-12 NOTE — PROGRESS NOTES
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 Her symptoms began last Monday with headache that started in her neck and migrated toward the top of her head. She has had chronic neck pain due to cervical stenosis, but the headache was new. On Saturday, she had a normal morning and was able to perform all of her ADLs. Around 8 pm, she suddenly became weak in her arms and legs and was not able to walk. MRI demonstrated chronic type 2 odontoid fracture with progressed retropulsion of the fracture fragment in comparison to prior MRI from 8/11/2023, with moderate to severe canal stenosis at C1-C2. Of note, patient had previously discontinued wearing her cervical collar. Denies radiculopathy in her arms or legs, sensory deficit, bladder or bowel incontinence.      Post-Op Info:  Procedure(s) (LRB):  FUSION, SPINE, CERVICAL, POSTERIOR APPROACH (N/A)       Interval History: NAEON. Patient reports worsening weakness in BUE and difficulty feeding herself starting late yesterday evening. Compliant with collar. We discussed that the current recommendation is still surgical intervention. Patient states she needs to discuss with her 3 daughters and is not ready to make a decision yet. We discussed that there is less likelihood of neurologic recovery, the longer the deficit is present without intervention. Patient/ v/u and wants to wait to decide. Dexamethasone started. Voiding spontaneously. Denies LE weakness, b/b incontinence, numbness.     Medications:  Continuous Infusions:  Scheduled Meds:   amLODIPine  10 mg Oral Daily    dexAMETHasone injection   4 mg Intravenous Q6H    fluticasone furoate-vilanteroL  1 puff Inhalation Daily    gabapentin  300 mg Oral QHS    [START ON 3/13/2025] losartan  100 mg Oral Daily    methocarbamoL  500 mg Oral TID    mupirocin   Nasal BID    pantoprazole  40 mg Oral BID    polyethylene glycol  17 g Oral Daily    pregabalin  50 mg Oral QHS    senna-docusate 8.6-50 mg  1 tablet Oral BID     PRN Meds:  Current Facility-Administered Medications:     acetaminophen, 650 mg, Oral, Q4H PRN    albuterol, 2 puff, Inhalation, Q6H PRN    aluminum-magnesium hydroxide-simethicone, 30 mL, Oral, QID PRN    butalbital-acetaminophen-caffeine -40 mg, 1 tablet, Oral, Q4H PRN    dextrose 50%, 12.5 g, Intravenous, PRN    dextrose 50%, 25 g, Intravenous, PRN    glucagon (human recombinant), 1 mg, Intramuscular, PRN    glucagon (human recombinant), 1 mg, Intramuscular, PRN    glucose, 16 g, Oral, PRN    glucose, 16 g, Oral, PRN    glucose, 24 g, Oral, PRN    glucose, 24 g, Oral, PRN    insulin aspart U-100, 0-5 Units, Subcutaneous, QID (AC + HS) PRN    melatonin, 6 mg, Oral, Nightly PRN    morphine, 2 mg, Intravenous, Q4H PRN    naloxone, 0.02 mg, Intravenous, PRN    ondansetron, 8 mg, Oral, Q8H PRN    oxyCODONE-acetaminophen, 1 tablet, Oral, Q4H PRN    oxyCODONE-acetaminophen, 1 tablet, Oral, Q4H PRN    simethicone, 1 tablet, Oral, QID PRN    sodium chloride 0.9%, 1-10 mL, Intravenous, Q12H PRN     Review of Systems  Objective:     Weight: 59 kg (130 lb 1.1 oz)  Body mass index is 23.04 kg/m².  Vital Signs (Most Recent):  Temp: 97.9 °F (36.6 °C) (03/12/25 0510)  Pulse: (!) 56 (03/12/25 1218)  Resp: 18 (03/12/25 1218)  BP: 116/67 (03/12/25 1218)  SpO2: (!) 89 % (03/12/25 1218) Vital Signs (24h Range):  Temp:  [97.4 °F (36.3 °C)-97.9 °F (36.6 °C)] 97.9 °F (36.6 °C)  Pulse:  [46-65] 56  Resp:  [17-18] 18  SpO2:  [89 %-97 %] 89 %  BP: (116-180)/(67-80) 116/67                         Female External Urinary Catheter w/ Suction 03/09/25 (Active)   Suction  "Continuous suction at 40 mmHg 03/10/25 2046   Date of last wick change 03/10/25 03/10/25 2046   Time of last wick change 2100 03/10/25 2046   Output (mL) 300 mL 03/10/25 0630            Neurosurgery Physical Exam  General: Well developed, well nourished, no distress.   Head: Normocephalic, atraumatic.  Mental Status: Awake, Alert, Oriented  Speech: Clear with content appropriate to conversation.  Cranial nerves: Face symmetric, CN II-XII grossly intact.   Eyes: Pupils equal, round, reactive to light, EOMI.  Sensory: Intact to light touch throughout.  Motor Strength: Moves all extremities spontaneously with good tone. Full strength upper and lower extremities. No abnormal movements seen.      Strength   Deltoids Triceps Biceps Wrist Extension Wrist Flexion Hand    Upper: R 3/5 5/5 5/5 5/5 5/5 5/5     L 5/5 5/5 5/5 5/5 5/5 5/5       Iliopsoas Quadriceps Knee  Flexion Tibialis  anterior Gastro- cnemius EHL   Lower: R 5/5 5/5 5/5 5/5 5/5 5/5     L 5/5 5/5 5/5 5/5 5/5 5/5    Patient with R distal clavicle fx and h/o of R shoulder arthroplasty.  Hand intrinsics 5/5.    Schneider: absent  In c-collar.    Significant Labs:  Recent Labs   Lab 03/11/25  0409 03/12/25  0333   GLU 70 87   * 132*   K 4.8 4.7   CL 94* 97   CO2 26 24   BUN 33* 38*   CREATININE 1.3 1.4   CALCIUM 8.5* 8.6*   MG 2.7* 3.1*     Recent Labs   Lab 03/11/25  0409 03/12/25  0333   WBC 4.20 4.88   HGB 10.0* 9.8*   HCT 31.2* 31.1*    315     No results for input(s): "LABPT", "INR", "APTT" in the last 48 hours.  Microbiology Results (last 7 days)       ** No results found for the last 168 hours. **          All pertinent labs from the last 24 hours have been reviewed.    Significant Diagnostics:  I have reviewed and interpreted all pertinent imaging results/findings within the past 24 hours.    Assessment/Plan:     Cervical spinal stenosis  Bridget Diop Ulises is a 89yo woman w/ PMHx COPD, HTN, HLD, history of TIA, known type 2 odontoid fx " followed by Dr. Quach with non-surgical management, and right shoulder arthroplasty 06/2024 presenting with a week of severe persistent headache, and diffuse weakness involving her bilateral upper and lower extremities.     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 Multilevel foraminal stenosis.     Discussed surgical vs non-surgical options with the patient and her daughter Liliana Montgomery present. Patient's grandson Aidan (neurosurgeon) was also on the phone for this conversation. Given the degree of upper cervical cord kinking, cord edema, and craniocervical settling recommended surgical intervention to preserve her current function and prevent neurological decline. Surgical recommendation is occiput-C4 fusion. She would also need pre-op traction. Non-surgical option is strict cervical collar 24/7 indefinitely, pending stability of XR that is ordered to ensure patient is stable in the collar. This would also require the patient to be compliant with collar use. Patient and daughter would like more time to discuss this with their family. While awaiting family decision, would recommend continue strict collar use and will f/u on XR results     Plan:   - Cervical collar at all times.  - XR cervical spine upright/supine in collar stable. Continue collar.   - Multimodal pain control.  - Bowel regimen.  - Notify NSGY immediately with any decline on exam.  - Awaiting family decision on surgical intervention.     D/w Dr. Lemons.        Janeth Riley PA-C  Neurosurgery  [facility] ([facility])

## 2025-03-12 NOTE — ASSESSMENT & PLAN NOTE
Cord Compression  Presented to outside hospital with increased generalized weakness, particularly in her extremities.  CVA workup negative, however MRI showed new onset cervical cord compression, likely contributing to her symptoms.  Transferred here for Neurosurgery evaluation; appreciate recommendations.  - MRI Cervical spine 3/9/25: chronic type 2 odontoid fracture with progressed retropulsion of the fracture fragment in comparison to prior MRI from 8/11/2023, with moderate to severe canal stenosis at C1-C2. Multilevel foraminal stenosis.   - Cont C-collar at all times  - Upright and supine cervical x-ray pending  - Neurosurgery recommended occiput-C4 fusion. Non-surgical option is strict cervical collar 24/7 indefinitely  - XR with chronic odontoid type 2 fracture, position alignment appears stable   - Decadron started for new neuro deficit  - Patient remains undecided regarding surgery. Neurosurgery to follow up with family today

## 2025-03-12 NOTE — ASSESSMENT & PLAN NOTE
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 Multilevel foraminal stenosis.     Discussed surgical vs non-surgical options with the patient and her daughter Liliana Montgomery present. Patient's grandson Aidan (neurosurgeon) was also on the phone for this conversation. Given the degree of upper cervical cord kinking, cord edema, and craniocervical settling recommended surgical intervention to preserve her current function and prevent neurological decline. Surgical recommendation is occiput-C4 fusion. She would also need pre-op traction. Non-surgical option is strict cervical collar 24/7 indefinitely, pending stability of XR that is ordered to ensure patient is stable in the collar. This would also require the patient to be compliant with collar use. Patient and daughter would like more time to discuss this with their family. While awaiting family decision, would recommend continue strict collar use and will f/u on XR results     Plan:   - Cervical collar at all times.  - XR cervical spine upright/supine in collar stable. Continue collar.   - Multimodal pain control.  - Bowel regimen.  - Notify NSGY immediately with any decline on exam.  - Awaiting family decision on surgical intervention.     D/w Dr. Lemons.

## 2025-03-12 NOTE — ASSESSMENT & PLAN NOTE
Patient has Abnormal Magnesium: hypermagnesemia. Will continue to monitor electrolytes closely. Will attempt to lower the affected electrolytes and repeat labs to be done after interventions completed. The patient's magnesium results have been reviewed and are listed below.  Recent Labs   Lab 03/12/25  0333   MG 3.1*

## 2025-03-12 NOTE — SUBJECTIVE & OBJECTIVE
Interval History: NAEON. Patient reports worsening weakness in BUE and difficulty feeding herself starting late yesterday evening. Compliant with collar. We discussed that the current recommendation is still surgical intervention. Patient states she needs to discuss with her 3 daughters and is not ready to make a decision yet. We discussed that there is less likelihood of neurologic recovery, the longer the deficit is present without intervention. Patient/ v/u and wants to wait to decide. Dexamethasone started. Voiding spontaneously. Denies LE weakness, b/b incontinence, numbness.     Medications:  Continuous Infusions:  Scheduled Meds:   amLODIPine  10 mg Oral Daily    dexAMETHasone injection  4 mg Intravenous Q6H    fluticasone furoate-vilanteroL  1 puff Inhalation Daily    gabapentin  300 mg Oral QHS    [START ON 3/13/2025] losartan  100 mg Oral Daily    methocarbamoL  500 mg Oral TID    mupirocin   Nasal BID    pantoprazole  40 mg Oral BID    polyethylene glycol  17 g Oral Daily    pregabalin  50 mg Oral QHS    senna-docusate 8.6-50 mg  1 tablet Oral BID     PRN Meds:  Current Facility-Administered Medications:     acetaminophen, 650 mg, Oral, Q4H PRN    albuterol, 2 puff, Inhalation, Q6H PRN    aluminum-magnesium hydroxide-simethicone, 30 mL, Oral, QID PRN    butalbital-acetaminophen-caffeine -40 mg, 1 tablet, Oral, Q4H PRN    dextrose 50%, 12.5 g, Intravenous, PRN    dextrose 50%, 25 g, Intravenous, PRN    glucagon (human recombinant), 1 mg, Intramuscular, PRN    glucagon (human recombinant), 1 mg, Intramuscular, PRN    glucose, 16 g, Oral, PRN    glucose, 16 g, Oral, PRN    glucose, 24 g, Oral, PRN    glucose, 24 g, Oral, PRN    insulin aspart U-100, 0-5 Units, Subcutaneous, QID (AC + HS) PRN    melatonin, 6 mg, Oral, Nightly PRN    morphine, 2 mg, Intravenous, Q4H PRN    naloxone, 0.02 mg, Intravenous, PRN    ondansetron, 8 mg, Oral, Q8H PRN    oxyCODONE-acetaminophen, 1 tablet, Oral, Q4H PRN     oxyCODONE-acetaminophen, 1 tablet, Oral, Q4H PRN    simethicone, 1 tablet, Oral, QID PRN    sodium chloride 0.9%, 1-10 mL, Intravenous, Q12H PRN     Review of Systems  Objective:     Weight: 59 kg (130 lb 1.1 oz)  Body mass index is 23.04 kg/m².  Vital Signs (Most Recent):  Temp: 97.9 °F (36.6 °C) (03/12/25 0510)  Pulse: (!) 56 (03/12/25 1218)  Resp: 18 (03/12/25 1218)  BP: 116/67 (03/12/25 1218)  SpO2: (!) 89 % (03/12/25 1218) Vital Signs (24h Range):  Temp:  [97.4 °F (36.3 °C)-97.9 °F (36.6 °C)] 97.9 °F (36.6 °C)  Pulse:  [46-65] 56  Resp:  [17-18] 18  SpO2:  [89 %-97 %] 89 %  BP: (116-180)/(67-80) 116/67                         Female External Urinary Catheter w/ Suction 03/09/25 (Active)   Suction Continuous suction at 40 mmHg 03/10/25 2046   Date of last wick change 03/10/25 03/10/25 2046   Time of last wick change 2100 03/10/25 2046   Output (mL) 300 mL 03/10/25 0630            Neurosurgery Physical Exam  General: Well developed, well nourished, no distress.   Head: Normocephalic, atraumatic.  Mental Status: Awake, Alert, Oriented  Speech: Clear with content appropriate to conversation.  Cranial nerves: Face symmetric, CN II-XII grossly intact.   Eyes: Pupils equal, round, reactive to light, EOMI.  Sensory: Intact to light touch throughout.  Motor Strength: Moves all extremities spontaneously with good tone. Full strength upper and lower extremities. No abnormal movements seen.      Strength   Deltoids Triceps Biceps Wrist Extension Wrist Flexion Hand    Upper: R 3/5 5/5 5/5 5/5 5/5 5/5     L 5/5 5/5 5/5 5/5 5/5 5/5       Iliopsoas Quadriceps Knee  Flexion Tibialis  anterior Gastro- cnemius EHL   Lower: R 5/5 5/5 5/5 5/5 5/5 5/5     L 5/5 5/5 5/5 5/5 5/5 5/5    Patient with R distal clavicle fx and h/o of R shoulder arthroplasty.  Hand intrinsics 5/5.    Schneider: absent  In c-collar.    Significant Labs:  Recent Labs   Lab 03/11/25  0409 03/12/25  0333   GLU 70 87   * 132*   K 4.8 4.7   CL 94* 97  "  CO2 26 24   BUN 33* 38*   CREATININE 1.3 1.4   CALCIUM 8.5* 8.6*   MG 2.7* 3.1*     Recent Labs   Lab 03/11/25  0409 03/12/25  0333   WBC 4.20 4.88   HGB 10.0* 9.8*   HCT 31.2* 31.1*    315     No results for input(s): "LABPT", "INR", "APTT" in the last 48 hours.  Microbiology Results (last 7 days)       ** No results found for the last 168 hours. **          All pertinent labs from the last 24 hours have been reviewed.    Significant Diagnostics:  I have reviewed and interpreted all pertinent imaging results/findings within the past 24 hours.    "

## 2025-03-12 NOTE — CARE UPDATE
I have reviewed the chart of Bridget Montgomery who is hospitalized for the following:    Active Hospital Problems    Diagnosis    *Spinal stenosis    Cord compression    Hypomagnesemia     Monitor with labs      Cervical spinal stenosis    Headache    Generalized weakness    Hyponatremia    Other emphysema     - remote history of smoking      Mixed hyperlipidemia    Edema, spinal cord    Essential hypertension    Chronic kidney disease, stage III (moderate)     - sees Lacey.  - last GFR was 36 (in 2022)          Imani Schaefer NP  [facility]

## 2025-03-12 NOTE — PROGRESS NOTES
"[facility] - Neurosurgery ([facility] Medicine  Progress Note    Patient Name: Bridget Montgomery  MRN: [id number]  Patient Class: IP- Inpatient   Admission Date: 3/9/2025  Length of Stay: 3 days  Attending Physician: Will Blanco MD  Primary Care Provider: No primary care provider on file.        Subjective     Principal Problem:Spinal stenosis        HPI:  Bridget Montgomery is a 89 y.o. female with COPD, HTN, HLD, history of TIA who is transferred here from [place] for NSGY evaluation for cervical cord compression.    Per Transfer Center:  "89F COPD, CKD4, GERD, HTN, HLD, hx of TIA, prediabetes, and spinal stenosis who initially presented to [facility] on 3/8/2025 with a primary complaint of sudden onset weakness. The patient was in their usual state of health until Monday. Starting on Monday, the patient started having a headache that started in her neck and migrated toward the top of her head. She has had chronic neck pain due to cervical stenosis, but the headache was new. She managed the pain with tylenol. On Friday, she presented to her PCP (Dr. Alejandrina Russ) who gave her 2 injections into her neck to help with the pain. She is not sure what the injection was but thinks it might have been a steroid. On Saturday, she had a normal morning and was able to perform all of her ADLs. Around 8 pm, she suddenly became weak in her arms and legs and was not able to walk. She was able to catch herself prior to falling. The primary concern was the weakness and ongoing headache. She endorses chronic numbness/tingling in her fingers that has not worsened. She denies any sensory deficits. She denies any urinary incontinence or fecal incontinence. She endorses pain in her back, neck, knees, and a headache. She denies any fevers, chills, chest pain, shortness of breath, abdominal pain, N/V, diarrhea, constipation. CT Head was negative for acute stroke. MRI shows new onset cervical cord " "compression C1-C2. Neurosurgery consulted, recommended transferring pt to [facility] for a higher level of care."    Overview/Hospital Course:  89 y.o. female with COPD, HTN, HLD, cervical stenosis, history of TIA who is transferred here from [place] for NSGY evaluation for cervical cord compression. Presented to outside hospital with increased generalized weakness, particularly in her extremities.  CVA workup negative, however MRI showed new onset cervical cord compression, likely contributing to her symptoms.  Transferred here for Neurosurgery evaluation. MRI Cervical spine 3/9/25: chronic type 2 odontoid fracture with progressed retropulsion of the fracture fragment in comparison to prior MRI from 8/11/2023, with moderate to severe canal stenosis at C1-C2. Multilevel foraminal stenosis. Cont C-collar at all times. Neurosurgery recommended occiput-C4 fusion. Non-surgical option is strict cervical collar 24/7 indefinitely, XR pending to ensure patient is stable in the collar.    Interval History: NAEON. Worsening RUE weakness noted. Pt states she is having trouble raising arm to feed herself. Denies numbness/paresthesias or bowel/bladder incontinence. She is still undecided about surgery, states she would like to discuss with her grandson who is a neurosurgeon in [place].     Review of Systems   All other systems reviewed and are negative.    Objective:     Vital Signs (Most Recent):  Temp: 97.9 °F (36.6 °C) (03/12/25 0510)  Pulse: (!) 56 (03/12/25 1218)  Resp: 18 (03/12/25 1218)  BP: 116/67 (03/12/25 1218)  SpO2: (!) 89 % (03/12/25 1218) Vital Signs (24h Range):  Temp:  [97.4 °F (36.3 °C)-97.9 °F (36.6 °C)] 97.9 °F (36.6 °C)  Pulse:  [46-65] 56  Resp:  [17-18] 18  SpO2:  [89 %-97 %] 89 %  BP: (116-180)/(67-80) 116/67     Weight: 59 kg (130 lb 1.1 oz)  Body mass index is 23.04 kg/m².    Intake/Output Summary (Last 24 hours) at 3/12/2025 1623  Last data filed at 3/12/2025 050  Gross per 24 hour   Intake --   Output " 1000 ml   Net -1000 ml         Physical Exam  Vitals and nursing note reviewed.   Constitutional:       General: She is not in acute distress.     Appearance: She is well-developed. She is not diaphoretic.   HENT:      Head: Normocephalic and atraumatic.   Eyes:      General: No scleral icterus.     Conjunctiva/sclera: Conjunctivae normal.   Neck:      Vascular: No JVD.      Comments: Neck in C-collar   Cardiovascular:      Rate and Rhythm: Normal rate and regular rhythm.   Pulmonary:      Effort: Pulmonary effort is normal. No respiratory distress.   Musculoskeletal:      Right lower leg: No edema.      Left lower leg: No edema.   Skin:     Coloration: Skin is not jaundiced or pale.   Neurological:      Mental Status: She is alert and oriented to person, place, and time.      Cranial Nerves: No cranial nerve deficit.      Sensory: No sensory deficit.      Motor: Weakness (generalized) present. No abnormal muscle tone.      Comments: Motor: RUE 3/5, LUE, RLE, LLE 5/5   Psychiatric:         Mood and Affect: Mood normal.         Behavior: Behavior normal.               Significant Labs: All pertinent labs within the past 24 hours have been reviewed.    Significant Imaging: I have reviewed all pertinent imaging results/findings within the past 24 hours.      Assessment & Plan  Spinal stenosis  Cord Compression  Presented to outside hospital with increased generalized weakness, particularly in her extremities.  CVA workup negative, however MRI showed new onset cervical cord compression, likely contributing to her symptoms.  Transferred here for Neurosurgery evaluation; appreciate recommendations.  - MRI Cervical spine 3/9/25: chronic type 2 odontoid fracture with progressed retropulsion of the fracture fragment in comparison to prior MRI from 8/11/2023, with moderate to severe canal stenosis at C1-C2. Multilevel foraminal stenosis.   - Cont C-collar at all times  - Upright and supine cervical x-ray pending  -  Neurosurgery recommended occiput-C4 fusion. Non-surgical option is strict cervical collar 24/7 indefinitely  - XR with chronic odontoid type 2 fracture, position alignment appears stable   - Decadron started for new neuro deficit  - Patient remains undecided regarding surgery. Neurosurgery to follow up with family today      Chronic kidney disease, stage III (moderate)  Renal function remains around baseline; Estimated Creatinine Clearance: 22.1 mL/min (based on SCr of 1.4 mg/dL). according to latest data. 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 Will avoid nephrotoxic agents as able, and renally dose all meds as applicable.  Other emphysema  History of COPD, not on home oxygen. Currently without evidence of acute exacerbation, and sats on room air at goal 88% or greater. Will continue hospital formulary equivalent of home regimen and monitor respiratory status.   Essential hypertension  Chronic, and currently controlled. Latest blood pressure and vitals reviewed-   While in the hospital, will manage blood pressure as follows; Continue home antihypertensive regimen    Will utilize p.r.n. blood pressure medication only if patient's blood pressure greater than 220/110 and she develops symptoms such as worsening chest pain or shortness of breath.  Generalized weakness  Chronic, however likely exacerbated by cervical radiculopathy.  Consider PT/OT after neurosurgery recommendations.    Headache  Fioricet PRN    Cervical spinal stenosis      Mixed hyperlipidemia      Edema, spinal cord      Hyponatremia    Cord compression      VTE Risk Mitigation (From admission, onward)           Ordered     IP VTE HIGH RISK PATIENT  Once         03/09/25 2040     Place sequential compression device  Until discontinued         03/09/25 2040     Reason for No Pharmacological VTE Prophylaxis  Once        Question:  Reasons:  Answer:  Physician Provided (leave comment)  Comment:  surgery    03/09/25 2040                    Discharge  Planning   MIKAYLA: 3/12/2025     Code Status: Full Code   Medical Readiness for Discharge Date:   Discharge Plan A: Home with family, Home Health                        Will Blanco MD  Department of Hospital Medicine   [facility] - Neurosurgery ([facility])

## 2025-03-13 LAB
ALBUMIN SERPL BCP-MCNC: 2.9 G/DL (ref 3.5–5.2)
ALP SERPL-CCNC: 199 U/L (ref 40–150)
ALT SERPL W/O P-5'-P-CCNC: 25 U/L (ref 10–44)
ANION GAP SERPL CALC-SCNC: 12 MMOL/L (ref 8–16)
AST SERPL-CCNC: 31 U/L (ref 10–40)
BASOPHILS # BLD AUTO: 0 K/UL (ref 0–0.2)
BASOPHILS NFR BLD: 0 % (ref 0–1.9)
BILIRUB SERPL-MCNC: 0.2 MG/DL (ref 0.1–1)
BUN SERPL-MCNC: 41 MG/DL (ref 8–23)
CALCIUM SERPL-MCNC: 8.6 MG/DL (ref 8.7–10.5)
CHLORIDE SERPL-SCNC: 96 MMOL/L (ref 95–110)
CO2 SERPL-SCNC: 21 MMOL/L (ref 23–29)
CREAT SERPL-MCNC: 1.3 MG/DL (ref 0.5–1.4)
DIFFERENTIAL METHOD BLD: ABNORMAL
EOSINOPHIL # BLD AUTO: 0 K/UL (ref 0–0.5)
EOSINOPHIL NFR BLD: 0 % (ref 0–8)
ERYTHROCYTE [DISTWIDTH] IN BLOOD BY AUTOMATED COUNT: 13.4 % (ref 11.5–14.5)
EST. GFR  (NO RACE VARIABLE): 39.1 ML/MIN/1.73 M^2
GLUCOSE SERPL-MCNC: 167 MG/DL (ref 70–110)
HCT VFR BLD AUTO: 33.2 % (ref 37–48.5)
HGB BLD-MCNC: 10.7 G/DL (ref 12–16)
IMM GRANULOCYTES # BLD AUTO: 0.01 K/UL (ref 0–0.04)
IMM GRANULOCYTES NFR BLD AUTO: 0.2 % (ref 0–0.5)
LYMPHOCYTES # BLD AUTO: 0.4 K/UL (ref 1–4.8)
LYMPHOCYTES NFR BLD: 9.4 % (ref 18–48)
MAGNESIUM SERPL-MCNC: 2.9 MG/DL (ref 1.6–2.6)
MCH RBC QN AUTO: 27.9 PG (ref 27–31)
MCHC RBC AUTO-ENTMCNC: 32.2 G/DL (ref 32–36)
MCV RBC AUTO: 87 FL (ref 82–98)
MONOCYTES # BLD AUTO: 0 K/UL (ref 0.3–1)
MONOCYTES NFR BLD: 0.6 % (ref 4–15)
NEUTROPHILS # BLD AUTO: 4.2 K/UL (ref 1.8–7.7)
NEUTROPHILS NFR BLD: 89.8 % (ref 38–73)
NRBC BLD-RTO: 0 /100 WBC
PLATELET # BLD AUTO: 345 K/UL (ref 150–450)
PMV BLD AUTO: 10.5 FL (ref 9.2–12.9)
POCT GLUCOSE: 138 MG/DL (ref 70–110)
POCT GLUCOSE: 193 MG/DL (ref 70–110)
POCT GLUCOSE: 198 MG/DL (ref 70–110)
POTASSIUM SERPL-SCNC: 4.8 MMOL/L (ref 3.5–5.1)
PROT SERPL-MCNC: 6.5 G/DL (ref 6–8.4)
RBC # BLD AUTO: 3.84 M/UL (ref 4–5.4)
SODIUM SERPL-SCNC: 129 MMOL/L (ref 136–145)
WBC # BLD AUTO: 4.68 K/UL (ref 3.9–12.7)

## 2025-03-13 PROCEDURE — 83735 ASSAY OF MAGNESIUM: CPT | Performed by: INTERNAL MEDICINE

## 2025-03-13 PROCEDURE — 25000003 PHARM REV CODE 250: Performed by: STUDENT IN AN ORGANIZED HEALTH CARE EDUCATION/TRAINING PROGRAM

## 2025-03-13 PROCEDURE — 36415 COLL VENOUS BLD VENIPUNCTURE: CPT | Performed by: INTERNAL MEDICINE

## 2025-03-13 PROCEDURE — 80053 COMPREHEN METABOLIC PANEL: CPT | Performed by: INTERNAL MEDICINE

## 2025-03-13 PROCEDURE — 25000003 PHARM REV CODE 250: Performed by: INTERNAL MEDICINE

## 2025-03-13 PROCEDURE — 25000242 PHARM REV CODE 250 ALT 637 W/ HCPCS: Performed by: STUDENT IN AN ORGANIZED HEALTH CARE EDUCATION/TRAINING PROGRAM

## 2025-03-13 PROCEDURE — 94640 AIRWAY INHALATION TREATMENT: CPT

## 2025-03-13 PROCEDURE — 11000001 HC ACUTE MED/SURG PRIVATE ROOM

## 2025-03-13 PROCEDURE — 25000003 PHARM REV CODE 250: Performed by: NURSE PRACTITIONER

## 2025-03-13 PROCEDURE — 63600175 PHARM REV CODE 636 W HCPCS: Performed by: PHYSICIAN ASSISTANT

## 2025-03-13 PROCEDURE — 85025 COMPLETE CBC W/AUTO DIFF WBC: CPT | Performed by: INTERNAL MEDICINE

## 2025-03-13 PROCEDURE — 99024 POSTOP FOLLOW-UP VISIT: CPT | Mod: ,,,

## 2025-03-13 RX ADMIN — MUPIROCIN: 20 OINTMENT TOPICAL at 08:03

## 2025-03-13 RX ADMIN — BUTALBITAL, ACETAMINOPHEN, AND CAFFEINE 1 TABLET: 325; 50; 40 TABLET ORAL at 08:03

## 2025-03-13 RX ADMIN — LOSARTAN POTASSIUM 100 MG: 50 TABLET, FILM COATED ORAL at 08:03

## 2025-03-13 RX ADMIN — DEXAMETHASONE SODIUM PHOSPHATE 4 MG: 4 INJECTION INTRA-ARTICULAR; INTRALESIONAL; INTRAMUSCULAR; INTRAVENOUS; SOFT TISSUE at 12:03

## 2025-03-13 RX ADMIN — AMLODIPINE BESYLATE 10 MG: 10 TABLET ORAL at 08:03

## 2025-03-13 RX ADMIN — SENNOSIDES AND DOCUSATE SODIUM 1 TABLET: 50; 8.6 TABLET ORAL at 08:03

## 2025-03-13 RX ADMIN — PREGABALIN 50 MG: 50 CAPSULE ORAL at 08:03

## 2025-03-13 RX ADMIN — DEXAMETHASONE SODIUM PHOSPHATE 4 MG: 4 INJECTION INTRA-ARTICULAR; INTRALESIONAL; INTRAMUSCULAR; INTRAVENOUS; SOFT TISSUE at 11:03

## 2025-03-13 RX ADMIN — DEXAMETHASONE SODIUM PHOSPHATE 4 MG: 4 INJECTION INTRA-ARTICULAR; INTRALESIONAL; INTRAMUSCULAR; INTRAVENOUS; SOFT TISSUE at 10:03

## 2025-03-13 RX ADMIN — PANTOPRAZOLE SODIUM 40 MG: 20 TABLET, DELAYED RELEASE ORAL at 08:03

## 2025-03-13 RX ADMIN — DEXAMETHASONE SODIUM PHOSPHATE 4 MG: 4 INJECTION INTRA-ARTICULAR; INTRALESIONAL; INTRAMUSCULAR; INTRAVENOUS; SOFT TISSUE at 05:03

## 2025-03-13 RX ADMIN — METHOCARBAMOL 500 MG: 500 TABLET ORAL at 08:03

## 2025-03-13 RX ADMIN — POLYETHYLENE GLYCOL 3350 17 G: 17 POWDER, FOR SOLUTION ORAL at 08:03

## 2025-03-13 RX ADMIN — GABAPENTIN 300 MG: 300 CAPSULE ORAL at 08:03

## 2025-03-13 RX ADMIN — BUTALBITAL, ACETAMINOPHEN, AND CAFFEINE 1 TABLET: 325; 50; 40 TABLET ORAL at 10:03

## 2025-03-13 RX ADMIN — METHOCARBAMOL 500 MG: 500 TABLET ORAL at 01:03

## 2025-03-13 RX ADMIN — FLUTICASONE FUROATE AND VILANTEROL TRIFENATATE 1 PUFF: 100; 25 POWDER RESPIRATORY (INHALATION) at 08:03

## 2025-03-13 NOTE — SUBJECTIVE & OBJECTIVE
Interval History:  Patient reports some upper extremity symptoms tingling.  No chest pain no shortness a breath.  She does report having chronic indigestion    Review of Systems  Objective:     Vital Signs (Most Recent):  Temp: 98.7 °F (37.1 °C) (03/13/25 0830)  Pulse: 62 (03/13/25 0955)  Resp: 18 (03/13/25 0830)  BP: 121/74 (03/13/25 0830)  SpO2: 96 % (03/13/25 0830) Vital Signs (24h Range):  Temp:  [97.5 °F (36.4 °C)-98.7 °F (37.1 °C)] 98.7 °F (37.1 °C)  Pulse:  [53-62] 62  Resp:  [16-18] 18  SpO2:  [95 %-99 %] 96 %  BP: (103-145)/(56-74) 121/74     Weight: 59 kg (130 lb 1.1 oz)  Body mass index is 23.04 kg/m².    Intake/Output Summary (Last 24 hours) at 3/13/2025 1439  Last data filed at 3/13/2025 0405  Gross per 24 hour   Intake 200 ml   Output 1001 ml   Net -801 ml         Physical Exam      Clear lungs bilaterally, unlabored breathing, on room air, no cyanosis   Heart sounds indicate a regular rate and rhythm   Awake alert, no acute distress   C-collar in place   No facial droop, no slurred speech   5/5 fist  and hip flexor strength bilaterally   No obvious upper nor lower extremity edema

## 2025-03-13 NOTE — SUBJECTIVE & OBJECTIVE
Interval History: NAEON. AFVSS. No complaints. R deltoid weakness stable. OR booked for 3/19.    Medications:  Continuous Infusions:  Scheduled Meds:   amLODIPine  10 mg Oral Daily    dexAMETHasone injection  4 mg Intravenous Q6H    fluticasone furoate-vilanteroL  1 puff Inhalation Daily    gabapentin  300 mg Oral QHS    losartan  100 mg Oral Daily    methocarbamoL  500 mg Oral TID    mupirocin   Nasal BID    pantoprazole  40 mg Oral BID    polyethylene glycol  17 g Oral Daily    pregabalin  50 mg Oral QHS    senna-docusate 8.6-50 mg  1 tablet Oral BID     PRN Meds:  Current Facility-Administered Medications:     acetaminophen, 650 mg, Oral, Q4H PRN    albuterol, 2 puff, Inhalation, Q6H PRN    aluminum-magnesium hydroxide-simethicone, 30 mL, Oral, QID PRN    butalbital-acetaminophen-caffeine -40 mg, 1 tablet, Oral, Q4H PRN    dextrose 50%, 12.5 g, Intravenous, PRN    dextrose 50%, 25 g, Intravenous, PRN    glucagon (human recombinant), 1 mg, Intramuscular, PRN    glucagon (human recombinant), 1 mg, Intramuscular, PRN    glucose, 16 g, Oral, PRN    glucose, 16 g, Oral, PRN    glucose, 24 g, Oral, PRN    glucose, 24 g, Oral, PRN    insulin aspart U-100, 0-5 Units, Subcutaneous, QID (AC + HS) PRN    melatonin, 6 mg, Oral, Nightly PRN    morphine, 2 mg, Intravenous, Q4H PRN    naloxone, 0.02 mg, Intravenous, PRN    ondansetron, 8 mg, Oral, Q8H PRN    oxyCODONE-acetaminophen, 1 tablet, Oral, Q4H PRN    oxyCODONE-acetaminophen, 1 tablet, Oral, Q4H PRN    simethicone, 1 tablet, Oral, QID PRN    sodium chloride 0.9%, 1-10 mL, Intravenous, Q12H PRN     Review of Systems  Objective:     Weight: 59 kg (130 lb 1.1 oz)  Body mass index is 23.04 kg/m².  Vital Signs (Most Recent):  Temp: 98.7 °F (37.1 °C) (03/13/25 0830)  Pulse: 62 (03/13/25 0955)  Resp: 18 (03/13/25 0830)  BP: 121/74 (03/13/25 0830)  SpO2: 96 % (03/13/25 0830) Vital Signs (24h Range):  Temp:  [97.5 °F (36.4 °C)-98.7 °F (37.1 °C)] 98.7 °F (37.1 °C)  Pulse:   "[53-62] 62  Resp:  [16-18] 18  SpO2:  [95 %-99 %] 96 %  BP: (103-145)/(56-74) 121/74                         Female External Urinary Catheter w/ Suction 03/09/25 (Active)   Skin no redness;no breakdown 03/13/25 0955   Tolerance no signs/symptoms of discomfort 03/13/25 0955   Suction Continuous suction at 60 mmHg 03/13/25 0955   Date of last wick change 03/12/25 03/13/25 0955   Time of last wick change 2000 03/13/25 0405   Output (mL) 1000 mL 03/13/25 0405          Neurosurgery Physical Exam  General: Well developed, well nourished, no distress.   Head: Normocephalic, atraumatic.  Mental Status: Awake, Alert, Oriented  Speech: Clear with content appropriate to conversation.  Cranial nerves: Face symmetric, CN II-XII grossly intact.   Eyes: Pupils equal, round, reactive to light, EOMI.  Sensory: Intact to light touch throughout.  Motor Strength: Moves all extremities spontaneously with good tone. Full strength upper and lower extremities. No abnormal movements seen.      Strength   Deltoids Triceps Biceps Wrist Extension Wrist Flexion Hand    Upper: R 3/5 5/5 5/5 5/5 5/5 5/5     L 5/5 5/5 5/5 5/5 5/5 5/5       Iliopsoas Quadriceps Knee  Flexion Tibialis  anterior Gastro- cnemius EHL   Lower: R 5/5 5/5 5/5 5/5 5/5 5/5     L 5/5 5/5 5/5 5/5 5/5 5/5    Patient with R distal clavicle fx and h/o of R shoulder arthroplasty.  Hand intrinsics 5/5.     Schneider: absent  In c-collar.    Significant Labs:  Recent Labs   Lab 03/12/25  0333 03/13/25  0638   GLU 87 167*   * 129*   K 4.7 4.8   CL 97 96   CO2 24 21*   BUN 38* 41*   CREATININE 1.4 1.3   CALCIUM 8.6* 8.6*   MG 3.1* 2.9*     Recent Labs   Lab 03/12/25  0333 03/13/25  0638   WBC 4.88 4.68   HGB 9.8* 10.7*   HCT 31.1* 33.2*    345     No results for input(s): "LABPT", "INR", "APTT" in the last 48 hours.  Microbiology Results (last 7 days)       ** No results found for the last 168 hours. **          All pertinent labs from the last 24 hours have been " reviewed.    Significant Diagnostics:  I have reviewed and interpreted all pertinent imaging results/findings within the past 24 hours.

## 2025-03-13 NOTE — PLAN OF CARE
[facility] - Neurosurgery (Hospital)  Discharge Reassessment    Primary Care Provider: No primary care provider on file.    Expected Discharge Date: 3/21/2025    Reassessment (most recent)       Discharge Reassessment - 03/13/25 1435          Discharge Reassessment    Assessment Type Discharge Planning Reassessment     Did the patient's condition or plan change since previous assessment? No     Discharge Plan A Rehab     Discharge Plan B Skilled Nursing Facility;Home Health                     Patient has surgery (O-C4 fusion) scheduled for Wednesday. Dispo TBD.     Discharge Plan A and Plan B have been determined by review of patient's clinical status, future medical and therapeutic needs, and coverage/benefits for post-acute care in coordination with multidisciplinary team members.    SELENA Orr, MSW  Case Management  [facility]

## 2025-03-13 NOTE — PLAN OF CARE
Patient had no acute events during shift. BM positive. Plan for surgery 3/19. Waiting pre op traction. C- collar at all times. Daughters at bedside. Bed low and call light within reach of the patient. No other changes made to plan of care.  Will continue to monitor.           Problem: Adult Inpatient Plan of Care  Goal: Plan of Care Review  Outcome: Progressing  Goal: Patient-Specific Goal (Individualized)  Outcome: Progressing  Goal: Absence of Hospital-Acquired Illness or Injury  Outcome: Progressing  Goal: Optimal Comfort and Wellbeing  Outcome: Progressing  Goal: Readiness for Transition of Care  Outcome: Progressing     Problem: Diabetes Comorbidity  Goal: Blood Glucose Level Within Targeted Range  Outcome: Progressing     Problem: Wound  Goal: Optimal Coping  Outcome: Progressing  Goal: Optimal Functional Ability  Outcome: Progressing  Goal: Absence of Infection Signs and Symptoms  Outcome: Progressing  Goal: Improved Oral Intake  Outcome: Progressing  Goal: Optimal Pain Control and Function  Outcome: Progressing  Goal: Skin Health and Integrity  Outcome: Progressing  Goal: Optimal Wound Healing  Outcome: Progressing     Problem: Fall Injury Risk  Goal: Absence of Fall and Fall-Related Injury  Outcome: Progressing     Problem: Skin Injury Risk Increased  Goal: Skin Health and Integrity  Outcome: Progressing

## 2025-03-13 NOTE — ASSESSMENT & PLAN NOTE
Bridget oMntgomery is a 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 Multilevel foraminal stenosis.     Discussed surgical vs non-surgical options with the patient and her daughter Liliana Montgomery present. Patient's grandson Aidan (neurosurgeon) was also on the phone for this conversation. Given the degree of upper cervical cord kinking, cord edema, and craniocervical settling recommended surgical intervention to preserve her current function and prevent neurological decline. Surgical recommendation is occiput-C4 fusion. She would also need pre-op traction. Non-surgical option is strict cervical collar 24/7 indefinitely. This would also require the patient to be compliant with collar use. Fracture appears stable in X-Ray c spine upright/supine. Patient and daughter have decided to proceed with surgery.    Plan:   - Cervical collar at all times.  - XR cervical spine upright/supine in collar stable. Continue collar.   - Multimodal pain control.  - Bowel regimen.  - Notify NSGY immediately with any decline on exam.  - Family has decided to proceed with surgery. Booked for O-C4 fusion on 3/19/25.    D/w Dr. Lemons.

## 2025-03-13 NOTE — PROGRESS NOTES
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 Her symptoms began last Monday with headache that started in her neck and migrated toward the top of her head. She has had chronic neck pain due to cervical stenosis, but the headache was new. On Saturday, she had a normal morning and was able to perform all of her ADLs. Around 8 pm, she suddenly became weak in her arms and legs and was not able to walk. MRI demonstrated chronic type 2 odontoid fracture with progressed retropulsion of the fracture fragment in comparison to prior MRI from 8/11/2023, with moderate to severe canal stenosis at C1-C2. Of note, patient had previously discontinued wearing her cervical collar. Denies radiculopathy in her arms or legs, sensory deficit, bladder or bowel incontinence.      Post-Op Info:  Procedure(s) (LRB):  FUSION, SPINE, CERVICAL, POSTERIOR APPROACH (N/A)       Interval History: NAEON. AFVSS. No complaints. R deltoid weakness stable. OR booked for 3/19.    Medications:  Continuous Infusions:  Scheduled Meds:   amLODIPine  10 mg Oral Daily    dexAMETHasone injection  4 mg Intravenous Q6H    fluticasone furoate-vilanteroL  1 puff Inhalation Daily    gabapentin  300 mg Oral QHS    losartan  100 mg Oral Daily    methocarbamoL  500 mg Oral TID    mupirocin   Nasal BID    pantoprazole  40 mg Oral BID    polyethylene glycol  17 g Oral Daily    pregabalin  50 mg Oral QHS    senna-docusate 8.6-50 mg  1 tablet Oral BID     PRN Meds:  Current Facility-Administered Medications:     acetaminophen, 650 mg, Oral, Q4H PRN    albuterol, 2 puff, Inhalation, Q6H PRN     aluminum-magnesium hydroxide-simethicone, 30 mL, Oral, QID PRN    butalbital-acetaminophen-caffeine -40 mg, 1 tablet, Oral, Q4H PRN    dextrose 50%, 12.5 g, Intravenous, PRN    dextrose 50%, 25 g, Intravenous, PRN    glucagon (human recombinant), 1 mg, Intramuscular, PRN    glucagon (human recombinant), 1 mg, Intramuscular, PRN    glucose, 16 g, Oral, PRN    glucose, 16 g, Oral, PRN    glucose, 24 g, Oral, PRN    glucose, 24 g, Oral, PRN    insulin aspart U-100, 0-5 Units, Subcutaneous, QID (AC + HS) PRN    melatonin, 6 mg, Oral, Nightly PRN    morphine, 2 mg, Intravenous, Q4H PRN    naloxone, 0.02 mg, Intravenous, PRN    ondansetron, 8 mg, Oral, Q8H PRN    oxyCODONE-acetaminophen, 1 tablet, Oral, Q4H PRN    oxyCODONE-acetaminophen, 1 tablet, Oral, Q4H PRN    simethicone, 1 tablet, Oral, QID PRN    sodium chloride 0.9%, 1-10 mL, Intravenous, Q12H PRN     Review of Systems  Objective:     Weight: 59 kg (130 lb 1.1 oz)  Body mass index is 23.04 kg/m².  Vital Signs (Most Recent):  Temp: 98.7 °F (37.1 °C) (03/13/25 0830)  Pulse: 62 (03/13/25 0955)  Resp: 18 (03/13/25 0830)  BP: 121/74 (03/13/25 0830)  SpO2: 96 % (03/13/25 0830) Vital Signs (24h Range):  Temp:  [97.5 °F (36.4 °C)-98.7 °F (37.1 °C)] 98.7 °F (37.1 °C)  Pulse:  [53-62] 62  Resp:  [16-18] 18  SpO2:  [95 %-99 %] 96 %  BP: (103-145)/(56-74) 121/74                         Female External Urinary Catheter w/ Suction 03/09/25 (Active)   Skin no redness;no breakdown 03/13/25 0955   Tolerance no signs/symptoms of discomfort 03/13/25 0955   Suction Continuous suction at 60 mmHg 03/13/25 0955   Date of last wick change 03/12/25 03/13/25 0955   Time of last wick change 2000 03/13/25 0405   Output (mL) 1000 mL 03/13/25 0405          Neurosurgery Physical Exam  General: Well developed, well nourished, no distress.   Head: Normocephalic, atraumatic.  Mental Status: Awake, Alert, Oriented  Speech: Clear with content appropriate to conversation.  Cranial nerves: Face  "symmetric, CN II-XII grossly intact.   Eyes: Pupils equal, round, reactive to light, EOMI.  Sensory: Intact to light touch throughout.  Motor Strength: Moves all extremities spontaneously with good tone. Full strength upper and lower extremities. No abnormal movements seen.      Strength   Deltoids Triceps Biceps Wrist Extension Wrist Flexion Hand    Upper: R 3/5 5/5 5/5 5/5 5/5 5/5     L 5/5 5/5 5/5 5/5 5/5 5/5       Iliopsoas Quadriceps Knee  Flexion Tibialis  anterior Gastro- cnemius EHL   Lower: R 5/5 5/5 5/5 5/5 5/5 5/5     L 5/5 5/5 5/5 5/5 5/5 5/5    Patient with R distal clavicle fx and h/o of R shoulder arthroplasty.  Hand intrinsics 5/5.     Schneider: absent  In c-collar.    Significant Labs:  Recent Labs   Lab 03/12/25  0333 03/13/25  0638   GLU 87 167*   * 129*   K 4.7 4.8   CL 97 96   CO2 24 21*   BUN 38* 41*   CREATININE 1.4 1.3   CALCIUM 8.6* 8.6*   MG 3.1* 2.9*     Recent Labs   Lab 03/12/25  0333 03/13/25  0638   WBC 4.88 4.68   HGB 9.8* 10.7*   HCT 31.1* 33.2*    345     No results for input(s): "LABPT", "INR", "APTT" in the last 48 hours.  Microbiology Results (last 7 days)       ** No results found for the last 168 hours. **          All pertinent labs from the last 24 hours have been reviewed.    Significant Diagnostics:  I have reviewed and interpreted all pertinent imaging results/findings within the past 24 hours.  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     MRI Cervical spine 3/9/25: Chronic type 2 odontoid fracture with progressed retropulsion of the fracture fragment in comparison to prior MRI from 8/11/2023, with moderate to severe canal stenosis at " C1-C2. Multilevel foraminal stenosis.     Discussed surgical vs non-surgical options with the patient and her daughter Liliana Montgomery present. Patient's grandson Aidan (neurosurgeon) was also on the phone for this conversation. Given the degree of upper cervical cord kinking, cord edema, and craniocervical settling recommended surgical intervention to preserve her current function and prevent neurological decline. Surgical recommendation is occiput-C4 fusion. She would also need pre-op traction. Non-surgical option is strict cervical collar 24/7 indefinitely. This would also require the patient to be compliant with collar use. Fracture appears stable in X-Ray c spine upright/supine. Patient and daughter have decided to proceed with surgery.    Plan:   - Cervical collar at all times.  - XR cervical spine upright/supine in collar stable. Continue collar.   - Multimodal pain control.  - Bowel regimen.  - Notify NSGY immediately with any decline on exam.  - Family has decided to proceed with surgery. Booked for O-C4 fusion on 3/19/25.    D/w Dr. Lemons.        Umu Torres PA-C  Neurosurgery  [facility] - Neurosurgery ([facility])

## 2025-03-13 NOTE — PLAN OF CARE
POC reviewed and updated with the patient at the bedside. Questions regarding POC were encouraged and addressed. VSS, see flowsheets. Tele maintained per provider's order.   Patient is AOx4 at this time. Fall[place] safety precautions maintained, no signs of injury noted during shift.  Cervical collar to be worn at all times. Upon exiting room, patient's bed locked in low position, side rails up x 3, bed alarm set, with call light within reach. Instructed patient to call staff for mobility, verbalized understanding.  No acute signs of distress noted at this time.     Problem: Adult Inpatient Plan of Care  Goal: Plan of Care Review  Outcome: Progressing  Goal: Patient-Specific Goal (Individualized)  Outcome: Progressing  Goal: Absence of Hospital-Acquired Illness or Injury  Outcome: Progressing  Goal: Optimal Comfort and Wellbeing  Outcome: Progressing  Goal: Readiness for Transition of Care  Outcome: Progressing     Problem: Diabetes Comorbidity  Goal: Blood Glucose Level Within Targeted Range  Outcome: Progressing     Problem: Wound  Goal: Optimal Coping  Outcome: Progressing  Goal: Optimal Functional Ability  Outcome: Progressing  Goal: Absence of Infection Signs and Symptoms  Outcome: Progressing  Goal: Improved Oral Intake  Outcome: Progressing  Goal: Optimal Pain Control and Function  Outcome: Progressing  Goal: Skin Health and Integrity  Outcome: Progressing  Goal: Optimal Wound Healing  Outcome: Progressing     Problem: Fall Injury Risk  Goal: Absence of Fall and Fall-Related Injury  Outcome: Progressing     Problem: Skin Injury Risk Increased  Goal: Skin Health and Integrity  Outcome: Progressing

## 2025-03-13 NOTE — ASSESSMENT & PLAN NOTE
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 Multilevel foraminal stenosis.   - Cont C-collar at all times  - Upright and supine cervical x-ray pending  - Neurosurgery recommended occiput-C4 fusion. Non-surgical option is strict cervical collar 24/7 indefinitely  - XR with chronic odontoid type 2 fracture, position alignment appears stable   - Decadron started for new neuro deficit  - surgery for 3/19      See above

## 2025-03-14 LAB
ALBUMIN SERPL BCP-MCNC: 2.8 G/DL (ref 3.5–5.2)
ALP SERPL-CCNC: 187 U/L (ref 40–150)
ALT SERPL W/O P-5'-P-CCNC: 24 U/L (ref 10–44)
ANION GAP SERPL CALC-SCNC: 10 MMOL/L (ref 8–16)
AST SERPL-CCNC: 36 U/L (ref 10–40)
BASOPHILS # BLD AUTO: 0.01 K/UL (ref 0–0.2)
BASOPHILS NFR BLD: 0.1 % (ref 0–1.9)
BILIRUB SERPL-MCNC: 0.2 MG/DL (ref 0.1–1)
BUN SERPL-MCNC: 48 MG/DL (ref 8–23)
CALCIUM SERPL-MCNC: 8.7 MG/DL (ref 8.7–10.5)
CHLORIDE SERPL-SCNC: 96 MMOL/L (ref 95–110)
CO2 SERPL-SCNC: 19 MMOL/L (ref 23–29)
CREAT SERPL-MCNC: 1.2 MG/DL (ref 0.5–1.4)
DIFFERENTIAL METHOD BLD: ABNORMAL
EOSINOPHIL # BLD AUTO: 0 K/UL (ref 0–0.5)
EOSINOPHIL NFR BLD: 0 % (ref 0–8)
ERYTHROCYTE [DISTWIDTH] IN BLOOD BY AUTOMATED COUNT: 13.5 % (ref 11.5–14.5)
EST. GFR  (NO RACE VARIABLE): 43 ML/MIN/1.73 M^2
GLUCOSE SERPL-MCNC: 120 MG/DL (ref 70–110)
HCT VFR BLD AUTO: 28.7 % (ref 37–48.5)
HGB BLD-MCNC: 9.4 G/DL (ref 12–16)
IMM GRANULOCYTES # BLD AUTO: 0.06 K/UL (ref 0–0.04)
IMM GRANULOCYTES NFR BLD AUTO: 0.5 % (ref 0–0.5)
LYMPHOCYTES # BLD AUTO: 0.5 K/UL (ref 1–4.8)
LYMPHOCYTES NFR BLD: 3.9 % (ref 18–48)
MAGNESIUM SERPL-MCNC: 2.7 MG/DL (ref 1.6–2.6)
MCH RBC QN AUTO: 28.4 PG (ref 27–31)
MCHC RBC AUTO-ENTMCNC: 32.8 G/DL (ref 32–36)
MCV RBC AUTO: 87 FL (ref 82–98)
MONOCYTES # BLD AUTO: 0.3 K/UL (ref 0.3–1)
MONOCYTES NFR BLD: 2.2 % (ref 4–15)
NEUTROPHILS # BLD AUTO: 11 K/UL (ref 1.8–7.7)
NEUTROPHILS NFR BLD: 93.3 % (ref 38–73)
NRBC BLD-RTO: 0 /100 WBC
PLATELET # BLD AUTO: 322 K/UL (ref 150–450)
PMV BLD AUTO: 10.5 FL (ref 9.2–12.9)
POCT GLUCOSE: 128 MG/DL (ref 70–110)
POCT GLUCOSE: 136 MG/DL (ref 70–110)
POCT GLUCOSE: 154 MG/DL (ref 70–110)
POCT GLUCOSE: 221 MG/DL (ref 70–110)
POTASSIUM SERPL-SCNC: 5.2 MMOL/L (ref 3.5–5.1)
PROT SERPL-MCNC: 6.3 G/DL (ref 6–8.4)
RBC # BLD AUTO: 3.31 M/UL (ref 4–5.4)
SODIUM SERPL-SCNC: 125 MMOL/L (ref 136–145)
WBC # BLD AUTO: 11.79 K/UL (ref 3.9–12.7)

## 2025-03-14 PROCEDURE — 94761 N-INVAS EAR/PLS OXIMETRY MLT: CPT

## 2025-03-14 PROCEDURE — 36415 COLL VENOUS BLD VENIPUNCTURE: CPT | Performed by: INTERNAL MEDICINE

## 2025-03-14 PROCEDURE — 63600175 PHARM REV CODE 636 W HCPCS: Performed by: PHYSICIAN ASSISTANT

## 2025-03-14 PROCEDURE — 83735 ASSAY OF MAGNESIUM: CPT | Performed by: INTERNAL MEDICINE

## 2025-03-14 PROCEDURE — 11000001 HC ACUTE MED/SURG PRIVATE ROOM

## 2025-03-14 PROCEDURE — 94640 AIRWAY INHALATION TREATMENT: CPT

## 2025-03-14 PROCEDURE — 25000003 PHARM REV CODE 250: Performed by: NURSE PRACTITIONER

## 2025-03-14 PROCEDURE — 63600175 PHARM REV CODE 636 W HCPCS: Performed by: HOSPITALIST

## 2025-03-14 PROCEDURE — 99024 POSTOP FOLLOW-UP VISIT: CPT | Mod: ,,, | Performed by: PHYSICIAN ASSISTANT

## 2025-03-14 PROCEDURE — 25000003 PHARM REV CODE 250: Performed by: HOSPITALIST

## 2025-03-14 PROCEDURE — 80053 COMPREHEN METABOLIC PANEL: CPT | Performed by: INTERNAL MEDICINE

## 2025-03-14 PROCEDURE — 25000003 PHARM REV CODE 250: Performed by: INTERNAL MEDICINE

## 2025-03-14 PROCEDURE — 25000003 PHARM REV CODE 250: Performed by: STUDENT IN AN ORGANIZED HEALTH CARE EDUCATION/TRAINING PROGRAM

## 2025-03-14 PROCEDURE — 85025 COMPLETE CBC W/AUTO DIFF WBC: CPT | Performed by: INTERNAL MEDICINE

## 2025-03-14 RX ORDER — SODIUM CHLORIDE 1 G/1
1000 TABLET ORAL 3 TIMES DAILY
Status: DISCONTINUED | OUTPATIENT
Start: 2025-03-14 | End: 2025-03-16

## 2025-03-14 RX ORDER — HEPARIN SODIUM 5000 [USP'U]/ML
5000 INJECTION, SOLUTION INTRAVENOUS; SUBCUTANEOUS EVERY 8 HOURS
Status: COMPLETED | OUTPATIENT
Start: 2025-03-14 | End: 2025-03-18

## 2025-03-14 RX ORDER — HEPARIN SODIUM 5000 [USP'U]/ML
5000 INJECTION, SOLUTION INTRAVENOUS; SUBCUTANEOUS EVERY 8 HOURS
Status: DISCONTINUED | OUTPATIENT
Start: 2025-03-14 | End: 2025-03-14

## 2025-03-14 RX ORDER — PANTOPRAZOLE SODIUM 20 MG/1
20 TABLET, DELAYED RELEASE ORAL 2 TIMES DAILY WITH MEALS
Status: DISCONTINUED | OUTPATIENT
Start: 2025-03-15 | End: 2025-03-18

## 2025-03-14 RX ADMIN — HEPARIN SODIUM 5000 UNITS: 5000 INJECTION INTRAVENOUS; SUBCUTANEOUS at 09:03

## 2025-03-14 RX ADMIN — HEPARIN SODIUM 5000 UNITS: 5000 INJECTION INTRAVENOUS; SUBCUTANEOUS at 02:03

## 2025-03-14 RX ADMIN — PANTOPRAZOLE SODIUM 40 MG: 20 TABLET, DELAYED RELEASE ORAL at 08:03

## 2025-03-14 RX ADMIN — INSULIN ASPART 1 UNITS: 100 INJECTION, SOLUTION INTRAVENOUS; SUBCUTANEOUS at 12:03

## 2025-03-14 RX ADMIN — GABAPENTIN 300 MG: 300 CAPSULE ORAL at 09:03

## 2025-03-14 RX ADMIN — LOSARTAN POTASSIUM 100 MG: 50 TABLET, FILM COATED ORAL at 08:03

## 2025-03-14 RX ADMIN — SODIUM CHLORIDE 1000 MG: 1 TABLET ORAL at 08:03

## 2025-03-14 RX ADMIN — SODIUM CHLORIDE 1000 MG: 1 TABLET ORAL at 02:03

## 2025-03-14 RX ADMIN — MUPIROCIN: 20 OINTMENT TOPICAL at 09:03

## 2025-03-14 RX ADMIN — METHOCARBAMOL 500 MG: 500 TABLET ORAL at 09:03

## 2025-03-14 RX ADMIN — SENNOSIDES AND DOCUSATE SODIUM 1 TABLET: 50; 8.6 TABLET ORAL at 09:03

## 2025-03-14 RX ADMIN — FLUTICASONE FUROATE AND VILANTEROL TRIFENATATE 1 PUFF: 100; 25 POWDER RESPIRATORY (INHALATION) at 09:03

## 2025-03-14 RX ADMIN — PREGABALIN 50 MG: 50 CAPSULE ORAL at 09:03

## 2025-03-14 RX ADMIN — SENNOSIDES AND DOCUSATE SODIUM 1 TABLET: 50; 8.6 TABLET ORAL at 08:03

## 2025-03-14 RX ADMIN — DEXAMETHASONE SODIUM PHOSPHATE 4 MG: 4 INJECTION INTRA-ARTICULAR; INTRALESIONAL; INTRAMUSCULAR; INTRAVENOUS; SOFT TISSUE at 12:03

## 2025-03-14 RX ADMIN — DEXAMETHASONE SODIUM PHOSPHATE 4 MG: 4 INJECTION INTRA-ARTICULAR; INTRALESIONAL; INTRAMUSCULAR; INTRAVENOUS; SOFT TISSUE at 11:03

## 2025-03-14 RX ADMIN — DEXAMETHASONE SODIUM PHOSPHATE 4 MG: 4 INJECTION INTRA-ARTICULAR; INTRALESIONAL; INTRAMUSCULAR; INTRAVENOUS; SOFT TISSUE at 05:03

## 2025-03-14 RX ADMIN — METHOCARBAMOL 500 MG: 500 TABLET ORAL at 02:03

## 2025-03-14 RX ADMIN — POLYETHYLENE GLYCOL 3350 17 G: 17 POWDER, FOR SOLUTION ORAL at 09:03

## 2025-03-14 RX ADMIN — AMLODIPINE BESYLATE 10 MG: 10 TABLET ORAL at 08:03

## 2025-03-14 RX ADMIN — SODIUM CHLORIDE 1000 MG: 1 TABLET ORAL at 09:03

## 2025-03-14 NOTE — SUBJECTIVE & OBJECTIVE
Interval History:  Patient reports similar indigestion is yesterday.  No new acute or worsening chest pains.  Pending surgery next week.  Salt level dropping now down to 125    Review of Systems  Objective:     Vital Signs (Most Recent):  Temp: 97.5 °F (36.4 °C) (03/14/25 0752)  Pulse: 69 (03/14/25 0917)  Resp: 18 (03/14/25 0917)  BP: (!) 156/65 (03/14/25 0752)  SpO2: 95 % (03/14/25 0917) Vital Signs (24h Range):  Temp:  [97.5 °F (36.4 °C)-98.5 °F (36.9 °C)] 97.5 °F (36.4 °C)  Pulse:  [58-75] 69  Resp:  [18-20] 18  SpO2:  [95 %-99 %] 95 %  BP: (122-156)/(52-76) 156/65     Weight: 59 kg (130 lb 1.1 oz)  Body mass index is 23.04 kg/m².    Intake/Output Summary (Last 24 hours) at 3/14/2025 1121  Last data filed at 3/14/2025 0557  Gross per 24 hour   Intake 200 ml   Output 1650 ml   Net -1450 ml         Physical Exam      Clear lungs bilaterally, unlabored breathing, on room air, no cyanosis   Heart sounds indicate a regular rate and rhythm   Awake alert, no acute distress   5/5 fist  and hip flexor strength bilaterally   No obvious upper nor lower extremity edema   C-collar in place  Female family member at the bedside

## 2025-03-14 NOTE — PROGRESS NOTES
[facility] - Neurosurgery ([facility])  Neurosurgery  Progress Note    Subjective:     History of Present Illness: 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 Her symptoms began last Monday with headache that started in her neck and migrated toward the top of her head. She has had chronic neck pain due to cervical stenosis, but the headache was new. On Saturday, she had a normal morning and was able to perform all of her ADLs. Around 8 pm, she suddenly became weak in her arms and legs and was not able to walk. MRI demonstrated chronic type 2 odontoid fracture with progressed retropulsion of the fracture fragment in comparison to prior MRI from 8/11/2023, with moderate to severe canal stenosis at C1-C2. Of note, patient had previously discontinued wearing her cervical collar. Denies radiculopathy in her arms or legs, sensory deficit, bladder or bowel incontinence.      Post-Op Info:  Procedure(s) (LRB):  FUSION, SPINE, CERVICAL, POSTERIOR APPROACH (N/A)       Interval History: NAEON. AFVSS. No complaints. Exam stable. R deltoid weakness stable. OR booked for 3/19. Will need preop traction on 3/18.    Medications:  Continuous Infusions:  Scheduled Meds:   amLODIPine  10 mg Oral Daily    dexAMETHasone injection  4 mg Intravenous Q6H    fluticasone furoate-vilanteroL  1 puff Inhalation Daily    gabapentin  300 mg Oral QHS    heparin (porcine)  5,000 Units Subcutaneous Q8H    losartan  100 mg Oral Daily    methocarbamoL  500 mg Oral TID    mupirocin   Nasal BID    pantoprazole  40 mg Oral BID    polyethylene glycol  17 g Oral Daily    pregabalin  50 mg Oral QHS    senna-docusate 8.6-50 mg  1 tablet Oral BID    sodium chloride  1,000 mg Oral TID     PRN  Meds:  Current Facility-Administered Medications:     acetaminophen, 650 mg, Oral, Q4H PRN    albuterol, 2 puff, Inhalation, Q6H PRN    aluminum-magnesium hydroxide-simethicone, 30 mL, Oral, QID PRN    butalbital-acetaminophen-caffeine -40 mg, 1 tablet, Oral, Q4H PRN    dextrose 50%, 12.5 g, Intravenous, PRN    dextrose 50%, 25 g, Intravenous, PRN    glucagon (human recombinant), 1 mg, Intramuscular, PRN    glucose, 16 g, Oral, PRN    glucose, 24 g, Oral, PRN    insulin aspart U-100, 0-5 Units, Subcutaneous, QID (AC + HS) PRN    melatonin, 6 mg, Oral, Nightly PRN    morphine, 2 mg, Intravenous, Q4H PRN    naloxone, 0.02 mg, Intravenous, PRN    ondansetron, 8 mg, Oral, Q8H PRN    oxyCODONE-acetaminophen, 1 tablet, Oral, Q4H PRN    oxyCODONE-acetaminophen, 1 tablet, Oral, Q4H PRN    simethicone, 1 tablet, Oral, QID PRN    sodium chloride 0.9%, 1-10 mL, Intravenous, Q12H PRN     Review of Systems  Objective:     Weight: 59 kg (130 lb 1.1 oz)  Body mass index is 23.04 kg/m².  Vital Signs (Most Recent):  Temp: 97.5 °F (36.4 °C) (03/14/25 1142)  Pulse: 76 (03/14/25 1142)  Resp: 18 (03/14/25 1142)  BP: (!) 149/73 (03/14/25 1142)  SpO2: 96 % (03/14/25 1142) Vital Signs (24h Range):  Temp:  [97.5 °F (36.4 °C)-98.5 °F (36.9 °C)] 97.5 °F (36.4 °C)  Pulse:  [58-76] 76  Resp:  [18-20] 18  SpO2:  [95 %-99 %] 96 %  BP: (122-156)/(52-76) 149/73                         Female External Urinary Catheter w/ Suction 03/09/25 (Active)   Skin no redness;no breakdown 03/13/25 0955   Tolerance no signs/symptoms of discomfort 03/13/25 0955   Suction Continuous suction at 60 mmHg 03/13/25 0955   Date of last wick change 03/12/25 03/13/25 0955   Time of last wick change 2000 03/13/25 0405   Output (mL) 1000 mL 03/13/25 0405          Neurosurgery Physical Exam  General: Well developed, well nourished, no distress.   Head: Normocephalic, atraumatic.  Mental Status: Awake, Alert, Oriented  Speech: Clear with content appropriate to  "conversation.  Cranial nerves: Face symmetric, CN II-XII grossly intact.   Eyes: Pupils equal, round, reactive to light, EOMI.  Sensory: Intact to light touch throughout.  Motor Strength: Moves all extremities spontaneously with good tone. Full strength upper and lower extremities. No abnormal movements seen.      Strength   Deltoids Triceps Biceps Wrist Extension Wrist Flexion Hand    Upper: R 3/5 5/5 5/5 5/5 5/5 5/5     L 5/5 5/5 5/5 5/5 5/5 5/5       Iliopsoas Quadriceps Knee  Flexion Tibialis  anterior Gastro- cnemius EHL   Lower: R 5/5 5/5 5/5 5/5 5/5 5/5     L 5/5 5/5 5/5 5/5 5/5 5/5    Patient with R distal clavicle fx and h/o of R shoulder arthroplasty.  Hand intrinsics 5/5.     Schneider: absent  In c-collar.    Significant Labs:  Recent Labs   Lab 03/13/25  0638 03/14/25  0328   * 120*   * 125*   K 4.8 5.2*   CL 96 96   CO2 21* 19*   BUN 41* 48*   CREATININE 1.3 1.2   CALCIUM 8.6* 8.7   MG 2.9* 2.7*     Recent Labs   Lab 03/13/25  0638 03/14/25  0328   WBC 4.68 11.79   HGB 10.7* 9.4*   HCT 33.2* 28.7*    322     No results for input(s): "LABPT", "INR", "APTT" in the last 48 hours.  Microbiology Results (last 7 days)       ** No results found for the last 168 hours. **          All pertinent labs from the last 24 hours have been reviewed.    Significant Diagnostics:  I have reviewed and interpreted all pertinent imaging results/findings within the past 24 hours.    Assessment/Plan:     Cervical spinal stenosis  Bridget Montgomery is a 89yo woman w/ PMHx COPD, HTN, HLD, history of TIA, known type 2 odontoid fx followed by Dr. Qauch with non-surgical management, and right shoulder arthroplasty 06/2024 presenting with a week of severe persistent headache, and diffuse weakness involving her bilateral upper and lower extremities.     MRI Cervical spine 3/9/25: Chronic type 2 odontoid fracture with progressed retropulsion of the fracture fragment in comparison to prior MRI from 8/11/2023, " with moderate to severe canal stenosis at C1-C2. Multilevel foraminal stenosis.     Discussed surgical vs non-surgical options with the patient and her daughter Liliana Montgomery present. Patient's grandson Aidan (neurosurgeon) was also on the phone for this conversation. Given the degree of upper cervical cord kinking, cord edema, and craniocervical settling recommended surgical intervention to preserve her current function and prevent neurological decline. Surgical recommendation is occiput-C4 fusion. She would also need pre-op traction. Non-surgical option is strict cervical collar 24/7 indefinitely. This would also require the patient to be compliant with collar use. Fracture appears stable in X-Ray c spine upright/supine. Patient and daughter have decided to proceed with surgery.    Plan:   - Cervical collar at all times.  - XR cervical spine upright/supine in collar stable. Continue collar.   - Multimodal pain control.  - Bowel regimen.  - Notify NSGY immediately with any decline on exam.  - Family has decided to proceed with surgery. Booked for O-C4 fusion on 3/19/25. Pre-op traction on 3/18.    D/w Dr. Lemons.        Janeth Riley PA-C  Neurosurgery  [facility] - Neurosurgery ([facility])

## 2025-03-14 NOTE — PROGRESS NOTES
"[facility] - Neurosurgery ([facility] Medicine  Progress Note    Patient Name: Bridget Montgomery  MRN: [id number]  Patient Class: IP- Inpatient   Admission Date: 3/9/2025  Length of Stay: 5 days  Attending Physician: Lars Espinal MD  Primary Care Provider: No primary care provider on file.        Subjective     Principal Problem:Spinal stenosis        HPI:  Bridget Montgomery is a 89 y.o. female with COPD, HTN, HLD, history of TIA who is transferred here from [place] for NSGY evaluation for cervical cord compression.    Per Transfer Center:  "89F COPD, CKD4, GERD, HTN, HLD, hx of TIA, prediabetes, and spinal stenosis who initially presented to [facility] on 3/8/2025 with a primary complaint of sudden onset weakness. The patient was in their usual state of health until Monday. Starting on Monday, the patient started having a headache that started in her neck and migrated toward the top of her head. She has had chronic neck pain due to cervical stenosis, but the headache was new. She managed the pain with tylenol. On Friday, she presented to her PCP (Dr. Alejandrina Russ) who gave her 2 injections into her neck to help with the pain. She is not sure what the injection was but thinks it might have been a steroid. On Saturday, she had a normal morning and was able to perform all of her ADLs. Around 8 pm, she suddenly became weak in her arms and legs and was not able to walk. She was able to catch herself prior to falling. The primary concern was the weakness and ongoing headache. She endorses chronic numbness/tingling in her fingers that has not worsened. She denies any sensory deficits. She denies any urinary incontinence or fecal incontinence. She endorses pain in her back, neck, knees, and a headache. She denies any fevers, chills, chest pain, shortness of breath, abdominal pain, N/V, diarrhea, constipation. CT Head was negative for acute stroke. MRI shows new onset cervical cord " "compression C1-C2. Neurosurgery consulted, recommended transferring pt to [facility] for a higher level of care."    Overview/Hospital Course:  89 y.o. female with COPD, HTN, HLD, cervical stenosis, history of TIA who is transferred here from [place] for NSGY evaluation for cervical cord compression. Presented to outside hospital with increased generalized weakness, particularly in her extremities.  CVA workup negative, however MRI showed new onset cervical cord compression, likely contributing to her symptoms.  Transferred here for Neurosurgery evaluation. MRI Cervical spine 3/9/25: chronic type 2 odontoid fracture with progressed retropulsion of the fracture fragment in comparison to prior MRI from 8/11/2023, with moderate to severe canal stenosis at C1-C2. Multilevel foraminal stenosis. Cont C-collar at all times. Neurosurgery recommended occiput-C4 fusion. Non-surgical option is strict cervical collar 24/7 indefinitely. XR with chronic odontoid type 2 fracture, position alignment appears stable. Decadron started 3/12 for new RUE weakness.  Started on salt tablets for dropping Na.    Interval History:  Patient reports similar indigestion is yesterday.  No new acute or worsening chest pains.  Pending surgery next week.  Salt level dropping now down to 125    Review of Systems  Objective:     Vital Signs (Most Recent):  Temp: 97.5 °F (36.4 °C) (03/14/25 0752)  Pulse: 69 (03/14/25 0917)  Resp: 18 (03/14/25 0917)  BP: (!) 156/65 (03/14/25 0752)  SpO2: 95 % (03/14/25 0917) Vital Signs (24h Range):  Temp:  [97.5 °F (36.4 °C)-98.5 °F (36.9 °C)] 97.5 °F (36.4 °C)  Pulse:  [58-75] 69  Resp:  [18-20] 18  SpO2:  [95 %-99 %] 95 %  BP: (122-156)/(52-76) 156/65     Weight: 59 kg (130 lb 1.1 oz)  Body mass index is 23.04 kg/m².    Intake/Output Summary (Last 24 hours) at 3/14/2025 1121  Last data filed at 3/14/2025 0557  Gross per 24 hour   Intake 200 ml   Output 1650 ml   Net -1450 ml         Physical Exam      Clear lungs " bilaterally, unlabored breathing, on room air, no cyanosis   Heart sounds indicate a regular rate and rhythm   Awake alert, no acute distress   5/5 fist  and hip flexor strength bilaterally   No obvious upper nor lower extremity edema   C-collar in place  Female family member at the bedside        Assessment & Plan  Spinal stenosis  Cord compression  Cord Compression  - MRI Cervical spine 3/9/25: chronic type 2 odontoid fracture with progressed retropulsion of the fracture fragment in comparison to prior MRI from 8/11/2023, with moderate to severe canal stenosis at C1-C2. Multilevel foraminal stenosis.   - Cont C-collar at all times  - Upright and supine cervical x-ray pending  - Neurosurgery recommended occiput-C4 fusion. Non-surgical option is strict cervical collar 24/7 indefinitely  - XR with chronic odontoid type 2 fracture, position alignment appears stable   - Decadron started for new neuro deficit  - surgery for 3/19      See above    Chronic kidney disease, stage III (moderate)  Renal function remains around baseline; Estimated Creatinine Clearance: 25.8 mL/min (based on SCr of 1.2 mg/dL). according to latest data. 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 Will avoid nephrotoxic agents as able, and renally dose all meds as applicable.  Other emphysema  History of COPD, not on home oxygen. Currently without evidence of acute exacerbation, and sats on room air at goal 88% or greater. Will continue hospital formulary equivalent of home regimen and monitor respiratory status.   Essential hypertension  Chronic, and currently controlled. Latest blood pressure and vitals reviewed-   While in the hospital, will manage blood pressure as follows; Continue home antihypertensive regimen    Will utilize p.r.n. blood pressure medication only if patient's blood pressure greater than 220/110 and she develops symptoms such as worsening chest pain or shortness of breath.  Generalized weakness  Chronic, however likely  exacerbated by cervical radiculopathy.  See above    Headache  Fioricet PRN    Cervical spinal stenosis      Mixed hyperlipidemia      Edema, spinal cord      Hyponatremia  Asymptomatic, mild, suspected polydipsia and/or SIADH  Still worsening despite fluid restriction, start salt tabs  VTE Risk Mitigation (From admission, onward)           Ordered     heparin (porcine) injection 5,000 Units  Every 8 hours         03/14/25 1121     IP VTE HIGH RISK PATIENT  Once         03/09/25 2040     Place sequential compression device  Until discontinued         03/09/25 2040     Reason for No Pharmacological VTE Prophylaxis  Once        Question:  Reasons:  Answer:  Physician Provided (leave comment)  Comment:  surgery    03/09/25 2040                    Discharge Planning   MIKAYLA: 3/21/2025     Code Status: Full Code   Medical Readiness for Discharge Date:   Discharge Plan A: Rehab                        Lars Espinal MD  Department of Hospital Medicine   [facility] - Neurosurgery ([facility])

## 2025-03-14 NOTE — ASSESSMENT & PLAN NOTE
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 Multilevel foraminal stenosis.     Discussed surgical vs non-surgical options with the patient and her daughter Liliana Montgomery present. Patient's grandson Aidan (neurosurgeon) was also on the phone for this conversation. Given the degree of upper cervical cord kinking, cord edema, and craniocervical settling recommended surgical intervention to preserve her current function and prevent neurological decline. Surgical recommendation is occiput-C4 fusion. She would also need pre-op traction. Non-surgical option is strict cervical collar 24/7 indefinitely. This would also require the patient to be compliant with collar use. Fracture appears stable in X-Ray c spine upright/supine. Patient and daughter have decided to proceed with surgery.    Plan:   - Cervical collar at all times.  - XR cervical spine upright/supine in collar stable. Continue collar.   - Multimodal pain control.  - Bowel regimen.  - Notify NSGY immediately with any decline on exam.  - Family has decided to proceed with surgery. Booked for O-C4 fusion on 3/19/25. Pre-op traction on 3/18.    D/w Dr. Lemons.

## 2025-03-14 NOTE — ASSESSMENT & PLAN NOTE
Renal function remains around baseline; Estimated Creatinine Clearance: 25.8 mL/min (based on SCr of 1.2 mg/dL). according to latest data. 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 Will avoid nephrotoxic agents as able, and renally dose all meds as applicable.

## 2025-03-14 NOTE — SUBJECTIVE & OBJECTIVE
Interval History: NAEON. AFVSS. No complaints. Exam stable. R deltoid weakness stable. OR booked for 3/19. Will need preop traction on 3/18.    Medications:  Continuous Infusions:  Scheduled Meds:   amLODIPine  10 mg Oral Daily    dexAMETHasone injection  4 mg Intravenous Q6H    fluticasone furoate-vilanteroL  1 puff Inhalation Daily    gabapentin  300 mg Oral QHS    heparin (porcine)  5,000 Units Subcutaneous Q8H    losartan  100 mg Oral Daily    methocarbamoL  500 mg Oral TID    mupirocin   Nasal BID    pantoprazole  40 mg Oral BID    polyethylene glycol  17 g Oral Daily    pregabalin  50 mg Oral QHS    senna-docusate 8.6-50 mg  1 tablet Oral BID    sodium chloride  1,000 mg Oral TID     PRN Meds:  Current Facility-Administered Medications:     acetaminophen, 650 mg, Oral, Q4H PRN    albuterol, 2 puff, Inhalation, Q6H PRN    aluminum-magnesium hydroxide-simethicone, 30 mL, Oral, QID PRN    butalbital-acetaminophen-caffeine -40 mg, 1 tablet, Oral, Q4H PRN    dextrose 50%, 12.5 g, Intravenous, PRN    dextrose 50%, 25 g, Intravenous, PRN    glucagon (human recombinant), 1 mg, Intramuscular, PRN    glucose, 16 g, Oral, PRN    glucose, 24 g, Oral, PRN    insulin aspart U-100, 0-5 Units, Subcutaneous, QID (AC + HS) PRN    melatonin, 6 mg, Oral, Nightly PRN    morphine, 2 mg, Intravenous, Q4H PRN    naloxone, 0.02 mg, Intravenous, PRN    ondansetron, 8 mg, Oral, Q8H PRN    oxyCODONE-acetaminophen, 1 tablet, Oral, Q4H PRN    oxyCODONE-acetaminophen, 1 tablet, Oral, Q4H PRN    simethicone, 1 tablet, Oral, QID PRN    sodium chloride 0.9%, 1-10 mL, Intravenous, Q12H PRN     Review of Systems  Objective:     Weight: 59 kg (130 lb 1.1 oz)  Body mass index is 23.04 kg/m².  Vital Signs (Most Recent):  Temp: 97.5 °F (36.4 °C) (03/14/25 1142)  Pulse: 76 (03/14/25 1142)  Resp: 18 (03/14/25 1142)  BP: (!) 149/73 (03/14/25 1142)  SpO2: 96 % (03/14/25 1142) Vital Signs (24h Range):  Temp:  [97.5 °F (36.4 °C)-98.5 °F (36.9 °C)]  "97.5 °F (36.4 °C)  Pulse:  [58-76] 76  Resp:  [18-20] 18  SpO2:  [95 %-99 %] 96 %  BP: (122-156)/(52-76) 149/73                         Female External Urinary Catheter w/ Suction 03/09/25 (Active)   Skin no redness;no breakdown 03/13/25 0955   Tolerance no signs/symptoms of discomfort 03/13/25 0955   Suction Continuous suction at 60 mmHg 03/13/25 0955   Date of last wick change 03/12/25 03/13/25 0955   Time of last wick change 2000 03/13/25 0405   Output (mL) 1000 mL 03/13/25 0405          Neurosurgery Physical Exam  General: Well developed, well nourished, no distress.   Head: Normocephalic, atraumatic.  Mental Status: Awake, Alert, Oriented  Speech: Clear with content appropriate to conversation.  Cranial nerves: Face symmetric, CN II-XII grossly intact.   Eyes: Pupils equal, round, reactive to light, EOMI.  Sensory: Intact to light touch throughout.  Motor Strength: Moves all extremities spontaneously with good tone. Full strength upper and lower extremities. No abnormal movements seen.      Strength   Deltoids Triceps Biceps Wrist Extension Wrist Flexion Hand    Upper: R 3/5 5/5 5/5 5/5 5/5 5/5     L 5/5 5/5 5/5 5/5 5/5 5/5       Iliopsoas Quadriceps Knee  Flexion Tibialis  anterior Gastro- cnemius EHL   Lower: R 5/5 5/5 5/5 5/5 5/5 5/5     L 5/5 5/5 5/5 5/5 5/5 5/5    Patient with R distal clavicle fx and h/o of R shoulder arthroplasty.  Hand intrinsics 5/5.     Schneider: absent  In c-collar.    Significant Labs:  Recent Labs   Lab 03/13/25  0638 03/14/25  0328   * 120*   * 125*   K 4.8 5.2*   CL 96 96   CO2 21* 19*   BUN 41* 48*   CREATININE 1.3 1.2   CALCIUM 8.6* 8.7   MG 2.9* 2.7*     Recent Labs   Lab 03/13/25  0638 03/14/25  0328   WBC 4.68 11.79   HGB 10.7* 9.4*   HCT 33.2* 28.7*    322     No results for input(s): "LABPT", "INR", "APTT" in the last 48 hours.  Microbiology Results (last 7 days)       ** No results found for the last 168 hours. **          All pertinent labs from " the last 24 hours have been reviewed.    Significant Diagnostics:  I have reviewed and interpreted all pertinent imaging results/findings within the past 24 hours.

## 2025-03-14 NOTE — ASSESSMENT & PLAN NOTE
Asymptomatic, mild, suspected polydipsia and/or SIADH  Still worsening despite fluid restriction, start salt tabs

## 2025-03-15 LAB
ALBUMIN SERPL BCP-MCNC: 3 G/DL (ref 3.5–5.2)
ALP SERPL-CCNC: 182 U/L (ref 40–150)
ALT SERPL W/O P-5'-P-CCNC: 41 U/L (ref 10–44)
ANION GAP SERPL CALC-SCNC: 11 MMOL/L (ref 8–16)
AST SERPL-CCNC: 51 U/L (ref 10–40)
BASOPHILS # BLD AUTO: 0.01 K/UL (ref 0–0.2)
BASOPHILS NFR BLD: 0.1 % (ref 0–1.9)
BILIRUB SERPL-MCNC: 0.2 MG/DL (ref 0.1–1)
BUN SERPL-MCNC: 51 MG/DL (ref 8–23)
CALCIUM SERPL-MCNC: 8.6 MG/DL (ref 8.7–10.5)
CHLORIDE SERPL-SCNC: 104 MMOL/L (ref 95–110)
CO2 SERPL-SCNC: 21 MMOL/L (ref 23–29)
CREAT SERPL-MCNC: 1.2 MG/DL (ref 0.5–1.4)
DIFFERENTIAL METHOD BLD: ABNORMAL
EOSINOPHIL # BLD AUTO: 0 K/UL (ref 0–0.5)
EOSINOPHIL NFR BLD: 0 % (ref 0–8)
ERYTHROCYTE [DISTWIDTH] IN BLOOD BY AUTOMATED COUNT: 14.3 % (ref 11.5–14.5)
EST. GFR  (NO RACE VARIABLE): 43 ML/MIN/1.73 M^2
GLUCOSE SERPL-MCNC: 171 MG/DL (ref 70–110)
HCT VFR BLD AUTO: 31.6 % (ref 37–48.5)
HGB BLD-MCNC: 10.5 G/DL (ref 12–16)
IMM GRANULOCYTES # BLD AUTO: 0.04 K/UL (ref 0–0.04)
IMM GRANULOCYTES NFR BLD AUTO: 0.5 % (ref 0–0.5)
LYMPHOCYTES # BLD AUTO: 0.4 K/UL (ref 1–4.8)
LYMPHOCYTES NFR BLD: 4.7 % (ref 18–48)
MCH RBC QN AUTO: 28.6 PG (ref 27–31)
MCHC RBC AUTO-ENTMCNC: 33.2 G/DL (ref 32–36)
MCV RBC AUTO: 86 FL (ref 82–98)
MONOCYTES # BLD AUTO: 0.1 K/UL (ref 0.3–1)
MONOCYTES NFR BLD: 0.9 % (ref 4–15)
NEUTROPHILS # BLD AUTO: 8 K/UL (ref 1.8–7.7)
NEUTROPHILS NFR BLD: 93.8 % (ref 38–73)
NRBC BLD-RTO: 0 /100 WBC
PLATELET # BLD AUTO: 390 K/UL (ref 150–450)
PMV BLD AUTO: 10.6 FL (ref 9.2–12.9)
POCT GLUCOSE: 137 MG/DL (ref 70–110)
POCT GLUCOSE: 196 MG/DL (ref 70–110)
POTASSIUM SERPL-SCNC: 4.8 MMOL/L (ref 3.5–5.1)
PROT SERPL-MCNC: 6.5 G/DL (ref 6–8.4)
RBC # BLD AUTO: 3.67 M/UL (ref 4–5.4)
SODIUM SERPL-SCNC: 136 MMOL/L (ref 136–145)
WBC # BLD AUTO: 8.5 K/UL (ref 3.9–12.7)

## 2025-03-15 PROCEDURE — 63600175 PHARM REV CODE 636 W HCPCS: Performed by: PHYSICIAN ASSISTANT

## 2025-03-15 PROCEDURE — 85025 COMPLETE CBC W/AUTO DIFF WBC: CPT | Performed by: HOSPITALIST

## 2025-03-15 PROCEDURE — 25000003 PHARM REV CODE 250: Performed by: INTERNAL MEDICINE

## 2025-03-15 PROCEDURE — 80053 COMPREHEN METABOLIC PANEL: CPT | Performed by: HOSPITALIST

## 2025-03-15 PROCEDURE — 11000001 HC ACUTE MED/SURG PRIVATE ROOM

## 2025-03-15 PROCEDURE — 25000003 PHARM REV CODE 250: Performed by: HOSPITALIST

## 2025-03-15 PROCEDURE — 63600175 PHARM REV CODE 636 W HCPCS: Performed by: HOSPITALIST

## 2025-03-15 PROCEDURE — 36415 COLL VENOUS BLD VENIPUNCTURE: CPT | Performed by: HOSPITALIST

## 2025-03-15 PROCEDURE — 25000003 PHARM REV CODE 250: Performed by: STUDENT IN AN ORGANIZED HEALTH CARE EDUCATION/TRAINING PROGRAM

## 2025-03-15 RX ADMIN — ACETAMINOPHEN 650 MG: 325 TABLET ORAL at 05:03

## 2025-03-15 RX ADMIN — DEXAMETHASONE SODIUM PHOSPHATE 4 MG: 4 INJECTION INTRA-ARTICULAR; INTRALESIONAL; INTRAMUSCULAR; INTRAVENOUS; SOFT TISSUE at 04:03

## 2025-03-15 RX ADMIN — HEPARIN SODIUM 5000 UNITS: 5000 INJECTION INTRAVENOUS; SUBCUTANEOUS at 05:03

## 2025-03-15 RX ADMIN — SODIUM CHLORIDE 1000 MG: 1 TABLET ORAL at 08:03

## 2025-03-15 RX ADMIN — ACETAMINOPHEN 650 MG: 325 TABLET ORAL at 12:03

## 2025-03-15 RX ADMIN — DEXAMETHASONE SODIUM PHOSPHATE 4 MG: 4 INJECTION INTRA-ARTICULAR; INTRALESIONAL; INTRAMUSCULAR; INTRAVENOUS; SOFT TISSUE at 12:03

## 2025-03-15 RX ADMIN — SODIUM CHLORIDE 1000 MG: 1 TABLET ORAL at 04:03

## 2025-03-15 RX ADMIN — DEXAMETHASONE SODIUM PHOSPHATE 4 MG: 4 INJECTION INTRA-ARTICULAR; INTRALESIONAL; INTRAMUSCULAR; INTRAVENOUS; SOFT TISSUE at 05:03

## 2025-03-15 RX ADMIN — FLUTICASONE FUROATE AND VILANTEROL TRIFENATATE 1 PUFF: 100; 25 POWDER RESPIRATORY (INHALATION) at 09:03

## 2025-03-15 RX ADMIN — POLYETHYLENE GLYCOL 3350 17 G: 17 POWDER, FOR SOLUTION ORAL at 08:03

## 2025-03-15 RX ADMIN — GABAPENTIN 300 MG: 300 CAPSULE ORAL at 08:03

## 2025-03-15 RX ADMIN — METHOCARBAMOL 500 MG: 500 TABLET ORAL at 08:03

## 2025-03-15 RX ADMIN — ACETAMINOPHEN 650 MG: 325 TABLET ORAL at 07:03

## 2025-03-15 RX ADMIN — LOSARTAN POTASSIUM 100 MG: 50 TABLET, FILM COATED ORAL at 08:03

## 2025-03-15 RX ADMIN — HEPARIN SODIUM 5000 UNITS: 5000 INJECTION INTRAVENOUS; SUBCUTANEOUS at 04:03

## 2025-03-15 RX ADMIN — SENNOSIDES AND DOCUSATE SODIUM 1 TABLET: 50; 8.6 TABLET ORAL at 08:03

## 2025-03-15 RX ADMIN — METHOCARBAMOL 500 MG: 500 TABLET ORAL at 04:03

## 2025-03-15 RX ADMIN — PANTOPRAZOLE SODIUM 20 MG: 20 TABLET, DELAYED RELEASE ORAL at 08:03

## 2025-03-15 RX ADMIN — BUTALBITAL, ACETAMINOPHEN, AND CAFFEINE 1 TABLET: 325; 50; 40 TABLET ORAL at 04:03

## 2025-03-15 RX ADMIN — PREGABALIN 50 MG: 50 CAPSULE ORAL at 08:03

## 2025-03-15 RX ADMIN — BUTALBITAL, ACETAMINOPHEN, AND CAFFEINE 1 TABLET: 325; 50; 40 TABLET ORAL at 08:03

## 2025-03-15 RX ADMIN — HEPARIN SODIUM 5000 UNITS: 5000 INJECTION INTRAVENOUS; SUBCUTANEOUS at 08:03

## 2025-03-15 RX ADMIN — ALUMINUM HYDROXIDE, MAGNESIUM HYDROXIDE, AND SIMETHICONE 30 ML: 200; 200; 20 SUSPENSION ORAL at 08:03

## 2025-03-15 RX ADMIN — AMLODIPINE BESYLATE 10 MG: 10 TABLET ORAL at 08:03

## 2025-03-15 RX ADMIN — PANTOPRAZOLE SODIUM 20 MG: 20 TABLET, DELAYED RELEASE ORAL at 04:03

## 2025-03-15 NOTE — PROGRESS NOTES
"[facility] - Neurosurgery ([facility] Medicine  Progress Note    Patient Name: Bridget Montgomery  MRN: [id number]  Patient Class: IP- Inpatient   Admission Date: 3/9/2025  Length of Stay: 6 days  Attending Physician: Lars Espinal MD  Primary Care Provider: No primary care provider on file.        Subjective     Principal Problem:Spinal stenosis        HPI:  Bridget Montgomery is a 89 y.o. female with COPD, HTN, HLD, history of TIA who is transferred here from [place] for NSGY evaluation for cervical cord compression.    Per Transfer Center:  "89F COPD, CKD4, GERD, HTN, HLD, hx of TIA, prediabetes, and spinal stenosis who initially presented to [facility] on 3/8/2025 with a primary complaint of sudden onset weakness. The patient was in their usual state of health until Monday. Starting on Monday, the patient started having a headache that started in her neck and migrated toward the top of her head. She has had chronic neck pain due to cervical stenosis, but the headache was new. She managed the pain with tylenol. On Friday, she presented to her PCP (Dr. Alejandrina Russ) who gave her 2 injections into her neck to help with the pain. She is not sure what the injection was but thinks it might have been a steroid. On Saturday, she had a normal morning and was able to perform all of her ADLs. Around 8 pm, she suddenly became weak in her arms and legs and was not able to walk. She was able to catch herself prior to falling. The primary concern was the weakness and ongoing headache. She endorses chronic numbness/tingling in her fingers that has not worsened. She denies any sensory deficits. She denies any urinary incontinence or fecal incontinence. She endorses pain in her back, neck, knees, and a headache. She denies any fevers, chills, chest pain, shortness of breath, abdominal pain, N/V, diarrhea, constipation. CT Head was negative for acute stroke. MRI shows new onset cervical cord " "compression C1-C2. Neurosurgery consulted, recommended transferring pt to [facility] for a higher level of care."    Overview/Hospital Course:  89 y.o. female with COPD, HTN, HLD, cervical stenosis, history of TIA who is transferred here from [place] for NSGY evaluation for cervical cord compression. Presented to outside hospital with increased generalized weakness, particularly in her extremities.  CVA workup negative, however MRI showed new onset cervical cord compression, likely contributing to her symptoms.  Transferred here for Neurosurgery evaluation. MRI Cervical spine 3/9/25: chronic type 2 odontoid fracture with progressed retropulsion of the fracture fragment in comparison to prior MRI from 8/11/2023, with moderate to severe canal stenosis at C1-C2. Multilevel foraminal stenosis. Cont C-collar at all times. Neurosurgery recommended occiput-C4 fusion. Non-surgical option is strict cervical collar 24/7 indefinitely. XR with chronic odontoid type 2 fracture, position alignment appears stable. Decadron started 3/12 for new RUE weakness.  Started on salt tablets for dropping Na.    Interval History:  Patient complaining of headache this morning.  Also reports having right groin pains, possibly spasms.  Sodium level improving    Review of Systems  Objective:     Vital Signs (Most Recent):  Temp: 97.5 °F (36.4 °C) (03/15/25 0931)  Pulse:  (given per family) (03/15/25 0900)  Resp:  (given per family , imntructed to wait for nurse) (03/15/25 0900)  BP: (!) 152/74 (03/15/25 0931)  SpO2: 96 % (03/15/25 0931) Vital Signs (24h Range):  Temp:  [97.5 °F (36.4 °C)] 97.5 °F (36.4 °C)  Pulse:  [56-82] 82  Resp:  [18] 18  SpO2:  [96 %-98 %] 96 %  BP: (146-161)/(66-74) 152/74     Weight: 59 kg (130 lb 1.1 oz)  Body mass index is 23.04 kg/m².    Intake/Output Summary (Last 24 hours) at 3/15/2025 1139  Last data filed at 3/15/2025 0602  Gross per 24 hour   Intake --   Output 3150 ml   Net -3150 ml         Physical Exam    "   Clear lungs bilaterally, unlabored breathing, on room air, no cyanosis   Heart sounds indicate a regular rate and rhythm   No facial droop, no slurred speech   C-collar in place   5/5 fist  plantar flexion of both feet   No obvious lower extremity edema   Intact straight leg raise on the right      Assessment & Plan  Spinal stenosis  Cord compression  Cord Compression  - MRI Cervical spine 3/9/25: chronic type 2 odontoid fracture with progressed retropulsion of the fracture fragment in comparison to prior MRI from 8/11/2023, with moderate to severe canal stenosis at C1-C2. Multilevel foraminal stenosis.   - Cont C-collar at all times  - Upright and supine cervical x-ray pending  - Neurosurgery recommended occiput-C4 fusion. Non-surgical option is strict cervical collar 24/7 indefinitely  - XR with chronic odontoid type 2 fracture, position alignment appears stable   - Decadron started for new neuro deficit  - surgery for 3/19      See above    Hyponatremia  Asymptomatic, mild, suspected polydipsia and/or SIADH  Still worsening despite fluid restriction  Now improving w/ salt tabs, trend  Chronic kidney disease, stage III (moderate)  Renal function remains around baseline; Estimated Creatinine Clearance: 25.8 mL/min (based on SCr of 1.2 mg/dL). according to latest data. 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 Will avoid nephrotoxic agents as able, and renally dose all meds as applicable.  Other emphysema  History of COPD, not on home oxygen. Currently without evidence of acute exacerbation, and sats on room air at goal 88% or greater. Will continue hospital formulary equivalent of home regimen and monitor respiratory status.   Essential hypertension  Chronic, and currently controlled. Latest blood pressure and vitals reviewed-   While in the hospital, will manage blood pressure as follows; Continue home antihypertensive regimen    Will utilize p.r.n. blood pressure medication only if patient's blood pressure  greater than 220/110 and she develops symptoms such as worsening chest pain or shortness of breath.  Generalized weakness  Chronic, however likely exacerbated by cervical radiculopathy.  See above    Headache  Fioricet PRN    Cervical spinal stenosis      Mixed hyperlipidemia      Edema, spinal cord      VTE Risk Mitigation (From admission, onward)           Ordered     heparin (porcine) injection 5,000 Units  Every 8 hours         03/14/25 1121     IP VTE HIGH RISK PATIENT  Once         03/09/25 2040     Place sequential compression device  Until discontinued         03/09/25 2040     Reason for No Pharmacological VTE Prophylaxis  Once        Question:  Reasons:  Answer:  Physician Provided (leave comment)  Comment:  surgery    03/09/25 2040                    Discharge Planning   MIKAYLA: 3/21/2025     Code Status: Full Code   Medical Readiness for Discharge Date:   Discharge Plan A: Rehab                        Lars Espinal MD  Department of Hospital Medicine   [facility] - Neurosurgery ([facility])

## 2025-03-15 NOTE — SUBJECTIVE & OBJECTIVE
Interval History: NAEON. Neuro exam stable. No new complaints. R deltoid weakness improving. OR booked for 3/19. Will need preop traction on 3/18.     Medications:  Continuous Infusions:  Scheduled Meds:   amLODIPine  10 mg Oral Daily    dexAMETHasone injection  4 mg Intravenous Q6H    fluticasone furoate-vilanteroL  1 puff Inhalation Daily    gabapentin  300 mg Oral QHS    heparin (porcine)  5,000 Units Subcutaneous Q8H    losartan  100 mg Oral Daily    methocarbamoL  500 mg Oral TID    pantoprazole  20 mg Oral BID WM    polyethylene glycol  17 g Oral Daily    pregabalin  50 mg Oral QHS    senna-docusate 8.6-50 mg  1 tablet Oral BID    sodium chloride  1,000 mg Oral TID     PRN Meds:  Current Facility-Administered Medications:     acetaminophen, 650 mg, Oral, Q4H PRN    albuterol, 2 puff, Inhalation, Q6H PRN    aluminum-magnesium hydroxide-simethicone, 30 mL, Oral, QID PRN    butalbital-acetaminophen-caffeine -40 mg, 1 tablet, Oral, Q4H PRN    dextrose 50%, 12.5 g, Intravenous, PRN    dextrose 50%, 25 g, Intravenous, PRN    glucagon (human recombinant), 1 mg, Intramuscular, PRN    glucose, 16 g, Oral, PRN    glucose, 24 g, Oral, PRN    insulin aspart U-100, 0-5 Units, Subcutaneous, QID (AC + HS) PRN    melatonin, 6 mg, Oral, Nightly PRN    morphine, 2 mg, Intravenous, Q4H PRN    naloxone, 0.02 mg, Intravenous, PRN    ondansetron, 8 mg, Oral, Q8H PRN    oxyCODONE-acetaminophen, 1 tablet, Oral, Q4H PRN    oxyCODONE-acetaminophen, 1 tablet, Oral, Q4H PRN    simethicone, 1 tablet, Oral, QID PRN    sodium chloride 0.9%, 1-10 mL, Intravenous, Q12H PRN     Review of Systems  Objective:     Weight: 59 kg (130 lb 1.1 oz)  Body mass index is 23.04 kg/m².  Vital Signs (Most Recent):  Temp: 97.5 °F (36.4 °C) (03/14/25 1142)  Pulse: 64 (03/15/25 0515)  Resp: 18 (03/14/25 1142)  BP: (!) 152/74 (03/15/25 0515)  SpO2: 96 % (03/15/25 0515) Vital Signs (24h Range):  Temp:  [97.5 °F (36.4 °C)] 97.5 °F (36.4 °C)  Pulse:  [56-76]  "64  Resp:  [18] 18  SpO2:  [95 %-98 %] 96 %  BP: (146-161)/(66-74) 152/74                         Female External Urinary Catheter w/ Suction 03/09/25 (Active)   Skin no redness;no breakdown 03/14/25 1913   Tolerance no signs/symptoms of discomfort 03/14/25 1913   Suction Continuous suction at 40 mmHg 03/14/25 1913   Date of last wick change 03/14/25 03/14/25 1913   Time of last wick change 2200 03/14/25 0404   Output (mL) 800 mL 03/15/25 0602          Physical Exam         Neurosurgery Physical Exam    General: Well developed, well nourished, no distress.   Head: Normocephalic, atraumatic.  Mental Status: Awake, Alert, Oriented  Speech: Clear with content appropriate to conversation.  Cranial nerves: Face symmetric, CN II-XII grossly intact.   Eyes: Pupils equal, round, reactive to light, EOMI.  Sensory: Intact to light touch throughout.  Motor Strength: Moves all extremities spontaneously with good tone. Full strength upper and lower extremities. No abnormal movements seen.      Strength   Deltoids Triceps Biceps Wrist Extension Wrist Flexion Hand    Upper: R 4/5 5/5 5/5 5/5 5/5 5/5     L 5/5 5/5 5/5 5/5 5/5 5/5       Iliopsoas Quadriceps Knee  Flexion Tibialis  anterior Gastro- cnemius EHL   Lower: R 5/5 5/5 5/5 5/5 5/5 5/5     L 5/5 5/5 5/5 5/5 5/5 5/5    Patient with R distal clavicle fx and h/o of R shoulder arthroplasty.  Hand intrinsics 5/5.     Schneider: absent  In c-collar.    Significant Labs:  Recent Labs   Lab 03/14/25  0328   *   *   K 5.2*   CL 96   CO2 19*   BUN 48*   CREATININE 1.2   CALCIUM 8.7   MG 2.7*     Recent Labs   Lab 03/14/25  0328   WBC 11.79   HGB 9.4*   HCT 28.7*        No results for input(s): "LABPT", "INR", "APTT" in the last 48 hours.  Microbiology Results (last 7 days)       ** No results found for the last 168 hours. **          All pertinent labs from the last 24 hours have been reviewed.    Significant Diagnostics:  I have reviewed all pertinent imaging " results/findings within the past 24 hours.

## 2025-03-15 NOTE — ASSESSMENT & PLAN NOTE
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 Multilevel foraminal stenosis.     Discussed surgical vs non-surgical options with the patient and her daughter Liliana Montgomery present. Patient's grandson Aidan (neurosurgeon) was also on the phone for this conversation. Given the degree of upper cervical cord kinking, cord edema, and craniocervical settling recommended surgical intervention to preserve her current function and prevent neurological decline. Surgical recommendation is occiput-C4 fusion. She would also need pre-op traction. Non-surgical option is strict cervical collar 24/7 indefinitely. This would also require the patient to be compliant with collar use. Fracture appears stable in X-Ray c spine upright/supine. Patient and daughter have decided to proceed with surgery.    Plan:   - Cervical collar at all times.  - XR cervical spine upright/supine in collar stable. Continue collar.   - Multimodal pain control.  - Bowel regimen.  - Notify NSGY immediately with any decline on exam.  - Family has decided to proceed with surgery. Booked for O-C4 fusion on 3/19/25. Pre-op traction on 3/18.    D/w staff Dr. Lemons and on call staff Dr. Quintero.

## 2025-03-15 NOTE — PROGRESS NOTES
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 Her symptoms began last Monday with headache that started in her neck and migrated toward the top of her head. She has had chronic neck pain due to cervical stenosis, but the headache was new. On Saturday, she had a normal morning and was able to perform all of her ADLs. Around 8 pm, she suddenly became weak in her arms and legs and was not able to walk. MRI demonstrated chronic type 2 odontoid fracture with progressed retropulsion of the fracture fragment in comparison to prior MRI from 8/11/2023, with moderate to severe canal stenosis at C1-C2. Of note, patient had previously discontinued wearing her cervical collar. Denies radiculopathy in her arms or legs, sensory deficit, bladder or bowel incontinence.      Post-Op Info:  Procedure(s) (LRB):  FUSION, SPINE, CERVICAL, POSTERIOR APPROACH (N/A)       Interval History: NAEON. Neuro exam stable. No new complaints. R deltoid weakness improving. OR booked for 3/19. Will need preop traction on 3/18.     Medications:  Continuous Infusions:  Scheduled Meds:   amLODIPine  10 mg Oral Daily    dexAMETHasone injection  4 mg Intravenous Q6H    fluticasone furoate-vilanteroL  1 puff Inhalation Daily    gabapentin  300 mg Oral QHS    heparin (porcine)  5,000 Units Subcutaneous Q8H    losartan  100 mg Oral Daily    methocarbamoL  500 mg Oral TID    pantoprazole  20 mg Oral BID WM    polyethylene glycol  17 g Oral Daily    pregabalin  50 mg Oral QHS    senna-docusate 8.6-50 mg  1 tablet Oral BID    sodium chloride  1,000 mg Oral TID     PRN Meds:  Current  Facility-Administered Medications:     acetaminophen, 650 mg, Oral, Q4H PRN    albuterol, 2 puff, Inhalation, Q6H PRN    aluminum-magnesium hydroxide-simethicone, 30 mL, Oral, QID PRN    butalbital-acetaminophen-caffeine -40 mg, 1 tablet, Oral, Q4H PRN    dextrose 50%, 12.5 g, Intravenous, PRN    dextrose 50%, 25 g, Intravenous, PRN    glucagon (human recombinant), 1 mg, Intramuscular, PRN    glucose, 16 g, Oral, PRN    glucose, 24 g, Oral, PRN    insulin aspart U-100, 0-5 Units, Subcutaneous, QID (AC + HS) PRN    melatonin, 6 mg, Oral, Nightly PRN    morphine, 2 mg, Intravenous, Q4H PRN    naloxone, 0.02 mg, Intravenous, PRN    ondansetron, 8 mg, Oral, Q8H PRN    oxyCODONE-acetaminophen, 1 tablet, Oral, Q4H PRN    oxyCODONE-acetaminophen, 1 tablet, Oral, Q4H PRN    simethicone, 1 tablet, Oral, QID PRN    sodium chloride 0.9%, 1-10 mL, Intravenous, Q12H PRN     Review of Systems  Objective:     Weight: 59 kg (130 lb 1.1 oz)  Body mass index is 23.04 kg/m².  Vital Signs (Most Recent):  Temp: 97.5 °F (36.4 °C) (03/14/25 1142)  Pulse: 64 (03/15/25 0515)  Resp: 18 (03/14/25 1142)  BP: (!) 152/74 (03/15/25 0515)  SpO2: 96 % (03/15/25 0515) Vital Signs (24h Range):  Temp:  [97.5 °F (36.4 °C)] 97.5 °F (36.4 °C)  Pulse:  [56-76] 64  Resp:  [18] 18  SpO2:  [95 %-98 %] 96 %  BP: (146-161)/(66-74) 152/74                         Female External Urinary Catheter w/ Suction 03/09/25 (Active)   Skin no redness;no breakdown 03/14/25 1913   Tolerance no signs/symptoms of discomfort 03/14/25 1913   Suction Continuous suction at 40 mmHg 03/14/25 1913   Date of last wick change 03/14/25 03/14/25 1913   Time of last wick change 2200 03/14/25 0404   Output (mL) 800 mL 03/15/25 0602          Physical Exam         Neurosurgery Physical Exam    General: Well developed, well nourished, no distress.   Head: Normocephalic, atraumatic.  Mental Status: Awake, Alert, Oriented  Speech: Clear with content appropriate to conversation.  Cranial  "nerves: Face symmetric, CN II-XII grossly intact.   Eyes: Pupils equal, round, reactive to light, EOMI.  Sensory: Intact to light touch throughout.  Motor Strength: Moves all extremities spontaneously with good tone. Full strength upper and lower extremities. No abnormal movements seen.      Strength   Deltoids Triceps Biceps Wrist Extension Wrist Flexion Hand    Upper: R 4/5 5/5 5/5 5/5 5/5 5/5     L 5/5 5/5 5/5 5/5 5/5 5/5       Iliopsoas Quadriceps Knee  Flexion Tibialis  anterior Gastro- cnemius EHL   Lower: R 5/5 5/5 5/5 5/5 5/5 5/5     L 5/5 5/5 5/5 5/5 5/5 5/5    Patient with R distal clavicle fx and h/o of R shoulder arthroplasty.  Hand intrinsics 5/5.     Schneider: absent  In c-collar.    Significant Labs:  Recent Labs   Lab 03/14/25  0328   *   *   K 5.2*   CL 96   CO2 19*   BUN 48*   CREATININE 1.2   CALCIUM 8.7   MG 2.7*     Recent Labs   Lab 03/14/25  0328   WBC 11.79   HGB 9.4*   HCT 28.7*        No results for input(s): "LABPT", "INR", "APTT" in the last 48 hours.  Microbiology Results (last 7 days)       ** No results found for the last 168 hours. **          All pertinent labs from the last 24 hours have been reviewed.    Significant Diagnostics:  I have reviewed all pertinent imaging results/findings within the past 24 hours.  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 Multilevel foraminal stenosis.     Discussed surgical vs non-surgical options with " the patient and her daughter Liliana Montgomery present. Patient's grandson Aidan (neurosurgeon) was also on the phone for this conversation. Given the degree of upper cervical cord kinking, cord edema, and craniocervical settling recommended surgical intervention to preserve her current function and prevent neurological decline. Surgical recommendation is occiput-C4 fusion. She would also need pre-op traction. Non-surgical option is strict cervical collar 24/7 indefinitely. This would also require the patient to be compliant with collar use. Fracture appears stable in X-Ray c spine upright/supine. Patient and daughter have decided to proceed with surgery.    Plan:   - Cervical collar at all times.  - XR cervical spine upright/supine in collar stable. Continue collar.   - Multimodal pain control.  - Bowel regimen.  - Notify NSGY immediately with any decline on exam.  - Family has decided to proceed with surgery. Booked for O-C4 fusion on 3/19/25. Pre-op traction on 3/18.    D/w staff Dr. Lemons and on call staff Dr. Quintero.        Griffin Sauceda MD  Neurosurgery  [facility] - Neurosurgery ([facility])

## 2025-03-15 NOTE — ASSESSMENT & PLAN NOTE
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

## 2025-03-16 PROBLEM — E87.5 HYPERKALEMIA: Status: ACTIVE | Noted: 2025-03-16

## 2025-03-16 LAB
ALBUMIN SERPL BCP-MCNC: 3 G/DL (ref 3.5–5.2)
ALP SERPL-CCNC: 181 U/L (ref 40–150)
ALT SERPL W/O P-5'-P-CCNC: 41 U/L (ref 10–44)
ANION GAP SERPL CALC-SCNC: 11 MMOL/L (ref 8–16)
AST SERPL-CCNC: 47 U/L (ref 10–40)
BASOPHILS # BLD AUTO: 0.01 K/UL (ref 0–0.2)
BASOPHILS NFR BLD: 0.1 % (ref 0–1.9)
BILIRUB SERPL-MCNC: 0.1 MG/DL (ref 0.1–1)
BUN SERPL-MCNC: 57 MG/DL (ref 8–23)
CALCIUM SERPL-MCNC: 8.4 MG/DL (ref 8.7–10.5)
CHLORIDE SERPL-SCNC: 105 MMOL/L (ref 95–110)
CO2 SERPL-SCNC: 20 MMOL/L (ref 23–29)
CREAT SERPL-MCNC: 1.1 MG/DL (ref 0.5–1.4)
DIFFERENTIAL METHOD BLD: ABNORMAL
EOSINOPHIL # BLD AUTO: 0 K/UL (ref 0–0.5)
EOSINOPHIL NFR BLD: 0 % (ref 0–8)
ERYTHROCYTE [DISTWIDTH] IN BLOOD BY AUTOMATED COUNT: 14.6 % (ref 11.5–14.5)
EST. GFR  (NO RACE VARIABLE): 47.7 ML/MIN/1.73 M^2
GLUCOSE SERPL-MCNC: 98 MG/DL (ref 70–110)
HCT VFR BLD AUTO: 31.4 % (ref 37–48.5)
HGB BLD-MCNC: 10.1 G/DL (ref 12–16)
IMM GRANULOCYTES # BLD AUTO: 0.06 K/UL (ref 0–0.04)
IMM GRANULOCYTES NFR BLD AUTO: 0.6 % (ref 0–0.5)
LYMPHOCYTES # BLD AUTO: 0.6 K/UL (ref 1–4.8)
LYMPHOCYTES NFR BLD: 5.9 % (ref 18–48)
MCH RBC QN AUTO: 28.5 PG (ref 27–31)
MCHC RBC AUTO-ENTMCNC: 32.2 G/DL (ref 32–36)
MCV RBC AUTO: 89 FL (ref 82–98)
MONOCYTES # BLD AUTO: 0.3 K/UL (ref 0.3–1)
MONOCYTES NFR BLD: 3.2 % (ref 4–15)
NEUTROPHILS # BLD AUTO: 8.7 K/UL (ref 1.8–7.7)
NEUTROPHILS NFR BLD: 90.2 % (ref 38–73)
NRBC BLD-RTO: 0 /100 WBC
PLATELET # BLD AUTO: 359 K/UL (ref 150–450)
PMV BLD AUTO: 11 FL (ref 9.2–12.9)
POCT GLUCOSE: 116 MG/DL (ref 70–110)
POCT GLUCOSE: 138 MG/DL (ref 70–110)
POCT GLUCOSE: 164 MG/DL (ref 70–110)
POCT GLUCOSE: 189 MG/DL (ref 70–110)
POTASSIUM SERPL-SCNC: 5.3 MMOL/L (ref 3.5–5.1)
PROT SERPL-MCNC: 6.4 G/DL (ref 6–8.4)
RBC # BLD AUTO: 3.55 M/UL (ref 4–5.4)
SODIUM SERPL-SCNC: 136 MMOL/L (ref 136–145)
WBC # BLD AUTO: 9.67 K/UL (ref 3.9–12.7)

## 2025-03-16 PROCEDURE — 36415 COLL VENOUS BLD VENIPUNCTURE: CPT | Performed by: HOSPITALIST

## 2025-03-16 PROCEDURE — 85025 COMPLETE CBC W/AUTO DIFF WBC: CPT | Performed by: HOSPITALIST

## 2025-03-16 PROCEDURE — 63600175 PHARM REV CODE 636 W HCPCS: Performed by: PHYSICIAN ASSISTANT

## 2025-03-16 PROCEDURE — 80053 COMPREHEN METABOLIC PANEL: CPT | Performed by: HOSPITALIST

## 2025-03-16 PROCEDURE — 63600175 PHARM REV CODE 636 W HCPCS: Performed by: HOSPITALIST

## 2025-03-16 PROCEDURE — 25000003 PHARM REV CODE 250: Performed by: INTERNAL MEDICINE

## 2025-03-16 PROCEDURE — 25000003 PHARM REV CODE 250: Performed by: STUDENT IN AN ORGANIZED HEALTH CARE EDUCATION/TRAINING PROGRAM

## 2025-03-16 PROCEDURE — 11000001 HC ACUTE MED/SURG PRIVATE ROOM

## 2025-03-16 PROCEDURE — 25000003 PHARM REV CODE 250: Performed by: HOSPITALIST

## 2025-03-16 RX ORDER — LOSARTAN POTASSIUM 50 MG/1
50 TABLET ORAL DAILY
Status: DISCONTINUED | OUTPATIENT
Start: 2025-03-16 | End: 2025-03-18

## 2025-03-16 RX ORDER — HYDRALAZINE HYDROCHLORIDE 25 MG/1
25 TABLET, FILM COATED ORAL EVERY 8 HOURS
Status: DISCONTINUED | OUTPATIENT
Start: 2025-03-16 | End: 2025-03-17

## 2025-03-16 RX ORDER — SODIUM CHLORIDE 1 G/1
1000 TABLET ORAL 2 TIMES DAILY
Status: DISCONTINUED | OUTPATIENT
Start: 2025-03-16 | End: 2025-03-17

## 2025-03-16 RX ADMIN — SODIUM CHLORIDE 1000 MG: 1 TABLET ORAL at 10:03

## 2025-03-16 RX ADMIN — BUTALBITAL, ACETAMINOPHEN, AND CAFFEINE 1 TABLET: 325; 50; 40 TABLET ORAL at 12:03

## 2025-03-16 RX ADMIN — DEXAMETHASONE SODIUM PHOSPHATE 4 MG: 4 INJECTION INTRA-ARTICULAR; INTRALESIONAL; INTRAMUSCULAR; INTRAVENOUS; SOFT TISSUE at 05:03

## 2025-03-16 RX ADMIN — METHOCARBAMOL 500 MG: 500 TABLET ORAL at 08:03

## 2025-03-16 RX ADMIN — SENNOSIDES AND DOCUSATE SODIUM 1 TABLET: 50; 8.6 TABLET ORAL at 08:03

## 2025-03-16 RX ADMIN — BUTALBITAL, ACETAMINOPHEN, AND CAFFEINE 1 TABLET: 325; 50; 40 TABLET ORAL at 05:03

## 2025-03-16 RX ADMIN — HYDRALAZINE HYDROCHLORIDE 25 MG: 25 TABLET ORAL at 02:03

## 2025-03-16 RX ADMIN — BUTALBITAL, ACETAMINOPHEN, AND CAFFEINE 1 TABLET: 325; 50; 40 TABLET ORAL at 08:03

## 2025-03-16 RX ADMIN — PREGABALIN 50 MG: 50 CAPSULE ORAL at 08:03

## 2025-03-16 RX ADMIN — AMLODIPINE BESYLATE 10 MG: 10 TABLET ORAL at 08:03

## 2025-03-16 RX ADMIN — DEXAMETHASONE SODIUM PHOSPHATE 4 MG: 4 INJECTION INTRA-ARTICULAR; INTRALESIONAL; INTRAMUSCULAR; INTRAVENOUS; SOFT TISSUE at 11:03

## 2025-03-16 RX ADMIN — ACETAMINOPHEN 650 MG: 325 TABLET ORAL at 08:03

## 2025-03-16 RX ADMIN — FLUTICASONE FUROATE AND VILANTEROL TRIFENATATE 1 PUFF: 100; 25 POWDER RESPIRATORY (INHALATION) at 08:03

## 2025-03-16 RX ADMIN — ACETAMINOPHEN 650 MG: 325 TABLET ORAL at 12:03

## 2025-03-16 RX ADMIN — SODIUM POLYSTYRENE SULFONATE 15 G: 15 SUSPENSION ORAL; RECTAL at 11:03

## 2025-03-16 RX ADMIN — PANTOPRAZOLE SODIUM 20 MG: 20 TABLET, DELAYED RELEASE ORAL at 04:03

## 2025-03-16 RX ADMIN — PANTOPRAZOLE SODIUM 20 MG: 20 TABLET, DELAYED RELEASE ORAL at 07:03

## 2025-03-16 RX ADMIN — HEPARIN SODIUM 5000 UNITS: 5000 INJECTION INTRAVENOUS; SUBCUTANEOUS at 08:03

## 2025-03-16 RX ADMIN — BUTALBITAL, ACETAMINOPHEN, AND CAFFEINE 1 TABLET: 325; 50; 40 TABLET ORAL at 01:03

## 2025-03-16 RX ADMIN — SODIUM CHLORIDE 1000 MG: 1 TABLET ORAL at 08:03

## 2025-03-16 RX ADMIN — HYDRALAZINE HYDROCHLORIDE 25 MG: 25 TABLET ORAL at 08:03

## 2025-03-16 RX ADMIN — HEPARIN SODIUM 5000 UNITS: 5000 INJECTION INTRAVENOUS; SUBCUTANEOUS at 01:03

## 2025-03-16 RX ADMIN — GABAPENTIN 300 MG: 300 CAPSULE ORAL at 08:03

## 2025-03-16 RX ADMIN — HEPARIN SODIUM 5000 UNITS: 5000 INJECTION INTRAVENOUS; SUBCUTANEOUS at 05:03

## 2025-03-16 RX ADMIN — LOSARTAN POTASSIUM 50 MG: 50 TABLET, FILM COATED ORAL at 08:03

## 2025-03-16 RX ADMIN — HYDRALAZINE HYDROCHLORIDE 25 MG: 25 TABLET ORAL at 10:03

## 2025-03-16 RX ADMIN — METHOCARBAMOL 500 MG: 500 TABLET ORAL at 05:03

## 2025-03-16 RX ADMIN — ALUMINUM HYDROXIDE, MAGNESIUM HYDROXIDE, AND SIMETHICONE 30 ML: 200; 200; 20 SUSPENSION ORAL at 08:03

## 2025-03-16 RX ADMIN — POLYETHYLENE GLYCOL 3350 17 G: 17 POWDER, FOR SOLUTION ORAL at 08:03

## 2025-03-16 RX ADMIN — DEXAMETHASONE SODIUM PHOSPHATE 4 MG: 4 INJECTION INTRA-ARTICULAR; INTRALESIONAL; INTRAMUSCULAR; INTRAVENOUS; SOFT TISSUE at 04:03

## 2025-03-16 RX ADMIN — DEXAMETHASONE SODIUM PHOSPHATE 4 MG: 4 INJECTION INTRA-ARTICULAR; INTRALESIONAL; INTRAMUSCULAR; INTRAVENOUS; SOFT TISSUE at 12:03

## 2025-03-16 NOTE — ASSESSMENT & PLAN NOTE
Fluctating; possibly from losartan and CKD  Reducing losartan dose  Dosing w/ kayexlate, recheck in AM

## 2025-03-16 NOTE — PROGRESS NOTES
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 Her symptoms began last Monday with headache that started in her neck and migrated toward the top of her head. She has had chronic neck pain due to cervical stenosis, but the headache was new. On Saturday, she had a normal morning and was able to perform all of her ADLs. Around 8 pm, she suddenly became weak in her arms and legs and was not able to walk. MRI demonstrated chronic type 2 odontoid fracture with progressed retropulsion of the fracture fragment in comparison to prior MRI from 8/11/2023, with moderate to severe canal stenosis at C1-C2. Of note, patient had previously discontinued wearing her cervical collar. Denies radiculopathy in her arms or legs, sensory deficit, bladder or bowel incontinence.      Post-Op Info:  Procedure(s) (LRB):  FUSION, SPINE, CERVICAL, POSTERIOR APPROACH (N/A)       Interval History: NAEON. Neurologically stable. Pain controlled. Pending OR this week.    Medications:  Continuous Infusions:  Scheduled Meds:   amLODIPine  10 mg Oral Daily    dexAMETHasone injection  4 mg Intravenous Q6H    fluticasone furoate-vilanteroL  1 puff Inhalation Daily    gabapentin  300 mg Oral QHS    heparin (porcine)  5,000 Units Subcutaneous Q8H    hydrALAZINE  25 mg Oral Q8H    losartan  50 mg Oral Daily    methocarbamoL  500 mg Oral TID    pantoprazole  20 mg Oral BID WM    polyethylene glycol  17 g Oral Daily    pregabalin  50 mg Oral QHS    senna-docusate 8.6-50 mg  1 tablet Oral BID    sodium chloride  1,000 mg Oral BID    sodium polystyrene sulfonate  15 g Oral Once      PRN Meds:  Current Facility-Administered Medications:     acetaminophen, 650 mg, Oral, Q4H PRN    albuterol, 2 puff, Inhalation, Q6H PRN    aluminum-magnesium hydroxide-simethicone, 30 mL, Oral, QID PRN    butalbital-acetaminophen-caffeine -40 mg, 1 tablet, Oral, Q4H PRN    dextrose 50%, 12.5 g, Intravenous, PRN    dextrose 50%, 25 g, Intravenous, PRN    glucagon (human recombinant), 1 mg, Intramuscular, PRN    glucose, 16 g, Oral, PRN    glucose, 24 g, Oral, PRN    insulin aspart U-100, 0-5 Units, Subcutaneous, QID (AC + HS) PRN    melatonin, 6 mg, Oral, Nightly PRN    morphine, 2 mg, Intravenous, Q4H PRN    naloxone, 0.02 mg, Intravenous, PRN    ondansetron, 8 mg, Oral, Q8H PRN    oxyCODONE-acetaminophen, 1 tablet, Oral, Q4H PRN    oxyCODONE-acetaminophen, 1 tablet, Oral, Q4H PRN    simethicone, 1 tablet, Oral, QID PRN    sodium chloride 0.9%, 1-10 mL, Intravenous, Q12H PRN     Review of Systems  Objective:     Weight: 59 kg (130 lb 1.1 oz)  Body mass index is 23.04 kg/m².  Vital Signs (Most Recent):  Temp: 98.5 °F (36.9 °C) (03/16/25 0736)  Pulse: 80 (03/16/25 0834)  Resp: 18 (03/16/25 0834)  BP: (!) 182/77 (03/16/25 0736)  SpO2: 98 % (03/16/25 0736) Vital Signs (24h Range):  Temp:  [97.7 °F (36.5 °C)-98.5 °F (36.9 °C)] 98.5 °F (36.9 °C)  Pulse:  [49-80] 80  Resp:  [18-20] 18  SpO2:  [96 %-100 %] 98 %  BP: (132-188)/(61-77) 182/77                         Female External Urinary Catheter w/ Suction 03/09/25 (Active)   Skin no redness;no breakdown 03/15/25 0815   Tolerance no signs/symptoms of discomfort 03/15/25 0815   Suction Continuous suction at 40 mmHg 03/15/25 0815   Date of last wick change 03/14/25 03/14/25 1913   Time of last wick change 2200 03/14/25 0404   Output (mL) 800 mL 03/15/25 0602          Physical Exam         Neurosurgery Physical Exam  General: Well developed, well nourished, no distress.   Head: Normocephalic, atraumatic.  Mental Status: Awake, Alert, Oriented  Speech: Clear with  "content appropriate to conversation.  Cranial nerves: Face symmetric, CN II-XII grossly intact.   Eyes: Pupils equal, round, reactive to light, EOMI.  Sensory: Intact to light touch throughout.  Motor Strength: Moves all extremities spontaneously with good tone. Full strength upper and lower extremities. No abnormal movements seen.      Strength   Deltoids Triceps Biceps Wrist Extension Wrist Flexion Hand    Upper: R 4/5 5/5 5/5 5/5 5/5 5/5     L 5/5 5/5 5/5 5/5 5/5 5/5       Iliopsoas Quadriceps Knee  Flexion Tibialis  anterior Gastro- cnemius EHL   Lower: R 5/5 5/5 5/5 5/5 5/5 5/5     L 5/5 5/5 5/5 5/5 5/5 5/5    Patient with R distal clavicle fx and h/o of R shoulder arthroplasty.  Hand intrinsics 5/5.     Schneider: absent  In c-collar.    Significant Labs:  Recent Labs   Lab 03/15/25  0927 03/16/25  0459   * 98    136   K 4.8 5.3*    105   CO2 21* 20*   BUN 51* 57*   CREATININE 1.2 1.1   CALCIUM 8.6* 8.4*     Recent Labs   Lab 03/15/25  0927 03/16/25  0459   WBC 8.50 9.67   HGB 10.5* 10.1*   HCT 31.6* 31.4*    359     No results for input(s): "LABPT", "INR", "APTT" in the last 48 hours.  Microbiology Results (last 7 days)       ** No results found for the last 168 hours. **          All pertinent labs from the last 24 hours have been reviewed.    Significant Diagnostics:  I have reviewed all pertinent imaging results/findings within the past 24 hours.  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     MRI Cervical spine 3/9/25: Chronic type 2 odontoid fracture with progressed retropulsion of the fracture fragment in comparison to prior MRI from 8/11/2023, with " moderate to severe canal stenosis at C1-C2. Multilevel foraminal stenosis.     Discussed surgical vs non-surgical options with the patient and her daughter Liliana Montgomery present. Patient's grandson Aidan (neurosurgeon) was also on the phone for this conversation. Given the degree of upper cervical cord kinking, cord edema, and craniocervical settling recommended surgical intervention to preserve her current function and prevent neurological decline. Surgical recommendation is occiput-C4 fusion. She would also need pre-op traction. Non-surgical option is strict cervical collar 24/7 indefinitely. This would also require the patient to be compliant with collar use. Fracture appears stable in X-Ray c spine upright/supine. Patient and daughter have decided to proceed with surgery.    Plan:   - Cervical collar at all times.  - XR cervical spine upright/supine in collar stable. Continue collar. Ok for bed level PT/OT only with collar in place  - Multimodal pain control.  - Bowel regimen.  - Notify NSGY immediately with any decline on exam.  - Family has decided to proceed with surgery. Booked for O-C4 fusion on 3/19/25. Pre-op traction on 3/18. Please document RCRI/medical risk stratification.  - Med management per primary    D/w staff Dr. Lemons and on call staff Dr. Quintero.        Gilda Altamirano MD  Neurosurgery  [facility] - Neurosurgery ([facility])

## 2025-03-16 NOTE — SUBJECTIVE & OBJECTIVE
Interval History:  Patient reports same indigestion.  No new complaints.  Blood pressure becoming more elevated.  Sodium improving.    Review of Systems  Objective:     Vital Signs (Most Recent):  Temp: 98.5 °F (36.9 °C) (03/16/25 0736)  Pulse: 80 (03/16/25 0834)  Resp: 18 (03/16/25 0834)  BP: (!) 182/77 (03/16/25 0736)  SpO2: 98 % (03/16/25 0736) Vital Signs (24h Range):  Temp:  [97.7 °F (36.5 °C)-98.5 °F (36.9 °C)] 98.5 °F (36.9 °C)  Pulse:  [49-80] 80  Resp:  [18-20] 18  SpO2:  [96 %-100 %] 98 %  BP: (132-188)/(61-77) 182/77     Weight: 59 kg (130 lb 1.1 oz)  Body mass index is 23.04 kg/m².  No intake or output data in the 24 hours ending 03/16/25 1039      Physical Exam      Awake alert, no acute distress   No facial droop, no slurred speech   Clear lungs bilaterally, unlabored breathing, on room air, no cyanosis   Heart sounds indicate a regular rate and rhythm     C-collar in place   5/5 fist  bilaterally   5/5 dorsiflexion plantar flexion of both feet   No obvious upper nor lower extremity edema

## 2025-03-16 NOTE — ASSESSMENT & PLAN NOTE
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

## 2025-03-16 NOTE — ASSESSMENT & PLAN NOTE
Uncontrolled  Pulse too slow to start BB.  Given hyperkalemia will reduce losartan to half and start scheduled hydralazine  Continue norvasc

## 2025-03-16 NOTE — ASSESSMENT & PLAN NOTE
Renal function remains around baseline; Estimated Creatinine Clearance: 28.1 mL/min (based on SCr of 1.1 mg/dL). according to latest data. 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 Will avoid nephrotoxic agents as able, and renally dose all meds as applicable.

## 2025-03-16 NOTE — ASSESSMENT & PLAN NOTE
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 Multilevel foraminal stenosis.     Discussed surgical vs non-surgical options with the patient and her daughter Liliana Montgomery present. Patient's grandson Aidan (neurosurgeon) was also on the phone for this conversation. Given the degree of upper cervical cord kinking, cord edema, and craniocervical settling recommended surgical intervention to preserve her current function and prevent neurological decline. Surgical recommendation is occiput-C4 fusion. She would also need pre-op traction. Non-surgical option is strict cervical collar 24/7 indefinitely. This would also require the patient to be compliant with collar use. Fracture appears stable in X-Ray c spine upright/supine. Patient and daughter have decided to proceed with surgery.    Plan:   - Cervical collar at all times.  - XR cervical spine upright/supine in collar stable. Continue collar. Ok for bed level PT/OT only with collar in place  - Multimodal pain control.  - Bowel regimen.  - Notify NSGY immediately with any decline on exam.  - Family has decided to proceed with surgery. Booked for O-C4 fusion on 3/19/25. Pre-op traction on 3/18. Please document RCRI/medical risk stratification.  - Med management per primary    D/w staff Dr. Lemons and on call staff Dr. Quintero.

## 2025-03-16 NOTE — PROGRESS NOTES
"[facility] - Neurosurgery ([facility]  Progress Note    Patient Name: Bridget Montgomery  MRN: [id number]  Patient Class: IP- Inpatient   Admission Date: 3/9/2025  Length of Stay: 7 days  Attending Physician: Lars Espinal MD  Primary Care Provider: No primary care provider on file.        Subjective     Principal Problem:Spinal stenosis        HPI:  Bridget Montgomery is a 89 y.o. female with COPD, HTN, HLD, history of TIA who is transferred here from [place] for NSGY evaluation for cervical cord compression.    Per Transfer Center:  "89F COPD, CKD4, GERD, HTN, HLD, hx of TIA, prediabetes, and spinal stenosis who initially presented to [facility] on 3/8/2025 with a primary complaint of sudden onset weakness. The patient was in their usual state of health until Monday. Starting on Monday, the patient started having a headache that started in her neck and migrated toward the top of her head. She has had chronic neck pain due to cervical stenosis, but the headache was new. She managed the pain with tylenol. On Friday, she presented to her PCP (Dr. Alejandrina Russ) who gave her 2 injections into her neck to help with the pain. She is not sure what the injection was but thinks it might have been a steroid. On Saturday, she had a normal morning and was able to perform all of her ADLs. Around 8 pm, she suddenly became weak in her arms and legs and was not able to walk. She was able to catch herself prior to falling. The primary concern was the weakness and ongoing headache. She endorses chronic numbness/tingling in her fingers that has not worsened. She denies any sensory deficits. She denies any urinary incontinence or fecal incontinence. She endorses pain in her back, neck, knees, and a headache. She denies any fevers, chills, chest pain, shortness of breath, abdominal pain, N/V, diarrhea, constipation. CT Head was negative for acute stroke. MRI shows new onset cervical cord " "compression C1-C2. Neurosurgery consulted, recommended transferring pt to [facility] for a higher level of care."    Overview/Hospital Course:  89 y.o. female with COPD, HTN, HLD, cervical stenosis, history of TIA who is transferred here from [place] for NSGY evaluation for cervical cord compression. Presented to outside hospital with increased generalized weakness, particularly in her extremities.  CVA workup negative, however MRI showed new onset cervical cord compression, likely contributing to her symptoms.  Transferred here for Neurosurgery evaluation. MRI Cervical spine 3/9/25: chronic type 2 odontoid fracture with progressed retropulsion of the fracture fragment in comparison to prior MRI from 8/11/2023, with moderate to severe canal stenosis at C1-C2. Multilevel foraminal stenosis. Cont C-collar at all times. Neurosurgery recommended occiput-C4 fusion. Non-surgical option is strict cervical collar 24/7 indefinitely. XR with chronic odontoid type 2 fracture, position alignment appears stable. Decadron started 3/12 for new RUE weakness.  Started on salt tablets for dropping Na.    Interval History:  Patient reports same indigestion.  No new complaints.  Blood pressure becoming more elevated.  Sodium improving.    Review of Systems  Objective:     Vital Signs (Most Recent):  Temp: 98.5 °F (36.9 °C) (03/16/25 0736)  Pulse: 80 (03/16/25 0834)  Resp: 18 (03/16/25 0834)  BP: (!) 182/77 (03/16/25 0736)  SpO2: 98 % (03/16/25 0736) Vital Signs (24h Range):  Temp:  [97.7 °F (36.5 °C)-98.5 °F (36.9 °C)] 98.5 °F (36.9 °C)  Pulse:  [49-80] 80  Resp:  [18-20] 18  SpO2:  [96 %-100 %] 98 %  BP: (132-188)/(61-77) 182/77     Weight: 59 kg (130 lb 1.1 oz)  Body mass index is 23.04 kg/m².  No intake or output data in the 24 hours ending 03/16/25 1039      Physical Exam      Awake alert, no acute distress   No facial droop, no slurred speech   Clear lungs bilaterally, unlabored breathing, on room air, no cyanosis   Heart sounds " indicate a regular rate and rhythm     C-collar in place   5/5 fist  bilaterally   5/5 dorsiflexion plantar flexion of both feet   No obvious upper nor lower extremity edema        Assessment & Plan  Spinal stenosis  Cord compression  Cord Compression  - MRI Cervical spine 3/9/25: chronic type 2 odontoid fracture with progressed retropulsion of the fracture fragment in comparison to prior MRI from 8/11/2023, with moderate to severe canal stenosis at C1-C2. Multilevel foraminal stenosis.   - Cont C-collar at all times  - Upright and supine cervical x-ray pending  - Neurosurgery recommended occiput-C4 fusion. Non-surgical option is strict cervical collar 24/7 indefinitely  - XR with chronic odontoid type 2 fracture, position alignment appears stable   - Decadron started for new neuro deficit  - surgery for 3/19      See above    Hyponatremia  Asymptomatic, mild, suspected polydipsia and/or SIADH  Still worsening despite fluid restriction  Now improving w/ salt tabs, reduce dose to BID  Chronic kidney disease, stage III (moderate)  Renal function remains around baseline; Estimated Creatinine Clearance: 28.1 mL/min (based on SCr of 1.1 mg/dL). according to latest data. 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 Will avoid nephrotoxic agents as able, and renally dose all meds as applicable.  Other emphysema  History of COPD, not on home oxygen. Currently without evidence of acute exacerbation, and sats on room air at goal 88% or greater. Will continue hospital formulary equivalent of home regimen and monitor respiratory status.   Essential hypertension  Uncontrolled  Pulse too slow to start BB.  Given hyperkalemia will reduce losartan to half and start scheduled hydralazine  Continue norvasc  Generalized weakness  Chronic, however likely exacerbated by cervical radiculopathy.  See above    Headache  Fioricet PRN    Cervical spinal stenosis      Mixed hyperlipidemia      Edema, spinal cord      Hyperkalemia  Fluctating;  possibly from losartan and CKD  Reducing losartan dose  Dosing w/ kayexlate, recheck in AM          VTE Risk Mitigation (From admission, onward)           Ordered     heparin (porcine) injection 5,000 Units  Every 8 hours         03/14/25 1121     IP VTE HIGH RISK PATIENT  Once         03/09/25 2040     Place sequential compression device  Until discontinued         03/09/25 2040     Reason for No Pharmacological VTE Prophylaxis  Once        Question:  Reasons:  Answer:  Physician Provided (leave comment)  Comment:  surgery    03/09/25 2040                    Discharge Planning   MIKAYLA: 3/21/2025     Code Status: Full Code   Medical Readiness for Discharge Date:   Discharge Plan A: Rehab                        Lars Espinal MD  Department of Hospital Medicine   [facility] - Neurosurgery ([facility])     Double Island Pedicle Flap Text: The defect edges were debeveled with a #15 scalpel blade.  Given the location of the defect, shape of the defect and the proximity to free margins a double island pedicle advancement flap was deemed most appropriate.  Using a sterile surgical marker, an appropriate advancement flap was drawn incorporating the defect, outlining the appropriate donor tissue and placing the expected incisions within the relaxed skin tension lines where possible.    The area thus outlined was incised deep to adipose tissue with a #15 scalpel blade.  The skin margins were undermined to an appropriate distance in all directions around the primary defect and laterally outward around the island pedicle utilizing iris scissors.  There was minimal undermining beneath the pedicle flap.

## 2025-03-16 NOTE — SUBJECTIVE & OBJECTIVE
Interval History: NAEON. Neurologically stable. Pain controlled. Pending OR this week.    Medications:  Continuous Infusions:  Scheduled Meds:   amLODIPine  10 mg Oral Daily    dexAMETHasone injection  4 mg Intravenous Q6H    fluticasone furoate-vilanteroL  1 puff Inhalation Daily    gabapentin  300 mg Oral QHS    heparin (porcine)  5,000 Units Subcutaneous Q8H    hydrALAZINE  25 mg Oral Q8H    losartan  50 mg Oral Daily    methocarbamoL  500 mg Oral TID    pantoprazole  20 mg Oral BID WM    polyethylene glycol  17 g Oral Daily    pregabalin  50 mg Oral QHS    senna-docusate 8.6-50 mg  1 tablet Oral BID    sodium chloride  1,000 mg Oral BID    sodium polystyrene sulfonate  15 g Oral Once     PRN Meds:  Current Facility-Administered Medications:     acetaminophen, 650 mg, Oral, Q4H PRN    albuterol, 2 puff, Inhalation, Q6H PRN    aluminum-magnesium hydroxide-simethicone, 30 mL, Oral, QID PRN    butalbital-acetaminophen-caffeine -40 mg, 1 tablet, Oral, Q4H PRN    dextrose 50%, 12.5 g, Intravenous, PRN    dextrose 50%, 25 g, Intravenous, PRN    glucagon (human recombinant), 1 mg, Intramuscular, PRN    glucose, 16 g, Oral, PRN    glucose, 24 g, Oral, PRN    insulin aspart U-100, 0-5 Units, Subcutaneous, QID (AC + HS) PRN    melatonin, 6 mg, Oral, Nightly PRN    morphine, 2 mg, Intravenous, Q4H PRN    naloxone, 0.02 mg, Intravenous, PRN    ondansetron, 8 mg, Oral, Q8H PRN    oxyCODONE-acetaminophen, 1 tablet, Oral, Q4H PRN    oxyCODONE-acetaminophen, 1 tablet, Oral, Q4H PRN    simethicone, 1 tablet, Oral, QID PRN    sodium chloride 0.9%, 1-10 mL, Intravenous, Q12H PRN     Review of Systems  Objective:     Weight: 59 kg (130 lb 1.1 oz)  Body mass index is 23.04 kg/m².  Vital Signs (Most Recent):  Temp: 98.5 °F (36.9 °C) (03/16/25 0736)  Pulse: 80 (03/16/25 0834)  Resp: 18 (03/16/25 0834)  BP: (!) 182/77 (03/16/25 0736)  SpO2: 98 % (03/16/25 0736) Vital Signs (24h Range):  Temp:  [97.7 °F (36.5 °C)-98.5 °F (36.9 °C)]  "98.5 °F (36.9 °C)  Pulse:  [49-80] 80  Resp:  [18-20] 18  SpO2:  [96 %-100 %] 98 %  BP: (132-188)/(61-77) 182/77                         Female External Urinary Catheter w/ Suction 03/09/25 (Active)   Skin no redness;no breakdown 03/15/25 0815   Tolerance no signs/symptoms of discomfort 03/15/25 0815   Suction Continuous suction at 40 mmHg 03/15/25 0815   Date of last wick change 03/14/25 03/14/25 1913   Time of last wick change 2200 03/14/25 0404   Output (mL) 800 mL 03/15/25 0602          Physical Exam         Neurosurgery Physical Exam  General: Well developed, well nourished, no distress.   Head: Normocephalic, atraumatic.  Mental Status: Awake, Alert, Oriented  Speech: Clear with content appropriate to conversation.  Cranial nerves: Face symmetric, CN II-XII grossly intact.   Eyes: Pupils equal, round, reactive to light, EOMI.  Sensory: Intact to light touch throughout.  Motor Strength: Moves all extremities spontaneously with good tone. Full strength upper and lower extremities. No abnormal movements seen.      Strength   Deltoids Triceps Biceps Wrist Extension Wrist Flexion Hand    Upper: R 4/5 5/5 5/5 5/5 5/5 5/5     L 5/5 5/5 5/5 5/5 5/5 5/5       Iliopsoas Quadriceps Knee  Flexion Tibialis  anterior Gastro- cnemius EHL   Lower: R 5/5 5/5 5/5 5/5 5/5 5/5     L 5/5 5/5 5/5 5/5 5/5 5/5    Patient with R distal clavicle fx and h/o of R shoulder arthroplasty.  Hand intrinsics 5/5.     Schneider: absent  In c-collar.    Significant Labs:  Recent Labs   Lab 03/15/25  0927 03/16/25  0459   * 98    136   K 4.8 5.3*    105   CO2 21* 20*   BUN 51* 57*   CREATININE 1.2 1.1   CALCIUM 8.6* 8.4*     Recent Labs   Lab 03/15/25  0927 03/16/25  0459   WBC 8.50 9.67   HGB 10.5* 10.1*   HCT 31.6* 31.4*    359     No results for input(s): "LABPT", "INR", "APTT" in the last 48 hours.  Microbiology Results (last 7 days)       ** No results found for the last 168 hours. **          All pertinent labs " from the last 24 hours have been reviewed.    Significant Diagnostics:  I have reviewed all pertinent imaging results/findings within the past 24 hours.

## 2025-03-17 LAB
ALBUMIN SERPL BCP-MCNC: 2.9 G/DL (ref 3.5–5.2)
ALP SERPL-CCNC: 175 U/L (ref 40–150)
ALT SERPL W/O P-5'-P-CCNC: 44 U/L (ref 10–44)
ANION GAP SERPL CALC-SCNC: 9 MMOL/L (ref 8–16)
AST SERPL-CCNC: 41 U/L (ref 10–40)
BASOPHILS # BLD AUTO: 0.01 K/UL (ref 0–0.2)
BASOPHILS NFR BLD: 0.1 % (ref 0–1.9)
BILIRUB SERPL-MCNC: 0.2 MG/DL (ref 0.1–1)
BUN SERPL-MCNC: 50 MG/DL (ref 8–23)
CALCIUM SERPL-MCNC: 8 MG/DL (ref 8.7–10.5)
CHLORIDE SERPL-SCNC: 103 MMOL/L (ref 95–110)
CO2 SERPL-SCNC: 21 MMOL/L (ref 23–29)
CREAT SERPL-MCNC: 1 MG/DL (ref 0.5–1.4)
DIFFERENTIAL METHOD BLD: ABNORMAL
EOSINOPHIL # BLD AUTO: 0 K/UL (ref 0–0.5)
EOSINOPHIL NFR BLD: 0 % (ref 0–8)
ERYTHROCYTE [DISTWIDTH] IN BLOOD BY AUTOMATED COUNT: 14.6 % (ref 11.5–14.5)
EST. GFR  (NO RACE VARIABLE): 53.5 ML/MIN/1.73 M^2
GLUCOSE SERPL-MCNC: 100 MG/DL (ref 70–110)
HCT VFR BLD AUTO: 33.2 % (ref 37–48.5)
HGB BLD-MCNC: 10.4 G/DL (ref 12–16)
IMM GRANULOCYTES # BLD AUTO: 0.11 K/UL (ref 0–0.04)
IMM GRANULOCYTES NFR BLD AUTO: 1.2 % (ref 0–0.5)
LYMPHOCYTES # BLD AUTO: 0.7 K/UL (ref 1–4.8)
LYMPHOCYTES NFR BLD: 7.9 % (ref 18–48)
MCH RBC QN AUTO: 27.8 PG (ref 27–31)
MCHC RBC AUTO-ENTMCNC: 31.3 G/DL (ref 32–36)
MCV RBC AUTO: 89 FL (ref 82–98)
MONOCYTES # BLD AUTO: 0.5 K/UL (ref 0.3–1)
MONOCYTES NFR BLD: 5.7 % (ref 4–15)
NEUTROPHILS # BLD AUTO: 7.8 K/UL (ref 1.8–7.7)
NEUTROPHILS NFR BLD: 85.1 % (ref 38–73)
NRBC BLD-RTO: 0 /100 WBC
PLATELET # BLD AUTO: 390 K/UL (ref 150–450)
PMV BLD AUTO: 11 FL (ref 9.2–12.9)
POCT GLUCOSE: 118 MG/DL (ref 70–110)
POCT GLUCOSE: 134 MG/DL (ref 70–110)
POCT GLUCOSE: 135 MG/DL (ref 70–110)
POCT GLUCOSE: 187 MG/DL (ref 70–110)
POTASSIUM SERPL-SCNC: 5.2 MMOL/L (ref 3.5–5.1)
PROT SERPL-MCNC: 6.3 G/DL (ref 6–8.4)
RBC # BLD AUTO: 3.74 M/UL (ref 4–5.4)
SODIUM SERPL-SCNC: 133 MMOL/L (ref 136–145)
WBC # BLD AUTO: 9.13 K/UL (ref 3.9–12.7)

## 2025-03-17 PROCEDURE — 99232 SBSQ HOSP IP/OBS MODERATE 35: CPT | Mod: ,,, | Performed by: PHYSICIAN ASSISTANT

## 2025-03-17 PROCEDURE — 97165 OT EVAL LOW COMPLEX 30 MIN: CPT

## 2025-03-17 PROCEDURE — 63600175 PHARM REV CODE 636 W HCPCS: Performed by: PHYSICIAN ASSISTANT

## 2025-03-17 PROCEDURE — 97110 THERAPEUTIC EXERCISES: CPT

## 2025-03-17 PROCEDURE — 25000003 PHARM REV CODE 250: Performed by: STUDENT IN AN ORGANIZED HEALTH CARE EDUCATION/TRAINING PROGRAM

## 2025-03-17 PROCEDURE — 94640 AIRWAY INHALATION TREATMENT: CPT

## 2025-03-17 PROCEDURE — 11000001 HC ACUTE MED/SURG PRIVATE ROOM

## 2025-03-17 PROCEDURE — 85025 COMPLETE CBC W/AUTO DIFF WBC: CPT | Performed by: HOSPITALIST

## 2025-03-17 PROCEDURE — 97112 NEUROMUSCULAR REEDUCATION: CPT

## 2025-03-17 PROCEDURE — 36415 COLL VENOUS BLD VENIPUNCTURE: CPT | Performed by: HOSPITALIST

## 2025-03-17 PROCEDURE — 63600175 PHARM REV CODE 636 W HCPCS: Performed by: HOSPITALIST

## 2025-03-17 PROCEDURE — 25000003 PHARM REV CODE 250: Performed by: INTERNAL MEDICINE

## 2025-03-17 PROCEDURE — 97530 THERAPEUTIC ACTIVITIES: CPT

## 2025-03-17 PROCEDURE — 97535 SELF CARE MNGMENT TRAINING: CPT

## 2025-03-17 PROCEDURE — 80053 COMPREHEN METABOLIC PANEL: CPT | Performed by: HOSPITALIST

## 2025-03-17 PROCEDURE — 25000003 PHARM REV CODE 250: Performed by: HOSPITALIST

## 2025-03-17 PROCEDURE — 97162 PT EVAL MOD COMPLEX 30 MIN: CPT

## 2025-03-17 RX ORDER — CLONIDINE 0.1 MG/24H
1 PATCH, EXTENDED RELEASE TRANSDERMAL
Status: DISCONTINUED | OUTPATIENT
Start: 2025-03-17 | End: 2025-03-24 | Stop reason: HOSPADM

## 2025-03-17 RX ORDER — HYDRALAZINE HYDROCHLORIDE 25 MG/1
50 TABLET, FILM COATED ORAL EVERY 8 HOURS
Status: DISCONTINUED | OUTPATIENT
Start: 2025-03-17 | End: 2025-03-18

## 2025-03-17 RX ORDER — SODIUM CHLORIDE 1 G/1
1000 TABLET ORAL 3 TIMES DAILY
Status: DISCONTINUED | OUTPATIENT
Start: 2025-03-17 | End: 2025-03-18

## 2025-03-17 RX ADMIN — METHOCARBAMOL 500 MG: 500 TABLET ORAL at 03:03

## 2025-03-17 RX ADMIN — SODIUM CHLORIDE 1000 MG: 1 TABLET ORAL at 03:03

## 2025-03-17 RX ADMIN — CLONIDINE 1 PATCH: 0.1 PATCH TRANSDERMAL at 11:03

## 2025-03-17 RX ADMIN — ACETAMINOPHEN 650 MG: 325 TABLET ORAL at 05:03

## 2025-03-17 RX ADMIN — HEPARIN SODIUM 5000 UNITS: 5000 INJECTION INTRAVENOUS; SUBCUTANEOUS at 06:03

## 2025-03-17 RX ADMIN — FLUTICASONE FUROATE AND VILANTEROL TRIFENATATE 1 PUFF: 100; 25 POWDER RESPIRATORY (INHALATION) at 10:03

## 2025-03-17 RX ADMIN — PREGABALIN 50 MG: 50 CAPSULE ORAL at 09:03

## 2025-03-17 RX ADMIN — HYDRALAZINE HYDROCHLORIDE 25 MG: 25 TABLET ORAL at 05:03

## 2025-03-17 RX ADMIN — LOSARTAN POTASSIUM 50 MG: 50 TABLET, FILM COATED ORAL at 08:03

## 2025-03-17 RX ADMIN — HEPARIN SODIUM 5000 UNITS: 5000 INJECTION INTRAVENOUS; SUBCUTANEOUS at 03:03

## 2025-03-17 RX ADMIN — METHOCARBAMOL 500 MG: 500 TABLET ORAL at 09:03

## 2025-03-17 RX ADMIN — GABAPENTIN 300 MG: 300 CAPSULE ORAL at 09:03

## 2025-03-17 RX ADMIN — HYDRALAZINE HYDROCHLORIDE 50 MG: 25 TABLET ORAL at 12:03

## 2025-03-17 RX ADMIN — HYDRALAZINE HYDROCHLORIDE 50 MG: 25 TABLET ORAL at 03:03

## 2025-03-17 RX ADMIN — AMLODIPINE BESYLATE 10 MG: 10 TABLET ORAL at 08:03

## 2025-03-17 RX ADMIN — SODIUM CHLORIDE 1000 MG: 1 TABLET ORAL at 09:03

## 2025-03-17 RX ADMIN — SODIUM CHLORIDE 1000 MG: 1 TABLET ORAL at 08:03

## 2025-03-17 RX ADMIN — DEXAMETHASONE SODIUM PHOSPHATE 4 MG: 4 INJECTION INTRA-ARTICULAR; INTRALESIONAL; INTRAMUSCULAR; INTRAVENOUS; SOFT TISSUE at 11:03

## 2025-03-17 RX ADMIN — DEXAMETHASONE SODIUM PHOSPHATE 4 MG: 4 INJECTION INTRA-ARTICULAR; INTRALESIONAL; INTRAMUSCULAR; INTRAVENOUS; SOFT TISSUE at 12:03

## 2025-03-17 RX ADMIN — SODIUM ZIRCONIUM CYCLOSILICATE 5 G: 5 POWDER, FOR SUSPENSION ORAL at 12:03

## 2025-03-17 RX ADMIN — HYDRALAZINE HYDROCHLORIDE 50 MG: 25 TABLET ORAL at 09:03

## 2025-03-17 RX ADMIN — PANTOPRAZOLE SODIUM 20 MG: 20 TABLET, DELAYED RELEASE ORAL at 03:03

## 2025-03-17 RX ADMIN — BUTALBITAL, ACETAMINOPHEN, AND CAFFEINE 1 TABLET: 325; 50; 40 TABLET ORAL at 05:03

## 2025-03-17 RX ADMIN — HEPARIN SODIUM 5000 UNITS: 5000 INJECTION INTRAVENOUS; SUBCUTANEOUS at 09:03

## 2025-03-17 RX ADMIN — PANTOPRAZOLE SODIUM 20 MG: 20 TABLET, DELAYED RELEASE ORAL at 08:03

## 2025-03-17 RX ADMIN — DEXAMETHASONE SODIUM PHOSPHATE 4 MG: 4 INJECTION INTRA-ARTICULAR; INTRALESIONAL; INTRAMUSCULAR; INTRAVENOUS; SOFT TISSUE at 06:03

## 2025-03-17 RX ADMIN — DEXAMETHASONE SODIUM PHOSPHATE 4 MG: 4 INJECTION INTRA-ARTICULAR; INTRALESIONAL; INTRAMUSCULAR; INTRAVENOUS; SOFT TISSUE at 05:03

## 2025-03-17 RX ADMIN — BUTALBITAL, ACETAMINOPHEN, AND CAFFEINE 1 TABLET: 325; 50; 40 TABLET ORAL at 09:03

## 2025-03-17 RX ADMIN — BUTALBITAL, ACETAMINOPHEN, AND CAFFEINE 1 TABLET: 325; 50; 40 TABLET ORAL at 12:03

## 2025-03-17 RX ADMIN — METHOCARBAMOL 500 MG: 500 TABLET ORAL at 08:03

## 2025-03-17 NOTE — ASSESSMENT & PLAN NOTE
Still bouncing up again; possibly from losartan and CKD  Reducing losartan dose further  Will try lokelma

## 2025-03-17 NOTE — PROGRESS NOTES
[facility]surgery ([facility])  Neurosurgery  Progress Note    Subjective:     History of Present Illness: 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 Her symptoms began last Monday with headache that started in her neck and migrated toward the top of her head. She has had chronic neck pain due to cervical stenosis, but the headache was new. On Saturday, she had a normal morning and was able to perform all of her ADLs. Around 8 pm, she suddenly became weak in her arms and legs and was not able to walk. MRI demonstrated chronic type 2 odontoid fracture with progressed retropulsion of the fracture fragment in comparison to prior MRI from 8/11/2023, with moderate to severe canal stenosis at C1-C2. Of note, patient had previously discontinued wearing her cervical collar. Denies radiculopathy in her arms or legs, sensory deficit, bladder or bowel incontinence.      Post-Op Info:  Procedure(s) (LRB):  FUSION, SPINE, CERVICAL, POSTERIOR APPROACH GE 3 D NICHOLAS (N/A)       Interval History: NAEON. Daughter at bedside. Eating breakfast, tolerating PO. Neuro exam stable. BM today and yesterday. Voiding. Plan for bedside traction tomorrow, OR Wednesday.    Medications:  Continuous Infusions:  Scheduled Meds:   amLODIPine  10 mg Oral Daily    cloNIDine 0.1 mg/24 hr td ptwk  1 patch Transdermal Q7 Days    dexAMETHasone injection  4 mg Intravenous Q6H    fluticasone furoate-vilanteroL  1 puff Inhalation Daily    gabapentin  300 mg Oral QHS    heparin (porcine)  5,000 Units Subcutaneous Q8H    hydrALAZINE  50 mg Oral Q8H    losartan  50 mg Oral Daily    methocarbamoL  500 mg Oral TID    pantoprazole  20 mg Oral BID WM    pregabalin  50 mg Oral QHS    sodium  chloride  1,000 mg Oral TID     PRN Meds:  Current Facility-Administered Medications:     acetaminophen, 650 mg, Oral, Q4H PRN    albuterol, 2 puff, Inhalation, Q6H PRN    aluminum-magnesium hydroxide-simethicone, 30 mL, Oral, QID PRN    butalbital-acetaminophen-caffeine -40 mg, 1 tablet, Oral, Q4H PRN    dextrose 50%, 12.5 g, Intravenous, PRN    dextrose 50%, 25 g, Intravenous, PRN    glucagon (human recombinant), 1 mg, Intramuscular, PRN    glucose, 16 g, Oral, PRN    glucose, 24 g, Oral, PRN    insulin aspart U-100, 0-5 Units, Subcutaneous, QID (AC + HS) PRN    melatonin, 6 mg, Oral, Nightly PRN    morphine, 2 mg, Intravenous, Q4H PRN    naloxone, 0.02 mg, Intravenous, PRN    ondansetron, 8 mg, Oral, Q8H PRN    oxyCODONE-acetaminophen, 1 tablet, Oral, Q4H PRN    oxyCODONE-acetaminophen, 1 tablet, Oral, Q4H PRN    simethicone, 1 tablet, Oral, QID PRN    sodium chloride 0.9%, 1-10 mL, Intravenous, Q12H PRN     Review of Systems  Objective:     Weight: 59 kg (130 lb 1.1 oz)  Body mass index is 23.04 kg/m².  Vital Signs (Most Recent):  Temp: 96 °F (35.6 °C) (03/17/25 0738)  Pulse: 70 (03/17/25 1124)  Resp: 18 (03/17/25 1124)  BP: (!) 146/64 (03/17/25 1124)  SpO2: 98 % (03/17/25 1124) Vital Signs (24h Range):  Temp:  [96 °F (35.6 °C)-97.8 °F (36.6 °C)] 96 °F (35.6 °C)  Pulse:  [55-70] 70  Resp:  [16-20] 18  SpO2:  [96 %-100 %] 98 %  BP: (146-174)/(62-83) 146/64                         Female External Urinary Catheter w/ Suction 03/09/25 (Active)   Skin no redness;no breakdown 03/15/25 0815   Tolerance no signs/symptoms of discomfort 03/15/25 0815   Suction Continuous suction at 40 mmHg 03/15/25 0815   Date of last wick change 03/14/25 03/14/25 1913   Time of last wick change 2200 03/14/25 0404   Output (mL) 800 mL 03/15/25 0602          Physical Exam         Neurosurgery Physical Exam  General: Well developed, well nourished, no distress.   Head: Normocephalic, atraumatic.  Mental Status: Awake, Alert,  "Oriented  Speech: Clear with content appropriate to conversation.  Cranial nerves: Face symmetric, CN II-XII grossly intact.   Eyes: Pupils equal, round, reactive to light, EOMI.  Sensory: Intact to light touch throughout.  Motor Strength: Moves all extremities spontaneously with good tone. Full strength upper and lower extremities. No abnormal movements seen.      Strength   Deltoids Triceps Biceps Wrist Extension Wrist Flexion Hand    Upper: R 4/5 5/5 5/5 5/5 5/5 5/5     L 5/5 5/5 5/5 5/5 5/5 5/5       Iliopsoas Quadriceps Knee  Flexion Tibialis  anterior Gastro- cnemius EHL   Lower: R 5/5 5/5 5/5 5/5 5/5 5/5     L 5/5 5/5 5/5 5/5 5/5 5/5    Patient with R distal clavicle fx and h/o of R shoulder arthroplasty.  Hand intrinsics 5/5.     In c-collar.    Significant Labs:  Recent Labs   Lab 03/16/25  0459 03/17/25  0359   GLU 98 100    133*   K 5.3* 5.2*    103   CO2 20* 21*   BUN 57* 50*   CREATININE 1.1 1.0   CALCIUM 8.4* 8.0*     Recent Labs   Lab 03/16/25  0459 03/17/25  0359   WBC 9.67 9.13   HGB 10.1* 10.4*   HCT 31.4* 33.2*    390     No results for input(s): "LABPT", "INR", "APTT" in the last 48 hours.  Microbiology Results (last 7 days)       ** No results found for the last 168 hours. **          All pertinent labs from the last 24 hours have been reviewed.    Significant Diagnostics:  I have reviewed all pertinent imaging results/findings within the past 24 hours.  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     MRI Cervical spine 3/9/25: Chronic type 2 odontoid fracture with progressed retropulsion of the fracture fragment in comparison to prior MRI from " 8/11/2023, with moderate to severe canal stenosis at C1-C2. Multilevel foraminal stenosis.     Discussed surgical vs non-surgical options with the patient and her daughter Liliana Montgomery present. Patient's grandson Aidan (neurosurgeon) was also on the phone for this conversation. Given the degree of upper cervical cord kinking, cord edema, and craniocervical settling recommended surgical intervention to preserve her current function and prevent neurological decline. Surgical recommendation is occiput-C4 fusion. She would also need pre-op traction. Non-surgical option is strict cervical collar 24/7 indefinitely. This would also require the patient to be compliant with collar use. Fracture appears stable in X-Ray c spine upright/supine. Patient and daughter have decided to proceed with surgery.    Plan:   - Cervical collar at all times.  - XR cervical spine upright/supine in collar stable. Continue collar. Ok for bed level PT/OT only with collar in place  - Multimodal pain control.  - Bowel regimen.  - Notify NSGY immediately with any decline on exam.  - Family has decided to proceed with surgery. Booked for O-C4 fusion on 3/19/25. Pre-op traction on 3/18. Please document RCRI/medical risk stratification.  - Med management per primary          Janeth Riley PA-C  Neurosurgery  [facility] - Neurosurgery ([facility])

## 2025-03-17 NOTE — PT/OT/SLP EVAL
Occupational Therapy  Partial Co Evaluation & Co-treatment    Name: Bridget Montgomery  MRN: [id number]  Admitting Diagnosis: Spinal stenosis  Recent Surgery: Procedure(s) (LRB):  FUSION, SPINE, CERVICAL, POSTERIOR APPROACH GE 3 D NICHOLAS (N/A)      Recommendations:     Discharge Recommendations: Moderate Intensity Therapy  Discharge Equipment Recommendations:  to be determined by next level of care  Barriers to discharge:  None    Assessment:     Bridget Montgomery is a 89 y.o. female with a medical diagnosis of Spinal stenosis.  She presents with decreased self-care and functional mobility independence 2/2 AMS. Performance deficits affecting function: weakness, decreased upper extremity function, impaired self care skills, orthopedic precautions, gait instability. Pt with surgery scheduled for 3/19 w/ plans for traction 3/18, pt and daughter at bedside educated on BUE therapeutic exercises to perform while in bed awaiting surgery and on spinal precautions/cervical collar wear schedule. Pt cleared for bed level OT evaluation this date with pt completing rolling and BUE AROM with good participation. Pt with decreased R>LUE shoulder flexion AROM from previous shoulder injuries/procedures though PROM WFL, pt with distal AROM WNL. Pt also indicating distal digit numbness present prior to admission. Pt with appropriate command following this date. Due to assistance needed and spinal precautions, pt is currently functioning below her PLOF and therefore would continue to benefit from skilled OT services to increase independence. Therapy intensity recommendation anticipated to be moderate intensity though will be re-evaluated following surgical procedure.     Partial Co-evaluation with PT performed for patient safety, education, and facilitation of treatment to maximize activity tolerance and progression towards goals from two skilled therapy disciplines.     Cervical collar donned upon OT entry to room    Rehab Prognosis:  "Good; patient would benefit from acute skilled OT services to address these deficits and reach maximum level of function.       Plan:     Patient to be seen 4 x/week to address the above listed problems via self-care/home management, therapeutic activities, therapeutic exercises, neuromuscular re-education  Plan of Care Expires: 04/17/25  Plan of Care Reviewed with: patient, daughter    Subjective     Chief Complaint: "I have arthritis so it's hard" (digiti extension mildly limited)  Patient/Family Comments/goals: increase independence    Occupational Profile:  Living Environment: Pt lives with daughter in [facility], [address], ramp to enter, t/s with bath bench  Previous level of function: Modified independent with functional mobility (rollator), dresses independently, some assistance with bathing back side,   Roles and Routines: Enjoys word searches, solitaire, reading  Equipment Used at Home: bedside commode, rollator, bath bench, wheelchair (lift recliner, adjustable bed)  Assistance upon Discharge: Daughter though not home 24/7     Pain/Comfort:  Pain Rating 1: 0/10  Pain Rating Post-Intervention 1: 0/10    Patients cultural, spiritual, Spiritism conflicts given the current situation: no    Objective:     Communicated with: nursing prior to session.  Patient found HOB elevated with telemetry, blood pressure cuff, PureWick, SCD, cervical collar upon OT entry to room.    General Precautions: Standard, fall, dental soft  Orthopedic Precautions: spinal precautions  Braces: Cervical collar  Respiratory Status: Room air    Occupational Performance:    Bed Mobility:    Patient completed Rolling/Turning to Left with  moderate assistance  Patient completed Rolling/Turning to Right with moderate assistance    Functional Mobility/Transfers:  Not attempted as pt cleared for bed level OT this date    Activities of Daily Living:  Toileting: maximal assistance for lamar care and pure wick management    Cognitive/Visual " Perceptual:  Cognitive/Psychosocial Skills:     -       Follows Commands/attention:Follows multistep  commands  -       Communication: clear/fluent and (pt hard of hearing, wears hearing aides)  -       Memory: No Deficits noted  -       Safety awareness/insight to disability: mildly impaired   -       Mood/Affect/Coping skills/emotional control: Pleasant  Visual/Perceptual:  -Intact  -Pt indicating she wears glasses for distance      Physical Exam:  Balance:    -       not tested as bed level evaluation completed  Sensation:    -       Intact  Upper Extremity Range of Motion:     -       Right Upper Extremity: WFL except shoulder flexion 60* (PROM WFL)  -       Left Upper Extremity: WFL except shoulder flexion 80* (PROM WFL)  Upper Extremity Strength:    -       Right Upper Extremity: shoulder not tested, distal WFL  -       Left Upper Extremity: shoulder not tested, distal WFL   Strength:    -       Right Upper Extremity: WFL  -       Left Upper Extremity: WFL  Fine Motor Coordination:    -       Intact  Gross motor coordination:   mild difficulty 2/2 decreased shoulder AROM for finger to target R>LUE difficulty    AMPAC 6 Click ADL:  AMPAC Total Score: 14    Treatment & Education:  Sessions this date targeted initial OT evaluation, self-care, and therapeutic exercises to increase pt's BUE strength/AROM for self-care  Pt and daughter at bedside educated on OT roles, POC, call button for assistance  -Education provided regarding fit and wear schedule of cervical collar  -Education provided regarding spinal precautions for use during functional tasks/mobility/transfers    Pt and daughter at bedside educated on BUE AROM HEP to be completed 2-3x daily 5-10 reps ea within pain tolerance: shoulder flexion, shoulder internal/external rotation, bicep flexion/extension, wrist flexion/extension, digit flexion/extension. Pt completing exercises during OT session w/o c/o pain    Patient left HOB elevated with all lines  intact, call button in reach, bed alarm on, nursing notified, daughter present, and cervical collar donned    GOALS:   Multidisciplinary Problems       Occupational Therapy Goals          Problem: Occupational Therapy    Goal Priority Disciplines Outcome Interventions   Occupational Therapy Goal     OT, PT/OT Progressing    Description: Goals to be met by: 4/17/25     Patient will increase functional independence with ADLs by performing:    UE Dressing with Green.  LE Dressing with Modified Green.  Grooming while standing at sink with Minimal Assistance and Assistive Devices as needed.  Toileting from toilet with Supervision for hygiene and clothing management.   Rolling to Right, Left with Stand-by Assistance.   Supine to sit with Minimal Assistance.  Step transfer with Minimal Assistance  Increased functional strength to WNL for BUEs.  Upper extremity exercise program x10 reps per handout, with assistance as needed.                         DME Justifications:  No DME recommended requiring DME justifications - to be determined    History:     Past Medical History:   Diagnosis Date    Allergy     Anemia, unspecified     Arthritis     CKD (chronic kidney disease) stage 4, GFR 15-29 ml/min     COPD (chronic obstructive pulmonary disease)     Edema     HTN (hypertension)     Hypocalcemia     Hyponatremia     Osteoarthritis          Past Surgical History:   Procedure Laterality Date    BREAST CYST EXCISION      CAUDAL EPIDURAL STEROID INJECTION N/A 2/2/2023    Procedure: Injection-steroid-epidural-caudal;  Surgeon: Angel Sparrow MD;  Location: [facility] PAIN [facility];  Service: Pain Management;  Laterality: N/A;    FOOT SURGERY         Time Tracking:     OT Date of Treatment: 03/17/25  OT Start Time: 0851 (Second session: 7745-1690)  OT Stop Time: 0910  OT Total Time (min): 19 min +35 = 54 total minutes    Billable Minutes:Evaluation 8  Self Care/Home Management 21  Therapeutic Exercise 25    3/17/2025

## 2025-03-17 NOTE — SUBJECTIVE & OBJECTIVE
Interval History: NAEON. Daughter at bedside. Eating breakfast, tolerating PO. Neuro exam stable. BM today and yesterday. Voiding. Plan for bedside traction tomorrow, OR Wednesday.    Medications:  Continuous Infusions:  Scheduled Meds:   amLODIPine  10 mg Oral Daily    cloNIDine 0.1 mg/24 hr td ptwk  1 patch Transdermal Q7 Days    dexAMETHasone injection  4 mg Intravenous Q6H    fluticasone furoate-vilanteroL  1 puff Inhalation Daily    gabapentin  300 mg Oral QHS    heparin (porcine)  5,000 Units Subcutaneous Q8H    hydrALAZINE  50 mg Oral Q8H    losartan  50 mg Oral Daily    methocarbamoL  500 mg Oral TID    pantoprazole  20 mg Oral BID WM    pregabalin  50 mg Oral QHS    sodium chloride  1,000 mg Oral TID     PRN Meds:  Current Facility-Administered Medications:     acetaminophen, 650 mg, Oral, Q4H PRN    albuterol, 2 puff, Inhalation, Q6H PRN    aluminum-magnesium hydroxide-simethicone, 30 mL, Oral, QID PRN    butalbital-acetaminophen-caffeine -40 mg, 1 tablet, Oral, Q4H PRN    dextrose 50%, 12.5 g, Intravenous, PRN    dextrose 50%, 25 g, Intravenous, PRN    glucagon (human recombinant), 1 mg, Intramuscular, PRN    glucose, 16 g, Oral, PRN    glucose, 24 g, Oral, PRN    insulin aspart U-100, 0-5 Units, Subcutaneous, QID (AC + HS) PRN    melatonin, 6 mg, Oral, Nightly PRN    morphine, 2 mg, Intravenous, Q4H PRN    naloxone, 0.02 mg, Intravenous, PRN    ondansetron, 8 mg, Oral, Q8H PRN    oxyCODONE-acetaminophen, 1 tablet, Oral, Q4H PRN    oxyCODONE-acetaminophen, 1 tablet, Oral, Q4H PRN    simethicone, 1 tablet, Oral, QID PRN    sodium chloride 0.9%, 1-10 mL, Intravenous, Q12H PRN     Review of Systems  Objective:     Weight: 59 kg (130 lb 1.1 oz)  Body mass index is 23.04 kg/m².  Vital Signs (Most Recent):  Temp: 96 °F (35.6 °C) (03/17/25 0738)  Pulse: 70 (03/17/25 1124)  Resp: 18 (03/17/25 1124)  BP: (!) 146/64 (03/17/25 1124)  SpO2: 98 % (03/17/25 1124) Vital Signs (24h Range):  Temp:  [96 °F (35.6  "°C)-97.8 °F (36.6 °C)] 96 °F (35.6 °C)  Pulse:  [55-70] 70  Resp:  [16-20] 18  SpO2:  [96 %-100 %] 98 %  BP: (146-174)/(62-83) 146/64                         Female External Urinary Catheter w/ Suction 03/09/25 (Active)   Skin no redness;no breakdown 03/15/25 0815   Tolerance no signs/symptoms of discomfort 03/15/25 0815   Suction Continuous suction at 40 mmHg 03/15/25 0815   Date of last wick change 03/14/25 03/14/25 1913   Time of last wick change 2200 03/14/25 0404   Output (mL) 800 mL 03/15/25 0602          Physical Exam         Neurosurgery Physical Exam  General: Well developed, well nourished, no distress.   Head: Normocephalic, atraumatic.  Mental Status: Awake, Alert, Oriented  Speech: Clear with content appropriate to conversation.  Cranial nerves: Face symmetric, CN II-XII grossly intact.   Eyes: Pupils equal, round, reactive to light, EOMI.  Sensory: Intact to light touch throughout.  Motor Strength: Moves all extremities spontaneously with good tone. Full strength upper and lower extremities. No abnormal movements seen.      Strength   Deltoids Triceps Biceps Wrist Extension Wrist Flexion Hand    Upper: R 4/5 5/5 5/5 5/5 5/5 5/5     L 5/5 5/5 5/5 5/5 5/5 5/5       Iliopsoas Quadriceps Knee  Flexion Tibialis  anterior Gastro- cnemius EHL   Lower: R 5/5 5/5 5/5 5/5 5/5 5/5     L 5/5 5/5 5/5 5/5 5/5 5/5    Patient with R distal clavicle fx and h/o of R shoulder arthroplasty.  Hand intrinsics 5/5.     In c-collar.    Significant Labs:  Recent Labs   Lab 03/16/25 0459 03/17/25  0359   GLU 98 100    133*   K 5.3* 5.2*    103   CO2 20* 21*   BUN 57* 50*   CREATININE 1.1 1.0   CALCIUM 8.4* 8.0*     Recent Labs   Lab 03/16/25 0459 03/17/25  0359   WBC 9.67 9.13   HGB 10.1* 10.4*   HCT 31.4* 33.2*    390     No results for input(s): "LABPT", "INR", "APTT" in the last 48 hours.  Microbiology Results (last 7 days)       ** No results found for the last 168 hours. **          All pertinent " labs from the last 24 hours have been reviewed.    Significant Diagnostics:  I have reviewed all pertinent imaging results/findings within the past 24 hours.

## 2025-03-17 NOTE — ASSESSMENT & PLAN NOTE
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 Multilevel foraminal stenosis.     Discussed surgical vs non-surgical options with the patient and her daughter Liliana Montgomery present. Patient's grandson Aidan (neurosurgeon) was also on the phone for this conversation. Given the degree of upper cervical cord kinking, cord edema, and craniocervical settling recommended surgical intervention to preserve her current function and prevent neurological decline. Surgical recommendation is occiput-C4 fusion. She would also need pre-op traction. Non-surgical option is strict cervical collar 24/7 indefinitely. This would also require the patient to be compliant with collar use. Fracture appears stable in X-Ray c spine upright/supine. Patient and daughter have decided to proceed with surgery.    Plan:   - Cervical collar at all times.  - XR cervical spine upright/supine in collar stable. Continue collar. Ok for bed level PT/OT only with collar in place  - Multimodal pain control.  - Bowel regimen.  - Notify NSGY immediately with any decline on exam.  - Family has decided to proceed with surgery. Booked for O-C4 fusion on 3/19/25. Pre-op traction on 3/18. Please document RCRI/medical risk stratification.  - Med management per primary

## 2025-03-17 NOTE — PT/OT/SLP EVAL
Physical Therapy Evaluation and Discharge Note    Patient Name:  Bridget Montgomery   MRN:  [id number]    Recommendations:     Discharge Recommendations: Moderate Intensity Therapy  Discharge Equipment Recommendations: to be determined by next level of care   Barriers to discharge:  pending surgery    Assessment:     Bridget Montgomery is a 89 y.o. female admitted with a medical diagnosis of Spinal stenosis. Pt currently awaiting surgery scheduled for 3/19 w/ plans for traction 3/18, educated on therex to perform while in bed awaiting surgery and on spinal precautions. Will d/c PT orders at this time as new orders required post-op, anticipate need for mod intensity therapy on d/c but will continue to assess and adjust recommendation as needed.    Recent Surgery: Procedure(s) (LRB):  FUSION, SPINE, CERVICAL, POSTERIOR APPROACH (N/A)      Plan:     During this hospitalization, patient does not require further acute PT services.  Please re-consult if situation changes.      Subjective     Chief Complaint: R shoulder weakness  Patient/Family Comments/goals: want to maintain pt's strength up until surgery  Pain/Comfort:  Pain Rating 1: 0/10  Pain Rating Post-Intervention 1: 0/10    Patients cultural, spiritual, Sikh conflicts given the current situation: no    Living Environment:  Pt lives w/ dtr in a [facility] w/ ramp entry, has a t/s combo w/ bench and handheld shower head.  Prior to admission, patients level of function was mod-I w/ rollator; typically sleeps in lift recliner; independent w/ dressing and toileting, help w/ washing back when bathing.  Equipment used at home: bedside commode, rollator, bath bench, wheelchair, other (see comments) (lift recliner; adjustable bed).  DME owned (not currently used): none.  Upon discharge, patient will have assistance from family.    Objective:     Communicated with RN prior to session.  Patient found HOB elevated with telemetry, blood pressure cuff, PureWick, SCD, cervical  collar upon PT entry to room.    General Precautions: Standard, fall, dental soft    Orthopedic Precautions:spinal precautions   Braces: Cervical collar  Respiratory Status: Room air    Exams:  Cognitive Exam:  Patient is oriented to Person, Place, Time, and Situation  Sensation:    -       Intact  RLE ROM: WFL  RLE Strength: WFL  LLE ROM: WFL  LLE Strength: WFL    Functional Mobility:  Bed Mobility:     Rolling Left:  moderate assistance  Rolling Right: moderate assistance  Scooting: total assistance    AM-PAC 6 CLICK MOBILITY  Total Score:11       Treatment and Education:  Pt educated on PT POC/goals, d/c recs, and continued treatment. All questions answered and pt in agreement w/ POC.  Pt educated on therex to perform 3x/day independently in order to maintain/progress mobility and strength and allow treatment time to focus on functional mobility. Pt demonstrated independence with all exercises and verbalized understanding: ankle pumps, LAQs, marching, heel slides, hip abd/add, SLR, quad sets, glute sets.   PT educated patient on spinal precautions: no bending, lifting >10lbs (~1 gallon of milk), or twisting, w/ acronym no BLT to assist w/ remembering precautions. Patient verbalized understanding of spinal precautions and that these precautions will remain in place until MD clears pt to return to these activities.   Pt received soiled, inc time spent rolling in bed x4 ea way to change linen and perform pericare    AM-PAC 6 CLICK MOBILITY  Total Score:11     Patient left HOB elevated with all lines intact, call button in reach, RN notified, and pt's dtr + OT present.    GOALS:   Multidisciplinary Problems       Physical Therapy Goals       Not on file                    History:     Past Medical History:   Diagnosis Date    Allergy     Anemia, unspecified     Arthritis     CKD (chronic kidney disease) stage 4, GFR 15-29 ml/min     COPD (chronic obstructive pulmonary disease)     Edema     HTN (hypertension)      Hypocalcemia     Hyponatremia     Osteoarthritis        Past Surgical History:   Procedure Laterality Date    BREAST CYST EXCISION      CAUDAL EPIDURAL STEROID INJECTION N/A 2/2/2023    Procedure: Injection-steroid-epidural-caudal;  Surgeon: Angel Sparrow MD;  Location: [facility] PAIN [facility];  Service: Pain Management;  Laterality: N/A;    FOOT SURGERY         Time Tracking:     PT Received On: 03/17/25  PT Start Time: 0958 (Part 1: 241-172)     PT Stop Time: 1040  PT Total Time (min): 42 min + 20 min = 62 min    Billable Minutes: Evaluation 9, Therapeutic Activity 30, Therapeutic Exercise 15, and Neuromuscular Re-education 8      03/17/2025

## 2025-03-17 NOTE — PROGRESS NOTES
"[facility] - Neurosurgery ([facility]  Progress Note    Patient Name: Bridget Montgomery  MRN: [id number]  Patient Class: IP- Inpatient   Admission Date: 3/9/2025  Length of Stay: 8 days  Attending Physician: Lars Espinal MD  Primary Care Provider: No primary care provider on file.        Subjective     Principal Problem:Spinal stenosis        HPI:  Bridget Montgomery is a 89 y.o. female with COPD, HTN, HLD, history of TIA who is transferred here from [place] for NSGY evaluation for cervical cord compression.    Per Transfer Center:  "89F COPD, CKD4, GERD, HTN, HLD, hx of TIA, prediabetes, and spinal stenosis who initially presented to [facility] on 3/8/2025 with a primary complaint of sudden onset weakness. The patient was in their usual state of health until Monday. Starting on Monday, the patient started having a headache that started in her neck and migrated toward the top of her head. She has had chronic neck pain due to cervical stenosis, but the headache was new. She managed the pain with tylenol. On Friday, she presented to her PCP (Dr. Alejandrina Russ) who gave her 2 injections into her neck to help with the pain. She is not sure what the injection was but thinks it might have been a steroid. On Saturday, she had a normal morning and was able to perform all of her ADLs. Around 8 pm, she suddenly became weak in her arms and legs and was not able to walk. She was able to catch herself prior to falling. The primary concern was the weakness and ongoing headache. She endorses chronic numbness/tingling in her fingers that has not worsened. She denies any sensory deficits. She denies any urinary incontinence or fecal incontinence. She endorses pain in her back, neck, knees, and a headache. She denies any fevers, chills, chest pain, shortness of breath, abdominal pain, N/V, diarrhea, constipation. CT Head was negative for acute stroke. MRI shows new onset cervical cord " "compression C1-C2. Neurosurgery consulted, recommended transferring pt to [facility] for a higher level of care."    Overview/Hospital Course:  89 y.o. female with COPD, HTN, HLD, cervical stenosis, history of TIA who is transferred here from [place] for NSGY evaluation for cervical cord compression. Presented to outside hospital with increased generalized weakness, particularly in her extremities.  CVA workup negative, however MRI showed new onset cervical cord compression, likely contributing to her symptoms.  Transferred here for Neurosurgery evaluation. MRI Cervical spine 3/9/25: chronic type 2 odontoid fracture with progressed retropulsion of the fracture fragment in comparison to prior MRI from 8/11/2023, with moderate to severe canal stenosis at C1-C2. Multilevel foraminal stenosis. Cont C-collar at all times. Neurosurgery recommended occiput-C4 fusion. Non-surgical option is strict cervical collar 24/7 indefinitely. XR with chronic odontoid type 2 fracture, position alignment appears stable. Decadron started 3/12 for new RUE weakness.  Started on salt tablets for dropping Na.    Interval History:  no SOB or new chest pains reported. Says she has chronic indigestion pains due to known esophagitis    Review of Systems  Objective:     Vital Signs (Most Recent):  Temp: 96 °F (35.6 °C) (03/17/25 0738)  Pulse: 70 (03/17/25 1124)  Resp: 18 (03/17/25 1124)  BP: (!) 146/64 (03/17/25 1124)  SpO2: 98 % (03/17/25 1124) Vital Signs (24h Range):  Temp:  [96 °F (35.6 °C)-98.5 °F (36.9 °C)] 96 °F (35.6 °C)  Pulse:  [52-70] 70  Resp:  [16-20] 18  SpO2:  [96 %-100 %] 98 %  BP: (146-174)/(62-83) 146/64     Weight: 59 kg (130 lb 1.1 oz)  Body mass index is 23.04 kg/m².  No intake or output data in the 24 hours ending 03/17/25 1213      Physical Exam      Clear lungs bilaterally, unlabored breathing, on room air, no cyanosis   Heart sounds indicate a regular rate and rhythm  Awake alert, no acute distress   No facial droop, no " slurred speech   C-collar in place   5/5 fist  bilaterally   At least 4/5 dorsiflexion plantar flexion of both feet   No obvious upper nor lower extremity edema            Assessment & Plan  Spinal stenosis  Cord compression  Cord Compression  - MRI Cervical spine 3/9/25: chronic type 2 odontoid fracture with progressed retropulsion of the fracture fragment in comparison to prior MRI from 8/11/2023, with moderate to severe canal stenosis at C1-C2. Multilevel foraminal stenosis.   - Cont C-collar at all times  - Upright and supine cervical x-ray pending  - Neurosurgery recommended occiput-C4 fusion. Non-surgical option is strict cervical collar 24/7 indefinitely  - XR with chronic odontoid type 2 fracture, position alignment appears stable   - Decadron started for new neuro deficit  - surgery for 3/19      See above    Hyponatremia  Asymptomatic, mild, suspected polydipsia and/or SIADH  Still worsening despite fluid restriction  Now improving w/ salt tabs, reduce dose to BID  Chronic kidney disease, stage III (moderate)  Renal function remains around baseline; Estimated Creatinine Clearance: 30.9 mL/min (based on SCr of 1 mg/dL). according to latest data. 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 Will avoid nephrotoxic agents as able, and renally dose all meds as applicable.  Other emphysema  History of COPD, not on home oxygen. Currently without evidence of acute exacerbation, and sats on room air at goal 88% or greater. Will continue hospital formulary equivalent of home regimen and monitor respiratory status.   Essential hypertension  Uncontrolled  Pulse too slow to start BB.  Given hyperkalemia will reduce losartan to half and start scheduled hydralazine  Continue norvasc  Generalized weakness  Chronic, however likely exacerbated by cervical radiculopathy.  See above    Headache  Fioricet PRN    Cervical spinal stenosis      Mixed hyperlipidemia      Edema, spinal cord      Hyperkalemia  Still bouncing up again;  possibly from losartan and CKD  Reducing losartan dose further  Will try lokelma          VTE Risk Mitigation (From admission, onward)           Ordered     heparin (porcine) injection 5,000 Units  Every 8 hours         03/14/25 1121     IP VTE HIGH RISK PATIENT  Once         03/09/25 2040     Reason for No Pharmacological VTE Prophylaxis  Once        Question:  Reasons:  Answer:  Physician Provided (leave comment)  Comment:  surgery    03/09/25 2040                    Discharge Planning   MIKAYLA: 3/21/2025     Code Status: Full Code   Medical Readiness for Discharge Date:   Discharge Plan A: Rehab                Please place Justification for DME        Lars Espinal MD  Department of Hospital Medicine   [facility] - Neurosurgery ([facility])

## 2025-03-17 NOTE — PLAN OF CARE
Problem: Occupational Therapy  Goal: Occupational Therapy Goal  Description: Goals to be met by: 4/17/25     Patient will increase functional independence with ADLs by performing:    UE Dressing with Horace.  LE Dressing with Modified Horace.  Grooming while standing at sink with Minimal Assistance and Assistive Devices as needed.  Toileting from toilet with Supervision for hygiene and clothing management.   Rolling to Right, Left with Stand-by Assistance.   Supine to sit with Minimal Assistance.  Step transfer with Minimal Assistance  Increased functional strength to WNL for BUEs.  Upper extremity exercise program x10 reps per handout, with assistance as needed.    Outcome: Progressing

## 2025-03-17 NOTE — ASSESSMENT & PLAN NOTE
Renal function remains around baseline; Estimated Creatinine Clearance: 30.9 mL/min (based on SCr of 1 mg/dL). according to latest data. 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 Will avoid nephrotoxic agents as able, and renally dose all meds as applicable.

## 2025-03-18 PROBLEM — E87.1 HYPONATREMIA: Status: RESOLVED | Noted: 2023-05-12 | Resolved: 2025-03-18

## 2025-03-18 LAB
ABO + RH BLD: NORMAL
ALBUMIN SERPL BCP-MCNC: 2.9 G/DL (ref 3.5–5.2)
ALP SERPL-CCNC: 164 U/L (ref 40–150)
ALT SERPL W/O P-5'-P-CCNC: 35 U/L (ref 10–44)
ANION GAP SERPL CALC-SCNC: 10 MMOL/L (ref 8–16)
ANION GAP SERPL CALC-SCNC: 11 MMOL/L (ref 8–16)
ANION GAP SERPL CALC-SCNC: 6 MMOL/L (ref 8–16)
APTT PPP: 27.6 SEC (ref 21–32)
AST SERPL-CCNC: 35 U/L (ref 10–40)
BASOPHILS # BLD AUTO: 0.08 K/UL (ref 0–0.2)
BASOPHILS NFR BLD: 0.9 % (ref 0–1.9)
BILIRUB SERPL-MCNC: 0.2 MG/DL (ref 0.1–1)
BLD GP AB SCN CELLS X3 SERPL QL: NORMAL
BUN SERPL-MCNC: 42 MG/DL (ref 8–23)
BUN SERPL-MCNC: 45 MG/DL (ref 8–23)
BUN SERPL-MCNC: 47 MG/DL (ref 8–23)
CALCIUM SERPL-MCNC: 7.7 MG/DL (ref 8.7–10.5)
CALCIUM SERPL-MCNC: 7.8 MG/DL (ref 8.7–10.5)
CALCIUM SERPL-MCNC: 8 MG/DL (ref 8.7–10.5)
CHLORIDE SERPL-SCNC: 106 MMOL/L (ref 95–110)
CHLORIDE SERPL-SCNC: 107 MMOL/L (ref 95–110)
CHLORIDE SERPL-SCNC: 110 MMOL/L (ref 95–110)
CHOLEST SERPL-MCNC: 226 MG/DL (ref 120–199)
CHOLEST/HDLC SERPL: 3.6 {RATIO} (ref 2–5)
CO2 SERPL-SCNC: 16 MMOL/L (ref 23–29)
CO2 SERPL-SCNC: 21 MMOL/L (ref 23–29)
CO2 SERPL-SCNC: 23 MMOL/L (ref 23–29)
CREAT SERPL-MCNC: 1.1 MG/DL (ref 0.5–1.4)
CREAT SERPL-MCNC: 1.3 MG/DL (ref 0.5–1.4)
CREAT SERPL-MCNC: 1.3 MG/DL (ref 0.5–1.4)
DIFFERENTIAL METHOD BLD: ABNORMAL
EOSINOPHIL # BLD AUTO: 0 K/UL (ref 0–0.5)
EOSINOPHIL NFR BLD: 0 % (ref 0–8)
ERYTHROCYTE [DISTWIDTH] IN BLOOD BY AUTOMATED COUNT: 15.4 % (ref 11.5–14.5)
EST. GFR  (NO RACE VARIABLE): 39.1 ML/MIN/1.73 M^2
EST. GFR  (NO RACE VARIABLE): 39.1 ML/MIN/1.73 M^2
EST. GFR  (NO RACE VARIABLE): 47.7 ML/MIN/1.73 M^2
ESTIMATED AVG GLUCOSE: 120 MG/DL (ref 68–131)
GLUCOSE SERPL-MCNC: 132 MG/DL (ref 70–110)
GLUCOSE SERPL-MCNC: 216 MG/DL (ref 70–110)
GLUCOSE SERPL-MCNC: 87 MG/DL (ref 70–110)
HBA1C MFR BLD: 5.8 % (ref 4–5.6)
HCT VFR BLD AUTO: 33.7 % (ref 37–48.5)
HDLC SERPL-MCNC: 63 MG/DL (ref 40–75)
HDLC SERPL: 27.9 % (ref 20–50)
HGB BLD-MCNC: 10.3 G/DL (ref 12–16)
IMM GRANULOCYTES # BLD AUTO: 0.18 K/UL (ref 0–0.04)
IMM GRANULOCYTES NFR BLD AUTO: 1.9 % (ref 0–0.5)
INR PPP: 0.9 (ref 0.8–1.2)
LDLC SERPL CALC-MCNC: 139.6 MG/DL (ref 63–159)
LYMPHOCYTES # BLD AUTO: 1.1 K/UL (ref 1–4.8)
LYMPHOCYTES NFR BLD: 11.4 % (ref 18–48)
MCH RBC QN AUTO: 29.1 PG (ref 27–31)
MCHC RBC AUTO-ENTMCNC: 30.6 G/DL (ref 32–36)
MCV RBC AUTO: 95 FL (ref 82–98)
MONOCYTES # BLD AUTO: 0.7 K/UL (ref 0.3–1)
MONOCYTES NFR BLD: 7.1 % (ref 4–15)
NEUTROPHILS # BLD AUTO: 7.4 K/UL (ref 1.8–7.7)
NEUTROPHILS NFR BLD: 78.7 % (ref 38–73)
NONHDLC SERPL-MCNC: 163 MG/DL
NRBC BLD-RTO: 0 /100 WBC
PLATELET # BLD AUTO: 326 K/UL (ref 150–450)
PMV BLD AUTO: 11.1 FL (ref 9.2–12.9)
POCT GLUCOSE: 112 MG/DL (ref 70–110)
POCT GLUCOSE: 127 MG/DL (ref 70–110)
POCT GLUCOSE: 138 MG/DL (ref 70–110)
POCT GLUCOSE: 153 MG/DL (ref 70–110)
POCT GLUCOSE: 169 MG/DL (ref 70–110)
POCT GLUCOSE: 231 MG/DL (ref 70–110)
POCT GLUCOSE: 304 MG/DL (ref 70–110)
POTASSIUM SERPL-SCNC: 4 MMOL/L (ref 3.5–5.1)
POTASSIUM SERPL-SCNC: 4 MMOL/L (ref 3.5–5.1)
POTASSIUM SERPL-SCNC: 4.3 MMOL/L (ref 3.5–5.1)
POTASSIUM SERPL-SCNC: 4.3 MMOL/L (ref 3.5–5.1)
POTASSIUM SERPL-SCNC: 5.6 MMOL/L (ref 3.5–5.1)
POTASSIUM SERPL-SCNC: 6.3 MMOL/L (ref 3.5–5.1)
PROT SERPL-MCNC: 6.1 G/DL (ref 6–8.4)
PROTHROMBIN TIME: 10 SEC (ref 9–12.5)
RBC # BLD AUTO: 3.54 M/UL (ref 4–5.4)
SODIUM SERPL-SCNC: 136 MMOL/L (ref 136–145)
SODIUM SERPL-SCNC: 137 MMOL/L (ref 136–145)
SODIUM SERPL-SCNC: 137 MMOL/L (ref 136–145)
SPECIMEN OUTDATE: NORMAL
TRIGL SERPL-MCNC: 117 MG/DL (ref 30–150)
WBC # BLD AUTO: 9.33 K/UL (ref 3.9–12.7)

## 2025-03-18 PROCEDURE — 93010 ELECTROCARDIOGRAM REPORT: CPT | Mod: ,,, | Performed by: INTERNAL MEDICINE

## 2025-03-18 PROCEDURE — 63600175 PHARM REV CODE 636 W HCPCS

## 2025-03-18 PROCEDURE — 36600 WITHDRAWAL OF ARTERIAL BLOOD: CPT

## 2025-03-18 PROCEDURE — 83036 HEMOGLOBIN GLYCOSYLATED A1C: CPT

## 2025-03-18 PROCEDURE — 63600175 PHARM REV CODE 636 W HCPCS: Performed by: PHYSICIAN ASSISTANT

## 2025-03-18 PROCEDURE — 84132 ASSAY OF SERUM POTASSIUM: CPT | Performed by: PHYSICIAN ASSISTANT

## 2025-03-18 PROCEDURE — 86901 BLOOD TYPING SEROLOGIC RH(D): CPT | Performed by: PHYSICIAN ASSISTANT

## 2025-03-18 PROCEDURE — 85610 PROTHROMBIN TIME: CPT | Performed by: NEUROLOGICAL SURGERY

## 2025-03-18 PROCEDURE — 99900035 HC TECH TIME PER 15 MIN (STAT)

## 2025-03-18 PROCEDURE — 25000003 PHARM REV CODE 250: Performed by: STUDENT IN AN ORGANIZED HEALTH CARE EDUCATION/TRAINING PROGRAM

## 2025-03-18 PROCEDURE — 25000003 PHARM REV CODE 250: Performed by: HOSPITALIST

## 2025-03-18 PROCEDURE — 94761 N-INVAS EAR/PLS OXIMETRY MLT: CPT | Mod: XB

## 2025-03-18 PROCEDURE — 25000003 PHARM REV CODE 250

## 2025-03-18 PROCEDURE — 85730 THROMBOPLASTIN TIME PARTIAL: CPT | Performed by: NEUROLOGICAL SURGERY

## 2025-03-18 PROCEDURE — 82803 BLOOD GASES ANY COMBINATION: CPT

## 2025-03-18 PROCEDURE — 36415 COLL VENOUS BLD VENIPUNCTURE: CPT | Performed by: NEUROLOGICAL SURGERY

## 2025-03-18 PROCEDURE — 27000221 HC OXYGEN, UP TO 24 HOURS

## 2025-03-18 PROCEDURE — 93005 ELECTROCARDIOGRAM TRACING: CPT

## 2025-03-18 PROCEDURE — 25000242 PHARM REV CODE 250 ALT 637 W/ HCPCS

## 2025-03-18 PROCEDURE — 99291 CRITICAL CARE FIRST HOUR: CPT | Mod: ,,,

## 2025-03-18 PROCEDURE — 63600175 PHARM REV CODE 636 W HCPCS: Performed by: HOSPITALIST

## 2025-03-18 PROCEDURE — 80048 BASIC METABOLIC PNL TOTAL CA: CPT | Mod: 91,XB

## 2025-03-18 PROCEDURE — 99024 POSTOP FOLLOW-UP VISIT: CPT | Mod: ,,, | Performed by: PHYSICIAN ASSISTANT

## 2025-03-18 PROCEDURE — 36415 COLL VENOUS BLD VENIPUNCTURE: CPT | Performed by: HOSPITALIST

## 2025-03-18 PROCEDURE — 20000000 HC ICU ROOM

## 2025-03-18 PROCEDURE — 25000003 PHARM REV CODE 250: Performed by: INTERNAL MEDICINE

## 2025-03-18 PROCEDURE — 94640 AIRWAY INHALATION TREATMENT: CPT

## 2025-03-18 PROCEDURE — 80053 COMPREHEN METABOLIC PANEL: CPT | Performed by: HOSPITALIST

## 2025-03-18 PROCEDURE — 85025 COMPLETE CBC W/AUTO DIFF WBC: CPT | Performed by: HOSPITALIST

## 2025-03-18 PROCEDURE — 80061 LIPID PANEL: CPT

## 2025-03-18 PROCEDURE — 99499 UNLISTED E&M SERVICE: CPT | Mod: GC,,, | Performed by: STUDENT IN AN ORGANIZED HEALTH CARE EDUCATION/TRAINING PROGRAM

## 2025-03-18 RX ORDER — HYDRALAZINE HYDROCHLORIDE 20 MG/ML
10 INJECTION INTRAMUSCULAR; INTRAVENOUS EVERY 4 HOURS PRN
Status: DISCONTINUED | OUTPATIENT
Start: 2025-03-18 | End: 2025-03-24 | Stop reason: HOSPADM

## 2025-03-18 RX ORDER — ALBUTEROL SULFATE 2.5 MG/.5ML
10 SOLUTION RESPIRATORY (INHALATION) ONCE
Status: COMPLETED | OUTPATIENT
Start: 2025-03-18 | End: 2025-03-18

## 2025-03-18 RX ORDER — HYDRALAZINE HYDROCHLORIDE 25 MG/1
75 TABLET, FILM COATED ORAL EVERY 8 HOURS
Status: DISCONTINUED | OUTPATIENT
Start: 2025-03-18 | End: 2025-03-24 | Stop reason: HOSPADM

## 2025-03-18 RX ORDER — MIDAZOLAM HYDROCHLORIDE 1 MG/ML
2 INJECTION, SOLUTION INTRAMUSCULAR; INTRAVENOUS
Status: DISCONTINUED | OUTPATIENT
Start: 2025-03-18 | End: 2025-03-19

## 2025-03-18 RX ORDER — DIAZEPAM 10 MG/2ML
5 INJECTION INTRAMUSCULAR ONCE
Status: COMPLETED | OUTPATIENT
Start: 2025-03-18 | End: 2025-03-18

## 2025-03-18 RX ORDER — LIDOCAINE HYDROCHLORIDE AND EPINEPHRINE 10; 10 UG/ML; MG/ML
20 INJECTION, SOLUTION INFILTRATION; PERINEURAL
Status: DISCONTINUED | OUTPATIENT
Start: 2025-03-18 | End: 2025-03-19

## 2025-03-18 RX ORDER — METHOCARBAMOL 100 MG/ML
500 INJECTION, SOLUTION INTRAMUSCULAR; INTRAVENOUS EVERY 8 HOURS
Status: DISCONTINUED | OUTPATIENT
Start: 2025-03-18 | End: 2025-03-18

## 2025-03-18 RX ORDER — LABETALOL HCL 20 MG/4 ML
10 SYRINGE (ML) INTRAVENOUS EVERY 4 HOURS PRN
Status: DISCONTINUED | OUTPATIENT
Start: 2025-03-18 | End: 2025-03-24 | Stop reason: HOSPADM

## 2025-03-18 RX ORDER — FUROSEMIDE 40 MG/1
40 TABLET ORAL DAILY
Status: DISCONTINUED | OUTPATIENT
Start: 2025-03-18 | End: 2025-03-24 | Stop reason: HOSPADM

## 2025-03-18 RX ORDER — PANTOPRAZOLE SODIUM 40 MG/10ML
20 INJECTION, POWDER, LYOPHILIZED, FOR SOLUTION INTRAVENOUS 2 TIMES DAILY
Status: DISCONTINUED | OUTPATIENT
Start: 2025-03-18 | End: 2025-03-19

## 2025-03-18 RX ORDER — SODIUM CHLORIDE 1 G/1
1000 TABLET ORAL 2 TIMES DAILY
Status: DISCONTINUED | OUTPATIENT
Start: 2025-03-18 | End: 2025-03-24 | Stop reason: HOSPADM

## 2025-03-18 RX ORDER — DIAZEPAM 10 MG/2ML
2.5 INJECTION INTRAMUSCULAR ONCE
Status: COMPLETED | OUTPATIENT
Start: 2025-03-18 | End: 2025-03-18

## 2025-03-18 RX ORDER — LIDOCAINE HYDROCHLORIDE AND EPINEPHRINE 10; 10 UG/ML; MG/ML
20 INJECTION, SOLUTION INFILTRATION; PERINEURAL ONCE
Status: DISCONTINUED | OUTPATIENT
Start: 2025-03-18 | End: 2025-03-19

## 2025-03-18 RX ORDER — LIDOCAINE HYDROCHLORIDE 10 MG/ML
10 INJECTION, SOLUTION INFILTRATION; PERINEURAL ONCE
Status: DISCONTINUED | OUTPATIENT
Start: 2025-03-18 | End: 2025-03-19

## 2025-03-18 RX ADMIN — DEXAMETHASONE SODIUM PHOSPHATE 4 MG: 4 INJECTION INTRA-ARTICULAR; INTRALESIONAL; INTRAMUSCULAR; INTRAVENOUS; SOFT TISSUE at 01:03

## 2025-03-18 RX ADMIN — PANTOPRAZOLE SODIUM 20 MG: 40 INJECTION, POWDER, FOR SOLUTION INTRAVENOUS at 09:03

## 2025-03-18 RX ADMIN — METHOCARBAMOL 500 MG: 500 TABLET ORAL at 08:03

## 2025-03-18 RX ADMIN — HYDRALAZINE HYDROCHLORIDE 75 MG: 25 TABLET ORAL at 02:03

## 2025-03-18 RX ADMIN — PANTOPRAZOLE SODIUM 20 MG: 20 TABLET, DELAYED RELEASE ORAL at 08:03

## 2025-03-18 RX ADMIN — AMLODIPINE BESYLATE 10 MG: 10 TABLET ORAL at 08:03

## 2025-03-18 RX ADMIN — ALBUTEROL SULFATE 10 MG: 2.5 SOLUTION RESPIRATORY (INHALATION) at 05:03

## 2025-03-18 RX ADMIN — DEXAMETHASONE SODIUM PHOSPHATE 4 MG: 4 INJECTION INTRA-ARTICULAR; INTRALESIONAL; INTRAMUSCULAR; INTRAVENOUS; SOFT TISSUE at 05:03

## 2025-03-18 RX ADMIN — DIAZEPAM 2.5 MG: 5 INJECTION, SOLUTION INTRAMUSCULAR; INTRAVENOUS at 03:03

## 2025-03-18 RX ADMIN — DEXTROSE MONOHYDRATE 50 G: 25 INJECTION, SOLUTION INTRAVENOUS at 05:03

## 2025-03-18 RX ADMIN — DIAZEPAM 2.5 MG: 5 INJECTION, SOLUTION INTRAMUSCULAR; INTRAVENOUS at 04:03

## 2025-03-18 RX ADMIN — METHOCARBAMOL 500 MG: 500 TABLET ORAL at 02:03

## 2025-03-18 RX ADMIN — DEXAMETHASONE SODIUM PHOSPHATE 4 MG: 4 INJECTION INTRA-ARTICULAR; INTRALESIONAL; INTRAMUSCULAR; INTRAVENOUS; SOFT TISSUE at 06:03

## 2025-03-18 RX ADMIN — HYDRALAZINE HYDROCHLORIDE 50 MG: 25 TABLET ORAL at 06:03

## 2025-03-18 RX ADMIN — FUROSEMIDE 40 MG: 40 TABLET ORAL at 08:03

## 2025-03-18 RX ADMIN — FLUTICASONE FUROATE AND VILANTEROL TRIFENATATE 1 PUFF: 100; 25 POWDER RESPIRATORY (INHALATION) at 09:03

## 2025-03-18 RX ADMIN — SODIUM CHLORIDE 1000 MG: 1 TABLET ORAL at 08:03

## 2025-03-18 RX ADMIN — INSULIN HUMAN 10 UNITS: 100 INJECTION, SOLUTION PARENTERAL at 05:03

## 2025-03-18 RX ADMIN — HEPARIN SODIUM 5000 UNITS: 5000 INJECTION INTRAVENOUS; SUBCUTANEOUS at 06:03

## 2025-03-18 RX ADMIN — DEXAMETHASONE SODIUM PHOSPHATE 4 MG: 4 INJECTION INTRA-ARTICULAR; INTRALESIONAL; INTRAMUSCULAR; INTRAVENOUS; SOFT TISSUE at 11:03

## 2025-03-18 RX ADMIN — SODIUM ZIRCONIUM CYCLOSILICATE 10 G: 5 POWDER, FOR SUSPENSION ORAL at 08:03

## 2025-03-18 NOTE — NURSING
Verbal order from Phoebe Salinas MD to administer 2.5mg of IV diazepam instead of ordered 5mg for cervical traction. Med calculation performed and dose is 0.5ml. Close loop confirmation of dose with Dr. Salinas, med scanned and 2.5mg placed in dose amount box in Epic--unable to choose MD order for reason for less than ordered amount given

## 2025-03-18 NOTE — PROGRESS NOTES
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 Her symptoms began last Monday with headache that started in her neck and migrated toward the top of her head. She has had chronic neck pain due to cervical stenosis, but the headache was new. On Saturday, she had a normal morning and was able to perform all of her ADLs. Around 8 pm, she suddenly became weak in her arms and legs and was not able to walk. MRI demonstrated chronic type 2 odontoid fracture with progressed retropulsion of the fracture fragment in comparison to prior MRI from 8/11/2023, with moderate to severe canal stenosis at C1-C2. Of note, patient had previously discontinued wearing her cervical collar. Denies radiculopathy in her arms or legs, sensory deficit, bladder or bowel incontinence.      Post-Op Info:  Procedure(s) (LRB):  FUSION, SPINE, CERVICAL, POSTERIOR APPROACH GE 3 D NICHOLAS (N/A)       Interval History: NAEON. Family at bedside. Tolerated 100% of diet today per family. Exam stable. Transferred to ICU for cervical traction today. Plan for OR tomorrow pending medical optimization from hyperkalemia standpoint.    Medications:  Continuous Infusions:  Scheduled Meds:   amLODIPine  10 mg Oral Daily    cloNIDine 0.1 mg/24 hr td ptwk  1 patch Transdermal Q7 Days    dexAMETHasone injection  4 mg Intravenous Q6H    fluticasone furoate-vilanteroL  1 puff Inhalation Daily    furosemide  40 mg Oral Daily    gabapentin  300 mg Oral QHS    hydrALAZINE  75 mg Oral Q8H    LIDOcaine HCL 10 mg/ml (1%)  10 mL Intradermal Once    methocarbamoL  500 mg Oral TID    pantoprazole  20 mg Oral  BID WM    sodium chloride  1,000 mg Oral BID    sodium zirconium cyclosilicate  10 g Oral BID     PRN Meds:  Current Facility-Administered Medications:     acetaminophen, 650 mg, Oral, Q4H PRN    albuterol, 2 puff, Inhalation, Q6H PRN    aluminum-magnesium hydroxide-simethicone, 30 mL, Oral, QID PRN    butalbital-acetaminophen-caffeine -40 mg, 1 tablet, Oral, Q4H PRN    dextrose 50%, 12.5 g, Intravenous, PRN    dextrose 50%, 25 g, Intravenous, PRN    glucagon (human recombinant), 1 mg, Intramuscular, PRN    glucose, 16 g, Oral, PRN    glucose, 24 g, Oral, PRN    insulin aspart U-100, 0-5 Units, Subcutaneous, QID (AC + HS) PRN    LIDOcaine-EPINEPHrine 1%-1:100,000, 20 mL, Intradermal, On Call Procedure    melatonin, 6 mg, Oral, Nightly PRN    morphine, 2 mg, Intravenous, Q4H PRN    naloxone, 0.02 mg, Intravenous, PRN    ondansetron, 8 mg, Oral, Q8H PRN    oxyCODONE-acetaminophen, 1 tablet, Oral, Q4H PRN    oxyCODONE-acetaminophen, 1 tablet, Oral, Q4H PRN    simethicone, 1 tablet, Oral, QID PRN    sodium chloride 0.9%, 1-10 mL, Intravenous, Q12H PRN     Review of Systems  Objective:     Weight: 59 kg (130 lb 1.1 oz)  Body mass index is 23.04 kg/m².  Vital Signs (Most Recent):  Temp: 97.6 °F (36.4 °C) (03/18/25 1210)  Pulse: (!) 59 (03/18/25 1301)  Resp: 16 (03/18/25 1301)  BP: (!) 166/72 (03/18/25 1301)  SpO2: 99 % (03/18/25 1301) Vital Signs (24h Range):  Temp:  [97.4 °F (36.3 °C)-97.6 °F (36.4 °C)] 97.6 °F (36.4 °C)  Pulse:  [59-80] 59  Resp:  [13-18] 16  SpO2:  [95 %-99 %] 99 %  BP: (153-175)/(65-86) 166/72     Date 03/18/25 0700 - 03/19/25 0659   Shift 0501-0950 8290-1797 5083-6595 24 Hour Total   INTAKE   Shift Total(mL/kg)       OUTPUT   Urine(mL/kg/hr) 500   500   Shift Total(mL/kg) 500(8.5)   500(8.5)   Weight (kg) 59 59 59 59                       Female External Urinary Catheter w/ Suction 03/09/25 (Active)   Skin no redness;no breakdown 03/15/25 0815   Tolerance no signs/symptoms of discomfort  "03/15/25 0815   Suction Continuous suction at 40 mmHg 03/15/25 0815   Date of last wick change 03/14/25 03/14/25 1913   Time of last wick change 2200 03/14/25 0404   Output (mL) 800 mL 03/15/25 0602          Physical Exam         Neurosurgery Physical Exam  General: Well developed, well nourished, no distress.   Head: Normocephalic, atraumatic.  Mental Status: Awake, Alert, Oriented  Speech: Clear with content appropriate to conversation.  Cranial nerves: Face symmetric, CN II-XII grossly intact.   Eyes: Pupils equal, round, reactive to light, EOMI.  Sensory: Intact to light touch throughout.  Motor Strength: Moves all extremities spontaneously with good tone. Full strength upper and lower extremities. No abnormal movements seen.      Strength   Deltoids Triceps Biceps Wrist Extension Wrist Flexion Hand    Upper: R 4/5 5/5 5/5 5/5 5/5 5/5     L 5/5 5/5 5/5 5/5 5/5 5/5       Iliopsoas Quadriceps Knee  Flexion Tibialis  anterior Gastro- cnemius EHL   Lower: R 5/5 5/5 5/5 5/5 5/5 5/5     L 5/5 5/5 5/5 5/5 5/5 5/5    Patient with R distal clavicle fx and h/o of R shoulder arthroplasty.  Hand intrinsics 5/5.     In c-collar.    Significant Labs:  Recent Labs   Lab 03/17/25  0359 03/18/25  0528    87   * 137   K 5.2* 5.6*    110   CO2 21* 16*   BUN 50* 42*   CREATININE 1.0 1.1   CALCIUM 8.0* 8.0*     Recent Labs   Lab 03/17/25  0359 03/18/25  0528   WBC 9.13 9.33   HGB 10.4* 10.3*   HCT 33.2* 33.7*    326     No results for input(s): "LABPT", "INR", "APTT" in the last 48 hours.  Microbiology Results (last 7 days)       ** No results found for the last 168 hours. **          All pertinent labs from the last 24 hours have been reviewed.    Significant Diagnostics:  I have reviewed all pertinent imaging results/findings within the past 24 hours.  Assessment/Plan:     Cervical spinal stenosis  Bridget Montgomery is a 89yo woman w/ PMHx COPD, HTN, HLD, history of TIA, known type 2 odontoid fx " followed by Dr. Quach with non-surgical management, and right shoulder arthroplasty 06/2024 presenting with a week of severe persistent headache, and diffuse weakness involving her bilateral upper and lower extremities.     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 Multilevel foraminal stenosis.     Discussed surgical vs non-surgical options with the patient and her daughter Liliana Montgomery present. Patient's grandson Aidan (neurosurgeon) was also on the phone for this conversation. Given the degree of upper cervical cord kinking, cord edema, and craniocervical settling recommended surgical intervention to preserve her current function and prevent neurological decline. Surgical recommendation is occiput-C4 fusion. She would also need pre-op traction. Non-surgical option is strict cervical collar 24/7 indefinitely. This would also require the patient to be compliant with collar use. Fracture appears stable in X-Ray c spine upright/supine. Patient and daughter have decided to proceed with surgery.    Plan:   - Cervical collar at all times.  - XR cervical spine upright/supine in collar stable. Continue collar. Ok for bed level PT/OT only with collar in place  - Multimodal pain control.  - Bowel regimen.  - Notify NSGY immediately with any decline on exam.  - Family has decided to proceed with surgery. Booked for O-C4 fusion on 3/19/25. Pre-op traction on 3/18.   - RCRI/medical risk stratification - NSQUIP risk is 2.1% of death; 16.7% of any complication. Needs K stabilization prior to surgery.  - Med management per primary          Janeth Riley PA-C  Neurosurgery  [facility] - Neuro Critical Care

## 2025-03-18 NOTE — NURSING
Called [facility] regarding pt pantoprazole PO to be admin at 1715. Pt is now in cervical traction and cannot swallow safely as she is now flat. Spoke to BRANDON Cody and she will modify order to IV if possible.    [facility] Infectious Diseases  Pt well know to our group from care at [facility]     74y Female transferred from [facility] of lethargy and hypoxia.   Patient is a terminal case of PD.   At ED arrival her O2 sat was 72%.  Patient DNR/DNI started on 100% NRBM and her saturation improved.   Patient is non communicative most of informations received from [facility] facility and from spouse.  Patient has PEG/JT and on feeding and Parkinson's Disease medications.  Blood culture (collected 9/4 1am)    -  ESBL: Detec    -  Proteus species: Detec    -  CTX-M Resistance Marker: Detec    RECOMMENDATIONS  PT with acute respiratory distress and on imaging suggestion of LLL PNA. And now noted to have ESBL Proteus bacteremia  recommended meropenem first day 9/5 9/7 changed to ertapenem but noted elevated wbc but clinically appears slightly improved  -continue ertapenem    repeat blood cultures collected 9/6-NGTD  -will order rpt blood cultures to document clearance and inform duration recommendations  -ESBL proteus in urine so potential source as acute pyelonephritis with bacteremia    concerns regarding leukocytosis, appreciate challenges with CT/  V/Q scan and other investigations    All interventions in keeping with goals of care     Thank you for consulting us and involving us in the management of this most interesting and challenging case.  We will follow along in the care of this patient. Please call us at [phone] or text me directly on my cell# at [phone] with any concerns.

## 2025-03-18 NOTE — PROGRESS NOTES
"[facility] - Neurosurgery ([facility])  Adult Nutrition  Progress Note    SUMMARY     Recommendations  1. Continue regular low potassium diet   2. Monitor need for ONS   3. Monitor weight and labs   4. Encourage PO intake    Goals: Pt will meet 85% of EEN/EPN by RD follow up    Nutrition Goal Status: goal not met  Communication of RD Recs: other (comment) (POC)    Nutrition Discharge Planning  Nutrition Discharge Planning: Therapeutic diet (comments)  Therapeutic diet (comments): Regular low potassium diet    Assessment and Plan  Nutrition Problem  Inadequate energy intake     Related to (etiology):   Limited food acceptance     Signs and Symptoms (as evidenced by):   Vee eatajit   Does not like the texture of her food   Does not like hospital food      Interventions:  Collaboration by nutrition professional with other providers   Low potassium diet     Nutrition Diagnosis Status:   New    Malnutrition Assessment  Unable to assess at this time, will attempt at RD follow up     Reason for Assessment  Reason For Assessment: RD follow-up  Diagnosis:  (spinal stenosis)  General Information Comments: Pt is currently on a regular diet low potassium diet. PMH- COPD, HTN, anemia, CKD4. Transfer from [place] for [facility] eval for cervical cord compression. -135 lbs. Pt's daughter does not believe in ONS and dujayshreehter will bring her own. Marvel-20/buttocks, contusion right. Noted 0% meal intake. S/p spinal cord fusion. Known esophagitis. Weight stable for the last 6 months.    Nutrition/Diet History  Nutrition Intake History: [facility]  Food Preferences: [facility]  Spiritual, Cultural Beliefs, Adventist Practices, Values that Affect Care: no  Factors Affecting Nutritional Intake: None identified at this time    Food Insecurity: No Food Insecurity (3/10/2025)    Hunger Vital Sign     Worried About Running Out of Food in the Last Year: Never true     Ran Out of Food in the Last Year: Never true     Anthropometrics  Height: 5' 3" (160 " cm)  Height (inches): 63 in  Height Method: Stated  Weight: 59 kg (130 lb 1.1 oz)  Weight (lb): 130.07 lb  Weight Method: Bed Scale  Ideal Body Weight (IBW), Female: 115 lb  % Ideal Body Weight, Female (lb): 113.1 %  BMI (Calculated): 23  BMI Grade: 18.5-24.9 - normal  Usual Body Weight (UBW), k.9 kg (6/10/24)  % Usual Body Weight: 98.7  % Weight Change From Usual Weight: -1.5 %     Lab/Procedures/Meds  Pertinent Labs Reviewed: reviewed  Pertinent Labs Comments: CO2 16, BUN 42, Ca 8, GFR 47.7, Magnesium 2.7  Pertinent Medications Reviewed: reviewed  Pertinent Medications Comments: furosemide, gabapentin, methocarbamol, sodium chloride, pantoprazole    Estimated/Assessed Needs  Weight Used For Calorie Calculations: 59 kg (130 lb 1.1 oz)  Energy Calorie Requirements (kcal): 0046-3673 kcals (25-30 kcals/kg)  Energy Need Method: Kcal/kg  Protein Requirements: 47-59g (0.8-1g/kg)  Weight Used For Protein Calculations: 59 kg (130 lb 1.1 oz)     Estimated Fluid Requirement Method: RDA Method  RDA Method (mL): 1475     Nutrition Prescription Ordered  Current Diet Order: Regular low potassium diet    Evaluation of Received Nutrient/Fluid Intake  I/O: 240/1400  Energy Calories Required: not meeting needs  Protein Required: not meeting needs  Fluid Required: not meeting needs  Comments: LBM-3/17/25  Tolerance: tolerating  % Intake of Estimated Energy Needs: 0 - 25 %  % Meal Intake: 0 - 25 %    Nutrition Risk  Level of Risk/Frequency of Follow-up:  (1x/week)     Monitor and Evaluation  Monitor and Evaluation: Food and beverage intake, Energy intake, Protein intake, Diet order, Weight, Inflammatory profile     Nutrition Follow-Up  RD Follow-up?: Yes

## 2025-03-18 NOTE — PROGRESS NOTES
Attempted to see the pt for wound care consult- pt off the floor. When attempted a second time, pt had moved to Neuro ICU.  Reached out to primary nurse- re wounds- pt unable to be turned at this time due to being in cervical traction.    YADI HunterN, RN,[facility]  3/18/25

## 2025-03-18 NOTE — PLAN OF CARE
[facility] Care Plan  POC reviewed with Bridget Montgomery and family at 1400. Patient and Family verbalized understanding. Questions and concerns addressed. No acute events today. Pt progressing toward goals. Will continue to monitor. See below and flowsheets for full assessment and VS info.     -admitted to [facility] today  -placed in cervical traction (10lbs) via NSG pending OR tomorrow for Occiput-C4 fusion  -patients daughter indicated they do not want blood from our blood bank, and they have a designated donor (another daughter)--unclear about blood [facility] policies as donation was made less than 1 week prior to scheduled surgery-Phoebe Salinas aware  -critical K+ requiring shifting--10u regular insulin/50 of dextrose and albuterol per RT          Is this a stroke patient? no    Neuro:  Lorenzo Coma Scale  Best Eye Response: 4-->(E4) spontaneous  Best Motor Response: 6-->(M6) obeys commands  Best Verbal Response: 5-->(V5) oriented  Lorenzo Coma Scale Score: 15  Assessment Qualifiers: patient not sedated/intubated, no eye obstruction present  Pupil PERRLA: yes     24 hr Temp:  [97.4 °F (36.3 °C)-97.9 °F (36.6 °C)]     CV:   Rhythm: normal sinus rhythm  BP goals:   SBP <  200  MAP > 65    Resp:           Plan: N/A    GI/:     Diet/Nutrition Received: regular  Last Bowel Movement: 03/17/25  Voiding Characteristics: external catheter    Intake/Output Summary (Last 24 hours) at 3/18/2025 1759  Last data filed at 3/18/2025 1753  Gross per 24 hour   Intake 240 ml   Output 2900 ml   Net -2660 ml     Unmeasured Output  Urine Occurrence: 0  Stool Occurrence: 0  Emesis Occurrence: 0  Pad Count: 2    Labs/Accuchecks:  Recent Labs   Lab 03/18/25  0528   WBC 9.33   RBC 3.54*   HGB 10.3*   HCT 33.7*         Recent Labs   Lab 03/18/25  0528 03/18/25  1648     --    K 5.6* 6.3*   CO2 16*  --      --    BUN 42*  --    CREATININE 1.1  --    ALKPHOS 164*  --    ALT 35  --    AST 35  --    BILITOT 0.2  --       Recent  "Labs   Lab 03/18/25  1648   INR 0.9   APTT 27.6    No results for input(s): "CPK", "CPKMB", "TROPONINI", "MB" in the last 168 hours.    Electrolytes: Contraindicated  Accuchecks: Q4H    Gtts:      LDA/Wounds:    Marvel Risk Assessment  Sensory Perception: 4-->no impairment  Moisture: 3-->occasionally moist  Activity: 1-->bedfast  Mobility: 3-->slightly limited  Nutrition: 3-->adequate  Friction and Shear: 2-->potential problem  Marvel Score: 16    Is your marvel score 12 or less? no            Restraints:        WCTM   "

## 2025-03-18 NOTE — PLAN OF CARE
Patient to transfer to [facility] this AM for preoperative traction. D/w [facility] charge RN. Transfer order placed.    Gilda Velásquez MD  Neurosurgery  [facility]

## 2025-03-18 NOTE — HPI
Bridget Montgomery is a 89F PMHx COPD, HTN, HLD, history of TIA, known type 2 odontoid fx followed by Dr. Quach with non-surgical management, and R shoulder arthroplasty 06/2024 presenting to [facility] for preoperative traction. She initially presented with a week of severe persistent HA, and diffuse weakness of all extremities. Her symptoms began last Monday with HA that started in her neck and migrated toward the top of her head. She has had chronic neck pain due to cervical stenosis, but the headache was new. On Saturday, she was at her baseline, able to perform all of her ADLs. Around 8 pm, she suddenly became weak in all extremities and was not able to walk. MRI demonstrated chronic type 2 odontoid fracture with progressed retropulsion of the fracture fragment in comparison to prior MRI from 8/11/2023, with moderate to severe canal stenosis at C1-C2. Of note, patient had previously discontinued wearing her cervical collar. Denied radiculopathy in her arms or legs, sensory deficit, bladder or bowel incontinence. Family decided to proceed with surgery. Booked for O-C4 fusion on 3/19/25. Admitted to [facility] for pre-op traction on 3/18.

## 2025-03-18 NOTE — ANESTHESIA PREPROCEDURE EVALUATION
[facility]  Anesthesia Pre-Operative Evaluation         Patient Name: Bridget Montgomery  YOB: 1935  MRN: [id number]    SUBJECTIVE:     Pre-operative evaluation for Procedure(s) (LRB):  FUSION, SPINE, CERVICAL, POSTERIOR APPROACH GE 3 D NICHOLAS (N/A)     03/18/2025    Bridget Montgomery is a 89 y.o. female with COPD, HTN, hx of TIA, GERD, esophageal ulcer, CKD4, T2DM. She reports some dysphagia and trouble swallowing food that sometimes leads to regurgitation.    She is admitted to the floor as a transfer from [place] with severe cervical cord compression requiring fusion.    Patient now presents for the above procedure(s).    Patient's daughter was present at bedside for anesthesia consent discussion.     Level of Care: floor    Hemodynamics: mildly hypertensive; stable    Oxygenation:  room air     LDAs: 20g PIV x1    Labs: H/H 10.3/33.7 on 3/18     NPO status: NPO @ [facility]      Echo Summary  Results for orders placed during the hospital encounter of 10/17/23    Echo Saline Bubble? Yes    Interpretation Summary    Left Ventricle: The left ventricle is normal in size. Normal wall thickness. Normal wall motion. There is normal systolic function. Biplane (2D) method of discs ejection fraction is 61%. Diastolic function cannot be reliably determined in the presence of mitral annular calcification.    Left Atrium: Left atrium is moderately dilated.    Right Ventricle: Normal right ventricular cavity size. Wall thickness is normal. Right ventricle wall motion  is normal. Systolic function is normal.    Right Atrium: Right atrium is mildly dilated.    Mitral Valve: There is moderate mitral annular calcification present. There is mild regurgitation.    Pulmonary Artery: The estimated pulmonary artery systolic pressure is 39 mmHg.    IVC/SVC: Normal venous pressure at 3 mmHg.       Prev airway: None documented.    LDA:        Peripheral IV - Single Lumen 03/14/25 1157 20 G  Anterior;Distal;Right Forearm (Active)   Site Assessment Clean;Dry;Intact 03/18/25 0400   Line Securement Device Secured with sutureless device 03/17/25 2000   Extremity Assessment Distal to IV No abnormal discoloration 03/17/25 2200   Line Status Flushed 03/18/25 0400   Dressing Status Clean;Dry;Intact 03/18/25 0400   Dressing Intervention Integrity maintained 03/18/25 0400   Dressing Change Due 03/18/25 03/17/25 2200   Site Change Due 03/18/25 03/17/25 2000   Reason Not Rotated Not due 03/17/25 2200   Number of days: 3       Female External Urinary Catheter w/ Suction 03/09/25 (Active)   Skin no redness;no breakdown 03/17/25 2200   Tolerance no signs/symptoms of discomfort 03/17/25 2200   Suction Continuous suction at 60 mmHg 03/17/25 2000   Date of last wick change 03/14/25 03/14/25 1913   Time of last wick change 2200 03/14/25 0404   Output (mL) 250 mL 03/17/25 2000   Number of days: 9       Drips: None documented.      Problem List[1]    Review of patient's allergies indicates:   Allergen Reactions    Cephalexin     Codeine     Meperidine      Other reaction(s): delerium, hallucination  Delerium  Delerium  Delerium      Nsaids (non-steroidal anti-inflammatory drug)     Penicillins     Tazarotene      Other reaction(s): rash, Unknown       Current Inpatient Medications:   amLODIPine  10 mg Oral Daily    cloNIDine 0.1 mg/24 hr td ptwk  1 patch Transdermal Q7 Days    dexAMETHasone injection  4 mg Intravenous Q6H    fluticasone furoate-vilanteroL  1 puff Inhalation Daily    furosemide  40 mg Oral Daily    gabapentin  300 mg Oral QHS    hydrALAZINE  75 mg Oral Q8H    methocarbamoL  500 mg Oral TID    pantoprazole  20 mg Oral BID WM    sodium chloride  1,000 mg Oral BID    sodium zirconium cyclosilicate  10 g Oral BID       Medications Ordered Prior to Encounter[2]    Past Surgical History:   Procedure Laterality Date    BREAST CYST EXCISION      CAUDAL EPIDURAL STEROID INJECTION N/A 2/2/2023     Procedure: Injection-steroid-epidural-caudal;  Surgeon: Angel Sparrow MD;  [facility];  Service: Pain Management;  Laterality: N/A;    FOOT SURGERY         Social History:  Tobacco Use: Medium Risk (3/9/2025)    Patient History     Smoking Tobacco Use: Former     Smokeless Tobacco Use: Never     Passive Exposure: Not on file      Alcohol Use: Not At Risk (3/10/2025)    AUDIT-C     Frequency of Alcohol Consumption: Monthly or less     Average Number of Drinks: 1 or 2     Frequency of Binge Drinking: Never        OBJECTIVE:     Vital Signs Range (Last 24H):  Temp:  [36.3 °C (97.4 °F)-36.4 °C (97.6 °F)]   Pulse:  [64-80]   Resp:  [18]   BP: (146-175)/(64-86)   SpO2:  [95 %-99 %]       Significant Labs:  Lab Results   Component Value Date    WBC 9.33 03/18/2025    HGB 10.3 (L) 03/18/2025    HCT 33.7 (L) 03/18/2025     03/18/2025    CHOL 241 (H) 03/08/2025    TRIG 143 03/08/2025    HDL 70 03/08/2025    ALT 35 03/18/2025    AST 35 03/18/2025     03/18/2025    K 5.6 (H) 03/18/2025     03/18/2025    CREATININE 1.1 03/18/2025    BUN 42 (H) 03/18/2025    CO2 16 (L) 03/18/2025    TSH 2.745 03/08/2025    INR 0.9 03/08/2025    HGBA1C 5.9 (H) 03/09/2025       Diagnostic Studies: No relevant studies.    EKG:   Results for orders placed or performed during the hospital encounter of 03/08/25   ECG 12 lead    Collection Time: 03/08/25 10:01 PM   Result Value Ref Range    QRS Duration 86 ms    OHS QTC Calculation 417 ms    Narrative    Test Reason : R29.818,    Vent. Rate :  74 BPM     Atrial Rate :  74 BPM     P-R Int : 172 ms          QRS Dur :  86 ms      QT Int : 376 ms       P-R-T Axes :  74  40  38 degrees    QTcB Int : 417 ms    Normal sinus rhythm  Cannot rule out Anterior infarct (cited on or before 28-Sep-2023)  Abnormal ECG  When compared with ECG of 17-Oct-2023 18:09,  QT has shortened  Confirmed by Dionisio Minor ([id number]) on 3/10/2025 11:33:18 PM    Referred By: RENEE  SELF           Confirmed By: Dionisio Berrios       2D ECHO:  TTE:  Results for orders placed or performed during the hospital encounter of 03/08/25   Echo Saline Bubble? Yes *Canceled*    Narrative    The following orders were created for panel order Echo Saline Bubble? Yes.  Procedure                               Abnormality         Status                     ---------                               -----------         ------                       Please view results for these tests on the individual orders.       MALIK:  No results found for this or any previous visit.    ASSESSMENT/PLAN:           Pre-op Assessment    I have reviewed the Patient Summary Reports.     I have reviewed the Nursing Notes. I have reviewed the NPO Status.   I have reviewed the Medications.     Review of Systems  Anesthesia Hx:  No problems with previous Anesthesia   History of prior surgery of interest to airway management or planning:          Denies Family Hx of Anesthesia complications.    Denies Personal Hx of Anesthesia complications.                    Social:  Non-Smoker       Hematology/Oncology:  Hematology Normal   Oncology Normal                                   EENT/Dental:  EENT/Dental Normal           Cardiovascular:     Hypertension                                          Pulmonary:   COPD                     Renal/:  Chronic Renal Disease, CKD                Hepatic/GI:   PUD,  GERD                Musculoskeletal:  Arthritis               Neurological:  TIA,  Neuromuscular Disease,  Headaches                                 Endocrine:  Diabetes, type 2         Denies Morbid Obesity / BMI > 40  Dermatological:  Skin Normal    Psych:  Psychiatric Normal                  Physical Exam  General: Well nourished, Cooperative, Alert and Oriented  Neck brace in place  Airway:  Mallampati: III / II  Mouth Opening: Small, but > 3cm  TM Distance: Normal  Tongue: Normal  Neck ROM: Normal ROM    Dental:  Intact, Caps /  Implants, Partial Dentures  Partial bridge on top upper left  Chest/Lungs:  Clear to auscultation    Heart:  Rate: Bradycardia  Rhythm: Regular Rhythm  Sounds: Normal      Anesthesia Plan  Type of Anesthesia, risks & benefits discussed:    Anesthesia Type: Gen ETT  Intra-op Monitoring Plan: Standard ASA Monitors and Art Line  Post Op Pain Control Plan: multimodal analgesia and IV/PO Opioids PRN  Induction:  IV and rapid sequence  Airway Plan: Direct and Video, Post-Induction  Informed Consent: Informed consent signed with the Patient and all parties understand the risks and agree with anesthesia plan.  All questions answered.   ASA Score: 4  Day of Surgery Review of History & Physical: H&P Update referred to the surgeon/provider.    Ready For Surgery From Anesthesia Perspective.     .             [1]  Patient Active Problem List  Diagnosis    Chronic kidney disease, stage III (moderate)    Type 2 diabetes mellitus with diabetic chronic kidney disease    Spinal stenosis    Senile purpura    Primary localized osteoarthritis of pelvic region and thigh    Idiopathic osteoarthritis    Osteopenia    Neuralgia of right sciatic nerve    Mixed hyperlipidemia    Mild recurrent major depression    Diabetic renal disease    Other emphysema    Allergic rhinitis    Essential hypertension    Gastro-esophageal reflux disease without esophagitis    Lumbar spondylosis    Ovarian mass    Edema, spinal cord    Ulcer of esophagus    Greater trochanteric bursitis    Osteoarthritis of both knees    Lumbar radiculopathy    Acute chorea    Hyponatremia    Non-healing wound of right lower extremity    Acute pancreatitis without infection or necrosis    CKD (chronic kidney disease) stage 4, GFR 15-29 ml/min    Leg wound, right    Closed odontoid fracture with routine healing    Coccyalgia    Diabetes 1.5, managed as type 2    Closed fracture of proximal end of left humerus with routine healing    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     Current Outpatient Medications on File Prior to Encounter   Medication Sig Dispense Refill    acetaminophen (TYLENOL) 500 MG tablet Take 1 tablet (500 mg total) by mouth every 6 (six) hours as needed for Pain. 90 tablet 6    albuterol (PROVENTIL) 2.5 mg /3 mL (0.083 %) nebulizer solution Take 2.5 mg by nebulization 2 (two) times daily as needed.      albuterol (PROVENTIL/VENTOLIN HFA) 90 mcg/actuation inhaler Inhale 2 puffs into the lungs every 6 (six) hours as needed.      amLODIPine (NORVASC) 10 MG tablet Take 1 tablet (10 mg total) by mouth once daily. 90 tablet 3    budesonide-formoterol 160-4.5 mcg (SYMBICORT) 160-4.5 mcg/actuation HFAA Inhale 2 puffs into the lungs every 12 (twelve) hours.      calcium carbonate (CALCIUM 500 ORAL) Take 500 mg by mouth once daily.      cetirizine (ZYRTEC) 10 MG tablet Take 10 mg by mouth once daily.      cholecalciferol, vitamin D3, (VITAMIN D3) 50 mcg (2,000 unit) Cap capsule Take 2,000 Units by mouth once daily.      diclofenac sodium (VOLTAREN ARTHRITIS PAIN) 1 % Gel Apply 2 g topically 3 (three) times daily as needed (painful joint). 100 g 0    famotidine (PEPCID) 40 MG tablet Take 40 mg by mouth nightly as needed for Heartburn.      fluticasone propionate (FLONASE) 50 mcg/actuation nasal spray 1 spray by Nasal route 2 (two) times daily.      furosemide (LASIX) 20 MG tablet Take 1 tablet (20 mg total) by mouth 2 (two) times a day. 180 tablet 3    gabapentin (NEURONTIN) 300 MG capsule Take 300 mg by mouth every evening.      ipratropium (ATROVENT) 42 mcg (0.06 %) nasal  spray       melatonin (MELATIN) 3 mg tablet Take 2 tablets (6 mg total) by mouth nightly as needed for Insomnia. 30 tablet 0    methocarbamoL (ROBAXIN) 500 MG Tab       omega 3-dha-epa-fish oil 1,000 mg (120 mg-180 mg) Cap Take 1 capsule by mouth once daily.      omeprazole (PRILOSEC) 20 MG capsule Take 1 capsule (20 mg total) by mouth 2 (two) times daily before meals. 60 capsule 0    polyethylene glycol 3350 (MIRALAX ORAL) Take 17 g by mouth once daily. As needed      pregabalin (LYRICA) 50 MG capsule Take 1 capsule by mouth every evening.      senna-docusate 8.6-50 mg (PERICOLACE) 8.6-50 mg per tablet Take 2 tablets by mouth 2 (two) times daily. 60 tablet 0    sodium bicarbonate 650 MG tablet Take 1 tablet (650 mg total) by mouth once daily. 30 tablet 0    TOLAK 4 % Crea apply a thin film to the lesions using the fingertips, once daily. preferably at night., WASH off in THE morning FOR 2 WEEKS.      traMADoL (ULTRAM) 50 mg tablet Take 1 tablet (50 mg total) by mouth 2 (two) times daily as needed. 60 tablet 4

## 2025-03-18 NOTE — PLAN OF CARE
Recommendations  1. Continue regular low potassium diet   2. Monitor need for ONS   3. Monitor weight and labs   4. Encourage PO intake    Goals: Pt will meet 85% of EEN/EPN by RD follow up

## 2025-03-18 NOTE — ASSESSMENT & PLAN NOTE
Still bouncing up again; possibly from losartan and CKD  D/w nephrology on call - Obtaining renal US, stopping losartan, starting lowK diet  Additionally will increase lokelma dosing

## 2025-03-18 NOTE — SUBJECTIVE & OBJECTIVE
Interval History:  no loose BMs since yesterday.  Potassium david even higher today up to 5.6.  Sodium stable at 137    Review of Systems  Objective:     Vital Signs (Most Recent):  Temp: 97.4 °F (36.3 °C) (03/18/25 0732)  Pulse: 67 (03/18/25 0941)  Resp: 18 (03/18/25 0941)  BP: (!) 166/65 (03/18/25 0732)  SpO2: 96 % (03/18/25 0941) Vital Signs (24h Range):  Temp:  [97.4 °F (36.3 °C)-97.6 °F (36.4 °C)] 97.4 °F (36.3 °C)  Pulse:  [67-80] 67  Resp:  [18] 18  SpO2:  [95 %-99 %] 96 %  BP: (146-175)/(64-86) 166/65     Weight: 59 kg (130 lb 1.1 oz)  Body mass index is 23.04 kg/m².    Intake/Output Summary (Last 24 hours) at 3/18/2025 1051  Last data filed at 3/18/2025 0400  Gross per 24 hour   Intake 240 ml   Output 1400 ml   Net -1160 ml         Physical Exam      Clear lungs bilaterally, unlabored breathing, on room air, no cyanosis   Heart sounds indicate a regular rate and rhythm   No facial droop, no slurred speech  C-collar in place   5/5 fist  BL  No obvious focal motor deficit in the extremities   No obvious edema noted in extremities

## 2025-03-18 NOTE — PLAN OF CARE
Problem: Adult Inpatient Plan of Care  Goal: Plan of Care Review  Outcome: Progressing     Problem: Adult Inpatient Plan of Care  Goal: Optimal Comfort and Wellbeing  Outcome: Progressing     Problem: Adult Inpatient Plan of Care  Goal: Readiness for Transition of Care  Outcome: Progressing     Problem: Diabetes Comorbidity  Goal: Blood Glucose Level Within Targeted Range  Outcome: Progressing     Problem: Wound  Goal: Optimal Functional Ability  Outcome: Progressing     Problem: Wound  Goal: Skin Health and Integrity  Outcome: Progressing     Problem: Fall Injury Risk  Goal: Absence of Fall and Fall-Related Injury  Outcome: Progressing     Problem: Skin Injury Risk Increased  Goal: Skin Health and Integrity  Outcome: Progressing

## 2025-03-18 NOTE — SUBJECTIVE & OBJECTIVE
Past Medical History:   Diagnosis Date    Allergy     Anemia, unspecified     Arthritis     CKD (chronic kidney disease) stage 4, GFR 15-29 ml/min     COPD (chronic obstructive pulmonary disease)     Edema     HTN (hypertension)     Hypocalcemia     Hyponatremia     Osteoarthritis      Past Surgical History:   Procedure Laterality Date    BREAST CYST EXCISION      CAUDAL EPIDURAL STEROID INJECTION N/A 2/2/2023    Procedure: Injection-steroid-epidural-caudal;  Surgeon: Angel Sparrow MD;  Location: [facility];  Service: Pain Management;  Laterality: N/A;    FOOT SURGERY        Medications Ordered Prior to Encounter[1]   Allergies: Cephalexin, Codeine, Meperidine, Nsaids (non-steroidal anti-inflammatory drug), Penicillins, and Tazarotene  Family History   Problem Relation Name Age of Onset    Cancer Mother      No Known Problems Father       Social History[2]  Review of Systems   Constitutional:  Negative for chills and fever.   HENT:  Negative for trouble swallowing.    Eyes:  Negative for visual disturbance.   Respiratory:  Negative for shortness of breath.    Cardiovascular:  Negative for chest pain.   Gastrointestinal:  Negative for abdominal pain.     Objective:     Vitals:    Temp: 97.9 °F (36.6 °C)  Pulse: 92  Rhythm: normal sinus rhythm  BP: (!) 150/66  MAP (mmHg): 95  Resp: 15  SpO2: 100 %    Temp  Min: 97.4 °F (36.3 °C)  Max: 97.9 °F (36.6 °C)  Pulse  Min: 59  Max: 93  BP  Min: 111/56  Max: 188/77  MAP (mmHg)  Min: 81  Max: 110  Resp  Min: 11  Max: 18  SpO2  Min: 95 %  Max: 100 %    03/17 0701 - 03/18 0700  In: 240 [P.O.:240]  Out: 1400 [Urine:1400]   Unmeasured Output  Urine Occurrence: 0  Stool Occurrence: 0  Emesis Occurrence: 0  Pad Count: 2        Physical Exam  Vitals and nursing note reviewed.   Constitutional:       General: She is not in acute distress.  HENT:      Head: Normocephalic and atraumatic.      Right Ear: External ear normal.      Left Ear: External ear normal.      Nose: Nose  normal.      Mouth/Throat:      Mouth: Mucous membranes are moist.      Pharynx: Oropharynx is clear.   Eyes:      Extraocular Movements: Extraocular movements intact.      Pupils: Pupils are equal, round, and reactive to light.   Cardiovascular:      Rate and Rhythm: Normal rate and regular rhythm.   Pulmonary:      Effort: Pulmonary effort is normal. No respiratory distress.   Abdominal:      General: There is no distension.      Palpations: Abdomen is soft.      Tenderness: There is no abdominal tenderness. There is no guarding.   Musculoskeletal:      Cervical back: Normal range of motion.   Skin:     General: Skin is warm and dry.      Capillary Refill: Capillary refill takes less than 2 seconds.   Neurological:      Mental Status: She is alert.      Comments: E4 V5 M6  Mental status: Alert. Oriented x3. Speech fluent, no aphasia or dysarthria. Following commands.   CN II-XII grossly intact, specifically:  PERRL  EOMI  Visual fields intact   No facial asymmetry.    Motor:  RUE 4+/5 limited 2/2 shoulder  RLE 5/5  LUE 5/5  LLE 5/5  Sensation intact    Psychiatric:         Mood and Affect: Mood normal.              Today I personally reviewed pertinent medications, lines/drains/airways, imaging, cardiology results, laboratory results, microbiology results, :    K 6.3         [1]   No current facility-administered medications on file prior to encounter.     Current Outpatient Medications on File Prior to Encounter   Medication Sig Dispense Refill    acetaminophen (TYLENOL) 500 MG tablet Take 1 tablet (500 mg total) by mouth every 6 (six) hours as needed for Pain. 90 tablet 6    albuterol (PROVENTIL) 2.5 mg /3 mL (0.083 %) nebulizer solution Take 2.5 mg by nebulization 2 (two) times daily as needed.      albuterol (PROVENTIL/VENTOLIN HFA) 90 mcg/actuation inhaler Inhale 2 puffs into the lungs every 6 (six) hours as needed.      amLODIPine (NORVASC) 10 MG tablet Take 1 tablet (10 mg total) by mouth once daily. 90  tablet 3    budesonide-formoterol 160-4.5 mcg (SYMBICORT) 160-4.5 mcg/actuation HFAA Inhale 2 puffs into the lungs every 12 (twelve) hours.      calcium carbonate (CALCIUM 500 ORAL) Take 500 mg by mouth once daily.      cetirizine (ZYRTEC) 10 MG tablet Take 10 mg by mouth once daily.      cholecalciferol, vitamin D3, (VITAMIN D3) 50 mcg (2,000 unit) Cap capsule Take 2,000 Units by mouth once daily.      diclofenac sodium (VOLTAREN ARTHRITIS PAIN) 1 % Gel Apply 2 g topically 3 (three) times daily as needed (painful joint). 100 g 0    famotidine (PEPCID) 40 MG tablet Take 40 mg by mouth nightly as needed for Heartburn.      fluticasone propionate (FLONASE) 50 mcg/actuation nasal spray 1 spray by Nasal route 2 (two) times daily.      furosemide (LASIX) 20 MG tablet Take 1 tablet (20 mg total) by mouth 2 (two) times a day. 180 tablet 3    gabapentin (NEURONTIN) 300 MG capsule Take 300 mg by mouth every evening.      ipratropium (ATROVENT) 42 mcg (0.06 %) nasal spray       melatonin (MELATIN) 3 mg tablet Take 2 tablets (6 mg total) by mouth nightly as needed for Insomnia. 30 tablet 0    methocarbamoL (ROBAXIN) 500 MG Tab       omega 3-dha-epa-fish oil 1,000 mg (120 mg-180 mg) Cap Take 1 capsule by mouth once daily.      omeprazole (PRILOSEC) 20 MG capsule Take 1 capsule (20 mg total) by mouth 2 (two) times daily before meals. 60 capsule 0    polyethylene glycol 3350 (MIRALAX ORAL) Take 17 g by mouth once daily. As needed      pregabalin (LYRICA) 50 MG capsule Take 1 capsule by mouth every evening.      senna-docusate 8.6-50 mg (PERICOLACE) 8.6-50 mg per tablet Take 2 tablets by mouth 2 (two) times daily. 60 tablet 0    sodium bicarbonate 650 MG tablet Take 1 tablet (650 mg total) by mouth once daily. 30 tablet 0    TOLAK 4 % Crea apply a thin film to the lesions using the fingertips, once daily. preferably at night., WASH off in THE morning FOR 2 WEEKS.      traMADoL (ULTRAM) 50 mg tablet Take 1 tablet (50 mg total) by  mouth 2 (two) times daily as needed. 60 tablet 4   [2]   Social History  Tobacco Use    Smoking status: Former    Smokeless tobacco: Never   Substance Use Topics    Alcohol use: Not Currently    Drug use: Never

## 2025-03-18 NOTE — ASSESSMENT & PLAN NOTE
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 Multilevel foraminal stenosis.     Discussed surgical vs non-surgical options with the patient and her daughter Liliana Montgomery present. Patient's grandson Aidan (neurosurgeon) was also on the phone for this conversation. Given the degree of upper cervical cord kinking, cord edema, and craniocervical settling recommended surgical intervention to preserve her current function and prevent neurological decline. Surgical recommendation is occiput-C4 fusion. She would also need pre-op traction. Non-surgical option is strict cervical collar 24/7 indefinitely. This would also require the patient to be compliant with collar use. Fracture appears stable in X-Ray c spine upright/supine. Patient and daughter have decided to proceed with surgery.    Plan:   - Cervical collar at all times.  - XR cervical spine upright/supine in collar stable. Continue collar. Ok for bed level PT/OT only with collar in place  - Multimodal pain control.  - Bowel regimen.  - Notify NSGY immediately with any decline on exam.  - Family has decided to proceed with surgery. Booked for O-C4 fusion on 3/19/25. Pre-op traction on 3/18.   - RCRI/medical risk stratification - NSQUIP risk is 2.1% of death; 16.7% of any complication. Needs K stabilization prior to surgery.  - Med management per primary

## 2025-03-18 NOTE — SUBJECTIVE & OBJECTIVE
Interval History: NAEON. Family at bedside. Tolerated 100% of diet today per family. Exam stable. Transferred to ICU for cervical traction today. Plan for OR tomorrow pending medical optimization from hyperkalemia standpoint.    Medications:  Continuous Infusions:  Scheduled Meds:   amLODIPine  10 mg Oral Daily    cloNIDine 0.1 mg/24 hr td ptwk  1 patch Transdermal Q7 Days    dexAMETHasone injection  4 mg Intravenous Q6H    fluticasone furoate-vilanteroL  1 puff Inhalation Daily    furosemide  40 mg Oral Daily    gabapentin  300 mg Oral QHS    hydrALAZINE  75 mg Oral Q8H    LIDOcaine HCL 10 mg/ml (1%)  10 mL Intradermal Once    methocarbamoL  500 mg Oral TID    pantoprazole  20 mg Oral BID WM    sodium chloride  1,000 mg Oral BID    sodium zirconium cyclosilicate  10 g Oral BID     PRN Meds:  Current Facility-Administered Medications:     acetaminophen, 650 mg, Oral, Q4H PRN    albuterol, 2 puff, Inhalation, Q6H PRN    aluminum-magnesium hydroxide-simethicone, 30 mL, Oral, QID PRN    butalbital-acetaminophen-caffeine -40 mg, 1 tablet, Oral, Q4H PRN    dextrose 50%, 12.5 g, Intravenous, PRN    dextrose 50%, 25 g, Intravenous, PRN    glucagon (human recombinant), 1 mg, Intramuscular, PRN    glucose, 16 g, Oral, PRN    glucose, 24 g, Oral, PRN    insulin aspart U-100, 0-5 Units, Subcutaneous, QID (AC + HS) PRN    LIDOcaine-EPINEPHrine 1%-1:100,000, 20 mL, Intradermal, On Call Procedure    melatonin, 6 mg, Oral, Nightly PRN    morphine, 2 mg, Intravenous, Q4H PRN    naloxone, 0.02 mg, Intravenous, PRN    ondansetron, 8 mg, Oral, Q8H PRN    oxyCODONE-acetaminophen, 1 tablet, Oral, Q4H PRN    oxyCODONE-acetaminophen, 1 tablet, Oral, Q4H PRN    simethicone, 1 tablet, Oral, QID PRN    sodium chloride 0.9%, 1-10 mL, Intravenous, Q12H PRN     Review of Systems  Objective:     Weight: 59 kg (130 lb 1.1 oz)  Body mass index is 23.04 kg/m².  Vital Signs (Most Recent):  Temp: 97.6 °F (36.4 °C) (03/18/25 1210)  Pulse: (!) 59  (03/18/25 1301)  Resp: 16 (03/18/25 1301)  BP: (!) 166/72 (03/18/25 1301)  SpO2: 99 % (03/18/25 1301) Vital Signs (24h Range):  Temp:  [97.4 °F (36.3 °C)-97.6 °F (36.4 °C)] 97.6 °F (36.4 °C)  Pulse:  [59-80] 59  Resp:  [13-18] 16  SpO2:  [95 %-99 %] 99 %  BP: (153-175)/(65-86) 166/72     Date 03/18/25 0700 - 03/19/25 0659   Shift 4573-6582 0713-1462 9326-1138 24 Hour Total   INTAKE   Shift Total(mL/kg)       OUTPUT   Urine(mL/kg/hr) 500   500   Shift Total(mL/kg) 500(8.5)   500(8.5)   Weight (kg) 59 59 59 59                       Female External Urinary Catheter w/ Suction 03/09/25 (Active)   Skin no redness;no breakdown 03/15/25 0815   Tolerance no signs/symptoms of discomfort 03/15/25 0815   Suction Continuous suction at 40 mmHg 03/15/25 0815   Date of last wick change 03/14/25 03/14/25 1913   Time of last wick change 2200 03/14/25 0404   Output (mL) 800 mL 03/15/25 0602          Physical Exam         Neurosurgery Physical Exam  General: Well developed, well nourished, no distress.   Head: Normocephalic, atraumatic.  Mental Status: Awake, Alert, Oriented  Speech: Clear with content appropriate to conversation.  Cranial nerves: Face symmetric, CN II-XII grossly intact.   Eyes: Pupils equal, round, reactive to light, EOMI.  Sensory: Intact to light touch throughout.  Motor Strength: Moves all extremities spontaneously with good tone. Full strength upper and lower extremities. No abnormal movements seen.      Strength   Deltoids Triceps Biceps Wrist Extension Wrist Flexion Hand    Upper: R 4/5 5/5 5/5 5/5 5/5 5/5     L 5/5 5/5 5/5 5/5 5/5 5/5       Iliopsoas Quadriceps Knee  Flexion Tibialis  anterior Gastro- cnemius EHL   Lower: R 5/5 5/5 5/5 5/5 5/5 5/5     L 5/5 5/5 5/5 5/5 5/5 5/5    Patient with R distal clavicle fx and h/o of R shoulder arthroplasty.  Hand intrinsics 5/5.     In c-collar.    Significant Labs:  Recent Labs   Lab 03/17/25  0359 03/18/25  0528    87   * 137   K 5.2* 5.6*     "110   CO2 21* 16*   BUN 50* 42*   CREATININE 1.0 1.1   CALCIUM 8.0* 8.0*     Recent Labs   Lab 03/17/25  0359 03/18/25  0528   WBC 9.13 9.33   HGB 10.4* 10.3*   HCT 33.2* 33.7*    326     No results for input(s): "LABPT", "INR", "APTT" in the last 48 hours.  Microbiology Results (last 7 days)       ** No results found for the last 168 hours. **          All pertinent labs from the last 24 hours have been reviewed.    Significant Diagnostics:  I have reviewed all pertinent imaging results/findings within the past 24 hours.  "

## 2025-03-18 NOTE — NURSING
RN received critical lab notification for a K+ of 6.3, up from 5.8 this AM. Escobar with [facility] made aware. Likely shifting again as pt was given Lokelma this AM with unsuccessful results. Patient is now in cervical traction and NPO at this time.

## 2025-03-18 NOTE — PLAN OF CARE
[facility] - Neurosurgery ([facility])  Discharge Reassessment    Primary Care Provider: No primary care provider on file.    Expected Discharge Date: 3/21/2025    Patient is not medically ready for discharge.  Patient is to transfer to [facility] for traction prior to tomorrow's scheduled surgery. Once patient is medically stable post op PT/OT will eval  For discharge planning.    Discharge Plan A and Plan B have been determined by review of patient's clinical status, future medical and therapeutic needs, and coverage/benefits for post-acute care in coordination with multidisciplinary team members.     Reassessment (most recent)       Discharge Reassessment - 03/18/25 0902          Discharge Reassessment    Assessment Type Discharge Planning Reassessment     Did the patient's condition or plan change since previous assessment? No     Communicated MIKAYLA with patient/caregiver Date not available/Unable to determine     Discharge Plan A Rehab     Discharge Plan B Skilled Nursing Facility     DME Needed Upon Discharge  other (see comments)   tbd    Transition of Care Barriers None     Why the patient remains in the hospital Requires continued medical care        Post-Acute Status    Discharge Delays None known at this time

## 2025-03-18 NOTE — PROGRESS NOTES
"[facility] - Neurosurgery ([facility] Medicine  Progress Note    Patient Name: Bridget Montgomery  MRN: [id number]  Patient Class: IP- Inpatient   Admission Date: 3/9/2025  Length of Stay: 9 days  Attending Physician: Lars Espinal MD  Primary Care Provider: No primary care provider on file.        Subjective     Principal Problem:Spinal stenosis        HPI:  Birdget Montgomery is a 89 y.o. female with COPD, HTN, HLD, history of TIA who is transferred here from [place] for NSGY evaluation for cervical cord compression.    Per Transfer Center:  "89F COPD, CKD4, GERD, HTN, HLD, hx of TIA, prediabetes, and spinal stenosis who initially presented to [facility] on 3/8/2025 with a primary complaint of sudden onset weakness. The patient was in their usual state of health until Monday. Starting on Monday, the patient started having a headache that started in her neck and migrated toward the top of her head. She has had chronic neck pain due to cervical stenosis, but the headache was new. She managed the pain with tylenol. On Friday, she presented to her PCP (Dr. Alejandrina Russ) who gave her 2 injections into her neck to help with the pain. She is not sure what the injection was but thinks it might have been a steroid. On Saturday, she had a normal morning and was able to perform all of her ADLs. Around 8 pm, she suddenly became weak in her arms and legs and was not able to walk. She was able to catch herself prior to falling. The primary concern was the weakness and ongoing headache. She endorses chronic numbness/tingling in her fingers that has not worsened. She denies any sensory deficits. She denies any urinary incontinence or fecal incontinence. She endorses pain in her back, neck, knees, and a headache. She denies any fevers, chills, chest pain, shortness of breath, abdominal pain, N/V, diarrhea, constipation. CT Head was negative for acute stroke. MRI shows new onset cervical cord " "compression C1-C2. Neurosurgery consulted, recommended transferring pt to [facility] for a higher level of care."    Overview/Hospital Course:  89 y.o. female with COPD, HTN, HLD, cervical stenosis, history of TIA who is transferred here from [place] for NSGY evaluation for cervical cord compression. Presented to outside hospital with increased generalized weakness, particularly in her extremities.  CVA workup negative, however MRI showed new onset cervical cord compression, likely contributing to her symptoms.  Transferred here for Neurosurgery evaluation. MRI Cervical spine 3/9/25: chronic type 2 odontoid fracture with progressed retropulsion of the fracture fragment in comparison to prior MRI from 8/11/2023, with moderate to severe canal stenosis at C1-C2. Multilevel foraminal stenosis. Cont C-collar at all times. Neurosurgery recommended occiput-C4 fusion. Non-surgical option is strict cervical collar 24/7 indefinitely. XR with chronic odontoid type 2 fracture, position alignment appears stable. Decadron started 3/12 for new RUE weakness.  Started on salt tablets for dropping Na - stabilized. K began rising, started on binders.    Interval History:  no loose BMs since yesterday.  Potassium david even higher today up to 5.6.  Sodium stable at 137    Review of Systems  Objective:     Vital Signs (Most Recent):  Temp: 97.4 °F (36.3 °C) (03/18/25 0732)  Pulse: 67 (03/18/25 0941)  Resp: 18 (03/18/25 0941)  BP: (!) 166/65 (03/18/25 0732)  SpO2: 96 % (03/18/25 0941) Vital Signs (24h Range):  Temp:  [97.4 °F (36.3 °C)-97.6 °F (36.4 °C)] 97.4 °F (36.3 °C)  Pulse:  [67-80] 67  Resp:  [18] 18  SpO2:  [95 %-99 %] 96 %  BP: (146-175)/(64-86) 166/65     Weight: 59 kg (130 lb 1.1 oz)  Body mass index is 23.04 kg/m².    Intake/Output Summary (Last 24 hours) at 3/18/2025 1051  Last data filed at 3/18/2025 0400  Gross per 24 hour   Intake 240 ml   Output 1400 ml   Net -1160 ml         Physical Exam      Clear lungs bilaterally, " unlabored breathing, on room air, no cyanosis   Heart sounds indicate a regular rate and rhythm   No facial droop, no slurred speech  C-collar in place   5/5 fist  BL  No obvious focal motor deficit in the extremities   No obvious edema noted in extremities       Assessment & Plan  Spinal stenosis  Cord compression  Cord Compression  - MRI Cervical spine 3/9/25: chronic type 2 odontoid fracture with progressed retropulsion of the fracture fragment in comparison to prior MRI from 8/11/2023, with moderate to severe canal stenosis at C1-C2. Multilevel foraminal stenosis.   - Cont C-collar at all times  - Upright and supine cervical x-ray pending  - Neurosurgery recommended occiput-C4 fusion. Non-surgical option is strict cervical collar 24/7 indefinitely  - XR with chronic odontoid type 2 fracture, position alignment appears stable   - Decadron started for new neuro deficit  - surgery for 3/19      See above    Hyponatremia  Asymptomatic, mild, suspected polydipsia and/or SIADH  Still worsening despite fluid restriction  Now improving w/ salt tabs, reduce dose to BID  Chronic kidney disease, stage III (moderate)  Renal function remains around baseline; Estimated Creatinine Clearance: 28.1 mL/min (based on SCr of 1.1 mg/dL). according to latest data. 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 Will avoid nephrotoxic agents as able, and renally dose all meds as applicable.  Other emphysema  History of COPD, not on home oxygen. Currently without evidence of acute exacerbation, and sats on room air at goal 88% or greater. Will continue hospital formulary equivalent of home regimen and monitor respiratory status.   Essential hypertension  Uncontrolled  Pulse too slow to start BB.  Given hyperkalemia will reduce losartan to half and start scheduled hydralazine  Continue norvasc  Generalized weakness  Chronic, however likely exacerbated by cervical radiculopathy.  See above    Headache  Fioricet PRN    Cervical spinal  stenosis      Mixed hyperlipidemia      Edema, spinal cord      Hyperkalemia  Still bouncing up again; possibly from losartan and CKD  D/w nephrology on call - Obtaining renal US, stopping losartan, starting lowK diet  Additionally will increase lokelma dosing          VTE Risk Mitigation (From admission, onward)           Ordered     IP VTE HIGH RISK PATIENT  Once         03/09/25 2040     Reason for No Pharmacological VTE Prophylaxis  Once        Question:  Reasons:  Answer:  Physician Provided (leave comment)  Comment:  surgery    03/09/25 2040                    Discharge Planning   IMKAYLA: 3/21/2025     Code Status: Full Code   Medical Readiness for Discharge Date:   Discharge Plan A: Rehab   Discharge Delays: None known at this time      NSQUIP risk is 2.1% of death; 16.7% of any complication. Needs K stabilization prior to surgery.        Lars Espinal MD  Department of Hospital Medicine   [facility] - Neurosurgery ([facility])

## 2025-03-18 NOTE — NURSING
[place] [facility] [place] [facility] by two RN transport    Report received from:  [facility] RN    Type of stroke/diagnosis:  Spinal Stenosis      Thrombectomy start and end time (if applicable) N/A    Current symptoms: pain 7/10 generalized    Skin Assessment done: Y  Wounds noted: LDA's added  *If wounds noted, was Wound Care consulted? N/A  *If wounds noted, LDA placed? Already in place via [facility]  Skin Assessment Verified by:  MARIAMA Griffin Completed? Y    Patient Belongings on Admit: family with belongings    [facility] notified: BRANDON Cody

## 2025-03-19 LAB
ALBUMIN SERPL BCP-MCNC: 2.7 G/DL (ref 3.5–5.2)
ALLENS TEST: ABNORMAL
ALP SERPL-CCNC: 145 U/L (ref 40–150)
ALT SERPL W/O P-5'-P-CCNC: 32 U/L (ref 10–44)
ANION GAP SERPL CALC-SCNC: 11 MMOL/L (ref 8–16)
ANION GAP SERPL CALC-SCNC: 8 MMOL/L (ref 8–16)
ANION GAP SERPL CALC-SCNC: 9 MMOL/L (ref 8–16)
AST SERPL-CCNC: 48 U/L (ref 10–40)
AV AREA BY CONTINUOUS VTI: 1.3 CM2
AV INDEX (PROSTH): 0.41
AV LVOT MEAN GRADIENT: 1 MMHG
AV LVOT PEAK GRADIENT: 1 MMHG
AV MEAN GRADIENT: 4 MMHG
AV PEAK GRADIENT: 7 MMHG
AV VALVE AREA BY VELOCITY RATIO: 1.2 CM²
AV VALVE AREA: 1.3 CM2
AV VELOCITY RATIO: 0.38
BASOPHILS # BLD AUTO: 0.04 K/UL (ref 0–0.2)
BASOPHILS NFR BLD: 0.4 % (ref 0–1.9)
BILIRUB SERPL-MCNC: 0.2 MG/DL (ref 0.1–1)
BSA FOR ECHO PROCEDURE: 1.62 M2
BUN SERPL-MCNC: 43 MG/DL (ref 8–23)
BUN SERPL-MCNC: 45 MG/DL (ref 8–23)
BUN SERPL-MCNC: 46 MG/DL (ref 8–23)
CALCIUM SERPL-MCNC: 7.5 MG/DL (ref 8.7–10.5)
CALCIUM SERPL-MCNC: 7.7 MG/DL (ref 8.7–10.5)
CALCIUM SERPL-MCNC: 7.7 MG/DL (ref 8.7–10.5)
CHLORIDE SERPL-SCNC: 107 MMOL/L (ref 95–110)
CHLORIDE SERPL-SCNC: 107 MMOL/L (ref 95–110)
CHLORIDE SERPL-SCNC: 110 MMOL/L (ref 95–110)
CK SERPL-CCNC: 623 U/L (ref 20–180)
CO2 SERPL-SCNC: 21 MMOL/L (ref 23–29)
CO2 SERPL-SCNC: 21 MMOL/L (ref 23–29)
CO2 SERPL-SCNC: 22 MMOL/L (ref 23–29)
CREAT SERPL-MCNC: 1.1 MG/DL (ref 0.5–1.4)
CREAT SERPL-MCNC: 1.1 MG/DL (ref 0.5–1.4)
CREAT SERPL-MCNC: 1.2 MG/DL (ref 0.5–1.4)
CV ECHO LV RWT: 0.39 CM
DELSYS: ABNORMAL
DIFFERENTIAL METHOD BLD: ABNORMAL
DOP CALC AO PEAK VEL: 1.3 M/S
DOP CALC AO VTI: 37.1 CM
DOP CALC LVOT AREA: 3.1 CM2
DOP CALC LVOT DIAMETER: 2 CM
DOP CALC LVOT PEAK VEL: 0.5 M/S
DOP CALC LVOT STROKE VOLUME: 48 CM3
DOP CALCLVOT PEAK VEL VTI: 15.3 CM
E WAVE DECELERATION TIME: 385 MS
E WAVE DECELERATION TIME: 392 MS
E/A RATIO: 0.86
E/E' RATIO: 23 M/S
ECHO EF ESTIMATED: 54 %
ECHO LV POSTERIOR WALL: 0.9 CM (ref 0.6–1.1)
EOSINOPHIL # BLD AUTO: 0 K/UL (ref 0–0.5)
EOSINOPHIL NFR BLD: 0 % (ref 0–8)
ERYTHROCYTE [DISTWIDTH] IN BLOOD BY AUTOMATED COUNT: 15.1 % (ref 11.5–14.5)
EST. GFR  (NO RACE VARIABLE): 43 ML/MIN/1.73 M^2
EST. GFR  (NO RACE VARIABLE): 47.7 ML/MIN/1.73 M^2
EST. GFR  (NO RACE VARIABLE): 47.7 ML/MIN/1.73 M^2
FRACTIONAL SHORTENING: 41.3 % (ref 28–44)
GLUCOSE SERPL-MCNC: 108 MG/DL (ref 70–110)
GLUCOSE SERPL-MCNC: 160 MG/DL (ref 70–110)
GLUCOSE SERPL-MCNC: 94 MG/DL (ref 70–110)
HCO3 UR-SCNC: 22 MMOL/L (ref 24–28)
HCT VFR BLD AUTO: 30.1 % (ref 37–48.5)
HCT VFR BLD CALC: 33 %PCV (ref 36–54)
HGB BLD-MCNC: 9.6 G/DL (ref 12–16)
HR MV ECHO: 51 BPM
IMM GRANULOCYTES # BLD AUTO: 0.26 K/UL (ref 0–0.04)
IMM GRANULOCYTES NFR BLD AUTO: 2.3 % (ref 0–0.5)
INTERVENTRICULAR SEPTUM: 0.8 CM (ref 0.6–1.1)
LA MAJOR: 6.7 CM
LA MINOR: 6.3 CM
LA WIDTH: 3.4 CM
LEFT ATRIUM SIZE: 4.7 CM
LEFT ATRIUM VOLUME INDEX MOD: 45 ML/M2
LEFT ATRIUM VOLUME INDEX: 55 ML/M2
LEFT ATRIUM VOLUME MOD: 72 ML
LEFT ATRIUM VOLUME: 88 CM3
LEFT INTERNAL DIMENSION IN SYSTOLE: 2.7 CM (ref 2.1–4)
LEFT VENTRICLE DIASTOLIC VOLUME INDEX: 36.02 ML/M2
LEFT VENTRICLE DIASTOLIC VOLUME: 58 ML
LEFT VENTRICLE MASS INDEX: 79.3 G/M2
LEFT VENTRICLE SYSTOLIC VOLUME INDEX: 16.8 ML/M2
LEFT VENTRICLE SYSTOLIC VOLUME: 27 ML
LEFT VENTRICULAR INTERNAL DIMENSION IN DIASTOLE: 4.6 CM (ref 3.5–6)
LEFT VENTRICULAR MASS: 127.7 G
LV LATERAL E/E' RATIO: 18.9
LV SEPTAL E/E' RATIO: 30.2
LYMPHOCYTES # BLD AUTO: 1.1 K/UL (ref 1–4.8)
LYMPHOCYTES NFR BLD: 9.5 % (ref 18–48)
MAGNESIUM SERPL-MCNC: 2.7 MG/DL (ref 1.6–2.6)
MCH RBC QN AUTO: 28.3 PG (ref 27–31)
MCHC RBC AUTO-ENTMCNC: 31.9 G/DL (ref 32–36)
MCV RBC AUTO: 89 FL (ref 82–98)
MODE: ABNORMAL
MONOCYTES # BLD AUTO: 1 K/UL (ref 0.3–1)
MONOCYTES NFR BLD: 8.5 % (ref 4–15)
MV MEAN GRADIENT: 4 MMHG
MV PEAK A VEL: 1.75 M/S
MV PEAK E VEL: 1.51 M/S
MV PEAK GRADIENT: 13 MMHG
NEUTROPHILS # BLD AUTO: 8.9 K/UL (ref 1.8–7.7)
NEUTROPHILS NFR BLD: 79.3 % (ref 38–73)
NRBC BLD-RTO: 0 /100 WBC
OHS CV RV/LV RATIO: 0.65 CM
OHS QRS DURATION: 94 MS
OHS QTC CALCULATION: 454 MS
PCO2 BLDA: 32.8 MMHG (ref 35–45)
PH SMN: 7.43 [PH] (ref 7.35–7.45)
PHOSPHATE SERPL-MCNC: 2.9 MG/DL (ref 2.7–4.5)
PISA TR MAX VEL: 3.3 M/S
PLATELET # BLD AUTO: 373 K/UL (ref 150–450)
PMV BLD AUTO: 10.6 FL (ref 9.2–12.9)
PO2 BLDA: 103 MMHG (ref 80–100)
POC BE: -2 MMOL/L
POC SATURATED O2: 98 % (ref 95–100)
POC TCO2: 23 MMOL/L (ref 23–27)
POCT GLUCOSE: 109 MG/DL (ref 70–110)
POCT GLUCOSE: 126 MG/DL (ref 70–110)
POCT GLUCOSE: 173 MG/DL (ref 70–110)
POTASSIUM SERPL-SCNC: 4.3 MMOL/L (ref 3.5–5.1)
POTASSIUM SERPL-SCNC: 4.4 MMOL/L (ref 3.5–5.1)
POTASSIUM SERPL-SCNC: 4.4 MMOL/L (ref 3.5–5.1)
POTASSIUM SERPL-SCNC: 4.6 MMOL/L (ref 3.5–5.1)
POTASSIUM SERPL-SCNC: 4.6 MMOL/L (ref 3.5–5.1)
PROT SERPL-MCNC: 5.7 G/DL (ref 6–8.4)
RA MAJOR: 5.42 CM
RA PRESSURE ESTIMATED: 3 MMHG
RA WIDTH: 3.26 CM
RBC # BLD AUTO: 3.39 M/UL (ref 4–5.4)
RIGHT VENTRICLE DIASTOLIC BASEL DIMENSION: 3 CM
RV TB RVSP: 6 MMHG
SAMPLE: ABNORMAL
SINUS: 2.98 CM
SITE: ABNORMAL
SODIUM SERPL-SCNC: 138 MMOL/L (ref 136–145)
SODIUM SERPL-SCNC: 139 MMOL/L (ref 136–145)
SODIUM SERPL-SCNC: 139 MMOL/L (ref 136–145)
STJ: 2.89 CM
TDI LATERAL: 0.08 M/S
TDI SEPTAL: 0.05 M/S
TDI: 0.07 M/S
TV PEAK GRADIENT: 44 MMHG
TV REST PULMONARY ARTERY PRESSURE: 47 MMHG
WBC # BLD AUTO: 11.25 K/UL (ref 3.9–12.7)
Z-SCORE OF LEFT VENTRICULAR DIMENSION IN END DIASTOLE: 0.07
Z-SCORE OF LEFT VENTRICULAR DIMENSION IN END SYSTOLE: -0.38

## 2025-03-19 PROCEDURE — A6010 COLLAGEN BASED WOUND FILLER: HCPCS | Performed by: NEUROLOGICAL SURGERY

## 2025-03-19 PROCEDURE — 25000003 PHARM REV CODE 250: Performed by: STUDENT IN AN ORGANIZED HEALTH CARE EDUCATION/TRAINING PROGRAM

## 2025-03-19 PROCEDURE — 25000003 PHARM REV CODE 250

## 2025-03-19 PROCEDURE — 20000000 HC ICU ROOM

## 2025-03-19 PROCEDURE — 37000009 HC ANESTHESIA EA ADD 15 MINS: Performed by: NEUROLOGICAL SURGERY

## 2025-03-19 PROCEDURE — 63600175 PHARM REV CODE 636 W HCPCS

## 2025-03-19 PROCEDURE — 22326 TREAT NECK SPINE FRACTURE: CPT | Mod: 51,,, | Performed by: NEUROLOGICAL SURGERY

## 2025-03-19 PROCEDURE — 94761 N-INVAS EAR/PLS OXIMETRY MLT: CPT

## 2025-03-19 PROCEDURE — 63600175 PHARM REV CODE 636 W HCPCS: Performed by: NURSE ANESTHETIST, CERTIFIED REGISTERED

## 2025-03-19 PROCEDURE — 99291 CRITICAL CARE FIRST HOUR: CPT | Mod: ,,, | Performed by: PHYSICIAN ASSISTANT

## 2025-03-19 PROCEDURE — 37000008 HC ANESTHESIA 1ST 15 MINUTES: Performed by: NEUROLOGICAL SURGERY

## 2025-03-19 PROCEDURE — 22840 INSERT SPINE FIXATION DEVICE: CPT | Mod: ,,, | Performed by: NEUROLOGICAL SURGERY

## 2025-03-19 PROCEDURE — 83735 ASSAY OF MAGNESIUM: CPT

## 2025-03-19 PROCEDURE — C1713 ANCHOR/SCREW BN/BN,TIS/BN: HCPCS | Performed by: NEUROLOGICAL SURGERY

## 2025-03-19 PROCEDURE — 0RG20J1 FUSION OF 2 OR MORE CERVICAL VERTEBRAL JOINTS WITH SYNTHETIC SUBSTITUTE, POSTERIOR APPROACH, POSTERIOR COLUMN, OPEN APPROACH: ICD-10-PCS | Performed by: NEUROLOGICAL SURGERY

## 2025-03-19 PROCEDURE — 22614 ARTHRD PST TQ 1NTRSPC EA ADD: CPT | Mod: ,,, | Performed by: NEUROLOGICAL SURGERY

## 2025-03-19 PROCEDURE — 99900035 HC TECH TIME PER 15 MIN (STAT)

## 2025-03-19 PROCEDURE — 36000711: Performed by: NEUROLOGICAL SURGERY

## 2025-03-19 PROCEDURE — 25000003 PHARM REV CODE 250: Performed by: NURSE ANESTHETIST, CERTIFIED REGISTERED

## 2025-03-19 PROCEDURE — 94640 AIRWAY INHALATION TREATMENT: CPT

## 2025-03-19 PROCEDURE — 25000003 PHARM REV CODE 250: Performed by: NEUROLOGICAL SURGERY

## 2025-03-19 PROCEDURE — 27201423 OPTIME MED/SURG SUP & DEVICES STERILE SUPPLY: Performed by: NEUROLOGICAL SURGERY

## 2025-03-19 PROCEDURE — 36000710: Performed by: NEUROLOGICAL SURGERY

## 2025-03-19 PROCEDURE — 27000221 HC OXYGEN, UP TO 24 HOURS

## 2025-03-19 PROCEDURE — 22590 ARTHRD PST TQ CRANIOCERVICAL: CPT | Mod: 22,,, | Performed by: NEUROLOGICAL SURGERY

## 2025-03-19 PROCEDURE — 0RG00J1 FUSION OF OCCIPITAL-CERVICAL JOINT WITH SYNTHETIC SUBSTITUTE, POSTERIOR APPROACH, POSTERIOR COLUMN, OPEN APPROACH: ICD-10-PCS | Performed by: NEUROLOGICAL SURGERY

## 2025-03-19 PROCEDURE — 80053 COMPREHEN METABOLIC PANEL: CPT

## 2025-03-19 PROCEDURE — 0PS304Z REPOSITION CERVICAL VERTEBRA WITH INTERNAL FIXATION DEVICE, OPEN APPROACH: ICD-10-PCS | Performed by: NEUROLOGICAL SURGERY

## 2025-03-19 PROCEDURE — 63600175 PHARM REV CODE 636 W HCPCS: Performed by: PHYSICIAN ASSISTANT

## 2025-03-19 PROCEDURE — 63600175 PHARM REV CODE 636 W HCPCS: Performed by: NEUROLOGICAL SURGERY

## 2025-03-19 PROCEDURE — 63600175 PHARM REV CODE 636 W HCPCS: Performed by: INTERNAL MEDICINE

## 2025-03-19 PROCEDURE — C1751 CATH, INF, PER/CENT/MIDLINE: HCPCS | Performed by: ANESTHESIOLOGY

## 2025-03-19 PROCEDURE — 82550 ASSAY OF CK (CPK): CPT | Performed by: PHYSICIAN ASSISTANT

## 2025-03-19 PROCEDURE — 20930 SP BONE ALGRFT MORSEL ADD-ON: CPT | Mod: ,,, | Performed by: NEUROLOGICAL SURGERY

## 2025-03-19 PROCEDURE — 25000003 PHARM REV CODE 250: Performed by: INTERNAL MEDICINE

## 2025-03-19 PROCEDURE — 85025 COMPLETE CBC W/AUTO DIFF WBC: CPT

## 2025-03-19 PROCEDURE — 80048 BASIC METABOLIC PNL TOTAL CA: CPT | Mod: 91,XB

## 2025-03-19 PROCEDURE — 22600 ARTHRD PST TQ 1NTRSPC CRV: CPT | Mod: 51,,, | Performed by: NEUROLOGICAL SURGERY

## 2025-03-19 PROCEDURE — 27800903 OPTIME MED/SURG SUP & DEVICES OTHER IMPLANTS: Performed by: NEUROLOGICAL SURGERY

## 2025-03-19 PROCEDURE — 84100 ASSAY OF PHOSPHORUS: CPT

## 2025-03-19 PROCEDURE — 25000003 PHARM REV CODE 250: Performed by: HOSPITALIST

## 2025-03-19 DEVICE — IMPLANTABLE DEVICE: Type: IMPLANTABLE DEVICE | Site: CRANIAL | Status: FUNCTIONAL

## 2025-03-19 DEVICE — SCREW SET SPINAL OCT 25MM TI: Type: IMPLANTABLE DEVICE | Site: CRANIAL | Status: FUNCTIONAL

## 2025-03-19 DEVICE — SCREW BONE MOUNTAINEER 4.5X10: Type: IMPLANTABLE DEVICE | Site: CRANIAL | Status: FUNCTIONAL

## 2025-03-19 DEVICE — SCREW SYMPHONY PA 3.5X12MM: Type: IMPLANTABLE DEVICE | Site: CRANIAL | Status: FUNCTIONAL

## 2025-03-19 DEVICE — MATRIX FIBERGRAFT BG LG 12.5CC: Type: IMPLANTABLE DEVICE | Site: CRANIAL | Status: FUNCTIONAL

## 2025-03-19 DEVICE — COLLAGEN CELLERATE ACTIVATED 1GM: Type: IMPLANTABLE DEVICE | Site: CRANIAL | Status: FUNCTIONAL

## 2025-03-19 DEVICE — SET SYMPHONY SCREW NS: Type: IMPLANTABLE DEVICE | Site: CRANIAL | Status: FUNCTIONAL

## 2025-03-19 RX ORDER — VANCOMYCIN HYDROCHLORIDE 1 G/20ML
INJECTION, POWDER, LYOPHILIZED, FOR SOLUTION INTRAVENOUS
Status: DISCONTINUED | OUTPATIENT
Start: 2025-03-19 | End: 2025-03-19 | Stop reason: HOSPADM

## 2025-03-19 RX ORDER — PROPOFOL 10 MG/ML
VIAL (ML) INTRAVENOUS CONTINUOUS PRN
Status: DISCONTINUED | OUTPATIENT
Start: 2025-03-19 | End: 2025-03-19

## 2025-03-19 RX ORDER — ONDANSETRON HYDROCHLORIDE 2 MG/ML
INJECTION, SOLUTION INTRAVENOUS
Status: DISCONTINUED | OUTPATIENT
Start: 2025-03-19 | End: 2025-03-19

## 2025-03-19 RX ORDER — DEXMEDETOMIDINE HYDROCHLORIDE 100 UG/ML
INJECTION, SOLUTION INTRAVENOUS
Status: DISCONTINUED | OUTPATIENT
Start: 2025-03-19 | End: 2025-03-19

## 2025-03-19 RX ORDER — LIDOCAINE HYDROCHLORIDE 20 MG/ML
INJECTION, SOLUTION EPIDURAL; INFILTRATION; INTRACAUDAL; PERINEURAL
Status: DISCONTINUED | OUTPATIENT
Start: 2025-03-19 | End: 2025-03-19

## 2025-03-19 RX ORDER — SUCCINYLCHOLINE CHLORIDE 20 MG/ML
INJECTION INTRAMUSCULAR; INTRAVENOUS
Status: DISCONTINUED | OUTPATIENT
Start: 2025-03-19 | End: 2025-03-19

## 2025-03-19 RX ORDER — PANTOPRAZOLE SODIUM 40 MG/10ML
40 INJECTION, POWDER, LYOPHILIZED, FOR SOLUTION INTRAVENOUS DAILY
Status: DISCONTINUED | OUTPATIENT
Start: 2025-03-19 | End: 2025-03-19

## 2025-03-19 RX ORDER — ROCURONIUM BROMIDE 10 MG/ML
INJECTION, SOLUTION INTRAVENOUS
Status: DISCONTINUED | OUTPATIENT
Start: 2025-03-19 | End: 2025-03-19

## 2025-03-19 RX ORDER — LIDOCAINE HYDROCHLORIDE AND EPINEPHRINE 10; 10 UG/ML; MG/ML
INJECTION, SOLUTION INFILTRATION; PERINEURAL
Status: DISCONTINUED | OUTPATIENT
Start: 2025-03-19 | End: 2025-03-19 | Stop reason: HOSPADM

## 2025-03-19 RX ORDER — AMOXICILLIN 250 MG
1 CAPSULE ORAL DAILY PRN
Status: DISCONTINUED | OUTPATIENT
Start: 2025-03-19 | End: 2025-03-20

## 2025-03-19 RX ORDER — HYDROMORPHONE HYDROCHLORIDE 1 MG/ML
INJECTION, SOLUTION INTRAMUSCULAR; INTRAVENOUS; SUBCUTANEOUS
Status: DISCONTINUED | OUTPATIENT
Start: 2025-03-19 | End: 2025-03-19

## 2025-03-19 RX ORDER — MUPIROCIN 20 MG/G
OINTMENT TOPICAL 2 TIMES DAILY
Status: DISCONTINUED | OUTPATIENT
Start: 2025-03-19 | End: 2025-03-24 | Stop reason: HOSPADM

## 2025-03-19 RX ORDER — FAMOTIDINE 10 MG/ML
INJECTION, SOLUTION INTRAVENOUS
Status: DISCONTINUED | OUTPATIENT
Start: 2025-03-19 | End: 2025-03-19

## 2025-03-19 RX ORDER — LABETALOL HYDROCHLORIDE 5 MG/ML
INJECTION, SOLUTION INTRAVENOUS
Status: DISCONTINUED | OUTPATIENT
Start: 2025-03-19 | End: 2025-03-19

## 2025-03-19 RX ORDER — DEXAMETHASONE SODIUM PHOSPHATE 4 MG/ML
INJECTION, SOLUTION INTRA-ARTICULAR; INTRALESIONAL; INTRAMUSCULAR; INTRAVENOUS; SOFT TISSUE
Status: DISCONTINUED | OUTPATIENT
Start: 2025-03-19 | End: 2025-03-19

## 2025-03-19 RX ADMIN — PROPOFOL 50 MCG/KG/MIN: 10 INJECTION, EMULSION INTRAVENOUS at 08:03

## 2025-03-19 RX ADMIN — GLYCOPYRROLATE 0.2 MG: 0.2 INJECTION, SOLUTION INTRAMUSCULAR; INTRAVENOUS at 09:03

## 2025-03-19 RX ADMIN — GLYCOPYRROLATE 0.2 MG: 0.2 INJECTION, SOLUTION INTRAMUSCULAR; INTRAVENOUS at 10:03

## 2025-03-19 RX ADMIN — OXYCODONE HYDROCHLORIDE AND ACETAMINOPHEN 1 TABLET: 5; 325 TABLET ORAL at 08:03

## 2025-03-19 RX ADMIN — SODIUM CHLORIDE 1000 MG: 1 TABLET ORAL at 08:03

## 2025-03-19 RX ADMIN — MUPIROCIN: 20 OINTMENT TOPICAL at 08:03

## 2025-03-19 RX ADMIN — PHENYLEPHRINE HYDROCHLORIDE 0.5 MCG/KG/MIN: 10 INJECTION INTRAVENOUS at 09:03

## 2025-03-19 RX ADMIN — ONDANSETRON 8 MG: 8 TABLET, ORALLY DISINTEGRATING ORAL at 10:03

## 2025-03-19 RX ADMIN — DEXMEDETOMIDINE 8 MCG: 100 INJECTION, SOLUTION, CONCENTRATE INTRAVENOUS at 12:03

## 2025-03-19 RX ADMIN — HYPROMELLOSE 2910 1 DROP: 5 SOLUTION OPHTHALMIC at 11:03

## 2025-03-19 RX ADMIN — HYDRALAZINE HYDROCHLORIDE 10 MG: 20 INJECTION, SOLUTION INTRAMUSCULAR; INTRAVENOUS at 03:03

## 2025-03-19 RX ADMIN — MORPHINE SULFATE 2 MG: 2 INJECTION, SOLUTION INTRAMUSCULAR; INTRAVENOUS at 05:03

## 2025-03-19 RX ADMIN — PANTOPRAZOLE SODIUM 40 MG: 40 INJECTION, POWDER, FOR SOLUTION INTRAVENOUS at 01:03

## 2025-03-19 RX ADMIN — FLUTICASONE FUROATE AND VILANTEROL TRIFENATATE 1 PUFF: 100; 25 POWDER RESPIRATORY (INHALATION) at 07:03

## 2025-03-19 RX ADMIN — GABAPENTIN 300 MG: 300 CAPSULE ORAL at 08:03

## 2025-03-19 RX ADMIN — DEXAMETHASONE SODIUM PHOSPHATE 4 MG: 4 INJECTION INTRA-ARTICULAR; INTRALESIONAL; INTRAMUSCULAR; INTRAVENOUS; SOFT TISSUE at 06:03

## 2025-03-19 RX ADMIN — PROPOFOL 70 MCG/KG/MIN: 10 INJECTION, EMULSION INTRAVENOUS at 08:03

## 2025-03-19 RX ADMIN — SUCCINYLCHOLINE 120 MG: 20 INJECTION, SOLUTION INTRAMUSCULAR; INTRAVENOUS at 08:03

## 2025-03-19 RX ADMIN — HYDROMORPHONE HYDROCHLORIDE 0.3 MG: 1 INJECTION, SOLUTION INTRAMUSCULAR; INTRAVENOUS; SUBCUTANEOUS at 12:03

## 2025-03-19 RX ADMIN — LABETALOL HYDROCHLORIDE 5 MG: 5 INJECTION, SOLUTION INTRAVENOUS at 12:03

## 2025-03-19 RX ADMIN — DEXMEDETOMIDINE 8 MCG: 100 INJECTION, SOLUTION, CONCENTRATE INTRAVENOUS at 08:03

## 2025-03-19 RX ADMIN — OXYCODONE HYDROCHLORIDE AND ACETAMINOPHEN 1 TABLET: 5; 325 TABLET ORAL at 04:03

## 2025-03-19 RX ADMIN — SODIUM CHLORIDE 0.1 MCG/KG/MIN: 9 INJECTION, SOLUTION INTRAVENOUS at 08:03

## 2025-03-19 RX ADMIN — SODIUM CHLORIDE, SODIUM GLUCONATE, SODIUM ACETATE, POTASSIUM CHLORIDE, MAGNESIUM CHLORIDE, SODIUM PHOSPHATE, DIBASIC, AND POTASSIUM PHOSPHATE: .53; .5; .37; .037; .03; .012; .00082 INJECTION, SOLUTION INTRAVENOUS at 07:03

## 2025-03-19 RX ADMIN — ROCURONIUM BROMIDE 40 MG: 10 INJECTION, SOLUTION INTRAVENOUS at 09:03

## 2025-03-19 RX ADMIN — DEXAMETHASONE SODIUM PHOSPHATE 4 MG: 4 INJECTION INTRA-ARTICULAR; INTRALESIONAL; INTRAMUSCULAR; INTRAVENOUS; SOFT TISSUE at 01:03

## 2025-03-19 RX ADMIN — DEXAMETHASONE SODIUM PHOSPHATE 4 MG: 4 INJECTION INTRA-ARTICULAR; INTRALESIONAL; INTRAMUSCULAR; INTRAVENOUS; SOFT TISSUE at 12:03

## 2025-03-19 RX ADMIN — ONDANSETRON 4 MG: 2 INJECTION INTRAMUSCULAR; INTRAVENOUS at 09:03

## 2025-03-19 RX ADMIN — LIDOCAINE HYDROCHLORIDE 60 MG: 20 INJECTION, SOLUTION EPIDURAL; INFILTRATION; INTRACAUDAL; PERINEURAL at 08:03

## 2025-03-19 RX ADMIN — FAMOTIDINE 20 MG: 10 INJECTION, SOLUTION INTRAVENOUS at 10:03

## 2025-03-19 RX ADMIN — HYDROMORPHONE HYDROCHLORIDE 0.5 MG: 1 INJECTION, SOLUTION INTRAMUSCULAR; INTRAVENOUS; SUBCUTANEOUS at 12:03

## 2025-03-19 RX ADMIN — VANCOMYCIN HYDROCHLORIDE 1000 MG: 1 INJECTION, POWDER, LYOPHILIZED, FOR SOLUTION INTRAVENOUS at 09:03

## 2025-03-19 RX ADMIN — HYDROMORPHONE HYDROCHLORIDE 0.2 MG: 1 INJECTION, SOLUTION INTRAMUSCULAR; INTRAVENOUS; SUBCUTANEOUS at 12:03

## 2025-03-19 RX ADMIN — DEXAMETHASONE SODIUM PHOSPHATE 8 MG: 4 INJECTION INTRA-ARTICULAR; INTRALESIONAL; INTRAMUSCULAR; INTRAVENOUS; SOFT TISSUE at 09:03

## 2025-03-19 RX ADMIN — MORPHINE SULFATE 2 MG: 2 INJECTION, SOLUTION INTRAMUSCULAR; INTRAVENOUS at 01:03

## 2025-03-19 NOTE — ASSESSMENT & PLAN NOTE
89F PMHx COPD, HTN, HLD, history of TIA, known type 2 odontoid fx followed by Dr. Quach with non-surgical management, and R shoulder arthroplasty 06/2024 presenting to [facility] for preoperative traction on 3/18, now S/P O-C4 posterior cervical fusion 3/19.    MRI demonstrated chronic type 2 odontoid fracture with progressed retropulsion of the fracture fragment in comparison to prior MRI from 8/11/2023, with moderate to severe canal stenosis at C1-C2.     Admit to [facility]  Q1h neuro checks, I&Os, and vitals   NSGY following   Dex d/c per neurosurgery recommendations  SBP <160   Pre-op traction completed   pain regimen   Normal Diet   SCDs; hold chemical VTE ppx in acute setting   PT/OT to be resumed in the am   S/PO-C4 fusion on 3/19/25   HV drain x 1 to suction, vanc for prophylaxis

## 2025-03-19 NOTE — BRIEF OP NOTE
[facility] - Neuro Critical Care  Brief Operative Note  Neurosurgery    SUMMARY     Surgery Date: 3/19/2025     Surgeons and Role:     * Clint Lemons MD - Primary     * Ben Rodríguez MD - Resident - Assisting        Pre-op Diagnosis:  Spinal cord compression [G95.20]    Post-op Diagnosis: Post-Op Diagnosis Codes:     * Spinal cord compression [G95.20]    Procedure Performed:     Procedure(s) (LRB):  FUSION, SPINE, CERVICAL, POSTERIOR APPROACH (N/A)    Technical Procedures Used:   Occipital to C4 posterior spinal instrumented fusion  Occipital keel fixation with plate connected to C2 L translaminar and bilateral pars screw fixation, C3,4 lateral mass   1 HV drain deep    Operative Findings: see full op note for further details    Estimated Blood Loss: 100 mL         Specimens:   Specimen (24h ago, onward)      None

## 2025-03-19 NOTE — TRANSFER OF CARE
"Anesthesia Transfer of Care Note    Patient: Bridget Montgomery    Procedure(s) Performed: Procedure(s) (LRB):  FUSION, SPINE, CERVICAL, POSTERIOR APPROACH (N/A)    Patient location: ICU    Anesthesia Type: general    Transport from OR: Upon arrival to PACU/ICU, patient attached to ventilator and auscultated to confirm bilateral breath sounds and adequate TV. Continuous ECG monitoring in transport. Continuous SpO2 monitoring in transport. Transported from OR on 6-10 L/min O2 by face mask with adequate spontaneous ventilation. Continuos invasive BP monitoring in transport    Post pain: adequate analgesia    Post assessment: no apparent anesthetic complications and tolerated procedure well    Post vital signs: stable    Level of consciousness: awake, alert and oriented    Nausea/Vomiting: no nausea/vomiting    Complications: none    Transfer of care protocol was followedComments: Report called to rn prior to departure from or. Vss per transport monitor throughout transport and arrival to icu.    Last vitals: Visit Vitals  BP (!) 176/77 (BP Location: Left arm, Patient Position: Lying)   Pulse 61   Temp 36.9 °C (98.5 °F) (Oral)   Resp (!) 26   Ht 5' 3" (1.6 m)   Wt 59 kg (130 lb 1.1 oz)   SpO2 100%   Breastfeeding No   BMI 23.04 kg/m²     "

## 2025-03-19 NOTE — ASSESSMENT & PLAN NOTE
K 6.3, no peaked T on tele     EKG   Dextrose and Insulin 10U   Albuterol   Trend K q4h until normalized

## 2025-03-19 NOTE — PLAN OF CARE
"[facility] Care Plan    POC reviewed with Bridget Diopcarla Montgomery and family at 0300. Patient verbalized understanding. Questions and concerns addressed. No acute events today. Pt progressing toward goals. Will continue to monitor. See below and flowsheets for full assessment and VS info.     Preop checklist complete, plan for OR today   Pt NPO since before 0000   Potassium improvement after shifting on 3/18/2025      Is this a stroke patient? no    Neuro:  Tow Coma Scale  Best Eye Response: 3-->(E3) to speech  Best Motor Response: 6-->(M6) obeys commands  Best Verbal Response: 5-->(V5) oriented  Tow Coma Scale Score: 14  Assessment Qualifiers: no eye obstruction present, patient not sedated/intubated  Pupil PERRLA: yes     24 hr Temp:  [97.4 °F (36.3 °C)-98 °F (36.7 °C)]     CV:   Rhythm: normal sinus rhythm  BP goals:   SBP <  200  MAP > 65    Resp:           Plan: N/A    GI/:     Diet/Nutrition Received: NPO  Last Bowel Movement: 03/17/25  Voiding Characteristics: urethral catheter (bladder)    Intake/Output Summary (Last 24 hours) at 3/19/2025 0501  Last data filed at 3/19/2025 0401  Gross per 24 hour   Intake --   Output 2300 ml   Net -2300 ml     Unmeasured Output  Urine Occurrence: 0  Stool Occurrence: 0  Emesis Occurrence: 0  Pad Count: 2    Labs/Accuchecks:  Recent Labs   Lab 03/19/25  0209   WBC 11.25   RBC 3.39*   HGB 9.6*   HCT 30.1*         Recent Labs   Lab 03/19/25  0209      K 4.3   CO2 22*      BUN 46*   CREATININE 1.2   ALKPHOS 145   ALT 32   AST 48*   BILITOT 0.2      Recent Labs   Lab 03/18/25  1648   INR 0.9   APTT 27.6    No results for input(s): "CPK", "CPKMB", "TROPONINI", "MB" in the last 168 hours.    Electrolytes: N/A - electrolytes WDL  Accuchecks: ACHS    Gtts:      LDA/Wounds:    Marvel Risk Assessment  Sensory Perception: 4-->no impairment  Moisture: 3-->occasionally moist  Activity: 1-->bedfast  Mobility: 3-->slightly limited  Nutrition: 3-->adequate  Friction and " Shear: 2-->potential problem  Marvel Score: 16  Is your marvel score 12 or less? no      Problem: Adult Inpatient Plan of Care  Goal: Plan of Care Review  Outcome: Progressing  Goal: Patient-Specific Goal (Individualized)  Outcome: Progressing  Goal: Absence of Hospital-Acquired Illness or Injury  Outcome: Progressing  Goal: Optimal Comfort and Wellbeing  Outcome: Progressing  Goal: Readiness for Transition of Care  Outcome: Progressing     Problem: Diabetes Comorbidity  Goal: Blood Glucose Level Within Targeted Range  Outcome: Progressing     Problem: Wound  Goal: Optimal Coping  Outcome: Progressing  Goal: Optimal Functional Ability  Outcome: Progressing  Goal: Absence of Infection Signs and Symptoms  Outcome: Progressing  Goal: Improved Oral Intake  Outcome: Progressing  Goal: Optimal Pain Control and Function  Outcome: Progressing  Goal: Skin Health and Integrity  Outcome: Progressing  Goal: Optimal Wound Healing  Outcome: Progressing     Problem: Fall Injury Risk  Goal: Absence of Fall and Fall-Related Injury  Outcome: Progressing     Problem: Skin Injury Risk Increased  Goal: Skin Health and Integrity  Outcome: Progressing     Problem: Infection  Goal: Absence of Infection Signs and Symptoms  Outcome: Progressing

## 2025-03-19 NOTE — HOSPITAL COURSE
03/19/2025: To OR for O-C4 fusion  03/20/2025: POD 1 S/P O-C4 fusion. NAEO. VSS. Neurologically intact. Continues with residual neck pain, robaxin added to pain regimen. Pending step down to .

## 2025-03-19 NOTE — H&P
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 Quach with non-surgical management, and R shoulder arthroplasty 06/2024 presenting to [facility] for preoperative traction. She initially presented with a week of severe persistent HA, and diffuse weakness of all extremities. Her symptoms began last Monday with HA that started in her neck and migrated toward the top of her head. She has had chronic neck pain due to cervical stenosis, but the headache was new. On Saturday, she was at her baseline, able to perform all of her ADLs. Around 8 pm, she suddenly became weak in all extremities and was not able to walk. MRI demonstrated chronic type 2 odontoid fracture with progressed retropulsion of the fracture fragment in comparison to prior MRI from 8/11/2023, with moderate to severe canal stenosis at C1-C2. Of note, patient had previously discontinued wearing her cervical collar. Denied radiculopathy in her arms or legs, sensory deficit, bladder or bowel incontinence. Family decided to proceed with surgery. Booked for O-C4 fusion on 3/19/25. Admitted to [facility] for pre-op traction on 3/18.       Past Medical History:   Diagnosis Date    Allergy     Anemia, unspecified     Arthritis     CKD (chronic kidney disease) stage 4, GFR 15-29 ml/min     COPD (chronic obstructive pulmonary disease)     Edema     HTN (hypertension)     Hypocalcemia     Hyponatremia     Osteoarthritis      Past Surgical History:   Procedure Laterality Date    BREAST CYST EXCISION      CAUDAL EPIDURAL STEROID INJECTION N/A 2/2/2023    Procedure: Injection-steroid-epidural-caudal;  Surgeon: Angel Sparrow MD;  [place] [facility] [place] [place]  Service: Pain Management;  Laterality: N/A;     FOOT SURGERY        Medications Ordered Prior to Encounter[1]   Allergies: Cephalexin, Codeine, Meperidine, Nsaids (non-steroidal anti-inflammatory drug), Penicillins, and Tazarotene  Family History   Problem Relation Name Age of Onset    Cancer Mother      No Known Problems Father       Social History[2]  Review of Systems   Constitutional:  Negative for chills and fever.   HENT:  Negative for trouble swallowing.    Eyes:  Negative for visual disturbance.   Respiratory:  Negative for shortness of breath.    Cardiovascular:  Negative for chest pain.   Gastrointestinal:  Negative for abdominal pain.     Objective:     Vitals:    Temp: 97.9 °F (36.6 °C)  Pulse: 92  Rhythm: normal sinus rhythm  BP: (!) 150/66  MAP (mmHg): 95  Resp: 15  SpO2: 100 %    Temp  Min: 97.4 °F (36.3 °C)  Max: 97.9 °F (36.6 °C)  Pulse  Min: 59  Max: 93  BP  Min: 111/56  Max: 188/77  MAP (mmHg)  Min: 81  Max: 110  Resp  Min: 11  Max: 18  SpO2  Min: 95 %  Max: 100 %    03/17 0701 - 03/18 0700  In: 240 [P.O.:240]  Out: 1400 [Urine:1400]   Unmeasured Output  Urine Occurrence: 0  Stool Occurrence: 0  Emesis Occurrence: 0  Pad Count: 2        Physical Exam  Vitals and nursing note reviewed.   Constitutional:       General: She is not in acute distress.  HENT:      Head: Normocephalic and atraumatic.      Right Ear: External ear normal.      Left Ear: External ear normal.      Nose: Nose normal.      Mouth/Throat:      Mouth: Mucous membranes are moist.      Pharynx: Oropharynx is clear.   Eyes:      Extraocular Movements: Extraocular movements intact.      Pupils: Pupils are equal, round, and reactive to light.   Cardiovascular:      Rate and Rhythm: Normal rate and regular rhythm.   Pulmonary:      Effort: Pulmonary effort is normal. No respiratory distress.   Abdominal:      General: There is no distension.      Palpations: Abdomen is soft.      Tenderness: There is no abdominal tenderness. There is no guarding.   Musculoskeletal:      Cervical  back: Normal range of motion.   Skin:     General: Skin is warm and dry.      Capillary Refill: Capillary refill takes less than 2 seconds.   Neurological:      Mental Status: She is alert.      Comments: E4 V5 M6  Mental status: Alert. Oriented x3. Speech fluent, no aphasia or dysarthria. Following commands.   CN II-XII grossly intact, specifically:  PERRL  EOMI  Visual fields intact   No facial asymmetry.    Motor:  RUE 4+/5 limited 2/2 shoulder  RLE 5/5  LUE 5/5  LLE 5/5  Sensation intact    Psychiatric:         Mood and Affect: Mood normal.              Today I personally reviewed pertinent medications, lines/drains/airways, imaging, cardiology results, laboratory results, microbiology results, :    K 6.3    Assessment/Plan:     Neuro  * Spinal stenosis  89F PMHx COPD, HTN, HLD, history of TIA, known type 2 odontoid fx followed by Dr. Quach with non-surgical management, and R shoulder arthroplasty 06/2024 presenting to [facility] for preoperative traction on 3/18    MRI demonstrated chronic type 2 odontoid fracture with progressed retropulsion of the fracture fragment in comparison to prior MRI from 8/11/2023, with moderate to severe canal stenosis at C1-C2.     Admit to NCC  Q1h neuro checks, I&Os, and vitals   CBC, CMP, mag, phos daily   A1c, TSH, lipid panel, coags  NSGY following   Dex 4q6  PPI ppx  SBP <160   Traction, weight per nsgy   HOB flat   pain regimen   NPO midnight for OR tomorrow   SCDs; hold chemical VTE ppx in acute setting   PT/OT/SLP   OR for O-C4 fusion on 3/19/25     Edema, spinal cord  Dex   PPI    Pulmonary  Other emphysema  nebs    Cardiac/Vascular  Essential hypertension  SBP < 160  Home antihypertensives    Mixed hyperlipidemia  Lipid panel     Renal/  Hyperkalemia  K 6.3, no peaked T on tele     EKG   Dextrose and Insulin 10U   Albuterol   Trend K q4h until normalized    Chronic kidney disease, stage III (moderate)  CMP daily   Renally dose meds          The patient is being Prophylaxed  for:  Venous Thromboembolism with: Mechanical  Stress Ulcer with: PPI  Ventilator Pneumonia with: not applicable    Activity Orders            Diet NPO: NPO starting at 03/19 0001    Diet Adult Regular Low Potassium: Regular starting at 03/18 0829    Progressive Mobility Protocol (mobilize patient to their highest level of functioning at least twice daily) starting at 03/10 0800    Turn patient every 2 hours starting at 03/10 0800          Full Code    Critical care time spent on the evaluation and treatment of severe organ dysfunction, review of pertinent labs and imaging studies, discussions with consulting providers and discussions with patient/family: 35 minutes.    Escobar Brown PA-C  Neurocritical Care  [facility]       [1]   No current facility-administered medications on file prior to encounter.     Current Outpatient Medications on File Prior to Encounter   Medication Sig Dispense Refill    acetaminophen (TYLENOL) 500 MG tablet Take 1 tablet (500 mg total) by mouth every 6 (six) hours as needed for Pain. 90 tablet 6    albuterol (PROVENTIL) 2.5 mg /3 mL (0.083 %) nebulizer solution Take 2.5 mg by nebulization 2 (two) times daily as needed.      albuterol (PROVENTIL/VENTOLIN HFA) 90 mcg/actuation inhaler Inhale 2 puffs into the lungs every 6 (six) hours as needed.      amLODIPine (NORVASC) 10 MG tablet Take 1 tablet (10 mg total) by mouth once daily. 90 tablet 3    budesonide-formoterol 160-4.5 mcg (SYMBICORT) 160-4.5 mcg/actuation HFAA Inhale 2 puffs into the lungs every 12 (twelve) hours.      calcium carbonate (CALCIUM 500 ORAL) Take 500 mg by mouth once daily.      cetirizine (ZYRTEC) 10 MG tablet Take 10 mg by mouth once daily.      cholecalciferol, vitamin D3, (VITAMIN D3) 50 mcg (2,000 unit) Cap capsule Take 2,000 Units by mouth once daily.      diclofenac sodium (VOLTAREN ARTHRITIS PAIN) 1 % Gel Apply 2 g topically 3 (three) times daily as needed (painful joint). 100 g 0     famotidine (PEPCID) 40 MG tablet Take 40 mg by mouth nightly as needed for Heartburn.      fluticasone propionate (FLONASE) 50 mcg/actuation nasal spray 1 spray by Nasal route 2 (two) times daily.      furosemide (LASIX) 20 MG tablet Take 1 tablet (20 mg total) by mouth 2 (two) times a day. 180 tablet 3    gabapentin (NEURONTIN) 300 MG capsule Take 300 mg by mouth every evening.      ipratropium (ATROVENT) 42 mcg (0.06 %) nasal spray       melatonin (MELATIN) 3 mg tablet Take 2 tablets (6 mg total) by mouth nightly as needed for Insomnia. 30 tablet 0    methocarbamoL (ROBAXIN) 500 MG Tab       omega 3-dha-epa-fish oil 1,000 mg (120 mg-180 mg) Cap Take 1 capsule by mouth once daily.      omeprazole (PRILOSEC) 20 MG capsule Take 1 capsule (20 mg total) by mouth 2 (two) times daily before meals. 60 capsule 0    polyethylene glycol 3350 (MIRALAX ORAL) Take 17 g by mouth once daily. As needed      pregabalin (LYRICA) 50 MG capsule Take 1 capsule by mouth every evening.      senna-docusate 8.6-50 mg (PERICOLACE) 8.6-50 mg per tablet Take 2 tablets by mouth 2 (two) times daily. 60 tablet 0    sodium bicarbonate 650 MG tablet Take 1 tablet (650 mg total) by mouth once daily. 30 tablet 0    TOLAK 4 % Crea apply a thin film to the lesions using the fingertips, once daily. preferably at night., WASH off in THE morning FOR 2 WEEKS.      traMADoL (ULTRAM) 50 mg tablet Take 1 tablet (50 mg total) by mouth 2 (two) times daily as needed. 60 tablet 4   [2]   Social History  Tobacco Use    Smoking status: Former    Smokeless tobacco: Never   Substance Use Topics    Alcohol use: Not Currently    Drug use: Never

## 2025-03-19 NOTE — NURSING
Patient off unit on monitor and O2 to OR with CRNA, RN and OR neuro monitor.  Family at bedside.  Consents and chart sent with patient

## 2025-03-19 NOTE — SUBJECTIVE & OBJECTIVE
Interval History:  S/P O-C4 posterior cervical fusion 3/19. Residual neck pain. VSS. Neurologically stable     Review of Systems  Positive for neck and head pain.   Objective:     Vitals:  Temp: (!) 95 °F (35 °C)  Pulse: (!) 47  Rhythm: normal sinus rhythm  BP: (!) 176/75  MAP (mmHg): 108  Resp: 15  SpO2: 95 %    Temp  Min: 94.2 °F (34.6 °C)  Max: 98.5 °F (36.9 °C)  Pulse  Min: 47  Max: 93  BP  Min: 124/58  Max: 187/86  MAP (mmHg)  Min: 83  Max: 124  Resp  Min: 7  Max: 26  SpO2  Min: 95 %  Max: 100 %    03/18 0701 - 03/19 0700  In: -   Out: 2300 [Urine:2300]   Unmeasured Output  Urine Occurrence: 0  Stool Occurrence: 0  Emesis Occurrence: 0  Pad Count: 2        Physical Exam    Examination:   Constitutional: Well-nourished and -developed. No apparent distress.   Eyes: Conjunctiva clear, anicteric. Lids no lesions.  Head/Ears/Nose/Mouth/Throat/Neck: C-collar in place. Moist mucous membranes. External ears, nose atraumatic.   Cardiovascular: No leg edema.  Respiratory: Comfortable respirations.  Neurologic:  -GCS E4V5M6  -Alert. Oriented to person, place, and time. Speech fluent. Follows commands.  -CN II-XII grossly intact  -Sensation intact  - Gait deferred.    Strength   Deltoids Triceps Biceps Wrist Extension Wrist Flexion Hand    Upper: R 4/5 5/5 5/5 5/5 5/5 5/5     L 5/5 5/5 5/5 5/5 5/5 5/5       Iliopsoas Quadriceps Knee  Flexion Tibialis  anterior Gastro- cnemius EHL   Lower: R 5/5 5/5 5/5 5/5 5/5 5/5     L 5/5 5/5 5/5 5/5 5/5 5/5           Medications:  Continuous ScheduledamLODIPine, 10 mg, Daily  cloNIDine 0.1 mg/24 hr td ptwk, 1 patch, Q7 Days  fluticasone furoate-vilanteroL, 1 puff, Daily  furosemide, 40 mg, Daily  gabapentin, 300 mg, QHS  hydrALAZINE, 75 mg, Q8H  pantoprazole, 40 mg, Daily  sodium chloride, 1,000 mg, BID  [START ON 3/20/2025] vancomycin (VANCOCIN) IV (PEDS and ADULTS), 500 mg, Q24H    PRNacetaminophen, 650 mg, Q4H PRN  butalbital-acetaminophen-caffeine -40 mg, 1 tablet, Q4H  PRN  hydrALAZINE, 10 mg, Q4H PRN  labetalol, 10 mg, Q4H PRN  melatonin, 6 mg, Nightly PRN  morphine, 2 mg, Q4H PRN  naloxone, 0.02 mg, PRN  ondansetron, 8 mg, Q8H PRN  oxyCODONE-acetaminophen, 1 tablet, Q4H PRN  oxyCODONE-acetaminophen, 1 tablet, Q4H PRN  senna-docusate 8.6-50 mg, 1 tablet, Daily PRN  simethicone, 1 tablet, QID PRN  sodium chloride 0.9%, 1-10 mL, Q12H PRN  vancomycin - pharmacy to dose, , pharmacy to manage frequency      Today I personally reviewed pertinent medications, lines/drains/airways, imaging, laboratory results, notably:    Diet  Diet Adult Regular  Diet Adult Regular

## 2025-03-19 NOTE — CARE UPDATE
NSGY post op note:     Patient was examined post operatively from O-C4 PCF    Vitals:    03/19/25 1317   BP:    Pulse:    Resp: 12   Temp:      Moving all extremities with good strength  Squeezes hands 5/5 strength  Moves legs with normal tone 5/5 strength DF/PF  Drain holding suction  Collar in place    Plan:  ICU status today  Continue C Collar at all times (Called brace line for additional collar given blood on current one)  XR C Sp  PT/OT  HV drain x 1 to suction, vanc for prophylaxis  No MAP goals or steroids required post operatively  MM pain control  Further care per ICU    Ben Rodríguez MD  NSGY PGY 7

## 2025-03-19 NOTE — SUBJECTIVE & OBJECTIVE
Interval History: 3/19: Cervical traction in place overnight, tolerated well.  Neurologically stable. Potassium improved. Blood consent signed this AM (OK only to have family blood transfused, which was donated earlier this week).     Medications:  Continuous Infusions:  Scheduled Meds:   amLODIPine  10 mg Oral Daily    cloNIDine 0.1 mg/24 hr td ptwk  1 patch Transdermal Q7 Days    dexAMETHasone injection  4 mg Intravenous Q6H    fluticasone furoate-vilanteroL  1 puff Inhalation Daily    furosemide  40 mg Oral Daily    gabapentin  300 mg Oral QHS    hydrALAZINE  75 mg Oral Q8H    LIDOcaine HCL 10 mg/ml (1%)  10 mL Intradermal Once    LIDOcaine-EPINEPHrine 1%-1:100,000  20 mL Intradermal Once    pantoprazole  20 mg Intravenous BID    sodium chloride  1,000 mg Oral BID    sodium zirconium cyclosilicate  10 g Oral BID     PRN Meds:  Current Facility-Administered Medications:     acetaminophen, 650 mg, Oral, Q4H PRN    albuterol, 2 puff, Inhalation, Q6H PRN    aluminum-magnesium hydroxide-simethicone, 30 mL, Oral, QID PRN    butalbital-acetaminophen-caffeine -40 mg, 1 tablet, Oral, Q4H PRN    dextrose 50%, 12.5 g, Intravenous, PRN    dextrose 50%, 25 g, Intravenous, PRN    [COMPLETED] dextrose 50%, 50 g, Intravenous, Once **AND** dextrose 50%, 25 g, Intravenous, PRN **AND** [COMPLETED] insulin regular, 10 Units, Intravenous, Once    glucagon (human recombinant), 1 mg, Intramuscular, PRN    glucose, 16 g, Oral, PRN    glucose, 24 g, Oral, PRN    hydrALAZINE, 10 mg, Intravenous, Q4H PRN    insulin aspart U-100, 0-5 Units, Subcutaneous, QID (AC + HS) PRN    labetalol, 10 mg, Intravenous, Q4H PRN    LIDOcaine-EPINEPHrine 1%-1:100,000, 20 mL, Intradermal, On Call Procedure    melatonin, 6 mg, Oral, Nightly PRN    midazolam, 2 mg, Intravenous, On Call Procedure    morphine, 2 mg, Intravenous, Q4H PRN    naloxone, 0.02 mg, Intravenous, PRN    ondansetron, 8 mg, Oral, Q8H PRN    oxyCODONE-acetaminophen, 1 tablet, Oral,  "Q4H PRN    oxyCODONE-acetaminophen, 1 tablet, Oral, Q4H PRN    simethicone, 1 tablet, Oral, QID PRN    sodium chloride 0.9%, 1-10 mL, Intravenous, Q12H PRN     Review of Systems  Objective:     Weight: 59 kg (130 lb 1.1 oz)  Body mass index is 23.04 kg/m².  Vital Signs (Most Recent):  Temp: 97.4 °F (36.3 °C) (03/19/25 0301)  Pulse: 60 (03/19/25 0601)  Resp: 13 (03/19/25 0601)  BP: (!) 144/62 (03/19/25 0601)  SpO2: 100 % (03/19/25 0601) Vital Signs (24h Range):  Temp:  [97.4 °F (36.3 °C)-98 °F (36.7 °C)] 97.4 °F (36.3 °C)  Pulse:  [59-93] 60  Resp:  [11-18] 13  SpO2:  [96 %-100 %] 100 %  BP: (111-188)/(56-86) 144/62                              Urethral Catheter 03/18/25 1610 Silicone 16 Fr. (Active)   Site Assessment Clean;Intact 03/19/25 0501   Collection Container Urimeter 03/19/25 0501   Securement Method secured to top of thigh w/ adhesive device 03/19/25 0501   Catheter Care Performed yes 03/19/25 0501   Reason for Continuing Urinary Catheterization Critically ill in ICU and requiring hourly monitoring of intake/output 03/19/25 0501   CAUTI Prevention Bundle Securement Device in place with 1" slack;Intact seal between catheter & drainage tubing;Drainage bag/urimeter off the floor;Sheeting clip in use;No dependent loops or kinks;Drainage bag/urimeter not overfilled (<2/3 full);Drainage bag/urimeter below bladder 03/18/25 1901   Output (mL) 175 mL 03/19/25 0401          Physical Exam         Neurosurgery Physical Exam      General: Well developed, well nourished, no distress.   Head: Normocephalic, atraumatic.  Mental Status: Awake, Alert, Oriented  Speech: Clear with content appropriate to conversation.  Cranial nerves: Face symmetric, CN II-XII grossly intact.   Eyes: Pupils equal, round, reactive to light, EOMI.  Sensory: Intact to light touch throughout.  Motor Strength: Moves all extremities spontaneously with good tone. Full strength upper and lower extremities. No abnormal movements seen.      Strength   " Deltoids Triceps Biceps Wrist Extension Wrist Flexion Hand    Upper: R 4/5 5/5 5/5 5/5 5/5 5/5     L 5/5 5/5 5/5 5/5 5/5 5/5       Iliopsoas Quadriceps Knee  Flexion Tibialis  anterior Gastro- cnemius EHL   Lower: R 5/5 5/5 5/5 5/5 5/5 5/5     L 5/5 5/5 5/5 5/5 5/5 5/5    Patient with R distal clavicle fx and h/o of R shoulder arthroplasty.  Hand intrinsics 5/5.     In c-collar.                  Significant Labs:  Recent Labs   Lab 03/18/25 2001 03/18/25  2312 03/19/25  0209   * 132* 94    136 138   K 4.0  4.0 4.3  4.3 4.3    107 107   CO2 21* 23 22*   BUN 47* 45* 46*   CREATININE 1.3 1.3 1.2   CALCIUM 7.7* 7.8* 7.7*   MG  --   --  2.7*     Recent Labs   Lab 03/18/25  0528 03/18/25  0938 03/19/25  0209   WBC 9.33  --  11.25   HGB 10.3*  --  9.6*   HCT 33.7* 33* 30.1*     --  373     Recent Labs   Lab 03/18/25  1648   INR 0.9   APTT 27.6     Microbiology Results (last 7 days)       ** No results found for the last 168 hours. **          All pertinent labs from the last 24 hours have been reviewed.    Significant Diagnostics:  I have reviewed all pertinent imaging results/findings within the past 24 hours.

## 2025-03-19 NOTE — PROCEDURES
Cervical traction procedure note    Written consent obtained from patient to place into cervical traction prior to surgery tomorrow. Patient positioned flat in bed with c collar in place, pin sites marked and injected with 5 cc lidocaine with epi each. Baseline XR taken. 2.5 mg valium given, pins then placed, with 5cc more lidocaine being injected on each side. Placed in neutral alignmnent, 5lbs added without detrimental change, XR taken. 5 more lbs for total of 10 lbs. Patient tolerating this weight. Position and XR reviewed by Dr. Lemons. Will remain in this position until surgery. Keep npo. Ok to place Mosher given immobility.     SHAZIA Izaguirre PGY3

## 2025-03-19 NOTE — ASSESSMENT & PLAN NOTE
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 Multilevel foraminal stenosis.     Discussed surgical vs non-surgical options with the patient and her daughter Liliana Montgomery present. Patient's grandson Aidan (neurosurgeon) was also on the phone for this conversation. Given the degree of upper cervical cord kinking, cord edema, and craniocervical settling recommended surgical intervention to preserve her current function and prevent neurological decline. Surgical recommendation is occiput-C4 fusion. She would also need pre-op traction. Non-surgical option is strict cervical collar 24/7 indefinitely. This would also require the patient to be compliant with collar use. Fracture appears stable in X-Ray c spine upright/supine. Patient and daughter have decided to proceed with surgery.    Plan:   - Cervical collar at all times, cervical traction in place.   - XR cervical spine upright/supine in collar stable. Continue collar. Ok for bed level PT/OT only with collar in place  - Multimodal pain control.  - Bowel regimen.  - Notify NSGY immediately with any decline on exam.  - Patient and Family have decided to proceed with surgery. Booked for O-C4 fusion on 3/19/25. Consented for procedure, also with blood consent OK for family blood only. Pre-op traction on 3/18. Post traction XR stable.   - RCRI/medical risk stratification - NSQUIP risk is 2.1% of death; 16.7% of any complication.   - K+ optimized, s/p insulin and glucose shift on 3/18.  - Med  management per primary

## 2025-03-19 NOTE — ANESTHESIA PROCEDURE NOTES
Arterial    Diagnosis: Cervical stenosis    Patient location during procedure: done in OR  Timeout: 3/19/2025 8:44 AM  Procedure end time: 3/19/2025 8:46 AM    Staffing  Authorizing Provider: Otto Argueta MD  Performing Provider: Otto Argueta MD    Staffing  Performed by: Otto Argueta MD  Authorized by: Otto Argueta MD    Anesthesiologist was present at the time of the procedure.    Preanesthetic Checklist  Completed: patient identified, IV checked, site marked, risks and benefits discussed, surgical consent, monitors and equipment checked, pre-op evaluation, timeout performed and anesthesia consent givenArterial  Skin Prep: chlorhexidine gluconate  Local Infiltration: none  Orientation: right  Location: radial    Catheter Size: 20 G  Catheter placement by Ultrasound guidance. Heme positive aspiration all ports.   Vessel Caliber: small, medium, patent, compressibility normal  Vascular Doppler:  not done  Needle advanced into vessel with real time Ultrasound guidance.  Guidewire confirmed in vessel.  Sterile sheath used.  Image recorded and saved.Insertion Attempts: 1  Assessment  Dressing: secured with tape and tegaderm  Patient: Tolerated well

## 2025-03-19 NOTE — PLAN OF CARE
[facility] Care Plan  POC reviewed with Bridget Montgomery and family at 1400. Patient and Family verbalized understanding. Questions and concerns addressed. No acute events today. Pt progressing toward goals. Will continue to monitor. See below and flowsheets for full assessment and VS info.     - To OR today for Occipital-C4 fusion  -  Hemovac drain to full sxn.    - drain put out 110 cc bloody drainage.    - Mosher remains in place  - returned from surgery with R radial Cherri  - PRN morphine x2 and percocet x1 for pain.  - Changed out c collar to new [place] J collar  - Family at bedside through shift        Is this a stroke patient? no    Neuro:  [place] Coma Scale  Best Eye Response: 4-->(E4) spontaneous  Best Motor Response: 6-->(M6) obeys commands  Best Verbal Response: 5-->(V5) oriented  [place] Coma Scale Score: 15  Assessment Qualifiers: no eye obstruction present, patient not sedated/intubated  Pupil PERRLA: yes     24 hr Temp:  [94.2 °F (34.6 °C)-98.5 °F (36.9 °C)]     CV:   Rhythm: normal sinus rhythm  BP goals:   SBP <  200  MAP > 65    Resp:           Plan: N/A    GI/:     Diet/Nutrition Received: regular  Last Bowel Movement: 03/17/25  Voiding Characteristics: urethral catheter (bladder)    Intake/Output Summary (Last 24 hours) at 3/19/2025 1808  Last data filed at 3/19/2025 1805  Gross per 24 hour   Intake 1250 ml   Output 1910 ml   Net -660 ml     Unmeasured Output  Urine Occurrence: 0  Stool Occurrence: 0  Emesis Occurrence: 0  Pad Count: 2    Labs/Accuchecks:  Recent Labs   Lab 03/19/25  0209   WBC 11.25   RBC 3.39*   HGB 9.6*   HCT 30.1*         Recent Labs   Lab 03/19/25  0209 03/19/25  0805 03/19/25  1302      < > 139   K 4.3   < > 4.6  4.6   CO2 22*   < > 21*      < > 107   BUN 46*   < > 43*   CREATININE 1.2   < > 1.1   ALKPHOS 145  --   --    ALT 32  --   --    AST 48*  --   --    BILITOT 0.2  --   --     < > = values in this interval not displayed.      Recent Labs   Lab  03/18/25  1648   INR 0.9   APTT 27.6      Recent Labs   Lab 03/19/25  1302   *       Electrolytes: N/A - electrolytes WDL  Accuchecks: none    Gtts:      LDA/Wounds:    Marvel Risk Assessment  Sensory Perception: 3-->slightly limited  Moisture: 3-->occasionally moist  Activity: 1-->bedfast  Mobility: 3-->slightly limited  Nutrition: 3-->adequate  Friction and Shear: 2-->potential problem  Marvel Score: 15    Is your marvel score 12 or less? no            Restraints:        WCTM

## 2025-03-19 NOTE — ASSESSMENT & PLAN NOTE
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     Admit to [facility]  Q1h neuro checks, I&Os, and vitals   CBC, CMP, mag, phos daily   A1c, TSH, lipid panel, coags  NSGY following   Dex 4q6  PPI ppx  SBP <160   Traction, weight per nsgy   HOB flat   pain regimen   NPO midnight for OR tomorrow   SCDs; hold chemical VTE ppx in acute setting   PT/OT/SLP   OR for O-C4 fusion on 3/19/25

## 2025-03-19 NOTE — ASSESSMENT & PLAN NOTE
K 6.3 on arrival to ICU, no peaked T on tele     EKG followed   Dextrose and Insulin 10U + lokelma   Albuterol   Trended K q4h until normalized-- as this am, now continue to monitor daily  Checked CPK, slightly elevated, continue to monitor until resolved

## 2025-03-19 NOTE — HOSPITAL COURSE
3/19: Cervical traction in place overnight, tolerated well.  Neurologically stable. Potassium improved. Blood consent signed this AM (OK only to have family blood transfused, which was donated earlier this week).   3/20: POD 1 s/p O-C4 fusion. CPK elevated overnight on fluids, neck pain.present. neurologically and hemodynamically stable. Drain 180cc output.   3/21: POD 2.  NAEON.  Stepped down to floor status.  Drain with slowing output. Neurologically stable.   3/22: POD3. NAEON. Neuro exam stable. Drain with slowing output 40cc. Pain controlled. Working with PT.  3/23: POD4. NAEON. Neuro exam stable. Drain removed yesterday. Pain controlled. Working with PT. Had received straight cath yesterday twice but voided on her own yesterday PM with PVR ~60cc. Early this morning needs straight cath again with 500cc urine output. Will initiate flomax and bethanechol.   3/24: POD 5.  Patient is doing well from surgical standpoint.  Voiding, has gotten out of bed. Tolerating PO. Neurologically stable

## 2025-03-19 NOTE — PROGRESS NOTES
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 She initially presented with a week of severe persistent HA, and diffuse weakness of all extremities. Her symptoms began last Monday with HA that started in her neck and migrated toward the top of her head. She has had chronic neck pain due to cervical stenosis, but the headache was new. On Saturday, she was at her baseline, able to perform all of her ADLs. Around 8 pm, she suddenly became weak in all extremities and was not able to walk. MRI demonstrated chronic type 2 odontoid fracture with progressed retropulsion of the fracture fragment in comparison to prior MRI from 8/11/2023, with moderate to severe canal stenosis at C1-C2. Of note, patient had previously discontinued wearing her cervical collar. Denied radiculopathy in her arms or legs, sensory deficit, bladder or bowel incontinence. Family decided to proceed with surgery. Booked for O-C4 fusion on 3/19/25. Admitted to [facility] for pre-op traction on 3/18.     Hospital Course: 03/19/2025: To OR for O-C4 fusion    Interval History:  S/P O-C4 posterior cervical fusion 3/19. Residual neck pain. VSS. Neurologically stable     Review of Systems  Positive for neck and head pain.   Objective:     Vitals:  Temp: (!) 95 °F (35 °C)  Pulse: (!) 47  Rhythm: normal sinus rhythm  BP: (!) 176/75  MAP (mmHg): 108  Resp: 15  SpO2: 95 %    Temp  Min: 94.2 °F (34.6 °C)  Max: 98.5 °F (36.9 °C)  Pulse  Min: 47  Max: 93  BP  Min: 124/58  Max: 187/86  MAP (mmHg)  Min: 83  Max: 124  Resp  Min: 7  Max: 26  SpO2  Min: 95 %  Max: 100 %    03/18 0701 -  03/19 0700  In: -   Out: 2300 [Urine:2300]   Unmeasured Output  Urine Occurrence: 0  Stool Occurrence: 0  Emesis Occurrence: 0  Pad Count: 2        Physical Exam    Examination:   Constitutional: Well-nourished and -developed. No apparent distress.   Eyes: Conjunctiva clear, anicteric. Lids no lesions.  Head/Ears/Nose/Mouth/Throat/Neck: C-collar in place. Moist mucous membranes. External ears, nose atraumatic.   Cardiovascular: No leg edema.  Respiratory: Comfortable respirations.  Neurologic:  -GCS [id number]  -Alert. Oriented to person, place, and time. Speech fluent. Follows commands.  -CN II-XII grossly intact  -Sensation intact  - Gait deferred.    Strength   Deltoids Triceps Biceps Wrist Extension Wrist Flexion Hand    Upper: R 4/5 5/5 5/5 5/5 5/5 5/5     L 5/5 5/5 5/5 5/5 5/5 5/5       Iliopsoas Quadriceps Knee  Flexion Tibialis  anterior Gastro- cnemius EHL   Lower: R 5/5 5/5 5/5 5/5 5/5 5/5     L 5/5 5/5 5/5 5/5 5/5 5/5           Medications:  Continuous ScheduledamLODIPine, 10 mg, Daily  cloNIDine 0.1 mg/24 hr td ptwk, 1 patch, Q7 Days  fluticasone furoate-vilanteroL, 1 puff, Daily  furosemide, 40 mg, Daily  gabapentin, 300 mg, QHS  hydrALAZINE, 75 mg, Q8H  pantoprazole, 40 mg, Daily  sodium chloride, 1,000 mg, BID  [START ON 3/20/2025] vancomycin (VANCOCIN) IV (PEDS and ADULTS), 500 mg, Q24H    PRNacetaminophen, 650 mg, Q4H PRN  butalbital-acetaminophen-caffeine -40 mg, 1 tablet, Q4H PRN  hydrALAZINE, 10 mg, Q4H PRN  labetalol, 10 mg, Q4H PRN  melatonin, 6 mg, Nightly PRN  morphine, 2 mg, Q4H PRN  naloxone, 0.02 mg, PRN  ondansetron, 8 mg, Q8H PRN  oxyCODONE-acetaminophen, 1 tablet, Q4H PRN  oxyCODONE-acetaminophen, 1 tablet, Q4H PRN  senna-docusate 8.6-50 mg, 1 tablet, Daily PRN  simethicone, 1 tablet, QID PRN  sodium chloride 0.9%, 1-10 mL, Q12H PRN  vancomycin - pharmacy to dose, , pharmacy to manage frequency      Today I personally reviewed pertinent medications, lines/drains/airways, imaging,  laboratory results, notably:    Diet  Diet Adult Regular  Diet Adult Regular        Assessment/Plan:     Neuro  * Spinal stenosis  89F PMHx COPD, HTN, HLD, history of TIA, 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    MRI demonstrated chronic type 2 odontoid fracture with progressed retropulsion of the fracture fragment in comparison to prior MRI from 8/11/2023, with moderate to severe canal stenosis at C1-C2.     Admit to [facility]  Q1h neuro checks, I&Os, and vitals   NSGY following   Dex d/c per neurosurgery recommendations  SBP <160   Pre-op traction completed   pain regimen   Normal Diet   SCDs; hold chemical VTE ppx in acute setting   PT/OT to be resumed in the am   S/PO-C4 fusion on 3/19/25   HV drain x 1 to suction, vanc for prophylaxis    Pulmonary  Other emphysema  nebs    Cardiac/Vascular  Essential hypertension  SBP < 160  Home antihypertensives    Mixed hyperlipidemia  Lipid panel     Renal/  Hyperkalemia  K 6.3 on arrival to ICU, no peaked T on tele     EKG followed   Dextrose and Insulin 10U + lokelma   Albuterol   Trended K q4h until normalized-- as this am, now continue to monitor daily  Checked CPK, slightly elevated, continue to monitor until resolved     Chronic kidney disease, stage III (moderate)  CMP daily   Renally dose meds          The patient is being Prophylaxed for:  Venous Thromboembolism with: Mechanical  Stress Ulcer with: None  Ventilator Pneumonia with: not applicable    Activity Orders            Diet Adult Regular: Regular starting at 03/19 1305    Progressive Mobility Protocol (mobilize patient to their highest level of functioning at least twice daily) starting at 03/10 0800    Turn patient every 2 hours starting at 03/10 0800          Full Code    Critical care time: 30 minutes     Adeline Raza PA-C  Neurocritical  Care  [facility] - Neuro Critical [facility]

## 2025-03-19 NOTE — PROGRESS NOTES
Pharmacokinetic Initial Assessment: IV Vancomycin    Assessment/Plan:    Initiate intravenous vancomycin with loading dose of 1000 mg once followed by a maintenance dose of vancomycin 500mg IV every 24 hours  Desired empiric serum trough concentration is 10 to 15 mcg/mL  Draw vancomycin trough level 60 min prior to third dose on 3/21 at approximately 0900  Pharmacy will continue to follow and monitor vancomycin.          Thank you for the consult,   Curtis Mcmahon       Patient brief summary:  Bridget Montgomery is a 89 y.o. female initiated on antimicrobial therapy with IV Vancomycin for treatment of suspected skin & soft tissue infection    Drug Allergies:   Review of patient's allergies indicates:   Allergen Reactions    Cephalexin     Codeine     Meperidine      Other reaction(s): delerium, hallucination  Delerium  Delerium  Delerium      Nsaids (non-steroidal anti-inflammatory drug)     Penicillins     Tazarotene      Other reaction(s): rash, Unknown       Actual Body Weight:   59    Renal Function:   Estimated Creatinine Clearance: 28.1 mL/min (based on SCr of 1.1 mg/dL).,       CBC (last 72 hours):  Recent Labs   Lab Result Units 03/17/25  0359 03/18/25  0528 03/18/25  1420 03/19/25  0209   WBC K/uL 9.13 9.33  --  11.25   Hemoglobin g/dL 10.4* 10.3*  --  9.6*   Hemoglobin A1C %  --   --  5.8*  --    Hematocrit % 33.2* 33.7*  --  30.1*   Platelets K/uL 390 326  --  373   Gran % % 85.1* 78.7*  --  79.3*   Lymph % % 7.9* 11.4*  --  9.5*   Mono % % 5.7 7.1  --  8.5   Eosinophil % % 0.0 0.0  --  0.0   Basophil % % 0.1 0.9  --  0.4   Differential Method  Automated Automated  --  Automated       Metabolic Panel (last 72 hours):  Recent Labs   Lab Result Units 03/17/25  0359 03/18/25  0528 03/18/25  1648 03/18/25 2001 03/18/25  2312 03/19/25  0209 03/19/25  0805 03/19/25  1302   Sodium mmol/L 133* 137  --  137 136 138 139 139   Potassium mmol/L 5.2* 5.6* 6.3* 4.0  4.0 4.3  4.3 4.3 4.4  4.4 4.6  4.6   Chloride  "mmol/L 103 110  --  106 107 107 110 107   CO2 mmol/L 21* 16*  --  21* 23 22* 21* 21*   Glucose mg/dL 100 87  --  216* 132* 94 108 160*   BUN mg/dL 50* 42*  --  47* 45* 46* 45* 43*   Creatinine mg/dL 1.0 1.1  --  1.3 1.3 1.2 1.1 1.1   Albumin g/dL 2.9* 2.9*  --   --   --  2.7*  --   --    Total Bilirubin mg/dL 0.2 0.2  --   --   --  0.2  --   --    Alkaline Phosphatase U/L 175* 164*  --   --   --  145  --   --    AST U/L 41* 35  --   --   --  48*  --   --    ALT U/L 44 35  --   --   --  32  --   --    Magnesium mg/dL  --   --   --   --   --  2.7*  --   --    Phosphorus mg/dL  --   --   --   --   --  2.9  --   --        Drug levels (last 3 results):  No results for input(s): "VANCOMYCINRA", "VANCORANDOM", "VANCOMYCINPE", "VANCOPEAK", "VANCOMYCINTR", "VANCOTROUGH" in the last 72 hours.    Microbiologic Results:  Microbiology Results (last 7 days)       ** No results found for the last 168 hours. **            "

## 2025-03-19 NOTE — PROGRESS NOTES
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 Her symptoms began last Monday with headache that started in her neck and migrated toward the top of her head. She has had chronic neck pain due to cervical stenosis, but the headache was new. On Saturday, she had a normal morning and was able to perform all of her ADLs. Around 8 pm, she suddenly became weak in her arms and legs and was not able to walk. MRI demonstrated chronic type 2 odontoid fracture with progressed retropulsion of the fracture fragment in comparison to prior MRI from 8/11/2023, with moderate to severe canal stenosis at C1-C2. Of note, patient had previously discontinued wearing her cervical collar. Denies radiculopathy in her arms or legs, sensory deficit, bladder or bowel incontinence.      Post-Op Info:  Procedure(s) (LRB):  FUSION, SPINE, CERVICAL, POSTERIOR APPROACH GE 3 D NICHOLAS (N/A)       Interval History: 3/19: Cervical traction in place overnight, tolerated well.  Neurologically stable. Potassium improved. Blood consent signed this AM (OK only to have family blood transfused, which was donated earlier this week).     Medications:  Continuous Infusions:  Scheduled Meds:   amLODIPine  10 mg Oral Daily    cloNIDine 0.1 mg/24 hr td ptwk  1 patch Transdermal Q7 Days    dexAMETHasone injection  4 mg Intravenous Q6H    fluticasone furoate-vilanteroL  1 puff Inhalation Daily    furosemide  40 mg Oral Daily    gabapentin  300 mg Oral QHS    hydrALAZINE  75 mg Oral Q8H    LIDOcaine HCL 10 mg/ml (1%)  10 mL Intradermal Once    LIDOcaine-EPINEPHrine 1%-1:100,000  20 mL Intradermal  Once    pantoprazole  20 mg Intravenous BID    sodium chloride  1,000 mg Oral BID    sodium zirconium cyclosilicate  10 g Oral BID     PRN Meds:  Current Facility-Administered Medications:     acetaminophen, 650 mg, Oral, Q4H PRN    albuterol, 2 puff, Inhalation, Q6H PRN    aluminum-magnesium hydroxide-simethicone, 30 mL, Oral, QID PRN    butalbital-acetaminophen-caffeine -40 mg, 1 tablet, Oral, Q4H PRN    dextrose 50%, 12.5 g, Intravenous, PRN    dextrose 50%, 25 g, Intravenous, PRN    [COMPLETED] dextrose 50%, 50 g, Intravenous, Once **AND** dextrose 50%, 25 g, Intravenous, PRN **AND** [COMPLETED] insulin regular, 10 Units, Intravenous, Once    glucagon (human recombinant), 1 mg, Intramuscular, PRN    glucose, 16 g, Oral, PRN    glucose, 24 g, Oral, PRN    hydrALAZINE, 10 mg, Intravenous, Q4H PRN    insulin aspart U-100, 0-5 Units, Subcutaneous, QID (AC + HS) PRN    labetalol, 10 mg, Intravenous, Q4H PRN    LIDOcaine-EPINEPHrine 1%-1:100,000, 20 mL, Intradermal, On Call Procedure    melatonin, 6 mg, Oral, Nightly PRN    midazolam, 2 mg, Intravenous, On Call Procedure    morphine, 2 mg, Intravenous, Q4H PRN    naloxone, 0.02 mg, Intravenous, PRN    ondansetron, 8 mg, Oral, Q8H PRN    oxyCODONE-acetaminophen, 1 tablet, Oral, Q4H PRN    oxyCODONE-acetaminophen, 1 tablet, Oral, Q4H PRN    simethicone, 1 tablet, Oral, QID PRN    sodium chloride 0.9%, 1-10 mL, Intravenous, Q12H PRN     Review of Systems  Objective:     Weight: 59 kg (130 lb 1.1 oz)  Body mass index is 23.04 kg/m².  Vital Signs (Most Recent):  Temp: 97.4 °F (36.3 °C) (03/19/25 0301)  Pulse: 60 (03/19/25 0601)  Resp: 13 (03/19/25 0601)  BP: (!) 144/62 (03/19/25 0601)  SpO2: 100 % (03/19/25 0601) Vital Signs (24h Range):  Temp:  [97.4 °F (36.3 °C)-98 °F (36.7 °C)] 97.4 °F (36.3 °C)  Pulse:  [59-93] 60  Resp:  [11-18] 13  SpO2:  [96 %-100 %] 100 %  BP: (111-188)/(56-86) 144/62                              Urethral Catheter 03/18/25 1610 Silicone 16  "Fr. (Active)   Site Assessment Clean;Intact 03/19/25 0501   Collection Container Urimeter 03/19/25 0501   Securement Method secured to top of thigh w/ adhesive device 03/19/25 0501   Catheter Care Performed yes 03/19/25 0501   Reason for Continuing Urinary Catheterization Critically ill in ICU and requiring hourly monitoring of intake/output 03/19/25 0501   CAUTI Prevention Bundle Securement Device in place with 1" slack;Intact seal between catheter & drainage tubing;Drainage bag/urimeter off the floor;Sheeting clip in use;No dependent loops or kinks;Drainage bag/urimeter not overfilled (<2/3 full);Drainage bag/urimeter below bladder 03/18/25 1901   Output (mL) 175 mL 03/19/25 0401          Physical Exam         Neurosurgery Physical Exam      General: Well developed, well nourished, no distress.   Head: Normocephalic, atraumatic.  Mental Status: Awake, Alert, Oriented  Speech: Clear with content appropriate to conversation.  Cranial nerves: Face symmetric, CN II-XII grossly intact.   Eyes: Pupils equal, round, reactive to light, EOMI.  Sensory: Intact to light touch throughout.  Motor Strength: Moves all extremities spontaneously with good tone. Full strength upper and lower extremities. No abnormal movements seen.      Strength   Deltoids Triceps Biceps Wrist Extension Wrist Flexion Hand    Upper: R 4/5 5/5 5/5 5/5 5/5 5/5     L 5/5 5/5 5/5 5/5 5/5 5/5       Iliopsoas Quadriceps Knee  Flexion Tibialis  anterior Gastro- cnemius EHL   Lower: R 5/5 5/5 5/5 5/5 5/5 5/5     L 5/5 5/5 5/5 5/5 5/5 5/5    Patient with R distal clavicle fx and h/o of R shoulder arthroplasty.  Hand intrinsics 5/5.     In c-collar.                  Significant Labs:  Recent Labs   Lab 03/18/25 2001 03/18/25 2312 03/19/25  0209   * 132* 94    136 138   K 4.0  4.0 4.3  4.3 4.3    107 107   CO2 21* 23 22*   BUN 47* 45* 46*   CREATININE 1.3 1.3 1.2   CALCIUM 7.7* 7.8* 7.7*   MG  --   --  2.7*     Recent Labs   Lab " 03/18/25  0528 03/18/25  0938 03/19/25  0209   WBC 9.33  --  11.25   HGB 10.3*  --  9.6*   HCT 33.7* 33* 30.1*     --  373     Recent Labs   Lab 03/18/25  1648   INR 0.9   APTT 27.6     Microbiology Results (last 7 days)       ** No results found for the last 168 hours. **          All pertinent labs from the last 24 hours have been reviewed.    Significant Diagnostics:  I have reviewed all pertinent imaging results/findings within the past 24 hours.  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 Multilevel foraminal stenosis.     Discussed surgical vs non-surgical options with the patient and her daughter Liliana Montgomery present. Patient's grandson Aidan (neurosurgeon) was also on the phone for this conversation. Given the degree of upper cervical cord kinking, cord edema, and craniocervical settling recommended surgical intervention to preserve her current function and prevent neurological decline. Surgical recommendation is occiput-C4 fusion. She would also need pre-op traction. Non-surgical option is strict cervical collar 24/7 indefinitely. This would also require the patient to be compliant with collar use. Fracture appears stable in X-Ray c spine upright/supine. Patient and daughter have decided to proceed with surgery.    Plan:   - Cervical collar at all times, cervical traction in place.   - XR cervical spine upright/supine in collar stable. Continue collar. Ok for bed level  PT/OT only with collar in place  - Multimodal pain control.  - Bowel regimen.  - Notify NSGY immediately with any decline on exam.  - Patient and Family have decided to proceed with surgery. Booked for O-C4 fusion on 3/19/25. Consented for procedure, also with blood consent OK for family blood only. Pre-op traction on 3/18. Post traction XR stable.   - RCRI/medical risk stratification - NSQUIP risk is 2.1% of death; 16.7% of any complication.   - K+ optimized, s/p insulin and glucose shift on 3/18.  - Med management per primary          Ben Rodríguez MD  Neurosurgery  [facility]

## 2025-03-19 NOTE — PT/OT/SLP PROGRESS
Occupational Therapy      Patient Name:  Bridget Montgomery   MRN:  [id number]    Patient not seen today secondary to YOLI to OR. Discharging OT orders. Patient will require new orders and re-evaluation once medically appropriate .    3/19/2025

## 2025-03-20 PROBLEM — R51.9 HEADACHE: Status: RESOLVED | Noted: 2025-03-09 | Resolved: 2025-03-20

## 2025-03-20 PROBLEM — G95.20 CORD COMPRESSION: Status: RESOLVED | Noted: 2025-03-12 | Resolved: 2025-03-20

## 2025-03-20 PROBLEM — D72.829 LEUCOCYTOSIS: Status: ACTIVE | Noted: 2025-03-20

## 2025-03-20 LAB
ALBUMIN SERPL BCP-MCNC: 2.5 G/DL (ref 3.5–5.2)
ALP SERPL-CCNC: 140 U/L (ref 40–150)
ALT SERPL W/O P-5'-P-CCNC: 41 U/L (ref 10–44)
ANION GAP SERPL CALC-SCNC: 10 MMOL/L (ref 8–16)
AST SERPL-CCNC: 55 U/L (ref 10–40)
BASOPHILS # BLD AUTO: 0.02 K/UL (ref 0–0.2)
BASOPHILS NFR BLD: 0.2 % (ref 0–1.9)
BILIRUB SERPL-MCNC: 0.2 MG/DL (ref 0.1–1)
BUN SERPL-MCNC: 43 MG/DL (ref 8–23)
CALCIUM SERPL-MCNC: 7.6 MG/DL (ref 8.7–10.5)
CHLORIDE SERPL-SCNC: 107 MMOL/L (ref 95–110)
CK SERPL-CCNC: 420 U/L (ref 20–180)
CO2 SERPL-SCNC: 21 MMOL/L (ref 23–29)
CREAT SERPL-MCNC: 1.2 MG/DL (ref 0.5–1.4)
DIFFERENTIAL METHOD BLD: ABNORMAL
EOSINOPHIL # BLD AUTO: 0 K/UL (ref 0–0.5)
EOSINOPHIL NFR BLD: 0 % (ref 0–8)
ERYTHROCYTE [DISTWIDTH] IN BLOOD BY AUTOMATED COUNT: 15.2 % (ref 11.5–14.5)
EST. GFR  (NO RACE VARIABLE): 43 ML/MIN/1.73 M^2
GLUCOSE SERPL-MCNC: 108 MG/DL (ref 70–110)
HCT VFR BLD AUTO: 28.2 % (ref 37–48.5)
HGB BLD-MCNC: 8.9 G/DL (ref 12–16)
IMM GRANULOCYTES # BLD AUTO: 0.2 K/UL (ref 0–0.04)
IMM GRANULOCYTES NFR BLD AUTO: 1.6 % (ref 0–0.5)
LYMPHOCYTES # BLD AUTO: 1.2 K/UL (ref 1–4.8)
LYMPHOCYTES NFR BLD: 9 % (ref 18–48)
MAGNESIUM SERPL-MCNC: 2.9 MG/DL (ref 1.6–2.6)
MCH RBC QN AUTO: 28.3 PG (ref 27–31)
MCHC RBC AUTO-ENTMCNC: 31.6 G/DL (ref 32–36)
MCV RBC AUTO: 90 FL (ref 82–98)
MONOCYTES # BLD AUTO: 1.6 K/UL (ref 0.3–1)
MONOCYTES NFR BLD: 12.5 % (ref 4–15)
NEUTROPHILS # BLD AUTO: 9.9 K/UL (ref 1.8–7.7)
NEUTROPHILS NFR BLD: 76.7 % (ref 38–73)
NRBC BLD-RTO: 0 /100 WBC
PHOSPHATE SERPL-MCNC: 4.2 MG/DL (ref 2.7–4.5)
PLATELET # BLD AUTO: 341 K/UL (ref 150–450)
PMV BLD AUTO: 10.9 FL (ref 9.2–12.9)
POTASSIUM SERPL-SCNC: 4.6 MMOL/L (ref 3.5–5.1)
PROT SERPL-MCNC: 5.3 G/DL (ref 6–8.4)
RBC # BLD AUTO: 3.14 M/UL (ref 4–5.4)
SODIUM SERPL-SCNC: 138 MMOL/L (ref 136–145)
WBC # BLD AUTO: 12.87 K/UL (ref 3.9–12.7)

## 2025-03-20 PROCEDURE — 80053 COMPREHEN METABOLIC PANEL: CPT

## 2025-03-20 PROCEDURE — 97164 PT RE-EVAL EST PLAN CARE: CPT

## 2025-03-20 PROCEDURE — 97535 SELF CARE MNGMENT TRAINING: CPT

## 2025-03-20 PROCEDURE — 97112 NEUROMUSCULAR REEDUCATION: CPT

## 2025-03-20 PROCEDURE — 85025 COMPLETE CBC W/AUTO DIFF WBC: CPT

## 2025-03-20 PROCEDURE — 25000003 PHARM REV CODE 250: Performed by: PHYSICIAN ASSISTANT

## 2025-03-20 PROCEDURE — 25000003 PHARM REV CODE 250: Performed by: HOSPITALIST

## 2025-03-20 PROCEDURE — 25000003 PHARM REV CODE 250

## 2025-03-20 PROCEDURE — 11000001 HC ACUTE MED/SURG PRIVATE ROOM

## 2025-03-20 PROCEDURE — 83735 ASSAY OF MAGNESIUM: CPT

## 2025-03-20 PROCEDURE — 84100 ASSAY OF PHOSPHORUS: CPT

## 2025-03-20 PROCEDURE — 97530 THERAPEUTIC ACTIVITIES: CPT

## 2025-03-20 PROCEDURE — 94761 N-INVAS EAR/PLS OXIMETRY MLT: CPT

## 2025-03-20 PROCEDURE — 97116 GAIT TRAINING THERAPY: CPT

## 2025-03-20 PROCEDURE — 25000003 PHARM REV CODE 250: Performed by: INTERNAL MEDICINE

## 2025-03-20 PROCEDURE — 27000221 HC OXYGEN, UP TO 24 HOURS

## 2025-03-20 PROCEDURE — 82550 ASSAY OF CK (CPK): CPT | Performed by: PHYSICIAN ASSISTANT

## 2025-03-20 PROCEDURE — 99499 UNLISTED E&M SERVICE: CPT | Mod: ,,, | Performed by: PHYSICIAN ASSISTANT

## 2025-03-20 PROCEDURE — 25000003 PHARM REV CODE 250: Performed by: STUDENT IN AN ORGANIZED HEALTH CARE EDUCATION/TRAINING PROGRAM

## 2025-03-20 PROCEDURE — 99900035 HC TECH TIME PER 15 MIN (STAT)

## 2025-03-20 PROCEDURE — 63600175 PHARM REV CODE 636 W HCPCS: Performed by: PHYSICIAN ASSISTANT

## 2025-03-20 PROCEDURE — 63600175 PHARM REV CODE 636 W HCPCS: Performed by: INTERNAL MEDICINE

## 2025-03-20 PROCEDURE — 97168 OT RE-EVAL EST PLAN CARE: CPT

## 2025-03-20 PROCEDURE — 63600175 PHARM REV CODE 636 W HCPCS: Performed by: NEUROLOGICAL SURGERY

## 2025-03-20 RX ORDER — ACETAMINOPHEN 325 MG/1
650 TABLET ORAL EVERY 6 HOURS PRN
Status: DISCONTINUED | OUTPATIENT
Start: 2025-03-20 | End: 2025-03-24 | Stop reason: HOSPADM

## 2025-03-20 RX ORDER — METHOCARBAMOL 500 MG/1
500 TABLET, FILM COATED ORAL 3 TIMES DAILY
Status: DISCONTINUED | OUTPATIENT
Start: 2025-03-20 | End: 2025-03-24 | Stop reason: HOSPADM

## 2025-03-20 RX ORDER — POLYETHYLENE GLYCOL 3350 17 G/17G
17 POWDER, FOR SOLUTION ORAL DAILY
Status: DISCONTINUED | OUTPATIENT
Start: 2025-03-20 | End: 2025-03-24 | Stop reason: HOSPADM

## 2025-03-20 RX ORDER — ENOXAPARIN SODIUM 100 MG/ML
30 INJECTION SUBCUTANEOUS EVERY 24 HOURS
Status: DISCONTINUED | OUTPATIENT
Start: 2025-03-20 | End: 2025-03-24 | Stop reason: HOSPADM

## 2025-03-20 RX ORDER — CALCIUM GLUCONATE 20 MG/ML
1 INJECTION, SOLUTION INTRAVENOUS ONCE
Status: COMPLETED | OUTPATIENT
Start: 2025-03-20 | End: 2025-03-20

## 2025-03-20 RX ORDER — CALCIUM CARBONATE 200(500)MG
1000 TABLET,CHEWABLE ORAL 3 TIMES DAILY PRN
Status: DISCONTINUED | OUTPATIENT
Start: 2025-03-20 | End: 2025-03-24 | Stop reason: HOSPADM

## 2025-03-20 RX ORDER — BISACODYL 10 MG/1
10 SUPPOSITORY RECTAL DAILY PRN
Status: DISCONTINUED | OUTPATIENT
Start: 2025-03-20 | End: 2025-03-24 | Stop reason: HOSPADM

## 2025-03-20 RX ORDER — CEFAZOLIN SODIUM 1 G/3ML
1 INJECTION, POWDER, FOR SOLUTION INTRAMUSCULAR; INTRAVENOUS
Status: DISCONTINUED | OUTPATIENT
Start: 2025-03-20 | End: 2025-03-22

## 2025-03-20 RX ORDER — AMOXICILLIN 250 MG
1 CAPSULE ORAL DAILY
Status: DISCONTINUED | OUTPATIENT
Start: 2025-03-20 | End: 2025-03-23

## 2025-03-20 RX ORDER — METHOCARBAMOL 750 MG/1
750 TABLET, FILM COATED ORAL 3 TIMES DAILY PRN
Status: DISCONTINUED | OUTPATIENT
Start: 2025-03-20 | End: 2025-03-20

## 2025-03-20 RX ORDER — ENOXAPARIN SODIUM 100 MG/ML
40 INJECTION SUBCUTANEOUS EVERY 24 HOURS
Status: DISCONTINUED | OUTPATIENT
Start: 2025-03-20 | End: 2025-03-20

## 2025-03-20 RX ADMIN — HYDRALAZINE HYDROCHLORIDE 75 MG: 25 TABLET ORAL at 08:03

## 2025-03-20 RX ADMIN — SENNOSIDES AND DOCUSATE SODIUM 1 TABLET: 50; 8.6 TABLET ORAL at 08:03

## 2025-03-20 RX ADMIN — HYDRALAZINE HYDROCHLORIDE 75 MG: 25 TABLET ORAL at 01:03

## 2025-03-20 RX ADMIN — FLUTICASONE FUROATE AND VILANTEROL TRIFENATATE 1 PUFF: 100; 25 POWDER RESPIRATORY (INHALATION) at 08:03

## 2025-03-20 RX ADMIN — ENOXAPARIN SODIUM 30 MG: 30 INJECTION SUBCUTANEOUS at 05:03

## 2025-03-20 RX ADMIN — MORPHINE SULFATE 2 MG: 2 INJECTION, SOLUTION INTRAMUSCULAR; INTRAVENOUS at 08:03

## 2025-03-20 RX ADMIN — SODIUM CHLORIDE 500 ML: 9 INJECTION, SOLUTION INTRAVENOUS at 02:03

## 2025-03-20 RX ADMIN — FUROSEMIDE 40 MG: 40 TABLET ORAL at 08:03

## 2025-03-20 RX ADMIN — SODIUM CHLORIDE 1000 MG: 1 TABLET ORAL at 08:03

## 2025-03-20 RX ADMIN — CEFAZOLIN 1 G: 330 INJECTION, POWDER, FOR SOLUTION INTRAMUSCULAR; INTRAVENOUS at 12:03

## 2025-03-20 RX ADMIN — CALCIUM GLUCONATE 1 G: 20 INJECTION, SOLUTION INTRAVENOUS at 02:03

## 2025-03-20 RX ADMIN — OXYCODONE AND ACETAMINOPHEN 1 TABLET: 7.5; 325 TABLET ORAL at 11:03

## 2025-03-20 RX ADMIN — OXYCODONE HYDROCHLORIDE AND ACETAMINOPHEN 1 TABLET: 5; 325 TABLET ORAL at 01:03

## 2025-03-20 RX ADMIN — OXYCODONE HYDROCHLORIDE AND ACETAMINOPHEN 1 TABLET: 5; 325 TABLET ORAL at 05:03

## 2025-03-20 RX ADMIN — CALCIUM CARBONATE (ANTACID) CHEW TAB 500 MG 1000 MG: 500 CHEW TAB at 09:03

## 2025-03-20 RX ADMIN — METHOCARBAMOL 500 MG: 500 TABLET ORAL at 08:03

## 2025-03-20 RX ADMIN — GABAPENTIN 300 MG: 300 CAPSULE ORAL at 08:03

## 2025-03-20 RX ADMIN — AMLODIPINE BESYLATE 10 MG: 10 TABLET ORAL at 08:03

## 2025-03-20 RX ADMIN — METHOCARBAMOL 500 MG: 500 TABLET ORAL at 09:03

## 2025-03-20 RX ADMIN — MUPIROCIN: 20 OINTMENT TOPICAL at 08:03

## 2025-03-20 RX ADMIN — CEFAZOLIN 1 G: 330 INJECTION, POWDER, FOR SOLUTION INTRAMUSCULAR; INTRAVENOUS at 11:03

## 2025-03-20 RX ADMIN — OXYCODONE HYDROCHLORIDE AND ACETAMINOPHEN 1 TABLET: 5; 325 TABLET ORAL at 02:03

## 2025-03-20 RX ADMIN — METHOCARBAMOL 500 MG: 500 TABLET ORAL at 03:03

## 2025-03-20 RX ADMIN — POLYETHYLENE GLYCOL 3350 17 G: 17 POWDER, FOR SOLUTION ORAL at 08:03

## 2025-03-20 RX ADMIN — OXYCODONE HYDROCHLORIDE AND ACETAMINOPHEN 1 TABLET: 5; 325 TABLET ORAL at 06:03

## 2025-03-20 NOTE — PT/OT/SLP RE-EVAL
Occupational Therapy   [facility]/Treatment     Name: Bridget Montgomery  MRN: [id number]  Admitting Diagnosis:  Spinal stenosis  Recent Surgery: Procedure(s) (LRB):  FUSION, SPINE, CERVICAL, POSTERIOR APPROACH (N/A) 1 Day Post-Op    Recommendations:     Discharge Recommendations: Moderate Intensity Therapy  Discharge Equipment Recommendations: to be determined by next level of care  Barriers to discharge:  None    Assessment:     Bridget Montgomery is a 89 y.o. female with a medical diagnosis of Spinal stenosis.  She presents with the following performance deficits affecting function:  weakness, impaired endurance, impaired self care skills, impaired functional mobility, gait instability, impaired balance, decreased upper extremity function, decreased lower extremity function, pain, impaired cardiopulmonary response to activity, impaired fine motor, decreased ROM, orthopedic precautions.      Pt agreeable to therapy and tolerated the session well for re-evaluation after surgery yesterday. Pt requires max A to transition to supine to sit, but was able to progress to sitting up on her own EOB. Pt performed grooming EOB by washing her face with SBA. Pt able to ambulate within the room with Min A x 2 and a RW before transferring to the bedside chair where she was left to sit up. Education provided on cervical spinal precautions. Pt would benefit from continued skilled acute OT services during this admission in order to maximize independence and safety with ADLs and functional mobility to ensure safe return to PLOF in the least restrictive environment. Patient currently demonstrates a need for moderate intensity therapy on a daily basis post acute secondary to a decline in functional status due to injury    Rehab Prognosis:  good; patient would benefit from acute skilled OT services to address these deficits and reach maximum level of function.       Plan:     Patient to be seen 4 x/week to address the above  listed problems via self-care/home management, therapeutic activities, therapeutic exercises, neuromuscular re-education  Plan of Care Expires: 04/20/25  Plan of Care Reviewed with: patient, daughter    Subjective     Chief Complaint: Pain  Patient/Family stated goals: To return to PLOF  Communicated with: RN prior to session.  Pain/Comfort:  Pain Rating 1: 9/10  Location - Orientation 1: posterior  Location 1: neck  Pain Addressed 1: Reposition, Distraction  Pain Rating Post-Intervention 1: 9/10    Objective:     Co-evaluation/treatment performed due to patient's multiple deficits requiring two skilled therapists to appropriately and safely assess patient's strength and endurance while facilitating functional tasks in addition to accommodating for patient's activity tolerance.     Communicated with: RN prior to session.  Patient found HOB elevated with: pulse ox (continuous), telemetry, blood pressure cuff, cervical collar, oxygen, noriega catheter upon OT entry to room.    General Precautions: Standard, fall  Orthopedic Precautions: spinal precautions  Braces: Cervical collar  Respiratory Status: Nasal cannula, flow 2.5 L/min    Occupational Performance:    Bed Mobility:    Patient completed Rolling/Turning to Right with moderate assistance  Patient completed Scooting/Bridging with moderate assistance  Patient completed Supine to Sit with maximal assistance    Functional Mobility/Transfers:  Patient completed Sit <> Stand Transfer with minimum assistance and of 2 persons  with  rolling walker   Functional Mobility: Pt engaging in functional mobility to simulate household/community distances with Min A x 2 and utilizing RW in order to maximize functional activity tolerance and standing balance required for engagement in occupations of choice.    Activities of Daily Living:  Grooming: stand by assistance : to wash her face sitting EOB with use of the LUE     Cognitive/Visual Perceptual:  Cognitive/Psychosocial Skills:      -       Oriented to: Person, Place, Time, and Situation   -       Follows Commands/attention:Follows multistep  commands  -       Safety awareness/insight to disability: intact   -       Mood/Affect/Coping skills/emotional control: Appropriate to situation  Visual/Perceptual:      -Intact visual field     Physical Exam:  Balance:  Static Sitting   stand by assistance and moderate assistance   Dynamic Sitting   moderate assistance   Static Standing   minimum assistance   Dynamic Standing   minimum assistance and of 2 persons     Upper Extremity Function:   Dominance: Right  Left UE Right UE   UE Edema None noted None noted   UE ROM WFL except Sh flexion to ~120* WFL except Sh flexion to ~60*   UE Strength Elbow 4-/5  Shoulder 2-/5 Elbow 4-/5  Shoulder 2-/5    Strength Fair Fair   Sensation    -       Intact - numbness in digits at baseline    -       Intact - numbness in digits at baseline    Fine Motor Skills:     -       Intact  Left hand thumb/finger opposition skills    -       Intact  Right hand thumb/finger opposition skills   Gross Motor Skills:   Impaired   Impaired       AMPAC 6 Click:  AMPAC Total Score: 16    Treatment & Education:  Therapist provided facilitation and instruction of proper body mechanics and fall prevention strategies during tasks listed above.  Instructed patient to sit in bedside chair daily to increase OOB/activity tolerance.  Instructed patient to use call light to have nursing staff assist with needs/transfers.  Discussed OT POC and answered all questions within OT scope of practice.  Whiteboard updated       Patient left up in chair with all lines intact, call button in reach, RN notified, and daughter present    GOALS:   Multidisciplinary Problems       Occupational Therapy Goals          Problem: Occupational Therapy    Goal Priority Disciplines Outcome Interventions   Occupational Therapy Goal     OT, PT/OT Progressing    Description: Goals to be met by: 4/20/25      Patient will increase functional independence with ADLs by performing:    UE Dressing with [place].  LE Dressing with Modified [place].  Grooming while standing at sink with Minimal Assistance and Assistive Devices as needed.  Toileting from toilet with Supervision for hygiene and clothing management.   Rolling to Right, Left with Stand-by Assistance.   Supine to sit with Minimal Assistance.  Step transfer with SBA  Increased functional strength to WNL for BUEs.  Upper extremity exercise program x10 reps per handout, with assistance as needed.                         DME Justifications:  No DME recommended requiring DME justifications    History:     Past Medical History:   Diagnosis Date    Allergy     Anemia, unspecified     Arthritis     CKD (chronic kidney disease) stage 4, GFR 15-29 ml/min     COPD (chronic obstructive pulmonary disease)     Edema     HTN (hypertension)     Hypocalcemia     Hyponatremia     Osteoarthritis          Past Surgical History:   Procedure Laterality Date    BREAST CYST EXCISION      CAUDAL EPIDURAL STEROID INJECTION N/A 2/2/2023    Procedure: Injection-steroid-epidural-caudal;  Surgeon: Angel Sparrow MD;  [facility]: [facility];  Service: Pain Management;  Laterality: N/A;    FOOT SURGERY      FUSION OF CERVICAL SPINE BY POSTERIOR APPROACH N/A 3/19/2025    Procedure: FUSION, SPINE, CERVICAL, POSTERIOR APPROACH;  Surgeon: Clint Lemons MD;  Location: [facility] OR [address] [place] [address];  Service: Neurosurgery;  Laterality: N/A;  Occiput-C4       Time Tracking:     OT Date of Treatment: 03/20/25  OT Start Time: 0954  OT Stop Time: 1030  OT Total Time (min): 36 min    Billable Minutes:Re-eval 10  Self Care/Home Management 10  Neuromuscular Re-education 16    3/20/2025

## 2025-03-20 NOTE — PLAN OF CARE
PT Eval complete, appropriate goals created    Problem: Physical Therapy  Goal: Physical Therapy Goal  Description: Goals to be completed by: 3/30/25    Pt will perform sup<>sit transfers w/ moderate assistance  Pt will be able to stand up from EOB w/ contact guard assistance using LRAD  Pt will ambulate 30 feet w/ contact guard assistance using LRAD  Pt will be independent w/ HEP therex on BLE w/ good form and ROM   Pt will be independent w/ recalling 3/3 spinal precautions  Outcome: Progressing

## 2025-03-20 NOTE — PROVIDER TRANSFER
"Neuro Critical Care Transfer of Care note    Date of Admit: 3/9/2025  Date of Transfer / Stepdown: 3/20/2025    Brief History of Present Illness:      HPI:  Bridget Montgomery is a 89 y.o. female with COPD, HTN, HLD, history of TIA who is transferred here from [place] for NSGY evaluation for cervical cord compression.     Per Transfer Center:  "89F COPD, CKD4, GERD, HTN, HLD, hx of TIA, prediabetes, and spinal stenosis who initially presented to [facility] on 3/8/2025 with a primary complaint of sudden onset weakness. The patient was in their usual state of health until Monday. Starting on Monday, the patient started having a headache that started in her neck and migrated toward the top of her head. She has had chronic neck pain due to cervical stenosis, but the headache was new. She managed the pain with tylenol. On Friday, she presented to her PCP (Dr. Alejandrina Russ) who gave her 2 injections into her neck to help with the pain. She is not sure what the injection was but thinks it might have been a steroid. On Saturday, she had a normal morning and was able to perform all of her ADLs. Around 8 pm, she suddenly became weak in her arms and legs and was not able to walk. She was able to catch herself prior to falling. The primary concern was the weakness and ongoing headache. She endorses chronic numbness/tingling in her fingers that has not worsened. She denies any sensory deficits. She denies any urinary incontinence or fecal incontinence. She endorses pain in her back, neck, knees, and a headache. She denies any fevers, chills, chest pain, shortness of breath, abdominal pain, N/V, diarrhea, constipation. CT Head was negative for acute stroke. MRI shows new onset cervical cord compression C1-C2. Neurosurgery consulted, recommended transferring pt to [facility] for a higher level of care."     Overview/Hospital Course:  89 y.o. female with COPD, HTN, HLD, cervical stenosis, history of TIA who is " transferred here from [place] for NSGY evaluation for cervical cord compression. Presented to outside hospital with increased generalized weakness, particularly in her extremities.  CVA workup negative, however MRI showed new onset cervical cord compression, likely contributing to her symptoms.  Transferred here for Neurosurgery evaluation. MRI Cervical spine 3/9/25: chronic type 2 odontoid fracture with progressed retropulsion of the fracture fragment in comparison to prior MRI from 8/11/2023, with moderate to severe canal stenosis at C1-C2. Multilevel foraminal stenosis. Cont C-collar at all times. Neurosurgery recommended occiput-C4 fusion. Non-surgical option is strict cervical collar 24/7 indefinitely. XR with chronic odontoid type 2 fracture, position alignment appears stable. Decadron started 3/12 for new RUE weakness.  Started on salt tablets for dropping Na - stabilized. K began rising, started on binders.    Patient transferred from  to [facility] 3/18 for cervical traction prior to OR. She underwent O-C4 posterior fusion on 3/19.     Hospital Course By Problem with Pertinent Findings:     1. Cervical spinal stenosis   MRI Cervical spine 3/9/25: Chronic type 2 odontoid fracture with progressed retropulsion of the fracture fragment in comparison to prior MRI from 8/11/2023, with moderate to severe canal stenosis at C1-C2. Multilevel foraminal stenosis.      Now s/p Occipital - C4 posterior spinal fusion (3/19).      POD 1    - Cervical collar at all times, will continue collar post operatively for 3 months   - Multimodal pain management (added robaxin today, continuing gabapentin, PRN apap, PRN oxy, PRN morphine)   - Drain remains in place, abx ppx while in place  - PT/OT ordered post-op, recommendations pending   - Steroids dcd post-op     2. CKD/HyperK   - CPK also midly elevated, gentle hydration provided, downtrending  - HyperK shifted with insulin pre-op, has remained WNL since  - Lokelma dcd while K+ WNL        3. Hypertension   - SBP goal <160  - Home antihypertensives on board    Consultants and Procedures:     Consultants:  Neurosurgery     Procedures:    O-C4 posterior cervical fusion     Transfer Information:     Diet:  Regular     Physical Activity:  PT and OT re-ordered post-op, pending evaluations  -C-collar to be worn at all times for next 3 months!    To Do / Pending Studies / Follow ups:  -post-op xrays  -drain removal  -PT/OT recommendations       Adeline Raza  Neuro Crtical Care

## 2025-03-20 NOTE — ANESTHESIA POSTPROCEDURE EVALUATION
Anesthesia Post Evaluation    Patient: Bridget Montgomery    Procedure(s) Performed: Procedure(s) (LRB):  FUSION, SPINE, CERVICAL, POSTERIOR APPROACH (N/A)    Final Anesthesia Type: general      Patient location during evaluation: PACU  Patient participation: Yes- Able to Participate  Level of consciousness: awake and alert  Post-procedure vital signs: reviewed and stable  Pain management: adequate  Airway patency: patent    PONV status: None or treated.  Anesthetic complications: no      Cardiovascular status: hemodynamically stable  Respiratory status: spontaneous ventilation  Hydration status: euvolemic  Follow-up not needed.          Vitals Value Taken Time   /63 03/20/25 14:32   Temp 36.6 °C (97.8 °F) 03/20/25 11:01   Pulse 50 03/20/25 14:52   Resp 18 03/20/25 13:40   SpO2 100 % 03/20/25 14:52   Vitals shown include unfiled device data.      No case tracking events are documented in the log.      Pain/Javier Score: Pain Rating Prior to Med Admin: 6 (3/20/2025  1:40 PM)  Pain Rating Post Med Admin: 4 (3/20/2025  8:40 AM)

## 2025-03-20 NOTE — ASSESSMENT & PLAN NOTE
History of COPD, not on home oxygen. Currently without evidence of acute exacerbation, and sats on room air at goal 88% or greater. Will continue hospital formulary equivalent of home regimen and monitor respiratory status.     3/20 sats 100% on 2LNC

## 2025-03-20 NOTE — ASSESSMENT & PLAN NOTE
Chronic, however likely exacerbated by cervical radiculopathy.    PT/OT consulted - recs SNF

## 2025-03-20 NOTE — ASSESSMENT & PLAN NOTE
K 6.3 on arrival to ICU, no peaked T on tele     EKG followed, given insulin, albuterol, lokelma   Trended K q4h until normalized-- as this am, now continue to monitor daily  CPK downtrended but remains mildly elevated. Administered 500 cc NS over 5 hours overnight.

## 2025-03-20 NOTE — CONSULTS
[facility]  Wound Care    Patient Name:  Bridget Montgomery   MRN:  [id number]  Date: 3/20/2025  Diagnosis: Spinal stenosis    History:     Past Medical History:   Diagnosis Date    Allergy     Anemia, unspecified     Arthritis     CKD (chronic kidney disease) stage 4, GFR 15-29 ml/min     COPD (chronic obstructive pulmonary disease)     Edema     HTN (hypertension)     Hypocalcemia     Hyponatremia     Osteoarthritis        Social History[1]    Precautions:     Allergies as of 03/09/2025 - Reviewed 03/09/2025   Allergen Reaction Noted    Cephalexin  03/21/2018    Codeine  03/21/2018    Meperidine  01/29/2019    Nsaids (non-steroidal anti-inflammatory drug)  10/03/2023    Penicillins  03/21/2018    Tazarotene  03/30/2020       WOC Assessment Details/Treatment     Wound care consult received for leg, buttocks.  Chart reviewed for this encounter.  See flowsheets for findings.    Pt found sitting up in chair, agreeable to care at this time. Pt w/ small skin tear to RLE - foam dressing in place. Pt turned into lateral position w/ minimal assistance, gluteal cleft pink and moist, however, no open wounds. Will order Z-Guard for moisture barrier protection. WC will sign off.    RECOMMENDATIONS:  Q3 days - RLE - cleanse gently w/ NS, pat dry and apply Mepilex foam dressing.    BID/PRN - gluteal cleft - cleanse gently w/ no rinse bath wipes, pat dry and apply Z-Guard to affected area.     03/20/25 1040   WOCN Assessment   WOCN Total Time (mins) 15   Visit Date 03/20/25   Visit Time 1040   Consult Type New   WOCN Speciality Wound   Intervention assessed;changed;applied;chart review;coordination of care;orders   Teaching on-going        Wound 03/17/25 0001 Skin Tear Right Leg   Date First Assessed/Time First Assessed: 03/17/25 0001   Primary Wound Type: Skin Tear  Side: Right  Location: Leg   Wound Image    Dressing Appearance Moist drainage   Drainage Amount Scant   Drainage Characteristics/Odor Serous    Appearance Maroon;Moist   Tissue loss description Partial thickness   Periwound Area Intact;Dry   Wound Edges Irregular   Dressing Foam          [1]   Social History  Socioeconomic History    Marital status:    Tobacco Use    Smoking status: Former    Smokeless tobacco: Never   Substance and Sexual Activity    Alcohol use: Not Currently    Drug use: Never    Sexual activity: Not Currently     Social Drivers of Health     Financial Resource Strain: Low Risk  (3/10/2025)    Overall Financial Resource Strain (CARDIA)     Difficulty of Paying Living Expenses: Not hard at all   Food Insecurity: No Food Insecurity (3/10/2025)    Hunger Vital Sign     Worried About Running Out of Food in the Last Year: Never true     Ran Out of Food in the Last Year: Never true   Transportation Needs: Unmet Transportation Needs (7/2/2024)    Received from [facility]    PRAPAR - Transportation     Lack of Transportation (Medical): No     Lack of Transportation (Non-Medical): Yes   Physical Activity: Insufficiently Active (3/10/2025)    Exercise Vital Sign     Days of Exercise per Week: 4 days     Minutes of Exercise per Session: 30 min   Stress: No Stress Concern Present (3/10/2025)    [employer] [place] [employer] - Occupational Stress Questionnaire     Feeling of Stress : Only a little   Housing Stability: Low Risk  (3/10/2025)    Housing Stability Vital Sign     Unable to Pay for Housing in the Last Year: No     Homeless in the Last Year: No

## 2025-03-20 NOTE — PLAN OF CARE
[facility] Medicine ICU Acceptance Note    Date of Admit: 3/9/2025  Date of Transfer: 3/20/2025  Bodonns, C/J, L, Onc (IV chemo w/in 1 month), Gyn/Onc, or other special case?: no   ICU team stepping patient down:  NICU  Accepting  team: ROCIO BARTON    Brief History of Present Illness:      Bridget Montgomery merlene  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 Multilevel foraminal stenosis.      Now s/p Occipital - C4 posterior spinal fusion (3/19).    To Do / Pending Studies / Follow ups:    PT/OT evaluation   PT/OT/OOB     Patient has been accepted by Hospital Medicine Team ROCIO BARTON , who will assume care of the patient upon arrival to the floor from the ICU. Please contact ICU team with any concerns prior to arrival. Please contact [facility] at [phone] or [phone] (please do NOT leave a voicemail) when patient arrives to the floor.    Luis Gunn MD  Attending Staff Physician  [facility]  pager- [phone]  [facility] - [phone]

## 2025-03-20 NOTE — ASSESSMENT & PLAN NOTE
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 Multilevel foraminal stenosis.   - Cont C-collar at all times  - Upright and supine cervical x-ray pending  - Neurosurgery recommended occiput-C4 fusion. Non-surgical option is strict cervical collar 24/7 indefinitely  - XR with chronic odontoid type 2 fracture, position alignment appears stable   - Decadron started for new neuro deficit  - surgery for 3/19      3/20 Transfer to [facility] medicine. 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 Multilevel foraminal stenosis.    Now s/p Occipital - C4 posterior spinal fusion (3/19). Continue Cervical collar at all times post operatively for 3 months. Post operative XR C spine pending.  Multimodal pain control  with oxycodone 5/7.5 prn, morphine prn, robaxin prn) CPK trended down 623--> 420.  vancomycin discontinued.  started on cefazolin due to  concern for allergic reaction. Mosher and A-line removed. DVT ppx initiated.    Ancef can be discontinued once drain removed     Cord Compression  - MRI Cervical spine 3/9/25: chronic type 2 odontoid fracture with progressed retropulsion of the fracture fragment in comparison to prior MRI from 8/11/2023, with moderate to severe canal stenosis at C1-C2. Multilevel foraminal stenosis.   - Cont C-collar at all times  - Upright and supine cervical x-ray  pending  - Neurosurgery recommended occiput-C4 fusion. Non-surgical option is strict cervical collar 24/7 indefinitely  - XR with chronic odontoid type 2 fracture, position alignment appears stable   - Decadron started for new neuro deficit  - surgery for 3/19      See above

## 2025-03-20 NOTE — ASSESSMENT & PLAN NOTE
89F PMHx COPD, HTN, HLD, history of TIA, known type 2 odontoid fx followed by Dr. Quach with non-surgical management, and R shoulder arthroplasty 06/2024 presenting to [facility] for preoperative traction on 3/18, now S/P O-C4 posterior cervical fusion 3/19.    MRI demonstrated chronic type 2 odontoid fracture with progressed retropulsion of the fracture fragment in comparison to prior MRI from 8/11/2023, with moderate to severe canal stenosis at C1-C2.     Admit to [facility]  Q1h neuro checks, I&Os, and vitals   NSGY following   SBP <160   pain regimen, added robaxin   Normal Diet   SCDs; DVT ppx initiated this am   PT/OT re-ordered post-op on 3/19   S/PO-C4 fusion on 3/19/25   HV drain x 1 to suction, cefazolin for ppx

## 2025-03-20 NOTE — PROGRESS NOTES
VANCOMYCIN DOSING BY PHARMACY DISCONTINUATION NOTE    Bridget Montgomery is a 89 y.o. female who had been consulted for vancomycin dosing.    The pharmacy consult for vancomycin dosing has been discontinued.     Vancomycin Dosing by Pharmacy Consult will sign-off. Please reconsult if necessary. Thank you for allowing us to participate in this patient's care.     Kimberly Moreland, PharmD, [facility]  [phone]

## 2025-03-20 NOTE — PLAN OF CARE
[facility]     Department of Hospital Medicine     [address] [place] [address]     [phone] after hours  [phone] fax       NURSING HOME ORDERS    Patient Name: Bridget Montgomery  YOB: [birth year]    Admit to Nursing Home:   Skilled Bed      Diagnoses:  Active Hospital Problems    Diagnosis  POA    *Spinal stenosis [M48.00]  Yes     Chronic    Anemia [D64.9]  Yes    Urinary retention [R33.9]  Yes    Leucocytosis [D72.829]  Yes    Hyperkalemia [E87.5]  No    Cervical spinal stenosis [M48.02]  Yes    Generalized weakness [R53.1]  Yes     Chronic    Other emphysema [J43.8]  Yes     Chronic     - remote history of smoking      Mixed hyperlipidemia [E78.2]  Yes    Essential hypertension [I10]  Yes    Chronic kidney disease, stage III (moderate) [N18.30]  Yes     Chronic     - sees Lacey.  - last GFR was 36 (in 2022)        Resolved Hospital Problems    Diagnosis Date Resolved POA    Cord compression [G95.20] 03/20/2025 Yes    Headache [R51.9] 03/20/2025 Yes    Hyponatremia [E87.1] 03/18/2025 Yes    Edema, spinal cord [G95.19] 03/21/2025 Yes       Patient is homebound due to:  Spinal stenosis    Allergies:  Review of patient's allergies indicates:   Allergen Reactions    Cephalexin     Codeine     Meperidine      Other reaction(s): delerium, hallucination  Delerium  Delerium  Delerium      Nsaids (non-steroidal anti-inflammatory drug)     Penicillins     Tazarotene      Other reaction(s): rash, Unknown       Vitals:     Every shift (Skilled Nursing patients)    Diet: regular diet              Acitivities:      Cervical collar at all times, will continue collar post operatively for 3 months       - Up in a chair each morning as tolerated  - Ambulate with assistance to bathroom  - Scheduled walks once each shift (every 8 hours)  - May ambulate independently  - May use walker, cane, or self-propelled wheelchair       - Weight  bearing: as tolerated      LABS:  Per facility protocol    Nursing Precautions:     - Aspiration precautions:             - Total assistance with meals            -  Upright 90 degrees befor during and after meals             -  Suction at bedside          - Fall precautions per nursing home protocol   - Seizure precaution per half-way protocol   - Decubitus precautions:        -  for positioning   - Pressure reducing foam mattress   - Turn patient every two hours. Use wedge pillows to anchor patient    CONSULTS:    Cervical collar at all times, will continue collar post operatively for 3 months        Physical Therapy to evaluate and treat     Occupational Therapy to evaluate and treat      Code status - Full code     Wound care    Skin tear right leg - Q3 days - RLE - cleanse gently w/ NS, pat dry and apply Mepilex foam dressing.  BID/PRN - gluteal cleft - cleanse gently w/ no rinse bath wipes, pat dry and apply Z-Guard to affected area.      MISCELLANEOUS CARE:    Routine Skin for Bedridden Patients:  Apply moisture barrier cream to all    skin folds and wet areas in perineal area daily and after baths and                           all bowel movements.         DIABETES CARE:  NA    Medications: Discontinue all previous medication orders, if any. See new list below.        Medication List        START taking these medications      bethanechol 10 MG Tab  Commonly known as: URECHOLINE  Take 1 tablet (10 mg total) by mouth 3 (three) times daily.     cloNIDine 0.1 mg/24 hr td ptwk 0.1 mg/24 hr  Commonly known as: CATAPRES  Place 1 patch onto the skin every 7 days.     fluticasone furoate-vilanteroL 100-25 mcg/dose diskus inhaler  Commonly known as: BREO  Inhale 1 puff into the lungs once daily. Controller  Replaces: budesonide-formoterol 160-4.5 mcg 160-4.5 mcg/actuation Hfaa     hydrALAZINE 25 MG tablet  Commonly known as: APRESOLINE  Take 3 tablets (75 mg total) by mouth every 8 (eight) hours.      oxyCODONE-acetaminophen 5-325 mg per tablet  Commonly known as: PERCOCET  Take 1 tablet by mouth every 6 (six) hours as needed.     polyethylene glycol 17 gram/dose powder  Commonly known as: GLYCOLAX  Use cap to measure 17 grams.  Dissolve as directed and take by mouth once daily.     sodium chloride 1,000 mg Tbso oral tablet  Take 1 tablet (1,000 mg total) by mouth 2 (two) times daily. for 15 days     tamsulosin 0.4 mg Cap  Commonly known as: FLOMAX  Take 1 capsule (0.4 mg total) by mouth once daily.            CHANGE how you take these medications      furosemide 40 MG tablet  Commonly known as: LASIX  Take 1 tablet (40 mg total) by mouth once daily.  What changed:   medication strength  how much to take  when to take this     methocarbamoL 500 MG Tab  Commonly known as: Robaxin  Take 1 tablet (500 mg total) by mouth 3 (three) times daily. for 10 days  What changed: See the new instructions.            CONTINUE taking these medications      albuterol 90 mcg/actuation inhaler  Commonly known as: PROVENTIL/VENTOLIN HFA  Inhale 2 puffs into the lungs every 6 (six) hours as needed.     amLODIPine 10 MG tablet  Commonly known as: NORVASC  Take 1 tablet (10 mg total) by mouth once daily.     fluticasone propionate 50 mcg/actuation nasal spray  Commonly known as: FLONASE  1 spray by Nasal route 2 (two) times daily.     gabapentin 300 MG capsule  Commonly known as: NEURONTIN  Take 300 mg by mouth every evening.     ipratropium 42 mcg (0.06 %) nasal spray  Commonly known as: ATROVENT     melatonin 3 mg tablet  Commonly known as: MELATIN  Take 2 tablets (6 mg total) by mouth nightly as needed for Insomnia.     omega 3-dha-epa-fish oil 1,000 (120-180) mg Cap  Take 1 capsule by mouth once daily.     omeprazole 20 MG capsule  Commonly known as: PRILOSEC  Take 1 capsule (20 mg total) by mouth 2 (two) times daily before meals.            STOP taking these medications      budesonide-formoterol 160-4.5 mcg 160-4.5  mcg/actuation Hfaa  Commonly known as: SYMBICORT  Replaced by: fluticasone furoate-vilanteroL 100-25 mcg/dose diskus inhaler     pregabalin 50 MG capsule  Commonly known as: LYRICA     TOLAK 4 % Crea  Generic drug: fluorouraciL     traMADoL 50 mg tablet  Commonly known as: ULTRAM                   _________________________________  Luis Gunn MD  03/24/2025

## 2025-03-20 NOTE — ASSESSMENT & PLAN NOTE
Uncontrolled  Pulse too slow to start BB.    Chronic, controlled.  Latest blood pressure and vitals reviewed-   Temp:  [97.4 °F (36.3 °C)-98.4 °F (36.9 °C)]   Pulse:  [42-60]   Resp:  [10-20]   BP: (104-158)/(52-69)   SpO2:  [94 %-100 %]   Arterial Line BP: (116-159)/(32-96) .   Home meds for hypertension were reviewed and noted below. Hospital anti-hypertensive changes were made as shown below.  Hypertension Medications              amLODIPine (NORVASC) 10 MG tablet Take 1 tablet (10 mg total) by mouth once daily.    furosemide (LASIX) 20 MG tablet Take 1 tablet (20 mg total) by mouth 2 (two) times a day.        continue clonidine and hydralazine   PRN meds if BP> 180/110 mm HG scheduled hydralazine  Continue norvasc

## 2025-03-20 NOTE — PROGRESS NOTES
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 She initially presented with a week of severe persistent HA, and diffuse weakness of all extremities. Her symptoms began last Monday with HA that started in her neck and migrated toward the top of her head. She has had chronic neck pain due to cervical stenosis, but the headache was new. On Saturday, she was at her baseline, able to perform all of her ADLs. Around 8 pm, she suddenly became weak in all extremities and was not able to walk. MRI demonstrated chronic type 2 odontoid fracture with progressed retropulsion of the fracture fragment in comparison to prior MRI from 8/11/2023, with moderate to severe canal stenosis at C1-C2. Of note, patient had previously discontinued wearing her cervical collar. Denied radiculopathy in her arms or legs, sensory deficit, bladder or bowel incontinence. Family decided to proceed with surgery. Booked for O-C4 fusion on 3/19/25. Admitted to [facility] for pre-op traction on 3/18.     Hospital Course: 03/19/2025: To OR for O-C4 fusion  03/20/2025: POD 1 S/P O-C4 fusion. NAEO. VSS. Neurologically intact. Continues with residual neck pain, robaxin added to pain regimen. Pending step down to .       Interval History:  POD 1 S/P O-C4 fusion from 3/19. NAEO. VSS. Neurologically intact. Continues with residual neck pain, robaxin added to pain regimen. Vanc d/c, started on cefazolin, as has different side chain, low concern for allergic reaction. Mosher and A-line removed. DVT ppx initiated. Pending step down to .     Review of Systems  Positive for neck  and head pain.   Objective:     Vitals:  Temp: 97.8 °F (36.6 °C)  Pulse: (!) 53  Rhythm: sinus bradycardia  BP: (!) 153/64  MAP (mmHg): 92  Resp: 16  SpO2: 100 %    Temp  Min: 94.2 °F (34.6 °C)  Max: 98.4 °F (36.9 °C)  Pulse  Min: 42  Max: 60  BP  Min: 104/53  Max: 187/86  MAP (mmHg)  Min: 75  Max: 124  Resp  Min: 8  Max: 20  SpO2  Min: 94 %  Max: 100 %    03/19 0701 - 03/20 0700  In: 1897.8 [P.O.:360]  Out: 1630 [Urine:1350; Drains:180]   Unmeasured Output  Urine Occurrence: 0  Stool Occurrence: 0  Emesis Occurrence: 0  Pad Count: 2        Physical Exam    Examination:   Constitutional: Well-nourished and -developed. No apparent distress.   Eyes: Conjunctiva clear, anicteric. Lids no lesions.  Head/Ears/Nose/Mouth/Throat/Neck: C-collar in place. Moist mucous membranes. External ears, nose atraumatic.   Cardiovascular: No leg edema.  Respiratory: Comfortable respirations.  Neurologic:  -GCS [id number]  -Alert. Oriented to person, place, and time. Speech fluent. Follows commands.  -CN II-XII grossly intact  -Sensation intact  - No focal weakness. Lifting of right shoulder slightly limited by shoulder pain from arthritis.        Medications:  Continuous ScheduledamLODIPine, 10 mg, Daily  ceFAZolin (Ancef) IV (PEDS and ADULTS), 1 g, Q12H  cloNIDine 0.1 mg/24 hr td ptwk, 1 patch, Q7 Days  enoxparin, 30 mg, Q24H (prophylaxis, 1700)  fluticasone furoate-vilanteroL, 1 puff, Daily  furosemide, 40 mg, Daily  gabapentin, 300 mg, QHS  hydrALAZINE, 75 mg, Q8H  methocarbamoL, 500 mg, TID  mupirocin, , BID  polyethylene glycol, 17 g, Daily  senna-docusate 8.6-50 mg, 1 tablet, Daily  sodium chloride, 1,000 mg, BID    PRNacetaminophen, 650 mg, Q6H PRN  artificial tears, 1 drop, PRN  bisacodyL, 10 mg, Daily PRN  calcium carbonate, 1,000 mg, TID PRN  hydrALAZINE, 10 mg, Q4H PRN  labetalol, 10 mg, Q4H PRN  melatonin, 6 mg, Nightly PRN  morphine, 2 mg, Q4H PRN  naloxone, 0.02 mg, PRN  ondansetron, 8 mg, Q8H PRN  oxyCODONE-acetaminophen, 1  tablet, Q4H PRN  oxyCODONE-acetaminophen, 1 tablet, Q4H PRN  simethicone, 1 tablet, QID PRN  sodium chloride 0.9%, 1-10 mL, Q12H PRN      Today I personally reviewed pertinent medications, lines/drains/airways, imaging, laboratory results, notably:    Diet  Diet Adult Regular  Diet Adult Regular        Assessment/Plan:     Neuro  * Spinal stenosis  89F PMHx COPD, HTN, HLD, history of TIA, 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    MRI demonstrated chronic type 2 odontoid fracture with progressed retropulsion of the fracture fragment in comparison to prior MRI from 8/11/2023, with moderate to severe canal stenosis at C1-C2.     Admit to [facility]  Q1h neuro checks, I&Os, and vitals   NSGY following   SBP <160   pain regimen, added robaxin   Normal Diet   SCDs; DVT ppx initiated this am   PT/OT re-ordered post-op on 3/19   S/PO-C4 fusion on 3/19/25   HV drain x 1 to suction, cefazolin for ppx     Renal/  Hyperkalemia  K 6.3 on arrival to ICU, no peaked T on tele     EKG followed, given insulin, albuterol, lokelma   Trended K q4h until normalized-- as this am, now continue to monitor daily  CPK downtrended but remains mildly elevated. Administered 500 cc NS over 5 hours overnight.     Chronic kidney disease, stage III (moderate)  CMP daily   Renally dose meds  D/C noriega and A-line          The patient is being Prophylaxed for:  Venous Thromboembolism with: Mechanical or Chemical  Stress Ulcer with: None  Ventilator Pneumonia with: not applicable    Activity Orders            Diet Adult Regular No Pork: Regular starting at 03/20 1232    Progressive Mobility Protocol (mobilize patient to their highest level of functioning at least twice daily) starting at 03/10 0800    Turn patient every 2 hours starting at 03/10 0800          Full Code    Dispo- TTF under       Level 3     AUGUSTINE GutierrezC  Neurocritical Care  [facility]

## 2025-03-20 NOTE — ASSESSMENT & PLAN NOTE
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 Multilevel foraminal stenosis.   - Cont C-collar at all times  - Upright and supine cervical x-ray pending  - Neurosurgery recommended occiput-C4 fusion. Non-surgical option is strict cervical collar 24/7 indefinitely  - XR with chronic odontoid type 2 fracture, position alignment appears stable   - Decadron started for new neuro deficit  - surgery for 3/19      3/20 Transfer to [facility] medicine. 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 Multilevel foraminal stenosis.    Now s/p Occipital - C4 posterior spinal fusion (3/19). Continue Cervical collar at all times post operatively for 3 months. Post operative XR C spine pending.  Multimodal pain control  with oxycodone 5/7.5 prn, morphine prn, robaxin prn) CPK trended down 623--> 420.  vancomycin discontinued.  started on cefazolin due to  concern for allergic reaction. Mosher and A-line removed. DVT ppx initiated.    Ancef can be discontinued once drain removed     Cord Compression  - MRI Cervical spine 3/9/25: chronic type 2 odontoid fracture with progressed retropulsion of the fracture fragment in comparison to prior MRI from 8/11/2023, with moderate to severe canal stenosis at C1-C2. Multilevel foraminal stenosis.   - Cont C-collar at all times  - Upright and supine cervical x-ray  pending  - Neurosurgery recommended occiput-C4 fusion. Non-surgical option is strict cervical collar 24/7 indefinitely  - XR with chronic odontoid type 2 fracture, position alignment appears stable   - Decadron started for new neuro deficit  - surgery for 3/19      See above

## 2025-03-20 NOTE — PROGRESS NOTES
[facility]  [facility] Note    Patient Name: Bridget Montgomery  MRN: [id number]  Patient Class: IP- Inpatient   Admission Date: 3/9/2025  Length of Stay: 11 days  Attending Physician: Luis Gunn MD  Primary Care Provider: No primary care provider on file.        Subjective     Principal Problem:Spinal stenosis        HPI:  Bridget Montgomery is a 89F PMHx COPD, HTN, HLD, history of TIA, known type 2 odontoid fx followed by Dr. Quach with non-surgical management, and R shoulder arthroplasty 06/2024 presenting to [facility] for preoperative traction. She initially presented with a week of severe persistent HA, and diffuse weakness of all extremities. Her symptoms began last Monday with HA that started in her neck and migrated toward the top of her head. She has had chronic neck pain due to cervical stenosis, but the headache was new. On Saturday, she was at her baseline, able to perform all of her ADLs. Around 8 pm, she suddenly became weak in all extremities and was not able to walk. MRI demonstrated chronic type 2 odontoid fracture with progressed retropulsion of the fracture fragment in comparison to prior MRI from 8/11/2023, with moderate to severe canal stenosis at C1-C2. Of note, patient had previously discontinued wearing her cervical collar. Denied radiculopathy in her arms or legs, sensory deficit, bladder or bowel incontinence. Family decided to proceed with surgery. Booked for O-C4 fusion on 3/19/25. Admitted to [facility] for pre-op traction on 3/18.           Overview/Hospital Course:    03/19/2025: To OR for O-C4 fusion  03/20/2025: POD 1 S/P O-C4 fusion. NAEO. VSS. Neurologically intact. Continues with residual neck pain, robaxin added to pain regimen. Pending step down to .           3/20 Transfer to hospital medicine. 89yo woman w/ PMHx COPD, HTN, HLD, history of TIA, known type 2 odontoid fx followed by Dr. Quach with non-surgical management, and right shoulder  arthroplasty 06/2024 presenting with a week of severe persistent headache, and diffuse weakness involving her bilateral upper and lower extremities.  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 Multilevel foraminal stenosis. Now s/p Occipital - C4 posterior spinal fusion (3/19). Continue Cervical collar at all times post operatively for 3 months. Post operative XR C spine pending.  Multimodal pain control  with oxycodone 5/7.5 prn, morphine prn, robaxin prn) CPK trended down 623--> 420.  vancomycin discontinued.  started on cefazolin due to ow concern for allergic reaction. Mosher and A-line removed. DVT ppx initiated. PT recs SNF         Review of Systems:   Pain scale:   Constitutional:  fever,  chills, headache, vision loss, hearing loss, weight loss, Generalized weakness, falls, loss of smell, loss of taste, poor appetite,  sore throat  Respiratory: cough, shortness of breath.   Cardiovascular: chest pain, dizziness, palpitations, orthopnea, swelling of feet, syncope  Gastrointestinal: nausea, vomiting, abdominal pain, diarrhea, black stool,  blood in stool, change in bowel habits, constipation  Genitourinary: hematuria, dysuria, urgency, frequency  Integument/Breast: rash,  pruritis  Hematologic/Lymphatic: easy bruising, lymphadenopathy  Musculoskeletal: arthralgias , myalgias, back pain, neck pain, knee pain  Neurological: confusion, seizures, tremors, slurred speech  Behavioral/Psych:  depression, anxiety, auditory or visual hallucinations     OBJECTIVE:     Physical Exam:  Body mass index is 23.03 kg/m².    Constitutional: Appears well-developed and well-nourished.   Head: Normocephalic and atraumatic. C collar and drain in place   Neck: Normal range of motion. Neck supple.   Cardiovascular: Normal heart rate.  Regular heart rhythm.  Pulmonary/Chest:  Effort normal.   Abdominal: No distension.  No tenderness  Musculoskeletal: Normal range of motion. No edema.   Neurological: Alert and oriented to person, place, and time. able to move bilateral upper and lower extremities without limitation   Skin: Skin is warm and dry.   Psychiatric: Normal mood and affect. Behavior is normal.                  Vital Signs  Temp: 97.8 °F (36.6 °C) (03/20/25 1101)  Pulse: (!) 50 (03/20/25 1501)  Resp: 16 (03/20/25 1501)  BP: (!) 118/59 (03/20/25 1501)  SpO2: 100 % (03/20/25 1501)     24 Hour VS Range    Temp:  [97.4 °F (36.3 °C)-98.4 °F (36.9 °C)]   Pulse:  [42-60]   Resp:  [10-20]   BP: (104-158)/(52-69)   SpO2:  [94 %-100 %]   Arterial Line BP: (116-159)/(32-96)     Intake/Output Summary (Last 24 hours) at 3/20/2025 1634  Last data filed at 3/20/2025 1101  Gross per 24 hour   Intake 774.51 ml   Output 755 ml   Net 19.51 ml         I/O This Shift:  I/O this shift:  In: 246.7 [IV Piggyback:246.7]  Out: -     Wt Readings from Last 3 Encounters:   03/19/25 59 kg (130 lb)   12/10/24 59.9 kg (132 lb)   09/09/24 59.9 kg (132 lb)       I have personally reviewed the vitals and recorded Intake/Output     Laboratory/Diagnostic Data:    CBC/Anemia Labs: Coags:    Recent Labs   Lab 03/18/25  0528 03/18/25  0938 03/19/25  0209 03/20/25  0003   WBC 9.33  --  11.25 12.87*   HGB 10.3*  --  9.6* 8.9*   HCT 33.7* 33* 30.1* 28.2*     --  373 341   MCV 95  --  89 90   RDW 15.4*  --  15.1* 15.2*    Recent Labs   Lab 03/18/25  1648   INR 0.9   APTT 27.6        Chemistries: ABG:   Recent Labs   Lab 03/14/25  0328 03/15/25  0927 03/18/25  0528 03/18/25  1648 03/19/25  0209 03/19/25  0805 03/19/25  1302 03/20/25  0003   *   < > 137   < > 138 139 139 138   K 5.2*   < > 5.6*   < > 4.3 4.4  4.4 4.6  4.6 4.6   CL 96   < > 110   < > 107 110 107 107   CO2 19*   < > 16*   < > 22* 21* 21* 21*   BUN 48*   < > 42*   < > 46* 45* 43* 43*   CREATININE 1.2   < > 1.1   < > 1.2 1.1 1.1 1.2   CALCIUM 8.7    < > 8.0*   < > 7.7* 7.7* 7.5* 7.6*   PROT 6.3   < > 6.1  --  5.7*  --   --  5.3*   BILITOT 0.2   < > 0.2  --  0.2  --   --  0.2   ALKPHOS 187*   < > 164*  --  145  --   --  140   ALT 24   < > 35  --  32  --   --  41   AST 36   < > 35  --  48*  --   --  55*   MG 2.7*  --   --   --  2.7*  --   --  2.9*   PHOS  --   --   --   --  2.9  --   --  4.2    < > = values in this interval not displayed.    Recent Labs   Lab 03/18/25  0938   PH 7.434   PCO2 32.8*   PO2 103*   HCO3 22.0*   POCSATURATED 98   BE -2        POCT Glucose: HbA1c:    Recent Labs   Lab 03/18/25  1959 03/18/25 2129 03/18/25  2312 03/19/25  0209 03/19/25  0802 03/19/25  1248   POCTGLUCOSE 231* 169* 138* 109 126* 173*    Hemoglobin A1C   Date Value Ref Range Status   03/18/2025 5.8 (H) 4.0 - 5.6 % Final     Comment:     ADA Screening Guidelines:  5.7-6.4%  Consistent with prediabetes  >or=6.5%  Consistent with diabetes    High levels of fetal hemoglobin interfere with the HbA1C  assay. Heterozygous hemoglobin variants (HbS, HgC, etc)do  not significantly interfere with this assay.   However, presence of multiple variants may affect accuracy.     03/09/2025 5.9 (H) 4.0 - 5.6 % Final     Comment:     ADA Screening Guidelines:  5.7-6.4%  Consistent with prediabetes  >or=6.5%  Consistent with diabetes    High levels of fetal hemoglobin interfere with the HbA1C  assay. Heterozygous hemoglobin variants (HbS, HgC, etc)do  not significantly interfere with this assay.   However, presence of multiple variants may affect accuracy.     10/17/2023 5.8 (H) 4.0 - 5.6 % Final     Comment:     ADA Screening Guidelines:  5.7-6.4%  Consistent with prediabetes  >or=6.5%  Consistent with diabetes    High levels of fetal hemoglobin interfere with the HbA1C  assay. Heterozygous hemoglobin variants (HbS, HgC, etc)do  not significantly interfere with this assay.   However, presence of multiple variants may affect accuracy.          Cardiac Enzymes: Ejection Fractions:    Recent  "Labs     03/19/25  1302 03/20/25  0003   * 420*    No results found for: "EF"       No results for input(s): "COLORU", "APPEARANCEUA", "PHUR", "SPECGRAV", "PROTEINUA", "GLUCUA", "KETONESU", "BILIRUBINUA", "OCCULTUA", "NITRITE", "UROBILINOGEN", "LEUKOCYTESUR", "RBCUA", "WBCUA", "BACTERIA", "SQUAMEPITHEL", "HYALINECASTS" in the last 48 hours.    Invalid input(s): "WRIGHTSUR"    Procalcitonin (ng/mL)   Date Value   05/16/2023 0.05     Lactate (Lactic Acid) (mmol/L)   Date Value   06/20/2023 1.2     No results found for: "BNP"  CRP (mg/L)   Date Value   05/12/2023 7.8     Sed Rate (mm/Hr)   Date Value   05/12/2023 37 (H)     No results found for: "DDIMER"  Ferritin (ng/mL)   Date Value   03/09/2025 29   08/27/2024 48   06/10/2024 35   12/07/2023 123   10/18/2023 34   09/13/2023 46   06/02/2023 93   02/24/2023 57   09/12/2022 49   09/12/2022 49     No results found for: "LDH"  Troponin I (ng/mL)   Date Value   03/08/2025 0.006   06/20/2023 <0.006     CPK (U/L)   Date Value   03/20/2025 420 (H)   03/19/2025 623 (H)     Results for orders placed or performed in visit on 12/06/24   Vitamin D    Collection Time: 12/06/24  4:22 PM   Result Value Ref Range    Vit D, 25-Hydroxy 75 30 - 96 ng/mL   Results for orders placed or performed in visit on 03/13/23   Vitamin D    Collection Time: 03/13/23 12:06 PM   Result Value Ref Range    Vit D, 25-Hydroxy 69 30 - 96 ng/mL     SARS-CoV2 (COVID-19) Qualitative PCR (no units)   Date Value   11/07/2023 Not Detected   10/31/2023 Not Detected     SARS-CoV-2 RNA, Amplification, Qual (no units)   Date Value   03/08/2025 Negative   10/23/2023 Negative   10/19/2023 Negative       Microbiology labs for the last week  Microbiology Results (last 7 days)       ** No results found for the last 168 hours. **            Reviewed and noted in plan where applicable- Please see chart for full lab data.    Lines/Drains:       Peripheral IV - Single Lumen 03/18/25 1217 20 G Left Antecubital " (Active)   Site Assessment Clean;Dry;Intact 03/20/25 1101   Line Securement Device Secured with sutureless device 03/20/25 0701   Extremity Assessment Distal to IV No abnormal discoloration;No swelling;No redness 03/20/25 1101   Line Status Flushed;Saline locked 03/20/25 1101   Dressing Status Clean;Intact;Dry 03/20/25 1101   Dressing Intervention Integrity maintained 03/20/25 1101   Dressing Change Due 03/22/25 03/20/25 1101   Site Change Due 03/22/25 03/20/25 0701   Reason Not Rotated Not due 03/20/25 1101   Number of days: 2            Peripheral IV - Single Lumen 03/20/25 1527 20 G 1 3/4 in No Right Antecubital (Active)   Site Assessment Clean;Dry;Intact;No redness;No swelling 03/20/25 1558   Line Securement Device Secured with sutureless device 03/20/25 1558   Extremity Assessment Distal to IV No abnormal discoloration;No redness;No swelling;No warmth 03/20/25 1558   Line Status Blood return noted;Flushed;Saline locked 03/20/25 1558   Dressing Status Clean;Dry;Intact 03/20/25 1558   Dressing Intervention First dressing 03/20/25 1558   Dressing Change Due 03/24/25 03/20/25 1558   Site Change Due 03/24/25 03/20/25 1558   Number of days: 0            Closed/Suction Drain 03/19/25 1135 Tube - 1 Left Back Accordion 10 Fr. (Active)   Site Description Unable to view 03/20/25 1101   Dressing Type Other (Comment) 03/20/25 1101   Dressing Status Clean;Dry;Intact 03/20/25 1101   Dressing Intervention Integrity maintained 03/20/25 1101   Drainage Sanguineous 03/20/25 1101   Status Other (Comment) 03/20/25 1101   Output (mL) 70 mL 03/20/25 0501   Number of days: 1       Imaging      Results for orders placed during the hospital encounter of 03/09/25    Echo    Interpretation Summary    Left Ventricle: The left ventricle is normal in size. Ventricular mass is normal. Normal wall thickness. There is normal systolic function with a visually estimated ejection fraction of 60 - 65%. Diastolic function cannot be reliably  determined in the presence of mitral annular calcification.    Right Ventricle: The right ventricle is normal in size. Wall thickness is normal. Systolic function is normal.    Left Atrium: Moderately dilated    Aortic Valve: There is mild aortic valve sclerosis. There is mild annular calcification present.    Mitral Valve: There is severe posterior mitral annular calcification. 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 The mean pressure gradient across the mitral valve is 4 mmHg at a heart rate of 51 bpm. There is mild to moderate regurgitation with a centrally directed jet.    Tricuspid Valve: There is mild to moderate regurgitation with a centrally directed jet.    Aorta: Aortic root is normal in size measuring 2.98 cm. Aortic root at ST junction is normal. Ascending aorta is normal.    Pulmonary Artery: There is mild pulmonary hypertension. The estimated pulmonary artery systolic pressure is 47 mmHg.    IVC/SVC: Normal venous pressure at 3 mmHg.      SURG FL Surgery Fluoro Usage  See OP Notes for results.     IMPRESSION: See OP Notes for results.     This procedure was auto-finalized by: Virtual Radiologist  Echo    Left Ventricle: The left ventricle is normal in size. Ventricular mass   is normal. Normal wall thickness. There is normal systolic function with a   visually estimated ejection fraction of 60 - 65%. Diastolic function   cannot be reliably determined in the presence of mitral annular   calcification.    Right Ventricle: The right ventricle is normal in size. Wall thickness   is normal. Systolic function is normal.    Left Atrium: Moderately dilated    Aortic Valve: There is mild aortic valve sclerosis. There is mild   annular calcification present.    Mitral Valve: There is severe posterior mitral annular calcification.   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 The mean pressure gradient across the   mitral valve is 4 mmHg at a heart rate of 51 bpm. There is mild to   moderate regurgitation with a centrally  directed jet.    Tricuspid Valve: There is mild to moderate regurgitation with a   centrally directed jet.    Aorta: Aortic root is normal in size measuring 2.98 cm. Aortic root at   ST junction is normal. Ascending aorta is normal.    Pulmonary Artery: There is mild pulmonary hypertension. The estimated   pulmonary artery systolic pressure is 47 mmHg.    IVC/SVC: Normal venous pressure at 3 mmHg.  US Lower Extremity Veins Bilateral  Narrative: EXAMINATION:  US LOWER EXTREMITY VEINS BILATERAL    CLINICAL HISTORY:  R/O DVT preop;    TECHNIQUE:  Duplex and color flow Doppler and dynamic compression was performed of the bilateral lower extremity veins was performed.    COMPARISON:  None    FINDINGS:  Right thigh veins: The common femoral, femoral, popliteal, and upper greater saphenous veins are patent and free of thrombus. The veins are normally compressible and have normal phasic flow and augmentation response.    Right calf veins: The visualized calf veins are patent.    Left thigh veins: The common femoral, femoral, popliteal, and upper greater saphenous veins are patent and free of thrombus. The veins are normally compressible and have normal phasic flow and augmentation response.    Left calf veins: The visualized calf veins are patent.    Miscellaneous: None  Impression: No scan evidence of deep venous thrombosis in either lower extremity.    Electronically signed by resident: Sawyer Mercado  Date:    03/19/2025  Time:    01:30    Electronically signed by: Lasha Villareal  Date:    03/19/2025  Time:    01:46      Labs, Imaging, EKG and Diagnostic results from 3/20/2025 were reviewed.    Medications:  Medication list was reviewed and changes noted under Assessment/Plan.  Medications Ordered Prior to Encounter[1]  Scheduled Medications:  Current Facility-Administered Medications   Medication Dose Route Frequency    amLODIPine  10 mg Oral Daily    ceFAZolin (Ancef) IV (PEDS and ADULTS)  1 g Intravenous Q12H     cloNIDine 0.1 mg/24 hr td ptwk  1 patch Transdermal Q7 Days    enoxparin  30 mg Subcutaneous Q24H (prophylaxis, 1700)    fluticasone furoate-vilanteroL  1 puff Inhalation Daily    furosemide  40 mg Oral Daily    gabapentin  300 mg Oral QHS    hydrALAZINE  75 mg Oral Q8H    methocarbamoL  500 mg Oral TID    mupirocin   Topical (Top) BID    polyethylene glycol  17 g Oral Daily    senna-docusate 8.6-50 mg  1 tablet Oral Daily    sodium chloride  1,000 mg Oral BID     PRN:   Current Facility-Administered Medications:     acetaminophen, 650 mg, Oral, Q6H PRN    artificial tears, 1 drop, Both Eyes, PRN    bisacodyL, 10 mg, Rectal, Daily PRN    calcium carbonate, 1,000 mg, Oral, TID PRN    hydrALAZINE, 10 mg, Intravenous, Q4H PRN    labetalol, 10 mg, Intravenous, Q4H PRN    melatonin, 6 mg, Oral, Nightly PRN    morphine, 2 mg, Intravenous, Q4H PRN    naloxone, 0.02 mg, Intravenous, PRN    ondansetron, 8 mg, Oral, Q8H PRN    oxyCODONE-acetaminophen, 1 tablet, Oral, Q4H PRN    oxyCODONE-acetaminophen, 1 tablet, Oral, Q4H PRN    simethicone, 1 tablet, Oral, QID PRN    sodium chloride 0.9%, 1-10 mL, Intravenous, Q12H PRN  Infusions:   Estimated Creatinine Clearance: 25.8 mL/min (based on SCr of 1.2 mg/dL).             Assessment & Plan  Spinal stenosis  Cord compression (Resolved: 3/20/2025)  Cord Compression  - MRI Cervical spine 3/9/25: chronic type 2 odontoid fracture with progressed retropulsion of the fracture fragment in comparison to prior MRI from 8/11/2023, with moderate to severe canal stenosis at C1-C2. Multilevel foraminal stenosis.   - Cont C-collar at all times  - Upright and supine cervical x-ray pending  - Neurosurgery recommended occiput-C4 fusion. Non-surgical option is strict cervical collar 24/7 indefinitely  - XR with chronic odontoid type 2 fracture, position alignment appears stable   - Decadron started for new neuro deficit  - surgery for 3/19      3/20 Transfer to [facility] medicine. 89yo woman w/ PMHx  COPD, HTN, HLD, history of TIA, known type 2 odontoid fx followed by Dr. Quach with non-surgical management, and right shoulder arthroplasty 06/2024 presenting with a week of severe persistent headache, and diffuse weakness involving her bilateral upper and lower extremities.  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 Multilevel foraminal stenosis.    Now s/p Occipital - C4 posterior spinal fusion (3/19). Continue Cervical collar at all times post operatively for 3 months. Post operative XR C spine pending.  Multimodal pain control  with oxycodone 5/7.5 prn, morphine prn, robaxin prn) CPK trended down 623--> 420.  vancomycin discontinued.  started on cefazolin due to  concern for allergic reaction. Mosher and A-line removed. DVT ppx initiated.    Ancef can be discontinued once drain removed     Cord Compression  - MRI Cervical spine 3/9/25: chronic type 2 odontoid fracture with progressed retropulsion of the fracture fragment in comparison to prior MRI from 8/11/2023, with moderate to severe canal stenosis at C1-C2. Multilevel foraminal stenosis.   - Cont C-collar at all times  - Upright and supine cervical x-ray pending  - Neurosurgery recommended occiput-C4 fusion. Non-surgical option is strict cervical collar 24/7 indefinitely  - XR with chronic odontoid type 2 fracture, position alignment appears stable   - Decadron started for new neuro deficit  - surgery for 3/19      See above    Chronic kidney disease, stage III (moderate)  Renal function remains around baseline; Estimated Creatinine Clearance: 25.8 mL/min (based on SCr of 1.2 mg/dL). according to latest data. 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 Will avoid nephrotoxic agents as able, and renally dose all meds as applicable.       Recent Labs   Lab 03/19/25  0805 03/19/25  1302 03/20/25  0003    BUN 45* 43* 43*   CREATININE 1.1 1.1 1.2       I & O     Intake/Output Summary (Last 24 hours) at 3/20/2025 1634  Last data filed at 3/20/2025 1101  Gross per 24 hour   Intake 774.51 ml   Output 755 ml   Net 19.51 ml      Other emphysema  History of COPD, not on home oxygen. Currently without evidence of acute exacerbation, and sats on room air at goal 88% or greater. Will continue hospital formulary equivalent of home regimen and monitor respiratory status.     3/20 sats 100% on 2LNC  Essential hypertension  Uncontrolled  Pulse too slow to start BB.    Chronic, controlled.  Latest blood pressure and vitals reviewed-   Temp:  [97.4 °F (36.3 °C)-98.4 °F (36.9 °C)]   Pulse:  [42-60]   Resp:  [10-20]   BP: (104-158)/(52-69)   SpO2:  [94 %-100 %]   Arterial Line BP: (116-159)/(32-96) .   Home meds for hypertension were reviewed and noted below. Hospital anti-hypertensive changes were made as shown below.  Hypertension Medications              amLODIPine (NORVASC) 10 MG tablet Take 1 tablet (10 mg total) by mouth once daily.    furosemide (LASIX) 20 MG tablet Take 1 tablet (20 mg total) by mouth 2 (two) times a day.        continue clonidine and hydralazine   PRN meds if BP> 180/110 mm HG scheduled hydralazine  Continue norvasc  Generalized weakness  Chronic, however likely exacerbated by cervical radiculopathy.    PT/OT consulted - recs SNF         Headache (Resolved: 3/20/2025)  Fioricet PRN    Cervical spinal stenosis  as above    Mixed hyperlipidemia  hold statin with elevatd CPK     Edema, spinal cord      Hyperkalemia  resolved         Leucocytosis     Patient with leucocytosis   Recent Labs   Lab 03/18/25  0528 03/19/25  0209 03/20/25  0003   WBC 9.33 11.25 12.87*     . Afebrile.  monitor .        VTE Risk Mitigation (From admission, onward)           Ordered     enoxaparin injection 30 mg  Every 24 hours         03/20/25 0819     Place sequential compression device  Until discontinued         03/19/25 0838      IP VTE HIGH RISK PATIENT  Once         03/09/25 2040     Reason for No Pharmacological VTE Prophylaxis  Once        Question:  Reasons:  Answer:  Physician Provided (leave comment)  Comment:  surgery    03/09/25 2040                    Discharge Planning   MIKAYLA: 3/22/2025     Code Status: Full Code   Medical Readiness for Discharge Date:   Discharge Plan A: Rehab   Discharge Delays: None known at this time            Please place Justification for DME        Luis Gunn MD  Department of Hospital Medicine   [facility] ([facility])         [1]   No current facility-administered medications on file prior to encounter.     Current Outpatient Medications on File Prior to Encounter   Medication Sig Dispense Refill    albuterol (PROVENTIL/VENTOLIN HFA) 90 mcg/actuation inhaler Inhale 2 puffs into the lungs every 6 (six) hours as needed.      amLODIPine (NORVASC) 10 MG tablet Take 1 tablet (10 mg total) by mouth once daily. 90 tablet 3    budesonide-formoterol 160-4.5 mcg (SYMBICORT) 160-4.5 mcg/actuation HFAA Inhale 2 puffs into the lungs every 12 (twelve) hours.      fluticasone propionate (FLONASE) 50 mcg/actuation nasal spray 1 spray by Nasal route 2 (two) times daily.      furosemide (LASIX) 20 MG tablet Take 1 tablet (20 mg total) by mouth 2 (two) times a day. 180 tablet 3    gabapentin (NEURONTIN) 300 MG capsule Take 300 mg by mouth every evening.      ipratropium (ATROVENT) 42 mcg (0.06 %) nasal spray       melatonin (MELATIN) 3 mg tablet Take 2 tablets (6 mg total) by mouth nightly as needed for Insomnia. 30 tablet 0    methocarbamoL (ROBAXIN) 500 MG Tab       omega 3-dha-epa-fish oil 1,000 mg (120 mg-180 mg) Cap Take 1 capsule by mouth once daily.      omeprazole (PRILOSEC) 20 MG capsule Take 1 capsule (20 mg total) by mouth 2 (two) times daily before meals. 60 capsule 0    pregabalin (LYRICA) 50 MG capsule Take 1 capsule by mouth every evening.      TOLAK 4 % Crea apply a thin film to the lesions  using the fingertips, once daily. preferably at night., WASH off in THE morning FOR 2 WEEKS.      traMADoL (ULTRAM) 50 mg tablet Take 1 tablet (50 mg total) by mouth 2 (two) times daily as needed. 60 tablet 4

## 2025-03-20 NOTE — SUBJECTIVE & OBJECTIVE
Interval History:  POD 1 S/P O-C4 fusion from 3/19. NAEO. VSS. Neurologically intact. Continues with residual neck pain, robaxin added to pain regimen. Vanc d/c, started on cefazolin, as has different side chain, low concern for allergic reaction. Mosher and A-line removed. DVT ppx initiated. Pending step down to HM.     Review of Systems  Positive for neck and head pain.   Objective:     Vitals:  Temp: 97.8 °F (36.6 °C)  Pulse: (!) 53  Rhythm: sinus bradycardia  BP: (!) 153/64  MAP (mmHg): 92  Resp: 16  SpO2: 100 %    Temp  Min: 94.2 °F (34.6 °C)  Max: 98.4 °F (36.9 °C)  Pulse  Min: 42  Max: 60  BP  Min: 104/53  Max: 187/86  MAP (mmHg)  Min: 75  Max: 124  Resp  Min: 8  Max: 20  SpO2  Min: 94 %  Max: 100 %    03/19 0701 - 03/20 0700  In: 1897.8 [P.O.:360]  Out: 1630 [Urine:1350; Drains:180]   Unmeasured Output  Urine Occurrence: 0  Stool Occurrence: 0  Emesis Occurrence: 0  Pad Count: 2        Physical Exam    Examination:   Constitutional: Well-nourished and -developed. No apparent distress.   Eyes: Conjunctiva clear, anicteric. Lids no lesions.  Head/Ears/Nose/Mouth/Throat/Neck: C-collar in place. Moist mucous membranes. External ears, nose atraumatic.   Cardiovascular: No leg edema.  Respiratory: Comfortable respirations.  Neurologic:  -GCS [id number]  -Alert. Oriented to person, place, and time. Speech fluent. Follows commands.  -CN II-XII grossly intact  -Sensation intact  - No focal weakness. Lifting of right shoulder slightly limited by shoulder pain from arthritis.        Medications:  Continuous ScheduledamLODIPine, 10 mg, Daily  ceFAZolin (Ancef) IV (PEDS and ADULTS), 1 g, Q12H  cloNIDine 0.1 mg/24 hr td ptwk, 1 patch, Q7 Days  enoxparin, 30 mg, Q24H (prophylaxis, 1700)  fluticasone furoate-vilanteroL, 1 puff, Daily  furosemide, 40 mg, Daily  gabapentin, 300 mg, QHS  hydrALAZINE, 75 mg, Q8H  methocarbamoL, 500 mg, TID  mupirocin, , BID  polyethylene glycol, 17 g, Daily  senna-docusate 8.6-50 mg, 1 tablet,  Daily  sodium chloride, 1,000 mg, BID    PRNacetaminophen, 650 mg, Q6H PRN  artificial tears, 1 drop, PRN  bisacodyL, 10 mg, Daily PRN  calcium carbonate, 1,000 mg, TID PRN  hydrALAZINE, 10 mg, Q4H PRN  labetalol, 10 mg, Q4H PRN  melatonin, 6 mg, Nightly PRN  morphine, 2 mg, Q4H PRN  naloxone, 0.02 mg, PRN  ondansetron, 8 mg, Q8H PRN  oxyCODONE-acetaminophen, 1 tablet, Q4H PRN  oxyCODONE-acetaminophen, 1 tablet, Q4H PRN  simethicone, 1 tablet, QID PRN  sodium chloride 0.9%, 1-10 mL, Q12H PRN      Today I personally reviewed pertinent medications, lines/drains/airways, imaging, laboratory results, notably:    Diet  Diet Adult Regular  Diet Adult Regular

## 2025-03-20 NOTE — ASSESSMENT & PLAN NOTE
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 Multilevel foraminal stenosis.     Now s/p Occipital - C4 posterior spinal fusion (3/19).     POD 1.     Plan:   - Cervical collar at all times, will continue collar post operatively for 3 months  - Post operative XR C spine pending  - Multimodal pain control (oxycodone 5/7.5 prn, morphine prn, robaxin prn)  - Bowel regimen: senna, miralax  - CPK elevated post operatively, currently on fluids, also home lasix. Trending down.  Appreciate ICU management.   - Notify NSGY immediately with any decline on exam.  - Med management per primary  - PT/OT/OOB  - Discontinue noriega and A Line  - Step down when appropriate from medical standpoint, continue telemetry on the floor.    Given age, hyperkalemia pre-op, and CPK levels feel would be most appropriate for step down back to medicine service.  Neurosurgery will continue to follow during hospitalization.   - OK for DVT prophylaxis: Lovenox    D/w staff, Dr. Lemons

## 2025-03-20 NOTE — SUBJECTIVE & OBJECTIVE
Interval History:   3/20: POD 1 s/p O-C4 fusion. CPK elevated overnight on fluids, neck pain.present. neurologically and hemodynamically stable. Drain 180cc output.     Medications:  Continuous Infusions:  Scheduled Meds:   amLODIPine  10 mg Oral Daily    cloNIDine 0.1 mg/24 hr td ptwk  1 patch Transdermal Q7 Days    fluticasone furoate-vilanteroL  1 puff Inhalation Daily    furosemide  40 mg Oral Daily    gabapentin  300 mg Oral QHS    hydrALAZINE  75 mg Oral Q8H    mupirocin   Topical (Top) BID    polyethylene glycol  17 g Oral Daily    senna-docusate 8.6-50 mg  1 tablet Oral Daily    sodium chloride  1,000 mg Oral BID    vancomycin (VANCOCIN) IV (PEDS and ADULTS)  500 mg Intravenous Q24H     PRN Meds:  Current Facility-Administered Medications:     acetaminophen, 650 mg, Oral, Q6H PRN    artificial tears, 1 drop, Both Eyes, PRN    bisacodyL, 10 mg, Rectal, Daily PRN    hydrALAZINE, 10 mg, Intravenous, Q4H PRN    labetalol, 10 mg, Intravenous, Q4H PRN    melatonin, 6 mg, Oral, Nightly PRN    morphine, 2 mg, Intravenous, Q4H PRN    naloxone, 0.02 mg, Intravenous, PRN    ondansetron, 8 mg, Oral, Q8H PRN    oxyCODONE-acetaminophen, 1 tablet, Oral, Q4H PRN    oxyCODONE-acetaminophen, 1 tablet, Oral, Q4H PRN    simethicone, 1 tablet, Oral, QID PRN    sodium chloride 0.9%, 1-10 mL, Intravenous, Q12H PRN    vancomycin - pharmacy to dose, , Intravenous, pharmacy to manage frequency     Review of Systems  Objective:     Weight: 59 kg (130 lb)  Body mass index is 23.03 kg/m².  Vital Signs (Most Recent):  Temp: 98.4 °F (36.9 °C) (03/20/25 0301)  Pulse: (!) 52 (03/20/25 0736)  Resp: 14 (03/20/25 0736)  BP: (!) 146/67 (03/20/25 0736)  SpO2: 97 % (03/20/25 0736) Vital Signs (24h Range):  Temp:  [94.2 °F (34.6 °C)-98.4 °F (36.9 °C)] 98.4 °F (36.9 °C)  Pulse:  [42-66] 52  Resp:  [7-26] 14  SpO2:  [94 %-100 %] 97 %  BP: (104-187)/(52-86) 146/67  Arterial Line BP: (116-181)/(32-96) 147/43                              Closed/Suction  "Drain 03/19/25 1135 Tube - 1 Left Back Accordion 10 Fr. (Active)   Site Description Unable to view 03/20/25 0501   Dressing Type Other (Comment) 03/19/25 1505   Dressing Intervention Integrity maintained 03/20/25 0501   Drainage Sanguineous 03/20/25 0501   Status Other (Comment) 03/20/25 0501   Output (mL) 70 mL 03/20/25 0501            Urethral Catheter 03/18/25 1610 Silicone 16 Fr. (Active)   Site Assessment Clean;Intact 03/20/25 0501   Collection Container Urimeter 03/20/25 0501   Securement Method secured to top of thigh w/ adhesive device 03/20/25 0501   Catheter Care Performed yes 03/20/25 0501   Reason for Continuing Urinary Catheterization Critically ill in ICU and requiring hourly monitoring of intake/output 03/20/25 0501   CAUTI Prevention Bundle Securement Device in place with 1" slack;Intact seal between catheter & drainage tubing;Drainage bag/urimeter off the floor;Sheeting clip in use;No dependent loops or kinks;Drainage bag/urimeter not overfilled (<2/3 full);Drainage bag/urimeter below bladder 03/19/25 1901   Output (mL) 200 mL 03/20/25 0501          Physical Exam         Neurosurgery Physical Exam  General: Well developed, well nourished, no distress.   Head: Normocephalic, atraumatic.  Mental Status: Awake, Alert, Oriented  Speech: Clear with content appropriate to conversation.  Cranial nerves: Face symmetric, CN II-XII grossly intact.   Eyes: Pupils equal, round, reactive to light, EOMI.  Sensory: Intact to light touch throughout.  Motor Strength: Moves all extremities spontaneously with good tone. Full strength upper and lower extremities. No abnormal movements seen.     Shoulder arthritis, lifting up right > left shoulder is limited by shoulder pain and ROM     Strength   Deltoids Triceps Biceps Wrist Extension Wrist Flexion Hand    Upper: R 4/5 5/5 5/5 5/5 5/5 5/5     L 4/5 5/5 5/5 5/5 5/5 5/5       Iliopsoas Quadriceps Knee  Flexion Tibialis  anterior Gastro- cnemius EHL   Lower: R 5/5 " 5/5 5/5 5/5 5/5 5/5     L 5/5 5/5 5/5 5/5 5/5 5/5    Patient with R distal clavicle fx and h/o of R shoulder arthroplasty.  Hand intrinsics 5/5.     In c-collar.                    Significant Labs:  Recent Labs   Lab 03/19/25  0209 03/19/25  0805 03/19/25  1302 03/20/25  0003   GLU 94 108 160* 108    139 139 138   K 4.3 4.4  4.4 4.6  4.6 4.6    110 107 107   CO2 22* 21* 21* 21*   BUN 46* 45* 43* 43*   CREATININE 1.2 1.1 1.1 1.2   CALCIUM 7.7* 7.7* 7.5* 7.6*   MG 2.7*  --   --  2.9*     Recent Labs   Lab 03/18/25  0938 03/19/25  0209 03/20/25  0003   WBC  --  11.25 12.87*   HGB  --  9.6* 8.9*   HCT 33* 30.1* 28.2*   PLT  --  373 341     Recent Labs   Lab 03/18/25  1648   INR 0.9   APTT 27.6     Microbiology Results (last 7 days)       ** No results found for the last 168 hours. **          All pertinent labs from the last 24 hours have been reviewed.    Significant Diagnostics:  I have reviewed all pertinent imaging results/findings within the past 24 hours.

## 2025-03-20 NOTE — PT/OT/SLP EVAL
Physical Therapy Re-Evaluation and Treatment    Patient Name: Bridget Montgomery   MRN: [id number]  Recent Surgery: Procedure(s) (LRB):  FUSION, SPINE, CERVICAL, POSTERIOR APPROACH (N/A) 1 Day Post-Op    Recommendations:     Discharge Recommendations: Moderate Intensity Therapy    Discharge Equipment Recommendations: to be determined by next level of care   Barriers to discharge: Increased level of assist  Nursing Mobilization: Patient clear to stand pivot transfer with RN/PCT, assist x2 .    Assessment:     Bridget Montgomery is a 89 y.o. female admitted with a medical diagnosis of Spinal stenosis. She presents with the following impairments/functional limitations: weakness, impaired endurance, impaired self care skills, impaired functional mobility, gait instability, impaired balance, decreased upper extremity function, decreased lower extremity function, pain, orthopedic precautions. Pt w/ good strength and mobility, limited 2/2 pain in head/neck and fatigue. Patient currently demonstrates a need for moderate intensity therapy on a daily basis post acute secondary to a decline in functional status due to surgical procedure     Rehab Prognosis: Good; patient would benefit from acute skilled PT services to address these deficits and reach maximum level of function.  Recent Surgery: Procedure(s) (LRB):  FUSION, SPINE, CERVICAL, POSTERIOR APPROACH (N/A) 1 Day Post-Op    Plan:     During this hospitalization, patient to be seen 4 x/week to address the identified rehab impairments via gait training, therapeutic activities, therapeutic exercises, neuromuscular re-education and progress toward the following goals:    Plan of Care Expires: 03/30/25    Subjective     Chief Complaint: pain in posterior head/neck  Patient/Family Comments/Goals: wants her chair positioned so she can see the clock, prefers to d/c to [facility] SNF  Pain/Comfort:  Pain Rating 1: 9/10  Location - Side 1: Bilateral  Location - Orientation 1:  posterior  Location 1:  (head and neck)  Pain Addressed 1: Reposition, Distraction, Cessation of Activity, Nurse notified, Pre-medicate for activity  Pain Rating Post-Intervention 1: 9/10    Patients cultural, spiritual, [employer]ism conflicts given the current situation: no    Objective:     Communicated with RN prior to session. Patient found HOB elevated with pulse ox (continuous), telemetry, blood pressure cuff, cervical collar, oxygen, noriega catheter, SCD, hemovac upon PT entry to room. Co-evaluation w/ OT 2/2 suspected pt complexity and requirement of assistance from 2 skilled therapists to maximize treatment potential and maintain pt safety     General Precautions: Standard, fall   Orthopedic Precautions:spinal precautions   Braces: Cervical collar  Respiratory Status: Nasal cannula, flow 2 L/min    Exams:  Cognitive Exam: Patient is oriented to Person, Place, Time, Situation, follows commands 100% of the time  RLE ROM: WFL  RLE Strength: WFL  LLE ROM: WFL  LLE Strength: WFL  Sensation: Intact light touch to BLEs    Functional Mobility:  Bed Mobility:  Rolling Right: moderate assistance  Supine to Sit: maximal assistance for LE management, trunk management, and log rolling technique  Transfers:  Sit to Stand: minimum assistance of 2 persons with rolling walker with cues for hand placement and foot placement  Bed to Chair: minimum assistance of 2 persons with rolling walker with cues for hand placement and foot placement using Step Transfer  Gait:   Patient ambulated 6' with rolling walker and minimum assistance of 2 persons. Patient demonstrates unsteady gait, ambulates outside XAVIER of RW, flexed posture, decreased tessa, and large step-through gait pattern. Balance improved w/ shorter steps. All lines remained intact throughout ambulation trial, gait belt utilized.  Balance:   EOB Sitting: modA progressing to SBA  Static Standing: contact guard assistance with rolling walker  Dynamic Standing: minimum  assistance of 2 persons with rolling walker    Therapeutic Activities and Exercises:  Patient educated on role of acute care PT and PT POC, safety while in hospital including calling nurse for mobility, and call light usage  Educated on spinal precautions including avoidance of bending, lifting, and twisting. Patient expresses understanding. Educated on log roll technique, performed to right  Patient educated about importance of OOB mobility  Gait training w/ verbal, visual, and tactile cueing for proper use of AD, step length, step width, step height, postural control, safety awareness, width of XAVIER, and proximity to AD     AM-PAC 6 CLICK MOBILITY  Turning over in bed (including adjusting bedclothes, sheets and blankets)?: 2  Sitting down on and standing up from a chair with arms (e.g., wheelchair, bedside commode, etc.): 2  Moving from lying on back to sitting on the side of the bed?: 2  Moving to and from a bed to a chair (including a wheelchair)?: 2  Need to walk in hospital room?: 2  Climbing 3-5 steps with a railing?: 1  Basic Mobility Total Score: 11     Patient left up in chair with all lines intact, call button in reach, RN notified, and family present.    GOALS:   Multidisciplinary Problems       Physical Therapy Goals          Problem: Physical Therapy    Goal Priority Disciplines Outcome Interventions   Physical Therapy Goal     PT, PT/OT Progressing    Description: Goals to be completed by: 3/30/25    Pt will perform sup<>sit transfers w/ moderate assistance  Pt will be able to stand up from EOB w/ contact guard assistance using LRAD  Pt will ambulate 30 feet w/ contact guard assistance using LRAD  Pt will be independent w/ HEP therex on BLE w/ good form and ROM   Pt will be independent w/ recalling 3/3 spinal precautions                       History:     Past Medical History:   Diagnosis Date    Allergy     Anemia, unspecified     Arthritis     CKD (chronic kidney disease) stage 4, GFR 15-29 ml/min      COPD (chronic obstructive pulmonary disease)     Edema     HTN (hypertension)     Hypocalcemia     Hyponatremia     Osteoarthritis        Past Surgical History:   Procedure Laterality Date    BREAST CYST EXCISION      CAUDAL EPIDURAL STEROID INJECTION N/A 2/2/2023    Procedure: Injection-steroid-epidural-caudal;  Surgeon: Angel Sparrow MD;  Location: [facility];  Service: Pain Management;  Laterality: N/A;    FOOT SURGERY      FUSION OF CERVICAL SPINE BY POSTERIOR APPROACH N/A 3/19/2025    Procedure: FUSION, SPINE, CERVICAL, POSTERIOR APPROACH;  Surgeon: Clint Lemons MD;  Location: [facility] OR [address] [place] [address];  Service: Neurosurgery;  Laterality: N/A;  Occiput-C4       Time Tracking:     PT Received On: 03/20/25  PT Start Time: 0955  PT Stop Time: 1030  PT Total Time (min): 35 min     Billable Minutes: Evaluation 10, Gait Training 10, and Therapeutic Activity 15    03/20/2025

## 2025-03-20 NOTE — PROGRESS NOTES
[facility] - Neuro Critical Care  Neurosurgery  Progress Note    Subjective:     History of Present Illness: Bridget Montgomery is a 89yo woman w/PMHx COPD, HTN, HLD, history of TIA, known type 2 odontoid fx followed by Dr. uQach with non-surgical management, and right shoulder arthroplasty 06/2024 presenting with a week of severe persistent headache, and diffuse weakness involving her bilateral upper and lower extremities. Her symptoms began last Monday with headache that started in her neck and migrated toward the top of her head. She has had chronic neck pain due to cervical stenosis, but the headache was new. On Saturday, she had a normal morning and was able to perform all of her ADLs. Around 8 pm, she suddenly became weak in her arms and legs and was not able to walk. MRI demonstrated chronic type 2 odontoid fracture with progressed retropulsion of the fracture fragment in comparison to prior MRI from 8/11/2023, with moderate to severe canal stenosis at C1-C2. Of note, patient had previously discontinued wearing her cervical collar. Denies radiculopathy in her arms or legs, sensory deficit, bladder or bowel incontinence.      Post-Op Info:  Procedure(s) (LRB):  FUSION, SPINE, CERVICAL, POSTERIOR APPROACH (N/A)   1 Day Post-Op   Interval History:   3/20: POD 1 s/p O-C4 fusion. CPK elevated overnight on fluids, neck pain.present. neurologically and hemodynamically stable. Drain 180cc output.     Medications:  Continuous Infusions:  Scheduled Meds:   amLODIPine  10 mg Oral Daily    cloNIDine 0.1 mg/24 hr td ptwk  1 patch Transdermal Q7 Days    fluticasone furoate-vilanteroL  1 puff Inhalation Daily    furosemide  40 mg Oral Daily    gabapentin  300 mg Oral QHS    hydrALAZINE  75 mg Oral Q8H    mupirocin   Topical (Top) BID    polyethylene glycol  17 g Oral Daily    senna-docusate 8.6-50 mg  1 tablet Oral Daily    sodium chloride  1,000 mg Oral BID    vancomycin (VANCOCIN) IV (PEDS and ADULTS)  500 mg Intravenous  Q24H     PRN Meds:  Current Facility-Administered Medications:     acetaminophen, 650 mg, Oral, Q6H PRN    artificial tears, 1 drop, Both Eyes, PRN    bisacodyL, 10 mg, Rectal, Daily PRN    hydrALAZINE, 10 mg, Intravenous, Q4H PRN    labetalol, 10 mg, Intravenous, Q4H PRN    melatonin, 6 mg, Oral, Nightly PRN    morphine, 2 mg, Intravenous, Q4H PRN    naloxone, 0.02 mg, Intravenous, PRN    ondansetron, 8 mg, Oral, Q8H PRN    oxyCODONE-acetaminophen, 1 tablet, Oral, Q4H PRN    oxyCODONE-acetaminophen, 1 tablet, Oral, Q4H PRN    simethicone, 1 tablet, Oral, QID PRN    sodium chloride 0.9%, 1-10 mL, Intravenous, Q12H PRN    vancomycin - pharmacy to dose, , Intravenous, pharmacy to manage frequency     Review of Systems  Objective:     Weight: 59 kg (130 lb)  Body mass index is 23.03 kg/m².  Vital Signs (Most Recent):  Temp: 98.4 °F (36.9 °C) (03/20/25 0301)  Pulse: (!) 52 (03/20/25 0736)  Resp: 14 (03/20/25 0736)  BP: (!) 146/67 (03/20/25 0736)  SpO2: 97 % (03/20/25 0736) Vital Signs (24h Range):  Temp:  [94.2 °F (34.6 °C)-98.4 °F (36.9 °C)] 98.4 °F (36.9 °C)  Pulse:  [42-66] 52  Resp:  [7-26] 14  SpO2:  [94 %-100 %] 97 %  BP: (104-187)/(52-86) 146/67  Arterial Line BP: (116-181)/(32-96) 147/43                              Closed/Suction Drain 03/19/25 1135 Tube - 1 Left Back Accordion 10 Fr. (Active)   Site Description Unable to view 03/20/25 0501   Dressing Type Other (Comment) 03/19/25 1505   Dressing Intervention Integrity maintained 03/20/25 0501   Drainage Sanguineous 03/20/25 0501   Status Other (Comment) 03/20/25 0501   Output (mL) 70 mL 03/20/25 0501            Urethral Catheter 03/18/25 1610 Silicone 16 Fr. (Active)   Site Assessment Clean;Intact 03/20/25 0501   Collection Container Urimeter 03/20/25 0501   Securement Method secured to top of thigh w/ adhesive device 03/20/25 0501   Catheter Care Performed yes 03/20/25 0501   Reason for Continuing Urinary Catheterization Critically ill in ICU and requiring  "hourly monitoring of intake/output 03/20/25 0501   CAUTI Prevention Bundle Securement Device in place with 1" slack;Intact seal between catheter & drainage tubing;Drainage bag/urimeter off the floor;Sheeting clip in use;No dependent loops or kinks;Drainage bag/urimeter not overfilled (<2/3 full);Drainage bag/urimeter below bladder 03/19/25 1901   Output (mL) 200 mL 03/20/25 0501          Physical Exam         Neurosurgery Physical Exam  General: Well developed, well nourished, no distress.   Head: Normocephalic, atraumatic.  Mental Status: Awake, Alert, Oriented  Speech: Clear with content appropriate to conversation.  Cranial nerves: Face symmetric, CN II-XII grossly intact.   Eyes: Pupils equal, round, reactive to light, EOMI.  Sensory: Intact to light touch throughout.  Motor Strength: Moves all extremities spontaneously with good tone. Full strength upper and lower extremities. No abnormal movements seen.     Shoulder arthritis, lifting up right > left shoulder is limited by shoulder pain and ROM     Strength   Deltoids Triceps Biceps Wrist Extension Wrist Flexion Hand    Upper: R 4/5 5/5 5/5 5/5 5/5 5/5     L 4/5 5/5 5/5 5/5 5/5 5/5       Iliopsoas Quadriceps Knee  Flexion Tibialis  anterior Gastro- cnemius EHL   Lower: R 5/5 5/5 5/5 5/5 5/5 5/5     L 5/5 5/5 5/5 5/5 5/5 5/5    Patient with R distal clavicle fx and h/o of R shoulder arthroplasty.  Hand intrinsics 5/5.     In c-collar.                    Significant Labs:  Recent Labs   Lab 03/19/25  0209 03/19/25  0805 03/19/25  1302 03/20/25  0003   GLU 94 108 160* 108    139 139 138   K 4.3 4.4  4.4 4.6  4.6 4.6    110 107 107   CO2 22* 21* 21* 21*   BUN 46* 45* 43* 43*   CREATININE 1.2 1.1 1.1 1.2   CALCIUM 7.7* 7.7* 7.5* 7.6*   MG 2.7*  --   --  2.9*     Recent Labs   Lab 03/18/25  0938 03/19/25  0209 03/20/25  0003   WBC  --  11.25 12.87*   HGB  --  9.6* 8.9*   HCT 33* 30.1* 28.2*   PLT  --  373 341     Recent Labs   Lab 03/18/25  1648 "   INR 0.9   APTT 27.6     Microbiology Results (last 7 days)       ** No results found for the last 168 hours. **          All pertinent labs from the last 24 hours have been reviewed.    Significant Diagnostics:  I have reviewed all pertinent imaging results/findings within the past 24 hours.  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 Multilevel foraminal stenosis.     Now s/p Occipital - C4 posterior spinal fusion (3/19).     POD 1.     Plan:   - Cervical collar at all times, will continue collar post operatively for 3 months  - Post operative XR C spine pending  - Multimodal pain control (oxycodone 5/7.5 prn, morphine prn, robaxin prn)  - Bowel regimen: senna, miralax  - CPK elevated post operatively, currently on fluids, also home lasix. Trending down.  Appreciate ICU management.   - Notify NSGY immediately with any decline on exam.  - Med management per primary  - PT/OT/OOB  - Discontinue noriega and A Line  - Step down when appropriate from medical standpoint, continue telemetry on the floor.    Given age, hyperkalemia pre-op, and CPK levels feel would be most appropriate for step down back to medicine service.  Neurosurgery will continue to follow during hospitalization.   - OK for DVT prophylaxis: Lovenox    D/w staff, Dr. Cristo Rodríguez MD  Neurosurgery  [facility] - Neuro Critical Care

## 2025-03-20 NOTE — ASSESSMENT & PLAN NOTE
Renal function remains around baseline; Estimated Creatinine Clearance: 25.8 mL/min (based on SCr of 1.2 mg/dL). according to latest data. 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 Will avoid nephrotoxic agents as able, and renally dose all meds as applicable.       Recent Labs   Lab 03/19/25  0805 03/19/25  1302 03/20/25  0003   BUN 45* 43* 43*   CREATININE 1.1 1.1 1.2       I & O     Intake/Output Summary (Last 24 hours) at 3/20/2025 1634  Last data filed at 3/20/2025 1101  Gross per 24 hour   Intake 774.51 ml   Output 755 ml   Net 19.51 ml

## 2025-03-20 NOTE — ASSESSMENT & PLAN NOTE
Patient with leucocytosis   Recent Labs   Lab 03/18/25  0528 03/19/25  0209 03/20/25  0003   WBC 9.33 11.25 12.87*     . Afebrile.  monitor .

## 2025-03-20 NOTE — PLAN OF CARE
[facility] Care Plan    POC reviewed with Bridget Montgomery and family at 0300. Patient verbalized understanding. Questions and concerns addressed. No acute events today. Pt progressing toward goals. Will continue to monitor. See below and flowsheets for full assessment and VS info.     Pt remains in C collar   PM hydralazine held due to SBP between 100-120   Zofran given x1 for nausea   Percocet x2 for pain   CPK remains elevated at 420, 500cc NS given over 5 hours, due to stop at 0730   Calcium replaced IVPB   70cc Hemovac output overnight     Is this a stroke patient? no    Neuro:  [place] Coma Scale  Best Eye Response: 4-->(E4) spontaneous  Best Motor Response: 6-->(M6) obeys commands  Best Verbal Response: 5-->(V5) oriented  [place] Coma Scale Score: 15  Assessment Qualifiers: no eye obstruction present, patient not sedated/intubated  Pupil PERRLA: yes     24 hr Temp:  [94.2 °F (34.6 °C)-98.5 °F (36.9 °C)]     CV:   Rhythm: sinus bradycardia  BP goals:   SBP < 160  MAP > 65    Resp:           Plan: N/A    GI/:     Diet/Nutrition Received: regular  Last Bowel Movement: 03/17/25  Voiding Characteristics: urethral catheter (bladder)    Intake/Output Summary (Last 24 hours) at 3/20/2025 0522  Last data filed at 3/20/2025 0501  Gross per 24 hour   Intake 1897.78 ml   Output 1630 ml   Net 267.78 ml     Unmeasured Output  Urine Occurrence: 0  Stool Occurrence: 0  Emesis Occurrence: 0  Pad Count: 2    Labs/Accuchecks:  Recent Labs   Lab 03/20/25  0003   WBC 12.87*   RBC 3.14*   HGB 8.9*   HCT 28.2*         Recent Labs   Lab 03/20/25  0003      K 4.6   CO2 21*      BUN 43*   CREATININE 1.2   ALKPHOS 140   ALT 41   AST 55*   BILITOT 0.2      Recent Labs   Lab 03/18/25  1648   INR 0.9   APTT 27.6      Recent Labs   Lab 03/20/25  0003   *       Electrolytes: N/A - electrolytes WDL  Accuchecks: none    Gtts:      LDA/Wounds:    Marvel Risk Assessment  Sensory Perception: 3-->slightly  limited  Moisture: 3-->occasionally moist  Activity: 1-->bedfast  Mobility: 3-->slightly limited  Nutrition: 3-->adequate  Friction and Shear: 2-->potential problem  Marvel Score: 15  Is your marvel score 12 or less? no      Problem: Adult Inpatient Plan of Care  Goal: Plan of Care Review  Outcome: Progressing  Goal: Patient-Specific Goal (Individualized)  Outcome: Progressing  Goal: Absence of Hospital-Acquired Illness or Injury  Outcome: Progressing  Goal: Optimal Comfort and Wellbeing  Outcome: Progressing  Goal: Readiness for Transition of Care  Outcome: Progressing     Problem: Diabetes Comorbidity  Goal: Blood Glucose Level Within Targeted Range  Outcome: Progressing     Problem: Wound  Goal: Optimal Coping  Outcome: Progressing  Goal: Optimal Functional Ability  Outcome: Progressing  Goal: Absence of Infection Signs and Symptoms  Outcome: Progressing  Goal: Improved Oral Intake  Outcome: Progressing  Goal: Optimal Pain Control and Function  Outcome: Progressing  Goal: Skin Health and Integrity  Outcome: Progressing  Goal: Optimal Wound Healing  Outcome: Progressing     Problem: Fall Injury Risk  Goal: Absence of Fall and Fall-Related Injury  Outcome: Progressing     Problem: Skin Injury Risk Increased  Goal: Skin Health and Integrity  Outcome: Progressing     Problem: Infection  Goal: Absence of Infection Signs and Symptoms  Outcome: Progressing

## 2025-03-21 PROBLEM — G95.19 EDEMA, SPINAL CORD: Status: RESOLVED | Noted: 2018-08-17 | Resolved: 2025-03-21

## 2025-03-21 LAB
ALBUMIN SERPL BCP-MCNC: 2.3 G/DL (ref 3.5–5.2)
ALP SERPL-CCNC: 123 U/L (ref 40–150)
ALT SERPL W/O P-5'-P-CCNC: 36 U/L (ref 10–44)
ANION GAP SERPL CALC-SCNC: 10 MMOL/L (ref 8–16)
AST SERPL-CCNC: 41 U/L (ref 10–40)
BASOPHILS # BLD AUTO: 0.02 K/UL (ref 0–0.2)
BASOPHILS NFR BLD: 0.2 % (ref 0–1.9)
BILIRUB SERPL-MCNC: 0.1 MG/DL (ref 0.1–1)
BUN SERPL-MCNC: 48 MG/DL (ref 8–23)
CALCIUM SERPL-MCNC: 8.1 MG/DL (ref 8.7–10.5)
CHLORIDE SERPL-SCNC: 106 MMOL/L (ref 95–110)
CK SERPL-CCNC: 172 U/L (ref 20–180)
CO2 SERPL-SCNC: 20 MMOL/L (ref 23–29)
CREAT SERPL-MCNC: 1.2 MG/DL (ref 0.5–1.4)
DIFFERENTIAL METHOD BLD: ABNORMAL
EOSINOPHIL # BLD AUTO: 0 K/UL (ref 0–0.5)
EOSINOPHIL NFR BLD: 0.2 % (ref 0–8)
ERYTHROCYTE [DISTWIDTH] IN BLOOD BY AUTOMATED COUNT: 15.4 % (ref 11.5–14.5)
EST. GFR  (NO RACE VARIABLE): 43 ML/MIN/1.73 M^2
GLUCOSE SERPL-MCNC: 93 MG/DL (ref 70–110)
HCT VFR BLD AUTO: 30.6 % (ref 37–48.5)
HGB BLD-MCNC: 9.2 G/DL (ref 12–16)
IMM GRANULOCYTES # BLD AUTO: 0.09 K/UL (ref 0–0.04)
IMM GRANULOCYTES NFR BLD AUTO: 1.1 % (ref 0–0.5)
LYMPHOCYTES # BLD AUTO: 1 K/UL (ref 1–4.8)
LYMPHOCYTES NFR BLD: 11.7 % (ref 18–48)
MAGNESIUM SERPL-MCNC: 2.6 MG/DL (ref 1.6–2.6)
MCH RBC QN AUTO: 28.3 PG (ref 27–31)
MCHC RBC AUTO-ENTMCNC: 30.1 G/DL (ref 32–36)
MCV RBC AUTO: 94 FL (ref 82–98)
MONOCYTES # BLD AUTO: 1.1 K/UL (ref 0.3–1)
MONOCYTES NFR BLD: 13.6 % (ref 4–15)
NEUTROPHILS # BLD AUTO: 6.1 K/UL (ref 1.8–7.7)
NEUTROPHILS NFR BLD: 73.2 % (ref 38–73)
NRBC BLD-RTO: 0 /100 WBC
PHOSPHATE SERPL-MCNC: 3.9 MG/DL (ref 2.7–4.5)
PLATELET # BLD AUTO: 255 K/UL (ref 150–450)
PMV BLD AUTO: 11.1 FL (ref 9.2–12.9)
POTASSIUM SERPL-SCNC: 3.9 MMOL/L (ref 3.5–5.1)
PROT SERPL-MCNC: 5 G/DL (ref 6–8.4)
RBC # BLD AUTO: 3.25 M/UL (ref 4–5.4)
SODIUM SERPL-SCNC: 136 MMOL/L (ref 136–145)
WBC # BLD AUTO: 8.31 K/UL (ref 3.9–12.7)

## 2025-03-21 PROCEDURE — 85025 COMPLETE CBC W/AUTO DIFF WBC: CPT

## 2025-03-21 PROCEDURE — 63600175 PHARM REV CODE 636 W HCPCS: Performed by: HOSPITALIST

## 2025-03-21 PROCEDURE — 11000001 HC ACUTE MED/SURG PRIVATE ROOM

## 2025-03-21 PROCEDURE — 80053 COMPREHEN METABOLIC PANEL: CPT

## 2025-03-21 PROCEDURE — 97530 THERAPEUTIC ACTIVITIES: CPT

## 2025-03-21 PROCEDURE — 25000003 PHARM REV CODE 250: Performed by: INTERNAL MEDICINE

## 2025-03-21 PROCEDURE — 99900035 HC TECH TIME PER 15 MIN (STAT)

## 2025-03-21 PROCEDURE — 25000003 PHARM REV CODE 250

## 2025-03-21 PROCEDURE — 94640 AIRWAY INHALATION TREATMENT: CPT

## 2025-03-21 PROCEDURE — 25000003 PHARM REV CODE 250: Performed by: STUDENT IN AN ORGANIZED HEALTH CARE EDUCATION/TRAINING PROGRAM

## 2025-03-21 PROCEDURE — 25000003 PHARM REV CODE 250: Performed by: HOSPITALIST

## 2025-03-21 PROCEDURE — 51700 IRRIGATION OF BLADDER: CPT

## 2025-03-21 PROCEDURE — 82550 ASSAY OF CK (CPK): CPT

## 2025-03-21 PROCEDURE — 63600175 PHARM REV CODE 636 W HCPCS: Performed by: NEUROLOGICAL SURGERY

## 2025-03-21 PROCEDURE — 25000003 PHARM REV CODE 250: Performed by: PHYSICIAN ASSISTANT

## 2025-03-21 PROCEDURE — 63600175 PHARM REV CODE 636 W HCPCS: Performed by: PHYSICIAN ASSISTANT

## 2025-03-21 PROCEDURE — 94761 N-INVAS EAR/PLS OXIMETRY MLT: CPT

## 2025-03-21 PROCEDURE — 83735 ASSAY OF MAGNESIUM: CPT

## 2025-03-21 PROCEDURE — 84100 ASSAY OF PHOSPHORUS: CPT

## 2025-03-21 PROCEDURE — 63600175 PHARM REV CODE 636 W HCPCS: Performed by: INTERNAL MEDICINE

## 2025-03-21 PROCEDURE — 36415 COLL VENOUS BLD VENIPUNCTURE: CPT

## 2025-03-21 RX ORDER — POLYETHYLENE GLYCOL 3350 17 G/17G
17 POWDER, FOR SOLUTION ORAL DAILY
Qty: 510 G | Refills: 0 | Status: ON HOLD | OUTPATIENT
Start: 2025-03-22

## 2025-03-21 RX ORDER — ALUMINUM HYDROXIDE, MAGNESIUM HYDROXIDE, AND SIMETHICONE 2400; 240; 2400 MG/30ML; MG/30ML; MG/30ML
30 SUSPENSION ORAL EVERY 6 HOURS PRN
Status: DISCONTINUED | OUTPATIENT
Start: 2025-03-21 | End: 2025-03-24 | Stop reason: HOSPADM

## 2025-03-21 RX ORDER — FUROSEMIDE 40 MG/1
40 TABLET ORAL DAILY
Qty: 30 TABLET | Refills: 1 | Status: ON HOLD | OUTPATIENT
Start: 2025-03-22 | End: 2026-03-22

## 2025-03-21 RX ORDER — SODIUM CHLORIDE 1 G/1
1000 TABLET ORAL 2 TIMES DAILY
Qty: 30 TABLET | Refills: 0 | Status: ON HOLD | OUTPATIENT
Start: 2025-03-21 | End: 2025-04-05

## 2025-03-21 RX ORDER — HYDRALAZINE HYDROCHLORIDE 25 MG/1
75 TABLET, FILM COATED ORAL EVERY 8 HOURS
Qty: 270 TABLET | Refills: 11 | Status: ON HOLD | OUTPATIENT
Start: 2025-03-21 | End: 2026-03-21

## 2025-03-21 RX ORDER — FLUTICASONE FUROATE AND VILANTEROL 100; 25 UG/1; UG/1
1 POWDER RESPIRATORY (INHALATION) DAILY
Qty: 60 EACH | Refills: 1 | Status: ON HOLD | OUTPATIENT
Start: 2025-03-22

## 2025-03-21 RX ORDER — CLONIDINE 0.1 MG/24H
1 PATCH, EXTENDED RELEASE TRANSDERMAL
Qty: 4 PATCH | Refills: 11 | Status: ON HOLD | OUTPATIENT
Start: 2025-03-24 | End: 2026-03-24

## 2025-03-21 RX ORDER — MORPHINE SULFATE 2 MG/ML
1 INJECTION, SOLUTION INTRAMUSCULAR; INTRAVENOUS EVERY 6 HOURS PRN
Status: DISPENSED | OUTPATIENT
Start: 2025-03-21 | End: 2025-03-22

## 2025-03-21 RX ORDER — OXYCODONE AND ACETAMINOPHEN 5; 325 MG/1; MG/1
1 TABLET ORAL EVERY 6 HOURS PRN
Status: ON HOLD
Start: 2025-03-21

## 2025-03-21 RX ORDER — METHOCARBAMOL 500 MG/1
500 TABLET, FILM COATED ORAL 3 TIMES DAILY
Qty: 30 TABLET | Refills: 0 | Status: ON HOLD | OUTPATIENT
Start: 2025-03-21 | End: 2025-03-31

## 2025-03-21 RX ADMIN — MUPIROCIN: 20 OINTMENT TOPICAL at 09:03

## 2025-03-21 RX ADMIN — CEFAZOLIN 1 G: 330 INJECTION, POWDER, FOR SOLUTION INTRAMUSCULAR; INTRAVENOUS at 12:03

## 2025-03-21 RX ADMIN — OXYCODONE AND ACETAMINOPHEN 1 TABLET: 7.5; 325 TABLET ORAL at 11:03

## 2025-03-21 RX ADMIN — CEFAZOLIN 1 G: 330 INJECTION, POWDER, FOR SOLUTION INTRAMUSCULAR; INTRAVENOUS at 11:03

## 2025-03-21 RX ADMIN — POLYETHYLENE GLYCOL 3350 17 G: 17 POWDER, FOR SOLUTION ORAL at 09:03

## 2025-03-21 RX ADMIN — ENOXAPARIN SODIUM 30 MG: 30 INJECTION SUBCUTANEOUS at 06:03

## 2025-03-21 RX ADMIN — SODIUM CHLORIDE 1000 MG: 1 TABLET ORAL at 09:03

## 2025-03-21 RX ADMIN — HYDRALAZINE HYDROCHLORIDE 75 MG: 25 TABLET ORAL at 05:03

## 2025-03-21 RX ADMIN — METHOCARBAMOL 500 MG: 500 TABLET ORAL at 08:03

## 2025-03-21 RX ADMIN — ACETAMINOPHEN 650 MG: 325 TABLET ORAL at 08:03

## 2025-03-21 RX ADMIN — FUROSEMIDE 40 MG: 40 TABLET ORAL at 09:03

## 2025-03-21 RX ADMIN — MORPHINE SULFATE 1 MG: 2 INJECTION, SOLUTION INTRAMUSCULAR; INTRAVENOUS at 06:03

## 2025-03-21 RX ADMIN — METHOCARBAMOL 500 MG: 500 TABLET ORAL at 03:03

## 2025-03-21 RX ADMIN — ALUMINUM HYDROXIDE, MAGNESIUM HYDROXIDE, AND DIMETHICONE 30 ML: 400; 400; 40 SUSPENSION ORAL at 02:03

## 2025-03-21 RX ADMIN — MORPHINE SULFATE 2 MG: 2 INJECTION, SOLUTION INTRAMUSCULAR; INTRAVENOUS at 09:03

## 2025-03-21 RX ADMIN — HYDRALAZINE HYDROCHLORIDE 75 MG: 25 TABLET ORAL at 03:03

## 2025-03-21 RX ADMIN — GABAPENTIN 300 MG: 300 CAPSULE ORAL at 03:03

## 2025-03-21 RX ADMIN — GABAPENTIN 300 MG: 300 CAPSULE ORAL at 08:03

## 2025-03-21 RX ADMIN — HYDRALAZINE HYDROCHLORIDE 75 MG: 25 TABLET ORAL at 08:03

## 2025-03-21 RX ADMIN — Medication 6 MG: at 08:03

## 2025-03-21 RX ADMIN — MUPIROCIN: 20 OINTMENT TOPICAL at 08:03

## 2025-03-21 RX ADMIN — METHOCARBAMOL 500 MG: 500 TABLET ORAL at 09:03

## 2025-03-21 RX ADMIN — SODIUM CHLORIDE 1000 MG: 1 TABLET ORAL at 08:03

## 2025-03-21 RX ADMIN — OXYCODONE AND ACETAMINOPHEN 1 TABLET: 7.5; 325 TABLET ORAL at 03:03

## 2025-03-21 RX ADMIN — SENNOSIDES AND DOCUSATE SODIUM 1 TABLET: 50; 8.6 TABLET ORAL at 09:03

## 2025-03-21 RX ADMIN — FLUTICASONE FUROATE AND VILANTEROL TRIFENATATE 1 PUFF: 100; 25 POWDER RESPIRATORY (INHALATION) at 09:03

## 2025-03-21 RX ADMIN — AMLODIPINE BESYLATE 10 MG: 10 TABLET ORAL at 09:03

## 2025-03-21 NOTE — ASSESSMENT & PLAN NOTE
History of COPD, not on home oxygen. Currently without evidence of acute exacerbation, and sats on room air at goal 88% or greater. Will continue hospital formulary equivalent of home regimen and monitor respiratory status.     3/21 sats 97% on 1LNC Taper oxygen as tolerated to Spo2 goal of low 90s

## 2025-03-21 NOTE — ASSESSMENT & PLAN NOTE
Renal function remains around baseline; Estimated Creatinine Clearance: 25.8 mL/min (based on SCr of 1.2 mg/dL). according to latest data. 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 Will avoid nephrotoxic agents as able, and renally dose all meds as applicable.       Recent Labs   Lab 03/19/25  1302 03/20/25  0003 03/21/25  0518   BUN 43* 43* 48*   CREATININE 1.1 1.2 1.2       I & O     Intake/Output Summary (Last 24 hours) at 3/21/2025 1217  Last data filed at 3/21/2025 0505  Gross per 24 hour   Intake 400 ml   Output 130 ml   Net 270 ml

## 2025-03-21 NOTE — OP NOTE
DATE OF SURGERY: 3/19/25    PREOPERATIVE DIAGNOSIS:  1. 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 Cervical myelopathy    POSTOPERATIVE DIAGNOSIS:  Same    PROCEDURE PERFORMED:  1. Open reduction and internal fixation of C2 fracture dislocation and cranial settling  2. Occiput to C4 fusion  3. Nonsegmental posterior fixation, O-C4  4. Use of intraoperative flouroscopy  5. Use of intraoperative neuromonitoring with MEPs  6. Fibergraft bone grafting    SURGEON: Clint Lemons M.D.    ASSISTANT: Ben Rodríguez M.D.    ANESTHESIA: GETA    ESTIMATED BLOOD LOSS: 200mL    COMPLICATIONS: None    DRAINS: Subfascial Hemovac x1    SPECIMENS SENT: None    FINDINGS: Good open reduction    INDICATIONS:    89F with intermittent BUE weakness related to cord kinking and cervical myelopathy in the setting of the above diagnoses. We discussed surgical and nonsurgical options extensively with the patient and family (including a grandson who is a Neurosurgeon), and over a week time period and after a second surgical opinion they concluded that they would like to proceed with surgery. I recommended the above procedure specifically. Risks, benefits, alternatives, indications and methods were reviewed in detail and the patient wished to proceed. All questions were answered. No guarantees about the results of the procedure were made.    PROCEDURE:    The patient was brought to the operating room where she was intubated and placed under general anesthesia without difficulty.  All lines were placed.  The Radford clamp was placed bitemporally. Pre-flip motors were run and found to be present in the upper extremities but unreliable in the lower extremities. She was repositioned prone onto the operating table. The Radford was fixed to the table with her head in a neutral position. The head was repositioned under lateral fluoroscopy to reduce her fracture dislocation.  Motor evoked  potentials were run again and found to be stable. Hair was clipped at the base of the skull and the posterior cervical region was marked and prepped.   This area was prepped as well and the entire region was draped in the usual sterile fashion.  A timeout was performed prior to the procedure.  10 cc of lidocaine with epinephrine were injected.    We made a linear incision with a 10 blade from approximately the inion to the mid cervical spine.  Supra and subfascial dissection was carried out with Bovie electrocautery at the midline through the nuchal raphae. A muscle cuff was preserved at the nuchal line near the inion and the muscle tissue was cut in a T pattern using the Bovie electrocautery. At the end of my exposure the posterior elements from the occiput to C4 could be seen.     We then localized our cervical exposure with the Bovie tip as a marker placed into the presumed C3-4 facet joint, and confirmed levels on lateral xray. Attention was then turned to placement of the occipital plate.  The keel was identified and drilled flush with the occiput using M8 drill bit.  Keel screws were then placed using freehand technique and anatomic landmarks. Bilateral C2 pars screws, a left C2 translaminar screw, and bilateral C3 and C4 lateral mass screws were placed using freehand technique and anatomic landmarks. The wound was irrigated with a dilute Betadine solution and normal saline and the hardware was evaluated with x-rays. X-rays showed excellent position of all hardware.     Articulating occipitocervical rods were then sized contoured and reduced into the tulip heads.  The rods were gently manipulated to reduce the fracture dislocation in open fashion. Set screws were finally tightened.  The the base of the occiput and the posterior elements from C1 to C4 were then decorticated with a high-speed drill. Fibergraft strips were then placed from the occiput to C4 bilaterally to complete the arthrodesis. Final x-rays  showed excellent reduction of the fracture dislocation and position of all hardware.    2 grams of Vancomyic powder was placed into the wound. A subfascial Hemovac drain was placed and tunneled through a separate incision where it was secured with a Vicryl suture.  A watertight fascial closure was achieved with interrupted inverted Ethibond sutures and a Stratafix suture. The soft tissues were closed in layers and the skin was closed with staples.  A silver nitrate dressing was applied.    The patient appeared to tolerate the procedure well from a hemodynamic and neuromonitoring standpoint.  The motor evoked potentials were stable from the beginning of the case until the end. I was present for all critical portions of the case.  At the end of the case all counts were correct. The patient was removed from the Radford clamp and repositioned supine on the hospital bed where she was extubated and allowed to emerge from anesthesia without difficulty. She was sent to the ICU in stable condition for recovery.    JUSTIFICATION OF MODIFIER 22:    This is a frail 89F with kinking of the cervicomedullary junction in the setting of a C2 fracture dislocation with cranial settling, which required a spinal ORIF to correct. The case was inherently more risky than similar procedures given the patients age and frailty.

## 2025-03-21 NOTE — SUBJECTIVE & OBJECTIVE
Interval History: 3/21: POD 2.  NAEON.  Stepped down to floor status.  Drain with slowing output. Neurologically stable.     Medications:  Continuous Infusions:  Scheduled Meds:   amLODIPine  10 mg Oral Daily    ceFAZolin (Ancef) IV (PEDS and ADULTS)  1 g Intravenous Q12H    cloNIDine 0.1 mg/24 hr td ptwk  1 patch Transdermal Q7 Days    enoxparin  30 mg Subcutaneous Q24H (prophylaxis, 1700)    fluticasone furoate-vilanteroL  1 puff Inhalation Daily    furosemide  40 mg Oral Daily    gabapentin  300 mg Oral QHS    hydrALAZINE  75 mg Oral Q8H    methocarbamoL  500 mg Oral TID    mupirocin   Topical (Top) BID    polyethylene glycol  17 g Oral Daily    senna-docusate 8.6-50 mg  1 tablet Oral Daily    sodium chloride  1,000 mg Oral BID     PRN Meds:  Current Facility-Administered Medications:     acetaminophen, 650 mg, Oral, Q6H PRN    aluminum & magnesium hydroxide-simethicone, 30 mL, Oral, Q6H PRN    artificial tears, 1 drop, Both Eyes, PRN    bisacodyL, 10 mg, Rectal, Daily PRN    calcium carbonate, 1,000 mg, Oral, TID PRN    hydrALAZINE, 10 mg, Intravenous, Q4H PRN    labetalol, 10 mg, Intravenous, Q4H PRN    melatonin, 6 mg, Oral, Nightly PRN    morphine, 1 mg, Intravenous, Q6H PRN    naloxone, 0.02 mg, Intravenous, PRN    ondansetron, 8 mg, Oral, Q8H PRN    oxyCODONE-acetaminophen, 1 tablet, Oral, Q4H PRN    oxyCODONE-acetaminophen, 1 tablet, Oral, Q4H PRN    simethicone, 1 tablet, Oral, QID PRN    sodium chloride 0.9%, 1-10 mL, Intravenous, Q12H PRN     Review of Systems  Objective:     Weight: 59 kg (130 lb)  Body mass index is 23.03 kg/m².  Vital Signs (Most Recent):  Temp: 97.5 °F (36.4 °C) (03/21/25 0812)  Pulse: 82 (03/21/25 1119)  Resp: 18 (03/21/25 0949)  BP: (!) 154/66 (03/21/25 0812)  SpO2: 98 % (03/21/25 4405) Vital Signs (24h Range):  Temp:  [97.4 °F (36.3 °C)-98.6 °F (37 °C)] 97.5 °F (36.4 °C)  Pulse:  [49-82] 82  Resp:  [15-20] 18  SpO2:  [96 %-100 %] 98 %  BP: (111-154)/(59-70) 154/66                  "             Closed/Suction Drain 03/19/25 1135 Tube - 1 Left Back Accordion 10 Fr. (Active)   Site Description Unable to view 03/20/25 2000   Dressing Type Other (Comment) 03/20/25 2000   Dressing Status Clean;Intact;Dry 03/20/25 2000   Dressing Intervention Integrity maintained 03/20/25 2000   Drainage Sanguineous 03/20/25 2000   Status Other (Comment) 03/20/25 2000   Output (mL) 40 mL 03/21/25 0505          Physical Exam         Neurosurgery Physical Exam    General: Well developed, well nourished, no distress.   Head: Normocephalic, atraumatic.  Mental Status: Awake, Alert, Oriented  Speech: Clear with content appropriate to conversation.  Cranial nerves: Face symmetric, CN II-XII grossly intact.   Eyes: Pupils equal, round, reactive to light, EOMI.  Sensory: Intact to light touch throughout.  Motor Strength: Moves all extremities spontaneously with good tone. Full strength upper and lower extremities. No abnormal movements seen.      Shoulder arthritis, lifting up right > left shoulder is limited by shoulder pain and ROM     Strength   Deltoids Triceps Biceps Wrist Extension Wrist Flexion Hand    Upper: R 4/5 5/5 5/5 5/5 5/5 5/5     L 4/5 5/5 5/5 5/5 5/5 5/5       Iliopsoas Quadriceps Knee  Flexion Tibialis  anterior Gastro- cnemius EHL   Lower: R 5/5 5/5 5/5 5/5 5/5 5/5     L 5/5 5/5 5/5 5/5 5/5 5/5    Patient with R distal clavicle fx and h/o of R shoulder arthroplasty.  Hand intrinsics 5/5.     In c-collar.                     Significant Labs:  Recent Labs   Lab 03/19/25  1302 03/20/25  0003 03/21/25  0518   * 108 93    138 136   K 4.6  4.6 4.6 3.9    107 106   CO2 21* 21* 20*   BUN 43* 43* 48*   CREATININE 1.1 1.2 1.2   CALCIUM 7.5* 7.6* 8.1*   MG  --  2.9* 2.6     Recent Labs   Lab 03/20/25  0003 03/21/25  0518   WBC 12.87* 8.31   HGB 8.9* 9.2*   HCT 28.2* 30.6*    255     No results for input(s): "LABPT", "INR", "APTT" in the last 48 hours.  Microbiology Results (last 7 " days)       ** No results found for the last 168 hours. **          All pertinent labs from the last 24 hours have been reviewed.    Significant Diagnostics:  I have reviewed all pertinent imaging results/findings within the past 24 hours.

## 2025-03-21 NOTE — PLAN OF CARE
03/21/25 0958   Post-Acute Status   Post-Acute Authorization Placement   Post-Acute Placement Status Referrals Sent   Coverage [facility]   Hospital Resources/Appts/Education Provided Provided education on problems/symptoms using teachback;Provided patient/caregiver with written discharge plan information   Discharge Delays None known at this time   Discharge Plan   Discharge Plan A Skilled Nursing Facility   Discharge Plan B Skilled Nursing Facility       Discharge Plan A and Plan B have been determined by review of patient's clinical status, future medical and therapeutic needs, and coverage/benefits for post-acute care in coordination with multidisciplinary team members.     Jeff spoke to patient and caregiver at bedside, And Liliana on the speaker phone. Cm reviewed providers recommend SNF patient and caregivers are agreeable to plan    Patient/family provided list of facilities in-network with patient's payor plan. Providers that are owned, operated, or affiliated with [facility] are included on the list.     Notified that referral sent to below listed facilities from in-network list based on proximity to home/family support:   [facility]      Patient/family instructed to identify preference.    Preferred Facility: (if more than 1, listed in order of descending preference)  [facility]    If an additional preferred facility not listed above is identified, additional referral to be sent. If above facilities unable to accept, will send additional referrals to in-network providers.     CM reviewed should given additional resources in case [facility] SNF is full they have no other recommendations, Liliana will review and given additional SNF sources. CM will continue to follow.      12:15 PM  CM received notification that patient has been accepted to [facility], MIKAYLA Monday 03/24, needs Covid ordered Sunday, CM sent Auth request to [facility] Rep Yuliya [phone], ext [phone]. Waiting on response.     3:47 PM  "called [facility] to verify receipt of paperwork spoke with Estelita dunlap does not see paperwork, verified fax number, resent fax to Estelita     Your fax has been successfully sent to [phone] at [phone].  ------------------------------------------------------------  From: [id number]  ------------------------------------------------------------  3/21/2025 3:57:22 PM Transmission Record   Sent to [phone] with remote ID "[id number]      "   Result: (0/339;0/0) Success   Page record: 1 - 28   Elapsed time: 08:57 on channel 61      Priya Martínez RN  Case Management  [phone]   "

## 2025-03-21 NOTE — PT/OT/SLP PROGRESS
"Occupational Therapy   Treatment    Name: Bridget Montgomery  MRN: [id number]  Admitting Diagnosis:  Spinal stenosis  2 Days Post-Op    Recommendations:     Discharge Recommendations: Moderate Intensity Therapy  Discharge Equipment Recommendations:  hospital bed, lift device, wheelchair, bath bench, walker, rolling  Barriers to discharge:  None    Assessment:     Bridget Montgomery is a 89 y.o. female with a medical diagnosis of Spinal stenosis.  She presents with increased difficulty with mobility on this date secondary to pain. Performance deficits affecting function are impaired endurance, weakness, impaired self care skills, impaired functional mobility, gait instability, impaired balance, decreased safety awareness, decreased lower extremity function, decreased upper extremity function, impaired fine motor, impaired skin, orthopedic precautions. Patient significantly below PLOF. Patient continues to demonstrate the need for moderate intensity therapy on a daily basis post acute exhibited by decreased independence with self-care and functional mobility     Rehab Prognosis:  Good; patient would benefit from acute skilled OT services to address these deficits and reach maximum level of function.       Plan:     Patient to be seen 4 x/week to address the above listed problems via self-care/home management, therapeutic activities, therapeutic exercises, neuromuscular re-education  Plan of Care Expires: 04/20/25  Plan of Care Reviewed with: patient, daughter    Subjective     Chief Complaint: pain  Patient/Family Comments/goals: "my knees won't straighten"  Pain/Comfort:  Pain Rating 1: other (see comments) (unrated)  Location 1:  (neck into R shoulder)  Pain Addressed 1: Reposition, Distraction, Nurse notified, Pre-medicate for activity  Pain Rating Post-Intervention 1: other (see comments)    Objective:     Communicated with: nursing prior to session.  Patient found supine with telemetry, bed alarm, cervical " collar, hemovac and PCT present upon OT entry to room. Patient's daughter present in room during session.     General Precautions: Standard, fall    Orthopedic Precautions:spinal precautions  Braces: Cervical collar  Respiratory Status: Nasal cannula, flow 1 L/min     Occupational Performance:     Bed Mobility:    Patient completed Rolling/Turning to Left with  minimum assistance  Patient completed Rolling/Turning to Right with minimum assistance  Patient completed Supine to Sit with maximal assistance     Functional Mobility/Transfers:  Patient completed Sit <> Stand Transfer with moderate assistance  with  rolling walker x1 rep from EOB; max A with RW x1 rep from chair  Functional Mobility: Patient completed bed>chair step transfer (~2 ft) with mod A regressing to max A with RW  Significant knee flexion during transfer with difficulty straightening     Activities of Daily Living:  Upper Body Dressing: maximal assistance to don gown secondary to impaired R arm function  Lower Body Dressing: total assistance to don socks      AMPAC 6 Click ADL: 10    Treatment & Education:  Patient educated on:   -purpose of OT and OT POC  -facilitation and education on proper body mechanics, energy conservation, and safety  -importance of early mobility and out of bed activities with staff assist  -overall benefits of therapy     All questions answered within OT scope and to patient's satisfaction    Patient left up in chair with all lines intact, call button in reach, chair alarm on, and nursing notified    GOALS:   Multidisciplinary Problems       Occupational Therapy Goals          Problem: Occupational Therapy    Goal Priority Disciplines Outcome Interventions   Occupational Therapy Goal     OT, PT/OT Progressing    Description: Goals to be met by: 4/20/25     Patient will increase functional independence with ADLs by performing:    UE Dressing with [place].  LE Dressing with Modified [place]lier.  Grooming while standing  at sink with Minimal Assistance and Assistive Devices as needed.  Toileting from toilet with Supervision for hygiene and clothing management.   Rolling to Right, Left with Stand-by Assistance.   Supine to sit with Minimal Assistance.  Step transfer with SBA  Increased functional strength to WNL for BUEs.  Upper extremity exercise program x10 reps per handout, with assistance as needed.                         DME Justifications:  Bridget requires a hospital bed due to her requiring positioning of the body in ways not feasible with an ordinary bed due to limited ability and cannot independently make changes in body position without the use of the bed.The positioning of the body cannot be sufficiently resolved by the use of pillows and wedges.  Bridget Montgomery has a mobility limitation that significantly impairs her ability to participate in one or more mobility related activities of daily living (MRADLs) such as toileting, feeding, dressing, grooming, and bathing in customary locations in the home.  The mobility limitation cannot be sufficiently resolved by the use of a cane or walker.   The use of a manual wheelchair will significantly improve the patients ability to participate in MRADLS and the patient will use it on regular basis in the home.  Bridget Montgomery has expressed her willingness to use a manual wheelchair in the home. Patients upper body strength is sufficient for propulsion.  She also has a caregiver who is available, willing, and able to provide assistance with the wheelchair when needed.     Bridget's mobility limitation cannot be sufficiently resolved by the use of a cane. Her functional mobility deficit can be sufficiently resolved with the use of a Rolling Walker. Patient's mobility limitation significantly impairs their ability to participate in one of more activities of daily living.  The use of a RW will significantly improve the patient's ability to participate in MRADLS and the patient  will use it on regular basis in the home.    Time Tracking:     OT Date of Treatment: 03/21/25  OT Start Time: 1034  OT Stop Time: 1109  OT Total Time (min): 35 min    Billable Minutes:Therapeutic Activity 35    OT/BECKA: OT     Number of BECKA visits since last OT visit: 0    3/21/2025

## 2025-03-21 NOTE — ASSESSMENT & PLAN NOTE
Patient with leucocytosis   Recent Labs   Lab 03/19/25  0209 03/20/25  0003 03/21/25  0518   WBC 11.25 12.87* 8.31     . Afebrile.  monitor .

## 2025-03-21 NOTE — ASSESSMENT & PLAN NOTE
Uncontrolled  Pulse too slow to start BB.    Chronic, controlled.  Latest blood pressure and vitals reviewed-   Temp:  [97.4 °F (36.3 °C)-98.6 °F (37 °C)]   Pulse:  [49-82]   Resp:  [15-20]   BP: (111-154)/(59-70)   SpO2:  [96 %-100 %] .   Home meds for hypertension were reviewed and noted below. Hospital anti-hypertensive changes were made as shown below.  Hypertension Medications              amLODIPine (NORVASC) 10 MG tablet Take 1 tablet (10 mg total) by mouth once daily.    furosemide (LASIX) 20 MG tablet Take 1 tablet (20 mg total) by mouth 2 (two) times a day.        continue clonidine and hydralazine   PRN meds if BP> 180/110 mm HG scheduled hydralazine  Continue norvasc

## 2025-03-21 NOTE — ASSESSMENT & PLAN NOTE
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 Multilevel foraminal stenosis.   - Cont C-collar at all times  - Upright and supine cervical x-ray pending  - Neurosurgery recommended occiput-C4 fusion. Non-surgical option is strict cervical collar 24/7 indefinitely  - XR with chronic odontoid type 2 fracture, position alignment appears stable   - Decadron started for new neuro deficit  - surgery for 3/19      3/20 Transfer to [facility] medicine. 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 Multilevel foraminal stenosis.    Now s/p Occipital - C4 posterior spinal fusion (3/19). Continue Cervical collar at all times post operatively for 3 months. Post operative XR C spine pending.  Multimodal pain control  with oxycodone 5/7.5 prn, morphine prn, robaxin prn) CPK trended down 623--> 420.  vancomycin discontinued.  started on cefazolin due to  concern for allergic reaction. Mosher and A-line removed. DVT ppx initiated.    Ancef can be discontinued once drain removed   3/21 Drain output 130ml/24h. Leucocytosis resolved.   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 Multilevel foraminal stenosis.   - Cont C-collar at all  times  - Upright and supine cervical x-ray pending  - Neurosurgery recommended occiput-C4 fusion. Non-surgical option is strict cervical collar 24/7 indefinitely  - XR with chronic odontoid type 2 fracture, position alignment appears stable   - Decadron started for new neuro deficit  - surgery for 3/19      See above

## 2025-03-21 NOTE — PROGRESS NOTES
[facility] - Neurosurgery ([facility])  [facility] Medicine  Progress Note    Patient Name: Bridget Montgomery  MRN: [id number]  Patient Class: IP- Inpatient   Admission Date: 3/9/2025  Length of Stay: 12 days  Attending Physician: Luis Gunn MD  Primary Care Provider: No primary care provider on file.        Subjective     Principal Problem:Spinal stenosis        HPI:  Bridget Montgomery is a 89F PMHx COPD, HTN, HLD, history of TIA, known type 2 odontoid fx followed by Dr. Quach with non-surgical management, and R shoulder arthroplasty 06/2024 presenting to [facility] for preoperative traction. She initially presented with a week of severe persistent HA, and diffuse weakness of all extremities. Her symptoms began last Monday with HA that started in her neck and migrated toward the top of her head. She has had chronic neck pain due to cervical stenosis, but the headache was new. On Saturday, she was at her baseline, able to perform all of her ADLs. Around 8 pm, she suddenly became weak in all extremities and was not able to walk. MRI demonstrated chronic type 2 odontoid fracture with progressed retropulsion of the fracture fragment in comparison to prior MRI from 8/11/2023, with moderate to severe canal stenosis at C1-C2. Of note, patient had previously discontinued wearing her cervical collar. Denied radiculopathy in her arms or legs, sensory deficit, bladder or bowel incontinence. Family decided to proceed with surgery. Booked for O-C4 fusion on 3/19/25. Admitted to [facility] for pre-op traction on 3/18.           Overview/Hospital Course:    03/19/2025: To OR for O-C4 fusion  03/20/2025: POD 1 S/P O-C4 fusion. NAEO. VSS. Neurologically intact. Continues with residual neck pain, robaxin added to pain regimen. Pending step down to .           3/20 Transfer to hospital medicine. 89yo woman w/ PMHx COPD, HTN, HLD, history of TIA, known type 2 odontoid fx followed by Dr. Quach with non-surgical management, and right shoulder  arthroplasty 06/2024 presenting with a week of severe persistent headache, and diffuse weakness involving her bilateral upper and lower extremities.  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 Multilevel foraminal stenosis. Now s/p Occipital - C4 posterior spinal fusion (3/19). Continue Cervical collar at all times post operatively for 3 months. Post operative XR C spine pending.  Multimodal pain control  with oxycodone 5/7.5 prn, morphine prn, robaxin prn) CPK trended down 623--> 420.  vancomycin discontinued.  started on cefazolin due to ow concern for allergic reaction. Mosher and A-line removed. DVT ppx initiated. PT recs SNF   3/21 Drain output 130ml/24h. Leucocytosis resolved.         Review of Systems:   Pain scale:   Constitutional:  fever,  chills, headache, vision loss, hearing loss, weight loss, Generalized weakness, falls, loss of smell, loss of taste, poor appetite,  sore throat  Respiratory: cough, shortness of breath.   Cardiovascular: chest pain, dizziness, palpitations, orthopnea, swelling of feet, syncope  Gastrointestinal: nausea, vomiting, abdominal pain, diarrhea, black stool,  blood in stool, change in bowel habits, constipation  Genitourinary: hematuria, dysuria, urgency, frequency  Integument/Breast: rash,  pruritis  Hematologic/Lymphatic: easy bruising, lymphadenopathy  Musculoskeletal: arthralgias , myalgias, back pain, neck pain, knee pain  Neurological: confusion, seizures, tremors, slurred speech  Behavioral/Psych:  depression, anxiety, auditory or visual hallucinations     OBJECTIVE:     Physical Exam:  Body mass index is 23.03 kg/m².    Constitutional: Appears well-developed and well-nourished.   Head: Normocephalic and atraumatic. C collar and drain in place   Neck: Normal range of motion. Neck supple.   Cardiovascular:  Normal heart rate.  Regular heart rhythm.  Pulmonary/Chest: Effort normal.   Abdominal: No distension.  No tenderness  Musculoskeletal: Normal range of motion. No edema.   Neurological: Alert and oriented to person, place, and time. able to move bilateral upper and lower extremities without limitation   Skin: Skin is warm and dry.   Psychiatric: Normal mood and affect. Behavior is normal.                  Vital Signs  Temp: 97.5 °F (36.4 °C) (03/21/25 0812)  Pulse: 82 (03/21/25 1119)  Resp: 18 (03/21/25 0949)  BP: (!) 154/66 (03/21/25 0812)  SpO2: 98 % (03/21/25 0949)     24 Hour VS Range    Temp:  [97.4 °F (36.3 °C)-98.6 °F (37 °C)]   Pulse:  [49-82]   Resp:  [15-20]   BP: (111-154)/(59-70)   SpO2:  [96 %-100 %]     Intake/Output Summary (Last 24 hours) at 3/21/2025 1217  Last data filed at 3/21/2025 0505  Gross per 24 hour   Intake 400 ml   Output 130 ml   Net 270 ml         I/O This Shift:  No intake/output data recorded.    Wt Readings from Last 3 Encounters:   03/19/25 59 kg (130 lb)   12/10/24 59.9 kg (132 lb)   09/09/24 59.9 kg (132 lb)       I have personally reviewed the vitals and recorded Intake/Output     Laboratory/Diagnostic Data:    CBC/Anemia Labs: Coags:    Recent Labs   Lab 03/19/25  0209 03/20/25  0003 03/21/25  0518   WBC 11.25 12.87* 8.31   HGB 9.6* 8.9* 9.2*   HCT 30.1* 28.2* 30.6*    341 255   MCV 89 90 94   RDW 15.1* 15.2* 15.4*    Recent Labs   Lab 03/18/25  1648   INR 0.9   APTT 27.6        Chemistries: ABG:   Recent Labs   Lab 03/19/25  0209 03/19/25  0805 03/19/25  1302 03/20/25  0003 03/21/25  0518      < > 139 138 136   K 4.3   < > 4.6  4.6 4.6 3.9      < > 107 107 106   CO2 22*   < > 21* 21* 20*   BUN 46*   < > 43* 43* 48*   CREATININE 1.2   < > 1.1 1.2 1.2   CALCIUM 7.7*   < > 7.5* 7.6* 8.1*   PROT 5.7*  --   --  5.3* 5.0*   BILITOT 0.2  --   --  0.2 0.1   ALKPHOS 145  --   --  140 123   ALT 32  --   --  41 36   AST 48*  --   --  55* 41*   MG 2.7*  --   --  2.9*  "2.6   PHOS 2.9  --   --  4.2 3.9    < > = values in this interval not displayed.    Recent Labs   Lab 03/18/25  0938   PH 7.434   PCO2 32.8*   PO2 103*   HCO3 22.0*   POCSATURATED 98   BE -2        POCT Glucose: HbA1c:    Recent Labs   Lab 03/18/25 1959 03/18/25 2129 03/18/25  2312 03/19/25  0209 03/19/25  0802 03/19/25  1248   POCTGLUCOSE 231* 169* 138* 109 126* 173*    Hemoglobin A1C   Date Value Ref Range Status   03/18/2025 5.8 (H) 4.0 - 5.6 % Final     Comment:     ADA Screening Guidelines:  5.7-6.4%  Consistent with prediabetes  >or=6.5%  Consistent with diabetes    High levels of fetal hemoglobin interfere with the HbA1C  assay. Heterozygous hemoglobin variants (HbS, HgC, etc)do  not significantly interfere with this assay.   However, presence of multiple variants may affect accuracy.     03/09/2025 5.9 (H) 4.0 - 5.6 % Final     Comment:     ADA Screening Guidelines:  5.7-6.4%  Consistent with prediabetes  >or=6.5%  Consistent with diabetes    High levels of fetal hemoglobin interfere with the HbA1C  assay. Heterozygous hemoglobin variants (HbS, HgC, etc)do  not significantly interfere with this assay.   However, presence of multiple variants may affect accuracy.     10/17/2023 5.8 (H) 4.0 - 5.6 % Final     Comment:     ADA Screening Guidelines:  5.7-6.4%  Consistent with prediabetes  >or=6.5%  Consistent with diabetes    High levels of fetal hemoglobin interfere with the HbA1C  assay. Heterozygous hemoglobin variants (HbS, HgC, etc)do  not significantly interfere with this assay.   However, presence of multiple variants may affect accuracy.          Cardiac Enzymes: Ejection Fractions:    Recent Labs     03/19/25  1302 03/20/25  0003 03/21/25  0518   * 420* 172    No results found for: "EF"       No results for input(s): "COLORU", "APPEARANCEUA", "PHUR", "SPECGRAV", "PROTEINUA", "GLUCUA", "KETONESU", "BILIRUBINUA", "OCCULTUA", "NITRITE", "UROBILINOGEN", "LEUKOCYTESUR", "RBCUA", "WBCUA", "BACTERIA", " ""SQUAMEPITHEL", "HYALINECASTS" in the last 48 hours.    Invalid input(s): "WRIGHTSUR"    Procalcitonin (ng/mL)   Date Value   05/16/2023 0.05     Lactate (Lactic Acid) (mmol/L)   Date Value   06/20/2023 1.2     No results found for: "BNP"  CRP (mg/L)   Date Value   05/12/2023 7.8     Sed Rate (mm/Hr)   Date Value   05/12/2023 37 (H)     No results found for: "DDIMER"  Ferritin (ng/mL)   Date Value   03/09/2025 29   08/27/2024 48   06/10/2024 35   12/07/2023 123   10/18/2023 34   09/13/2023 46   06/02/2023 93   02/24/2023 57   09/12/2022 49   09/12/2022 49     No results found for: "LDH"  Troponin I (ng/mL)   Date Value   03/08/2025 0.006   06/20/2023 <0.006     CPK (U/L)   Date Value   03/21/2025 172   03/20/2025 420 (H)   03/19/2025 623 (H)     Results for orders placed or performed in visit on 12/06/24   Vitamin D    Collection Time: 12/06/24  4:22 PM   Result Value Ref Range    Vit D, 25-Hydroxy 75 30 - 96 ng/mL   Results for orders placed or performed in visit on 03/13/23   Vitamin D    Collection Time: 03/13/23 12:06 PM   Result Value Ref Range    Vit D, 25-Hydroxy 69 30 - 96 ng/mL     SARS-CoV2 (COVID-19) Qualitative PCR (no units)   Date Value   11/07/2023 Not Detected   10/31/2023 Not Detected     SARS-CoV-2 RNA, Amplification, Qual (no units)   Date Value   03/08/2025 Negative   10/23/2023 Negative   10/19/2023 Negative       Microbiology labs for the last week  Microbiology Results (last 7 days)       ** No results found for the last 168 hours. **            Reviewed and noted in plan where applicable- Please see chart for full lab data.    Lines/Drains:       Peripheral IV - Single Lumen 03/18/25 1217 20 G Left Antecubital (Active)   Site Assessment Clean;Dry;Intact 03/20/25 1101   Line Securement Device Secured with sutureless device 03/20/25 0701   Extremity Assessment Distal to IV No abnormal discoloration;No swelling;No redness 03/20/25 1101   Line Status Flushed;Saline locked 03/20/25 1101   Dressing " Status Clean;Intact;Dry 03/20/25 1101   Dressing Intervention Integrity maintained 03/20/25 1101   Dressing Change Due 03/22/25 03/20/25 1101   Site Change Due 03/22/25 03/20/25 0701   Reason Not Rotated Not due 03/20/25 1101   Number of days: 2            Peripheral IV - Single Lumen 03/20/25 1527 20 G 1 3/4 in No Right Antecubital (Active)   Site Assessment Clean;Dry;Intact;No redness;No swelling 03/20/25 1558   Line Securement Device Secured with sutureless device 03/20/25 1558   Extremity Assessment Distal to IV No abnormal discoloration;No redness;No swelling;No warmth 03/20/25 1558   Line Status Blood return noted;Flushed;Saline locked 03/20/25 1558   Dressing Status Clean;Dry;Intact 03/20/25 1558   Dressing Intervention First dressing 03/20/25 1558   Dressing Change Due 03/24/25 03/20/25 1558   Site Change Due 03/24/25 03/20/25 1558   Number of days: 0            Closed/Suction Drain 03/19/25 1135 Tube - 1 Left Back Accordion 10 Fr. (Active)   Site Description Unable to view 03/20/25 1101   Dressing Type Other (Comment) 03/20/25 1101   Dressing Status Clean;Dry;Intact 03/20/25 1101   Dressing Intervention Integrity maintained 03/20/25 1101   Drainage Sanguineous 03/20/25 1101   Status Other (Comment) 03/20/25 1101   Output (mL) 70 mL 03/20/25 0501   Number of days: 1       Imaging      Results for orders placed during the hospital encounter of 03/09/25    Echo    Interpretation Summary    Left Ventricle: The left ventricle is normal in size. Ventricular mass is normal. Normal wall thickness. There is normal systolic function with a visually estimated ejection fraction of 60 - 65%. Diastolic function cannot be reliably determined in the presence of mitral annular calcification.    Right Ventricle: The right ventricle is normal in size. Wall thickness is normal. Systolic function is normal.    Left Atrium: Moderately dilated    Aortic Valve: There is mild aortic valve sclerosis. There is mild annular  calcification present.    Mitral Valve: There is severe posterior mitral annular calcification. 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 The mean pressure gradient across the mitral valve is 4 mmHg at a heart rate of 51 bpm. There is mild to moderate regurgitation with a centrally directed jet.    Tricuspid Valve: There is mild to moderate regurgitation with a centrally directed jet.    Aorta: Aortic root is normal in size measuring 2.98 cm. Aortic root at ST junction is normal. Ascending aorta is normal.    Pulmonary Artery: There is mild pulmonary hypertension. The estimated pulmonary artery systolic pressure is 47 mmHg.    IVC/SVC: Normal venous pressure at 3 mmHg.      SURG FL Surgery Fluoro Usage  See OP Notes for results.     IMPRESSION: See OP Notes for results.     This procedure was auto-finalized by: Virtual Radiologist  Echo    Left Ventricle: The left ventricle is normal in size. Ventricular mass   is normal. Normal wall thickness. There is normal systolic function with a   visually estimated ejection fraction of 60 - 65%. Diastolic function   cannot be reliably determined in the presence of mitral annular   calcification.    Right Ventricle: The right ventricle is normal in size. Wall thickness   is normal. Systolic function is normal.    Left Atrium: Moderately dilated    Aortic Valve: There is mild aortic valve sclerosis. There is mild   annular calcification present.    Mitral Valve: There is severe posterior mitral annular calcification.   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 The mean pressure gradient across the   mitral valve is 4 mmHg at a heart rate of 51 bpm. There is mild to   moderate regurgitation with a centrally directed jet.    Tricuspid Valve: There is mild to moderate regurgitation with a   centrally directed jet.    Aorta: Aortic root is normal in size measuring 2.98 cm. Aortic root at   ST junction is normal. Ascending aorta is normal.    Pulmonary Artery: There is mild pulmonary  hypertension. The estimated   pulmonary artery systolic pressure is 47 mmHg.    IVC/SVC: Normal venous pressure at 3 mmHg.  US Lower Extremity Veins Bilateral  Narrative: EXAMINATION:  US LOWER EXTREMITY VEINS BILATERAL    CLINICAL HISTORY:  R/O DVT preop;    TECHNIQUE:  Duplex and color flow Doppler and dynamic compression was performed of the bilateral lower extremity veins was performed.    COMPARISON:  None    FINDINGS:  Right thigh veins: The common femoral, femoral, popliteal, and upper greater saphenous veins are patent and free of thrombus. The veins are normally compressible and have normal phasic flow and augmentation response.    Right calf veins: The visualized calf veins are patent.    Left thigh veins: The common femoral, femoral, popliteal, and upper greater saphenous veins are patent and free of thrombus. The veins are normally compressible and have normal phasic flow and augmentation response.    Left calf veins: The visualized calf veins are patent.    Miscellaneous: None  Impression: No scan evidence of deep venous thrombosis in either lower extremity.    Electronically signed by resident: Sawyer Mercado  Date:    03/19/2025  Time:    01:30    Electronically signed by: Lasha Villareal  Date:    03/19/2025  Time:    01:46      Labs, Imaging, EKG and Diagnostic results from 3/21/2025 were reviewed.    Medications:  Medication list was reviewed and changes noted under Assessment/Plan.  Medications Ordered Prior to Encounter[1]  Scheduled Medications:  Current Facility-Administered Medications   Medication Dose Route Frequency    amLODIPine  10 mg Oral Daily    ceFAZolin (Ancef) IV (PEDS and ADULTS)  1 g Intravenous Q12H    cloNIDine 0.1 mg/24 hr td ptwk  1 patch Transdermal Q7 Days    enoxparin  30 mg Subcutaneous Q24H (prophylaxis, 1700)    fluticasone furoate-vilanteroL  1 puff Inhalation Daily    furosemide  40 mg Oral Daily    gabapentin  300 mg Oral QHS    hydrALAZINE  75 mg Oral Q8H     methocarbamoL  500 mg Oral TID    mupirocin   Topical (Top) BID    polyethylene glycol  17 g Oral Daily    senna-docusate 8.6-50 mg  1 tablet Oral Daily    sodium chloride  1,000 mg Oral BID     PRN:   Current Facility-Administered Medications:     acetaminophen, 650 mg, Oral, Q6H PRN    aluminum & magnesium hydroxide-simethicone, 30 mL, Oral, Q6H PRN    artificial tears, 1 drop, Both Eyes, PRN    bisacodyL, 10 mg, Rectal, Daily PRN    calcium carbonate, 1,000 mg, Oral, TID PRN    hydrALAZINE, 10 mg, Intravenous, Q4H PRN    labetalol, 10 mg, Intravenous, Q4H PRN    melatonin, 6 mg, Oral, Nightly PRN    morphine, 1 mg, Intravenous, Q6H PRN    naloxone, 0.02 mg, Intravenous, PRN    ondansetron, 8 mg, Oral, Q8H PRN    oxyCODONE-acetaminophen, 1 tablet, Oral, Q4H PRN    oxyCODONE-acetaminophen, 1 tablet, Oral, Q4H PRN    simethicone, 1 tablet, Oral, QID PRN    sodium chloride 0.9%, 1-10 mL, Intravenous, Q12H PRN  Infusions:   Estimated Creatinine Clearance: 25.8 mL/min (based on SCr of 1.2 mg/dL).             Assessment & Plan  Spinal stenosis  Cord Compression  - MRI Cervical spine 3/9/25: chronic type 2 odontoid fracture with progressed retropulsion of the fracture fragment in comparison to prior MRI from 8/11/2023, with moderate to severe canal stenosis at C1-C2. Multilevel foraminal stenosis.   - Cont C-collar at all times  - Upright and supine cervical x-ray pending  - Neurosurgery recommended occiput-C4 fusion. Non-surgical option is strict cervical collar 24/7 indefinitely  - XR with chronic odontoid type 2 fracture, position alignment appears stable   - Decadron started for new neuro deficit  - surgery for 3/19      3/20 Transfer to hospital medicine. 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 Quach with non-surgical management, and right shoulder arthroplasty 06/2024 presenting with a week of severe persistent headache, and diffuse weakness involving her  bilateral upper and lower extremities.  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 Multilevel foraminal stenosis.    Now s/p Occipital - C4 posterior spinal fusion (3/19). Continue Cervical collar at all times post operatively for 3 months. Post operative XR C spine pending.  Multimodal pain control  with oxycodone 5/7.5 prn, morphine prn, robaxin prn) CPK trended down 623--> 420.  vancomycin discontinued.  started on cefazolin due to  concern for allergic reaction. Mosher and A-line removed. DVT ppx initiated.    Ancef can be discontinued once drain removed   3/21 Drain output 130ml/24h. Leucocytosis resolved.   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 Multilevel foraminal stenosis.   - Cont C-collar at all times  - Upright and supine cervical x-ray pending  - Neurosurgery recommended occiput-C4 fusion. Non-surgical option is strict cervical collar 24/7 indefinitely  - XR with chronic odontoid type 2 fracture, position alignment appears stable   - Decadron started for new neuro deficit  - surgery for 3/19      See above    Chronic kidney disease, stage III (moderate)  Renal function remains around baseline; Estimated Creatinine Clearance: 25.8 mL/min (based on SCr of 1.2 mg/dL). according to latest data. 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 Will avoid nephrotoxic agents as able, and renally dose all meds as applicable.       Recent Labs   Lab 03/19/25  1302 03/20/25  0003 03/21/25  0518   BUN 43* 43* 48*   CREATININE 1.1 1.2 1.2       I & O     Intake/Output Summary (Last 24 hours) at 3/21/2025 1217  Last data filed at 3/21/2025 0505  Gross per 24 hour   Intake 400 ml    Output 130 ml   Net 270 ml      Other emphysema  History of COPD, not on home oxygen. Currently without evidence of acute exacerbation, and sats on room air at goal 88% or greater. Will continue hospital formulary equivalent of home regimen and monitor respiratory status.     3/21 sats 97% on 1LNC Taper oxygen as tolerated to Spo2 goal of low 90s   Essential hypertension  Uncontrolled  Pulse too slow to start BB.    Chronic, controlled.  Latest blood pressure and vitals reviewed-   Temp:  [97.4 °F (36.3 °C)-98.6 °F (37 °C)]   Pulse:  [49-82]   Resp:  [15-20]   BP: (111-154)/(59-70)   SpO2:  [96 %-100 %] .   Home meds for hypertension were reviewed and noted below. Hospital anti-hypertensive changes were made as shown below.  Hypertension Medications              amLODIPine (NORVASC) 10 MG tablet Take 1 tablet (10 mg total) by mouth once daily.    furosemide (LASIX) 20 MG tablet Take 1 tablet (20 mg total) by mouth 2 (two) times a day.        continue clonidine and hydralazine   PRN meds if BP> 180/110 mm HG scheduled hydralazine  Continue norvasc  Generalized weakness  Chronic, however likely exacerbated by cervical radiculopathy.    PT/OT consulted - recs SNF         Cervical spinal stenosis  as above    Mixed hyperlipidemia  hold statin with elevatd CPK     Hyperkalemia  resolved         Leucocytosis     Patient with leucocytosis   Recent Labs   Lab 03/19/25  0209 03/20/25  0003 03/21/25  0518   WBC 11.25 12.87* 8.31     . Afebrile.  monitor .          VTE Risk Mitigation (From admission, onward)           Ordered     enoxaparin injection 30 mg  Every 24 hours         03/20/25 0819     Place sequential compression device  Until discontinued         03/19/25 0857     IP VTE HIGH RISK PATIENT  Once         03/09/25 2040     Reason for No Pharmacological VTE Prophylaxis  Once        Question:  Reasons:  Answer:  Physician Provided (leave comment)  Comment:  surgery    03/09/25 2040                    Discharge  Planning   MIKAYLA: 3/24/2025     Code Status: Full Code   Medical Readiness for Discharge Date:   Discharge Plan A: Skilled Nursing Facility   Discharge Delays: None known at this time            Please place Justification for DME        Luis Gunn MD  Department of Hospital Medicine   [facility] ([facility])         [1]   No current facility-administered medications on file prior to encounter.     Current Outpatient Medications on File Prior to Encounter   Medication Sig Dispense Refill    albuterol (PROVENTIL/VENTOLIN HFA) 90 mcg/actuation inhaler Inhale 2 puffs into the lungs every 6 (six) hours as needed.      amLODIPine (NORVASC) 10 MG tablet Take 1 tablet (10 mg total) by mouth once daily. 90 tablet 3    fluticasone propionate (FLONASE) 50 mcg/actuation nasal spray 1 spray by Nasal route 2 (two) times daily.      gabapentin (NEURONTIN) 300 MG capsule Take 300 mg by mouth every evening.      ipratropium (ATROVENT) 42 mcg (0.06 %) nasal spray       melatonin (MELATIN) 3 mg tablet Take 2 tablets (6 mg total) by mouth nightly as needed for Insomnia. 30 tablet 0    omega 3-dha-epa-fish oil 1,000 mg (120 mg-180 mg) Cap Take 1 capsule by mouth once daily.      omeprazole (PRILOSEC) 20 MG capsule Take 1 capsule (20 mg total) by mouth 2 (two) times daily before meals. 60 capsule 0    [DISCONTINUED] budesonide-formoterol 160-4.5 mcg (SYMBICORT) 160-4.5 mcg/actuation HFAA Inhale 2 puffs into the lungs every 12 (twelve) hours.      [DISCONTINUED] furosemide (LASIX) 20 MG tablet Take 1 tablet (20 mg total) by mouth 2 (two) times a day. 180 tablet 3    [DISCONTINUED] methocarbamoL (ROBAXIN) 500 MG Tab       [DISCONTINUED] pregabalin (LYRICA) 50 MG capsule Take 1 capsule by mouth every evening.      [DISCONTINUED] TOLAK 4 % Crea apply a thin film to the lesions using the fingertips, once daily. preferably at night., WASH off in THE morning FOR 2 WEEKS.      [DISCONTINUED] traMADoL (ULTRAM) 50 mg tablet Take 1  tablet (50 mg total) by mouth 2 (two) times daily as needed. 60 tablet 4

## 2025-03-21 NOTE — CARE UPDATE
Unit [facility] Care Support Interaction      I have reviewed the chart of Bridget Montgomery who is hospitalized for Spinal stenosis. The patient is currently located in the following unit: npu         I have seen and examined the patient and provided the following support:     Skin - Integrity assessment, waffle mattress and/or specialty bed ordered, and wound care reconsulted for sacrum dermatitis        Imani Schaefer, NP  Unit Based [facility]

## 2025-03-21 NOTE — PROGRESS NOTES
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 Her symptoms began last Monday with headache that started in her neck and migrated toward the top of her head. She has had chronic neck pain due to cervical stenosis, but the headache was new. On Saturday, she had a normal morning and was able to perform all of her ADLs. Around 8 pm, she suddenly became weak in her arms and legs and was not able to walk. MRI demonstrated chronic type 2 odontoid fracture with progressed retropulsion of the fracture fragment in comparison to prior MRI from 8/11/2023, with moderate to severe canal stenosis at C1-C2. Of note, patient had previously discontinued wearing her cervical collar. Denies radiculopathy in her arms or legs, sensory deficit, bladder or bowel incontinence.      Post-Op Info:  Procedure(s) (LRB):  FUSION, SPINE, CERVICAL, POSTERIOR APPROACH (N/A)   2 Days Post-Op   Interval History: 3/21: POD 2.  NAEON.  Stepped down to floor status.  Drain with slowing output. Neurologically stable.     Medications:  Continuous Infusions:  Scheduled Meds:   amLODIPine  10 mg Oral Daily    ceFAZolin (Ancef) IV (PEDS and ADULTS)  1 g Intravenous Q12H    cloNIDine 0.1 mg/24 hr td ptwk  1 patch Transdermal Q7 Days    enoxparin  30 mg Subcutaneous Q24H (prophylaxis, 1700)    fluticasone furoate-vilanteroL  1 puff Inhalation Daily    furosemide  40 mg Oral Daily    gabapentin  300 mg Oral QHS    hydrALAZINE  75 mg Oral Q8H    methocarbamoL  500 mg Oral TID    mupirocin   Topical (Top) BID    polyethylene glycol  17 g Oral Daily    senna-docusate 8.6-50 mg  1  tablet Oral Daily    sodium chloride  1,000 mg Oral BID     PRN Meds:  Current Facility-Administered Medications:     acetaminophen, 650 mg, Oral, Q6H PRN    aluminum & magnesium hydroxide-simethicone, 30 mL, Oral, Q6H PRN    artificial tears, 1 drop, Both Eyes, PRN    bisacodyL, 10 mg, Rectal, Daily PRN    calcium carbonate, 1,000 mg, Oral, TID PRN    hydrALAZINE, 10 mg, Intravenous, Q4H PRN    labetalol, 10 mg, Intravenous, Q4H PRN    melatonin, 6 mg, Oral, Nightly PRN    morphine, 1 mg, Intravenous, Q6H PRN    naloxone, 0.02 mg, Intravenous, PRN    ondansetron, 8 mg, Oral, Q8H PRN    oxyCODONE-acetaminophen, 1 tablet, Oral, Q4H PRN    oxyCODONE-acetaminophen, 1 tablet, Oral, Q4H PRN    simethicone, 1 tablet, Oral, QID PRN    sodium chloride 0.9%, 1-10 mL, Intravenous, Q12H PRN     Review of Systems  Objective:     Weight: 59 kg (130 lb)  Body mass index is 23.03 kg/m².  Vital Signs (Most Recent):  Temp: 97.5 °F (36.4 °C) (03/21/25 0812)  Pulse: 82 (03/21/25 1119)  Resp: 18 (03/21/25 0949)  BP: (!) 154/66 (03/21/25 0812)  SpO2: 98 % (03/21/25 0949) Vital Signs (24h Range):  Temp:  [97.4 °F (36.3 °C)-98.6 °F (37 °C)] 97.5 °F (36.4 °C)  Pulse:  [49-82] 82  Resp:  [15-20] 18  SpO2:  [96 %-100 %] 98 %  BP: (111-154)/(59-70) 154/66                              Closed/Suction Drain 03/19/25 1135 Tube - 1 Left Back Accordion 10 Fr. (Active)   Site Description Unable to view 03/20/25 2000   Dressing Type Other (Comment) 03/20/25 2000   Dressing Status Clean;Intact;Dry 03/20/25 2000   Dressing Intervention Integrity maintained 03/20/25 2000   Drainage Sanguineous 03/20/25 2000   Status Other (Comment) 03/20/25 2000   Output (mL) 40 mL 03/21/25 0505          Physical Exam         Neurosurgery Physical Exam    General: Well developed, well nourished, no distress.   Head: Normocephalic, atraumatic.  Mental Status: Awake, Alert, Oriented  Speech: Clear with content appropriate to conversation.  Cranial nerves: Face symmetric,  "CN II-XII grossly intact.   Eyes: Pupils equal, round, reactive to light, EOMI.  Sensory: Intact to light touch throughout.  Motor Strength: Moves all extremities spontaneously with good tone. Full strength upper and lower extremities. No abnormal movements seen.      Shoulder arthritis, lifting up right > left shoulder is limited by shoulder pain and ROM     Strength   Deltoids Triceps Biceps Wrist Extension Wrist Flexion Hand    Upper: R 4/5 5/5 5/5 5/5 5/5 5/5     L 4/5 5/5 5/5 5/5 5/5 5/5       Iliopsoas Quadriceps Knee  Flexion Tibialis  anterior Gastro- cnemius EHL   Lower: R 5/5 5/5 5/5 5/5 5/5 5/5     L 5/5 5/5 5/5 5/5 5/5 5/5    Patient with R distal clavicle fx and h/o of R shoulder arthroplasty.  Hand intrinsics 5/5.     In c-collar.                     Significant Labs:  Recent Labs   Lab 03/19/25  1302 03/20/25  0003 03/21/25  0518   * 108 93    138 136   K 4.6  4.6 4.6 3.9    107 106   CO2 21* 21* 20*   BUN 43* 43* 48*   CREATININE 1.1 1.2 1.2   CALCIUM 7.5* 7.6* 8.1*   MG  --  2.9* 2.6     Recent Labs   Lab 03/20/25  0003 03/21/25  0518   WBC 12.87* 8.31   HGB 8.9* 9.2*   HCT 28.2* 30.6*    255     No results for input(s): "LABPT", "INR", "APTT" in the last 48 hours.  Microbiology Results (last 7 days)       ** No results found for the last 168 hours. **          All pertinent labs from the last 24 hours have been reviewed.    Significant Diagnostics:  I have reviewed all pertinent imaging results/findings within the past 24 hours.  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     MRI Cervical spine 3/9/25: Chronic type 2 odontoid fracture with " progressed retropulsion of the fracture fragment in comparison to prior MRI from 8/11/2023, with moderate to severe canal stenosis at C1-C2. Multilevel foraminal stenosis.     Now s/p Occipital - C4 posterior spinal fusion (3/19).     POD 2    Plan:   - Cervical collar at all times, will continue collar post operatively for 3 months  - Post operative XR C spine pending  - Multimodal pain control (oxycodone 5/7.5 prn, morphine prn, robaxin prn)  - Bowel regimen: senna, miralax  - CPK elevated post operatively, currently on fluids, also home lasix. Trending down.  Appreciate HM management  - Notify NSGY immediately with any decline on exam.  - Med management per primary  - PT/OT/OOB as tolerated  - Concern for retaining urine if needing several I/O may consider urology consult and noriega replacement to be discontinued after requiring less narcotics and increased mobility.   - OK for DVT prophylaxis: Lovenox  - Neurosurgery team will follow    D/w staff, Dr. Cristo Rodríguez MD  Neurosurgery  [facility] ([facility])

## 2025-03-21 NOTE — NURSING
Patient has not voided for this shift.       Report no discomfort, bladder scan showing 468ml.    Notified on call provider.

## 2025-03-21 NOTE — NURSING
Patient reporting no pain/discomfort at the moment, does not want to do in/out cath.     Per David Shaver MD, to re-scan again at 8 am.

## 2025-03-21 NOTE — ASSESSMENT & PLAN NOTE
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 Multilevel foraminal stenosis.     Now s/p Occipital - C4 posterior spinal fusion (3/19).     POD 2    Plan:   - Cervical collar at all times, will continue collar post operatively for 3 months  - Post operative XR C spine pending  - Multimodal pain control (oxycodone 5/7.5 prn, morphine prn, robaxin prn)  - Bowel regimen: senna, miralax  - CPK elevated post operatively, currently on fluids, also home lasix. Trending down.  Appreciate HM management  - Notify NSGY immediately with any decline on exam.  - Med management per primary  - PT/OT/OOB as tolerated  - Concern for retaining urine if needing several I/O may consider urology consult and noriega replacement to be discontinued after requiring less narcotics and increased mobility.   - OK for DVT prophylaxis: Lovenox  - Neurosurgery team will follow    D/w staff, Dr. Lemons

## 2025-03-21 NOTE — CONSULTS
Wound care consultation received for the sacrum. The patient was evaluated by wound care yesterday, with recommendations provided and photos taken. Wound care will follow-up as needed.

## 2025-03-22 PROBLEM — R33.9 URINARY RETENTION: Status: ACTIVE | Noted: 2025-03-22

## 2025-03-22 LAB
ABSOLUTE EOSINOPHIL (OHS): 0.07 K/UL
ABSOLUTE MONOCYTE (OHS): 0.77 K/UL (ref 0.3–1)
ABSOLUTE NEUTROPHIL COUNT (OHS): 3.3 K/UL (ref 1.8–7.7)
ALBUMIN SERPL BCP-MCNC: 2.1 G/DL (ref 3.5–5.2)
ALP SERPL-CCNC: 119 UNIT/L (ref 40–150)
ALT SERPL W/O P-5'-P-CCNC: 17 UNIT/L (ref 10–44)
ANION GAP (OHS): 7 MMOL/L (ref 8–16)
AST SERPL-CCNC: 26 UNIT/L (ref 11–45)
BASOPHILS # BLD AUTO: 0.01 K/UL
BASOPHILS NFR BLD AUTO: 0.2 %
BILIRUB SERPL-MCNC: 0.1 MG/DL (ref 0.1–1)
BUN SERPL-MCNC: 44 MG/DL (ref 8–23)
CALCIUM SERPL-MCNC: 7.9 MG/DL (ref 8.7–10.5)
CHLORIDE SERPL-SCNC: 107 MMOL/L (ref 95–110)
CO2 SERPL-SCNC: 23 MMOL/L (ref 23–29)
CREAT SERPL-MCNC: 1.1 MG/DL (ref 0.5–1.4)
ERYTHROCYTE [DISTWIDTH] IN BLOOD BY AUTOMATED COUNT: 15 % (ref 11.5–14.5)
GFR SERPLBLD CREATININE-BSD FMLA CKD-EPI: 48 ML/MIN/1.73/M2
GLUCOSE SERPL-MCNC: 91 MG/DL (ref 70–110)
HCT VFR BLD AUTO: 27.5 % (ref 37–48.5)
HGB BLD-MCNC: 8.4 GM/DL (ref 12–16)
IMM GRANULOCYTES # BLD AUTO: 0.08 K/UL (ref 0–0.04)
IMM GRANULOCYTES NFR BLD AUTO: 1.5 % (ref 0–0.5)
LYMPHOCYTES # BLD AUTO: 1.21 K/UL (ref 1–4.8)
MAGNESIUM SERPL-MCNC: 2.5 MG/DL (ref 1.6–2.6)
MCH RBC QN AUTO: 28.1 PG (ref 27–50)
MCHC RBC AUTO-ENTMCNC: 30.5 G/DL (ref 32–36)
MCV RBC AUTO: 92 FL (ref 82–98)
NUCLEATED RBC (/100WBC) (OHS): 0 /100 WBC
PHOSPHATE SERPL-MCNC: 3.8 MG/DL (ref 2.7–4.5)
PLATELET # BLD AUTO: 237 K/UL (ref 150–450)
PMV BLD AUTO: 11.1 FL (ref 9.2–12.9)
POTASSIUM SERPL-SCNC: 3.9 MMOL/L (ref 3.5–5.1)
PROT SERPL-MCNC: 4.8 GM/DL (ref 6–8.4)
RBC # BLD AUTO: 2.99 M/UL (ref 4–5.4)
RELATIVE EOSINOPHIL (OHS): 1.3 %
RELATIVE LYMPHOCYTE (OHS): 22.2 % (ref 18–48)
RELATIVE MONOCYTE (OHS): 14.2 % (ref 4–15)
RELATIVE NEUTROPHIL (OHS): 60.6 % (ref 38–73)
SODIUM SERPL-SCNC: 137 MMOL/L (ref 136–145)
WBC # BLD AUTO: 5.44 K/UL (ref 3.9–12.7)

## 2025-03-22 PROCEDURE — 63600175 PHARM REV CODE 636 W HCPCS: Performed by: HOSPITALIST

## 2025-03-22 PROCEDURE — 94761 N-INVAS EAR/PLS OXIMETRY MLT: CPT

## 2025-03-22 PROCEDURE — 27000221 HC OXYGEN, UP TO 24 HOURS

## 2025-03-22 PROCEDURE — 25000003 PHARM REV CODE 250

## 2025-03-22 PROCEDURE — 85025 COMPLETE CBC W/AUTO DIFF WBC: CPT

## 2025-03-22 PROCEDURE — 97116 GAIT TRAINING THERAPY: CPT | Mod: CQ

## 2025-03-22 PROCEDURE — 25000003 PHARM REV CODE 250: Performed by: PHYSICIAN ASSISTANT

## 2025-03-22 PROCEDURE — 84100 ASSAY OF PHOSPHORUS: CPT

## 2025-03-22 PROCEDURE — 25000003 PHARM REV CODE 250: Performed by: HOSPITALIST

## 2025-03-22 PROCEDURE — 25000003 PHARM REV CODE 250: Performed by: INTERNAL MEDICINE

## 2025-03-22 PROCEDURE — 51798 US URINE CAPACITY MEASURE: CPT

## 2025-03-22 PROCEDURE — 63600175 PHARM REV CODE 636 W HCPCS: Performed by: PHYSICIAN ASSISTANT

## 2025-03-22 PROCEDURE — 11000001 HC ACUTE MED/SURG PRIVATE ROOM

## 2025-03-22 PROCEDURE — 82040 ASSAY OF SERUM ALBUMIN: CPT

## 2025-03-22 PROCEDURE — 99900035 HC TECH TIME PER 15 MIN (STAT)

## 2025-03-22 PROCEDURE — 97530 THERAPEUTIC ACTIVITIES: CPT | Mod: CQ

## 2025-03-22 PROCEDURE — 63600175 PHARM REV CODE 636 W HCPCS: Performed by: NEUROLOGICAL SURGERY

## 2025-03-22 PROCEDURE — 25000003 PHARM REV CODE 250: Performed by: STUDENT IN AN ORGANIZED HEALTH CARE EDUCATION/TRAINING PROGRAM

## 2025-03-22 PROCEDURE — 36415 COLL VENOUS BLD VENIPUNCTURE: CPT

## 2025-03-22 PROCEDURE — 94640 AIRWAY INHALATION TREATMENT: CPT

## 2025-03-22 PROCEDURE — 83735 ASSAY OF MAGNESIUM: CPT

## 2025-03-22 PROCEDURE — 51701 INSERT BLADDER CATHETER: CPT

## 2025-03-22 RX ADMIN — OXYCODONE AND ACETAMINOPHEN 1 TABLET: 7.5; 325 TABLET ORAL at 12:03

## 2025-03-22 RX ADMIN — MORPHINE SULFATE 1 MG: 2 INJECTION, SOLUTION INTRAMUSCULAR; INTRAVENOUS at 01:03

## 2025-03-22 RX ADMIN — ACETAMINOPHEN 650 MG: 325 TABLET ORAL at 07:03

## 2025-03-22 RX ADMIN — SODIUM CHLORIDE 1000 MG: 1 TABLET ORAL at 08:03

## 2025-03-22 RX ADMIN — CALCIUM CARBONATE (ANTACID) CHEW TAB 500 MG 1000 MG: 500 CHEW TAB at 01:03

## 2025-03-22 RX ADMIN — ENOXAPARIN SODIUM 30 MG: 30 INJECTION SUBCUTANEOUS at 04:03

## 2025-03-22 RX ADMIN — OXYCODONE HYDROCHLORIDE AND ACETAMINOPHEN 1 TABLET: 5; 325 TABLET ORAL at 07:03

## 2025-03-22 RX ADMIN — GABAPENTIN 300 MG: 300 CAPSULE ORAL at 07:03

## 2025-03-22 RX ADMIN — HYDRALAZINE HYDROCHLORIDE 75 MG: 25 TABLET ORAL at 04:03

## 2025-03-22 RX ADMIN — SENNOSIDES AND DOCUSATE SODIUM 1 TABLET: 50; 8.6 TABLET ORAL at 08:03

## 2025-03-22 RX ADMIN — METHOCARBAMOL 500 MG: 500 TABLET ORAL at 04:03

## 2025-03-22 RX ADMIN — SODIUM CHLORIDE 1000 MG: 1 TABLET ORAL at 07:03

## 2025-03-22 RX ADMIN — Medication 6 MG: at 07:03

## 2025-03-22 RX ADMIN — MUPIROCIN: 20 OINTMENT TOPICAL at 07:03

## 2025-03-22 RX ADMIN — FLUTICASONE FUROATE AND VILANTEROL TRIFENATATE 1 PUFF: 100; 25 POWDER RESPIRATORY (INHALATION) at 07:03

## 2025-03-22 RX ADMIN — POLYETHYLENE GLYCOL 3350 17 G: 17 POWDER, FOR SOLUTION ORAL at 08:03

## 2025-03-22 RX ADMIN — FUROSEMIDE 40 MG: 40 TABLET ORAL at 08:03

## 2025-03-22 RX ADMIN — METHOCARBAMOL 500 MG: 500 TABLET ORAL at 08:03

## 2025-03-22 RX ADMIN — BISACODYL 10 MG: 10 SUPPOSITORY RECTAL at 07:03

## 2025-03-22 RX ADMIN — AMLODIPINE BESYLATE 10 MG: 10 TABLET ORAL at 08:03

## 2025-03-22 RX ADMIN — MUPIROCIN: 20 OINTMENT TOPICAL at 09:03

## 2025-03-22 RX ADMIN — HYDRALAZINE HYDROCHLORIDE 75 MG: 25 TABLET ORAL at 09:03

## 2025-03-22 RX ADMIN — METHOCARBAMOL 500 MG: 500 TABLET ORAL at 07:03

## 2025-03-22 RX ADMIN — CEFAZOLIN 1 G: 330 INJECTION, POWDER, FOR SOLUTION INTRAMUSCULAR; INTRAVENOUS at 12:03

## 2025-03-22 NOTE — PLAN OF CARE
Problem: Urinary Retention  Goal: Effective Urinary Elimination  Outcome: Progressing     Problem: Pain Acute  Goal: Optimal Pain Control and Function  Outcome: Progressing

## 2025-03-22 NOTE — ASSESSMENT & PLAN NOTE
Renal function remains around baseline; Estimated Creatinine Clearance: 28.1 mL/min (based on SCr of 1.1 mg/dL). according to latest data. 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 Will avoid nephrotoxic agents as able, and renally dose all meds as applicable.       Recent Labs   Lab 03/20/25  0003 03/21/25  0518 03/22/25  0634   BUN 43* 48* 44*   CREATININE 1.2 1.2 1.1       I & O     Intake/Output Summary (Last 24 hours) at 3/22/2025 1505  Last data filed at 3/22/2025 1440  Gross per 24 hour   Intake --   Output 1540 ml   Net -1540 ml

## 2025-03-22 NOTE — ASSESSMENT & PLAN NOTE
Patient with leucocytosis   Recent Labs   Lab 03/20/25  0003 03/21/25  0518 03/22/25  0634   WBC 12.87* 8.31 5.44     . Afebrile.  monitor .

## 2025-03-22 NOTE — PROGRESS NOTES
[facility] - [facility] ([facility])  [facility]  Progress Note    Patient Name: Bridget Montgomery  MRN: [id number]  Patient Class: IP- Inpatient   Admission Date: 3/9/2025  Length of Stay: 13 days  Attending Physician: Luis Gunn MD  Primary Care Provider: No primary care provider on file.        Subjective     Principal Problem:Spinal stenosis        HPI:  Bridget Montgomery is a 89F PMHx COPD, HTN, HLD, history of TIA, known type 2 odontoid fx followed by Dr. Quach with non-surgical management, and R shoulder arthroplasty 06/2024 presenting to [facility] for preoperative traction. She initially presented with a week of severe persistent HA, and diffuse weakness of all extremities. Her symptoms began last Monday with HA that started in her neck and migrated toward the top of her head. She has had chronic neck pain due to cervical stenosis, but the headache was new. On Saturday, she was at her baseline, able to perform all of her ADLs. Around 8 pm, she suddenly became weak in all extremities and was not able to walk. MRI demonstrated chronic type 2 odontoid fracture with progressed retropulsion of the fracture fragment in comparison to prior MRI from 8/11/2023, with moderate to severe canal stenosis at C1-C2. Of note, patient had previously discontinued wearing her cervical collar. Denied radiculopathy in her arms or legs, sensory deficit, bladder or bowel incontinence. Family decided to proceed with surgery. Booked for O-C4 fusion on 3/19/25. Admitted to [facility] for pre-op traction on 3/18.           Overview/Hospital Course:    03/19/2025: To OR for O-C4 fusion  03/20/2025: POD 1 S/P O-C4 fusion. NAEO. VSS. Neurologically intact. Continues with residual neck pain, robaxin added to pain regimen. Pending step down to .           3/20 Transfer to hospital medicine. 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 Quach with non-surgical management, and right shoulder  arthroplasty 06/2024 presenting with a week of severe persistent headache, and diffuse weakness involving her bilateral upper and lower extremities.  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 Multilevel foraminal stenosis. Now s/p Occipital - C4 posterior spinal fusion (3/19). Continue Cervical collar at all times post operatively for 3 months. Post operative XR C spine pending.  Multimodal pain control  with oxycodone 5/7.5 prn, morphine prn, robaxin prn) CPK trended down 623--> 420.  vancomycin discontinued.  started on cefazolin due to ow concern for allergic reaction. Mosher and A-line removed. DVT ppx initiated. PT recs SNF   3/22 Drain output 40ml/24h. remove drain today. Leucocytosis resolved.         Review of Systems:   Pain scale:   Constitutional:  fever,  chills, headache, vision loss, hearing loss, weight loss, Generalized weakness, falls, loss of smell, loss of taste, poor appetite,  sore throat  Respiratory: cough, shortness of breath.   Cardiovascular: chest pain, dizziness, palpitations, orthopnea, swelling of feet, syncope  Gastrointestinal: nausea, vomiting, abdominal pain, diarrhea, black stool,  blood in stool, change in bowel habits, constipation  Genitourinary: hematuria, dysuria, urgency, frequency  Integument/Breast: rash,  pruritis  Hematologic/Lymphatic: easy bruising, lymphadenopathy  Musculoskeletal: arthralgias , myalgias, back pain, neck pain, knee pain  Neurological: confusion, seizures, tremors, slurred speech  Behavioral/Psych:  depression, anxiety, auditory or visual hallucinations     OBJECTIVE:     Physical Exam:  Body mass index is 23.03 kg/m².    Constitutional: Appears well-developed and well-nourished.   Head: Normocephalic and atraumatic. C collar and drain in place   Neck: Normal range of motion. Neck supple.    Cardiovascular: Normal heart rate.  Regular heart rhythm.  Pulmonary/Chest: Effort normal.   Abdominal: No distension.  No tenderness  Musculoskeletal: Normal range of motion. No edema.   Neurological: Alert and oriented to person, place, and time. able to move bilateral upper and lower extremities without limitation   Skin: Skin is warm and dry.   Psychiatric: Normal mood and affect. Behavior is normal.                  Vital Signs  Temp: 97.5 °F (36.4 °C) (03/22/25 0746)  Pulse: (!) 58 (03/22/25 1109)  Resp: 16 (03/22/25 1358)  BP: (!) 142/64 (03/22/25 0828)  SpO2: 97 % (03/22/25 0828)     24 Hour VS Range    Temp:  [97.5 °F (36.4 °C)-98.5 °F (36.9 °C)]   Pulse:  [48-86]   Resp:  [16-20]   BP: (105-151)/(55-75)   SpO2:  [97 %-100 %]     Intake/Output Summary (Last 24 hours) at 3/22/2025 1505  Last data filed at 3/22/2025 1440  Gross per 24 hour   Intake --   Output 1540 ml   Net -1540 ml         I/O This Shift:  I/O this shift:  In: -   Out: 100 [Urine:100]    Wt Readings from Last 3 Encounters:   03/19/25 59 kg (130 lb)   12/10/24 59.9 kg (132 lb)   09/09/24 59.9 kg (132 lb)       I have personally reviewed the vitals and recorded Intake/Output     Laboratory/Diagnostic Data:    CBC/Anemia Labs: Coags:    Recent Labs   Lab 03/20/25  0003 03/21/25  0518 03/22/25  0634   WBC 12.87* 8.31 5.44   HGB 8.9* 9.2* 8.4*   HCT 28.2* 30.6* 27.5*    255 237   MCV 90 94 92   RDW 15.2* 15.4* 15.0*    Recent Labs   Lab 03/18/25  1648   INR 0.9   APTT 27.6        Chemistries: ABG:   Recent Labs   Lab 03/19/25  0209 03/19/25  0805 03/20/25  0003 03/21/25  0518 03/22/25  0634      < > 138 136 137   K 4.3   < > 4.6 3.9 3.9      < > 107 106 107   CO2 22*   < > 21* 20* 23   BUN 46*   < > 43* 48* 44*   CREATININE 1.2   < > 1.2 1.2 1.1   CALCIUM 7.7*   < > 7.6* 8.1* 7.9*   PROT 5.7*  --  5.3* 5.0*  --    BILITOT 0.2  --  0.2 0.1 0.1   ALKPHOS 145  --  140 123 119   ALT 32  --  41 36 17   AST 48*  --  55* 41* 26  "  GLUCOSE  --   --   --   --  91   MG 2.7*  --  2.9* 2.6 2.5   PHOS 2.9  --  4.2 3.9 3.8    < > = values in this interval not displayed.    Recent Labs   Lab 03/18/25  0938   PH 7.434   PCO2 32.8*   PO2 103*   HCO3 22.0*   POCSATURATED 98   BE -2        POCT Glucose: HbA1c:    Recent Labs   Lab 03/18/25  1959 03/18/25 2129 03/18/25  2312 03/19/25  0209 03/19/25  0802 03/19/25  1248   POCTGLUCOSE 231* 169* 138* 109 126* 173*    Hemoglobin A1C   Date Value Ref Range Status   03/18/2025 5.8 (H) 4.0 - 5.6 % Final     Comment:     ADA Screening Guidelines:  5.7-6.4%  Consistent with prediabetes  >or=6.5%  Consistent with diabetes    High levels of fetal hemoglobin interfere with the HbA1C  assay. Heterozygous hemoglobin variants (HbS, HgC, etc)do  not significantly interfere with this assay.   However, presence of multiple variants may affect accuracy.     03/09/2025 5.9 (H) 4.0 - 5.6 % Final     Comment:     ADA Screening Guidelines:  5.7-6.4%  Consistent with prediabetes  >or=6.5%  Consistent with diabetes    High levels of fetal hemoglobin interfere with the HbA1C  assay. Heterozygous hemoglobin variants (HbS, HgC, etc)do  not significantly interfere with this assay.   However, presence of multiple variants may affect accuracy.     10/17/2023 5.8 (H) 4.0 - 5.6 % Final     Comment:     ADA Screening Guidelines:  5.7-6.4%  Consistent with prediabetes  >or=6.5%  Consistent with diabetes    High levels of fetal hemoglobin interfere with the HbA1C  assay. Heterozygous hemoglobin variants (HbS, HgC, etc)do  not significantly interfere with this assay.   However, presence of multiple variants may affect accuracy.          Cardiac Enzymes: Ejection Fractions:    Recent Labs     03/20/25  0003 03/21/25  0518   * 172    No results found for: "EF"       No results for input(s): "COLORU", "APPEARANCEUA", "PHUR", "SPECGRAV", "PROTEINUA", "GLUCUA", "KETONESU", "BILIRUBINUA", "OCCULTUA", "NITRITE", "UROBILINOGEN", " ""LEUKOCYTESUR", "RBCUA", "WBCUA", "BACTERIA", "SQUAMEPITHEL", "HYALINECASTS" in the last 48 hours.    Invalid input(s): "WRIGHTSUR"    Procalcitonin (ng/mL)   Date Value   05/16/2023 0.05     Lactate (Lactic Acid) (mmol/L)   Date Value   06/20/2023 1.2     No results found for: "BNP"  CRP (mg/L)   Date Value   05/12/2023 7.8     Sed Rate (mm/Hr)   Date Value   05/12/2023 37 (H)     No results found for: "DDIMER"  Ferritin (ng/mL)   Date Value   03/09/2025 29   08/27/2024 48   06/10/2024 35   12/07/2023 123   10/18/2023 34   09/13/2023 46   06/02/2023 93   02/24/2023 57   09/12/2022 49   09/12/2022 49     No results found for: "LDH"  Troponin I (ng/mL)   Date Value   03/08/2025 0.006   06/20/2023 <0.006     CPK (U/L)   Date Value   03/21/2025 172   03/20/2025 420 (H)   03/19/2025 623 (H)     Results for orders placed or performed in visit on 12/06/24   Vitamin D    Collection Time: 12/06/24  4:22 PM   Result Value Ref Range    Vit D, 25-Hydroxy 75 30 - 96 ng/mL   Results for orders placed or performed in visit on 03/13/23   Vitamin D    Collection Time: 03/13/23 12:06 PM   Result Value Ref Range    Vit D, 25-Hydroxy 69 30 - 96 ng/mL     SARS-CoV2 (COVID-19) Qualitative PCR (no units)   Date Value   11/07/2023 Not Detected   10/31/2023 Not Detected     SARS-CoV-2 RNA, Amplification, Qual (no units)   Date Value   03/08/2025 Negative   10/23/2023 Negative   10/19/2023 Negative       Microbiology labs for the last week  Microbiology Results (last 7 days)       ** No results found for the last 168 hours. **            Reviewed and noted in plan where applicable- Please see chart for full lab data.    Lines/Drains:       Peripheral IV - Single Lumen 03/18/25 1217 20 G Left Antecubital (Active)   Site Assessment Clean;Dry;Intact 03/20/25 1101   Line Securement Device Secured with sutureless device 03/20/25 0701   Extremity Assessment Distal to IV No abnormal discoloration;No swelling;No redness 03/20/25 1101   Line Status " Flushed;Saline locked 03/20/25 1101   Dressing Status Clean;Intact;Dry 03/20/25 1101   Dressing Intervention Integrity maintained 03/20/25 1101   Dressing Change Due 03/22/25 03/20/25 1101   Site Change Due 03/22/25 03/20/25 0701   Reason Not Rotated Not due 03/20/25 1101   Number of days: 2            Peripheral IV - Single Lumen 03/20/25 1527 20 G 1 3/4 in No Right Antecubital (Active)   Site Assessment Clean;Dry;Intact;No redness;No swelling 03/20/25 1558   Line Securement Device Secured with sutureless device 03/20/25 1558   Extremity Assessment Distal to IV No abnormal discoloration;No redness;No swelling;No warmth 03/20/25 1558   Line Status Blood return noted;Flushed;Saline locked 03/20/25 1558   Dressing Status Clean;Dry;Intact 03/20/25 1558   Dressing Intervention First dressing 03/20/25 1558   Dressing Change Due 03/24/25 03/20/25 1558   Site Change Due 03/24/25 03/20/25 1558   Number of days: 0            Closed/Suction Drain 03/19/25 1135 Tube - 1 Left Back Accordion 10 Fr. (Active)   Site Description Unable to view 03/20/25 1101   Dressing Type Other (Comment) 03/20/25 1101   Dressing Status Clean;Dry;Intact 03/20/25 1101   Dressing Intervention Integrity maintained 03/20/25 1101   Drainage Sanguineous 03/20/25 1101   Status Other (Comment) 03/20/25 1101   Output (mL) 70 mL 03/20/25 0501   Number of days: 1       Imaging      Results for orders placed during the hospital encounter of 03/09/25    Echo    Interpretation Summary    Left Ventricle: The left ventricle is normal in size. Ventricular mass is normal. Normal wall thickness. There is normal systolic function with a visually estimated ejection fraction of 60 - 65%. Diastolic function cannot be reliably determined in the presence of mitral annular calcification.    Right Ventricle: The right ventricle is normal in size. Wall thickness is normal. Systolic function is normal.    Left Atrium: Moderately dilated    Aortic Valve: There is mild aortic  valve sclerosis. There is mild annular calcification present.    Mitral Valve: There is severe posterior mitral annular calcification. 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 The mean pressure gradient across the mitral valve is 4 mmHg at a heart rate of 51 bpm. There is mild to moderate regurgitation with a centrally directed jet.    Tricuspid Valve: There is mild to moderate regurgitation with a centrally directed jet.    Aorta: Aortic root is normal in size measuring 2.98 cm. Aortic root at ST junction is normal. Ascending aorta is normal.    Pulmonary Artery: There is mild pulmonary hypertension. The estimated pulmonary artery systolic pressure is 47 mmHg.    IVC/SVC: Normal venous pressure at 3 mmHg.      SURG FL Surgery Fluoro Usage  See OP Notes for results.     IMPRESSION: See OP Notes for results.     This procedure was auto-finalized by: Virtual Radiologist  Echo    Left Ventricle: The left ventricle is normal in size. Ventricular mass   is normal. Normal wall thickness. There is normal systolic function with a   visually estimated ejection fraction of 60 - 65%. Diastolic function   cannot be reliably determined in the presence of mitral annular   calcification.    Right Ventricle: The right ventricle is normal in size. Wall thickness   is normal. Systolic function is normal.    Left Atrium: Moderately dilated    Aortic Valve: There is mild aortic valve sclerosis. There is mild   annular calcification present.    Mitral Valve: There is severe posterior mitral annular calcification.   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 The mean pressure gradient across the   mitral valve is 4 mmHg at a heart rate of 51 bpm. There is mild to   moderate regurgitation with a centrally directed jet.    Tricuspid Valve: There is mild to moderate regurgitation with a   centrally directed jet.    Aorta: Aortic root is normal in size measuring 2.98 cm. Aortic root at   ST junction is normal. Ascending aorta is normal.    Pulmonary  Artery: There is mild pulmonary hypertension. The estimated   pulmonary artery systolic pressure is 47 mmHg.    IVC/SVC: Normal venous pressure at 3 mmHg.  US Lower Extremity Veins Bilateral  Narrative: EXAMINATION:  US LOWER EXTREMITY VEINS BILATERAL    CLINICAL HISTORY:  R/O DVT preop;    TECHNIQUE:  Duplex and color flow Doppler and dynamic compression was performed of the bilateral lower extremity veins was performed.    COMPARISON:  None    FINDINGS:  Right thigh veins: The common femoral, femoral, popliteal, and upper greater saphenous veins are patent and free of thrombus. The veins are normally compressible and have normal phasic flow and augmentation response.    Right calf veins: The visualized calf veins are patent.    Left thigh veins: The common femoral, femoral, popliteal, and upper greater saphenous veins are patent and free of thrombus. The veins are normally compressible and have normal phasic flow and augmentation response.    Left calf veins: The visualized calf veins are patent.    Miscellaneous: None  Impression: No scan evidence of deep venous thrombosis in either lower extremity.    Electronically signed by resident: Sawyer Mercado  Date:    03/19/2025  Time:    01:30    Electronically signed by: Lasha Villareal  Date:    03/19/2025  Time:    01:46      Labs, Imaging, EKG and Diagnostic results from 3/22/2025 were reviewed.    Medications:  Medication list was reviewed and changes noted under Assessment/Plan.  Medications Ordered Prior to Encounter[1]  Scheduled Medications:  Current Facility-Administered Medications   Medication Dose Route Frequency    amLODIPine  10 mg Oral Daily    ceFAZolin (Ancef) IV (PEDS and ADULTS)  1 g Intravenous Q12H    cloNIDine 0.1 mg/24 hr td ptwk  1 patch Transdermal Q7 Days    enoxparin  30 mg Subcutaneous Q24H (prophylaxis, 1700)    fluticasone furoate-vilanteroL  1 puff Inhalation Daily    furosemide  40 mg Oral Daily    gabapentin  300 mg Oral QHS     hydrALAZINE  75 mg Oral Q8H    methocarbamoL  500 mg Oral TID    mupirocin   Topical (Top) BID    polyethylene glycol  17 g Oral Daily    senna-docusate 8.6-50 mg  1 tablet Oral Daily    sodium chloride  1,000 mg Oral BID     PRN:   Current Facility-Administered Medications:     acetaminophen, 650 mg, Oral, Q6H PRN    aluminum & magnesium hydroxide-simethicone, 30 mL, Oral, Q6H PRN    artificial tears, 1 drop, Both Eyes, PRN    bisacodyL, 10 mg, Rectal, Daily PRN    calcium carbonate, 1,000 mg, Oral, TID PRN    hydrALAZINE, 10 mg, Intravenous, Q4H PRN    labetalol, 10 mg, Intravenous, Q4H PRN    melatonin, 6 mg, Oral, Nightly PRN    morphine, 1 mg, Intravenous, Q6H PRN    naloxone, 0.02 mg, Intravenous, PRN    ondansetron, 8 mg, Oral, Q8H PRN    oxyCODONE-acetaminophen, 1 tablet, Oral, Q4H PRN    oxyCODONE-acetaminophen, 1 tablet, Oral, Q4H PRN    simethicone, 1 tablet, Oral, QID PRN    sodium chloride 0.9%, 1-10 mL, Intravenous, Q12H PRN  Infusions:   Estimated Creatinine Clearance: 28.1 mL/min (based on SCr of 1.1 mg/dL).             Assessment & Plan  Spinal stenosis  Cord Compression  - MRI Cervical spine 3/9/25: chronic type 2 odontoid fracture with progressed retropulsion of the fracture fragment in comparison to prior MRI from 8/11/2023, with moderate to severe canal stenosis at C1-C2. Multilevel foraminal stenosis.   - Cont C-collar at all times  - Upright and supine cervical x-ray pending  - Neurosurgery recommended occiput-C4 fusion. Non-surgical option is strict cervical collar 24/7 indefinitely  - XR with chronic odontoid type 2 fracture, position alignment appears stable   - Decadron started for new neuro deficit  - surgery for 3/19      3/20 Transfer to hospital medicine. 89yo woman w/ PMHx COPD, HTN, HLD, history of TIA, known type 2 odontoid fx followed by Dr. Quach with non-surgical management, and right shoulder arthroplasty 06/2024 presenting with a week of severe persistent headache, and diffuse  weakness involving her bilateral upper and lower extremities.  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 Multilevel foraminal stenosis.    Now s/p Occipital - C4 posterior spinal fusion (3/19). Continue Cervical collar at all times post operatively for 3 months. Post operative XR C spine pending.  Multimodal pain control  with oxycodone 5/7.5 prn, morphine prn, robaxin prn) CPK trended down 623--> 420.  vancomycin discontinued.  started on cefazolin due to  concern for allergic reaction. Mosher and A-line removed. DVT ppx initiated.    Ancef can be discontinued once drain removed   3/21 Drain output 130ml/24h. Leucocytosis resolved.   3/22 Drain output 40ml/24h.         Chronic kidney disease, stage III (moderate)  Renal function remains around baseline; Estimated Creatinine Clearance: 28.1 mL/min (based on SCr of 1.1 mg/dL). according to latest data. 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 Will avoid nephrotoxic agents as able, and renally dose all meds as applicable.       Recent Labs   Lab 03/20/25  0003 03/21/25  0518 03/22/25  0634   BUN 43* 48* 44*   CREATININE 1.2 1.2 1.1       I & O     Intake/Output Summary (Last 24 hours) at 3/22/2025 1505  Last data filed at 3/22/2025 1440  Gross per 24 hour   Intake --   Output 1540 ml   Net -1540 ml      Other emphysema  History of COPD, not on home oxygen. Currently without evidence of acute exacerbation, and sats on room air at goal 88% or greater. Will continue hospital formulary equivalent of home regimen and monitor respiratory status.     3/21 sats 97% on 1LNC Taper oxygen as tolerated to Spo2 goal of low 90s   Essential hypertension  Uncontrolled  Pulse too slow to start BB.    Chronic, controlled.  Latest blood pressure and vitals reviewed-   Temp:  [97.5 °F (36.4 °C)-98.5 °F (36.9 °C)]   Pulse:   [48-86]   Resp:  [16-20]   BP: (105-151)/(55-75)   SpO2:  [97 %-100 %] .   Home meds for hypertension were reviewed and noted below. Hospital anti-hypertensive changes were made as shown below.  Hypertension Medications              amLODIPine (NORVASC) 10 MG tablet Take 1 tablet (10 mg total) by mouth once daily.    furosemide (LASIX) 20 MG tablet Take 1 tablet (20 mg total) by mouth 2 (two) times a day.        continue clonidine and hydralazine   PRN meds if BP> 180/110 mm HG scheduled hydralazine  Continue norvasc  Generalized weakness  Chronic, however likely exacerbated by cervical radiculopathy.    PT/OT consulted - recs SNF         Cervical spinal stenosis  as above    Mixed hyperlipidemia  hold statin with elevatd CPK     Hyperkalemia  resolved         Leucocytosis     Patient with leucocytosis   Recent Labs   Lab 03/20/25  0003 03/21/25  0518 03/22/25  0634   WBC 12.87* 8.31 5.44     . Afebrile.  monitor .        Urinary retention  3/22 straight cath x 3. bladder scan showed 450ml and she voided on her own 100ml . . Mosher placed       VTE Risk Mitigation (From admission, onward)           Ordered     enoxaparin injection 30 mg  Every 24 hours         03/20/25 0819     Place sequential compression device  Until discontinued         03/19/25 0857     IP VTE HIGH RISK PATIENT  Once         03/09/25 2040     Reason for No Pharmacological VTE Prophylaxis  Once        Question:  Reasons:  Answer:  Physician Provided (leave comment)  Comment:  surgery    03/09/25 2040                    Discharge Planning   MIKAYLA: 3/24/2025     Code Status: Full Code   Medical Readiness for Discharge Date:   Discharge Plan A: Skilled Nursing Facility   Discharge Delays: None known at this time            Please place Justification for DME        Luis Gunn MD  Department of Hospital Medicine   [facility] - Neurosurgery (Hospital)         [1]   No current facility-administered medications on file prior to encounter.     Current  Outpatient Medications on File Prior to Encounter   Medication Sig Dispense Refill    albuterol (PROVENTIL/VENTOLIN HFA) 90 mcg/actuation inhaler Inhale 2 puffs into the lungs every 6 (six) hours as needed.      amLODIPine (NORVASC) 10 MG tablet Take 1 tablet (10 mg total) by mouth once daily. 90 tablet 3    fluticasone propionate (FLONASE) 50 mcg/actuation nasal spray 1 spray by Nasal route 2 (two) times daily.      gabapentin (NEURONTIN) 300 MG capsule Take 300 mg by mouth every evening.      ipratropium (ATROVENT) 42 mcg (0.06 %) nasal spray       melatonin (MELATIN) 3 mg tablet Take 2 tablets (6 mg total) by mouth nightly as needed for Insomnia. 30 tablet 0    omega 3-dha-epa-fish oil 1,000 mg (120 mg-180 mg) Cap Take 1 capsule by mouth once daily.      omeprazole (PRILOSEC) 20 MG capsule Take 1 capsule (20 mg total) by mouth 2 (two) times daily before meals. 60 capsule 0

## 2025-03-22 NOTE — SUBJECTIVE & OBJECTIVE
Interval History: 3/22: POD3. NAEON. Neuro exam stable. Drain with slowing output 40cc. Pain controlled. Working with PT.    Medications:  Continuous Infusions:  Scheduled Meds:   amLODIPine  10 mg Oral Daily    ceFAZolin (Ancef) IV (PEDS and ADULTS)  1 g Intravenous Q12H    cloNIDine 0.1 mg/24 hr td ptwk  1 patch Transdermal Q7 Days    enoxparin  30 mg Subcutaneous Q24H (prophylaxis, 1700)    fluticasone furoate-vilanteroL  1 puff Inhalation Daily    furosemide  40 mg Oral Daily    gabapentin  300 mg Oral QHS    hydrALAZINE  75 mg Oral Q8H    methocarbamoL  500 mg Oral TID    mupirocin   Topical (Top) BID    polyethylene glycol  17 g Oral Daily    senna-docusate 8.6-50 mg  1 tablet Oral Daily    sodium chloride  1,000 mg Oral BID     PRN Meds:  Current Facility-Administered Medications:     acetaminophen, 650 mg, Oral, Q6H PRN    aluminum & magnesium hydroxide-simethicone, 30 mL, Oral, Q6H PRN    artificial tears, 1 drop, Both Eyes, PRN    bisacodyL, 10 mg, Rectal, Daily PRN    calcium carbonate, 1,000 mg, Oral, TID PRN    hydrALAZINE, 10 mg, Intravenous, Q4H PRN    labetalol, 10 mg, Intravenous, Q4H PRN    melatonin, 6 mg, Oral, Nightly PRN    morphine, 1 mg, Intravenous, Q6H PRN    naloxone, 0.02 mg, Intravenous, PRN    ondansetron, 8 mg, Oral, Q8H PRN    oxyCODONE-acetaminophen, 1 tablet, Oral, Q4H PRN    oxyCODONE-acetaminophen, 1 tablet, Oral, Q4H PRN    simethicone, 1 tablet, Oral, QID PRN    sodium chloride 0.9%, 1-10 mL, Intravenous, Q12H PRN     Review of Systems  Objective:     Weight: 59 kg (130 lb)  Body mass index is 23.03 kg/m².  Vital Signs (Most Recent):  Temp: 97.5 °F (36.4 °C) (03/22/25 0746)  Pulse: (!) 58 (03/22/25 1109)  Resp: 16 (03/22/25 0746)  BP: (!) 142/64 (03/22/25 0828)  SpO2: 97 % (03/22/25 0828) Vital Signs (24h Range):  Temp:  [97.5 °F (36.4 °C)-98.5 °F (36.9 °C)] 97.5 °F (36.4 °C)  Pulse:  [48-86] 58  Resp:  [16-20] 16  SpO2:  [97 %-100 %] 97 %  BP: (105-151)/(55-75) 142/64            "                   Closed/Suction Drain 03/19/25 1135 Tube - 1 Left Back Accordion 10 Fr. (Active)   Site Description Unable to view 03/21/25 0740   Dressing Type Other (Comment) 03/21/25 0740   Dressing Status Clean;Dry;Intact 03/22/25 0640   Dressing Intervention Integrity maintained 03/21/25 0740   Drainage Sanguineous 03/21/25 0740   Status Other (Comment) 03/21/25 0740   Output (mL) 10 mL 03/21/25 1901          Physical Exam         Neurosurgery Physical Exam    General: Well developed, well nourished, no distress.   Head: Normocephalic, atraumatic.  Mental Status: Awake, Alert, Oriented  Speech: Clear with content appropriate to conversation.  Cranial nerves: Face symmetric, CN II-XII grossly intact.   Eyes: Pupils equal, round, reactive to light, EOMI.  Sensory: Intact to light touch throughout.  Motor Strength: Moves all extremities spontaneously with good tone. Full strength upper and lower extremities. No abnormal movements seen.      Shoulder arthritis, lifting up right > left shoulder is limited by shoulder pain and ROM     Strength   Deltoids Triceps Biceps Wrist Extension Wrist Flexion Hand    Upper: R 4/5 5/5 5/5 5/5 5/5 5/5     L 4/5 5/5 5/5 5/5 5/5 5/5       Iliopsoas Quadriceps Knee  Flexion Tibialis  anterior Gastro- cnemius EHL   Lower: R 5/5 5/5 5/5 5/5 5/5 5/5     L 5/5 5/5 5/5 5/5 5/5 5/5    Patient with R distal clavicle fx and h/o of R shoulder arthroplasty.  Hand intrinsics 5/5.     In c-collar.    Significant Labs:  Recent Labs   Lab 03/21/25  0518 03/22/25  0634   GLU 93  --     137   K 3.9 3.9    107   CO2 20* 23   BUN 48* 44*   CREATININE 1.2 1.1   CALCIUM 8.1* 7.9*   MG 2.6 2.5     Recent Labs   Lab 03/21/25  0518 03/22/25  0634   WBC 8.31 5.44   HGB 9.2* 8.4*   HCT 30.6* 27.5*    237     No results for input(s): "LABPT", "INR", "APTT" in the last 48 hours.  Microbiology Results (last 7 days)       ** No results found for the last 168 hours. **          All " pertinent labs from the last 24 hours have been reviewed.    Significant Diagnostics:  I have reviewed all pertinent imaging results/findings within the past 24 hours.

## 2025-03-22 NOTE — PT/OT/SLP PROGRESS
Physical Therapy Treatment    Patient Name:  Bridget Montgomery   MRN:  [id number]    Recommendations:     Discharge Recommendations: Moderate Intensity Therapy  Discharge Equipment Recommendations: to be determined by next level of care  Barriers to discharge: Pt requiring increased skilled assistance at current time.     Assessment:     Bridget Montgomery is a 89 y.o. female admitted with a medical diagnosis of Spinal stenosis.  She presents with the following impairments/functional limitations: weakness, impaired endurance, impaired sensation, impaired self care skills, impaired functional mobility, gait instability, impaired balance, decreased coordination, decreased upper extremity function, decreased lower extremity function, decreased safety awareness, pain, decreased ROM, impaired coordination, impaired skin, orthopedic precautions requiring mod to max assistance and verbal cues for bed mob, sit < > stand transitions, and gait to prevent falls due to weakness, fatigue, pain, B knee instability.   In light of pt's current functional level and deficits, it is anticipated that pt will need to participate in a moderate intensity rehab program consisting of PT and OT in order to achieve full rehab potential to return to previous level of function and roles.  Pt remains motivated to participate in PT session and will cont to benefit from skilled PT intervention..    Rehab Prognosis: Good; patient would benefit from acute skilled PT services to address these deficits and reach maximum level of function.    Recent Surgery: Procedure(s) (LRB):  FUSION, SPINE, CERVICAL, POSTERIOR APPROACH (N/A) 3 Days Post-Op    Plan:     During this hospitalization, patient to be seen 4 x/week to address the identified rehab impairments via gait training, therapeutic activities, therapeutic exercises, neuromuscular re-education and progress toward the following goals:    Plan of Care Expires:  03/30/25    Subjective     Chief  "Complaint: pain, weakness, fatigue.   Patient/Family Comments/goals: Pt's daughter states "She can't push with her arms to stand up.  She had surgery in her shoulder and broke her clavicle a few months ago.  She was in therapy for that"  Pain/Comfort:  Pain Rating 1: 0/10  Location - Side 1: Bilateral  Location - Orientation 1: posterior  Location 1: neck  Pain Addressed 1: Reposition, Distraction, Cessation of Activity, Nurse notified  Pain Rating Post-Intervention 1:  (no rating provided)      Objective:     Communicated with nurse (Jory) prior to session.  Patient found HOB elevated with cervical collar, telemetry (waffle pad.  Daughter (Liliana) present) upon PT entry to room.     General Precautions: Standard, fall, hearing impaired (Pt wears hearing aides)  Orthopedic Precautions: spinal precautions  Braces: Cervical collar  Respiratory Status: Room air     Functional Mobility:  Bed Mobility:     Rolling Right: mod A with use of rail, HOB at 30* angle  Scooting: anteriorly to the EOB with min A; to scoot hips to deeper in chair with mod/max A x2  Supine to Sit: mod to max A for trunk elevation and B LE's, exiting on the R side, HOB at 30* angle   Transfers:     Sit to Stand:   from moderately raised EOB with min A with RW; from BSC with mod A  with rolling walker  BSC to BS chair:  max to mod A of 1  with  hand-held assist  using  Step Transfer to the L.  Pt demonstrates flexed needs, lateral sway, decreased B foot clearance, and instability  Gait: RW initially with min A then requiring mod A for balance and mgt of AD (especially when turning) 12ft within room with chair follow; then 4 steps with mod to max A for balance and mgt of AD(attempting to make it BTB, had to perform BSC > BS chair transfer to get out of restroom) due to B knees collapsing and decreased B step length, decreased B foot clearance, decreased weight shift ability, and increased instability  Balance: static sitting at the EOB with SBA; " static standing with RW min A, no AD/B HHA mod A approx 1 min while daughter assists pt with perianal hygiene      AM-PAC 6 CLICK MOBILITY  Turning over in bed (including adjusting bedclothes, sheets and blankets)?: 2  Sitting down on and standing up from a chair with arms (e.g., wheelchair, bedside commode, etc.): 2  Moving from lying on back to sitting on the side of the bed?: 2  Moving to and from a bed to a chair (including a wheelchair)?: 2  Need to walk in hospital room?: 2  Climbing 3-5 steps with a railing?: 1  Basic Mobility Total Score: 11       Treatment & Education:  Patient provided with daily orientation and goals of this PT session. They were educated to call for assistance and to transfer with hospital staff only.  Also, pt was educated on the effects of prolonged immobility and the importance of performing OOB activity and exercises to promote healing and reduce recovery time.   Pt was educated on Spinal precautions with 1/3 recall.      Patient left up in chair with all lines intact, call button in reach, nurse notified, and daughter present..    GOALS:   Multidisciplinary Problems       Physical Therapy Goals          Problem: Physical Therapy    Goal Priority Disciplines Outcome Interventions   Physical Therapy Goal     PT, PT/OT Progressing    Description: Goals to be completed by: 3/30/25    Pt will perform sup<>sit transfers w/ moderate assistance  Pt will be able to stand up from EOB w/ contact guard assistance using LRAD  Pt will ambulate 30 feet w/ contact guard assistance using LRAD  Pt will be independent w/ HEP therex on BLE w/ good form and ROM   Pt will be independent w/ recalling 3/3 spinal precautions                         Time Tracking:     PT Received On: 03/22/25  PT Start Time: 1459     PT Stop Time: 1538  PT Total Time (min): 39 min     Billable Minutes: Gait Training 15 and Therapeutic Activity 24    Treatment Type: Treatment  PT/PTA: PTA     Number of PTA visits since last  PT visit: 1     03/22/2025

## 2025-03-22 NOTE — ASSESSMENT & PLAN NOTE
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 Multilevel foraminal stenosis.   - Cont C-collar at all times  - Upright and supine cervical x-ray pending  - Neurosurgery recommended occiput-C4 fusion. Non-surgical option is strict cervical collar 24/7 indefinitely  - XR with chronic odontoid type 2 fracture, position alignment appears stable   - Decadron started for new neuro deficit  - surgery for 3/19      3/20 Transfer to hospital medicine. 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 Multilevel foraminal stenosis.    Now s/p Occipital - C4 posterior spinal fusion (3/19). Continue Cervical collar at all times post operatively for 3 months. Post operative XR C spine pending.  Multimodal pain control  with oxycodone 5/7.5 prn, morphine prn, robaxin prn) CPK trended down 623--> 420.  vancomycin discontinued.  started on cefazolin due to  concern for allergic reaction. Mosher and A-line removed. DVT ppx initiated.    Ancef can be discontinued once drain removed   3/21 Drain output 130ml/24h. Leucocytosis resolved.   3/22 Drain output 40ml/24h.

## 2025-03-22 NOTE — ASSESSMENT & PLAN NOTE
Uncontrolled  Pulse too slow to start BB.    Chronic, controlled.  Latest blood pressure and vitals reviewed-   Temp:  [97.5 °F (36.4 °C)-98.5 °F (36.9 °C)]   Pulse:  [48-86]   Resp:  [16-20]   BP: (105-151)/(55-75)   SpO2:  [97 %-100 %] .   Home meds for hypertension were reviewed and noted below. Hospital anti-hypertensive changes were made as shown below.  Hypertension Medications              amLODIPine (NORVASC) 10 MG tablet Take 1 tablet (10 mg total) by mouth once daily.    furosemide (LASIX) 20 MG tablet Take 1 tablet (20 mg total) by mouth 2 (two) times a day.        continue clonidine and hydralazine   PRN meds if BP> 180/110 mm HG scheduled hydralazine  Continue norvasc

## 2025-03-22 NOTE — PROGRESS NOTES
[facility] - Neurosurgery ([facility])  Neurosurgery  Progress Note    Subjective:     History of Present Illness: 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 Her symptoms began last Monday with headache that started in her neck and migrated toward the top of her head. She has had chronic neck pain due to cervical stenosis, but the headache was new. On Saturday, she had a normal morning and was able to perform all of her ADLs. Around 8 pm, she suddenly became weak in her arms and legs and was not able to walk. MRI demonstrated chronic type 2 odontoid fracture with progressed retropulsion of the fracture fragment in comparison to prior MRI from 8/11/2023, with moderate to severe canal stenosis at C1-C2. Of note, patient had previously discontinued wearing her cervical collar. Denies radiculopathy in her arms or legs, sensory deficit, bladder or bowel incontinence.      Post-Op Info:  Procedure(s) (LRB):  FUSION, SPINE, CERVICAL, POSTERIOR APPROACH (N/A)   3 Days Post-Op   Interval History: 3/22: POD3. NAEON. Neuro exam stable. Drain with slowing output 40cc. Pain controlled. Working with PT.    Medications:  Continuous Infusions:  Scheduled Meds:   amLODIPine  10 mg Oral Daily    ceFAZolin (Ancef) IV (PEDS and ADULTS)  1 g Intravenous Q12H    cloNIDine 0.1 mg/24 hr td ptwk  1 patch Transdermal Q7 Days    enoxparin  30 mg Subcutaneous Q24H (prophylaxis, 1700)    fluticasone furoate-vilanteroL  1 puff Inhalation Daily    furosemide  40 mg Oral Daily    gabapentin  300 mg Oral QHS    hydrALAZINE  75 mg Oral Q8H    methocarbamoL  500 mg Oral TID    mupirocin   Topical (Top) BID    polyethylene glycol  17 g Oral Daily    senna-docusate 8.6-50 mg  1  tablet Oral Daily    sodium chloride  1,000 mg Oral BID     PRN Meds:  Current Facility-Administered Medications:     acetaminophen, 650 mg, Oral, Q6H PRN    aluminum & magnesium hydroxide-simethicone, 30 mL, Oral, Q6H PRN    artificial tears, 1 drop, Both Eyes, PRN    bisacodyL, 10 mg, Rectal, Daily PRN    calcium carbonate, 1,000 mg, Oral, TID PRN    hydrALAZINE, 10 mg, Intravenous, Q4H PRN    labetalol, 10 mg, Intravenous, Q4H PRN    melatonin, 6 mg, Oral, Nightly PRN    morphine, 1 mg, Intravenous, Q6H PRN    naloxone, 0.02 mg, Intravenous, PRN    ondansetron, 8 mg, Oral, Q8H PRN    oxyCODONE-acetaminophen, 1 tablet, Oral, Q4H PRN    oxyCODONE-acetaminophen, 1 tablet, Oral, Q4H PRN    simethicone, 1 tablet, Oral, QID PRN    sodium chloride 0.9%, 1-10 mL, Intravenous, Q12H PRN     Review of Systems  Objective:     Weight: 59 kg (130 lb)  Body mass index is 23.03 kg/m².  Vital Signs (Most Recent):  Temp: 97.5 °F (36.4 °C) (03/22/25 0746)  Pulse: (!) 58 (03/22/25 1109)  Resp: 16 (03/22/25 0746)  BP: (!) 142/64 (03/22/25 0828)  SpO2: 97 % (03/22/25 0828) Vital Signs (24h Range):  Temp:  [97.5 °F (36.4 °C)-98.5 °F (36.9 °C)] 97.5 °F (36.4 °C)  Pulse:  [48-86] 58  Resp:  [16-20] 16  SpO2:  [97 %-100 %] 97 %  BP: (105-151)/(55-75) 142/64                              Closed/Suction Drain 03/19/25 1135 Tube - 1 Left Back Accordion 10 Fr. (Active)   Site Description Unable to view 03/21/25 0740   Dressing Type Other (Comment) 03/21/25 0740   Dressing Status Clean;Dry;Intact 03/22/25 0640   Dressing Intervention Integrity maintained 03/21/25 0740   Drainage Sanguineous 03/21/25 0740   Status Other (Comment) 03/21/25 0740   Output (mL) 10 mL 03/21/25 1901          Physical Exam         Neurosurgery Physical Exam    General: Well developed, well nourished, no distress.   Head: Normocephalic, atraumatic.  Mental Status: Awake, Alert, Oriented  Speech: Clear with content appropriate to conversation.  Cranial nerves: Face  "symmetric, CN II-XII grossly intact.   Eyes: Pupils equal, round, reactive to light, EOMI.  Sensory: Intact to light touch throughout.  Motor Strength: Moves all extremities spontaneously with good tone. Full strength upper and lower extremities. No abnormal movements seen.      Shoulder arthritis, lifting up right > left shoulder is limited by shoulder pain and ROM     Strength   Deltoids Triceps Biceps Wrist Extension Wrist Flexion Hand    Upper: R 4/5 5/5 5/5 5/5 5/5 5/5     L 4/5 5/5 5/5 5/5 5/5 5/5       Iliopsoas Quadriceps Knee  Flexion Tibialis  anterior Gastro- cnemius EHL   Lower: R 5/5 5/5 5/5 5/5 5/5 5/5     L 5/5 5/5 5/5 5/5 5/5 5/5    Patient with R distal clavicle fx and h/o of R shoulder arthroplasty.  Hand intrinsics 5/5.     In c-collar.    Significant Labs:  Recent Labs   Lab 03/21/25  0518 03/22/25  0634   GLU 93  --     137   K 3.9 3.9    107   CO2 20* 23   BUN 48* 44*   CREATININE 1.2 1.1   CALCIUM 8.1* 7.9*   MG 2.6 2.5     Recent Labs   Lab 03/21/25  0518 03/22/25  0634   WBC 8.31 5.44   HGB 9.2* 8.4*   HCT 30.6* 27.5*    237     No results for input(s): "LABPT", "INR", "APTT" in the last 48 hours.  Microbiology Results (last 7 days)       ** No results found for the last 168 hours. **          All pertinent labs from the last 24 hours have been reviewed.    Significant Diagnostics:  I have reviewed all pertinent imaging results/findings within the past 24 hours.  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     MRI Cervical spine 3/9/25: Chronic type 2 odontoid fracture with progressed retropulsion of the fracture fragment in comparison to prior MRI " from 8/11/2023, with moderate to severe canal stenosis at C1-C2. Multilevel foraminal stenosis.     Now s/p Occipital - C4 posterior spinal fusion (3/19).     POD 3    Plan:   - Cervical collar at all times, will continue collar post operatively for 3 months  - Post operative XR C spine pending  - Remove drain today  - Multimodal pain control (oxycodone 5/7.5 prn, morphine prn, robaxin prn)  - Bowel regimen: senna, miralax  - CPK elevated post operatively, currently on fluids, also home lasix. Trending down.  Appreciate HM management  - Notify NSGY immediately with any decline on exam.  - Med management per primary  - PT/OT/OOB as tolerated  - Concern for retaining urine if needing several I/O may consider urology consult and noriega replacement to be discontinued after requiring less narcotics and increased mobility.   - OK for DVT prophylaxis: Lovenox  - Neurosurgery team will follow    D/w staff, Dr. Lemons and on call staff Dr. Den Sauceda MD  Neurosurgery  [facility] - Neurosurgery ([facility])

## 2025-03-22 NOTE — PLAN OF CARE
Problem: Adult Inpatient Plan of Care  Goal: Plan of Care Review  Outcome: Progressing  Goal: Patient-Specific Goal (Individualized)  Outcome: Progressing  Goal: Absence of Hospital-Acquired Illness or Injury  Outcome: Progressing  Goal: Optimal Comfort and Wellbeing  Outcome: Progressing  Goal: Readiness for Transition of Care  Outcome: Progressing     Problem: Wound  Goal: Optimal Coping  Outcome: Progressing  Goal: Optimal Functional Ability  Outcome: Progressing  Goal: Absence of Infection Signs and Symptoms  Outcome: Progressing  Goal: Improved Oral Intake  Outcome: Progressing  Goal: Optimal Pain Control and Function  Outcome: Progressing  Goal: Skin Health and Integrity  Outcome: Progressing  Goal: Optimal Wound Healing  Outcome: Progressing     Problem: Fall Injury Risk  Goal: Absence of Fall and Fall-Related Injury  Outcome: Progressing     Problem: Skin Injury Risk Increased  Goal: Skin Health and Integrity  Outcome: Progressing     Problem: Infection  Goal: Absence of Infection Signs and Symptoms  Outcome: Progressing     Problem: Pain Acute  Goal: Optimal Pain Control and Function  Outcome: Progressing     Problem: Urinary Retention  Goal: Effective Urinary Elimination  Outcome: Progressing

## 2025-03-22 NOTE — ASSESSMENT & PLAN NOTE
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 Multilevel foraminal stenosis.     Now s/p Occipital - C4 posterior spinal fusion (3/19).     POD 3    Plan:   - Cervical collar at all times, will continue collar post operatively for 3 months  - Post operative XR C spine pending  - Remove drain today  - Multimodal pain control (oxycodone 5/7.5 prn, morphine prn, robaxin prn)  - Bowel regimen: senna, miralax  - CPK elevated post operatively, currently on fluids, also home lasix. Trending down.  Appreciate HM management  - Notify NSGY immediately with any decline on exam.  - Med management per primary  - PT/OT/OOB as tolerated  - Concern for retaining urine if needing several I/O may consider urology consult and noriega replacement to be discontinued after requiring less narcotics and increased mobility.   - OK for DVT prophylaxis: Lovenox  - Neurosurgery team will follow    D/w staff, Dr. Lemons and on call staff Dr. Crenshaw

## 2025-03-22 NOTE — PLAN OF CARE
Problem: Wound  Goal: Optimal Wound Healing  Outcome: Progressing     Problem: Adult Inpatient Plan of Care  Goal: Optimal Comfort and Wellbeing  Outcome: Progressing   Plan of care discussed. Addressed questions and concerns. BladderScan, will cont to care.

## 2025-03-22 NOTE — ASSESSMENT & PLAN NOTE
3/22 straight cath x 3. bladder scan showed 450ml and she voided on her own 100ml . . Mosher placed

## 2025-03-23 PROBLEM — D64.9 ANEMIA: Status: ACTIVE | Noted: 2025-03-23

## 2025-03-23 LAB
ABSOLUTE EOSINOPHIL (OHS): 0.06 K/UL
ABSOLUTE EOSINOPHIL (OHS): 0.08 K/UL
ABSOLUTE MONOCYTE (OHS): 0.6 K/UL (ref 0.3–1)
ABSOLUTE MONOCYTE (OHS): 1.08 K/UL (ref 0.3–1)
ABSOLUTE NEUTROPHIL COUNT (OHS): 14.14 K/UL (ref 1.8–7.7)
ABSOLUTE NEUTROPHIL COUNT (OHS): 5.84 K/UL (ref 1.8–7.7)
ALBUMIN SERPL BCP-MCNC: 1.9 G/DL (ref 3.5–5.2)
ALP SERPL-CCNC: 116 UNIT/L (ref 40–150)
ALT SERPL W/O P-5'-P-CCNC: 9 UNIT/L (ref 10–44)
ANION GAP (OHS): 9 MMOL/L (ref 8–16)
AST SERPL-CCNC: 25 UNIT/L (ref 11–45)
BASOPHILS # BLD AUTO: 0.01 K/UL
BASOPHILS # BLD AUTO: 0.03 K/UL
BASOPHILS NFR BLD AUTO: 0.1 %
BASOPHILS NFR BLD AUTO: 0.2 %
BILIRUB SERPL-MCNC: 0.1 MG/DL (ref 0.1–1)
BUN SERPL-MCNC: 38 MG/DL (ref 8–23)
CALCIUM SERPL-MCNC: 7.9 MG/DL (ref 8.7–10.5)
CHLORIDE SERPL-SCNC: 107 MMOL/L (ref 95–110)
CO2 SERPL-SCNC: 20 MMOL/L (ref 23–29)
CREAT SERPL-MCNC: 1 MG/DL (ref 0.5–1.4)
ERYTHROCYTE [DISTWIDTH] IN BLOOD BY AUTOMATED COUNT: 14.9 % (ref 11.5–14.5)
ERYTHROCYTE [DISTWIDTH] IN BLOOD BY AUTOMATED COUNT: 14.9 % (ref 11.5–14.5)
GFR SERPLBLD CREATININE-BSD FMLA CKD-EPI: 54 ML/MIN/1.73/M2
GLUCOSE SERPL-MCNC: 87 MG/DL (ref 70–110)
HCT VFR BLD AUTO: 23.7 % (ref 37–48.5)
HCT VFR BLD AUTO: 29.7 % (ref 37–48.5)
HGB BLD-MCNC: 7.5 GM/DL (ref 12–16)
HGB BLD-MCNC: 9.3 GM/DL (ref 12–16)
IMM GRANULOCYTES # BLD AUTO: 0.06 K/UL (ref 0–0.04)
IMM GRANULOCYTES # BLD AUTO: 0.09 K/UL (ref 0–0.04)
IMM GRANULOCYTES NFR BLD AUTO: 0.5 % (ref 0–0.5)
IMM GRANULOCYTES NFR BLD AUTO: 0.8 % (ref 0–0.5)
LYMPHOCYTES # BLD AUTO: 0.88 K/UL (ref 1–4.8)
LYMPHOCYTES # BLD AUTO: 0.97 K/UL (ref 1–4.8)
MAGNESIUM SERPL-MCNC: 2.2 MG/DL (ref 1.6–2.6)
MCH RBC QN AUTO: 28.5 PG (ref 27–50)
MCH RBC QN AUTO: 28.7 PG (ref 27–50)
MCHC RBC AUTO-ENTMCNC: 31.3 G/DL (ref 32–36)
MCHC RBC AUTO-ENTMCNC: 31.6 G/DL (ref 32–36)
MCV RBC AUTO: 91 FL (ref 82–98)
MCV RBC AUTO: 91 FL (ref 82–98)
NUCLEATED RBC (/100WBC) (OHS): 0 /100 WBC
NUCLEATED RBC (/100WBC) (OHS): 0 /100 WBC
OB PNL STL: NEGATIVE
PHOSPHATE SERPL-MCNC: 3.6 MG/DL (ref 2.7–4.5)
PLATELET # BLD AUTO: 223 K/UL (ref 150–450)
PLATELET # BLD AUTO: 275 K/UL (ref 150–450)
PMV BLD AUTO: 10.8 FL (ref 9.2–12.9)
PMV BLD AUTO: 11 FL (ref 9.2–12.9)
POTASSIUM SERPL-SCNC: 3.7 MMOL/L (ref 3.5–5.1)
PROT SERPL-MCNC: 4.7 GM/DL (ref 6–8.4)
RBC # BLD AUTO: 2.61 M/UL (ref 4–5.4)
RBC # BLD AUTO: 3.26 M/UL (ref 4–5.4)
RELATIVE EOSINOPHIL (OHS): 0.4 %
RELATIVE EOSINOPHIL (OHS): 1.1 %
RELATIVE LYMPHOCYTE (OHS): 11.8 % (ref 18–48)
RELATIVE LYMPHOCYTE (OHS): 5.9 % (ref 18–48)
RELATIVE MONOCYTE (OHS): 6.6 % (ref 4–15)
RELATIVE MONOCYTE (OHS): 8 % (ref 4–15)
RELATIVE NEUTROPHIL (OHS): 78.2 % (ref 38–73)
RELATIVE NEUTROPHIL (OHS): 86.4 % (ref 38–73)
SODIUM SERPL-SCNC: 136 MMOL/L (ref 136–145)
WBC # BLD AUTO: 16.37 K/UL (ref 3.9–12.7)
WBC # BLD AUTO: 7.47 K/UL (ref 3.9–12.7)

## 2025-03-23 PROCEDURE — 94640 AIRWAY INHALATION TREATMENT: CPT

## 2025-03-23 PROCEDURE — 25000003 PHARM REV CODE 250: Performed by: HOSPITALIST

## 2025-03-23 PROCEDURE — 25000003 PHARM REV CODE 250: Performed by: INTERNAL MEDICINE

## 2025-03-23 PROCEDURE — 25000003 PHARM REV CODE 250: Performed by: STUDENT IN AN ORGANIZED HEALTH CARE EDUCATION/TRAINING PROGRAM

## 2025-03-23 PROCEDURE — 83735 ASSAY OF MAGNESIUM: CPT

## 2025-03-23 PROCEDURE — 85025 COMPLETE CBC W/AUTO DIFF WBC: CPT

## 2025-03-23 PROCEDURE — 84100 ASSAY OF PHOSPHORUS: CPT

## 2025-03-23 PROCEDURE — 63600175 PHARM REV CODE 636 W HCPCS: Performed by: NEUROLOGICAL SURGERY

## 2025-03-23 PROCEDURE — 80053 COMPREHEN METABOLIC PANEL: CPT

## 2025-03-23 PROCEDURE — 25000003 PHARM REV CODE 250

## 2025-03-23 PROCEDURE — 11000001 HC ACUTE MED/SURG PRIVATE ROOM

## 2025-03-23 PROCEDURE — 85025 COMPLETE CBC W/AUTO DIFF WBC: CPT | Performed by: HOSPITALIST

## 2025-03-23 PROCEDURE — 99900035 HC TECH TIME PER 15 MIN (STAT)

## 2025-03-23 PROCEDURE — 82272 OCCULT BLD FECES 1-3 TESTS: CPT | Performed by: HOSPITALIST

## 2025-03-23 PROCEDURE — 94761 N-INVAS EAR/PLS OXIMETRY MLT: CPT

## 2025-03-23 PROCEDURE — 36415 COLL VENOUS BLD VENIPUNCTURE: CPT | Performed by: HOSPITALIST

## 2025-03-23 PROCEDURE — 36415 COLL VENOUS BLD VENIPUNCTURE: CPT

## 2025-03-23 PROCEDURE — 25000003 PHARM REV CODE 250: Performed by: PHYSICIAN ASSISTANT

## 2025-03-23 RX ORDER — BETHANECHOL CHLORIDE 10 MG/1
10 TABLET ORAL 3 TIMES DAILY
Status: DISCONTINUED | OUTPATIENT
Start: 2025-03-23 | End: 2025-03-24 | Stop reason: HOSPADM

## 2025-03-23 RX ORDER — AMOXICILLIN 250 MG
1 CAPSULE ORAL 2 TIMES DAILY
Status: DISCONTINUED | OUTPATIENT
Start: 2025-03-23 | End: 2025-03-24 | Stop reason: HOSPADM

## 2025-03-23 RX ORDER — TAMSULOSIN HYDROCHLORIDE 0.4 MG/1
0.4 CAPSULE ORAL DAILY
Status: DISCONTINUED | OUTPATIENT
Start: 2025-03-23 | End: 2025-03-24 | Stop reason: HOSPADM

## 2025-03-23 RX ADMIN — BETHANECHOL CHLORIDE 10 MG: 10 TABLET ORAL at 07:03

## 2025-03-23 RX ADMIN — FLUTICASONE FUROATE AND VILANTEROL TRIFENATATE 1 PUFF: 100; 25 POWDER RESPIRATORY (INHALATION) at 03:03

## 2025-03-23 RX ADMIN — HYDRALAZINE HYDROCHLORIDE 75 MG: 25 TABLET ORAL at 03:03

## 2025-03-23 RX ADMIN — TAMSULOSIN HYDROCHLORIDE 0.4 MG: 0.4 CAPSULE ORAL at 10:03

## 2025-03-23 RX ADMIN — GABAPENTIN 300 MG: 300 CAPSULE ORAL at 07:03

## 2025-03-23 RX ADMIN — MUPIROCIN: 20 OINTMENT TOPICAL at 07:03

## 2025-03-23 RX ADMIN — Medication 6 MG: at 07:03

## 2025-03-23 RX ADMIN — BETHANECHOL CHLORIDE 10 MG: 10 TABLET ORAL at 03:03

## 2025-03-23 RX ADMIN — MUPIROCIN: 20 OINTMENT TOPICAL at 10:03

## 2025-03-23 RX ADMIN — CALCIUM CARBONATE (ANTACID) CHEW TAB 500 MG 1000 MG: 500 CHEW TAB at 06:03

## 2025-03-23 RX ADMIN — METHOCARBAMOL 500 MG: 500 TABLET ORAL at 03:03

## 2025-03-23 RX ADMIN — SODIUM CHLORIDE 1000 MG: 1 TABLET ORAL at 07:03

## 2025-03-23 RX ADMIN — ENOXAPARIN SODIUM 30 MG: 30 INJECTION SUBCUTANEOUS at 06:03

## 2025-03-23 RX ADMIN — SODIUM CHLORIDE 1000 MG: 1 TABLET ORAL at 10:03

## 2025-03-23 RX ADMIN — ACETAMINOPHEN 650 MG: 325 TABLET ORAL at 07:03

## 2025-03-23 RX ADMIN — POLYETHYLENE GLYCOL 3350 17 G: 17 POWDER, FOR SOLUTION ORAL at 05:03

## 2025-03-23 RX ADMIN — SENNOSIDES AND DOCUSATE SODIUM 1 TABLET: 50; 8.6 TABLET ORAL at 07:03

## 2025-03-23 RX ADMIN — AMLODIPINE BESYLATE 10 MG: 10 TABLET ORAL at 10:03

## 2025-03-23 RX ADMIN — OXYCODONE HYDROCHLORIDE AND ACETAMINOPHEN 1 TABLET: 5; 325 TABLET ORAL at 07:03

## 2025-03-23 RX ADMIN — METHOCARBAMOL 500 MG: 500 TABLET ORAL at 10:03

## 2025-03-23 RX ADMIN — ALUMINUM HYDROXIDE, MAGNESIUM HYDROXIDE, AND DIMETHICONE 30 ML: 400; 400; 40 SUSPENSION ORAL at 07:03

## 2025-03-23 RX ADMIN — OXYCODONE AND ACETAMINOPHEN 1 TABLET: 7.5; 325 TABLET ORAL at 12:03

## 2025-03-23 RX ADMIN — METHOCARBAMOL 500 MG: 500 TABLET ORAL at 07:03

## 2025-03-23 RX ADMIN — FUROSEMIDE 40 MG: 40 TABLET ORAL at 10:03

## 2025-03-23 RX ADMIN — CALCIUM CARBONATE (ANTACID) CHEW TAB 500 MG 1000 MG: 500 CHEW TAB at 10:03

## 2025-03-23 RX ADMIN — HYDRALAZINE HYDROCHLORIDE 75 MG: 25 TABLET ORAL at 05:03

## 2025-03-23 NOTE — ASSESSMENT & PLAN NOTE
3/22 straight cath x 3. bladder scan showed 450ml and she voided on her own 100ml .  3/23 urinary retention overnight. straight cath x 2. bladder scan q 6h. started on flomax and bethanechol.

## 2025-03-23 NOTE — SUBJECTIVE & OBJECTIVE
Interval History: 3/23: POD4. NAEON. Neuro exam stable. Drain removed yesterday. Pain controlled. Working with PT. Had received straight cath yesterday twice but voided on her own yesterday PM with PVR ~60cc. Early this morning needs straight cath again with 500cc urine output. Will initiate flomax and bethanechol.     Medications:  Continuous Infusions:  Scheduled Meds:   amLODIPine  10 mg Oral Daily    bethanechol  10 mg Oral TID    cloNIDine 0.1 mg/24 hr td ptwk  1 patch Transdermal Q7 Days    enoxparin  30 mg Subcutaneous Q24H (prophylaxis, 1700)    fluticasone furoate-vilanteroL  1 puff Inhalation Daily    furosemide  40 mg Oral Daily    gabapentin  300 mg Oral QHS    hydrALAZINE  75 mg Oral Q8H    methocarbamoL  500 mg Oral TID    mupirocin   Topical (Top) BID    polyethylene glycol  17 g Oral Daily    senna-docusate 8.6-50 mg  1 tablet Oral BID    sodium chloride  1,000 mg Oral BID    tamsulosin  0.4 mg Oral Daily     PRN Meds:  Current Facility-Administered Medications:     acetaminophen, 650 mg, Oral, Q6H PRN    aluminum & magnesium hydroxide-simethicone, 30 mL, Oral, Q6H PRN    artificial tears, 1 drop, Both Eyes, PRN    bisacodyL, 10 mg, Rectal, Daily PRN    calcium carbonate, 1,000 mg, Oral, TID PRN    hydrALAZINE, 10 mg, Intravenous, Q4H PRN    labetalol, 10 mg, Intravenous, Q4H PRN    melatonin, 6 mg, Oral, Nightly PRN    naloxone, 0.02 mg, Intravenous, PRN    ondansetron, 8 mg, Oral, Q8H PRN    oxyCODONE-acetaminophen, 1 tablet, Oral, Q4H PRN    oxyCODONE-acetaminophen, 1 tablet, Oral, Q4H PRN    simethicone, 1 tablet, Oral, QID PRN    sodium chloride 0.9%, 1-10 mL, Intravenous, Q12H PRN     Review of Systems  Objective:     Weight: 59 kg (130 lb)  Body mass index is 23.03 kg/m².  Vital Signs (Most Recent):  Temp: 98.5 °F (36.9 °C) (03/23/25 0806)  Pulse: 60 (03/23/25 0806)  Resp: 17 (03/23/25 0806)  BP: 130/75 (03/23/25 1017)  SpO2: 95 % (03/23/25 0806) Vital Signs (24h Range):  Temp:  [96.6 °F (35.9  "°C)-98.5 °F (36.9 °C)] 98.5 °F (36.9 °C)  Pulse:  [58-76] 60  Resp:  [13-18] 17  SpO2:  [95 %-100 %] 95 %  BP: (100-131)/(44-75) 130/75                                 Physical Exam         Neurosurgery Physical Exam    General: Well developed, well nourished, no distress.   Head: Normocephalic, atraumatic.  Mental Status: Awake, Alert, Oriented  Speech: Clear with content appropriate to conversation.  Cranial nerves: Face symmetric, CN II-XII grossly intact.   Eyes: Pupils equal, round, reactive to light, EOMI.  Sensory: Intact to light touch throughout.  Motor Strength: Moves all extremities spontaneously with good tone. Full strength upper and lower extremities. No abnormal movements seen.      Shoulder arthritis, lifting up right > left shoulder is limited by shoulder pain and ROM     Strength   Deltoids Triceps Biceps Wrist Extension Wrist Flexion Hand    Upper: R 4/5 5/5 5/5 5/5 5/5 5/5     L 4/5 5/5 5/5 5/5 5/5 5/5       Iliopsoas Quadriceps Knee  Flexion Tibialis  anterior Gastro- cnemius EHL   Lower: R 5/5 5/5 5/5 5/5 5/5 5/5     L 5/5 5/5 5/5 5/5 5/5 5/5    Patient with R distal clavicle fx and h/o of R shoulder arthroplasty.  Hand intrinsics 5/5.     In c-collar.    Significant Labs:  Recent Labs   Lab 03/22/25  0634 03/23/25  0539    136   K 3.9 3.7    107   CO2 23 20*   BUN 44* 38*   CREATININE 1.1 1.0   CALCIUM 7.9* 7.9*   MG 2.5 2.2     Recent Labs   Lab 03/22/25  0634 03/23/25  0539   WBC 5.44 7.47   HGB 8.4* 7.5*   HCT 27.5* 23.7*    223     No results for input(s): "LABPT", "INR", "APTT" in the last 48 hours.  Microbiology Results (last 7 days)       ** No results found for the last 168 hours. **          All pertinent labs from the last 24 hours have been reviewed.    Significant Diagnostics:  I have reviewed all pertinent imaging results/findings within the past 24 hours.  "

## 2025-03-23 NOTE — ASSESSMENT & PLAN NOTE
Uncontrolled  Pulse too slow to start BB.    Chronic, controlled.  Latest blood pressure and vitals reviewed-   Temp:  [96.6 °F (35.9 °C)-98.5 °F (36.9 °C)]   Pulse:  [59-76]   Resp:  [13-18]   BP: (100-131)/(44-75)   SpO2:  [94 %-100 %] .   Home meds for hypertension were reviewed and noted below. Hospital anti-hypertensive changes were made as shown below.  Hypertension Medications              amLODIPine (NORVASC) 10 MG tablet Take 1 tablet (10 mg total) by mouth once daily.    furosemide (LASIX) 20 MG tablet Take 1 tablet (20 mg total) by mouth 2 (two) times a day.        continue clonidine and hydralazine   PRN meds if BP> 180/110 mm HG scheduled hydralazine  Continue norvasc

## 2025-03-23 NOTE — PROGRESS NOTES
[facility] - Neurosurgery ([facility])  [facility] Medicine  Progress Note    Patient Name: Bridget Montgomery  MRN: [id number]  Patient Class: IP- Inpatient   Admission Date: 3/9/2025  Length of Stay: 14 days  Attending Physician: Luis Gunn MD  Primary Care Provider: No primary care provider on file.        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 She initially presented with a week of severe persistent HA, and diffuse weakness of all extremities. Her symptoms began last Monday with HA that started in her neck and migrated toward the top of her head. She has had chronic neck pain due to cervical stenosis, but the headache was new. On Saturday, she was at her baseline, able to perform all of her ADLs. Around 8 pm, she suddenly became weak in all extremities and was not able to walk. MRI demonstrated chronic type 2 odontoid fracture with progressed retropulsion of the fracture fragment in comparison to prior MRI from 8/11/2023, with moderate to severe canal stenosis at C1-C2. Of note, patient had previously discontinued wearing her cervical collar. Denied radiculopathy in her arms or legs, sensory deficit, bladder or bowel incontinence. Family decided to proceed with surgery. Booked for O-C4 fusion on 3/19/25. Admitted to [facility] for pre-op traction on 3/18.           Overview/Hospital Course:    03/19/2025: To OR for O-C4 fusion  03/20/2025: POD 1 S/P O-C4 fusion. NAEO. VSS. Neurologically intact. Continues with residual neck pain, robaxin added to pain regimen. Pending step down to .           3/20 Transfer to hospital medicine. 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 Quach with non-surgical management, and right shoulder  arthroplasty 06/2024 presenting with a week of severe persistent headache, and diffuse weakness involving her bilateral upper and lower extremities.  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 Multilevel foraminal stenosis. Now s/p Occipital - C4 posterior spinal fusion (3/19). Continue Cervical collar at all times post operatively for 3 months. Post operative XR C spine pending.  Multimodal pain control  with oxycodone 5/7.5 prn, morphine prn, robaxin prn) CPK trended down 623--> 420.  vancomycin discontinued.  started on cefazolin due to ow concern for allergic reaction. Mosher and A-line removed. DVT ppx initiated. PT recs SNF   3/22 Drain output 40ml/24h. remove drain today. Leucocytosis resolved.   3/23 urinary retention overnight. straight cath x 2. bladder scan q 6h. started on flomax and bethanechol. Ancef discontinued.sats 95% on RA.  Hb trended down to 7.5. No overt bleed. FOBT. Iron studies . [facility] tomorrow         Review of Systems:   Pain scale:   Constitutional:  fever,  chills, headache, vision loss, hearing loss, weight loss, Generalized weakness, falls, loss of smell, loss of taste, poor appetite,  sore throat  Respiratory: cough, shortness of breath.   Cardiovascular: chest pain, dizziness, palpitations, orthopnea, swelling of feet, syncope  Gastrointestinal: nausea, vomiting, abdominal pain, diarrhea, black stool,  blood in stool, change in bowel habits, constipation  Genitourinary: hematuria, dysuria, urgency, frequency  Integument/Breast: rash,  pruritis  Hematologic/Lymphatic: easy bruising, lymphadenopathy  Musculoskeletal: arthralgias , myalgias, back pain, neck pain, knee pain  Neurological: confusion, seizures, tremors, slurred speech  Behavioral/Psych:  depression, anxiety, auditory or visual hallucinations     OBJECTIVE:      Physical Exam:  Body mass index is 23.03 kg/m².    Constitutional: Appears well-developed and well-nourished.   Head: Normocephalic and atraumatic. C collar in place   Neck: Normal range of motion. Neck supple.   Cardiovascular: Normal heart rate.  Regular heart rhythm.  Pulmonary/Chest: Effort normal.   Abdominal: No distension.  No tenderness  Musculoskeletal: Normal range of motion. No edema.   Neurological: Alert and oriented to person, place, and time. able to move bilateral upper and lower extremities without limitation   Skin: Skin is warm and dry.   Psychiatric: Normal mood and affect. Behavior is normal.                  Vital Signs  Temp: 98.5 °F (36.9 °C) (03/23/25 0806)  Pulse: 62 (03/23/25 1233)  Resp: 18 (03/23/25 1236)  BP: 120/61 (03/23/25 1233)  SpO2: (!) 94 % (03/23/25 1233)     24 Hour VS Range    Temp:  [96.6 °F (35.9 °C)-98.5 °F (36.9 °C)]   Pulse:  [59-76]   Resp:  [13-18]   BP: (100-131)/(44-75)   SpO2:  [94 %-100 %]     Intake/Output Summary (Last 24 hours) at 3/23/2025 1412  Last data filed at 3/23/2025 0800  Gross per 24 hour   Intake 450 ml   Output 600 ml   Net -150 ml         I/O This Shift:  I/O this shift:  In: 300 [P.O.:300]  Out: -     Wt Readings from Last 3 Encounters:   03/19/25 59 kg (130 lb)   12/10/24 59.9 kg (132 lb)   09/09/24 59.9 kg (132 lb)       I have personally reviewed the vitals and recorded Intake/Output     Laboratory/Diagnostic Data:    CBC/Anemia Labs: Coags:    Recent Labs   Lab 03/22/25  0634 03/23/25  0539 03/23/25  1212   WBC 5.44 7.47 16.37*   HGB 8.4* 7.5* 9.3*   HCT 27.5* 23.7* 29.7*    223 275   MCV 92 91 91   RDW 15.0* 14.9* 14.9*    Recent Labs   Lab 03/18/25  1648   INR 0.9   APTT 27.6        Chemistries: ABG:   Recent Labs   Lab 03/19/25  0209 03/19/25  0805 03/20/25  0003 03/21/25  0518 03/22/25  0634 03/23/25  0539      < > 138 136 137 136   K 4.3   < > 4.6 3.9 3.9 3.7      < > 107 106 107 107   CO2 22*   < > 21* 20* 23 20*    BUN 46*   < > 43* 48* 44* 38*   CREATININE 1.2   < > 1.2 1.2 1.1 1.0   CALCIUM 7.7*   < > 7.6* 8.1* 7.9* 7.9*   PROT 5.7*  --  5.3* 5.0*  --   --    BILITOT 0.2  --  0.2 0.1 0.1 0.1   ALKPHOS 145  --  140 123 119 116   ALT 32  --  41 36 17 9*   AST 48*  --  55* 41* 26 25   GLUCOSE  --   --   --   --  91 87   MG 2.7*  --  2.9* 2.6 2.5 2.2   PHOS 2.9  --  4.2 3.9 3.8 3.6    < > = values in this interval not displayed.    Recent Labs   Lab 03/18/25  0938   PH 7.434   PCO2 32.8*   PO2 103*   HCO3 22.0*   POCSATURATED 98   BE -2        POCT Glucose: HbA1c:    Recent Labs   Lab 03/18/25 1959 03/18/25 2129 03/18/25  2312 03/19/25  0209 03/19/25  0802 03/19/25  1248   POCTGLUCOSE 231* 169* 138* 109 126* 173*    Hemoglobin A1C   Date Value Ref Range Status   03/18/2025 5.8 (H) 4.0 - 5.6 % Final     Comment:     ADA Screening Guidelines:  5.7-6.4%  Consistent with prediabetes  >or=6.5%  Consistent with diabetes    High levels of fetal hemoglobin interfere with the HbA1C  assay. Heterozygous hemoglobin variants (HbS, HgC, etc)do  not significantly interfere with this assay.   However, presence of multiple variants may affect accuracy.     03/09/2025 5.9 (H) 4.0 - 5.6 % Final     Comment:     ADA Screening Guidelines:  5.7-6.4%  Consistent with prediabetes  >or=6.5%  Consistent with diabetes    High levels of fetal hemoglobin interfere with the HbA1C  assay. Heterozygous hemoglobin variants (HbS, HgC, etc)do  not significantly interfere with this assay.   However, presence of multiple variants may affect accuracy.     10/17/2023 5.8 (H) 4.0 - 5.6 % Final     Comment:     ADA Screening Guidelines:  5.7-6.4%  Consistent with prediabetes  >or=6.5%  Consistent with diabetes    High levels of fetal hemoglobin interfere with the HbA1C  assay. Heterozygous hemoglobin variants (HbS, HgC, etc)do  not significantly interfere with this assay.   However, presence of multiple variants may affect accuracy.          Cardiac Enzymes:  "Ejection Fractions:    Recent Labs     03/21/25  0518       No results found for: "EF"       No results for input(s): "COLORU", "APPEARANCEUA", "PHUR", "SPECGRAV", "PROTEINUA", "GLUCUA", "KETONESU", "BILIRUBINUA", "OCCULTUA", "NITRITE", "UROBILINOGEN", "LEUKOCYTESUR", "RBCUA", "WBCUA", "BACTERIA", "SQUAMEPITHEL", "HYALINECASTS" in the last 48 hours.    Invalid input(s): "WRIGHTSUR"    Procalcitonin (ng/mL)   Date Value   05/16/2023 0.05     Lactate (Lactic Acid) (mmol/L)   Date Value   06/20/2023 1.2     No results found for: "BNP"  CRP (mg/L)   Date Value   05/12/2023 7.8     Sed Rate (mm/Hr)   Date Value   05/12/2023 37 (H)     No results found for: "DDIMER"  Ferritin (ng/mL)   Date Value   03/09/2025 29   08/27/2024 48   06/10/2024 35   12/07/2023 123   10/18/2023 34   09/13/2023 46   06/02/2023 93   02/24/2023 57   09/12/2022 49   09/12/2022 49     No results found for: "LDH"  Troponin I (ng/mL)   Date Value   03/08/2025 0.006   06/20/2023 <0.006     CPK (U/L)   Date Value   03/21/2025 172   03/20/2025 420 (H)   03/19/2025 623 (H)     Results for orders placed or performed in visit on 12/06/24   Vitamin D    Collection Time: 12/06/24  4:22 PM   Result Value Ref Range    Vit D, 25-Hydroxy 75 30 - 96 ng/mL   Results for orders placed or performed in visit on 03/13/23   Vitamin D    Collection Time: 03/13/23 12:06 PM   Result Value Ref Range    Vit D, 25-Hydroxy 69 30 - 96 ng/mL     SARS-CoV2 (COVID-19) Qualitative PCR (no units)   Date Value   11/07/2023 Not Detected   10/31/2023 Not Detected     SARS-CoV-2 RNA, Amplification, Qual (no units)   Date Value   03/08/2025 Negative   10/23/2023 Negative   10/19/2023 Negative       Microbiology labs for the last week  Microbiology Results (last 7 days)       ** No results found for the last 168 hours. **            Reviewed and noted in plan where applicable- Please see chart for full lab data.    Lines/Drains:       Peripheral IV - Single Lumen 03/18/25 1217 20 " G Left Antecubital (Active)   Site Assessment Clean;Dry;Intact 03/20/25 1101   Line Securement Device Secured with sutureless device 03/20/25 0701   Extremity Assessment Distal to IV No abnormal discoloration;No swelling;No redness 03/20/25 1101   Line Status Flushed;Saline locked 03/20/25 1101   Dressing Status Clean;Intact;Dry 03/20/25 1101   Dressing Intervention Integrity maintained 03/20/25 1101   Dressing Change Due 03/22/25 03/20/25 1101   Site Change Due 03/22/25 03/20/25 0701   Reason Not Rotated Not due 03/20/25 1101   Number of days: 2            Peripheral IV - Single Lumen 03/20/25 1527 20 G 1 3/4 in No Right Antecubital (Active)   Site Assessment Clean;Dry;Intact;No redness;No swelling 03/20/25 1558   Line Securement Device Secured with sutureless device 03/20/25 1558   Extremity Assessment Distal to IV No abnormal discoloration;No redness;No swelling;No warmth 03/20/25 1558   Line Status Blood return noted;Flushed;Saline locked 03/20/25 1558   Dressing Status Clean;Dry;Intact 03/20/25 1558   Dressing Intervention First dressing 03/20/25 1558   Dressing Change Due 03/24/25 03/20/25 1558   Site Change Due 03/24/25 03/20/25 1558   Number of days: 0            Closed/Suction Drain 03/19/25 1135 Tube - 1 Left Back Accordion 10 Fr. (Active)   Site Description Unable to view 03/20/25 1101   Dressing Type Other (Comment) 03/20/25 1101   Dressing Status Clean;Dry;Intact 03/20/25 1101   Dressing Intervention Integrity maintained 03/20/25 1101   Drainage Sanguineous 03/20/25 1101   Status Other (Comment) 03/20/25 1101   Output (mL) 70 mL 03/20/25 0501   Number of days: 1       Imaging      Results for orders placed during the hospital encounter of 03/09/25    Echo    Interpretation Summary    Left Ventricle: The left ventricle is normal in size. Ventricular mass is normal. Normal wall thickness. There is normal systolic function with a visually estimated ejection fraction of 60 - 65%. Diastolic function cannot  be reliably determined in the presence of mitral annular calcification.    Right Ventricle: The right ventricle is normal in size. Wall thickness is normal. Systolic function is normal.    Left Atrium: Moderately dilated    Aortic Valve: There is mild aortic valve sclerosis. There is mild annular calcification present.    Mitral Valve: There is severe posterior mitral annular calcification. 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 The mean pressure gradient across the mitral valve is 4 mmHg at a heart rate of 51 bpm. There is mild to moderate regurgitation with a centrally directed jet.    Tricuspid Valve: There is mild to moderate regurgitation with a centrally directed jet.    Aorta: Aortic root is normal in size measuring 2.98 cm. Aortic root at ST junction is normal. Ascending aorta is normal.    Pulmonary Artery: There is mild pulmonary hypertension. The estimated pulmonary artery systolic pressure is 47 mmHg.    IVC/SVC: Normal venous pressure at 3 mmHg.      X-Ray Cervical Spine AP And Lateral  Narrative: EXAMINATION:  XR CERVICAL SPINE AP LATERAL    CLINICAL HISTORY:  post op O-C4 fusion;    TECHNIQUE:  AP, lateral and open mouth views of the cervical spine were performed.    COMPARISON:  03/11/2025.    FINDINGS:  There are postoperative changes related to fusion extending from the occiput to C4.  There are bony changes related to the patient's chronic odontoid fracture with improvement of vertebral alignment at the C1-C2 level when compared to the prior study dated 03/11/2025.  The remainder of the posterior vertebral alignment is satisfactory.  Bones appear osteopenic.  There are degenerative changes throughout the cervical spine.  Prevertebral soft tissues are grossly unremarkable.  There is a surgical drain noted posteriorly.  Impression: Postoperative changes of spinal fusion from the occiput to C4 with improvement thin vertebral alignment of the C1-C2 level in this patient with a chronic odontoid  fracture.    Osteopenia.    Cervical spondylosis.    Electronically signed by: Sha Street MD  Date:    03/23/2025  Time:    08:04      Labs, Imaging, EKG and Diagnostic results from 3/23/2025 were reviewed.    Medications:  Medication list was reviewed and changes noted under Assessment/Plan.  Medications Ordered Prior to Encounter[1]  Scheduled Medications:  Current Facility-Administered Medications   Medication Dose Route Frequency    amLODIPine  10 mg Oral Daily    bethanechol  10 mg Oral TID    cloNIDine 0.1 mg/24 hr td ptwk  1 patch Transdermal Q7 Days    enoxparin  30 mg Subcutaneous Q24H (prophylaxis, 1700)    fluticasone furoate-vilanteroL  1 puff Inhalation Daily    furosemide  40 mg Oral Daily    gabapentin  300 mg Oral QHS    hydrALAZINE  75 mg Oral Q8H    methocarbamoL  500 mg Oral TID    mupirocin   Topical (Top) BID    polyethylene glycol  17 g Oral Daily    senna-docusate 8.6-50 mg  1 tablet Oral BID    sodium chloride  1,000 mg Oral BID    tamsulosin  0.4 mg Oral Daily     PRN:   Current Facility-Administered Medications:     acetaminophen, 650 mg, Oral, Q6H PRN    aluminum & magnesium hydroxide-simethicone, 30 mL, Oral, Q6H PRN    artificial tears, 1 drop, Both Eyes, PRN    bisacodyL, 10 mg, Rectal, Daily PRN    calcium carbonate, 1,000 mg, Oral, TID PRN    hydrALAZINE, 10 mg, Intravenous, Q4H PRN    labetalol, 10 mg, Intravenous, Q4H PRN    melatonin, 6 mg, Oral, Nightly PRN    naloxone, 0.02 mg, Intravenous, PRN    ondansetron, 8 mg, Oral, Q8H PRN    oxyCODONE-acetaminophen, 1 tablet, Oral, Q4H PRN    oxyCODONE-acetaminophen, 1 tablet, Oral, Q4H PRN    simethicone, 1 tablet, Oral, QID PRN    sodium chloride 0.9%, 1-10 mL, Intravenous, Q12H PRN  Infusions:   Estimated Creatinine Clearance: 30.9 mL/min (based on SCr of 1 mg/dL).             Assessment & Plan  Spinal stenosis  Cord Compression  - MRI Cervical spine 3/9/25: chronic type 2 odontoid fracture with progressed retropulsion of the  fracture fragment in comparison to prior MRI from 8/11/2023, with moderate to severe canal stenosis at C1-C2. Multilevel foraminal stenosis.   - Cont C-collar at all times  - Upright and supine cervical x-ray pending  - Neurosurgery recommended occiput-C4 fusion. Non-surgical option is strict cervical collar 24/7 indefinitely  - XR with chronic odontoid type 2 fracture, position alignment appears stable   - Decadron started for new neuro deficit  - surgery for 3/19      3/20 Transfer to hospital medicine. 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 Multilevel foraminal stenosis.    Now s/p Occipital - C4 posterior spinal fusion (3/19). Continue Cervical collar at all times post operatively for 3 months. Post operative XR C spine pending.  Multimodal pain control  with oxycodone 5/7.5 prn, morphine prn, robaxin prn) CPK trended down 623--> 420.  vancomycin discontinued.  started on cefazolin due to  concern for allergic reaction. Mosher and A-line removed. DVT ppx initiated.    Ancef can be discontinued once drain removed   3/21 Drain output 130ml/24h. Leucocytosis resolved.   3/22 Drain output 40ml/24h.         Chronic kidney disease, stage III (moderate)  Renal function remains around baseline; Estimated Creatinine Clearance: 30.9 mL/min (based on SCr of 1 mg/dL). according to latest data. 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 Will avoid nephrotoxic agents as able, and renally dose all meds as applicable.       Recent Labs   Lab 03/21/25  0518 03/22/25  06  03/23/25  0539   BUN 48* 44* 38*   CREATININE 1.2 1.1 1.0       I & O     Intake/Output Summary (Last 24 hours) at 3/23/2025 1412  Last data filed at 3/23/2025 0800  Gross per 24 hour   Intake 450 ml   Output 600 ml   Net -150 ml      Other emphysema  History of COPD, not on home oxygen. Currently without evidence of acute exacerbation, and sats on room air at goal 88% or greater. Will continue hospital formulary equivalent of home regimen and monitor respiratory status.     3/21 sats 97% on 1LNC Taper oxygen as tolerated to Spo2 goal of low 90s   3/23 sats 95% on RA.   Essential hypertension  Uncontrolled  Pulse too slow to start BB.    Chronic, controlled.  Latest blood pressure and vitals reviewed-   Temp:  [96.6 °F (35.9 °C)-98.5 °F (36.9 °C)]   Pulse:  [59-76]   Resp:  [13-18]   BP: (100-131)/(44-75)   SpO2:  [94 %-100 %] .   Home meds for hypertension were reviewed and noted below. Hospital anti-hypertensive changes were made as shown below.  Hypertension Medications              amLODIPine (NORVASC) 10 MG tablet Take 1 tablet (10 mg total) by mouth once daily.    furosemide (LASIX) 20 MG tablet Take 1 tablet (20 mg total) by mouth 2 (two) times a day.        continue clonidine and hydralazine   PRN meds if BP> 180/110 mm HG scheduled hydralazine  Continue norvasc  Generalized weakness  Chronic, however likely exacerbated by cervical radiculopathy.    PT/OT consulted - recs SNF         Cervical spinal stenosis  as above    Mixed hyperlipidemia  hold statin with elevatd CPK     Hyperkalemia  resolved         Leucocytosis     Patient with leucocytosis   Recent Labs   Lab 03/22/25  0634 03/23/25  0539 03/23/25  1212   WBC 5.44 7.47 16.37*     . Afebrile.  monitor .    3/23 Ancef discontinued.  Urinary retention  3/22 straight cath x 3. bladder scan showed 450ml and she voided on her own 100ml . . Mosher placed     Anemia    Patient's with Normocytic anemia.. Hemoglobin stable. Etiology likely due to chronic  disease .  Current CBC reviewed-    Recent Labs   Lab 03/22/25  0634 03/23/25  0539 03/23/25  1212   HGB 8.4* 7.5* 9.3*         Component Value Date/Time    MCV 91 03/23/2025 1212    MCV 94 03/21/2025 0518    RDW 14.9 (H) 03/23/2025 1212    RDW 15.4 (H) 03/21/2025 0518    IRON 78 03/09/2025 0911    FERRITIN 29 03/09/2025 0911    FOLATE 13.7 03/09/2025 0911    [id number] 03/09/2025 0911     Monitor CBC and transfuse if H/H drops below 7/21.    3/23  Hb trended down to 7.5. No overt bleed. FOBT. Iron studies .     VTE Risk Mitigation (From admission, onward)           Ordered     enoxaparin injection 30 mg  Every 24 hours         03/20/25 0819     Place sequential compression device  Until discontinued         03/19/25 0857     IP VTE HIGH RISK PATIENT  Once         03/09/25 2040     Reason for No Pharmacological VTE Prophylaxis  Once        Question:  Reasons:  Answer:  Physician Provided (leave comment)  Comment:  surgery    03/09/25 2040                    Discharge Planning   MIKAYLA: 3/24/2025     Code Status: Full Code   Medical Readiness for Discharge Date:   Discharge Plan A: Skilled Nursing Facility   Discharge Delays: None known at this time            Please place Justification for DME        Luis Gunn MD  Department of Hospital Medicine   [facility] ([facility])         [1]   No current facility-administered medications on file prior to encounter.     Current Outpatient Medications on File Prior to Encounter   Medication Sig Dispense Refill    albuterol (PROVENTIL/VENTOLIN HFA) 90 mcg/actuation inhaler Inhale 2 puffs into the lungs every 6 (six) hours as needed.      amLODIPine (NORVASC) 10 MG tablet Take 1 tablet (10 mg total) by mouth once daily. 90 tablet 3    fluticasone propionate (FLONASE) 50 mcg/actuation nasal spray 1 spray by Nasal route 2 (two) times daily.      gabapentin (NEURONTIN) 300 MG capsule Take 300 mg by mouth every evening.      ipratropium (ATROVENT) 42 mcg (0.06 %)  nasal spray       melatonin (MELATIN) 3 mg tablet Take 2 tablets (6 mg total) by mouth nightly as needed for Insomnia. 30 tablet 0    omega 3-dha-epa-fish oil 1,000 mg (120 mg-180 mg) Cap Take 1 capsule by mouth once daily.      omeprazole (PRILOSEC) 20 MG capsule Take 1 capsule (20 mg total) by mouth 2 (two) times daily before meals. 60 capsule 0

## 2025-03-23 NOTE — ASSESSMENT & PLAN NOTE
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 Multilevel foraminal stenosis.   - Cont C-collar at all times  - Upright and supine cervical x-ray pending  - Neurosurgery recommended occiput-C4 fusion. Non-surgical option is strict cervical collar 24/7 indefinitely  - XR with chronic odontoid type 2 fracture, position alignment appears stable   - Decadron started for new neuro deficit  - surgery for 3/19      3/20 Transfer to hospital medicine. 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 Multilevel foraminal stenosis.    Now s/p Occipital - C4 posterior spinal fusion (3/19). Continue Cervical collar at all times post operatively for 3 months. Post operative XR C spine pending.  Multimodal pain control  with oxycodone 5/7.5 prn, morphine prn, robaxin prn) CPK trended down 623--> 420.  vancomycin discontinued.  started on cefazolin due to  concern for allergic reaction. Mosher and A-line removed. DVT ppx initiated.    Ancef can be discontinued once drain removed   3/21 Drain output 130ml/24h. Leucocytosis resolved.   3/22 Drain output 40ml/24h.

## 2025-03-23 NOTE — ASSESSMENT & PLAN NOTE
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 Multilevel foraminal stenosis.     Now s/p Occipital - C4 posterior spinal fusion (3/19).     POD 4    Plan:   - Cervical collar at all times, will continue collar post operatively for 3 months  - Post operative XR C spine: post op hardware good  - Drain removed  - Initiate flomax and bethanechol today  - Multimodal pain control (oxycodone 5/7.5 prn, morphine prn, robaxin prn)  - Bowel regimen: senna, miralax  - CPK elevated post operatively, currently on fluids, also home lasix. Trending down.  Appreciate HM management  - Notify NSGY immediately with any decline on exam.  - Med management per primary  - PT/OT/OOB as tolerated  - Concern for retaining urine if needing several I/O may consider urology consult and noriega replacement to be discontinued after requiring less narcotics and increased mobility.   - OK for DVT prophylaxis: Lovenox  - Neurosurgery team will follow    D/w staff, Dr. Lemons and on call staff Dr. Crenshaw

## 2025-03-23 NOTE — PROGRESS NOTES
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 Her symptoms began last Monday with headache that started in her neck and migrated toward the top of her head. She has had chronic neck pain due to cervical stenosis, but the headache was new. On Saturday, she had a normal morning and was able to perform all of her ADLs. Around 8 pm, she suddenly became weak in her arms and legs and was not able to walk. MRI demonstrated chronic type 2 odontoid fracture with progressed retropulsion of the fracture fragment in comparison to prior MRI from 8/11/2023, with moderate to severe canal stenosis at C1-C2. Of note, patient had previously discontinued wearing her cervical collar. Denies radiculopathy in her arms or legs, sensory deficit, bladder or bowel incontinence.      Post-Op Info:  Procedure(s) (LRB):  FUSION, SPINE, CERVICAL, POSTERIOR APPROACH (N/A)   4 Days Post-Op   Interval History: 3/23: POD4. NAEON. Neuro exam stable. Drain removed yesterday. Pain controlled. Working with PT. Had received straight cath yesterday twice but voided on her own yesterday PM with PVR ~60cc. Early this morning needs straight cath again with 500cc urine output. Will initiate flomax and bethanechol.     Medications:  Continuous Infusions:  Scheduled Meds:   amLODIPine  10 mg Oral Daily    bethanechol  10 mg Oral TID    cloNIDine 0.1 mg/24 hr td ptwk  1 patch Transdermal Q7 Days    enoxparin  30 mg Subcutaneous Q24H (prophylaxis, 1700)    fluticasone furoate-vilanteroL  1 puff Inhalation Daily    furosemide  40 mg Oral Daily    gabapentin  300 mg Oral QHS     hydrALAZINE  75 mg Oral Q8H    methocarbamoL  500 mg Oral TID    mupirocin   Topical (Top) BID    polyethylene glycol  17 g Oral Daily    senna-docusate 8.6-50 mg  1 tablet Oral BID    sodium chloride  1,000 mg Oral BID    tamsulosin  0.4 mg Oral Daily     PRN Meds:  Current Facility-Administered Medications:     acetaminophen, 650 mg, Oral, Q6H PRN    aluminum & magnesium hydroxide-simethicone, 30 mL, Oral, Q6H PRN    artificial tears, 1 drop, Both Eyes, PRN    bisacodyL, 10 mg, Rectal, Daily PRN    calcium carbonate, 1,000 mg, Oral, TID PRN    hydrALAZINE, 10 mg, Intravenous, Q4H PRN    labetalol, 10 mg, Intravenous, Q4H PRN    melatonin, 6 mg, Oral, Nightly PRN    naloxone, 0.02 mg, Intravenous, PRN    ondansetron, 8 mg, Oral, Q8H PRN    oxyCODONE-acetaminophen, 1 tablet, Oral, Q4H PRN    oxyCODONE-acetaminophen, 1 tablet, Oral, Q4H PRN    simethicone, 1 tablet, Oral, QID PRN    sodium chloride 0.9%, 1-10 mL, Intravenous, Q12H PRN     Review of Systems  Objective:     Weight: 59 kg (130 lb)  Body mass index is 23.03 kg/m².  Vital Signs (Most Recent):  Temp: 98.5 °F (36.9 °C) (03/23/25 0806)  Pulse: 60 (03/23/25 0806)  Resp: 17 (03/23/25 0806)  BP: 130/75 (03/23/25 1017)  SpO2: 95 % (03/23/25 0806) Vital Signs (24h Range):  Temp:  [96.6 °F (35.9 °C)-98.5 °F (36.9 °C)] 98.5 °F (36.9 °C)  Pulse:  [58-76] 60  Resp:  [13-18] 17  SpO2:  [95 %-100 %] 95 %  BP: (100-131)/(44-75) 130/75                                 Physical Exam         Neurosurgery Physical Exam    General: Well developed, well nourished, no distress.   Head: Normocephalic, atraumatic.  Mental Status: Awake, Alert, Oriented  Speech: Clear with content appropriate to conversation.  Cranial nerves: Face symmetric, CN II-XII grossly intact.   Eyes: Pupils equal, round, reactive to light, EOMI.  Sensory: Intact to light touch throughout.  Motor Strength: Moves all extremities spontaneously with good tone. Full strength upper and lower extremities. No  "abnormal movements seen.      Shoulder arthritis, lifting up right > left shoulder is limited by shoulder pain and ROM     Strength   Deltoids Triceps Biceps Wrist Extension Wrist Flexion Hand    Upper: R 4/5 5/5 5/5 5/5 5/5 5/5     L 4/5 5/5 5/5 5/5 5/5 5/5       Iliopsoas Quadriceps Knee  Flexion Tibialis  anterior Gastro- cnemius EHL   Lower: R 5/5 5/5 5/5 5/5 5/5 5/5     L 5/5 5/5 5/5 5/5 5/5 5/5    Patient with R distal clavicle fx and h/o of R shoulder arthroplasty.  Hand intrinsics 5/5.     In c-collar.    Significant Labs:  Recent Labs   Lab 03/22/25  0634 03/23/25  0539    136   K 3.9 3.7    107   CO2 23 20*   BUN 44* 38*   CREATININE 1.1 1.0   CALCIUM 7.9* 7.9*   MG 2.5 2.2     Recent Labs   Lab 03/22/25  0634 03/23/25  0539   WBC 5.44 7.47   HGB 8.4* 7.5*   HCT 27.5* 23.7*    223     No results for input(s): "LABPT", "INR", "APTT" in the last 48 hours.  Microbiology Results (last 7 days)       ** No results found for the last 168 hours. **          All pertinent labs from the last 24 hours have been reviewed.    Significant Diagnostics:  I have reviewed all pertinent imaging results/findings within the past 24 hours.  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 Multilevel foraminal stenosis.     Now s/p Occipital - C4 posterior spinal fusion (3/19).     POD 4    Plan:   - Cervical collar at all times, will continue collar post operatively for 3 months  - " Post operative XR C spine: post op hardware good  - Drain removed  - Initiate flomax and bethanechol today  - Multimodal pain control (oxycodone 5/7.5 prn, morphine prn, robaxin prn)  - Bowel regimen: senna, miralax  - CPK elevated post operatively, currently on fluids, also home lasix. Trending down.  Appreciate HM management  - Notify NSGY immediately with any decline on exam.  - Med management per primary  - PT/OT/OOB as tolerated  - Concern for retaining urine if needing several I/O may consider urology consult and noriega replacement to be discontinued after requiring less narcotics and increased mobility.   - OK for DVT prophylaxis: Lovenox  - Neurosurgery team will follow    D/w staff, Dr. Lemons and on call staff Dr. Den Sauceda MD  Neurosurgery  [facility] - Neurosurgery ([facility])

## 2025-03-23 NOTE — ASSESSMENT & PLAN NOTE
Patient with leucocytosis   Recent Labs   Lab 03/22/25  0634 03/23/25  0539 03/23/25  1212   WBC 5.44 7.47 16.37*     . Afebrile.  monitor .    3/23 Ancef discontinued.

## 2025-03-23 NOTE — ASSESSMENT & PLAN NOTE
Renal function remains around baseline; Estimated Creatinine Clearance: 30.9 mL/min (based on SCr of 1 mg/dL). according to latest data. 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 Will avoid nephrotoxic agents as able, and renally dose all meds as applicable.       Recent Labs   Lab 03/21/25  0518 03/22/25  0634 03/23/25  0539   BUN 48* 44* 38*   CREATININE 1.2 1.1 1.0       I & O     Intake/Output Summary (Last 24 hours) at 3/23/2025 1412  Last data filed at 3/23/2025 0800  Gross per 24 hour   Intake 450 ml   Output 600 ml   Net -150 ml

## 2025-03-23 NOTE — ASSESSMENT & PLAN NOTE
Patient's with Normocytic anemia.. Hemoglobin stable. Etiology likely due to chronic disease .  Current CBC reviewed-    Recent Labs   Lab 03/22/25  0634 03/23/25  0539 03/23/25  1212   HGB 8.4* 7.5* 9.3*         Component Value Date/Time    MCV 91 03/23/2025 1212    MCV 94 03/21/2025 0518    RDW 14.9 (H) 03/23/2025 1212    RDW 15.4 (H) 03/21/2025 0518    IRON 78 03/09/2025 0911    FERRITIN 29 03/09/2025 0911    FOLATE 13.7 03/09/2025 0911    [id number] 03/09/2025 0911     Monitor CBC and transfuse if H/H drops below 7/21.    3/23  Hb trended down to 7.5. No overt bleed. FOBT. Iron studies .

## 2025-03-23 NOTE — ASSESSMENT & PLAN NOTE
History of COPD, not on home oxygen. Currently without evidence of acute exacerbation, and sats on room air at goal 88% or greater. Will continue hospital formulary equivalent of home regimen and monitor respiratory status.     3/21 sats 97% on 1LNC Taper oxygen as tolerated to Spo2 goal of low 90s   3/23 sats 95% on RA.

## 2025-03-23 NOTE — PT/OT/SLP PROGRESS
Occupational Therapy      Patient Name:  Bridget Montgomery   MRN:  [id number]    Patient not seen today secondary to breakfast consumption and room being mopped. Dtr req OT attempt again later if possible. OT unable to make 2nd attempt. Will follow-up POC.    3/23/2025

## 2025-03-24 ENCOUNTER — HOSPITAL ENCOUNTER (INPATIENT)
Facility: HOSPITAL | Age: OVER 89
LOS: 4 days | Discharge: ANOTHER HOSPITAL | DRG: 552 | End: 2025-03-28
Attending: HOSPITALIST | Admitting: HOSPITALIST
Payer: MEDICARE

## 2025-03-24 VITALS
RESPIRATION RATE: 20 BRPM | HEIGHT: 63 IN | TEMPERATURE: 97 F | BODY MASS INDEX: 23.04 KG/M2 | OXYGEN SATURATION: 94 % | DIASTOLIC BLOOD PRESSURE: 58 MMHG | WEIGHT: 130 LBS | SYSTOLIC BLOOD PRESSURE: 108 MMHG | HEART RATE: 70 BPM

## 2025-03-24 DIAGNOSIS — M48.00 SPINAL STENOSIS: ICD-10-CM

## 2025-03-24 DIAGNOSIS — M48.02 CERVICAL SPINAL STENOSIS: Primary | ICD-10-CM

## 2025-03-24 LAB
ABSOLUTE EOSINOPHIL (OHS): 0.05 K/UL
ABSOLUTE MONOCYTE (OHS): 0.7 K/UL (ref 0.3–1)
ABSOLUTE NEUTROPHIL COUNT (OHS): 7.45 K/UL (ref 1.8–7.7)
ALBUMIN SERPL BCP-MCNC: 2 G/DL (ref 3.5–5.2)
ALP SERPL-CCNC: 131 UNIT/L (ref 40–150)
ALT SERPL W/O P-5'-P-CCNC: 6 UNIT/L (ref 10–44)
ANION GAP (OHS): 8 MMOL/L (ref 8–16)
AST SERPL-CCNC: 19 UNIT/L (ref 11–45)
BASOPHILS # BLD AUTO: 0.01 K/UL
BASOPHILS NFR BLD AUTO: 0.1 %
BILIRUB SERPL-MCNC: 0.2 MG/DL (ref 0.1–1)
BUN SERPL-MCNC: 35 MG/DL (ref 8–23)
CALCIUM SERPL-MCNC: 8.7 MG/DL (ref 8.7–10.5)
CHLORIDE SERPL-SCNC: 104 MMOL/L (ref 95–110)
CO2 SERPL-SCNC: 23 MMOL/L (ref 23–29)
CREAT SERPL-MCNC: 1.1 MG/DL (ref 0.5–1.4)
ERYTHROCYTE [DISTWIDTH] IN BLOOD BY AUTOMATED COUNT: 14.9 % (ref 11.5–14.5)
FERRITIN SERPL-MCNC: 76 NG/ML (ref 20–300)
GFR SERPLBLD CREATININE-BSD FMLA CKD-EPI: 48 ML/MIN/1.73/M2
GLUCOSE SERPL-MCNC: 86 MG/DL (ref 70–110)
HCT VFR BLD AUTO: 25.1 % (ref 37–48.5)
HGB BLD-MCNC: 8 GM/DL (ref 12–16)
IMM GRANULOCYTES # BLD AUTO: 0.05 K/UL (ref 0–0.04)
IMM GRANULOCYTES NFR BLD AUTO: 0.5 % (ref 0–0.5)
IRON SATN MFR SERPL: 11 % (ref 20–50)
IRON SERPL-MCNC: 28 UG/DL (ref 30–160)
LYMPHOCYTES # BLD AUTO: 0.9 K/UL (ref 1–4.8)
MAGNESIUM SERPL-MCNC: 2.2 MG/DL (ref 1.6–2.6)
MCH RBC QN AUTO: 28.6 PG (ref 27–50)
MCHC RBC AUTO-ENTMCNC: 31.9 G/DL (ref 32–36)
MCV RBC AUTO: 90 FL (ref 82–98)
NUCLEATED RBC (/100WBC) (OHS): 0 /100 WBC
PHOSPHATE SERPL-MCNC: 3.6 MG/DL (ref 2.7–4.5)
PLATELET # BLD AUTO: 265 K/UL (ref 150–450)
PMV BLD AUTO: 11.4 FL (ref 9.2–12.9)
POCT GLUCOSE: 108 MG/DL (ref 70–110)
POCT GLUCOSE: 160 MG/DL (ref 70–110)
POTASSIUM SERPL-SCNC: 3.9 MMOL/L (ref 3.5–5.1)
PROT SERPL-MCNC: 5.3 GM/DL (ref 6–8.4)
RBC # BLD AUTO: 2.8 M/UL (ref 4–5.4)
RELATIVE EOSINOPHIL (OHS): 0.5 %
RELATIVE LYMPHOCYTE (OHS): 9.8 % (ref 18–48)
RELATIVE MONOCYTE (OHS): 7.6 % (ref 4–15)
RELATIVE NEUTROPHIL (OHS): 81.5 % (ref 38–73)
SARS-COV-2 RNA RESP QL NAA+PROBE: NEGATIVE
SODIUM SERPL-SCNC: 135 MMOL/L (ref 136–145)
TIBC SERPL-MCNC: 263 UG/DL (ref 250–450)
TRANSFERRIN SERPL-MCNC: 178 MG/DL (ref 200–375)
WBC # BLD AUTO: 9.16 K/UL (ref 3.9–12.7)

## 2025-03-24 PROCEDURE — 84100 ASSAY OF PHOSPHORUS: CPT

## 2025-03-24 PROCEDURE — 25000003 PHARM REV CODE 250

## 2025-03-24 PROCEDURE — 94761 N-INVAS EAR/PLS OXIMETRY MLT: CPT

## 2025-03-24 PROCEDURE — 25000003 PHARM REV CODE 250: Performed by: HOSPITALIST

## 2025-03-24 PROCEDURE — 25000242 PHARM REV CODE 250 ALT 637 W/ HCPCS: Performed by: HOSPITALIST

## 2025-03-24 PROCEDURE — 99900035 HC TECH TIME PER 15 MIN (STAT)

## 2025-03-24 PROCEDURE — 11000004 HC SNF PRIVATE

## 2025-03-24 PROCEDURE — 25000003 PHARM REV CODE 250: Performed by: STUDENT IN AN ORGANIZED HEALTH CARE EDUCATION/TRAINING PROGRAM

## 2025-03-24 PROCEDURE — 82728 ASSAY OF FERRITIN: CPT | Performed by: HOSPITALIST

## 2025-03-24 PROCEDURE — 25000003 PHARM REV CODE 250: Performed by: PHYSICIAN ASSISTANT

## 2025-03-24 PROCEDURE — 84466 ASSAY OF TRANSFERRIN: CPT | Performed by: HOSPITALIST

## 2025-03-24 PROCEDURE — 80053 COMPREHEN METABOLIC PANEL: CPT

## 2025-03-24 PROCEDURE — 83735 ASSAY OF MAGNESIUM: CPT

## 2025-03-24 PROCEDURE — 36415 COLL VENOUS BLD VENIPUNCTURE: CPT

## 2025-03-24 PROCEDURE — 97535 SELF CARE MNGMENT TRAINING: CPT

## 2025-03-24 PROCEDURE — 85025 COMPLETE CBC W/AUTO DIFF WBC: CPT

## 2025-03-24 PROCEDURE — 87635 SARS-COV-2 COVID-19 AMP PRB: CPT | Performed by: HOSPITALIST

## 2025-03-24 PROCEDURE — 94640 AIRWAY INHALATION TREATMENT: CPT

## 2025-03-24 PROCEDURE — 97530 THERAPEUTIC ACTIVITIES: CPT

## 2025-03-24 RX ORDER — POLYETHYLENE GLYCOL 3350 17 G/17G
17 POWDER, FOR SOLUTION ORAL DAILY
Status: DISCONTINUED | OUTPATIENT
Start: 2025-03-25 | End: 2025-03-27

## 2025-03-24 RX ORDER — FLUTICASONE FUROATE AND VILANTEROL 100; 25 UG/1; UG/1
1 POWDER RESPIRATORY (INHALATION) DAILY
Status: DISCONTINUED | OUTPATIENT
Start: 2025-03-25 | End: 2025-03-31 | Stop reason: HOSPADM

## 2025-03-24 RX ORDER — CLONIDINE 0.1 MG/24H
1 PATCH, EXTENDED RELEASE TRANSDERMAL
Status: DISCONTINUED | OUTPATIENT
Start: 2025-03-31 | End: 2025-03-25

## 2025-03-24 RX ORDER — OXYCODONE AND ACETAMINOPHEN 5; 325 MG/1; MG/1
1 TABLET ORAL EVERY 6 HOURS PRN
Status: DISCONTINUED | OUTPATIENT
Start: 2025-03-24 | End: 2025-03-25

## 2025-03-24 RX ORDER — AMLODIPINE BESYLATE 10 MG/1
10 TABLET ORAL DAILY
Status: DISCONTINUED | OUTPATIENT
Start: 2025-03-25 | End: 2025-03-25

## 2025-03-24 RX ORDER — METHOCARBAMOL 500 MG/1
500 TABLET, FILM COATED ORAL 3 TIMES DAILY
Status: DISCONTINUED | OUTPATIENT
Start: 2025-03-24 | End: 2025-03-27

## 2025-03-24 RX ORDER — FLUTICASONE PROPIONATE 50 MCG
1 SPRAY, SUSPENSION (ML) NASAL 2 TIMES DAILY
Status: DISCONTINUED | OUTPATIENT
Start: 2025-03-24 | End: 2025-03-31 | Stop reason: HOSPADM

## 2025-03-24 RX ORDER — TAMSULOSIN HYDROCHLORIDE 0.4 MG/1
0.4 CAPSULE ORAL DAILY
Qty: 30 CAPSULE | Refills: 11 | Status: ON HOLD | OUTPATIENT
Start: 2025-03-24 | End: 2026-03-24

## 2025-03-24 RX ORDER — FUROSEMIDE 40 MG/1
40 TABLET ORAL DAILY
Status: DISCONTINUED | OUTPATIENT
Start: 2025-03-25 | End: 2025-03-31 | Stop reason: HOSPADM

## 2025-03-24 RX ORDER — ACETAMINOPHEN 325 MG/1
650 TABLET ORAL EVERY 6 HOURS PRN
Status: DISCONTINUED | OUTPATIENT
Start: 2025-03-24 | End: 2025-03-31 | Stop reason: HOSPADM

## 2025-03-24 RX ORDER — BETHANECHOL CHLORIDE 10 MG/1
10 TABLET ORAL 3 TIMES DAILY
Status: DISCONTINUED | OUTPATIENT
Start: 2025-03-24 | End: 2025-03-31 | Stop reason: HOSPADM

## 2025-03-24 RX ORDER — TALC
6 POWDER (GRAM) TOPICAL NIGHTLY PRN
Status: DISCONTINUED | OUTPATIENT
Start: 2025-03-24 | End: 2025-03-31 | Stop reason: HOSPADM

## 2025-03-24 RX ORDER — BETHANECHOL CHLORIDE 10 MG/1
10 TABLET ORAL 3 TIMES DAILY
Qty: 90 TABLET | Refills: 11 | Status: ON HOLD | OUTPATIENT
Start: 2025-03-24 | End: 2026-03-24

## 2025-03-24 RX ORDER — AMOXICILLIN 250 MG
1 CAPSULE ORAL 2 TIMES DAILY
Status: DISCONTINUED | OUTPATIENT
Start: 2025-03-24 | End: 2025-03-31 | Stop reason: HOSPADM

## 2025-03-24 RX ORDER — TAMSULOSIN HYDROCHLORIDE 0.4 MG/1
0.4 CAPSULE ORAL DAILY
Status: DISCONTINUED | OUTPATIENT
Start: 2025-03-25 | End: 2025-03-31 | Stop reason: HOSPADM

## 2025-03-24 RX ORDER — OMEGA-3/DHA/EPA/FISH OIL 300-1000MG
1 CAPSULE,DELAYED RELEASE (ENTERIC COATED) ORAL DAILY
Status: DISCONTINUED | OUTPATIENT
Start: 2025-03-25 | End: 2025-03-31 | Stop reason: HOSPADM

## 2025-03-24 RX ORDER — GABAPENTIN 300 MG/1
300 CAPSULE ORAL NIGHTLY
Status: DISCONTINUED | OUTPATIENT
Start: 2025-03-24 | End: 2025-03-26

## 2025-03-24 RX ORDER — CALCIUM CARBONATE 200(500)MG
500 TABLET,CHEWABLE ORAL 2 TIMES DAILY PRN
Status: DISCONTINUED | OUTPATIENT
Start: 2025-03-24 | End: 2025-03-31 | Stop reason: HOSPADM

## 2025-03-24 RX ORDER — ALBUTEROL SULFATE 90 UG/1
2 INHALANT RESPIRATORY (INHALATION) EVERY 6 HOURS PRN
Status: DISCONTINUED | OUTPATIENT
Start: 2025-03-24 | End: 2025-03-31 | Stop reason: HOSPADM

## 2025-03-24 RX ORDER — SODIUM CHLORIDE 1 G/1
1000 TABLET ORAL 2 TIMES DAILY
Status: DISCONTINUED | OUTPATIENT
Start: 2025-03-24 | End: 2025-03-25

## 2025-03-24 RX ORDER — PANTOPRAZOLE SODIUM 40 MG/1
40 TABLET, DELAYED RELEASE ORAL
Status: DISCONTINUED | OUTPATIENT
Start: 2025-03-24 | End: 2025-03-25

## 2025-03-24 RX ADMIN — METHOCARBAMOL 500 MG: 500 TABLET ORAL at 02:03

## 2025-03-24 RX ADMIN — BETHANECHOL CHLORIDE 10 MG: 10 TABLET ORAL at 08:03

## 2025-03-24 RX ADMIN — AMLODIPINE BESYLATE 10 MG: 10 TABLET ORAL at 08:03

## 2025-03-24 RX ADMIN — OXYCODONE HYDROCHLORIDE AND ACETAMINOPHEN 1 TABLET: 5; 325 TABLET ORAL at 06:03

## 2025-03-24 RX ADMIN — BETHANECHOL CHLORIDE 10 MG: 10 TABLET ORAL at 11:03

## 2025-03-24 RX ADMIN — BETHANECHOL CHLORIDE 10 MG: 10 TABLET ORAL at 02:03

## 2025-03-24 RX ADMIN — HYDRALAZINE HYDROCHLORIDE 75 MG: 25 TABLET ORAL at 05:03

## 2025-03-24 RX ADMIN — ALUMINUM HYDROXIDE, MAGNESIUM HYDROXIDE, AND DIMETHICONE 30 ML: 400; 400; 40 SUSPENSION ORAL at 08:03

## 2025-03-24 RX ADMIN — ACETAMINOPHEN 650 MG: 325 TABLET ORAL at 02:03

## 2025-03-24 RX ADMIN — GABAPENTIN 300 MG: 300 CAPSULE ORAL at 09:03

## 2025-03-24 RX ADMIN — FLUTICASONE PROPIONATE 50 MCG: 50 SPRAY, METERED NASAL at 09:03

## 2025-03-24 RX ADMIN — SENNOSIDES AND DOCUSATE SODIUM 1 TABLET: 50; 8.6 TABLET ORAL at 08:03

## 2025-03-24 RX ADMIN — SENNOSIDES AND DOCUSATE SODIUM 1 TABLET: 50; 8.6 TABLET ORAL at 09:03

## 2025-03-24 RX ADMIN — HYDRALAZINE HYDROCHLORIDE 75 MG: 25 TABLET ORAL at 09:03

## 2025-03-24 RX ADMIN — TAMSULOSIN HYDROCHLORIDE 0.4 MG: 0.4 CAPSULE ORAL at 08:03

## 2025-03-24 RX ADMIN — METHOCARBAMOL 500 MG: 500 TABLET ORAL at 09:03

## 2025-03-24 RX ADMIN — CLONIDINE 1 PATCH: 0.1 PATCH TRANSDERMAL at 08:03

## 2025-03-24 RX ADMIN — POLYETHYLENE GLYCOL 3350 17 G: 17 POWDER, FOR SOLUTION ORAL at 08:03

## 2025-03-24 RX ADMIN — SODIUM CHLORIDE 1000 MG: 1 TABLET ORAL at 09:03

## 2025-03-24 RX ADMIN — MUPIROCIN: 20 OINTMENT TOPICAL at 08:03

## 2025-03-24 RX ADMIN — SODIUM CHLORIDE 1000 MG: 1 TABLET ORAL at 08:03

## 2025-03-24 RX ADMIN — CALCIUM CARBONATE (ANTACID) CHEW TAB 500 MG 1000 MG: 500 CHEW TAB at 05:03

## 2025-03-24 RX ADMIN — FLUTICASONE FUROATE AND VILANTEROL TRIFENATATE 1 PUFF: 100; 25 POWDER RESPIRATORY (INHALATION) at 08:03

## 2025-03-24 NOTE — PLAN OF CARE
CHW met with patient/family at bedside. Patient experience rounding completed and reviewed the following.    Do you know your discharge plan? Yes or No    Yes    If yes, what is the plan? (Home, Home Health, Rehab, SNF, LTAC, or Other)    SNF    Have you dicussed your needs and preferences with your SW/CM? Yes or No   Yes    If you are discharging home do you have help at home? Yes or No        SNF    Do you think you will need additional help at home at discharge? Yes or No    SNF    Do you currently have difficulty keeping up with bills, affording medicine or buying food? Yes or No    No    Assigned SW/Cm notified of any patient/family needs or concerns. Krystina BEE    Appropriate resources provided to address patient needs.   Yes          Phoebe Patterson CHW  Case Management   [phone]

## 2025-03-24 NOTE — ASSESSMENT & PLAN NOTE
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 Multilevel foraminal stenosis.   - Cont C-collar at all times  - Upright and supine cervical x-ray pending  - Neurosurgery recommended occiput-C4 fusion. Non-surgical option is strict cervical collar 24/7 indefinitely  - XR with chronic odontoid type 2 fracture, position alignment appears stable   - Decadron started for new neuro deficit  - surgery for 3/19      3/20 Transfer to \[facility]"" medicine. 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 Multilevel foraminal stenosis.    Now s/p Occipital - C4 posterior spinal fusion (3/19). Continue Cervical collar at all times post operatively for 3 months. Post operative XR C spine pending.  Multimodal pain control  with oxycodone 5/7.5 prn, morphine prn, robaxin prn) CPK trended down 623--> 420.  vancomycin discontinued.  started on cefazolin due to  concern for allergic reaction. Mosher and A-line removed. DVT ppx initiated.    Ancef can be discontinued once drain removed   3/21 Drain output 130ml/24h. Leucocytosis resolved.   3/22 Drain removed

## 2025-03-24 NOTE — DISCHARGE SUMMARY
Mane Riddle - Neurosurgery ([facility])  [facility] Medicine  Discharge Summary      Patient Name: Bridget Montgomery  MRN: [id number]  SHIRA: [id number]  Patient Class: IP- Inpatient  Admission Date: 3/9/2025  Hospital Length of Stay: 15 days  Discharge Date and Time:  03/24/2025 7:24 AM  Attending Physician: Luis Gunn MD   Discharging Provider: Luis Gunn MD  Primary Care Provider: No primary care provider on file.  Hospital Medicine Team: [facility] MED N Luis Gunn MD  Primary Care Team: 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 She initially presented with a week of severe persistent HA, and diffuse weakness of all extremities. Her symptoms began last Monday with HA that started in her neck and migrated toward the top of her head. She has had chronic neck pain due to cervical stenosis, but the headache was new. On Saturday, she was at her baseline, able to perform all of her ADLs. Around 8 pm, she suddenly became weak in all extremities and was not able to walk. MRI demonstrated chronic type 2 odontoid fracture with progressed retropulsion of the fracture fragment in comparison to prior MRI from 8/11/2023, with moderate to severe canal stenosis at C1-C2. Of note, patient had previously discontinued wearing her cervical collar. Denied radiculopathy in her arms or legs, sensory deficit, bladder or bowel incontinence. Family decided to proceed with surgery. Booked for O-C4 fusion on 3/19/25. Admitted to [facility] for pre-op traction on 3/18.           Procedure(s) (LRB):  FUSION, SPINE, CERVICAL, POSTERIOR APPROACH (N/A)      Hospital Course:     03/19/2025: To OR for O-C4 fusion  03/20/2025: POD 1 S/P O-C4 fusion. NAEO. VSS. Neurologically intact. Continues with residual neck pain, robaxin added to  pain regimen. Pending step down to .           3/20 Transfer to hospital medicine. 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 Multilevel foraminal stenosis. Now s/p Occipital - C4 posterior spinal fusion (3/19). Continue Cervical collar at all times post operatively for 3 months. Post operative XR C spine pending.  Multimodal pain control  with oxycodone 5/7.5 prn, morphine prn, robaxin prn) CPK trended down 623--> 420.  vancomycin discontinued.  started on cefazolin due to ow concern for allergic reaction. Mosher and A-line removed. DVT ppx initiated. PT recs SNF   3/22 Drain output 40ml/24h. remove drain today. Leucocytosis resolved.   3/23 urinary retention overnight. straight cath x 2. bladder scan q 6h. started on flomax and bethanechol. Ancef discontinued.sats 95% on RA.  Hb trended down to 7.5. No overt bleed. FOBT. Iron studies .   3/24 Hb stable. Discharge to [facility]        Goals of Care Treatment Preferences:  Code Status: Full Code      SDOH Screening:  The patient was screened for utility difficulties, food insecurity, transport difficulties, housing insecurity, and interpersonal safety and there were no concerns identified this admission.     Consults:   Consults (From admission, onward)          Status Ordering Provider     Inpatient consult to Midline team  Once        Provider:  (Not yet assigned)    Completed FIDEL RICH     Inpatient consult to Neuro Critical Care  Once        Provider:  (Not yet assigned)    Acknowledged LURDES LOZADA  CHARI     Inpatient consult to Neurosurgery  Once        Provider:  (Not yet assigned)    Completed ODETTE GUARDADO            Assessment & Plan  Spinal stenosis  Cord Compression  - MRI Cervical spine 3/9/25: chronic type 2 odontoid fracture with progressed retropulsion of the fracture fragment in comparison to prior MRI from 8/11/2023, with moderate to severe canal stenosis at C1-C2. Multilevel foraminal stenosis.   - Cont C-collar at all times  - Upright and supine cervical x-ray pending  - Neurosurgery recommended occiput-C4 fusion. Non-surgical option is strict cervical collar 24/7 indefinitely  - XR with chronic odontoid type 2 fracture, position alignment appears stable   - Decadron started for new neuro deficit  - surgery for 3/19      3/20 Transfer to hospital medicine. 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 Multilevel foraminal stenosis.    Now s/p Occipital - C4 posterior spinal fusion (3/19). Continue Cervical collar at all times post operatively for 3 months. Post operative XR C spine pending.  Multimodal pain control  with oxycodone 5/7.5 prn, morphine prn, robaxin prn) CPK trended down 623--> 420.  vancomycin discontinued.  started on cefazolin due to  concern for allergic reaction. Mosher and A-line removed. DVT ppx initiated.    Ancef can be discontinued once drain removed   3/21 Drain output 130ml/24h. Leucocytosis resolved.   3/22 Drain removed         Chronic kidney disease, stage III (moderate)  Renal function remains around baseline;  Estimated Creatinine Clearance: 28.1 mL/min (based on SCr of 1.1 mg/dL). according to latest data. 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 Will avoid nephrotoxic agents as able, and renally dose all meds as applicable.       Recent Labs   Lab 03/22/25  0634 03/23/25  0539 03/24/25  0507   BUN 44* 38* 35*   CREATININE 1.1 1.0 1.1       I & O     Intake/Output Summary (Last 24 hours) at 3/24/2025 0724  Last data filed at 3/23/2025 1940  Gross per 24 hour   Intake 500 ml   Output 200 ml   Net 300 ml      Other emphysema  History of COPD, not on home oxygen. Currently without evidence of acute exacerbation, and sats on room air at goal 88% or greater. Will continue hospital formulary equivalent of home regimen and monitor respiratory status.     3/21 sats 97% on 1LNC Taper oxygen as tolerated to Spo2 goal of low 90s   3/23 sats 95% on RA.   Essential hypertension  Uncontrolled  Pulse too slow to start BB.    Chronic, controlled.  Latest blood pressure and vitals reviewed-   Temp:  [98.5 °F (36.9 °C)]   Pulse:  [60-88]   Resp:  [14-18]   BP: (103-130)/(54-75)   SpO2:  [92 %-97 %] .   Home meds for hypertension were reviewed and noted below. Hospital anti-hypertensive changes were made as shown below.  Hypertension Medications              amLODIPine (NORVASC) 10 MG tablet Take 1 tablet (10 mg total) by mouth once daily.    furosemide (LASIX) 20 MG tablet Take 1 tablet (20 mg total) by mouth 2 (two) times a day.        continue clonidine and hydralazine   PRN meds if BP> 180/110 mm HG scheduled hydralazine  Continue norvasc  Generalized weakness  Chronic, however likely exacerbated by cervical radiculopathy.    PT/OT consulted - recs SNF         Cervical spinal stenosis  as above    Mixed hyperlipidemia  hold statin with elevatd CPK     Hyperkalemia  resolved         Leucocytosis     Patient with leucocytosis   Recent Labs   Lab 03/22/25  0634 03/23/25  0539 03/23/25  1212   WBC 5.44 7.47 16.37*     . Afebrile.  monitor .     3/23 Ancef discontinued.  Urinary retention  3/22 straight cath x 3. bladder scan showed 450ml and she voided on her own 100ml .  3/23 urinary retention overnight. straight cath x 2. bladder scan q 6h. started on flomax and bethanechol.     Anemia    Patient's with Normocytic anemia.. Hemoglobin stable. Etiology likely due to chronic disease .  Current CBC reviewed-    Recent Labs   Lab 03/22/25  0634 03/23/25  0539 03/23/25  1212   HGB 8.4* 7.5* 9.3*         Component Value Date/Time    MCV 91 03/23/2025 1212    MCV 94 03/21/2025 0518    RDW 14.9 (H) 03/23/2025 1212    RDW 15.4 (H) 03/21/2025 0518    IRON 28 (L) 03/24/2025 0507    IRON 78 03/09/2025 0911    FERRITIN 29 03/09/2025 0911    FOLATE 13.7 03/09/2025 0911    [id number] 03/09/2025 0911    OCCULTBLOOD Negative 03/23/2025 1036     Monitor CBC and transfuse if H/H drops below 7/21.    3/23  Hb trended down to 7.5. No overt bleed. FOBT. Iron studies .     Final Active Diagnoses:    Diagnosis Date Noted POA    PRINCIPAL PROBLEM:  Spinal stenosis [M48.00] 07/07/2020 Yes     Chronic    Anemia [D64.9] 03/23/2025 Yes    Urinary retention [R33.9] 03/22/2025 Yes    Leucocytosis [D72.829] 03/20/2025 Yes    Hyperkalemia [E87.5] 03/16/2025 No    Cervical spinal stenosis [M48.02] 03/09/2025 Yes    Generalized weakness [R53.1] 11/15/2023 Yes     Chronic    Other emphysema [J43.8] 07/07/2020 Yes     Chronic    Mixed hyperlipidemia [E78.2] 07/07/2020 Yes    Essential hypertension [I10] 02/23/2016 Yes    Chronic kidney disease, stage III (moderate) [N18.30] 08/20/2013 Yes     Chronic      Problems Resolved During this Admission:    Diagnosis Date Noted Date Resolved POA    Cord compression [G95.20] 03/12/2025 03/20/2025 Yes    Headache [R51.9] 03/09/2025 03/20/2025 Yes    Hyponatremia [E87.1] 05/12/2023 03/18/2025 Yes    Edema, spinal cord [G95.19] 08/17/2018 03/21/2025 Yes       Discharged Condition: fair    Disposition: SNF    Follow Up:    Patient Instructions:     "  Ambulatory referral/consult to Neurosurgery   Standing Status: Future   Referral Priority: Routine Referral Type: Consultation   Referral Reason: Specialty Services Required   Requested Specialty: Neurosurgery   Number of Visits Requested: 1       Significant Diagnostic Studies: Labs: BMP:   Recent Labs   Lab 03/23/25  0539 03/24/25  0507    135*   K 3.7 3.9    104   CO2 20* 23   BUN 38* 35*   CREATININE 1.0 1.1   CALCIUM 7.9* 8.7   MG 2.2 2.2   , CMP   Recent Labs   Lab 03/23/25  0539 03/24/25  0507    135*   K 3.7 3.9    104   CO2 20* 23   BUN 38* 35*   CREATININE 1.0 1.1   CALCIUM 7.9* 8.7   ALBUMIN 1.9* 2.0*   BILITOT 0.1 0.2   ALKPHOS 116 131   AST 25 19   ALT 9* 6*   ANIONGAP 9 8   , CBC   Recent Labs   Lab 03/23/25  0539 03/23/25  1212   WBC 7.47 16.37*   HGB 7.5* 9.3*   HCT 23.7* 29.7*    275   , INR   Lab Results   Component Value Date    INR 0.9 03/18/2025    INR 0.9 03/08/2025    INR 1.1 10/17/2023   , Lipid Panel   Lab Results   Component Value Date    CHOL 226 (H) 03/18/2025    HDL 63 03/18/2025    LDLCALC 139.6 03/18/2025    TRIG 117 03/18/2025    CHOLHDL 27.9 03/18/2025   , Troponin No results for input(s): "TROPONINI" in the last 168 hours., A1C:   Recent Labs   Lab 03/09/25  0911 03/18/25  1420   HGBA1C 5.9* 5.8*   , and All labs within the past 24 hours have been reviewed  Microbiology: Blood Culture   Lab Results   Component Value Date    LABBLOO No growth after 5 days. 06/20/2023    LABBLOO No growth after 5 days. 06/20/2023   , Sputum Culture No results found for: "GSRESP", "RESPIRATORYC", and Urine Culture  No results found for: "LABURIN"    X-Ray Cervical Spine AP And Lateral  Narrative: EXAMINATION:  XR CERVICAL SPINE AP LATERAL    CLINICAL HISTORY:  post op O-C4 fusion;    TECHNIQUE:  AP, lateral and open mouth views of the cervical spine were performed.    COMPARISON:  03/11/2025.    FINDINGS:  There are postoperative changes related to fusion extending from " the occiput to C4.  There are bony changes related to the patient's chronic odontoid fracture with improvement of vertebral alignment at the C1-C2 level when compared to the prior study dated 03/11/2025.  The remainder of the posterior vertebral alignment is satisfactory.  Bones appear osteopenic.  There are degenerative changes throughout the cervical spine.  Prevertebral soft tissues are grossly unremarkable.  There is a surgical drain noted posteriorly.  Impression: Postoperative changes of spinal fusion from the occiput to C4 with improvement thin vertebral alignment of the C1-C2 level in this patient with a chronic odontoid fracture.    Osteopenia.    Cervical spondylosis.    Electronically signed by: Sha Street MD  Date:    03/23/2025  Time:    08:04       Pending Diagnostic Studies:       Procedure Component Value Units Date/Time    Basic metabolic panel [[id number]] Collected: 03/19/25 0531    Order Status: Sent Lab Status: No result     Specimen: Blood     CBC Auto Differential [[id number]] Collected: 03/24/25 0507    Order Status: Sent Lab Status: In process Updated: 03/24/25 0613    Specimen: Blood     Narrative:      The following orders were created for panel order CBC Auto Differential.  Procedure                               Abnormality         Status                     ---------                               -----------         ------                     CBC with Differential[[id number]]                           In process                   Please view results for these tests on the individual orders.    CBC with Differential [[id number]] Collected: 03/24/25 0507    Order Status: Sent Lab Status: In process Updated: 03/24/25 0613    Specimen: Blood     Ferritin [[id number]] Collected: 03/24/25 0507    Order Status: Sent Lab Status: In process Updated: 03/24/25 0610    Specimen: Blood     Potassium [[id number]] Collected: 03/19/25 0531    Order Status: Sent Lab Status: No result     Specimen:  Blood     Potassium [[id number]] Collected: 03/18/25 1423    Order Status: Sent Lab Status: No result     Specimen: Blood            Medications:  Reconciled Home Medications:      Medication List        START taking these medications      bethanechol 10 MG Tab  Commonly known as: URECHOLINE  Take 1 tablet (10 mg total) by mouth 3 (three) times daily.     cloNIDine 0.1 mg/24 hr td ptwk 0.1 mg/24 hr  Commonly known as: CATAPRES  Place 1 patch onto the skin every 7 days.     fluticasone furoate-vilanteroL 100-25 mcg/dose diskus inhaler  Commonly known as: BREO  Inhale 1 puff into the lungs once daily. Controller  Replaces: budesonide-formoterol 160-4.5 mcg 160-4.5 mcg/actuation Hfaa     hydrALAZINE 25 MG tablet  Commonly known as: APRESOLINE  Take 3 tablets (75 mg total) by mouth every 8 (eight) hours.     oxyCODONE-acetaminophen 5-325 mg per tablet  Commonly known as: PERCOCET  Take 1 tablet by mouth every 6 (six) hours as needed.     polyethylene glycol 17 gram/dose powder  Commonly known as: GLYCOLAX  Use cap to measure 17 grams.  Dissolve as directed and take by mouth once daily.     sodium chloride 1,000 mg Tbso oral tablet  Take 1 tablet (1,000 mg total) by mouth 2 (two) times daily. for 15 days     tamsulosin 0.4 mg Cap  Commonly known as: FLOMAX  Take 1 capsule (0.4 mg total) by mouth once daily.            CHANGE how you take these medications      furosemide 40 MG tablet  Commonly known as: LASIX  Take 1 tablet (40 mg total) by mouth once daily.  What changed:   medication strength  how much to take  when to take this     methocarbamoL 500 MG Tab  Commonly known as: Robaxin  Take 1 tablet (500 mg total) by mouth 3 (three) times daily. for 10 days  What changed: See the new instructions.            CONTINUE taking these medications      albuterol 90 mcg/actuation inhaler  Commonly known as: PROVENTIL/VENTOLIN HFA  Inhale 2 puffs into the lungs every 6 (six) hours as needed.     amLODIPine 10 MG  tablet  Commonly known as: NORVASC  Take 1 tablet (10 mg total) by mouth once daily.     fluticasone propionate 50 mcg/actuation nasal spray  Commonly known as: FLONASE  1 spray by Nasal route 2 (two) times daily.     gabapentin 300 MG capsule  Commonly known as: NEURONTIN  Take 300 mg by mouth every evening.     ipratropium 42 mcg (0.06 %) nasal spray  Commonly known as: ATROVENT     melatonin 3 mg tablet  Commonly known as: MELATIN  Take 2 tablets (6 mg total) by mouth nightly as needed for Insomnia.     omega 3-dha-epa-fish oil 1,000 (120-180) mg Cap  Take 1 capsule by mouth once daily.     omeprazole 20 MG capsule  Commonly known as: PRILOSEC  Take 1 capsule (20 mg total) by mouth 2 (two) times daily before meals.            STOP taking these medications      budesonide-formoterol 160-4.5 mcg 160-4.5 mcg/actuation Hfaa  Commonly known as: SYMBICORT  Replaced by: fluticasone furoate-vilanteroL 100-25 mcg/dose diskus inhaler     pregabalin 50 MG capsule  Commonly known as: LYRICA     TOLAK 4 % Crea  Generic drug: fluorouraciL     traMADoL 50 mg tablet  Commonly known as: ULTRAM              Indwelling Lines/Drains at time of discharge:   Lines/Drains/Airways       Drain  Duration             Female External Urinary Catheter w/ Suction 03/23/25 0000 1 day                     45  minutes of time spent on discharge, including examining the patient, providing discharge instructions, arranging   follow up and documentation        Luis Gunn MD  Attending Staff Physician  [facility] Medicine  pager- [phone]  [facility] - [phone]

## 2025-03-24 NOTE — ASSESSMENT & PLAN NOTE
Patient's with Normocytic anemia.. Hemoglobin stable. Etiology likely due to chronic disease .  Current CBC reviewed-    Recent Labs   Lab 03/22/25  0634 03/23/25  0539 03/23/25  1212   HGB 8.4* 7.5* 9.3*         Component Value Date/Time    MCV 91 03/23/2025 1212    MCV 94 03/21/2025 0518    RDW 14.9 (H) 03/23/2025 1212    RDW 15.4 (H) 03/21/2025 0518    IRON 28 (L) 03/24/2025 0507    IRON 78 03/09/2025 0911    FERRITIN 29 03/09/2025 0911    FOLATE 13.7 03/09/2025 0911    [id number] 865 03/09/2025 0911    OCCULTBLOOD Negative 03/23/2025 1036     Monitor CBC and transfuse if H/H drops below 7/21.    3/23  Hb trended down to 7.5. No overt bleed. FOBT. Iron studies .

## 2025-03-24 NOTE — PLAN OF CARE
03/24/25 1247   Medicare Message   Important Message from Medicare regarding Discharge Appeal Rights Given to patient/caregiver;Explained to patient/caregiver;Signed/date by patient/caregiver   Date IMM was signed 03/24/25   Time IMM was signed 1236

## 2025-03-24 NOTE — ASSESSMENT & PLAN NOTE
Renal function remains around baseline; Estimated Creatinine Clearance: 28.1 mL/min (based on SCr of 1.1 mg/dL). according to latest data. 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 Will avoid nephrotoxic agents as able, and renally dose all meds as applicable.       Recent Labs   Lab 03/22/25  0634 03/23/25  0539 03/24/25  0507   BUN 44* 38* 35*   CREATININE 1.1 1.0 1.1       I & O     Intake/Output Summary (Last 24 hours) at 3/24/2025 0723  Last data filed at 3/23/2025 1940  Gross per 24 hour   Intake 500 ml   Output 200 ml   Net 300 ml

## 2025-03-24 NOTE — PROGRESS NOTES
Saw patient and daughters as they refused supper tray because it was chicken. Add note to diet order. Previously on low potassium diet, no longer indicated. They stated her GERD was really bad and her swallowing is a concern due to esophageal issues. Agree to soft and bite sized diet. She drinks hot tea with milk in am so will not put on GERD diet.  Familiar with patient from previous admit.  Note to MD about diet change and daughters request to be on Omeprazole 20 mg before breakfast and supper.

## 2025-03-24 NOTE — NURSING
Pt arrived to floor for skilled services with admitting diagnosis of Spinal stenosis, site unspecified via wheelchair. Pt required x2 assistance from wheelchair to bed. Pt is AAOx4, incontinet (continent or incontinent) of B/B. Pt is on a soft and bite size level 6  . Skin assessment done: (detailed). Family informed of arrival. POC reviewed with patient. Pt denies pain at this time and is educated to use call light and not get OOB w/o assistance.      Pt skin assessment already documented in LDA, family states they are managing pink dry flaky sacrum area with boudreauxs butt paste. Daughters at bedside upon arrival.

## 2025-03-24 NOTE — ASSESSMENT & PLAN NOTE
Uncontrolled  Pulse too slow to start BB.    Chronic, controlled.  Latest blood pressure and vitals reviewed-   Temp:  [98.5 °F (36.9 °C)]   Pulse:  [60-88]   Resp:  [14-18]   BP: (103-130)/(54-75)   SpO2:  [92 %-97 %] .   Home meds for hypertension were reviewed and noted below. Hospital anti-hypertensive changes were made as shown below.  Hypertension Medications              amLODIPine (NORVASC) 10 MG tablet Take 1 tablet (10 mg total) by mouth once daily.    furosemide (LASIX) 20 MG tablet Take 1 tablet (20 mg total) by mouth 2 (two) times a day.        continue clonidine and hydralazine   PRN meds if BP> 180/110 mm HG scheduled hydralazine  Continue norvasc

## 2025-03-24 NOTE — PROGRESS NOTES
[facility] - Neurosurgery ([facility])  [facility] Medicine  Progress Note    Patient Name: Bridget Montgomery  MRN: [id number]  Patient Class: IP- Inpatient   Admission Date: 3/9/2025  Length of Stay: 15 days  Attending Physician: Luis Gunn MD  Primary Care Provider: No primary care provider on file.        Subjective     Principal Problem:Spinal stenosis        HPI:  Bridget Montgomery is a 89F PMHx COPD, HTN, HLD, history of TIA, known type 2 odontoid fx followed by Dr. Quach with non-surgical management, and R shoulder arthroplasty 06/2024 presenting to [facility] for preoperative traction. She initially presented with a week of severe persistent HA, and diffuse weakness of all extremities. Her symptoms began last Monday with HA that started in her neck and migrated toward the top of her head. She has had chronic neck pain due to cervical stenosis, but the headache was new. On Saturday, she was at her baseline, able to perform all of her ADLs. Around 8 pm, she suddenly became weak in all extremities and was not able to walk. MRI demonstrated chronic type 2 odontoid fracture with progressed retropulsion of the fracture fragment in comparison to prior MRI from 8/11/2023, with moderate to severe canal stenosis at C1-C2. Of note, patient had previously discontinued wearing her cervical collar. Denied radiculopathy in her arms or legs, sensory deficit, bladder or bowel incontinence. Family decided to proceed with surgery. Booked for O-C4 fusion on 3/19/25. Admitted to [facility] for pre-op traction on 3/18.           Overview/Hospital Course:    03/19/2025: To OR for O-C4 fusion  03/20/2025: POD 1 S/P O-C4 fusion. NAEO. VSS. Neurologically intact. Continues with residual neck pain, robaxin added to pain regimen. Pending step down to .           3/20 Transfer to hospital medicine. 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 Quach with non-surgical management, and right shoulder  arthroplasty 06/2024 presenting with a week of severe persistent headache, and diffuse weakness involving her bilateral upper and lower extremities.  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 Multilevel foraminal stenosis. Now s/p Occipital - C4 posterior spinal fusion (3/19). Continue Cervical collar at all times post operatively for 3 months. Post operative XR C spine pending.  Multimodal pain control  with oxycodone 5/7.5 prn, morphine prn, robaxin prn) CPK trended down 623--> 420.  vancomycin discontinued.  started on cefazolin due to ow concern for allergic reaction. Mosher and A-line removed. DVT ppx initiated. PT recs SNF   3/22 Drain output 40ml/24h. remove drain today. Leucocytosis resolved.   3/23 urinary retention overnight. straight cath x 2. bladder scan q 6h. started on flomax and bethanechol. Ancef discontinued.sats 95% on RA.  Hb trended down to 7.5. No overt bleed. FOBT. Iron studies .   3/24 Hb stable. Discharge to [facility]         Review of Systems:   Pain scale:   Constitutional:  fever,  chills, headache, vision loss, hearing loss, weight loss, Generalized weakness, falls, loss of smell, loss of taste, poor appetite,  sore throat  Respiratory: cough, shortness of breath.   Cardiovascular: chest pain, dizziness, palpitations, orthopnea, swelling of feet, syncope  Gastrointestinal: nausea, vomiting, abdominal pain, diarrhea, black stool,  blood in stool, change in bowel habits, constipation  Genitourinary: hematuria, dysuria, urgency, frequency  Integument/Breast: rash,  pruritis  Hematologic/Lymphatic: easy bruising, lymphadenopathy  Musculoskeletal: arthralgias , myalgias, back pain, neck pain, knee pain  Neurological: confusion, seizures, tremors, slurred speech  Behavioral/Psych:  depression, anxiety, auditory or visual  hallucinations     OBJECTIVE:     Physical Exam:  Body mass index is 23.03 kg/m².    Constitutional: Appears well-developed and well-nourished.   Head: Normocephalic and atraumatic. C collar in place   Neck: Normal range of motion. Neck supple.   Cardiovascular: Normal heart rate.  Regular heart rhythm.  Pulmonary/Chest: Effort normal.   Abdominal: No distension.  No tenderness  Musculoskeletal: Normal range of motion. No edema.   Neurological: Alert and oriented to person, place, and time. able to move bilateral upper and lower extremities without limitation   Skin: Skin is warm and dry.   Psychiatric: Normal mood and affect. Behavior is normal.                  Vital Signs  Temp: 98.5 °F (36.9 °C) (03/23/25 2307)  Pulse: 73 (03/24/25 0355)  Resp: 14 (03/24/25 0355)  BP: (!) 123/54 (03/24/25 0355)  SpO2: (!) 92 % (03/24/25 0355)     24 Hour VS Range    Temp:  [98.5 °F (36.9 °C)]   Pulse:  [60-88]   Resp:  [14-18]   BP: (103-130)/(54-75)   SpO2:  [92 %-97 %]     Intake/Output Summary (Last 24 hours) at 3/24/2025 0723  Last data filed at 3/23/2025 1940  Gross per 24 hour   Intake 500 ml   Output 200 ml   Net 300 ml         I/O This Shift:  No intake/output data recorded.    Wt Readings from Last 3 Encounters:   03/19/25 59 kg (130 lb)   12/10/24 59.9 kg (132 lb)   09/09/24 59.9 kg (132 lb)       I have personally reviewed the vitals and recorded Intake/Output     Laboratory/Diagnostic Data:    CBC/Anemia Labs: Coags:    Recent Labs   Lab 03/22/25  0634 03/23/25  0539 03/23/25  1036 03/23/25  1212 03/24/25  0507   WBC 5.44 7.47  --  16.37*  --    HGB 8.4* 7.5*  --  9.3*  --    HCT 27.5* 23.7*  --  29.7*  --     223  --  275  --    MCV 92 91  --  91  --    RDW 15.0* 14.9*  --  14.9*  --    IRON  --   --   --   --  28*   OCCULTBLOOD  --   --  Negative  --   --     Recent Labs   Lab 03/18/25  1648   INR 0.9   APTT 27.6        Chemistries: ABG:   Recent Labs   Lab 03/19/25  0209 03/19/25  0805 03/20/25  0003  03/21/25  0518 03/22/25  0634 03/23/25  0539 03/24/25  0507      < > 138 136 137 136 135*   K 4.3   < > 4.6 3.9 3.9 3.7 3.9      < > 107 106 107 107 104   CO2 22*   < > 21* 20* 23 20* 23   BUN 46*   < > 43* 48* 44* 38* 35*   CREATININE 1.2   < > 1.2 1.2 1.1 1.0 1.1   CALCIUM 7.7*   < > 7.6* 8.1* 7.9* 7.9* 8.7   PROT 5.7*  --  5.3* 5.0*  --   --   --    BILITOT 0.2  --  0.2 0.1 0.1 0.1 0.2   ALKPHOS 145  --  140 123 119 116 131   ALT 32  --  41 36 17 9* 6*   AST 48*  --  55* 41* 26 25 19   GLUCOSE  --   --   --   --  91 87 86   MG 2.7*  --  2.9* 2.6 2.5 2.2 2.2   PHOS 2.9  --  4.2 3.9 3.8 3.6 3.6    < > = values in this interval not displayed.    Recent Labs   Lab 03/18/25  0938   PH 7.434   PCO2 32.8*   PO2 103*   HCO3 22.0*   POCSATURATED 98   BE -2        POCT Glucose: HbA1c:    Recent Labs   Lab 03/18/25  1959 03/18/25 2129 03/18/25  2312 03/19/25  0209 03/19/25  0802 03/19/25  1248   POCTGLUCOSE 231* 169* 138* 109 126* 173*    Hemoglobin A1C   Date Value Ref Range Status   03/18/2025 5.8 (H) 4.0 - 5.6 % Final     Comment:     ADA Screening Guidelines:  5.7-6.4%  Consistent with prediabetes  >or=6.5%  Consistent with diabetes    High levels of fetal hemoglobin interfere with the HbA1C  assay. Heterozygous hemoglobin variants (HbS, HgC, etc)do  not significantly interfere with this assay.   However, presence of multiple variants may affect accuracy.     03/09/2025 5.9 (H) 4.0 - 5.6 % Final     Comment:     ADA Screening Guidelines:  5.7-6.4%  Consistent with prediabetes  >or=6.5%  Consistent with diabetes    High levels of fetal hemoglobin interfere with the HbA1C  assay. Heterozygous hemoglobin variants (HbS, HgC, etc)do  not significantly interfere with this assay.   However, presence of multiple variants may affect accuracy.     10/17/2023 5.8 (H) 4.0 - 5.6 % Final     Comment:     ADA Screening Guidelines:  5.7-6.4%  Consistent with prediabetes  >or=6.5%  Consistent with diabetes    High levels of  "fetal hemoglobin interfere with the HbA1C  assay. Heterozygous hemoglobin variants (HbS, HgC, etc)do  not significantly interfere with this assay.   However, presence of multiple variants may affect accuracy.          Cardiac Enzymes: Ejection Fractions:    No results for input(s): "CPK", "CPKMB", "MB", "TROPONINI" in the last 72 hours.   No results found for: "EF"       No results for input(s): "COLORU", "APPEARANCEUA", "PHUR", "SPECGRAV", "PROTEINUA", "GLUCUA", "KETONESU", "BILIRUBINUA", "OCCULTUA", "NITRITE", "UROBILINOGEN", "LEUKOCYTESUR", "RBCUA", "WBCUA", "BACTERIA", "SQUAMEPITHEL", "HYALINECASTS" in the last 48 hours.    Invalid input(s): "WRIGHTSUR"    Procalcitonin (ng/mL)   Date Value   05/16/2023 0.05     Lactate (Lactic Acid) (mmol/L)   Date Value   06/20/2023 1.2     No results found for: "BNP"  CRP (mg/L)   Date Value   05/12/2023 7.8     Sed Rate (mm/Hr)   Date Value   05/12/2023 37 (H)     No results found for: "DDIMER"  Ferritin (ng/mL)   Date Value   03/09/2025 29   08/27/2024 48   06/10/2024 35   12/07/2023 123   10/18/2023 34   09/13/2023 46   06/02/2023 93   02/24/2023 57   09/12/2022 49   09/12/2022 49     No results found for: "LDH"  Troponin I (ng/mL)   Date Value   03/08/2025 0.006   06/20/2023 <0.006     CPK (U/L)   Date Value   03/21/2025 172   03/20/2025 420 (H)   03/19/2025 623 (H)     Results for orders placed or performed in visit on 12/06/24   Vitamin D    Collection Time: 12/06/24  4:22 PM   Result Value Ref Range    Vit D, 25-Hydroxy 75 30 - 96 ng/mL   Results for orders placed or performed in visit on 03/13/23   Vitamin D    Collection Time: 03/13/23 12:06 PM   Result Value Ref Range    Vit D, 25-Hydroxy 69 30 - 96 ng/mL     SARS-CoV2 (COVID-19) Qualitative PCR (no units)   Date Value   11/07/2023 Not Detected   10/31/2023 Not Detected     SARS-CoV-2 RNA, Amplification, Qual (no units)   Date Value   03/08/2025 Negative   10/23/2023 Negative   10/19/2023 Negative       Microbiology " labs for the last week  Microbiology Results (last 7 days)       ** No results found for the last 168 hours. **            Reviewed and noted in plan where applicable- Please see chart for full lab data.    Lines/Drains:       Peripheral IV - Single Lumen 03/18/25 1217 20 G Left Antecubital (Active)   Site Assessment Clean;Dry;Intact 03/20/25 1101   Line Securement Device Secured with sutureless device 03/20/25 0701   Extremity Assessment Distal to IV No abnormal discoloration;No swelling;No redness 03/20/25 1101   Line Status Flushed;Saline locked 03/20/25 1101   Dressing Status Clean;Intact;Dry 03/20/25 1101   Dressing Intervention Integrity maintained 03/20/25 1101   Dressing Change Due 03/22/25 03/20/25 1101   Site Change Due 03/22/25 03/20/25 0701   Reason Not Rotated Not due 03/20/25 1101   Number of days: 2            Peripheral IV - Single Lumen 03/20/25 1527 20 G 1 3/4 in No Right Antecubital (Active)   Site Assessment Clean;Dry;Intact;No redness;No swelling 03/20/25 1558   Line Securement Device Secured with sutureless device 03/20/25 1558   Extremity Assessment Distal to IV No abnormal discoloration;No redness;No swelling;No warmth 03/20/25 1558   Line Status Blood return noted;Flushed;Saline locked 03/20/25 1558   Dressing Status Clean;Dry;Intact 03/20/25 1558   Dressing Intervention First dressing 03/20/25 1558   Dressing Change Due 03/24/25 03/20/25 1558   Site Change Due 03/24/25 03/20/25 1558   Number of days: 0            Closed/Suction Drain 03/19/25 1135 Tube - 1 Left Back Accordion 10 Fr. (Active)   Site Description Unable to view 03/20/25 1101   Dressing Type Other (Comment) 03/20/25 1101   Dressing Status Clean;Dry;Intact 03/20/25 1101   Dressing Intervention Integrity maintained 03/20/25 1101   Drainage Sanguineous 03/20/25 1101   Status Other (Comment) 03/20/25 1101   Output (mL) 70 mL 03/20/25 0501   Number of days: 1       Imaging      Results for orders placed during the hospital encounter  of 03/09/25    Echo    Interpretation Summary    Left Ventricle: The left ventricle is normal in size. Ventricular mass is normal. Normal wall thickness. There is normal systolic function with a visually estimated ejection fraction of 60 - 65%. Diastolic function cannot be reliably determined in the presence of mitral annular calcification.    Right Ventricle: The right ventricle is normal in size. Wall thickness is normal. Systolic function is normal.    Left Atrium: Moderately dilated    Aortic Valve: There is mild aortic valve sclerosis. There is mild annular calcification present.    Mitral Valve: There is severe posterior mitral annular calcification. 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 The mean pressure gradient across the mitral valve is 4 mmHg at a heart rate of 51 bpm. There is mild to moderate regurgitation with a centrally directed jet.    Tricuspid Valve: There is mild to moderate regurgitation with a centrally directed jet.    Aorta: Aortic root is normal in size measuring 2.98 cm. Aortic root at ST junction is normal. Ascending aorta is normal.    Pulmonary Artery: There is mild pulmonary hypertension. The estimated pulmonary artery systolic pressure is 47 mmHg.    IVC/SVC: Normal venous pressure at 3 mmHg.      X-Ray Cervical Spine AP And Lateral  Narrative: EXAMINATION:  XR CERVICAL SPINE AP LATERAL    CLINICAL HISTORY:  post op O-C4 fusion;    TECHNIQUE:  AP, lateral and open mouth views of the cervical spine were performed.    COMPARISON:  03/11/2025.    FINDINGS:  There are postoperative changes related to fusion extending from the occiput to C4.  There are bony changes related to the patient's chronic odontoid fracture with improvement of vertebral alignment at the C1-C2 level when compared to the prior study dated 03/11/2025.  The remainder of the posterior vertebral alignment is satisfactory.  Bones appear osteopenic.  There are degenerative changes throughout the cervical spine.  Prevertebral  soft tissues are grossly unremarkable.  There is a surgical drain noted posteriorly.  Impression: Postoperative changes of spinal fusion from the occiput to C4 with improvement thin vertebral alignment of the C1-C2 level in this patient with a chronic odontoid fracture.    Osteopenia.    Cervical spondylosis.    Electronically signed by: Sha Street MD  Date:    03/23/2025  Time:    08:04      Labs, Imaging, EKG and Diagnostic results from 3/24/2025 were reviewed.    Medications:  Medication list was reviewed and changes noted under Assessment/Plan.  Medications Ordered Prior to Encounter[1]  Scheduled Medications:  Current Facility-Administered Medications   Medication Dose Route Frequency    amLODIPine  10 mg Oral Daily    bethanechol  10 mg Oral TID    cloNIDine 0.1 mg/24 hr td ptwk  1 patch Transdermal Q7 Days    enoxparin  30 mg Subcutaneous Q24H (prophylaxis, 1700)    fluticasone furoate-vilanteroL  1 puff Inhalation Daily    furosemide  40 mg Oral Daily    gabapentin  300 mg Oral QHS    hydrALAZINE  75 mg Oral Q8H    methocarbamoL  500 mg Oral TID    mupirocin   Topical (Top) BID    polyethylene glycol  17 g Oral Daily    senna-docusate 8.6-50 mg  1 tablet Oral BID    sodium chloride  1,000 mg Oral BID    tamsulosin  0.4 mg Oral Daily     PRN:   Current Facility-Administered Medications:     acetaminophen, 650 mg, Oral, Q6H PRN    aluminum & magnesium hydroxide-simethicone, 30 mL, Oral, Q6H PRN    artificial tears, 1 drop, Both Eyes, PRN    bisacodyL, 10 mg, Rectal, Daily PRN    calcium carbonate, 1,000 mg, Oral, TID PRN    hydrALAZINE, 10 mg, Intravenous, Q4H PRN    labetalol, 10 mg, Intravenous, Q4H PRN    melatonin, 6 mg, Oral, Nightly PRN    naloxone, 0.02 mg, Intravenous, PRN    ondansetron, 8 mg, Oral, Q8H PRN    oxyCODONE-acetaminophen, 1 tablet, Oral, Q4H PRN    oxyCODONE-acetaminophen, 1 tablet, Oral, Q4H PRN    simethicone, 1 tablet, Oral, QID PRN    sodium chloride 0.9%, 1-10 mL, Intravenous,  Q12H PRN  Infusions:   Estimated Creatinine Clearance: 28.1 mL/min (based on SCr of 1.1 mg/dL).             Assessment & Plan  Spinal stenosis  Cord Compression  - MRI Cervical spine 3/9/25: chronic type 2 odontoid fracture with progressed retropulsion of the fracture fragment in comparison to prior MRI from 8/11/2023, with moderate to severe canal stenosis at C1-C2. Multilevel foraminal stenosis.   - Cont C-collar at all times  - Upright and supine cervical x-ray pending  - Neurosurgery recommended occiput-C4 fusion. Non-surgical option is strict cervical collar 24/7 indefinitely  - XR with chronic odontoid type 2 fracture, position alignment appears stable   - Decadron started for new neuro deficit  - surgery for 3/19      3/20 Transfer to hospital medicine. 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 Multilevel foraminal stenosis.    Now s/p Occipital - C4 posterior spinal fusion (3/19). Continue Cervical collar at all times post operatively for 3 months. Post operative XR C spine pending.  Multimodal pain control  with oxycodone 5/7.5 prn, morphine prn, robaxin prn) CPK trended down 623--> 420.  vancomycin discontinued.  started on cefazolin due to  concern for allergic reaction. Mosher and A-line removed. DVT ppx initiated.    Ancef can be discontinued once drain removed   3/21 Drain output 130ml/24h. Leucocytosis resolved.   3/22 Drain removed         Chronic kidney disease, stage III (moderate)  Renal function remains around baseline; Estimated Creatinine  Clearance: 28.1 mL/min (based on SCr of 1.1 mg/dL). according to latest data. 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 Will avoid nephrotoxic agents as able, and renally dose all meds as applicable.       Recent Labs   Lab 03/22/25  0634 03/23/25  0539 03/24/25  0507   BUN 44* 38* 35*   CREATININE 1.1 1.0 1.1       I & O     Intake/Output Summary (Last 24 hours) at 3/24/2025 0723  Last data filed at 3/23/2025 1940  Gross per 24 hour   Intake 500 ml   Output 200 ml   Net 300 ml      Other emphysema  History of COPD, not on home oxygen. Currently without evidence of acute exacerbation, and sats on room air at goal 88% or greater. Will continue hospital formulary equivalent of home regimen and monitor respiratory status.     3/21 sats 97% on 1LNC Taper oxygen as tolerated to Spo2 goal of low 90s   3/23 sats 95% on RA.   Essential hypertension  Uncontrolled  Pulse too slow to start BB.    Chronic, controlled.  Latest blood pressure and vitals reviewed-   Temp:  [98.5 °F (36.9 °C)]   Pulse:  [60-88]   Resp:  [14-18]   BP: (103-130)/(54-75)   SpO2:  [92 %-97 %] .   Home meds for hypertension were reviewed and noted below. Hospital anti-hypertensive changes were made as shown below.  Hypertension Medications              amLODIPine (NORVASC) 10 MG tablet Take 1 tablet (10 mg total) by mouth once daily.    furosemide (LASIX) 20 MG tablet Take 1 tablet (20 mg total) by mouth 2 (two) times a day.        continue clonidine and hydralazine   PRN meds if BP> 180/110 mm HG scheduled hydralazine  Continue norvasc  Generalized weakness  Chronic, however likely exacerbated by cervical radiculopathy.    PT/OT consulted - recs SNF         Cervical spinal stenosis  as above    Mixed hyperlipidemia  hold statin with elevatd CPK     Hyperkalemia  resolved         Leucocytosis     Patient with leucocytosis   Recent Labs   Lab 03/22/25  0634 03/23/25  0539 03/23/25  1212   WBC 5.44 7.47 16.37*     . Afebrile.  monitor .    3/23 Ancef  discontinued.  Urinary retention  3/22 straight cath x 3. bladder scan showed 450ml and she voided on her own 100ml .  3/23 urinary retention overnight. straight cath x 2. bladder scan q 6h. started on flomax and bethanechol.     Anemia    Patient's with Normocytic anemia.. Hemoglobin stable. Etiology likely due to chronic disease .  Current CBC reviewed-    Recent Labs   Lab 03/22/25  0634 03/23/25  0539 03/23/25  1212   HGB 8.4* 7.5* 9.3*         Component Value Date/Time    MCV 91 03/23/2025 1212    MCV 94 03/21/2025 0518    RDW 14.9 (H) 03/23/2025 1212    RDW 15.4 (H) 03/21/2025 0518    IRON 28 (L) 03/24/2025 0507    IRON 78 03/09/2025 0911    FERRITIN 29 03/09/2025 0911    FOLATE 13.7 03/09/2025 0911    [id number] 03/09/2025 0911    OCCULTBLOOD Negative 03/23/2025 1036     Monitor CBC and transfuse if H/H drops below 7/21.    3/23  Hb trended down to 7.5. No overt bleed. FOBT. Iron studies .     VTE Risk Mitigation (From admission, onward)           Ordered     enoxaparin injection 30 mg  Every 24 hours         03/20/25 0819     Place sequential compression device  Until discontinued         03/19/25 0857     IP VTE HIGH RISK PATIENT  Once         03/09/25 2040     Reason for No Pharmacological VTE Prophylaxis  Once        Question:  Reasons:  Answer:  Physician Provided (leave comment)  Comment:  surgery    03/09/25 2040                    Discharge Planning   MIKAYLA: 3/24/2025     Code Status: Full Code   Medical Readiness for Discharge Date:   Discharge Plan A: Skilled Nursing Facility   Discharge Delays: None known at this time            Please place Justification for DME        Luis Gunn MD  Department of Hospital Medicine   [facility] - Neurosurgery ([facility])         [1]   No current facility-administered medications on file prior to encounter.     Current Outpatient Medications on File Prior to Encounter   Medication Sig Dispense Refill    albuterol (PROVENTIL/VENTOLIN HFA) 90 mcg/actuation  inhaler Inhale 2 puffs into the lungs every 6 (six) hours as needed.      amLODIPine (NORVASC) 10 MG tablet Take 1 tablet (10 mg total) by mouth once daily. 90 tablet 3    fluticasone propionate (FLONASE) 50 mcg/actuation nasal spray 1 spray by Nasal route 2 (two) times daily.      gabapentin (NEURONTIN) 300 MG capsule Take 300 mg by mouth every evening.      ipratropium (ATROVENT) 42 mcg (0.06 %) nasal spray       melatonin (MELATIN) 3 mg tablet Take 2 tablets (6 mg total) by mouth nightly as needed for Insomnia. 30 tablet 0    omega 3-dha-epa-fish oil 1,000 mg (120 mg-180 mg) Cap Take 1 capsule by mouth once daily.      omeprazole (PRILOSEC) 20 MG capsule Take 1 capsule (20 mg total) by mouth 2 (two) times daily before meals. 60 capsule 0

## 2025-03-24 NOTE — NURSING
Pt daughter at bedside insisting on performing mobility care, wound care and lamar care, gave mother home medication omeprazole at bedside, nurse educated on importance of not giving medications at bedside.

## 2025-03-24 NOTE — PT/OT/SLP PROGRESS
"Occupational Therapy   Treatment    Name: Bridget Montgomery  MRN: [id number]  Admitting Diagnosis:  Spinal stenosis  5 Days Post-Op    Recommendations:     Discharge Recommendations: Moderate Intensity Therapy  Discharge Equipment Recommendations:  to be determined by next level of care  Barriers to discharge:  None    Assessment:     Bridget Montgomery is a 89 y.o. female with a medical diagnosis of Spinal stenosis.  She presents with consistent assistance needed for self-care and functional mobility activities 2/2 AMS. Performance deficits affecting function are weakness, gait instability, decreased upper extremity function, impaired endurance, impaired balance, decreased lower extremity function, decreased safety awareness, impaired self care skills, orthopedic precautions, impaired functional mobility, decreased coordination. This date pt recalling 1/3 spinal precautions with re-education provided and pt verbalizing understanding. Patient continues to demonstrate the need for moderate intensity therapy on a daily basis post acute exhibited by decreased independence with self-care and functional mobility     Additional Staff Present: Therapy tech utilized during session due to patient complexity and required physical assistance to ensure patient safety    Cervical collar donned upon OT entry to room    Rehab Prognosis:  Good; patient would benefit from acute skilled OT services to address these deficits and reach maximum level of function.       Plan:     Patient to be seen 4 x/week to address the above listed problems via self-care/home management, therapeutic activities, therapeutic exercises, neuromuscular re-education  Plan of Care Expires: 04/20/25  Plan of Care Reviewed with: patient, daughter    Subjective     Chief Complaint: "no turning my head"  Patient/Family Comments/goals: increase independence  Pain/Comfort:  Pain Rating 1: 0/10  Pain Rating Post-Intervention 1: 0/10    Objective:     Communicated " with: nursing prior to session.  Patient found HOB elevated with cervical collar, telemetry, PureWick upon OT entry to room.    General Precautions: Standard, fall, hearing impaired    Orthopedic Precautions:spinal precautions  Braces: Cervical collar  Respiratory Status: Room air     Occupational Performance:     Bed Mobility:    Patient completed Rolling/Turning to Right with moderate assistance  Patient completed Scooting/Bridging with maximal assistance  Patient completed Supine to Sit with maximal assistance   Pt seated EOB with minimal to CGA 2/2 posterior lean    Functional Mobility/Transfers:  Patient completed Sit <> Stand Transfer with moderate assistance  with  rolling walker - pt educated on hand placement with RW  Patient completed Bed <> Chair Transfer using Step Transfer technique with minimum assistance with rolling walker with moderate v/cs for increasing upright posture    Activities of Daily Living:  Upper Body Dressing: maximal assistance to malgorzata gown as robe  Lower Body Dressing: maximal assistance to malgorzata footwear 2/2 difficulty with figure 4 method    Therapeutic exercise:  - pt completing R/LUE row with scapula retraction, internal/external rotation, shoulder rolls x10 ea    [facility] 6 Click ADL: 12    Treatment & Education:  Session this date targeted self-care, therapeutic exercises, and therapeutic activities to increase pt's independence  Pt educated on OT roles, POC, call button for assistance  -Education provided regarding fit and wear schedule of cervical colla  -Education provided regarding spinal precautions for use during functional tasks/mobility/transfers      Patient left up in chair with all lines intact, call button in reach, chair alarm on, and nursing and daughter present    GOALS:   Multidisciplinary Problems       Occupational Therapy Goals          Problem: Occupational Therapy    Goal Priority Disciplines Outcome Interventions   Occupational Therapy Goal     OT, PT/OT  Progressing    Description: Goals to be met by: 4/20/25     Patient will increase functional independence with ADLs by performing:    UE Dressing with Wilkes.  LE Dressing with Modified Wilkes.  Grooming while standing at sink with Minimal Assistance and Assistive Devices as needed.  Toileting from toilet with Supervision for hygiene and clothing management.   Rolling to Right, Left with Stand-by Assistance.   Supine to sit with Minimal Assistance.  Step transfer with SBA  Increased functional strength to WNL for BUEs.  Upper extremity exercise program x10 reps per handout, with assistance as needed.                         DME Justifications:   Bridget's mobility limitation cannot be sufficiently resolved by the use of a cane. Her functional mobility deficit can be sufficiently resolved with the use of a Rolling Walker. Patient's mobility limitation significantly impairs their ability to participate in one of more activities of daily living.  The use of a RW will significantly improve the patient's ability to participate in MRADLS and the patient will use it on regular basis in the home.    Time Tracking:     OT Date of Treatment: 03/24/25  OT Start Time: 1352  OT Stop Time: 1420  OT Total Time (min): 28 min    Billable Minutes:Self Care/Home Management 14  Therapeutic Activity 14    OT/BECKA: OT     Number of BECKA visits since last OT visit: 0    3/24/2025

## 2025-03-24 NOTE — ASSESSMENT & PLAN NOTE
Patient's with Normocytic anemia.. Hemoglobin stable. Etiology likely due to chronic disease .  Current CBC reviewed-    Recent Labs   Lab 03/22/25  0634 03/23/25  0539 03/23/25  1212   HGB 8.4* 7.5* 9.3*         Component Value Date/Time    MCV 91 03/23/2025 1212    MCV 94 03/21/2025 0518    RDW 14.9 (H) 03/23/2025 1212    RDW 15.4 (H) 03/21/2025 0518    IRON 28 (L) 03/24/2025 0507    IRON 78 03/09/2025 0911    FERRITIN 29 03/09/2025 0911    FOLATE 13.7 03/09/2025 0911    [id number] 865 03/09/2025 0911    OCCULTBLOOD Negative 03/23/2025 1036     Monitor CBC and transfuse if H/H drops below 7/21.    3/23  Hb trended down to 7.5. No overt bleed. FOBT. Iron studies .

## 2025-03-24 NOTE — ASSESSMENT & PLAN NOTE
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 Multilevel foraminal stenosis.   - Cont C-collar at all times  - Upright and supine cervical x-ray pending  - Neurosurgery recommended occiput-C4 fusion. Non-surgical option is strict cervical collar 24/7 indefinitely  - XR with chronic odontoid type 2 fracture, position alignment appears stable   - Decadron started for new neuro deficit  - surgery for 3/19      3/20 Transfer to [facility] medicine. 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 Multilevel foraminal stenosis.    Now s/p Occipital - C4 posterior spinal fusion (3/19). Continue Cervical collar at all times post operatively for 3 months. Post operative XR C spine pending.  Multimodal pain control  with oxycodone 5/7.5 prn, morphine prn, robaxin prn) CPK trended down 623--> 420.  vancomycin discontinued.  started on cefazolin due to  concern for allergic reaction. Mosher and A-line removed. DVT ppx initiated.    Ancef can be discontinued once drain removed   3/21 Drain output 130ml/24h. Leucocytosis resolved.   3/22 Drain removed

## 2025-03-24 NOTE — ASSESSMENT & PLAN NOTE
Renal function remains around baseline; Estimated Creatinine Clearance: 28.1 mL/min (based on SCr of 1.1 mg/dL). according to latest data. 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 Will avoid nephrotoxic agents as able, and renally dose all meds as applicable.       Recent Labs   Lab 03/22/25  0634 03/23/25  0539 03/24/25  0507   BUN 44* 38* 35*   CREATININE 1.1 1.0 1.1       I & O     Intake/Output Summary (Last 24 hours) at 3/24/2025 0724  Last data filed at 3/23/2025 1940  Gross per 24 hour   Intake 500 ml   Output 200 ml   Net 300 ml

## 2025-03-24 NOTE — PLAN OF CARE
Case Management Final Discharge Note    Discharge Disposition: [facility] - call report to [phone]    New DME ordered / company name: N/A    Relevant SDOH / Transition of Care Barriers:  N/A    Person available to provide assistance at home when needed and their contact information: facility staff    Scheduled followup appointment: will see MD at SNF    Referrals placed: Neurosurgery    Transportation: ADT30 for wheelchair van transport with requested  time 4pm     Patient and family educated on discharge services and updated on DC plan. Bedside RN notified, patient clear to discharge from Case Management Perspective.      [facility] - [facility] (Hospital)  Discharge Final Note    Primary Care Provider: No primary care provider on file.    Expected Discharge Date: 3/24/2025    Final Discharge Note (most recent)       Final Note - 03/24/25 1402          Final Note    Assessment Type Final Discharge Note     Anticipated Discharge Disposition Skilled Nursing Facility     What phone number can be called within the next 1-3 days to see how you are doing after discharge? [phone]     Hospital Resources/Appts/Education Provided Provided patient/caregiver with written discharge plan information        Post-Acute Status    Post-Acute Authorization Placement     Post-Acute Placement Status Set-up Complete/Auth obtained     Coverage [employer]     Discharge Delays None known at this time                     Important Message from Medicare  Important Message from Medicare regarding Discharge Appeal Rights: Given to patient/caregiver, Explained to patient/caregiver, Signed/date by patient/caregiver     Date IMM was signed: 03/24/25  Time IMM was signed: 1230    After-discharge care                Destination       *[facility]   Service: Skilled Nursing    [address], [address]RD [address]LO[address]  [facility] [address]   Phone: [phone]

## 2025-03-24 NOTE — PLAN OF CARE
Problem: Adult Inpatient Plan of Care  Goal: Plan of Care Review  Outcome: Progressing  Goal: Patient-Specific Goal (Individualized)  Outcome: Progressing  Goal: Absence of Hospital-Acquired Illness or Injury  Outcome: Progressing  Goal: Optimal Comfort and Wellbeing  Outcome: Progressing  Goal: Readiness for Transition of Care  Outcome: Progressing     Problem: Diabetes Comorbidity  Goal: Blood Glucose Level Within Targeted Range  Outcome: Progressing     Problem: Wound  Goal: Optimal Coping  Outcome: Progressing  Goal: Optimal Functional Ability  Outcome: Progressing  Goal: Absence of Infection Signs and Symptoms  Outcome: Progressing  Goal: Improved Oral Intake  Outcome: Progressing  Goal: Optimal Pain Control and Function  Outcome: Progressing  Goal: Skin Health and Integrity  Outcome: Progressing  Goal: Optimal Wound Healing  Outcome: Progressing     Problem: Fall Injury Risk  Goal: Absence of Fall and Fall-Related Injury  Outcome: Progressing     Problem: Skin Injury Risk Increased  Goal: Skin Health and Integrity  Outcome: Progressing     Problem: Infection  Goal: Absence of Infection Signs and Symptoms  Outcome: Progressing     Problem: Pain Acute  Goal: Optimal Pain Control and Function  Outcome: Progressing     Problem: Urinary Retention  Goal: Effective Urinary Elimination  Outcome: Progressing

## 2025-03-24 NOTE — PROGRESS NOTES
[facility] - Neurosurgery ([facility])  Neurosurgery  Progress Note    Subjective:     History of Present Illness: 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 Her symptoms began last Monday with headache that started in her neck and migrated toward the top of her head. She has had chronic neck pain due to cervical stenosis, but the headache was new. On Saturday, she had a normal morning and was able to perform all of her ADLs. Around 8 pm, she suddenly became weak in her arms and legs and was not able to walk. MRI demonstrated chronic type 2 odontoid fracture with progressed retropulsion of the fracture fragment in comparison to prior MRI from 8/11/2023, with moderate to severe canal stenosis at C1-C2. Of note, patient had previously discontinued wearing her cervical collar. Denies radiculopathy in her arms or legs, sensory deficit, bladder or bowel incontinence.      Post-Op Info:  Procedure(s) (LRB):  FUSION, SPINE, CERVICAL, POSTERIOR APPROACH (N/A)   5 Days Post-Op   Interval History:   3/24: POD 5.  Patient is doing well from surgical standpoint.  Voiding, has gotten out of bed. Tolerating PO. Neurologically stable    Medications:  Continuous Infusions:  Scheduled Meds:   amLODIPine  10 mg Oral Daily    bethanechol  10 mg Oral TID    cloNIDine 0.1 mg/24 hr td ptwk  1 patch Transdermal Q7 Days    enoxparin  30 mg Subcutaneous Q24H (prophylaxis, 1700)    fluticasone furoate-vilanteroL  1 puff Inhalation Daily    furosemide  40 mg Oral Daily    gabapentin  300 mg Oral QHS    hydrALAZINE  75 mg Oral Q8H    methocarbamoL  500 mg Oral TID    mupirocin   Topical (Top) BID    polyethylene glycol  17 g Oral Daily    senna-docusate 8.6-50 mg  1 tablet  Oral BID    sodium chloride  1,000 mg Oral BID    tamsulosin  0.4 mg Oral Daily     PRN Meds:  Current Facility-Administered Medications:     acetaminophen, 650 mg, Oral, Q6H PRN    aluminum & magnesium hydroxide-simethicone, 30 mL, Oral, Q6H PRN    artificial tears, 1 drop, Both Eyes, PRN    bisacodyL, 10 mg, Rectal, Daily PRN    calcium carbonate, 1,000 mg, Oral, TID PRN    hydrALAZINE, 10 mg, Intravenous, Q4H PRN    labetalol, 10 mg, Intravenous, Q4H PRN    melatonin, 6 mg, Oral, Nightly PRN    naloxone, 0.02 mg, Intravenous, PRN    ondansetron, 8 mg, Oral, Q8H PRN    oxyCODONE-acetaminophen, 1 tablet, Oral, Q4H PRN    oxyCODONE-acetaminophen, 1 tablet, Oral, Q4H PRN    simethicone, 1 tablet, Oral, QID PRN    sodium chloride 0.9%, 1-10 mL, Intravenous, Q12H PRN     Review of Systems  Objective:     Weight: 59 kg (130 lb)  Body mass index is 23.03 kg/m².  Vital Signs (Most Recent):  Temp: 98.5 °F (36.9 °C) (03/23/25 2307)  Pulse: 78 (03/24/25 0740)  Resp: 18 (03/24/25 0740)  BP: (!) 115/57 (03/24/25 0740)  SpO2: (!) 92 % (03/24/25 0740) Vital Signs (24h Range):  Temp:  [98.5 °F (36.9 °C)] 98.5 °F (36.9 °C)  Pulse:  [60-88] 78  Resp:  [14-18] 18  SpO2:  [92 %-97 %] 92 %  BP: (103-130)/(54-75) 115/57                         Female External Urinary Catheter w/ Suction 03/23/25 0000 (Active)   Output (mL) 200 mL 03/23/25 1940          Physical Exam         Neurosurgery Physical Exam  5/5 strength in BUE with pain limited deltoid movement due to R>L shoulder  Incision c/d/I with staples and suture  5/5 strength BLE  Collar in place    Gen: well nourished, normocephalic, atraumatic  CV: skin warm and dry, distal pulses present  Pulm: chest rise symmetric, no increased work of breathing  GI: abdomen soft, non-distended, non-tender  : patient voiding independently  MSK: no bony abnormalities noted  Psych: patient interacting with appropriate mood and affect         Significant Labs:  Recent Labs   Lab 03/23/25  0539  "03/24/25  0507    135*   K 3.7 3.9    104   CO2 20* 23   BUN 38* 35*   CREATININE 1.0 1.1   CALCIUM 7.9* 8.7   MG 2.2 2.2     Recent Labs   Lab 03/23/25  0539 03/23/25  1212   WBC 7.47 16.37*   HGB 7.5* 9.3*   HCT 23.7* 29.7*    275     No results for input(s): "LABPT", "INR", "APTT" in the last 48 hours.  Microbiology Results (last 7 days)       ** No results found for the last 168 hours. **          All pertinent labs from the last 24 hours have been reviewed.    Significant Diagnostics:  I have reviewed all pertinent imaging results/findings within the past 24 hours.  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 Multilevel foraminal stenosis.     Now s/p Occipital - C4 posterior spinal fusion (3/19).     POD 5    Plan:   - Cervical collar at all times, will continue collar post operatively for 3 months  - Post operative XR C spine: post op hardware appropriate placement  - Voiding appropriately.   - Notify NSGY immediately with any decline on exam.  - Med management per primary  - PT/OT/OOB as tolerated  - OK for DVT prophylaxis: Lovenox  - Neurosurgery team will follow, on board for disposition of patient when ready per primary team.  We will arrange follow up at 2 weeks for staple and suture removal.     D/w staff, Dr. Lemons and on call staff Dr. Den Rodríguez MD  Neurosurgery  [facility] - Neurosurgery ([facility])  "

## 2025-03-24 NOTE — SUBJECTIVE & OBJECTIVE
Interval History:   3/24: POD 5.  Patient is doing well from surgical standpoint.  Voiding, has gotten out of bed. Tolerating PO. Neurologically stable    Medications:  Continuous Infusions:  Scheduled Meds:   amLODIPine  10 mg Oral Daily    bethanechol  10 mg Oral TID    cloNIDine 0.1 mg/24 hr td ptwk  1 patch Transdermal Q7 Days    enoxparin  30 mg Subcutaneous Q24H (prophylaxis, 1700)    fluticasone furoate-vilanteroL  1 puff Inhalation Daily    furosemide  40 mg Oral Daily    gabapentin  300 mg Oral QHS    hydrALAZINE  75 mg Oral Q8H    methocarbamoL  500 mg Oral TID    mupirocin   Topical (Top) BID    polyethylene glycol  17 g Oral Daily    senna-docusate 8.6-50 mg  1 tablet Oral BID    sodium chloride  1,000 mg Oral BID    tamsulosin  0.4 mg Oral Daily     PRN Meds:  Current Facility-Administered Medications:     acetaminophen, 650 mg, Oral, Q6H PRN    aluminum & magnesium hydroxide-simethicone, 30 mL, Oral, Q6H PRN    artificial tears, 1 drop, Both Eyes, PRN    bisacodyL, 10 mg, Rectal, Daily PRN    calcium carbonate, 1,000 mg, Oral, TID PRN    hydrALAZINE, 10 mg, Intravenous, Q4H PRN    labetalol, 10 mg, Intravenous, Q4H PRN    melatonin, 6 mg, Oral, Nightly PRN    naloxone, 0.02 mg, Intravenous, PRN    ondansetron, 8 mg, Oral, Q8H PRN    oxyCODONE-acetaminophen, 1 tablet, Oral, Q4H PRN    oxyCODONE-acetaminophen, 1 tablet, Oral, Q4H PRN    simethicone, 1 tablet, Oral, QID PRN    sodium chloride 0.9%, 1-10 mL, Intravenous, Q12H PRN     Review of Systems  Objective:     Weight: 59 kg (130 lb)  Body mass index is 23.03 kg/m².  Vital Signs (Most Recent):  Temp: 98.5 °F (36.9 °C) (03/23/25 2307)  Pulse: 78 (03/24/25 0740)  Resp: 18 (03/24/25 0740)  BP: (!) 115/57 (03/24/25 0740)  SpO2: (!) 92 % (03/24/25 0740) Vital Signs (24h Range):  Temp:  [98.5 °F (36.9 °C)] 98.5 °F (36.9 °C)  Pulse:  [60-88] 78  Resp:  [14-18] 18  SpO2:  [92 %-97 %] 92 %  BP: (103-130)/(54-75) 115/57                         Female External  "Urinary Catheter w/ Suction 03/23/25 0000 (Active)   Output (mL) 200 mL 03/23/25 1940          Physical Exam         Neurosurgery Physical Exam  5/5 strength in BUE with pain limited deltoid movement due to R>L shoulder  Incision c/d/I with staples and suture  5/5 strength BLE  Collar in place    Gen: well nourished, normocephalic, atraumatic  CV: skin warm and dry, distal pulses present  Pulm: chest rise symmetric, no increased work of breathing  GI: abdomen soft, non-distended, non-tender  : patient voiding independently  MSK: no bony abnormalities noted  Psych: patient interacting with appropriate mood and affect         Significant Labs:  Recent Labs   Lab 03/23/25  0539 03/24/25  0507    135*   K 3.7 3.9    104   CO2 20* 23   BUN 38* 35*   CREATININE 1.0 1.1   CALCIUM 7.9* 8.7   MG 2.2 2.2     Recent Labs   Lab 03/23/25  0539 03/23/25  1212   WBC 7.47 16.37*   HGB 7.5* 9.3*   HCT 23.7* 29.7*    275     No results for input(s): "LABPT", "INR", "APTT" in the last 48 hours.  Microbiology Results (last 7 days)       ** No results found for the last 168 hours. **          All pertinent labs from the last 24 hours have been reviewed.    Significant Diagnostics:  I have reviewed all pertinent imaging results/findings within the past 24 hours.  "

## 2025-03-24 NOTE — ASSESSMENT & PLAN NOTE
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 Multilevel foraminal stenosis.     Now s/p Occipital - C4 posterior spinal fusion (3/19).     POD 5    Plan:   - Cervical collar at all times, will continue collar post operatively for 3 months  - Post operative XR C spine: post op hardware appropriate placement  - Voiding appropriately.   - Notify NSGY immediately with any decline on exam.  - Med management per primary  - PT/OT/OOB as tolerated  - OK for DVT prophylaxis: Lovenox  - Neurosurgery team will follow, on board for disposition of patient when ready per primary team.  We will arrange follow up at 2 weeks for staple and suture removal.     D/w staff, Dr. Lemons and on call staff Dr. Crenshaw

## 2025-03-24 NOTE — CARE UPDATE
Patient seen with Dr. Lemons. Ok to remove collar to eat and sleep. Recommended collar when sitting upright/OOB for 3 months.

## 2025-03-25 PROBLEM — E87.5 HYPERKALEMIA: Status: RESOLVED | Noted: 2025-03-16 | Resolved: 2025-03-25

## 2025-03-25 PROBLEM — D72.829 LEUCOCYTOSIS: Status: RESOLVED | Noted: 2025-03-20 | Resolved: 2025-03-25

## 2025-03-25 PROBLEM — E87.1 HYPONATREMIA: Status: ACTIVE | Noted: 2025-03-25

## 2025-03-25 PROCEDURE — 25000003 PHARM REV CODE 250: Performed by: HOSPITALIST

## 2025-03-25 PROCEDURE — 25000003 PHARM REV CODE 250: Performed by: NURSE PRACTITIONER

## 2025-03-25 PROCEDURE — 92610 EVALUATE SWALLOWING FUNCTION: CPT

## 2025-03-25 PROCEDURE — 97535 SELF CARE MNGMENT TRAINING: CPT

## 2025-03-25 PROCEDURE — 11000004 HC SNF PRIVATE

## 2025-03-25 PROCEDURE — 25000242 PHARM REV CODE 250 ALT 637 W/ HCPCS: Performed by: HOSPITALIST

## 2025-03-25 PROCEDURE — 97116 GAIT TRAINING THERAPY: CPT

## 2025-03-25 PROCEDURE — 97162 PT EVAL MOD COMPLEX 30 MIN: CPT

## 2025-03-25 PROCEDURE — 97530 THERAPEUTIC ACTIVITIES: CPT

## 2025-03-25 PROCEDURE — 97166 OT EVAL MOD COMPLEX 45 MIN: CPT

## 2025-03-25 RX ORDER — SUCRALFATE 1 G/10ML
1 SUSPENSION ORAL
Status: DISCONTINUED | OUTPATIENT
Start: 2025-03-25 | End: 2025-03-26

## 2025-03-25 RX ORDER — AMLODIPINE BESYLATE 10 MG/1
10 TABLET ORAL DAILY
Status: DISCONTINUED | OUTPATIENT
Start: 2025-03-26 | End: 2025-03-27

## 2025-03-25 RX ORDER — GUAIFENESIN 100 MG/5ML
200 LIQUID ORAL 3 TIMES DAILY
Status: DISCONTINUED | OUTPATIENT
Start: 2025-03-25 | End: 2025-03-28 | Stop reason: HOSPADM

## 2025-03-25 RX ORDER — OXYCODONE HYDROCHLORIDE 5 MG/1
5 TABLET ORAL EVERY 6 HOURS PRN
Status: DISCONTINUED | OUTPATIENT
Start: 2025-03-25 | End: 2025-03-27

## 2025-03-25 RX ORDER — OMEPRAZOLE 20 MG/1
20 CAPSULE, DELAYED RELEASE ORAL
Status: DISCONTINUED | OUTPATIENT
Start: 2025-03-26 | End: 2025-03-26

## 2025-03-25 RX ORDER — ACETAMINOPHEN 500 MG
1000 TABLET ORAL EVERY 8 HOURS
Status: DISCONTINUED | OUTPATIENT
Start: 2025-03-25 | End: 2025-03-31 | Stop reason: HOSPADM

## 2025-03-25 RX ORDER — SODIUM CHLORIDE 1 G/1
1000 TABLET ORAL 2 TIMES DAILY
Status: DISCONTINUED | OUTPATIENT
Start: 2025-03-25 | End: 2025-03-31 | Stop reason: HOSPADM

## 2025-03-25 RX ORDER — CETIRIZINE HYDROCHLORIDE 5 MG/1
5 TABLET ORAL NIGHTLY
Status: DISCONTINUED | OUTPATIENT
Start: 2025-03-25 | End: 2025-03-27

## 2025-03-25 RX ORDER — LIDOCAINE 50 MG/G
1 PATCH TOPICAL
Status: DISCONTINUED | OUTPATIENT
Start: 2025-03-26 | End: 2025-03-31 | Stop reason: HOSPADM

## 2025-03-25 RX ADMIN — PANTOPRAZOLE SODIUM 40 MG: 40 TABLET, DELAYED RELEASE ORAL at 06:03

## 2025-03-25 RX ADMIN — SENNOSIDES AND DOCUSATE SODIUM 1 TABLET: 50; 8.6 TABLET ORAL at 09:03

## 2025-03-25 RX ADMIN — SUCRALFATE 1 G: 1 SUSPENSION ORAL at 04:03

## 2025-03-25 RX ADMIN — AMLODIPINE BESYLATE 10 MG: 10 TABLET ORAL at 09:03

## 2025-03-25 RX ADMIN — BETHANECHOL CHLORIDE 10 MG: 10 TABLET ORAL at 03:03

## 2025-03-25 RX ADMIN — METHOCARBAMOL 500 MG: 500 TABLET ORAL at 09:03

## 2025-03-25 RX ADMIN — OXYCODONE HYDROCHLORIDE AND ACETAMINOPHEN 1 TABLET: 5; 325 TABLET ORAL at 12:03

## 2025-03-25 RX ADMIN — TAMSULOSIN HYDROCHLORIDE 0.4 MG: 0.4 CAPSULE ORAL at 09:03

## 2025-03-25 RX ADMIN — SODIUM CHLORIDE 1000 MG: 1 TABLET ORAL at 09:03

## 2025-03-25 RX ADMIN — HYDRALAZINE HYDROCHLORIDE 75 MG: 25 TABLET ORAL at 09:03

## 2025-03-25 RX ADMIN — ACETAMINOPHEN 1000 MG: 500 TABLET ORAL at 03:03

## 2025-03-25 RX ADMIN — Medication 1 CAPSULE: at 09:03

## 2025-03-25 RX ADMIN — FLUTICASONE PROPIONATE 50 MCG: 50 SPRAY, METERED NASAL at 09:03

## 2025-03-25 RX ADMIN — BETHANECHOL CHLORIDE 10 MG: 10 TABLET ORAL at 09:03

## 2025-03-25 RX ADMIN — ACETAMINOPHEN 1000 MG: 500 TABLET ORAL at 09:03

## 2025-03-25 RX ADMIN — SUCRALFATE 1 G: 1 SUSPENSION ORAL at 09:03

## 2025-03-25 RX ADMIN — OXYCODONE 5 MG: 5 TABLET ORAL at 09:03

## 2025-03-25 RX ADMIN — HYDRALAZINE HYDROCHLORIDE 75 MG: 25 TABLET ORAL at 06:03

## 2025-03-25 RX ADMIN — GABAPENTIN 300 MG: 300 CAPSULE ORAL at 09:03

## 2025-03-25 RX ADMIN — FLUTICASONE FUROATE AND VILANTEROL TRIFENATATE 1 PUFF: 100; 25 POWDER RESPIRATORY (INHALATION) at 09:03

## 2025-03-25 RX ADMIN — POLYETHYLENE GLYCOL 3350 17 G: 17 POWDER, FOR SOLUTION ORAL at 09:03

## 2025-03-25 RX ADMIN — FUROSEMIDE 40 MG: 40 TABLET ORAL at 09:03

## 2025-03-25 RX ADMIN — CETIRIZINE HYDROCHLORIDE 5 MG: 5 TABLET, FILM COATED ORAL at 09:03

## 2025-03-25 RX ADMIN — GUAIFENESIN 200 MG: 200 SOLUTION ORAL at 09:03

## 2025-03-25 RX ADMIN — METHOCARBAMOL 500 MG: 500 TABLET ORAL at 03:03

## 2025-03-25 RX ADMIN — OXYCODONE HYDROCHLORIDE AND ACETAMINOPHEN 1 TABLET: 5; 325 TABLET ORAL at 06:03

## 2025-03-25 NOTE — PLAN OF CARE
Problem: Physical Therapy  Goal: Physical Therapy Goal  Description: Goals to be met by: 4/25/25     Patient will increase functional independence with mobility by performing:    . Supine to sit with Contact Guard Assistance  . Sit to supine with Contact Guard Assistance  . Rolling to Left and Right with Modified Handley.  . Sit to stand transfer with Contact Guard Assistance  . Bed to chair transfer with Contact Guard Assistance using Rolling Walker  . Gait  x 50 feet with Contact Guard Assistance using Rolling Walker.   . Wheelchair propulsion x50 feet with Modified Handley using bilateral uppper extremities    Outcome: Progressing

## 2025-03-25 NOTE — PT/OT/SLP EVAL
Speech Language Pathology  Bedside Swallow Evaluation/Discharge    Bridget Montgomery   MRN: [id number]   Admitting Diagnosis: <principal problem not specified>    Diet recommendations: Solid Diet Level: Soft & Bite Sized Diet - IDDSI Level 6  Liquid Diet Level: Thin liquids - IDDSI Level 0 1 bite/sip at a time, Alternating bites/sips, Avoid talking while eating, Frequent oral care, HOB to 90 degrees, Meds crushed in puree, Monitor for s/s of aspiration, Remain upright 30 minutes post meal, Small bites/sips, and Strict aspiration precautions    SLP Treatment Date: 03/25/25  Speech Start Time: 0836     Speech Stop Time: 0847     Speech Total (min): 11 min       TREATMENT BILLABLE MINUTES:  Eval Swallow and Oral Function 11    Diagnosis: <principal problem not specified>    Past Medical History:   Diagnosis Date    Allergy     Anemia, unspecified     Arthritis     CKD (chronic kidney disease) stage 4, GFR 15-29 ml/min     COPD (chronic obstructive pulmonary disease)     Edema     HTN (hypertension)     Hypocalcemia     Hyponatremia     Osteoarthritis      Past Surgical History:   Procedure Laterality Date    BREAST CYST EXCISION      CAUDAL EPIDURAL STEROID INJECTION N/A 2/2/2023    Procedure: Injection-steroid-epidural-caudal;  Surgeon: Angel Sparrow MD;  Location: [facility];  Service: Pain Management;  Laterality: N/A;    FOOT SURGERY      FUSION OF CERVICAL SPINE BY POSTERIOR APPROACH N/A 3/19/2025    Procedure: FUSION, SPINE, CERVICAL, POSTERIOR APPROACH;  Surgeon: Clint Lemons MD;  Location: [address];  Service: Neurosurgery;  Laterality: N/A;  Occiput-C4       Has the patient been evaluated by SLP for swallowing? : Yes  Keep patient NPO?: No General Precautions: Standard, aspiration, fall (reflux)        Chief Complaint: Spinal stenosis     History of Present Illness: Bridget Montgomery is a 89F PMHx COPD, HTN, HLD, history of TIA, known type 2 odontoid fx followed by Dr. Quach with  "non-surgical management, and R shoulder arthroplasty 06/2024 presenting to [facility] for preoperative traction. She initially presented with a week of severe persistent HA, and diffuse weakness of all extremities. Her symptoms began last Monday with HA that started in her neck and migrated toward the top of her head. She has had chronic neck pain due to cervical stenosis, but the headache was new. On Saturday, she was at her baseline, able to perform all of her ADLs. Around 8 pm, she suddenly became weak in all extremities and was not able to walk. MRI demonstrated chronic type 2 odontoid fracture with progressed retropulsion of the fracture fragment in comparison to prior MRI from 8/11/2023, with moderate to severe canal stenosis at C1-C2. Of note, patient had previously discontinued wearing her cervical collar. Denied radiculopathy in her arms or legs, sensory deficit, bladder or bowel incontinence. Family decided to proceed with surgery. Booked for O-C4 fusion on 3/19/25. Admitted to [facility] for pre-op traction on 3/18.       Prior diet: soft and bite size diet/thin liquids      Subjective:  "I don't have to wear the collar when I eat."        Objective:              Bedside Swallow Eval:   Consistencies Assessed: Thin liquids tea via straw x 2  Soft solids fig mackey bites x 2  Oral Phase: WFL  Pharyngeal Phase: no overt clinical signs/symptoms of aspiration  no overt clinical signs/symptoms of pharyngeal dysphagia    Additional Treatment:  Pt sitting upright in bed with daughter present at bedside. Pt/daughter reported good intake from breakfast meal. Pt was taking small bites of a fig mackey cookie while she sipped on her tea.  Pt and daughter report much improved tolerance and ease of PO intake since GERD medication was restarted yesterday.  No overt s/s of aspiration were observed at this time. SLP reviewed aspiration and reflux precautions recommended given history of esophageal dysphagia a/w GERD and hiatal hernia. " Pt/daughter expressed good understanding.  Pt's daughter reports pt is able to tolerate oral medication administration best when meds are crushed and buried in puree. No further skilled SLP services warranted at this time.       Assessment:  Bridget Montgomery is a 89 y.o. female with a medical diagnosis of <principal problem not specified> and presents with baseline swallowing function. Pt appears appropriate to remain on soft and bite size diet with thin liquids while implementing aspiration and reflux precautions.  Meds should continue to be crushed and buried in puree.   No further skilled SLP services warranted at this time.            Goals:   Multidisciplinary Problems       SLP Goals       Not on file                     Plan:   Patient to be seen    Planned Interventions:    Plan of Care expires:    Plan of Care reviewed with: patient  SLP Follow-up?: No                03/25/2025

## 2025-03-25 NOTE — PT/OT/SLP EVAL
Physical Therapy Evaluation    Patient Name:  Bridget Montgomery   MRN:  [id number]  Admit Date: 3/24/2025  Recent Surgeries:  Procedure(s) (LRB):  FUSION, SPINE, CERVICAL, POSTERIOR APPROACH    General Precautions: Standard, fall, aspiration (reflux)   Orthopedic Precautions: spinal precautions   Braces: Cervical collar (WBAT, Cervical collar at all times for 3 months post-op)    Recommendations:     Discharge Recommendations: home health PT   Level of Assistance Recommended: 24 hours significant assistance  Discharge Equipment Recommendations: none   Barriers to discharge: None    Assessment:     Bridget Montgomery is a 89 y.o. female admitted with a medical diagnosis of Cervical spinal stenosis .  Performance deficits affecting function  weakness, impaired endurance, impaired self care skills, impaired functional mobility, gait instability, impaired balance, decreased upper extremity function, decreased lower extremity function, pain, decreased ROM, impaired skin, impaired cardiopulmonary response to activity, impaired fine motor, orthopedic precautions. Pt and pt's daughter reports that pt was living with one of her daughters and needed some assistance with ADLs and (S) to Mod I with all functional mobility using a rollator. Pt currently has limited upper extremity ROM and MMT d.t previous surgery and what sounds like chronic arthritis in all her joints. Pt needed Mod A for bed mobility, MaxA for transfers and Mod A for GT. Pt is very motivated  to participate with PT and plans on returning home with her daughter.    Rehab Potential is good     Activity Tolerance: Good    Plan:     Patient to be seen 5 x/week to address the above listed problems via gait training, therapeutic activities, therapeutic exercises, neuromuscular re-education, wheelchair management/training     Plan of Care Expires: 04/24/25  Plan of Care Reviewed with: patient, daughter    Subjective     Chief Complaint: pain and  weakness  Patient/Family Comments/goals: gain (I)  Pain/Comfort:  Pain Rating 1: 8/10  Location - Side 1: Bilateral  Location - Orientation 1: generalized  Location 1: cervical spine  Pain Addressed 1: Pre-medicate for activity, Reposition, Distraction, Nurse notified  Pain Rating Post-Intervention 1: 8/10    Living Environment:   Prior to admission, patients level of function was needing assistance for ADLs and (S) for GT with a RW. Pt has a tub/shower combo with bathbench and grab bars.  Equipment used at home:  bathbench, rollator, RW,BSC  DME owned (not currently used): wheelchair.  Upon discharge, patient will have assistance from family.    Patients cultural, spiritual, Rastafarian conflicts given the current situation: no    Objective:     Communicated with pt prior to session.  Patient found HOB elevated with cervical collar  upon PT entry to room.    Exams:  Cognitive Exam:  Patient is oriented to Person, Place, Time, and Situation  Sensation:    -       Impaired  numbness B hands  LUE Strength: WFL  RLE ROM: grossly 4/5  RLE Strength: WFL  LLE ROM: grossly 4/5    Functional Mobility:     03/25/25 1205   Prior Functioning: Everyday Activities   Self Care Needed some help   Indoor Mobility (Ambulation) Needed some help   Stairs Unknown   Functional Cognition Independent   Prior Device Use Walker   Functional Limitation in Range of Motion   Upper Extremity Impairment on both sides   Lower Extremity No impairment   Mobility Devices Walker;Wheelchair (manual or electric)   Roll Left and Right   Was the activity attempted? Yes   Was the activity done independently? No   Assistance Needed Physical assistance   Physical Assistance Level Less than half  (Heriberto using B rails)   Was adaptive equipment used? Yes   CARE Score - Roll Left and Right 3   Sit to Lying   Was the activity attempted? Yes   Was the activity done independently? No   Assistance Needed Physical assistance   Physical Assistance Level More than  half  (Mod A for trunk and B L/E management)   Was adaptive equipment used? Yes   CARE Score - Sit to Lying 2   Lying to Sitting on Side of Bed   Was the activity attempted? Yes   Was the activity done independently? No   Assistance Needed Physical assistance   Physical Assistance Level More than half  (Mod A fro trunk and B l/E management)   Was adaptive equipment used? Yes   CARE Score - Lying to Sitting on Side of Bed 2   Sit to Stand   Was the activity attempted? Yes   Was the activity done independently? No   Assistance Needed Physical assistance   Physical Assistance Level More than half  (MaxA fro bed and w/c with RW)   Was adaptive equipment used? Yes   CARE Score - Sit to Stand 2   Chair/Bed-to-Chair Transfer   Was the activity attempted? Yes   Was the activity done independently? No   Assistance Needed Physical assistance   Physical Assistance Level More than half  (Mod/MaxA SPT with RW)   Was adaptive equipment used? Yes   CARE Score - Chair/Bed-to-Chair Transfer 2   Car Transfer   Reason if not Attempted Environmental limitations   CARE Score - Car Transfer 10   Walk 10 Feet   Was the activity attempted? Yes   Was the activity done independently? No   Assistance Needed Physical assistance   Physical Assistance Level More than half  (21ft +20ft with RW and Mod A d.t pt with FFT, anterior lean, B U/E weakness and pt with tingling, numbness and weakness B hands.)   Was adaptive equipment used? Yes   CARE Score - Walk 10 Feet 2   Walk 50 Feet with Two Turns   Reason if not Attempted Safety concerns   CARE Score - Walk 50 Feet with Two Turns 88   Walk 150 Feet   Reason if not Attempted Safety concerns   CARE Score - Walk 150 Feet 88   Walking 10 Feet on Uneven Surfaces   Reason if not Attempted Safety concerns   CARE Score - Walking 10 Feet on Uneven Surfaces 88   1 Step (Curb)   Reason if not Attempted Safety concerns   CARE Score - 1 Step (Curb) 88   4 Steps   Reason if not Attempted Safety concerns   CARE  Score - 4 Steps 88   12 Steps   Reason if not Attempted Safety concerns   CARE Score - 12 Steps 88   Picking Up Object   Reason if not Attempted Safety concerns   CARE Score - Picking Up Object 88   OTHER   Uses a Wheelchair/Scooter? Yes   Wheel 50 Feet with Two Turns   Reason if not Attempted Safety concerns  (pt became fatigued at 30ft with pt propeling w/c 30ft with (S))   CARE Score - Wheel 50 Feet with Two Turns 88   Wheel 150 Feet   Reason if not Attempted Safety concerns   CARE Score - Wheel 150 Feet 88       Therapeutic Activities and Exercises: Additional time spent on GT, education and transfers.    Education:  Patient provided with daily orientation and goals of this PT session.  Patient educated to transfer to bedside chair/bedside commode/bathroom with RN/PCT present.   Patient educated on Fall risk and plan of care by explanation.   Patient Verbalized Understanding.  Time provided for therapeutic counseling and discussion of current health disposition. All questions answered to satisfaction, within scope of PT practice; voiced no other concerns. White board updated in patient's room, RN notified of session.     AM-PAC 6 CLICK MOBILITY  Total Score:11     Patient left up in chair with call button in reach.    GOALS:   Multidisciplinary Problems       Physical Therapy Goals          Problem: Physical Therapy    Goal Priority Disciplines Outcome Interventions   Physical Therapy Goal     PT, PT/OT Progressing    Description: Goals to be met by: 4/25/25     Patient will increase functional independence with mobility by performing:    . Supine to sit with Contact Guard Assistance  . Sit to supine with Contact Guard Assistance  . Rolling to Left and Right with Modified [place].  . Sit to stand transfer with Contact Guard Assistance  . Bed to chair transfer with Contact Guard Assistance using Rolling Walker  . Gait  x 50 feet with Contact Guard Assistance using Rolling Walker.   . Wheelchair propulsion  x50 feet with Modified [place] using bilateral uppper extremities                         History:     Past Medical History:   Diagnosis Date    Allergy     Anemia, unspecified     Arthritis     CKD (chronic kidney disease) stage 4, GFR 15-29 ml/min     COPD (chronic obstructive pulmonary disease)     Edema     HTN (hypertension)     Hypocalcemia     Hyponatremia     Osteoarthritis        Past Surgical History:   Procedure Laterality Date    BREAST CYST EXCISION      CAUDAL EPIDURAL STEROID INJECTION N/A 2/2/2023    Procedure: Injection-steroid-epidural-caudal;  Surgeon: Angel Sparrow MD;  Location: [facility];  Service: Pain Management;  Laterality: N/A;    FOOT SURGERY      FUSION OF CERVICAL SPINE BY POSTERIOR APPROACH N/A 3/19/2025    Procedure: FUSION, SPINE, CERVICAL, POSTERIOR APPROACH;  Surgeon: Clint Lemons MD;  Location: [facility] OR [address] [place] [address];  Service: Neurosurgery;  Laterality: N/A;  Occiput-C4       Time Tracking:     PT Received On: 03/25/25  PT Start Time: 1026     PT Stop Time: 1116  PT Total Time (min): 50 min     Billable Minutes: Evaluation 25, Gait Training 15, and Therapeutic Activity 10      03/25/2025

## 2025-03-25 NOTE — PLAN OF CARE
Problem: Occupational Therapy  Goal: Occupational Therapy Goal  Description: Goals to be met by: 4/25/2025     Patient will increase functional independence with ADLs by performing:    UE Dressing with Moderate Assistance.  LE Dressing with Moderate Assistance.  Grooming while seated at sink with Minimal Assistance.  Toileting from toilet with Minimal Assistance for hygiene and clothing management.   Bathing from  sitting at sink with Moderate Assistance.  Toilet transfer to toilet with Contact Guard Assistance  and LRAD as needed  Footwear /c Min A and AE as needed    Outcome: Progressing     Pt evaluated and goals set based on performance at admission.

## 2025-03-25 NOTE — PT/OT/SLP EVAL
Occupational Therapy   Evaluation/Treatment    Name: Bridget Montgomery  MRN: [id number]  Admit Date: 3/24/2025  Recent Surgeries: FUSION, SPINE, CERVICAL, POSTERIOR APPROACH      General Precautions: Standard, fall, aspiration  Orthopedic Precautions:spinal precautions   Braces: Cervical collar    Recommendations:     Discharge Recommendations:  (TBD)  Level of Assistance Recommended: 24 hours significant assistance  Discharge Equipment Recommendations:  walker, rolling, hip kit  Barriers to discharge:  None    Assessment:     Bridget Montgomery is a 89 y.o. female with a medical diagnosis of FUSION, SPINE, CERVICAL, POSTERIOR APPROACH. Pt tolerated session fairly, c/o pain in back throughout. Pt noted to have impairment in BUE, leading to increased difficulty /c ADLs. Pt also noted to have buckling and weakness in B knees. Otherwise, Pt tolerated session without incident and shows excellent motivation and potential to improve, but the pt continues to require assistance to perform self-care tasks and mobility. Pt strengths include Functional cognition, Following multi-step tasks, and Attention to task and PLOF, Family support, Motivation, and Willingness to participate.  However, pt would continue to benefit from cont'd OT services in the SNF setting to improve safety and independence /c functional tasks and ADLs upon discharge. Performance deficits affecting function: weakness, impaired endurance, impaired sensation, impaired self care skills, impaired functional mobility, gait instability, impaired balance, decreased coordination, decreased upper extremity function, decreased lower extremity function, pain, decreased ROM, impaired coordination, orthopedic precautions.      Rehab Potential is good    Activity Tolerance: Good    Plan:     Patient to be seen 5 x/week to address the above listed problems via self-care/home management, therapeutic activities, therapeutic exercises, neuromuscular re-education,  community/work re-entry  Plan of Care Expires: 04/25/25  Plan of Care Reviewed with: patient, daughter    Subjective     Chief Complaint: Pain in back and difficulty reaching full extension in B knees when standing  Patient/Family Comments/goals: Improve function and use of BUE    Occupational Profile:  Living Environment: Pt lives /c daughter in SSH /c 0STE, ramp entrance, and t/s combo /c TTB  Previous level of function: Mod I /c rollator for FM, ADLs  Roles and Routines: Enjoys spending time with family and grandchildren, playing solitaire, reading, does not drive, and is retired   Equipment Used at Home: bedside commode, rollator, bath bench, wheelchair  Assistance upon Discharge: (A) from daughter (not 24/7)    Pain/Comfort:  Pain Rating 1: 10/10  Location - Orientation 1: generalized  Location 1: back  Pain Addressed 1: Pre-medicate for activity, Distraction  Pain Rating Post-Intervention 1: 10/10    Patients cultural, spiritual, Scientologist conflicts given the current situation: no    Objective:     Communicated with: NSG prior to session.  Patient found HOB elevated with cervical collar upon OT entry to room. Pt alert and agreeable to therapy.       Occupational Performance:    03/25/25 1446   Prior Functioning: Everyday Activities   Self Care Independent   Indoor Mobility (Ambulation) Needed some help   Stairs Unknown   Functional Cognition Independent   Prior Device Use Walker   Functional Limitation in Range of Motion   Upper Extremity Impairment on both sides   Lower Extremity Impairment on both sides   Mobility Devices Walker;Wheelchair (manual or electric)   Eating   Was the activity attempted? Yes   Was the activity done independently? No   Assistance Needed Physical assistance   Physical Assistance Level More than half  (Per pt report)   Was adaptive equipment used? No   CARE Score - Eating 2   Oral Hygiene   Was the activity attempted? Yes   Was the activity done independently? No   Assistance Needed  Physical assistance   Physical Assistance Level Less than half  (Mod A seated)   Was adaptive equipment used? Yes   CARE Score - Oral Hygiene 3   Toileting Hygiene   Was the activity attempted? Yes   Was the activity done independently? No   Assistance Needed Physical assistance   Physical Assistance Level Total assistance  (seated on raised toilet seat and standing /c CGA/Min A and RW)   Was adaptive equipment used? Yes   CARE Score - Toileting Hygiene 1   Shower/Bathe Self   Was the activity attempted? No   Reason if not Attempted Safety concerns   CARE Score - Shower/Bathe Self 88   Upper Body Dressing   Was the activity attempted? Yes   Was the activity done independently? No   Assistance Needed Physical assistance   Physical Assistance Level Total assistance  (To don pullover shirt)   Was adaptive equipment used? No   CARE Score - Upper Body Dressing 1   Lower Body Dressing   Was the activity attempted? Yes   Was the activity done independently? No   Assistance Needed Physical assistance   Physical Assistance Level More than half  (Max A to don pants. Standing /c CGA/Min A and RW)   Was adaptive equipment used? Yes   CARE Score - Lower Body Dressing 2   Putting On/Taking Off Footwear   Was the activity attempted? Yes   Was the activity done independently? No   Assistance Needed Physical assistance   Physical Assistance Level Total assistance  (Total A to doff/don socks and shoes)   Was adaptive equipment used? No   CARE Score - Putting On/Taking Off Footwear 1   Personal Hygiene   Was the activity attempted? Yes   Was the activity done independently? No   Assistance Needed Physical assistance   Physical Assistance Level Less than half  (Min A to wash hands seated at sink)   Was adaptive equipment used? Yes   CARE Score - Personal Hygiene 3   Sit to Lying   Was the activity attempted? Yes   Was the activity done independently? No   Assistance Needed Physical assistance   Physical Assistance Level More than  half  (Max A /c HOB elevated)   Was adaptive equipment used? Yes   CARE Score - Sit to Lying 2   Sit to Stand   Was the activity attempted? Yes   Was the activity done independently? No   Assistance Needed Physical assistance   Physical Assistance Level More than half  (Mod A /c RW)   Was adaptive equipment used? Yes   CARE Score - Sit to Stand 2   Chair/Bed-to-Chair Transfer   Was the activity attempted? Yes   Was the activity done independently? No   Assistance Needed Physical assistance   Physical Assistance Level More than half  (Mod A /c RW)   Was adaptive equipment used? Yes   CARE Score - Chair/Bed-to-Chair Transfer 2   Toilet Transfer   Was the activity attempted? Yes   Was the activity done independently? No   Assistance Needed Physical assistance   Physical Assistance Level More than half  (Mod A /c RW to raised toilet seat)   Was adaptive equipment used? Yes   CARE Score - Toilet Transfer 2   Tub/Shower Transfer   Was the activity attempted? No   Reason if not Attempted Safety concerns   CARE Score - Tub/Shower Transfer 88       Cognitive/Visual Perceptual:  Cognitive/Psychosocial Skills:  -       Oriented to: Person, Place, Time, and Situation   -       Follows Commands/attention:Follows multistep  commands  -       Communication: clear/fluent  -       Memory: No Deficits noted  -       Safety awareness/insight to disability: intact   -       Mood/Affect/Coping skills/emotional control: Appropriate to situation    Physical Exam:  Balance:    -       Sitting balance- SBA/CGA; Standing balance- CGA/Min A  Postural examination/scapula alignment: -       Rounded shoulders  -       Forward head  -       Posterior pelvic tilt  -       Kyphosis  Motor Planning: -       WFL  Dominant hand: -       R  Upper Extremity Range of Motion:  -       Right Upper Extremity: Deficits: 3-/5 grossly  -       Left Upper Extremity: Deficits:  3-/5 grossly   Strength: -       Right Upper Extremity: Deficits:   -       Left  Upper Extremity: Deficits:   Gross motor coordination: WFL    AMPAC 6 Click ADL:  AMPAC Total Score: 12    Treatment & Education:  Pt educated on POC, role of OT, safety, precautions, and proper body mechanics for maintenance of precautions. Pt performed tasks to improve safety and independence in functional tasks and ADLs as mentioned above.       Patient left HOB elevated with call button in reach, daughter present, and all needs met    GOALS:   Multidisciplinary Problems       Occupational Therapy Goals          Problem: Occupational Therapy    Goal Priority Disciplines Outcome Interventions   Occupational Therapy Goal     OT, PT/OT Progressing    Description: Goals to be met by: 4/25/2025     Patient will increase functional independence with ADLs by performing:    UE Dressing with Moderate Assistance.  LE Dressing with Moderate Assistance.  Grooming while seated at sink with Minimal Assistance.  Toileting from toilet with Minimal Assistance for hygiene and clothing management.   Bathing from  sitting at sink with Moderate Assistance.  Toilet transfer to toilet with Contact Guard Assistance  and LRAD as needed  Footwear /c Min A and AE as needed                         History:     Past Medical History:   Diagnosis Date    Allergy     Anemia, unspecified     Arthritis     CKD (chronic kidney disease) stage 4, GFR 15-29 ml/min     COPD (chronic obstructive pulmonary disease)     Edema     HTN (hypertension)     Hypocalcemia     Hyponatremia     Osteoarthritis          Past Surgical History:   Procedure Laterality Date    BREAST CYST EXCISION      CAUDAL EPIDURAL STEROID INJECTION N/A 2/2/2023    Procedure: Injection-steroid-epidural-caudal;  Surgeon: Angel Sparrow MD;  Location: [facility];  Service: Pain Management;  Laterality: N/A;    FOOT SURGERY      FUSION OF CERVICAL SPINE BY POSTERIOR APPROACH N/A 3/19/2025    Procedure: FUSION, SPINE, CERVICAL, POSTERIOR APPROACH;  Surgeon: Clint Lemons MD;   Location: [facility] OR [address] [place] [address];  Service: Neurosurgery;  Laterality: N/A;  Occiput-C4       Time Tracking:     OT Date of Treatment: 03/25/25  OT Start Time: 1354  OT Stop Time: 1441  OT Total Time (min): 47 min    Billable Minutes:Evaluation 15  Self Care/Home Management 32    3/25/2025

## 2025-03-25 NOTE — PROGRESS NOTES
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 Lemons.  Admission to SNF for secondary weakness and debility.    Patient has had a known type 2 odontoid fracture since July of 2023.  She had been treated nonoperative management, wearing a C-collar on and off. She presented to [facility] ED on 03/08 with a week of severe persistent HA, and diffuse weakness of all extremities. Her symptoms began last Monday with HA that started in her neck and migrated toward the top of her head. She has had chronic neck pain due to cervical stenosis, but the headache was new. On Saturday, she was at her baseline, able to perform all of her ADLs. Around 8 pm, she suddenly became weak in all extremities and was not able to walk. MRI demonstrated chronic type 2 odontoid fracture with progressed retropulsion of the fracture fragment in comparison to prior MRI from 8/11/2023, with moderate to severe canal stenosis at C1-C2. Of note, patient had previously discontinued wearing her cervical collar. Denied radiculopathy in her arms or legs, sensory deficit, bladder or bowel incontinence.  Patient was transferred to [facility] for surgical evaluation.  Patient was taken to the OR with Neurosurgery on 03/19 for O-C4 fusion.  No intraop complications noted,  mL, skin closed  with staples.  Patient has developed leukocytosis postoperatively, treated with vancomycin then cefazolin.  Leukocytosis resolved.  Had some urinary retention.  Patient was evaluated by PT/OT who recommended for moderate intensity therapy, patient was transferred to SNF.     Today, patient reports pain to her neck, bilateral shoulders, lower back and bilateral knees.  Patient has been compliant with her C-collar.  Patient is also having trouble swallowing, she has been off of her home omeprazole, family to bring in.  Patient reports a decreased appetite but she is eating an adequate amount with family's help in feeding her.  She had a bowel movement this morning.    Patient will be treated at [facility] with PT and OT to improve functional status and ability to perform ADLs.     Past Medical History: Patient has a past medical history of Allergy, Anemia, unspecified, Arthritis, CKD (chronic kidney disease) stage 4, GFR 15-29 ml/min, COPD (chronic obstructive pulmonary disease), Edema, HTN (hypertension), Hypocalcemia, Hyponatremia, and Osteoarthritis.    Past Surgical History: Patient has a past surgical history that includes Breast cyst excision; Foot surgery; Caudal epidural steroid injection (N/A, 2/2/2023); and Fusion of cervical spine by posterior approach (N/A, 3/19/2025).    Social History: Patient reports that she has quit smoking. She has never used smokeless tobacco. She reports that she does not currently use alcohol. She reports that she does not use drugs.    Family History: family history includes Cancer in her mother; No Known Problems in her father.    Allergies: Patient is allergic to cephalexin, codeine, meperidine, nsaids (non-steroidal anti-inflammatory drug), penicillins, and tazarotene.    ROS  Constitutional: Negative for fever   Respiratory: Negative for shortness of breath.  Postnasal drip, productive cough   Cardiovascular: Negative for chest pain, palpitations, and leg swelling.  "  Gastrointestinal: Negative for abdominal pain, diarrhea, nausea, vomiting.  +intermittent constipation, indigestion  Genitourinary: Negative for dysuria, frequency   Musculoskeletal:  + generalized weakness.  +pain to neck, bilateral shoulders, lower back, bilateral knees  Skin: Negative for itching and rash.   Neurological: Negative for dizziness, headaches.   Psychiatric/Behavioral: Negative for depression. The patient is not nervous/anxious.      24 hour Vital Sign Range   Temp:  [97.4 °F (36.3 °C)-97.7 °F (36.5 °C)]   Pulse:  [64-76]   Resp:  [16-20]   BP: (101-151)/(58-67)   SpO2:  [92 %-96 %]     Current BMI: Body mass index is 23.55 kg/m².    PEx  Constitutional: Patient appears frail, debilitated.  No distress noted  Neck:  C-collar in place  Cardiovascular: Normal rate, regular rhythm and normal heart sounds.    Pulmonary/Chest: Effort normal and breath sounds are clear  Abdominal: Soft. Bowel sounds are normal.   Musculoskeletal: Normal range of motion.   Neurological: Alert and oriented to person, place, and time.   Psychiatric: Normal mood and affect. Behavior is normal.   Skin: Skin is warm and dry.  Multiple areas of ecchymosis    No results for input(s): "GLUCOSE", "NA", "K", "CL", "CO2", "BUN", "CREATININE", "CALCIUM", "MG" in the last 24 hours.  No results for input(s): "WBC", "RBC", "HGB", "HCT", "PLT", "MCV", "MCH", "MCHC" in the last 24 hours.  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 Multilevel foraminal " "stenosis.   - Cont C-collar (ok to remove for eating and sleeping) x 3 months   - PT/OT  - initiated DVT PPX with Lovenox 30  - Monitor and report drainage to incisional site  - maintain surgical dressing until 4/2  - Fall precautions  - Bowel regimen in place, hold for loose or frequent stools  - follow-up with NSGY on 5/1    Acute postoperative pain  - continue methocarbamol TID, gabapentin 300 mg qHS., Percocet 5-325 q.6 hours PRN    Essential hypertension  - BP soft, discontinuing newly prescribed clonidine patch 0.1 mg Q7days  - continue amlodipine 10 mg daily, Lasix 40 mg daily, hydralazine 75 mg q.8 hours, holding parameters added    Emphysema  COPD  - not on home oxygen  - continue Breo Ellipta place of non formulary home inhaler    Chronic kidney disease, stage III  - stable, continue monitor twice weekly BMPs, avoid nephrotoxic agents, renally dose medication when appropriate    Acute blood loss anemia  - transfuse for hemoglobin < 7  - continue monitor twice weekly CBC    Mixed hyperlipidemia  -  wrote "holding statin with elevatd CPK"- CPK now WNL  - per pt she's not not taking a statin nor is one on her recent dispensed report     Urinary retention  - required straight catheterization on 03/22 and 3/23  -  started on flomax and bethanechol at [facility], continue for now  - bladder scan PRN    GERD  - continue Protonix for now  - will order home omeprazole once patient's daughter brings patient's home bottle in  - per nursing, patient's daughter was giving patient her own omeprazole (rather than the pts bottle)    Debility   - Continue with PT/OT for gait training and strengthening and restoration of ADL's   - Encourage mobility, OOB in chair, and early ambulation as appropriate  - Fall precautions   - Monitor for bowel and bladder dysfunction  - Monitor for and prevent skin breakdown and pressure ulcers  - initiated DVT prophylaxis with Lovenox    Psychiatric Assessment  Patient Health Screening " (PHQ-2)    Date: 3/25/2025      Over the last two weeks how often have you been bothered by the following problems?     1. Little interest or pleasure in doing things? 0  2. Feeling down, depressed, or hopeless? 0    0 = not at all   1 = several days   2 =  more than half the days   3 = nearly every day    Score: 0  Screening is Negative           Anticipate disposition:  Home with home health    IP [facility] RISK OF UNPLANNED READMISSION Model: HIGH    Follow-up needed during SNF stay-    Follow-up needed after discharge from SNF: PCP     Future Appointments   Date Time Provider Department Center   4/2/2025  9:00 AM Marlee Hidalgo, MARIAMA [facility]   5/1/2025  9:45 AM [facility]   5/1/2025 10:30 AM Katie Castillo NP [facility]   6/16/2025 11:00 AM Lluvia Rahman MD [facility]     Advance Care Planning   This was a voluntary visit in the option to decline counseling was given  Length of Conversation:  18 minutes  Topics Discussed: Disease process of advanced age, multiple comorbidities  Overview of Materials/Resources given(if applicable):  Discussed code status.  Discussed what it means to be DNR.  Discussed what resuscitative measures are in detail.  Statistics given on resuscitation success rate.  All topics recapped and questions answered.  Outcome of Discussion:  Patient would like to remain full code  Individuals present:  Myself, patient, patient's daughter  Decision Maker: Bridget Montgomery, patient    I certify that SNF services are required to be given on an inpatient basis because Bridget Montgomery needs for skilled nursing care and/or skilled rehabilitation are required on a daily basis and such services can only practically be provided in a skilled nursing facility setting and are for an ongoing condition for which she received inpatient care in the hospital.     Total time of the visit 135 minutes (does not include ACP time)  Description of non  physical exam/non charting time: counseling patient on clinical conditions and therapies provided.  Extensive chart review completed including all consultation notes.  All pertinent laboratory and radiographical images reviewed.        Lorena Akins NP  Department of Hospital Medicine   [facility]     DOS: 3/25/2025       Patient note was created using odal Dictation.  Any errors in syntax or even information may not have been identified and edited on initial review prior to signing this note.

## 2025-03-26 PROCEDURE — 25000003 PHARM REV CODE 250: Performed by: NURSE PRACTITIONER

## 2025-03-26 PROCEDURE — 63600175 PHARM REV CODE 636 W HCPCS: Performed by: NURSE PRACTITIONER

## 2025-03-26 PROCEDURE — 25000003 PHARM REV CODE 250: Performed by: HOSPITALIST

## 2025-03-26 PROCEDURE — 97535 SELF CARE MNGMENT TRAINING: CPT

## 2025-03-26 PROCEDURE — 97116 GAIT TRAINING THERAPY: CPT | Mod: CQ

## 2025-03-26 PROCEDURE — 97110 THERAPEUTIC EXERCISES: CPT

## 2025-03-26 PROCEDURE — 99306 1ST NF CARE HIGH MDM 50: CPT | Mod: NSCH,,, | Performed by: HOSPITALIST

## 2025-03-26 PROCEDURE — 11000004 HC SNF PRIVATE

## 2025-03-26 PROCEDURE — 97530 THERAPEUTIC ACTIVITIES: CPT | Mod: CQ

## 2025-03-26 RX ORDER — OMEPRAZOLE 20 MG/1
20 CAPSULE, DELAYED RELEASE ORAL
Status: DISCONTINUED | OUTPATIENT
Start: 2025-03-26 | End: 2025-03-26

## 2025-03-26 RX ORDER — OMEPRAZOLE 20 MG/1
40 CAPSULE, DELAYED RELEASE ORAL
Status: DISCONTINUED | OUTPATIENT
Start: 2025-03-27 | End: 2025-03-27

## 2025-03-26 RX ORDER — HYDRALAZINE HYDROCHLORIDE 50 MG/1
50 TABLET, FILM COATED ORAL EVERY 8 HOURS
Status: DISCONTINUED | OUTPATIENT
Start: 2025-03-26 | End: 2025-03-31 | Stop reason: HOSPADM

## 2025-03-26 RX ORDER — SUCRALFATE 1 G/10ML
1 SUSPENSION ORAL
Status: DISCONTINUED | OUTPATIENT
Start: 2025-03-26 | End: 2025-03-31 | Stop reason: HOSPADM

## 2025-03-26 RX ORDER — PREGABALIN 50 MG/1
50 CAPSULE ORAL NIGHTLY
Status: DISCONTINUED | OUTPATIENT
Start: 2025-03-26 | End: 2025-03-27

## 2025-03-26 RX ORDER — ENOXAPARIN SODIUM 100 MG/ML
30 INJECTION SUBCUTANEOUS EVERY 24 HOURS
Status: DISCONTINUED | OUTPATIENT
Start: 2025-03-26 | End: 2025-03-31 | Stop reason: HOSPADM

## 2025-03-26 RX ADMIN — OXYCODONE 5 MG: 5 TABLET ORAL at 10:03

## 2025-03-26 RX ADMIN — SUCRALFATE 1 G: 1 SUSPENSION ORAL at 06:03

## 2025-03-26 RX ADMIN — METHOCARBAMOL 500 MG: 500 TABLET ORAL at 10:03

## 2025-03-26 RX ADMIN — SODIUM CHLORIDE 1000 MG: 1 TABLET ORAL at 10:03

## 2025-03-26 RX ADMIN — GUAIFENESIN 200 MG: 200 SOLUTION ORAL at 03:03

## 2025-03-26 RX ADMIN — GUAIFENESIN 200 MG: 200 SOLUTION ORAL at 10:03

## 2025-03-26 RX ADMIN — ENOXAPARIN SODIUM 30 MG: 30 INJECTION SUBCUTANEOUS at 04:03

## 2025-03-26 RX ADMIN — AMLODIPINE BESYLATE 10 MG: 10 TABLET ORAL at 10:03

## 2025-03-26 RX ADMIN — CETIRIZINE HYDROCHLORIDE 5 MG: 5 TABLET, FILM COATED ORAL at 10:03

## 2025-03-26 RX ADMIN — LIDOCAINE 1 PATCH: 50 PATCH CUTANEOUS at 10:03

## 2025-03-26 RX ADMIN — ACETAMINOPHEN 1000 MG: 500 TABLET ORAL at 06:03

## 2025-03-26 RX ADMIN — Medication 1 CAPSULE: at 10:03

## 2025-03-26 RX ADMIN — ACETAMINOPHEN 1000 MG: 500 TABLET ORAL at 03:03

## 2025-03-26 RX ADMIN — SENNOSIDES AND DOCUSATE SODIUM 1 TABLET: 50; 8.6 TABLET ORAL at 10:03

## 2025-03-26 RX ADMIN — SUCRALFATE 1 G: 1 SUSPENSION ORAL at 10:03

## 2025-03-26 RX ADMIN — SUCRALFATE 1 G: 1 SUSPENSION ORAL at 04:03

## 2025-03-26 RX ADMIN — OMEPRAZOLE 20 MG: 20 CAPSULE, DELAYED RELEASE ORAL at 06:03

## 2025-03-26 RX ADMIN — PREGABALIN 50 MG: 50 CAPSULE ORAL at 10:03

## 2025-03-26 RX ADMIN — FLUTICASONE PROPIONATE 50 MCG: 50 SPRAY, METERED NASAL at 10:03

## 2025-03-26 RX ADMIN — ACETAMINOPHEN 1000 MG: 500 TABLET ORAL at 10:03

## 2025-03-26 RX ADMIN — BETHANECHOL CHLORIDE 10 MG: 10 TABLET ORAL at 03:03

## 2025-03-26 RX ADMIN — BETHANECHOL CHLORIDE 10 MG: 10 TABLET ORAL at 10:03

## 2025-03-26 RX ADMIN — POLYETHYLENE GLYCOL 3350 17 G: 17 POWDER, FOR SOLUTION ORAL at 10:03

## 2025-03-26 RX ADMIN — FUROSEMIDE 40 MG: 40 TABLET ORAL at 10:03

## 2025-03-26 RX ADMIN — TAMSULOSIN HYDROCHLORIDE 0.4 MG: 0.4 CAPSULE ORAL at 10:03

## 2025-03-26 RX ADMIN — METHOCARBAMOL 500 MG: 500 TABLET ORAL at 03:03

## 2025-03-26 RX ADMIN — HYDRALAZINE HYDROCHLORIDE 50 MG: 50 TABLET ORAL at 03:03

## 2025-03-26 RX ADMIN — FLUTICASONE FUROATE AND VILANTEROL TRIFENATATE 1 PUFF: 100; 25 POWDER RESPIRATORY (INHALATION) at 10:03

## 2025-03-26 NOTE — PT/OT/SLP PROGRESS
Occupational Therapy   Treatment    Name: Bridget Montgomery  MRN: [id number]  Admit Date: 3/24/2025  Admitting Diagnosis:  Cervical spinal stenosis    General Precautions: Standard, fall, aspiration   Orthopedic Precautions: spinal precautions   Braces: Cervical collar    Recommendations:     Discharge Recommendations:   (TBD)  Level of Assistance Recommended at Discharge: 24 hours physical assistance for all ADL's and home management tasks  Discharge Equipment Recommendations: walker, rolling, hip kit  Barriers to discharge:  None    Assessment:     Bridget Montgomery is a 89 y.o. female with a medical diagnosis of Cervical spinal stenosis .  She presents with performance deficits affecting function are weakness, impaired endurance, impaired sensation, impaired self care skills, impaired functional mobility, gait instability, impaired balance, decreased coordination, decreased upper extremity function, decreased lower extremity function, pain, decreased ROM, impaired coordination, orthopedic precautions.   Pt tolerated Tx without incident and is making progress but continues to require assist to perform self care tasks, functional mobility and functional transfers .  She would continue to benefit from OT intervention to further her functional (I)ce and safety.     Rehab Potential is good    Activity tolerance:  Good    Plan:     Patient to be seen 5 x/week to address the above listed problems via self-care/home management, therapeutic activities, therapeutic exercises, neuromuscular re-education, community/work re-entry    Plan of Care Expires: 04/25/25  Plan of Care Reviewed with: patient, daughter    Subjective     Communicated with: nurse prior to session.  .    Pain/Comfort:  Pain Rating 1: 9/10  Location - Orientation 1: generalized  Location 1: back  Pain Addressed 1: Pre-medicate for activity, Reposition, Distraction, Cessation of Activity  Pain Rating Post-Intervention 1: 9/10    Patient's cultural,  spiritual, Sikhism conflicts given the current situation:  no    Objective:     Patient found up in chair with cervical collar upon OT entry to room.    Bed Mobility:    Pt seated in W/C at onset of therapy session.      Functional Mobility/Transfers:  Patient completed Sit <> Stand Transfer with moderate assistance  with  rolling walker   Patient completed Bed <> Chair Transfer using Step Transfer technique with moderate assistance with rolling walker    Activities of Daily Living:  Lower Body Dressing: moderate assistance with Pt introduced to using hip kit to don/doff pants, socks and tennis. Pt's daughter given handout with hip kit components listed and site to obtain kit.     [facility] 6 Click ADL: 12    OT Exercises: Pt performed UBE exercise for 10 minutes with  Mod resistance.  UE exercises performed to increase functional endurance and strength in order increase independence when performing self care tasks, functional ambulation, W/C propulsion, and functional standing activities .     Treatment & Education:  Pt edu on POC, safety when performing self care tasks , safety when performing functional transfers and mobility.  - White board updated  - Self care tasks completed-- as noted above      Patient left up in chair with call button in reach and daughter present    GOALS:   Multidisciplinary Problems       Occupational Therapy Goals          Problem: Occupational Therapy    Goal Priority Disciplines Outcome Interventions   Occupational Therapy Goal     OT, PT/OT Progressing    Description: Goals to be met by: 4/25/2025     Patient will increase functional independence with ADLs by performing:    UE Dressing with Moderate Assistance.--Ongoing  LE Dressing with Moderate Assistance.--Ongoing  Grooming while seated at sink with Minimal Assistance.--Ongoing  Toileting from toilet with Min Assistance for hygiene and clothing management.--Ongoing   Bathing from  sitting at sink with Moderate  Assistance.--Ongoing  Toilet transfer to toilet with Contact Guard Assistance  and LRAD as needed--Ongoing  Footwear /c Min A and AE as needed---Ongoing                         Time Tracking:     OT Date of Treatment: 03/26/25  OT Start Time: 1353    OT Stop Time: 1435  OT Total Time (min): 42 min    Billable Minutes:Self Care/Home Management 32  Therapeutic Exercise 10    3/26/2025

## 2025-03-26 NOTE — CLINICAL REVIEW
Clinical Pharmacy Chart Review Note      Admit Date: 3/24/2025   LOS: 2 days       Bridget Montgomery is a 89 y.o. female admitted to [facility] for PT/OT after hospitalization for cervical spinal stenosis.    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]  Yes    Closed odontoid fracture with routine healing [S12.100D]  Not Applicable    CKD (chronic kidney disease) stage 4, GFR 15-29 ml/min [N18.4]  Yes    Neuralgia of right sciatic nerve [M54.31]  Yes    Other emphysema [J43.8]  Yes     Chronic     - remote history of smoking      Allergic rhinitis [J30.9]  Yes    Mixed hyperlipidemia [E78.2]  Yes    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     Review of patient's allergies indicates:   Allergen Reactions    Cephalexin     Codeine     Meperidine      Other reaction(s): delerium, hallucination  Delerium  Delerium  Delerium      Nsaids (non-steroidal anti-inflammatory drug)     Penicillins     Tazarotene      Other reaction(s): rash, Unknown     Patient Active Problem List    Diagnosis Date Noted    Hyponatremia 03/25/2025    Anemia 03/23/2025    Urinary retention 03/22/2025    TIA (transient ischemic attack) 03/09/2025    Weakness 03/09/2025    Cervical spinal stenosis 03/09/2025    Difficulty with speech 03/08/2025    Generalized weakness 11/15/2023    Blurry vision 11/15/2023    Other cerebral infarction 11/11/2023    Diplopia 11/11/2023    Chronic constipation 10/24/2023    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     Closed fracture of proximal end of left humerus with routine healing 08/21/2023    Diabetes 1.5, 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    Closed odontoid fracture with routine healing 07/24/2023    Coccyalgia 07/24/2023    Leg wound, right 06/27/2023    Acute pancreatitis without infection or necrosis 06/20/2023    CKD (chronic kidney disease) stage 4, GFR 15-29 ml/min 06/20/2023    Non-healing wound of right lower extremity 05/12/2023    Acute chorea 04/13/2023    Lumbar radiculopathy 01/25/2023    Greater trochanteric bursitis 12/08/2020    Osteoarthritis of both knees 12/08/2020    Spinal stenosis 07/07/2020    Senile purpura 07/07/2020    Primary localized osteoarthritis of pelvic region and thigh 07/07/2020    Idiopathic osteoarthritis 07/07/2020    Osteopenia 07/07/2020    Neuralgia of right sciatic nerve 07/07/2020    Mixed hyperlipidemia 07/07/2020    Mild recurrent major depression 07/07/2020    Diabetic renal disease 07/07/2020    Other emphysema 07/07/2020    Allergic rhinitis 07/07/2020    Ovarian mass 03/22/2019    Ulcer of esophagus 01/29/2019    Lumbar spondylosis 08/17/2018    Gastro-esophageal reflux disease without esophagitis 03/01/2016    Type 2 diabetes mellitus with diabetic chronic kidney disease 02/23/2016    Essential hypertension 02/23/2016    Chronic kidney disease, stage III (moderate) 08/20/2013       Scheduled Meds:    acetaminophen  1,000 mg Oral Q8H    amLODIPine  10 mg Oral Daily    bethanechol  10 mg Oral TID    cetirizine  5 mg Oral QHS    fluticasone furoate-vilanteroL  1 puff Inhalation Daily    fluticasone propionate  1 spray Each Nostril BID    furosemide  40 mg Oral Daily    gabapentin  300 mg Oral QHS    guaiFENesin 100 mg/5 ml  200 mg Oral TID    hydrALAZINE  75 mg Oral Q8H    LIDOcaine  1 patch Transdermal Q24H    methocarbamoL  500 mg Oral TID    omega 3-dha-epa-fish oil  1 capsule Oral Daily    omeprazole  20 mg Oral Before breakfast    polyethylene glycol  17 g Oral Daily     senna-docusate 8.6-50 mg  1 tablet Oral BID    sodium chloride  1,000 mg Oral BID    sucralfate  1 g Oral QID (AC & HS)    tamsulosin  0.4 mg Oral Daily     Continuous Infusions:   PRN Meds:   Current Facility-Administered Medications:     acetaminophen, 650 mg, Oral, Q6H PRN    albuterol, 2 puff, Inhalation, Q6H PRN    calcium carbonate, 500 mg, Oral, BID PRN    melatonin, 6 mg, Oral, Nightly PRN    oxyCODONE, 5 mg, Oral, Q6H PRN    OBJECTIVE:     Vital Signs (Last 24H)  Temp:  [97.4 °F (36.3 °C)-98.2 °F (36.8 °C)]   Pulse:  [60-64]   Resp:  [17-20]   BP: (118-133)/(57-62)   SpO2:  [94 %-96 %]     Laboratory:  CBC:   Recent Labs   Lab 03/23/25  0539 03/23/25  1212 03/24/25  0507   WBC 7.47 16.37* 9.16   RBC 2.61* 3.26* 2.80*   HGB 7.5* 9.3* 8.0*   HCT 23.7* 29.7* 25.1*    275 265   MCV 91 91 90   MCH 28.7 28.5 28.6   MCHC 31.6* 31.3* 31.9*     BMP:   Recent Labs   Lab 03/19/25  1302 03/19/25  1302 03/20/25  0003 03/21/25  0518 03/22/25  0634 03/23/25  0539 03/24/25  0507   *  --  108 93  --   --   --      --  138 136 137 136 135*   K 4.6  4.6  --  4.6 3.9 3.9 3.7 3.9     --  107 106 107 107 104   CO2 21*  --  21* 20* 23 20* 23   BUN 43*  --  43* 48* 44* 38* 35*   CREATININE 1.1  --  1.2 1.2 1.1 1.0 1.1   CALCIUM 7.5*  --  7.6* 8.1* 7.9* 7.9* 8.7   MG  --    < > 2.9* 2.6 2.5 2.2 2.2    < > = values in this interval not displayed.     CMP:   Recent Labs   Lab 03/19/25  1302 03/19/25  1302 03/20/25  0003 03/21/25  0518 03/22/25  0634 03/22/25  0634 03/23/25  0539 03/24/25  0507   *  --  108 93  --   --   --   --    CALCIUM 7.5*  --  7.6* 8.1* 7.9*   < > 7.9* 8.7   ALBUMIN  --    < > 2.5* 2.3* 2.1*  --  1.9* 2.0*   PROT  --   --  5.3* 5.0*  --   --   --   --      --  138 136 137  --  136 135*   K 4.6  4.6  --  4.6 3.9 3.9  --  3.7 3.9   CO2 21*  --  21* 20* 23  --  20* 23     --  107 106 107  --  107 104   BUN 43*  --  43* 48* 44*  --  38* 35*   CREATININE 1.1  --  1.2 1.2  1.1  --  1.0 1.1   ALKPHOS  --    < > 140 123 119  --  116 131   ALT  --    < > 41 36 17  --  9* 6*   AST  --    < > 55* 41* 26  --  25 19   BILITOT  --    < > 0.2 0.1 0.1  --  0.1 0.2    < > = values in this interval not displayed.     LFTs:   Recent Labs   Lab 03/20/25  0003 03/21/25  0518 03/22/25  0634 03/23/25  0539 03/24/25  0507   ALT 41 36 17 9* 6*   AST 55* 41* 26 25 19   ALKPHOS 140 123 119 116 131   BILITOT 0.2 0.1 0.1 0.1 0.2   PROT 5.3* 5.0*  --   --   --    ALBUMIN 2.5* 2.3* 2.1* 1.9* 2.0*       ASSESSMENT/PLAN:     I have reviewed the medications in compliance with CMS Regulation F756 of the [employer]. No irregularities were noted at this time.    Medications reviewed by PharmD, please re-consult if needed.      Binta Randolph, Pharm. D.  Clinical Pharmacist  [facility]

## 2025-03-26 NOTE — PLAN OF CARE
Recommendation/Intervention:   1) Continue current soft and bite sized diet per SLP recs, encourage PO intake, honor food preferences as able, provide assistance with meals as needed  2) RD to monitor and follow-up as needed     Goals: Meet % of EEN/EPN by next RD follow-up  Nutrition Goal Status: new  Communication of RD Recs: other (comment) (POC)

## 2025-03-26 NOTE — H&P
"Hospital Medicine  Skilled Nursing Facility   History and Physical Exam      Date of Service: 03/26/2025      Patient Name: Bridget Montgomery  MRN: [id number]  Admission Date: 3/24/2025  Attending Physician: Brayan Ponce MD  Primary Care Provider: No primary care provider on file.  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 Lemons.  Admission to SNF for secondary weakness and debility.     Patient has had a known type 2 odontoid fracture since July of 2023.  She had been treated nonoperative management, wearing a C-collar on and off. She presented to [facility] ED on 03/08 with a week of severe persistent HA, and diffuse weakness of all extremities. Her symptoms began last Monday with HA that started in her neck and migrated toward the top of her head. She has had chronic neck pain due to cervical stenosis, but the headache was new. On Saturday, she was at her baseline, able to perform all of her ADLs. Around 8 pm, she suddenly became weak in all extremities and was not able to walk. MRI demonstrated chronic type 2 odontoid fracture with progressed retropulsion of the fracture fragment in comparison to prior MRI from 8/11/2023, with moderate to severe canal stenosis at C1-C2. Of note, patient had previously discontinued wearing her cervical collar. Denied radiculopathy in her arms or legs, sensory deficit, bladder or bowel " "incontinence.  Patient was transferred to [facility] for surgical evaluation.  Patient was taken to the OR with Neurosurgery on 03/19 for O-C4 fusion.  No intraop complications noted,  mL, skin closed with staples.  Patient has developed leukocytosis postoperatively, treated with vancomycin then cefazolin.  Leukocytosis resolved.  Had some urinary retention.  Patient was evaluated by PT/OT who recommended for moderate intensity therapy, patient was transferred to SNF.     Today, patient reports pain to her neck, bilateral shoulders, lower back and bilateral knees.  Patient has been compliant with her C-collar.  Patient is also having trouble swallowing, she has been off of her home omeprazole, family to bring in.  Patient reports a decreased appetite but she is eating an adequate amount with family's help in feeding her.  She had a bowel movement this morning." Per Lorena Akins NP on 3/25/25.     She is sitting up in wheelchair currently. She is complaining of some neck soreness and back pain, but most pain is with swallowing related to esophagitis. She missed a couple doses of her home omeprazole and feels that that reflux has gotten worse. She was started on sucralfate yesterday and resumed her home omeprazole. She says that the sucralfate has helped a little, but she is still having trouble swallowing. She is working with therapy well and walked 15 feet and 14 feet with RW and modA. She denies any nausea or vomiting. She denies any shortness of breath. She does report some post-nasal drip that leads to a cough occasionally.     Patient admitted with skilled services with PT and OT to improve functional status and ability to perform ADLs.       Past Medical History:   Past Medical History:   Diagnosis Date    Allergy     Anemia, unspecified     Arthritis     CKD (chronic kidney disease) stage 4, GFR 15-29 ml/min     COPD (chronic obstructive pulmonary disease)     Edema     HTN (hypertension)     " Hypocalcemia     Hyponatremia     Osteoarthritis        Past Surgical History:   Past Surgical History:   Procedure Laterality Date    BREAST CYST EXCISION      CAUDAL EPIDURAL STEROID INJECTION N/A 2/2/2023    Procedure: Injection-steroid-epidural-caudal;  Surgeon: Angel Sparrow MD;  Location: [facility];  Service: Pain Management;  Laterality: N/A;    FOOT SURGERY      FUSION OF CERVICAL SPINE BY POSTERIOR APPROACH N/A 3/19/2025    Procedure: FUSION, SPINE, CERVICAL, POSTERIOR APPROACH;  Surgeon: Clint Lemons MD;  Location: [address];  Service: Neurosurgery;  Laterality: N/A;  Occiput-C4       Social History:   Tobacco Use    Smoking status: Former    Smokeless tobacco: Never   Substance and Sexual Activity    Alcohol use: Not Currently    Drug use: Never    Sexual activity: Not Currently       Family History:   Family History       Problem Relation (Age of Onset)    Cancer Mother    No Known Problems Father            No current facility-administered medications on file prior to encounter.     Current Outpatient Medications on File Prior to Encounter   Medication Sig    albuterol (PROVENTIL/VENTOLIN HFA) 90 mcg/actuation inhaler Inhale 2 puffs into the lungs every 6 (six) hours as needed.    amLODIPine (NORVASC) 10 MG tablet Take 1 tablet (10 mg total) by mouth once daily.    bethanechol (URECHOLINE) 10 MG Tab Take 1 tablet (10 mg total) by mouth 3 (three) times daily.    cloNIDine 0.1 mg/24 hr td ptwk (CATAPRES) 0.1 mg/24 hr Place 1 patch onto the skin every 7 days.    fluticasone furoate-vilanteroL (BREO) 100-25 mcg/dose diskus inhaler Inhale 1 puff into the lungs once daily. Controller    fluticasone propionate (FLONASE) 50 mcg/actuation nasal spray 1 spray by Nasal route 2 (two) times daily.    furosemide (LASIX) 40 MG tablet Take 1 tablet (40 mg total) by mouth once daily.    gabapentin (NEURONTIN) 300 MG capsule Take 300 mg by mouth every evening.    hydrALAZINE (APRESOLINE) 25 MG tablet  Take 3 tablets (75 mg total) by mouth every 8 (eight) hours.    ipratropium (ATROVENT) 42 mcg (0.06 %) nasal spray     melatonin (MELATIN) 3 mg tablet Take 2 tablets (6 mg total) by mouth nightly as needed for Insomnia.    methocarbamoL (ROBAXIN) 500 MG Tab Take 1 tablet (500 mg total) by mouth 3 (three) times daily. for 10 days    omega 3-dha-epa-fish oil 1,000 mg (120 mg-180 mg) Cap Take 1 capsule by mouth once daily.    omeprazole (PRILOSEC) 20 MG capsule Take 1 capsule (20 mg total) by mouth 2 (two) times daily before meals.    oxyCODONE-acetaminophen (PERCOCET) 5-325 mg per tablet Take 1 tablet by mouth every 6 (six) hours as needed.    polyethylene glycol (GLYCOLAX) 17 gram/dose powder Use cap to measure 17 grams.  Dissolve as directed and take by mouth once daily.    sodium chloride 1,000 mg TbSO oral tablet Take 1 tablet (1,000 mg total) by mouth 2 (two) times daily. for 15 days    tamsulosin (FLOMAX) 0.4 mg Cap Take 1 capsule (0.4 mg total) by mouth once daily.       Allergies:   Review of patient's allergies indicates:   Allergen Reactions    Cephalexin     Codeine     Meperidine      Other reaction(s): delerium, hallucination  Delerium  Delerium  Delerium      Nsaids (non-steroidal anti-inflammatory drug)     Penicillins     Tazarotene      Other reaction(s): rash, Unknown       ROS:  Review of Systems   Constitutional:  Negative for appetite change and fever.   HENT:  Positive for congestion, postnasal drip and trouble swallowing (pain with swallowing).    Respiratory:  Positive for shortness of breath (with exertion). Negative for cough.    Cardiovascular:  Negative for chest pain and palpitations.   Gastrointestinal:  Negative for abdominal pain, constipation, nausea and vomiting.   Genitourinary:  Negative for difficulty urinating and dysuria.   Musculoskeletal:  Positive for back pain and neck pain. Negative for arthralgias.   Neurological:  Positive for weakness. Negative for dizziness and  light-headedness.   Psychiatric/Behavioral:  Negative for sleep disturbance. The patient is not nervous/anxious.      Objective:  Temp:  [97.6 °F (36.4 °C)-98.2 °F (36.8 °C)]   Pulse:  [60-76]   Resp:  [17-18]   BP: (111-123)/(53-58)   SpO2:  [95 %-96 %]     Body mass index is 23.55 kg/m².    Physical Exam  Vitals and nursing note reviewed.   Constitutional:       General: She is not in acute distress.     Appearance: She is well-developed.      Interventions: Cervical collar in place.   Cardiovascular:      Rate and Rhythm: Normal rate and regular rhythm.      Heart sounds: S1 normal and S2 normal. No murmur heard.  Pulmonary:      Effort: Pulmonary effort is normal. No respiratory distress.      Breath sounds: Normal breath sounds. No wheezing or rales.   Abdominal:      General: Bowel sounds are normal. There is no distension.      Palpations: Abdomen is soft.      Tenderness: There is no abdominal tenderness.   Musculoskeletal:         General: No tenderness.      Right lower leg: No edema.      Left lower leg: No edema.   Skin:     General: Skin is warm and dry.      Findings: Bruising present.   Neurological:      Mental Status: She is alert and oriented to person, place, and time.   Psychiatric:         Mood and Affect: Mood normal.         Behavior: Behavior normal. Behavior is cooperative.         Thought Content: Thought content normal.     Significant Labs:   A1C:   Recent Labs   Lab 03/09/25  0911 03/18/25  1420   HGBA1C 5.9* 5.8*     POCT Glucose:   Recent Labs   Lab 03/24/25  1145   POCTGLUCOSE 160*     TSH:   Recent Labs   Lab 03/08/25  2202   TSH 2.745     CBC:  Recent Labs   Lab 03/23/25  1212 03/24/25  0507   WBC 16.37* 9.16   HGB 9.3* 8.0*   HCT 29.7* 25.1*    265   MCV 91 90     BMP:  Recent Labs   Lab 03/24/25  0507   *   K 3.9      CO2 23   BUN 35*   CREATININE 1.1   CALCIUM 8.7   MG 2.2   PHOS 3.6     LFTs:  Recent Labs   Lab 03/24/25  0507   ALT 6*   AST 19   ALKPHOS 131    BILITOT 0.2   ALBUMIN 2.0*       Significant Imaging: I have reviewed all pertinent imaging results/findings completed during prior hospitalization.      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] 10/17/2023 Yes    Closed odontoid fracture with routine healing [S12.100D] 07/24/2023 Not Applicable    CKD (chronic kidney disease) stage 4, GFR 15-29 ml/min [N18.4] 06/20/2023 Yes    Neuralgia of right sciatic nerve [M54.31] 07/07/2020 Yes    Other emphysema [J43.8] 07/07/2020 Yes     Chronic    Allergic rhinitis [J30.9] 07/07/2020 Yes    Mixed hyperlipidemia [E78.2] 07/07/2020 Yes    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] 07/07/2020 Yes    Gastro-esophageal reflux disease without esophagitis [K21.9] 03/01/2016 Yes    Essential hypertension [I10] 02/23/2016 Yes    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 Multilevel foraminal stenosis.   - Cont C-collar (ok to remove for eating and sleeping) x 3 months   - PT/OT  - Continue DVT PPX with Lovenox 30 mg daily  - Monitor and report drainage to incisional site  - maintain surgical dressing until 4/2  - Fall precautions  - Bowel regimen in place, hold for loose or frequent stools  - follow-up with NSGY on 5/1  -  continue methocarbamol TID and Percocet 5-325 q.6 hours PRN  - Will stop gabapentin 300 mg qHS and resume her home pregabalin 50 mg qHS.      Essential hypertension  - BP soft  - Discontinued newly prescribed clonidine patch 0.1 mg Q7days  - continue amlodipine 10 mg daily, furosemide 40 mg daily, hydralazine 75 mg q.8 hours     Emphysema  COPD  - not on home oxygen  - continue Breo Ellipta place of non formulary home inhaler     Chronic kidney disease, stage III  - Continue monitor twice weekly BMPs  - avoid nephrotoxic agents  - renally dose medication when appropriate     Acute blood loss anemia  - transfuse for hemoglobin < 7  - continue monitor twice weekly CBC     Mixed hyperlipidemia  - Not on statin at home presently.   - Follow up with PCP.      Urinary retention  - required straight catheterization on 3/22 and 3/23  - Continue tamsulosin and bethanechol   - bladder scan PRN     GERD  - Continue home omeprazole 40 mg daily per daughter request instead of prescribed 20 mg BID.   - Continue sucralfate qAC and HS     Debility   - Continue with PT/OT for gait training and strengthening and restoration of ADL's   - Encourage mobility, OOB in chair, and early ambulation as appropriate  - Fall precautions   - Monitor for bowel and bladder dysfunction  - Monitor for and prevent skin breakdown and pressure ulcers  - Continue DVT prophylaxis with Lovenox      IP [facility] RISK OF UNPLANNED READMISSION Model: High      Anticipated Disposition:  Home with home health      Future Appointments   Date Time Provider Department Center   4/2/2025  9:00 AM [facility]   5/1/2025  9:45 AM [facility]IC[facility]RAY [facility]   5/1/2025 10:30 AM Katie Castillo NP [facility] NEUROS[facility]   6/16/2025 11:00 AM Lluvia Rahman MD [facility]       I certify that SNF services are required to be given on an inpatient basis because Bridget Montgomery needs for skilled nursing care and/or skilled  rehabilitation are required on a daily basis and such services can only practically be provided in a skilled nursing facility setting and are for an ongoing condition for which she received inpatient care in the hospital.       Brayan Ponce MD  Department of Hospital Medicine   [facility] - Skilled Nursing

## 2025-03-26 NOTE — CONSULTS
[facility] - Skilled Nursing  Adult Nutrition  Consult Note    SUMMARY     Recommendation/Intervention:   1) Continue current soft and bite sized diet per SLP recs, encourage PO intake, honor food preferences as able, provide assistance with meals as needed  2) RD to monitor and follow-up as needed    Goals: Meet % of EEN/EPN by next RD follow-up  Nutrition Goal Status: new  Communication of RD Recs: other (comment) (POC)    Nutrition Discharge Planning     Nutrition Discharge Planning: Diet texture per SLP    Assessment and Plan    Nutrition Problem  Chewing/swallowing difficulty    Related to (etiology):   dysphagia    Signs and Symptoms (as evidenced by):   SLP eval, need for altered texture diet     Interventions/Recommendations (treatment strategy):  Altered texture diet  Collaboration with other providers    Nutrition Diagnosis Status:   New      Malnutrition Assessment  NFPE not completed at this time- pt seen remotely. Will complete at f/u visit.    Reason for Assessment    Reason For Assessment: consult  Diagnosis: other (see comments) (Cervical spine stenosis)  General Information Comments: RD received consult- new admit to SNF. SLP eval- recs soft and bite sized diet. Pt on appropriate diet and food prefs added to diet order- hot tea, milk, and victor at breakfast. No coffee, OJ, tomato, lemonade, chicken. Adequate appetite, consuming 75% at recent meals. Family helping feed pt per NP note.    Past Medical History:   Diagnosis Date    Allergy     Anemia, unspecified     Arthritis     CKD (chronic kidney disease) stage 4, GFR 15-29 ml/min     COPD (chronic obstructive pulmonary disease)     Edema     HTN (hypertension)     Hypocalcemia     Hyponatremia     Osteoarthritis      Nutrition/Diet History    Food Preferences: Hot tea, milk, victor at breafast. NO coffee, OJ, tomato, lemonade, chicken  Spiritual, Cultural Beliefs, [employer]ism Practices, Values that Affect Care: yes  Food Allergies:  "NKFA  Factors Affecting Nutritional Intake: difficulty/impaired swallowing    Nutrition Related Social Determinants of Health: SDOH: Unable to assess at this time.   Food Insecurity: No Food Insecurity (3/23/2025)    Hunger Vital Sign     Worried About Running Out of Food in the Last Year: Never true     Ran Out of Food in the Last Year: Never true       Anthropometrics    Height: 5' 3" (160 cm)  Height (inches): 63 in  Height Method: Stated  Weight: 60.3 kg (132 lb 15 oz)  Weight (lb): 132.94 lb  Weight Method: Bed Scale  Ideal Body Weight (IBW), Female: 115 lb  % Ideal Body Weight, Female (lb): 112.91 %  BMI (Calculated): 23.6  BMI Grade: 18.5-24.9 - normal       Lab/Procedures/Meds    Pertinent Labs Reviewed: reviewed  Pertinent Labs Comments: Hgb: 8, Hct: 25.1, Na: 135, BUN: 35, eGFR: 48  Pertinent Medications Reviewed: reviewed   acetaminophen  1,000 mg Oral Q8H    [START ON 3/26/2025] amLODIPine  10 mg Oral Daily    bethanechol  10 mg Oral TID    cetirizine  5 mg Oral QHS    fluticasone furoate-vilanteroL  1 puff Inhalation Daily    fluticasone propionate  1 spray Each Nostril BID    furosemide  40 mg Oral Daily    gabapentin  300 mg Oral QHS    guaiFENesin 100 mg/5 ml  200 mg Oral TID    hydrALAZINE  75 mg Oral Q8H    [START ON 3/26/2025] LIDOcaine  1 patch Transdermal Q24H    methocarbamoL  500 mg Oral TID    omega 3-dha-epa-fish oil  1 capsule Oral Daily    [START ON 3/26/2025] omeprazole  20 mg Oral Before breakfast    polyethylene glycol  17 g Oral Daily    senna-docusate 8.6-50 mg  1 tablet Oral BID    sodium chloride  1,000 mg Oral BID    sucralfate  1 g Oral QID (AC & HS)    tamsulosin  0.4 mg Oral Daily     Estimated/Assessed Needs    Weight Used For Calorie Calculations: 60.3 kg (132 lb 15 oz)  Energy Calorie Requirements (kcal): 1290 kcal/day  Energy Need Method: Dane-[place] (x 1.3)  Protein Requirements: 60-72 g (1-1.2 g/kg)  Weight Used For Protein Calculations: 60.3 kg (132 lb 15 oz)  Fluid " Requirements (mL): 1 mL/kcal or per MD  Estimated Fluid Requirement Method: RDA Method  RDA Method (mL): 1290     Nutrition Prescription Ordered    Current Diet Order: Soft and Bite Sized    Evaluation of Received Nutrient/Fluid Intake    I/O: + 80 mL since admit  Energy Calories Required: meeting needs  Protein Required: meeting needs  Fluid Required: other (see comments) (As per MD)  Comments: LBM 3/25  Tolerance: tolerating  % Intake of Estimated Energy Needs: 75 - 100 %  % Meal Intake: 75 - 100 %    Nutrition Risk    Level of Risk/Frequency of Follow-up: low       Monitor and Evaluation    Monitor and Evaluation: Energy intake, Food and beverage intake, Protein intake, Diet order, Weight       Nutrition Follow-Up    RD Follow-up?: Yes

## 2025-03-26 NOTE — PT/OT/SLP PROGRESS
Physical Therapy Treatment    Patient Name:  Bridget Montgomery   MRN:  [id number]  Admit Date: 3/24/2025  Admitting Diagnosis: Cervical spinal stenosis  Recent Surgeries: FUSION, SPINE, CERVICAL, POSTERIOR APPROACH     General Precautions: Standard, fall, aspiration (reflux)  Orthopedic Precautions: spinal precautions  Braces: Cervical collar (WBAT, Cervical collar at all times for 3 months post-op)    Recommendations:     Discharge Recommendations: home health PT  Level of Assistance Recommended at Discharge: 24 hours significant assistance  Discharge Equipment Recommendations: none  Barriers to discharge: None    Assessment:     Bridget Montgomery is a 89 y.o. female admitted with a medical diagnosis of Cervical spinal stenosis. Pt tolerated session fairly well and without incident. Pt and pt's daughter educated on current spinal precautions as pt demonstrated excessive bending in order to complete pericare while seated on raised toilet seat. Pt participating in multiple, prolonged static stands with daughter present to complete pericare/hygiene tasks. PTA maintaining Mod A/RW throughout each trial. Pt with complaint of increased bilateral knee pain once seated back in WC; gait training distances limited by this pain as well as fatigue. Pt however agreeable to be seen for OT back-to-back. Pt will continue to benefit from skilled PT services in order to further promote functional mobility, endurance, and BLE strengthening. Pt remains appropriate for PT treatment at this time.      Performance deficits affecting function: weakness, impaired endurance, impaired self care skills, impaired functional mobility, gait instability, impaired balance, decreased upper extremity function, decreased lower extremity function, pain, decreased ROM, impaired skin, impaired cardiopulmonary response to activity, impaired fine motor, orthopedic precautions.    Rehab Potential is good    Activity Tolerance: Good    Plan:     Patient  to be seen 5 x/week to address the above listed problems via gait training, therapeutic activities, therapeutic exercises, neuromuscular re-education, wheelchair management/training    Plan of Care Expires: 04/24/25  Plan of Care Reviewed with: patient, daughter    Subjective     Pt agreeable to treatment.     Pain/Comfort:  Pain Rating 1: 10/10  Location - Orientation 1: generalized  Location 1: back  Pain Addressed 1: Pre-medicate for activity, Reposition, Distraction, Cessation of Activity  Pain Rating Post-Intervention 1: 9/10  Location - Side 2: Bilateral  Location - Orientation 2: generalized  Location 2: knee    Patient's cultural, spiritual, Islam conflicts given the current situation:  no    Objective:     Communicated with nursing prior to session.  Patient found HOB elevated with [place] J cervical collar, PureWick upon PT entry to room.     Therapeutic Activities and Exercises:   Patient educated on role of therapy, goals of session, and benefits of out of bed mobility.   Patient and pt's daughter educated on current spinal precautions. Pt able to provide verbal callback 2/3.  PTA adjusting [place] J collar for pt comfort at beginning of session.  Instructed on use of call button and importance of calling nursing staff for assistance with mobility   Questions/concerns addressed within PTA scope of practice.  Pt verbalized understanding.    Functional Mobility:  Bed Mobility:     Scooting: minimum assistance to EOB,in WC  Supine to Sit: moderate assistance x 2, HOB elevated, side rail  Transfers:     Sit to Stand:  moderate assistance and maximal assistance with rolling walker  Bed to Chair: moderate assistance and of 2 persons with  rolling walker  using  Stand Pivot  Toilet Transfer: moderate assistance and maximal assistance with  rolling walker and toilet grab bar  using  Stand Pivot  Gait: 15 ft + 14 ft using RW with Mod A; close WC follow provided. Vc/tc for upright posture, RW mgmt, and steadiness.  Pt experiencing no LOB but demonstrates global weakness. Seated rest break taken between trials.  Balance: static standing x two trials at toilet to complete total A pericare, donning/doffing of pants and briefs. Pt with mild-mod LE buckling during second standing trial requiring Mod A-Max A/RW to maintain upright.  Seated rest break taken between trials    AM-PAC 6 CLICK MOBILITY  11    Patient left up in chair with  OT present in therapy gym.    GOALS:   Multidisciplinary Problems       Physical Therapy Goals          Problem: Physical Therapy    Goal Priority Disciplines Outcome Interventions   Physical Therapy Goal     PT, PT/OT Progressing    Description: Goals to be met by: 4/25/25     Patient will increase functional independence with mobility by performing:    . Supine to sit with Contact Guard Assistance  . Sit to supine with Contact Guard Assistance  . Rolling to Left and Right with Modified [place].  . Sit to stand transfer with Contact Guard Assistance  . Bed to chair transfer with Contact Guard Assistance using Rolling Walker  . Gait  x 50 feet with Contact Guard Assistance using Rolling Walker.   . Wheelchair propulsion x50 feet with Modified [place] using bilateral uppper extremities                         Time Tracking:     PT Received On: 03/26/25  PT Start Time: 1314  PT Stop Time: 1353  PT Total Time (min): 39 min    Billable Minutes: Gait Training 15 and Therapeutic Activity 24    Treatment Type: Treatment  PT/PTA: PTA     Number of PTA visits since last PT visit: 1 03/26/2025

## 2025-03-26 NOTE — PLAN OF CARE
Problem: Occupational Therapy  Goal: Occupational Therapy Goal  Description: Goals to be met by: 4/25/2025     Patient will increase functional independence with ADLs by performing:    UE Dressing with Moderate Assistance.--Ongoing  LE Dressing with Moderate Assistance.--Ongoing  Grooming while seated at sink with Minimal Assistance.--Ongoing  Toileting from toilet with Min Assistance for hygiene and clothing management.--Ongoing   Bathing from  sitting at sink with Moderate Assistance.--Ongoing  Toilet transfer to toilet with Contact Guard Assistance  and LRAD as needed--Ongoing  Footwear /c Min A and AE as needed---Ongoing    Outcome: Progressing

## 2025-03-27 LAB
ABSOLUTE EOSINOPHIL (OHS): 0.02 K/UL
ABSOLUTE MONOCYTE (OHS): 0.62 K/UL (ref 0.3–1)
ABSOLUTE NEUTROPHIL COUNT (OHS): 6.61 K/UL (ref 1.8–7.7)
ANION GAP (OHS): 11 MMOL/L (ref 8–16)
BASOPHILS # BLD AUTO: 0.01 K/UL
BASOPHILS NFR BLD AUTO: 0.1 %
BUN SERPL-MCNC: 32 MG/DL (ref 8–23)
CALCIUM SERPL-MCNC: 8.8 MG/DL (ref 8.7–10.5)
CHLORIDE SERPL-SCNC: 103 MMOL/L (ref 95–110)
CO2 SERPL-SCNC: 23 MMOL/L (ref 23–29)
CREAT SERPL-MCNC: 1 MG/DL (ref 0.5–1.4)
ERYTHROCYTE [DISTWIDTH] IN BLOOD BY AUTOMATED COUNT: 15.4 % (ref 11.5–14.5)
GFR SERPLBLD CREATININE-BSD FMLA CKD-EPI: 54 ML/MIN/1.73/M2
GLUCOSE SERPL-MCNC: 90 MG/DL (ref 70–110)
HCT VFR BLD AUTO: 24.8 % (ref 37–48.5)
HGB BLD-MCNC: 8 GM/DL (ref 12–16)
IMM GRANULOCYTES # BLD AUTO: 0.03 K/UL (ref 0–0.04)
IMM GRANULOCYTES NFR BLD AUTO: 0.4 % (ref 0–0.5)
LYMPHOCYTES # BLD AUTO: 0.71 K/UL (ref 1–4.8)
MAGNESIUM SERPL-MCNC: 2.3 MG/DL (ref 1.6–2.6)
MCH RBC QN AUTO: 28.6 PG (ref 27–50)
MCHC RBC AUTO-ENTMCNC: 32.3 G/DL (ref 32–36)
MCV RBC AUTO: 89 FL (ref 82–98)
NUCLEATED RBC (/100WBC) (OHS): 0 /100 WBC
PHOSPHATE SERPL-MCNC: 3.2 MG/DL (ref 2.7–4.5)
PLATELET # BLD AUTO: 280 K/UL (ref 150–450)
PMV BLD AUTO: 10.7 FL (ref 9.2–12.9)
POTASSIUM SERPL-SCNC: 4.2 MMOL/L (ref 3.5–5.1)
RBC # BLD AUTO: 2.8 M/UL (ref 4–5.4)
RELATIVE EOSINOPHIL (OHS): 0.3 %
RELATIVE LYMPHOCYTE (OHS): 8.9 % (ref 18–48)
RELATIVE MONOCYTE (OHS): 7.8 % (ref 4–15)
RELATIVE NEUTROPHIL (OHS): 82.5 % (ref 38–73)
SODIUM SERPL-SCNC: 137 MMOL/L (ref 136–145)
WBC # BLD AUTO: 8 K/UL (ref 3.9–12.7)

## 2025-03-27 PROCEDURE — 11000004 HC SNF PRIVATE

## 2025-03-27 PROCEDURE — 63600175 PHARM REV CODE 636 W HCPCS: Performed by: NURSE PRACTITIONER

## 2025-03-27 PROCEDURE — 82310 ASSAY OF CALCIUM: CPT | Performed by: HOSPITALIST

## 2025-03-27 PROCEDURE — 84100 ASSAY OF PHOSPHORUS: CPT | Performed by: HOSPITALIST

## 2025-03-27 PROCEDURE — 25000003 PHARM REV CODE 250: Performed by: NURSE PRACTITIONER

## 2025-03-27 PROCEDURE — 36415 COLL VENOUS BLD VENIPUNCTURE: CPT | Performed by: HOSPITALIST

## 2025-03-27 PROCEDURE — 83735 ASSAY OF MAGNESIUM: CPT | Performed by: HOSPITALIST

## 2025-03-27 PROCEDURE — 97530 THERAPEUTIC ACTIVITIES: CPT | Mod: CQ

## 2025-03-27 PROCEDURE — 25000003 PHARM REV CODE 250: Performed by: HOSPITALIST

## 2025-03-27 PROCEDURE — 97110 THERAPEUTIC EXERCISES: CPT | Mod: CO

## 2025-03-27 PROCEDURE — 97530 THERAPEUTIC ACTIVITIES: CPT | Mod: CO

## 2025-03-27 PROCEDURE — 85025 COMPLETE CBC W/AUTO DIFF WBC: CPT | Performed by: HOSPITALIST

## 2025-03-27 RX ORDER — POLYETHYLENE GLYCOL 3350 17 G/17G
17 POWDER, FOR SOLUTION ORAL 2 TIMES DAILY PRN
Status: DISCONTINUED | OUTPATIENT
Start: 2025-03-27 | End: 2025-03-31 | Stop reason: HOSPADM

## 2025-03-27 RX ORDER — METHOCARBAMOL 500 MG/1
500 TABLET, FILM COATED ORAL 3 TIMES DAILY PRN
Status: DISCONTINUED | OUTPATIENT
Start: 2025-03-27 | End: 2025-03-31 | Stop reason: HOSPADM

## 2025-03-27 RX ORDER — NYSTATIN 100000 [USP'U]/ML
500000 SUSPENSION ORAL
Status: DISCONTINUED | OUTPATIENT
Start: 2025-03-27 | End: 2025-03-31 | Stop reason: HOSPADM

## 2025-03-27 RX ORDER — GABAPENTIN 100 MG/1
100 CAPSULE ORAL 3 TIMES DAILY
Status: DISCONTINUED | OUTPATIENT
Start: 2025-03-27 | End: 2025-03-31 | Stop reason: HOSPADM

## 2025-03-27 RX ORDER — TRAMADOL HYDROCHLORIDE 50 MG/1
50 TABLET ORAL EVERY 6 HOURS PRN
Status: DISCONTINUED | OUTPATIENT
Start: 2025-03-27 | End: 2025-03-31 | Stop reason: HOSPADM

## 2025-03-27 RX ORDER — OMEPRAZOLE 20 MG/1
40 CAPSULE, DELAYED RELEASE ORAL
Status: DISCONTINUED | OUTPATIENT
Start: 2025-03-27 | End: 2025-03-31 | Stop reason: HOSPADM

## 2025-03-27 RX ORDER — AMLODIPINE BESYLATE 5 MG/1
5 TABLET ORAL DAILY
Status: DISCONTINUED | OUTPATIENT
Start: 2025-03-28 | End: 2025-03-31 | Stop reason: HOSPADM

## 2025-03-27 RX ADMIN — HYDRALAZINE HYDROCHLORIDE 50 MG: 50 TABLET ORAL at 03:03

## 2025-03-27 RX ADMIN — Medication 1 CAPSULE: at 10:03

## 2025-03-27 RX ADMIN — AMLODIPINE BESYLATE 10 MG: 10 TABLET ORAL at 10:03

## 2025-03-27 RX ADMIN — LIDOCAINE 1 PATCH: 50 PATCH CUTANEOUS at 10:03

## 2025-03-27 RX ADMIN — SODIUM CHLORIDE 1000 MG: 1 TABLET ORAL at 08:03

## 2025-03-27 RX ADMIN — TAMSULOSIN HYDROCHLORIDE 0.4 MG: 0.4 CAPSULE ORAL at 10:03

## 2025-03-27 RX ADMIN — ACETAMINOPHEN 1000 MG: 500 TABLET ORAL at 05:03

## 2025-03-27 RX ADMIN — FLUTICASONE PROPIONATE 50 MCG: 50 SPRAY, METERED NASAL at 10:03

## 2025-03-27 RX ADMIN — ENOXAPARIN SODIUM 30 MG: 30 INJECTION SUBCUTANEOUS at 05:03

## 2025-03-27 RX ADMIN — OXYCODONE 5 MG: 5 TABLET ORAL at 10:03

## 2025-03-27 RX ADMIN — BETHANECHOL CHLORIDE 10 MG: 10 TABLET ORAL at 10:03

## 2025-03-27 RX ADMIN — SUCRALFATE 1 G: 1 SUSPENSION ORAL at 08:03

## 2025-03-27 RX ADMIN — HYDRALAZINE HYDROCHLORIDE 50 MG: 50 TABLET ORAL at 05:03

## 2025-03-27 RX ADMIN — BETHANECHOL CHLORIDE 10 MG: 10 TABLET ORAL at 03:03

## 2025-03-27 RX ADMIN — GABAPENTIN 100 MG: 100 CAPSULE ORAL at 03:03

## 2025-03-27 RX ADMIN — FLUTICASONE FUROATE AND VILANTEROL TRIFENATATE 1 PUFF: 100; 25 POWDER RESPIRATORY (INHALATION) at 10:03

## 2025-03-27 RX ADMIN — SODIUM CHLORIDE 1000 MG: 1 TABLET ORAL at 10:03

## 2025-03-27 RX ADMIN — BETHANECHOL CHLORIDE 10 MG: 10 TABLET ORAL at 08:03

## 2025-03-27 RX ADMIN — SUCRALFATE 1 G: 1 SUSPENSION ORAL at 10:03

## 2025-03-27 RX ADMIN — SUCRALFATE 1 G: 1 SUSPENSION ORAL at 04:03

## 2025-03-27 RX ADMIN — ACETAMINOPHEN 1000 MG: 500 TABLET ORAL at 10:03

## 2025-03-27 RX ADMIN — FUROSEMIDE 40 MG: 40 TABLET ORAL at 10:03

## 2025-03-27 RX ADMIN — OMEPRAZOLE 40 MG: 20 CAPSULE, DELAYED RELEASE ORAL at 06:03

## 2025-03-27 RX ADMIN — ACETAMINOPHEN 1000 MG: 500 TABLET ORAL at 03:03

## 2025-03-27 RX ADMIN — GABAPENTIN 100 MG: 100 CAPSULE ORAL at 08:03

## 2025-03-27 RX ADMIN — FLUTICASONE PROPIONATE 50 MCG: 50 SPRAY, METERED NASAL at 09:03

## 2025-03-27 RX ADMIN — GUAIFENESIN 200 MG: 200 SOLUTION ORAL at 10:03

## 2025-03-27 RX ADMIN — METHOCARBAMOL 500 MG: 500 TABLET ORAL at 10:03

## 2025-03-27 RX ADMIN — OMEPRAZOLE 40 MG: 20 CAPSULE, DELAYED RELEASE ORAL at 04:03

## 2025-03-27 RX ADMIN — GUAIFENESIN 200 MG: 200 SOLUTION ORAL at 03:03

## 2025-03-27 RX ADMIN — HYDRALAZINE HYDROCHLORIDE 50 MG: 50 TABLET ORAL at 08:03

## 2025-03-27 RX ADMIN — SUCRALFATE 1 G: 1 SUSPENSION ORAL at 06:03

## 2025-03-27 RX ADMIN — GUAIFENESIN 200 MG: 200 SOLUTION ORAL at 08:03

## 2025-03-27 NOTE — PT/OT/SLP PROGRESS
"Physical Therapy Treatment  Partial co-treatment performed with OT due to patient's multiple deficits requiring two skilled therapists to appropriately and safely assess patient's strength and endurance while facilitating functional tasks in addition to accommodating for patient's activity tolerance.      Patient Name:  Bridget Montgomery   MRN:  [id number]  Admit Date: 3/24/2025  Admitting Diagnosis: Cervical spinal stenosis  Recent Surgeries: FUSION, SPINE, CERVICAL, POSTERIOR APPROACH     General Precautions: Standard, fall, aspiration (reflux)  Orthopedic Precautions: spinal precautions  Braces: Cervical collar (WBAT, Cervical collar at all times for 3 months post-op)    Recommendations:     Discharge Recommendations: home health PT  Level of Assistance Recommended at Discharge: 24 hours significant assistance  Discharge Equipment Recommendations: none  Barriers to discharge: None    Assessment:     Bridget Montgomery is a 89 y.o. female admitted with a medical diagnosis of Cervical spinal stenosis. Pt with reduced tolerance for skilled interventions today due to neck and back pain and noted lethargy while seated up in WC. Pt's daughter reporting use of personal LE ergometer to alleviate back pain; pt found propelling LBE on initial attempt. Pt requiring Max A/RW to stand from low bedside chair, crying out "My back! My back!" with transitional movements. Pt reporting dizziness after approximately 15 seconds in static stance with increasing postural sway noted. Near Total A required to safely lower back down to BSC.     PTA then returning with OT for afternoon session as pt requiring two-person assist for safety with transfers. Pt completing STS and BSC to  stand pivot with Mod A x 2 and Min A x 2 respectively (with RW). Will attempt to incorporate gait training next session as pt continued crying out in pain with transfers. Pt will continue to benefit from skilled PT services in order to further promote " functional mobility, endurance, and BLE strengthening. Pt remains appropriate for PT treatment at this time.    Performance deficits affecting function: weakness, impaired endurance, impaired self care skills, impaired functional mobility, gait instability, impaired balance, decreased upper extremity function, decreased lower extremity function, pain, decreased ROM, impaired skin, impaired cardiopulmonary response to activity, impaired fine motor, orthopedic precautions.    Rehab Potential is good    Activity Tolerance: Fair    Plan:     Patient to be seen 5 x/week to address the above listed problems via gait training, therapeutic activities, therapeutic exercises, neuromuscular re-education, wheelchair management/training    Plan of Care Expires: 04/24/25  Plan of Care Reviewed with: patient, daughter    Subjective     Pt agreeable to treatment(s) with increased time/cueing and mild encouragement.     Pain/Comfort:  Pain Rating 1: 10/10  Location - Orientation 1: generalized  Location 1: back (and neck)  Pain Addressed 1: Pre-medicate for activity, Reposition, Distraction, Cessation of Activity, Nurse notified  Pain Rating Post-Intervention 1: 10/10    Patient's cultural, spiritual, Episcopalian conflicts given the current situation:  no    Objective:     Communicated with nursing prior to session.  Patient found up in chair with cervical collar upon PT entry to room (both sessions).     Therapeutic Activities and Exercises:   Patient educated on role of therapy, goals of session, and benefits of out of bed mobility.   Patient and pt's daughter educated on current spinal precautions and maintenance of precautions with bed mobility and toileting tasks.   Instructed on use of call button and importance of calling nursing staff for assistance with mobility   Questions/concerns addressed within PTA scope of practice.  Pt verbalized understanding.     Functional Mobility:  Transfers:     Sit to Stand (session 1):  maximal  assistance with rolling walker  Sit to Stand (session 2): moderate assistance x 2 with RW   Bedside Chair to WC: minimum assistance and of 2 persons with  rolling walker  using  Stand Pivot; cues given throughout for sequencing, RW mgmt, safety. Pt reporting back pain throughout.  Balance: static standing at RW with Mod A-Max A ~15 sec. Pt reporting dizziness requiring near total A to safely lower back to BSC (first session)  Balance: static standing at RW with Min A x 2 for safety (second session)    AM-PAC 6 CLICK MOBILITY  11    Patient left up in chair with all needs met, call button in reach, and daughter present (first session). Pt left up in WC with  PEREZ present (second session).    GOALS:   Multidisciplinary Problems       Physical Therapy Goals          Problem: Physical Therapy    Goal Priority Disciplines Outcome Interventions   Physical Therapy Goal     PT, PT/OT Progressing    Description: Goals to be met by: 4/25/25     Patient will increase functional independence with mobility by performing:    . Supine to sit with Contact Guard Assistance  . Sit to supine with Contact Guard Assistance  . Rolling to Left and Right with Modified [place].  . Sit to stand transfer with Contact Guard Assistance  . Bed to chair transfer with Contact Guard Assistance using Rolling Walker  . Gait  x 50 feet with Contact Guard Assistance using Rolling Walker.   . Wheelchair propulsion x50 feet with Modified [place] using bilateral uppper extremities                         Time Tracking:     PT Received On: 03/27/25  PT Start Time: 1125  PT Stop Time: 1140  PT Total Time (min): 15 min    PT Start Time: 1347  PT Stop Time: 1355  PT Total Time (min): 8 min (co-treatment)    Billable Minutes: Therapeutic Activity 23    Treatment Type: Treatment  PT/PTA: PTA     Number of PTA visits since last PT visit: 2     03/27/2025

## 2025-03-27 NOTE — CONSULTS
[facility] - Skilled Nursing    Wound Care     Patient Name:  Bridget Montgomery  MRN:  [id number]  Date: 3/27/2025  Diagnosis: Cervical spinal stenosis     History:  Past Medical History:   Diagnosis Date    Allergy     Anemia, unspecified     Arthritis     CKD (chronic kidney disease) stage 4, GFR 15-29 ml/min     COPD (chronic obstructive pulmonary disease)     Edema     HTN (hypertension)     Hypocalcemia     Hyponatremia     Osteoarthritis      Social History[1]  Precautions:  Allergies as of 03/24/2025 - Reviewed 03/24/2025   Allergen Reaction Noted    Cephalexin  03/21/2018    Codeine  03/21/2018    Meperidine  01/29/2019    Nsaids (non-steroidal anti-inflammatory drug)  10/03/2023    Penicillins  03/21/2018    Tazarotene  03/30/2020       WO Assessment Details / Treatment:    Patient seen for wound care: New Consult   Chart reviewed for this encounter.   Labs:   WBC (K/uL)   Date Value   03/27/2025 8.00   03/24/2025 9.16     Glucose (mg/dL)   Date Value   03/21/2025 93   03/20/2025 108     Albumin (g/dL)   Date Value   03/24/2025 2.0 (L)   03/23/2025 1.9 (L)   03/21/2025 2.3 (L)   03/20/2025 2.5 (L)       Marvel Score: 15    Narrative:  Pt seen for WC consultation agreeable to assessment  Chart reviewed for this encounter. Patient lying in bed. Family member at bedside. Waffle overlay on mattress, properly inflated. Pt. Requires max assist. Buttocks pink, blanchable with some small darker areas and peeling skin. Pt. Incontinent and had loose stool x 2. Gently cleanse area with no rinse foam, pat dry and apply barrier cream. Family member stays with patient and assists with care and cleaning patient. Diaper in use. Mepilex RLE dry and intact. Change q 5d- nursing manages. WC to follow  See Flow Sheet for additional documentation and media.                                 [1]   Social History  Socioeconomic History    Marital status:    Tobacco Use    Smoking status: Former    Smokeless tobacco: Never    Substance and Sexual Activity    Alcohol use: Not Currently    Drug use: Never    Sexual activity: Not Currently     Social Drivers of Health     Financial Resource Strain: Low Risk  (3/23/2025)    Overall Financial Resource Strain (CARDIA)     Difficulty of Paying Living Expenses: Not hard at all   Food Insecurity: No Food Insecurity (3/23/2025)    Hunger Vital Sign     Worried About Running Out of Food in the Last Year: Never true     Ran Out of Food in the Last Year: Never true   Transportation Needs: No Transportation Needs (3/23/2025)    PRAPARE - Transportation     Lack of Transportation (Medical): No     Lack of Transportation (Non-Medical): No   Physical Activity: Insufficiently Active (3/10/2025)    Exercise Vital Sign     Days of Exercise per Week: 4 days     Minutes of Exercise per Session: 30 min   Stress: No Stress Concern Present (3/23/2025)    [employer] - Occupational Stress Questionnaire     Feeling of Stress : Only a little   Housing Stability: Low Risk  (3/23/2025)    Housing Stability Vital Sign     Unable to Pay for Housing in the Last Year: No     Homeless in the Last Year: No

## 2025-03-27 NOTE — PLAN OF CARE
Change diet to pureed due to swallowing difficulties at request of family, add boost glucose at request of family, RD following  Goals: Meet % of EEN/EPN by next RD follow -up  Nutrition Goal Status: new  Communication of RD Recs: other (comment) (POC)     Nutrition Discharge Planning     Nutrition Discharge Planning: Diet texture per SLP diabetic diet     Assessment and Plan     Nutrition Problem  Chewing/swallowing difficulty     Related to (etiology):   dysphagia     Signs and Symptoms (as evidenced by):   SLP eval, need for altered texture diet      Interventions/Recommendations (treatment strategy):  Altered textured diet -puree level 4  Thin liquids  Collaboration of nutrition professional with other providers  Commercial beverage medical food supplement- boost glucose vanilla BID        Nutrition Diagnosis Status:   Active

## 2025-03-27 NOTE — PT/OT/SLP PROGRESS
"Occupational Therapy   Treatment    Name: Bridget Montgomery  MRN: [id number]  Admit Date: 3/24/2025  Admitting Diagnosis:  Cervical spinal stenosis    General Precautions: Standard, fall, aspiration   Orthopedic Precautions: spinal precautions   Braces: Cervical collar    Recommendations:     Discharge Recommendations:   (TBD)  Level of Assistance Recommended at Discharge: 24 hours physical assistance for all ADL's and home management tasks  Discharge Equipment Recommendations: walker, rolling, hip kit  Barriers to discharge:  None    Assessment:     Bridget Montgomery is a 89 y.o. female with a medical diagnosis of Cervical spinal stenosis.  She presents with limitations in performance of self-care, functional mobility, and ADLs. Performance deficits affecting function are weakness, impaired endurance, impaired sensation, impaired self care skills, impaired functional mobility, gait instability, impaired balance, decreased coordination, decreased upper extremity function, decreased lower extremity function, pain, decreased ROM, impaired coordination, orthopedic precautions.     Pt agreeable to tx with max encouragement from daughter. Pt reporting significant pain on this day. Pt wincing with pain when moving and growning/shouting out when performing sit to stand.  is making progress but continues to require assist to perform self care tasks, functional mobility and functional transfers. Pt would continue to benefit from OT intervention to further functional (I)ce and safety.     Rehab Potential is fair    Activity tolerance:  Poor    Plan:     Patient to be seen 5 x/week to address the above listed problems via self-care/home management, therapeutic activities, therapeutic exercises, neuromuscular re-education, community/work re-entry    Plan of Care Expires: 04/25/25  Plan of Care Reviewed with: patient, daughter    Subjective     "I want to be left alone."    Communicated with: Nursing prior to session. "     Pain/Comfort:  Pain Rating 1:  (unable to rate)  Location - Orientation 1: generalized  Location 1: back (; neck)  Pain Addressed 1: Pre-medicate for activity, Reposition, Distraction, Cessation of Activity, Nurse notified  Pain Rating Post-Intervention 1: 10/10    Patient's cultural, spiritual, Orthodox conflicts given the current situation:  no    Objective:     Patient found up in chair with cervical collar upon OT entry to room.    Bed Mobility:    Pt seated in chair at onset of treatment session.     Functional Mobility/Transfers:  Patient completed Sit <> Stand Transfer with moderate assistance and of 2 persons  with  rolling walker   Patient completed Bedside Chair <> Chair Transfer using Stand Pivot technique with minimum assistance and of 2 persons with rolling walker  Patient completed Chair <> Bedside Chair Stand Pivot technique with minimum assistance and of 2 persons with rolling walker    AMPAC 6 Click ADL: 12    OT Exercises:   Pt performed BUE exercise with 1lb dowel with rep of 5 x2 of chest presses, wrist flex/ext, and bicep curls.     Treatment & Education:  Attempted seated FM manipulation activity with small pegs hidden in medium resistance theraputty with pt unable to attend to task. Cessation of activity/tx per pt.     Pt educated on:  - role of OT  - level of assistance  - energy conservation and task modification to maximized independence with ADL's and mobility   -  safety while performing functional transfers and self care tasks  - progress towards OT goals    Patient left up in chair with call button in reach, NP notified, and pt's daughter present    GOALS:   Multidisciplinary Problems       Occupational Therapy Goals          Problem: Occupational Therapy    Goal Priority Disciplines Outcome Interventions   Occupational Therapy Goal     OT, PT/OT Progressing    Description: Goals to be met by: 4/25/2025     Patient will increase functional independence with ADLs by  performing:    UE Dressing with Moderate Assistance.--Ongoing  LE Dressing with Moderate Assistance.--Ongoing  Grooming while seated at sink with Minimal Assistance.--Ongoing  Toileting from toilet with Min Assistance for hygiene and clothing management.--Ongoing   Bathing from  sitting at sink with Moderate Assistance.--Ongoing  Toilet transfer to toilet with Contact Guard Assistance  and LRAD as needed--Ongoing  Footwear /c Min A and AE as needed---Ongoing                         Time Tracking:     OT Date of Treatment: 03/27/25  OT Start Time: 1338    OT Stop Time: 1431  OT Total Time (min): 53 min    Billable Minutes:Therapeutic Activity 43  Therapeutic Exercise 10    3/27/2025  A client care conference was performed between the RYDERR and CHRIS, prior to treatment by CHRIS, to discuss the patient's status, treatment plan and established goals.

## 2025-03-27 NOTE — PROGRESS NOTES
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 Lemons.  Admission to SNF for secondary weakness and debility.    Interval history: All of today's labs reviewed and are listed below.  BUN improved to 32. 24 hr vital sign ranges reviewed and listed below.  BP stable.  Patient lethargic today, possibly from over-sedation from medication.  Patient's pain is very severe, she reports it starting at her neck and traveling all the way down her spine.  Still having issues with indigestion.  Possible thrush, patient with white spots on her tongue.  Patient denies shortness of breath, abdominal discomfort, nausea, or vomiting.  Patient reports an adequate appetite.  Patient denies dysuria.  Patient reports having regular bowel movements.  Patient progessing with PT/OT- patient ambulated 15 ft + 14 ft using RW with Mod A; close WC follow provided. Vc/tc for upright posture, RW mgmt, and steadiness. Pt experiencing no LOB but demonstrates global weakness. Seated rest break taken between trials. Continuing to follow and treat all acute and chronic conditions.    Past Medical History: Patient has a past medical history of Allergy, Anemia, unspecified, Arthritis, CKD (chronic kidney disease) stage 4, GFR 15-29 ml/min, COPD (chronic  obstructive pulmonary disease), Edema, HTN (hypertension), Hypocalcemia, Hyponatremia, and Osteoarthritis.    Past Surgical History: Patient has a past surgical history that includes Breast cyst excision; Foot surgery; Caudal epidural steroid injection (N/A, 2/2/2023); and Fusion of cervical spine by posterior approach (N/A, 3/19/2025).    Social History: Patient reports that she has quit smoking. She has never used smokeless tobacco. She reports that she does not currently use alcohol. She reports that she does not use drugs.    Family History: family history includes Cancer in her mother; No Known Problems in her father.    Allergies: Patient is allergic to cephalexin, codeine, meperidine, nsaids (non-steroidal anti-inflammatory drug), penicillins, and tazarotene.    ROS  Constitutional: Negative for fever   Respiratory: Negative for shortness of breath.  Postnasal drip, productive cough   Cardiovascular: Negative for chest pain, palpitations, and leg swelling.   Gastrointestinal: Negative for abdominal pain, diarrhea, nausea, vomiting.  +intermittent constipation, indigestion  Genitourinary: Negative for dysuria, frequency   Musculoskeletal:  + generalized weakness.  +pain to neck, bilateral shoulders, lower back, bilateral knees  Skin: Negative for itching and rash.   Neurological: Negative for dizziness, headaches.   Psychiatric/Behavioral: Negative for depression. The patient is not nervous/anxious.      24 hour Vital Sign Range   Temp:  [97.9 °F (36.6 °C)-98.2 °F (36.8 °C)]   Pulse:  [64-69]   Resp:  [17-18]   BP: (118-131)/(57-60)   SpO2:  [95 %-96 %]     Current BMI: Body mass index is 23.08 kg/m².    PEx  Constitutional: Patient appears frail, debilitated.  No distress noted  Neck:  C-collar in place  Cardiovascular: Normal rate, regular rhythm and normal heart sounds.    Pulmonary/Chest: Effort normal and breath sounds are clear  Abdominal: Soft. Bowel sounds are normal.   Musculoskeletal: Normal range of  motion.   Neurological: Alert and oriented to person, place, and time.   Psychiatric: Normal mood and affect. Behavior is normal.   Skin: Skin is warm and dry.  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 Multilevel foraminal stenosis.   - Cont C-collar (ok to remove for eating and sleeping) x 3 months   - PT/OT  - DVT PPX with Lovenox 30  - Monitor and report drainage to incisional site  - maintain surgical dressing until 4/2  - Fall precautions  - Bowel regimen in place, hold for loose or frequent stools  - follow-up with NSGY on 5/1    Acute postoperative pain  - unstable, initiated gabapentin 100 mg TID, methocarbamol 500 mg TID prn, tramadol 50 mg q.6 hours PRN continue acetaminophen 1000 mg q.8 hours, discontinuing Lyrica and oxycodone    Thrush  - initiated nystatin [id number] units QID    Essential hypertension  - BP soft, discontinuing newly prescribed clonidine patch 0.1 mg Q7days  - stable, continue amlodipine 10 mg daily, Lasix 40 mg daily, hydralazine 75 mg q.8 hours, holding parameters added    Emphysema  COPD  - not on home oxygen  - stable, continue Breo Ellipta place of non formulary home inhaler    Chronic kidney disease, stage III  - stable, continue monitor twice weekly BMPs, avoid nephrotoxic agents,  "renally dose medication when appropriate    Acute blood loss anemia  - transfuse for hemoglobin < 7  - stable, continue monitor twice weekly CBC    Mixed hyperlipidemia  - HM wrote "holding statin with elevatd CPK"- CPK now WNL  - per pt she's not not taking a statin nor is one on her recent dispensed report     Urinary retention  - required straight catheterization on 03/22 and 3/23  - started on flomax and bethanechol at [facility], continue for now  - bladder scan PRN    GERD  - increasing patient's home omeprazole to 40 mg AC BID  - continues to Carafate    Debility   - Continue with PT/OT for gait training and strengthening and restoration of ADL's   - Encourage mobility, OOB in chair, and early ambulation as appropriate  - Fall precautions   - Monitor for bowel and bladder dysfunction  - Monitor for and prevent skin breakdown and pressure ulcers  - continue DVT prophylaxis with Lovenox       Anticipate disposition:  Home with home health    IP [facility] RISK OF UNPLANNED READMISSION Model: HIGH    Follow-up needed during SNF stay-    Apt not to send to - NSGY RN 4/2    Follow-up needed after discharge from SNF: PCP     Future Appointments   Date Time Provider Department Center   4/2/2025  9:00 AM Marlee Hidalgo, MARIAMA [facility] NEUROS8 [facility]   5/1/2025  9:45 AM [facility] OIC-XRAY [facility] X[facility]   5/1/2025 10:30 AM Katie Castillo NP [facility] NEUROS8 [facility]   6/16/2025 11:00 AM Lluvia Rahman MD [facility]     I certify that SNF services are required to be given on an inpatient basis because Bridget Montgomery needs for skilled nursing care and/or skilled rehabilitation are required on a daily basis and such services can only practically be provided in a skilled nursing facility setting and are for an ongoing condition for which she received inpatient care in the hospital.     I spent 103 minutes reviewing patient records, examining, and counseling the patient/ patient's family with greater than 50% of the " time spent in direct patient care and coordination. Review of outpatient clinic records.  Family updated at bedside.    Lorena Akins NP  Department of Hospital Medicine   [facility]     DOS: 3/27/2025       Patient note was created using MModal Dictation.  Any errors in syntax or even information may not have been identified and edited on initial review prior to signing this note.

## 2025-03-28 ENCOUNTER — HOSPITAL ENCOUNTER (INPATIENT)
Facility: HOSPITAL | Age: OVER 89
LOS: 9 days | DRG: 811 | End: 2025-04-08
Attending: EMERGENCY MEDICINE
Payer: MEDICARE

## 2025-03-28 VITALS
WEIGHT: 129.88 LBS | TEMPERATURE: 97 F | SYSTOLIC BLOOD PRESSURE: 132 MMHG | DIASTOLIC BLOOD PRESSURE: 60 MMHG | BODY MASS INDEX: 23.01 KG/M2 | HEIGHT: 63 IN | HEART RATE: 70 BPM | RESPIRATION RATE: 18 BRPM | OXYGEN SATURATION: 96 %

## 2025-03-28 DIAGNOSIS — I10 ESSENTIAL HYPERTENSION: ICD-10-CM

## 2025-03-28 DIAGNOSIS — R07.9 CHEST PAIN, UNSPECIFIED TYPE: Primary | ICD-10-CM

## 2025-03-28 DIAGNOSIS — R79.89 ELEVATED TROPONIN: ICD-10-CM

## 2025-03-28 DIAGNOSIS — R10.10 UPPER ABDOMINAL PAIN: ICD-10-CM

## 2025-03-28 DIAGNOSIS — R07.9 CHEST PAIN: ICD-10-CM

## 2025-03-28 DIAGNOSIS — R10.13 EPIGASTRIC PAIN: ICD-10-CM

## 2025-03-28 DIAGNOSIS — M54.9 BACK PAIN, UNSPECIFIED BACK LOCATION, UNSPECIFIED BACK PAIN LATERALITY, UNSPECIFIED CHRONICITY: ICD-10-CM

## 2025-03-28 PROBLEM — K21.9 HIATAL HERNIA WITH GERD: Status: ACTIVE | Noted: 2025-03-28

## 2025-03-28 PROBLEM — R74.8 ALKALINE PHOSPHATASE ELEVATION: Status: ACTIVE | Noted: 2025-03-28

## 2025-03-28 PROBLEM — D62 ACUTE BLOOD LOSS ANEMIA: Status: ACTIVE | Noted: 2025-03-28

## 2025-03-28 PROBLEM — K44.9 HIATAL HERNIA WITH GERD: Status: ACTIVE | Noted: 2025-03-28

## 2025-03-28 LAB
ABSOLUTE EOSINOPHIL (OHS): 0 K/UL
ABSOLUTE MONOCYTE (OHS): 0.57 K/UL (ref 0.3–1)
ABSOLUTE NEUTROPHIL COUNT (OHS): 12.4 K/UL (ref 1.8–7.7)
ALBUMIN SERPL BCP-MCNC: 2.2 G/DL (ref 3.5–5.2)
ALP SERPL-CCNC: 203 UNIT/L (ref 40–150)
ALT SERPL W/O P-5'-P-CCNC: 15 UNIT/L (ref 10–44)
ANION GAP (OHS): 16 MMOL/L (ref 8–16)
AST SERPL-CCNC: 28 UNIT/L (ref 11–45)
BASOPHILS # BLD AUTO: 0.02 K/UL
BASOPHILS NFR BLD AUTO: 0.1 %
BILIRUB SERPL-MCNC: 0.2 MG/DL (ref 0.1–1)
BUN SERPL-MCNC: 28 MG/DL (ref 8–23)
CALCIUM SERPL-MCNC: 9 MG/DL (ref 8.7–10.5)
CHLORIDE SERPL-SCNC: 101 MMOL/L (ref 95–110)
CO2 SERPL-SCNC: 17 MMOL/L (ref 23–29)
CREAT SERPL-MCNC: 1.1 MG/DL (ref 0.5–1.4)
ERYTHROCYTE [DISTWIDTH] IN BLOOD BY AUTOMATED COUNT: 15.9 % (ref 11.5–14.5)
GFR SERPLBLD CREATININE-BSD FMLA CKD-EPI: 48 ML/MIN/1.73/M2
GLUCOSE SERPL-MCNC: 202 MG/DL (ref 70–110)
HCT VFR BLD AUTO: 24.6 % (ref 37–48.5)
HCV AB SERPL QL IA: NORMAL
HGB BLD-MCNC: 7.7 GM/DL (ref 12–16)
HIV 1+2 AB+HIV1 P24 AG SERPL QL IA: NORMAL
IMM GRANULOCYTES # BLD AUTO: 0.08 K/UL (ref 0–0.04)
IMM GRANULOCYTES NFR BLD AUTO: 0.6 % (ref 0–0.5)
LIPASE SERPL-CCNC: 30 U/L (ref 4–60)
LYMPHOCYTES # BLD AUTO: 0.53 K/UL (ref 1–4.8)
MAGNESIUM SERPL-MCNC: 2.3 MG/DL (ref 1.6–2.6)
MCH RBC QN AUTO: 28.3 PG (ref 27–50)
MCHC RBC AUTO-ENTMCNC: 31.3 G/DL (ref 32–36)
MCV RBC AUTO: 90 FL (ref 82–98)
NUCLEATED RBC (/100WBC) (OHS): 0 /100 WBC
OHS QRS DURATION: 92 MS
OHS QTC CALCULATION: 463 MS
PLATELET # BLD AUTO: 326 K/UL (ref 150–450)
PMV BLD AUTO: 10.5 FL (ref 9.2–12.9)
POTASSIUM SERPL-SCNC: 4.3 MMOL/L (ref 3.5–5.1)
PROT SERPL-MCNC: 6.1 GM/DL (ref 6–8.4)
RBC # BLD AUTO: 2.72 M/UL (ref 4–5.4)
RELATIVE EOSINOPHIL (OHS): 0 %
RELATIVE LYMPHOCYTE (OHS): 3.9 % (ref 18–48)
RELATIVE MONOCYTE (OHS): 4.2 % (ref 4–15)
RELATIVE NEUTROPHIL (OHS): 91.2 % (ref 38–73)
SODIUM SERPL-SCNC: 134 MMOL/L (ref 136–145)
TROPONIN I SERPL HS-MCNC: 51 NG/L
WBC # BLD AUTO: 13.6 K/UL (ref 3.9–12.7)

## 2025-03-28 PROCEDURE — 25000003 PHARM REV CODE 250: Performed by: HOSPITALIST

## 2025-03-28 PROCEDURE — 93005 ELECTROCARDIOGRAM TRACING: CPT

## 2025-03-28 PROCEDURE — 25000003 PHARM REV CODE 250: Performed by: NURSE PRACTITIONER

## 2025-03-28 PROCEDURE — 63600175 PHARM REV CODE 636 W HCPCS: Performed by: HOSPITALIST

## 2025-03-28 PROCEDURE — G0378 HOSPITAL OBSERVATION PER HR: HCPCS

## 2025-03-28 PROCEDURE — 80053 COMPREHEN METABOLIC PANEL: CPT | Performed by: EMERGENCY MEDICINE

## 2025-03-28 PROCEDURE — 99285 EMERGENCY DEPT VISIT HI MDM: CPT | Mod: 25

## 2025-03-28 PROCEDURE — 96374 THER/PROPH/DIAG INJ IV PUSH: CPT

## 2025-03-28 PROCEDURE — 84484 ASSAY OF TROPONIN QUANT: CPT | Performed by: EMERGENCY MEDICINE

## 2025-03-28 PROCEDURE — 93010 ELECTROCARDIOGRAM REPORT: CPT | Mod: ,,, | Performed by: INTERNAL MEDICINE

## 2025-03-28 PROCEDURE — 96376 TX/PRO/DX INJ SAME DRUG ADON: CPT

## 2025-03-28 PROCEDURE — 83735 ASSAY OF MAGNESIUM: CPT | Performed by: EMERGENCY MEDICINE

## 2025-03-28 PROCEDURE — 83690 ASSAY OF LIPASE: CPT | Performed by: EMERGENCY MEDICINE

## 2025-03-28 PROCEDURE — 85025 COMPLETE CBC W/AUTO DIFF WBC: CPT | Performed by: EMERGENCY MEDICINE

## 2025-03-28 PROCEDURE — 86803 HEPATITIS C AB TEST: CPT | Performed by: PHYSICIAN ASSISTANT

## 2025-03-28 PROCEDURE — 25000003 PHARM REV CODE 250: Performed by: EMERGENCY MEDICINE

## 2025-03-28 PROCEDURE — 63600175 PHARM REV CODE 636 W HCPCS: Performed by: EMERGENCY MEDICINE

## 2025-03-28 PROCEDURE — 63600175 PHARM REV CODE 636 W HCPCS

## 2025-03-28 PROCEDURE — 87389 HIV-1 AG W/HIV-1&-2 AB AG IA: CPT | Performed by: PHYSICIAN ASSISTANT

## 2025-03-28 PROCEDURE — 18500000 HC LEAVE OF ABSENCE HOSPITAL SERVICES

## 2025-03-28 PROCEDURE — 96361 HYDRATE IV INFUSION ADD-ON: CPT

## 2025-03-28 PROCEDURE — 25000003 PHARM REV CODE 250

## 2025-03-28 PROCEDURE — 25500020 PHARM REV CODE 255: Performed by: EMERGENCY MEDICINE

## 2025-03-28 PROCEDURE — 96375 TX/PRO/DX INJ NEW DRUG ADDON: CPT

## 2025-03-28 RX ORDER — NALOXONE HCL 0.4 MG/ML
0.02 VIAL (ML) INJECTION
Status: DISCONTINUED | OUTPATIENT
Start: 2025-03-28 | End: 2025-04-08 | Stop reason: HOSPADM

## 2025-03-28 RX ORDER — MORPHINE SULFATE 4 MG/ML
8 INJECTION, SOLUTION INTRAMUSCULAR; INTRAVENOUS ONCE
Status: COMPLETED | OUTPATIENT
Start: 2025-03-28 | End: 2025-03-28

## 2025-03-28 RX ORDER — GABAPENTIN 100 MG/1
200 CAPSULE ORAL 3 TIMES DAILY
Status: DISCONTINUED | OUTPATIENT
Start: 2025-03-28 | End: 2025-04-08 | Stop reason: HOSPADM

## 2025-03-28 RX ORDER — LIDOCAINE HYDROCHLORIDE 20 MG/ML
15 SOLUTION OROPHARYNGEAL ONCE
Status: COMPLETED | OUTPATIENT
Start: 2025-03-28 | End: 2025-03-28

## 2025-03-28 RX ORDER — FAMOTIDINE 10 MG/ML
20 INJECTION, SOLUTION INTRAVENOUS
Status: COMPLETED | OUTPATIENT
Start: 2025-03-28 | End: 2025-03-28

## 2025-03-28 RX ORDER — PANTOPRAZOLE SODIUM 40 MG/10ML
40 INJECTION, POWDER, LYOPHILIZED, FOR SOLUTION INTRAVENOUS
Status: COMPLETED | OUTPATIENT
Start: 2025-03-28 | End: 2025-03-28

## 2025-03-28 RX ORDER — SUCRALFATE 1 G/10ML
1 SUSPENSION ORAL
Status: DISCONTINUED | OUTPATIENT
Start: 2025-03-28 | End: 2025-03-30

## 2025-03-28 RX ORDER — LIDOCAINE 50 MG/G
2 PATCH TOPICAL
Status: DISCONTINUED | OUTPATIENT
Start: 2025-03-28 | End: 2025-04-08 | Stop reason: HOSPADM

## 2025-03-28 RX ORDER — MORPHINE SULFATE 4 MG/ML
4 INJECTION, SOLUTION INTRAMUSCULAR; INTRAVENOUS
Refills: 0 | Status: COMPLETED | OUTPATIENT
Start: 2025-03-28 | End: 2025-03-28

## 2025-03-28 RX ORDER — TALC
6 POWDER (GRAM) TOPICAL NIGHTLY PRN
Status: DISCONTINUED | OUTPATIENT
Start: 2025-03-28 | End: 2025-04-08 | Stop reason: HOSPADM

## 2025-03-28 RX ORDER — IBUPROFEN 200 MG
16 TABLET ORAL
Status: DISCONTINUED | OUTPATIENT
Start: 2025-03-28 | End: 2025-04-08 | Stop reason: HOSPADM

## 2025-03-28 RX ORDER — MORPHINE SULFATE 4 MG/ML
4 INJECTION, SOLUTION INTRAMUSCULAR; INTRAVENOUS EVERY 4 HOURS PRN
Refills: 0 | Status: DISCONTINUED | OUTPATIENT
Start: 2025-03-28 | End: 2025-04-08 | Stop reason: HOSPADM

## 2025-03-28 RX ORDER — IBUPROFEN 200 MG
24 TABLET ORAL
Status: DISCONTINUED | OUTPATIENT
Start: 2025-03-28 | End: 2025-04-08 | Stop reason: HOSPADM

## 2025-03-28 RX ORDER — GABAPENTIN 100 MG/1
100 CAPSULE ORAL 3 TIMES DAILY
Status: DISCONTINUED | OUTPATIENT
Start: 2025-03-28 | End: 2025-03-28

## 2025-03-28 RX ORDER — ALUMINUM HYDROXIDE, MAGNESIUM HYDROXIDE, AND SIMETHICONE 1200; 120; 1200 MG/30ML; MG/30ML; MG/30ML
30 SUSPENSION ORAL ONCE
Status: COMPLETED | OUTPATIENT
Start: 2025-03-28 | End: 2025-03-28

## 2025-03-28 RX ORDER — ONDANSETRON 4 MG/1
4 TABLET, ORALLY DISINTEGRATING ORAL EVERY 6 HOURS PRN
Status: DISCONTINUED | OUTPATIENT
Start: 2025-03-28 | End: 2025-03-31 | Stop reason: HOSPADM

## 2025-03-28 RX ORDER — ACETAMINOPHEN 500 MG
1000 TABLET ORAL EVERY 8 HOURS
Status: DISCONTINUED | OUTPATIENT
Start: 2025-03-28 | End: 2025-03-28

## 2025-03-28 RX ORDER — OXYCODONE HYDROCHLORIDE 5 MG/1
5 TABLET ORAL EVERY 6 HOURS PRN
Refills: 0 | Status: DISCONTINUED | OUTPATIENT
Start: 2025-03-28 | End: 2025-04-08 | Stop reason: HOSPADM

## 2025-03-28 RX ORDER — METHOCARBAMOL 750 MG/1
750 TABLET, FILM COATED ORAL 3 TIMES DAILY PRN
Status: DISCONTINUED | OUTPATIENT
Start: 2025-03-28 | End: 2025-04-08 | Stop reason: HOSPADM

## 2025-03-28 RX ORDER — PANTOPRAZOLE SODIUM 40 MG/10ML
40 INJECTION, POWDER, LYOPHILIZED, FOR SOLUTION INTRAVENOUS DAILY
Status: DISCONTINUED | OUTPATIENT
Start: 2025-03-28 | End: 2025-03-30

## 2025-03-28 RX ORDER — ONDANSETRON HYDROCHLORIDE 2 MG/ML
4 INJECTION, SOLUTION INTRAVENOUS EVERY 8 HOURS PRN
Status: DISCONTINUED | OUTPATIENT
Start: 2025-03-28 | End: 2025-04-08 | Stop reason: HOSPADM

## 2025-03-28 RX ORDER — ONDANSETRON HYDROCHLORIDE 2 MG/ML
4 INJECTION, SOLUTION INTRAVENOUS
Status: COMPLETED | OUTPATIENT
Start: 2025-03-28 | End: 2025-03-28

## 2025-03-28 RX ORDER — GABAPENTIN 100 MG/1
200 CAPSULE ORAL 3 TIMES DAILY
Status: CANCELLED | OUTPATIENT
Start: 2025-03-28

## 2025-03-28 RX ORDER — AMLODIPINE BESYLATE 5 MG/1
5 TABLET ORAL DAILY
Status: DISCONTINUED | OUTPATIENT
Start: 2025-03-29 | End: 2025-04-08 | Stop reason: HOSPADM

## 2025-03-28 RX ORDER — PANTOPRAZOLE SODIUM 40 MG/1
40 TABLET, DELAYED RELEASE ORAL DAILY
Status: DISCONTINUED | OUTPATIENT
Start: 2025-03-29 | End: 2025-03-28

## 2025-03-28 RX ORDER — ALBUTEROL SULFATE 90 UG/1
2 INHALANT RESPIRATORY (INHALATION) EVERY 6 HOURS PRN
Status: DISCONTINUED | OUTPATIENT
Start: 2025-03-28 | End: 2025-04-08 | Stop reason: HOSPADM

## 2025-03-28 RX ORDER — POLYETHYLENE GLYCOL 3350 17 G/17G
17 POWDER, FOR SOLUTION ORAL 2 TIMES DAILY PRN
Status: DISCONTINUED | OUTPATIENT
Start: 2025-03-28 | End: 2025-03-31

## 2025-03-28 RX ORDER — FLUTICASONE PROPIONATE 50 MCG
1 SPRAY, SUSPENSION (ML) NASAL 2 TIMES DAILY
Status: DISCONTINUED | OUTPATIENT
Start: 2025-03-28 | End: 2025-04-08 | Stop reason: HOSPADM

## 2025-03-28 RX ORDER — FLUTICASONE FUROATE AND VILANTEROL 100; 25 UG/1; UG/1
1 POWDER RESPIRATORY (INHALATION) DAILY
Status: DISCONTINUED | OUTPATIENT
Start: 2025-03-29 | End: 2025-04-08 | Stop reason: HOSPADM

## 2025-03-28 RX ORDER — TALC
6 POWDER (GRAM) TOPICAL NIGHTLY PRN
Status: DISCONTINUED | OUTPATIENT
Start: 2025-03-28 | End: 2025-03-28

## 2025-03-28 RX ORDER — HYDRALAZINE HYDROCHLORIDE 25 MG/1
50 TABLET, FILM COATED ORAL EVERY 8 HOURS
Status: DISCONTINUED | OUTPATIENT
Start: 2025-03-28 | End: 2025-03-28

## 2025-03-28 RX ORDER — ACETAMINOPHEN 500 MG
1000 TABLET ORAL EVERY 8 HOURS
Status: DISCONTINUED | OUTPATIENT
Start: 2025-03-28 | End: 2025-04-08 | Stop reason: HOSPADM

## 2025-03-28 RX ORDER — GLUCAGON 1 MG
1 KIT INJECTION
Status: DISCONTINUED | OUTPATIENT
Start: 2025-03-28 | End: 2025-04-08 | Stop reason: HOSPADM

## 2025-03-28 RX ORDER — METHOCARBAMOL 500 MG/1
1000 TABLET, FILM COATED ORAL
Status: COMPLETED | OUTPATIENT
Start: 2025-03-28 | End: 2025-03-28

## 2025-03-28 RX ORDER — SODIUM CHLORIDE 0.9 % (FLUSH) 0.9 %
10 SYRINGE (ML) INJECTION EVERY 12 HOURS PRN
Status: DISCONTINUED | OUTPATIENT
Start: 2025-03-28 | End: 2025-03-28

## 2025-03-28 RX ORDER — BETHANECHOL CHLORIDE 10 MG/1
10 TABLET ORAL 3 TIMES DAILY
Status: DISCONTINUED | OUTPATIENT
Start: 2025-03-28 | End: 2025-03-28

## 2025-03-28 RX ORDER — HYDROXYZINE PAMOATE 25 MG/1
25 CAPSULE ORAL EVERY 8 HOURS PRN
Status: DISCONTINUED | OUTPATIENT
Start: 2025-03-28 | End: 2025-04-08 | Stop reason: HOSPADM

## 2025-03-28 RX ORDER — LIDOCAINE 50 MG/G
2 PATCH TOPICAL
Status: DISCONTINUED | OUTPATIENT
Start: 2025-03-29 | End: 2025-03-28

## 2025-03-28 RX ORDER — SODIUM CHLORIDE 0.9 % (FLUSH) 0.9 %
10 SYRINGE (ML) INJECTION
Status: DISCONTINUED | OUTPATIENT
Start: 2025-03-28 | End: 2025-04-08 | Stop reason: HOSPADM

## 2025-03-28 RX ORDER — MORPHINE SULFATE 4 MG/ML
4 INJECTION, SOLUTION INTRAMUSCULAR; INTRAVENOUS
Refills: 0 | Status: DISCONTINUED | OUTPATIENT
Start: 2025-03-28 | End: 2025-03-28

## 2025-03-28 RX ORDER — TAMSULOSIN HYDROCHLORIDE 0.4 MG/1
0.4 CAPSULE ORAL DAILY
Status: DISCONTINUED | OUTPATIENT
Start: 2025-03-29 | End: 2025-04-08 | Stop reason: HOSPADM

## 2025-03-28 RX ADMIN — MORPHINE SULFATE 4 MG: 4 INJECTION INTRAVENOUS at 03:03

## 2025-03-28 RX ADMIN — IOHEXOL 75 ML: 350 INJECTION, SOLUTION INTRAVENOUS at 02:03

## 2025-03-28 RX ADMIN — SODIUM CHLORIDE 500 ML: 9 INJECTION, SOLUTION INTRAVENOUS at 01:03

## 2025-03-28 RX ADMIN — TRAMADOL HYDROCHLORIDE 50 MG: 50 TABLET, COATED ORAL at 05:03

## 2025-03-28 RX ADMIN — HYDRALAZINE HYDROCHLORIDE 50 MG: 50 TABLET ORAL at 05:03

## 2025-03-28 RX ADMIN — LIDOCAINE HYDROCHLORIDE 15 ML: 20 SOLUTION ORAL at 12:03

## 2025-03-28 RX ADMIN — PANTOPRAZOLE SODIUM 40 MG: 40 INJECTION, POWDER, FOR SOLUTION INTRAVENOUS at 12:03

## 2025-03-28 RX ADMIN — MORPHINE SULFATE 8 MG: 4 INJECTION INTRAVENOUS at 09:03

## 2025-03-28 RX ADMIN — ACETAMINOPHEN 1000 MG: 500 TABLET ORAL at 05:03

## 2025-03-28 RX ADMIN — SUCRALFATE 1 G: 1 SUSPENSION ORAL at 05:03

## 2025-03-28 RX ADMIN — ALUMINUM HYDROXIDE, MAGNESIUM HYDROXIDE, AND SIMETHICONE 30 ML: 200; 200; 20 SUSPENSION ORAL at 12:03

## 2025-03-28 RX ADMIN — SUCRALFATE 1 G: 1 SUSPENSION ORAL at 09:03

## 2025-03-28 RX ADMIN — ONDANSETRON 4 MG: 4 TABLET, ORALLY DISINTEGRATING ORAL at 09:03

## 2025-03-28 RX ADMIN — GABAPENTIN 200 MG: 100 CAPSULE ORAL at 09:03

## 2025-03-28 RX ADMIN — OMEPRAZOLE 40 MG: 20 CAPSULE, DELAYED RELEASE ORAL at 06:03

## 2025-03-28 RX ADMIN — FAMOTIDINE 20 MG: 10 INJECTION, SOLUTION INTRAVENOUS at 12:03

## 2025-03-28 RX ADMIN — PANTOPRAZOLE SODIUM 40 MG: 40 INJECTION, POWDER, LYOPHILIZED, FOR SOLUTION INTRAVENOUS at 05:03

## 2025-03-28 RX ADMIN — MORPHINE SULFATE 4 MG: 4 INJECTION INTRAVENOUS at 12:03

## 2025-03-28 RX ADMIN — SUCRALFATE 1 G: 1 SUSPENSION ORAL at 10:03

## 2025-03-28 RX ADMIN — METHOCARBAMOL 1000 MG: 500 TABLET ORAL at 05:03

## 2025-03-28 RX ADMIN — ONDANSETRON 4 MG: 2 INJECTION INTRAMUSCULAR; INTRAVENOUS at 12:03

## 2025-03-28 RX ADMIN — LIDOCAINE 2 PATCH: 50 PATCH CUTANEOUS at 05:03

## 2025-03-28 RX ADMIN — MORPHINE SULFATE 4 MG: 4 INJECTION INTRAVENOUS at 07:03

## 2025-03-28 NOTE — H&P
[facility] - Emergency Dept  [facility] Medicine  History & Physical    Patient Name: Bridget Montgomery  MRN: [id number]  Patient Class: OP- Observation  Admission Date: 3/28/2025  Attending Physician: Servando Mejia MD   Primary Care Provider: No primary care provider on file.         Patient information was obtained from patient, relative(s), past medical records, and ER records.     Subjective:     Principal Problem:<principal problem not specified>    Chief Complaint:   Chief Complaint   Patient presents with    Abdominal Pain     Upper abdominal pain, difficulty swallowing, nausea/vomitting        HPI: Ms. 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 Her abdominal pain has been worsening for the past week and has progressed to bilious vomiting of meds or anything that she tries to eat/drink. No noted hematemesis, melena, hematochezia. Per meds review no recent NSAID use (listed as allergy, and has CKD). No ETOH use. Pt has a known hiatal hernia. Pt's back pain has worsened over the past few days without any recent falls, trauma, and she does report a history of chronic back pain, but is wailing in pain in the ED. Per collateral from her daughter and HCPOA, Liliana Montgomery, pt has been working on elliptical and doing PT/OT at [facility] without significant issues. No fevers, chills, SOB, LE edema.     In the ED she is afebrile, hemodynamically stable on RA with leukocytosis to 13, anemia to 7.7 (baseline 10), isolated ALP elevation to 203, high-sensitivity troponin elevation to 51 with unremarkable EKG. CXR with increase in the perihilar interstitial and vascular markings. XR lumbar spine with mild scoliosis, but no acute fractures. CT AP with mildly distended bladder,  diverticulosis, and moderate to large hiatal hernia. Pt given Maalox, viscous lidocaine, famotidine, pantoprazole, Zofran, and morphine for abdominal, chest, and back pain. Admitted to Hospital Medicine for pain control and evaluation of abdominal pain.     Past Medical History:   Diagnosis Date    Allergy     Anemia, unspecified     Arthritis     CKD (chronic kidney disease) stage 4, GFR 15-29 ml/min     COPD (chronic obstructive pulmonary disease)     Edema     HTN (hypertension)     Hypocalcemia     Hyponatremia     Osteoarthritis        Past Surgical History:   Procedure Laterality Date    BREAST CYST EXCISION      CAUDAL EPIDURAL STEROID INJECTION N/A 2/2/2023    Procedure: Injection-steroid-epidural-caudal;  Surgeon: Angel Sparrow MD;  Location: [facility] PAIN MGT;  Service: Pain Management;  Laterality: N/A;    FOOT SURGERY      FUSION OF CERVICAL SPINE BY POSTERIOR APPROACH N/A 3/19/2025    Procedure: FUSION, SPINE, CERVICAL, POSTERIOR APPROACH;  Surgeon: Clint Lemons MD;  Location: [facility] OR [address] [place] [address];  Service: Neurosurgery;  Laterality: N/A;  Occiput-C4       Review of patient's allergies indicates:   Allergen Reactions    Cephalexin     Codeine     Meperidine      Other reaction(s): delerium, hallucination  Delerium  Delerium  Delerium      Nsaids (non-steroidal anti-inflammatory drug)     Penicillins     Tazarotene      Other reaction(s): rash, Unknown       Current Facility-Administered Medications on File Prior to Encounter   Medication    [ - Suspended Admission] acetaminophen tablet 1,000 mg    [ - Suspended Admission] acetaminophen tablet 650 mg    [ - Suspended Admission] albuterol inhaler 2 puff    [ - Suspended Admission] amLODIPine tablet 5 mg    [ - Suspended Admission] bethanechol tablet 10 mg    [ - Suspended Admission] calcium carbonate 200 mg calcium (500 mg) chewable tablet 500 mg    [ - Suspended Admission] enoxaparin injection 30 mg    [ - Suspended Admission] fluticasone  furoate-vilanteroL 100-25 mcg/dose diskus inhaler 1 puff    [ - Suspended Admission] fluticasone propionate 50 mcg/actuation nasal spray 50 mcg    [ - Suspended Admission] furosemide tablet 40 mg    [ - Suspended Admission] gabapentin capsule 100 mg    [ - Suspended Admission] guaiFENesin 100 mg/5 ml syrup 200 mg    [ - Suspended Admission] hydrALAZINE tablet 50 mg    [ - Suspended Admission] LIDOcaine 5 % patch 1 patch    [ - Suspended Admission] melatonin tablet 6 mg    [ - Suspended Admission] methocarbamoL tablet 500 mg    [ - Suspended Admission] nystatin 100,000 unit/mL suspension 500,000 Units    [ - Suspended Admission] omega 3-dha-epa-fish oil capsule 1 capsule    [ - Suspended Admission] omeprazole capsule 40 mg    [ - Suspended Admission] ondansetron disintegrating tablet 4 mg    [ - Suspended Admission] polyethylene glycol packet 17 g    [ - Suspended Admission] senna-docusate 8.6-50 mg per tablet 1 tablet    [ - Suspended Admission] sodium chloride oral tablet 1,000 mg    [ - Suspended Admission] sucralfate 100 mg/mL suspension 1 g    [ - Suspended Admission] tamsulosin 24 hr capsule 0.4 mg    [ - Suspended Admission] traMADoL tablet 50 mg     Current Outpatient Medications on File Prior to Encounter   Medication Sig    albuterol (PROVENTIL/VENTOLIN HFA) 90 mcg/actuation inhaler Inhale 2 puffs into the lungs every 6 (six) hours as needed.    amLODIPine (NORVASC) 10 MG tablet Take 1 tablet (10 mg total) by mouth once daily.    bethanechol (URECHOLINE) 10 MG Tab Take 1 tablet (10 mg total) by mouth 3 (three) times daily.    cloNIDine 0.1 mg/24 hr td ptwk (CATAPRES) 0.1 mg/24 hr Place 1 patch onto the skin every 7 days.    fluticasone furoate-vilanteroL (BREO) 100-25 mcg/dose diskus inhaler Inhale 1 puff into the lungs once daily. Controller    fluticasone propionate (FLONASE) 50 mcg/actuation nasal spray 1 spray by Nasal route 2 (two) times daily.    furosemide (LASIX) 40 MG tablet Take 1 tablet (40 mg  total) by mouth once daily.    gabapentin (NEURONTIN) 300 MG capsule Take 300 mg by mouth every evening.    hydrALAZINE (APRESOLINE) 25 MG tablet Take 3 tablets (75 mg total) by mouth every 8 (eight) hours.    ipratropium (ATROVENT) 42 mcg (0.06 %) nasal spray     melatonin (MELATIN) 3 mg tablet Take 2 tablets (6 mg total) by mouth nightly as needed for Insomnia.    methocarbamoL (ROBAXIN) 500 MG Tab Take 1 tablet (500 mg total) by mouth 3 (three) times daily. for 10 days    omega 3-dha-epa-fish oil 1,000 mg (120 mg-180 mg) Cap Take 1 capsule by mouth once daily.    omeprazole (PRILOSEC) 20 MG capsule Take 1 capsule (20 mg total) by mouth 2 (two) times daily before meals.    oxyCODONE-acetaminophen (PERCOCET) 5-325 mg per tablet Take 1 tablet by mouth every 6 (six) hours as needed.    polyethylene glycol (GLYCOLAX) 17 gram/dose powder Use cap to measure 17 grams.  Dissolve as directed and take by mouth once daily.    sodium chloride 1,000 mg TbSO oral tablet Take 1 tablet (1,000 mg total) by mouth 2 (two) times daily. for 15 days    tamsulosin (FLOMAX) 0.4 mg Cap Take 1 capsule (0.4 mg total) by mouth once daily.     Family History       Problem Relation (Age of Onset)    Cancer Mother    No Known Problems Father          Tobacco Use    Smoking status: Former    Smokeless tobacco: Never   Substance and Sexual Activity    Alcohol use: Not Currently    Drug use: Never    Sexual activity: Not Currently     Review of Systems   Constitutional:  Positive for activity change, appetite change and fatigue. Negative for fever.   Respiratory:  Negative for cough, chest tightness, shortness of breath and wheezing.    Cardiovascular:  Positive for chest pain. Negative for leg swelling.   Gastrointestinal:  Positive for abdominal pain, nausea and vomiting. Negative for abdominal distention, constipation and diarrhea.   Genitourinary:  Negative for difficulty urinating.   Musculoskeletal:  Positive for back pain and myalgias.    Skin:  Negative for rash.   Neurological:  Positive for headaches.   Psychiatric/Behavioral: Negative.       Objective:     Vital Signs (Most Recent):  Temp: 98.1 °F (36.7 °C) (03/28/25 1300)  Pulse: 80 (03/28/25 1500)  Resp: 18 (03/28/25 1509)  BP: 118/61 (03/28/25 1500)  SpO2: 95 % (03/28/25 1500) Vital Signs (24h Range):  Temp:  [97.1 °F (36.2 °C)-98.1 °F (36.7 °C)] 98.1 °F (36.7 °C)  Pulse:  [70-91] 80  Resp:  [16-19] 18  SpO2:  [95 %-96 %] 95 %  BP: (118-142)/(51-63) 118/61     Weight: 68 kg (150 lb)  Body mass index is 26.57 kg/m².     Physical Exam  Vitals and nursing note reviewed.   Constitutional:       General: She is in acute distress.      Appearance: She is ill-appearing.   HENT:      Head: Normocephalic and atraumatic.      Mouth/Throat:      Mouth: Mucous membranes are dry.      Pharynx: Oropharynx is clear.   Eyes:      Extraocular Movements: Extraocular movements intact.      Conjunctiva/sclera: Conjunctivae normal.   Neck:      Comments: C-collar in place  Cardiovascular:      Rate and Rhythm: Normal rate and regular rhythm.      Pulses: Normal pulses.   Pulmonary:      Effort: Pulmonary effort is normal. No respiratory distress.      Breath sounds: No wheezing or rales.   Abdominal:      Palpations: Abdomen is soft.      Tenderness: There is abdominal tenderness (epigastric). There is no guarding.   Musculoskeletal:         General: No tenderness.      Right lower leg: No edema.      Left lower leg: No edema.   Skin:     General: Skin is warm and dry.   Neurological:      General: No focal deficit present.      Mental Status: She is alert and oriented to person, place, and time.   Psychiatric:         Mood and Affect: Mood is anxious.                Significant Labs: All pertinent labs within the past 24 hours have been reviewed.    Significant Imaging: I have reviewed all pertinent imaging results/findings within the past 24 hours.  Assessment/Plan:     Assessment & Plan  Chronic kidney  "disease, stage III (moderate)  Creatine stable for now. BMP reviewed- noted Estimated Creatinine Clearance: 31.4 mL/min (based on SCr of 1.1 mg/dL). according to latest data. Based on current GFR, CKD stage is stage 3 - GFR 30-59.  Monitor UOP and serial BMP and adjust therapy as needed. Renally dose meds. Avoid nephrotoxic medications and procedures.  Type 2 diabetes mellitus with diabetic chronic kidney disease  Patient's FSGs are controlled on current medication regimen.  Last A1c reviewed-   Lab Results   Component Value Date    HGBA1C 5.8 (H) 03/18/2025     Most recent fingerstick glucose reviewed- No results for input(s): "POCTGLUCOSE" in the last 24 hours.  Current correctional scale   None  Maintain anti-hyperglycemic dose as follows-   Antihyperglycemics (From admission, onward)      None          Hold Oral hypoglycemics while patient is in the hospital.  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 XR lumbar spine with mild scoliosis and degenerative changes.    Multimodal pain regimen with scheduled Tylenol, prn methocarbamol, increase home gabapentin from 100mg to 200mg TID with judicious increases for pain control, lidocaine patches, prn Oxycodone and IV morphine for severe pain.   Essential hypertension  Patient's blood pressure range in the last 24 hours was: BP  Min: 118/61  Max: 142/63.The patient's inpatient anti-hypertensive regimen is listed below:  Current Antihypertensives  amLODIPine tablet 5 mg, Daily, Oral  hydrALAZINE tablet 50 mg, Every 8 hours, Oral    Plan  - BP is controlled, no changes needed to their regimen  Ulcer of esophagus  Gastro-esophageal reflux disease without esophagitis  Hiatal hernia with GERD  Appears to have used Protonix and omeprazole for a period at SNF along with viscious po agents without symptomatic relief that has progressed " to N/V with liquids and solids. Associated acute anemia 7.7 with slow decrease from baseline 10, but without overt bleeding, melena, coffee ground emesis. Ddx includes gastric or duodenal ulcer, esophageal webbing, GERD associated with hiatal hernia. Low suspicion for Megan-Osorio tear given pt's stable BP.    - IV protonix BID  - sucralfate before and after meals  - CLD with continued monitoring  - Zofran  - qd CBC  - Appreciated GI evaluation for EGD, noting recent C-spinal fusion as possible complicating factor for scope.    Closed odontoid fracture with routine healing  Maintain C-collar    Urinary retention  Continue Flomax and hold bethanechol started one week ago to rule out N/V contributory factor from anticholinergic med    Hyponatremia  Hyponatremia is likely due to indeterminate. The patient's most recent sodium results are listed below.  Recent Labs     03/27/25  0410 03/28/25  1239    134*     Plan  - Correct the sodium by 4-6mEq in 24 hours.   - Obtain the following studies: monitoring.  - Will treat the hyponatremia with monitoring, and, if indicated, resuming sodium tablets that were started within the past week  - Monitor sodium Daily.   - Patient hyponatremia is stable    Acute blood loss anemia  Anemia is likely due to acute blood loss which was from GI source . Most recent hemoglobin and hematocrit are listed below.  Recent Labs     03/27/25  0410 03/28/25  1239   HGB 8.0* 7.7*   HCT 24.8* 24.6*     Plan  - Monitor serial CBC: Daily  - Transfuse PRBC if patient becomes hemodynamically unstable, symptomatic or H/H drops below 7/21.  - Patient has not received any PRBC transfusions to date  - Patient's anemia is currently worsening. Will continue current treatment  - hold anticoagulation at this time  Troponin level elevated  Pt reports chest pain, but attributes this to hx of esophagitis. EKG non-contributory.    - Trend troponin to peak.     Alkaline phosphatase elevation  With associated  epigastric pain.    - US RUQ to rule out gallstones or obstructive etiology of abdominal pain      VTE Risk Mitigation (From admission, onward)      None               On 03/28/2025, patient should be placed in hospital observation services under my care.             Servando Mejia MD  Department of Hospital Medicine  [facility] - Emergency Dept

## 2025-03-28 NOTE — ASSESSMENT & PLAN NOTE
Anemia is likely due to acute blood loss which was from GI source. Most recent hemoglobin and hematocrit are listed below.  Recent Labs     03/27/25  0410 03/28/25  1239   HGB 8.0* 7.7*   HCT 24.8* 24.6*     Plan  - Monitor serial CBC: Daily  - Transfuse PRBC if patient becomes hemodynamically unstable, symptomatic or H/H drops below 7/21.  - Patient has not received any PRBC transfusions to date  - Patient's anemia is currently worsening. Will continue current treatment  - hold anticoagulation at this time

## 2025-03-28 NOTE — SUBJECTIVE & OBJECTIVE
Past Medical History:   Diagnosis Date    Allergy     Anemia, unspecified     Arthritis     CKD (chronic kidney disease) stage 4, GFR 15-29 ml/min     COPD (chronic obstructive pulmonary disease)     Edema     HTN (hypertension)     Hypocalcemia     Hyponatremia     Osteoarthritis        Past Surgical History:   Procedure Laterality Date    BREAST CYST EXCISION      CAUDAL EPIDURAL STEROID INJECTION N/A 2/2/2023    Procedure: Injection-steroid-epidural-caudal;  Surgeon: Angel Sparrow MD;  Location: [facility];  Service: Pain Management;  Laterality: N/A;    FOOT SURGERY      FUSION OF CERVICAL SPINE BY POSTERIOR APPROACH N/A 3/19/2025    Procedure: FUSION, SPINE, CERVICAL, POSTERIOR APPROACH;  Surgeon: Clint Lemons MD;  Location: [facility] OR [address] [place] [address];  Service: Neurosurgery;  Laterality: N/A;  Occiput-C4       Review of patient's allergies indicates:   Allergen Reactions    Cephalexin     Codeine     Meperidine      Other reaction(s): delerium, hallucination  Delerium  Delerium  Delerium      Nsaids (non-steroidal anti-inflammatory drug)     Penicillins     Tazarotene      Other reaction(s): rash, Unknown       Current Facility-Administered Medications on File Prior to Encounter   Medication    [ - Suspended Admission] acetaminophen tablet 1,000 mg    [ - Suspended Admission] acetaminophen tablet 650 mg    [ - Suspended Admission] albuterol inhaler 2 puff    [ - Suspended Admission] amLODIPine tablet 5 mg    [ - Suspended Admission] bethanechol tablet 10 mg    [ - Suspended Admission] calcium carbonate 200 mg calcium (500 mg) chewable tablet 500 mg    [ - Suspended Admission] enoxaparin injection 30 mg    [ - Suspended Admission] fluticasone furoate-vilanteroL 100-25 mcg/dose diskus inhaler 1 puff    [ - Suspended Admission] fluticasone propionate 50 mcg/actuation nasal spray 50 mcg    [ - Suspended Admission] furosemide tablet 40 mg    [ - Suspended Admission] gabapentin capsule 100 mg    [ - Suspended  Admission] guaiFENesin 100 mg/5 ml syrup 200 mg    [ - Suspended Admission] hydrALAZINE tablet 50 mg    [ - Suspended Admission] LIDOcaine 5 % patch 1 patch    [ - Suspended Admission] melatonin tablet 6 mg    [ - Suspended Admission] methocarbamoL tablet 500 mg    [ - Suspended Admission] nystatin 100,000 unit/mL suspension 500,000 Units    [ - Suspended Admission] omega 3-dha-epa-fish oil capsule 1 capsule    [ - Suspended Admission] omeprazole capsule 40 mg    [ - Suspended Admission] ondansetron disintegrating tablet 4 mg    [ - Suspended Admission] polyethylene glycol packet 17 g    [ - Suspended Admission] senna-docusate 8.6-50 mg per tablet 1 tablet    [ - Suspended Admission] sodium chloride oral tablet 1,000 mg    [ - Suspended Admission] sucralfate 100 mg/mL suspension 1 g    [ - Suspended Admission] tamsulosin 24 hr capsule 0.4 mg    [ - Suspended Admission] traMADoL tablet 50 mg     Current Outpatient Medications on File Prior to Encounter   Medication Sig    albuterol (PROVENTIL/VENTOLIN HFA) 90 mcg/actuation inhaler Inhale 2 puffs into the lungs every 6 (six) hours as needed.    amLODIPine (NORVASC) 10 MG tablet Take 1 tablet (10 mg total) by mouth once daily.    bethanechol (URECHOLINE) 10 MG Tab Take 1 tablet (10 mg total) by mouth 3 (three) times daily.    cloNIDine 0.1 mg/24 hr td ptwk (CATAPRES) 0.1 mg/24 hr Place 1 patch onto the skin every 7 days.    fluticasone furoate-vilanteroL (BREO) 100-25 mcg/dose diskus inhaler Inhale 1 puff into the lungs once daily. Controller    fluticasone propionate (FLONASE) 50 mcg/actuation nasal spray 1 spray by Nasal route 2 (two) times daily.    furosemide (LASIX) 40 MG tablet Take 1 tablet (40 mg total) by mouth once daily.    gabapentin (NEURONTIN) 300 MG capsule Take 300 mg by mouth every evening.    hydrALAZINE (APRESOLINE) 25 MG tablet Take 3 tablets (75 mg total) by mouth every 8 (eight) hours.    ipratropium (ATROVENT) 42 mcg (0.06 %) nasal spray      melatonin (MELATIN) 3 mg tablet Take 2 tablets (6 mg total) by mouth nightly as needed for Insomnia.    methocarbamoL (ROBAXIN) 500 MG Tab Take 1 tablet (500 mg total) by mouth 3 (three) times daily. for 10 days    omega 3-dha-epa-fish oil 1,000 mg (120 mg-180 mg) Cap Take 1 capsule by mouth once daily.    omeprazole (PRILOSEC) 20 MG capsule Take 1 capsule (20 mg total) by mouth 2 (two) times daily before meals.    oxyCODONE-acetaminophen (PERCOCET) 5-325 mg per tablet Take 1 tablet by mouth every 6 (six) hours as needed.    polyethylene glycol (GLYCOLAX) 17 gram/dose powder Use cap to measure 17 grams.  Dissolve as directed and take by mouth once daily.    sodium chloride 1,000 mg TbSO oral tablet Take 1 tablet (1,000 mg total) by mouth 2 (two) times daily. for 15 days    tamsulosin (FLOMAX) 0.4 mg Cap Take 1 capsule (0.4 mg total) by mouth once daily.     Family History       Problem Relation (Age of Onset)    Cancer Mother    No Known Problems Father          Tobacco Use    Smoking status: Former    Smokeless tobacco: Never   Substance and Sexual Activity    Alcohol use: Not Currently    Drug use: Never    Sexual activity: Not Currently     Review of Systems   Constitutional:  Positive for activity change, appetite change and fatigue. Negative for fever.   Respiratory:  Negative for cough, chest tightness, shortness of breath and wheezing.    Cardiovascular:  Positive for chest pain. Negative for leg swelling.   Gastrointestinal:  Positive for abdominal pain, nausea and vomiting. Negative for abdominal distention, constipation and diarrhea.   Genitourinary:  Negative for difficulty urinating.   Musculoskeletal:  Positive for back pain and myalgias.   Skin:  Negative for rash.   Neurological:  Positive for headaches.   Psychiatric/Behavioral: Negative.       Objective:     Vital Signs (Most Recent):  Temp: 98.1 °F (36.7 °C) (03/28/25 1300)  Pulse: 80 (03/28/25 1500)  Resp: 18 (03/28/25 1509)  BP: 118/61 (03/28/25  1500)  SpO2: 95 % (03/28/25 1500) Vital Signs (24h Range):  Temp:  [97.1 °F (36.2 °C)-98.1 °F (36.7 °C)] 98.1 °F (36.7 °C)  Pulse:  [70-91] 80  Resp:  [16-19] 18  SpO2:  [95 %-96 %] 95 %  BP: (118-142)/(51-63) 118/61     Weight: 68 kg (150 lb)  Body mass index is 26.57 kg/m².     Physical Exam  Vitals and nursing note reviewed.   Constitutional:       General: She is in acute distress.      Appearance: She is ill-appearing.   HENT:      Head: Normocephalic and atraumatic.      Mouth/Throat:      Mouth: Mucous membranes are dry.      Pharynx: Oropharynx is clear.   Eyes:      Extraocular Movements: Extraocular movements intact.      Conjunctiva/sclera: Conjunctivae normal.   Neck:      Comments: C-collar in place  Cardiovascular:      Rate and Rhythm: Normal rate and regular rhythm.      Pulses: Normal pulses.   Pulmonary:      Effort: Pulmonary effort is normal. No respiratory distress.      Breath sounds: No wheezing or rales.   Abdominal:      Palpations: Abdomen is soft.      Tenderness: There is abdominal tenderness (epigastric). There is no guarding.   Musculoskeletal:         General: No tenderness.      Right lower leg: No edema.      Left lower leg: No edema.   Skin:     General: Skin is warm and dry.   Neurological:      General: No focal deficit present.      Mental Status: She is alert and oriented to person, place, and time.   Psychiatric:         Mood and Affect: Mood is anxious.                Significant Labs: All pertinent labs within the past 24 hours have been reviewed.    Significant Imaging: I have reviewed all pertinent imaging results/findings within the past 24 hours.

## 2025-03-28 NOTE — NURSING
Patient is not feeling well this morning. Lorena Akins NP at bedside to assess and talk to the family. PRN Zofran given as ordered. Patient to ED due to concern of esophageal issues, unable to tolerate PO intake, severe GERD, nausea /vomitting and delirium as per NP. Called ED for report.

## 2025-03-28 NOTE — PROGRESS NOTES
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 Lemons.  Admission to SNF for secondary weakness and debility.    Interval history:  Arrived to patient's room to find her sitting in the bedside chair in pain and complaining of nausea and abdominal discomfort.  Patient has had a difficult time at SNF recovering from her recent surgery.  Pain has been difficult to control while also maintaining proper mentation.  Yesterday family requested pain medication be reduced because patient was too lethargic even though she was complaining of severe pain.  Today they are requesting for it to be increased.  Patient received tramadol this morning on an empty stomach in his reporting severe abdominal discomfort.  Patient has also been having issues with GERD.  Her home omeprazole was resumed on 3/26 at increased yesterday.  She was also receiving Carafate.  Patient noted to have white specks on her tongue yesterday, nystatin was ordered but patient refused both of the doses yesterday.  Daughters at bedside asking for an esophagram due to patient's history of esophageal issues.  At this point patient is not taking in anything PO in his not safe for SNF admission.  Decision was made to send patient to [facility] ED for further evaluation.    Past  Medical History: Patient has a past medical history of Allergy, Anemia, unspecified, Arthritis, CKD (chronic kidney disease) stage 4, GFR 15-29 ml/min, COPD (chronic obstructive pulmonary disease), Edema, HTN (hypertension), Hypocalcemia, Hyponatremia, and Osteoarthritis.    Past Surgical History: Patient has a past surgical history that includes Breast cyst excision; Foot surgery; Caudal epidural steroid injection (N/A, 2/2/2023); and Fusion of cervical spine by posterior approach (N/A, 3/19/2025).    Social History: Patient reports that she has quit smoking. She has never used smokeless tobacco. She reports that she does not currently use alcohol. She reports that she does not use drugs.    Family History: family history includes Cancer in her mother; No Known Problems in her father.    Allergies: Patient is allergic to cephalexin, codeine, meperidine, nsaids (non-steroidal anti-inflammatory drug), penicillins, and tazarotene.    ROS  Constitutional: Negative for fever   Respiratory: Negative for shortness of breath.  Postnasal drip, productive cough   Cardiovascular: Negative for chest pain, palpitations, and leg swelling.   Gastrointestinal: Negative for abdominal pain, diarrhea, nausea, vomiting.  +intermittent constipation, indigestion  Genitourinary: Negative for dysuria, frequency   Musculoskeletal:  + generalized weakness.  +pain to neck, bilateral shoulders, lower back, bilateral knees  Skin: Negative for itching and rash.   Neurological: Negative for dizziness, headaches.   Psychiatric/Behavioral: Negative for depression. The patient is nervous/anxious.      24 hour Vital Sign Range   Temp:  [97.1 °F (36.2 °C)-97.6 °F (36.4 °C)]   Pulse:  [70-76]   Resp:  [16-18]   BP: (128-135)/(51-60)   SpO2:  [96 %]     Current BMI: Body mass index is 23 kg/m².    PEx  Constitutional: Patient appears frail, debilitated.  Mild  distress noted  Neck:  C-collar in place  Cardiovascular: Normal rate, regular rhythm and  "normal heart sounds.    Pulmonary/Chest: Effort normal and breath sounds are clear  Abdominal: Soft. Bowel sounds are normal.   Musculoskeletal: Normal range of motion.   Neurological: Alert and oriented to person.  Disoriented to place, and time.  Confusion  Psychiatric: Normal mood and affect. Behavior is normal.   Skin: Skin is warm and dry.  Multiple areas of ecchymosis    No results for input(s): "GLUCOSE", "NA", "K", "CL", "CO2", "BUN", "CREATININE", "CALCIUM", "MG" in the last 24 hours.    No results for input(s): "WBC", "RBC", "HGB", "HCT", "PLT", "MCV", "MCH", "MCHC" in the last 24 hours.    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 Multilevel foraminal stenosis.   - Cont C-collar (ok to remove for eating and sleeping) x 3 months   - PT/OT  - DVT PPX with Lovenox 30  - Monitor and report drainage to incisional site  - maintain surgical dressing until 4/2  - Fall precautions  - Bowel regimen in place, hold for loose or frequent stools  - follow-up with NSGY on 5/1    Acute postoperative pain  - unstable, continue gabapentin 100 mg TID, methocarbamol 500 mg TID prn, tramadol 50 mg q.6 hours PRN continue acetaminophen 1000 mg q.8 hours, discontinuing Lyrica and oxycodone    Thrush  - patient has yet to receive 1 dose of this- continue nystatin 412091 units QID    Essential hypertension  - BP soft, discontinuing newly prescribed clonidine patch 0.1 mg Q7days  - stable, continue amlodipine 10 mg daily, Lasix 40 mg daily, hydralazine 75 mg q.8 " "hours, holding parameters added    Emphysema  COPD  - not on home oxygen  - stable, continue Breo Ellipta place of non formulary home inhaler    Chronic kidney disease, stage III  - stable, continue monitor twice weekly BMPs, avoid nephrotoxic agents, renally dose medication when appropriate    Acute blood loss anemia  - transfuse for hemoglobin < 7  - stable, continue monitor twice weekly CBC    Mixed hyperlipidemia  - HM wrote "holding statin with elevatd CPK"- CPK now WNL  - per pt she's not not taking a statin nor is one on her recent dispensed report     Urinary retention  - required straight catheterization on 03/22 and 3/23  - started on flomax and bethanechol at [facility], continue for now  - bladder scan PRN    GERD  - increasing patient's home omeprazole to 40 mg AC BID  - continues to Carafate    Debility   - Continue with PT/OT for gait training and strengthening and restoration of ADL's   - Encourage mobility, OOB in chair, and early ambulation as appropriate  - Fall precautions   - Monitor for bowel and bladder dysfunction  - Monitor for and prevent skin breakdown and pressure ulcers  - continue DVT prophylaxis with Lovenox       Anticipate disposition:  Home with home health    IP [facility] RISK OF UNPLANNED READMISSION Model: HIGH    Follow-up needed during SNF stay-    Apt not to send to - NSGY RN 4/2    Follow-up needed after discharge from SNF: PCP     Future Appointments   Date Time Provider Department Center   5/1/2025  9:45 AM [facility]   5/1/2025 10:30 AM Katie Castillo NP [facility] NEUROS[address]   6/16/2025 11:00 AM Lluvia Rahman MD [facility]     I certify that SNF services are required to be given on an inpatient basis because Bridget Montgomery needs for skilled nursing care and/or skilled rehabilitation are required on a daily basis and such services can only practically be provided in a skilled nursing facility setting and are for an ongoing condition " for which she received inpatient care in the hospital.     I spent 48 minutes reviewing patient records, examining, and counseling the patient/ patient's family with greater than 50% of the time spent in direct patient care and coordination. Review of outpatient clinic records.  ED send out    Lorena Akins NP  Department of Hospital Medicine   [facility]     DOS: 3/28/2025       Patient note was created using MModal Dictation.  Any errors in syntax or even information may not have been identified and edited on initial review prior to signing this note.

## 2025-03-28 NOTE — ASSESSMENT & PLAN NOTE
"Patient's FSGs are controlled on current medication regimen.  Last A1c reviewed-   Lab Results   Component Value Date    HGBA1C 5.8 (H) 03/18/2025     Most recent fingerstick glucose reviewed- No results for input(s): "POCTGLUCOSE" in the last 24 hours.  Current correctional scale  None  Maintain anti-hyperglycemic dose as follows-   Antihyperglycemics (From admission, onward)      None          Hold Oral hypoglycemics while patient is in the hospital.  "

## 2025-03-28 NOTE — ASSESSMENT & PLAN NOTE
Continue Flomax and hold bethanechol started one week ago to rule out N/V contributory factor from anticholinergic med

## 2025-03-28 NOTE — ASSESSMENT & PLAN NOTE
Patient's blood pressure range in the last 24 hours was: BP  Min: 118/61  Max: 142/63.The patient's inpatient anti-hypertensive regimen is listed below:  Current Antihypertensives  amLODIPine tablet 5 mg, Daily, Oral  hydrALAZINE tablet 50 mg, Every 8 hours, Oral    Plan  - BP is controlled, no changes needed to their regimen

## 2025-03-28 NOTE — ASSESSMENT & PLAN NOTE
Creatine stable for now. BMP reviewed- noted Estimated Creatinine Clearance: 31.4 mL/min (based on SCr of 1.1 mg/dL). according to latest data. Based on current GFR, CKD stage is stage 3 - GFR 30-59.  Monitor UOP and serial BMP and adjust therapy as needed. Renally dose meds. Avoid nephrotoxic medications and procedures.

## 2025-03-28 NOTE — ASSESSMENT & PLAN NOTE
Appears to have used Protonix and omeprazole for a period at SNF along with viscious po agents without symptomatic relief that has progressed to N/V with liquids and solids. Associated acute anemia 7.7 with slow decrease from baseline 10, but without overt bleeding, melena, coffee ground emesis. Ddx includes gastric or duodenal ulcer, esophageal webbing, GERD associated with hiatal hernia. Low suspicion for Megan-Osorio tear given pt's stable BP.    - IV protonix BID  - sucralfate before and after meals  - CLD with continued monitoring  - Zofran  - qd CBC  - Appreciated GI evaluation for EGD, noting recent C-spinal fusion as possible complicating factor for scope.

## 2025-03-28 NOTE — ED PROVIDER NOTES
Chief complaint:  Abdominal Pain (Upper abdominal pain, difficulty swallowing, nausea/vomitting)      HPI:  Bridget Montgomery is a 89 y.o. female presenting with acute onset of severe pain to her chest and upper abdomen that she describes as a burning discomfort that has been getting worse over the last couple of days despite numerous medications for GERD.  She is status post cervical spine fusion several days ago and had been doing well.  Now she is having difficulties eating because of the pain.  She is also describing severe low back pain that she has had in the past because she has been in bed too much.  She is not complaining of much pain to her neck.  No numbness or weakness.  No bowel or bladder deficits.    ROS: As per HPI and below:  Constitutional:  No fevers, no chills  Cardiac:  chest pain  Respiratory: no shortness of breath  Abdominal:  Abdominal pain, nausea, vomiting  Neuro: no focal numbness, no focal weakness        Review of patient's allergies indicates:   Allergen Reactions    Cephalexin     Codeine     Meperidine      Other reaction(s): delerium, hallucination  Delerium  Delerium  Delerium      Nsaids (non-steroidal anti-inflammatory drug)     Penicillins     Tazarotene      Other reaction(s): rash, Unknown       Medications Ordered Prior to Encounter[1]    PMH:  As per HPI and below:  Past Medical History:   Diagnosis Date    Allergy     Anemia, unspecified     Arthritis     CKD (chronic kidney disease) stage 4, GFR 15-29 ml/min     COPD (chronic obstructive pulmonary disease)     Edema     HTN (hypertension)     Hypocalcemia     Hyponatremia     Osteoarthritis      Past Surgical History:   Procedure Laterality Date    BREAST CYST EXCISION      CAUDAL EPIDURAL STEROID INJECTION N/A 2/2/2023    Procedure: Injection-steroid-epidural-caudal;  Surgeon: Angel Sparrow MD;  Location: [facility];  Service: Pain Management;  Laterality: N/A;    FOOT SURGERY      FUSION OF CERVICAL SPINE BY  POSTERIOR APPROACH N/A 3/19/2025    Procedure: FUSION, SPINE, CERVICAL, POSTERIOR APPROACH;  Surgeon: Clint Lemons MD;  Location: [facility] OR [address] [place] [address];  Service: Neurosurgery;  Laterality: N/A;  Occiput-C4       Social History     Socioeconomic History    Marital status:    Tobacco Use    Smoking status: Former    Smokeless tobacco: Never   Substance and Sexual Activity    Alcohol use: Not Currently    Drug use: Never    Sexual activity: Not Currently     Social Drivers of Health     Financial Resource Strain: Low Risk  (3/23/2025)    Overall Financial Resource Strain (CARDIA)     Difficulty of Paying Living Expenses: Not hard at all   Food Insecurity: No Food Insecurity (3/23/2025)    Hunger Vital Sign     Worried About Running Out of Food in the Last Year: Never true     Ran Out of Food in the Last Year: Never true   Transportation Needs: No Transportation Needs (3/23/2025)    PRAPARE - Transportation     Lack of Transportation (Medical): No     Lack of Transportation (Non-Medical): No   Physical Activity: Insufficiently Active (3/10/2025)    Exercise Vital Sign     Days of Exercise per Week: 4 days     Minutes of Exercise per Session: 30 min   Stress: No Stress Concern Present (3/23/2025)    [employer] - Occupational Stress Questionnaire     Feeling of Stress : Only a little   Housing Stability: Low Risk  (3/23/2025)    Housing Stability Vital Sign     Unable to Pay for Housing in the Last Year: No     Homeless in the Last Year: No       Family History   Problem Relation Name Age of Onset    Cancer Mother      No Known Problems Father         Physical Exam:    Vitals:    03/30/25 0249   BP: 129/63   Pulse: 85   Resp: (!) 22   Temp: 97.8 °F (36.6 °C)     Constitutional: Well-nourished, well-developed, in no acute distress, not cachectic  Eyes: PERRLA, EOMI, normal conjunctiva, normal sclera  ENT: Moist Mucous membranes  Respiratory: Clear to auscultation bilaterally, no wheezes,  no crackles, no rhonchi  Cardiovascular: Regular rate and rhythm, no murmurs, no rubs, no gallops  Abdominal: Soft, moderate epigastric tenderness to palpation, nondistended, no guarding, no rebound  Musculoskeletal: Normal range of motion, no obvious deformity, normal capillary refill, head atraumatic, C-collar in place, no significant tenderness along C-spine, severe tenderness to her lower L-spine both vertebral and paravertebrally  Skin: no rash, no ecchymosis, no errythema, no discharge, incision to her C-spine are clean dry and intact without erythema or exudate  Neurologic: Cranial nerves II through XII intact, no motor deficits, no sensory deficits, no cerebellar deficits  Psychological: Alert, oriented x3, normal affect, normal mood    Orders Placed This Encounter   Procedures    WAFFLE MATTRESS OVERLAY    X-Ray Lumbar Spine Ap And Lateral    X-Ray Chest AP Portable    CT Abdomen Pelvis With IV Contrast NO Oral Contrast    US Abdomen Limited    Hepatitis C Antibody    HIV 1/2 Ag/Ab (4th Gen)    CBC auto differential    Comprehensive metabolic panel    Troponin I High Sensitivity    Lipase    Magnesium    CBC with Differential    Comprehensive Metabolic Panel (CMP)    Magnesium    Phosphorus    CBC with Automated Differential    CBC with Differential    Troponin I High Sensitivity    Iron and TIBC    Ferritin    CBC auto differential    CBC with Differential    Morphology    Hematocrit    Hemoglobin    Protime-INR    APTT    Lactic Acid, Plasma    Basic Metabolic Panel    CBC with Differential    Diet Adult Regular Standard Tray    Confirm Patient is not Eligible for Congestive Heart Failure Pathway    Intake and output    Notify physician     Cardiac Monitoring - Adult    Delirium Precautions    Wound care routine    Wound care routine (specify)    Vital signs    Bladder scan    Notify Provider    Recheck Blood Glucose:    Skin Tear - Apply foam dressing now:  Cleanse area with normal saline, reapproximate  skin flap, apply a no sting barrier film to the periwound skin, apply foam dressing.    Skin Tear - Change foam dressing weekly:  Cleanse area with normal saline, apply a no sting barrier film to the periwound skin, apply foam dressing    Full code    Inpatient consult to Gastroenterology    Pulse Oximetry Q4H    Oxygen Continuous    Cardiac monitoring strips    EKG 12-lead    EKG 12-lead    Type & Screen    Insert Saline lock IV    Saline lock IV    Place in Observation    Fall precautions    Aspiration precautions    Prepare RBC 1 Unit       Medications   sodium chloride 0.9% flush 10 mL (has no administration in time range)   melatonin tablet 6 mg (has no administration in time range)   albuterol inhaler 2 puff (has no administration in time range)   amLODIPine tablet 5 mg (5 mg Oral Given 3/29/25 0911)   fluticasone furoate-vilanteroL 100-25 mcg/dose diskus inhaler 1 puff (1 puff Inhalation Given 3/29/25 0920)   fluticasone propionate 50 mcg/actuation nasal spray 50 mcg (50 mcg Each Nostril Given 3/29/25 2129)   methocarbamoL tablet 750 mg (750 mg Oral Given 3/29/25 0056)   polyethylene glycol packet 17 g (has no administration in time range)   sucralfate 100 mg/mL suspension 1 g (1 g Oral Not Given 3/30/25 0645)   tamsulosin 24 hr capsule 0.4 mg (0.4 mg Oral Given 3/29/25 0911)   naloxone 0.4 mg/mL injection 0.02 mg (has no administration in time range)   glucose chewable tablet 16 g (has no administration in time range)   glucose chewable tablet 24 g (has no administration in time range)   dextrose 50% injection 12.5 g (has no administration in time range)   dextrose 50% injection 25 g (has no administration in time range)   glucagon (human recombinant) injection 1 mg (has no administration in time range)   morphine injection 4 mg (4 mg Intravenous Given 3/28/25 1911)   ondansetron injection 4 mg (has no administration in time range)   oxyCODONE immediate release tablet 5 mg (5 mg Oral Given 3/29/25 0056)    acetaminophen tablet 1,000 mg (1,000 mg Oral Not Given 3/30/25 0600)   LIDOcaine 5 % patch 2 patch (2 patches Transdermal Patch Removed 3/30/25 0410)   pantoprazole injection 40 mg (40 mg Intravenous Given 3/29/25 0910)   gabapentin capsule 200 mg (200 mg Oral Given 3/29/25 2122)   hydrOXYzine pamoate capsule 25 mg (25 mg Oral Given 3/29/25 0056)   dextromethorphan-guaiFENesin  mg/5 ml liquid 10 mL (10 mLs Oral Given 3/29/25 1003)   ferric gluconate (Ferrlecit) 62.5 mg/5 mL injection 125 mg (125 mg Intravenous Given 3/30/25 0132)   0.9%  NaCl infusion (for blood administration) (has no administration in time range)   sodium chloride 0.9% bolus 500 mL 500 mL (0 mLs Intravenous Stopped 3/28/25 1400)   morphine injection 4 mg (4 mg Intravenous Given 3/28/25 1253)   ondansetron injection 4 mg (4 mg Intravenous Given 3/28/25 1252)   famotidine (PF) injection 20 mg (20 mg Intravenous Given 3/28/25 1251)   pantoprazole injection 40 mg (40 mg Intravenous Given 3/28/25 1215)   aluminum-magnesium hydroxide-simethicone 200-200-20 mg/5 mL suspension 30 mL (30 mLs Oral Given 3/28/25 1249)     And   LIDOcaine viscous HCl 2% oral solution 15 mL (15 mLs Oral Given 3/28/25 1249)   iohexoL (OMNIPAQUE 350) injection 75 mL (75 mLs Intravenous Given 3/28/25 1402)   morphine injection 4 mg (4 mg Intravenous Given 3/28/25 1509)   methocarbamoL tablet 1,000 mg (1,000 mg Oral Given 3/28/25 1716)   morphine injection 8 mg (8 mg Intravenous Given 3/28/25 2133)         Labs Reviewed   COMPREHENSIVE METABOLIC PANEL - Abnormal       Result Value    Sodium 134 (*)     Potassium 4.3      Chloride 101      CO2 17 (*)     Glucose 202 (*)     BUN 28 (*)     Creatinine 1.1      Calcium 9.0      Protein Total 6.1      Albumin 2.2 (*)     Bilirubin Total 0.2       (*)     AST 28      ALT 15      Anion Gap 16      eGFR 48 (*)    TROPONIN I HIGH SENSITIVITY - Abnormal    Troponin High Sensitive 51 (*)    CBC WITH DIFFERENTIAL - Abnormal     WBC 13.60 (*)     RBC 2.72 (*)     HGB 7.7 (*)     HCT 24.6 (*)     MCV 90      MCH 28.3      MCHC 31.3 (*)     RDW 15.9 (*)     Platelet Count 326      MPV 10.5      Nucleated RBC 0      Neut % 91.2 (*)     Lymph % 3.9 (*)     Mono % 4.2      Eos % 0.0      Basophil % 0.1      Imm Grans % 0.6 (*)     Neut # 12.40 (*)     Lymph # 0.53 (*)     Mono # 0.57      Eos # 0.00      Baso # 0.02      Imm Grans # 0.08 (*)    COMPREHENSIVE METABOLIC PANEL - Abnormal    Sodium 132 (*)     Potassium 3.6      Chloride 100      CO2 21 (*)     Glucose 117 (*)     BUN 23      Creatinine 0.9      Calcium 8.4 (*)     Protein Total 5.4 (*)     Albumin 2.1 (*)     Bilirubin Total 0.2       (*)     AST 23      ALT 13      Anion Gap 11      eGFR >60     CBC WITH DIFFERENTIAL - Abnormal    WBC 12.47      RBC 2.50 (*)     HGB 7.3 (*)     HCT 22.0 (*)     MCV 88      MCH 29.2      MCHC 33.2      RDW 15.7 (*)     Platelet Count 270      MPV 9.7      Nucleated RBC 0      Neut % 88.8 (*)     Lymph % 3.6 (*)     Mono % 6.9      Eos % 0.1      Basophil % 0.2      Imm Grans % 0.4      Neut # 11.08 (*)     Lymph # 0.45 (*)     Mono # 0.86      Eos # 0.01      Baso # 0.02      Imm Grans # 0.05 (*)     Narrative:     This is an appended report.  These results have been appended to a previously verified report.   TROPONIN I HIGH SENSITIVITY - Abnormal    Troponin High Sensitive 48 (*)    IRON AND TIBC - Abnormal    Iron Level 13 (*)     Transferrin 189 (*)     Iron Binding Capacity Total 280      Iron Saturation 5 (*)    CBC WITH DIFFERENTIAL - Abnormal    WBC 9.23      RBC 2.33 (*)     HGB 6.6 (*)     HCT 20.3 (*)     MCV 87      MCH 28.3      MCHC 32.5      RDW 15.6 (*)     Platelet Count 245      MPV 10.0      Nucleated RBC 0      Neut % 87.6 (*)     Lymph % 5.3 (*)     Mono % 6.4      Eos % 0.0      Basophil % 0.2      Imm Grans % 0.5      Neut # 8.08 (*)     Lymph # 0.49 (*)     Mono # 0.59      Eos # 0.00      Baso # 0.02      Imm Grans #  0.05 (*)     Narrative:     This is an appended report.  These results have been appended to a previously verified report.   HEMATOCRIT - Abnormal    HCT 22.3 (*)    HEMOGLOBIN - Abnormal    HGB 7.3 (*)    APTT - Abnormal    PTT 39.2 (*)    BASIC METABOLIC PANEL - Abnormal    Sodium 131 (*)     Potassium 4.1      Chloride 99      CO2 19 (*)     Glucose 55 (*)     BUN 21      Creatinine 1.0      Calcium 8.8      Anion Gap 13      eGFR 54 (*)    POCT GLUCOSE - Abnormal    POCT Glucose 111 (*)    HEPATITIS C ANTIBODY - Normal    Hep C Ab Interp Non-Reactive     HIV 1 / 2 ANTIBODY - Normal    HIV 1/2 Ag/Ab Non-Reactive     LIPASE - Normal    Lipase Level 30     MAGNESIUM - Normal    Magnesium  2.3     MAGNESIUM - Normal    Magnesium  2.1     PHOSPHORUS - Normal    Phosphorus Level 4.1     FERRITIN - Normal    Ferritin 102.0     PROTIME-INR - Normal    PT 10.3      INR 0.9     LACTIC ACID, PLASMA - Normal    Lactic Acid Level <0.6      Narrative:     Falsely low lactic acid results can be found in samples containing >=13.0 mg/dL total bilirubin and/or >=3.5 mg/dL direct bilirubin.    CBC W/ AUTO DIFFERENTIAL    Narrative:     The following orders were created for panel order CBC auto differential.                  Procedure                               Abnormality         Status                                     ---------                               -----------         ------                                     CBC with Differential[[id number]]       Abnormal            Final result                                                 Please view results for these tests on the individual orders.   CBC W/ AUTO DIFFERENTIAL    Narrative:     The following orders were created for panel order CBC with Automated Differential.                  Procedure                               Abnormality         Status                                     ---------                               -----------         ------                                      CBC with Differential[[id number]]       Abnormal            Final result                                                 Please view results for these tests on the individual orders.   CBC W/ AUTO DIFFERENTIAL    Narrative:     The following orders were created for panel order CBC auto differential.                  Procedure                               Abnormality         Status                                     ---------                               -----------         ------                                     CBC with Differential[[id number]]       Abnormal            Final result                                                 Please view results for these tests on the individual orders.   MORPHOLOGY    Polychromasia Occasional      Hypochromia Occasional      Basophilic Stippling Occasional      Dohle Bodies Present      Toxic Gran Present     TYPE & SCREEN    Specimen Outdate 04/01/2025 23:59      Group & Rh A POS      Indirect Lanie NEG         US Abdomen Limited   Final Result      Biliary sludge without findings to suggest acute cholecystitis.  No sizable gallstones.      Electronically signed by resident: Sawyer Mercado   Date:    03/28/2025   Time:    22:14      Electronically signed by: Edin La MD   Date:    03/29/2025   Time:    01:55      X-Ray Lumbar Spine Ap And Lateral   Final Result      See above         Electronically signed by: Patricio Stephens   Date:    03/28/2025   Time:    15:46      X-Ray Chest AP Portable   Final Result      See above         Electronically signed by: Jaylen Matta MD   Date:    03/28/2025   Time:    15:42      CT Abdomen Pelvis With IV Contrast NO Oral Contrast   Final Result      1. Allowing for motion artifact, no findings to suggest obstructive uropathy.   2. The urinary bladder is mildly distended, correlation with any history of urinary retention or outlet obstruction.   3. Colonic diverticulosis without diverticulitis.   4. Small bilateral  pleural effusions with associated compressive atelectasis of the bilateral lower lobes.   5. Please see above for several additional findings.         Electronically signed by: Juan Shelton MD   Date:    03/28/2025   Time:    14:27          Medical Decision Making  Differential diagnosis includes GERD, gastritis, back pain, life-threatening ACS, pancreatitis, colitis     Patient presents with recent cervical spine fusion.  For the last couple of days she has been having increasing GERD symptoms despite multiple medications with the point that she has not been eating very well.  She is also describing severe low back pain.  Will perform abdominal and cardiac workup and attempt to control her pain.  Will also x-ray her low back.    Patient's entire abdominal workup is unremarkable.  However, her troponin is slightly elevated which is different than her baseline.  She continues to have severe pain in her back and abdomen despite multiple doses of IV narcotic medication.  Given these symptoms in the elevated troponin, I will admit for further observation.    Amount and/or Complexity of Data Reviewed  Labs: ordered.  Radiology: ordered and independent interpretation performed.     Details: CT abdomen: No acute process  ECG/medicine tests: ordered and independent interpretation performed.     Details: EKG: Normal sinus rhythm at 84, normal axis, normal intervals, no ST elevation or depression  Discussion of management or test interpretation with external provider(s): Internal Medicine, will admit    Risk  OTC drugs.  Prescription drug management.  Parenteral controlled substances.  Decision regarding hospitalization.      Procedures          ASSESSMENT  1. Chest pain, unspecified type    2. Upper abdominal pain    3. Elevated troponin    4. Epigastric pain    5. Back pain, unspecified back location, unspecified back pain laterality, unspecified chronicity    6. Chest pain          Disposition:  Admit to internal  medicine    Current Discharge Medication List        Current Discharge Medication List        Current Discharge Medication List               [1]   Current Facility-Administered Medications on File Prior to Encounter   Medication Dose Route Frequency Provider Last Rate Last Admin    [ - Suspended Admission] acetaminophen tablet 1,000 mg  1,000 mg Oral Q8H Lorena Akins NP   1,000 mg at 03/27/25 2200    [ - Suspended Admission] acetaminophen tablet 650 mg  650 mg Oral Q6H PRN Brayan Ponce MD        [ - Suspended Admission] albuterol inhaler 2 puff  2 puff Inhalation Q6H PRN Brayan Ponce MD        [ - Suspended Admission] amLODIPine tablet 5 mg  5 mg Oral Daily Lorena Akins NP        [ - Suspended Admission] bethanechol tablet 10 mg  10 mg Oral TID Brayan Ponce MD   10 mg at 03/27/25 2010    [ - Suspended Admission] calcium carbonate 200 mg calcium (500 mg) chewable tablet 500 mg  500 mg Oral BID PRN Brayan Ponce MD        [ - Suspended Admission] enoxaparin injection 30 mg  30 mg Subcutaneous Q24H (prophylaxis, 1700) Lorena Akins NP   30 mg at 03/27/25 1700    [ - Suspended Admission] fluticasone furoate-vilanteroL 100-25 mcg/dose diskus inhaler 1 puff  1 puff Inhalation Daily Brayan Ponce MD   1 puff at 03/27/25 1040    [ - Suspended Admission] fluticasone propionate 50 mcg/actuation nasal spray 50 mcg  1 spray Each Nostril BID Brayan Ponce MD   50 mcg at 03/27/25 2100    [ - Suspended Admission] furosemide tablet 40 mg  40 mg Oral Daily Lorena Akins NP   40 mg at 03/27/25 1041    [ - Suspended Admission] gabapentin capsule 100 mg  100 mg Oral TID Lorena Akins NP   100 mg at 03/27/25 2011    [ - Suspended Admission] hydrALAZINE tablet 50 mg  50 mg Oral Q8H Lorena Akins NP   50 mg at 03/28/25 0532    [ - Suspended Admission] LIDOcaine 5 % patch 1 patch  1 patch Transdermal Q24H Lorena Akins NP   1 patch at 03/27/25 1040    [ - Suspended  Admission] melatonin tablet 6 mg  6 mg Oral Nightly PRN Brayan Ponce MD        [ - Suspended Admission] methocarbamoL tablet 500 mg  500 mg Oral TID PRN Lorena Akins NP        [ - Suspended Admission] nystatin 100,000 unit/mL suspension 500,000 Units  500,000 Units Oral QID ( & HS) Lorena Akins NP        [ - Suspended Admission] omega 3-dha-epa-fish oil capsule 1 capsule  1 capsule Oral Daily Brayan Ponce MD   1 capsule at 03/27/25 1041    [ - Suspended Admission] omeprazole capsule 40 mg  40 mg Oral BID AC Lorena Akins NP   40 mg at 03/28/25 0645    [ - Suspended Admission] ondansetron disintegrating tablet 4 mg  4 mg Oral Q6H PRN Lorena Akins NP   4 mg at 03/28/25 0959    [ - Suspended Admission] polyethylene glycol packet 17 g  17 g Oral BID PRN Lorena Akins, NP        [ - Suspended Admission] senna-docusate 8.6-50 mg per tablet 1 tablet  1 tablet Oral BID Brayan Ponce MD   1 tablet at 03/26/25 1002    [ - Suspended Admission] sodium chloride oral tablet 1,000 mg  1,000 mg Oral BID Lorena Akins NP   1,000 mg at 03/27/25 2011    [ - Suspended Admission] sucralfate 100 mg/mL suspension 1 g  1 g Oral QID (AC & HS) Brayan Ponce MD   1 g at 03/28/25 1044    [ - Suspended Admission] tamsulosin 24 hr capsule 0.4 mg  0.4 mg Oral Daily Brayan Ponce MD   0.4 mg at 03/27/25 1041    [ - Suspended Admission] traMADoL tablet 50 mg  50 mg Oral Q6H PRN Lorena Akins NP   50 mg at 03/28/25 0545     Current Outpatient Medications on File Prior to Encounter   Medication Sig Dispense Refill    albuterol (PROVENTIL/VENTOLIN HFA) 90 mcg/actuation inhaler Inhale 2 puffs into the lungs every 6 (six) hours as needed.      amLODIPine (NORVASC) 10 MG tablet Take 1 tablet (10 mg total) by mouth once daily. 90 tablet 3    bethanechol (URECHOLINE) 10 MG Tab Take 1 tablet (10 mg total) by mouth 3 (three) times daily. 90 tablet 11    cloNIDine 0.1 mg/24 hr td ptwk  (CATAPRES) 0.1 mg/24 hr Place 1 patch onto the skin every 7 days. 4 patch 11    fluticasone furoate-vilanteroL (BREO) 100-25 mcg/dose diskus inhaler Inhale 1 puff into the lungs once daily. Controller 60 each 1    fluticasone propionate (FLONASE) 50 mcg/actuation nasal spray 1 spray by Nasal route 2 (two) times daily.      furosemide (LASIX) 40 MG tablet Take 1 tablet (40 mg total) by mouth once daily. 30 tablet 1    gabapentin (NEURONTIN) 300 MG capsule Take 300 mg by mouth every evening.      hydrALAZINE (APRESOLINE) 25 MG tablet Take 3 tablets (75 mg total) by mouth every 8 (eight) hours. 270 tablet 11    ipratropium (ATROVENT) 42 mcg (0.06 %) nasal spray       melatonin (MELATIN) 3 mg tablet Take 2 tablets (6 mg total) by mouth nightly as needed for Insomnia. 30 tablet 0    methocarbamoL (ROBAXIN) 500 MG Tab Take 1 tablet (500 mg total) by mouth 3 (three) times daily. for 10 days 30 tablet 0    omega 3-dha-epa-fish oil 1,000 mg (120 mg-180 mg) Cap Take 1 capsule by mouth once daily.      omeprazole (PRILOSEC) 20 MG capsule Take 1 capsule (20 mg total) by mouth 2 (two) times daily before meals. 60 capsule 0    oxyCODONE-acetaminophen (PERCOCET) 5-325 mg per tablet Take 1 tablet by mouth every 6 (six) hours as needed.      polyethylene glycol (GLYCOLAX) 17 gram/dose powder Use cap to measure 17 grams.  Dissolve as directed and take by mouth once daily. 510 g 0    sodium chloride 1,000 mg TbSO oral tablet Take 1 tablet (1,000 mg total) by mouth 2 (two) times daily. for 15 days 30 tablet 0    tamsulosin (FLOMAX) 0.4 mg Cap Take 1 capsule (0.4 mg total) by mouth once daily. 30 capsule 11        Luke Weaver III, MD  03/30/25 9254

## 2025-03-28 NOTE — ASSESSMENT & PLAN NOTE
With associated epigastric pain.    - US RUQ to rule out gallstones or obstructive etiology of abdominal pain

## 2025-03-28 NOTE — HPI
Ms. 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 Her abdominal pain has been worsening for the past week and has progressed to bilious vomiting of meds or anything that she tries to eat/drink. No noted hematemesis, melena, hematochezia. Per meds review no recent NSAID use (listed as allergy, and has CKD). No ETOH use. Pt has a known hiatal hernia. Pt's back pain has worsened over the past few days without any recent falls, trauma, and she does report a history of chronic back pain, but is wailing in pain in the ED. Per collateral from her daughter and HCPOA, Liliana Montgomery, pt has been working on elliptical and doing PT/OT at [facility] without significant issues. No fevers, chills, SOB, LE edema.     In the ED she is afebrile, hemodynamically stable on RA with leukocytosis to 13, anemia to 7.7 (baseline 10), isolated ALP elevation to 203, lipase wnl, high-sensitivity troponin elevation to 51 with unremarkable EKG. CXR with increase in the perihilar interstitial and vascular markings. XR lumbar spine with mild scoliosis, but no acute fractures. CT AP with mildly distended bladder, diverticulosis, and moderate to large hiatal hernia. Pt given Maalox, viscous lidocaine, famotidine, pantoprazole, Zofran, and morphine for abdominal, chest, and back pain. Admitted to Hospital Medicine for pain control and evaluation of abdominal pain.

## 2025-03-28 NOTE — ASSESSMENT & PLAN NOTE
Hyponatremia is likely due to indeterminate. The patient's most recent sodium results are listed below.  Recent Labs     03/27/25  0410 03/28/25  1239    134*     Plan  - Correct the sodium by 4-6mEq in 24 hours.   - Obtain the following studies: monitoring.  - Will treat the hyponatremia with monitoring, and, if indicated, resuming sodium tablets that were started within the past week  - Monitor sodium Daily.   - Patient hyponatremia is stable

## 2025-03-28 NOTE — ASSESSMENT & PLAN NOTE
Pt with severe lower back pain refractory to multiple agents in ED. XR lumbar spine with mild scoliosis and degenerative changes.    Multimodal pain regimen with scheduled Tylenol, prn methocarbamol, increase home gabapentin from 100mg to 200mg TID with judicious increases for pain control, lidocaine patches, prn Oxycodone and IV morphine for severe pain.

## 2025-03-28 NOTE — ASSESSMENT & PLAN NOTE
Pt reports chest pain, but attributes this to hx of esophagitis. EKG non-contributory.    - Trend troponin to peak.

## 2025-03-28 NOTE — PT/OT/SLP PROGRESS
Occupational Therapy      Patient Name:  Bridget Montgomery   MRN:  [id number]    Patient not seen today secondary to d/c to ED 2/2 concern for esophageal issues and inability to tolerate PO intake . Will follow-up /c POC as appropriate.    3/28/2025

## 2025-03-28 NOTE — ED TRIAGE NOTES
Bridget Montgomery, a 89 y.o. female presents to the ED w/ complaint of abdominal pain that started about last week. Patient also complains of nausea and vomiting.     Triage note:  Chief Complaint   Patient presents with    Abdominal Pain     Upper abdominal pain, difficulty swallowing, nausea/vomitting     Review of patient's allergies indicates:   Allergen Reactions    Cephalexin     Codeine     Meperidine      Other reaction(s): delerium, hallucination  Delerium  Delerium  Delerium      Nsaids (non-steroidal anti-inflammatory drug)     Penicillins     Tazarotene      Other reaction(s): rash, Unknown     Past Medical History:   Diagnosis Date    Allergy     Anemia, unspecified     Arthritis     CKD (chronic kidney disease) stage 4, GFR 15-29 ml/min     COPD (chronic obstructive pulmonary disease)     Edema     HTN (hypertension)     Hypocalcemia     Hyponatremia     Osteoarthritis

## 2025-03-29 LAB
ABSOLUTE EOSINOPHIL (OHS): 0 K/UL
ABSOLUTE EOSINOPHIL (OHS): 0.01 K/UL
ABSOLUTE MONOCYTE (OHS): 0.59 K/UL (ref 0.3–1)
ABSOLUTE MONOCYTE (OHS): 0.86 K/UL (ref 0.3–1)
ABSOLUTE NEUTROPHIL COUNT (OHS): 11.08 K/UL (ref 1.8–7.7)
ABSOLUTE NEUTROPHIL COUNT (OHS): 8.08 K/UL (ref 1.8–7.7)
ALBUMIN SERPL BCP-MCNC: 2.1 G/DL (ref 3.5–5.2)
ALP SERPL-CCNC: 184 UNIT/L (ref 40–150)
ALT SERPL W/O P-5'-P-CCNC: 13 UNIT/L (ref 10–44)
ANION GAP (OHS): 11 MMOL/L (ref 8–16)
AST SERPL-CCNC: 23 UNIT/L (ref 11–45)
BASO STIPL BLD QL SMEAR: NORMAL
BASOPHILS # BLD AUTO: 0.02 K/UL
BASOPHILS # BLD AUTO: 0.02 K/UL
BASOPHILS NFR BLD AUTO: 0.2 %
BASOPHILS NFR BLD AUTO: 0.2 %
BILIRUB SERPL-MCNC: 0.2 MG/DL (ref 0.1–1)
BUN SERPL-MCNC: 23 MG/DL (ref 8–23)
CALCIUM SERPL-MCNC: 8.4 MG/DL (ref 8.7–10.5)
CHLORIDE SERPL-SCNC: 100 MMOL/L (ref 95–110)
CO2 SERPL-SCNC: 21 MMOL/L (ref 23–29)
CREAT SERPL-MCNC: 0.9 MG/DL (ref 0.5–1.4)
DOHLE BOD BLD QL SMEAR: PRESENT
ERYTHROCYTE [DISTWIDTH] IN BLOOD BY AUTOMATED COUNT: 15.6 % (ref 11.5–14.5)
ERYTHROCYTE [DISTWIDTH] IN BLOOD BY AUTOMATED COUNT: 15.7 % (ref 11.5–14.5)
FERRITIN SERPL-MCNC: 102 NG/ML (ref 20–300)
GFR SERPLBLD CREATININE-BSD FMLA CKD-EPI: >60 ML/MIN/1.73/M2
GLUCOSE SERPL-MCNC: 117 MG/DL (ref 70–110)
HCT VFR BLD AUTO: 20.3 % (ref 37–48.5)
HCT VFR BLD AUTO: 22 % (ref 37–48.5)
HGB BLD-MCNC: 6.6 GM/DL (ref 12–16)
HGB BLD-MCNC: 7.3 GM/DL (ref 12–16)
HYPOCHROMIA BLD QL SMEAR: NORMAL
IMM GRANULOCYTES # BLD AUTO: 0.05 K/UL (ref 0–0.04)
IMM GRANULOCYTES # BLD AUTO: 0.05 K/UL (ref 0–0.04)
IMM GRANULOCYTES NFR BLD AUTO: 0.4 % (ref 0–0.5)
IMM GRANULOCYTES NFR BLD AUTO: 0.5 % (ref 0–0.5)
IRON SATN MFR SERPL: 5 % (ref 20–50)
IRON SERPL-MCNC: 13 UG/DL (ref 30–160)
LYMPHOCYTES # BLD AUTO: 0.45 K/UL (ref 1–4.8)
LYMPHOCYTES # BLD AUTO: 0.49 K/UL (ref 1–4.8)
MAGNESIUM SERPL-MCNC: 2.1 MG/DL (ref 1.6–2.6)
MCH RBC QN AUTO: 28.3 PG (ref 27–50)
MCH RBC QN AUTO: 29.2 PG (ref 27–50)
MCHC RBC AUTO-ENTMCNC: 32.5 G/DL (ref 32–36)
MCHC RBC AUTO-ENTMCNC: 33.2 G/DL (ref 32–36)
MCV RBC AUTO: 87 FL (ref 82–98)
MCV RBC AUTO: 88 FL (ref 82–98)
NUCLEATED RBC (/100WBC) (OHS): 0 /100 WBC
NUCLEATED RBC (/100WBC) (OHS): 0 /100 WBC
PHOSPHATE SERPL-MCNC: 4.1 MG/DL (ref 2.7–4.5)
PLATELET # BLD AUTO: 245 K/UL (ref 150–450)
PLATELET # BLD AUTO: 270 K/UL (ref 150–450)
PMV BLD AUTO: 10 FL (ref 9.2–12.9)
PMV BLD AUTO: 9.7 FL (ref 9.2–12.9)
POCT GLUCOSE: 111 MG/DL (ref 70–110)
POLYCHROMASIA BLD QL SMEAR: NORMAL
POTASSIUM SERPL-SCNC: 3.6 MMOL/L (ref 3.5–5.1)
PROT SERPL-MCNC: 5.4 GM/DL (ref 6–8.4)
RBC # BLD AUTO: 2.33 M/UL (ref 4–5.4)
RBC # BLD AUTO: 2.5 M/UL (ref 4–5.4)
RELATIVE EOSINOPHIL (OHS): 0 %
RELATIVE EOSINOPHIL (OHS): 0.1 %
RELATIVE LYMPHOCYTE (OHS): 3.6 % (ref 18–48)
RELATIVE LYMPHOCYTE (OHS): 5.3 % (ref 18–48)
RELATIVE MONOCYTE (OHS): 6.4 % (ref 4–15)
RELATIVE MONOCYTE (OHS): 6.9 % (ref 4–15)
RELATIVE NEUTROPHIL (OHS): 87.6 % (ref 38–73)
RELATIVE NEUTROPHIL (OHS): 88.8 % (ref 38–73)
SODIUM SERPL-SCNC: 132 MMOL/L (ref 136–145)
TIBC SERPL-MCNC: 280 UG/DL (ref 250–450)
TOXIC GRANULES BLD QL SMEAR: PRESENT
TRANSFERRIN SERPL-MCNC: 189 MG/DL (ref 200–375)
TROPONIN I SERPL HS-MCNC: 48 NG/L
WBC # BLD AUTO: 12.47 K/UL (ref 3.9–12.7)
WBC # BLD AUTO: 9.23 K/UL (ref 3.9–12.7)

## 2025-03-29 PROCEDURE — 84100 ASSAY OF PHOSPHORUS: CPT

## 2025-03-29 PROCEDURE — 85025 COMPLETE CBC W/AUTO DIFF WBC: CPT | Mod: 91

## 2025-03-29 PROCEDURE — 83540 ASSAY OF IRON: CPT

## 2025-03-29 PROCEDURE — G0378 HOSPITAL OBSERVATION PER HR: HCPCS

## 2025-03-29 PROCEDURE — 86900 BLOOD TYPING SEROLOGIC ABO: CPT | Performed by: HOSPITALIST

## 2025-03-29 PROCEDURE — 85730 THROMBOPLASTIN TIME PARTIAL: CPT | Performed by: HOSPITALIST

## 2025-03-29 PROCEDURE — 85014 HEMATOCRIT: CPT | Performed by: HOSPITALIST

## 2025-03-29 PROCEDURE — 85018 HEMOGLOBIN: CPT | Performed by: HOSPITALIST

## 2025-03-29 PROCEDURE — 25000003 PHARM REV CODE 250

## 2025-03-29 PROCEDURE — 85610 PROTHROMBIN TIME: CPT | Performed by: HOSPITALIST

## 2025-03-29 PROCEDURE — 84484 ASSAY OF TROPONIN QUANT: CPT

## 2025-03-29 PROCEDURE — 18500000 HC LEAVE OF ABSENCE HOSPITAL SERVICES

## 2025-03-29 PROCEDURE — 83605 ASSAY OF LACTIC ACID: CPT | Performed by: HOSPITALIST

## 2025-03-29 PROCEDURE — 25000242 PHARM REV CODE 250 ALT 637 W/ HCPCS

## 2025-03-29 PROCEDURE — 82310 ASSAY OF CALCIUM: CPT | Performed by: HOSPITALIST

## 2025-03-29 PROCEDURE — 82728 ASSAY OF FERRITIN: CPT

## 2025-03-29 PROCEDURE — 83735 ASSAY OF MAGNESIUM: CPT

## 2025-03-29 PROCEDURE — 63600175 PHARM REV CODE 636 W HCPCS

## 2025-03-29 PROCEDURE — 99223 1ST HOSP IP/OBS HIGH 75: CPT | Mod: GC,,, | Performed by: STUDENT IN AN ORGANIZED HEALTH CARE EDUCATION/TRAINING PROGRAM

## 2025-03-29 PROCEDURE — 80053 COMPREHEN METABOLIC PANEL: CPT

## 2025-03-29 RX ORDER — GUAIFENESIN 600 MG/1
600 TABLET, EXTENDED RELEASE ORAL 2 TIMES DAILY
Status: DISCONTINUED | OUTPATIENT
Start: 2025-03-29 | End: 2025-03-29

## 2025-03-29 RX ORDER — PSEUDOEPHEDRINE HCL 120 MG/1
120 TABLET, FILM COATED, EXTENDED RELEASE ORAL 2 TIMES DAILY
Status: DISCONTINUED | OUTPATIENT
Start: 2025-03-29 | End: 2025-03-29

## 2025-03-29 RX ORDER — GUAIFENESIN AND DEXTROMETHORPHAN HYDROBROMIDE 10; 100 MG/5ML; MG/5ML
10 SYRUP ORAL EVERY 4 HOURS PRN
Status: DISCONTINUED | OUTPATIENT
Start: 2025-03-29 | End: 2025-04-08 | Stop reason: HOSPADM

## 2025-03-29 RX ADMIN — GABAPENTIN 200 MG: 100 CAPSULE ORAL at 09:03

## 2025-03-29 RX ADMIN — LIDOCAINE 2 PATCH: 50 PATCH CUTANEOUS at 04:03

## 2025-03-29 RX ADMIN — SUCRALFATE 1 G: 1 SUSPENSION ORAL at 12:03

## 2025-03-29 RX ADMIN — PSEUDOEPHEDRINE HCL 120 MG: 120 TABLET, FILM COATED, EXTENDED RELEASE ORAL at 11:03

## 2025-03-29 RX ADMIN — SUCRALFATE 1 G: 1 SUSPENSION ORAL at 04:03

## 2025-03-29 RX ADMIN — HYDROXYZINE PAMOATE 25 MG: 25 CAPSULE ORAL at 12:03

## 2025-03-29 RX ADMIN — FLUTICASONE PROPIONATE 50 MCG: 50 SPRAY, METERED NASAL at 01:03

## 2025-03-29 RX ADMIN — TAMSULOSIN HYDROCHLORIDE 0.4 MG: 0.4 CAPSULE ORAL at 09:03

## 2025-03-29 RX ADMIN — GUAIFENESIN AND DEXTROMETHORPHAN 10 ML: 100; 10 SYRUP ORAL at 10:03

## 2025-03-29 RX ADMIN — PANTOPRAZOLE SODIUM 40 MG: 40 INJECTION, POWDER, LYOPHILIZED, FOR SOLUTION INTRAVENOUS at 09:03

## 2025-03-29 RX ADMIN — METHOCARBAMOL 750 MG: 750 TABLET ORAL at 12:03

## 2025-03-29 RX ADMIN — AMLODIPINE BESYLATE 5 MG: 5 TABLET ORAL at 09:03

## 2025-03-29 RX ADMIN — OXYCODONE 5 MG: 5 TABLET ORAL at 12:03

## 2025-03-29 RX ADMIN — FLUTICASONE PROPIONATE 50 MCG: 50 SPRAY, METERED NASAL at 09:03

## 2025-03-29 RX ADMIN — GABAPENTIN 200 MG: 100 CAPSULE ORAL at 02:03

## 2025-03-29 RX ADMIN — ACETAMINOPHEN 1000 MG: 500 TABLET ORAL at 02:03

## 2025-03-29 RX ADMIN — SUCRALFATE 1 G: 1 SUSPENSION ORAL at 05:03

## 2025-03-29 RX ADMIN — ACETAMINOPHEN 1000 MG: 500 TABLET ORAL at 05:03

## 2025-03-29 RX ADMIN — FLUTICASONE FUROATE AND VILANTEROL TRIFENATATE 1 PUFF: 100; 25 POWDER RESPIRATORY (INHALATION) at 09:03

## 2025-03-29 RX ADMIN — SUCRALFATE 1 G: 1 SUSPENSION ORAL at 09:03

## 2025-03-29 NOTE — ED NOTES
Pt expells soft brown stool w/ beige liquid stool- linens , gown  and pure wick changed. Pt cleansed.

## 2025-03-29 NOTE — HOSPITAL COURSE
Admitted with lower back pain better controlled with multimodal regimen. Pt able to tolerate a turkey sandwich for first time in several days after admission with IV PPI, sucralfate, viscous lidocaine. GI consulted with appreciated evaluation for EGD given pt's reported GERD symptoms, inability to tolerate PO for days before admission, known hiatal hernia, and continued anemia (mixed etiology per iron studies), though recent c-spine fusion is a consideration prior to scoping. GI defers to OP workup with ongoing PPI given recent surgery. Symptomatic relief for rhinorrhea provided with pt's daughter's concerns for post-operative complications from sinus infection.     Hospital associated delirium in conjunction with polypharmacy (opioids for severe back pain) on 03/30 with patient agitated, pulling at NC, attempting to remove C-collar. Pt re-directed with assistance from her daughters in attempt to avoid using agitation meds with some resolution of agitation throughout the night. UA with UTI, started on Bactrim empirically in light of patient's med allergies. Mentation improved to baseline. Hospital course complicated by acute anemia in spite of IV iron supplementation. GI re-consulted after recurrent anemia, but deferred EGD (c-spine surgery) and recommended pRBC transfusion. Pt transfused with directed donations as she and her daughter Liliana request blood from unvaccinated donors.    Repeat UA infectious and she is symptomatic.  Cont rocephin.    Patient transfused 1 unit PRBC on 04/04.  Hgb stable.

## 2025-03-29 NOTE — ED NOTES
DR Mejia notified of rectal T 92.7 after 90 minutes on bear hugger on high  and POCT glucose 111.

## 2025-03-29 NOTE — PLAN OF CARE
[facility] - Emergency Dept  Discharge Assessment    Primary Care Provider: No primary care provider on file.     Pt daughter Dora at bedside.  Per Dora pt was recently d/c 03/24 from [facility] to [facility].      Pt and daughter stated they would like to return to [facility].  SW explained to daughter pt is on a 72hr (3midnight) CH[facility] and can return within that time frame.  SW explained if pt is not ready to return within that time frame the SNF process will have to begin again    Pt to return to [facility] when ready    Discharge Assessment (most recent)       BRIEF DISCHARGE ASSESSMENT - 03/29/25 0925          Discharge Planning    Assessment Type Discharge Planning Brief Assessment     Resource/Environmental Concerns none     Support Systems Family members     Equipment Currently Used at Home walker, rolling;other (see comments) (P)    lift chair    Patient/Family Anticipated Services at Transition skilled nursing (P)      DME Needed Upon Discharge  none (P)      Discharge Plan A Skilled Nursing Facility (P)      Discharge Plan B Skilled Nursing Facility (P)                      Discharge Plan A and Plan B have been determined by review of patient's clinical status, future medical and therapeutic needs, and coverage/benefits for post-acute care in coordination with multidisciplinary team members.    Gilda Mercado, JERO, MSW, LMSW, RSW   Case Management  [facility]  Email: [email or web address]

## 2025-03-29 NOTE — ASSESSMENT & PLAN NOTE
Hyponatremia is likely due to indeterminate. The patient's most recent sodium results are listed below.  Recent Labs     03/27/25  0410 03/28/25  1239 03/29/25  0359    134* 132*     Plan  - Correct the sodium by 4-6mEq in 24 hours.   - Obtain the following studies: monitoring.  - Will treat the hyponatremia with monitoring, and, if indicated, resuming sodium tablets that were started within the past week  - Monitor sodium Daily.   - Patient hyponatremia is stable

## 2025-03-29 NOTE — ASSESSMENT & PLAN NOTE
Patient's blood pressure range in the last 24 hours was: BP  Min: 118/61  Max: 146/65.The patient's inpatient anti-hypertensive regimen is listed below:  Current Antihypertensives  amLODIPine tablet 5 mg, Daily, Oral    Plan  - BP is controlled, no changes needed to their regimen

## 2025-03-29 NOTE — ASSESSMENT & PLAN NOTE
Anemia is likely due to acute blood loss which was from GI source. Most recent hemoglobin and hematocrit are listed below.  Recent Labs     03/27/25  0410 03/28/25  1239 03/29/25  0359   HGB 8.0* 7.7* 7.3*   HCT 24.8* 24.6* 22.0*     Plan  - Monitor serial CBC: Daily  - Transfuse PRBC if patient becomes hemodynamically unstable, symptomatic or H/H drops below 7/21.  - Patient has not received any PRBC transfusions to date  - Patient's anemia is currently worsening. Will continue current treatment  - hold anticoagulation at this time

## 2025-03-29 NOTE — ASSESSMENT & PLAN NOTE
With associated epigastric pain.    - US RUQ to rule out gallstones or obstructive etiology of abdominal pain demonstrated sludge, but no obstructive findings

## 2025-03-29 NOTE — ASSESSMENT & PLAN NOTE
Pt reports chest pain, but attributes this to hx of esophagitis. EKG non-contributory.    - troponin trended to peak, suspect type 2

## 2025-03-29 NOTE — CONSULTS
[facility]  Gastroenterology  Consult Note    Patient Name: Bridget Montgomery  MRN: [id number]  Admission Date: 3/28/2025  Hospital Length of Stay: 0 days  Code Status: Full Code   Attending Provider: Servando Mejia MD   Consulting Provider: Jake Villalta MD  Primary Care Physician: No primary care provider on file.  Principal Problem:Acute blood loss anemia    Inpatient consult to Gastroenterology  Consult performed by: Jake Villalta MD  Consult ordered by: Servando Mejia MD        Subjective:     HPI: Bridget Montgomery is a 89 y.o. female with history of COPD, CKD4, GERD, HTN, HLD, TIA, spinal stenosis, chronic type 2 odontoid fracture s/p C-spine fusion currently with a C-collar in place who presented to the ER with epigastric abdominal pain that has been worsening over the past week and not tolerating p.o. intake.  GI was consulted for this epigastric pain and worsening of chronic anemia without overt GI bleeding.  Patient currently is able to tolerate p.o. intake well.  She ate a turkey sandwich yesterday without any issues.  Denies any nausea, vomiting and reports that her epigastric abdominal pain has also improved.  Denies having hematemesis, melena, hematochezia.  Of note, patient was off of her PPI and believes that this was likely contributing to her epigastric abdominal pain.    Labs show hemoglobin of 7.3 and baseline earlier this month was around 10.  BUN elevated to the 40s to 50s earlier this month but has downtrended to 23, creatinine normal.    Review of Systems   Constitutional:  Positive for malaise/fatigue.   Gastrointestinal:  Positive for abdominal pain (epigastric), heartburn (improving) and vomiting (already improving). Negative for blood in stool, constipation, diarrhea and melena.        Objective:     Vitals:    03/29/25 1203   BP: (!) 130/57   Pulse: 63   Resp: (!) 23   Temp:          Constitutional:  not in acute distress and well  developed  HENT:  C-collar in place  Eyes: conjunctiva clear and sclera nonicteric  GI: soft, nondistended, tenderness mild in the epigastrium, without guarding, and without rebound  Skin: normal color  Neurological: alert, oriented  Psychiatric: mood and affect are within normal limits    Significant Labs:  Reviewed pertinent laboratory tests    Significant Imaging:  Abdominal ultrasound 3/28/25:  Impression:  Biliary sludge without findings to suggest acute cholecystitis.  No sizable gallstones.    CT abdomen pelvis with IV contrast 03/28/2025:  Impression:  1. Allowing for motion artifact, no findings to suggest obstructive uropathy.  2. The urinary bladder is mildly distended, correlation with any history of urinary retention or outlet obstruction.  3. Colonic diverticulosis without diverticulitis.  4. Small bilateral pleural effusions with associated compressive atelectasis of the bilateral lower lobes.    Assessment/Plan:     Bridget Montgomery is a 89 y.o. female with history of COPD, CKD4, GERD, HTN, HLD, TIA, spinal stenosis, chronic type 2 odontoid fracture s/p C-spine fusion currently with a C-collar in place who presented to the ER with epigastric abdominal pain that has been worsening over the past week and not tolerating p.o. intake initially for which GI is consulted along with her worsening of chronic anemia. Patient currently is able to tolerate p.o. intake well.  Denies any nausea, vomiting and reports that her epigastric abdominal pain has also improved.  Denies having hematemesis, melena, hematochezia.  Of note, patient was off of her PPI and believes that this was likely contributing to her epigastric abdominal pain.  Given improvement of patient's epigastric pain after restarting her PPI, and no overt GI bleeding seen, we will hold off on EGD at this time.    Problem List:  Epigastric abdominal pain, improving  Acute on chronic normocytic anemia  Chronic type 2 odontoid fracture s/p C-spine  fusion    Recommendations:  - continue PPI b.i.d.  - Monitor hemoglobin trend daily  - no plans for scoping at this time given lack of GI bleeding and improvement of patient's epigastric pain/nausea and patient tolerating a diet and higher risk procedure in a 89-year-old patient with a C-collar in place.  Please call us back if any overt GI bleeding seen or anemia worsens, we can reassess performing an EGD at that time.    Thank you for involving us in the care of Bridget Montgomery. Please call with any additional questions, concerns or changes in the patient's clinical status. We will sign off.    Discussed with Dr. Corry Villalta MD  Gastroenterology Fellow PGY-IV  [facility]

## 2025-03-29 NOTE — ASSESSMENT & PLAN NOTE
Creatine stable for now. BMP reviewed- noted Estimated Creatinine Clearance: 38.4 mL/min (based on SCr of 0.9 mg/dL). according to latest data. Based on current GFR, CKD stage is stage 3 - GFR 30-59.  Monitor UOP and serial BMP and adjust therapy as needed. Renally dose meds. Avoid nephrotoxic medications and procedures.

## 2025-03-29 NOTE — PROGRESS NOTES
[facility] - Emergency Dept  [facility] Medicine  Progress Note    Patient Name: Bridget Montgomery  MRN: [id number]  Patient Class: OP- Observation   Admission Date: 3/28/2025  Length of Stay: 0 days  Attending Physician: Servando Mejia MD  Primary Care Provider: No primary care provider on file.        Subjective     Principal Problem:Acute blood loss anemia        HPI:  Ms. 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 Her abdominal pain has been worsening for the past week and has progressed to bilious vomiting of meds or anything that she tries to eat/drink. No noted hematemesis, melena, hematochezia. Per meds review no recent NSAID use (listed as allergy, and has CKD). No ETOH use. Pt has a known hiatal hernia. Pt's back pain has worsened over the past few days without any recent falls, trauma, and she does report a history of chronic back pain, but is wailing in pain in the ED. Per collateral from her daughter and HCPOA, Liliana Montgomery, pt has been working on elliptical and doing PT/OT at [facility] without significant issues. No fevers, chills, SOB, LE edema.     In the ED she is afebrile, hemodynamically stable on RA with leukocytosis to 13, anemia to 7.7 (baseline 10), isolated ALP elevation to 203, lipase wnl, high-sensitivity troponin elevation to 51 with unremarkable EKG. CXR with increase in the perihilar interstitial and vascular markings. XR lumbar spine with mild scoliosis, but no acute fractures. CT AP with mildly distended bladder, diverticulosis, and moderate to large hiatal hernia. Pt given Maalox, viscous lidocaine, famotidine, pantoprazole, Zofran, and morphine for abdominal, chest, and back pain. Admitted to Hospital Medicine for pain control and  evaluation of abdominal pain.     Overview/Hospital Course:  Admitted with lower back pain better controlled with multimodal regimen. Pt able to tolerate a turkey sandwich for first time in several days after admission with IV PPI, sucralfate, viscous lidocaine. GI consulted with appreciated evaluation for EGD given pt's reported GERD symptoms, inability to tolerate PO for days before admission, known hiatal hernia, and continued anemia (mixed etiology per iron studies), though recent c-spine fusion is a consideration prior to scoping. Symptomatic relief for rhinorrhea provided with pt's daughter's concerns for post-operative complications from sinus infection.     Interval History: NAEON    Review of Systems  Objective:     Vital Signs (Most Recent):  Temp: 98.6 °F (37 °C) (03/29/25 0200)  Pulse: 69 (03/29/25 1037)  Resp: (!) 23 (03/29/25 1037)  BP: 126/66 (03/29/25 1037)  SpO2: 98 % (03/29/25 1037) Vital Signs (24h Range):  Temp:  [98.1 °F (36.7 °C)-98.6 °F (37 °C)] 98.6 °F (37 °C)  Pulse:  [66-91] 69  Resp:  [12-23] 23  SpO2:  [92 %-98 %] 98 %  BP: (118-146)/(60-86) 126/66     Weight: 68 kg (150 lb)  Body mass index is 26.57 kg/m².    Intake/Output Summary (Last 24 hours) at 3/29/2025 1209  Last data filed at 3/28/2025 1400  Gross per 24 hour   Intake 467.15 ml   Output --   Net 467.15 ml         Physical Exam  Vitals and nursing note reviewed.   Constitutional:       General: She is not in acute distress.     Appearance: She is not ill-appearing.   HENT:      Head: Normocephalic and atraumatic.      Mouth/Throat:      Mouth: Mucous membranes are moist.      Pharynx: Oropharynx is clear.   Eyes:      Extraocular Movements: Extraocular movements intact.      Conjunctiva/sclera: Conjunctivae normal.   Neck:      Comments: C-collar in place  Cardiovascular:      Rate and Rhythm: Normal rate and regular rhythm.      Pulses: Normal pulses.   Pulmonary:      Effort: Pulmonary effort is normal. No respiratory distress.  "     Breath sounds: No wheezing or rales.   Abdominal:      Palpations: Abdomen is soft.      Tenderness: There is no abdominal tenderness. There is no guarding.   Musculoskeletal:         General: No tenderness.      Right lower leg: No edema.      Left lower leg: No edema.   Skin:     General: Skin is warm and dry.   Neurological:      General: No focal deficit present.      Mental Status: She is alert and oriented to person, place, and time.   Psychiatric:         Mood and Affect: Mood is not anxious.      Comments: Mild, intermittent confusion               Significant Labs: All pertinent labs within the past 24 hours have been reviewed.    Significant Imaging: I have reviewed all pertinent imaging results/findings within the past 24 hours.      Assessment & Plan  Chronic kidney disease, stage III (moderate)  Creatine stable for now. BMP reviewed- noted Estimated Creatinine Clearance: 38.4 mL/min (based on SCr of 0.9 mg/dL). according to latest data. Based on current GFR, CKD stage is stage 3 - GFR 30-59.  Monitor UOP and serial BMP and adjust therapy as needed. Renally dose meds. Avoid nephrotoxic medications and procedures.  Type 2 diabetes mellitus with diabetic chronic kidney disease  Patient's FSGs are controlled on current medication regimen.  Last A1c reviewed-   Lab Results   Component Value Date    HGBA1C 5.8 (H) 03/18/2025     Most recent fingerstick glucose reviewed- No results for input(s): "POCTGLUCOSE" in the last 24 hours.  Current correctional scale   None  Maintain anti-hyperglycemic dose as follows-   Antihyperglycemics (From admission, onward)      None          Hold Oral hypoglycemics while patient is in the hospital.  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 XR lumbar spine with mild scoliosis and degenerative changes.    Multimodal pain regimen with " scheduled Tylenol, prn methocarbamol, increase home gabapentin from 100mg to 200mg TID with judicious increases for pain control, lidocaine patches, prn Oxycodone and IV morphine for severe pain.     Essential hypertension  Patient's blood pressure range in the last 24 hours was: BP  Min: 118/61  Max: 146/65.The patient's inpatient anti-hypertensive regimen is listed below:  Current Antihypertensives  amLODIPine tablet 5 mg, Daily, Oral    Plan  - BP is controlled, no changes needed to their regimen  Ulcer of esophagus  Gastro-esophageal reflux disease without esophagitis  Hiatal hernia with GERD  Appears to have used Protonix and omeprazole for a period at SNF along with viscious po agents without symptomatic relief that has progressed to N/V with liquids and solids. Associated acute anemia 7.7 with slow decrease from baseline 10, but without overt bleeding, melena, coffee ground emesis. Ddx includes gastric or duodenal ulcer, esophageal webbing, GERD associated with hiatal hernia. Low suspicion for Megan-Osorio tear given pt's stable BP.    - IV protonix BID  - sucralfate before and after meals  - CLD with continued monitoring  - Zofran  - qd CBC  - Appreciated GI evaluation for EGD, noting recent C-spinal fusion as possible complicating factor for scope.    Closed odontoid fracture with routine healing  Maintain C-collar    Urinary retention  Continue Flomax and hold bethanechol started one week ago to rule out N/V contributory factor from anticholinergic med    Hyponatremia  Hyponatremia is likely due to indeterminate. The patient's most recent sodium results are listed below.  Recent Labs     03/27/25  0410 03/28/25  1239 03/29/25  0359    134* 132*     Plan  - Correct the sodium by 4-6mEq in 24 hours.   - Obtain the following studies: monitoring.  - Will treat the hyponatremia with monitoring, and, if indicated, resuming sodium tablets that were started within the past week  - Monitor sodium Daily.   -  Patient hyponatremia is stable    Acute blood loss anemia  Anemia is likely due to acute blood loss which was from GI source . Most recent hemoglobin and hematocrit are listed below.  Recent Labs     03/27/25  0410 03/28/25  1239 03/29/25  0359   HGB 8.0* 7.7* 7.3*   HCT 24.8* 24.6* 22.0*     Plan  - Monitor serial CBC: Daily  - Transfuse PRBC if patient becomes hemodynamically unstable, symptomatic or H/H drops below 7/21.  - Patient has not received any PRBC transfusions to date  - Patient's anemia is currently worsening. Will continue current treatment  - hold anticoagulation at this time  Troponin level elevated  Pt reports chest pain, but attributes this to hx of esophagitis. EKG non-contributory.    - troponin trended to peak, suspect type 2    Alkaline phosphatase elevation  With associated epigastric pain.    - US RUQ to rule out gallstones or obstructive etiology of abdominal pain demonstrated sludge, but no obstructive findings    VTE Risk Mitigation (From admission, onward)      None            Discharge Planning   MIKAYLA:      Code Status: Full Code   Medical Readiness for Discharge Date:   Discharge Plan A: Skilled Nursing Facility   Discharge Delays: None known at this time                    Servando Mejia MD  Department of Hospital Medicine   [facility] - Emergency Dept

## 2025-03-29 NOTE — ED NOTES
LOC: The patient is awake and alert; oriented x 3 and speaking appropriately.  APPEARANCE: Patient resting comfortably, patient is clean and well groomed  SKIN: warm and dry, normal skin turgor & moist mucus membranes, skin tear rt leg dressed and documented  MUSCULOSKELETAL: Patient moving all extremities well, no obvious swelling or deformities noted  RESPIRATORY: Airway is open and patent, moist productive cough w/ yellow mucus,  respirations are spontaneous, increased effort and rate, on O2 2l/nc  CARDIAC: Patient has a normal rate, no peripheral edema noted, capillary refill < 3 seconds; No complaints of chest pain   ABDOMEN: Soft and non tender to palpation, no distention noted. Pure wick in place w/ clear yellow urine to canister.        Pt remains  on cardiac monitor, continuous pulse ox, cycling blood pressures. Side rails up x2, call bell in reach, bed in low position with brake engaged. Daughter at bedside- pure wick in place w/ yellow urine in canister, IV site asymptomatic. On O2 2l/nc. Pt  intermittently pulse off pulse ox - attached to left ear lobe at present.  Pt / family declined waffle mattress. Pt able to turn independently.. Pt has a moist productive cough w./ yellow / beige mucus.  Hard cervical collar  -[place]  J brace-  from recent cervical  neck surgery remains in place.

## 2025-03-29 NOTE — SUBJECTIVE & OBJECTIVE
Interval History: NAEON    Review of Systems  Objective:     Vital Signs (Most Recent):  Temp: 98.6 °F (37 °C) (03/29/25 0200)  Pulse: 69 (03/29/25 1037)  Resp: (!) 23 (03/29/25 1037)  BP: 126/66 (03/29/25 1037)  SpO2: 98 % (03/29/25 1037) Vital Signs (24h Range):  Temp:  [98.1 °F (36.7 °C)-98.6 °F (37 °C)] 98.6 °F (37 °C)  Pulse:  [66-91] 69  Resp:  [12-23] 23  SpO2:  [92 %-98 %] 98 %  BP: (118-146)/(60-86) 126/66     Weight: 68 kg (150 lb)  Body mass index is 26.57 kg/m².    Intake/Output Summary (Last 24 hours) at 3/29/2025 1209  Last data filed at 3/28/2025 1400  Gross per 24 hour   Intake 467.15 ml   Output --   Net 467.15 ml         Physical Exam  Vitals and nursing note reviewed.   Constitutional:       General: She is not in acute distress.     Appearance: She is not ill-appearing.   HENT:      Head: Normocephalic and atraumatic.      Mouth/Throat:      Mouth: Mucous membranes are moist.      Pharynx: Oropharynx is clear.   Eyes:      Extraocular Movements: Extraocular movements intact.      Conjunctiva/sclera: Conjunctivae normal.   Neck:      Comments: C-collar in place  Cardiovascular:      Rate and Rhythm: Normal rate and regular rhythm.      Pulses: Normal pulses.   Pulmonary:      Effort: Pulmonary effort is normal. No respiratory distress.      Breath sounds: No wheezing or rales.   Abdominal:      Palpations: Abdomen is soft.      Tenderness: There is no abdominal tenderness. There is no guarding.   Musculoskeletal:         General: No tenderness.      Right lower leg: No edema.      Left lower leg: No edema.   Skin:     General: Skin is warm and dry.   Neurological:      General: No focal deficit present.      Mental Status: She is alert and oriented to person, place, and time.   Psychiatric:         Mood and Affect: Mood is not anxious.      Comments: Mild, intermittent confusion               Significant Labs: All pertinent labs within the past 24 hours have been reviewed.    Significant Imaging:  I have reviewed all pertinent imaging results/findings within the past 24 hours.

## 2025-03-29 NOTE — PLAN OF CARE
03/29/25 0931   Post-Acute Status   Post-Acute Authorization Placement   Post-Acute Placement Status Pending medical clearance/testing   Discharge Delays None known at this time   Discharge Plan   Discharge Plan A Skilled Nursing Facility     Pt is currently on an [facility] from [facility] and would like to return    SW sent updates via EPIC for pt return      Discharge Plan A and Plan B have been determined by review of patient's clinical status, future medical and therapeutic needs, and coverage/benefits for post-acute care in coordination with multidisciplinary team members.    Gilda Mercado CD, MSW, LMSW, RSW   Case Management  [facility]  Email: [email or web address]

## 2025-03-30 LAB
ABSOLUTE EOSINOPHIL (OHS): 0.01 K/UL
ABSOLUTE MONOCYTE (OHS): 0.57 K/UL (ref 0.3–1)
ABSOLUTE NEUTROPHIL COUNT (OHS): 9.38 K/UL (ref 1.8–7.7)
ALBUMIN SERPL BCP-MCNC: 2 G/DL (ref 3.5–5.2)
ALP SERPL-CCNC: 181 UNIT/L (ref 40–150)
ALT SERPL W/O P-5'-P-CCNC: 13 UNIT/L (ref 10–44)
ANION GAP (OHS): 10 MMOL/L (ref 8–16)
ANION GAP (OHS): 13 MMOL/L (ref 8–16)
APTT PPP: 39.2 SECONDS (ref 21–32)
AST SERPL-CCNC: 23 UNIT/L (ref 11–45)
BASOPHILS # BLD AUTO: 0.02 K/UL
BASOPHILS NFR BLD AUTO: 0.2 %
BILIRUB SERPL-MCNC: 0.2 MG/DL (ref 0.1–1)
BUN SERPL-MCNC: 21 MG/DL (ref 8–23)
BUN SERPL-MCNC: 23 MG/DL (ref 8–23)
CALCIUM SERPL-MCNC: 8.6 MG/DL (ref 8.7–10.5)
CALCIUM SERPL-MCNC: 8.8 MG/DL (ref 8.7–10.5)
CHLORIDE SERPL-SCNC: 100 MMOL/L (ref 95–110)
CHLORIDE SERPL-SCNC: 99 MMOL/L (ref 95–110)
CO2 SERPL-SCNC: 19 MMOL/L (ref 23–29)
CO2 SERPL-SCNC: 22 MMOL/L (ref 23–29)
CREAT SERPL-MCNC: 1 MG/DL (ref 0.5–1.4)
CREAT SERPL-MCNC: 1 MG/DL (ref 0.5–1.4)
ERYTHROCYTE [DISTWIDTH] IN BLOOD BY AUTOMATED COUNT: 15.5 % (ref 11.5–14.5)
GFR SERPLBLD CREATININE-BSD FMLA CKD-EPI: 54 ML/MIN/1.73/M2
GFR SERPLBLD CREATININE-BSD FMLA CKD-EPI: 54 ML/MIN/1.73/M2
GLUCOSE SERPL-MCNC: 55 MG/DL (ref 70–110)
GLUCOSE SERPL-MCNC: 55 MG/DL (ref 70–110)
HCT VFR BLD AUTO: 21.9 % (ref 37–48.5)
HCT VFR BLD AUTO: 22.3 % (ref 37–48.5)
HGB BLD-MCNC: 7.2 GM/DL (ref 12–16)
HGB BLD-MCNC: 7.3 GM/DL (ref 12–16)
IMM GRANULOCYTES # BLD AUTO: 0.05 K/UL (ref 0–0.04)
IMM GRANULOCYTES NFR BLD AUTO: 0.5 % (ref 0–0.5)
INDIRECT COOMBS: NORMAL
INR PPP: 0.9 (ref 0.8–1.2)
LACTATE SERPL-SCNC: <0.6 MMOL/L (ref 0.5–2.2)
LYMPHOCYTES # BLD AUTO: 0.63 K/UL (ref 1–4.8)
MAGNESIUM SERPL-MCNC: 2.2 MG/DL (ref 1.6–2.6)
MCH RBC QN AUTO: 28.8 PG (ref 27–50)
MCHC RBC AUTO-ENTMCNC: 32.9 G/DL (ref 32–36)
MCV RBC AUTO: 88 FL (ref 82–98)
NUCLEATED RBC (/100WBC) (OHS): 0 /100 WBC
PHOSPHATE SERPL-MCNC: 3.9 MG/DL (ref 2.7–4.5)
PLATELET # BLD AUTO: 261 K/UL (ref 150–450)
PMV BLD AUTO: 10 FL (ref 9.2–12.9)
POTASSIUM SERPL-SCNC: 3.9 MMOL/L (ref 3.5–5.1)
POTASSIUM SERPL-SCNC: 4.1 MMOL/L (ref 3.5–5.1)
PROT SERPL-MCNC: 5.5 GM/DL (ref 6–8.4)
PROTHROMBIN TIME: 10.3 SECONDS (ref 9–12.5)
RBC # BLD AUTO: 2.5 M/UL (ref 4–5.4)
RELATIVE EOSINOPHIL (OHS): 0.1 %
RELATIVE LYMPHOCYTE (OHS): 5.9 % (ref 18–48)
RELATIVE MONOCYTE (OHS): 5.3 % (ref 4–15)
RELATIVE NEUTROPHIL (OHS): 88 % (ref 38–73)
RH BLD: NORMAL
SODIUM SERPL-SCNC: 131 MMOL/L (ref 136–145)
SODIUM SERPL-SCNC: 132 MMOL/L (ref 136–145)
SPECIMEN OUTDATE: NORMAL
WBC # BLD AUTO: 10.66 K/UL (ref 3.9–12.7)

## 2025-03-30 PROCEDURE — 63600175 PHARM REV CODE 636 W HCPCS: Performed by: HOSPITALIST

## 2025-03-30 PROCEDURE — 21400001 HC TELEMETRY ROOM

## 2025-03-30 PROCEDURE — 84100 ASSAY OF PHOSPHORUS: CPT

## 2025-03-30 PROCEDURE — 63600175 PHARM REV CODE 636 W HCPCS

## 2025-03-30 PROCEDURE — 25000242 PHARM REV CODE 250 ALT 637 W/ HCPCS

## 2025-03-30 PROCEDURE — 83735 ASSAY OF MAGNESIUM: CPT

## 2025-03-30 PROCEDURE — 80053 COMPREHEN METABOLIC PANEL: CPT

## 2025-03-30 PROCEDURE — 85025 COMPLETE CBC W/AUTO DIFF WBC: CPT

## 2025-03-30 PROCEDURE — 25000003 PHARM REV CODE 250

## 2025-03-30 PROCEDURE — 86920 COMPATIBILITY TEST SPIN: CPT | Performed by: HOSPITALIST

## 2025-03-30 PROCEDURE — 11000001 HC ACUTE MED/SURG PRIVATE ROOM

## 2025-03-30 PROCEDURE — 36415 COLL VENOUS BLD VENIPUNCTURE: CPT

## 2025-03-30 PROCEDURE — 18500000 HC LEAVE OF ABSENCE HOSPITAL SERVICES

## 2025-03-30 RX ORDER — PANTOPRAZOLE SODIUM 40 MG/10ML
40 INJECTION, POWDER, LYOPHILIZED, FOR SOLUTION INTRAVENOUS 2 TIMES DAILY
Status: DISCONTINUED | OUTPATIENT
Start: 2025-03-30 | End: 2025-04-05

## 2025-03-30 RX ORDER — HYDROCODONE BITARTRATE AND ACETAMINOPHEN 500; 5 MG/1; MG/1
TABLET ORAL
Status: DISCONTINUED | OUTPATIENT
Start: 2025-03-30 | End: 2025-04-08 | Stop reason: HOSPADM

## 2025-03-30 RX ORDER — SODIUM FERRIC GLUCONATE COMPLEX IN SUCROSE 12.5 MG/ML
125 INJECTION INTRAVENOUS DAILY
Status: DISCONTINUED | OUTPATIENT
Start: 2025-03-30 | End: 2025-03-31

## 2025-03-30 RX ADMIN — PANTOPRAZOLE SODIUM 40 MG: 40 INJECTION, POWDER, FOR SOLUTION INTRAVENOUS at 09:03

## 2025-03-30 RX ADMIN — FLUTICASONE PROPIONATE 50 MCG: 50 SPRAY, METERED NASAL at 09:03

## 2025-03-30 RX ADMIN — SODIUM FERRIC GLUCONATE COMPLEX IN SUCROSE 125 MG: 12.5 INJECTION INTRAVENOUS at 01:03

## 2025-03-30 RX ADMIN — GABAPENTIN 200 MG: 100 CAPSULE ORAL at 09:03

## 2025-03-30 RX ADMIN — PANTOPRAZOLE SODIUM 40 MG: 40 INJECTION, POWDER, FOR SOLUTION INTRAVENOUS at 10:03

## 2025-03-30 RX ADMIN — AMLODIPINE BESYLATE 5 MG: 5 TABLET ORAL at 10:03

## 2025-03-30 RX ADMIN — ACETAMINOPHEN 1000 MG: 500 TABLET ORAL at 09:03

## 2025-03-30 RX ADMIN — GABAPENTIN 200 MG: 100 CAPSULE ORAL at 10:03

## 2025-03-30 RX ADMIN — TAMSULOSIN HYDROCHLORIDE 0.4 MG: 0.4 CAPSULE ORAL at 10:03

## 2025-03-30 NOTE — ASSESSMENT & PLAN NOTE
Hyponatremia is likely due to indeterminate. The patient's most recent sodium results are listed below.  Recent Labs     03/29/25  0359 03/29/25  2358 03/30/25  0548   * 131* 132*     Plan  - Correct the sodium by 4-6mEq in 24 hours.   - Obtain the following studies: monitoring.  - Will treat the hyponatremia with monitoring, and, if indicated, resuming sodium tablets that were started within the past week  - Monitor sodium Daily.   - Patient hyponatremia is stable

## 2025-03-30 NOTE — NURSING
"0753: pt yelling, " call [facility], I dont want to be here, yall are holding me here against my will and I want to go home." Reporting that the daughter at the bedside is not her daughter and that the person there is an imposter. Refusing care. Pt removed nasal canula and broke it into a few pieces. MD made aware via secure chat. V/s taken, new nasal canula placed on pt with 2L O2.    1026: to bedside to administer scheduled meds. Pt yelling again to call [facility], she wants to go home. Two daughters at bedside, one attempting to redirect. Pt allowed nurse to pour PO carafate into her mouth, then proceeded to spit entire dose at nurse. Pt refusing other scheduled PO meds. MD made aware. Will attempt to administer at later time.   "

## 2025-03-30 NOTE — PLAN OF CARE
Problem: Adult Inpatient Plan of Care  Goal: Plan of Care Review  Outcome: Not Progressing  Goal: Patient-Specific Goal (Individualized)  Outcome: Not Progressing  Goal: Absence of Hospital-Acquired Illness or Injury  Outcome: Not Progressing  Goal: Optimal Comfort and Wellbeing  Outcome: Not Progressing  Goal: Readiness for Transition of Care  Outcome: Not Progressing     Problem: Skin Injury Risk Increased  Goal: Skin Health and Integrity  Outcome: Not Progressing     Problem: Diabetes Comorbidity  Goal: Blood Glucose Level Within Targeted Range  Outcome: Not Progressing     Problem: Fall Injury Risk  Goal: Absence of Fall and Fall-Related Injury  Outcome: Not Progressing     Problem: Wound  Goal: Optimal Coping  Outcome: Not Progressing  Goal: Optimal Functional Ability  Outcome: Not Progressing  Goal: Absence of Infection Signs and Symptoms  Outcome: Not Progressing  Goal: Improved Oral Intake  Outcome: Not Progressing  Goal: Optimal Pain Control and Function  Outcome: Not Progressing  Goal: Skin Health and Integrity  Outcome: Not Progressing  Goal: Optimal Wound Healing  Outcome: Not Progressing     Problem: Delirium  Goal: Optimal Coping  Outcome: Not Progressing  Goal: Improved Behavioral Control  Outcome: Not Progressing  Goal: Improved Attention and Thought Clarity  Outcome: Not Progressing  Goal: Improved Sleep  Outcome: Not Progressing

## 2025-03-30 NOTE — ED NOTES
Pt family refusing blood transfusion due to unknown vaccination status of potential donor. Per family, no blood transfusion until pt's daughter is able to donate blood for the pt in the morning.

## 2025-03-30 NOTE — ASSESSMENT & PLAN NOTE
Patient's blood pressure range in the last 24 hours was: BP  Min: 116/58  Max: 136/66.The patient's inpatient anti-hypertensive regimen is listed below:  Current Antihypertensives  amLODIPine tablet 5 mg, Daily, Oral    Plan  - BP is controlled, no changes needed to their regimen

## 2025-03-30 NOTE — ED NOTES
Assumed care of the patient. Report received from MARIAMA Luna. Pt on continuous cardiac monitoring, continuous pulse oximetry, and automatic BP cuff cycling Q30 min. Pt in hospital gown, side rails up X2, bed low and locked, and call light is placed within reach. No family/visitors at bedside at this time. Pt denies any complaints or needs. Whiteboard updated with pt's plan of care.

## 2025-03-30 NOTE — SUBJECTIVE & OBJECTIVE
Interval History: Pt delirious this AM (days in ED without day/night orientation, opioids for severe back pain). Repeat Hgb >7, no need for transfusion. Pt's daughters are planning to donate blood to have on hand in the event of repeat acute anemia requiring transfusion based on preference for non-vaccinated blood.     Review of Systems  Objective:     Vital Signs (Most Recent):  Temp: 97.4 °F (36.3 °C) (03/30/25 0750)  Pulse: 76 (03/30/25 1046)  Resp: 16 (03/30/25 0750)  BP: 134/63 (03/30/25 0750)  SpO2: (!) 94 % (03/30/25 0750) Vital Signs (24h Range):  Temp:  [90.7 °F (32.6 °C)-98.6 °F (37 °C)] 97.4 °F (36.3 °C)  Pulse:  [60-97] 76  Resp:  [13-23] 16  SpO2:  [76 %-100 %] 94 %  BP: (116-136)/(57-77) 134/63     Weight: 68 kg (150 lb)  Body mass index is 26.57 kg/m².    Intake/Output Summary (Last 24 hours) at 3/30/2025 1050  Last data filed at 3/29/2025 1431  Gross per 24 hour   Intake 1100 ml   Output 300 ml   Net 800 ml         Physical Exam  Vitals and nursing note reviewed.   Constitutional:       General: She is not in acute distress.     Appearance: She is not ill-appearing.   HENT:      Head: Normocephalic and atraumatic.      Mouth/Throat:      Mouth: Mucous membranes are moist.      Pharynx: Oropharynx is clear.   Eyes:      Extraocular Movements: Extraocular movements intact.      Conjunctiva/sclera: Conjunctivae normal.   Neck:      Comments: C-collar in place  Cardiovascular:      Rate and Rhythm: Normal rate and regular rhythm.      Pulses: Normal pulses.   Pulmonary:      Effort: Pulmonary effort is normal. No respiratory distress.      Breath sounds: No wheezing or rales.   Abdominal:      Palpations: Abdomen is soft.      Tenderness: There is no abdominal tenderness. There is no guarding.   Musculoskeletal:         General: No tenderness.      Right lower leg: No edema.      Left lower leg: No edema.   Skin:     General: Skin is warm and dry.   Neurological:      General: No focal deficit present.       Mental Status: She is alert. She is disoriented.   Psychiatric:         Mood and Affect: Mood is anxious.         Behavior: Behavior is agitated.      Comments: Mild, intermittent confusion           Significant Labs: All pertinent labs within the past 24 hours have been reviewed.    Significant Imaging: I have reviewed all pertinent imaging results/findings within the past 24 hours.

## 2025-03-30 NOTE — PROGRESS NOTES
[facility]  Progress Note    Patient Name: Bridget Montgomery  MRN: [id number]  Patient Class: IP- Inpatient   Admission Date: 3/28/2025  Length of Stay: 0 days  Attending Physician: Servando Mejia MD  Primary Care Provider: No primary care provider on file.        Subjective     Principal Problem:Acute blood loss anemia        HPI:  Ms. 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 Her abdominal pain has been worsening for the past week and has progressed to bilious vomiting of meds or anything that she tries to eat/drink. No noted hematemesis, melena, hematochezia. Per meds review no recent NSAID use (listed as allergy, and has CKD). No ETOH use. Pt has a known hiatal hernia. Pt's back pain has worsened over the past few days without any recent falls, trauma, and she does report a history of chronic back pain, but is wailing in pain in the ED. Per collateral from her daughter and HCPOA, Liliana Montgomery, pt has been working on elliptical and doing PT/OT at [facility] without significant issues. No fevers, chills, SOB, LE edema.     In the ED she is afebrile, hemodynamically stable on RA with leukocytosis to 13, anemia to 7.7 (baseline 10), isolated ALP elevation to 203, lipase wnl, high-sensitivity troponin elevation to 51 with unremarkable EKG. CXR with increase in the perihilar interstitial and vascular markings. XR lumbar spine with mild scoliosis, but no acute fractures. CT AP with mildly distended bladder, diverticulosis, and moderate to large hiatal hernia. Pt given Maalox, viscous lidocaine, famotidine, pantoprazole, Zofran, and morphine for abdominal, chest, and back pain. Admitted to Hospital Medicine for pain control and evaluation of  abdominal pain.     Overview/Hospital Course:  Admitted with lower back pain better controlled with multimodal regimen. Pt able to tolerate a turkey sandwich for first time in several days after admission with IV PPI, sucralfate, viscous lidocaine. GI consulted with appreciated evaluation for EGD given pt's reported GERD symptoms, inability to tolerate PO for days before admission, known hiatal hernia, and continued anemia (mixed etiology per iron studies), though recent c-spine fusion is a consideration prior to scoping. Symptomatic relief for rhinorrhea provided with pt's daughter's concerns for post-operative complications from sinus infection.     Hospital associated delirium in conjunction with polypharmacy (opioids for severe back pain) on 03/30 with patient agitated, pulling at NC, attempting to remove C-collar. Pt re-directed with assistance from her daughters in attempt to avoid using agitation meds.     Interval History: Pt delirious this AM (days in ED without day/night orientation, opioids for severe back pain). Repeat Hgb >7, no need for transfusion. Pt's daughters are planning to donate blood to have on hand in the event of repeat acute anemia requiring transfusion based on preference for non-vaccinated blood.     Review of Systems  Objective:     Vital Signs (Most Recent):  Temp: 97.4 °F (36.3 °C) (03/30/25 0750)  Pulse: 76 (03/30/25 1046)  Resp: 16 (03/30/25 0750)  BP: 134/63 (03/30/25 0750)  SpO2: (!) 94 % (03/30/25 0750) Vital Signs (24h Range):  Temp:  [90.7 °F (32.6 °C)-98.6 °F (37 °C)] 97.4 °F (36.3 °C)  Pulse:  [60-97] 76  Resp:  [13-23] 16  SpO2:  [76 %-100 %] 94 %  BP: (116-136)/(57-77) 134/63     Weight: 68 kg (150 lb)  Body mass index is 26.57 kg/m².    Intake/Output Summary (Last 24 hours) at 3/30/2025 1050  Last data filed at 3/29/2025 1431  Gross per 24 hour   Intake 1100 ml   Output 300 ml   Net 800 ml         Physical Exam  Vitals and nursing note reviewed.   Constitutional:        General: She is not in acute distress.     Appearance: She is not ill-appearing.   HENT:      Head: Normocephalic and atraumatic.      Mouth/Throat:      Mouth: Mucous membranes are moist.      Pharynx: Oropharynx is clear.   Eyes:      Extraocular Movements: Extraocular movements intact.      Conjunctiva/sclera: Conjunctivae normal.   Neck:      Comments: C-collar in place  Cardiovascular:      Rate and Rhythm: Normal rate and regular rhythm.      Pulses: Normal pulses.   Pulmonary:      Effort: Pulmonary effort is normal. No respiratory distress.      Breath sounds: No wheezing or rales.   Abdominal:      Palpations: Abdomen is soft.      Tenderness: There is no abdominal tenderness. There is no guarding.   Musculoskeletal:         General: No tenderness.      Right lower leg: No edema.      Left lower leg: No edema.   Skin:     General: Skin is warm and dry.   Neurological:      General: No focal deficit present.      Mental Status: She is alert. She is disoriented.   Psychiatric:         Mood and Affect: Mood is anxious.         Behavior: Behavior is agitated.      Comments: Mild, intermittent confusion           Significant Labs: All pertinent labs within the past 24 hours have been reviewed.    Significant Imaging: I have reviewed all pertinent imaging results/findings within the past 24 hours.      Assessment & Plan  Chronic kidney disease, stage III (moderate)  Creatine stable for now. BMP reviewed- noted Estimated Creatinine Clearance: 34.6 mL/min (based on SCr of 1 mg/dL). according to latest data. Based on current GFR, CKD stage is stage 3 - GFR 30-59.  Monitor UOP and serial BMP and adjust therapy as needed. Renally dose meds. Avoid nephrotoxic medications and procedures.  Type 2 diabetes mellitus with diabetic chronic kidney disease  Patient's FSGs are controlled on current medication regimen.  Last A1c reviewed-   Lab Results   Component Value Date    HGBA1C 5.8 (H) 03/18/2025     Most recent  fingerstick glucose reviewed-   Recent Labs   Lab 03/29/25  1641   POCTGLUCOSE 111*     Current correctional scale   None  Maintain anti-hyperglycemic dose as follows-   Antihyperglycemics (From admission, onward)      None          Hold Oral hypoglycemics while patient is in the hospital.  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 XR lumbar spine with mild scoliosis and degenerative changes.    Multimodal pain regimen with scheduled Tylenol, prn methocarbamol, increase home gabapentin from 100mg to 200mg TID with judicious increases for pain control, lidocaine patches, prn Oxycodone and IV morphine for severe pain.     - Avoid additional opioids in setting of encephalopathy    Essential hypertension  Patient's blood pressure range in the last 24 hours was: BP  Min: 116/58  Max: 136/66.The patient's inpatient anti-hypertensive regimen is listed below:  Current Antihypertensives  amLODIPine tablet 5 mg, Daily, Oral    Plan  - BP is controlled, no changes needed to their regimen  Ulcer of esophagus  Gastro-esophageal reflux disease without esophagitis  Hiatal hernia with GERD  Appears to have used Protonix and omeprazole for a period at SNF along with viscious po agents without symptomatic relief that has progressed to N/V with liquids and solids. Associated acute anemia 7.7 with slow decrease from baseline 10, but without overt bleeding, melena, coffee ground emesis. Ddx includes gastric or duodenal ulcer, esophageal webbing, GERD associated with hiatal hernia. Low suspicion for Megan-Osorio tear given pt's stable BP.    - IV protonix BID  - sucralfate before and after meals  - CLD with continued monitoring  - Zofran  - qd CBC  - Appreciated GI evaluation for EGD, noting recent C-spinal fusion as possible complicating factor for scope.    Closed odontoid fracture with routine  healing  Maintain C-collar    Urinary retention  Continue Flomax and hold bethanechol started one week ago to rule out N/V contributory factor from anticholinergic med    Hyponatremia  Hyponatremia is likely due to indeterminate. The patient's most recent sodium results are listed below.  Recent Labs     03/29/25  0359 03/29/25  2358 03/30/25  0548   * 131* 132*     Plan  - Correct the sodium by 4-6mEq in 24 hours.   - Obtain the following studies: monitoring.  - Will treat the hyponatremia with monitoring, and, if indicated, resuming sodium tablets that were started within the past week  - Monitor sodium Daily.   - Patient hyponatremia is stable    Acute blood loss anemia  Anemia is likely due to acute blood loss which was from GI source . Most recent hemoglobin and hematocrit are listed below.  Recent Labs     03/29/25  1701 03/29/25  2358 03/30/25  0548   HGB 6.6* 7.3* 7.2*   HCT 20.3* 22.3* 21.9*     Plan  - Monitor serial CBC: Daily  - Transfuse PRBC if patient becomes hemodynamically unstable, symptomatic or H/H drops below 7/21.  - Patient has not received any PRBC transfusions to date  - Patient's anemia is currently worsening. Will continue current treatment  - hold anticoagulation at this time  - Hgb 6.6 overnight, but repeat back at 7.2. Will forego transfusion at this time. Family planning to directed donate blood if needed for transfusion with preference for unvaccinated blood.   Troponin level elevated  Pt reports chest pain, but attributes this to hx of esophagitis. EKG non-contributory.    - troponin trended to peak, suspect type 2    Alkaline phosphatase elevation  With associated epigastric pain.    - US RUQ to rule out gallstones or obstructive etiology of abdominal pain demonstrated sludge, but no obstructive findings    VTE Risk Mitigation (From admission, onward)      None            Discharge Planning   MIKAYLA: 4/1/2025     Code Status: Full Code   Medical Readiness for Discharge Date:    Discharge Plan A: Skilled Nursing Facility   Discharge Delays: None known at this time                    Servando Mejia MD  Department of Hospital Medicine   [facility]

## 2025-03-30 NOTE — ASSESSMENT & PLAN NOTE
Creatine stable for now. BMP reviewed- noted Estimated Creatinine Clearance: 34.6 mL/min (based on SCr of 1 mg/dL). according to latest data. Based on current GFR, CKD stage is stage 3 - GFR 30-59.  Monitor UOP and serial BMP and adjust therapy as needed. Renally dose meds. Avoid nephrotoxic medications and procedures.

## 2025-03-30 NOTE — ASSESSMENT & PLAN NOTE
Anemia is likely due to acute blood loss which was from GI source. Most recent hemoglobin and hematocrit are listed below.  Recent Labs     03/29/25  1701 03/29/25  2358 03/30/25  0548   HGB 6.6* 7.3* 7.2*   HCT 20.3* 22.3* 21.9*     Plan  - Monitor serial CBC: Daily  - Transfuse PRBC if patient becomes hemodynamically unstable, symptomatic or H/H drops below 7/21.  - Patient has not received any PRBC transfusions to date  - Patient's anemia is currently worsening. Will continue current treatment  - hold anticoagulation at this time  - Hgb 6.6 overnight, but repeat back at 7.2. Will forego transfusion at this time. Family planning to directed donate blood if needed for transfusion with preference for unvaccinated blood.

## 2025-03-30 NOTE — ED NOTES
Tele box TTX# [id number] applied to pt. Clint in war room states able to see pt on monitor, rhythm normal sinus with HR 88.

## 2025-03-30 NOTE — ED NOTES
Pt rectal temp 98.2. Petr hugger temporarily turned off at this time per family request. RN continuing to monitor hourly temps and will reapply petr hugger if necessary.

## 2025-03-30 NOTE — NURSING
"Pt transferred to 621 from ED. She's confused, yelling extremely loud "Help! I want to go home." She's accompanied by her daughter who tries to calm her but she remains agitated and uncooperative with staff. Full head to toe assessment is limited because the patient is yelling and screaming loudly to leave her alone.   "

## 2025-03-30 NOTE — ASSESSMENT & PLAN NOTE
Patient's FSGs are controlled on current medication regimen.  Last A1c reviewed-   Lab Results   Component Value Date    HGBA1C 5.8 (H) 03/18/2025     Most recent fingerstick glucose reviewed-   Recent Labs   Lab 03/29/25  1641   POCTGLUCOSE 111*     Current correctional scale  None  Maintain anti-hyperglycemic dose as follows-   Antihyperglycemics (From admission, onward)      None          Hold Oral hypoglycemics while patient is in the hospital.

## 2025-03-30 NOTE — ASSESSMENT & PLAN NOTE
Pt with severe lower back pain refractory to multiple agents in ED. XR lumbar spine with mild scoliosis and degenerative changes.    Multimodal pain regimen with scheduled Tylenol, prn methocarbamol, increase home gabapentin from 100mg to 200mg TID with judicious increases for pain control, lidocaine patches, prn Oxycodone and IV morphine for severe pain.     - Avoid additional opioids in setting of encephalopathy

## 2025-03-30 NOTE — SIGNIFICANT EVENT
Notified by nursing @ 23:13 with question regarding if patient will receive transfusion for critical hgb of 6.6 (collected )   result prior to transfer to medical floor,     Patient with progressive anemia of unclear etiology, pending GI consult to evaluate for EGD.     Type & screen ordered, notified patient requires transfusion consent.  Orders placed including prepare 1unit prbc.     Subsequently informed that patient's family(Liliana Montgomery) reports that they are only willing to accept a direct donor transfusion for patient from a family member who has not had covid vaccination.   Daughter reported that she had donated blood to [facility] previously.     -as interim therapy ordered IV iron dosage  - pt with iron sat 5%  low serum iron, ferritin normal.     Spoke with blood bank and they requested daughters name &  in order to determine if directed donor blood is available/set aside specifically for patient use.  Requested nursing to assist in this task.     Nursing reported that she spoke with blood bank and initial report was that daughter's donated blood had been sent to another facility due to not being used for a recent surgery that patient had, further update that bloodbank has found particular unit(s) and are having it [place] [facility] [place].     I presented to bedside and alternate daughter Dora Kapadia present. Dora signed transfusion consent for patient.  Comments added to consent that only directed donor blood from daughters Felicity & Liliana to be use.   Patient will not accept any blood products that are non-directed donor.   Consent is scanned into media tab.       Dora mentioned that their  is a universal donor and has plans to donate blood to blood bank tomorrow or coming week.  She did not know their full name, so not added to consent form, when this information obtained - please update consent form.         Shawn Gomez M.D.  Attending Physician  [facility]  Dept.  Pager: [phone]

## 2025-03-31 ENCOUNTER — TELEPHONE (OUTPATIENT)
Dept: GASTROENTEROLOGY | Facility: CLINIC | Age: OVER 89
End: 2025-03-31
Payer: MEDICARE

## 2025-03-31 PROBLEM — J98.11 COMPRESSION ATELECTASIS: Status: ACTIVE | Noted: 2025-03-31

## 2025-03-31 PROBLEM — J90 BILATERAL PLEURAL EFFUSION: Status: ACTIVE | Noted: 2025-03-31

## 2025-03-31 PROBLEM — G93.41 ENCEPHALOPATHY, METABOLIC: Status: ACTIVE | Noted: 2025-03-31

## 2025-03-31 PROBLEM — Z75.8 DISCHARGE PLANNING ISSUES: Status: ACTIVE | Noted: 2025-03-31

## 2025-03-31 LAB
ABSOLUTE EOSINOPHIL (OHS): 0.02 K/UL
ABSOLUTE MONOCYTE (OHS): 0.51 K/UL (ref 0.3–1)
ABSOLUTE NEUTROPHIL COUNT (OHS): 8.72 K/UL (ref 1.8–7.7)
ALBUMIN SERPL BCP-MCNC: 1.9 G/DL (ref 3.5–5.2)
ALP SERPL-CCNC: 229 UNIT/L (ref 40–150)
ALT SERPL W/O P-5'-P-CCNC: 12 UNIT/L (ref 10–44)
ANION GAP (OHS): 9 MMOL/L (ref 8–16)
AST SERPL-CCNC: 22 UNIT/L (ref 11–45)
BACTERIA #/AREA URNS AUTO: ABNORMAL /HPF
BASOPHILS # BLD AUTO: 0.02 K/UL
BASOPHILS NFR BLD AUTO: 0.2 %
BILIRUB SERPL-MCNC: 0.3 MG/DL (ref 0.1–1)
BILIRUB UR QL STRIP.AUTO: NEGATIVE
BUN SERPL-MCNC: 23 MG/DL (ref 8–23)
CALCIUM SERPL-MCNC: 8.7 MG/DL (ref 8.7–10.5)
CHLORIDE SERPL-SCNC: 104 MMOL/L (ref 95–110)
CLARITY UR: ABNORMAL
CO2 SERPL-SCNC: 22 MMOL/L (ref 23–29)
COLOR UR AUTO: YELLOW
CREAT SERPL-MCNC: 0.9 MG/DL (ref 0.5–1.4)
ERYTHROCYTE [DISTWIDTH] IN BLOOD BY AUTOMATED COUNT: 15.7 % (ref 11.5–14.5)
GFR SERPLBLD CREATININE-BSD FMLA CKD-EPI: >60 ML/MIN/1.73/M2
GLUCOSE SERPL-MCNC: 56 MG/DL (ref 70–110)
GLUCOSE UR QL STRIP: NEGATIVE
HCT VFR BLD AUTO: 23.9 % (ref 37–48.5)
HGB BLD-MCNC: 7.8 GM/DL (ref 12–16)
HGB UR QL STRIP: ABNORMAL
HYALINE CASTS UR QL AUTO: 0 /LPF (ref 0–1)
IMM GRANULOCYTES # BLD AUTO: 0.09 K/UL (ref 0–0.04)
IMM GRANULOCYTES NFR BLD AUTO: 0.9 % (ref 0–0.5)
KETONES UR QL STRIP: ABNORMAL
LEUKOCYTE ESTERASE UR QL STRIP: ABNORMAL
LYMPHOCYTES # BLD AUTO: 0.92 K/UL (ref 1–4.8)
MAGNESIUM SERPL-MCNC: 2 MG/DL (ref 1.6–2.6)
MCH RBC QN AUTO: 28.2 PG (ref 27–50)
MCHC RBC AUTO-ENTMCNC: 32.6 G/DL (ref 32–36)
MCV RBC AUTO: 86 FL (ref 82–98)
MICROSCOPIC COMMENT: ABNORMAL
NITRITE UR QL STRIP: NEGATIVE
NUCLEATED RBC (/100WBC) (OHS): 0 /100 WBC
PH UR STRIP: 8 [PH]
PHOSPHATE SERPL-MCNC: 4 MG/DL (ref 2.7–4.5)
PLATELET # BLD AUTO: 290 K/UL (ref 150–450)
PMV BLD AUTO: 9.8 FL (ref 9.2–12.9)
POTASSIUM SERPL-SCNC: 3.8 MMOL/L (ref 3.5–5.1)
PROT SERPL-MCNC: 5.3 GM/DL (ref 6–8.4)
PROT UR QL STRIP: ABNORMAL
RBC # BLD AUTO: 2.77 M/UL (ref 4–5.4)
RBC #/AREA URNS AUTO: 0 /HPF (ref 0–4)
RELATIVE EOSINOPHIL (OHS): 0.2 %
RELATIVE LYMPHOCYTE (OHS): 8.9 % (ref 18–48)
RELATIVE MONOCYTE (OHS): 5 % (ref 4–15)
RELATIVE NEUTROPHIL (OHS): 84.8 % (ref 38–73)
SODIUM SERPL-SCNC: 135 MMOL/L (ref 136–145)
SP GR UR STRIP: 1.01
SQUAMOUS #/AREA URNS AUTO: 1 /HPF
UROBILINOGEN UR STRIP-ACNC: NEGATIVE EU/DL
WBC # BLD AUTO: 10.28 K/UL (ref 3.9–12.7)
WBC #/AREA URNS AUTO: >100 /HPF (ref 0–5)

## 2025-03-31 PROCEDURE — 36415 COLL VENOUS BLD VENIPUNCTURE: CPT

## 2025-03-31 PROCEDURE — 85025 COMPLETE CBC W/AUTO DIFF WBC: CPT

## 2025-03-31 PROCEDURE — 21400001 HC TELEMETRY ROOM

## 2025-03-31 PROCEDURE — 11000001 HC ACUTE MED/SURG PRIVATE ROOM

## 2025-03-31 PROCEDURE — 94640 AIRWAY INHALATION TREATMENT: CPT

## 2025-03-31 PROCEDURE — 81003 URINALYSIS AUTO W/O SCOPE: CPT

## 2025-03-31 PROCEDURE — 84100 ASSAY OF PHOSPHORUS: CPT

## 2025-03-31 PROCEDURE — 87086 URINE CULTURE/COLONY COUNT: CPT

## 2025-03-31 PROCEDURE — 94761 N-INVAS EAR/PLS OXIMETRY MLT: CPT

## 2025-03-31 PROCEDURE — 63600175 PHARM REV CODE 636 W HCPCS: Performed by: HOSPITALIST

## 2025-03-31 PROCEDURE — 63600175 PHARM REV CODE 636 W HCPCS

## 2025-03-31 PROCEDURE — 80053 COMPREHEN METABOLIC PANEL: CPT

## 2025-03-31 PROCEDURE — 83735 ASSAY OF MAGNESIUM: CPT

## 2025-03-31 PROCEDURE — 25000003 PHARM REV CODE 250

## 2025-03-31 PROCEDURE — 27000221 HC OXYGEN, UP TO 24 HOURS

## 2025-03-31 PROCEDURE — 99232 SBSQ HOSP IP/OBS MODERATE 35: CPT | Mod: ,,,

## 2025-03-31 PROCEDURE — 99900035 HC TECH TIME PER 15 MIN (STAT)

## 2025-03-31 RX ORDER — POLYETHYLENE GLYCOL 3350 17 G/17G
17 POWDER, FOR SOLUTION ORAL NIGHTLY
Status: DISCONTINUED | OUTPATIENT
Start: 2025-03-31 | End: 2025-04-08 | Stop reason: HOSPADM

## 2025-03-31 RX ORDER — SULFAMETHOXAZOLE AND TRIMETHOPRIM 800; 160 MG/1; MG/1
1 TABLET ORAL 2 TIMES DAILY
Status: DISCONTINUED | OUTPATIENT
Start: 2025-03-31 | End: 2025-04-02

## 2025-03-31 RX ADMIN — ACETAMINOPHEN 1000 MG: 500 TABLET ORAL at 09:03

## 2025-03-31 RX ADMIN — FLUTICASONE PROPIONATE 50 MCG: 50 SPRAY, METERED NASAL at 09:03

## 2025-03-31 RX ADMIN — POLYETHYLENE GLYCOL 3350 17 G: 17 POWDER, FOR SOLUTION ORAL at 09:03

## 2025-03-31 RX ADMIN — GABAPENTIN 200 MG: 100 CAPSULE ORAL at 09:03

## 2025-03-31 RX ADMIN — FLUTICASONE FUROATE AND VILANTEROL TRIFENATATE 1 PUFF: 100; 25 POWDER RESPIRATORY (INHALATION) at 08:03

## 2025-03-31 RX ADMIN — TAMSULOSIN HYDROCHLORIDE 0.4 MG: 0.4 CAPSULE ORAL at 09:03

## 2025-03-31 RX ADMIN — AMLODIPINE BESYLATE 5 MG: 5 TABLET ORAL at 09:03

## 2025-03-31 RX ADMIN — ACETAMINOPHEN 1000 MG: 500 TABLET ORAL at 02:03

## 2025-03-31 RX ADMIN — SODIUM FERRIC GLUCONATE COMPLEX IN SUCROSE 125 MG: 12.5 INJECTION INTRAVENOUS at 09:03

## 2025-03-31 RX ADMIN — PANTOPRAZOLE SODIUM 40 MG: 40 INJECTION, POWDER, FOR SOLUTION INTRAVENOUS at 09:03

## 2025-03-31 RX ADMIN — GABAPENTIN 200 MG: 100 CAPSULE ORAL at 02:03

## 2025-03-31 RX ADMIN — ACETAMINOPHEN 1000 MG: 500 TABLET ORAL at 05:03

## 2025-03-31 RX ADMIN — LIDOCAINE 2 PATCH: 50 PATCH CUTANEOUS at 06:03

## 2025-03-31 RX ADMIN — SULFAMETHOXAZOLE AND TRIMETHOPRIM 1 TABLET: 800; 160 TABLET ORAL at 09:03

## 2025-03-31 NOTE — PROGRESS NOTES
[facility]  Gastroenterology  Progress Note    Patient Name: Bridget Montgomery  MRN: [id number]  Admission Date: 3/28/2025  Hospital Length of Stay: 1 days  Code Status: Full Code   Attending Provider: Servando Mejia MD  Consulting Provider: Lorena Ge NP  Primary Care Physician: No primary care provider on file.  Principal Problem: Acute blood loss anemia    Interval History: Followed up with patient for concerns of esophageal pain. Patient asleep and daughter provides history. Reports doing better since starting PPI. Blood counts have improved. No melena or hematochezia noted. Vitals stable.     Review of Systems   Unable to perform ROS: Other     Objective:     Vital Signs (Most Recent):  Temp: 97.9 °F (36.6 °C) (Simultaneous filing. User may not have seen previous data.) (03/31/25 0525)  Pulse: 79 (03/31/25 0818)  Resp: 18 (03/31/25 0818)  BP: 133/63 (03/31/25 0525)  SpO2: (!) 94 % (03/31/25 0818) Vital Signs (24h Range):  Temp:  [97.3 °F (36.3 °C)-97.9 °F (36.6 °C)] 97.9 °F (36.6 °C)  Pulse:  [74-88] 79  Resp:  [16-18] 18  SpO2:  [90 %-96 %] 94 %  BP: (128-163)/(60-71) 133/63     Weight: 68 kg (150 lb) (03/28/25 1132)  Body mass index is 26.57 kg/m².    No intake or output data in the 24 hours ending 03/31/25 0924    Lines/Drains/Airways       Drain  Duration             Female External Urinary Catheter w/ Suction 03/23/25 0000 8 days              Peripheral Intravenous Line  Duration                  Peripheral IV - Single Lumen 03/30/25 20 G Posterior;Right Forearm 1 day                     Physical Exam  Vitals reviewed.   Constitutional:       General: She is sleeping. She is not in acute distress.     Appearance: Normal appearance. She is not ill-appearing.      Interventions: Cervical collar in place.   HENT:      Mouth/Throat:      Mouth: Mucous membranes are moist.      Pharynx: Oropharynx is clear. No oropharyngeal exudate.   Eyes:      General: No scleral icterus.  Abdominal:       General: Abdomen is flat. Bowel sounds are normal. There is no distension.      Palpations: Abdomen is soft. There is no mass.      Hernia: No hernia is present.   Skin:     General: Skin is warm and dry.      Capillary Refill: Capillary refill takes less than 2 seconds.      Coloration: Skin is not jaundiced or pale.      Findings: No bruising or erythema.   Neurological:      Mental Status: She is oriented to person, place, and time. Mental status is at baseline.          Significant Labs:  CBC:   Recent Labs   Lab 03/29/25  1701 03/29/25  2358 03/30/25  0548 03/31/25  0503   WBC 9.23  --  10.66 10.28   HGB 6.6* 7.3* 7.2* 7.8*   HCT 20.3* 22.3* 21.9* 23.9*     --  261 290     CMP:   Recent Labs   Lab 03/31/25  0503   CALCIUM 8.7   ALBUMIN 1.9*   *   K 3.8   CO2 22*      BUN 23   CREATININE 0.9   ALKPHOS 229*   ALT 12   AST 22   BILITOT 0.3     Coagulation:   Recent Labs   Lab 03/29/25  2358   INR 0.9   APTT 39.2*         Significant Imaging:  Imaging results within the past 24 hours have been reviewed.  Assessment/Plan:     GI  Gastro-esophageal reflux disease without esophagitis  Blood counts remain stable. No melena or overt signs of GI bleeding. Doing well with PPI.     --Continue PPI BID.  --Will arrange for outpatient GI clinic visit to reassess her symptoms and adjust PPI as indicated. They prefer [facility] location.         Thank you for your consult. After careful review of labs and patient current status with the GI staff and fellow, it has been decided that I will sign off. Please contact us if you have any additional questions.    Lorena Ge, MELISSA  Gastroenterology  [facility]

## 2025-03-31 NOTE — ASSESSMENT & PLAN NOTE
Ruling out organic causes (UTI, constipation, last BM prior to admission). Possible hospital delirium (ED room for 48h without windows, opioids for severe back pain)  - start bowel regimen  - f/u UA

## 2025-03-31 NOTE — PROGRESS NOTES
[facility] - [facility] Medicine  Progress Note    Patient Name: Bridget Montgomery  MRN: [id number]  Patient Class: IP- Inpatient   Admission Date: 3/28/2025  Length of Stay: 1 days  Attending Physician: Servando Mejia MD  Primary Care Provider: No primary care provider on file.        Subjective     Principal Problem:Acute blood loss anemia        HPI:  Ms. 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 Her abdominal pain has been worsening for the past week and has progressed to bilious vomiting of meds or anything that she tries to eat/drink. No noted hematemesis, melena, hematochezia. Per meds review no recent NSAID use (listed as allergy, and has CKD). No ETOH use. Pt has a known hiatal hernia. Pt's back pain has worsened over the past few days without any recent falls, trauma, and she does report a history of chronic back pain, but is wailing in pain in the ED. Per collateral from her daughter and HCPOA, Liliana Montgomery, pt has been working on elliptical and doing PT/OT at  without significant issues. No fevers, chills, SOB, LE edema.     In the ED she is afebrile, hemodynamically stable on RA with leukocytosis to 13, anemia to 7.7 (baseline 10), isolated ALP elevation to 203, lipase wnl, high-sensitivity troponin elevation to 51 with unremarkable EKG. CXR with increase in the perihilar interstitial and vascular markings. XR lumbar spine with mild scoliosis, but no acute fractures. CT AP with mildly distended bladder, diverticulosis, and moderate to large hiatal hernia. Pt given Maalox, viscous lidocaine, famotidine, pantoprazole, Zofran, and morphine for abdominal, chest, and back pain. Admitted to Hospital Medicine for pain control and evaluation of  abdominal pain.     Overview/Hospital Course:  Admitted with lower back pain better controlled with multimodal regimen. Pt able to tolerate a turkey sandwich for first time in several days after admission with IV PPI, sucralfate, viscous lidocaine. GI consulted with appreciated evaluation for EGD given pt's reported GERD symptoms, inability to tolerate PO for days before admission, known hiatal hernia, and continued anemia (mixed etiology per iron studies), though recent c-spine fusion is a consideration prior to scoping. Symptomatic relief for rhinorrhea provided with pt's daughter's concerns for post-operative complications from sinus infection.     Hospital associated delirium in conjunction with polypharmacy (opioids for severe back pain) on 03/30 with patient agitated, pulling at NC, attempting to remove C-collar. Pt re-directed with assistance from her daughters in attempt to avoid using agitation meds with some resolution of agitation throughout the night.     Interval History: NAEON, pt more calm yesterday afternoon, but woke up this AM thinking she was flying. Ruling out metabolic etiologies for encephalopathy. Bowel regimen starting tonight. [facility] says pt no longer a candidate for their facility, PT/OT consulted for tomorrow. Hgb stable, GI signing off.     Review of Systems  Objective:     Vital Signs (Most Recent):  Temp: 97.6 °F (36.4 °C) (03/31/25 1058)  Pulse: 73 (03/31/25 1202)  Resp: 18 (03/31/25 1058)  BP: 128/60 (03/31/25 1058)  SpO2: 95 % (03/31/25 1058) Vital Signs (24h Range):  Temp:  [97.3 °F (36.3 °C)-97.9 °F (36.6 °C)] 97.6 °F (36.4 °C)  Pulse:  [73-88] 73  Resp:  [16-18] 18  SpO2:  [90 %-96 %] 95 %  BP: (128-163)/(60-71) 128/60     Weight: 68 kg (150 lb)  Body mass index is 26.57 kg/m².    Intake/Output Summary (Last 24 hours) at 3/31/2025 1334  Last data filed at 3/31/2025 1101  Gross per 24 hour   Intake --   Output 500 ml   Net -500 ml         Physical Exam  Vitals and nursing note  reviewed.   Constitutional:       General: She is not in acute distress.     Appearance: She is not ill-appearing.   HENT:      Head: Normocephalic and atraumatic.      Mouth/Throat:      Mouth: Mucous membranes are moist.      Pharynx: Oropharynx is clear.   Eyes:      Extraocular Movements: Extraocular movements intact.      Conjunctiva/sclera: Conjunctivae normal.   Neck:      Comments: C-collar in place  Cardiovascular:      Rate and Rhythm: Normal rate and regular rhythm.      Pulses: Normal pulses.   Pulmonary:      Effort: Pulmonary effort is normal. No respiratory distress.      Breath sounds: No wheezing or rales.   Abdominal:      Palpations: Abdomen is soft.      Tenderness: There is no abdominal tenderness. There is no guarding.   Musculoskeletal:         General: No tenderness.      Right lower leg: No edema.      Left lower leg: No edema.   Skin:     General: Skin is warm and dry.   Neurological:      General: No focal deficit present.      Mental Status: She is alert. She is disoriented.   Psychiatric:         Mood and Affect: Mood is anxious.         Behavior: Behavior is agitated.      Comments: Mild, intermittent confusion               Significant Labs: All pertinent labs within the past 24 hours have been reviewed.    Significant Imaging: I have reviewed all pertinent imaging results/findings within the past 24 hours.      Assessment & Plan  Chronic kidney disease, stage III (moderate)  Creatine stable for now. BMP reviewed- noted Estimated Creatinine Clearance: 38.4 mL/min (based on SCr of 0.9 mg/dL). according to latest data. Based on current GFR, CKD stage is stage 3 - GFR 30-59.  Monitor UOP and serial BMP and adjust therapy as needed. Renally dose meds. Avoid nephrotoxic medications and procedures.  Type 2 diabetes mellitus with diabetic chronic kidney disease  Patient's FSGs are controlled on current medication regimen.  Last A1c reviewed-   Lab Results   Component Value Date    HGBA1C 5.8  "(H) 03/18/2025     Most recent fingerstick glucose reviewed-   No results for input(s): "POCTGLUCOSE" in the last 24 hours.    Current correctional scale   None  Maintain anti-hyperglycemic dose as follows-   Antihyperglycemics (From admission, onward)      None          Hold Oral hypoglycemics while patient is in the hospital.  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 XR lumbar spine with mild scoliosis and degenerative changes.    Multimodal pain regimen with scheduled Tylenol, prn methocarbamol, increase home gabapentin from 100mg to 200mg TID with judicious increases for pain control, lidocaine patches, prn Oxycodone and IV morphine for severe pain.     - Avoid additional opioids in setting of encephalopathy    Essential hypertension  Patient's blood pressure range in the last 24 hours was: BP  Min: 128/60  Max: 163/71.The patient's inpatient anti-hypertensive regimen is listed below:  Current Antihypertensives  amLODIPine tablet 5 mg, Daily, Oral    Plan  - BP is controlled, no changes needed to their regimen  Ulcer of esophagus  Gastro-esophageal reflux disease without esophagitis  Hiatal hernia with GERD  Appears to have used Protonix and omeprazole for a period at SNF along with viscious po agents without symptomatic relief that has progressed to N/V with liquids and solids. Associated acute anemia 7.7 with slow decrease from baseline 10, but without overt bleeding, melena, coffee ground emesis. Ddx includes gastric or duodenal ulcer, esophageal webbing, GERD associated with hiatal hernia. Low suspicion for Megan-Osorio tear given pt's stable BP.    - IV protonix BID  - sucralfate before and after meals  - CLD with continued monitoring  - Zofran  - qd CBC  - Appreciated GI evaluation for EGD, noting recent C-spinal fusion as possible complicating factor for " scope.    Closed odontoid fracture with routine healing  Maintain C-collar    Urinary retention  Continue Flomax and hold bethanechol started one week ago to rule out N/V contributory factor from anticholinergic med    Hyponatremia  Hyponatremia is likely due to indeterminate. The patient's most recent sodium results are listed below.  Recent Labs     03/29/25  2358 03/30/25  0548 03/31/25  0503   * 132* 135*     Plan  - Correct the sodium by 4-6mEq in 24 hours.   - Obtain the following studies: monitoring.  - Will treat the hyponatremia with monitoring, and, if indicated, resuming sodium tablets that were started within the past week  - Monitor sodium Daily.   - Patient hyponatremia is stable    Acute blood loss anemia  Anemia is likely due to acute blood loss which was from GI source . Most recent hemoglobin and hematocrit are listed below.  Recent Labs     03/29/25  2358 03/30/25  0548 03/31/25  0503   HGB 7.3* 7.2* 7.8*   HCT 22.3* 21.9* 23.9*     Plan  - Monitor serial CBC: Daily  - Transfuse PRBC if patient becomes hemodynamically unstable, symptomatic or H/H drops below 7/21.  - Patient has not received any PRBC transfusions to date  - Patient's anemia is currently worsening. Will continue current treatment  - hold anticoagulation at this time  - Hgb 6.6 overnight, but repeat back at 7.2. Will forego transfusion at this time. Family planning to directed donate blood if needed for transfusion with preference for unvaccinated blood.   Troponin level elevated  Pt reports chest pain, but attributes this to hx of esophagitis. EKG non-contributory.    - troponin trended to peak, suspect type 2    Alkaline phosphatase elevation  With associated epigastric pain.    - US RUQ to rule out gallstones or obstructive etiology of abdominal pain demonstrated sludge, but no obstructive findings    Bilateral pleural effusion  Patient found to have small pleural effusion on imaging. I have personally reviewed and  interpreted the following imaging: CT. A thoracentesis was deferred. Most likely etiology includes Volume overload not due to CHF. Management to include  monitoring    Compression atelectasis  I have personally reviewed CT Scan. Patient with atelectasis on interpretation. Likely Non-Obstructive atelectasis secondary to pleural effusion. Signs and symptoms include  none  Will begin treatment with upright positioning.  Encephalopathy, metabolic  Ruling out organic causes (UTI, constipation, last BM prior to admission). Possible hospital delirium (ED room for 48h without windows, opioids for severe back pain)  - start bowel regimen  - f/u UA    VTE Risk Mitigation (From admission, onward)      None            Discharge Planning   MIKAYLA: 4/3/2025     Code Status: Full Code   Medical Readiness for Discharge Date:   Discharge Plan A: Skilled Nursing Facility   Discharge Delays: None known at this time            Please place Justification for DME        Servando Mejia MD  Department of Hospital Medicine   [facility]

## 2025-03-31 NOTE — ASSESSMENT & PLAN NOTE
I have personally reviewed CT Scan. Patient with atelectasis on interpretation. Likely Non-Obstructive atelectasis secondary to pleural effusion. Signs and symptoms include none Will begin treatment with upright positioning.

## 2025-03-31 NOTE — PLAN OF CARE
Problem: Adult Inpatient Plan of Care  Goal: Plan of Care Review  Outcome: Progressing  Goal: Patient-Specific Goal (Individualized)  Outcome: Progressing  Goal: Absence of Hospital-Acquired Illness or Injury  Outcome: Progressing  Goal: Optimal Comfort and Wellbeing  Outcome: Progressing  Goal: Readiness for Transition of Care  Outcome: Progressing     Problem: Skin Injury Risk Increased  Goal: Skin Health and Integrity  Outcome: Progressing     Problem: Diabetes Comorbidity  Goal: Blood Glucose Level Within Targeted Range  Outcome: Progressing     Problem: Fall Injury Risk  Goal: Absence of Fall and Fall-Related Injury  Outcome: Progressing     Problem: Wound  Goal: Optimal Coping  Outcome: Progressing  Goal: Optimal Functional Ability  Outcome: Progressing  Goal: Absence of Infection Signs and Symptoms  Outcome: Progressing  Goal: Improved Oral Intake  Outcome: Progressing  Goal: Optimal Pain Control and Function  Outcome: Progressing  Goal: Skin Health and Integrity  Outcome: Progressing  Goal: Optimal Wound Healing  Outcome: Progressing     Problem: Delirium  Goal: Optimal Coping  Outcome: Progressing  Goal: Improved Behavioral Control  Outcome: Progressing  Goal: Improved Attention and Thought Clarity  Outcome: Progressing  Goal: Improved Sleep  Outcome: Progressing

## 2025-03-31 NOTE — SUBJECTIVE & OBJECTIVE
Interval History: ANYA, pt more calm yesterday afternoon, but woke up this AM thinking she was flying. Ruling out metabolic etiologies for encephalopathy. Bowel regimen starting tonight. [facility] says pt no longer a candidate for their facility, PT/OT consulted for tomorrow. Hgb stable, GI signing off.     Review of Systems  Objective:     Vital Signs (Most Recent):  Temp: 97.6 °F (36.4 °C) (03/31/25 1058)  Pulse: 73 (03/31/25 1202)  Resp: 18 (03/31/25 1058)  BP: 128/60 (03/31/25 1058)  SpO2: 95 % (03/31/25 1058) Vital Signs (24h Range):  Temp:  [97.3 °F (36.3 °C)-97.9 °F (36.6 °C)] 97.6 °F (36.4 °C)  Pulse:  [73-88] 73  Resp:  [16-18] 18  SpO2:  [90 %-96 %] 95 %  BP: (128-163)/(60-71) 128/60     Weight: 68 kg (150 lb)  Body mass index is 26.57 kg/m².    Intake/Output Summary (Last 24 hours) at 3/31/2025 1334  Last data filed at 3/31/2025 1101  Gross per 24 hour   Intake --   Output 500 ml   Net -500 ml         Physical Exam  Vitals and nursing note reviewed.   Constitutional:       General: She is not in acute distress.     Appearance: She is not ill-appearing.   HENT:      Head: Normocephalic and atraumatic.      Mouth/Throat:      Mouth: Mucous membranes are moist.      Pharynx: Oropharynx is clear.   Eyes:      Extraocular Movements: Extraocular movements intact.      Conjunctiva/sclera: Conjunctivae normal.   Neck:      Comments: C-collar in place  Cardiovascular:      Rate and Rhythm: Normal rate and regular rhythm.      Pulses: Normal pulses.   Pulmonary:      Effort: Pulmonary effort is normal. No respiratory distress.      Breath sounds: No wheezing or rales.   Abdominal:      Palpations: Abdomen is soft.      Tenderness: There is no abdominal tenderness. There is no guarding.   Musculoskeletal:         General: No tenderness.      Right lower leg: No edema.      Left lower leg: No edema.   Skin:     General: Skin is warm and dry.   Neurological:      General: No focal deficit present.      Mental Status: She  is alert. She is disoriented.   Psychiatric:         Mood and Affect: Mood is anxious.         Behavior: Behavior is agitated.      Comments: Mild, intermittent confusion               Significant Labs: All pertinent labs within the past 24 hours have been reviewed.    Significant Imaging: I have reviewed all pertinent imaging results/findings within the past 24 hours.

## 2025-03-31 NOTE — NURSING
Blood sent back to blood bank family wants to speak to morning Md before infusing charge nurse aware.

## 2025-03-31 NOTE — TELEPHONE ENCOUNTER
----- Message from Lorena Ge NP sent at 3/31/2025  9:27 AM CDT -----  Regarding: hospital followup  Please assist patient in scheduling a hospital followup. They prefer [place] due to transportation and patients decreased mobility. Thank you!

## 2025-03-31 NOTE — ASSESSMENT & PLAN NOTE
Patient's blood pressure range in the last 24 hours was: BP  Min: 128/60  Max: 163/71.The patient's inpatient anti-hypertensive regimen is listed below:  Current Antihypertensives  amLODIPine tablet 5 mg, Daily, Oral    Plan  - BP is controlled, no changes needed to their regimen

## 2025-03-31 NOTE — SUBJECTIVE & OBJECTIVE
Interval History: Followed up with patient for concerns of esophageal pain. Patient asleep and daughter provides history. Reports doing better since starting PPI. Blood counts have improved. No melena or hematochezia noted. Vitals stable.     Review of Systems   Unable to perform ROS: Other     Objective:     Vital Signs (Most Recent):  Temp: 97.9 °F (36.6 °C) (Simultaneous filing. User may not have seen previous data.) (03/31/25 0525)  Pulse: 79 (03/31/25 0818)  Resp: 18 (03/31/25 0818)  BP: 133/63 (03/31/25 0525)  SpO2: (!) 94 % (03/31/25 0818) Vital Signs (24h Range):  Temp:  [97.3 °F (36.3 °C)-97.9 °F (36.6 °C)] 97.9 °F (36.6 °C)  Pulse:  [74-88] 79  Resp:  [16-18] 18  SpO2:  [90 %-96 %] 94 %  BP: (128-163)/(60-71) 133/63     Weight: 68 kg (150 lb) (03/28/25 1132)  Body mass index is 26.57 kg/m².    No intake or output data in the 24 hours ending 03/31/25 0924    Lines/Drains/Airways       Drain  Duration             Female External Urinary Catheter w/ Suction 03/23/25 0000 8 days              Peripheral Intravenous Line  Duration                  Peripheral IV - Single Lumen 03/30/25 20 G Posterior;Right Forearm 1 day                     Physical Exam  Vitals reviewed.   Constitutional:       General: She is sleeping. She is not in acute distress.     Appearance: Normal appearance. She is not ill-appearing.      Interventions: Cervical collar in place.   HENT:      Mouth/Throat:      Mouth: Mucous membranes are moist.      Pharynx: Oropharynx is clear. No oropharyngeal exudate.   Eyes:      General: No scleral icterus.  Abdominal:      General: Abdomen is flat. Bowel sounds are normal. There is no distension.      Palpations: Abdomen is soft. There is no mass.      Hernia: No hernia is present.   Skin:     General: Skin is warm and dry.      Capillary Refill: Capillary refill takes less than 2 seconds.      Coloration: Skin is not jaundiced or pale.      Findings: No bruising or erythema.   Neurological:       Mental Status: She is oriented to person, place, and time. Mental status is at baseline.          Significant Labs:  CBC:   Recent Labs   Lab 03/29/25  1701 03/29/25  2358 03/30/25  0548 03/31/25  0503   WBC 9.23  --  10.66 10.28   HGB 6.6* 7.3* 7.2* 7.8*   HCT 20.3* 22.3* 21.9* 23.9*     --  261 290     CMP:   Recent Labs   Lab 03/31/25  0503   CALCIUM 8.7   ALBUMIN 1.9*   *   K 3.8   CO2 22*      BUN 23   CREATININE 0.9   ALKPHOS 229*   ALT 12   AST 22   BILITOT 0.3     Coagulation:   Recent Labs   Lab 03/29/25  2358   INR 0.9   APTT 39.2*         Significant Imaging:  Imaging results within the past 24 hours have been reviewed.

## 2025-03-31 NOTE — ASSESSMENT & PLAN NOTE
Anemia is likely due to acute blood loss which was from GI source. Most recent hemoglobin and hematocrit are listed below.  Recent Labs     03/29/25  2358 03/30/25  0548 03/31/25  0503   HGB 7.3* 7.2* 7.8*   HCT 22.3* 21.9* 23.9*     Plan  - Monitor serial CBC: Daily  - Transfuse PRBC if patient becomes hemodynamically unstable, symptomatic or H/H drops below 7/21.  - Patient has not received any PRBC transfusions to date  - Patient's anemia is currently worsening. Will continue current treatment  - hold anticoagulation at this time  - Hgb 6.6 overnight, but repeat back at 7.2. Will forego transfusion at this time. Family planning to directed donate blood if needed for transfusion with preference for unvaccinated blood.

## 2025-03-31 NOTE — CARE UPDATE
I have reviewed the chart of Bridget Montgomery who is hospitalized for the following:    Active Hospital Problems    Diagnosis    *Acute blood loss anemia    Bilateral pleural effusion    Compression atelectasis    Hiatal hernia with GERD    Troponin level elevated    Alkaline phosphatase elevation    Hyponatremia    Urinary retention    Closed odontoid fracture with routine healing    Lumbar radiculopathy    Neuralgia of right sciatic nerve    Primary localized osteoarthritis of pelvic region and thigh    Ulcer of esophagus    Gastro-esophageal reflux disease without esophagitis    Type 2 diabetes mellitus with diabetic chronic kidney disease    Essential hypertension    Chronic kidney disease, stage III (moderate)     - sees Lacey.  - last GFR was 36 (in 2022)          Manuela Powell PA-C  [facility]

## 2025-03-31 NOTE — PLAN OF CARE
[facility] - Med Surg  Discharge Reassessment    Primary Care Provider: No primary care provider on file.    Expected Discharge Date: 4/3/2025    PRINCE assigned to pt's care team.  Pt not medically ready for d/c. Pt was transferred from [facility] to [facility]; pt was on a [facility].  PRINCE spoke to Klarissa and Dot with [facility] and was advised they are unable to meet patient's needs due to behaviors and their administrative staff denied pt's return.      PRINCE met with pt's and daughter Felicity at bedside to discuss discharge plan.  PRINCE advised [facility] unable to take patient back and provided a list of in-network SNF/NH facilities with PHN.  Pt's daughter, Felicity, asked PRINCE to reach out to daughter Liliana to discuss discharge plan and provide an update.      PRINCE telephoned pt's daughter, Liliana [phone], regarding d/c planning and update on [facility].  PRINCE advised [facility] denied pt's return, daughter, advised she would file an appeal.  She stated her mother would not go to a NH for SNF placement.  PRINCE inquired about alternative options and daughter, Liliana, not interested in another long term SNF facility.  PRINCE inquired if [facility] continues to deny does family have an alternative option and would it include home with HH.      Pt's daughter advised SW she is unable to give an alternative d/c plan at this time.  No additional referrals sent as family not interested in sending additional referrals at this time.  Medical team notified.  PRINCE will continue to follow.    Discharge Plan A and Plan B have been determined by review of patient's clinical status, future medical and therapeutic needs, and coverage/benefits for post-acute care in coordination with multidisciplinary team members.    Reassessment (most recent)       Discharge Reassessment - 03/31/25 5953          Discharge Reassessment    Assessment Type Discharge Planning Reassessment     Did the patient's condition or plan change since previous assessment? No     Discharge Plan discussed with: Adult  children     Communicated MIKAYLA with patient/caregiver Date not available/Unable to determine     Discharge Plan A Skilled Nursing Facility     Discharge Plan B Home;Home with family;Home Health     DME Needed Upon Discharge  other (see comments)   TBD    Transition of Care Barriers None     Why the patient remains in the hospital Requires continued medical care        Post-Acute Status    Post-Acute Authorization Placement     Post-Acute Placement Status Referrals Sent     Coverage [facility]     Discharge Delays None known at this time                   Phoebe Shepard LMSW  Part-Time-  [facility]  Ext. [phone]

## 2025-03-31 NOTE — ASSESSMENT & PLAN NOTE
Hyponatremia is likely due to indeterminate. The patient's most recent sodium results are listed below.  Recent Labs     03/29/25  2358 03/30/25  0548 03/31/25  0503   * 132* 135*     Plan  - Correct the sodium by 4-6mEq in 24 hours.   - Obtain the following studies: monitoring.  - Will treat the hyponatremia with monitoring, and, if indicated, resuming sodium tablets that were started within the past week  - Monitor sodium Daily.   - Patient hyponatremia is stable

## 2025-03-31 NOTE — ASSESSMENT & PLAN NOTE
Blood counts remain stable. No melena or overt signs of GI bleeding. Doing well with PPI.     --Continue PPI BID.  --Will arrange for outpatient GI clinic visit to reassess her symptoms and adjust PPI as indicated. They prefer [place] location.

## 2025-03-31 NOTE — ASSESSMENT & PLAN NOTE
Patient found to have small pleural effusion on imaging. I have personally reviewed and interpreted the following imaging: CT. A thoracentesis was deferred. Most likely etiology includes Volume overload not due to CHF. Management to include monitoring

## 2025-04-01 PROBLEM — N30.00 ACUTE CYSTITIS: Status: ACTIVE | Noted: 2025-04-01

## 2025-04-01 LAB
ABSOLUTE EOSINOPHIL (OHS): 0.06 K/UL
ABSOLUTE MONOCYTE (OHS): 0.57 K/UL (ref 0.3–1)
ABSOLUTE NEUTROPHIL COUNT (OHS): 6.46 K/UL (ref 1.8–7.7)
ALBUMIN SERPL BCP-MCNC: 1.7 G/DL (ref 3.5–5.2)
ALP SERPL-CCNC: 206 UNIT/L (ref 40–150)
ALT SERPL W/O P-5'-P-CCNC: 13 UNIT/L (ref 10–44)
ANION GAP (OHS): 6 MMOL/L (ref 8–16)
AST SERPL-CCNC: 21 UNIT/L (ref 11–45)
BACTERIA UR CULT: NORMAL
BASOPHILS # BLD AUTO: 0.03 K/UL
BASOPHILS NFR BLD AUTO: 0.4 %
BILIRUB SERPL-MCNC: 0.2 MG/DL (ref 0.1–1)
BUN SERPL-MCNC: 28 MG/DL (ref 8–23)
CALCIUM SERPL-MCNC: 8.3 MG/DL (ref 8.7–10.5)
CHLORIDE SERPL-SCNC: 107 MMOL/L (ref 95–110)
CO2 SERPL-SCNC: 26 MMOL/L (ref 23–29)
CREAT SERPL-MCNC: 1 MG/DL (ref 0.5–1.4)
ERYTHROCYTE [DISTWIDTH] IN BLOOD BY AUTOMATED COUNT: 15.7 % (ref 11.5–14.5)
GFR SERPLBLD CREATININE-BSD FMLA CKD-EPI: 54 ML/MIN/1.73/M2
GLUCOSE SERPL-MCNC: 59 MG/DL (ref 70–110)
HCT VFR BLD AUTO: 21.4 % (ref 37–48.5)
HGB BLD-MCNC: 6.9 GM/DL (ref 12–16)
IMM GRANULOCYTES # BLD AUTO: 0.1 K/UL (ref 0–0.04)
IMM GRANULOCYTES NFR BLD AUTO: 1.2 % (ref 0–0.5)
LYMPHOCYTES # BLD AUTO: 1.18 K/UL (ref 1–4.8)
MAGNESIUM SERPL-MCNC: 1.9 MG/DL (ref 1.6–2.6)
MCH RBC QN AUTO: 28.4 PG (ref 27–31)
MCHC RBC AUTO-ENTMCNC: 32.2 G/DL (ref 32–36)
MCV RBC AUTO: 88 FL (ref 82–98)
NUCLEATED RBC (/100WBC) (OHS): 0 /100 WBC
PHOSPHATE SERPL-MCNC: 3.8 MG/DL (ref 2.7–4.5)
PLATELET # BLD AUTO: 257 K/UL (ref 150–450)
PMV BLD AUTO: 9.7 FL (ref 9.2–12.9)
POTASSIUM SERPL-SCNC: 4.1 MMOL/L (ref 3.5–5.1)
PROT SERPL-MCNC: 4.8 GM/DL (ref 6–8.4)
RBC # BLD AUTO: 2.43 M/UL (ref 4–5.4)
RELATIVE EOSINOPHIL (OHS): 0.7 %
RELATIVE LYMPHOCYTE (OHS): 14 % (ref 18–48)
RELATIVE MONOCYTE (OHS): 6.8 % (ref 4–15)
RELATIVE NEUTROPHIL (OHS): 76.9 % (ref 38–73)
SODIUM SERPL-SCNC: 139 MMOL/L (ref 136–145)
WBC # BLD AUTO: 8.4 K/UL (ref 3.9–12.7)

## 2025-04-01 PROCEDURE — 85025 COMPLETE CBC W/AUTO DIFF WBC: CPT

## 2025-04-01 PROCEDURE — 94761 N-INVAS EAR/PLS OXIMETRY MLT: CPT

## 2025-04-01 PROCEDURE — 11000001 HC ACUTE MED/SURG PRIVATE ROOM

## 2025-04-01 PROCEDURE — 97162 PT EVAL MOD COMPLEX 30 MIN: CPT

## 2025-04-01 PROCEDURE — 84100 ASSAY OF PHOSPHORUS: CPT

## 2025-04-01 PROCEDURE — 63600175 PHARM REV CODE 636 W HCPCS

## 2025-04-01 PROCEDURE — 21400001 HC TELEMETRY ROOM

## 2025-04-01 PROCEDURE — 97530 THERAPEUTIC ACTIVITIES: CPT

## 2025-04-01 PROCEDURE — 36415 COLL VENOUS BLD VENIPUNCTURE: CPT

## 2025-04-01 PROCEDURE — 97116 GAIT TRAINING THERAPY: CPT

## 2025-04-01 PROCEDURE — 94640 AIRWAY INHALATION TREATMENT: CPT

## 2025-04-01 PROCEDURE — 97165 OT EVAL LOW COMPLEX 30 MIN: CPT

## 2025-04-01 PROCEDURE — 25000003 PHARM REV CODE 250

## 2025-04-01 PROCEDURE — 80053 COMPREHEN METABOLIC PANEL: CPT

## 2025-04-01 PROCEDURE — 27000221 HC OXYGEN, UP TO 24 HOURS

## 2025-04-01 PROCEDURE — 83735 ASSAY OF MAGNESIUM: CPT

## 2025-04-01 RX ADMIN — FLUTICASONE PROPIONATE 50 MCG: 50 SPRAY, METERED NASAL at 09:04

## 2025-04-01 RX ADMIN — FLUTICASONE FUROATE AND VILANTEROL TRIFENATATE 1 PUFF: 100; 25 POWDER RESPIRATORY (INHALATION) at 08:04

## 2025-04-01 RX ADMIN — TAMSULOSIN HYDROCHLORIDE 0.4 MG: 0.4 CAPSULE ORAL at 09:04

## 2025-04-01 RX ADMIN — GABAPENTIN 200 MG: 100 CAPSULE ORAL at 03:04

## 2025-04-01 RX ADMIN — PANTOPRAZOLE SODIUM 40 MG: 40 INJECTION, POWDER, FOR SOLUTION INTRAVENOUS at 09:04

## 2025-04-01 RX ADMIN — AMLODIPINE BESYLATE 5 MG: 5 TABLET ORAL at 09:04

## 2025-04-01 RX ADMIN — GABAPENTIN 200 MG: 100 CAPSULE ORAL at 09:04

## 2025-04-01 RX ADMIN — SODIUM CHLORIDE 125 MG: 9 INJECTION, SOLUTION INTRAVENOUS at 09:04

## 2025-04-01 RX ADMIN — SULFAMETHOXAZOLE AND TRIMETHOPRIM 1 TABLET: 800; 160 TABLET ORAL at 09:04

## 2025-04-01 RX ADMIN — LIDOCAINE 2 PATCH: 50 PATCH CUTANEOUS at 06:04

## 2025-04-01 RX ADMIN — POLYETHYLENE GLYCOL 3350 17 G: 17 POWDER, FOR SOLUTION ORAL at 09:04

## 2025-04-01 RX ADMIN — ACETAMINOPHEN 1000 MG: 500 TABLET ORAL at 09:04

## 2025-04-01 RX ADMIN — ACETAMINOPHEN 1000 MG: 500 TABLET ORAL at 03:04

## 2025-04-01 NOTE — ASSESSMENT & PLAN NOTE
Recent Labs     03/30/25  0548 03/31/25  0503 04/01/25  0823   HGB 7.2* 7.8* 6.9*   HCT 21.9* 23.9* 21.4*

## 2025-04-01 NOTE — ASSESSMENT & PLAN NOTE
Pt discharged from [facility] with pt's daughter/HCPLESLIE Ford relaying displeasure with that decision and looking to appeal decision. Advised to discuss matters with SNF if questions regarding her discharge from that facility.    PT/OT recs for moderate intensity, but Liliana states that she will not accept anything except [facility]. Ongoing dispo planning for sitters at home, etc if pt wishes to discharge to home.

## 2025-04-01 NOTE — PLAN OF CARE
OT evaluation completed on this date - see note for details. OT POC and goals established.    Problem: Occupational Therapy  Goal: Occupational Therapy Goal  Description: Goals to be met by: 04/15/2025     Patient will increase functional independence with ADLs by performing:    UE Dressing with Contact Guard Assistance.  LE Dressing with Minimal Assistance.  Grooming while seated with Supervision.  Toileting from toilet/BSC with Minimal Assistance for hygiene and clothing management.   Toilet transfer to toilet/BSC with Minimal Assistance.  Functional mobility to simulate household/community distances with Minimal Assistance and utilizing LRAD in order to maximize functional activity tolerance and standing balance required for engagement in occupations of choice.    Outcome: Progressing

## 2025-04-01 NOTE — DISCHARGE SUMMARY
"[facility]  Discharge Summary      Patient Name: Bridget Montgomery  MRN: [id number]  SHIRA: [id number]  Patient Class: IP- SNF  Admission Date: 3/24/2025  Hospital Length of Stay: 4 days  Discharge Date and Time: 3/28/2025 11:05 AM  Attending Physician: No att. providers found   Discharging Provider: Lorena Valle NP  Primary Care Provider: No primary care provider on file.    Primary Care Team: LÁZARO VALLE     HPI:   No notes on file    * No surgery found *      Hospital Course:   Patient prematurely discharged from SNF.  Patient is sent to ED for evaluation, see epic for full details     Goals of Care Treatment Preferences:  Code Status: Full Code         Consults:   Consults (From admission, onward)          Status Ordering Provider     Inpatient consult to Registered Dietitian/Nutritionist  Once        Provider:  (Not yet assigned)    Completed JOSE [employer]ANING            Assessment & Plan  Cervical spinal stenosis      Type 2 diabetes mellitus with diabetic chronic kidney disease    Lab Results   Component Value Date    HGBA1C 5.8 (H) 03/18/2025     Most recent fingerstick glucose reviewed- No results for input(s): "POCTGLUCOSE" in the last 24 hours.    Antihyperglycemics (From admission, onward)      None          Hold Oral hypoglycemics while patient is in the hospital.  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    Weakness      Urinary retention      Anemia    Recent Labs     03/30/25  0548 03/31/25  0503 04/01/25  0823   HGB 7.2* 7.8* 6.9*   HCT 21.9* 23.9* 21.4*       Hyponatremia    Recent Labs     03/30/25  0548 03/31/25  0503 04/01/25  0823   * 135* 139       Final Active Diagnoses:    Diagnosis Date Noted POA    PRINCIPAL PROBLEM:  Cervical spinal stenosis [M48.02] 03/09/2025 Yes    Hyponatremia [E87.1] 03/25/2025 Yes    Anemia [D64.9] 03/23/2025 Yes    Urinary retention [R33.9] 03/22/2025 Yes    Weakness [R53.1] 03/09/2025 Yes    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 [I63.02] 10/17/2023 Yes    Closed odontoid fracture with routine healing [S12.100D] 07/24/2023 Not Applicable    CKD (chronic kidney disease) stage 4, GFR 15-29 ml/min [N18.4] 06/20/2023 Yes    Neuralgia of right sciatic nerve [M54.31] 07/07/2020 Yes    Other emphysema [J43.8] 07/07/2020 Yes     Chronic    Allergic rhinitis [J30.9] 07/07/2020 Yes    Mixed hyperlipidemia [E78.2] 07/07/2020 Yes    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] 07/07/2020 Yes    Gastro-esophageal reflux disease without esophagitis [K21.9] 03/01/2016 Yes    Essential hypertension [I10] 02/23/2016 Yes    Type 2 diabetes mellitus with diabetic chronic kidney disease [E11.22] 02/23/2016 Yes      Problems Resolved During this Admission:       Discharged Condition: poor    Disposition: Short Term Hospital    Follow Up:    Patient Instructions:   No discharge procedures on file.    Significant Diagnostic Studies: N/A    Pending Diagnostic Studies:       None           Medications:  Reconciled Home Medications:      Medication List        ASK your doctor about these medications      albuterol 90 mcg/actuation inhaler  Commonly known as: PROVENTIL/VENTOLIN HFA  Inhale 2 puffs into the lungs every 6 (six) hours as needed.     amLODIPine 10 MG tablet  Commonly known as: NORVASC  Take 1 tablet (10 mg total) by mouth once daily.     bethanechol 10 MG  Tab  Commonly known as: URECHOLINE  Take 1 tablet (10 mg total) by mouth 3 (three) times daily.     cloNIDine 0.1 mg/24 hr td ptwk 0.1 mg/24 hr  Commonly known as: CATAPRES  Place 1 patch onto the skin every 7 days.     fluticasone furoate-vilanteroL 100-25 mcg/dose diskus inhaler  Commonly known as: BREO  Inhale 1 puff into the lungs once daily. Controller     fluticasone propionate 50 mcg/actuation nasal spray  Commonly known as: FLONASE  1 spray by Nasal route 2 (two) times daily.     furosemide 40 MG tablet  Commonly known as: LASIX  Take 1 tablet (40 mg total) by mouth once daily.     gabapentin 300 MG capsule  Commonly known as: NEURONTIN  Take 300 mg by mouth every evening.     hydrALAZINE 25 MG tablet  Commonly known as: APRESOLINE  Take 3 tablets (75 mg total) by mouth every 8 (eight) hours.     ipratropium 42 mcg (0.06 %) nasal spray  Commonly known as: ATROVENT     melatonin 3 mg tablet  Commonly known as: MELATIN  Take 2 tablets (6 mg total) by mouth nightly as needed for Insomnia.     methocarbamoL 500 MG Tab  Commonly known as: Robaxin  Take 1 tablet (500 mg total) by mouth 3 (three) times daily. for 10 days  Ask about: Should I take this medication?     omega 3-dha-epa-fish oil 1,000 (120-180) mg Cap  Take 1 capsule by mouth once daily.     omeprazole 20 MG capsule  Commonly known as: PRILOSEC  Take 1 capsule (20 mg total) by mouth 2 (two) times daily before meals.     oxyCODONE-acetaminophen 5-325 mg per tablet  Commonly known as: PERCOCET  Take 1 tablet by mouth every 6 (six) hours as needed.     polyethylene glycol 17 gram/dose powder  Commonly known as: GLYCOLAX  Use cap to measure 17 grams.  Dissolve as directed and take by mouth once daily.     sodium chloride 1,000 mg Tbso oral tablet  Take 1 tablet (1,000 mg total) by mouth 2 (two) times daily. for 15 days     tamsulosin 0.4 mg Cap  Commonly known as: FLOMAX  Take 1 capsule (0.4 mg total) by mouth once daily.              Indwelling  Lines/Drains at time of discharge:   Lines/Drains/Airways       Drain  Duration             Female External Urinary Catheter w/ Suction 03/23/25 0000 9 days                    Time spent on the discharge of patient: 0 minutes         Lorena Akins NP  Department of [facility]  [facility] - Skilled Nursing

## 2025-04-01 NOTE — HOSPITAL COURSE
Patient prematurely discharged from SNF.  Patient is sent to ED for evaluation, see epic for full details

## 2025-04-01 NOTE — ASSESSMENT & PLAN NOTE
Hyponatremia is likely due to indeterminate. The patient's most recent sodium results are listed below.  Recent Labs     03/30/25  0548 03/31/25  0503 04/01/25  0823   * 135* 139     Plan  - Correct the sodium by 4-6mEq in 24 hours.   - Obtain the following studies: monitoring.  - Will treat the hyponatremia with monitoring, and, if indicated, resuming sodium tablets that were started within the past week  - Monitor sodium Daily.   - Patient hyponatremia is stable

## 2025-04-01 NOTE — PROGRESS NOTES
[facility] - [facility] Medicine  Progress Note    Patient Name: Bridget Montgomery  MRN: [id number]  Patient Class: IP- Inpatient   Admission Date: 3/28/2025  Length of Stay: 2 days  Attending Physician: Servando Mejia MD  Primary Care Provider: No primary care provider on file.        Subjective     Principal Problem:Acute blood loss anemia        HPI:  Ms. 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 Her abdominal pain has been worsening for the past week and has progressed to bilious vomiting of meds or anything that she tries to eat/drink. No noted hematemesis, melena, hematochezia. Per meds review no recent NSAID use (listed as allergy, and has CKD). No ETOH use. Pt has a known hiatal hernia. Pt's back pain has worsened over the past few days without any recent falls, trauma, and she does report a history of chronic back pain, but is wailing in pain in the ED. Per collateral from her daughter and HCPOA, Liliana Montgomery, pt has been working on elliptical and doing PT/OT at [facility] without significant issues. No fevers, chills, SOB, LE edema.     In the ED she is afebrile, hemodynamically stable on RA with leukocytosis to 13, anemia to 7.7 (baseline 10), isolated ALP elevation to 203, lipase wnl, high-sensitivity troponin elevation to 51 with unremarkable EKG. CXR with increase in the perihilar interstitial and vascular markings. XR lumbar spine with mild scoliosis, but no acute fractures. CT AP with mildly distended bladder, diverticulosis, and moderate to large hiatal hernia. Pt given Maalox, viscous lidocaine, famotidine, pantoprazole, Zofran, and morphine for abdominal, chest, and back pain. Admitted to Hospital Medicine for pain control and evaluation of  abdominal pain.     Overview/Hospital Course:  Admitted with lower back pain better controlled with multimodal regimen. Pt able to tolerate a turkey sandwich for first time in several days after admission with IV PPI, sucralfate, viscous lidocaine. GI consulted with appreciated evaluation for EGD given pt's reported GERD symptoms, inability to tolerate PO for days before admission, known hiatal hernia, and continued anemia (mixed etiology per iron studies), though recent c-spine fusion is a consideration prior to scoping. Symptomatic relief for rhinorrhea provided with pt's daughter's concerns for post-operative complications from sinus infection.     Hospital associated delirium in conjunction with polypharmacy (opioids for severe back pain) on 03/30 with patient agitated, pulling at NC, attempting to remove C-collar. Pt re-directed with assistance from her daughters in attempt to avoid using agitation meds with some resolution of agitation throughout the night. UA with UTI, started on Bactrim empirically in light of patient's med allergies. Mentation improving    Interval History: NAEON,     Review of Systems  Objective:     Vital Signs (Most Recent):  Temp: 96.5 °F (35.8 °C) (04/01/25 1108)  Pulse: 70 (04/01/25 1108)  Resp: 18 (04/01/25 0837)  BP: 115/69 (04/01/25 1108)  SpO2: 99 % (04/01/25 1108) Vital Signs (24h Range):  Temp:  [96.5 °F (35.8 °C)-97.7 °F (36.5 °C)] 96.5 °F (35.8 °C)  Pulse:  [67-81] 70  Resp:  [18-19] 18  SpO2:  [95 %-99 %] 99 %  BP: (115-135)/(56-71) 115/69     Weight: 68 kg (150 lb)  Body mass index is 26.57 kg/m².    Intake/Output Summary (Last 24 hours) at 4/1/2025 8226  Last data filed at 4/1/2025 0627  Gross per 24 hour   Intake 480 ml   Output 2300 ml   Net -1820 ml         Physical Exam  Vitals and nursing note reviewed.   Constitutional:       General: She is not in acute distress.     Appearance: She is not ill-appearing.   HENT:      Head: Normocephalic and atraumatic.       Mouth/Throat:      Mouth: Mucous membranes are moist.      Pharynx: Oropharynx is clear.   Eyes:      Extraocular Movements: Extraocular movements intact.      Conjunctiva/sclera: Conjunctivae normal.   Neck:      Comments: C-collar in place  Cardiovascular:      Rate and Rhythm: Normal rate and regular rhythm.      Pulses: Normal pulses.   Pulmonary:      Effort: Pulmonary effort is normal. No respiratory distress.      Breath sounds: No wheezing or rales.   Abdominal:      Palpations: Abdomen is soft.      Tenderness: There is no abdominal tenderness. There is no guarding.   Musculoskeletal:         General: No tenderness.      Right lower leg: No edema.      Left lower leg: No edema.   Skin:     General: Skin is warm and dry.   Neurological:      General: No focal deficit present.      Mental Status: She is alert and oriented to person, place, and time.   Psychiatric:         Attention and Perception: Attention normal.         Mood and Affect: Mood normal. Mood is not anxious.         Speech: Speech normal.         Behavior: Behavior is not agitated. Behavior is cooperative.               Significant Labs: All pertinent labs within the past 24 hours have been reviewed.    Significant Imaging: I have reviewed all pertinent imaging results/findings within the past 24 hours.      Assessment & Plan  Chronic kidney disease, stage III (moderate)  Creatine stable for now. BMP reviewed- noted Estimated Creatinine Clearance: 34.6 mL/min (based on SCr of 1 mg/dL). according to latest data. Based on current GFR, CKD stage is stage 3 - GFR 30-59.  Monitor UOP and serial BMP and adjust therapy as needed. Renally dose meds. Avoid nephrotoxic medications and procedures.  Type 2 diabetes mellitus with diabetic chronic kidney disease  Patient's FSGs are controlled on current medication regimen.  Last A1c reviewed-   Lab Results   Component Value Date    HGBA1C 5.8 (H) 03/18/2025     Most recent fingerstick glucose reviewed-   No  "results for input(s): "POCTGLUCOSE" in the last 24 hours.    Current correctional scale   None  Maintain anti-hyperglycemic dose as follows-   Antihyperglycemics (From admission, onward)      None          Hold Oral hypoglycemics while patient is in the hospital.  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 XR lumbar spine with mild scoliosis and degenerative changes.    Multimodal pain regimen with scheduled Tylenol, prn methocarbamol, increase home gabapentin from 100mg to 200mg TID with judicious increases for pain control, lidocaine patches, prn Oxycodone and IV morphine for severe pain.     - Avoid additional opioids in setting of encephalopathy    Essential hypertension  Patient's blood pressure range in the last 24 hours was: BP  Min: 115/69  Max: 135/59.The patient's inpatient anti-hypertensive regimen is listed below:  Current Antihypertensives  amLODIPine tablet 5 mg, Daily, Oral    Plan  - BP is controlled, no changes needed to their regimen  Ulcer of esophagus  Gastro-esophageal reflux disease without esophagitis  Hiatal hernia with GERD  Appears to have used Protonix and omeprazole for a period at SNF along with viscious po agents without symptomatic relief that has progressed to N/V with liquids and solids. Associated acute anemia 7.7 with slow decrease from baseline 10, but without overt bleeding, melena, coffee ground emesis. Ddx includes gastric or duodenal ulcer, esophageal webbing, GERD associated with hiatal hernia. Low suspicion for Megan-Osorio tear given pt's stable BP.    - IV protonix BID  - sucralfate before and after meals  - CLD with continued monitoring  - Zofran  - qd CBC  - Appreciated GI evaluation for EGD, noting recent C-spinal fusion as possible complicating factor for scope and with stable anemia will defer to OP follow up with GI. "     Closed odontoid fracture with routine healing  Maintain C-collar    Urinary retention  Continue Flomax and hold bethanechol started one week ago to rule out N/V contributory factor from anticholinergic med    Hyponatremia  Hyponatremia is likely due to indeterminate. The patient's most recent sodium results are listed below.  Recent Labs     03/30/25  0548 03/31/25  0503 04/01/25  0823   * 135* 139     Plan  - Correct the sodium by 4-6mEq in 24 hours.   - Obtain the following studies: monitoring.  - Will treat the hyponatremia with monitoring, and, if indicated, resuming sodium tablets that were started within the past week  - Monitor sodium Daily.   - Patient hyponatremia is stable    Acute blood loss anemia  Anemia is likely due to acute blood loss which was from GI source . Most recent hemoglobin and hematocrit are listed below.  Recent Labs     03/30/25  0548 03/31/25  0503 04/01/25  0823   HGB 7.2* 7.8* 6.9*   HCT 21.9* 23.9* 21.4*     Plan  - Monitor serial CBC: Daily  - Transfuse PRBC if patient becomes hemodynamically unstable, symptomatic or H/H drops below 7/21.  - Patient has not received any PRBC transfusions to date  - Patient's anemia is currently worsening. Will continue current treatment  - hold anticoagulation at this time  - Family planning to directed donate blood if needed for transfusion with preference for unvaccinated blood.   Troponin level elevated  Pt reports chest pain, but attributes this to hx of esophagitis. EKG non-contributory.    - troponin trended to peak, suspect type 2    Alkaline phosphatase elevation  With associated epigastric pain.    - US RUQ to rule out gallstones or obstructive etiology of abdominal pain demonstrated sludge, but no obstructive findings    Bilateral pleural effusion  Patient found to have small pleural effusion on imaging. I have personally reviewed and interpreted the following imaging: CT. A thoracentesis was deferred. Most likely etiology  includes Volume overload not due to CHF. Management to include  monitoring    Compression atelectasis  I have personally reviewed CT Scan. Patient with atelectasis on interpretation. Likely Non-Obstructive atelectasis secondary to pleural effusion. Signs and symptoms include  none  Will begin treatment with upright positioning.  Encephalopathy, metabolic  Acute cystitis  Ruling out organic causes (UTI, constipation, last BM prior to admission). Possible hospital delirium (ED room for 48h without windows, opioids for severe back pain)  - start bowel regimen  - UA with UTI, started on empiric Bactrim in light of abx allergies, f/u cultures  Discharge planning issues  Pt discharged from [facility] with pt's daughter/GRACIE Ford relaying displeasure with that decision and looking to appeal decision. Advised to discuss matters with SNF if questions regarding her discharge from that facility.    PT/OT recs for moderate intensity, but Liliana states that she will not accept anything except [facility]. Ongoing dispo planning for sitters at home, etc if pt wishes to discharge to home.     VTE Risk Mitigation (From admission, onward)      None            Discharge Planning   MIKAYLA: 4/3/2025     Code Status: Full Code   Medical Readiness for Discharge Date:   Discharge Plan A: Skilled Nursing Facility   Discharge Delays: None known at this time            Please place Justification for DME        Servando Mejia MD  Department of Hospital Medicine   [facility]

## 2025-04-01 NOTE — PT/OT/SLP EVAL
Occupational Therapy  Co-Evaluation and Co-Treatment with PT    Name: Bridget Montgomery  MRN: [id number]  Admitting Diagnosis: Acute blood loss anemia  Recent Surgery: * No surgery found *      Recommendations:     Discharge Recommendations: Moderate Intensity Therapy  Discharge Equipment Recommendations: walker, rolling  Barriers to discharge: Other (Comment) (requires increased assistance)    Assessment:     Bridget Montgomery is a 89 y.o. female with a medical diagnosis of Acute blood loss anemia. She presents with performance deficits affecting function including: weakness, impaired endurance, impaired self care skills, impaired functional mobility, gait instability, impaired balance, decreased coordination, decreased upper extremity function, decreased lower extremity function, decreased safety awareness, pain, decreased ROM, impaired cardiopulmonary response to activity, orthopedic precautions. Patient agreeable to participate in therapy evaluation this AM. At this time, patient limited in ADLs, transfers, and functional mobility and is currently not performing tasks at their reported PLOF. Patient would benefit from continued skilled acute OT services in order to maximize IND with ADLs and functional mobility to ensure safe return to PLOF in the least restrictive environment. OT recommending Moderate Intensity Therapy once patient is medically appropriate for d/c.    Rehab Prognosis: Good/Fair; patient would benefit from acute OT services to address these deficits and reach maximum level of function.    Plan:     Patient to be seen 4 x/week to address the above listed problems via self-care/home management, therapeutic activities, therapeutic exercises, neuromuscular re-education  Plan of Care Expires: 05/01/25  Plan of Care Reviewed with: patient, daughter    Subjective     Chief Complaint: Patient with c/o pain in L hip.  Patient Comments/Goals: Patient with goal to return to PLOF.  Pain/Comfort:  Pain  Rating 1: 10/10  Location - Side 1: Left  Location - Orientation 1: generalized  Location 1: hip  Pain Addressed 1: Reposition, Distraction, Cessation of Activity  Pain Rating Post-Intervention 1: other (see comments) (unrated)    Patients cultural, spiritual, Yazidism conflicts given the current situation: no    Occupational Profile:  Living Environment: Patient lives with her daughter in a H with ramp entry. Bathroom setup includes a tub/shower combo with TTB, grab bars available (however not installed yet, per patient).  Prior Level of Function: IND with ADLs, except receives some A with LB dressing; Mod IND with functional mobility using rollator  Roles and Routines: Caretaker of self  Equipment Used at Home: bath bench, bedside commode, rollator (lift chair; owns wheelchair and and grab bars, however reports not using PTA)  Assistance Upon Discharge: Family to A as needed    Objective:     Communicated with RN prior to session. Patient cleared to participate in therapy at this time. Patient found HOB elevated with cervical collar, telemetry, PureWick, peripheral IV, oxygen upon OT entry to room. Daughter present in room upon OT entry.    General Precautions: Standard, fall, hearing impaired   Orthopedic Precautions: spinal precautions   Braces: [place] J collar  Respiratory Status: Nasal cannula    Occupational Performance    Bed Mobility:  Patient completed Rolling/Turning to Left with maximal assistance and with side rail  Patient completed Supine to Sit with maximal assistance and 2 persons  Patient completed Sit to Supine with maximal assistance and 2 persons    Functional Mobility/Transfers:  Patient completed Sit <> Stand Transfer with moderate assistance and of 2 persons with rolling walker  Functional Mobility: Patient participated in in-room mobility, taking lateral steps to the L with moderate assistance and of 2 persons and rolling walker. Patient noted with B knee buckling, however no LOB noted.  No SOB noted.    Activities of Daily Living:  Grooming: minimum assistance to wash face with washcloth seated EOB  Upper Body Dressing: moderate assistance to malgorzata and doff gown in a robe-like fashion seated EOB  Lower Body Dressing: maximal assistance to doff and re-malgorzata B socks, as well as malgorzata and doff B tennis shoes seated EOB    Cognitive/Visual Perceptual:  Cognitive/Psychosocial Skills:    Oriented to: Person, Place, and Time  Follows Commands/attention: Follows commands  Communication: clear/fluent  Memory: No Deficits noted  Safety awareness/insight to disability: impaired   Mood/Affect/Coping skills/emotional control: Appropriate to situation and Pleasant  Visual/Perceptual:    Intact visual field as noted during functional tasks  Patient wearing glasses throughout session    Physical Exam:  Balance:   Static sitting balance: min A - CGA  Dynamic sitting balance: min A  Static standing balance: mod A x 2  Dynamic standing balance: mod A x 2  Upper Extremity Range of Motion:     Right Upper Extremity: WFL except AROM shoulder flexion ~ 60 degrees, PROM ~ 90 degrees (unable to assess further due to c/o pain)  Left Upper Extremity: WFL except AROM shoulder flexion ~ 80 degrees, PROM WFL  Upper Extremity Strength:    Right Upper Extremity: 3-/5 MMT shoulder flexors; 3+/5 MMT biceps and triceps  Left Upper Extremity: 3-/5 MMT shoulder flexors; 3+/5 MMT biceps and triceps   Strength:    Right Upper Extremity: WFL  Left Upper Extremity: WFL    AMPAC 6 Click ADL:  AMPAC Total Score: 14    Treatment & Education:  Patient and daughter educated on:   Role of OT, OT POC, and discharge planning.  Safe transfer techniques and proper body mechanics for fall prevention and improved independence with functional transfers.  Importance of OOB activities to increase endurance and tolerance for increased participation in daily ADLs.  Various therapeutic exercises patient can perform outside of therapy sessions to increase  functional endurance and strength for overall improved independence in occupations of choice.  Spinal precautions (no bending, no lifting, and no twisting) and various compensatory/adaptive strategies to utilize with functional activities/ADLs to increase/maintain independence.  Cervical collar wear schedule (at all times).  Time provided for therapeutic counseling and discussion of health disposition.  Utilizing the call bell to request for assistance with all functional mobility to ensure safety during hospital stay.  Whiteboard updated.    Patient and daughter verbalized understanding and all questions were addressed within the scope of OT.    Patient left HOB elevated with all lines intact, call button in reach, RN notified, and daughter present.    GOALS:   Multidisciplinary Problems       Occupational Therapy Goals          Problem: Occupational Therapy    Goal Priority Disciplines Outcome Interventions   Occupational Therapy Goal     OT, PT/OT Progressing    Description: Goals to be met by: 04/15/2025     Patient will increase functional independence with ADLs by performing:    UE Dressing with Contact Guard Assistance.  LE Dressing with Minimal Assistance.  Grooming while seated with Supervision.  Toileting from toilet/BSC with Minimal Assistance for hygiene and clothing management.   Toilet transfer to toilet/BSC with Minimal Assistance.  Functional mobility to simulate household/community distances with Minimal Assistance and utilizing LRAD in order to maximize functional activity tolerance and standing balance required for engagement in occupations of choice.                       DME Justifications:  Rolling Walker - Patient's mobility limitation cannot be sufficiently resolved by the use of a cane. His/Her functional mobility deficit can be sufficiently resolved with the use of a Rolling Walker. Patient's mobility limitation significantly impairs their ability to participate in one of more activities of  daily living.  The use of a RW will significantly improve the patient's ability to participate in MRADLS and the patient will use it on regular basis in the home..    History:     Past Medical History:   Diagnosis Date    Allergy     Anemia, unspecified     Arthritis     CKD (chronic kidney disease) stage 4, GFR 15-29 ml/min     COPD (chronic obstructive pulmonary disease)     Edema     HTN (hypertension)     Hypocalcemia     Hyponatremia     Osteoarthritis          Past Surgical History:   Procedure Laterality Date    BREAST CYST EXCISION      CAUDAL EPIDURAL STEROID INJECTION N/A 2/2/2023    Procedure: Injection-steroid-epidural-caudal;  Surgeon: Angel Sparrow MD;  Location: [facility];  Service: Pain Management;  Laterality: N/A;    FOOT SURGERY      FUSION OF CERVICAL SPINE BY POSTERIOR APPROACH N/A 3/19/2025    Procedure: FUSION, SPINE, CERVICAL, POSTERIOR APPROACH;  Surgeon: Clint Lemons MD;  Location: [facility] OR [address] [place] [address];  Service: Neurosurgery;  Laterality: N/A;  Occiput-C4       Time Tracking:     OT Date of Treatment: 04/01/25  OT Start Time: 0943  OT Stop Time: 1018  OT Total Time (min): 35 min    Billable Minutes: Evaluation 15  Therapeutic Activity 15    Co-evaluation/Co-treatment performed with PT due to patient's multiple deficits requiring two skilled therapists to appropriately and safely assess patient's strength, endurance, functional mobility, and ADL performance while facilitating functional tasks in addition to accommodating for patient's activity tolerance and medical acuity.     4/1/2025

## 2025-04-01 NOTE — ASSESSMENT & PLAN NOTE
Appears to have used Protonix and omeprazole for a period at SNF along with viscious po agents without symptomatic relief that has progressed to N/V with liquids and solids. Associated acute anemia 7.7 with slow decrease from baseline 10, but without overt bleeding, melena, coffee ground emesis. Ddx includes gastric or duodenal ulcer, esophageal webbing, GERD associated with hiatal hernia. Low suspicion for Megan-Osorio tear given pt's stable BP.    - IV protonix BID  - sucralfate before and after meals  - CLD with continued monitoring  - Zofran  - qd CBC  - Appreciated GI evaluation for EGD, noting recent C-spinal fusion as possible complicating factor for scope and with stable anemia will defer to OP follow up with GI.

## 2025-04-01 NOTE — PT/OT/SLP EVAL
"Physical Therapy   Co-Evaluation    Patient Name:  Bridget Montgomery   MRN:  [id number]    Recommendations:     Discharge Recommendations: Moderate Intensity Therapy   Discharge Equipment Recommendations: walker, rolling   Barriers to discharge:  requiring increased level of skilled assist    Assessment:     Bridget Montgomery is a 89 y.o. female admitted with a medical diagnosis of Acute blood loss anemia.  She presents with the following impairments/functional limitations: weakness, impaired endurance, impaired functional mobility, impaired self care skills, gait instability, impaired balance, decreased upper extremity function, decreased lower extremity function, decreased safety awareness, pain, impaired skin, edema, orthopedic precautions Patient agreeable to therapy laraal, one of patient's supportive daughters present throughout trial. Able to complete bed mobility, transfer, and lateral steps at EOB with RW. Limited by knee buckling and crepitus. Spinal precautions maintained throughout with [place] J collar intact. Patient will continue to benefit from skilled PT during this admit to address BLE strength and endurance deficits, and maximize independence with functional mobility.    Rehab Prognosis: Good; patient would benefit from acute skilled PT services to address these deficits and reach maximum level of function.    Recent Surgery: * No surgery found *      Plan:     During this hospitalization, patient to be seen 4 x/week to address the identified rehab impairments via gait training, therapeutic activities, therapeutic exercises, neuromuscular re-education, wheelchair management/training and progress toward the following goals:    Plan of Care Expires:  05/01/25    Subjective     Chief Complaint: "I wouldn't want to do any further damage to my neck"  Patient/Family Comments/goals: improve functional mobility level  Pain/Comfort:  Pain Rating 1:  (no pain rating stated)  Location - Side 1: " Bilateral  Location - Orientation 1: generalized  Location 1: neck  Pain Addressed 1: Pre-medicate for activity, Reposition, Distraction, Cessation of Activity  Pain Rating Post-Intervention 1:  (no pain rating stated, but increased with movement)    Patients cultural, spiritual, Mandaen conflicts given the current situation: no    Living Environment:  Patient living with daughter Daniel) in a H with ramp access. Bathroom with tub-shower and bath bench inside.   Patient was discharged to SNF following previous hospital admission, and re-admitted currently from SNF.  Prior to admission, patients level of function modified independent with ambulation using Rollator and light assist with some ADLs.  Equipment used at home: bath bench, wheelchair, rollator, bedside commode.  DME owned (not currently used): none.  Upon discharge, patient will have assistance from daughters.    Objective:     Communicated with RN prior to session.  Patient found HOB elevated with cervical collar, telemetry, PureWick, peripheral IV  upon PT entry to room.    General Precautions: Standard, aspiration, fall, hearing impaired  Orthopedic Precautions:spinal precautions   Braces: [place] J collar  Respiratory Status: Nasal cannula, flow 3 L/min    Exams:  Cognitive Exam:  Patient is oriented to Person, Place, Time, and Situation  Gross Motor Coordination:  impaired  Postural Exam:  Patient presented with the following abnormalities:    -       Rounded shoulders  -       Forward head  RLE ROM: lacking hip flex and knee ext  RLE Strength: grossly 3-/5 within available range  LLE ROM: lacking hip flex and knee ext  LLE Strength: grossly 3-/5 within available range    Functional Mobility:  Bed Mobility:     Rolling Left:  maximal assistance and using log roll  Scooting: maximal assistance and anteriorly to plant feet on floor in seated  Supine to Sit: maximal assistance and of 2 persons  Sit to Supine: maximal assistance and of 2  persons  Transfers:     Sit to Stand:  moderate assistance and of 2 persons with rolling walker  Gait: 3 L lateral steps with RW and moderate A x2  Extensive B knee buckling and crepitus present, manual A to weight shift for poor foot floor clearance, cues for upright posture and gluteal activation, manual A with AD management  Balance:   Sitting: CGA/min with some posterior lean evident, occasionally able to self correct with multisensory cues  Standing: mod x2      AM-PAC 6 CLICK MOBILITY  Total Score:10       Treatment & Education:  Patient educated on calling for assistance for any needs to improve overall safety awareness.  Patient educated on role of PT in acute care, PT POC, and PT goals.  Patient required co-evaluation with OT for highest quality care d/t decreased activity tolerance and increased level of assistance necessary.  All items placed within reach, and notified on call light usage for assistance with any needs for fall prevention.  Re-adjusted [place] J collar for improved function and fit.  Answered patient + daughter questions within scope, deferred further to team    Patient left HOB elevated with all lines intact, call button in reach, and daughter present.    GOALS:   Multidisciplinary Problems       Physical Therapy Goals          Problem: Physical Therapy    Goal Priority Disciplines Outcome Interventions   Physical Therapy Goal     PT, PT/OT Progressing    Description: Goals to be met by: 4/15/25     Patient will increase functional independence with mobility by performin. Supine to sit with MInimal Assistance  2. Sit to supine with MInimal Assistance  3. Sit to stand transfer with Minimal Assistance  4. Bed to chair transfer with Minimal Assistance using LRAD  5. Gait  x 25 feet with Minimal Assistance using Rolling Walker.   6. Wheelchair propulsion x25 feet with Minimal Assistance using extremities  7. Lower extremity exercise program x15 reps per handout, with assistance as  needed                         DME Justifications:   Bridget's mobility limitation cannot be sufficiently resolved by the use of a cane. Her functional mobility deficit can be sufficiently resolved with the use of a Rolling Walker. Patient's mobility limitation significantly impairs their ability to participate in one of more activities of daily living.  The use of a RW will significantly improve the patient's ability to participate in MRADLS and the patient will use it on regular basis in the home.    History:     Past Medical History:   Diagnosis Date    Allergy     Anemia, unspecified     Arthritis     CKD (chronic kidney disease) stage 4, GFR 15-29 ml/min     COPD (chronic obstructive pulmonary disease)     Edema     HTN (hypertension)     Hypocalcemia     Hyponatremia     Osteoarthritis        Past Surgical History:   Procedure Laterality Date    BREAST CYST EXCISION      CAUDAL EPIDURAL STEROID INJECTION N/A 2/2/2023    Procedure: Injection-steroid-epidural-caudal;  Surgeon: Angel Sparrow MD;  [place] [facility] [place]  Service: Pain Management;  Laterality: N/A;    FOOT SURGERY      FUSION OF CERVICAL SPINE BY POSTERIOR APPROACH N/A 3/19/2025    Procedure: FUSION, SPINE, CERVICAL, POSTERIOR APPROACH;  Surgeon: Clint Lemons MD;  Location: [facility] OR [address] [place] [address];  Service: Neurosurgery;  Laterality: N/A;  Occiput-C4       Time Tracking:     PT Received On: 04/01/25  PT Start Time: 0943     PT Stop Time: 1015  PT Total Time (min): 32 min     Billable Minutes: Evaluation 16 and Gait Training 16      04/01/2025

## 2025-04-01 NOTE — ASSESSMENT & PLAN NOTE
Anemia is likely due to acute blood loss which was from GI source. Most recent hemoglobin and hematocrit are listed below.  Recent Labs     03/30/25  0548 03/31/25  0503 04/01/25  0823   HGB 7.2* 7.8* 6.9*   HCT 21.9* 23.9* 21.4*     Plan  - Monitor serial CBC: Daily  - Transfuse PRBC if patient becomes hemodynamically unstable, symptomatic or H/H drops below 7/21.  - Patient has not received any PRBC transfusions to date  - Patient's anemia is currently worsening. Will continue current treatment  - hold anticoagulation at this time  - Family planning to directed donate blood if needed for transfusion with preference for unvaccinated blood.

## 2025-04-01 NOTE — PLAN OF CARE
SW  spoke to pt's daughter, Liliana [phone], regarding d/c plan.  Daughter still not happy about OSNF denial.  SW offered additional options for SNF.  Referral sent to [facility] with daughter's permission.  SW offered list of SNF facilities in-network with [facility].  Daughter (Liliana) not interested in NH w/ SNF but will look at list.  SW left list with daughter, Felicity, at bedside. SW will follow up tomorrow with family regarding d/c plan.      Discharge Plan A and Plan B have been determined by review of patient's clinical status, future medical and therapeutic needs, and coverage/benefits for post-acute care in coordination with multidisciplinary team members.     04/01/25 1754   Post-Acute Status   Post-Acute Authorization Placement   Post-Acute Placement Status Pending post-acute provider review/more information requested   Coverage [employer]   Discharge Delays None known at this time   Discharge Plan   Discharge Plan A Skilled Nursing Facility   Discharge Plan B Home;Home with family;Home Health       Phoebe Shepard LMSW  Part-Time-  [facility]  Ext. [phone]

## 2025-04-01 NOTE — SUBJECTIVE & OBJECTIVE
Interval History: NAEON,     Review of Systems  Objective:     Vital Signs (Most Recent):  Temp: 96.5 °F (35.8 °C) (04/01/25 1108)  Pulse: 70 (04/01/25 1108)  Resp: 18 (04/01/25 0837)  BP: 115/69 (04/01/25 1108)  SpO2: 99 % (04/01/25 1108) Vital Signs (24h Range):  Temp:  [96.5 °F (35.8 °C)-97.7 °F (36.5 °C)] 96.5 °F (35.8 °C)  Pulse:  [67-81] 70  Resp:  [18-19] 18  SpO2:  [95 %-99 %] 99 %  BP: (115-135)/(56-71) 115/69     Weight: 68 kg (150 lb)  Body mass index is 26.57 kg/m².    Intake/Output Summary (Last 24 hours) at 4/1/2025 1456  Last data filed at 4/1/2025 0627  Gross per 24 hour   Intake 480 ml   Output 2300 ml   Net -1820 ml         Physical Exam  Vitals and nursing note reviewed.   Constitutional:       General: She is not in acute distress.     Appearance: She is not ill-appearing.   HENT:      Head: Normocephalic and atraumatic.      Mouth/Throat:      Mouth: Mucous membranes are moist.      Pharynx: Oropharynx is clear.   Eyes:      Extraocular Movements: Extraocular movements intact.      Conjunctiva/sclera: Conjunctivae normal.   Neck:      Comments: C-collar in place  Cardiovascular:      Rate and Rhythm: Normal rate and regular rhythm.      Pulses: Normal pulses.   Pulmonary:      Effort: Pulmonary effort is normal. No respiratory distress.      Breath sounds: No wheezing or rales.   Abdominal:      Palpations: Abdomen is soft.      Tenderness: There is no abdominal tenderness. There is no guarding.   Musculoskeletal:         General: No tenderness.      Right lower leg: No edema.      Left lower leg: No edema.   Skin:     General: Skin is warm and dry.   Neurological:      General: No focal deficit present.      Mental Status: She is alert and oriented to person, place, and time.   Psychiatric:         Attention and Perception: Attention normal.         Mood and Affect: Mood normal. Mood is not anxious.         Speech: Speech normal.         Behavior: Behavior is not agitated. Behavior is  cooperative.               Significant Labs: All pertinent labs within the past 24 hours have been reviewed.    Significant Imaging: I have reviewed all pertinent imaging results/findings within the past 24 hours.

## 2025-04-01 NOTE — ASSESSMENT & PLAN NOTE
Ruling out organic causes (UTI, constipation, last BM prior to admission). Possible hospital delirium (ED room for 48h without windows, opioids for severe back pain)  - start bowel regimen  - UA with UTI, started on empiric Bactrim in light of abx allergies, f/u cultures

## 2025-04-01 NOTE — ASSESSMENT & PLAN NOTE
Patient's blood pressure range in the last 24 hours was: BP  Min: 115/69  Max: 135/59.The patient's inpatient anti-hypertensive regimen is listed below:  Current Antihypertensives

## 2025-04-01 NOTE — PLAN OF CARE
PT Evaluation completed 2025   PT goals and POC established.   Moderate intensity physical therapy recommendation post-acutely.    Problem: Physical Therapy  Goal: Physical Therapy Goal  Description: Goals to be met by: 4/15/25     Patient will increase functional independence with mobility by performin. Supine to sit with MInimal Assistance  2. Sit to supine with MInimal Assistance  3. Sit to stand transfer with Minimal Assistance  4. Bed to chair transfer with Minimal Assistance using LRAD  5. Gait  x 25 feet with Minimal Assistance using Rolling Walker.   6. Wheelchair propulsion x25 feet with Minimal Assistance using extremities  7. Lower extremity exercise program x15 reps per handout, with assistance as needed    Outcome: Progressing

## 2025-04-01 NOTE — ASSESSMENT & PLAN NOTE
Patient's blood pressure range in the last 24 hours was: BP  Min: 115/69  Max: 135/59.The patient's inpatient anti-hypertensive regimen is listed below:  Current Antihypertensives  amLODIPine tablet 5 mg, Daily, Oral    Plan  - BP is controlled, no changes needed to their regimen

## 2025-04-01 NOTE — ASSESSMENT & PLAN NOTE
"  Lab Results   Component Value Date    HGBA1C 5.8 (H) 03/18/2025     Most recent fingerstick glucose reviewed- No results for input(s): "POCTGLUCOSE" in the last 24 hours.    Antihyperglycemics (From admission, onward)      None          Hold Oral hypoglycemics while patient is in the hospital.  "

## 2025-04-02 LAB
ABO + RH BLD: NORMAL
ABSOLUTE EOSINOPHIL (OHS): 0.07 K/UL
ABSOLUTE MONOCYTE (OHS): 0.62 K/UL (ref 0.3–1)
ABSOLUTE NEUTROPHIL COUNT (OHS): 5.52 K/UL (ref 1.8–7.7)
ALBUMIN SERPL BCP-MCNC: 1.9 G/DL (ref 3.5–5.2)
ALP SERPL-CCNC: 222 UNIT/L (ref 40–150)
ALT SERPL W/O P-5'-P-CCNC: 12 UNIT/L (ref 10–44)
ANION GAP (OHS): 6 MMOL/L (ref 8–16)
AST SERPL-CCNC: 19 UNIT/L (ref 11–45)
BASOPHILS # BLD AUTO: 0.03 K/UL
BASOPHILS NFR BLD AUTO: 0.4 %
BILIRUB SERPL-MCNC: 0.1 MG/DL (ref 0.1–1)
BLD PROD TYP BPU: NORMAL
BLOOD UNIT EXPIRATION DATE: NORMAL
BLOOD UNIT TYPE CODE: 6200
BUN SERPL-MCNC: 27 MG/DL (ref 8–23)
CALCIUM SERPL-MCNC: 8.9 MG/DL (ref 8.7–10.5)
CHLORIDE SERPL-SCNC: 107 MMOL/L (ref 95–110)
CO2 SERPL-SCNC: 23 MMOL/L (ref 23–29)
CREAT SERPL-MCNC: 1.2 MG/DL (ref 0.5–1.4)
CROSSMATCH INTERPRETATION: NORMAL
DISPENSE STATUS: NORMAL
ERYTHROCYTE [DISTWIDTH] IN BLOOD BY AUTOMATED COUNT: 15.8 % (ref 11.5–14.5)
GFR SERPLBLD CREATININE-BSD FMLA CKD-EPI: 43 ML/MIN/1.73/M2
GLUCOSE SERPL-MCNC: 71 MG/DL (ref 70–110)
HCT VFR BLD AUTO: 20.8 % (ref 37–48.5)
HGB BLD-MCNC: 6.8 GM/DL (ref 12–16)
IMM GRANULOCYTES # BLD AUTO: 0.14 K/UL (ref 0–0.04)
IMM GRANULOCYTES NFR BLD AUTO: 1.9 % (ref 0–0.5)
INDIRECT COOMBS: NORMAL
LYMPHOCYTES # BLD AUTO: 1.1 K/UL (ref 1–4.8)
MCH RBC QN AUTO: 28.5 PG (ref 27–31)
MCHC RBC AUTO-ENTMCNC: 32.7 G/DL (ref 32–36)
MCV RBC AUTO: 87 FL (ref 82–98)
NUCLEATED RBC (/100WBC) (OHS): 0 /100 WBC
PLATELET # BLD AUTO: 266 K/UL (ref 150–450)
PMV BLD AUTO: 9.9 FL (ref 9.2–12.9)
POTASSIUM SERPL-SCNC: 4.9 MMOL/L (ref 3.5–5.1)
PROT SERPL-MCNC: 5.3 GM/DL (ref 6–8.4)
RBC # BLD AUTO: 2.39 M/UL (ref 4–5.4)
RELATIVE EOSINOPHIL (OHS): 0.9 %
RELATIVE LYMPHOCYTE (OHS): 14.7 % (ref 18–48)
RELATIVE MONOCYTE (OHS): 8.3 % (ref 4–15)
RELATIVE NEUTROPHIL (OHS): 73.8 % (ref 38–73)
RH BLD: NORMAL
SODIUM SERPL-SCNC: 136 MMOL/L (ref 136–145)
SPECIMEN OUTDATE: NORMAL
UNIT NUMBER: NORMAL
WBC # BLD AUTO: 7.48 K/UL (ref 3.9–12.7)

## 2025-04-02 PROCEDURE — 11000001 HC ACUTE MED/SURG PRIVATE ROOM

## 2025-04-02 PROCEDURE — 97110 THERAPEUTIC EXERCISES: CPT | Mod: CQ

## 2025-04-02 PROCEDURE — 21400001 HC TELEMETRY ROOM

## 2025-04-02 PROCEDURE — 99232 SBSQ HOSP IP/OBS MODERATE 35: CPT | Mod: ,,,

## 2025-04-02 PROCEDURE — 63600175 PHARM REV CODE 636 W HCPCS

## 2025-04-02 PROCEDURE — 94761 N-INVAS EAR/PLS OXIMETRY MLT: CPT

## 2025-04-02 PROCEDURE — 82040 ASSAY OF SERUM ALBUMIN: CPT

## 2025-04-02 PROCEDURE — 85025 COMPLETE CBC W/AUTO DIFF WBC: CPT

## 2025-04-02 PROCEDURE — 99900035 HC TECH TIME PER 15 MIN (STAT)

## 2025-04-02 PROCEDURE — 86901 BLOOD TYPING SEROLOGIC RH(D): CPT

## 2025-04-02 PROCEDURE — 97112 NEUROMUSCULAR REEDUCATION: CPT | Mod: CO

## 2025-04-02 PROCEDURE — 97116 GAIT TRAINING THERAPY: CPT | Mod: CQ

## 2025-04-02 PROCEDURE — 86920 COMPATIBILITY TEST SPIN: CPT

## 2025-04-02 PROCEDURE — 36415 COLL VENOUS BLD VENIPUNCTURE: CPT

## 2025-04-02 PROCEDURE — 25000003 PHARM REV CODE 250

## 2025-04-02 PROCEDURE — 94640 AIRWAY INHALATION TREATMENT: CPT

## 2025-04-02 PROCEDURE — 30233N0 TRANSFUSION OF AUTOLOGOUS RED BLOOD CELLS INTO PERIPHERAL VEIN, PERCUTANEOUS APPROACH: ICD-10-PCS

## 2025-04-02 PROCEDURE — 97530 THERAPEUTIC ACTIVITIES: CPT | Mod: CO

## 2025-04-02 PROCEDURE — 27000221 HC OXYGEN, UP TO 24 HOURS

## 2025-04-02 RX ORDER — SULFAMETHOXAZOLE AND TRIMETHOPRIM 800; 160 MG/1; MG/1
1 TABLET ORAL DAILY
Status: COMPLETED | OUTPATIENT
Start: 2025-04-03 | End: 2025-04-03

## 2025-04-02 RX ORDER — PSEUDOEPHEDRINE HCL 120 MG/1
120 TABLET, FILM COATED, EXTENDED RELEASE ORAL 2 TIMES DAILY
Status: COMPLETED | OUTPATIENT
Start: 2025-04-02 | End: 2025-04-04

## 2025-04-02 RX ORDER — HYDROCODONE BITARTRATE AND ACETAMINOPHEN 500; 5 MG/1; MG/1
TABLET ORAL
Status: DISCONTINUED | OUTPATIENT
Start: 2025-04-02 | End: 2025-04-08 | Stop reason: HOSPADM

## 2025-04-02 RX ADMIN — GABAPENTIN 200 MG: 100 CAPSULE ORAL at 09:04

## 2025-04-02 RX ADMIN — PANTOPRAZOLE SODIUM 40 MG: 40 INJECTION, POWDER, FOR SOLUTION INTRAVENOUS at 09:04

## 2025-04-02 RX ADMIN — AMLODIPINE BESYLATE 5 MG: 5 TABLET ORAL at 09:04

## 2025-04-02 RX ADMIN — SULFAMETHOXAZOLE AND TRIMETHOPRIM 1 TABLET: 800; 160 TABLET ORAL at 09:04

## 2025-04-02 RX ADMIN — SODIUM CHLORIDE 125 MG: 9 INJECTION, SOLUTION INTRAVENOUS at 10:04

## 2025-04-02 RX ADMIN — LIDOCAINE 2 PATCH: 50 PATCH CUTANEOUS at 05:04

## 2025-04-02 RX ADMIN — ACETAMINOPHEN 1000 MG: 500 TABLET ORAL at 10:04

## 2025-04-02 RX ADMIN — FLUTICASONE FUROATE AND VILANTEROL TRIFENATATE 1 PUFF: 100; 25 POWDER RESPIRATORY (INHALATION) at 10:04

## 2025-04-02 RX ADMIN — ACETAMINOPHEN 1000 MG: 500 TABLET ORAL at 06:04

## 2025-04-02 RX ADMIN — FLUTICASONE PROPIONATE 50 MCG: 50 SPRAY, METERED NASAL at 09:04

## 2025-04-02 RX ADMIN — PSEUDOEPHEDRINE HCL 120 MG: 120 TABLET, FILM COATED, EXTENDED RELEASE ORAL at 09:04

## 2025-04-02 RX ADMIN — PSEUDOEPHEDRINE HCL 120 MG: 120 TABLET, FILM COATED, EXTENDED RELEASE ORAL at 11:04

## 2025-04-02 RX ADMIN — ACETAMINOPHEN 1000 MG: 500 TABLET ORAL at 02:04

## 2025-04-02 RX ADMIN — POLYETHYLENE GLYCOL 3350 17 G: 17 POWDER, FOR SOLUTION ORAL at 09:04

## 2025-04-02 RX ADMIN — TAMSULOSIN HYDROCHLORIDE 0.4 MG: 0.4 CAPSULE ORAL at 09:04

## 2025-04-02 NOTE — PLAN OF CARE
POC reviewed with patient and family who remained at bedside this shift.  A/O x4.  Respirations unlabored.  Attempted to titrate O2 down to 1L but later in shift had to raise back up to 2L/NC.  Skin w/d.  External catheter intact with lamar-care provided per staff.  Quiet uneventful shift.  Pain well controlled.  No PRN pain med administered this shift.  Tolerates meds better crushed in pudding.  VSS.  See flowsheet for full assessment.  Able to verbalize wants/needs.  No s/s of distress.  Fall/safety precautions maintained.      Problem: Adult Inpatient Plan of Care  Goal: Plan of Care Review  Outcome: Progressing     Problem: Skin Injury Risk Increased  Goal: Skin Health and Integrity  Outcome: Progressing     Problem: Fall Injury Risk  Goal: Absence of Fall and Fall-Related Injury  Outcome: Progressing

## 2025-04-02 NOTE — PT/OT/SLP PROGRESS
Occupational Therapy   Treatment    Name: Bridget Montgomery  MRN: [id number]  Admitting Diagnosis:  Acute blood loss anemia       Recommendations:     Discharge Recommendations: Moderate Intensity Therapy  Discharge Equipment Recommendations:  walker, rolling  Barriers to discharge:   (increased skilled (A) needed)    Assessment:     Bridget Montgomery is a 89 y.o. female with a medical diagnosis of Acute blood loss anemia.  She presents with the following performance deficits affecting function: weakness, impaired endurance, impaired self care skills, gait instability, impaired functional mobility, decreased lower extremity function, impaired balance, decreased safety awareness, pain, orthopedic precautions, decreased coordination.     Rehab Prognosis:  Good; patient would benefit from acute skilled OT services to address these deficits and reach maximum level of function.       Plan:     Patient to be seen 4 x/week to address the above listed problems via self-care/home management, therapeutic activities, therapeutic exercises, neuromuscular re-education  Plan of Care Expires: 05/01/25  Plan of Care Reviewed with: patient, family    Subjective     Patient/Family Comments/goals: to improve function  Pain/Comfort:  Pain Rating 1:  (unrated)  Location - Side 1: Right  Location - Orientation 1: generalized  Location 1: hip  Pain Addressed 1: Reposition, Distraction  Pain Rating Post-Intervention 1:  (unrated)    Objective:     Communicated with: RN prior to session.  Patient found HOB elevated with cervical collar, telemetry, PureWick, peripheral IV upon OT entry to room.  A client care conference was completed by the OTR and the PEREZ prior to treatment by the PEREZ to discuss the patient's POC and current status.    General Precautions: Standard, aspiration, fall, hearing impaired    Orthopedic Precautions:spinal precautions  Braces: [place] J collar  Respiratory Status: Room air     Occupational Performance:     Bed  Mobility:    Patient completed Supine to Sit with minimum assistance     Functional Mobility/Transfers:  Patient completed Sit <> Stand Transfer with moderate assistance  with  rolling walker   Patient completed Bed <> Chair Transfer using Step Transfer technique with moderate assistance with rolling walker  Functional Mobility: Pt taking 3 lateral steps both ways with Mod A using RW.    Activities of Daily Living:  Lower Body Dressing: total assistance to don shoes seated EOB      [facility] 6 Click ADL: 14    Treatment & Education:  Pt educated on OT POC and frequency during hospital stay.   Pt educated on proper hand placement and techniques for RW mgmt to improve safety awareness.   Phone conversation held with daughter about discharge recommendations and patient POC  Pt educated on importance of OOB activity to improve function and activity tolerance.  Addressed all patient questions/concerns within PEREZ scope of practice.     Patient left up in chair with all lines intact, call button in reach, and RN notified    GOALS:   Multidisciplinary Problems       Occupational Therapy Goals          Problem: Occupational Therapy    Goal Priority Disciplines Outcome Interventions   Occupational Therapy Goal     OT, PT/OT Progressing    Description: Goals to be met by: 04/15/2025     Patient will increase functional independence with ADLs by performing:    UE Dressing with Contact Guard Assistance.  LE Dressing with Minimal Assistance.  Grooming while seated with Supervision.  Toileting from toilet/BSC with Minimal Assistance for hygiene and clothing management.   Toilet transfer to toilet/BSC with Minimal Assistance.  Functional mobility to simulate household/community distances with Minimal Assistance and utilizing LRAD in order to maximize functional activity tolerance and standing balance required for engagement in occupations of choice.                         DME Justifications:   Bridget's mobility limitation cannot be  sufficiently resolved by the use of a cane. Her functional mobility deficit can be sufficiently resolved with the use of a Rolling Walker. Patient's mobility limitation significantly impairs their ability to participate in one of more activities of daily living.  The use of a RW will significantly improve the patient's ability to participate in MRADLS and the patient will use it on regular basis in the home.    Time Tracking:     OT Date of Treatment: 04/02/25  OT Start Time: 1434  OT Stop Time: 1510  OT Total Time (min): 36 min    Billable Minutes:Therapeutic Activity 22  Neuromuscular Re-education 14    OT/BECKA: BECKA     Number of BECKA visits since last OT visit: 1    4/2/2025

## 2025-04-02 NOTE — PROGRESS NOTES
Pharmacist Renal Dose Adjustment Note    Bridget Montgomery is a 89 y.o. female being treated with the medication Sulfamethoxazole-trimethoprim 800-160 mg(Bactrim).    Patient Data:    Vital Signs (Most Recent):  Temp: 97.4 °F (36.3 °C) (04/02/25 1105)  Pulse: 72 (04/02/25 1105)  Resp: 17 (04/02/25 1027)  BP: 119/68 (04/02/25 1105)  SpO2: (!) 94 % (04/02/25 1105) Vital Signs (72h Range):  Temp:  [96.5 °F (35.8 °C)-97.9 °F (36.6 °C)]   Pulse:  [66-88]   Resp:  [16-19]   BP: (112-163)/(56-71)   SpO2:  [90 %-99 %]      Recent Labs   Lab 03/31/25  0503 04/01/25  0823 04/02/25  1034   CREATININE 0.9 1.0 1.2     Serum creatinine: 1.2 mg/dL 04/02/25 1034  Estimated creatinine clearance: 28.8 mL/min    Medication: Bactrim DS PO BID will be changed to Bactrim DS PO daily for CrCl < 30 mL/min per pharmacy protocol.    Pharmacist's Name: Pamela Glass, PharmD  Pharmacist's Extension: [phone]

## 2025-04-02 NOTE — ASSESSMENT & PLAN NOTE
Hyponatremia is likely due to indeterminate. The patient's most recent sodium results are listed below.  Recent Labs     03/31/25  0503 04/01/25  0823 04/02/25  1034   * 139 136     Plan  - Correct the sodium by 4-6mEq in 24 hours.   - Obtain the following studies: monitoring.  - Will treat the hyponatremia with monitoring, and, if indicated, resuming sodium tablets that were started within the past week  - Monitor sodium Daily.   - Patient hyponatremia is stable

## 2025-04-02 NOTE — ASSESSMENT & PLAN NOTE
Re-consulted for ongoing fluctuating blood counts. There are no overt signs of GI bleeding and patient remains hemodynamically stable.     Recommendations:  --Would consider transfusing patient for her anemia and supportive care.  --Risk of EGD given need for c-collar and age outweigh benefits at present.  --Should patient start showing signs of GI bleeding, please let us know for consideration of procedure at that time.  --Decrease frequency of blood draws.   --Consider additional workup for other contributing causes to patients anemia.

## 2025-04-02 NOTE — PROGRESS NOTES
[facility] Medicine  Progress Note    Patient Name: Bridget Montgomery  MRN: [id number]  Patient Class: IP- Inpatient   Admission Date: 3/28/2025  Length of Stay: 3 days  Attending Physician: Servando Mejia MD  Primary Care Provider: No primary care provider on file.        Subjective     Principal Problem:Acute blood loss anemia        HPI:  Ms. 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 Her abdominal pain has been worsening for the past week and has progressed to bilious vomiting of meds or anything that she tries to eat/drink. No noted hematemesis, melena, hematochezia. Per meds review no recent NSAID use (listed as allergy, and has CKD). No ETOH use. Pt has a known hiatal hernia. Pt's back pain has worsened over the past few days without any recent falls, trauma, and she does report a history of chronic back pain, but is wailing in pain in the ED. Per collateral from her daughter and HCPOA, Liliana Montgomery, pt has been working on elliptical and doing PT/OT at [facility] without significant issues. No fevers, chills, SOB, LE edema.     In the ED she is afebrile, hemodynamically stable on RA with leukocytosis to 13, anemia to 7.7 (baseline 10), isolated ALP elevation to 203, lipase wnl, high-sensitivity troponin elevation to 51 with unremarkable EKG. CXR with increase in the perihilar interstitial and vascular markings. XR lumbar spine with mild scoliosis, but no acute fractures. CT AP with mildly distended bladder, diverticulosis, and moderate to large hiatal hernia. Pt given Maalox, viscous lidocaine, famotidine, pantoprazole, Zofran, and morphine for abdominal, chest, and back pain. Admitted to Hospital Medicine for pain control and evaluation of  abdominal pain.     Overview/Hospital Course:  Admitted with lower back pain better controlled with multimodal regimen. Pt able to tolerate a turkey sandwich for first time in several days after admission with IV PPI, sucralfate, viscous lidocaine. GI consulted with appreciated evaluation for EGD given pt's reported GERD symptoms, inability to tolerate PO for days before admission, known hiatal hernia, and continued anemia (mixed etiology per iron studies), though recent c-spine fusion is a consideration prior to scoping. GI defers to OP workup with ongoing PPI given recent surgery. Symptomatic relief for rhinorrhea provided with pt's daughter's concerns for post-operative complications from sinus infection.     Hospital associated delirium in conjunction with polypharmacy (opioids for severe back pain) on 03/30 with patient agitated, pulling at NC, attempting to remove C-collar. Pt re-directed with assistance from her daughters in attempt to avoid using agitation meds with some resolution of agitation throughout the night. UA with UTI, started on Bactrim empirically in light of patient's med allergies. Mentation improved to baseline. Hospital course complicated by acute anemia in spite of IV iron supplementation. GI re-consulted after recurrent anemia, but deferred EGD (c-spine surgery) and recommended pRBC transfusion. Pt transfused with directed donations as she and her daughter Liliana request blood from unvaccinated donors.     Interval History: Hgb 6.8 in spite of IV iron supplementation. Asymptomatic, no BM yet. No overt blood loss. Blood bank has located pRBC donated for patient prior to her c-spine surgery and planning to transfuse today. Pt's friend donating another unit today which would be ready to transfuse by Fri 04/04 should she need additional transfusions.     Review of Systems  Objective:     Vital Signs (Most Recent):  Temp: 97.4 °F (36.3 °C) (04/02/25 1105)  Pulse: 72 (04/02/25 1105)  Resp: 17  (04/02/25 1027)  BP: 119/68 (04/02/25 1105)  SpO2: (!) 94 % (04/02/25 1105) Vital Signs (24h Range):  Temp:  [97.3 °F (36.3 °C)-97.4 °F (36.3 °C)] 97.4 °F (36.3 °C)  Pulse:  [66-78] 72  Resp:  [17-19] 17  SpO2:  [92 %-94 %] 94 %  BP: (112-122)/(58-68) 119/68     Weight: 68 kg (150 lb)  Body mass index is 26.57 kg/m².    Intake/Output Summary (Last 24 hours) at 4/2/2025 1346  Last data filed at 4/2/2025 0608  Gross per 24 hour   Intake 360 ml   Output 800 ml   Net -440 ml         Physical Exam  Vitals and nursing note reviewed.   Constitutional:       General: She is not in acute distress.     Appearance: She is not ill-appearing.   HENT:      Head: Normocephalic and atraumatic.      Mouth/Throat:      Mouth: Mucous membranes are moist.      Pharynx: Oropharynx is clear.   Eyes:      Extraocular Movements: Extraocular movements intact.      Conjunctiva/sclera: Conjunctivae normal.   Neck:      Comments: C-collar in place  Cardiovascular:      Rate and Rhythm: Normal rate and regular rhythm.      Pulses: Normal pulses.   Pulmonary:      Effort: Pulmonary effort is normal. No respiratory distress.      Breath sounds: No wheezing or rales.   Abdominal:      Palpations: Abdomen is soft.      Tenderness: There is no abdominal tenderness. There is no guarding.   Musculoskeletal:         General: No tenderness.      Right lower leg: No edema.      Left lower leg: No edema.   Skin:     General: Skin is warm and dry.   Neurological:      General: No focal deficit present.      Mental Status: She is alert and oriented to person, place, and time.   Psychiatric:         Attention and Perception: Attention normal.         Mood and Affect: Mood normal. Mood is not anxious.         Speech: Speech normal.         Behavior: Behavior is not agitated. Behavior is cooperative.               Significant Labs: All pertinent labs within the past 24 hours have been reviewed.    Significant Imaging: I have reviewed all pertinent imaging  "results/findings within the past 24 hours.      Assessment & Plan  Chronic kidney disease, stage III (moderate)  Creatine stable for now. BMP reviewed- noted Estimated Creatinine Clearance: 28.8 mL/min (based on SCr of 1.2 mg/dL). according to latest data. Based on current GFR, CKD stage is stage 3 - GFR 30-59.  Monitor UOP and serial BMP and adjust therapy as needed. Renally dose meds. Avoid nephrotoxic medications and procedures.  Type 2 diabetes mellitus with diabetic chronic kidney disease  Patient's FSGs are controlled on current medication regimen.  Last A1c reviewed-   Lab Results   Component Value Date    HGBA1C 5.8 (H) 03/18/2025     Most recent fingerstick glucose reviewed-   No results for input(s): "POCTGLUCOSE" in the last 24 hours.    Current correctional scale   None  Maintain anti-hyperglycemic dose as follows-   Antihyperglycemics (From admission, onward)      None          Hold Oral hypoglycemics while patient is in the hospital.  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 XR lumbar spine with mild scoliosis and degenerative changes.    Multimodal pain regimen with scheduled Tylenol, prn methocarbamol, increase home gabapentin from 100mg to 200mg TID with judicious increases for pain control, lidocaine patches, prn Oxycodone and IV morphine for severe pain.     - Avoid additional opioids in setting of encephalopathy    Essential hypertension  Patient's blood pressure range in the last 24 hours was: BP  Min: 112/68  Max: 122/58.The patient's inpatient anti-hypertensive regimen is listed below:  Current Antihypertensives  amLODIPine tablet 5 mg, Daily, Oral    Plan  - BP is controlled, no changes needed to their regimen  Ulcer of esophagus  Gastro-esophageal reflux disease without esophagitis  Hiatal hernia with GERD  Appears to have used Protonix and " omeprazole for a period at SNF along with viscious po agents without symptomatic relief that has progressed to N/V with liquids and solids. Associated acute anemia 7.7 with slow decrease from baseline 10, but without overt bleeding, melena, coffee ground emesis. Ddx includes gastric or duodenal ulcer, esophageal webbing, GERD associated with hiatal hernia. Low suspicion for Megan-Osorio tear given pt's stable BP.    - IV protonix BID  - sucralfate before and after meals  - CLD with continued monitoring  - Zofran  - qd CBC  - Appreciated GI evaluation for EGD, noting recent C-spinal fusion as possible complicating factor for scope and with stable anemia will defer to OP follow up with GI.     Closed odontoid fracture with routine healing  Maintain C-collar    Urinary retention  Continue Flomax and hold bethanechol started one week ago to rule out N/V contributory factor from anticholinergic med    Hyponatremia  Hyponatremia is likely due to indeterminate. The patient's most recent sodium results are listed below.  Recent Labs     03/31/25  0503 04/01/25  0823 04/02/25  1034   * 139 136     Plan  - Correct the sodium by 4-6mEq in 24 hours.   - Obtain the following studies: monitoring.  - Will treat the hyponatremia with monitoring, and, if indicated, resuming sodium tablets that were started within the past week  - Monitor sodium Daily.   - Patient hyponatremia is stable    Acute blood loss anemia  Anemia is likely due to acute blood loss which was from GI source . Most recent hemoglobin and hematocrit are listed below.  Recent Labs     03/31/25  0503 04/01/25  0823 04/02/25  0358   HGB 7.8* 6.9* 6.8*   HCT 23.9* 21.4* 20.8*     Plan  - Monitor serial CBC: Daily  - Transfuse PRBC if patient becomes hemodynamically unstable, symptomatic or H/H drops below 7/21.  - Patient has not received any PRBC transfusions to date  - Patient's anemia is currently worsening. Will continue current treatment  - hold  anticoagulation at this time  - Family planning to directed donate blood if needed for transfusion with preference for unvaccinated blood, unit donated on 04/02 would be ready for transfusion on 04/04.  - pRBC donated pre-op was located by blood bank, will plan to transfuse on 04/02.  Troponin level elevated  Pt reports chest pain, but attributes this to hx of esophagitis. EKG non-contributory.    - troponin trended to peak, suspect type 2    Alkaline phosphatase elevation  With associated epigastric pain.    - US RUQ to rule out gallstones or obstructive etiology of abdominal pain demonstrated sludge, but no obstructive findings    Bilateral pleural effusion  Patient found to have small pleural effusion on imaging. I have personally reviewed and interpreted the following imaging: CT. A thoracentesis was deferred. Most likely etiology includes Volume overload not due to CHF. Management to include  monitoring    Compression atelectasis  I have personally reviewed CT Scan. Patient with atelectasis on interpretation. Likely Non-Obstructive atelectasis secondary to pleural effusion. Signs and symptoms include  none  Will begin treatment with upright positioning.  Encephalopathy, metabolic  Acute cystitis  Ruling out organic causes (UTI, constipation, last BM prior to admission). Possible hospital delirium (ED room for 48h without windows, opioids for severe back pain)  - start bowel regimen  - UA with UTI, started on empiric Bactrim in light of abx allergies, f/u cultures  Discharge planning issues  Pt discharged from [facility] with pt's daughter/GRACIE Ford relaying displeasure with that decision and looking to appeal decision. Advised to discuss matters with SNF if questions regarding her discharge from that facility.    PT/OT recs for moderate intensity, but Liliana states that she will not accept anything except [facility]. Ongoing dispo planning for sitters at home, etc if pt wishes to discharge to home.      VTE Risk Mitigation (From admission, onward)      None            Discharge Planning   MIKAYLA: 4/4/2025     Code Status: Full Code   Medical Readiness for Discharge Date:   Discharge Plan A: Skilled Nursing Facility   Discharge Delays: None known at this time            Please place Justification for DME        Servando Mejia MD  Department of Hospital Medicine   [facility]

## 2025-04-02 NOTE — SUBJECTIVE & OBJECTIVE
Subjective:     Interval History: Reconsulted for this patient for continued anemia without overt signs of GI bleeding. Patient reports improvement of her prior epigastric burning with the continuation of PPI BID. Denies nausea, vomiting, hematemesis, melena, hematochezia. She has not had a bowel movement since 3/31/2025 that was noted to be normal in color. Vitals remain stable. Patient reports feeling better overall. C-Collar remains in place for her odontoid fracture.     Review of Systems   Constitutional:  Negative for activity change, diaphoresis, fever and unexpected weight change.   HENT:  Negative for sore throat and trouble swallowing.    Eyes:  Negative for redness.   Gastrointestinal:  Negative for abdominal distention, abdominal pain, anal bleeding, blood in stool, constipation, diarrhea, nausea, rectal pain and vomiting.   Musculoskeletal:  Positive for neck pain (c-collar in place) and neck stiffness.   Skin:  Negative for color change, pallor and rash.   Neurological:  Negative for dizziness, syncope, weakness and headaches.     Objective:     Vital Signs (Most Recent):  Temp: 97.4 °F (36.3 °C) (04/02/25 1105)  Pulse: 72 (04/02/25 1105)  Resp: 17 (04/02/25 1027)  BP: 119/68 (04/02/25 1105)  SpO2: (!) 94 % (04/02/25 1105) Vital Signs (24h Range):  Temp:  [97.3 °F (36.3 °C)-97.4 °F (36.3 °C)] 97.4 °F (36.3 °C)  Pulse:  [66-78] 72  Resp:  [17-19] 17  SpO2:  [92 %-94 %] 94 %  BP: (112-122)/(58-68) 119/68     Weight: 68 kg (150 lb) (03/28/25 1132)  Body mass index is 26.57 kg/m².      Intake/Output Summary (Last 24 hours) at 4/2/2025 1133  Last data filed at 4/2/2025 0608  Gross per 24 hour   Intake 360 ml   Output 800 ml   Net -440 ml       Lines/Drains/Airways       Drain  Duration             Female External Urinary Catheter w/ Suction 03/23/25 0000 10 days              Peripheral Intravenous Line  Duration                  Peripheral IV - Single Lumen 03/30/25 20 G Posterior;Right Forearm 3 days       "               Physical Exam  Vitals reviewed.   Constitutional:       General: She is not in acute distress.     Appearance: Normal appearance. She is not ill-appearing.   HENT:      Mouth/Throat:      Mouth: Mucous membranes are moist.      Pharynx: Oropharynx is clear. No oropharyngeal exudate.   Eyes:      General: No scleral icterus.  Abdominal:      General: Abdomen is flat. Bowel sounds are normal. There is no distension.      Palpations: Abdomen is soft. There is no mass.      Tenderness: There is no abdominal tenderness.      Hernia: No hernia is present.   Skin:     General: Skin is warm and dry.      Capillary Refill: Capillary refill takes less than 2 seconds.      Coloration: Skin is not jaundiced or pale.      Findings: No bruising or erythema.   Neurological:      Mental Status: She is alert and oriented to person, place, and time. Mental status is at baseline.          Significant Labs:  CBC:   Recent Labs   Lab 04/01/25  0823 04/02/25  0358   WBC 8.40 7.48   HGB 6.9* 6.8*   HCT 21.4* 20.8*    266     CMP:   Recent Labs   Lab 04/02/25  1034   CALCIUM 8.9   ALBUMIN 1.9*      K 4.9   CO2 23      BUN 27*   CREATININE 1.2   ALKPHOS 222*   ALT 12   AST 19   BILITOT 0.1     Coagulation: No results for input(s): "PT", "INR", "APTT" in the last 48 hours.      Significant Imaging:  Imaging results within the past 24 hours have been reviewed.  "

## 2025-04-02 NOTE — ASSESSMENT & PLAN NOTE
Anemia is likely due to acute blood loss which was from GI source. Most recent hemoglobin and hematocrit are listed below.  Recent Labs     03/31/25  0503 04/01/25  0823 04/02/25  0358   HGB 7.8* 6.9* 6.8*   HCT 23.9* 21.4* 20.8*     Plan  - Monitor serial CBC: Daily  - Transfuse PRBC if patient becomes hemodynamically unstable, symptomatic or H/H drops below 7/21.  - Patient has not received any PRBC transfusions to date  - Patient's anemia is currently worsening. Will continue current treatment  - hold anticoagulation at this time  - Family planning to directed donate blood if needed for transfusion with preference for unvaccinated blood, unit donated on 04/02 would be ready for transfusion on 04/04.  - pRBC donated pre-op was located by blood bank, will plan to transfuse on 04/02.

## 2025-04-02 NOTE — SUBJECTIVE & OBJECTIVE
Interval History: Hgb 6.8 in spite of IV iron supplementation. Asymptomatic, no BM yet. No overt blood loss. Blood bank has located pRBC donated for patient prior to her c-spine surgery and planning to transfuse today. Pt's friend donating another unit today which would be ready to transfuse by Fri 04/04 should she need additional transfusions.     Review of Systems  Objective:     Vital Signs (Most Recent):  Temp: 97.4 °F (36.3 °C) (04/02/25 1105)  Pulse: 72 (04/02/25 1105)  Resp: 17 (04/02/25 1027)  BP: 119/68 (04/02/25 1105)  SpO2: (!) 94 % (04/02/25 1105) Vital Signs (24h Range):  Temp:  [97.3 °F (36.3 °C)-97.4 °F (36.3 °C)] 97.4 °F (36.3 °C)  Pulse:  [66-78] 72  Resp:  [17-19] 17  SpO2:  [92 %-94 %] 94 %  BP: (112-122)/(58-68) 119/68     Weight: 68 kg (150 lb)  Body mass index is 26.57 kg/m².    Intake/Output Summary (Last 24 hours) at 4/2/2025 1346  Last data filed at 4/2/2025 0608  Gross per 24 hour   Intake 360 ml   Output 800 ml   Net -440 ml         Physical Exam  Vitals and nursing note reviewed.   Constitutional:       General: She is not in acute distress.     Appearance: She is not ill-appearing.   HENT:      Head: Normocephalic and atraumatic.      Mouth/Throat:      Mouth: Mucous membranes are moist.      Pharynx: Oropharynx is clear.   Eyes:      Extraocular Movements: Extraocular movements intact.      Conjunctiva/sclera: Conjunctivae normal.   Neck:      Comments: C-collar in place  Cardiovascular:      Rate and Rhythm: Normal rate and regular rhythm.      Pulses: Normal pulses.   Pulmonary:      Effort: Pulmonary effort is normal. No respiratory distress.      Breath sounds: No wheezing or rales.   Abdominal:      Palpations: Abdomen is soft.      Tenderness: There is no abdominal tenderness. There is no guarding.   Musculoskeletal:         General: No tenderness.      Right lower leg: No edema.      Left lower leg: No edema.   Skin:     General: Skin is warm and dry.   Neurological:       General: No focal deficit present.      Mental Status: She is alert and oriented to person, place, and time.   Psychiatric:         Attention and Perception: Attention normal.         Mood and Affect: Mood normal. Mood is not anxious.         Speech: Speech normal.         Behavior: Behavior is not agitated. Behavior is cooperative.               Significant Labs: All pertinent labs within the past 24 hours have been reviewed.    Significant Imaging: I have reviewed all pertinent imaging results/findings within the past 24 hours.

## 2025-04-02 NOTE — PROGRESS NOTES
[facility]  Gastroenterology  Progress Note    Patient Name: Bridget Montgomery  MRN: [id number]  Admission Date: 3/28/2025  Hospital Length of Stay: 3 days  Code Status: Full Code   Attending Provider: Servando Mejia MD  Consulting Provider: Lorena Ge NP  Primary Care Physician: No primary care provider on file.  Principal Problem: Acute blood loss anemia    Subjective:     Interval History: Reconsulted for this patient for continued anemia without overt signs of GI bleeding. Patient reports improvement of her prior epigastric burning with the continuation of PPI BID. Denies nausea, vomiting, hematemesis, melena, hematochezia. She has not had a bowel movement since 3/31/2025 that was noted to be normal in color. Vitals remain stable. Patient reports feeling better overall. C-Collar remains in place for her odontoid fracture.     Review of Systems   Constitutional:  Negative for activity change, diaphoresis, fever and unexpected weight change.   HENT:  Negative for sore throat and trouble swallowing.    Eyes:  Negative for redness.   Gastrointestinal:  Negative for abdominal distention, abdominal pain, anal bleeding, blood in stool, constipation, diarrhea, nausea, rectal pain and vomiting.   Musculoskeletal:  Positive for neck pain (c-collar in place) and neck stiffness.   Skin:  Negative for color change, pallor and rash.   Neurological:  Negative for dizziness, syncope, weakness and headaches.     Objective:     Vital Signs (Most Recent):  Temp: 97.4 °F (36.3 °C) (04/02/25 1105)  Pulse: 72 (04/02/25 1105)  Resp: 17 (04/02/25 1027)  BP: 119/68 (04/02/25 1105)  SpO2: (!) 94 % (04/02/25 1105) Vital Signs (24h Range):  Temp:  [97.3 °F (36.3 °C)-97.4 °F (36.3 °C)] 97.4 °F (36.3 °C)  Pulse:  [66-78] 72  Resp:  [17-19] 17  SpO2:  [92 %-94 %] 94 %  BP: (112-122)/(58-68) 119/68     Weight: 68 kg (150 lb) (03/28/25 1132)  Body mass index is 26.57 kg/m².      Intake/Output Summary (Last 24 hours) at 4/2/2025  "1133  Last data filed at 4/2/2025 0608  Gross per 24 hour   Intake 360 ml   Output 800 ml   Net -440 ml       Lines/Drains/Airways       Drain  Duration             Female External Urinary Catheter w/ Suction 03/23/25 0000 10 days              Peripheral Intravenous Line  Duration                  Peripheral IV - Single Lumen 03/30/25 20 G Posterior;Right Forearm 3 days                     Physical Exam  Vitals reviewed.   Constitutional:       General: She is not in acute distress.     Appearance: Normal appearance. She is not ill-appearing.   HENT:      Mouth/Throat:      Mouth: Mucous membranes are moist.      Pharynx: Oropharynx is clear. No oropharyngeal exudate.   Eyes:      General: No scleral icterus.  Abdominal:      General: Abdomen is flat. Bowel sounds are normal. There is no distension.      Palpations: Abdomen is soft. There is no mass.      Tenderness: There is no abdominal tenderness.      Hernia: No hernia is present.   Skin:     General: Skin is warm and dry.      Capillary Refill: Capillary refill takes less than 2 seconds.      Coloration: Skin is not jaundiced or pale.      Findings: No bruising or erythema.   Neurological:      Mental Status: She is alert and oriented to person, place, and time. Mental status is at baseline.          Significant Labs:  CBC:   Recent Labs   Lab 04/01/25  0823 04/02/25  0358   WBC 8.40 7.48   HGB 6.9* 6.8*   HCT 21.4* 20.8*    266     CMP:   Recent Labs   Lab 04/02/25  1034   CALCIUM 8.9   ALBUMIN 1.9*      K 4.9   CO2 23      BUN 27*   CREATININE 1.2   ALKPHOS 222*   ALT 12   AST 19   BILITOT 0.1     Coagulation: No results for input(s): "PT", "INR", "APTT" in the last 48 hours.      Significant Imaging:  Imaging results within the past 24 hours have been reviewed.  Assessment/Plan:     Oncology  * Acute blood loss anemia  Re-consulted for ongoing fluctuating blood counts. There are no overt signs of GI bleeding and patient remains " hemodynamically stable.     Recommendations:  --Would consider transfusing patient for her anemia and supportive care.  --Risk of EGD given need for c-collar and age outweigh benefits at present.  --Should patient start showing signs of GI bleeding, please let us know for consideration of procedure at that time.  --Decrease frequency of blood draws.   --Consider additional workup for other contributing causes to patients anemia.         Thank you for your consult. I will follow-up with patient. Please contact us if you have any additional questions.    Lorena Ge NP  Gastroenterology  [facility] - Med Surg

## 2025-04-02 NOTE — ASSESSMENT & PLAN NOTE
Patient's blood pressure range in the last 24 hours was: BP  Min: 112/68  Max: 122/58.The patient's inpatient anti-hypertensive regimen is listed below:  Current Antihypertensives  amLODIPine tablet 5 mg, Daily, Oral    Plan  - BP is controlled, no changes needed to their regimen

## 2025-04-02 NOTE — PLAN OF CARE
PRINCE spoke to Marina johnson/ Krishna Chi [phone] and was advised [facility] SNF unable to accept due to no bed availability.     PRINCE (supervisor present) telephoned pt's daughter, Liliana [phone], to discuss discharge planning for once patient was medically ready for d/c.  Pt's daughter immediately stated her mother was not ready to d/c and advised PRINCE she was at work and would need to call back later.  PRINCE waiting for a return call.      Discharge Plan A and Plan B have been determined by review of patient's clinical status, future medical and therapeutic needs, and coverage/benefits for post-acute care in coordination with multidisciplinary team members.     04/02/25 1253   Post-Acute Status   Post-Acute Authorization Placement   Coverage [employer]   Discharge Delays None known at this time   Discharge Plan   Discharge Plan A Skilled Nursing Facility   Discharge Plan B Home;Home with family;Home Health     Phoebe Shepard LMSW  Part-Time-  [facility]  Ext. [phone]

## 2025-04-03 LAB
ABSOLUTE EOSINOPHIL (OHS): 0.08 K/UL
ABSOLUTE MONOCYTE (OHS): 0.5 K/UL (ref 0.3–1)
ABSOLUTE NEUTROPHIL COUNT (OHS): 3.71 K/UL (ref 1.8–7.7)
ALBUMIN SERPL BCP-MCNC: 1.9 G/DL (ref 3.5–5.2)
ALP SERPL-CCNC: 227 UNIT/L (ref 40–150)
ALT SERPL W/O P-5'-P-CCNC: 11 UNIT/L (ref 10–44)
ANION GAP (OHS): 7 MMOL/L (ref 8–16)
AST SERPL-CCNC: 18 UNIT/L (ref 11–45)
BASOPHILS # BLD AUTO: 0.02 K/UL
BASOPHILS NFR BLD AUTO: 0.4 %
BILIRUB SERPL-MCNC: 0.1 MG/DL (ref 0.1–1)
BUN SERPL-MCNC: 31 MG/DL (ref 8–23)
CALCIUM SERPL-MCNC: 8.6 MG/DL (ref 8.7–10.5)
CHLORIDE SERPL-SCNC: 107 MMOL/L (ref 95–110)
CO2 SERPL-SCNC: 23 MMOL/L (ref 23–29)
CREAT SERPL-MCNC: 1.2 MG/DL (ref 0.5–1.4)
ERYTHROCYTE [DISTWIDTH] IN BLOOD BY AUTOMATED COUNT: 16 % (ref 11.5–14.5)
GFR SERPLBLD CREATININE-BSD FMLA CKD-EPI: 43 ML/MIN/1.73/M2
GLUCOSE SERPL-MCNC: 78 MG/DL (ref 70–110)
HCT VFR BLD AUTO: 20.6 % (ref 37–48.5)
HGB BLD-MCNC: 6.7 GM/DL (ref 12–16)
IMM GRANULOCYTES # BLD AUTO: 0.11 K/UL (ref 0–0.04)
IMM GRANULOCYTES NFR BLD AUTO: 2 % (ref 0–0.5)
LYMPHOCYTES # BLD AUTO: 1.01 K/UL (ref 1–4.8)
MCH RBC QN AUTO: 28.8 PG (ref 27–31)
MCHC RBC AUTO-ENTMCNC: 32.5 G/DL (ref 32–36)
MCV RBC AUTO: 88 FL (ref 82–98)
NUCLEATED RBC (/100WBC) (OHS): 0 /100 WBC
PLATELET # BLD AUTO: 263 K/UL (ref 150–450)
PMV BLD AUTO: 9.6 FL (ref 9.2–12.9)
POTASSIUM SERPL-SCNC: 5 MMOL/L (ref 3.5–5.1)
PROT SERPL-MCNC: 5.1 GM/DL (ref 6–8.4)
RBC # BLD AUTO: 2.33 M/UL (ref 4–5.4)
RELATIVE EOSINOPHIL (OHS): 1.5 %
RELATIVE LYMPHOCYTE (OHS): 18.6 % (ref 18–48)
RELATIVE MONOCYTE (OHS): 9.2 % (ref 4–15)
RELATIVE NEUTROPHIL (OHS): 68.3 % (ref 38–73)
SODIUM SERPL-SCNC: 137 MMOL/L (ref 136–145)
WBC # BLD AUTO: 5.43 K/UL (ref 3.9–12.7)

## 2025-04-03 PROCEDURE — 25000003 PHARM REV CODE 250

## 2025-04-03 PROCEDURE — 85025 COMPLETE CBC W/AUTO DIFF WBC: CPT

## 2025-04-03 PROCEDURE — 36415 COLL VENOUS BLD VENIPUNCTURE: CPT

## 2025-04-03 PROCEDURE — 80053 COMPREHEN METABOLIC PANEL: CPT

## 2025-04-03 PROCEDURE — 99232 SBSQ HOSP IP/OBS MODERATE 35: CPT | Mod: ,,,

## 2025-04-03 PROCEDURE — 63600175 PHARM REV CODE 636 W HCPCS

## 2025-04-03 PROCEDURE — 11000001 HC ACUTE MED/SURG PRIVATE ROOM

## 2025-04-03 RX ORDER — SUCRALFATE 1 G/10ML
1 SUSPENSION ORAL ONCE
Status: COMPLETED | OUTPATIENT
Start: 2025-04-03 | End: 2025-04-03

## 2025-04-03 RX ORDER — SUCRALFATE 1 G/1
1 TABLET ORAL ONCE
Status: DISCONTINUED | OUTPATIENT
Start: 2025-04-03 | End: 2025-04-03

## 2025-04-03 RX ORDER — SUCRALFATE 1 G/10ML
1 SUSPENSION ORAL EVERY 6 HOURS PRN
Status: DISCONTINUED | OUTPATIENT
Start: 2025-04-03 | End: 2025-04-08 | Stop reason: HOSPADM

## 2025-04-03 RX ADMIN — ACETAMINOPHEN 1000 MG: 500 TABLET ORAL at 03:04

## 2025-04-03 RX ADMIN — GABAPENTIN 200 MG: 100 CAPSULE ORAL at 09:04

## 2025-04-03 RX ADMIN — GABAPENTIN 200 MG: 100 CAPSULE ORAL at 03:04

## 2025-04-03 RX ADMIN — PANTOPRAZOLE SODIUM 40 MG: 40 INJECTION, POWDER, FOR SOLUTION INTRAVENOUS at 09:04

## 2025-04-03 RX ADMIN — PSEUDOEPHEDRINE HCL 120 MG: 120 TABLET, FILM COATED, EXTENDED RELEASE ORAL at 09:04

## 2025-04-03 RX ADMIN — ACETAMINOPHEN 1000 MG: 500 TABLET ORAL at 09:04

## 2025-04-03 RX ADMIN — AMLODIPINE BESYLATE 5 MG: 5 TABLET ORAL at 09:04

## 2025-04-03 RX ADMIN — TAMSULOSIN HYDROCHLORIDE 0.4 MG: 0.4 CAPSULE ORAL at 09:04

## 2025-04-03 RX ADMIN — FLUTICASONE PROPIONATE 50 MCG: 50 SPRAY, METERED NASAL at 09:04

## 2025-04-03 RX ADMIN — POLYETHYLENE GLYCOL 3350 17 G: 17 POWDER, FOR SOLUTION ORAL at 09:04

## 2025-04-03 RX ADMIN — METHOCARBAMOL 750 MG: 750 TABLET ORAL at 09:04

## 2025-04-03 RX ADMIN — SUCRALFATE 1 G: 1 SUSPENSION ORAL at 07:04

## 2025-04-03 RX ADMIN — FLUTICASONE PROPIONATE 50 MCG: 50 SPRAY, METERED NASAL at 10:04

## 2025-04-03 RX ADMIN — SUCRALFATE 1 G: 1 SUSPENSION ORAL at 09:04

## 2025-04-03 RX ADMIN — SULFAMETHOXAZOLE AND TRIMETHOPRIM 1 TABLET: 800; 160 TABLET ORAL at 09:04

## 2025-04-03 RX ADMIN — ACETAMINOPHEN 1000 MG: 500 TABLET ORAL at 05:04

## 2025-04-03 NOTE — ASSESSMENT & PLAN NOTE
Hyponatremia is likely due to indeterminate. The patient's most recent sodium results are listed below.  Recent Labs     04/01/25  0823 04/02/25  1034 04/03/25  0506    136 137     Plan  - Correct the sodium by 4-6mEq in 24 hours.   - Obtain the following studies: monitoring.  - Will treat the hyponatremia with monitoring, and, if indicated, resuming sodium tablets that were started within the past week  - Monitor sodium Daily.   - Patient hyponatremia is stable

## 2025-04-03 NOTE — ASSESSMENT & PLAN NOTE
Anemia is likely due to acute blood loss which was from GI source. Most recent hemoglobin and hematocrit are listed below.  Recent Labs     04/01/25  0823 04/02/25  0358 04/03/25  0506   HGB 6.9* 6.8* 6.7*   HCT 21.4* 20.8* 20.6*     Monitor serial CBC: Daily  Transfuse PRBC if patient becomes hemodynamically unstable, symptomatic or H/H drops below 7/21  Patient's anemia is currently worsening. Will continue current treatment  hold anticoagulation at this time  Family planning to directed donate blood if needed for transfusion with preference for unvaccinated blood, unit donated on 04/02 would be ready for transfusion on 04/04.  pRBC donated pre-op was located by blood bank, will plan to transfuse on 04/02  Awaiting transfusion due to limitations on blood options given refusal to accept blood that might have come from a vaccinated person

## 2025-04-03 NOTE — ASSESSMENT & PLAN NOTE
Appears to have used Protonix and omeprazole for a period at SNF along with viscious po agents without symptomatic relief that has progressed to N/V with liquids and solids. Associated acute anemia 7.7 with slow decrease from baseline 10, but without overt bleeding, melena, coffee ground emesis. Ddx includes gastric or duodenal ulcer, esophageal webbing, GERD associated with hiatal hernia. Low suspicion for Megan-Osorio tear given pt's stable BP.    IV protonix BID  sucralfate before and after meals  CLD with continued monitoring  Zofran  qd CBC  Appreciated GI evaluation for EGD, noting recent C-spinal fusion as possible complicating factor for scope and with stable anemia will defer to OP follow up with GI.

## 2025-04-03 NOTE — ASSESSMENT & PLAN NOTE
Patient without BM since 3/31. Taking Miralax daily. Family wishes to try prune juice to help her move her bowels.    --Prune juice fine for natural stimulation of her bowels.  --Consider Dulcolax suppository if still with constipation.

## 2025-04-03 NOTE — PROGRESS NOTES
[facility]  Gastroenterology  Progress Note    Patient Name: Bridget Montgomery  MRN: [id number]  Admission Date: 3/28/2025  Hospital Length of Stay: 4 days  Code Status: Full Code   Attending Provider: Raul Singh DO  Consulting Provider: Lorena Ge NP  Primary Care Physician: No primary care provider on file.  Principal Problem: Acute blood loss anemia    Subjective:     Interval History: Reconsulted for this patient for continued anemia without overt signs of GI bleeding. Counts have remained low, but no changes (6.9-->6.8-->6.7). Planned for tranfusion yesterday with donor specific blood but family wishes to hold off at present. Patient reports improvement of her prior epigastric burning with the continuation of PPI BID. Denies nausea, vomiting, hematemesis, melena, hematochezia. She has not had a bowel movement since 3/31/2025 that was noted to be normal in color. Vitals remain stable. Patient reports feeling better overall. C-Collar remains in place for her odontoid fracture. Overall, patient reports feeling well this morning. Awaiting placement in SNF.    Review of Systems   Constitutional:  Negative for activity change, diaphoresis, fever and unexpected weight change.   HENT:  Negative for sore throat and trouble swallowing.    Eyes:  Negative for redness.   Gastrointestinal:  Negative for abdominal distention, abdominal pain, anal bleeding, blood in stool, constipation, diarrhea, nausea, rectal pain and vomiting.   Musculoskeletal:  Positive for neck pain (c-collar in place) and neck stiffness.   Skin:  Negative for color change, pallor and rash.   Neurological:  Negative for dizziness, syncope, weakness and headaches.     Objective:     Vital Signs (Most Recent):  Temp: 97.4 °F (36.3 °C) (04/03/25 0400)  Pulse: 64 (04/03/25 0730)  Resp: 18 (04/03/25 0730)  BP: (!) 122/58 (04/03/25 0730)  SpO2: (!) 94 % (04/03/25 0730) Vital Signs (24h Range):  Temp:  [97.4 °F (36.3 °C)] 97.4 °F (36.3  "°C)  Pulse:  [64-78] 64  Resp:  [17-19] 18  SpO2:  [94 %-97 %] 94 %  BP: (116-122)/(58-71) 122/58     Weight: 68 kg (150 lb) (03/28/25 1132)  Body mass index is 26.57 kg/m².    No intake or output data in the 24 hours ending 04/03/25 1001      Lines/Drains/Airways       Drain  Duration             Female External Urinary Catheter w/ Suction 03/23/25 0000 11 days              Peripheral Intravenous Line  Duration                  Peripheral IV - Single Lumen 03/30/25 20 G Posterior;Right Forearm 4 days                     Physical Exam  Vitals reviewed.   Constitutional:       General: She is not in acute distress.     Appearance: Normal appearance. She is not ill-appearing.   HENT:      Mouth/Throat:      Mouth: Mucous membranes are moist.      Pharynx: Oropharynx is clear. No oropharyngeal exudate.   Eyes:      General: No scleral icterus.  Abdominal:      General: Abdomen is flat. Bowel sounds are normal. There is no distension.      Palpations: Abdomen is soft. There is no mass.      Tenderness: There is no abdominal tenderness.      Hernia: No hernia is present.   Skin:     General: Skin is warm and dry.      Capillary Refill: Capillary refill takes less than 2 seconds.      Coloration: Skin is not jaundiced or pale.      Findings: No bruising or erythema.   Neurological:      Mental Status: She is alert and oriented to person, place, and time. Mental status is at baseline.          Significant Labs:  CBC:   Recent Labs   Lab 04/02/25  0358 04/03/25  0506   WBC 7.48 5.43   HGB 6.8* 6.7*   HCT 20.8* 20.6*    263     CMP:   Recent Labs   Lab 04/03/25  0506   CALCIUM 8.6*   ALBUMIN 1.9*      K 5.0   CO2 23      BUN 31*   CREATININE 1.2   ALKPHOS 227*   ALT 11   AST 18   BILITOT 0.1     Coagulation: No results for input(s): "PT", "INR", "APTT" in the last 48 hours.      Significant Imaging:  Imaging results within the past 24 hours have been reviewed.  Assessment/Plan:     Oncology  * Acute blood " loss anemia  Re-consulted for ongoing fluctuating blood counts. There are no overt signs of GI bleeding and patient remains hemodynamically stable. Plans for transfusion with donor specific blood, however, family prefers to wait. Family amenable to more conservative monitoring given patients age and comorbidities. Counts remain low, but stable without overt signs of bleeding.     Recommendations:  --Would consider transfusing patient for her anemia if family amenable. Supportive care.  --Risk of EGD given need for c-collar and age outweigh benefits at present.  --Should patient start showing signs of GI bleeding, please let us know for consideration of procedure at that time.  --Decrease frequency of blood draws.   --Consider additional workup for other contributing causes to patients anemia.     GI  Chronic constipation  Patient without BM since 3/31. Taking Miralax daily. Family wishes to try prune juice to help her move her bowels.    --Prune juice fine for natural stimulation of her bowels.  --Consider Dulcolax suppository if still with constipation.         Thank you for your consult. After careful review of labs and patient current status with the GI staff and fellow, it has been decided that I will sign off. Please contact us if you have any additional questions.    Lorena Ge, MELISSA  Gastroenterology  [facility]

## 2025-04-03 NOTE — NURSING
After confirming with the blood bank,  MD Mejia and grandson that was at the bedside  that patient needed unit of blood the grandson stated he needed to confirm with the daughter (Maikel) before giving blood. Confirmation was never given to give blood

## 2025-04-03 NOTE — SUBJECTIVE & OBJECTIVE
Subjective:     Interval History: Reconsulted for this patient for continued anemia without overt signs of GI bleeding. Counts have remained low, but no changes (6.9-->6.8-->6.7). Planned for tranfusion yesterday with donor specific blood but family wishes to hold off at present. Patient reports improvement of her prior epigastric burning with the continuation of PPI BID. Denies nausea, vomiting, hematemesis, melena, hematochezia. She has not had a bowel movement since 3/31/2025 that was noted to be normal in color. Vitals remain stable. Patient reports feeling better overall. C-Collar remains in place for her odontoid fracture. Overall, patient reports feeling well this morning. Awaiting placement in SNF.    Review of Systems   Constitutional:  Negative for activity change, diaphoresis, fever and unexpected weight change.   HENT:  Negative for sore throat and trouble swallowing.    Eyes:  Negative for redness.   Gastrointestinal:  Negative for abdominal distention, abdominal pain, anal bleeding, blood in stool, constipation, diarrhea, nausea, rectal pain and vomiting.   Musculoskeletal:  Positive for neck pain (c-collar in place) and neck stiffness.   Skin:  Negative for color change, pallor and rash.   Neurological:  Negative for dizziness, syncope, weakness and headaches.     Objective:     Vital Signs (Most Recent):  Temp: 97.4 °F (36.3 °C) (04/03/25 0400)  Pulse: 64 (04/03/25 0730)  Resp: 18 (04/03/25 0730)  BP: (!) 122/58 (04/03/25 0730)  SpO2: (!) 94 % (04/03/25 0730) Vital Signs (24h Range):  Temp:  [97.4 °F (36.3 °C)] 97.4 °F (36.3 °C)  Pulse:  [64-78] 64  Resp:  [17-19] 18  SpO2:  [94 %-97 %] 94 %  BP: (116-122)/(58-71) 122/58     Weight: 68 kg (150 lb) (03/28/25 1132)  Body mass index is 26.57 kg/m².    No intake or output data in the 24 hours ending 04/03/25 1001      Lines/Drains/Airways       Drain  Duration             Female External Urinary Catheter w/ Suction 03/23/25 0000 11 days               "Peripheral Intravenous Line  Duration                  Peripheral IV - Single Lumen 03/30/25 20 G Posterior;Right Forearm 4 days                     Physical Exam  Vitals reviewed.   Constitutional:       General: She is not in acute distress.     Appearance: Normal appearance. She is not ill-appearing.   HENT:      Mouth/Throat:      Mouth: Mucous membranes are moist.      Pharynx: Oropharynx is clear. No oropharyngeal exudate.   Eyes:      General: No scleral icterus.  Abdominal:      General: Abdomen is flat. Bowel sounds are normal. There is no distension.      Palpations: Abdomen is soft. There is no mass.      Tenderness: There is no abdominal tenderness.      Hernia: No hernia is present.   Skin:     General: Skin is warm and dry.      Capillary Refill: Capillary refill takes less than 2 seconds.      Coloration: Skin is not jaundiced or pale.      Findings: No bruising or erythema.   Neurological:      Mental Status: She is alert and oriented to person, place, and time. Mental status is at baseline.          Significant Labs:  CBC:   Recent Labs   Lab 04/02/25  0358 04/03/25  0506   WBC 7.48 5.43   HGB 6.8* 6.7*   HCT 20.8* 20.6*    263     CMP:   Recent Labs   Lab 04/03/25  0506   CALCIUM 8.6*   ALBUMIN 1.9*      K 5.0   CO2 23      BUN 31*   CREATININE 1.2   ALKPHOS 227*   ALT 11   AST 18   BILITOT 0.1     Coagulation: No results for input(s): "PT", "INR", "APTT" in the last 48 hours.      Significant Imaging:  Imaging results within the past 24 hours have been reviewed.  "

## 2025-04-03 NOTE — SUBJECTIVE & OBJECTIVE
Interval History:  Seen and evaluated on general medical floor  No acute events overnight    Clinical status stable, unchanged  No new complaints  Sitting up in chair eating lunch during my assessment    Objective:     Vital Signs (Most Recent):  Temp: 97.4 °F (36.3 °C) (04/03/25 1152)  Pulse: 67 (04/03/25 1152)  Resp: 19 (04/03/25 1152)  BP: 117/89 (04/03/25 1152)  SpO2: 97 % (04/03/25 1152) Vital Signs (24h Range):  Temp:  [97.4 °F (36.3 °C)] 97.4 °F (36.3 °C)  Pulse:  [64-74] 67  Resp:  [18-19] 19  SpO2:  [94 %-97 %] 97 %  BP: (116-122)/(58-89) 117/89     Weight: 68 kg (150 lb)  Body mass index is 26.57 kg/m².  No intake or output data in the 24 hours ending 04/03/25 1448      Physical Exam  Vitals and nursing note reviewed.   Constitutional:       General: She is not in acute distress.     Appearance: She is not ill-appearing.   HENT:      Head: Normocephalic and atraumatic.      Mouth/Throat:      Mouth: Mucous membranes are moist.      Pharynx: Oropharynx is clear.   Eyes:      Extraocular Movements: Extraocular movements intact.      Conjunctiva/sclera: Conjunctivae normal.   Neck:      Comments: C-collar in place  Cardiovascular:      Rate and Rhythm: Normal rate.   Pulmonary:      Effort: Pulmonary effort is normal. No respiratory distress.      Breath sounds: No wheezing or rales.   Musculoskeletal:         General: No tenderness.      Right lower leg: No edema.      Left lower leg: No edema.   Skin:     General: Skin is warm and dry.   Neurological:      General: No focal deficit present.      Mental Status: She is alert and oriented to person, place, and time.   Psychiatric:         Attention and Perception: Attention normal.         Mood and Affect: Mood normal. Mood is not anxious.         Speech: Speech normal.         Behavior: Behavior is not agitated. Behavior is cooperative.               Significant Labs: All pertinent labs within the past 24 hours have been reviewed.    Significant Imaging: I  have reviewed all pertinent imaging results/findings within the past 24 hours.

## 2025-04-03 NOTE — PROGRESS NOTES
[facility]  Progress Note    Patient Name: Bridget Montgomery  MRN: [id number]  Patient Class: IP- Inpatient   Admission Date: 3/28/2025  Length of Stay: 4 days  Attending Physician: Raul Singh DO  Primary Care Provider: No primary care provider on file.        Subjective     Principal Problem:Acute blood loss anemia        HPI:  Ms. 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 Her abdominal pain has been worsening for the past week and has progressed to bilious vomiting of meds or anything that she tries to eat/drink. No noted hematemesis, melena, hematochezia. Per meds review no recent NSAID use (listed as allergy, and has CKD). No ETOH use. Pt has a known hiatal hernia. Pt's back pain has worsened over the past few days without any recent falls, trauma, and she does report a history of chronic back pain, but is wailing in pain in the ED. Per collateral from her daughter and HCPOA, Liliana Montgomery, pt has been working on elliptical and doing PT/OT at  without significant issues. No fevers, chills, SOB, LE edema.     In the ED she is afebrile, hemodynamically stable on RA with leukocytosis to 13, anemia to 7.7 (baseline 10), isolated ALP elevation to 203, lipase wnl, high-sensitivity troponin elevation to 51 with unremarkable EKG. CXR with increase in the perihilar interstitial and vascular markings. XR lumbar spine with mild scoliosis, but no acute fractures. CT AP with mildly distended bladder, diverticulosis, and moderate to large hiatal hernia. Pt given Maalox, viscous lidocaine, famotidine, pantoprazole, Zofran, and morphine for abdominal, chest, and back pain. Admitted to Hospital Medicine for pain control and evaluation of  abdominal pain.     Overview/Hospital Course:  Admitted with lower back pain better controlled with multimodal regimen. Pt able to tolerate a turkey sandwich for first time in several days after admission with IV PPI, sucralfate, viscous lidocaine. GI consulted with appreciated evaluation for EGD given pt's reported GERD symptoms, inability to tolerate PO for days before admission, known hiatal hernia, and continued anemia (mixed etiology per iron studies), though recent c-spine fusion is a consideration prior to scoping. GI defers to OP workup with ongoing PPI given recent surgery. Symptomatic relief for rhinorrhea provided with pt's daughter's concerns for post-operative complications from sinus infection.     Hospital associated delirium in conjunction with polypharmacy (opioids for severe back pain) on 03/30 with patient agitated, pulling at NC, attempting to remove C-collar. Pt re-directed with assistance from her daughters in attempt to avoid using agitation meds with some resolution of agitation throughout the night. UA with UTI, started on Bactrim empirically in light of patient's med allergies. Mentation improved to baseline. Hospital course complicated by acute anemia in spite of IV iron supplementation. GI re-consulted after recurrent anemia, but deferred EGD (c-spine surgery) and recommended pRBC transfusion. Pt transfused with directed donations as she and her daughter Liliana request blood from unvaccinated donors.     Interval History:  Seen and evaluated on general medical floor  No acute events overnight    Clinical status stable, unchanged  No new complaints  Sitting up in chair eating lunch during my assessment    Objective:     Vital Signs (Most Recent):  Temp: 97.4 °F (36.3 °C) (04/03/25 1152)  Pulse: 67 (04/03/25 1152)  Resp: 19 (04/03/25 1152)  BP: 117/89 (04/03/25 1152)  SpO2: 97 % (04/03/25 1152) Vital Signs (24h Range):  Temp:  [97.4 °F (36.3 °C)] 97.4 °F (36.3 °C)  Pulse:  [64-74]  67  Resp:  [18-19] 19  SpO2:  [94 %-97 %] 97 %  BP: (116-122)/(58-89) 117/89     Weight: 68 kg (150 lb)  Body mass index is 26.57 kg/m².  No intake or output data in the 24 hours ending 04/03/25 1448      Physical Exam  Vitals and nursing note reviewed.   Constitutional:       General: She is not in acute distress.     Appearance: She is not ill-appearing.   HENT:      Head: Normocephalic and atraumatic.      Mouth/Throat:      Mouth: Mucous membranes are moist.      Pharynx: Oropharynx is clear.   Eyes:      Extraocular Movements: Extraocular movements intact.      Conjunctiva/sclera: Conjunctivae normal.   Neck:      Comments: C-collar in place  Cardiovascular:      Rate and Rhythm: Normal rate.   Pulmonary:      Effort: Pulmonary effort is normal. No respiratory distress.      Breath sounds: No wheezing or rales.   Musculoskeletal:         General: No tenderness.      Right lower leg: No edema.      Left lower leg: No edema.   Skin:     General: Skin is warm and dry.   Neurological:      General: No focal deficit present.      Mental Status: She is alert and oriented to person, place, and time.   Psychiatric:         Attention and Perception: Attention normal.         Mood and Affect: Mood normal. Mood is not anxious.         Speech: Speech normal.         Behavior: Behavior is not agitated. Behavior is cooperative.               Significant Labs: All pertinent labs within the past 24 hours have been reviewed.    Significant Imaging: I have reviewed all pertinent imaging results/findings within the past 24 hours.      Assessment & Plan  Acute blood loss anemia  Anemia is likely due to acute blood loss which was from GI source. Most recent hemoglobin and hematocrit are listed below.  Recent Labs     04/01/25  0823 04/02/25  0358 04/03/25  0506   HGB 6.9* 6.8* 6.7*   HCT 21.4* 20.8* 20.6*     Monitor serial CBC: Daily  Transfuse PRBC if patient becomes hemodynamically unstable, symptomatic or H/H drops below  7/21  Patient's anemia is currently worsening. Will continue current treatment  hold anticoagulation at this time  Family planning to directed donate blood if needed for transfusion with preference for unvaccinated blood, unit donated on 04/02 would be ready for transfusion on 04/04.  pRBC donated pre-op was located by blood bank, will plan to transfuse on 04/02  Awaiting transfusion due to limitations on blood options given refusal to accept blood that might have come from a vaccinated person    Ulcer of esophagus  Gastro-esophageal reflux disease without esophagitis  Hiatal hernia with GERD  Appears to have used Protonix and omeprazole for a period at SNF along with viscious po agents without symptomatic relief that has progressed to N/V with liquids and solids. Associated acute anemia 7.7 with slow decrease from baseline 10, but without overt bleeding, melena, coffee ground emesis. Ddx includes gastric or duodenal ulcer, esophageal webbing, GERD associated with hiatal hernia. Low suspicion for Megan-Osorio tear given pt's stable BP.    IV protonix BID  sucralfate before and after meals  CLD with continued monitoring  Zofran  qd CBC  Appreciated GI evaluation for EGD, noting recent C-spinal fusion as possible complicating factor for scope and with stable anemia will defer to OP follow up with GI.    Chronic kidney disease, stage III (moderate)  Creatine stable for now. BMP reviewed- noted Estimated Creatinine Clearance: 28.8 mL/min (based on SCr of 1.2 mg/dL). according to latest data. Based on current GFR, CKD stage is stage 3 - GFR 30-59.  Monitor UOP and serial BMP and adjust therapy as needed. Renally dose meds. Avoid nephrotoxic medications and procedures.  Type 2 diabetes mellitus with diabetic chronic kidney disease  Patient's FSGs are controlled on current medication regimen.  Last A1c reviewed-   Lab Results   Component Value Date    HGBA1C 5.8 (H) 03/18/2025     Most recent fingerstick glucose reviewed-  "  No results for input(s): "POCTGLUCOSE" in the last 24 hours.    Current correctional scale   None  Maintain anti-hyperglycemic dose as follows-   Antihyperglycemics (From admission, onward)      None          Hold Oral hypoglycemics while patient is in the hospital.  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 XR lumbar spine with mild scoliosis and degenerative changes.    Multimodal pain regimen with scheduled Tylenol, prn methocarbamol, increase home gabapentin from 100mg to 200mg TID with judicious increases for pain control, lidocaine patches, prn Oxycodone and IV morphine for severe pain.     - Avoid additional opioids in setting of encephalopathy    Essential hypertension  Patient's blood pressure range in the last 24 hours was: BP  Min: 116/69  Max: 122/58.The patient's inpatient anti-hypertensive regimen is listed below:  Current Antihypertensives  amLODIPine tablet 5 mg, Daily, Oral    Plan  - BP is controlled, no changes needed to their regimen  Closed odontoid fracture with routine healing  Maintain C-collar    Urinary retention  Continue Flomax and hold bethanechol started one week ago to rule out N/V contributory factor from anticholinergic med    Hyponatremia  Hyponatremia is likely due to indeterminate. The patient's most recent sodium results are listed below.  Recent Labs     04/01/25  0823 04/02/25  1034 04/03/25  0506    136 137     Plan  - Correct the sodium by 4-6mEq in 24 hours.   - Obtain the following studies: monitoring.  - Will treat the hyponatremia with monitoring, and, if indicated, resuming sodium tablets that were started within the past week  - Monitor sodium Daily.   - Patient hyponatremia is stable    Troponin level elevated  Pt reports chest pain, but attributes this to hx of esophagitis. EKG non-contributory.    - troponin trended to " peak, suspect type 2    Alkaline phosphatase elevation  With associated epigastric pain.    - US RUQ to rule out gallstones or obstructive etiology of abdominal pain demonstrated sludge, but no obstructive findings    Bilateral pleural effusion  Patient found to have small pleural effusion on imaging. I have personally reviewed and interpreted the following imaging: CT. A thoracentesis was deferred. Most likely etiology includes Volume overload not due to CHF. Management to include  monitoring    Compression atelectasis  I have personally reviewed CT Scan. Patient with atelectasis on interpretation. Likely Non-Obstructive atelectasis secondary to pleural effusion. Signs and symptoms include  none  Will begin treatment with upright positioning.  Encephalopathy, metabolic  Acute cystitis  Ruling out organic causes (UTI, constipation, last BM prior to admission). Possible hospital delirium (ED room for 48h without windows, opioids for severe back pain)  - start bowel regimen  - UA with UTI, started on empiric Bactrim in light of abx allergies, f/u cultures  Discharge planning issues  Pt discharged from [facility] with pt's daughter/GRACIE Ford relaying displeasure with that decision and looking to appeal decision. Advised to discuss matters with SNF if questions regarding her discharge from that facility.    PT/OT recs for moderate intensity, but Liliana states that she will not accept anything except [facility]. Ongoing dispo planning for sitters at home, etc if pt wishes to discharge to home.     Chronic constipation      VTE Risk Mitigation (From admission, onward)      None            Discharge Planning   MIKAYLA: 4/5/2025     Code Status: Full Code   Medical Readiness for Discharge Date:   Discharge Plan A: Skilled Nursing Facility   Discharge Delays: None known at this time            Please place Justification for JACE Singh DO  Department of Hospital Medicine   [facility]

## 2025-04-03 NOTE — PLAN OF CARE
Problem: Skin Injury Risk Increased  Goal: Skin Health and Integrity  Outcome: Not Progressing     Problem: Diabetes Comorbidity  Goal: Blood Glucose Level Within Targeted Range  Outcome: Not Progressing     Problem: Delirium  Goal: Optimal Coping  Outcome: Not Progressing  Goal: Improved Behavioral Control  Outcome: Not Progressing  Goal: Improved Attention and Thought Clarity  Outcome: Not Progressing  Goal: Improved Sleep  Outcome: Not Progressing

## 2025-04-03 NOTE — ASSESSMENT & PLAN NOTE
Patient's blood pressure range in the last 24 hours was: BP  Min: 116/69  Max: 122/58.The patient's inpatient anti-hypertensive regimen is listed below:  Current Antihypertensives  amLODIPine tablet 5 mg, Daily, Oral    Plan  - BP is controlled, no changes needed to their regimen

## 2025-04-03 NOTE — ASSESSMENT & PLAN NOTE
Re-consulted for ongoing fluctuating blood counts. There are no overt signs of GI bleeding and patient remains hemodynamically stable. Plans for transfusion with donor specific blood, however, family prefers to wait. Family amenable to more conservative monitoring given patients age and comorbidities. Counts remain low, but stable without overt signs of bleeding.     Recommendations:  --Would consider transfusing patient for her anemia if family amenable. Supportive care.  --Risk of EGD given need for c-collar and age outweigh benefits at present.  --Should patient start showing signs of GI bleeding, please let us know for consideration of procedure at that time.  --Decrease frequency of blood draws.   --Consider additional workup for other contributing causes to patients anemia.

## 2025-04-04 LAB
ABO + RH BLD: NORMAL
BLD PROD TYP BPU: NORMAL
BLOOD UNIT EXPIRATION DATE: NORMAL
BLOOD UNIT TYPE CODE: 6200
CROSSMATCH INTERPRETATION: NORMAL
DISPENSE STATUS: NORMAL
UNIT NUMBER: NORMAL

## 2025-04-04 PROCEDURE — 27000221 HC OXYGEN, UP TO 24 HOURS

## 2025-04-04 PROCEDURE — 25000003 PHARM REV CODE 250

## 2025-04-04 PROCEDURE — 63600175 PHARM REV CODE 636 W HCPCS

## 2025-04-04 PROCEDURE — P9040 RBC LEUKOREDUCED IRRADIATED: HCPCS

## 2025-04-04 PROCEDURE — 11000001 HC ACUTE MED/SURG PRIVATE ROOM

## 2025-04-04 PROCEDURE — 99900035 HC TECH TIME PER 15 MIN (STAT)

## 2025-04-04 PROCEDURE — 94761 N-INVAS EAR/PLS OXIMETRY MLT: CPT

## 2025-04-04 PROCEDURE — 94640 AIRWAY INHALATION TREATMENT: CPT

## 2025-04-04 RX ADMIN — PSEUDOEPHEDRINE HCL 120 MG: 120 TABLET, FILM COATED, EXTENDED RELEASE ORAL at 10:04

## 2025-04-04 RX ADMIN — GABAPENTIN 200 MG: 100 CAPSULE ORAL at 10:04

## 2025-04-04 RX ADMIN — GABAPENTIN 200 MG: 100 CAPSULE ORAL at 09:04

## 2025-04-04 RX ADMIN — PSEUDOEPHEDRINE HCL 120 MG: 120 TABLET, FILM COATED, EXTENDED RELEASE ORAL at 09:04

## 2025-04-04 RX ADMIN — ONDANSETRON 4 MG: 2 INJECTION INTRAMUSCULAR; INTRAVENOUS at 12:04

## 2025-04-04 RX ADMIN — ACETAMINOPHEN 1000 MG: 500 TABLET ORAL at 03:04

## 2025-04-04 RX ADMIN — GABAPENTIN 200 MG: 100 CAPSULE ORAL at 03:04

## 2025-04-04 RX ADMIN — TAMSULOSIN HYDROCHLORIDE 0.4 MG: 0.4 CAPSULE ORAL at 09:04

## 2025-04-04 RX ADMIN — FLUTICASONE PROPIONATE 50 MCG: 50 SPRAY, METERED NASAL at 10:04

## 2025-04-04 RX ADMIN — ACETAMINOPHEN 1000 MG: 500 TABLET ORAL at 05:04

## 2025-04-04 RX ADMIN — ACETAMINOPHEN 1000 MG: 500 TABLET ORAL at 10:04

## 2025-04-04 RX ADMIN — FLUTICASONE FUROATE AND VILANTEROL TRIFENATATE 1 PUFF: 100; 25 POWDER RESPIRATORY (INHALATION) at 09:04

## 2025-04-04 RX ADMIN — PANTOPRAZOLE SODIUM 40 MG: 40 INJECTION, POWDER, FOR SOLUTION INTRAVENOUS at 09:04

## 2025-04-04 RX ADMIN — AMLODIPINE BESYLATE 5 MG: 5 TABLET ORAL at 09:04

## 2025-04-04 NOTE — PLAN OF CARE
I spoke w/ cm and clarified that [facility] does not have a bed and she is not on our admit list. Patient has not been reviewed since early this week and will not be reviewed again until next week as there are no available beds.  Patient is not a candidate to admit at this time d/t several factors.

## 2025-04-04 NOTE — ASSESSMENT & PLAN NOTE
Hyponatremia is likely due to indeterminate. The patient's most recent sodium results are listed below.  Recent Labs     04/02/25  1034 04/03/25  0506    137     Plan  - Correct the sodium by 4-6mEq in 24 hours.   - Obtain the following studies: monitoring.  - Will treat the hyponatremia with monitoring, and, if indicated, resuming sodium tablets that were started within the past week  - Monitor sodium Daily.   - Patient hyponatremia is stable

## 2025-04-04 NOTE — PLAN OF CARE
"Patient's 2 daughters showed up at SNF requesting to come upstairs to speak to myself and Dr. Ponce.  SNF staff inform security to not allow them upstairs as they did not have a scheduled meeting nor any family members up here. This NP called daughter's phone to discuss their concerns with them.  Daughter states case management told them patient could not come to [facility] because of behavioral issues" and they wanted to discuss this.  I explained to the daughters the patient was not accepted for admission due to her low hemoglobin and poor functional status.  We could see in the charting patient was also experiencing some delirium at the time we declined her.  I also told the daughters that per chart review, patient is still experiencing persistent anemia that requires blood transfusions/intervention, and has not shown improvement in her functional status.  I also inform them that we do not have any open beds.  They were appreciative of the phone call.  Daughters are still requesting to speak with SNF , information relayed and daughters were informed they would receive a call on Monday.      About an hour later, I was informed by our admission coordinator that daughters went back to [facility] and stated patient was not declined for [facility] admission and this NP had "accepted the patient." This was never said to the patient's daughters.  I also informed them that I am not a part of the admissions team, that I was merely doing them a courtesy by calling them to further explain things.  SNF director, Ivana, to follow-up with family on Monday.     Lorena Akins, NP  Post Acute Care- Skilled Nursing Facility (SNF)  [facility]    "

## 2025-04-04 NOTE — PLAN OF CARE
Problem: Adult Inpatient Plan of Care  Goal: Plan of Care Review  Outcome: Ongoing  Goal: Patient-Specific Goal (Individualized)  Outcome: Ongoing  Goal: Absence of Hospital-Acquired Illness or Injury  Outcome: Ongoing  Goal: Optimal Comfort and Wellbeing  Outcome: Ongoing  Goal: Readiness for Transition of Care  Outcome: Ongoing     Problem: Skin Injury Risk Increased  Goal: Skin Health and Integrity  Outcome: Ongoing     Problem: Diabetes Comorbidity  Goal: Blood Glucose Level Within Targeted Range  Outcome: Ongoing     Problem: Fall Injury Risk  Goal: Absence of Fall and Fall-Related Injury  Outcome: Ongoing     Problem: Wound  Goal: Optimal Coping  Outcome: Ongoing  Goal: Optimal Functional Ability  Outcome: Ongoing  Goal: Absence of Infection Signs and Symptoms  Outcome: Ongoing  Goal: Improved Oral Intake  Outcome: Ongoing  Goal: Optimal Pain Control and Function  Outcome: Ongoing  Goal: Skin Health and Integrity  Outcome: Ongoing  Goal: Optimal Wound Healing  Outcome: Ongoing     Problem: Delirium  Goal: Optimal Coping  Outcome: Ongoing  Goal: Improved Behavioral Control  Outcome: Ongoing  Goal: Improved Attention and Thought Clarity  Outcome: Ongoing  Goal: Improved Sleep  Outcome: Ongoing       VS remain stable. Pt remains free from falls and injuries. Questions and concerns addressed and answered. Medication compliance. Bed low, wheels locked, side rails up x 2, call light within reach.  Family to remain at bedside. Pt got up to chair with assistance on two separate occasions.

## 2025-04-04 NOTE — PLAN OF CARE
04/04/25 1709   Medicare Message   Important Message from Medicare regarding Discharge Appeal Rights Given to patient/caregiver;Explained to patient/caregiver;Signed/date by patient/caregiver   Date IMM was signed 04/04/25   Time IMM was signed [id number]       Phoebe Shepard LMSW  Part-Time-  [facility]  Ext. [phone]

## 2025-04-04 NOTE — ASSESSMENT & PLAN NOTE
Patient's blood pressure range in the last 24 hours was: BP  Min: 113/60  Max: 135/58.The patient's inpatient anti-hypertensive regimen is listed below:  Current Antihypertensives  amLODIPine tablet 5 mg, Daily, Oral    Plan  - BP is controlled, no changes needed to their regimen

## 2025-04-04 NOTE — PROGRESS NOTES
[facility] Medicine  Progress Note    Patient Name: Bridget Montgomery  MRN: [id number]  Patient Class: IP- Inpatient   Admission Date: 3/28/2025  Length of Stay: 5 days  Attending Physician: Raul Singh DO  Primary Care Provider: No primary care provider on file.        Subjective     Principal Problem:Acute blood loss anemia        HPI:  Ms. 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 Her abdominal pain has been worsening for the past week and has progressed to bilious vomiting of meds or anything that she tries to eat/drink. No noted hematemesis, melena, hematochezia. Per meds review no recent NSAID use (listed as allergy, and has CKD). No ETOH use. Pt has a known hiatal hernia. Pt's back pain has worsened over the past few days without any recent falls, trauma, and she does report a history of chronic back pain, but is wailing in pain in the ED. Per collateral from her daughter and HCPOA, Liliana Montgomery, pt has been working on elliptical and doing PT/OT at [facility] without significant issues. No fevers, chills, SOB, LE edema.     In the ED she is afebrile, hemodynamically stable on RA with leukocytosis to 13, anemia to 7.7 (baseline 10), isolated ALP elevation to 203, lipase wnl, high-sensitivity troponin elevation to 51 with unremarkable EKG. CXR with increase in the perihilar interstitial and vascular markings. XR lumbar spine with mild scoliosis, but no acute fractures. CT AP with mildly distended bladder, diverticulosis, and moderate to large hiatal hernia. Pt given Maalox, viscous lidocaine, famotidine, pantoprazole, Zofran, and morphine for abdominal, chest, and back pain. Admitted to Hospital Medicine for pain control and evaluation of  abdominal pain.     Overview/Hospital Course:  Admitted with lower back pain better controlled with multimodal regimen. Pt able to tolerate a turkey sandwich for first time in several days after admission with IV PPI, sucralfate, viscous lidocaine. GI consulted with appreciated evaluation for EGD given pt's reported GERD symptoms, inability to tolerate PO for days before admission, known hiatal hernia, and continued anemia (mixed etiology per iron studies), though recent c-spine fusion is a consideration prior to scoping. GI defers to OP workup with ongoing PPI given recent surgery. Symptomatic relief for rhinorrhea provided with pt's daughter's concerns for post-operative complications from sinus infection.     Hospital associated delirium in conjunction with polypharmacy (opioids for severe back pain) on 03/30 with patient agitated, pulling at NC, attempting to remove C-collar. Pt re-directed with assistance from her daughters in attempt to avoid using agitation meds with some resolution of agitation throughout the night. UA with UTI, started on Bactrim empirically in light of patient's med allergies. Mentation improved to baseline. Hospital course complicated by acute anemia in spite of IV iron supplementation. GI re-consulted after recurrent anemia, but deferred EGD (c-spine surgery) and recommended pRBC transfusion. Pt transfused with directed donations as she and her daughter Liliana request blood from unvaccinated donors.     Interval History:  Seen and evaluated on general medical floor  No acute events overnight    Clinical status stable, unchanged  No new complaints    Objective:     Vital Signs (Most Recent):  Temp: 97.4 °F (36.3 °C) (04/04/25 1230)  Pulse: 76 (04/04/25 1230)  Resp: 16 (04/04/25 1230)  BP: (!) 135/58 (04/04/25 1230)  SpO2: (!) 93 % (04/04/25 1230) Vital Signs (24h Range):  Temp:  [94 °F (34.4 °C)-97.7 °F (36.5 °C)] 97.4 °F (36.3 °C)  Pulse:  [64-76] 76  Resp:  [16-20] 16  SpO2:  [90 %-95  %] 93 %  BP: (113-135)/(56-65) 135/58     Weight: 68 kg (150 lb)  Body mass index is 26.57 kg/m².    Intake/Output Summary (Last 24 hours) at 4/4/2025 1403  Last data filed at 4/4/2025 0627  Gross per 24 hour   Intake 480 ml   Output 600 ml   Net -120 ml         Physical Exam  Vitals and nursing note reviewed.   Constitutional:       General: She is not in acute distress.     Appearance: She is not ill-appearing.   HENT:      Head: Normocephalic and atraumatic.      Mouth/Throat:      Mouth: Mucous membranes are moist.      Pharynx: Oropharynx is clear.   Eyes:      Extraocular Movements: Extraocular movements intact.      Conjunctiva/sclera: Conjunctivae normal.   Neck:      Comments: C-collar in place  Cardiovascular:      Rate and Rhythm: Normal rate.   Pulmonary:      Effort: Pulmonary effort is normal. No respiratory distress.      Breath sounds: No wheezing or rales.   Musculoskeletal:         General: No tenderness.      Right lower leg: No edema.      Left lower leg: No edema.   Skin:     General: Skin is warm and dry.   Neurological:      General: No focal deficit present.      Mental Status: She is alert and oriented to person, place, and time.   Psychiatric:         Attention and Perception: Attention normal.         Mood and Affect: Mood normal. Mood is not anxious.         Speech: Speech normal.         Behavior: Behavior is not agitated. Behavior is cooperative.               Significant Labs: All pertinent labs within the past 24 hours have been reviewed.    Significant Imaging: I have reviewed all pertinent imaging results/findings within the past 24 hours.      Assessment & Plan  Acute blood loss anemia  Anemia is likely due to acute blood loss which was from GI source . Most recent hemoglobin and hematocrit are listed below.  Recent Labs     04/02/25  0358 04/03/25  0506   HGB 6.8* 6.7*   HCT 20.8* 20.6*     Monitor serial CBC: Daily  Transfuse PRBC if patient becomes hemodynamically unstable,  symptomatic or H/H drops below 7/21  Patient's anemia is currently worsening. Will continue current treatment  hold anticoagulation at this time  Family planning to directed donate blood if needed for transfusion with preference for unvaccinated blood, unit donated on 04/02 would be ready for transfusion on 04/04.  pRBC donated pre-op was located by blood bank, will plan to transfuse on 04/02  Limitations on blood options given refusal to accept blood that might have come from a vaccinated person  Receiving 1U PRBC 4.4.25    Ulcer of esophagus  Gastro-esophageal reflux disease without esophagitis  Hiatal hernia with GERD  Appears to have used Protonix and omeprazole for a period at [facility] along with viscious po agents without symptomatic relief that has progressed to N/V with liquids and solids. Associated acute anemia 7.7 with slow decrease from baseline 10, but without overt bleeding, melena, coffee ground emesis. Ddx includes gastric or duodenal ulcer, esophageal webbing, GERD associated with hiatal hernia. Low suspicion for Megan-Osorio tear given pt's stable BP.    IV protonix BID  sucralfate before and after meals  CLD with continued monitoring  Zofran  qd CBC  Appreciated GI evaluation for EGD, noting recent C-spinal fusion as possible complicating factor for scope and with stable anemia will defer to OP follow up with GI.    Chronic kidney disease, stage III (moderate)  Creatine stable for now. BMP reviewed- noted Estimated Creatinine Clearance: 28.8 mL/min (based on SCr of 1.2 mg/dL). according to latest data. Based on current GFR, CKD stage is stage 3 - GFR 30-59.  Monitor UOP and serial BMP and adjust therapy as needed. Renally dose meds. Avoid nephrotoxic medications and procedures.  Type 2 diabetes mellitus with diabetic chronic kidney disease  Patient's FSGs are controlled on current medication regimen.  Last A1c reviewed-   Lab Results   Component Value Date    HGBA1C 5.8 (H) 03/18/2025     Most recent  "fingerstick glucose reviewed-   No results for input(s): "POCTGLUCOSE" in the last 24 hours.    Current correctional scale   None  Maintain anti-hyperglycemic dose as follows-   Antihyperglycemics (From admission, onward)      None          Hold Oral hypoglycemics while patient is in the hospital.  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 XR lumbar spine with mild scoliosis and degenerative changes.    Multimodal pain regimen with scheduled Tylenol, prn methocarbamol, increase home gabapentin from 100mg to 200mg TID with judicious increases for pain control, lidocaine patches, prn Oxycodone and IV morphine for severe pain.     - Avoid additional opioids in setting of encephalopathy    Essential hypertension  Patient's blood pressure range in the last 24 hours was: BP  Min: 113/60  Max: 135/58.The patient's inpatient anti-hypertensive regimen is listed below:  Current Antihypertensives  amLODIPine tablet 5 mg, Daily, Oral    Plan  - BP is controlled, no changes needed to their regimen  Closed odontoid fracture with routine healing  Maintain C-collar    Urinary retention  Continue Flomax and hold bethanechol started one week ago to rule out N/V contributory factor from anticholinergic med    Hyponatremia  Hyponatremia is likely due to indeterminate. The patient's most recent sodium results are listed below.  Recent Labs     04/02/25  1034 04/03/25  0506    137     Plan  - Correct the sodium by 4-6mEq in 24 hours.   - Obtain the following studies: monitoring.  - Will treat the hyponatremia with monitoring, and, if indicated, resuming sodium tablets that were started within the past week  - Monitor sodium Daily.   - Patient hyponatremia is stable    Troponin level elevated  Pt reports chest pain, but attributes this to hx of esophagitis. EKG non-contributory.    - troponin " trended to peak, suspect type 2    Alkaline phosphatase elevation  With associated epigastric pain.    - US RUQ to rule out gallstones or obstructive etiology of abdominal pain demonstrated sludge, but no obstructive findings    Bilateral pleural effusion  Patient found to have small pleural effusion on imaging. I have personally reviewed and interpreted the following imaging: CT. A thoracentesis was deferred. Most likely etiology includes Volume overload not due to CHF. Management to include  monitoring    Compression atelectasis  I have personally reviewed CT Scan. Patient with atelectasis on interpretation. Likely Non-Obstructive atelectasis secondary to pleural effusion. Signs and symptoms include  none  Will begin treatment with upright positioning.  Encephalopathy, metabolic  Acute cystitis  Ruling out organic causes (UTI, constipation, last BM prior to admission). Possible hospital delirium (ED room for 48h without windows, opioids for severe back pain)  - start bowel regimen  - UA with UTI, started on empiric Bactrim in light of abx allergies, f/u cultures  Discharge planning issues  Pt discharged from [facility] with pt's daughter/GRACIE Ford relaying displeasure with that decision and looking to appeal decision. Advised to discuss matters with SNF if questions regarding her discharge from that facility.    PT/OT recs for moderate intensity, but Liliana states that she will not accept anything except [facility]. Ongoing dispo planning for sitters at home, etc if pt wishes to discharge to home.     Chronic constipation      VTE Risk Mitigation (From admission, onward)      None            Discharge Planning   MIKAYLA: 4/5/2025     Code Status: Full Code   Medical Readiness for Discharge Date:   Discharge Plan A: Skilled Nursing Facility   Discharge Delays: None known at this time            Please place Justification for JACE Singh DO  Department of Hospital Medicine   [facility]  Surg

## 2025-04-04 NOTE — PT/OT/SLP PROGRESS
Physical Therapy      Patient Name:  Bridget Montgomery   MRN:  [id number]    Patient not seen today secondary to Blood transfusion. Will follow-up next scheduled visit.

## 2025-04-04 NOTE — ASSESSMENT & PLAN NOTE
Anemia is likely due to acute blood loss which was from GI source. Most recent hemoglobin and hematocrit are listed below.  Recent Labs     04/02/25  0358 04/03/25  0506   HGB 6.8* 6.7*   HCT 20.8* 20.6*     Monitor serial CBC: Daily  Transfuse PRBC if patient becomes hemodynamically unstable, symptomatic or H/H drops below 7/21  Patient's anemia is currently worsening. Will continue current treatment  hold anticoagulation at this time  Family planning to directed donate blood if needed for transfusion with preference for unvaccinated blood, unit donated on 04/02 would be ready for transfusion on 04/04.  pRBC donated pre-op was located by blood bank, will plan to transfuse on 04/02  Limitations on blood options given refusal to accept blood that might have come from a vaccinated person  Receiving 1U PRBC 4.4.25

## 2025-04-04 NOTE — PLAN OF CARE
[facility] Surg  Discharge Reassessment    Primary Care Provider: No primary care provider on file.    Expected Discharge Date: 4/5/2025    Meeting with CM supervisor (Chavo), Unit TOYA (Manuela), and myself (PRINCE) met with pt and three daughters (Liliana, Dora, and Felicity) to discuss discharge planning for next level of care (SNF).  Provided update on referrals and advised [facility] unable to meet patient needs at this time; SW also advised [facility] unable to accept patient.  Family encouraged to review in-network SNF list provided and let us know if they would like us to send additional referrals.  Pt's daughter Dora inquired about a facility on the [facility] that provides rehab services.  SW advised if she is able to provide the name of the facility, we can reach out to see if she would qualify.  Explained Medicare Rights and reviewed IMM; copy was given to daughter and original placed in pt's chart.  SW will continue to follow and provide assistance.     Discharge Plan A and Plan B have been determined by review of patient's clinical status, future medical and therapeutic needs, and coverage/benefits for post-acute care in coordination with multidisciplinary team members.    Reassessment (most recent)       Discharge Reassessment - 04/04/25 1115          Discharge Reassessment    Assessment Type Discharge Planning Reassessment     Did the patient's condition or plan change since previous assessment? No     Discharge Plan discussed with: Adult children     Communicated MIKAYLA with patient/caregiver Yes     Discharge Plan A Skilled Nursing Facility     Discharge Plan B Home with family;Home;Home Health     DME Needed Upon Discharge  other (see comments)   TBD    Transition of Care Barriers None     Why the patient remains in the hospital Requires continued medical care        Post-Acute Status    Post-Acute Authorization Placement     Post-Acute Placement Status Pending medical clearance/testing   Also waiting  for family to review list and provide additional choices or advised SW if they want to take patient home instead.    Coverage [employer]     Discharge Delays None known at this time                   Phoebe Shepard LMSW  Part-Time-  [facility]  Ext. [phone]

## 2025-04-04 NOTE — SUBJECTIVE & OBJECTIVE
Interval History:  Seen and evaluated on general medical floor  No acute events overnight    Clinical status stable, unchanged  No new complaints    Objective:     Vital Signs (Most Recent):  Temp: 97.4 °F (36.3 °C) (04/04/25 1230)  Pulse: 76 (04/04/25 1230)  Resp: 16 (04/04/25 1230)  BP: (!) 135/58 (04/04/25 1230)  SpO2: (!) 93 % (04/04/25 1230) Vital Signs (24h Range):  Temp:  [94 °F (34.4 °C)-97.7 °F (36.5 °C)] 97.4 °F (36.3 °C)  Pulse:  [64-76] 76  Resp:  [16-20] 16  SpO2:  [90 %-95 %] 93 %  BP: (113-135)/(56-65) 135/58     Weight: 68 kg (150 lb)  Body mass index is 26.57 kg/m².    Intake/Output Summary (Last 24 hours) at 4/4/2025 1403  Last data filed at 4/4/2025 0627  Gross per 24 hour   Intake 480 ml   Output 600 ml   Net -120 ml         Physical Exam  Vitals and nursing note reviewed.   Constitutional:       General: She is not in acute distress.     Appearance: She is not ill-appearing.   HENT:      Head: Normocephalic and atraumatic.      Mouth/Throat:      Mouth: Mucous membranes are moist.      Pharynx: Oropharynx is clear.   Eyes:      Extraocular Movements: Extraocular movements intact.      Conjunctiva/sclera: Conjunctivae normal.   Neck:      Comments: C-collar in place  Cardiovascular:      Rate and Rhythm: Normal rate.   Pulmonary:      Effort: Pulmonary effort is normal. No respiratory distress.      Breath sounds: No wheezing or rales.   Musculoskeletal:         General: No tenderness.      Right lower leg: No edema.      Left lower leg: No edema.   Skin:     General: Skin is warm and dry.   Neurological:      General: No focal deficit present.      Mental Status: She is alert and oriented to person, place, and time.   Psychiatric:         Attention and Perception: Attention normal.         Mood and Affect: Mood normal. Mood is not anxious.         Speech: Speech normal.         Behavior: Behavior is not agitated. Behavior is cooperative.               Significant Labs: All pertinent labs within  the past 24 hours have been reviewed.    Significant Imaging: I have reviewed all pertinent imaging results/findings within the past 24 hours.

## 2025-04-04 NOTE — PHYSICIAN QUERY
Question: Please clarify the Cardiac diagnosis.    Provider Query Response:  Elevated troponin only (without corresponding diagnosis)

## 2025-04-05 PROBLEM — E87.5 HYPERKALEMIA: Status: ACTIVE | Noted: 2025-04-05

## 2025-04-05 LAB
ABSOLUTE EOSINOPHIL (OHS): 0.12 K/UL
ABSOLUTE MONOCYTE (OHS): 0.55 K/UL (ref 0.3–1)
ABSOLUTE NEUTROPHIL COUNT (OHS): 5.1 K/UL (ref 1.8–7.7)
ALBUMIN SERPL BCP-MCNC: 2.2 G/DL (ref 3.5–5.2)
ALP SERPL-CCNC: 225 UNIT/L (ref 40–150)
ALT SERPL W/O P-5'-P-CCNC: 17 UNIT/L (ref 10–44)
ANION GAP (OHS): 12 MMOL/L (ref 8–16)
AST SERPL-CCNC: 36 UNIT/L (ref 11–45)
BACTERIA #/AREA URNS AUTO: ABNORMAL /HPF
BASOPHILS # BLD AUTO: 0.03 K/UL
BASOPHILS NFR BLD AUTO: 0.4 %
BILIRUB SERPL-MCNC: 0.2 MG/DL (ref 0.1–1)
BILIRUB UR QL STRIP.AUTO: NEGATIVE
BUN SERPL-MCNC: 26 MG/DL (ref 8–23)
CALCIUM SERPL-MCNC: 9 MG/DL (ref 8.7–10.5)
CHLORIDE SERPL-SCNC: 107 MMOL/L (ref 95–110)
CLARITY UR: ABNORMAL
CO2 SERPL-SCNC: 16 MMOL/L (ref 23–29)
COLOR UR AUTO: YELLOW
CREAT SERPL-MCNC: 1.2 MG/DL (ref 0.5–1.4)
ERYTHROCYTE [DISTWIDTH] IN BLOOD BY AUTOMATED COUNT: 16 % (ref 11.5–14.5)
GFR SERPLBLD CREATININE-BSD FMLA CKD-EPI: 43 ML/MIN/1.73/M2
GLUCOSE SERPL-MCNC: 70 MG/DL (ref 70–110)
GLUCOSE UR QL STRIP: NEGATIVE
HCT VFR BLD AUTO: 25.7 % (ref 37–48.5)
HGB BLD-MCNC: 9 GM/DL (ref 12–16)
HGB UR QL STRIP: ABNORMAL
IMM GRANULOCYTES # BLD AUTO: 0.11 K/UL (ref 0–0.04)
IMM GRANULOCYTES NFR BLD AUTO: 1.6 % (ref 0–0.5)
KETONES UR QL STRIP: NEGATIVE
LEUKOCYTE ESTERASE UR QL STRIP: ABNORMAL
LYMPHOCYTES # BLD AUTO: 0.94 K/UL (ref 1–4.8)
MCH RBC QN AUTO: 29.2 PG (ref 27–31)
MCHC RBC AUTO-ENTMCNC: 35 G/DL (ref 32–36)
MCV RBC AUTO: 83 FL (ref 82–98)
MICROSCOPIC COMMENT: ABNORMAL
NITRITE UR QL STRIP: POSITIVE
NUCLEATED RBC (/100WBC) (OHS): 0 /100 WBC
PH UR STRIP: 7 [PH]
PLATELET # BLD AUTO: 362 K/UL (ref 150–450)
PLATELET BLD QL SMEAR: NORMAL
PMV BLD AUTO: 10.2 FL (ref 9.2–12.9)
POTASSIUM SERPL-SCNC: 6.1 MMOL/L (ref 3.5–5.1)
PROT SERPL-MCNC: 5.8 GM/DL (ref 6–8.4)
PROT UR QL STRIP: NEGATIVE
RBC # BLD AUTO: 3.08 M/UL (ref 4–5.4)
RBC #/AREA URNS AUTO: 3 /HPF (ref 0–4)
RELATIVE EOSINOPHIL (OHS): 1.8 %
RELATIVE LYMPHOCYTE (OHS): 13.7 % (ref 18–48)
RELATIVE MONOCYTE (OHS): 8 % (ref 4–15)
RELATIVE NEUTROPHIL (OHS): 74.5 % (ref 38–73)
SODIUM SERPL-SCNC: 135 MMOL/L (ref 136–145)
SP GR UR STRIP: 1.01
SQUAMOUS #/AREA URNS AUTO: <1 /HPF
UROBILINOGEN UR STRIP-ACNC: NEGATIVE EU/DL
WBC # BLD AUTO: 6.85 K/UL (ref 3.9–12.7)
WBC #/AREA URNS AUTO: >100 /HPF (ref 0–5)
WBC CLUMPS UR QL AUTO: ABNORMAL

## 2025-04-05 PROCEDURE — 94761 N-INVAS EAR/PLS OXIMETRY MLT: CPT

## 2025-04-05 PROCEDURE — 80053 COMPREHEN METABOLIC PANEL: CPT

## 2025-04-05 PROCEDURE — 25000003 PHARM REV CODE 250: Performed by: STUDENT IN AN ORGANIZED HEALTH CARE EDUCATION/TRAINING PROGRAM

## 2025-04-05 PROCEDURE — 85025 COMPLETE CBC W/AUTO DIFF WBC: CPT

## 2025-04-05 PROCEDURE — 63600175 PHARM REV CODE 636 W HCPCS

## 2025-04-05 PROCEDURE — 11000001 HC ACUTE MED/SURG PRIVATE ROOM

## 2025-04-05 PROCEDURE — 87088 URINE BACTERIA CULTURE: CPT | Performed by: STUDENT IN AN ORGANIZED HEALTH CARE EDUCATION/TRAINING PROGRAM

## 2025-04-05 PROCEDURE — 94640 AIRWAY INHALATION TREATMENT: CPT

## 2025-04-05 PROCEDURE — 81001 URINALYSIS AUTO W/SCOPE: CPT | Performed by: STUDENT IN AN ORGANIZED HEALTH CARE EDUCATION/TRAINING PROGRAM

## 2025-04-05 PROCEDURE — 36415 COLL VENOUS BLD VENIPUNCTURE: CPT

## 2025-04-05 PROCEDURE — 25000242 PHARM REV CODE 250 ALT 637 W/ HCPCS

## 2025-04-05 PROCEDURE — 25000003 PHARM REV CODE 250

## 2025-04-05 RX ORDER — PANTOPRAZOLE SODIUM 40 MG/1
40 TABLET, DELAYED RELEASE ORAL
Status: DISCONTINUED | OUTPATIENT
Start: 2025-04-05 | End: 2025-04-08 | Stop reason: HOSPADM

## 2025-04-05 RX ADMIN — SODIUM ZIRCONIUM CYCLOSILICATE 10 G: 10 POWDER, FOR SUSPENSION ORAL at 09:04

## 2025-04-05 RX ADMIN — GABAPENTIN 200 MG: 100 CAPSULE ORAL at 09:04

## 2025-04-05 RX ADMIN — ACETAMINOPHEN 1000 MG: 500 TABLET ORAL at 02:04

## 2025-04-05 RX ADMIN — LIDOCAINE 2 PATCH: 50 PATCH CUTANEOUS at 06:04

## 2025-04-05 RX ADMIN — FLUTICASONE PROPIONATE 50 MCG: 50 SPRAY, METERED NASAL at 09:04

## 2025-04-05 RX ADMIN — PANTOPRAZOLE SODIUM 40 MG: 40 INJECTION, POWDER, FOR SOLUTION INTRAVENOUS at 09:04

## 2025-04-05 RX ADMIN — AMLODIPINE BESYLATE 5 MG: 5 TABLET ORAL at 09:04

## 2025-04-05 RX ADMIN — TAMSULOSIN HYDROCHLORIDE 0.4 MG: 0.4 CAPSULE ORAL at 09:04

## 2025-04-05 RX ADMIN — ACETAMINOPHEN 1000 MG: 500 TABLET ORAL at 10:04

## 2025-04-05 RX ADMIN — GABAPENTIN 200 MG: 100 CAPSULE ORAL at 02:04

## 2025-04-05 RX ADMIN — FLUTICASONE FUROATE AND VILANTEROL TRIFENATATE 1 PUFF: 100; 25 POWDER RESPIRATORY (INHALATION) at 08:04

## 2025-04-05 RX ADMIN — PANTOPRAZOLE SODIUM 40 MG: 40 TABLET, DELAYED RELEASE ORAL at 04:04

## 2025-04-05 NOTE — PLAN OF CARE
Problem: Adult Inpatient Plan of Care  Goal: Plan of Care Review  Outcome: Progressing  Goal: Patient-Specific Goal (Individualized)  Outcome: Progressing  Goal: Absence of Hospital-Acquired Illness or Injury  Outcome: Progressing  Goal: Optimal Comfort and Wellbeing  Outcome: Progressing  Goal: Readiness for Transition of Care  Outcome: Progressing     Problem: Skin Injury Risk Increased  Goal: Skin Health and Integrity  Outcome: Progressing     Problem: Diabetes Comorbidity  Goal: Blood Glucose Level Within Targeted Range  Outcome: Progressing     Problem: Fall Injury Risk  Goal: Absence of Fall and Fall-Related Injury  Outcome: Progressing     Problem: Wound  Goal: Optimal Coping  Outcome: Progressing  Goal: Optimal Functional Ability  Outcome: Progressing

## 2025-04-06 LAB
ANION GAP (OHS): 8 MMOL/L (ref 8–16)
BUN SERPL-MCNC: 29 MG/DL (ref 8–23)
CALCIUM SERPL-MCNC: 8.9 MG/DL (ref 8.7–10.5)
CHLORIDE SERPL-SCNC: 106 MMOL/L (ref 95–110)
CO2 SERPL-SCNC: 22 MMOL/L (ref 23–29)
CREAT SERPL-MCNC: 1.1 MG/DL (ref 0.5–1.4)
ERYTHROCYTE [DISTWIDTH] IN BLOOD BY AUTOMATED COUNT: 16.5 % (ref 11.5–14.5)
GFR SERPLBLD CREATININE-BSD FMLA CKD-EPI: 48 ML/MIN/1.73/M2
GLUCOSE SERPL-MCNC: 69 MG/DL (ref 70–110)
HCT VFR BLD AUTO: 28.7 % (ref 37–48.5)
HGB BLD-MCNC: 9.5 GM/DL (ref 12–16)
MCH RBC QN AUTO: 29.1 PG (ref 27–31)
MCHC RBC AUTO-ENTMCNC: 33.1 G/DL (ref 32–36)
MCV RBC AUTO: 88 FL (ref 82–98)
PLATELET # BLD AUTO: 381 K/UL (ref 150–450)
PMV BLD AUTO: 9.5 FL (ref 9.2–12.9)
POTASSIUM SERPL-SCNC: 4.6 MMOL/L (ref 3.5–5.1)
RBC # BLD AUTO: 3.27 M/UL (ref 4–5.4)
SODIUM SERPL-SCNC: 136 MMOL/L (ref 136–145)
WBC # BLD AUTO: 4.99 K/UL (ref 3.9–12.7)

## 2025-04-06 PROCEDURE — 99900035 HC TECH TIME PER 15 MIN (STAT)

## 2025-04-06 PROCEDURE — 25000003 PHARM REV CODE 250: Performed by: STUDENT IN AN ORGANIZED HEALTH CARE EDUCATION/TRAINING PROGRAM

## 2025-04-06 PROCEDURE — 11000001 HC ACUTE MED/SURG PRIVATE ROOM

## 2025-04-06 PROCEDURE — 94761 N-INVAS EAR/PLS OXIMETRY MLT: CPT

## 2025-04-06 PROCEDURE — 25000003 PHARM REV CODE 250

## 2025-04-06 PROCEDURE — 97110 THERAPEUTIC EXERCISES: CPT | Mod: CQ

## 2025-04-06 PROCEDURE — 97116 GAIT TRAINING THERAPY: CPT | Mod: CQ

## 2025-04-06 PROCEDURE — 36415 COLL VENOUS BLD VENIPUNCTURE: CPT | Performed by: STUDENT IN AN ORGANIZED HEALTH CARE EDUCATION/TRAINING PROGRAM

## 2025-04-06 RX ADMIN — LIDOCAINE 2 PATCH: 50 PATCH CUTANEOUS at 06:04

## 2025-04-06 RX ADMIN — FLUTICASONE FUROATE AND VILANTEROL TRIFENATATE 1 PUFF: 100; 25 POWDER RESPIRATORY (INHALATION) at 03:04

## 2025-04-06 RX ADMIN — FLUTICASONE PROPIONATE 50 MCG: 50 SPRAY, METERED NASAL at 09:04

## 2025-04-06 RX ADMIN — ACETAMINOPHEN 1000 MG: 500 TABLET ORAL at 03:04

## 2025-04-06 RX ADMIN — AMLODIPINE BESYLATE 5 MG: 5 TABLET ORAL at 10:04

## 2025-04-06 RX ADMIN — PANTOPRAZOLE SODIUM 40 MG: 40 TABLET, DELAYED RELEASE ORAL at 06:04

## 2025-04-06 RX ADMIN — TAMSULOSIN HYDROCHLORIDE 0.4 MG: 0.4 CAPSULE ORAL at 10:04

## 2025-04-06 RX ADMIN — ACETAMINOPHEN 1000 MG: 500 TABLET ORAL at 09:04

## 2025-04-06 RX ADMIN — GABAPENTIN 200 MG: 100 CAPSULE ORAL at 03:04

## 2025-04-06 RX ADMIN — GUAIFENESIN AND DEXTROMETHORPHAN 10 ML: 100; 10 SYRUP ORAL at 09:04

## 2025-04-06 RX ADMIN — GABAPENTIN 200 MG: 100 CAPSULE ORAL at 10:04

## 2025-04-06 RX ADMIN — ACETAMINOPHEN 1000 MG: 500 TABLET ORAL at 06:04

## 2025-04-06 RX ADMIN — PANTOPRAZOLE SODIUM 40 MG: 40 TABLET, DELAYED RELEASE ORAL at 03:04

## 2025-04-06 RX ADMIN — GABAPENTIN 200 MG: 100 CAPSULE ORAL at 09:04

## 2025-04-06 NOTE — PROGRESS NOTES
[facility]  Progress Note    Patient Name: Bridget Montgomery  MRN: [id number]  Patient Class: IP- Inpatient   Admission Date: 3/28/2025  Length of Stay: 7 days  Attending Physician: Raul Singh DO  Primary Care Provider: No primary care provider on file.        Subjective     Principal Problem:Acute blood loss anemia        HPI:  Ms. 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 Her abdominal pain has been worsening for the past week and has progressed to bilious vomiting of meds or anything that she tries to eat/drink. No noted hematemesis, melena, hematochezia. Per meds review no recent NSAID use (listed as allergy, and has CKD). No ETOH use. Pt has a known hiatal hernia. Pt's back pain has worsened over the past few days without any recent falls, trauma, and she does report a history of chronic back pain, but is wailing in pain in the ED. Per collateral from her daughter and HCPOA, Liliana Montgomery, pt has been working on elliptical and doing PT/OT at [facility] without significant issues. No fevers, chills, SOB, LE edema.     In the ED she is afebrile, hemodynamically stable on RA with leukocytosis to 13, anemia to 7.7 (baseline 10), isolated ALP elevation to 203, lipase wnl, high-sensitivity troponin elevation to 51 with unremarkable EKG. CXR with increase in the perihilar interstitial and vascular markings. XR lumbar spine with mild scoliosis, but no acute fractures. CT AP with mildly distended bladder, diverticulosis, and moderate to large hiatal hernia. Pt given Maalox, viscous lidocaine, famotidine, pantoprazole, Zofran, and morphine for abdominal, chest, and back pain. Admitted to Hospital Medicine for pain control and evaluation of  abdominal pain.     Overview/Hospital Course:  Admitted with lower back pain better controlled with multimodal regimen. Pt able to tolerate a turkey sandwich for first time in several days after admission with IV PPI, sucralfate, viscous lidocaine. GI consulted with appreciated evaluation for EGD given pt's reported GERD symptoms, inability to tolerate PO for days before admission, known hiatal hernia, and continued anemia (mixed etiology per iron studies), though recent c-spine fusion is a consideration prior to scoping. GI defers to OP workup with ongoing PPI given recent surgery. Symptomatic relief for rhinorrhea provided with pt's daughter's concerns for post-operative complications from sinus infection.     Hospital associated delirium in conjunction with polypharmacy (opioids for severe back pain) on 03/30 with patient agitated, pulling at NC, attempting to remove C-collar. Pt re-directed with assistance from her daughters in attempt to avoid using agitation meds with some resolution of agitation throughout the night. UA with UTI, started on Bactrim empirically in light of patient's med allergies. Mentation improved to baseline. Hospital course complicated by acute anemia in spite of IV iron supplementation. GI re-consulted after recurrent anemia, but deferred EGD (c-spine surgery) and recommended pRBC transfusion. Pt transfused with directed donations as she and her daughter Liliana request blood from unvaccinated donors.   Patient transfused 1 unit PRBC on 04/04.  Hgb stable ~9    Interval History:  Seen and evaluated on general medical floor  No acute events overnight    Clinical status stable, unchanged  No new complaints    Objective:     Vital Signs (Most Recent):  Temp: 97.2 °F (36.2 °C) (04/05/25 2008)  Pulse: 74 (04/05/25 2008)  Resp: 16 (04/05/25 2008)  BP: (!) 118/58 (04/05/25 2008)  SpO2: 95 % (04/05/25 2008) Vital Signs (24h Range):  Temp:  [97 °F (36.1 °C)-97.2 °F (36.2 °C)] 97.2 °F (36.2  °C)  Pulse:  [65-74] 74  Resp:  [15-18] 16  SpO2:  [93 %-95 %] 95 %  BP: (118-150)/(58-75) 118/58     Weight: 68 kg (150 lb)  Body mass index is 26.57 kg/m².  No intake or output data in the 24 hours ending 04/05/25 2037        Physical Exam  Vitals and nursing note reviewed.   Constitutional:       General: She is not in acute distress.     Appearance: She is not ill-appearing.   HENT:      Head: Normocephalic and atraumatic.      Mouth/Throat:      Mouth: Mucous membranes are moist.      Pharynx: Oropharynx is clear.   Eyes:      Extraocular Movements: Extraocular movements intact.      Conjunctiva/sclera: Conjunctivae normal.   Neck:      Comments: C-collar in place  Cardiovascular:      Rate and Rhythm: Normal rate.   Pulmonary:      Effort: Pulmonary effort is normal. No respiratory distress.      Breath sounds: No wheezing or rales.   Musculoskeletal:         General: No tenderness.      Right lower leg: No edema.      Left lower leg: No edema.   Skin:     General: Skin is warm and dry.   Neurological:      General: No focal deficit present.      Mental Status: She is alert and oriented to person, place, and time.   Psychiatric:         Attention and Perception: Attention normal.         Mood and Affect: Mood normal. Mood is not anxious.         Speech: Speech normal.         Behavior: Behavior is not agitated. Behavior is cooperative.               Significant Labs: All pertinent labs within the past 24 hours have been reviewed.    Significant Imaging: I have reviewed all pertinent imaging results/findings within the past 24 hours.      Assessment & Plan  Acute blood loss anemia  Anemia is likely due to acute blood loss which was from GI source . Most recent hemoglobin and hematocrit are listed below.  Recent Labs     04/05/25  0252 04/06/25  0701   HGB 9.0* 9.5*   HCT 25.7* 28.7*     Monitor serial CBC: Daily  Transfuse PRBC if patient becomes hemodynamically unstable, symptomatic or H/H drops below  7/21  Patient's anemia is currently worsening. Will continue current treatment  hold anticoagulation at this time  Family planning to directed donate blood if needed for transfusion with preference for unvaccinated blood, unit donated on 04/02  Limitations on blood options given refusal to accept blood that might have come from a vaccinated person  Received 1U PRBC 4.4.25. Impressive Hgb improvement. Continue to monitor   Pt denies bloody stool. Check UA for hematuria    Ulcer of esophagus  Gastro-esophageal reflux disease without esophagitis  Hiatal hernia with GERD  Appears to have used Protonix and omeprazole for a period at SNF along with viscious po agents without symptomatic relief that has progressed to N/V with liquids and solids. Associated acute anemia 7.7 with slow decrease from baseline 10, but without overt bleeding, melena, coffee ground emesis. Ddx includes gastric or duodenal ulcer, esophageal webbing, GERD associated with hiatal hernia. Low suspicion for Megan-Osorio tear given pt's stable BP.    protonix BID  sucralfate before and after meals  CLD with continued monitoring  Zofran  qd CBC  Appreciated GI evaluation for EGD, noting recent C-spinal fusion as possible complicating factor for scope and with stable anemia will defer to OP follow up with GI.    Chronic kidney disease, stage III (moderate)  Creatine stable for now. BMP reviewed- noted Estimated Creatinine Clearance: 31.4 mL/min (based on SCr of 1.1 mg/dL). according to latest data. Based on current GFR, CKD stage is stage 3 - GFR 30-59.  Monitor UOP and serial BMP and adjust therapy as needed. Renally dose meds. Avoid nephrotoxic medications and procedures.  Type 2 diabetes mellitus with diabetic chronic kidney disease  Patient's FSGs are controlled on current medication regimen.  Last A1c reviewed- indicated excellent control chronically  Lab Results   Component Value Date    HGBA1C 5.8 (H) 03/18/2025     Most recent fingerstick glucose  "reviewed-   No results for input(s): "POCTGLUCOSE" in the last 24 hours.    Current correctional scale   None  Hold Oral hypoglycemics while patient is in the hospital.  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 XR lumbar spine with mild scoliosis and degenerative changes.    Multimodal pain regimen with scheduled Tylenol, prn methocarbamol, increase home gabapentin from 100mg to 200mg TID with judicious increases for pain control, lidocaine patches, prn Oxycodone and IV morphine for severe pain.     - Avoid additional opioids in setting of encephalopathy    Essential hypertension  Patient's blood pressure range in the last 24 hours was: BP  Min: 118/58  Max: 139/65.The patient's inpatient anti-hypertensive regimen is listed below:  Current Antihypertensives  amLODIPine tablet 5 mg, Daily, Oral    Plan  - BP is controlled, no changes needed to their regimen  Closed odontoid fracture with routine healing  Maintain C-collar    Urinary retention  Continue Flomax and hold bethanechol started one week ago to rule out N/V contributory factor from anticholinergic med    Hyponatremia  Hyponatremia is likely due to indeterminate. The patient's most recent sodium results are listed below.  Recent Labs     04/05/25  0252 04/06/25  0701   * 136     Plan  - Correct the sodium by 4-6mEq in 24 hours.   - Obtain the following studies: monitoring.  - Will treat the hyponatremia with monitoring, and, if indicated, resuming sodium tablets that were started within the past week  - Monitor sodium Daily.   - Patient hyponatremia is stable    Troponin level elevated  Pt reports chest pain, but attributes this to hx of esophagitis. EKG non-contributory.    troponin trended to peak, suspect type 2    Alkaline phosphatase elevation  With associated epigastric pain  US RUQ to rule out gallstones " or obstructive etiology of abdominal pain demonstrated sludge, but no obstructive findings    Bilateral pleural effusion  Patient found to have small pleural effusion on imaging. I have personally reviewed and interpreted the following imaging: CT. A thoracentesis was deferred. Most likely etiology includes Volume overload not due to CHF. Management to include  monitoring    Compression atelectasis  I have personally reviewed CT Scan. Patient with atelectasis on interpretation. Likely Non-Obstructive atelectasis secondary to pleural effusion. Signs and symptoms include  none  Will begin treatment with upright positioning    Encephalopathy, metabolic  Acute cystitis  Ruling out organic causes (UTI, constipation, last BM prior to admission). Possible hospital delirium (ED room for 48h without windows, opioids for severe back pain)  bowel regimen  Initial UA with UTI, started on empiric Bactrim in light of abx allergies, completed short antibiotics course    Discharge planning issues  Pt discharged from [facility] with pt's daughter/GRACIE Ford relaying displeasure with that decision and looking to appeal decision. Advised to discuss matters with SNF if questions regarding her discharge from that facility.    PT/OT recs for low intensity therapy. Family wants SNF, they are now accepting that they need to look for other options besides [facility]    Chronic constipation      Hyperkalemia  Resolved s/p lokelma x1 dose          VTE Risk Mitigation (From admission, onward)      None            Discharge Planning   MIKAYLA: 4/7/2025     Code Status: Full Code   Medical Readiness for Discharge Date:   Discharge Plan A: Skilled Nursing Facility   Discharge Delays: None known at this time            Please place Justification for DME        Raul Singh DO  Department of Hospital Medicine   [facility]

## 2025-04-06 NOTE — ASSESSMENT & PLAN NOTE
-suspect elevated K is lab error- daughter reports pt is a hard stick and am lab collect was from her finger  -Giving 1 dose Lokelma. Recheck K

## 2025-04-06 NOTE — PT/OT/SLP PROGRESS
Physical Therapy Treatment    Patient Name:  Bridget Montgomery   MRN:  [id number]    Recommendations:     Discharge Recommendations: Moderate Intensity Therapy  Discharge Equipment Recommendations: walker, rolling  Barriers to discharge:  increase level of assistance.     Assessment:     Bridget Montgomery is a 89 y.o. female admitted with a medical diagnosis of Acute blood loss anemia.  She presents with the following impairments/functional limitations: weakness, impaired self care skills, decreased coordination, decreased safety awareness, impaired endurance, impaired functional mobility, decreased upper extremity function, gait instability, impaired cardiopulmonary response to activity, orthopedic precautions.  Pt was agreeable and tolerated session well. Pt was highly motivated for therapy and tolerated ambulating in the hallway with modA and RW. Pt is progressing well toward goals and continues to demonstrate the need for moderate intensity therapy on a daily basis post acute exhibited by decreased independence with functional mobility    Rehab Prognosis: Good; patient would benefit from acute skilled PT services to address these deficits and reach maximum level of function.    Recent Surgery: * No surgery found *      Plan:     During this hospitalization, patient to be seen 4 x/week to address the identified rehab impairments via gait training, therapeutic activities, therapeutic exercises, neuromuscular re-education, wheelchair management/training and progress toward the following goals:    Plan of Care Expires:  05/01/25    Subjective     Chief Complaint: none stated   Patient/Family Comments/goals: to get stronger   Pain/Comfort:  Pain Rating 1:  (did not rate)      Objective:     Communicated with nurse prior to session.  Patient found HOB elevated with cervical collar, telemetry, PureWick upon PT entry to room.     General Precautions: Standard, aspiration, fall, hearing impaired  Orthopedic  Precautions: spinal precautions  Braces: [place] J collar  Respiratory Status: Room air     Functional Mobility:  Additional staff present: Rehab Tech  Bed Mobility:   Scooting to EOB: minimum assistance  Supine to Sit: minimum assistance; HOB elevated  Transfers:   Sit <> Stand Transfer: minimum assistance with rolling walker   Bed > Commode Transfer: minimum assistance with rolling walker using Step Transfer technique   Gait:  Pt ambulated ~25 ft with moderate assistance and rolling walker.  Rehab tech following with bedside chair.   no rest break. 2 turns completed   All lines remained intact throughout ambulation trial, gait belt utilized.  Gait Deviation(s): very flexed posture, decrease tessa, decrease step length, decrease heel strike, and increase lateral swaying.   Verbal/tactile cues for sequencing, RW management, and pacing    AM-PAC 6 CLICK MOBILITY  Turning over in bed (including adjusting bedclothes, sheets and blankets)?: 3  Sitting down on and standing up from a chair with arms (e.g., wheelchair, bedside commode, etc.): 3  Moving from lying on back to sitting on the side of the bed?: 3  Moving to and from a bed to a chair (including a wheelchair)?: 3  Need to walk in hospital room?: 2  Climbing 3-5 steps with a railing?: 1  Basic Mobility Total Score: 15       Treatment & Education:  Pt able to recall 2/3 spinal precautions. Re-educated on no lifting.   Seated BLE therex x15 reps: ankle pumps, long arc quads, and marches  Completed UE therex.   Patient educated on role of therapy, goals of session, and benefits of out of bed mobility.   Instructed on use of call button and importance of calling nursing staff for assistance with mobility   Questions/concerns addressed within PTA scope of practice  Pt verbalized understanding.  Whiteboard Updated      Patient left up in chair with all lines intact, call button in reach, and nurse present..    GOALS:   Multidisciplinary Problems       Physical Therapy  Goals          Problem: Physical Therapy    Goal Priority Disciplines Outcome Interventions   Physical Therapy Goal     PT, PT/OT Progressing    Description: Goals to be met by: 4/15/25     Patient will increase functional independence with mobility by performin. Supine to sit with MInimal Assistance  2. Sit to supine with MInimal Assistance  3. Sit to stand transfer with Minimal Assistance  4. Bed to chair transfer with Minimal Assistance using LRAD  5. Gait  x 25 feet with Minimal Assistance using Rolling Walker.   6. Wheelchair propulsion x25 feet with Minimal Assistance using extremities  7. Lower extremity exercise program x15 reps per handout, with assistance as needed                         DME Justifications:   Bridget's mobility limitation cannot be sufficiently resolved by the use of a cane. Her functional mobility deficit can be sufficiently resolved with the use of a Rolling Walker. Patient's mobility limitation significantly impairs their ability to participate in one of more activities of daily living.  The use of a RW will significantly improve the patient's ability to participate in MRADLS and the patient will use it on regular basis in the home.    Time Tracking:     PT Received On: 25  PT Start Time: 1114     PT Stop Time: 1144  PT Total Time (min): 30 min     Billable Minutes: Gait Training 12 and Therapeutic Exercise 15    Treatment Type: Treatment  PT/PTA: PTA     Number of PTA visits since last PT visit: 2     2025

## 2025-04-06 NOTE — ASSESSMENT & PLAN NOTE
Pt reports chest pain, but attributes this to hx of esophagitis. EKG non-contributory.    troponin trended to peak, suspect type 2

## 2025-04-06 NOTE — ASSESSMENT & PLAN NOTE
Anemia is likely due to acute blood loss which was from GI source. Most recent hemoglobin and hematocrit are listed below.  Recent Labs     04/03/25  0506 04/05/25  0252   HGB 6.7* 9.0*   HCT 20.6* 25.7*     Monitor serial CBC: Daily  Transfuse PRBC if patient becomes hemodynamically unstable, symptomatic or H/H drops below 7/21  Patient's anemia is currently worsening. Will continue current treatment  hold anticoagulation at this time  Family planning to directed donate blood if needed for transfusion with preference for unvaccinated blood, unit donated on 04/02  Limitations on blood options given refusal to accept blood that might have come from a vaccinated person  Received 1U PRBC 4.4.25. Impressive Hgb improvement. Continue to monitor   Pt denies bloody stool. Check UA for hematuria

## 2025-04-06 NOTE — ASSESSMENT & PLAN NOTE
With associated epigastric pain  US RUQ to rule out gallstones or obstructive etiology of abdominal pain demonstrated sludge, but no obstructive findings

## 2025-04-06 NOTE — SUBJECTIVE & OBJECTIVE
Interval History:  Seen and evaluated on general medical floor  No acute events overnight    Clinical status stable, unchanged  No new complaints    Objective:     Vital Signs (Most Recent):  Temp: 97.2 °F (36.2 °C) (04/05/25 2008)  Pulse: 74 (04/05/25 2008)  Resp: 16 (04/05/25 2008)  BP: (!) 118/58 (04/05/25 2008)  SpO2: 95 % (04/05/25 2008) Vital Signs (24h Range):  Temp:  [97 °F (36.1 °C)-97.2 °F (36.2 °C)] 97.2 °F (36.2 °C)  Pulse:  [65-74] 74  Resp:  [15-18] 16  SpO2:  [93 %-95 %] 95 %  BP: (118-150)/(58-75) 118/58     Weight: 68 kg (150 lb)  Body mass index is 26.57 kg/m².  No intake or output data in the 24 hours ending 04/05/25 2037        Physical Exam  Vitals and nursing note reviewed.   Constitutional:       General: She is not in acute distress.     Appearance: She is not ill-appearing.   HENT:      Head: Normocephalic and atraumatic.      Mouth/Throat:      Mouth: Mucous membranes are moist.      Pharynx: Oropharynx is clear.   Eyes:      Extraocular Movements: Extraocular movements intact.      Conjunctiva/sclera: Conjunctivae normal.   Neck:      Comments: C-collar in place  Cardiovascular:      Rate and Rhythm: Normal rate.   Pulmonary:      Effort: Pulmonary effort is normal. No respiratory distress.      Breath sounds: No wheezing or rales.   Musculoskeletal:         General: No tenderness.      Right lower leg: No edema.      Left lower leg: No edema.   Skin:     General: Skin is warm and dry.   Neurological:      General: No focal deficit present.      Mental Status: She is alert and oriented to person, place, and time.   Psychiatric:         Attention and Perception: Attention normal.         Mood and Affect: Mood normal. Mood is not anxious.         Speech: Speech normal.         Behavior: Behavior is not agitated. Behavior is cooperative.               Significant Labs: All pertinent labs within the past 24 hours have been reviewed.    Significant Imaging: I have reviewed all pertinent imaging  results/findings within the past 24 hours.

## 2025-04-06 NOTE — ASSESSMENT & PLAN NOTE
Ruling out organic causes (UTI, constipation, last BM prior to admission). Possible hospital delirium (ED room for 48h without windows, opioids for severe back pain)  bowel regimen  Initial UA with UTI, started on empiric Bactrim in light of abx allergies, completed short antibiotics course

## 2025-04-06 NOTE — ASSESSMENT & PLAN NOTE
Pt discharged from [facility] with pt's daughter/GRACIE Ford relaying displeasure with that decision and looking to appeal decision. Advised to discuss matters with SNF if questions regarding her discharge from that facility.    PT/OT recs for low intensity therapy. Family wants SNF, they are now accepting that they need to look for other options besides [facility]

## 2025-04-06 NOTE — PLAN OF CARE
04/06/25 1708   Post-Acute Status   Post-Acute Authorization Placement   Post-Acute Placement Status Referrals Sent   Coverage [facility] Resources/Appts/Education Provided Provided education on problems/symptoms using teachback   Discharge Delays None known at this time   Discharge Plan   Discharge Plan A Skilled Nursing Facility   Discharge Plan B Skilled Nursing Facility         Discharge Plan A and Plan B have been determined by review of patient's clinical status, future medical and therapeutic needs, and coverage/benefits for post-acute care in coordination with multidisciplinary team members.     Cm spoke with Patients Rikki Ford at bedside, states did go and see 2 facilities this weekend is agreeable to having referral sent to [facility] and [facility] in [place], still would like to check one more this week, states that [employer] will send out a representative to assess her then will make finial decisions. CM sent referrals to facilities mentioned.     Daughter is upset states mother was left in urine due to pur-wic not being turned on, Cm notified Change nurse sunil of complaint and need for lien change.     Priya Martínez RN  Case Management  [phone]

## 2025-04-06 NOTE — ASSESSMENT & PLAN NOTE
Hyponatremia is likely due to indeterminate. The patient's most recent sodium results are listed below.  Recent Labs     04/05/25  0252 04/06/25  0701   * 136     Plan  - Correct the sodium by 4-6mEq in 24 hours.   - Obtain the following studies: monitoring.  - Will treat the hyponatremia with monitoring, and, if indicated, resuming sodium tablets that were started within the past week  - Monitor sodium Daily.   - Patient hyponatremia is stable

## 2025-04-06 NOTE — ASSESSMENT & PLAN NOTE
Ruling out organic causes (UTI, constipation, last BM prior to admission). Possible hospital delirium (ED room for 48h without windows, opioids for severe back pain)  - bowel regimen  - Initial UA with UTI, started on empiric Bactrim in light of abx allergies, completed short antibiotics course

## 2025-04-06 NOTE — ASSESSMENT & PLAN NOTE
Hyponatremia is likely due to indeterminate. The patient's most recent sodium results are listed below.  Recent Labs     04/03/25  0506 04/05/25  0252    135*     Plan  - Correct the sodium by 4-6mEq in 24 hours.   - Obtain the following studies: monitoring.  - Will treat the hyponatremia with monitoring, and, if indicated, resuming sodium tablets that were started within the past week  - Monitor sodium Daily.   - Patient hyponatremia is stable

## 2025-04-06 NOTE — ASSESSMENT & PLAN NOTE
Appears to have used Protonix and omeprazole for a period at SNF along with viscious po agents without symptomatic relief that has progressed to N/V with liquids and solids. Associated acute anemia 7.7 with slow decrease from baseline 10, but without overt bleeding, melena, coffee ground emesis. Ddx includes gastric or duodenal ulcer, esophageal webbing, GERD associated with hiatal hernia. Low suspicion for Megan-Osorio tear given pt's stable BP.    protonix BID  sucralfate before and after meals  CLD with continued monitoring  Zofran  qd CBC  Appreciated GI evaluation for EGD, noting recent C-spinal fusion as possible complicating factor for scope and with stable anemia will defer to OP follow up with GI.

## 2025-04-06 NOTE — ASSESSMENT & PLAN NOTE
Patient found to have small pleural effusion on imaging. I have personally reviewed and interpreted the following imaging: CT. A thoracentesis was deferred. Most likely etiology includes Volume overload not due to CHF. Management to include monitoring     Pre-Op Size Of Lesion Removed (Optional): 0.9

## 2025-04-06 NOTE — PROGRESS NOTES
[facility]  Progress Note    Patient Name: Bridget Montgomery  MRN: [id number]  Patient Class: IP- Inpatient   Admission Date: 3/28/2025  Length of Stay: 6 days  Attending Physician: Gaby Villar MD  Primary Care Provider: No primary care provider on file.        Subjective     Principal Problem:Acute blood loss anemia        HPI:  Ms. 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 Her abdominal pain has been worsening for the past week and has progressed to bilious vomiting of meds or anything that she tries to eat/drink. No noted hematemesis, melena, hematochezia. Per meds review no recent NSAID use (listed as allergy, and has CKD). No ETOH use. Pt has a known hiatal hernia. Pt's back pain has worsened over the past few days without any recent falls, trauma, and she does report a history of chronic back pain, but is wailing in pain in the ED. Per collateral from her daughter and HCPOA, Liliana Montgomery, pt has been working on elliptical and doing PT/OT at [facility] without significant issues. No fevers, chills, SOB, LE edema.     In the ED she is afebrile, hemodynamically stable on RA with leukocytosis to 13, anemia to 7.7 (baseline 10), isolated ALP elevation to 203, lipase wnl, high-sensitivity troponin elevation to 51 with unremarkable EKG. CXR with increase in the perihilar interstitial and vascular markings. XR lumbar spine with mild scoliosis, but no acute fractures. CT AP with mildly distended bladder, diverticulosis, and moderate to large hiatal hernia. Pt given Maalox, viscous lidocaine, famotidine, pantoprazole, Zofran, and morphine for abdominal, chest, and back pain. Admitted to Hospital Medicine for pain control and evaluation of  abdominal pain.     Overview/Hospital Course:  Admitted with lower back pain better controlled with multimodal regimen. Pt able to tolerate a turkey sandwich for first time in several days after admission with IV PPI, sucralfate, viscous lidocaine. GI consulted with appreciated evaluation for EGD given pt's reported GERD symptoms, inability to tolerate PO for days before admission, known hiatal hernia, and continued anemia (mixed etiology per iron studies), though recent c-spine fusion is a consideration prior to scoping. GI defers to OP workup with ongoing PPI given recent surgery. Symptomatic relief for rhinorrhea provided with pt's daughter's concerns for post-operative complications from sinus infection.     Hospital associated delirium in conjunction with polypharmacy (opioids for severe back pain) on 03/30 with patient agitated, pulling at NC, attempting to remove C-collar. Pt re-directed with assistance from her daughters in attempt to avoid using agitation meds with some resolution of agitation throughout the night. UA with UTI, started on Bactrim empirically in light of patient's med allergies. Mentation improved to baseline. Hospital course complicated by acute anemia in spite of IV iron supplementation. GI re-consulted after recurrent anemia, but deferred EGD (c-spine surgery) and recommended pRBC transfusion. Pt transfused with directed donations as she and her daughter Liliana request blood from unvaccinated donors.   Patient transfused 1 unit PRBC on 04/04.  Impressive improvement in hemoglobin to 9.0    Interval History:  Seen and evaluated on general medical floor  No acute events overnight  Patient feeling okay.  Wants to get out of bed more.  Patient's daughter states she saw some reddish urine yesterday.     Objective:     Vital Signs (Most Recent):  Temp: 97.2 °F (36.2 °C) (04/05/25 2008)  Pulse: 74 (04/05/25 2008)  Resp: 16 (04/05/25 2008)  BP: (!) 118/58 (04/05/25 2008)  SpO2: 95 % (04/05/25  2008) Vital Signs (24h Range):  Temp:  [97 °F (36.1 °C)-97.2 °F (36.2 °C)] 97.2 °F (36.2 °C)  Pulse:  [65-74] 74  Resp:  [15-18] 16  SpO2:  [93 %-95 %] 95 %  BP: (118-150)/(58-75) 118/58     Weight: 68 kg (150 lb)  Body mass index is 26.57 kg/m².  No intake or output data in the 24 hours ending 04/05/25 2037        Physical Exam  Vitals and nursing note reviewed.   Constitutional:       General: She is not in acute distress.     Appearance: She is not ill-appearing.   HENT:      Head: Normocephalic and atraumatic.      Mouth/Throat:      Mouth: Mucous membranes are moist.      Pharynx: Oropharynx is clear.   Eyes:      Extraocular Movements: Extraocular movements intact.      Conjunctiva/sclera: Conjunctivae normal.   Neck:      Comments: C-collar in place  Cardiovascular:      Rate and Rhythm: Normal rate.   Pulmonary:      Effort: Pulmonary effort is normal. No respiratory distress.      Breath sounds: No wheezing or rales.   Musculoskeletal:         General: No tenderness.      Right lower leg: No edema.      Left lower leg: No edema.   Skin:     General: Skin is warm and dry.   Neurological:      General: No focal deficit present.      Mental Status: She is alert and oriented to person, place, and time.   Psychiatric:         Attention and Perception: Attention normal.         Mood and Affect: Mood normal. Mood is not anxious.         Speech: Speech normal.         Behavior: Behavior is not agitated. Behavior is cooperative.               Significant Labs: All pertinent labs within the past 24 hours have been reviewed.    Significant Imaging: I have reviewed all pertinent imaging results/findings within the past 24 hours.      Assessment & Plan  Acute blood loss anemia  Anemia is likely due to acute blood loss which was from GI source . Most recent hemoglobin and hematocrit are listed below.  Recent Labs     04/03/25  0506 04/05/25  0252   HGB 6.7* 9.0*   HCT 20.6* 25.7*     Monitor serial CBC: Daily  Transfuse  PRBC if patient becomes hemodynamically unstable, symptomatic or H/H drops below 7/21  Patient's anemia is currently worsening. Will continue current treatment  hold anticoagulation at this time  Family planning to directed donate blood if needed for transfusion with preference for unvaccinated blood, unit donated on 04/02  Limitations on blood options given refusal to accept blood that might have come from a vaccinated person  Received 1U PRBC 4.4.25. Impressive Hgb improvement. Continue to monitor   Pt denies bloody stool. Check UA for hematuria    Ulcer of esophagus  Gastro-esophageal reflux disease without esophagitis  Hiatal hernia with GERD  Appears to have used Protonix and omeprazole for a period at [facility] along with viscious po agents without symptomatic relief that has progressed to N/V with liquids and solids. Associated acute anemia 7.7 with slow decrease from baseline 10, but without overt bleeding, melena, coffee ground emesis. Ddx includes gastric or duodenal ulcer, esophageal webbing, GERD associated with hiatal hernia. Low suspicion for Megan-Osorio tear given pt's stable BP.    protonix BID  sucralfate before and after meals  CLD with continued monitoring  Zofran  qd CBC  Appreciated GI evaluation for EGD, noting recent C-spinal fusion as possible complicating factor for scope and with stable anemia will defer to OP follow up with GI.    Chronic kidney disease, stage III (moderate)  Creatine stable for now. BMP reviewed- noted Estimated Creatinine Clearance: 28.8 mL/min (based on SCr of 1.2 mg/dL). according to latest data. Based on current GFR, CKD stage is stage 3 - GFR 30-59.  Monitor UOP and serial BMP and adjust therapy as needed. Renally dose meds. Avoid nephrotoxic medications and procedures.  Type 2 diabetes mellitus with diabetic chronic kidney disease  Patient's FSGs are controlled on current medication regimen.  Last A1c reviewed- indicated excellent control chronically  Lab Results  "  Component Value Date    HGBA1C 5.8 (H) 03/18/2025     Most recent fingerstick glucose reviewed-   No results for input(s): "POCTGLUCOSE" in the last 24 hours.    Current correctional scale   None  Hold Oral hypoglycemics while patient is in the hospital.  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 XR lumbar spine with mild scoliosis and degenerative changes.    Multimodal pain regimen with scheduled Tylenol, prn methocarbamol, increase home gabapentin from 100mg to 200mg TID with judicious increases for pain control, lidocaine patches, prn Oxycodone and IV morphine for severe pain.     - Avoid additional opioids in setting of encephalopathy    Essential hypertension  Patient's blood pressure range in the last 24 hours was: BP  Min: 118/58  Max: 150/70.The patient's inpatient anti-hypertensive regimen is listed below:  Current Antihypertensives  amLODIPine tablet 5 mg, Daily, Oral    Plan  - BP is controlled, no changes needed to their regimen  Closed odontoid fracture with routine healing  Maintain C-collar    Urinary retention  Continue Flomax and hold bethanechol started one week ago to rule out N/V contributory factor from anticholinergic med    Hyponatremia  Hyponatremia is likely due to indeterminate. The patient's most recent sodium results are listed below.  Recent Labs     04/03/25  0506 04/05/25  0252    135*     Plan  - Correct the sodium by 4-6mEq in 24 hours.   - Obtain the following studies: monitoring.  - Will treat the hyponatremia with monitoring, and, if indicated, resuming sodium tablets that were started within the past week  - Monitor sodium Daily.   - Patient hyponatremia is stable    Troponin level elevated  Pt reports chest pain, but attributes this to hx of esophagitis. EKG non-contributory.    - troponin trended to peak, suspect type " 2    Alkaline phosphatase elevation  With associated epigastric pain.    - US RUQ to rule out gallstones or obstructive etiology of abdominal pain demonstrated sludge, but no obstructive findings    Bilateral pleural effusion  Patient found to have small pleural effusion on imaging. I have personally reviewed and interpreted the following imaging: CT. A thoracentesis was deferred. Most likely etiology includes Volume overload not due to CHF. Management to include  monitoring    Compression atelectasis  I have personally reviewed CT Scan. Patient with atelectasis on interpretation. Likely Non-Obstructive atelectasis secondary to pleural effusion. Signs and symptoms include  none  Will begin treatment with upright positioning.  Encephalopathy, metabolic  Acute cystitis  Ruling out organic causes (UTI, constipation, last BM prior to admission). Possible hospital delirium (ED room for 48h without windows, opioids for severe back pain)  - bowel regimen  - Initial UA with UTI, started on empiric Bactrim in light of abx allergies, completed short antibiotics course  Discharge planning issues  Pt discharged from [facility] with pt's daughter/GRACIE Ford relaying displeasure with that decision and looking to appeal decision. Advised to discuss matters with SNF if questions regarding her discharge from that facility.    PT/OT recs for low intensity therapy. Family wants SNF, they are now accepting that they need to look for other options besides [facility]    Chronic constipation      Hyperkalemia  -suspect elevated K is lab error- daughter reports pt is a hard stick and am lab collect was from her finger  -Giving 1 dose Lokelma. Recheck K            VTE Risk Mitigation (From admission, onward)      None            Discharge Planning   MIKAYLA: 4/7/2025     Code Status: Full Code   Medical Readiness for Discharge Date:   Discharge Plan A: Skilled Nursing Facility   Discharge Delays: None known at this time            Gaby ALCALA  MD Jian  Department of Hospital Medicine   [facility]

## 2025-04-06 NOTE — ASSESSMENT & PLAN NOTE
I have personally reviewed CT Scan. Patient with atelectasis on interpretation. Likely Non-Obstructive atelectasis secondary to pleural effusion. Signs and symptoms include none Will begin treatment with upright positioning

## 2025-04-06 NOTE — ASSESSMENT & PLAN NOTE
Patient's blood pressure range in the last 24 hours was: BP  Min: 118/58  Max: 150/70.The patient's inpatient anti-hypertensive regimen is listed below:  Current Antihypertensives  amLODIPine tablet 5 mg, Daily, Oral    Plan  - BP is controlled, no changes needed to their regimen

## 2025-04-06 NOTE — ASSESSMENT & PLAN NOTE
Anemia is likely due to acute blood loss which was from GI source. Most recent hemoglobin and hematocrit are listed below.  Recent Labs     04/05/25  0252 04/06/25  0701   HGB 9.0* 9.5*   HCT 25.7* 28.7*     Monitor serial CBC: Daily  Transfuse PRBC if patient becomes hemodynamically unstable, symptomatic or H/H drops below 7/21  Patient's anemia is currently worsening. Will continue current treatment  hold anticoagulation at this time  Family planning to directed donate blood if needed for transfusion with preference for unvaccinated blood, unit donated on 04/02  Limitations on blood options given refusal to accept blood that might have come from a vaccinated person  Received 1U PRBC 4.4.25. Impressive Hgb improvement. Continue to monitor   Pt denies bloody stool. Check UA for hematuria

## 2025-04-06 NOTE — ASSESSMENT & PLAN NOTE
"Patient's FSGs are controlled on current medication regimen.  Last A1c reviewed- indicated excellent control chronically  Lab Results   Component Value Date    HGBA1C 5.8 (H) 03/18/2025     Most recent fingerstick glucose reviewed-   No results for input(s): "POCTGLUCOSE" in the last 24 hours.    Current correctional scale  None  Hold Oral hypoglycemics while patient is in the hospital.  "

## 2025-04-06 NOTE — ASSESSMENT & PLAN NOTE
Patient's blood pressure range in the last 24 hours was: BP  Min: 118/58  Max: 139/65.The patient's inpatient anti-hypertensive regimen is listed below:  Current Antihypertensives  amLODIPine tablet 5 mg, Daily, Oral    Plan  - BP is controlled, no changes needed to their regimen

## 2025-04-07 PROCEDURE — 63600175 PHARM REV CODE 636 W HCPCS

## 2025-04-07 PROCEDURE — 94761 N-INVAS EAR/PLS OXIMETRY MLT: CPT

## 2025-04-07 PROCEDURE — 25000003 PHARM REV CODE 250: Performed by: STUDENT IN AN ORGANIZED HEALTH CARE EDUCATION/TRAINING PROGRAM

## 2025-04-07 PROCEDURE — 25000003 PHARM REV CODE 250

## 2025-04-07 PROCEDURE — 11000001 HC ACUTE MED/SURG PRIVATE ROOM

## 2025-04-07 PROCEDURE — 94640 AIRWAY INHALATION TREATMENT: CPT

## 2025-04-07 PROCEDURE — 25000242 PHARM REV CODE 250 ALT 637 W/ HCPCS

## 2025-04-07 RX ORDER — AMLODIPINE BESYLATE 10 MG/1
5 TABLET ORAL DAILY
Start: 2025-04-07 | End: 2026-04-07

## 2025-04-07 RX ORDER — METHOCARBAMOL 500 MG/1
500 TABLET, FILM COATED ORAL 3 TIMES DAILY
Start: 2025-04-07 | End: 2025-04-15 | Stop reason: SDUPTHER

## 2025-04-07 RX ORDER — CEFTRIAXONE 1 G/1
1 INJECTION, POWDER, FOR SOLUTION INTRAMUSCULAR; INTRAVENOUS
Status: DISCONTINUED | OUTPATIENT
Start: 2025-04-07 | End: 2025-04-08 | Stop reason: HOSPADM

## 2025-04-07 RX ADMIN — PANTOPRAZOLE SODIUM 40 MG: 40 TABLET, DELAYED RELEASE ORAL at 06:04

## 2025-04-07 RX ADMIN — ACETAMINOPHEN 1000 MG: 500 TABLET ORAL at 01:04

## 2025-04-07 RX ADMIN — GABAPENTIN 200 MG: 100 CAPSULE ORAL at 09:04

## 2025-04-07 RX ADMIN — GABAPENTIN 200 MG: 100 CAPSULE ORAL at 01:04

## 2025-04-07 RX ADMIN — GUAIFENESIN AND DEXTROMETHORPHAN 10 ML: 100; 10 SYRUP ORAL at 10:04

## 2025-04-07 RX ADMIN — FLUTICASONE PROPIONATE 50 MCG: 50 SPRAY, METERED NASAL at 09:04

## 2025-04-07 RX ADMIN — LIDOCAINE 2 PATCH: 50 PATCH CUTANEOUS at 06:04

## 2025-04-07 RX ADMIN — FLUTICASONE FUROATE AND VILANTEROL TRIFENATATE 1 PUFF: 100; 25 POWDER RESPIRATORY (INHALATION) at 11:04

## 2025-04-07 RX ADMIN — ACETAMINOPHEN 1000 MG: 500 TABLET ORAL at 09:04

## 2025-04-07 RX ADMIN — TAMSULOSIN HYDROCHLORIDE 0.4 MG: 0.4 CAPSULE ORAL at 09:04

## 2025-04-07 RX ADMIN — AMLODIPINE BESYLATE 5 MG: 5 TABLET ORAL at 09:04

## 2025-04-07 RX ADMIN — PANTOPRAZOLE SODIUM 40 MG: 40 TABLET, DELAYED RELEASE ORAL at 03:04

## 2025-04-07 NOTE — ASSESSMENT & PLAN NOTE
Anemia is likely due to acute blood loss which was from GI source    Monitor serial CBC: Daily  Hold anticoagulation at this time  Goal directed resuscitation, transfuse for Hgb<7  Family planning to directed donate blood if needed for transfusion with preference for unvaccinated blood  Limitations on blood options given refusal to accept blood that might have come from a vaccinated person  Received 1U PRBC 4.4.25  Stable, monitor

## 2025-04-07 NOTE — PROGRESS NOTES
[facility]  Progress Note    Patient Name: Bridget Montgomery  MRN: [id number]  Patient Class: IP- Inpatient   Admission Date: 3/28/2025  Length of Stay: 8 days  Attending Physician: Raul Singh DO  Primary Care Provider: No primary care provider on file.        Subjective     Principal Problem:Acute blood loss anemia        HPI:  Ms. 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 Her abdominal pain has been worsening for the past week and has progressed to bilious vomiting of meds or anything that she tries to eat/drink. No noted hematemesis, melena, hematochezia. Per meds review no recent NSAID use (listed as allergy, and has CKD). No ETOH use. Pt has a known hiatal hernia. Pt's back pain has worsened over the past few days without any recent falls, trauma, and she does report a history of chronic back pain, but is wailing in pain in the ED. Per collateral from her daughter and HCPOA, Liliana Montgomery, pt has been working on elliptical and doing PT/OT at [facility] without significant issues. No fevers, chills, SOB, LE edema.     In the ED she is afebrile, hemodynamically stable on RA with leukocytosis to 13, anemia to 7.7 (baseline 10), isolated ALP elevation to 203, lipase wnl, high-sensitivity troponin elevation to 51 with unremarkable EKG. CXR with increase in the perihilar interstitial and vascular markings. XR lumbar spine with mild scoliosis, but no acute fractures. CT AP with mildly distended bladder, diverticulosis, and moderate to large hiatal hernia. Pt given Maalox, viscous lidocaine, famotidine, pantoprazole, Zofran, and morphine for abdominal, chest, and back pain. Admitted to Hospital Medicine for pain control and evaluation of  abdominal pain.     Overview/Hospital Course:  Admitted with lower back pain better controlled with multimodal regimen. Pt able to tolerate a turkey sandwich for first time in several days after admission with IV PPI, sucralfate, viscous lidocaine. GI consulted with appreciated evaluation for EGD given pt's reported GERD symptoms, inability to tolerate PO for days before admission, known hiatal hernia, and continued anemia (mixed etiology per iron studies), though recent c-spine fusion is a consideration prior to scoping. GI defers to OP workup with ongoing PPI given recent surgery. Symptomatic relief for rhinorrhea provided with pt's daughter's concerns for post-operative complications from sinus infection.     Hospital associated delirium in conjunction with polypharmacy (opioids for severe back pain) on 03/30 with patient agitated, pulling at NC, attempting to remove C-collar. Pt re-directed with assistance from her daughters in attempt to avoid using agitation meds with some resolution of agitation throughout the night. UA with UTI, started on Bactrim empirically in light of patient's med allergies. Mentation improved to baseline. Hospital course complicated by acute anemia in spite of IV iron supplementation. GI re-consulted after recurrent anemia, but deferred EGD (c-spine surgery) and recommended pRBC transfusion. Pt transfused with directed donations as she and her daughter Liliana request blood from unvaccinated donors.    Repeat UA infectious and she is symptomatic.  Rocephin started.    Patient transfused 1 unit PRBC on 04/04.  Hgb stable ~9.    Interval History:  Seen and evaluated on general medical floor  No acute events overnight    Clinical status stable, unchanged  Began having burning with urination    Objective:     Vital Signs (Most Recent):  Temp: 99 °F (37.2 °C) (04/07/25 1151)  Pulse: 67 (04/07/25 1151)  Resp: 18 (04/07/25 1151)  BP: 113/60 (04/07/25 1151)  SpO2: 97 % (04/07/25 1151) Vital  Signs (24h Range):  Temp:  [97.2 °F (36.2 °C)-99 °F (37.2 °C)] 99 °F (37.2 °C)  Pulse:  [54-72] 67  Resp:  [16-18] 18  SpO2:  [95 %-97 %] 97 %  BP: (113-148)/(57-86) 113/60     Weight: 68 kg (150 lb)  Body mass index is 26.57 kg/m².    Intake/Output Summary (Last 24 hours) at 4/7/2025 1210  Last data filed at 4/7/2025 0442  Gross per 24 hour   Intake --   Output 1400 ml   Net -1400 ml         Physical Exam  Vitals and nursing note reviewed.   Constitutional:       General: She is not in acute distress.     Appearance: She is not ill-appearing.   HENT:      Head: Normocephalic and atraumatic.      Mouth/Throat:      Mouth: Mucous membranes are moist.      Pharynx: Oropharynx is clear.   Eyes:      Extraocular Movements: Extraocular movements intact.      Conjunctiva/sclera: Conjunctivae normal.   Neck:      Comments: C-collar in place  Cardiovascular:      Rate and Rhythm: Normal rate and regular rhythm.   Pulmonary:      Effort: Pulmonary effort is normal. No respiratory distress.      Breath sounds: No wheezing or rales.   Musculoskeletal:         General: No tenderness.      Right lower leg: No edema.      Left lower leg: No edema.   Skin:     General: Skin is warm and dry.   Neurological:      General: No focal deficit present.      Mental Status: She is alert and oriented to person, place, and time.   Psychiatric:         Attention and Perception: Attention normal.         Mood and Affect: Mood normal. Mood is not anxious.         Speech: Speech normal.         Behavior: Behavior is not agitated. Behavior is cooperative.               Significant Labs: All pertinent labs within the past 24 hours have been reviewed.    Significant Imaging: I have reviewed all pertinent imaging results/findings within the past 24 hours.      Assessment & Plan  Acute blood loss anemia  Anemia is likely due to acute blood loss which was from GI source    Monitor serial CBC: Daily  Hold anticoagulation at this time  Goal directed  "resuscitation, transfuse for Hgb<7  Family planning to directed donate blood if needed for transfusion with preference for unvaccinated blood  Limitations on blood options given refusal to accept blood that might have come from a vaccinated person  Received 1U PRBC 4.4.25  Stable, monitor    Ulcer of esophagus  Gastro-esophageal reflux disease without esophagitis  Hiatal hernia with GERD  Appears to have used Protonix and omeprazole for a period at SNF along with viscious po agents without symptomatic relief that has progressed to N/V with liquids and solids    Cont protonix BID  sucralfate before and after meals  CLD with continued monitoring  Zofran  qd CBC  Appreciated GI evaluation for EGD, noting recent C-spinal fusion as possible complicating factor for scope and with stable anemia will defer to OP follow up with GI.    Chronic kidney disease, stage III (moderate)  Creatine stable for now. BMP reviewed- noted Estimated Creatinine Clearance: 31.4 mL/min (based on SCr of 1.1 mg/dL). according to latest data. Based on current GFR, CKD stage is stage 3 - GFR 30-59.  Monitor UOP and serial BMP and adjust therapy as needed. Renally dose meds. Avoid nephrotoxic medications and procedures    Type 2 diabetes mellitus with diabetic chronic kidney disease  Patient's FSGs are controlled on current medication regimen.  Last A1c reviewed- indicated excellent control chronically  Lab Results   Component Value Date    HGBA1C 5.8 (H) 03/18/2025     Most recent fingerstick glucose reviewed-   No results for input(s): "POCTGLUCOSE" in the last 24 hours.    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 XR lumbar spine with mild scoliosis and degenerative changes.    Multimodal pain regimen with " scheduled Tylenol, prn methocarbamol, increase home gabapentin from 100mg to 200mg TID with judicious increases for pain control, lidocaine patches, prn Oxycodone and IV morphine for severe pain.     - Avoid additional opioids in setting of encephalopathy    Essential hypertension  Patient's blood pressure range in the last 24 hours was: BP  Min: 113/60  Max: 148/57.The patient's inpatient anti-hypertensive regimen is listed below:  Current Antihypertensives  amLODIPine tablet 5 mg, Daily, Oral  amlodipine (NORVASC) tablet, Daily, Oral    Plan  - BP is controlled, no changes needed to their regimen  Closed odontoid fracture with routine healing  Maintain C-collar    Urinary retention  Continue Flomax and hold bethanechol started one week ago to rule out N/V contributory factor from anticholinergic med    Hyponatremia  Resolved  Monitor    Troponin level elevated  Pt reports chest pain, but attributes this to hx of esophagitis  EKG non-contributory    Troponin trended to peak, suspect type 2    Alkaline phosphatase elevation  With associated epigastric pain  US RUQ to rule out gallstones or obstructive etiology of abdominal pain demonstrated sludge, but no obstructive findings    Bilateral pleural effusion  Patient found to have small pleural effusion on imaging. I have personally reviewed and interpreted the following imaging: CT. A thoracentesis was deferred. Most likely etiology includes Volume overload not due to CHF. Management to include  monitoring    Compression atelectasis  I have personally reviewed CT Scan. Patient with atelectasis on interpretation. Likely Non-Obstructive atelectasis secondary to pleural effusion. Signs and symptoms include  none  Will begin treatment with upright positioning    Encephalopathy, metabolic  Acute cystitis  Ruling out organic causes (UTI, constipation, last BM prior to admission). Possible hospital delirium (ED room for 48h without windows, opioids for severe back  pain)  bowel regimen  Initial UA with UTI, started on empiric Bactrim in light of abx allergies, completed short antibiotics course    Discharge planning issues  Pt discharged from [facility] with pt's daughter/GRACIE Ford relaying displeasure with that decision and looking to appeal decision. Advised to discuss matters with SNF if questions regarding her discharge from that facility.    PT/OT recs for low intensity therapy. Family wants SNF, they are now accepting that they need to look for other options besides [facility]    Chronic constipation      Hyperkalemia  Resolved s/p lokelma x1 dose          VTE Risk Mitigation (From admission, onward)      None            Discharge Planning   MIKAYLA: 4/8/2025     Code Status: Full Code   Medical Readiness for Discharge Date: 4/7/2025  Discharge Plan A: Skilled Nursing Facility   Discharge Delays: None known at this time            Please place Justification for DME        Raul Singh DO  Department of Hospital Medicine   [facility]

## 2025-04-07 NOTE — ASSESSMENT & PLAN NOTE
Pt reports chest pain, but attributes this to hx of esophagitis  EKG non-contributory    Troponin trended to peak, suspect type 2

## 2025-04-07 NOTE — ASSESSMENT & PLAN NOTE
----- Message from Ofe Velasquez MD sent at 8/31/2023  2:14 PM CDT -----  CT head is normal.    Appears to have used Protonix and omeprazole for a period at SNF along with viscious po agents without symptomatic relief that has progressed to N/V with liquids and solids    Cont protonix BID  sucralfate before and after meals  CLD with continued monitoring  Zofran  qd CBC  Appreciated GI evaluation for EGD, noting recent C-spinal fusion as possible complicating factor for scope and with stable anemia will defer to OP follow up with GI.

## 2025-04-07 NOTE — ASSESSMENT & PLAN NOTE
Creatine stable for now. BMP reviewed- noted Estimated Creatinine Clearance: 31.4 mL/min (based on SCr of 1.1 mg/dL). according to latest data. Based on current GFR, CKD stage is stage 3 - GFR 30-59.  Monitor UOP and serial BMP and adjust therapy as needed. Renally dose meds. Avoid nephrotoxic medications and procedures

## 2025-04-07 NOTE — ASSESSMENT & PLAN NOTE
Appears to have used Protonix and omeprazole for a period at SNF along with viscious po agents without symptomatic relief that has progressed to N/V with liquids and solids    Cont protonix BID  sucralfate before and after meals  CLD with continued monitoring  Zofran  qd CBC  Appreciated GI evaluation for EGD, noting recent C-spinal fusion as possible complicating factor for scope and with stable anemia will defer to OP follow up with GI.

## 2025-04-07 NOTE — ASSESSMENT & PLAN NOTE
Patient's blood pressure range in the last 24 hours was: BP  Min: 113/60  Max: 148/57.The patient's inpatient anti-hypertensive regimen is listed below:  Current Antihypertensives  amLODIPine tablet 5 mg, Daily, Oral  amlodipine (NORVASC) tablet, Daily, Oral    Plan  - BP is controlled, no changes needed to their regimen

## 2025-04-07 NOTE — PLAN OF CARE
[facility] - Med Surg  Discharge Reassessment    Primary Care Provider: No primary care provider on file.    Expected Discharge Date: 4/7/2025    PRINCE in communication with pt's daughter, Dora, regarding d/c plan.  Daughter asked SW to follow up with [facility] and [facility] for placement.      SW spoke to Marina alex/ [facility] [phone]; still no bed availability at this time.  SW telephoned [facility] [phone]; left a message requesting a return call.     PASRR completed in Assessment Pro.  Waiting for 142.     12:22 PM  [facility] accepted for SNF placement.  Request for insurance auth submitted to [facility] via fax [phone].       Discharge Plan A and Plan B have been determined by review of patient's clinical status, future medical and therapeutic needs, and coverage/benefits for post-acute care in coordination with multidisciplinary team members.    Reassessment (most recent)       Discharge Reassessment - 04/07/25 0950          Discharge Reassessment    Assessment Type Discharge Planning Reassessment     Did the patient's condition or plan change since previous assessment? No     Discharge Plan discussed with: Adult children     Communicated MIKAYLA with patient/caregiver Yes     Discharge Plan A Skilled Nursing Facility     Discharge Plan B Home with family;Home Health;Home;Other   Sitters and family support    DME Needed Upon Discharge  other (see comments)   TBD    Transition of Care Barriers None     Why the patient remains in the hospital Requires continued medical care        Post-Acute Status    Post-Acute Authorization Placement     Post-Acute Placement Status Pending post-acute provider review/more information requested     Coverage [employer]     Discharge Delays None known at this time                   Phoebe Shepard LMSW  Part-Time-  [facility]  Ext. [phone]

## 2025-04-07 NOTE — ASSESSMENT & PLAN NOTE
"Patient's FSGs are controlled on current medication regimen.  Last A1c reviewed- indicated excellent control chronically  Lab Results   Component Value Date    HGBA1C 5.8 (H) 03/18/2025     Most recent fingerstick glucose reviewed-   No results for input(s): "POCTGLUCOSE" in the last 24 hours.    Current correctional scale  None  Hold Oral hypoglycemics while patient is in the hospital    "

## 2025-04-07 NOTE — SUBJECTIVE & OBJECTIVE
Interval History:  Seen and evaluated on general medical floor  No acute events overnight    Clinical status stable, unchanged  Began having burning with urination    Objective:     Vital Signs (Most Recent):  Temp: 99 °F (37.2 °C) (04/07/25 1151)  Pulse: 67 (04/07/25 1151)  Resp: 18 (04/07/25 1151)  BP: 113/60 (04/07/25 1151)  SpO2: 97 % (04/07/25 1151) Vital Signs (24h Range):  Temp:  [97.2 °F (36.2 °C)-99 °F (37.2 °C)] 99 °F (37.2 °C)  Pulse:  [54-72] 67  Resp:  [16-18] 18  SpO2:  [95 %-97 %] 97 %  BP: (113-148)/(57-86) 113/60     Weight: 68 kg (150 lb)  Body mass index is 26.57 kg/m².    Intake/Output Summary (Last 24 hours) at 4/7/2025 1210  Last data filed at 4/7/2025 0442  Gross per 24 hour   Intake --   Output 1400 ml   Net -1400 ml         Physical Exam  Vitals and nursing note reviewed.   Constitutional:       General: She is not in acute distress.     Appearance: She is not ill-appearing.   HENT:      Head: Normocephalic and atraumatic.      Mouth/Throat:      Mouth: Mucous membranes are moist.      Pharynx: Oropharynx is clear.   Eyes:      Extraocular Movements: Extraocular movements intact.      Conjunctiva/sclera: Conjunctivae normal.   Neck:      Comments: C-collar in place  Cardiovascular:      Rate and Rhythm: Normal rate and regular rhythm.   Pulmonary:      Effort: Pulmonary effort is normal. No respiratory distress.      Breath sounds: No wheezing or rales.   Musculoskeletal:         General: No tenderness.      Right lower leg: No edema.      Left lower leg: No edema.   Skin:     General: Skin is warm and dry.   Neurological:      General: No focal deficit present.      Mental Status: She is alert and oriented to person, place, and time.   Psychiatric:         Attention and Perception: Attention normal.         Mood and Affect: Mood normal. Mood is not anxious.         Speech: Speech normal.         Behavior: Behavior is not agitated. Behavior is cooperative.               Significant Labs: All  pertinent labs within the past 24 hours have been reviewed.    Significant Imaging: I have reviewed all pertinent imaging results/findings within the past 24 hours.

## 2025-04-08 VITALS
SYSTOLIC BLOOD PRESSURE: 120 MMHG | WEIGHT: 150 LBS | RESPIRATION RATE: 16 BRPM | OXYGEN SATURATION: 99 % | DIASTOLIC BLOOD PRESSURE: 63 MMHG | HEART RATE: 65 BPM | BODY MASS INDEX: 26.58 KG/M2 | HEIGHT: 63 IN | TEMPERATURE: 97 F

## 2025-04-08 PROCEDURE — 97116 GAIT TRAINING THERAPY: CPT | Mod: CQ

## 2025-04-08 PROCEDURE — 94640 AIRWAY INHALATION TREATMENT: CPT

## 2025-04-08 PROCEDURE — 97530 THERAPEUTIC ACTIVITIES: CPT | Mod: CO

## 2025-04-08 PROCEDURE — 25000003 PHARM REV CODE 250

## 2025-04-08 PROCEDURE — 25000003 PHARM REV CODE 250: Performed by: STUDENT IN AN ORGANIZED HEALTH CARE EDUCATION/TRAINING PROGRAM

## 2025-04-08 PROCEDURE — 97535 SELF CARE MNGMENT TRAINING: CPT | Mod: CO

## 2025-04-08 PROCEDURE — 63600175 PHARM REV CODE 636 W HCPCS

## 2025-04-08 PROCEDURE — 99900031 HC PATIENT EDUCATION (STAT)

## 2025-04-08 PROCEDURE — 94761 N-INVAS EAR/PLS OXIMETRY MLT: CPT

## 2025-04-08 RX ORDER — CEFPODOXIME PROXETIL 100 MG/1
100 TABLET, FILM COATED ORAL 2 TIMES DAILY
Qty: 6 TABLET | Refills: 0 | Status: SHIPPED | OUTPATIENT
Start: 2025-04-09 | End: 2025-04-12

## 2025-04-08 RX ADMIN — CEFTRIAXONE 1 G: 1 INJECTION, POWDER, FOR SOLUTION INTRAMUSCULAR; INTRAVENOUS at 02:04

## 2025-04-08 RX ADMIN — ACETAMINOPHEN 1000 MG: 500 TABLET ORAL at 06:04

## 2025-04-08 RX ADMIN — GABAPENTIN 200 MG: 100 CAPSULE ORAL at 02:04

## 2025-04-08 RX ADMIN — PANTOPRAZOLE SODIUM 40 MG: 40 TABLET, DELAYED RELEASE ORAL at 06:04

## 2025-04-08 RX ADMIN — PANTOPRAZOLE SODIUM 40 MG: 40 TABLET, DELAYED RELEASE ORAL at 03:04

## 2025-04-08 RX ADMIN — GABAPENTIN 200 MG: 100 CAPSULE ORAL at 09:04

## 2025-04-08 RX ADMIN — ACETAMINOPHEN 1000 MG: 500 TABLET ORAL at 02:04

## 2025-04-08 RX ADMIN — FLUTICASONE PROPIONATE 50 MCG: 50 SPRAY, METERED NASAL at 09:04

## 2025-04-08 RX ADMIN — FLUTICASONE FUROATE AND VILANTEROL TRIFENATATE 1 PUFF: 100; 25 POWDER RESPIRATORY (INHALATION) at 07:04

## 2025-04-08 RX ADMIN — AMLODIPINE BESYLATE 5 MG: 5 TABLET ORAL at 09:04

## 2025-04-08 RX ADMIN — TAMSULOSIN HYDROCHLORIDE 0.4 MG: 0.4 CAPSULE ORAL at 09:04

## 2025-04-08 NOTE — PLAN OF CARE
04/08/25 1356   Medicare Message   Important Message from Medicare regarding Discharge Appeal Rights Given to patient/caregiver;Explained to patient/caregiver;Signed/date by patient/caregiver   Date IMM was signed 04/08/25   Time IMM was signed 0153     Phoebe Shepard LMSW  Part-Time-  [facility]  Ext. [phone]

## 2025-04-08 NOTE — PT/OT/SLP PROGRESS
Physical Therapy Co-Treatment with OT    Patient Name:  Bridget Montgomery   MRN:  [id number]    Recommendations:     Discharge Recommendations: Moderate Intensity Therapy  Discharge Equipment Recommendations: walker, rolling  Barriers to discharge:  increased level of assistance    Assessment:     Bridget Montgomery is a 89 y.o. female admitted with a medical diagnosis of Acute blood loss anemia.  She presents with the following impairments/functional limitations: weakness, impaired self care skills, decreased safety awareness, impaired endurance, impaired functional mobility, pain, gait instability, decreased lower extremity function, impaired cardiopulmonary response to activity, orthopedic precautions. Pt was motivated and pleasantly agreeable to work with PT/OT today. Pt daughter present in room and reports they have been ambulating with pt in the hallway, along with her cubii elliptical that is stationed in her room she does a few times a day. Pt is progressing well toward goals and continues to demonstrate the need for moderate intensity therapy on a daily basis post acute exhibited by decreased independence with functional mobility Pt would continue to benefit from skilled acute PT in order to address current deficits and progress functional mobility.     Co-treatment performed due to patient's multiple deficits requiring two skilled therapists to appropriately and safely assess patient's strength and endurance while facilitating functional tasks in addition to accommodating for patient's activity tolerance.      Rehab Prognosis: Good; patient would benefit from acute skilled PT services to address these deficits and reach maximum level of function.    Recent Surgery: * No surgery found *      Plan:     During this hospitalization, patient to be seen 4 x/week to address the identified rehab impairments via gait training, therapeutic exercises, therapeutic activities, neuromuscular re-education, wheelchair  management/training and progress toward the following goals:    Plan of Care Expires:  05/01/25    Subjective     Chief Complaint: none  Patient/Family Comments/goals: none  Pain/Comfort:  Pain Rating 1: 0/10  Pain Addressed 1: Pre-medicate for activity, Reposition, Distraction, Cessation of Activity      Objective:     Communicated with RN prior to session.  Patient found HOB elevated with cervical collar, telemetry, PureWick upon PT entry to room.     General Precautions: Standard, aspiration, fall, hearing impaired  Orthopedic Precautions: spinal precautions  Braces: Wake J collar  Respiratory Status: Room air     Functional Mobility:  Bed Mobility:     Supine to Sit: minimum assistance    Transfers:     Sit to Stand:  minimum assistance with rolling walker  Cueing for hand placement and sequencing     Gait: 46ft CGA with RW - chair follow  Deviations: decreased tessa and step length, forward flexed posture  When pt was fatiguing - slight R knee buckling noted   No LOB noted    Balance:   Static Sit: GOOD; SBA  Dynamic Sit: GOOD; SBA  Static stand: GOOD; SBA      AM-PAC 6 CLICK MOBILITY  Turning over in bed (including adjusting bedclothes, sheets and blankets)?: 3  Sitting down on and standing up from a chair with arms (e.g., wheelchair, bedside commode, etc.): 3  Moving from lying on back to sitting on the side of the bed?: 3  Moving to and from a bed to a chair (including a wheelchair)?: 3  Need to walk in hospital room?: 2  Climbing 3-5 steps with a railing?: 1  Basic Mobility Total Score: 15       Treatment & Education:  Educated pt on PT role/POC  Educated pt on importance of OOB activity and daily ambulation  Time provided for active listening, education, counseling and discussion of health disposition in regards to patient's current status   Questions/concerns addressed within PTA scope of practice. Answered to pt satisfaction, no further questions  White board updated accordingly   RN aware of  patient's mobility needs and status  Educated on energy conservation techniques   Gait belt utilized during session for patient safety  Bedside table in front of patient and area set up for function, convenience, and safety '  Gown, sock, and shoes donned    Patient left up in chair with all lines intact, call button in reach, RN notified, and family present..    GOALS:   Multidisciplinary Problems       Physical Therapy Goals          Problem: Physical Therapy    Goal Priority Disciplines Outcome Interventions   Physical Therapy Goal     PT, PT/OT Progressing    Description: Goals to be met by: 4/15/25     Patient will increase functional independence with mobility by performin. Supine to sit with MInimal Assistance  2. Sit to supine with MInimal Assistance  3. Sit to stand transfer with Minimal Assistance  4. Bed to chair transfer with Minimal Assistance using LRAD  5. Gait  x 25 feet with Minimal Assistance using Rolling Walker.   6. Wheelchair propulsion x25 feet with Minimal Assistance using extremities  7. Lower extremity exercise program x15 reps per handout, with assistance as needed                         DME Justifications:   Bridget's mobility limitation cannot be sufficiently resolved by the use of a cane. Her functional mobility deficit can be sufficiently resolved with the use of a Rolling Walker. Patient's mobility limitation significantly impairs their ability to participate in one of more activities of daily living.  The use of a RW will significantly improve the patient's ability to participate in MRADLS and the patient will use it on regular basis in the home.     Time Tracking:     PT Received On: 25  PT Start Time: 1040     PT Stop Time: 1103  PT Total Time (min): 23 min     Billable Minutes: Gait Training 23    Treatment Type: Treatment  PT/PTA: PTA     Number of PTA visits since last PT visit: 3     2025

## 2025-04-08 NOTE — ASSESSMENT & PLAN NOTE
Ruling out organic causes (UTI, constipation, last BM prior to admission). Possible hospital delirium (ED room for 48h without windows, opioids for severe back pain)  bowel regimen  Repeat UA appears infectious - having symptoms; cont rocephin, follow cultures

## 2025-04-08 NOTE — PLAN OF CARE
On- Call SW asked to monitor transportation to facility.  UNIT 300-ETA:18:00PM           Sofie Quigley LMSW   - [facility]

## 2025-04-08 NOTE — PLAN OF CARE
PRINCE spoke to [employer]/ [employer] [phone] and was advised insurance auth received.  PRINCE met with pt and pt's daughter Dora to discuss discharge plan for [place] today.  Pt and daughter (Dora) agreeable to d/c to [facility].  Pt's daughter has paperwork to sign for [facility].  Updated clinicals sent to [facility] for review.  Waiting for a return call.     Discharge Plan A and Plan B have been determined by review of patient's clinical status, future medical and therapeutic needs, and coverage/benefits for post-acute care in coordination with multidisciplinary team members.     04/08/25 1337   Post-Acute Status   Post-Acute Authorization Placement   Post-Acute Placement Status Pending post-acute provider review/more information requested   Coverage [employer]   Discharge Delays None known at this time   Discharge Plan   Discharge Plan A Skilled Nursing Facility   Discharge Plan B Home;Home with family;Home Health     Phoebe Shepard LMSW  Part-Time-  [facility]  Ext. [phone]

## 2025-04-08 NOTE — SUBJECTIVE & OBJECTIVE
Interval History:  Seen and evaluated on general medical floor  No acute events overnight    Clinical status stable, unchanged  No new complaints    Objective:     Vital Signs (Most Recent):  Temp: 97 °F (36.1 °C) (04/08/25 0605)  Pulse: 72 (04/08/25 1125)  Resp: 17 (04/08/25 1125)  BP: 116/68 (04/08/25 1125)  SpO2: 98 % (04/08/25 1125) Vital Signs (24h Range):  Temp:  [97 °F (36.1 °C)-97.5 °F (36.4 °C)] 97 °F (36.1 °C)  Pulse:  [65-73] 72  Resp:  [17-20] 17  SpO2:  [92 %-98 %] 98 %  BP: (114-163)/(67-71) 116/68     Weight: 68 kg (150 lb)  Body mass index is 26.57 kg/m².  No intake or output data in the 24 hours ending 04/08/25 1249      Physical Exam  Vitals and nursing note reviewed.   Constitutional:       General: She is not in acute distress.     Appearance: She is not ill-appearing.   HENT:      Head: Normocephalic and atraumatic.      Mouth/Throat:      Mouth: Mucous membranes are moist.      Pharynx: Oropharynx is clear.   Eyes:      Extraocular Movements: Extraocular movements intact.      Conjunctiva/sclera: Conjunctivae normal.   Neck:      Comments: C-collar in place  Cardiovascular:      Rate and Rhythm: Normal rate and regular rhythm.   Pulmonary:      Effort: Pulmonary effort is normal. No respiratory distress.      Breath sounds: No wheezing or rales.   Musculoskeletal:         General: No tenderness.      Right lower leg: No edema.      Left lower leg: No edema.   Skin:     General: Skin is warm and dry.   Neurological:      General: No focal deficit present.      Mental Status: She is alert and oriented to person, place, and time.   Psychiatric:         Attention and Perception: Attention normal.         Mood and Affect: Mood normal. Mood is not anxious.         Speech: Speech normal.         Behavior: Behavior is not agitated. Behavior is cooperative.               Significant Labs: All pertinent labs within the past 24 hours have been reviewed.    Significant Imaging: I have reviewed all  pertinent imaging results/findings within the past 24 hours.

## 2025-04-08 NOTE — ASSESSMENT & PLAN NOTE
Patient's blood pressure range in the last 24 hours was: BP  Min: 114/71  Max: 163/70.The patient's inpatient anti-hypertensive regimen is listed below:  Current Antihypertensives  amLODIPine tablet 5 mg, Daily, Oral  amlodipine (NORVASC) tablet, Daily, Oral    Plan  - BP is controlled, no changes needed to their regimen

## 2025-04-08 NOTE — PLAN OF CARE
Problem: Adult Inpatient Plan of Care  Goal: Plan of Care Review  Outcome: Progressing  Goal: Patient-Specific Goal (Individualized)  Outcome: Progressing  Goal: Absence of Hospital-Acquired Illness or Injury  Outcome: Progressing  Goal: Optimal Comfort and Wellbeing  Outcome: Progressing  Goal: Readiness for Transition of Care  Outcome: Progressing     Problem: Skin Injury Risk Increased  Goal: Skin Health and Integrity  Outcome: Progressing     Problem: Diabetes Comorbidity  Goal: Blood Glucose Level Within Targeted Range  Outcome: Progressing     Problem: Fall Injury Risk  Goal: Absence of Fall and Fall-Related Injury  Outcome: Progressing     Problem: Wound  Goal: Optimal Coping  Outcome: Progressing

## 2025-04-08 NOTE — PT/OT/SLP PROGRESS
Occupational Therapy   Treatment    Name: Bridget Montgomery  MRN: [id number]  Admitting Diagnosis:  Acute blood loss anemia       Recommendations:     Discharge Recommendations: Moderate Intensity Therapy  Discharge Equipment Recommendations:  walker, rolling  Barriers to discharge:  Other (Comment) (increased skilled (A) needed)    Assessment:     Bridget Montgomery is a 89 y.o. female with a medical diagnosis of Acute blood loss anemia.  She presents with the following performance deficits affecting function: weakness, impaired balance, impaired endurance, impaired self care skills, impaired functional mobility, gait instability, decreased lower extremity function, decreased safety awareness.     Rehab Prognosis:  Good; patient would benefit from acute skilled OT services to address these deficits and reach maximum level of function.       Plan:     Patient to be seen 4 x/week to address the above listed problems via self-care/home management, therapeutic activities, therapeutic exercises, neuromuscular re-education  Plan of Care Expires: 05/01/25  Plan of Care Reviewed with: patient    Subjective     Patient/Family Comments/goals: to improve function   Pain/Comfort:  Pain Rating 1: 0/10    Objective:     Communicated with: RN prior to session.  Patient found HOB elevated with cervical collar, telemetry, PureWick upon OT entry to room.    General Precautions: Standard, fall, hearing impaired, aspiration    Orthopedic Precautions:spinal precautions  Braces: Treasure J collar  Respiratory Status: Room air     Occupational Performance:     Bed Mobility:    Patient completed Scooting/Bridging with minimum assistance  Patient completed Supine to Sit with minimum assistance     Functional Mobility/Transfers:  Patient completed Sit <> Stand Transfer with minimum assistance  with  rolling walker   Functional Mobility: pt ambulating ~46ft with CGA using RW. No LOB noted.     Activities of Daily Living:  Lower Body Dressing:  maximal assistance to don shoes seated EOB       [facility] 6 Click ADL: 15    Treatment & Education:  Pt educated on OT POC and frequency during hospital stay.   Pt educated on proper hand placement and techniques for RW mgmt to improve safety awareness.   Pt educated on importance of OOB activity to improve function and activity tolerance.  Addressed all patient questions/concerns within PEREZ scope of practice.     Patient left up in chair with all lines intact, call button in reach, and RN notified    GOALS:   Multidisciplinary Problems       Occupational Therapy Goals          Problem: Occupational Therapy    Goal Priority Disciplines Outcome Interventions   Occupational Therapy Goal     OT, PT/OT Progressing    Description: Goals to be met by: 04/15/2025     Patient will increase functional independence with ADLs by performing:    UE Dressing with Contact Guard Assistance.  LE Dressing with Minimal Assistance.  Grooming while seated with Supervision.  Toileting from toilet/BSC with Minimal Assistance for hygiene and clothing management.   Toilet transfer to toilet/BSC with Minimal Assistance.  Functional mobility to simulate household/community distances with Minimal Assistance and utilizing LRAD in order to maximize functional activity tolerance and standing balance required for engagement in occupations of choice.                         Time Tracking:     OT Date of Treatment: 04/08/25  OT Start Time: 1040  OT Stop Time: 1103  OT Total Time (min): 23 min    Billable Minutes:Self Care/Home Management 8  Therapeutic Activity 15    OT/BECKA: BECKA     Number of BECKA visits since last OT visit: 2    4/8/2025

## 2025-04-08 NOTE — PROGRESS NOTES
[facility] Medicine  Progress Note    Patient Name: Bridget Montgomery  MRN: [id number]  Patient Class: IP- Inpatient   Admission Date: 3/28/2025  Length of Stay: 9 days  Attending Physician: Raul Singh DO  Primary Care Provider: No primary care provider on file.        Subjective     Principal Problem:Acute blood loss anemia        HPI:  Ms. 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 Her abdominal pain has been worsening for the past week and has progressed to bilious vomiting of meds or anything that she tries to eat/drink. No noted hematemesis, melena, hematochezia. Per meds review no recent NSAID use (listed as allergy, and has CKD). No ETOH use. Pt has a known hiatal hernia. Pt's back pain has worsened over the past few days without any recent falls, trauma, and she does report a history of chronic back pain, but is wailing in pain in the ED. Per collateral from her daughter and HCPOA, Liliana Montgomery, pt has been working on elliptical and doing PT/OT at [facility] without significant issues. No fevers, chills, SOB, LE edema.     In the ED she is afebrile, hemodynamically stable on RA with leukocytosis to 13, anemia to 7.7 (baseline 10), isolated ALP elevation to 203, lipase wnl, high-sensitivity troponin elevation to 51 with unremarkable EKG. CXR with increase in the perihilar interstitial and vascular markings. XR lumbar spine with mild scoliosis, but no acute fractures. CT AP with mildly distended bladder, diverticulosis, and moderate to large hiatal hernia. Pt given Maalox, viscous lidocaine, famotidine, pantoprazole, Zofran, and morphine for abdominal, chest, and back pain. Admitted to Hospital Medicine for pain control and evaluation of  abdominal pain.     Overview/Hospital Course:  Admitted with lower back pain better controlled with multimodal regimen. Pt able to tolerate a turkey sandwich for first time in several days after admission with IV PPI, sucralfate, viscous lidocaine. GI consulted with appreciated evaluation for EGD given pt's reported GERD symptoms, inability to tolerate PO for days before admission, known hiatal hernia, and continued anemia (mixed etiology per iron studies), though recent c-spine fusion is a consideration prior to scoping. GI defers to OP workup with ongoing PPI given recent surgery. Symptomatic relief for rhinorrhea provided with pt's daughter's concerns for post-operative complications from sinus infection.     Hospital associated delirium in conjunction with polypharmacy (opioids for severe back pain) on 03/30 with patient agitated, pulling at NC, attempting to remove C-collar. Pt re-directed with assistance from her daughters in attempt to avoid using agitation meds with some resolution of agitation throughout the night. UA with UTI, started on Bactrim empirically in light of patient's med allergies. Mentation improved to baseline. Hospital course complicated by acute anemia in spite of IV iron supplementation. GI re-consulted after recurrent anemia, but deferred EGD (c-spine surgery) and recommended pRBC transfusion. Pt transfused with directed donations as she and her daughter Liliana request blood from unvaccinated donors.    Repeat UA infectious and she is symptomatic.  Cont rocephin.    Patient transfused 1 unit PRBC on 04/04.  Hgb stable.    Interval History:  Seen and evaluated on general medical floor  No acute events overnight    Clinical status stable, unchanged  No new complaints    Objective:     Vital Signs (Most Recent):  Temp: 97 °F (36.1 °C) (04/08/25 0605)  Pulse: 72 (04/08/25 1125)  Resp: 17 (04/08/25 1125)  BP: 116/68 (04/08/25 1125)  SpO2: 98 % (04/08/25 1125) Vital Signs (24h Range):  Temp:   [97 °F (36.1 °C)-97.5 °F (36.4 °C)] 97 °F (36.1 °C)  Pulse:  [65-73] 72  Resp:  [17-20] 17  SpO2:  [92 %-98 %] 98 %  BP: (114-163)/(67-71) 116/68     Weight: 68 kg (150 lb)  Body mass index is 26.57 kg/m².  No intake or output data in the 24 hours ending 04/08/25 1249      Physical Exam  Vitals and nursing note reviewed.   Constitutional:       General: She is not in acute distress.     Appearance: She is not ill-appearing.   HENT:      Head: Normocephalic and atraumatic.      Mouth/Throat:      Mouth: Mucous membranes are moist.      Pharynx: Oropharynx is clear.   Eyes:      Extraocular Movements: Extraocular movements intact.      Conjunctiva/sclera: Conjunctivae normal.   Neck:      Comments: C-collar in place  Cardiovascular:      Rate and Rhythm: Normal rate and regular rhythm.   Pulmonary:      Effort: Pulmonary effort is normal. No respiratory distress.      Breath sounds: No wheezing or rales.   Musculoskeletal:         General: No tenderness.      Right lower leg: No edema.      Left lower leg: No edema.   Skin:     General: Skin is warm and dry.   Neurological:      General: No focal deficit present.      Mental Status: She is alert and oriented to person, place, and time.   Psychiatric:         Attention and Perception: Attention normal.         Mood and Affect: Mood normal. Mood is not anxious.         Speech: Speech normal.         Behavior: Behavior is not agitated. Behavior is cooperative.               Significant Labs: All pertinent labs within the past 24 hours have been reviewed.    Significant Imaging: I have reviewed all pertinent imaging results/findings within the past 24 hours.      Assessment & Plan  Acute blood loss anemia  Anemia is likely due to acute blood loss which was from GI source    Monitor serial CBC: Daily  Hold anticoagulation at this time  Goal directed resuscitation, transfuse for Hgb<7  Family planning to directed donate blood if needed for transfusion with preference for  "unvaccinated blood  Limitations on blood options given refusal to accept blood that might have come from a vaccinated person  Received 1U PRBC 4.4.25  Stable, monitor    Ulcer of esophagus  Gastro-esophageal reflux disease without esophagitis  Hiatal hernia with GERD  Appears to have used Protonix and omeprazole for a period at SNF along with viscious po agents without symptomatic relief that has progressed to N/V with liquids and solids    Cont protonix BID  sucralfate before and after meals  CLD with continued monitoring  Zofran  qd CBC  Appreciated GI evaluation for EGD, noting recent C-spinal fusion as possible complicating factor for scope and with stable anemia will defer to OP follow up with GI.    Chronic kidney disease, stage III (moderate)  Creatine stable for now. BMP reviewed- noted Estimated Creatinine Clearance: 31.4 mL/min (based on SCr of 1.1 mg/dL). according to latest data. Based on current GFR, CKD stage is stage 3 - GFR 30-59.  Monitor UOP and serial BMP and adjust therapy as needed. Renally dose meds. Avoid nephrotoxic medications and procedures    Type 2 diabetes mellitus with diabetic chronic kidney disease  Patient's FSGs are controlled on current medication regimen.  Last A1c reviewed- indicated excellent control chronically  Lab Results   Component Value Date    HGBA1C 5.8 (H) 03/18/2025     Most recent fingerstick glucose reviewed-   No results for input(s): "POCTGLUCOSE" in the last 24 hours.    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 XR lumbar spine with mild scoliosis and degenerative changes.    Multimodal pain regimen with scheduled Tylenol, prn methocarbamol, increase home gabapentin from 100mg to 200mg TID with judicious increases for pain " control, lidocaine patches, prn Oxycodone and IV morphine for severe pain.     - Avoid additional opioids in setting of encephalopathy    Essential hypertension  Patient's blood pressure range in the last 24 hours was: BP  Min: 114/71  Max: 163/70.The patient's inpatient anti-hypertensive regimen is listed below:  Current Antihypertensives  amLODIPine tablet 5 mg, Daily, Oral  amlodipine (NORVASC) tablet, Daily, Oral    Plan  - BP is controlled, no changes needed to their regimen  Closed odontoid fracture with routine healing  Maintain C-collar    Urinary retention  Continue Flomax and hold bethanechol started one week ago to rule out N/V contributory factor from anticholinergic med    Hyponatremia  Resolved  Monitor    Troponin level elevated  Pt reports chest pain, but attributes this to hx of esophagitis  EKG non-contributory    Troponin trended to peak, suspect type 2    Alkaline phosphatase elevation  With associated epigastric pain  US RUQ to rule out gallstones or obstructive etiology of abdominal pain demonstrated sludge, but no obstructive findings    Bilateral pleural effusion  Patient found to have small pleural effusion on imaging. I have personally reviewed and interpreted the following imaging: CT. A thoracentesis was deferred. Most likely etiology includes Volume overload not due to CHF. Management to include  monitoring    Compression atelectasis  I have personally reviewed CT Scan. Patient with atelectasis on interpretation. Likely Non-Obstructive atelectasis secondary to pleural effusion. Signs and symptoms include  none  Will begin treatment with upright positioning    Encephalopathy, metabolic  Acute cystitis  Ruling out organic causes (UTI, constipation, last BM prior to admission). Possible hospital delirium (ED room for 48h without windows, opioids for severe back pain)  bowel regimen  Repeat UA appears infectious - having symptoms; cont rocephin, follow cultures    Discharge planning  issues  Pt discharged from [facility] with pt's daughter/GRACIE Ford relaying displeasure with that decision and looking to appeal decision. Advised to discuss matters with SNF if questions regarding her discharge from that facility.    PT/OT recs for low intensity therapy. Family wants SNF, they are now accepting that [facility] need [facility]    Chronic constipation      Hyperkalemia  Resolved s/p lokelma x1 dose          VTE Risk Mitigation (From admission, onward)      None            Discharge Planning   MIKAYLA: 4/8/2025     Code Status: Full Code   Medical Readiness for Discharge Date: 4/7/2025  Discharge Plan A: Skilled Nursing Facility   Discharge Delays: None known at this time            Please place Justification for DME        Raul Singh DO  Department of Hospital Medicine   [facility]

## 2025-04-08 NOTE — PLAN OF CARE
Patient discharged. Discharge instructions explained, verbalized understanding. IV removed, catheter tip intact. Patient waiting on medications and transportation. Will continue to monitor patient.   Problem: Adult Inpatient Plan of Care  Goal: Plan of Care Review  Outcome: Adequate for Care Transition  Goal: Patient-Specific Goal (Individualized)  Outcome: Adequate for Care Transition  Goal: Absence of Hospital-Acquired Illness or Injury  Outcome: Adequate for Care Transition  Goal: Optimal Comfort and Wellbeing  Outcome: Adequate for Care Transition  Goal: Readiness for Transition of Care  Outcome: Adequate for Care Transition     Problem: Skin Injury Risk Increased  Goal: Skin Health and Integrity  Outcome: Adequate for Care Transition     Problem: Diabetes Comorbidity  Goal: Blood Glucose Level Within Targeted Range  Outcome: Adequate for Care Transition     Problem: Fall Injury Risk  Goal: Absence of Fall and Fall-Related Injury  Outcome: Adequate for Care Transition     Problem: Wound  Goal: Optimal Coping  Outcome: Adequate for Care Transition  Goal: Optimal Functional Ability  Outcome: Adequate for Care Transition  Goal: Absence of Infection Signs and Symptoms  Outcome: Adequate for Care Transition  Goal: Improved Oral Intake  Outcome: Adequate for Care Transition  Goal: Optimal Pain Control and Function  Outcome: Adequate for Care Transition  Goal: Skin Health and Integrity  Outcome: Adequate for Care Transition  Goal: Optimal Wound Healing  Outcome: Adequate for Care Transition     Problem: Delirium  Goal: Optimal Coping  Outcome: Adequate for Care Transition  Goal: Improved Behavioral Control  Outcome: Adequate for Care Transition  Goal: Improved Attention and Thought Clarity  Outcome: Adequate for Care Transition  Goal: Improved Sleep  Outcome: Adequate for Care Transition

## 2025-04-08 NOTE — PLAN OF CARE
PRINCE in communication with pt's daughter, Dora, regarding discharge plan and is agreeable to d/c to [facility].  Daughter Dora completed paperwork w/ Jose at bedside and Yuliya w/ [facility] via telephone.      PRINCE scheduled d/c transportation to [facility]  through [facility]. Patient is scheduled to be picked up at 6:00p.m. PRINCE provided patient's nurse with report number [phone]; ask for the nurse for room 135B.  PRINCE in communication with nurse and medical team and advised of the above information.  Pt will discharge with neck brace/ wheelchair van/ room air.      Discharge Plan A and Plan B have been determined by review of patient's clinical status, future medical and therapeutic needs, and coverage/benefits for post-acute care in coordination with multidisciplinary team members.     04/08/25 1723   Post-Acute Status   Post-Acute Authorization Placement   Post-Acute Placement Status Set-up Complete/Auth obtained   Coverage [employer]   Discharge Delays None known at this time   Discharge Plan   Discharge Plan A Skilled Nursing Facility   Discharge Plan B Home;Home with family;Home Health       Phoebe Shepard LMSW  Part-Time-  [facility]  Ext. [phone]

## 2025-04-09 ENCOUNTER — TELEPHONE (OUTPATIENT)
Dept: GASTROENTEROLOGY | Facility: CLINIC | Age: OVER 89
End: 2025-04-09
Payer: MEDICARE

## 2025-04-09 NOTE — PLAN OF CARE
[facility] - Med Surg  Discharge Final Note    Primary Care Provider: No primary care provider on file.    Expected Discharge Date: 4/8/2025    Pt discharged to [facility].  Pt discharged via wheelchair/room air.      Discharge Plan A and Plan B have been determined by review of patient's clinical status, future medical and therapeutic needs, and coverage/benefits for post-acute care in coordination with multidisciplinary team members.    Future Appointments   Date Time Provider Department Center   5/1/2025  9:45 AM [facility]   5/1/2025 10:30 AM Katie Castillo, NP [facility] NEUROS[facility]   5/27/2025 12:40 PM Iqra Brunson, NP [facility] GASTRO [place]   6/16/2025 11:00 AM Lluvia Rahman MD [facility]         Final Discharge Note (most recent)       Final Note - 04/09/25 0753          Final Note    Assessment Type Final Discharge Note     Anticipated Discharge Disposition Skilled Nursing Facility     Hospital Resources/Appts/Education Provided Provided patient/caregiver with written discharge plan information        Post-Acute Status    Post-Acute Authorization Placement     Post-Acute Placement Status Set-up Complete/Auth obtained     Coverage [facility]     Discharge Delays None known at this time                     Important Message from Medicare  Important Message from Medicare regarding Discharge Appeal Rights: Given to patient/caregiver, Explained to patient/caregiver, Signed/date by patient/caregiver     Date IMM was signed: 04/08/25  Time IMM was signed: 0153      Phoebe Shepard LMSW  Part-Time-  [facility]  Ext. [phone]

## 2025-04-11 ENCOUNTER — TELEPHONE (OUTPATIENT)
Dept: NEUROSURGERY | Facility: CLINIC | Age: OVER 89
End: 2025-04-11
Payer: MEDICARE

## 2025-04-11 ENCOUNTER — PATIENT MESSAGE (OUTPATIENT)
Dept: NEUROSURGERY | Facility: CLINIC | Age: OVER 89
End: 2025-04-11
Payer: MEDICARE

## 2025-04-11 NOTE — TELEPHONE ENCOUNTER
P/c to pt. Dtr and pt cell.  As well as sent message via portal that appt scheduled for Tuesday for staple removal    Future Appointments   Date Time Provider Department Center   4/15/2025 11:30 AM Marlee Hidalgo RN [facility] NEUROS[address] [facility]   5/1/2025  9:45 AM [facility] OIC-XRAY [facility] XRAY IC Imaging Ctr   5/1/2025 10:30 AM Katie Castillo NP [facility] NEUROS [address] [facility]   6/16/2025 11:00 AM Lluvia Rahman MD [facility]

## 2025-04-11 NOTE — TELEPHONE ENCOUNTER
----- Message from  Cornelia sent at 4/11/2025  8:13 AM CDT -----  Regarding: RE: PO Advise  Contact: Dora - [phone]    ----- Message -----  From: Yue Swanson  Sent: 4/10/2025   4:58 PM CDT  To: Cristo Jaramillo Staff  Subject: PO Advise                                        Bridget Montgomery calling regarding Patient Advice (message) for # pt oldest daughter oDra is calling to speak with nurse regarding bringing in pt to take out staples in her neck pls advise Dora - [phone]

## 2025-04-12 LAB
BACTERIA UR CULT: ABNORMAL
BACTERIA UR CULT: ABNORMAL

## 2025-04-15 ENCOUNTER — PATIENT MESSAGE (OUTPATIENT)
Dept: NEUROSURGERY | Facility: CLINIC | Age: OVER 89
End: 2025-04-15

## 2025-04-15 ENCOUNTER — CLINICAL SUPPORT (OUTPATIENT)
Dept: NEUROSURGERY | Facility: CLINIC | Age: OVER 89
End: 2025-04-15
Payer: MEDICARE

## 2025-04-15 ENCOUNTER — TELEPHONE (OUTPATIENT)
Dept: NEUROSURGERY | Facility: HOSPITAL | Age: OVER 89
End: 2025-04-15
Payer: MEDICARE

## 2025-04-15 VITALS — DIASTOLIC BLOOD PRESSURE: 71 MMHG | SYSTOLIC BLOOD PRESSURE: 117 MMHG | TEMPERATURE: 98 F | HEART RATE: 79 BPM

## 2025-04-15 DIAGNOSIS — Z98.1 S/P CERVICAL SPINAL FUSION: Primary | ICD-10-CM

## 2025-04-15 PROCEDURE — 99999 PR PBB SHADOW E&M-EST. PATIENT-LVL II: CPT | Mod: PBBFAC,,,

## 2025-04-15 RX ORDER — METHOCARBAMOL 500 MG/1
500 TABLET, FILM COATED ORAL 3 TIMES DAILY
Start: 2025-04-15 | End: 2025-04-25

## 2025-04-15 RX ORDER — HYDROCODONE BITARTRATE AND ACETAMINOPHEN 5; 325 MG/1; MG/1
1 TABLET ORAL EVERY 6 HOURS PRN
Qty: 28 TABLET | Refills: 0 | Status: SHIPPED | OUTPATIENT
Start: 2025-04-15

## 2025-04-15 NOTE — PROGRESS NOTES
Bridget Montgomery is a 89 y.o. female here for 2 week post op wound check here on 8th floor neurosurgery clinic    Surgery:occiput - C4 fusion    Symptoms: -    DME:w/c    Brace: enoc cheney    Pain: 10 - neck    Medication Refills: yes, methocarbomol and hydrocodone.       Incision with sutures and  staples, BECKA, well approximated, redness noted to upper area of incision(possibly from brace)   ,no swelling or purulent drainage. Sutures and  staples removed. Pt tolerated procedure well.    EDUCATION:    Instructed patient to keep incision BECKA, no lotions, creams or bandages. OK to shower without water pressure to incision. No scrubbing. Pat dry.  No submurging incision in water (no bathing/swimming) until 8 weeks after surgery once wound is healed. Do not lift more than 10 pounds. Avoid bending or twisting in the area of your surgery more than 45 degrees from neutral position in any direction. Wear brace at all times when up out of bed except when showering or flat in bed. Increase ambulation as tolerated. You should be walking 2 miles/day. Walk on paved surfaces only, holding side rail when using stairs.  No sexual activity for 6 weeks after surgery.  No driving or operating heavy machinary : until cleared by surgeon  or while you are taking narcotic pain medication or muscle relaxant.  If you have had a fusion, do not take any non steroidal anti-inflammatory drugs (NSAIDS) Including the following: Ibuprofen, naprosyn, Aleve, Advil, Indocin, Mobic, or Celebrex for 6 weeks.  Take docusate (colace 100mg): take 1 capsule a day as needed for constipation. You can purchase over the counter.  Avoid use of tobacco products.    Call your doctor or go to the Emergency Room for any signs of infection including: increased redness, drainage, pain or fever (temperature of 101.5 or above for 24hrs). Call your doctor or go to the ER if there are any localized neurological changes, problems with speech,  vision,numbness,tingling,weakness,severe headache or other concerns.  Above instructions reviewed with pt and copy of instructions given to pt along with follow up appt date and time.    Physicians need 3 days' notice for pain medicine to be refilled. Pain medicine will only be refilled between 8am and 5pm Monday-Friday.    Patient verbalizes understanding. All questions answered. Pt. Encouraged to call or send message thru the portal for any additional concerns. Patient to followup with Dr. Lemons for 6 week followup with imaging.    Future Appointments   Date Time Provider Department Center   4/29/2025  3:00 PM Lluvia Rahman MD [facility]   5/1/2025  9:45 AM [facility] OIC-XRAY [facility]   5/1/2025 10:30 AM Katie Castillo NP [facility] [address]OS[address]   6/16/2025 11:00 AM Lluvia Rahman MD [facility]

## 2025-04-15 NOTE — TELEPHONE ENCOUNTER
----- Message from MARIAMA Whalen sent at 4/15/2025  1:35 PM CDT -----  Pt would like to get rx for hydrocodone (not oxy).   Also, wants methocarbomolPharmacy verified.

## 2025-04-16 ENCOUNTER — HOSPITAL ENCOUNTER (EMERGENCY)
Facility: HOSPITAL | Age: OVER 89
Discharge: HOME | End: 2025-04-17
Attending: EMERGENCY MEDICINE
Payer: MEDICARE

## 2025-04-16 VITALS
SYSTOLIC BLOOD PRESSURE: 127 MMHG | WEIGHT: 150 LBS | RESPIRATION RATE: 18 BRPM | BODY MASS INDEX: 26.58 KG/M2 | OXYGEN SATURATION: 99 % | DIASTOLIC BLOOD PRESSURE: 57 MMHG | HEIGHT: 63 IN | HEART RATE: 71 BPM

## 2025-04-16 DIAGNOSIS — N30.00 ACUTE CYSTITIS WITHOUT HEMATURIA: Primary | ICD-10-CM

## 2025-04-16 LAB
ABSOLUTE EOSINOPHIL (OHS): 0.15 K/UL
ABSOLUTE MONOCYTE (OHS): 0.87 K/UL (ref 0.3–1)
ABSOLUTE NEUTROPHIL COUNT (OHS): 3.96 K/UL (ref 1.8–7.7)
ALBUMIN SERPL BCP-MCNC: 2.6 G/DL (ref 3.5–5.2)
ALP SERPL-CCNC: 212 UNIT/L (ref 40–150)
ALT SERPL W/O P-5'-P-CCNC: 11 UNIT/L (ref 10–44)
ANION GAP (OHS): 13 MMOL/L (ref 8–16)
AST SERPL-CCNC: 16 UNIT/L (ref 11–45)
BACTERIA #/AREA URNS AUTO: ABNORMAL /HPF
BASOPHILS # BLD AUTO: 0.07 K/UL
BASOPHILS NFR BLD AUTO: 1.2 %
BILIRUB SERPL-MCNC: 0.1 MG/DL (ref 0.1–1)
BILIRUB UR QL STRIP.AUTO: NEGATIVE
BUN SERPL-MCNC: 32 MG/DL (ref 8–23)
CALCIUM SERPL-MCNC: 9.3 MG/DL (ref 8.7–10.5)
CHLORIDE SERPL-SCNC: 100 MMOL/L (ref 95–110)
CLARITY UR: ABNORMAL
CO2 SERPL-SCNC: 23 MMOL/L (ref 23–29)
COLOR UR AUTO: YELLOW
CREAT SERPL-MCNC: 1.3 MG/DL (ref 0.5–1.4)
ERYTHROCYTE [DISTWIDTH] IN BLOOD BY AUTOMATED COUNT: 16.5 % (ref 11.5–14.5)
GFR SERPLBLD CREATININE-BSD FMLA CKD-EPI: 39 ML/MIN/1.73/M2
GLUCOSE SERPL-MCNC: 122 MG/DL (ref 70–110)
GLUCOSE UR QL STRIP: NEGATIVE
HCT VFR BLD AUTO: 28.8 % (ref 37–48.5)
HGB BLD-MCNC: 9.3 GM/DL (ref 12–16)
HGB UR QL STRIP: ABNORMAL
IMM GRANULOCYTES # BLD AUTO: 0.02 K/UL (ref 0–0.04)
IMM GRANULOCYTES NFR BLD AUTO: 0.3 % (ref 0–0.5)
KETONES UR QL STRIP: NEGATIVE
LEUKOCYTE ESTERASE UR QL STRIP: ABNORMAL
LYMPHOCYTES # BLD AUTO: 0.85 K/UL (ref 1–4.8)
MAGNESIUM SERPL-MCNC: 2.3 MG/DL (ref 1.6–2.6)
MCH RBC QN AUTO: 29.3 PG (ref 27–31)
MCHC RBC AUTO-ENTMCNC: 32.3 G/DL (ref 32–36)
MCV RBC AUTO: 91 FL (ref 82–98)
MICROSCOPIC COMMENT: ABNORMAL
NITRITE UR QL STRIP: NEGATIVE
NON-SQ EPI CELLS #/AREA URNS AUTO: 2 /HPF
NUCLEATED RBC (/100WBC) (OHS): 0 /100 WBC
PH UR STRIP: 6 [PH]
PLATELET # BLD AUTO: 469 K/UL (ref 150–450)
PMV BLD AUTO: 9.2 FL (ref 9.2–12.9)
POTASSIUM SERPL-SCNC: 4.5 MMOL/L (ref 3.5–5.1)
PROT SERPL-MCNC: 6.7 GM/DL (ref 6–8.4)
PROT UR QL STRIP: ABNORMAL
RBC # BLD AUTO: 3.17 M/UL (ref 4–5.4)
RBC #/AREA URNS AUTO: 16 /HPF (ref 0–4)
RELATIVE EOSINOPHIL (OHS): 2.5 %
RELATIVE LYMPHOCYTE (OHS): 14.4 % (ref 18–48)
RELATIVE MONOCYTE (OHS): 14.7 % (ref 4–15)
RELATIVE NEUTROPHIL (OHS): 66.9 % (ref 38–73)
SODIUM SERPL-SCNC: 136 MMOL/L (ref 136–145)
SP GR UR STRIP: 1.01
UROBILINOGEN UR STRIP-ACNC: NEGATIVE EU/DL
WBC # BLD AUTO: 5.92 K/UL (ref 3.9–12.7)
WBC #/AREA URNS AUTO: >100 /HPF (ref 0–5)
WBC CLUMPS UR QL AUTO: ABNORMAL

## 2025-04-16 PROCEDURE — 80053 COMPREHEN METABOLIC PANEL: CPT | Performed by: EMERGENCY MEDICINE

## 2025-04-16 PROCEDURE — 83735 ASSAY OF MAGNESIUM: CPT | Performed by: EMERGENCY MEDICINE

## 2025-04-16 PROCEDURE — 85025 COMPLETE CBC W/AUTO DIFF WBC: CPT | Performed by: EMERGENCY MEDICINE

## 2025-04-16 PROCEDURE — 99283 EMERGENCY DEPT VISIT LOW MDM: CPT

## 2025-04-16 PROCEDURE — 81003 URINALYSIS AUTO W/O SCOPE: CPT | Performed by: EMERGENCY MEDICINE

## 2025-04-16 PROCEDURE — 87181 SC STD AGAR DILUTION PER AGT: CPT | Performed by: EMERGENCY MEDICINE

## 2025-04-17 LAB — HOLD SPECIMEN: NORMAL

## 2025-04-17 PROCEDURE — 25000003 PHARM REV CODE 250: Performed by: EMERGENCY MEDICINE

## 2025-04-17 RX ORDER — CEPHALEXIN 500 MG/1
500 CAPSULE ORAL 4 TIMES DAILY
Qty: 28 CAPSULE | Refills: 0 | Status: SHIPPED | OUTPATIENT
Start: 2025-04-17 | End: 2025-04-24

## 2025-04-17 RX ORDER — CEPHALEXIN 500 MG/1
500 CAPSULE ORAL
Status: COMPLETED | OUTPATIENT
Start: 2025-04-17 | End: 2025-04-17

## 2025-04-17 RX ADMIN — CEPHALEXIN 500 MG: 500 CAPSULE ORAL at 12:04

## 2025-04-17 NOTE — ED NOTES
On the phone with pharmacy at this moment. Pharmacy questions what is pt's reaction to cephalexin due to this medication being an allergy listed in chart. This nurse and MD at bedside. Pt states she does not know what reaction is to this medication. Daughter denies knowing reaction to this medication. MD states it is fine to administer. Pharmacy notified of approval by MD at this moment

## 2025-04-17 NOTE — DISCHARGE INSTRUCTIONS
Thank you for choosing [facility]! We appreciate you trusting us with your medical care.     It is important to remember that some problems are difficult to diagnose and may not be found during your first visit. Be sure to follow up with your primary care doctor and review any labs/imaging that was performed during your visit with them. If you do not have a primary care doctor, you may contact the one listed on your discharge paperwork, or you may also call the [facility] Appointment Desk at [phone] to schedule an appointment.     All medications may potentially have side effects and it is impossible to predict which medications may give you side effects. If you feel that you are having a negative effect of any medication you should immediately stop taking them and seek medical attention.  Do not drive or make any important decisions for 24 hours if you have received any pain medications, sedatives or mood altering drugs during your ER visit.    We will be happy to take care of you for all of your future medical needs. You may return to the ER at any time for any new/concerning symptoms, worsening condition, or failure to improve. We hope you feel better soon.     Sharona Walden MD  Board Certified Emergency Medicine Physician      VSS. Urine sample collected and sent to lab. Pt denies any further needs. Call light within reach. Bed alarm engaged.

## 2025-04-17 NOTE — ED NOTES
Pt denies throat abnormalties. Pt denies confusion. Pt denies itching. Pt states she feels fine post administration of cephalexin 500

## 2025-04-17 NOTE — ED NOTES
Pt AAOx4. Pt is calm and cooperative. Family at bedside. Discharge instructions provided by MD. Pt and family verbalized understanding

## 2025-04-17 NOTE — ED PROVIDER NOTES
Encounter Date: 4/16/2025       History     Chief Complaint   Patient presents with    Urinary Tract Infection     Pt family states was d/x with a UTI about a week ago and started on meds. Pt completed meds and still has dysuria and cloudy appearing urine     89-year-old female with past medical history as below brought in by family due to concern about UTI.  They report that she completed the Bactrim that she was prescribed during hospitalization earlier this month.  They report that her urine cleared out for a few days but then a few days ago it became cloudy and foul-smelling.  They noted that she was also more confused than normal this morning they deny that she has had any fever, chills.  She has been eating and drinking well and has no abdominal pain, vomiting or diarrhea.    The history is provided by the patient and a relative.     Review of patient's allergies indicates:   Allergen Reactions    Cephalexin     Codeine     Meperidine      Other reaction(s): delerium, hallucination  Delerium  Delerium  Delerium      Nsaids (non-steroidal anti-inflammatory drug)     Penicillins     Tazarotene      Other reaction(s): rash, Unknown     Past Medical History:   Diagnosis Date    Allergy     Anemia, unspecified     Arthritis     CKD (chronic kidney disease) stage 4, GFR 15-29 ml/min     COPD (chronic obstructive pulmonary disease)     Edema     HTN (hypertension)     Hypocalcemia     Hyponatremia     Osteoarthritis      Past Surgical History:   Procedure Laterality Date    BREAST CYST EXCISION      CAUDAL EPIDURAL STEROID INJECTION N/A 2/2/2023    Procedure: Injection-steroid-epidural-caudal;  Surgeon: Angel Sparrow MD;  Location: [facility];  Service: Pain Management;  Laterality: N/A;    FOOT SURGERY      FUSION OF CERVICAL SPINE BY POSTERIOR APPROACH N/A 3/19/2025    Procedure: FUSION, SPINE, CERVICAL, POSTERIOR APPROACH;  Surgeon: Clint Lemons MD;  Location: [facility] OR [address] [place] [address];  Service: Neurosurgery;   Laterality: N/A;  Occiput-C4     Family History   Problem Relation Name Age of Onset    Cancer Mother      No Known Problems Father       Social History[1]  Review of Systems    Physical Exam     Initial Vitals [04/16/25 2114]   BP Pulse Resp Temp SpO2   (!) 127/57 71 18 -- 99 %      MAP       --         Physical Exam    Nursing note and vitals reviewed.  Constitutional: She appears well-developed. She is not diaphoretic. No distress.   HENT:   Head: Normocephalic and atraumatic.   Eyes: EOM are normal. Pupils are equal, round, and reactive to light.   Neck:   C-collar in place   Cardiovascular:  Normal rate.           Pulmonary/Chest: No stridor. No respiratory distress.   Abdominal: Abdomen is soft. She exhibits no distension. There is no abdominal tenderness.   No CVAT bilaterally   Musculoskeletal:         General: Normal range of motion.     Neurological: She is alert and oriented to person, place, and time.   Skin: Skin is warm and dry.   Psychiatric: She has a normal mood and affect.         ED Course   Procedures  Labs Reviewed   URINALYSIS, REFLEX TO URINE CULTURE - Abnormal       Result Value    Color, UA Yellow      Appearance, UA Cloudy (*)     pH, UA 6.0      Spec Grav UA 1.010      Protein, UA Trace (*)     Glucose, UA Negative      Ketones, UA Negative      Bilirubin, UA Negative      Blood, UA 1+ (*)     Nitrites, UA Negative      Urobilinogen, UA Negative      Leukocyte Esterase, UA 3+ (*)    COMPREHENSIVE METABOLIC PANEL - Abnormal    Sodium 136      Potassium 4.5      Chloride 100      CO2 23      Glucose 122 (*)     BUN 32 (*)     Creatinine 1.3      Calcium 9.3      Protein Total 6.7      Albumin 2.6 (*)     Bilirubin Total 0.1       (*)     AST 16      ALT 11      Anion Gap 13      eGFR 39 (*)    CBC WITH DIFFERENTIAL - Abnormal    WBC 5.92      RBC 3.17 (*)     HGB 9.3 (*)     HCT 28.8 (*)     MCV 91      MCH 29.3      MCHC 32.3      RDW 16.5 (*)     Platelet Count 469 (*)     MPV 9.2       Nucleated RBC 0      Neut % 66.9      Lymph % 14.4 (*)     Mono % 14.7      Eos % 2.5      Basophil % 1.2      Imm Grans % 0.3      Neut # 3.96      Lymph # 0.85 (*)     Mono # 0.87      Eos # 0.15      Baso # 0.07      Imm Grans # 0.02     URINALYSIS MICROSCOPIC - Abnormal    RBC, UA 16 (*)     WBC, UA >100 (*)     WBC Clumps, UA Many (*)     Bacteria, UA None      Non-Squamous Epithelial Cells 2 (*)     Microscopic Comment       MAGNESIUM - Normal    Magnesium  2.3     CULTURE, URINE   CBC W/ AUTO DIFFERENTIAL    Narrative:     The following orders were created for panel order CBC auto differential.  Procedure                               Abnormality         Status                     ---------                               -----------         ------                     CBC with Differential[[id number]]       Abnormal            Final result                 Please view results for these tests on the individual orders.   GREY TOP URINE HOLD    Extra Tube Hold for add-ons.            Imaging Results    None          Medications   cephALEXin capsule 500 mg (500 mg Oral Given 4/17/25 0026)     Medical Decision Making  89-year-old female with past medical history as above presents for evaluation with family due to concern about UTI.  Differential includes but isn't limited to UTI, pyelonephritis, electrolyte abnormality, RADHA.  Chart review of most recent urine culture shows that it was resistant to Bactrim which is what she was prescribed.  She did however have a period where it seemed her symptoms resolved. UA today resulted with out any bacteria however given presence of leukocytes, > 100 WBC with clumps, and associated symptoms, will still treat with abx.  Shared decision-making conversation with patient and family, they strongly prefer discharge and are very attentive to her needs and care at home.  Prescribed Keflex, and given strict return precautions and outpatient follow up.  They were informed that a urine  culture is in process and if it results resistant to the prescribed therapy she will receive a phone call.    Amount and/or Complexity of Data Reviewed  Independent Historian:      Details: Relatives at bedside throughout ED stay  External Data Reviewed: notes.     Details: 4/5/25 urine culture reviewed  Discharged 4/8/25 after 9 day admission, during which time she was started on Bactrim for UTI   Labs: ordered. Decision-making details documented in ED Course.    Risk  Prescription drug management.  Decision regarding hospitalization.               ED Course as of 04/17/25 0149   Wed Apr 16, 2025 2331 Creatinine: 1.3  Normal  [AT]   2331 WBC: 5.92  Normal  [AT]   2331 Hemoglobin(!): 9.3  Stable chronic anemia [AT]   Thu Apr 17, 2025 0039 Urinalysis Microscopic(!)  Will treat with Keflex [AT]      ED Course User Index  [AT] Sharona Walden MD                           Clinical Impression:  Final diagnoses:  [N30.00] Acute cystitis without hematuria (Primary)          ED Disposition Condition    Discharge Good          ED Prescriptions       Medication Sig Dispense Start Date End Date Auth. Provider    cephALEXin (KEFLEX) 500 MG capsule Take 1 capsule (500 mg total) by mouth 4 (four) times daily. for 7 days 28 capsule 4/17/2025 4/24/2025 Sharona Walden MD          Follow-up Information       Follow up With Specialties Details Why Contact Info    Your primary care physician        Lluvia Rahman MD Nephrology Go to  as scheduled [address] [facility]  [address]  [facility] [address]  [phone]      [facility] - Emergency Dept Emergency Medicine  As needed, If symptoms worsen [address] [facility] [id number] [phone]                 [1]   Social History  Tobacco Use    Smoking status: Former    Smokeless tobacco: Never   Substance Use Topics    Alcohol use: Not Currently    Drug use: Never        Sharona Walden MD  04/17/25 0149

## 2025-04-18 ENCOUNTER — RESULTS FOLLOW-UP (OUTPATIENT)
Dept: EMERGENCY MEDICINE | Facility: HOSPITAL | Age: OVER 89
End: 2025-04-18

## 2025-04-21 LAB — BACTERIA UR CULT: ABNORMAL

## 2025-04-24 ENCOUNTER — LAB VISIT (OUTPATIENT)
Dept: LAB | Facility: HOSPITAL | Age: OVER 89
End: 2025-04-24
Attending: INTERNAL MEDICINE
Payer: MEDICARE

## 2025-04-24 DIAGNOSIS — N18.32 STAGE 3B CHRONIC KIDNEY DISEASE: ICD-10-CM

## 2025-04-24 DIAGNOSIS — N25.81 SECONDARY HYPERPARATHYROIDISM OF RENAL ORIGIN: ICD-10-CM

## 2025-04-24 DIAGNOSIS — E55.9 VITAMIN D DEFICIENCY: ICD-10-CM

## 2025-04-24 LAB
25(OH)D3+25(OH)D2 SERPL-MCNC: 63 NG/ML (ref 30–96)
ABSOLUTE EOSINOPHIL (OHS): 0.18 K/UL
ABSOLUTE MONOCYTE (OHS): 0.72 K/UL (ref 0.3–1)
ABSOLUTE NEUTROPHIL COUNT (OHS): 6.54 K/UL (ref 1.8–7.7)
ALBUMIN SERPL BCP-MCNC: 2.6 G/DL (ref 3.5–5.2)
ALBUMIN/CREAT UR: 60.9 UG/MG
ANION GAP (OHS): 11 MMOL/L (ref 8–16)
BACTERIA #/AREA URNS AUTO: ABNORMAL /HPF
BASOPHILS # BLD AUTO: 0.06 K/UL
BASOPHILS NFR BLD AUTO: 0.7 %
BILIRUB UR QL STRIP.AUTO: NEGATIVE
BUN SERPL-MCNC: 36 MG/DL (ref 8–23)
CALCIUM SERPL-MCNC: 9.3 MG/DL (ref 8.7–10.5)
CHLORIDE SERPL-SCNC: 97 MMOL/L (ref 95–110)
CLARITY UR: ABNORMAL
CO2 SERPL-SCNC: 23 MMOL/L (ref 23–29)
COLOR UR AUTO: YELLOW
CREAT SERPL-MCNC: 1.4 MG/DL (ref 0.5–1.4)
CREAT UR-MCNC: 23 MG/DL (ref 15–325)
CREAT UR-MCNC: 24 MG/DL (ref 15–325)
ERYTHROCYTE [DISTWIDTH] IN BLOOD BY AUTOMATED COUNT: 16.2 % (ref 11.5–14.5)
GFR SERPLBLD CREATININE-BSD FMLA CKD-EPI: 36 ML/MIN/1.73/M2
GLUCOSE SERPL-MCNC: 155 MG/DL (ref 70–110)
GLUCOSE UR QL STRIP: NEGATIVE
HCT VFR BLD AUTO: 32.5 % (ref 37–48.5)
HGB BLD-MCNC: 10.4 GM/DL (ref 12–16)
HGB UR QL STRIP: NEGATIVE
HYALINE CASTS UR QL AUTO: 6 /LPF (ref 0–1)
IMM GRANULOCYTES # BLD AUTO: 0.06 K/UL (ref 0–0.04)
IMM GRANULOCYTES NFR BLD AUTO: 0.7 % (ref 0–0.5)
KETONES UR QL STRIP: NEGATIVE
LEUKOCYTE ESTERASE UR QL STRIP: ABNORMAL
LYMPHOCYTES # BLD AUTO: 0.99 K/UL (ref 1–4.8)
MAGNESIUM SERPL-MCNC: 2 MG/DL (ref 1.6–2.6)
MCH RBC QN AUTO: 29.2 PG (ref 27–31)
MCHC RBC AUTO-ENTMCNC: 32 G/DL (ref 32–36)
MCV RBC AUTO: 91 FL (ref 82–98)
MICROALBUMIN UR-MCNC: 14 UG/ML (ref ?–5000)
MICROSCOPIC COMMENT: ABNORMAL
NITRITE UR QL STRIP: NEGATIVE
NUCLEATED RBC (/100WBC) (OHS): 0 /100 WBC
PH UR STRIP: 7 [PH]
PHOSPHATE SERPL-MCNC: 5.1 MG/DL (ref 2.7–4.5)
PLATELET # BLD AUTO: 366 K/UL (ref 150–450)
PMV BLD AUTO: 9.9 FL (ref 9.2–12.9)
POTASSIUM SERPL-SCNC: 5.4 MMOL/L (ref 3.5–5.1)
PROT UR QL STRIP: NEGATIVE
PROT UR-MCNC: <7 MG/DL
PROT/CREAT UR: NORMAL MG/G{CREAT}
PTH-INTACT SERPL-MCNC: 21.6 PG/ML (ref 9–77)
RBC # BLD AUTO: 3.56 M/UL (ref 4–5.4)
RBC #/AREA URNS AUTO: 10 /HPF (ref 0–4)
RELATIVE EOSINOPHIL (OHS): 2.1 %
RELATIVE LYMPHOCYTE (OHS): 11.6 % (ref 18–48)
RELATIVE MONOCYTE (OHS): 8.4 % (ref 4–15)
RELATIVE NEUTROPHIL (OHS): 76.5 % (ref 38–73)
SODIUM SERPL-SCNC: 131 MMOL/L (ref 136–145)
SP GR UR STRIP: 1.01
SQUAMOUS #/AREA URNS AUTO: 1 /HPF
URATE SERPL-MCNC: 7.6 MG/DL (ref 2.4–5.7)
UROBILINOGEN UR STRIP-ACNC: NEGATIVE EU/DL
WBC # BLD AUTO: 8.55 K/UL (ref 3.9–12.7)
WBC #/AREA URNS AUTO: >100 /HPF (ref 0–5)
WBC CLUMPS UR QL AUTO: ABNORMAL

## 2025-04-24 PROCEDURE — 80069 RENAL FUNCTION PANEL: CPT

## 2025-04-24 PROCEDURE — 82306 VITAMIN D 25 HYDROXY: CPT

## 2025-04-24 PROCEDURE — 82043 UR ALBUMIN QUANTITATIVE: CPT

## 2025-04-24 PROCEDURE — 81003 URINALYSIS AUTO W/O SCOPE: CPT

## 2025-04-24 PROCEDURE — 85025 COMPLETE CBC W/AUTO DIFF WBC: CPT

## 2025-04-24 PROCEDURE — 36415 COLL VENOUS BLD VENIPUNCTURE: CPT

## 2025-04-24 PROCEDURE — 84550 ASSAY OF BLOOD/URIC ACID: CPT

## 2025-04-24 PROCEDURE — 83970 ASSAY OF PARATHORMONE: CPT

## 2025-04-24 PROCEDURE — 83735 ASSAY OF MAGNESIUM: CPT

## 2025-04-24 PROCEDURE — 82570 ASSAY OF URINE CREATININE: CPT

## 2025-05-01 ENCOUNTER — OFFICE VISIT (OUTPATIENT)
Dept: NEUROSURGERY | Facility: CLINIC | Age: OVER 89
End: 2025-05-01
Payer: MEDICARE

## 2025-05-01 ENCOUNTER — HOSPITAL ENCOUNTER (OUTPATIENT)
Dept: RADIOLOGY | Facility: HOSPITAL | Age: OVER 89
Discharge: HOME | End: 2025-05-01
Attending: PHYSICIAN ASSISTANT
Payer: MEDICARE

## 2025-05-01 VITALS
SYSTOLIC BLOOD PRESSURE: 137 MMHG | HEART RATE: 76 BPM | WEIGHT: 128.06 LBS | BODY MASS INDEX: 22.69 KG/M2 | DIASTOLIC BLOOD PRESSURE: 78 MMHG | TEMPERATURE: 99 F

## 2025-05-01 DIAGNOSIS — S12.110A ODONTOID FRACTURE: ICD-10-CM

## 2025-05-01 DIAGNOSIS — S12.100D CLOSED ODONTOID FRACTURE WITH ROUTINE HEALING: ICD-10-CM

## 2025-05-01 DIAGNOSIS — G95.20 SPINAL CORD COMPRESSION: ICD-10-CM

## 2025-05-01 DIAGNOSIS — Z98.1 S/P CERVICAL SPINAL FUSION: Primary | ICD-10-CM

## 2025-05-01 PROCEDURE — 1101F PT FALLS ASSESS-DOCD LE1/YR: CPT | Mod: CPTII,S$GLB,, | Performed by: NURSE PRACTITIONER

## 2025-05-01 PROCEDURE — 3288F FALL RISK ASSESSMENT DOCD: CPT | Mod: CPTII,S$GLB,, | Performed by: NURSE PRACTITIONER

## 2025-05-01 PROCEDURE — 99024 POSTOP FOLLOW-UP VISIT: CPT | Mod: S$GLB,,, | Performed by: NURSE PRACTITIONER

## 2025-05-01 PROCEDURE — 1160F RVW MEDS BY RX/DR IN RCRD: CPT | Mod: CPTII,S$GLB,, | Performed by: NURSE PRACTITIONER

## 2025-05-01 PROCEDURE — 1126F AMNT PAIN NOTED NONE PRSNT: CPT | Mod: CPTII,S$GLB,, | Performed by: NURSE PRACTITIONER

## 2025-05-01 PROCEDURE — 72040 X-RAY EXAM NECK SPINE 2-3 VW: CPT | Mod: TC

## 2025-05-01 PROCEDURE — 72040 X-RAY EXAM NECK SPINE 2-3 VW: CPT | Mod: 26,,, | Performed by: RADIOLOGY

## 2025-05-01 PROCEDURE — 1159F MED LIST DOCD IN RCRD: CPT | Mod: CPTII,S$GLB,, | Performed by: NURSE PRACTITIONER

## 2025-05-01 PROCEDURE — 99999 PR PBB SHADOW E&M-EST. PATIENT-LVL IV: CPT | Mod: PBBFAC,,, | Performed by: NURSE PRACTITIONER

## 2025-05-01 NOTE — PROGRESS NOTES
Neurosurgery  Established Patient    SUBJECTIVE:     History of Present Illness: Bridget Montgomery is a 89 y.o. female s/p O-C4 PCF on 3/19/25 with Dr. Lemons. She is being seen in clinic today with her daughter for her 6 week post-op evaluation. States that she is doing well since the surgery. She is at home with her daughter receiving excellent care and Home Health PT/OT. She has been compliant with the cervical collar. Denies fever, chills, or new neurological deficits.     Review of patient's allergies indicates:   Allergen Reactions    Cephalexin     Codeine     Meperidine      Other reaction(s): delerium, hallucination  Delerium  Delerium  Delerium      Nsaids (non-steroidal anti-inflammatory drug)     Penicillins     Tazarotene      Other reaction(s): rash, Unknown       Current Medications[1]    Past Medical History:   Diagnosis Date    Allergy     Anemia, unspecified     Arthritis     CKD (chronic kidney disease) stage 4, GFR 15-29 ml/min     COPD (chronic obstructive pulmonary disease)     Edema     HTN (hypertension)     Hypocalcemia     Hyponatremia     Osteoarthritis      Past Surgical History:   Procedure Laterality Date    BREAST CYST EXCISION      CAUDAL EPIDURAL STEROID INJECTION N/A 2/2/2023    Procedure: Injection-steroid-epidural-caudal;  Surgeon: Angel Sparrow MD;  Location: [facility];  Service: Pain Management;  Laterality: N/A;    FOOT SURGERY      FUSION OF CERVICAL SPINE BY POSTERIOR APPROACH N/A 3/19/2025    Procedure: FUSION, SPINE, CERVICAL, POSTERIOR APPROACH;  Surgeon: Clint Lemons MD;  Location: [address];  Service: Neurosurgery;  Laterality: N/A;  Occiput-C4     Family History       Problem Relation (Age of Onset)    Cancer Mother    No Known Problems Father          Social History     Socioeconomic History    Marital status:    Tobacco Use    Smoking status: Former    Smokeless tobacco: Never   Substance and Sexual Activity    Alcohol use: Not Currently     Drug use: Never    Sexual activity: Not Currently     Social Drivers of Health     Financial Resource Strain: Low Risk  (3/31/2025)    Overall Financial Resource Strain (CARDIA)     Difficulty of Paying Living Expenses: Not hard at all   Food Insecurity: No Food Insecurity (3/31/2025)    Hunger Vital Sign     Worried About Running Out of Food in the Last Year: Never true     Ran Out of Food in the Last Year: Never true   Transportation Needs: No Transportation Needs (3/23/2025)    PRAPARE - Transportation     Lack of Transportation (Medical): No     Lack of Transportation (Non-Medical): No   Physical Activity: Insufficiently Active (3/10/2025)    Exercise Vital Sign     Days of Exercise per Week: 4 days     Minutes of Exercise per Session: 30 min   Stress: No Stress Concern Present (3/31/2025)    [employer] - Occupational Stress Questionnaire     Feeling of Stress : Not at all   Housing Stability: Low Risk  (3/31/2025)    Housing Stability Vital Sign     Unable to Pay for Housing in the Last Year: No     Homeless in the Last Year: No       Review of Systems    OBJECTIVE:     Vital Signs  Temp: 98.6 °F (37 °C)  Pulse: 76  BP: 137/78  Pain Score: 0-No pain  Weight: 58.1 kg (128 lb 1.4 oz)  Body mass index is 22.69 kg/m².    Neurosurgery Physical Exam  General: well developed, well nourished, no distress.   Head: normocephalic, atraumatic  Neurologic: Alert and oriented. Thought content appropriate.  GCS: Motor: 6/Verbal: 5/Eyes: 4 GCS Total: 15  Mental Status: Awake, Alert, Oriented x 4  Language: No aphasia  Speech: No dysarthria  Cranial nerves: face symmetric, tongue midline, CN II-XII grossly intact.   Eyes: pupils equal, round, reactive to light with accomodation, EOMI.   Pulmonary: normal respirations, no signs of respiratory distress  Abdomen: soft, non-distended  Skin: Skin is warm, dry and intact. Incision well-healed  Sensory: intact to light touch throughout  Motor  Strength:Moves all extremities spontaneously with good tone.       Diagnostic Results:  I have personally reviewed the x-ray cervical spine dated 5/1/25 occiput and C4 PCF with hardware in stable position.      ASSESSMENT/PLAN:     Bridget Montgomery is a 89 y.o. female s/p O-C4 PCF on 3/19/25 with Dr. Lemons. She was seen in clinic today with her daughter for her 6 week post-op evaluation. States that she is doing well since the surgery. I have ordered a CT cervical spine to obtain in 6 weeks to assess for bony fusion. I would like the patient to follow-up in clinic with Dr. Lemons for the 3 month post-op evaluation. I have encouraged her to contact the clinic with any questions, concerns, or adverse clinical changes. She verbalized understanding.      XENIA Jeffery-DANNI  Neurosurgery  [facility].      Note dictated with voice recognition software, please excuse any grammatical errors.            [1]   Current Outpatient Medications   Medication Sig Dispense Refill    albuterol (PROVENTIL/VENTOLIN HFA) 90 mcg/actuation inhaler Inhale 2 puffs into the lungs every 6 (six) hours as needed.      amLODIPine (NORVASC) 10 MG tablet Take 0.5 tablets (5 mg total) by mouth once daily.      bethanechol (URECHOLINE) 10 MG Tab Take 1 tablet (10 mg total) by mouth 3 (three) times daily. 90 tablet 11    ciprofloxacin HCl (CIPRO) 500 MG tablet       fluticasone furoate-vilanteroL (BREO) 100-25 mcg/dose diskus inhaler Inhale 1 puff into the lungs once daily. Controller 60 each 1    fluticasone propionate (FLONASE) 50 mcg/actuation nasal spray 1 spray by Nasal route 2 (two) times daily.      furosemide (LASIX) 20 MG tablet       gabapentin (NEURONTIN) 300 MG capsule Take 300 mg by mouth every evening.      HYDROcodone-acetaminophen (NORCO) 5-325 mg per tablet Take 1 tablet by mouth every 6 (six) hours as needed for Pain. 28 tablet 0    melatonin (MELATIN) 3 mg tablet Take 2 tablets (6 mg total) by mouth  nightly as needed for Insomnia. 30 tablet 0    methocarbamoL (ROBAXIN) 500 MG Tab 1.5 tablets Orally every 4 hrs      methylPREDNISolone (MEDROL DOSEPACK) 4 mg tablet       omega 3-dha-epa-fish oil 1,000 mg (120 mg-180 mg) Cap Take 1 capsule by mouth once daily.      omeprazole (PRILOSEC) 20 MG capsule Take 1 capsule (20 mg total) by mouth 2 (two) times daily before meals. 60 capsule 0    polyethylene glycol (GLYCOLAX) 17 gram/dose powder Use cap to measure 17 grams.  Dissolve as directed and take by mouth once daily. (Patient taking differently: Take 17 g by mouth as needed.) 510 g 0    tamsulosin (FLOMAX) 0.4 mg Cap Take 1 capsule (0.4 mg total) by mouth once daily. 30 capsule 11    traMADoL (ULTRAM) 50 mg tablet Take 50 mg by mouth every 6 (six) hours as needed.       No current facility-administered medications for this visit.

## 2025-06-20 ENCOUNTER — HOSPITAL ENCOUNTER (OUTPATIENT)
Dept: RADIOLOGY | Facility: HOSPITAL | Age: OVER 89
Discharge: HOME | End: 2025-06-20
Attending: NURSE PRACTITIONER
Payer: MEDICARE

## 2025-06-20 DIAGNOSIS — S12.100D CLOSED ODONTOID FRACTURE WITH ROUTINE HEALING: ICD-10-CM

## 2025-06-20 DIAGNOSIS — Z98.1 S/P CERVICAL SPINAL FUSION: ICD-10-CM

## 2025-06-20 DIAGNOSIS — G95.20 SPINAL CORD COMPRESSION: ICD-10-CM

## 2025-06-20 PROCEDURE — 72125 CT NECK SPINE W/O DYE: CPT | Mod: TC

## 2025-06-20 PROCEDURE — 72125 CT NECK SPINE W/O DYE: CPT | Mod: 26,,, | Performed by: RADIOLOGY

## 2025-06-23 ENCOUNTER — OFFICE VISIT (OUTPATIENT)
Dept: NEUROSURGERY | Facility: CLINIC | Age: OVER 89
End: 2025-06-23
Payer: MEDICARE

## 2025-06-23 VITALS — DIASTOLIC BLOOD PRESSURE: 77 MMHG | HEART RATE: 86 BPM | SYSTOLIC BLOOD PRESSURE: 169 MMHG

## 2025-06-23 DIAGNOSIS — Z98.1 HISTORY OF FUSION OF CERVICAL SPINE: Primary | ICD-10-CM

## 2025-06-23 PROCEDURE — 3288F FALL RISK ASSESSMENT DOCD: CPT | Mod: CPTII,S$GLB,, | Performed by: NEUROLOGICAL SURGERY

## 2025-06-23 PROCEDURE — 1159F MED LIST DOCD IN RCRD: CPT | Mod: CPTII,S$GLB,, | Performed by: NEUROLOGICAL SURGERY

## 2025-06-23 PROCEDURE — 1101F PT FALLS ASSESS-DOCD LE1/YR: CPT | Mod: CPTII,S$GLB,, | Performed by: NEUROLOGICAL SURGERY

## 2025-06-23 PROCEDURE — 99213 OFFICE O/P EST LOW 20 MIN: CPT | Mod: S$GLB,,, | Performed by: NEUROLOGICAL SURGERY

## 2025-06-23 PROCEDURE — 99999 PR PBB SHADOW E&M-EST. PATIENT-LVL III: CPT | Mod: PBBFAC,,, | Performed by: NEUROLOGICAL SURGERY

## 2025-06-23 NOTE — PROGRESS NOTES
CHIEF COMPLAINT:  Post op     I, Princess Branham, attest that this documentation has been prepared under the direction and in the presence of Clint Lemons MD.    HPI from 5/1/2025:  Bridget Montgomery is a 89 y.o. female s/p O-C4 PCF on 3/19/25 with Dr. Lemons. She is being seen in clinic today with her daughter for her 6 week post-op evaluation. States that she is doing well since the surgery. She is at home with her daughter receiving excellent care and Home Health PT/OT. She has been compliant with the cervical collar. Denies fever, chills, or new neurological deficits.     Interval Hx:   Today the patient presents for 3 month post op s/p occiput-C4 PCF on 3/19/2025. She states that she is not in any pain, aside from some incisional pain at the site of the surgery. The incision is well-healed.     Patient denies any other complaints at this time.    Review of patient's allergies indicates:   Allergen Reactions    Cephalexin     Codeine     Meperidine      Other reaction(s): delerium, hallucination  Delerium  Delerium  Delerium      Nsaids (non-steroidal anti-inflammatory drug)     Penicillins     Tazarotene      Other reaction(s): rash, Unknown       Past Medical History:   Diagnosis Date    Allergy     Anemia, unspecified     Arthritis     CKD (chronic kidney disease) stage 4, GFR 15-29 ml/min     COPD (chronic obstructive pulmonary disease)     Edema     HTN (hypertension)     Hypocalcemia     Hyponatremia     Osteoarthritis      Past Surgical History:   Procedure Laterality Date    BREAST CYST EXCISION      CAUDAL EPIDURAL STEROID INJECTION N/A 2/2/2023    Procedure: Injection-steroid-epidural-caudal;  Surgeon: Angel Sparrow MD;  Location: [facility];  Service: Pain Management;  Laterality: N/A;    FOOT SURGERY      FUSION OF CERVICAL SPINE BY POSTERIOR APPROACH N/A 3/19/2025    Procedure: FUSION, SPINE, CERVICAL, POSTERIOR APPROACH;  Surgeon: Clint Lemons MD;  Location: [facility] OR [address] [place] [address];   Service: Neurosurgery;  Laterality: N/A;  Occiput-C4     Family History   Problem Relation Name Age of Onset    Cancer Mother      No Known Problems Father       Social History[1]     Review of Systems   All other systems reviewed and are negative.      OBJECTIVE:   Vital Signs:       Physical Exam:    Vital signs: All nursing notes and vital signs reviewed -- afebrile, vital signs stable.  Constitutional: Patient sitting comfortably in chair. Appears well developed and well nourished.  Skin: Exposed areas are intact without abnormal markings, rashes or other lesions.  HEENT: Normocephalic. Normal conjunctivae.  Cardiovascular: Normal rate and regular rhythm.  Respiratory: Chest wall rises and falls symmetrically, without signs of respiratory distress.  Abdomen: Soft and non-tender.  Extremities: Warm and without edema. Calves supple, non-tender.  Psych/Behavior: Normal affect.    Neurological:    Mental status: Alert and oriented. Conversational and appropriate.       Cranial Nerves: VFF to confrontation. PERRL. EOMI without nystagmus. Facial STLT normal and symmetric. Strong, symmetric muscles of mastication. Facial strength full and symmetric. Hearing equal bilaterally to finger rub. Palate and uvula rise and fall normally in midline. Shoulder shrug 5/5 strength. Tongue midline.     Motor:    Upper:  Deltoids Triceps Biceps WE WF     R 5/5 5/5 5/5 5/5 5/5 5/5    L 5/5 5/5 5/5 5/5 5/5 5/5      Lower:  HF KE KF DF PF EHL    R 5/5 5/5 5/5 5/5 5/5 5/5    L 5/5 5/5 5/5 5/5 5/5 5/5     Sensory: Intact sensation to light touch in all extremities. Romberg negative.    Reflexes:          DTR: 2+ symmetrically throughout.     Schneider's: Negative.     Babinski's: Negative.     Clonus: Negative.    Cerebellar: Finger-to-nose and rapid alternating movements normal. Gait stable, fluid.    Spine:    Posture: Head well aligned over pelvis in front and side views.  No focal or global spinal deformity visible on inspection.  Shoulders and hips even. No obvious leg length discrepancy. No scapula winging.    Bending: Full ROM with forward, back and lateral bending. No rib prominence with forward bend.    Cervical:      ROM: Full with flexion, extension, lateral rotation and ear-to-shoulder bend.      Midline TTP: Negative.     Spurling's test: Negative.     Lhermitte's: Negative.    Thoracic:     Midline TTP: Negative    Lumbar:     Midline TTP: Negative     Straight Leg Test: Negative     Crossed Straight Leg Test: Negative     Sciatic notch tenderness: Negative.    Other:     SI joint TTP: Negative.     Greater trochanter TTP: Negative.     Tenderness with external/internal hip rotation: Negative.    Diagnostic Results:  All imaging was independently reviewed by me.    CT cervical spine, dated 6/20/2025:  1. Solid bony fusion.     ASSESSMENT/PLAN:     Bridget Montgomery is doing great 3 months after OC fusion. She has no significant neck pain and no new neurologic deficits. Her CT shows solid fusion. We can wean her collar. She can continue PT without any cervical restirctions. RTC at 1 year from surgery.    The patient understands and agrees with the plan of care. All questions were answered.     1. RTC at 1 year from surgery    I, Dr. Clint Lemons personally performed the services described in this documentation. All medical record entries made by the scribe, Princess Branham, were at my direction and in my presence. I have reviewed the chart and agree that the record reflects my personal performance and is accurate and complete.      Clint Lemons M.D.  Department of Neurosurgery  [facility]          [1]   Social History  Tobacco Use    Smoking status: Former    Smokeless tobacco: Never   Substance Use Topics    Alcohol use: Not Currently    Drug use: Never

## 2025-07-21 ENCOUNTER — LAB VISIT (OUTPATIENT)
Dept: LAB | Facility: HOSPITAL | Age: OVER 89
End: 2025-07-21
Attending: INTERNAL MEDICINE
Payer: MEDICARE

## 2025-07-21 DIAGNOSIS — E83.39 HYPERPHOSPHATEMIA: ICD-10-CM

## 2025-07-21 DIAGNOSIS — N18.32 ANEMIA IN STAGE 3B CHRONIC KIDNEY DISEASE: ICD-10-CM

## 2025-07-21 DIAGNOSIS — D63.1 ANEMIA IN STAGE 3B CHRONIC KIDNEY DISEASE: ICD-10-CM

## 2025-07-21 DIAGNOSIS — E87.1 HYPONATREMIA: ICD-10-CM

## 2025-07-21 DIAGNOSIS — E87.5 HYPERKALEMIA: ICD-10-CM

## 2025-07-21 DIAGNOSIS — N25.81 SECONDARY HYPERPARATHYROIDISM OF RENAL ORIGIN: ICD-10-CM

## 2025-07-21 DIAGNOSIS — E79.0 HYPERURICEMIA: ICD-10-CM

## 2025-07-21 DIAGNOSIS — N18.32 STAGE 3B CHRONIC KIDNEY DISEASE: ICD-10-CM

## 2025-07-21 LAB
ABSOLUTE EOSINOPHIL (OHS): 0.05 K/UL
ABSOLUTE MONOCYTE (OHS): 0.53 K/UL (ref 0.3–1)
ABSOLUTE NEUTROPHIL COUNT (OHS): 2.92 K/UL (ref 1.8–7.7)
ALBUMIN SERPL BCP-MCNC: 3.7 G/DL (ref 3.5–5.2)
ALBUMIN/CREAT UR: 25 UG/MG
ANION GAP (OHS): 15 MMOL/L (ref 8–16)
BACTERIA #/AREA URNS AUTO: ABNORMAL /HPF
BASOPHILS # BLD AUTO: 0.05 K/UL
BASOPHILS NFR BLD AUTO: 0.8 %
BILIRUB UR QL STRIP.AUTO: NEGATIVE
BUN SERPL-MCNC: 34 MG/DL (ref 8–23)
CALCIUM SERPL-MCNC: 8.8 MG/DL (ref 8.7–10.5)
CHLORIDE SERPL-SCNC: 102 MMOL/L (ref 95–110)
CLARITY UR: CLEAR
CO2 SERPL-SCNC: 20 MMOL/L (ref 23–29)
COLOR UR AUTO: YELLOW
CREAT SERPL-MCNC: 1.3 MG/DL (ref 0.5–1.4)
CREAT UR-MCNC: 48 MG/DL (ref 15–325)
CREAT UR-MCNC: 49 MG/DL (ref 15–325)
ERYTHROCYTE [DISTWIDTH] IN BLOOD BY AUTOMATED COUNT: 13.9 % (ref 11.5–14.5)
FERRITIN SERPL-MCNC: 103 NG/ML (ref 20–300)
GFR SERPLBLD CREATININE-BSD FMLA CKD-EPI: 39 ML/MIN/1.73/M2
GLUCOSE SERPL-MCNC: 89 MG/DL (ref 70–110)
GLUCOSE UR QL STRIP: NEGATIVE
HCT VFR BLD AUTO: 37.6 % (ref 37–48.5)
HGB BLD-MCNC: 12.3 GM/DL (ref 12–16)
HGB UR QL STRIP: NEGATIVE
HYALINE CASTS UR QL AUTO: 5 /LPF (ref 0–1)
IMM GRANULOCYTES # BLD AUTO: 0.02 K/UL (ref 0–0.04)
IMM GRANULOCYTES NFR BLD AUTO: 0.3 % (ref 0–0.5)
IRON SATN MFR SERPL: 18 % (ref 20–50)
IRON SERPL-MCNC: 67 UG/DL (ref 30–160)
KETONES UR QL STRIP: NEGATIVE
LEUKOCYTE ESTERASE UR QL STRIP: ABNORMAL
LYMPHOCYTES # BLD AUTO: 2.37 K/UL (ref 1–4.8)
MAGNESIUM SERPL-MCNC: 2.1 MG/DL (ref 1.6–2.6)
MCH RBC QN AUTO: 29.9 PG (ref 27–31)
MCHC RBC AUTO-ENTMCNC: 32.7 G/DL (ref 32–36)
MCV RBC AUTO: 92 FL (ref 82–98)
MICROALBUMIN UR-MCNC: 12 UG/ML (ref ?–5000)
MICROSCOPIC COMMENT: ABNORMAL
NITRITE UR QL STRIP: NEGATIVE
NUCLEATED RBC (/100WBC) (OHS): 0 /100 WBC
PH UR STRIP: 5 [PH]
PHOSPHATE SERPL-MCNC: 4 MG/DL (ref 2.7–4.5)
PLATELET # BLD AUTO: 410 K/UL (ref 150–450)
PMV BLD AUTO: 9.9 FL (ref 9.2–12.9)
POTASSIUM SERPL-SCNC: 4.6 MMOL/L (ref 3.5–5.1)
PROT UR QL STRIP: NEGATIVE
PROT UR-MCNC: <7 MG/DL
PROT/CREAT UR: NORMAL MG/G{CREAT}
PTH-INTACT SERPL-MCNC: 60.8 PG/ML (ref 9–77)
RBC # BLD AUTO: 4.11 M/UL (ref 4–5.4)
RBC #/AREA URNS AUTO: 1 /HPF (ref 0–4)
RELATIVE EOSINOPHIL (OHS): 0.8 %
RELATIVE LYMPHOCYTE (OHS): 39.9 % (ref 18–48)
RELATIVE MONOCYTE (OHS): 8.9 % (ref 4–15)
RELATIVE NEUTROPHIL (OHS): 49.3 % (ref 38–73)
SODIUM SERPL-SCNC: 137 MMOL/L (ref 136–145)
SP GR UR STRIP: 1.01
SQUAMOUS #/AREA URNS AUTO: 2 /HPF
TIBC SERPL-MCNC: 382 UG/DL (ref 250–450)
TRANSFERRIN SERPL-MCNC: 258 MG/DL (ref 200–375)
URATE SERPL-MCNC: 7.5 MG/DL (ref 2.4–5.7)
UROBILINOGEN UR STRIP-ACNC: NEGATIVE EU/DL
WBC # BLD AUTO: 5.94 K/UL (ref 3.9–12.7)
WBC #/AREA URNS AUTO: 2 /HPF (ref 0–5)

## 2025-07-21 PROCEDURE — 81001 URINALYSIS AUTO W/SCOPE: CPT

## 2025-07-21 PROCEDURE — 85025 COMPLETE CBC W/AUTO DIFF WBC: CPT

## 2025-07-21 PROCEDURE — 83970 ASSAY OF PARATHORMONE: CPT

## 2025-07-21 PROCEDURE — 82570 ASSAY OF URINE CREATININE: CPT | Mod: 59

## 2025-07-21 PROCEDURE — 84466 ASSAY OF TRANSFERRIN: CPT

## 2025-07-21 PROCEDURE — 82043 UR ALBUMIN QUANTITATIVE: CPT

## 2025-07-21 PROCEDURE — 36415 COLL VENOUS BLD VENIPUNCTURE: CPT

## 2025-07-21 PROCEDURE — 80069 RENAL FUNCTION PANEL: CPT

## 2025-07-21 PROCEDURE — 83735 ASSAY OF MAGNESIUM: CPT

## 2025-07-21 PROCEDURE — 84550 ASSAY OF BLOOD/URIC ACID: CPT

## 2025-07-21 PROCEDURE — 82728 ASSAY OF FERRITIN: CPT

## (undated) DEVICE — PINS SKULL ADULT MAYFIELD
Type: IMPLANTABLE DEVICE | Site: CRANIAL | Status: NON-FUNCTIONAL
Removed: 2025-03-19

## (undated) DEVICE — Device

## (undated) DEVICE — SUT STRATAFIX PDS PLUS VIO

## (undated) DEVICE — BLADE CLIPPER NEURO / MDL 4411

## (undated) DEVICE — ELECTRODE REM PLYHSV RETURN 9

## (undated) DEVICE — SYR 10CC LUER LOCK

## (undated) DEVICE — BURR RND FLUT SFT TOUCH 2.0MM

## (undated) DEVICE — DRAPE STERI-DRAPE 1000 17X11IN

## (undated) DEVICE — DRESSING LEUKOPLAST FLEX 1X3IN

## (undated) DEVICE — RASP ELITE HELICOIDAL

## (undated) DEVICE — CHLORAPREP 10.5 ML APPLICATOR

## (undated) DEVICE — DRAPE ABDOMINAL TIBURON 14X11

## (undated) DEVICE — PENCIL ROCKER SWITCH 10FT CORD

## (undated) DEVICE — TUBE FRAZIER 5MM 2FT SOFT TIP

## (undated) DEVICE — KIT SURGIFLO HEMOSTATIC MATRIX

## (undated) DEVICE — SEALER AQUAMANTYS 2.3 BIPOLAR

## (undated) DEVICE — SCREW SYMPHONY PA 3.5X14MM
Type: IMPLANTABLE DEVICE | Site: CRANIAL | Status: NON-FUNCTIONAL
Removed: 2025-03-19

## (undated) DEVICE — SPONGE COTTON TRAY 4X4IN

## (undated) DEVICE — DRESSING AQUACEL FOAM 4 X 12

## (undated) DEVICE — SPHERE MARKER REFLECTIVE DISP

## (undated) DEVICE — TRAY NEURO OMC

## (undated) DEVICE — DRAPE LAP T SHT W/ INSTR PAD

## (undated) DEVICE — SUT VICRYL PLUS 0 CT1 18IN

## (undated) DEVICE — DRAPE C-ARMOR EQUIPMENT COVER

## (undated) DEVICE — SUT VICRYL 2 0 CT 2

## (undated) DEVICE — SUT ETHILON 2-0 PSLX 30IN

## (undated) DEVICE — CORD BIPOLAR 12 FOOT

## (undated) DEVICE — DRAPE C-ARM ELAS CLIP 42X120IN

## (undated) DEVICE — BUR BONE CUT MICRO TPS 3X3.8MM